# Patient Record
Sex: MALE | Race: WHITE | NOT HISPANIC OR LATINO | Employment: OTHER | URBAN - METROPOLITAN AREA
[De-identification: names, ages, dates, MRNs, and addresses within clinical notes are randomized per-mention and may not be internally consistent; named-entity substitution may affect disease eponyms.]

---

## 2023-12-11 ENCOUNTER — APPOINTMENT (OUTPATIENT)
Dept: LAB | Facility: LAB | Age: 68
End: 2023-12-11
Payer: MEDICARE

## 2023-12-11 ENCOUNTER — OFFICE VISIT (OUTPATIENT)
Dept: CARDIOLOGY | Facility: CLINIC | Age: 68
End: 2023-12-11
Payer: MEDICARE

## 2023-12-11 VITALS
OXYGEN SATURATION: 98 % | BODY MASS INDEX: 27.23 KG/M2 | HEART RATE: 62 BPM | DIASTOLIC BLOOD PRESSURE: 68 MMHG | SYSTOLIC BLOOD PRESSURE: 138 MMHG | RESPIRATION RATE: 16 BRPM | HEIGHT: 75 IN | WEIGHT: 219 LBS

## 2023-12-11 DIAGNOSIS — R00.2 PALPITATIONS: ICD-10-CM

## 2023-12-11 DIAGNOSIS — E11.9 DIABETES MELLITUS TYPE II, NON INSULIN DEPENDENT (MULTI): ICD-10-CM

## 2023-12-11 DIAGNOSIS — E78.2 MIXED HYPERLIPIDEMIA: ICD-10-CM

## 2023-12-11 DIAGNOSIS — I25.10 CORONARY ARTERY DISEASE INVOLVING NATIVE CORONARY ARTERY OF NATIVE HEART WITHOUT ANGINA PECTORIS: ICD-10-CM

## 2023-12-11 DIAGNOSIS — I10 PRIMARY HYPERTENSION: ICD-10-CM

## 2023-12-11 PROCEDURE — 3075F SYST BP GE 130 - 139MM HG: CPT | Performed by: INTERNAL MEDICINE

## 2023-12-11 PROCEDURE — 99214 OFFICE O/P EST MOD 30 MIN: CPT | Performed by: INTERNAL MEDICINE

## 2023-12-11 PROCEDURE — 1159F MED LIST DOCD IN RCRD: CPT | Performed by: INTERNAL MEDICINE

## 2023-12-11 PROCEDURE — 1036F TOBACCO NON-USER: CPT | Performed by: INTERNAL MEDICINE

## 2023-12-11 PROCEDURE — 1126F AMNT PAIN NOTED NONE PRSNT: CPT | Performed by: INTERNAL MEDICINE

## 2023-12-11 PROCEDURE — 3078F DIAST BP <80 MM HG: CPT | Performed by: INTERNAL MEDICINE

## 2023-12-11 RX ORDER — ATORVASTATIN CALCIUM 40 MG/1
40 TABLET, FILM COATED ORAL NIGHTLY
Qty: 90 TABLET | Refills: 3 | Status: SHIPPED | OUTPATIENT
Start: 2023-12-11 | End: 2024-12-10

## 2023-12-11 RX ORDER — METOPROLOL SUCCINATE 100 MG/1
100 TABLET, EXTENDED RELEASE ORAL DAILY
Qty: 90 TABLET | Refills: 3 | Status: SHIPPED | OUTPATIENT
Start: 2023-12-11 | End: 2024-12-10

## 2023-12-11 RX ORDER — AMLODIPINE BESYLATE 10 MG/1
10 TABLET ORAL DAILY
Qty: 90 TABLET | Refills: 3 | Status: SHIPPED | OUTPATIENT
Start: 2023-12-11 | End: 2024-12-10

## 2023-12-11 ASSESSMENT — LIFESTYLE VARIABLES
HOW OFTEN DO YOU HAVE SIX OR MORE DRINKS ON ONE OCCASION: NEVER
SKIP TO QUESTIONS 9-10: 1
HOW OFTEN DO YOU HAVE A DRINK CONTAINING ALCOHOL: MONTHLY OR LESS
HOW MANY STANDARD DRINKS CONTAINING ALCOHOL DO YOU HAVE ON A TYPICAL DAY: 1 OR 2
AUDIT-C TOTAL SCORE: 1

## 2023-12-11 ASSESSMENT — ENCOUNTER SYMPTOMS
OCCASIONAL FEELINGS OF UNSTEADINESS: 0
DEPRESSION: 0
LOSS OF SENSATION IN FEET: 0

## 2023-12-11 ASSESSMENT — PAIN SCALES - GENERAL: PAINLEVEL: 0-NO PAIN

## 2023-12-11 NOTE — PROGRESS NOTES
Subjective   Indu Corral is a 68 y.o. male.    Chief Complaint:  INDU CORRAL is being seen for a six month follow-up of coronary artery disease, dyslipidemia, dyspnea, hypertension, a prior myocardial infarction and a routine medication evaluation1   HPI  This is a 69 y/o male here today for a six month Cardiology follow up visit. He complains of increased heart palpitations over the past three weeks. He denies chest pain or sob. Reviewed lab work results and medications. A NM Stress test was done [id number]- see report. A Cardiac cath and Echo were done in June 2021- see reports.     ROS    Objective   Physical Exam  Patient is an obese-appearing 69 y/o male in no distress.   JVP not elevated. Carotid impulses are 2+ without overlying bruit.   Chest exhibits fair to good air movement with completely clear breath sounds.   The cardiac rhythm is regular with no premature beats.   Normal S1 and S2. No gallop, murmur or rub, or click.   Abdomen is soft and benign without focal tenderness.   No peripheral edema. The pedal pulses are intact.  All other systems have been reviewed and are negative for complaints.    Lab Review:   No visits with results within 6 Month(s) from this visit.   Latest known visit with results is:   Legacy Encounter on 12/30/2022   Component Date Value    Flu A By PCR 12/30/2022 NEGATIVE     Flu B By PCR 12/30/2022 NEGATIVE     Sars-Cov-2 By PCR 12/30/2022 Positive for COVID-19 (A)     Disclaimer 12/30/2022                      Value:Influenza A and Influenza B negative results should be considered   presumptive in samples that have a positive SARS-CoV-2 result.       This test has been authorized by FDA under an EUA for use by CLIA   Certified Moderate and High-Complexity laboratories and Point of Care   (POC), i.e., in patient care settings operating under a CLIA   Certificate of Waiver, Certificate of Compliance, or Certificate of   Accreditation.       This test has been authorized only for the  simultaneous qualitative   detection and differentiation of nucleic acid from SARS-CoV-2,   influenza A virus, and influenza B virus and not for any other virues   or pathogens.       This test is only authorized for the duration of the declaration that   circumstances exist justifying the authorization of emergency use of   in vitro diagnostic tests for the detection and/or diagnosis of   COVID-19, unless the authorization is terminated or revoked sooner.  Performed at [facility] [place], [address] [place] [id number]         Assessment/Plan   1. CAD. Patient was seen at [facility] 5/30/2021 with a 36 to 48-hour episode of vague upper abdominal and chest discomfort extending into his back. High-sensitivity troponin positive for non-STEMI with EKG demonstrating no ST segment abnormalities in the emergency room. He had diagnostic coronary angiography. It demonstrated mild calcification and diffuse mild to moderate irregularities throughout the proximal third of the LAD with mild luminal irregularities of the mid and distal vessel. There is a severe lesion in a very tiny and diagonal branch. The large dominant left circumflex has scattered mild luminal irregularities. A relatively small caliber nondominant right coronary artery supplying the mainly right ventricular myocardium is a moderate 50% mid vessel lesion. The left ventricular end-diastolic volume is in upper range of normal and there is a low normal left ventricular cyst solid contractility. Study reviewed by interventional cardiology and will recommend medical therapy. Brilinta was continued, but at this point may be discontinued. Patient is in cardiac rehab. Patient continues to have anginal symptoms with shortness of breath and palpitations. Will have patient complete exercise nuclear stress testing. Echocardiogram done at the time showed mild concentric LVH, EF 60 to 64%, left atrial size mildly dilated, mild AI, trace MR, trivial to mild TR. Had exercise  nuclear medicine stress test done 09/2021 that showed suspicion of infarct along the mid anterior wall, but ecg showed no ischemia at submax workload.  The patient is doing well at present.  His only symptom has been rare palpitations over the past 3 weeks but the duration is very brief less than 5 seconds.     2. Hyperlipidemia. Well-controlled on atorvastatin 40 mg daily.  Recent lipid panel from 12/2/2022 was excellent cholesterol 99 LDL 39 HDL 26 triglyceride 167.  SMA panel normal except glucose 142.  Hematocrit 38.5.  He will require repeat CBC CMP lipid panel A1c prior to next visit.     3. History of COVID-19 vaccine #1 and #2.     4. Hypertension. Adequately controlled on current medications.

## 2023-12-18 ENCOUNTER — PHARMACY VISIT (OUTPATIENT)
Dept: PHARMACY | Facility: CLINIC | Age: 68
End: 2023-12-18

## 2023-12-18 PROCEDURE — RXMED WILLOW AMBULATORY MEDICATION CHARGE

## 2024-02-02 ENCOUNTER — PHARMACY VISIT (OUTPATIENT)
Dept: PHARMACY | Facility: CLINIC | Age: 69
End: 2024-02-02

## 2024-02-02 PROCEDURE — RXMED WILLOW AMBULATORY MEDICATION CHARGE

## 2024-03-18 PROCEDURE — RXMED WILLOW AMBULATORY MEDICATION CHARGE

## 2024-03-19 ENCOUNTER — PHARMACY VISIT (OUTPATIENT)
Dept: PHARMACY | Facility: CLINIC | Age: 69
End: 2024-03-19
Payer: COMMERCIAL

## 2024-04-18 ENCOUNTER — LAB (OUTPATIENT)
Dept: LAB | Facility: LAB | Age: 69
End: 2024-04-18
Payer: MEDICARE

## 2024-04-18 DIAGNOSIS — I25.10 CORONARY ARTERY DISEASE INVOLVING NATIVE CORONARY ARTERY OF NATIVE HEART WITHOUT ANGINA PECTORIS: ICD-10-CM

## 2024-04-18 DIAGNOSIS — R00.2 PALPITATIONS: ICD-10-CM

## 2024-04-18 DIAGNOSIS — E11.9 DIABETES MELLITUS TYPE II, NON INSULIN DEPENDENT (MULTI): ICD-10-CM

## 2024-04-18 DIAGNOSIS — E78.2 MIXED HYPERLIPIDEMIA: ICD-10-CM

## 2024-04-18 LAB
ALBUMIN SERPL BCP-MCNC: 3.7 G/DL (ref 3.4–5)
ALP SERPL-CCNC: 113 U/L (ref 33–136)
ALT SERPL W P-5'-P-CCNC: 13 U/L (ref 10–52)
ANION GAP SERPL CALC-SCNC: 12 MMOL/L (ref 10–20)
AST SERPL W P-5'-P-CCNC: 25 U/L (ref 9–39)
BILIRUB SERPL-MCNC: 0.7 MG/DL (ref 0–1.2)
BUN SERPL-MCNC: 16 MG/DL (ref 6–23)
CALCIUM SERPL-MCNC: 8.9 MG/DL (ref 8.6–10.6)
CHLORIDE SERPL-SCNC: 105 MMOL/L (ref 98–107)
CHOLEST SERPL-MCNC: 136 MG/DL (ref 0–199)
CHOLESTEROL/HDL RATIO: 6.1
CO2 SERPL-SCNC: 27 MMOL/L (ref 21–32)
CREAT SERPL-MCNC: 1.07 MG/DL (ref 0.5–1.3)
EGFRCR SERPLBLD CKD-EPI 2021: 75 ML/MIN/1.73M*2
ERYTHROCYTE [DISTWIDTH] IN BLOOD BY AUTOMATED COUNT: 21 % (ref 11.5–14.5)
EST. AVERAGE GLUCOSE BLD GHB EST-MCNC: 169 MG/DL
GLUCOSE SERPL-MCNC: 137 MG/DL (ref 74–99)
HBA1C MFR BLD: 7.5 %
HCT VFR BLD AUTO: 30.7 % (ref 41–52)
HDLC SERPL-MCNC: 22.3 MG/DL
HGB BLD-MCNC: 8.5 G/DL (ref 13.5–17.5)
LDLC SERPL CALC-MCNC: 92 MG/DL
MCH RBC QN AUTO: 19.2 PG (ref 26–34)
MCHC RBC AUTO-ENTMCNC: 27.7 G/DL (ref 32–36)
MCV RBC AUTO: 69 FL (ref 80–100)
NON HDL CHOLESTEROL: 114 MG/DL (ref 0–149)
NRBC BLD-RTO: 0 /100 WBCS (ref 0–0)
PLATELET # BLD AUTO: 351 X10*3/UL (ref 150–450)
POTASSIUM SERPL-SCNC: 4.4 MMOL/L (ref 3.5–5.3)
PROT SERPL-MCNC: 7.4 G/DL (ref 6.4–8.2)
RBC # BLD AUTO: 4.43 X10*6/UL (ref 4.5–5.9)
SODIUM SERPL-SCNC: 140 MMOL/L (ref 136–145)
TRIGL SERPL-MCNC: 109 MG/DL (ref 0–149)
VLDL: 22 MG/DL (ref 0–40)
WBC # BLD AUTO: 8.6 X10*3/UL (ref 4.4–11.3)

## 2024-04-18 PROCEDURE — 83036 HEMOGLOBIN GLYCOSYLATED A1C: CPT

## 2024-04-18 PROCEDURE — 36415 COLL VENOUS BLD VENIPUNCTURE: CPT

## 2024-04-18 PROCEDURE — 80061 LIPID PANEL: CPT

## 2024-04-18 PROCEDURE — 85027 COMPLETE CBC AUTOMATED: CPT

## 2024-04-18 PROCEDURE — 80053 COMPREHEN METABOLIC PANEL: CPT

## 2024-06-06 ENCOUNTER — LAB (OUTPATIENT)
Dept: LAB | Facility: LAB | Age: 69
End: 2024-06-06
Payer: MEDICARE

## 2024-06-06 ENCOUNTER — OFFICE VISIT (OUTPATIENT)
Dept: PRIMARY CARE | Facility: CLINIC | Age: 69
End: 2024-06-06
Payer: MEDICARE

## 2024-06-06 VITALS
WEIGHT: 205 LBS | DIASTOLIC BLOOD PRESSURE: 72 MMHG | HEIGHT: 75 IN | SYSTOLIC BLOOD PRESSURE: 138 MMHG | BODY MASS INDEX: 25.49 KG/M2

## 2024-06-06 DIAGNOSIS — R63.4 WEIGHT LOSS: ICD-10-CM

## 2024-06-06 DIAGNOSIS — E78.5 HYPERLIPIDEMIA, UNSPECIFIED HYPERLIPIDEMIA TYPE: ICD-10-CM

## 2024-06-06 DIAGNOSIS — I10 PRIMARY HYPERTENSION: ICD-10-CM

## 2024-06-06 DIAGNOSIS — R10.84 GENERALIZED ABDOMINAL PAIN: ICD-10-CM

## 2024-06-06 DIAGNOSIS — D64.9 ANEMIA, UNSPECIFIED TYPE: ICD-10-CM

## 2024-06-06 DIAGNOSIS — K21.9 GASTROESOPHAGEAL REFLUX DISEASE WITHOUT ESOPHAGITIS: Primary | ICD-10-CM

## 2024-06-06 PROBLEM — E55.9 VITAMIN D DEFICIENCY: Status: ACTIVE | Noted: 2024-06-06

## 2024-06-06 PROBLEM — R07.89 TIGHT CHEST: Status: ACTIVE | Noted: 2024-06-06

## 2024-06-06 PROBLEM — K92.1 BLOOD IN STOOL: Status: ACTIVE | Noted: 2024-06-06

## 2024-06-06 PROBLEM — F41.9 ANXIETY: Status: ACTIVE | Noted: 2024-06-06

## 2024-06-06 PROBLEM — I21.4 ACUTE NON-ST ELEVATION MYOCARDIAL INFARCTION (NSTEMI) (MULTI): Status: ACTIVE | Noted: 2024-06-06
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
## 2024-06-06 PROBLEM — I25.10 CAD (CORONARY ARTERY DISEASE): Status: ACTIVE | Noted: 2024-06-06

## 2024-06-06 PROBLEM — M10.9 GOUT: Status: ACTIVE | Noted: 2024-06-06

## 2024-06-06 PROBLEM — R00.2 PALPITATIONS: Status: ACTIVE | Noted: 2023-12-11

## 2024-06-06 PROBLEM — E11.9 TYPE 2 DIABETES MELLITUS (MULTI): Status: ACTIVE | Noted: 2023-12-11

## 2024-06-06 PROBLEM — R06.02 SOB (SHORTNESS OF BREATH) ON EXERTION: Status: ACTIVE | Noted: 2024-06-06

## 2024-06-06 LAB
ALBUMIN SERPL BCP-MCNC: 3.8 G/DL (ref 3.4–5)
ALP SERPL-CCNC: 142 U/L (ref 33–136)
ALT SERPL W P-5'-P-CCNC: 12 U/L (ref 10–52)
ANION GAP SERPL CALC-SCNC: 18 MMOL/L (ref 10–20)
APPEARANCE UR: CLEAR
AST SERPL W P-5'-P-CCNC: 21 U/L (ref 9–39)
BILIRUB SERPL-MCNC: 0.7 MG/DL (ref 0–1.2)
BILIRUB UR STRIP.AUTO-MCNC: NEGATIVE MG/DL
BUN SERPL-MCNC: 18 MG/DL (ref 6–23)
CALCIUM SERPL-MCNC: 9.1 MG/DL (ref 8.6–10.6)
CHLORIDE SERPL-SCNC: 102 MMOL/L (ref 98–107)
CHOLEST SERPL-MCNC: 154 MG/DL (ref 0–199)
CHOLESTEROL/HDL RATIO: 5.7
CO2 SERPL-SCNC: 25 MMOL/L (ref 21–32)
COLOR UR: YELLOW
CREAT SERPL-MCNC: 1.1 MG/DL (ref 0.5–1.3)
EGFRCR SERPLBLD CKD-EPI 2021: 73 ML/MIN/1.73M*2
ERYTHROCYTE [DISTWIDTH] IN BLOOD BY AUTOMATED COUNT: 21.2 % (ref 11.5–14.5)
GLUCOSE SERPL-MCNC: 132 MG/DL (ref 74–99)
GLUCOSE UR STRIP.AUTO-MCNC: NORMAL MG/DL
HCT VFR BLD AUTO: 31.5 % (ref 41–52)
HDLC SERPL-MCNC: 27.1 MG/DL
HGB BLD-MCNC: 8.6 G/DL (ref 13.5–17.5)
IRON SERPL-MCNC: 21 UG/DL (ref 35–150)
KETONES UR STRIP.AUTO-MCNC: NEGATIVE MG/DL
LEUKOCYTE ESTERASE UR QL STRIP.AUTO: NEGATIVE
MCH RBC QN AUTO: 18.3 PG (ref 26–34)
MCHC RBC AUTO-ENTMCNC: 27.3 G/DL (ref 32–36)
MCV RBC AUTO: 67 FL (ref 80–100)
MUCOUS THREADS #/AREA URNS AUTO: NORMAL /LPF
NITRITE UR QL STRIP.AUTO: NEGATIVE
NON-HDL CHOLESTEROL: 127 MG/DL (ref 0–149)
NRBC BLD-RTO: 0 /100 WBCS (ref 0–0)
PH UR STRIP.AUTO: 5.5 [PH]
PLATELET # BLD AUTO: 395 X10*3/UL (ref 150–450)
POTASSIUM SERPL-SCNC: 4.6 MMOL/L (ref 3.5–5.3)
PROT SERPL-MCNC: 7.9 G/DL (ref 6.4–8.2)
PROT UR STRIP.AUTO-MCNC: ABNORMAL MG/DL
RBC # BLD AUTO: 4.69 X10*6/UL (ref 4.5–5.9)
RBC # UR STRIP.AUTO: NEGATIVE /UL
RBC #/AREA URNS AUTO: NORMAL /HPF
SODIUM SERPL-SCNC: 140 MMOL/L (ref 136–145)
SP GR UR STRIP.AUTO: 1.03
TSH SERPL-ACNC: 2.17 MIU/L (ref 0.44–3.98)
UROBILINOGEN UR STRIP.AUTO-MCNC: NORMAL MG/DL
VIT B12 SERPL-MCNC: 433 PG/ML (ref 211–911)
WBC # BLD AUTO: 9.9 X10*3/UL (ref 4.4–11.3)
WBC #/AREA URNS AUTO: NORMAL /HPF

## 2024-06-06 PROCEDURE — 84443 ASSAY THYROID STIM HORMONE: CPT

## 2024-06-06 PROCEDURE — 81001 URINALYSIS AUTO W/SCOPE: CPT

## 2024-06-06 PROCEDURE — 1036F TOBACCO NON-USER: CPT | Performed by: INTERNAL MEDICINE

## 2024-06-06 PROCEDURE — 82465 ASSAY BLD/SERUM CHOLESTEROL: CPT

## 2024-06-06 PROCEDURE — 36415 COLL VENOUS BLD VENIPUNCTURE: CPT

## 2024-06-06 PROCEDURE — 3051F HG A1C>EQUAL 7.0%<8.0%: CPT | Performed by: INTERNAL MEDICINE

## 2024-06-06 PROCEDURE — 3075F SYST BP GE 130 - 139MM HG: CPT | Performed by: INTERNAL MEDICINE

## 2024-06-06 PROCEDURE — 3048F LDL-C <100 MG/DL: CPT | Performed by: INTERNAL MEDICINE

## 2024-06-06 PROCEDURE — RXMED WILLOW AMBULATORY MEDICATION CHARGE

## 2024-06-06 PROCEDURE — 80053 COMPREHEN METABOLIC PANEL: CPT

## 2024-06-06 PROCEDURE — 83540 ASSAY OF IRON: CPT

## 2024-06-06 PROCEDURE — 85027 COMPLETE CBC AUTOMATED: CPT

## 2024-06-06 PROCEDURE — 99214 OFFICE O/P EST MOD 30 MIN: CPT | Performed by: INTERNAL MEDICINE

## 2024-06-06 PROCEDURE — 83718 ASSAY OF LIPOPROTEIN: CPT

## 2024-06-06 PROCEDURE — 1159F MED LIST DOCD IN RCRD: CPT | Performed by: INTERNAL MEDICINE

## 2024-06-06 PROCEDURE — 3078F DIAST BP <80 MM HG: CPT | Performed by: INTERNAL MEDICINE

## 2024-06-06 PROCEDURE — 82607 VITAMIN B-12: CPT

## 2024-06-06 RX ORDER — OMEPRAZOLE 40 MG/1
40 CAPSULE, DELAYED RELEASE ORAL
Qty: 90 CAPSULE | Refills: 0 | Status: SHIPPED | OUTPATIENT
Start: 2024-06-06 | End: 2025-06-06

## 2024-06-06 ASSESSMENT — ENCOUNTER SYMPTOMS
LOSS OF SENSATION IN FEET: 0
OCCASIONAL FEELINGS OF UNSTEADINESS: 0
DEPRESSION: 0

## 2024-06-07 NOTE — PROGRESS NOTES
"Subjective   Patient ID: Roma Corral is a 69 y.o. male who presents for Follow-up.    This 69-year-old white man today came to my office for abdominal discomfort, pain, weight loss.  Feeling weak, tired, fatigued, and exhausted.  Not feeling good.  He saw cardiologist a few days ago.  Some blood work was done.  He came here for follow-up.    I have personally reviewed the patient's Past Medical History, Medications, Allergies, Social History, and Family History in the EMR.    CURRENT MEDICATIONS: Lopid.    HEALTH MAINTENANCE:  He has never had a colonoscopy.    Review of Systems   All other systems reviewed and are negative.  He has never had a heart attack.  No stroke.  No diabetes.  No cancer.    Objective   /72   Ht 1.905 m (6' 3\")   Wt 93 kg (205 lb)   BMI 25.62 kg/m²     Physical Exam  Vitals reviewed.   Cardiovascular:      Heart sounds: Normal heart sounds, S1 normal and S2 normal. No murmur heard.     No friction rub.   Pulmonary:      Effort: Pulmonary effort is normal.      Breath sounds: Normal breath sounds and air entry.   Abdominal:      Palpations: There is no hepatomegaly, splenomegaly or mass.   Musculoskeletal:      Right lower leg: No edema.      Left lower leg: No edema.   Lymphadenopathy:      Lower Body: No right inguinal adenopathy. No left inguinal adenopathy.   Neurological:      Cranial Nerves: Cranial nerves 2-12 are intact.      Sensory: No sensory deficit.      Motor: Motor function is intact.      Deep Tendon Reflexes: Reflexes are normal and symmetric.     LAB WORK:  Laboratory testing discussed.    Assessment/Plan   Problem List Items Addressed This Visit             ICD-10-CM       Cardiac and Vasculature    Hyperlipidemia E78.5    Relevant Orders    Comprehensive Metabolic Panel (Completed)    Lipid Panel Non-Fasting (Completed)    Hypertension I10    Relevant Orders    CBC (Completed)    Urinalysis with Reflex Microscopic (Completed)    Thyroid Stimulating Hormone " (Completed)     Other Visit Diagnoses         Codes    Gastroesophageal reflux disease without esophagitis    -  Primary K21.9    Generalized abdominal pain     R10.84    Relevant Medications    omeprazole (PriLOSEC) 40 mg DR capsule    Other Relevant Orders    CT abdomen pelvis w IV contrast    Colonoscopy Screening; Average Risk Patient    Referral to Gastroenterology    Esophagogastroduodenoscopy (EGD)    Weight loss     R63.4    Relevant Orders    CT abdomen pelvis w IV contrast    Colonoscopy Screening; Average Risk Patient    Referral to Gastroenterology    Esophagogastroduodenoscopy (EGD)    Anemia, unspecified type     D64.9    Relevant Orders    Iron (Completed)    Vitamin B12 (Completed)    Colonoscopy Screening; Average Risk Patient    Referral to Gastroenterology    Esophagogastroduodenoscopy (EGD)        1. Abdominal pain, discomfort.  Now immediate CT scan ordered.  2. Anemia.  Anemia panel ordered.  I am going to set up upper and lower endoscopy for anemia.  3. Possible diabetes.  I will check hemoglobin A1c.  4. Hypertension, okay.  5. High cholesterol, okay.  6. GERD, on PPI.  7. History of heart disease.  He sees cardiologist.  8. Complete check-up ordered.  9. I urged him to see me in a couple of days after testing.  10. Everything will be done on an expedited manner.    Scribe Attestation  By signing my name below, I, Marnie Rowell attest that this documentation has been prepared under the direction and in the presence of Santiago Buckner MD.

## 2024-06-10 ENCOUNTER — HOSPITAL ENCOUNTER (OUTPATIENT)
Dept: RADIOLOGY | Facility: CLINIC | Age: 69
Discharge: HOME | End: 2024-06-10
Payer: MEDICARE

## 2024-06-10 DIAGNOSIS — R10.84 GENERALIZED ABDOMINAL PAIN: ICD-10-CM

## 2024-06-10 DIAGNOSIS — R63.4 WEIGHT LOSS: ICD-10-CM

## 2024-06-10 PROCEDURE — 2550000001 HC RX 255 CONTRASTS: Performed by: INTERNAL MEDICINE

## 2024-06-10 PROCEDURE — 74177 CT ABD & PELVIS W/CONTRAST: CPT | Performed by: RADIOLOGY

## 2024-06-10 PROCEDURE — 74177 CT ABD & PELVIS W/CONTRAST: CPT

## 2024-06-10 RX ADMIN — IOHEXOL 75 ML: 350 INJECTION, SOLUTION INTRAVENOUS at 14:11

## 2024-06-11 ENCOUNTER — OFFICE VISIT (OUTPATIENT)
Dept: PRIMARY CARE | Facility: CLINIC | Age: 69
End: 2024-06-11
Payer: MEDICARE

## 2024-06-11 ENCOUNTER — LAB (OUTPATIENT)
Dept: LAB | Facility: LAB | Age: 69
End: 2024-06-11
Payer: MEDICARE

## 2024-06-11 ENCOUNTER — TELEPHONE (OUTPATIENT)
Dept: PRIMARY CARE | Facility: CLINIC | Age: 69
End: 2024-06-11

## 2024-06-11 VITALS
WEIGHT: 206 LBS | HEIGHT: 75 IN | SYSTOLIC BLOOD PRESSURE: 118 MMHG | BODY MASS INDEX: 25.61 KG/M2 | DIASTOLIC BLOOD PRESSURE: 64 MMHG

## 2024-06-11 DIAGNOSIS — R97.8 OTHER ABNORMAL TUMOR MARKERS: ICD-10-CM

## 2024-06-11 DIAGNOSIS — R19.04 LEFT LOWER QUADRANT ABDOMINAL MASS: Primary | ICD-10-CM

## 2024-06-11 DIAGNOSIS — C18.9 MALIGNANT NEOPLASM OF COLON, UNSPECIFIED PART OF COLON (MULTI): ICD-10-CM

## 2024-06-11 DIAGNOSIS — K63.89 COLONIC MASS: Primary | ICD-10-CM

## 2024-06-11 DIAGNOSIS — K63.89 MASS OF COLON: ICD-10-CM

## 2024-06-11 LAB
CANCER AG125 SERPL-ACNC: 21 U/ML (ref 0–30.2)
CEA SERPL-MCNC: 893.1 UG/L

## 2024-06-11 PROCEDURE — 86304 IMMUNOASSAY TUMOR CA 125: CPT

## 2024-06-11 PROCEDURE — 36415 COLL VENOUS BLD VENIPUNCTURE: CPT

## 2024-06-11 PROCEDURE — 99214 OFFICE O/P EST MOD 30 MIN: CPT | Performed by: INTERNAL MEDICINE

## 2024-06-11 PROCEDURE — 3078F DIAST BP <80 MM HG: CPT | Performed by: INTERNAL MEDICINE

## 2024-06-11 PROCEDURE — 1159F MED LIST DOCD IN RCRD: CPT | Performed by: INTERNAL MEDICINE

## 2024-06-11 PROCEDURE — 3051F HG A1C>EQUAL 7.0%<8.0%: CPT | Performed by: INTERNAL MEDICINE

## 2024-06-11 PROCEDURE — 3074F SYST BP LT 130 MM HG: CPT | Performed by: INTERNAL MEDICINE

## 2024-06-11 PROCEDURE — 3048F LDL-C <100 MG/DL: CPT | Performed by: INTERNAL MEDICINE

## 2024-06-11 PROCEDURE — 82378 CARCINOEMBRYONIC ANTIGEN: CPT

## 2024-06-11 PROCEDURE — 4010F ACE/ARB THERAPY RXD/TAKEN: CPT | Performed by: INTERNAL MEDICINE

## 2024-06-11 RX ORDER — LOSARTAN POTASSIUM 100 MG/1
100 TABLET ORAL DAILY
COMMUNITY

## 2024-06-11 RX ORDER — NAPROXEN SODIUM 220 MG/1
81 TABLET, FILM COATED ORAL ONCE
COMMUNITY

## 2024-06-11 ASSESSMENT — ENCOUNTER SYMPTOMS
OCCASIONAL FEELINGS OF UNSTEADINESS: 0
LOSS OF SENSATION IN FEET: 0
DEPRESSION: 0

## 2024-06-12 NOTE — PROGRESS NOTES
"Subjective   Patient ID: Roma Corral is a 69 y.o. male who presents for Follow-up (various conditions).    Roma Corral today came here for multiple medical issues.  Very tired, fatigued, exhausted.  He had blood work and CT scan done.  He came for follow-up on various conditions, not feeling good.    I have personally reviewed the patient's Past Medical History, Medications, Allergies, Social History, and Family History in the EMR.    Review of Systems   All other systems reviewed and are negative.    Objective   /64   Ht 1.905 m (6' 3\")   Wt 93.4 kg (206 lb)   BMI 25.75 kg/m²     Physical Exam  Vitals reviewed.   Cardiovascular:      Heart sounds: Normal heart sounds, S1 normal and S2 normal. No murmur heard.     No friction rub.   Pulmonary:      Effort: Pulmonary effort is normal.      Breath sounds: Normal breath sounds and air entry.   Abdominal:      Tenderness: There is abdominal tenderness (diffuse).   Musculoskeletal:      Right lower leg: No edema.      Left lower leg: No edema.   Lymphadenopathy:      Lower Body: No right inguinal adenopathy. No left inguinal adenopathy.   Neurological:      Cranial Nerves: Cranial nerves 2-12 are intact.      Sensory: No sensory deficit.      Motor: Motor function is intact.      Deep Tendon Reflexes: Reflexes are normal and symmetric.     LAB WORK: Laboratory testing discussed.    Assessment/Plan   Problem List Items Addressed This Visit    None  Visit Diagnoses         Codes    Left lower quadrant abdominal mass    -  Primary R19.04    Mass of colon     K63.89    Relevant Orders    US guided needle liver biopsy    Referral to Colorectal Surgery    Malignant neoplasm of colon, unspecified part of colon (Multi)     C18.9    Relevant Orders    CEA (Completed)     (Completed)    Other abnormal tumor markers     R97.8    Relevant Orders     (Completed)        1. Left lower quadrant mass and sigmoid.  Needs colonoscopy right away.  2. Symptomatic anemia.  " Monitor.  3. Possible colon secondaries.  I ordered biopsy.  4. Cancer marker, blood work ordered.  5. I am going to do everything at aggressive speed to get results.  Meanwhile soft liquid diet.  I will keep an eye.    Scribe Attestation  By signing my name below, I, Luz Cowan, Marnie attest that this documentation has been prepared under the direction and in the presence of Santiago Buckner MD.

## 2024-06-12 NOTE — PROGRESS NOTES
"History Of Present Illness  Roma Corral is a 69 y.o. male presenting with Colon Mass. Referral from Dr. Buckner.     Office Note: 6/6/2024    Patient ID: Roma Corral is a 69 y.o. male who presents for Follow-up.     This 69-year-old white man today came to my office for abdominal discomfort, pain, weight loss.  Feeling weak, tired, fatigued, and exhausted.  Not feeling good.  He saw cardiologist a few days ago.  Some blood work was done.  He came here for follow-up.     I have personally reviewed the patient's Past Medical History, Medications, Allergies, Social History, and Family History in the EMR.     CURRENT MEDICATIONS: Lopid.     HEALTH MAINTENANCE:  He has never had a colonoscopy.     Review of Systems   All other systems reviewed and are negative.  He has never had a heart attack.  No stroke.  No diabetes.  No cancer.    Expand All Collapse All       Subjective   Patient ID: Roma Corral is a 69 y.o. male who presents for Follow-up.     This 69-year-old white man today came to my office for abdominal discomfort, pain, weight loss.  Feeling weak, tired, fatigued, and exhausted.  Not feeling good.  He saw cardiologist a few days ago.  Some blood work was done.  He came here for follow-up.     I have personally reviewed the patient's Past Medical History, Medications, Allergies, Social History, and Family History in the EMR.     CURRENT MEDICATIONS: Lopid.     HEALTH MAINTENANCE:  He has never had a colonoscopy.     Review of Systems   All other systems reviewed and are negative.  He has never had a heart attack.  No stroke.  No diabetes.  No cancer.           Objective   /72   Ht 1.905 m (6' 3\")   Wt 93 kg (205 lb)   BMI 25.62 kg/m²      Physical Exam  Vitals reviewed.   Cardiovascular:      Heart sounds: Normal heart sounds, S1 normal and S2 normal. No murmur heard.     No friction rub.   Pulmonary:      Effort: Pulmonary effort is normal.      Breath sounds: Normal breath sounds and air entry. "   Abdominal:      Palpations: There is no hepatomegaly, splenomegaly or mass.   Musculoskeletal:      Right lower leg: No edema.      Left lower leg: No edema.   Lymphadenopathy:      Lower Body: No right inguinal adenopathy. No left inguinal adenopathy.   Neurological:      Cranial Nerves: Cranial nerves 2-12 are intact.      Sensory: No sensory deficit.      Motor: Motor function is intact.      Deep Tendon Reflexes: Reflexes are normal and symmetric.      LAB WORK:  Laboratory testing discussed.           Assessment/Plan   Problem List Items Addressed This Visit               ICD-10-CM             Cardiac and Vasculature     Hyperlipidemia E78.5     Relevant Orders     Comprehensive Metabolic Panel (Completed)     Lipid Panel Non-Fasting (Completed)     Hypertension I10     Relevant Orders     CBC (Completed)     Urinalysis with Reflex Microscopic (Completed)     Thyroid Stimulating Hormone (Completed)      Other Visit Diagnoses           Codes     Gastroesophageal reflux disease without esophagitis    -  Primary K21.9     Generalized abdominal pain     R10.84     Relevant Medications     omeprazole (PriLOSEC) 40 mg DR capsule     Other Relevant Orders     CT abdomen pelvis w IV contrast     Colonoscopy Screening; Average Risk Patient     Referral to Gastroenterology     Esophagogastroduodenoscopy (EGD)     Weight loss     R63.4     Relevant Orders     CT abdomen pelvis w IV contrast     Colonoscopy Screening; Average Risk Patient     Referral to Gastroenterology     Esophagogastroduodenoscopy (EGD)     Anemia, unspecified type     D64.9     Relevant Orders     Iron (Completed)     Vitamin B12 (Completed)     Colonoscopy Screening; Average Risk Patient     Referral to Gastroenterology     Esophagogastroduodenoscopy (EGD)          1. Abdominal pain, discomfort.  Now immediate CT scan ordered.  2. Anemia.  Anemia panel ordered.  I am going to set up upper and lower endoscopy for anemia.  3. Possible diabetes.  I  will check hemoglobin A1c.  4. Hypertension, okay.  5. High cholesterol, okay.  6. GERD, on PPI.  7. History of heart disease.  He sees cardiologist.  8. Complete check-up ordered.  9. I urged him to see me in a couple of days after testing.  10. Everything will be done on an expedited manner.                   Imagin2024 CT abdomen pelvis w/ IV contrast  IMPRESSION:  1. New multiple confluent heterogenous ill-defined  right-greater-than-left hepatic lesions predominantly in segment V,  VI, and VII highly concerning for metastatic lesions. Recommend  further evaluation with tissue sampling.  2. Focal circumferential narrowing and wall thickening of the distal  sigmoid colon with adjacent subcentimeter  nodes concerning for colon  malignancy, primary site of disease. Recommend further evaluation  with colonoscopy.               Past Medical History  Past Medical History:   Diagnosis Date    High cholesterol     Hypertension        Surgical History  No past surgical history on file.     Social History  He reports that he has never smoked. He has never used smokeless tobacco. He reports that he does not currently use alcohol. He reports that he does not use drugs.    Family History  No family history on file.     Allergies  Patient has no known allergies.    Review of Systems     Physical Exam     Last Recorded Vitals  There were no vitals taken for this visit.    Relevant Results  {If you would like to pull in Medications, type .meds     If you would like to pull in Lab results for the last 24 hours, type .[id number]    If you would like to pull in Imaging results, type .imgrslt :99}      ***     Assessment/Plan   {Assess/PlanSmartLinks:19298}    ***           Lashawn Griffiths MA

## 2024-06-13 ENCOUNTER — APPOINTMENT (OUTPATIENT)
Dept: SURGERY | Facility: CLINIC | Age: 69
End: 2024-06-13
Payer: MEDICARE

## 2024-06-14 ENCOUNTER — OFFICE VISIT (OUTPATIENT)
Dept: SURGERY | Facility: CLINIC | Age: 69
End: 2024-06-14
Payer: MEDICARE

## 2024-06-14 VITALS
TEMPERATURE: 98.1 F | BODY MASS INDEX: 27.71 KG/M2 | SYSTOLIC BLOOD PRESSURE: 150 MMHG | HEIGHT: 72 IN | DIASTOLIC BLOOD PRESSURE: 77 MMHG | WEIGHT: 204.6 LBS | OXYGEN SATURATION: 99 % | HEART RATE: 74 BPM

## 2024-06-14 DIAGNOSIS — R16.0 MASS OF MULTIPLE SITES OF LIVER: ICD-10-CM

## 2024-06-14 DIAGNOSIS — K63.89 COLONIC MASS: Primary | ICD-10-CM

## 2024-06-14 PROCEDURE — 1125F AMNT PAIN NOTED PAIN PRSNT: CPT | Performed by: SURGERY

## 2024-06-14 PROCEDURE — 1036F TOBACCO NON-USER: CPT | Performed by: SURGERY

## 2024-06-14 PROCEDURE — 3077F SYST BP >= 140 MM HG: CPT | Performed by: SURGERY

## 2024-06-14 PROCEDURE — 3078F DIAST BP <80 MM HG: CPT | Performed by: SURGERY

## 2024-06-14 PROCEDURE — 1159F MED LIST DOCD IN RCRD: CPT | Performed by: SURGERY

## 2024-06-14 PROCEDURE — 3048F LDL-C <100 MG/DL: CPT | Performed by: SURGERY

## 2024-06-14 PROCEDURE — 4010F ACE/ARB THERAPY RXD/TAKEN: CPT | Performed by: SURGERY

## 2024-06-14 PROCEDURE — 3051F HG A1C>EQUAL 7.0%<8.0%: CPT | Performed by: SURGERY

## 2024-06-14 PROCEDURE — 99205 OFFICE O/P NEW HI 60 MIN: CPT | Performed by: SURGERY

## 2024-06-14 PROCEDURE — 99215 OFFICE O/P EST HI 40 MIN: CPT | Performed by: SURGERY

## 2024-06-14 ASSESSMENT — PAIN SCALES - GENERAL: PAINLEVEL: 6

## 2024-06-14 ASSESSMENT — PATIENT HEALTH QUESTIONNAIRE - PHQ9
1. LITTLE INTEREST OR PLEASURE IN DOING THINGS: NOT AT ALL
SUM OF ALL RESPONSES TO PHQ9 QUESTIONS 1 & 2: 0
2. FEELING DOWN, DEPRESSED OR HOPELESS: NOT AT ALL

## 2024-06-14 ASSESSMENT — COLUMBIA-SUICIDE SEVERITY RATING SCALE - C-SSRS
6. HAVE YOU EVER DONE ANYTHING, STARTED TO DO ANYTHING, OR PREPARED TO DO ANYTHING TO END YOUR LIFE?: NO
1. IN THE PAST MONTH, HAVE YOU WISHED YOU WERE DEAD OR WISHED YOU COULD GO TO SLEEP AND NOT WAKE UP?: NO
2. HAVE YOU ACTUALLY HAD ANY THOUGHTS OF KILLING YOURSELF?: NO

## 2024-06-14 ASSESSMENT — ENCOUNTER SYMPTOMS
DEPRESSION: 0
EYES NEGATIVE: 1
CARDIOVASCULAR NEGATIVE: 1
OCCASIONAL FEELINGS OF UNSTEADINESS: 0
RESPIRATORY NEGATIVE: 1
CONSTITUTIONAL NEGATIVE: 1
LOSS OF SENSATION IN FEET: 0
GASTROINTESTINAL NEGATIVE: 1

## 2024-06-14 NOTE — H&P (VIEW-ONLY)
History Of Present Illness  Roma Corral is a 69 y.o. male presenting with colon mass. Referral from      CT obtained for right upper quadrant vague abdominal pain.  Imaging: CT abdomen pelvis w IV contrast    IMPRESSION:  1. New multiple confluent heterogenous ill-defined  right-greater-than-left hepatic lesions predominantly in segment V,  VI, and VII highly concerning for metastatic lesions. Recommend  further evaluation with tissue sampling.  2. Focal circumferential narrowing and wall thickening of the distal  sigmoid colon with adjacent subcentimeter  nodes concerning for colon  malignancy, primary site of disease. Recommend further evaluation  with colonoscopy.      Reports he has had weight loss in the last month.  Occasional diarrhea.  No nausea vomiting bloating or significant constipation issues.      No prior abdominal surgeries.  Non-smoker.  No family history colorectal cancer.  No prior colonoscopy.        Past Medical History  He has a past medical history of High cholesterol and Hypertension.    Immunization History   Administered Date(s) Administered    Pfizer Purple Cap SARS-CoV-2 12/15/2021     Surgical History  He has no past surgical history on file.     Social History  He reports that he has never smoked. He has never used smokeless tobacco. He reports that he does not currently use alcohol. He reports that he does not use drugs.    Family History  His family history is not on file.     Allergies  Patient has no known allergies.      Review of Systems   Constitutional: Negative.    HENT: Negative.     Eyes: Negative.    Respiratory: Negative.     Cardiovascular: Negative.    Gastrointestinal: Negative.    Genitourinary: Negative.    Skin: Negative.    All other systems reviewed and are negative.       Physical Exam  Constitutional:       Appearance: Normal appearance.   HENT:      Head: Normocephalic.   Eyes:      Pupils: Pupils are equal, round, and reactive to light.    Cardiovascular:      Rate and Rhythm: Normal rate.   Pulmonary:      Effort: Pulmonary effort is normal.   Abdominal:      General: Abdomen is flat. Bowel sounds are normal.      Palpations: Abdomen is soft.   Skin:     General: Skin is warm.   Neurological:      General: No focal deficit present.      Mental Status: He is alert.            Assessment/Plan   Problem List Items Addressed This Visit             ICD-10-CM       Gastrointestinal and Abdominal    Colonic mass - Primary K63.89    Mass of multiple sites of liver R16.0       Imaging and history consistent with metastatic sigmoid colon cancer to multiple sites in the liver.  Significantly elevated CEA.  Plan for diagnostic colonoscopy next week with biopsies.  No evidence of colonic obstruction on the CT imaging or history today.  Referral to hematology oncology to discuss neoadjuvant treatment options.  Would benefit from referral to hepatobiliary surgery at some point in the future

## 2024-06-17 ENCOUNTER — ANESTHESIA (OUTPATIENT)
Dept: GASTROENTEROLOGY | Facility: HOSPITAL | Age: 69
End: 2024-06-17
Payer: MEDICARE

## 2024-06-17 ENCOUNTER — ANESTHESIA EVENT (OUTPATIENT)
Dept: GASTROENTEROLOGY | Facility: HOSPITAL | Age: 69
End: 2024-06-17
Payer: MEDICARE

## 2024-06-17 ENCOUNTER — APPOINTMENT (OUTPATIENT)
Dept: CARDIOLOGY | Facility: CLINIC | Age: 69
End: 2024-06-17
Payer: MEDICARE

## 2024-06-17 ENCOUNTER — HOSPITAL ENCOUNTER (OUTPATIENT)
Dept: GASTROENTEROLOGY | Facility: HOSPITAL | Age: 69
Discharge: HOME | End: 2024-06-17
Payer: MEDICARE

## 2024-06-17 VITALS
OXYGEN SATURATION: 98 % | HEART RATE: 62 BPM | DIASTOLIC BLOOD PRESSURE: 87 MMHG | TEMPERATURE: 98.2 F | RESPIRATION RATE: 15 BRPM | SYSTOLIC BLOOD PRESSURE: 113 MMHG

## 2024-06-17 DIAGNOSIS — K62.89 RECTAL MASS: ICD-10-CM

## 2024-06-17 DIAGNOSIS — C20 RECTAL CANCER (MULTI): ICD-10-CM

## 2024-06-17 DIAGNOSIS — R16.0 MASS OF MULTIPLE SITES OF LIVER: Primary | ICD-10-CM

## 2024-06-17 DIAGNOSIS — K63.89 MASS OF COLON: ICD-10-CM

## 2024-06-17 DIAGNOSIS — K63.89 COLONIC MASS: ICD-10-CM

## 2024-06-17 PROCEDURE — 7100000001 HC RECOVERY ROOM TIME - INITIAL BASE CHARGE

## 2024-06-17 PROCEDURE — 2500000005 HC RX 250 GENERAL PHARMACY W/O HCPCS: Performed by: NURSE ANESTHETIST, CERTIFIED REGISTERED

## 2024-06-17 PROCEDURE — A45331 PR SIGMOIDOSCOPY,BIOPSY: Performed by: NURSE ANESTHETIST, CERTIFIED REGISTERED

## 2024-06-17 PROCEDURE — RXMED WILLOW AMBULATORY MEDICATION CHARGE

## 2024-06-17 PROCEDURE — 7100000009 HC PHASE TWO TIME - INITIAL BASE CHARGE

## 2024-06-17 PROCEDURE — 2500000004 HC RX 250 GENERAL PHARMACY W/ HCPCS (ALT 636 FOR OP/ED): Performed by: NURSE ANESTHETIST, CERTIFIED REGISTERED

## 2024-06-17 PROCEDURE — 45331 SIGMOIDOSCOPY AND BIOPSY: CPT | Performed by: SURGERY

## 2024-06-17 PROCEDURE — 3700000002 HC GENERAL ANESTHESIA TIME - EACH INCREMENTAL 1 MINUTE

## 2024-06-17 PROCEDURE — 7100000002 HC RECOVERY ROOM TIME - EACH INCREMENTAL 1 MINUTE

## 2024-06-17 PROCEDURE — 7100000010 HC PHASE TWO TIME - EACH INCREMENTAL 1 MINUTE

## 2024-06-17 PROCEDURE — 3700000001 HC GENERAL ANESTHESIA TIME - INITIAL BASE CHARGE

## 2024-06-17 PROCEDURE — A45331 PR SIGMOIDOSCOPY,BIOPSY: Performed by: ANESTHESIOLOGY

## 2024-06-17 RX ORDER — PROPOFOL 10 MG/ML
INJECTION, EMULSION INTRAVENOUS AS NEEDED
Status: DISCONTINUED | OUTPATIENT
Start: 2024-06-17 | End: 2024-06-17

## 2024-06-17 RX ORDER — CHLORHEXIDINE GLUCONATE ORAL RINSE 1.2 MG/ML
SOLUTION DENTAL
Qty: 473 ML | Refills: 0 | Status: SHIPPED | OUTPATIENT
Start: 2024-06-17

## 2024-06-17 RX ORDER — NEOMYCIN SULFATE 500 MG/1
TABLET ORAL
Qty: 6 TABLET | Refills: 0 | Status: SHIPPED | OUTPATIENT
Start: 2024-06-17

## 2024-06-17 RX ORDER — HYDROMORPHONE HYDROCHLORIDE 0.2 MG/ML
0.2 INJECTION INTRAMUSCULAR; INTRAVENOUS; SUBCUTANEOUS EVERY 5 MIN PRN
Status: DISCONTINUED | OUTPATIENT
Start: 2024-06-17 | End: 2024-06-17 | Stop reason: HOSPADM

## 2024-06-17 RX ORDER — METRONIDAZOLE 250 MG/1
TABLET ORAL
Qty: 3 TABLET | Refills: 0 | Status: SHIPPED | OUTPATIENT
Start: 2024-06-17

## 2024-06-17 RX ORDER — GABAPENTIN 100 MG/1
100 CAPSULE ORAL NIGHTLY
Qty: 3 CAPSULE | Refills: 0 | Status: SHIPPED | OUTPATIENT
Start: 2024-06-17 | End: 2024-06-28 | Stop reason: ALTCHOICE

## 2024-06-17 RX ORDER — MEPERIDINE HYDROCHLORIDE 25 MG/ML
12.5 INJECTION INTRAMUSCULAR; INTRAVENOUS; SUBCUTANEOUS EVERY 10 MIN PRN
Status: DISCONTINUED | OUTPATIENT
Start: 2024-06-17 | End: 2024-06-17 | Stop reason: HOSPADM

## 2024-06-17 RX ORDER — SODIUM CHLORIDE, SODIUM LACTATE, POTASSIUM CHLORIDE, CALCIUM CHLORIDE 600; 310; 30; 20 MG/100ML; MG/100ML; MG/100ML; MG/100ML
100 INJECTION, SOLUTION INTRAVENOUS CONTINUOUS
Status: DISCONTINUED | OUTPATIENT
Start: 2024-06-17 | End: 2024-06-17 | Stop reason: HOSPADM

## 2024-06-17 RX ORDER — MIDAZOLAM HYDROCHLORIDE 1 MG/ML
INJECTION, SOLUTION INTRAMUSCULAR; INTRAVENOUS AS NEEDED
Status: DISCONTINUED | OUTPATIENT
Start: 2024-06-17 | End: 2024-06-17

## 2024-06-17 RX ORDER — SODIUM CHLORIDE, SODIUM LACTATE, POTASSIUM CHLORIDE, CALCIUM CHLORIDE 600; 310; 30; 20 MG/100ML; MG/100ML; MG/100ML; MG/100ML
INJECTION, SOLUTION INTRAVENOUS CONTINUOUS PRN
Status: DISCONTINUED | OUTPATIENT
Start: 2024-06-17 | End: 2024-06-17

## 2024-06-17 RX ORDER — LIDOCAINE HYDROCHLORIDE 10 MG/ML
0.1 INJECTION INFILTRATION; PERINEURAL ONCE
Status: DISCONTINUED | OUTPATIENT
Start: 2024-06-17 | End: 2024-06-17 | Stop reason: HOSPADM

## 2024-06-17 RX ORDER — ALBUTEROL SULFATE 0.83 MG/ML
2.5 SOLUTION RESPIRATORY (INHALATION) ONCE
Status: DISCONTINUED | OUTPATIENT
Start: 2024-06-17 | End: 2024-06-17 | Stop reason: HOSPADM

## 2024-06-17 RX ORDER — ONDANSETRON HYDROCHLORIDE 2 MG/ML
4 INJECTION, SOLUTION INTRAVENOUS ONCE AS NEEDED
Status: DISCONTINUED | OUTPATIENT
Start: 2024-06-17 | End: 2024-06-17 | Stop reason: HOSPADM

## 2024-06-17 RX ORDER — FENTANYL CITRATE 50 UG/ML
50 INJECTION, SOLUTION INTRAMUSCULAR; INTRAVENOUS EVERY 5 MIN PRN
Status: DISCONTINUED | OUTPATIENT
Start: 2024-06-17 | End: 2024-06-17 | Stop reason: HOSPADM

## 2024-06-17 RX ORDER — LIDOCAINE HYDROCHLORIDE 10 MG/ML
INJECTION INFILTRATION; PERINEURAL AS NEEDED
Status: DISCONTINUED | OUTPATIENT
Start: 2024-06-17 | End: 2024-06-17

## 2024-06-17 RX ORDER — MIDAZOLAM HYDROCHLORIDE 1 MG/ML
1 INJECTION, SOLUTION INTRAMUSCULAR; INTRAVENOUS ONCE AS NEEDED
Status: DISCONTINUED | OUTPATIENT
Start: 2024-06-17 | End: 2024-06-17 | Stop reason: HOSPADM

## 2024-06-17 ASSESSMENT — PATIENT HEALTH QUESTIONNAIRE - PHQ9
SUM OF ALL RESPONSES TO PHQ9 QUESTIONS 1 AND 2: 0
2. FEELING DOWN, DEPRESSED OR HOPELESS: NOT AT ALL
1. LITTLE INTEREST OR PLEASURE IN DOING THINGS: NOT AT ALL

## 2024-06-17 ASSESSMENT — PAIN SCALES - GENERAL
PAINLEVEL_OUTOF10: 0 - NO PAIN

## 2024-06-17 ASSESSMENT — PAIN - FUNCTIONAL ASSESSMENT
PAIN_FUNCTIONAL_ASSESSMENT: 0-10

## 2024-06-17 ASSESSMENT — ENCOUNTER SYMPTOMS: DEPRESSION: 0

## 2024-06-17 NOTE — ANESTHESIA PREPROCEDURE EVALUATION
Patient: Roma Corral    Procedure Information       Anesthesia Start Date/Time: 06/17/24 0755    Scheduled providers: Javier Vasquez MD; MELISSA Nava-[place]; Yang Perez DO    Procedures:       AMB REFERRAL TO COLORECTAL SURGERY      COLONOSCOPY    Location: [facility]            Relevant Problems   Anesthesia (within normal limits)      Cardiac   (+) Acute non-ST elevation myocardial infarction (NSTEMI) (Multi)   (+) CAD (coronary artery disease)   (+) Hyperlipidemia   (+) Hypertension      Pulmonary   (+) SOB (shortness of breath) on exertion      Neuro   (+) Anxiety      Endocrine   (+) Type 2 diabetes mellitus (Multi)       Clinical information reviewed:   Tobacco  Allergies  Meds  Problems  Med Hx  Surg Hx   Fam Hx  Soc   Hx        NPO Detail:  NPO/Void Status  Date of Last Liquid: 06/16/24  Time of Last Liquid: 2300  Date of Last Solid: 06/15/24  Time of Last Solid: 1800  Time of Last Void: 0600         Physical Exam    Airway  Mallampati: II  TM distance: >3 FB     Cardiovascular    Dental    Pulmonary    Abdominal            Anesthesia Plan    History of general anesthesia?: yes  History of complications of general anesthesia?: no    ASA 2     MAC     intravenous induction   Anesthetic plan and risks discussed with patient.

## 2024-06-17 NOTE — ANESTHESIA POSTPROCEDURE EVALUATION
Patient: oRma Corral    Procedure Summary       Date: 06/17/24 Room / Location: [facility]    Anesthesia Start: 0755 Anesthesia Stop: 0829    Procedures:       AMB REFERRAL TO COLORECTAL SURGERY      COLONOSCOPY Diagnosis:       Mass of colon      Colonic mass    Scheduled Providers: Javier Vasquez MD; EMLISSA Nava-[place]; Yang Perez DO Responsible Provider: Yang Perez DO    Anesthesia Type: general ASA Status: 2            Anesthesia Type: general    Vitals Value Taken Time   /67 06/17/24 0846   Temp 36.8 °C (98.2 °F) 06/17/24 0845   Pulse 64 06/17/24 0846   Resp 21 06/17/24 0846   SpO2 97 % 06/17/24 0846   Vitals shown include unfiled device data.    Anesthesia Post Evaluation    Patient location during evaluation: bedside  Patient participation: complete - patient participated  Level of consciousness: awake  Pain management: adequate  Airway patency: patent  Cardiovascular status: acceptable  Respiratory status: acceptable  Hydration status: acceptable  Postoperative Nausea and Vomiting: none        There were no known notable events for this encounter.

## 2024-06-18 ENCOUNTER — PHARMACY VISIT (OUTPATIENT)
Dept: PHARMACY | Facility: CLINIC | Age: 69
End: 2024-06-18
Payer: COMMERCIAL

## 2024-06-18 ENCOUNTER — HOSPITAL ENCOUNTER (OUTPATIENT)
Dept: RADIOLOGY | Facility: CLINIC | Age: 69
Discharge: HOME | End: 2024-06-18
Payer: MEDICARE

## 2024-06-18 DIAGNOSIS — C20 RECTAL CANCER (MULTI): Primary | ICD-10-CM

## 2024-06-18 DIAGNOSIS — C20 RECTAL CANCER (MULTI): ICD-10-CM

## 2024-06-18 DIAGNOSIS — R16.0 MASS OF MULTIPLE SITES OF LIVER: ICD-10-CM

## 2024-06-18 DIAGNOSIS — K62.89 RECTAL MASS: ICD-10-CM

## 2024-06-18 PROCEDURE — RXMED WILLOW AMBULATORY MEDICATION CHARGE

## 2024-06-18 PROCEDURE — 74183 MRI ABD W/O CNTR FLWD CNTR: CPT

## 2024-06-18 PROCEDURE — 2500000004 HC RX 250 GENERAL PHARMACY W/ HCPCS (ALT 636 FOR OP/ED): Performed by: SURGERY

## 2024-06-18 PROCEDURE — A9581 GADOXETATE DISODIUM INJ: HCPCS | Performed by: SURGERY

## 2024-06-18 RX ORDER — SODIUM CHLORIDE 9 MG/ML
10 INJECTION, SOLUTION INTRAVENOUS CONTINUOUS
Status: CANCELLED | OUTPATIENT
Start: 2024-06-18

## 2024-06-18 RX ORDER — HEPARIN SODIUM 5000 [USP'U]/ML
5000 INJECTION, SOLUTION INTRAVENOUS; SUBCUTANEOUS ONCE
Status: CANCELLED | OUTPATIENT
Start: 2024-06-18 | End: 2024-06-18

## 2024-06-18 RX ORDER — CEFAZOLIN SODIUM 2 G/100ML
2 INJECTION, SOLUTION INTRAVENOUS ONCE
Status: CANCELLED | OUTPATIENT
Start: 2024-06-18 | End: 2024-06-18

## 2024-06-18 RX ORDER — METRONIDAZOLE 500 MG/100ML
500 INJECTION, SOLUTION INTRAVENOUS ONCE
Status: CANCELLED | OUTPATIENT
Start: 2024-06-18 | End: 2024-06-18

## 2024-06-19 ENCOUNTER — PREP FOR PROCEDURE (OUTPATIENT)
Dept: RADIOLOGY | Facility: HOSPITAL | Age: 69
End: 2024-06-19

## 2024-06-19 ENCOUNTER — HOSPITAL ENCOUNTER (OUTPATIENT)
Dept: RADIOLOGY | Facility: HOSPITAL | Age: 69
Discharge: HOME | End: 2024-06-19
Payer: MEDICARE

## 2024-06-19 VITALS — DIASTOLIC BLOOD PRESSURE: 72 MMHG | SYSTOLIC BLOOD PRESSURE: 143 MMHG

## 2024-06-19 DIAGNOSIS — K62.89 RECTAL MASS: ICD-10-CM

## 2024-06-19 DIAGNOSIS — R16.0 MASS OF MULTIPLE SITES OF LIVER: ICD-10-CM

## 2024-06-19 PROCEDURE — A9575 INJ GADOTERATE MEGLUMI 0.1ML: HCPCS | Performed by: INTERNAL MEDICINE

## 2024-06-19 PROCEDURE — 72197 MRI PELVIS W/O & W/DYE: CPT

## 2024-06-19 PROCEDURE — 2550000001 HC RX 255 CONTRASTS: Performed by: INTERNAL MEDICINE

## 2024-06-19 RX ORDER — GADOTERATE MEGLUMINE 376.9 MG/ML
20 INJECTION INTRAVENOUS
Status: COMPLETED | OUTPATIENT
Start: 2024-06-19 | End: 2024-06-19

## 2024-06-21 ENCOUNTER — CLINICAL SUPPORT (OUTPATIENT)
Dept: SURGERY | Facility: CLINIC | Age: 69
End: 2024-06-21
Payer: MEDICARE

## 2024-06-21 DIAGNOSIS — K63.89 COLONIC MASS: ICD-10-CM

## 2024-06-21 LAB
LAB AP ASR DISCLAIMER: NORMAL
LABORATORY COMMENT REPORT: NORMAL
PATH REPORT.FINAL DX SPEC: NORMAL
PATH REPORT.GROSS SPEC: NORMAL
PATH REPORT.RELEVANT HX SPEC: NORMAL
PATH REPORT.TOTAL CANCER: NORMAL

## 2024-06-21 PROCEDURE — 99211 OFF/OP EST MAY X REQ PHY/QHP: CPT | Performed by: SURGERY

## 2024-06-21 NOTE — NURSING NOTE
Outcome: Preoperative education and joan completed for Colostomy    Abdomen assessed in sitting, standing, and lying position.   Joan made in LLQ using Ruma Ink Tattoo    Abdominal creases and scars were avoided.     Education provided:  Stoma Appearance  Purpose of pouch  Post-operative diet  Expectations for post-operative care/teaching by C RN    Materials provided: pre-op educational packet, booklet, colostomy handout    Patient Response: verbalized understanding    Time Increment: 45 minutes    RAMEZ KhouryN, RN, CWOCN

## 2024-06-24 ENCOUNTER — APPOINTMENT (OUTPATIENT)
Dept: RADIATION ONCOLOGY | Facility: HOSPITAL | Age: 69
End: 2024-06-24
Payer: MEDICARE

## 2024-06-24 ENCOUNTER — APPOINTMENT (OUTPATIENT)
Dept: CARDIOLOGY | Facility: CLINIC | Age: 69
End: 2024-06-24

## 2024-06-24 DIAGNOSIS — C20 RECTAL CANCER (MULTI): Primary | ICD-10-CM

## 2024-06-24 PROCEDURE — 77263 THER RADIOLOGY TX PLNG CPLX: CPT | Performed by: RADIOLOGY

## 2024-06-25 ENCOUNTER — CLINICAL SUPPORT (OUTPATIENT)
Dept: PREADMISSION TESTING | Facility: HOSPITAL | Age: 69
End: 2024-06-25
Payer: MEDICARE

## 2024-06-25 ENCOUNTER — TELEPHONE (OUTPATIENT)
Dept: PRIMARY CARE | Facility: CLINIC | Age: 69
End: 2024-06-25
Payer: MEDICARE

## 2024-06-25 DIAGNOSIS — C20 RECTAL CANCER (MULTI): ICD-10-CM

## 2024-06-25 LAB
LAB AP ASR DISCLAIMER: NORMAL
LABORATORY COMMENT REPORT: NORMAL
PATH REPORT.ADDENDUM SPEC: NORMAL
PATH REPORT.FINAL DX SPEC: NORMAL
PATH REPORT.GROSS SPEC: NORMAL
PATH REPORT.RELEVANT HX SPEC: NORMAL
PATH REPORT.TOTAL CANCER: NORMAL

## 2024-06-25 RX ORDER — ACETAMINOPHEN 500 MG
500 TABLET ORAL EVERY 6 HOURS PRN
COMMUNITY

## 2024-06-27 ENCOUNTER — LAB (OUTPATIENT)
Dept: LAB | Facility: LAB | Age: 69
End: 2024-06-27
Payer: MEDICARE

## 2024-06-27 ENCOUNTER — PRE-ADMISSION TESTING (OUTPATIENT)
Dept: PREADMISSION TESTING | Facility: HOSPITAL | Age: 69
End: 2024-06-27
Payer: MEDICARE

## 2024-06-27 ENCOUNTER — DOCUMENTATION (OUTPATIENT)
Dept: PREADMISSION TESTING | Facility: HOSPITAL | Age: 69
End: 2024-06-27

## 2024-06-27 VITALS
OXYGEN SATURATION: 99 % | BODY MASS INDEX: 28.43 KG/M2 | TEMPERATURE: 97.9 F | HEART RATE: 69 BPM | RESPIRATION RATE: 18 BRPM | WEIGHT: 203.04 LBS | SYSTOLIC BLOOD PRESSURE: 145 MMHG | DIASTOLIC BLOOD PRESSURE: 76 MMHG | HEIGHT: 71 IN

## 2024-06-27 DIAGNOSIS — Z01.818 PRE-OP TESTING: Primary | ICD-10-CM

## 2024-06-27 DIAGNOSIS — Z01.818 PRE-OP TESTING: ICD-10-CM

## 2024-06-27 LAB
ABO GROUP (TYPE) IN BLOOD: NORMAL
ANTIBODY SCREEN: NORMAL
BASOPHILS # BLD AUTO: 0.07 X10*3/UL (ref 0–0.1)
BASOPHILS NFR BLD AUTO: 0.9 %
BURR CELLS BLD QL SMEAR: NORMAL
DACRYOCYTES BLD QL SMEAR: NORMAL
DAT-POLYSPECIFIC: NORMAL
EOSINOPHIL # BLD AUTO: 0.28 X10*3/UL (ref 0–0.7)
EOSINOPHIL NFR BLD AUTO: 3.6 %
ERYTHROCYTE [DISTWIDTH] IN BLOOD BY AUTOMATED COUNT: 21.2 % (ref 11.5–14.5)
HCT VFR BLD AUTO: 28.9 % (ref 41–52)
HGB BLD-MCNC: 8 G/DL (ref 13.5–17.5)
HYPOCHROMIA BLD QL SMEAR: NORMAL
IMM GRANULOCYTES # BLD AUTO: 0.01 X10*3/UL (ref 0–0.7)
IMM GRANULOCYTES NFR BLD AUTO: 0.1 % (ref 0–0.9)
LYMPHOCYTES # BLD AUTO: 1.72 X10*3/UL (ref 1.2–4.8)
LYMPHOCYTES NFR BLD AUTO: 22.3 %
MCH RBC QN AUTO: 18.6 PG (ref 26–34)
MCHC RBC AUTO-ENTMCNC: 27.7 G/DL (ref 32–36)
MCV RBC AUTO: 67 FL (ref 80–100)
MONOCYTES # BLD AUTO: 0.62 X10*3/UL (ref 0.1–1)
MONOCYTES NFR BLD AUTO: 8.1 %
NEUTROPHILS # BLD AUTO: 5 X10*3/UL (ref 1.2–7.7)
NEUTROPHILS NFR BLD AUTO: 65 %
NRBC BLD-RTO: 0 /100 WBCS (ref 0–0)
OVALOCYTES BLD QL SMEAR: NORMAL
PLATELET # BLD AUTO: 384 X10*3/UL (ref 150–450)
POLYCHROMASIA BLD QL SMEAR: NORMAL
RBC # BLD AUTO: 4.31 X10*6/UL (ref 4.5–5.9)
RBC MORPH BLD: NORMAL
RH FACTOR (ANTIGEN D): NORMAL
WBC # BLD AUTO: 7.7 X10*3/UL (ref 4.4–11.3)

## 2024-06-27 PROCEDURE — 36415 COLL VENOUS BLD VENIPUNCTURE: CPT

## 2024-06-27 PROCEDURE — 86850 RBC ANTIBODY SCREEN: CPT

## 2024-06-27 PROCEDURE — 85025 COMPLETE CBC W/AUTO DIFF WBC: CPT

## 2024-06-27 PROCEDURE — 87081 CULTURE SCREEN ONLY: CPT | Mod: AHULAB | Performed by: SURGERY

## 2024-06-27 PROCEDURE — 93005 ELECTROCARDIOGRAM TRACING: CPT | Performed by: NURSE PRACTITIONER

## 2024-06-27 PROCEDURE — 86901 BLOOD TYPING SEROLOGIC RH(D): CPT

## 2024-06-27 PROCEDURE — 86900 BLOOD TYPING SEROLOGIC ABO: CPT

## 2024-06-27 ASSESSMENT — ENCOUNTER SYMPTOMS
CONSTITUTIONAL NEGATIVE: 1
DIARRHEA: 1
ARTHRALGIAS: 0
MYALGIAS: 0
DYSPNEA AT REST: 0
ABDOMINAL PAIN: 0
CONSTIPATION: 0
PALPITATIONS: 0
NECK NEGATIVE: 1
RESPIRATORY NEGATIVE: 1
DYSPNEA WITH EXERTION: 0
BRUISES/BLEEDS EASILY: 0
NEUROLOGICAL NEGATIVE: 1

## 2024-06-27 NOTE — CPM/PAT NURSE NOTE
CPM/PAT Nurse Note      Name: Roma Corral (Roma Corral)  /Age: 1955/69 y.o.       Past Medical History:   Diagnosis Date    Abdominal pain     Acute non-ST elevation myocardial infarction (NSTEMI) (Multi)     Anemia     24 HGB 8.6; HCT 31.5    Anxiety     CAD (coronary artery disease)     Cardiology follow-up encounter 2023    Sharif Lau MD    Gout     H/O cardiac catheterization 2021    H/O cardiovascular stress test 2021    IMPRESSION: 1. No evidence of ischemia. 2. Suspicion of an infarct along the mid anterior wall. 3. Left ventricular dilatation is noted. 4. Left ventricular EF was calculated to be 50%. Summary:  1. No clinical or electrocardiographic evidence for ischemia at a submaximal workload. 3. The blunted heart rate diminshes the sensitivity of this test.  4. The submaximal level of stress was achieved.    H/O echocardiogram 2021    Mild concentric Left ventricle hypertrophy.  Global left ventricular wall motion and contractility are within normal limits.  There is normal left ventricular systolic function.  Estimated ejection fraction is 60-64%.  The left atrial size is mildly dilated.  Mild aortic regurgitation.  A trace of mitral regurgitation.  Trivial to mild tricuspid regurgitation.  There is no pulmonary hypertension.    High cholesterol     Hypertension     Overweight     Rectal cancer (Multi)     Type 2 diabetes mellitus (Multi)     2024 A1C 7.5%       Past Surgical History:   Procedure Laterality Date    COLONOSCOPY  2024    LEG SURGERY Left     rodrigo placed       Patient Sexual activity questions deferred to the physician.    Family History   Problem Relation Name Age of Onset    No Known Problems Mother      No Known Problems Father      No Known Problems Sister      Leukemia Brother         No Known Allergies    Prior to Admission medications    Medication Sig Start Date End Date Taking? Authorizing Provider   acetaminophen (Tylenol) 500 mg  tablet Take 1 tablet (500 mg) by mouth every 6 hours if needed for mild pain (1 - 3).    Historical Provider, MD   amLODIPine (Norvasc) 10 mg tablet TAKE 1 TABLET BY MOUTH ONE TIME DAILY 12/11/23 12/10/24  Sharif Lau MD   aspirin 81 mg chewable tablet Chew 1 tablet (81 mg) 1 time.    Historical Provider, MD   atorvastatin (Lipitor) 40 mg tablet Take 1 tablet (40 mg) by mouth once daily at bedtime. 12/11/23 12/10/24  Sharif Lau MD   chlorhexidine (Peridex) 0.12 % solution Rinse mouth with 15 ml after toothbrushing the night before surgery and on the morning of surgery.  Expectorate after rinsing.  Do not swallow. 6/17/24   Javier Vasquez MD   gabapentin (Neurontin) 100 mg capsule Take 1 capsule (100 mg) by mouth once daily at bedtime. Start 3 days before surgery.  Patient not taking: Reported on 6/27/2024 6/17/24   Javier Vasquez MD   metoprolol succinate XL (Toprol-XL) 100 mg 24 hr tablet Take 1 tablet (100 mg) by mouth once daily. 12/11/23 12/10/24  Sharif Lau MD   metroNIDAZOLE (Flagyl) 250 mg tablet Take one tablet by mouth at 6p, 7p, and 11p the night before surgery.  Patient not taking: Reported on 6/27/2024 6/17/24   Javier Vasquez MD   neomycin (Mycifradin) 500 mg tablet Take two tabs by mouth at 6p, 7pm, and 11p the night before surgery.  Patient not taking: Reported on 6/27/2024 6/17/24   Javier Vasquez MD   losartan (Cozaar) 100 mg tablet Take 1 tablet (100 mg) by mouth once daily.  6/25/24  Historical Provider, MD   omeprazole (PriLOSEC) 40 mg DR capsule Take 1 capsule (40 mg) by mouth once daily in the morning. Take before meals. Do not crush or chew. 6/6/24 6/25/24  Santiago Buckner MD        PAT ROS     DASI Risk Score    No data to display       Caprini DVT Assessment    No data to display       Modified Frailty Index    No data to display       CHADS2 Stroke Risk  Current as of 2 hours ago        N/A 3 to 100%: High Risk   2 to < 3%: Medium Risk   0 to < 2%: Low Risk      Last Change: N/A          This score determines the patient's risk of having a stroke if the patient has atrial fibrillation.        This score is not applicable to this patient. Components are not calculated.          Revised Cardiac Risk Index    No data to display       Apfel Simplified Score    No data to display       Risk Analysis Index Results This Encounter    No data found in the last 1 encounters.         Nurse Plan of Action:     After Visit Summary (AVS) reviewed and patient verbalized good understanding of medications and NPO instructions.  Pre-op infection prevention measures:  CHG showers and mouthwash reviewed, understanding voiced.  CHG soap given and patient verbalized need to pick CHG mouthwash at their preferred local pharmacy.

## 2024-06-27 NOTE — H&P (VIEW-ONLY)
[facility]/PAT Evaluation       Name: Roma Corral (Roma Corral)  /Age: 1955/69 y.o.       Date of Consult: 24     Referring Provider: Dr. Vasquez    Surgery, Date, and Length: Laparoscopic Colostomy Creation, 24, 150 min    Roma Corral is a 69 year-old male who presents to the [facility] for perioperative risk assessment prior to surgery.    Patient presents with a primary diagnosis of colonic mass, rectal cancer.     CT obtained for right upper quadrant vague abdominal pain.  Imaging: CT abdomen pelvis w IV contrast     IMPRESSION:  1. New multiple confluent heterogenous ill-defined  right-greater-than-left hepatic lesions predominantly in segment V,  VI, and VII highly concerning for metastatic lesions. Recommend  further evaluation with tissue sampling.  2. Focal circumferential narrowing and wall thickening of the distal  sigmoid colon with adjacent subcentimeter  nodes concerning for colon  malignancy, primary site of disease. Recommend further evaluation  with colonoscopy.       Reports he has had weight loss in the last month.  Occasional diarrhea.  No nausea vomiting bloating or significant constipation issues.        No prior abdominal surgeries.  Non-smoker.  No family history colorectal cancer.  No prior colonoscopy.  This note was created in part upon personal review of patient's medical records.      Patient is scheduled to have a Laparoscopic Colostomy Creation    Pt denies any past history of anesthetic complications such as PONV, awareness, prolonged sedation, dental damage, aspiration, cardiac arrest, difficult intubation, difficult I.V. access or unexpected hospital admissions.  NO malignant hyperthermia and or pseudocholinesterase deficiency.  No history of blood transfusions     The patient is not a [employer] and will accept blood and blood products if medically indicated.   Type and screen sent.     Past Medical History:   Diagnosis Date    Abdominal pain     Acute non-ST  elevation myocardial infarction (NSTEMI) (Multi)     Anemia     6/6/24 HGB 8.6; HCT 31.5    Anxiety     CAD (coronary artery disease)     Cardiology follow-up encounter 12/11/2023    Sharif Lau MD    Gout     H/O cardiac catheterization 06/01/2021    H/O cardiovascular stress test 09/03/2021    IMPRESSION: 1. No evidence of ischemia. 2. Suspicion of an infarct along the mid anterior wall. 3. Left ventricular dilatation is noted. 4. Left ventricular EF was calculated to be 50%. Summary:  1. No clinical or electrocardiographic evidence for ischemia at a submaximal workload. 3. The blunted heart rate diminshes the sensitivity of this test.  4. The submaximal level of stress was achieved.    H/O echocardiogram 06/02/2021    Mild concentric Left ventricle hypertrophy.  Global left ventricular wall motion and contractility are within normal limits.  There is normal left ventricular systolic function.  Estimated ejection fraction is 60-64%.  The left atrial size is mildly dilated.  Mild aortic regurgitation.  A trace of mitral regurgitation.  Trivial to mild tricuspid regurgitation.  There is no pulmonary hypertension.    High cholesterol     Hypertension     Overweight     Rectal cancer (Multi)     Type 2 diabetes mellitus (Multi)     4/18/2024 A1C 7.5%       Past Surgical History:   Procedure Laterality Date    COLONOSCOPY  06/17/2024    LEG SURGERY Left     rodrigo placed       Family History   Problem Relation Name Age of Onset    No Known Problems Mother      No Known Problems Father      No Known Problems Sister      Leukemia Brother       Social History     Socioeconomic History    Marital status:      Spouse name: Not on file    Number of children: 1    Years of education: Not on file    Highest education level: Not on file   Occupational History    Occupation: retired   Tobacco Use    Smoking status: Never    Smokeless tobacco: Never   Vaping Use    Vaping status: Never Used   Substance and Sexual  Activity    Alcohol use: Not Currently    Drug use: Never    Sexual activity: Defer   Other Topics Concern    Not on file   Social History Narrative    Not on file     Social Determinants of Health     Financial Resource Strain: Not on file   Food Insecurity: Not on file   Transportation Needs: Not on file   Physical Activity: Not on file   Stress: Not on file   Social Connections: Not on file   Intimate Partner Violence: Not on file   Housing Stability: Not on file        No Known Allergies      Current Outpatient Medications:     acetaminophen (Tylenol) 500 mg tablet, Take 1 tablet (500 mg) by mouth every 6 hours if needed for mild pain (1 - 3)., Disp: , Rfl:     amLODIPine (Norvasc) 10 mg tablet, TAKE 1 TABLET BY MOUTH ONE TIME DAILY, Disp: 90 tablet, Rfl: 3    aspirin 81 mg chewable tablet, Chew 1 tablet (81 mg) 1 time., Disp: , Rfl:     atorvastatin (Lipitor) 40 mg tablet, Take 1 tablet (40 mg) by mouth once daily at bedtime., Disp: 90 tablet, Rfl: 3    metoprolol succinate XL (Toprol-XL) 100 mg 24 hr tablet, Take 1 tablet (100 mg) by mouth once daily., Disp: 90 tablet, Rfl: 3    chlorhexidine (Peridex) 0.12 % solution, Rinse mouth with 15 ml after toothbrushing the night before surgery and on the morning of surgery.  Expectorate after rinsing.  Do not swallow., Disp: 473 mL, Rfl: 0    gabapentin (Neurontin) 100 mg capsule, Take 1 capsule (100 mg) by mouth once daily at bedtime. Start 3 days before surgery. (Patient not taking: Reported on 6/27/2024), Disp: 3 capsule, Rfl: 0    metroNIDAZOLE (Flagyl) 250 mg tablet, Take one tablet by mouth at 6p, 7p, and 11p the night before surgery. (Patient not taking: Reported on 6/27/2024), Disp: 3 tablet, Rfl: 0    neomycin (Mycifradin) 500 mg tablet, Take two tabs by mouth at 6p, 7pm, and 11p the night before surgery. (Patient not taking: Reported on 6/27/2024), Disp: 6 tablet, Rfl: 0      PAT ROS:   Constitutional:   neg    Neuro/Psych:   neg    Eyes:    use of  "corrective lenses  Ears:    no hearing aides  Nose:   Mouth:   neg    Throat:   neg    Neck:   neg    Cardio:    Functional 4 Mets. Patient denies SOB walking up 2 flights of stairs , exercise equipment/20 minutes.    no chest pain   no palpitations   no dyspnea   no CHAUDHARY  Respiratory:   neg    Endocrine:   GI:    no abdominal pain   no constipation   diarrhea  :   neg    Musculoskeletal:    no arthralgias   no myalgias  Hematologic:    does not bruise/bleed easily   no history of blood transfusion   no blood clots  Skin:  neg        Physical Exam  Physical exam within normal limits.   Abdominal:      General: There is no distension.      Tenderness: There is no abdominal tenderness. There is no guarding.          PAT AIRWAY:   Airway:     Mallampati::  III    Neck ROM::  Full   No broken teeth, no dentures and no missing teeth         Visit Vitals  /76   Pulse 69   Temp 36.6 °C (97.9 °F)   Resp 18   Ht 1.803 m (5' 11\")   Wt 92.1 kg (203 lb 0.7 oz)   SpO2 99%   BMI 28.32 kg/m²   Smoking Status Never   BSA 2.15 m²       Plan    Cardiovascular:  Patient denies any chest pain, tightness, heaviness, pressure, radiating pain, palpitations, irregular heartbeats, lightheadedness, cough, congestion, shortness of breath, CHAUDHARY, PND, near syncope, weight loss or gain.    HLD:  Asa 81 mg to be taken dos  Atorvastatin-patient to take the evening before surgery    HTN:  Amlodipine-patient to take on dos  Metoprolol succinate-patient to take dos    CAD:   Cont ASA 81mg during periop period  cardiac cath 6/1/21        Pt follows with Dr. Shepherd; last seen 6/28/24.  Per secure chat 7/1/24 (see media tab) \"patient clear for surgery\"    EKG in PAT on 06/27/24   Sinus Bradycardia @ 67 bpm with 1 st degree AV Block      RCRI: 2 Risk of Mace: 10.1%    Pulm:  Denies any shortness of breath or activity intolerance.    Stop Bang= 3 (male, age, htn) intermediate risk    Renal/endo:  DM Type II-Elevated HgA1c 04/18/24-7.5%  Is not " currently on medication    GI/:  Rectal cancer    Heme:    Anemia-  6/27/24 H/H 8.0/28.9%    Patient instructed to ambulate as soon as possible postoperatively to decrease thromboembolic risk.    Initiate mechanical DVT prophylaxis as soon as possible and initiate chemical prophylaxis when deemed safe from a bleeding standpoint post surgery.    Caprini=8    Risk assessment complete.  Patient is scheduled for an intermediate surgical risk procedure.       Labs/testing obtained in PAT on 06/27/24  CBC, T&S,MRSA, EKG  Lab Results   Component Value Date    WBC 7.7 06/27/2024    HGB 8.0 (L) 06/27/2024    HCT 28.9 (L) 06/27/2024    MCV 67 (L) 06/27/2024     06/27/2024      Lab Results   Component Value Date    GLUCOSE 132 (H) 06/06/2024    CALCIUM 9.1 06/06/2024     06/06/2024    K 4.6 06/06/2024    CO2 25 06/06/2024     06/06/2024    BUN 18 06/06/2024    CREATININE 1.10 06/06/2024     Lab Results   Component Value Date    ALT 12 06/06/2024    AST 21 06/06/2024    ALKPHOS 142 (H) 06/06/2024    BILITOT 0.7 06/06/2024     Labs reviewed, patient's H/H low. Results similar to 06/06/24. Other wise labs unremarkable.     Follow up MRSA,   Patient has follow up visit with cardiology on 06/28/24    ---Addendum 7/1/24:  Patient optimized from cardiac perspective for upcoming surgery per Dr. Shepherd (see media tab)        Preoperative medication instructions were provided and reviewed with the patient.  Any additional testing or evaluation was explained to the patient.  Nothing by mouth instructions were discussed and patient's questions were answered prior to conclusion to this encounter.  Patient verbalized understanding of preoperative instructions given in preadmission testing; discharge instructions available in EMR.    This note was dictated with speech recognition.  Minor errors may have been detected during use of speech recognition.

## 2024-06-27 NOTE — CPM/PAT H&P
[facility]/PAT Evaluation       Name: Roma Corral (Roma Corral)  /Age: 1955/69 y.o.       Date of Consult: 24     Referring Provider: Dr. Vasquez    Surgery, Date, and Length: Laparoscopic Colostomy Creation, 24, 150 min    Roma Corral is a 69 year-old male who presents to the [facility] for perioperative risk assessment prior to surgery.    Patient presents with a primary diagnosis of colonic mass, rectal cancer.     CT obtained for right upper quadrant vague abdominal pain.  Imaging: CT abdomen pelvis w IV contrast     IMPRESSION:  1. New multiple confluent heterogenous ill-defined  right-greater-than-left hepatic lesions predominantly in segment V,  VI, and VII highly concerning for metastatic lesions. Recommend  further evaluation with tissue sampling.  2. Focal circumferential narrowing and wall thickening of the distal  sigmoid colon with adjacent subcentimeter  nodes concerning for colon  malignancy, primary site of disease. Recommend further evaluation  with colonoscopy.       Reports he has had weight loss in the last month.  Occasional diarrhea.  No nausea vomiting bloating or significant constipation issues.        No prior abdominal surgeries.  Non-smoker.  No family history colorectal cancer.  No prior colonoscopy.  This note was created in part upon personal review of patient's medical records.      Patient is scheduled to have a Laparoscopic Colostomy Creation    Pt denies any past history of anesthetic complications such as PONV, awareness, prolonged sedation, dental damage, aspiration, cardiac arrest, difficult intubation, difficult I.V. access or unexpected hospital admissions.  NO malignant hyperthermia and or pseudocholinesterase deficiency.  No history of blood transfusions     The patient is not a [employer] and will accept blood and blood products if medically indicated.   Type and screen sent.     Past Medical History:   Diagnosis Date    Abdominal pain     Acute non-ST  elevation myocardial infarction (NSTEMI) (Multi)     Anemia     6/6/24 HGB 8.6; HCT 31.5    Anxiety     CAD (coronary artery disease)     Cardiology follow-up encounter 12/11/2023    Sharif Lau MD    Gout     H/O cardiac catheterization 06/01/2021    H/O cardiovascular stress test 09/03/2021    IMPRESSION: 1. No evidence of ischemia. 2. Suspicion of an infarct along the mid anterior wall. 3. Left ventricular dilatation is noted. 4. Left ventricular EF was calculated to be 50%. Summary:  1. No clinical or electrocardiographic evidence for ischemia at a submaximal workload. 3. The blunted heart rate diminshes the sensitivity of this test.  4. The submaximal level of stress was achieved.    H/O echocardiogram 06/02/2021    Mild concentric Left ventricle hypertrophy.  Global left ventricular wall motion and contractility are within normal limits.  There is normal left ventricular systolic function.  Estimated ejection fraction is 60-64%.  The left atrial size is mildly dilated.  Mild aortic regurgitation.  A trace of mitral regurgitation.  Trivial to mild tricuspid regurgitation.  There is no pulmonary hypertension.    High cholesterol     Hypertension     Overweight     Rectal cancer (Multi)     Type 2 diabetes mellitus (Multi)     4/18/2024 A1C 7.5%       Past Surgical History:   Procedure Laterality Date    COLONOSCOPY  06/17/2024    LEG SURGERY Left     rodrigo placed       Family History   Problem Relation Name Age of Onset    No Known Problems Mother      No Known Problems Father      No Known Problems Sister      Leukemia Brother       Social History     Socioeconomic History    Marital status:      Spouse name: Not on file    Number of children: 1    Years of education: Not on file    Highest education level: Not on file   Occupational History    Occupation: retired   Tobacco Use    Smoking status: Never    Smokeless tobacco: Never   Vaping Use    Vaping status: Never Used   Substance and Sexual  Activity    Alcohol use: Not Currently    Drug use: Never    Sexual activity: Defer   Other Topics Concern    Not on file   Social History Narrative    Not on file     Social Determinants of Health     Financial Resource Strain: Not on file   Food Insecurity: Not on file   Transportation Needs: Not on file   Physical Activity: Not on file   Stress: Not on file   Social Connections: Not on file   Intimate Partner Violence: Not on file   Housing Stability: Not on file        No Known Allergies      Current Outpatient Medications:     acetaminophen (Tylenol) 500 mg tablet, Take 1 tablet (500 mg) by mouth every 6 hours if needed for mild pain (1 - 3)., Disp: , Rfl:     amLODIPine (Norvasc) 10 mg tablet, TAKE 1 TABLET BY MOUTH ONE TIME DAILY, Disp: 90 tablet, Rfl: 3    aspirin 81 mg chewable tablet, Chew 1 tablet (81 mg) 1 time., Disp: , Rfl:     atorvastatin (Lipitor) 40 mg tablet, Take 1 tablet (40 mg) by mouth once daily at bedtime., Disp: 90 tablet, Rfl: 3    metoprolol succinate XL (Toprol-XL) 100 mg 24 hr tablet, Take 1 tablet (100 mg) by mouth once daily., Disp: 90 tablet, Rfl: 3    chlorhexidine (Peridex) 0.12 % solution, Rinse mouth with 15 ml after toothbrushing the night before surgery and on the morning of surgery.  Expectorate after rinsing.  Do not swallow., Disp: 473 mL, Rfl: 0    gabapentin (Neurontin) 100 mg capsule, Take 1 capsule (100 mg) by mouth once daily at bedtime. Start 3 days before surgery. (Patient not taking: Reported on 6/27/2024), Disp: 3 capsule, Rfl: 0    metroNIDAZOLE (Flagyl) 250 mg tablet, Take one tablet by mouth at 6p, 7p, and 11p the night before surgery. (Patient not taking: Reported on 6/27/2024), Disp: 3 tablet, Rfl: 0    neomycin (Mycifradin) 500 mg tablet, Take two tabs by mouth at 6p, 7pm, and 11p the night before surgery. (Patient not taking: Reported on 6/27/2024), Disp: 6 tablet, Rfl: 0      PAT ROS:   Constitutional:   neg    Neuro/Psych:   neg    Eyes:    use of  "corrective lenses  Ears:    no hearing aides  Nose:   Mouth:   neg    Throat:   neg    Neck:   neg    Cardio:    Functional 4 Mets. Patient denies SOB walking up 2 flights of stairs , exercise equipment/20 minutes.    no chest pain   no palpitations   no dyspnea   no CHAUDHARY  Respiratory:   neg    Endocrine:   GI:    no abdominal pain   no constipation   diarrhea  :   neg    Musculoskeletal:    no arthralgias   no myalgias  Hematologic:    does not bruise/bleed easily   no history of blood transfusion   no blood clots  Skin:  neg        Physical Exam  Physical exam within normal limits.   Abdominal:      General: There is no distension.      Tenderness: There is no abdominal tenderness. There is no guarding.          PAT AIRWAY:   Airway:     Mallampati::  III    Neck ROM::  Full   No broken teeth, no dentures and no missing teeth         Visit Vitals  /76   Pulse 69   Temp 36.6 °C (97.9 °F)   Resp 18   Ht 1.803 m (5' 11\")   Wt 92.1 kg (203 lb 0.7 oz)   SpO2 99%   BMI 28.32 kg/m²   Smoking Status Never   BSA 2.15 m²       Plan    Cardiovascular:  Patient denies any chest pain, tightness, heaviness, pressure, radiating pain, palpitations, irregular heartbeats, lightheadedness, cough, congestion, shortness of breath, CHAUDHARY, PND, near syncope, weight loss or gain.    HLD:  Asa 81 mg to be taken dos  Atorvastatin-patient to take the evening before surgery    HTN:  Amlodipine-patient to take on dos  Metoprolol succinate-patient to take dos    CAD:   Cont ASA 81mg during periop period  cardiac cath 6/1/21        Pt follows with Dr. Shepherd; last seen 6/28/24.  Per secure chat 7/1/24 (see media tab) \"patient clear for surgery\"    EKG in PAT on 06/27/24   Sinus Bradycardia @ 67 bpm with 1 st degree AV Block      RCRI: 2 Risk of Mace: 10.1%    Pulm:  Denies any shortness of breath or activity intolerance.    Stop Bang= 3 (male, age, htn) intermediate risk    Renal/endo:  DM Type II-Elevated HgA1c 04/18/24-7.5%  Is not " currently on medication    GI/:  Rectal cancer    Heme:    Anemia-  6/27/24 H/H 8.0/28.9%    Patient instructed to ambulate as soon as possible postoperatively to decrease thromboembolic risk.    Initiate mechanical DVT prophylaxis as soon as possible and initiate chemical prophylaxis when deemed safe from a bleeding standpoint post surgery.    Caprini=8    Risk assessment complete.  Patient is scheduled for an intermediate surgical risk procedure.       Labs/testing obtained in PAT on 06/27/24  CBC, T&S,MRSA, EKG  Lab Results   Component Value Date    WBC 7.7 06/27/2024    HGB 8.0 (L) 06/27/2024    HCT 28.9 (L) 06/27/2024    MCV 67 (L) 06/27/2024     06/27/2024      Lab Results   Component Value Date    GLUCOSE 132 (H) 06/06/2024    CALCIUM 9.1 06/06/2024     06/06/2024    K 4.6 06/06/2024    CO2 25 06/06/2024     06/06/2024    BUN 18 06/06/2024    CREATININE 1.10 06/06/2024     Lab Results   Component Value Date    ALT 12 06/06/2024    AST 21 06/06/2024    ALKPHOS 142 (H) 06/06/2024    BILITOT 0.7 06/06/2024     Labs reviewed, patient's H/H low. Results similar to 06/06/24. Other wise labs unremarkable.     Follow up MRSA,   Patient has follow up visit with cardiology on 06/28/24    ---Addendum 7/1/24:  Patient optimized from cardiac perspective for upcoming surgery per Dr. Shepherd (see media tab)        Preoperative medication instructions were provided and reviewed with the patient.  Any additional testing or evaluation was explained to the patient.  Nothing by mouth instructions were discussed and patient's questions were answered prior to conclusion to this encounter.  Patient verbalized understanding of preoperative instructions given in preadmission testing; discharge instructions available in EMR.    This note was dictated with speech recognition.  Minor errors may have been detected during use of speech recognition.

## 2024-06-27 NOTE — PREPROCEDURE INSTRUCTIONS
Medication List            Accurate as of June 27, 2024  2:11 PM. Always use your most recent med list.                acetaminophen 500 mg tablet  Commonly known as: Tylenol  Notes to patient: May use on the morning of surgery if needed     amLODIPine 10 mg tablet  Commonly known as: Norvasc  TAKE 1 TABLET BY MOUTH ONE TIME DAILY  Medication Adjustments for Surgery: Take morning of surgery with sip of water, no other fluids     aspirin 81 mg chewable tablet  Medication Adjustments for Surgery: Take morning of surgery with sip of water, no other fluids     atorvastatin 40 mg tablet  Commonly known as: Lipitor  Take 1 tablet (40 mg) by mouth once daily at bedtime.  Notes to patient: May take the evening before surgery     chlorhexidine 0.12 % solution  Commonly known as: Peridex  Rinse mouth with 15 ml after toothbrushing the night before surgery and on the morning of surgery.  Expectorate after rinsing.  Do not swallow.     gabapentin 100 mg capsule  Commonly known as: Neurontin  Take 1 capsule (100 mg) by mouth once daily at bedtime. Start 3 days before surgery.  Notes to patient: Take as directed     metoprolol succinate  mg 24 hr tablet  Commonly known as: Toprol-XL  Take 1 tablet (100 mg) by mouth once daily.  Medication Adjustments for Surgery: Take morning of surgery with sip of water, no other fluids     metroNIDAZOLE 250 mg tablet  Commonly known as: Flagyl  Take one tablet by mouth at 6p, 7p, and 11p the night before surgery.  Notes to patient: Take as directed     neomycin 500 mg tablet  Commonly known as: Mycifradin  Take two tabs by mouth at 6p, 7pm, and 11p the night before surgery.  Notes to patient: Take as directed                        **Concerning above medication instructions, if medication is normally taken at night, continue normal schedule.**  **DO NOT TAKE NIGHT PRIOR AND MORNING OF SURGERY**    CONTACT SURGEON'S OFFICE IF YOU DEVELOP:  * Fever = 100.4 F   * New respiratory symptoms  (e.g. cough, shortness of breath, respiratory distress, sore throat)  * Recent loss of taste or smell  *Flu like symptoms such as headache, fatigue or gastrointestinal symptoms  * You develop any open sores, shingles, burning or painful urination   AND/OR:  * You no longer wish to have the surgery.  * Any other personal circumstances change that may lead to the need to cancel or defer this surgery.  *You were admitted to any hospital within one week of your planned procedure.    SMOKING:  *Quitting smoking can make a huge difference to your health and recovery from surgery.    *If you need help with quitting, call 5-786-QUIT-NOW.    THE DAY BEFORE SURGERY:   Nothing by mouth (no solids or liquids) after midnight.   If diabetic, please check fasting blood sugar upon waking up.    If fasting sugar is <80 mg/dl, please drink 100ml/3oz of apple juice no later than 2 hours prior to arrival time.      SURGICAL TIME  *You will be contacted between 2 p.m. and 6 p.m. the business day before your surgery with your arrival time.  *If you haven't received a call by 6pm, call [phone].  *Scheduled surgery times may change and you will be notified if this occurs-check your personal voicemail for any updates.    ON THE MORNING OF SURGERY:  *Wear comfortable, loose fitting clothing.   *Do not use moisturizers, creams, lotions or perfume.  *All jewelry and valuables should be left at home.  *Prosthetic devices such as contact lenses, hearing aids, dentures, eyelash extensions, hairpins and body piercing must be removed before surgery.    BRING WITH YOU:  *Photo ID and insurance card  *Current list of medicines and allergies  *Pacemaker/Defibrillator/Heart stent cards  *CPAP machine and mask  *Slings/splints/crutches  *Copy of your complete Advanced Directive/DHPOA-if applicable  *Neurostimulator implant remote    PARKING AND ARRIVAL:  *Check in at the Main Entrance desk and let them know you are here for surgery.  *You will be  directed to the 2nd floor surgical waiting area.    AFTER OUTPATIENT SURGERY:  *A responsible adult MUST accompany you at the time of discharge and stay with you for 24 hours after your surgery.  *You may NOT drive yourself home after surgery.  *You may use a taxi or ride sharing service ([employer], Uber) to return home ONLY if you are accompanied by a friend or family member.  *Instructions for resuming your medications will be provided by your surgeon.       Home Preoperative Antibacterial Shower     What is a home preoperative antibacterial shower?  This shower is a way of cleaning the skin with a germ killing soap before surgery.  The soap contains chlorhexidine, commonly known as CHG.  CHG is a soap for your skin with germ killing ability.  Let your doctor know if you are allergic to chlorhexidine.    Why do I need to take a preoperative antibacterial shower?  Skin is not sterile.  It is best to try to make your skin as free of germs as possible before surgery.  Proper cleansing with a germ killing soap before surgery can lower the number of germs on your skin.  This helps to reduce the risk of infection at the surgical site.  Following the instructions listed below will help you prepare your skin for surgery.      How do I use the CHG skin cleanser?  Steps:  Begin using your CHG soap five days before your scheduled surgery on ________________________.    Days 1-4 Shower before bed:  Wash your face and genitals with your normal soap and rinse.    Wash and rinse your hair using the CHG soap. Rinse completely, do not condition your   Hair.          3.    Apply the CHG soap to a clean wet washcloth.  Turn the water off or move away                From the water spray to avoid premature rinsing of the CHG soap as you are applying.     4.   Lather your entire body from the neck down.  Do not use on your face or genitals.   Pay special attention to the area(s) where your incision(s) will be located unless they are on your  face.  Avoid scrubbing your skin too hard.  The important point is to have the CHG soap sit on your skin for 3 minutes.    When the 3 minutes are up, turn on the water and rinse the CHG soap off your body completely.   Pat yourself dry with a clean, freshly-laundered towel.  Dress in clean, freshly laundered night clothes.    Be sure to sleep with clean, freshly laundered sheets.  Day 5:  Last shower is the morning before surgery: Follow above Instructions.    NOTE:    *Hair extensions should be removed    *Keep CHG soap out of eyes and ear canals   *DO NOT wash with regular soap on your body after you have used the CHG        soap solution  *DO NOT apply powders, lotions, or perfume.  *Deodorant may be used days 1-4, BUT NOT the day of surgery    Who should I contact if I have any questions regarding the use of CHG soap?  Call the [facility], Preadmission Testing at [phone] if you have any questions.              Patient Information: Pre-Operative Infection Prevention Measures     Why did I have my nose, under my arms and groin swabbed?  The purpose of the swab is to identify Staphylococcus aureus inside your nose or on your skin.  The swab was sent to the laboratory for culture.  A positive swab/culture for Staphylococcus aureus is called colonization or carriage.      What is Staphylococcus aureus?  Staphylococcus aureus, also known as “staph”, is a germ found on the skin or in the nose of healthy people.  Sometimes Staphylococcus aureus can get into the body and cause an infection.  This can be minor (such as pimples, boils or other skin problems).  It might also be serious (such as blood infection, pneumonia or a surgical site infection).    What is Staphylococcus aureus colonization or carriage?  Colonization or carriage means that a person has the germ but is not sick from it.  These bacteria can be spread on the hands or when breathing or sneezing.    How is Staphylococcus  aureus spread?  It is most often spread by close contact with a person or item that carries it.    What happens if my culture is positive for Staphylococcus aureus?  Your doctor/medical team will use this information to guide any antibiotic treatment which may be necessary.  Regardless of the culture results, we will clean the inside of your nose with a betadine swab just before you have your surgery.      Will I get an infection if I have Staphylococcus aureus in my nose or on my skin?  Anyone can get an infection with Staphylococcus aureus.  However, the best way to reduce your risk of infection is to follow the instructions provided to you for the use of your CHG soap and dental rinse.        Who should I contact if I have any questions?  Call the [facility], Preadmission Testing at [phone] if you have any questions.        Patient Information: Oral/Dental Rinse  **This is a prescription; pick it up at your preferred local pharmacy **  What is oral/dental rinse?   It is a mouthwash. It is a way of cleaning the mouth with a germ killing solution before your surgery.  The solution contains chlorhexidine, commonly known as CHG.   It is used inside the mouth to kill a bacteria known as Staphylococcus aureus.  Let your doctor know if you are allergic to Chlorhexidine.    Why do I need to use CHG oral/dental rinse?  The CHG oral/dental rinse helps to kill a bacteria in your mouth known a Staphylococcus aureus.     This reduces the risk of infection at the surgical site.      Using your CHG oral/dental rinse  STEPS:  Use your CHG oral/dental rinse after you brush your teeth the night before (at bedtime) and the morning of your surgery.  Follow all directions on your prescription label.    Use the cap on the container to measure 15ml (fill cap to fill line)  Swish (gargle if you can) the mouthwash in your mouth for at least 30 seconds, (do not to swallow) spit out  After you use your  CHG rinse, do not rinse your mouth with water, drink or eat.  Please refer to prescription label for the appropriate time to resume oral intake  Dental rinse comes in one size bottle: 473ml ~16oz.  You will have leftover    rinse, discard after this use.    What side effects might I have using the CHG oral/dental rinse?  CHG rinse will stick to plaque on the teeth.  Brush and floss just before use.  Teeth brushing will help avoid staining of plaque during use.    Who should I contact if I have questions about the CHG oral/dental rinse?  Please call [facility], Preadmission Testing at [phone] if you have any questions

## 2024-06-28 ENCOUNTER — TELEPHONE (OUTPATIENT)
Dept: PREADMISSION TESTING | Facility: HOSPITAL | Age: 69
End: 2024-06-28
Payer: MEDICARE

## 2024-06-28 ENCOUNTER — OFFICE VISIT (OUTPATIENT)
Dept: CARDIOLOGY | Facility: CLINIC | Age: 69
End: 2024-06-28
Payer: MEDICARE

## 2024-06-28 VITALS
SYSTOLIC BLOOD PRESSURE: 136 MMHG | OXYGEN SATURATION: 96 % | HEIGHT: 72 IN | BODY MASS INDEX: 27.36 KG/M2 | HEART RATE: 68 BPM | DIASTOLIC BLOOD PRESSURE: 76 MMHG | WEIGHT: 202 LBS

## 2024-06-28 DIAGNOSIS — I10 HYPERTENSION, UNSPECIFIED TYPE: Primary | ICD-10-CM

## 2024-06-28 LAB
ATRIAL RATE: 67 BPM
BB ANTIBODY IDENTIFICATION: NORMAL
CASE #: NORMAL
P AXIS: 20 DEGREES
P OFFSET: 170 MS
P ONSET: 113 MS
PR INTERVAL: 226 MS
Q ONSET: 226 MS
QRS COUNT: 11 BEATS
QRS DURATION: 96 MS
QT INTERVAL: 418 MS
QTC CALCULATION(BAZETT): 441 MS
QTC FREDERICIA: 434 MS
R AXIS: -25 DEGREES
T AXIS: 31 DEGREES
T OFFSET: 435 MS
VENTRICULAR RATE: 67 BPM

## 2024-06-28 PROCEDURE — 1159F MED LIST DOCD IN RCRD: CPT | Performed by: INTERNAL MEDICINE

## 2024-06-28 PROCEDURE — 3075F SYST BP GE 130 - 139MM HG: CPT | Performed by: INTERNAL MEDICINE

## 2024-06-28 PROCEDURE — 3051F HG A1C>EQUAL 7.0%<8.0%: CPT | Performed by: INTERNAL MEDICINE

## 2024-06-28 PROCEDURE — 99214 OFFICE O/P EST MOD 30 MIN: CPT | Performed by: INTERNAL MEDICINE

## 2024-06-28 PROCEDURE — 3048F LDL-C <100 MG/DL: CPT | Performed by: INTERNAL MEDICINE

## 2024-06-28 PROCEDURE — 3078F DIAST BP <80 MM HG: CPT | Performed by: INTERNAL MEDICINE

## 2024-06-28 RX ORDER — AMLODIPINE BESYLATE 5 MG/1
5 TABLET ORAL DAILY
Qty: 90 TABLET | Refills: 3 | Status: SHIPPED | OUTPATIENT
Start: 2024-06-28

## 2024-06-28 RX ORDER — AMLODIPINE BESYLATE 5 MG/1
5 TABLET ORAL DAILY
COMMUNITY
End: 2024-06-28 | Stop reason: SDUPTHER

## 2024-06-28 ASSESSMENT — ENCOUNTER SYMPTOMS
DEPRESSION: 0
LOSS OF SENSATION IN FEET: 0
OCCASIONAL FEELINGS OF UNSTEADINESS: 0

## 2024-06-29 ENCOUNTER — LAB (OUTPATIENT)
Dept: LAB | Facility: LAB | Age: 69
End: 2024-06-29
Payer: MEDICARE

## 2024-06-29 DIAGNOSIS — Z01.818 PRE-OP TESTING: ICD-10-CM

## 2024-06-29 LAB
ABO GROUP (TYPE) IN BLOOD: NORMAL
ANTIBODY SCREEN: NORMAL
BB ANTIBODY IDENTIFICATION: NORMAL
CASE #: NORMAL
RH FACTOR (ANTIGEN D): NORMAL
STAPHYLOCOCCUS SPEC CULT: NORMAL

## 2024-06-29 PROCEDURE — 36415 COLL VENOUS BLD VENIPUNCTURE: CPT

## 2024-06-29 PROCEDURE — 86850 RBC ANTIBODY SCREEN: CPT

## 2024-06-29 PROCEDURE — 86900 BLOOD TYPING SEROLOGIC ABO: CPT

## 2024-06-29 PROCEDURE — 86901 BLOOD TYPING SEROLOGIC RH(D): CPT

## 2024-07-01 ENCOUNTER — TELEPHONE (OUTPATIENT)
Dept: RADIATION ONCOLOGY | Facility: HOSPITAL | Age: 69
End: 2024-07-01
Payer: MEDICARE

## 2024-07-01 RX ORDER — METRONIDAZOLE 500 MG/100ML
500 INJECTION, SOLUTION INTRAVENOUS ONCE
Status: CANCELLED | OUTPATIENT
Start: 2024-07-01 | End: 2024-07-01

## 2024-07-01 RX ORDER — SODIUM CHLORIDE 9 MG/ML
10 INJECTION, SOLUTION INTRAVENOUS CONTINUOUS
Status: CANCELLED | OUTPATIENT
Start: 2024-07-01

## 2024-07-01 RX ORDER — HEPARIN SODIUM 5000 [USP'U]/ML
5000 INJECTION, SOLUTION INTRAVENOUS; SUBCUTANEOUS ONCE
Status: CANCELLED | OUTPATIENT
Start: 2024-07-01 | End: 2024-07-01

## 2024-07-01 RX ORDER — CEFAZOLIN SODIUM 2 G/100ML
2 INJECTION, SOLUTION INTRAVENOUS ONCE
Status: CANCELLED | OUTPATIENT
Start: 2024-07-01 | End: 2024-07-01

## 2024-07-02 ENCOUNTER — HOSPITAL ENCOUNTER (INPATIENT)
Facility: HOSPITAL | Age: 69
LOS: 3 days | Discharge: HOME | End: 2024-07-05
Attending: SURGERY | Admitting: SURGERY
Payer: MEDICARE

## 2024-07-02 ENCOUNTER — ANESTHESIA EVENT (OUTPATIENT)
Dept: OPERATING ROOM | Facility: HOSPITAL | Age: 69
End: 2024-07-02
Payer: MEDICARE

## 2024-07-02 ENCOUNTER — APPOINTMENT (OUTPATIENT)
Dept: RADIOLOGY | Facility: HOSPITAL | Age: 69
End: 2024-07-02
Payer: MEDICARE

## 2024-07-02 ENCOUNTER — ANESTHESIA (OUTPATIENT)
Dept: OPERATING ROOM | Facility: HOSPITAL | Age: 69
End: 2024-07-02
Payer: MEDICARE

## 2024-07-02 DIAGNOSIS — G89.18 ACUTE POST-OPERATIVE PAIN: ICD-10-CM

## 2024-07-02 DIAGNOSIS — C20 RECTAL CANCER (MULTI): Primary | ICD-10-CM

## 2024-07-02 PROCEDURE — 2500000001 HC RX 250 WO HCPCS SELF ADMINISTERED DRUGS (ALT 637 FOR MEDICARE OP): Performed by: STUDENT IN AN ORGANIZED HEALTH CARE EDUCATION/TRAINING PROGRAM

## 2024-07-02 PROCEDURE — 44188 LAP COLOSTOMY: CPT | Performed by: SURGERY

## 2024-07-02 PROCEDURE — 2500000004 HC RX 250 GENERAL PHARMACY W/ HCPCS (ALT 636 FOR OP/ED): Performed by: SURGERY

## 2024-07-02 PROCEDURE — 3600000009 HC OR TIME - EACH INCREMENTAL 1 MINUTE - PROCEDURE LEVEL FOUR: Performed by: SURGERY

## 2024-07-02 PROCEDURE — 1100000001 HC PRIVATE ROOM DAILY

## 2024-07-02 PROCEDURE — 2500000004 HC RX 250 GENERAL PHARMACY W/ HCPCS (ALT 636 FOR OP/ED): Performed by: ANESTHESIOLOGY

## 2024-07-02 PROCEDURE — 3700000001 HC GENERAL ANESTHESIA TIME - INITIAL BASE CHARGE: Performed by: SURGERY

## 2024-07-02 PROCEDURE — 2720000007 HC OR 272 NO HCPCS: Performed by: SURGERY

## 2024-07-02 PROCEDURE — 3600000004 HC OR TIME - INITIAL BASE CHARGE - PROCEDURE LEVEL FOUR: Performed by: SURGERY

## 2024-07-02 PROCEDURE — 2500000005 HC RX 250 GENERAL PHARMACY W/O HCPCS: Performed by: ANESTHESIOLOGY

## 2024-07-02 PROCEDURE — 0D1N4Z4 BYPASS SIGMOID COLON TO CUTANEOUS, PERCUTANEOUS ENDOSCOPIC APPROACH: ICD-10-PCS | Performed by: SURGERY

## 2024-07-02 PROCEDURE — 2500000004 HC RX 250 GENERAL PHARMACY W/ HCPCS (ALT 636 FOR OP/ED): Performed by: ANESTHESIOLOGIST ASSISTANT

## 2024-07-02 PROCEDURE — 2500000004 HC RX 250 GENERAL PHARMACY W/ HCPCS (ALT 636 FOR OP/ED): Performed by: STUDENT IN AN ORGANIZED HEALTH CARE EDUCATION/TRAINING PROGRAM

## 2024-07-02 PROCEDURE — 2500000005 HC RX 250 GENERAL PHARMACY W/O HCPCS: Performed by: ANESTHESIOLOGIST ASSISTANT

## 2024-07-02 PROCEDURE — 2500000005 HC RX 250 GENERAL PHARMACY W/O HCPCS: Performed by: SURGERY

## 2024-07-02 PROCEDURE — 3700000002 HC GENERAL ANESTHESIA TIME - EACH INCREMENTAL 1 MINUTE: Performed by: SURGERY

## 2024-07-02 PROCEDURE — 2500000004 HC RX 250 GENERAL PHARMACY W/ HCPCS (ALT 636 FOR OP/ED): Mod: JZ | Performed by: SURGERY

## 2024-07-02 PROCEDURE — 7100000002 HC RECOVERY ROOM TIME - EACH INCREMENTAL 1 MINUTE: Performed by: SURGERY

## 2024-07-02 PROCEDURE — 7100000001 HC RECOVERY ROOM TIME - INITIAL BASE CHARGE: Performed by: SURGERY

## 2024-07-02 RX ORDER — HEPARIN SODIUM 5000 [USP'U]/ML
5000 INJECTION, SOLUTION INTRAVENOUS; SUBCUTANEOUS ONCE
Status: COMPLETED | OUTPATIENT
Start: 2024-07-02 | End: 2024-07-02

## 2024-07-02 RX ORDER — CEFAZOLIN SODIUM 2 G/100ML
2 INJECTION, SOLUTION INTRAVENOUS ONCE
Status: DISCONTINUED | OUTPATIENT
Start: 2024-07-02 | End: 2024-07-02 | Stop reason: HOSPADM

## 2024-07-02 RX ORDER — LABETALOL HYDROCHLORIDE 5 MG/ML
5 INJECTION, SOLUTION INTRAVENOUS ONCE AS NEEDED
Status: DISCONTINUED | OUTPATIENT
Start: 2024-07-02 | End: 2024-07-02 | Stop reason: HOSPADM

## 2024-07-02 RX ORDER — ATORVASTATIN CALCIUM 40 MG/1
40 TABLET, FILM COATED ORAL NIGHTLY
Status: DISCONTINUED | OUTPATIENT
Start: 2024-07-03 | End: 2024-07-05 | Stop reason: HOSPADM

## 2024-07-02 RX ORDER — OXYCODONE HYDROCHLORIDE 5 MG/1
5 TABLET ORAL EVERY 4 HOURS PRN
Status: DISCONTINUED | OUTPATIENT
Start: 2024-07-02 | End: 2024-07-05 | Stop reason: HOSPADM

## 2024-07-02 RX ORDER — PROPOFOL 10 MG/ML
INJECTION, EMULSION INTRAVENOUS AS NEEDED
Status: DISCONTINUED | OUTPATIENT
Start: 2024-07-02 | End: 2024-07-02

## 2024-07-02 RX ORDER — ONDANSETRON HYDROCHLORIDE 2 MG/ML
4 INJECTION, SOLUTION INTRAVENOUS EVERY 8 HOURS PRN
Status: DISCONTINUED | OUTPATIENT
Start: 2024-07-02 | End: 2024-07-05 | Stop reason: HOSPADM

## 2024-07-02 RX ORDER — SODIUM CHLORIDE 0.9 G/100ML
IRRIGANT IRRIGATION AS NEEDED
Status: DISCONTINUED | OUTPATIENT
Start: 2024-07-02 | End: 2024-07-02 | Stop reason: HOSPADM

## 2024-07-02 RX ORDER — FENTANYL CITRATE 50 UG/ML
INJECTION, SOLUTION INTRAMUSCULAR; INTRAVENOUS AS NEEDED
Status: DISCONTINUED | OUTPATIENT
Start: 2024-07-02 | End: 2024-07-02

## 2024-07-02 RX ORDER — DEXTROSE MONOHYDRATE AND SODIUM CHLORIDE 5; .45 G/100ML; G/100ML
40 INJECTION, SOLUTION INTRAVENOUS CONTINUOUS
Status: DISCONTINUED | OUTPATIENT
Start: 2024-07-02 | End: 2024-07-05 | Stop reason: HOSPADM

## 2024-07-02 RX ORDER — ROCURONIUM BROMIDE 10 MG/ML
INJECTION, SOLUTION INTRAVENOUS AS NEEDED
Status: DISCONTINUED | OUTPATIENT
Start: 2024-07-02 | End: 2024-07-02

## 2024-07-02 RX ORDER — AMLODIPINE BESYLATE 5 MG/1
5 TABLET ORAL DAILY
Status: DISCONTINUED | OUTPATIENT
Start: 2024-07-03 | End: 2024-07-05 | Stop reason: HOSPADM

## 2024-07-02 RX ORDER — NALOXONE HYDROCHLORIDE 0.4 MG/ML
0.2 INJECTION, SOLUTION INTRAMUSCULAR; INTRAVENOUS; SUBCUTANEOUS EVERY 5 MIN PRN
Status: DISCONTINUED | OUTPATIENT
Start: 2024-07-02 | End: 2024-07-05 | Stop reason: HOSPADM

## 2024-07-02 RX ORDER — LIDOCAINE HYDROCHLORIDE 10 MG/ML
0.1 INJECTION, SOLUTION EPIDURAL; INFILTRATION; INTRACAUDAL; PERINEURAL ONCE
Status: DISCONTINUED | OUTPATIENT
Start: 2024-07-02 | End: 2024-07-02 | Stop reason: HOSPADM

## 2024-07-02 RX ORDER — ONDANSETRON HYDROCHLORIDE 2 MG/ML
4 INJECTION, SOLUTION INTRAVENOUS ONCE AS NEEDED
Status: COMPLETED | OUTPATIENT
Start: 2024-07-02 | End: 2024-07-02

## 2024-07-02 RX ORDER — OXYCODONE HYDROCHLORIDE 5 MG/1
10 TABLET ORAL EVERY 4 HOURS PRN
Status: DISCONTINUED | OUTPATIENT
Start: 2024-07-02 | End: 2024-07-05 | Stop reason: HOSPADM

## 2024-07-02 RX ORDER — ONDANSETRON 4 MG/1
4 TABLET, ORALLY DISINTEGRATING ORAL EVERY 8 HOURS PRN
Status: DISCONTINUED | OUTPATIENT
Start: 2024-07-02 | End: 2024-07-05 | Stop reason: HOSPADM

## 2024-07-02 RX ORDER — NAPROXEN SODIUM 220 MG/1
81 TABLET, FILM COATED ORAL DAILY
Status: DISCONTINUED | OUTPATIENT
Start: 2024-07-03 | End: 2024-07-05 | Stop reason: HOSPADM

## 2024-07-02 RX ORDER — GABAPENTIN 300 MG/1
300 CAPSULE ORAL 3 TIMES DAILY
Status: DISCONTINUED | OUTPATIENT
Start: 2024-07-02 | End: 2024-07-05 | Stop reason: HOSPADM

## 2024-07-02 RX ORDER — SODIUM CHLORIDE, SODIUM LACTATE, POTASSIUM CHLORIDE, CALCIUM CHLORIDE 600; 310; 30; 20 MG/100ML; MG/100ML; MG/100ML; MG/100ML
100 INJECTION, SOLUTION INTRAVENOUS CONTINUOUS
Status: DISCONTINUED | OUTPATIENT
Start: 2024-07-02 | End: 2024-07-02 | Stop reason: HOSPADM

## 2024-07-02 RX ORDER — ALBUTEROL SULFATE 0.83 MG/ML
2.5 SOLUTION RESPIRATORY (INHALATION) ONCE AS NEEDED
Status: DISCONTINUED | OUTPATIENT
Start: 2024-07-02 | End: 2024-07-02 | Stop reason: HOSPADM

## 2024-07-02 RX ORDER — METRONIDAZOLE 500 MG/100ML
500 INJECTION, SOLUTION INTRAVENOUS ONCE
Status: COMPLETED | OUTPATIENT
Start: 2024-07-02 | End: 2024-07-02

## 2024-07-02 RX ORDER — ACETAMINOPHEN 325 MG/1
650 TABLET ORAL EVERY 6 HOURS
Status: DISCONTINUED | OUTPATIENT
Start: 2024-07-02 | End: 2024-07-05 | Stop reason: HOSPADM

## 2024-07-02 RX ORDER — SODIUM CHLORIDE 9 MG/ML
10 INJECTION, SOLUTION INTRAVENOUS CONTINUOUS
Status: DISCONTINUED | OUTPATIENT
Start: 2024-07-02 | End: 2024-07-05 | Stop reason: HOSPADM

## 2024-07-02 RX ORDER — ENOXAPARIN SODIUM 100 MG/ML
40 INJECTION SUBCUTANEOUS EVERY 24 HOURS
Status: DISCONTINUED | OUTPATIENT
Start: 2024-07-03 | End: 2024-07-03

## 2024-07-02 RX ORDER — LIDOCAINE HYDROCHLORIDE 20 MG/ML
INJECTION, SOLUTION INFILTRATION; PERINEURAL AS NEEDED
Status: DISCONTINUED | OUTPATIENT
Start: 2024-07-02 | End: 2024-07-02

## 2024-07-02 RX ORDER — CEFAZOLIN 1 G/1
INJECTION, POWDER, FOR SOLUTION INTRAVENOUS AS NEEDED
Status: DISCONTINUED | OUTPATIENT
Start: 2024-07-02 | End: 2024-07-02

## 2024-07-02 RX ORDER — MIDAZOLAM HYDROCHLORIDE 1 MG/ML
INJECTION INTRAMUSCULAR; INTRAVENOUS AS NEEDED
Status: DISCONTINUED | OUTPATIENT
Start: 2024-07-02 | End: 2024-07-02

## 2024-07-02 RX ORDER — METOPROLOL TARTRATE 50 MG/1
50 TABLET ORAL 2 TIMES DAILY
Status: DISCONTINUED | OUTPATIENT
Start: 2024-07-03 | End: 2024-07-05 | Stop reason: HOSPADM

## 2024-07-02 RX ORDER — HYDRALAZINE HYDROCHLORIDE 20 MG/ML
5 INJECTION INTRAMUSCULAR; INTRAVENOUS EVERY 30 MIN PRN
Status: DISCONTINUED | OUTPATIENT
Start: 2024-07-02 | End: 2024-07-02 | Stop reason: HOSPADM

## 2024-07-02 RX ORDER — OXYCODONE HYDROCHLORIDE 5 MG/1
5 TABLET ORAL EVERY 4 HOURS PRN
Status: DISCONTINUED | OUTPATIENT
Start: 2024-07-02 | End: 2024-07-02 | Stop reason: HOSPADM

## 2024-07-02 SDOH — SOCIAL STABILITY: SOCIAL INSECURITY: HAVE YOU HAD THOUGHTS OF HARMING ANYONE ELSE?: NO

## 2024-07-02 SDOH — SOCIAL STABILITY: SOCIAL INSECURITY: ABUSE: ADULT

## 2024-07-02 SDOH — SOCIAL STABILITY: SOCIAL INSECURITY: ARE YOU OR HAVE YOU BEEN THREATENED OR ABUSED PHYSICALLY, EMOTIONALLY, OR SEXUALLY BY ANYONE?: NO

## 2024-07-02 SDOH — SOCIAL STABILITY: SOCIAL INSECURITY: HAVE YOU HAD ANY THOUGHTS OF HARMING ANYONE ELSE?: NO

## 2024-07-02 SDOH — SOCIAL STABILITY: SOCIAL INSECURITY: DO YOU FEEL UNSAFE GOING BACK TO THE PLACE WHERE YOU ARE LIVING?: NO

## 2024-07-02 SDOH — SOCIAL STABILITY: SOCIAL INSECURITY: HAS ANYONE EVER THREATENED TO HURT YOUR FAMILY OR YOUR PETS?: NO

## 2024-07-02 SDOH — SOCIAL STABILITY: SOCIAL INSECURITY: ARE THERE ANY APPARENT SIGNS OF INJURIES/BEHAVIORS THAT COULD BE RELATED TO ABUSE/NEGLECT?: NO

## 2024-07-02 SDOH — SOCIAL STABILITY: SOCIAL INSECURITY: WERE YOU ABLE TO COMPLETE ALL THE BEHAVIORAL HEALTH SCREENINGS?: YES

## 2024-07-02 SDOH — SOCIAL STABILITY: SOCIAL INSECURITY: DOES ANYONE TRY TO KEEP YOU FROM HAVING/CONTACTING OTHER FRIENDS OR DOING THINGS OUTSIDE YOUR HOME?: NO

## 2024-07-02 SDOH — SOCIAL STABILITY: SOCIAL INSECURITY: DO YOU FEEL ANYONE HAS EXPLOITED OR TAKEN ADVANTAGE OF YOU FINANCIALLY OR OF YOUR PERSONAL PROPERTY?: NO

## 2024-07-02 ASSESSMENT — COGNITIVE AND FUNCTIONAL STATUS - GENERAL
DAILY ACTIVITIY SCORE: 20
DRESSING REGULAR LOWER BODY CLOTHING: A LITTLE
MOVING TO AND FROM BED TO CHAIR: A LITTLE
CLIMB 3 TO 5 STEPS WITH RAILING: A LITTLE
CLIMB 3 TO 5 STEPS WITH RAILING: A LITTLE
STANDING UP FROM CHAIR USING ARMS: A LITTLE
HELP NEEDED FOR BATHING: A LITTLE
WALKING IN HOSPITAL ROOM: A LITTLE
STANDING UP FROM CHAIR USING ARMS: A LITTLE
MOVING FROM LYING ON BACK TO SITTING ON SIDE OF FLAT BED WITH BEDRAILS: A LITTLE
DRESSING REGULAR LOWER BODY CLOTHING: A LITTLE
DRESSING REGULAR LOWER BODY CLOTHING: A LITTLE
TURNING FROM BACK TO SIDE WHILE IN FLAT BAD: A LITTLE
PATIENT BASELINE BEDBOUND: NO
TOILETING: A LITTLE
DAILY ACTIVITIY SCORE: 20
HELP NEEDED FOR BATHING: A LITTLE
MOBILITY SCORE: 18
HELP NEEDED FOR BATHING: A LITTLE
MOVING TO AND FROM BED TO CHAIR: A LITTLE
TURNING FROM BACK TO SIDE WHILE IN FLAT BAD: A LITTLE
TURNING FROM BACK TO SIDE WHILE IN FLAT BAD: A LITTLE
WALKING IN HOSPITAL ROOM: A LITTLE
MOBILITY SCORE: 18
WALKING IN HOSPITAL ROOM: A LITTLE
DRESSING REGULAR UPPER BODY CLOTHING: A LITTLE
DRESSING REGULAR UPPER BODY CLOTHING: A LITTLE
TOILETING: A LITTLE
DRESSING REGULAR UPPER BODY CLOTHING: A LITTLE
MOVING TO AND FROM BED TO CHAIR: A LITTLE
DAILY ACTIVITIY SCORE: 20
MOVING FROM LYING ON BACK TO SITTING ON SIDE OF FLAT BED WITH BEDRAILS: A LITTLE
CLIMB 3 TO 5 STEPS WITH RAILING: A LITTLE
MOVING FROM LYING ON BACK TO SITTING ON SIDE OF FLAT BED WITH BEDRAILS: A LITTLE
STANDING UP FROM CHAIR USING ARMS: A LITTLE
TOILETING: A LITTLE
MOBILITY SCORE: 18

## 2024-07-02 ASSESSMENT — PAIN - FUNCTIONAL ASSESSMENT
PAIN_FUNCTIONAL_ASSESSMENT: 0-10

## 2024-07-02 ASSESSMENT — PAIN SCALES - GENERAL
PAINLEVEL_OUTOF10: 7
PAINLEVEL_OUTOF10: 6
PAINLEVEL_OUTOF10: 7
PAINLEVEL_OUTOF10: 5 - MODERATE PAIN
PAINLEVEL_OUTOF10: 0 - NO PAIN
PAINLEVEL_OUTOF10: 6
PAINLEVEL_OUTOF10: 7
PAINLEVEL_OUTOF10: 7
PAINLEVEL_OUTOF10: 5 - MODERATE PAIN
PAINLEVEL_OUTOF10: 7
PAINLEVEL_OUTOF10: 5 - MODERATE PAIN
PAINLEVEL_OUTOF10: 7
PAINLEVEL_OUTOF10: 5 - MODERATE PAIN
PAINLEVEL_OUTOF10: 7
PAINLEVEL_OUTOF10: 0 - NO PAIN

## 2024-07-02 ASSESSMENT — PATIENT HEALTH QUESTIONNAIRE - PHQ9
SUM OF ALL RESPONSES TO PHQ9 QUESTIONS 1 & 2: 0
2. FEELING DOWN, DEPRESSED OR HOPELESS: NOT AT ALL
1. LITTLE INTEREST OR PLEASURE IN DOING THINGS: NOT AT ALL

## 2024-07-02 ASSESSMENT — PAIN DESCRIPTION - LOCATION
LOCATION: ABDOMEN
LOCATION: ABDOMEN

## 2024-07-02 ASSESSMENT — ACTIVITIES OF DAILY LIVING (ADL)
HEARING - RIGHT EAR: FUNCTIONAL
GROOMING: INDEPENDENT
JUDGMENT_ADEQUATE_SAFELY_COMPLETE_DAILY_ACTIVITIES: YES
PATIENT'S MEMORY ADEQUATE TO SAFELY COMPLETE DAILY ACTIVITIES?: YES
TOILETING: INDEPENDENT
LACK_OF_TRANSPORTATION: NO
HEARING - LEFT EAR: FUNCTIONAL
DRESSING YOURSELF: INDEPENDENT
WALKS IN HOME: INDEPENDENT
BATHING: INDEPENDENT
ADEQUATE_TO_COMPLETE_ADL: YES
FEEDING YOURSELF: INDEPENDENT

## 2024-07-02 ASSESSMENT — COLUMBIA-SUICIDE SEVERITY RATING SCALE - C-SSRS
1. IN THE PAST MONTH, HAVE YOU WISHED YOU WERE DEAD OR WISHED YOU COULD GO TO SLEEP AND NOT WAKE UP?: NO
2. HAVE YOU ACTUALLY HAD ANY THOUGHTS OF KILLING YOURSELF?: NO
6. HAVE YOU EVER DONE ANYTHING, STARTED TO DO ANYTHING, OR PREPARED TO DO ANYTHING TO END YOUR LIFE?: NO

## 2024-07-02 ASSESSMENT — LIFESTYLE VARIABLES
HOW OFTEN DO YOU HAVE A DRINK CONTAINING ALCOHOL: NEVER
SKIP TO QUESTIONS 9-10: 1
HOW MANY STANDARD DRINKS CONTAINING ALCOHOL DO YOU HAVE ON A TYPICAL DAY: PATIENT DOES NOT DRINK
HOW OFTEN DO YOU HAVE 6 OR MORE DRINKS ON ONE OCCASION: NEVER
AUDIT-C TOTAL SCORE: 0
AUDIT-C TOTAL SCORE: 0

## 2024-07-02 NOTE — OP NOTE
Laparoscopic Colostomy Creation Operative Note     Date: 2024  OR Location: [facility] A OR    Name: Roma Corral, : 1955, Age: 69 y.o., MRN: [id number], Sex: male    Diagnosis  Pre-op Diagnosis     * Rectal cancer (Multi) [C20] Post-op Diagnosis     * Rectal cancer (Multi) [C20]     Procedures  Laparoscopic Colostomy Creation  [address] - [place] LAPAROSCOPY SURG COLOSTOMY/SKN LVL CECOSTOMY      Surgeons      * Javier Vasquez - Primary    Resident/Fellow/Other Assistant:  Surgeons and Role:     * Debby Lynch MD - Resident - Assisting    Procedure Summary  Anesthesia: General  ASA: III  Anesthesia Staff: Anesthesiologist: Darrion Rodriguez MD  C-AA: KEV Pickard  Estimated Blood Loss: 5 mL  Intra-op Medications:   Administrations occurring from 1230 to 1430 on 24:   Medication Name Total Dose   sodium chloride 0.9 % irrigation solution 1,000 mL   BUPivacaine HCl (Marcaine) 0.5 % (5 mg/mL) 30 mL in sodium chloride 0.9% 30 mL syringe 46 mL   sodium chloride 0.9% infusion Cannot be calculated   metroNIDAZOLE (Flagyl) 500 mg in NaCl (iso-os) 100 mL 500 mg              Anesthesia Record               Intraprocedure I/O Totals          Intake    sodium chloride 0.9% infusion 1000.00 mL    Total Intake 1000 mL       Output    Est. Blood Loss 20 mL    Total Output 20 mL       Net    Net Volume 980 mL          Specimen: No specimens collected     Staff:   Circulator: Nereyda  Scrub Person: Kristan  Relief Scrub: Betsy  Relief Circulator: [facility]         Drains and/or Catheters:   Colostomy Loop LUQ (Active)   Stomal Appliance Clean;Dry;Intact;1 piece 24 1432   Site/Stoma Assessment TIGRE 24 1432   Peristomal Assessment Intact;Clean 24 1432   Treatment Ice applied 24 1432   Drainage Characteristics Red 24 1432       Tourniquet Times:         Implants:     Findings: Obstructing mid rectal mass, evidence of liver metastasis    Indications: Roma Corral is an 69 y.o. male who is having  surgery for Rectal cancer (Multi) [C20].  Newly diagnosed metastatic obstructing rectal cancer    The patient was seen in the preoperative area. The risks, benefits, complications, treatment options, non-operative alternatives, expected recovery and outcomes were discussed with the patient. The possibilities of reaction to medication, pulmonary aspiration, injury to surrounding structures, bleeding, recurrent infection, the need for additional procedures, failure to diagnose a condition, and creating a complication requiring transfusion or operation were discussed with the patient. The patient concurred with the proposed plan, giving informed consent.  The site of surgery was properly noted/marked if necessary per policy. The patient has been actively warmed in preoperative area. Preoperative antibiotics have been ordered and given within 1 hours of incision. Venous thrombosis prophylaxis have been ordered including bilateral sequential compression devices    Procedure Details: Patient was identified in the preoperative area and transported to the operating room.  They were placed supine on the or table.  General anesthesia was induced. SCDs, OG were placed.  Abdomen was clipped, prepped, and draped in the usual sterile fashion.  Time-out was performed confirming patient, procedure, and perioperative criteria.  Veress needle was inserted at moyer's point with a good drop test.  Abdomen was insufflated to 15 millimeters mercury with low initial pressures.  5 millimeter Visiport was placed in supraumbilical region.  Camera was inserted and no entry injury was identified.  5 millimeter ports were then placed in the right upper and lower quadrants under visualization.  Abdomen was surveyed and hepatic metastases were visualized anteriorly on the right lobe of the liver.  Sigmoid colon was identified and the obstructing rectal mass was seen just below the rectosigmoid junction leading into the pelvis.  This was fairly  firm, fixed, and filling the pelvic brim..  There were mild omental attachments to the sigmoid colon which were divided sharply.  Sigmoid colon had limited mobility therefore this was medialized and the white line of Toldt was freed.  Adequate mobility was achieved. a trephine was made through the skin in the left lower quadrant and the previously marked site.  This was carried down to the anterior rectus sheath which was scored.  Rectus muscles were split and the peritoneum was entered sharply.  The colon was then delivered and secured with a stoma bar.  We returned to laparoscopy and ensured adequate orientation of the colon.  Hemostasis was confirmed.  Tap block was performed in 4 quadrants using a total of 40 milliliters of 0.25% Marcaine.   Abdomen was desufflated and all port sites were closed with 4-0 Monocryl followed by skin glue.  Attention was turned to the colon.  This was without tension on the mesentery or at the level of the fascia.   Diverting colostomy was opened and matured in a Rajni fashion with interrupted 3-0 Vicryl.  Both lumens were digitized and widely patent below the fascia.  Stoma bar was secured in place and an ostomy appliance was placed over top. Prior to complete closure all counts were confirmed correct.  Patient was then extubated and transferred to the PACU in stable condition.   Complications:  None; patient tolerated the procedure well.    Disposition: PACU - hemodynamically stable.  Condition: stable         Additional Details:     Attending Attestation: I was present and scrubbed for the entire procedure.    Javier Vasquez  Phone Number: [phone]

## 2024-07-02 NOTE — ANESTHESIA PREPROCEDURE EVALUATION
Patient: Roma Corral    Procedure Information       Date/Time: 07/02/24 1230    Procedure: Laparoscopic Colostomy Creation    Location: [facility] OR 04 / [facility]    Surgeons: Javier Vasquez MD          70 yo F hx Metastatic (liver) sigmoid colon CA, NSTEMI (normal stress 2021, anemia (hgb 8), HTN, DM2 (A1c 7.5).  TTE from 2021 showed EF 60-64 and mild AR.    Relevant Problems   Cardiac   (+) Acute non-ST elevation myocardial infarction (NSTEMI) (Multi)   (+) CAD (coronary artery disease)   (+) Hyperlipidemia   (+) Hypertension      Pulmonary   (+) SOB (shortness of breath) on exertion      Neuro   (+) Anxiety      GI   (+) Rectal cancer (Multi)      Liver   (+) Rectal cancer (Multi)      Endocrine   (+) Type 2 diabetes mellitus (Multi)       Clinical information reviewed:   Tobacco  Allergies  Meds   Med Hx  Surg Hx   Fam Hx  Soc Hx        NPO Detail:  NPO/Void Status  Carbohydrate Drink Given Prior to Surgery? : N  Date of Last Liquid: 07/02/24  Time of Last Liquid: 0930  Date of Last Solid: 06/30/24  Time of Last Solid: 1900  Last Intake Type: Clear fluids  Time of Last Void: 1100         Physical Exam    Airway  Mallampati: II  TM distance: >3 FB  Neck ROM: full     Cardiovascular   Rate: normal     Dental    Pulmonary - normal exam     Abdominal            Anesthesia Plan    History of general anesthesia?: yes  History of complications of general anesthesia?: no    ASA 3     general     intravenous induction   Postoperative administration of opioids is intended.  Anesthetic plan and risks discussed with patient.

## 2024-07-02 NOTE — ANESTHESIA PROCEDURE NOTES
Airway  Date/Time: 7/2/2024 12:51 PM  Urgency: elective    Airway not difficult    Staffing  Performed: KEV   Authorized by: Darrion Rodriguez MD    Performed by: KEV Pickard  Patient location during procedure: OR    Indications and Patient Condition  Indications for airway management: anesthesia  Spontaneous Ventilation: absent  Sedation level: deep  Preoxygenated: yes  Patient position: sniffing  Mask difficulty assessment: 1 - vent by mask    Final Airway Details  Final airway type: endotracheal airway      Successful airway: ETT  Cuffed: yes   Successful intubation technique: direct laryngoscopy  Endotracheal tube insertion site: oral  Blade: Matthew  Blade size: #4  ETT size (mm): 7.5  Cormack-Lehane Classification: grade I - full view of glottis  Placement verified by: capnometry   Measured from: lips  ETT to lips (cm): 22  Number of attempts at approach: 1

## 2024-07-02 NOTE — ANESTHESIA POSTPROCEDURE EVALUATION
Patient: Roma Corral    Procedure Summary       Date: 07/02/24 Room / Location: [facility] OR 04 / [facility] OR    Anesthesia Start: 1233 Anesthesia Stop: 1435    Procedure: Laparoscopic Colostomy Creation Diagnosis:       Rectal cancer (Multi)      (Rectal cancer (Multi) [C20])    Surgeons: Javier Vasquez MD Responsible Provider: Darrion Rodriguez MD    Anesthesia Type: general ASA Status: 3            Anesthesia Type: general    Vitals Value Taken Time   /59 07/02/24 1501   Temp 36.2 °C (97.2 °F) 07/02/24 1432   Pulse 65 07/02/24 1507   Resp 16 07/02/24 1500   SpO2 97 % 07/02/24 1507   Vitals shown include unfiled device data.    Anesthesia Post Evaluation    Patient participation: complete - patient participated  Level of consciousness: awake  Pain management: adequate  Airway patency: patent  Cardiovascular status: acceptable and hemodynamically stable  Respiratory status: acceptable  Hydration status: acceptable  Postoperative Nausea and Vomiting: none        No notable events documented.

## 2024-07-02 NOTE — CARE PLAN
The patient's goals for the shift include remain comfortable    The clinical goals for the shift include pain management    Over the shift, the patient did not make progress toward the following goals. Barriers to progression include post surgical. Recommendations to address these barriers include pain management.

## 2024-07-03 ENCOUNTER — APPOINTMENT (OUTPATIENT)
Dept: HEMATOLOGY/ONCOLOGY | Facility: HOSPITAL | Age: 69
End: 2024-07-03
Payer: MEDICARE

## 2024-07-03 LAB
ANION GAP SERPL CALC-SCNC: 13 MMOL/L (ref 10–20)
BUN SERPL-MCNC: 14 MG/DL (ref 6–23)
CALCIUM SERPL-MCNC: 7.8 MG/DL (ref 8.6–10.3)
CHLORIDE SERPL-SCNC: 103 MMOL/L (ref 98–107)
CO2 SERPL-SCNC: 23 MMOL/L (ref 21–32)
CREAT SERPL-MCNC: 1.01 MG/DL (ref 0.5–1.3)
EGFRCR SERPLBLD CKD-EPI 2021: 81 ML/MIN/1.73M*2
ERYTHROCYTE [DISTWIDTH] IN BLOOD BY AUTOMATED COUNT: 21 % (ref 11.5–14.5)
GLUCOSE SERPL-MCNC: 208 MG/DL (ref 74–99)
HCT VFR BLD AUTO: 25.8 % (ref 41–52)
HGB BLD-MCNC: 7 G/DL (ref 13.5–17.5)
MAGNESIUM SERPL-MCNC: 2.1 MG/DL (ref 1.6–2.4)
MCH RBC QN AUTO: 18.3 PG (ref 26–34)
MCHC RBC AUTO-ENTMCNC: 27.1 G/DL (ref 32–36)
MCV RBC AUTO: 67 FL (ref 80–100)
NRBC BLD-RTO: 0 /100 WBCS (ref 0–0)
PLATELET # BLD AUTO: 305 X10*3/UL (ref 150–450)
POTASSIUM SERPL-SCNC: 4.4 MMOL/L (ref 3.5–5.3)
RBC # BLD AUTO: 3.83 X10*6/UL (ref 4.5–5.9)
SODIUM SERPL-SCNC: 135 MMOL/L (ref 136–145)
WBC # BLD AUTO: 8.8 X10*3/UL (ref 4.4–11.3)

## 2024-07-03 PROCEDURE — 2500000004 HC RX 250 GENERAL PHARMACY W/ HCPCS (ALT 636 FOR OP/ED): Performed by: STUDENT IN AN ORGANIZED HEALTH CARE EDUCATION/TRAINING PROGRAM

## 2024-07-03 PROCEDURE — 85027 COMPLETE CBC AUTOMATED: CPT | Performed by: STUDENT IN AN ORGANIZED HEALTH CARE EDUCATION/TRAINING PROGRAM

## 2024-07-03 PROCEDURE — 83735 ASSAY OF MAGNESIUM: CPT | Performed by: STUDENT IN AN ORGANIZED HEALTH CARE EDUCATION/TRAINING PROGRAM

## 2024-07-03 PROCEDURE — 2500000001 HC RX 250 WO HCPCS SELF ADMINISTERED DRUGS (ALT 637 FOR MEDICARE OP): Performed by: STUDENT IN AN ORGANIZED HEALTH CARE EDUCATION/TRAINING PROGRAM

## 2024-07-03 PROCEDURE — 99231 SBSQ HOSP IP/OBS SF/LOW 25: CPT | Performed by: NURSE PRACTITIONER

## 2024-07-03 PROCEDURE — 2500000004 HC RX 250 GENERAL PHARMACY W/ HCPCS (ALT 636 FOR OP/ED): Performed by: NURSE PRACTITIONER

## 2024-07-03 PROCEDURE — 80048 BASIC METABOLIC PNL TOTAL CA: CPT | Performed by: STUDENT IN AN ORGANIZED HEALTH CARE EDUCATION/TRAINING PROGRAM

## 2024-07-03 PROCEDURE — 36415 COLL VENOUS BLD VENIPUNCTURE: CPT | Performed by: STUDENT IN AN ORGANIZED HEALTH CARE EDUCATION/TRAINING PROGRAM

## 2024-07-03 PROCEDURE — 9420000001 HC RT PATIENT EDUCATION 5 MIN

## 2024-07-03 PROCEDURE — 1100000001 HC PRIVATE ROOM DAILY

## 2024-07-03 RX ORDER — HYDROMORPHONE HYDROCHLORIDE 0.2 MG/ML
0.2 INJECTION INTRAMUSCULAR; INTRAVENOUS; SUBCUTANEOUS EVERY 4 HOURS PRN
Status: DISCONTINUED | OUTPATIENT
Start: 2024-07-03 | End: 2024-07-05 | Stop reason: HOSPADM

## 2024-07-03 RX ORDER — ENOXAPARIN SODIUM 100 MG/ML
40 INJECTION SUBCUTANEOUS EVERY 24 HOURS
Status: DISCONTINUED | OUTPATIENT
Start: 2024-07-03 | End: 2024-07-05 | Stop reason: HOSPADM

## 2024-07-03 SDOH — HEALTH STABILITY: MENTAL HEALTH: HOW OFTEN DO YOU HAVE A DRINK CONTAINING ALCOHOL?: NEVER

## 2024-07-03 SDOH — HEALTH STABILITY: MENTAL HEALTH: HOW OFTEN DO YOU HAVE 6 OR MORE DRINKS ON ONE OCCASION?: NEVER

## 2024-07-03 SDOH — ECONOMIC STABILITY: INCOME INSECURITY: IN THE PAST 12 MONTHS, HAS THE ELECTRIC, GAS, OIL, OR WATER COMPANY THREATENED TO SHUT OFF SERVICE IN YOUR HOME?: NO

## 2024-07-03 SDOH — ECONOMIC STABILITY: FOOD INSECURITY: WITHIN THE PAST 12 MONTHS, YOU WORRIED THAT YOUR FOOD WOULD RUN OUT BEFORE YOU GOT MONEY TO BUY MORE.: NEVER TRUE

## 2024-07-03 SDOH — ECONOMIC STABILITY: HOUSING INSECURITY: IN THE LAST 12 MONTHS, HOW MANY PLACES HAVE YOU LIVED?: 1

## 2024-07-03 SDOH — ECONOMIC STABILITY: INCOME INSECURITY: HOW HARD IS IT FOR YOU TO PAY FOR THE VERY BASICS LIKE FOOD, HOUSING, MEDICAL CARE, AND HEATING?: NOT HARD AT ALL

## 2024-07-03 SDOH — SOCIAL STABILITY: SOCIAL NETWORK: ARE YOU MARRIED, WIDOWED, DIVORCED, SEPARATED, NEVER MARRIED, OR LIVING WITH A PARTNER?: MARRIED

## 2024-07-03 SDOH — ECONOMIC STABILITY: TRANSPORTATION INSECURITY
IN THE PAST 12 MONTHS, HAS THE LACK OF TRANSPORTATION KEPT YOU FROM MEDICAL APPOINTMENTS OR FROM GETTING MEDICATIONS?: NO

## 2024-07-03 SDOH — ECONOMIC STABILITY: TRANSPORTATION INSECURITY
IN THE PAST 12 MONTHS, HAS LACK OF TRANSPORTATION KEPT YOU FROM MEETINGS, WORK, OR FROM GETTING THINGS NEEDED FOR DAILY LIVING?: NO

## 2024-07-03 SDOH — ECONOMIC STABILITY: INCOME INSECURITY: IN THE LAST 12 MONTHS, WAS THERE A TIME WHEN YOU WERE NOT ABLE TO PAY THE MORTGAGE OR RENT ON TIME?: NO

## 2024-07-03 SDOH — HEALTH STABILITY: MENTAL HEALTH: HOW MANY STANDARD DRINKS CONTAINING ALCOHOL DO YOU HAVE ON A TYPICAL DAY?: PATIENT DOES NOT DRINK

## 2024-07-03 SDOH — ECONOMIC STABILITY: HOUSING INSECURITY
IN THE LAST 12 MONTHS, WAS THERE A TIME WHEN YOU DID NOT HAVE A STEADY PLACE TO SLEEP OR SLEPT IN A SHELTER (INCLUDING NOW)?: NO

## 2024-07-03 SDOH — ECONOMIC STABILITY: FOOD INSECURITY: WITHIN THE PAST 12 MONTHS, THE FOOD YOU BOUGHT JUST DIDN'T LAST AND YOU DIDN'T HAVE MONEY TO GET MORE.: NEVER TRUE

## 2024-07-03 ASSESSMENT — PAIN - FUNCTIONAL ASSESSMENT
PAIN_FUNCTIONAL_ASSESSMENT: 0-10

## 2024-07-03 ASSESSMENT — LIFESTYLE VARIABLES
SKIP TO QUESTIONS 9-10: 1
AUDIT-C TOTAL SCORE: 0

## 2024-07-03 ASSESSMENT — PAIN SCALES - GENERAL
PAINLEVEL_OUTOF10: 6
PAINLEVEL_OUTOF10: 6
PAINLEVEL_OUTOF10: 8
PAINLEVEL_OUTOF10: 0 - NO PAIN
PAINLEVEL_OUTOF10: 8
PAINLEVEL_OUTOF10: 2

## 2024-07-03 ASSESSMENT — PAIN DESCRIPTION - LOCATION
LOCATION: ABDOMEN
LOCATION: ABDOMEN

## 2024-07-03 ASSESSMENT — COGNITIVE AND FUNCTIONAL STATUS - GENERAL
DRESSING REGULAR UPPER BODY CLOTHING: A LITTLE
CLIMB 3 TO 5 STEPS WITH RAILING: A LITTLE
MOVING TO AND FROM BED TO CHAIR: A LITTLE
TOILETING: A LITTLE
HELP NEEDED FOR BATHING: A LITTLE
TURNING FROM BACK TO SIDE WHILE IN FLAT BAD: A LITTLE
DAILY ACTIVITIY SCORE: 20
WALKING IN HOSPITAL ROOM: A LITTLE
STANDING UP FROM CHAIR USING ARMS: A LITTLE
DRESSING REGULAR LOWER BODY CLOTHING: A LITTLE
MOVING FROM LYING ON BACK TO SITTING ON SIDE OF FLAT BED WITH BEDRAILS: A LITTLE
MOBILITY SCORE: 18

## 2024-07-03 ASSESSMENT — ACTIVITIES OF DAILY LIVING (ADL): LACK_OF_TRANSPORTATION: NO

## 2024-07-03 NOTE — PROGRESS NOTES
"Roma Corral is a 69 y.o. male on day 1 of admission presenting with Rectal cancer (Multi).    Subjective   Patient reports increased abdominal pain after eating some scrambled eggs this morning and having some intermittent cramping. Stoma is making some noise. Afebrile, VSS       Objective     Physical Exam  Constitutional:       Appearance: Normal appearance.   Cardiovascular:      Rate and Rhythm: Normal rate.   Pulmonary:      Effort: Pulmonary effort is normal.   Abdominal:      Comments: Very distended, soft, appropriately tender, Colostomy Beefy red, moist, producing bowel sweat, no stool or gas, well pouched     Musculoskeletal:         General: Normal range of motion.   Skin:     General: Skin is warm and dry.   Neurological:      Mental Status: He is oriented to person, place, and time.   Psychiatric:         Mood and Affect: Mood normal.         Behavior: Behavior normal.         Last Recorded Vitals  Blood pressure 119/52, pulse 58, temperature 36.6 °C (97.9 °F), temperature source Oral, resp. rate 17, height 1.803 m (5' 11\"), weight 89.4 kg (197 lb 1.5 oz), SpO2 96%.  Intake/Output last 3 Shifts:  I/O last 3 completed shifts:  In: 2029.3 (22.7 mL/kg) [P.O.:240; I.V.:1789.3 (20 mL/kg)]  Out: 820 (9.2 mL/kg) [Urine:800 (0.2 mL/kg/hr); Blood:20]  Weight: 89.4 kg     Medications:   Scheduled medications  acetaminophen, 650 mg, oral, q6h  amLODIPine, 5 mg, oral, Daily  aspirin, 81 mg, oral, Daily  atorvastatin, 40 mg, oral, Nightly  enoxaparin, 40 mg, subcutaneous, q24h  gabapentin, 300 mg, oral, TID  iron sucrose, 200 mg, intravenous, Daily  metoprolol tartrate, 50 mg, oral, BID      Continuous medications  dextrose 5%-0.45 % sodium chloride, 40 mL/hr, Last Rate: 40 mL/hr (07/02/24 1804)  sodium chloride 0.9%, 10 mL/hr, Last Rate: 10 mL/hr (07/02/24 1804)      PRN medications  PRN medications: HYDROmorphone, naloxone, ondansetron ODT **OR** ondansetron, oxyCODONE, oxyCODONE, oxygen      Relevant " Results  Results for orders placed or performed during the hospital encounter of 07/02/24 (from the past 24 hour(s))   Basic Metabolic Panel   Result Value Ref Range    Glucose 208 (H) 74 - 99 mg/dL    Sodium 135 (L) 136 - 145 mmol/L    Potassium 4.4 3.5 - 5.3 mmol/L    Chloride 103 98 - 107 mmol/L    Bicarbonate 23 21 - 32 mmol/L    Anion Gap 13 10 - 20 mmol/L    Urea Nitrogen 14 6 - 23 mg/dL    Creatinine 1.01 0.50 - 1.30 mg/dL    eGFR 81 >60 mL/min/1.73m*2    Calcium 7.8 (L) 8.6 - 10.3 mg/dL   CBC   Result Value Ref Range    WBC 8.8 4.4 - 11.3 x10*3/uL    nRBC 0.0 0.0 - 0.0 /100 WBCs    RBC 3.83 (L) 4.50 - 5.90 x10*6/uL    Hemoglobin 7.0 (L) 13.5 - 17.5 g/dL    Hematocrit 25.8 (L) 41.0 - 52.0 %    MCV 67 (L) 80 - 100 fL    MCH 18.3 (L) 26.0 - 34.0 pg    MCHC 27.1 (L) 32.0 - 36.0 g/dL    RDW 21.0 (H) 11.5 - 14.5 %    Platelets 305 150 - 450 x10*3/uL   Magnesium   Result Value Ref Range    Magnesium 2.10 1.60 - 2.40 mg/dL     ECG 12 Lead    Result Date: 6/28/2024  Sinus rhythm with 1st degree AV block Otherwise normal ECG When compared with ECG of 02-DEC-2022 10:01, No significant change was found Confirmed by All Lennon ([id number]) on 6/28/2024 9:46:33 AM    MR liver w EOVIST contrast    Result Date: 6/22/2024  Interpreted By:  [place] Villead,  and Dervishi Rex STUDY: MR LIVER WITH EOVIST CONTRAST;  6/18/2024 2:29 pm   INDICATION: Signs/Symptoms:rectal cancer with hepatic metastases.   COMPARISON: CT abdomen pelvis: 06/10/2024   ACCESSION NUMBER(S): [id number]   ORDERING CLINICIAN: RAYMUNDO GUERRA   TECHNIQUE: MRI LIVER; Multiplanar magnetic resonance images of the abdomen were obtained including the following sequences; T2-weighted SSFSE with and without fat saturation, T1-weighted GRE in/opposed phase, DWI, fat saturated 3D-T1w GRE pre and dynamically post contrast.  9 ml of Gadolinium contrast agent Eovist were administered intravenously without immediate complication.   FINDINGS: LIVER: There are  multiple confluent, ill-defined, infiltrative lesions throughout the right hepatic lobe which demonstrate minimal enhancement on early postcontrast imaging. There is no uptake of contrast on hepatocyte phase imaging when compared to normal hepatic parenchyma. The largest measurable lesion is seen in hepatic segment 6/7 which measures 5.2 x 11 cm (series 19, image 24) and hepatic segment 6/5 measuring 6 x 7.5 cm. There are 2 well-circumscribed heterogenous hypoenhancing lesions seen on left hepatic lobe with the lesion seen on hepatic segment 4A which measures 2.3 x 2.5 cm (series 19, image 41) and hepatic segment 3 lesion measuring 0.7 x 0.8 cm (series 19, image 33). The liver measures 18 cm in craniocaudal dimension.   BILE DUCTS: No intrahepatic or extrahepatic bile duct dilatation is demonstrated.   GALLBLADDER: Gallbladder is partially distended. There is a T1 hyperintense material in the gallbladder lumen likely representing sludge.   PANCREAS: Normal signal intensity. Normal enhancement. No masses. The pancreatic duct is normal.   SPLEEN: Within normal limits.   ADRENAL GLANDS: Within normal limits.   KIDNEYS: Within normal limits.   LYMPH NODES: There are few small portacaval lymph nodes. Otherwise there is no lymphadenopathy by size criteria.   ABDOMINAL VESSELS: Aorta and the major abdominal arterial vessels demonstrate no gross abnormality.  The portal vein, hepatic veins, superior mesenteric and splenic veins are patent.  No significant collaterals or esophageal varices are present.   BOWEL: Stomach and duodenum are within normal limits. Visualized small and large bowel loops are normal in caliber.   PERITONEUM/RETROPERITONEUM: No ascites.   BONES AND LOWER THORAX: No abnormal enhancing focal bony lesions are identified. The visualized lower lung fields are unremarkable. The heart is normal in size.       1. Multiple hepatic metastases predominantly involving the right lobe of the liver as described  above. 2. Few small portacaval lymph nodes with no lymphadenopathy by size criteria. 3. Patent portal venous system and no extrahepatic or intrahepatic biliary duct dilatation.   I personally reviewed the images/study and I agree with the findings as stated. This study was interpreted at [facility], [place], [place].   MACRO: None   Signed by: Des Mohsen Lorenkalen 6/22/2024 12:13 AM Dictation workstation:   [id number]    MR rectum w and wo IV contrast    Result Date: 6/21/2024  Interpreted By:  Jamil Gustafson,  and Jacinda Goodman STUDY: MR RECTUM W AND WO IV CONTRAST;  6/19/2024 5:09 pm   INDICATION: Signs/Symptoms:rectal mass   COMPARISON: CT abdomen pelvis: 06/10/2024.   ACCESSION NUMBER(S): [id number]   ORDERING CLINICIAN: RAYMUNDO GUERRA   TECHNIQUE: Multiplanar MRI of the pelvis was obtained including axial, sagittal and coronal T2 weighted SSFSE, (para)axial, (para)coronal and sagittal T2 FSE , axial DWI, pre and post gadolinium dynamic T1 GRE sequences in 3 planes.  Patient received 60CC of gel per rectum. 20 ml of Gadolinium contrast agent Dotarem were administered intravenously without complication.   FINDINGS: RECTAL MASS: Within the  upper rectum at the rectosigmoid junction there is a semi circumferential rectal wall mass measuring up to 10mm in thickness (series 10, image 17). The mass measures 7.6 cm longitudinally and demonstrates predominant T2w  isointensity compared to normal rectal wall. There is marked narrowing at the rectosigmoid junction with abnormal peripheral T2 hypointense striations.   DISTANCE FROM ANAL VERGE: The inferior aspect of the lesion is approximately 8-9 cm from the anorectal junction and  10 cm from the anal verge.   EXTRAMURAL INVASION: The mass demonstrates extramural extension into the perirectal fat as seen on series 10, image 25.   EXTRAMURAL VASCULAR INVASION: There is no extramural vascular invasion.   DISTANCE TO  MESORECTAL FASCIA/PERITONEAL REFLECTION: Abnormal, enhancing T2 dark striations, extending beyond the wall of the rectal mass involving the anterior mesorectal fascia and peritoneal reflection (series 10, image 25.   ADJACENT ORGANS: The urinary bladder is  not involved.  The prostate and seminal vesicles appear within normal limits.   LYMPH NODE STATUS: There are few nonspecific left-sided superior rectal lymph nodes with the largest measuring 0.4 cm in diameter (series 6, image 21). Otherwise there is no evidence of lymphadenopathy.   BONES: No focal lesions are noted in the bone.       1. Semi circumferential rectosigmoid junction mass with extramural invasion and extension into the anterior mesorectal fascia and peritoneal reflection with significant luminal narrowing at the rectosigmoid junction as detailed above. Findings are consistent with known rectal malignancy. 2. Few nonspecific left sided pelvic lymph nodes with no evidence of lymphadenopathy by MRI criteria.     The MR imaging based stage of this rectal cancer is T4. Based on MRI the kiran stage is Nx.   I personally reviewed the images/study and I agree with the findings as stated by Resident Rex Monaco MD.   MACRO: None   Signed by: Jamil Mcclelland 6/21/2024 12:08 AM Dictation workstation:   [id number]    Colonoscopy Diagnostic    Result Date: 6/17/2024  Table formatting from the original result was not included. Impression Single malignant-appearing mass (not traversable) in the rectum 8 cm from the anal verge, covering the whole circumference; there was stigmata of recent hemorrhage, and bleeding occurred after intervention; performed cold forceps biopsy with partial removal Findings Single malignant-appearing mass (not traversable) in the rectum 8 cm from the anal verge observed during digital rectal exam, covering the whole circumference; there was stigmata of recent hemorrhage, and bleeding occurred after intervention;  performed cold forceps biopsy with partial removal. Obstructing nontraversable circumferential rectal mass just proximal to second rectal valve, approximately 8 cm from the anus  Recommendation  Repeat colonoscopy in 1 year  Incomplete procedure Abnormal pathology   Rectal MRI Liver MRI with Eovist Referrals to hematology oncology and hepatobiliary surgery Will need to discuss diverting loop colostomy in the near future  Indication Colonic mass Staff Staff Role Javier Vasquez MD Proceduralist Medications See Anesthesia Record. Preprocedure A history and physical has been performed, and patient medication allergies have been reviewed. The patient's tolerance of previous anesthesia has been reviewed. The risks and benefits of the procedure and the sedation options and risks were discussed with the patient. All questions were answered and informed consent obtained. Details of the Procedure The patient underwent monitored anesthesia care, which was administered by an anesthesia professional. The patient's blood pressure, ECG, ETCO2, heart rate, level of consciousness, oxygen and respirations were monitored throughout the procedure. A digital rectal exam was performed. A perianal exam was performed. The scope was introduced through the anus and advanced to the rectum. Retroflexion was not performed due to altered anatomy. The quality of bowel preparation was evaluated using the [place] Bowel Preparation Scale with scores of: right colon = not assessed, transverse colon = not assessed, left colon = not assessed. The patient's estimated blood loss was minimal (<5 mL). The procedure was not difficult. The patient tolerated the procedure well. There were no apparent adverse events. Obstructing rectal mass. Events Procedure Events Event Event Time ENDO SCOPE IN TIME 6/17/2024  8:05 AM ENDO SCOPE OUT TIME 6/17/2024  8:21 AM Specimens ID Type Source Tests Collected by Time 1 : rectal mass biopsy Tissue RECTUM BIOPSY SURGICAL  PATHOLOGY EXAM Regina Joseph RN 6/17/2024 0815 Procedure Location [facility] [address] [place] [facility] [id number] [phone] Referring Provider Javier Vasquez MD Procedure Provider Javier Vasquez MD     CT abdomen pelvis w IV contrast    Result Date: 6/10/2024  Interpreted By:  Des Villeda,  and Naz Aragon STUDY: CT ABDOMEN PELVIS W IV CONTRAST;  6/10/2024 2:18 pm   INDICATION: Signs/Symptoms:abdominal pain, weight loss.   Per EMR: History of hyperlipidemia and hypertension now with abdominal discomfort, pain, weight loss, weakness, and fatigue.   COMPARISON: CT chest/abdomen/pelvis 05/29/2021   ACCESSION NUMBER(S): [id number]   ORDERING CLINICIAN: JOSE R GUERRA   TECHNIQUE: CT of the abdomen and pelvis was performed.  Standard contiguous axial images were obtained at 3 mm slice thickness through the abdomen and pelvis. Coronal and sagittal reconstructions at 3 mm slice thickness were performed.   75 ml of contrast Omnipaque 350 were administered intravenously without immediate complication.   FINDINGS: LOWER CHEST: The visualized lung base is unremarkable. The heart is normal in size without pericardial effusion. No pleural effusion is present. Visualized distal esophagus appears normal.   ABDOMEN:   LIVER: The liver is normal in size.   There are multiple confluent heterogenous ill-defined hepatic lesions one on the left and the rest throughout the right lobe of the liver. For example:   7.9 x 9.0 x 5.4 cm lesion in segment V (series 201, image 42 and series 202, image 44). 6.7 x 0.4 x 5.3 cm lesion in segment VII (series 201, image 42 and series 202, image 71). 2.2 x 2.1 x 1.6 cm lesion in segment VI (series 201, image 25 and series 202, image 32).   BILE DUCTS: The intrahepatic and extrahepatic ducts are not dilated.   GALLBLADDER: The gallbladder is nondistended and without evidence of radiopaque stones.   PANCREAS: There are several punctate  calcification within the pancreatic head. Otherwise, the pancreas appears unremarkable without evidence of ductal dilatation or masses.   SPLEEN: The spleen is normal in size without focal lesions.   ADRENAL GLANDS: Bilateral adrenal glands appear normal.   KIDNEYS AND URETERS: The kidneys are normal in size and enhance symmetrically. There are two renal stones in the left ureterovesical junction measuring up to 4 cm series 201, image 138), without upstream hydroureteronephrosis. No evidence of nephroureterolithiasis or hydroureteronephrosis on the right.   Subcentimeter hypodensity in the inferior pole of the left kidney measuring 0.8 cm (series 201, image 79), too small to characterize, however favored to represent renal cyst.   PELVIS:   BLADDER: The urinary bladder is decompressed, limited for evaluation.   REPRODUCTIVE ORGANS: No pelvic masses.   BOWEL: The stomach is unremarkable.   The small bowel is not abnormally dilated.   The appendix appears normal.   There is focal circumferential narrowing of the distal sigmoid colon (series 201, image 131) with mild sigmoid wall thickening (series 201, image 131). There is moderate colonic stool burden proximal to this raising concern for partial obstruction.   VESSELS: There is no aneurysmal dilatation of the abdominal aorta. The IVC appears normal.   PERITONEUM/RETROPERITONEUM/LYMPH NODES: No ascites or free air, no fluid collection. Subcentimeter prominent pericolonic nodes around the thickened sigmoid colon measuring up to 0.5 cm (series 201, image 120), not evident on prior CT from 2021.   BONES: No suspicious osseous lesions are identified. Degenerative discogenic disease is noted in the lower thoracic and lumbar spine.   ABDOMINAL WALL: Small fat containing umbilical hernia.       1. New multiple confluent heterogenous ill-defined right-greater-than-left hepatic lesions predominantly in segment V, VI, and VII highly concerning for metastatic lesions. Recommend  further evaluation with tissue sampling. 2. Focal circumferential narrowing and wall thickening of the distal sigmoid colon with adjacent subcentimeter  nodes concerning for colon malignancy, primary site of disease. Recommend further evaluation with colonoscopy.   I personally reviewed the images/study and I agree with the findings as stated by Margo Childs DO, PGY-2. This study was interpreted at [facility], [place], [place].   MACRO: Critical Finding:  See findings. Notification was initiated on 6/10/2024 at 3:41 pm by  Margo Childs.  (**-OCF-**) Instructions:   Signed by: Des Villeda 6/10/2024 7:15 PM Dictation workstation:   [id number]       Assessment/Plan   Principal Problem:    Rectal cancer (Multi)    Roma Corral is a 70 yo male who is admitted after Laparoscopic Colostomy with Dr Vasquez.     Neuro:  Acute post-op pain- controlled with pain medications  - OOB/ ambulate 5x per day   - oxycodone as needed- minimize narcotics as much as possible  - scheduled tylenol       CV: RRR, VSS  Hx: HLD and HTN   - VS every 4 hours   - continue amlodipine, atorvastatin and metoprolol     Pulm: NAD, on RA, CTAB   - IS every hour while awake   - Pulse ox every 4 hours with VS       GI:  abdomen very distended, soft, appropriately tender, Colostomy Beefy red, moist, producing bowel sweat, no stool or gas, well pouched  Hx: rectal cancer   POD #2 s/p Lx colostomy   - scaled back to sips of clears  - intubate stoma  - Stoma RN and dietician following  - PRN antiemetic   - continue IVF     : voiding without difficulty   - Monitor I&Os   - Cr and lytes WNL     HEME: DVT Proph   - SCDs/ ambulate/ lovenox  - iron started  - H/H 7/25.8 (8/28.9 pre-op)       Endo:   No active issues     ID: afebrile   - Monitor for s/s infection       Dispo: Sips of clears, intubate stoma, walking as much as possible. Discussed with Dr Vasquez     I spent 35 minutes in the professional and  overall care of this patient.      Capri Thakkar, APRN-CNP    Hemoglobin stable, stoma intubated and beginning to function, diet as tolerated.  Ongoing stoma teaching, possible discharge tomorrow if doing well

## 2024-07-03 NOTE — POST-PROCEDURE NOTE
Subjective:  Doing well. Pain controlled, has not ate yet. Denies N/V. Reports +flatus, +void      PE:  Constitutional: A&Ox3, calm and cooperative, NAD.  Eyes: PERRL, clear sclera.  ENMT: Moist mucous membranes.  Head/Neck: Neck supple.  Cardiovascular: Normal rate and regular rhythm.   Respiratory/Thorax: Unlabored breathing.   Gastrointestinal: Abdomen soft, mildly distended, appropriately tender, +BS x 4, lap sites C/D/I with dermabond, well approximated w/o surrounding erythema or drainage. Stoma is Beefy red, moist, with serosanguinous OP, well pouched  Genitourinary: Voiding independently without difficulty.  Musculoskeletal: ROM intact.  Neurological: A&Ox3, No focal deficits.  Psychological: Appropriate mood and behavior.       Radiology and labs reviewed. VSS, afebrile.     POD0  LAP Colostomy Creation with Dr. Vasquez 2/2 rectal cancer   Intra-op findings: Obstructing mid rectal mass, evidence of liver metastasis     Plan:   - Diet: regular   - abx:  obdulio-op flagyl and ancef  - Continue current pain regimen   - PRN antiemetic   - DVT Proph: SCDs/ ambulate/ Lovenox  - Monitor VS every 4 hours   - Labs ordered for AM   - IS every hour while awake   - Encourage ambulation / OOB as tolerated   - Monitor surgical sites for excessive drainage, erythema, bruising.   - nutrition consult  - wound care consult  - monitor stoma output   - ERAS protocol  - resume home meds      Dispo: Pain and nausea control as needed. OOB as able. Monitor surgical site for excessive drainage, erythema or bruising.    I spent 35 minutes in the professional and overall care of this patient.     Tatyana Obregon PA-C  Department of Surgical Services  [facility]

## 2024-07-03 NOTE — PROGRESS NOTES
07/03/24 1100   Discharge Planning   Living Arrangements Spouse/significant other   Support Systems Spouse/significant other;Children   Assistance Needed none   Type of Residence Private residence   Number of Stairs to Enter Residence 2   Number of Stairs Within Residence 10   Do you have animals or pets at home? Yes   Type of Animals or Pets one dog   Who is requesting discharge planning? Provider   Home or Post Acute Services In home services   Type of Home Care Services Home nursing visits   Patient expects to be discharged to: Home   Does the patient need discharge transport arranged? No   Financial Resource Strain   How hard is it for you to pay for the very basics like food, housing, medical care, and heating? Not hard   Housing Stability   In the last 12 months, was there a time when you were not able to pay the mortgage or rent on time? N   In the last 12 months, how many places have you lived? 1   In the last 12 months, was there a time when you did not have a steady place to sleep or slept in a shelter (including now)? N   Transportation Needs   In the past 12 months, has lack of transportation kept you from medical appointments or from getting medications? no   In the past 12 months, has lack of transportation kept you from meetings, work, or from getting things needed for daily living? No   Patient Choice   Provider Choice list and CMS website (https://medicare.gov/care-compare#search) for post-acute Quality and Resource Measure Data were provided and reviewed with: Patient   Patient / Family choosing to utilize agency / facility established prior to hospitalization No     7/3/24 1100  Met with patient at bedside to discuss discharge planning.  Patient lives at home with his wife in a house.  He is independent with ADLs and drives at baseline.  He does not use any assistive devices for ambulation.  He plans on returning home at discharge.  He is agreeable to [facility] for ostomy care.  Reviewed [facility] options  and expectations with patient.  He would like to use [facility].  Referral will need to be sent at discharge.  Emilie Garcia RN TCC

## 2024-07-03 NOTE — CONSULTS
"WO nursing visit outcome: Pouch was changed and stoma was assessed. New pouch was placed, and per discussion with ANKUSH Thakkar, the stoma was intubated (16 Fr catheter) and flushed with NS. A total of 420 ml NS was used to to flush and about 350 ml brown stained return with some mucous shreds was returned.     [facility] next scheduled visit/plan: see on Friday for lesson and john removal (if approved by service)    Stoma Type: Loop Colostomy  John: Yes - sutured, moderate tension  Diameter: 1 1/2 x 1 3/4\"  Location: LLQ  Protrusion: Budded  Mucosal Condition and Color: Moist, Red, Edematous  Mucocutaneous Junction: Intact  Peristomal Skin: Clear, intact  Location of Skin Impairment: n/a  Peristomal Contour: Deep Concave and Creases at 3 & 9 o'clock  Supportive Tissue: Semi-Soft  Character of Output:  bowel sweat in pouch before pouch change and stoma intubation  Emptying Frequency: per nursing  Removed/Current Pouching System: 2 3/4\" ConvaTec moldable flat wafer, barrier ring, Natura Invisiclose pouch  Current Wearing Time: 1 Day    Recommendations:   Skin Care: stoma powder to denuded skin as needed with pouch change  Pouching System: for today 2 3/4\" Karrie New Image soft convex with tape collar, stoma paste (including over john ends), lock n' roll pouch    Wear Time: plan to change on Friday with lesson and john removal (if approved by service)  Other: TBD    Incision: lap sites, no drainage or erythema, surgical glue    Supplier:  TBD - should have [facility] on discharge    Comments: One set of supplies and the pattern are at the bedside.     Time Increment: 45 minutes    Lauren Posey, RAMEZN, RN, CWOCN     "

## 2024-07-03 NOTE — CARE PLAN
Problem: Diabetes  Goal: Achieve decreasing blood glucose levels by end of shift  Outcome: Progressing  Goal: Increase stability of blood glucose readings by end of shift  Outcome: Progressing  Goal: Decrease in ketones present in urine by end of shift  Outcome: Progressing  Goal: Maintain electrolyte levels within acceptable range throughout shift  Outcome: Progressing  Goal: Maintain glucose levels >70mg/dl to <250mg/dl throughout shift  Outcome: Progressing  Goal: No changes in neurological exam by end of shift  Outcome: Progressing  Goal: Learn about and adhere to nutrition recommendations by end of shift  Outcome: Progressing  Goal: Vital signs within normal range for age by end of shift  Outcome: Progressing  Goal: Increase self care and/or family involovement by end of shift  Outcome: Progressing  Goal: Receive DSME education by end of shift  Outcome: Progressing   The patient's goals for the shift include remain comfortable    The clinical goals for the shift include pain management

## 2024-07-04 LAB
ANION GAP SERPL CALC-SCNC: 12 MMOL/L (ref 10–20)
BUN SERPL-MCNC: 11 MG/DL (ref 6–23)
CALCIUM SERPL-MCNC: 7.9 MG/DL (ref 8.6–10.3)
CHLORIDE SERPL-SCNC: 107 MMOL/L (ref 98–107)
CO2 SERPL-SCNC: 25 MMOL/L (ref 21–32)
CREAT SERPL-MCNC: 1.03 MG/DL (ref 0.5–1.3)
EGFRCR SERPLBLD CKD-EPI 2021: 79 ML/MIN/1.73M*2
ERYTHROCYTE [DISTWIDTH] IN BLOOD BY AUTOMATED COUNT: 21.4 % (ref 11.5–14.5)
GLUCOSE SERPL-MCNC: 95 MG/DL (ref 74–99)
HCT VFR BLD AUTO: 25.8 % (ref 41–52)
HGB BLD-MCNC: 7 G/DL (ref 13.5–17.5)
MAGNESIUM SERPL-MCNC: 2.2 MG/DL (ref 1.6–2.4)
MCH RBC QN AUTO: 18.3 PG (ref 26–34)
MCHC RBC AUTO-ENTMCNC: 27.1 G/DL (ref 32–36)
MCV RBC AUTO: 68 FL (ref 80–100)
NRBC BLD-RTO: 0.2 /100 WBCS (ref 0–0)
PLATELET # BLD AUTO: 294 X10*3/UL (ref 150–450)
POTASSIUM SERPL-SCNC: 3.8 MMOL/L (ref 3.5–5.3)
RBC # BLD AUTO: 3.82 X10*6/UL (ref 4.5–5.9)
SODIUM SERPL-SCNC: 140 MMOL/L (ref 136–145)
WBC # BLD AUTO: 9.1 X10*3/UL (ref 4.4–11.3)

## 2024-07-04 PROCEDURE — 2500000004 HC RX 250 GENERAL PHARMACY W/ HCPCS (ALT 636 FOR OP/ED): Performed by: NURSE PRACTITIONER

## 2024-07-04 PROCEDURE — 83735 ASSAY OF MAGNESIUM: CPT | Performed by: STUDENT IN AN ORGANIZED HEALTH CARE EDUCATION/TRAINING PROGRAM

## 2024-07-04 PROCEDURE — 1100000001 HC PRIVATE ROOM DAILY

## 2024-07-04 PROCEDURE — 85027 COMPLETE CBC AUTOMATED: CPT

## 2024-07-04 PROCEDURE — 9420000001 HC RT PATIENT EDUCATION 5 MIN

## 2024-07-04 PROCEDURE — 36415 COLL VENOUS BLD VENIPUNCTURE: CPT

## 2024-07-04 PROCEDURE — 2500000001 HC RX 250 WO HCPCS SELF ADMINISTERED DRUGS (ALT 637 FOR MEDICARE OP): Performed by: STUDENT IN AN ORGANIZED HEALTH CARE EDUCATION/TRAINING PROGRAM

## 2024-07-04 PROCEDURE — 80048 BASIC METABOLIC PNL TOTAL CA: CPT

## 2024-07-04 PROCEDURE — 99231 SBSQ HOSP IP/OBS SF/LOW 25: CPT | Performed by: NURSE PRACTITIONER

## 2024-07-04 ASSESSMENT — PAIN DESCRIPTION - ORIENTATION
ORIENTATION: LEFT
ORIENTATION: LEFT

## 2024-07-04 ASSESSMENT — PAIN - FUNCTIONAL ASSESSMENT: PAIN_FUNCTIONAL_ASSESSMENT: 0-10

## 2024-07-04 ASSESSMENT — PAIN DESCRIPTION - LOCATION
LOCATION: ABDOMEN
LOCATION: ABDOMEN

## 2024-07-04 ASSESSMENT — PAIN SCALES - GENERAL
PAINLEVEL_OUTOF10: 10 - WORST POSSIBLE PAIN
PAINLEVEL_OUTOF10: 7
PAINLEVEL_OUTOF10: 0 - NO PAIN
PAINLEVEL_OUTOF10: 9
PAINLEVEL_OUTOF10: 3

## 2024-07-04 ASSESSMENT — PAIN SCALES - WONG BAKER: WONGBAKER_NUMERICALRESPONSE: HURTS WORST

## 2024-07-04 ASSESSMENT — PAIN SCALES - PAIN ASSESSMENT IN ADVANCED DEMENTIA (PAINAD): TOTALSCORE: MEDICATION (SEE MAR)

## 2024-07-04 NOTE — DISCHARGE INSTRUCTIONS
Instructions After Laparoscopic Surgery:    Wound Care:  -Ice packs to wounds every hour the first day  -Ok to shower in 24 hours  -no baths or swimming for 2 weeks  -keep wound clean and dry  -do not apply topical creams or ointments  -call if you notice redness around wound, foul-smelliing drainage, or increasing pain     Diet:          - GI soft low residual as discussed with Dietary          - Impact Advance Shakes 3 times a day for 5 days    Activity          -Take it easy for the first 48 hours          -stairs and walks are fine          -resume activities gradually over the first week          -ask Dr Vasquez before resuming strenuous physical exertions          -No driving while on pain medication    Medications          - Tylenol as needed for mild-moderate pain.          - Please take oxycodone as needed for severe pain only. Pain medication slows down bowel function so avoid taking unless neccessary.          - Resume your home medications unless otherwise directed    Other Instructions          - Appointment requested. Call to make appointment within 1-2 days if you have no heard back from Dr Vasquez's office: [phone]          -Call the doctor for the following:   severe unrelieved pain   fevers > 101F   Nausea/vomiting   wound issues   insurance/return to work forms   shortness of breath   chest pains    Other Instructions:  It is important to drink adequate fluid and avoid dehydration. Signs of dehydration include dark urine, decreased frequency in urine, pale mucous membrane, or dry mucous membranes. You should drink at least 8 8oz glasses of fluids a day and it should be a variety of fluids (water, juice, tea, coffee, etc.).  If you start to experience nausea, emesis, fever, or abdominal pain please call Dr. Vasquez's office at [phone].      -------  Your colostomy is formed from the large intestine or colon. The stool in your ostomy pouch will be somewhat formed an may resemble normal stool.  You can experience constipation with a colostomy, therefore it is important to drink plenty of a variety of fluids and you may need to use a stool softener. Your pouching system should be changed twice per week, including the wafer. The skin around the ostomy should be observed at that time to ensure that the skin under the pouching system is intact.    Ostomy Supply List  PATIENT NAME: Roma Corral                                                                     DATE: 2024     STREET ADDRESS: [address] [place] [address]        CITY: [place]   STATE:          [place]                  ZIP:  [address]     PHONE: [phone]   : 1955     DIAGNOSIS: Rectal Ca   STOMA TYPE: colostomy      Item Order # Brand Description Qty/Unit 30 Day Use   Wafer  43812 Karrie 2 ” New Image, soft convex 5/Box 2 Boxes   Pouch - drainable  30162 - clear  07915 - beige Karrie 2 ” New Image lock n' roll pouch 10/Box 1 Box   Pouch - Closed 26111  [place] 2 ” New Image closed pouch 60/Box 1 Box   Adhesive Remover 7760 [place] Adapt Universal Remover Wipes 50/Box 1 Box   Stoma Paste   83658 Karrie Adapt Stoma Paste 1 Tube 2 Tubes   Barrier Ring   7805 [place] Flat Adapt Barrier Ring 10/Box 1 Box   Stoma Powder   00490 ConvaTec Stomahesive Protective Powder 1 Bottle As Needed   Belt   7299 Karrie Large Belt 1 Belt 1 Belt   Deordorizing Lubricant  35217 Karrie Adapt Lubricating Deodorant 8 oz/Bottle 1 Bottle     Refill #: 12     Attending Physician:  Dr. Javier Vasquez   Office Phone: [phone]   Office Fax: [phone]     [facility] RN: RAMEZ KhouryN, RN, CWOCN            Office Phone: [phone]       HOW TO CHANGE YOUR OSTOMY POUCH  Gather Supplies:  Soft paper towels or washcloths (Bounty, Brawny, Viva for example)  Non-lotion/Non-oily soap (Ivory® or Dial® are recommended)  Scissors with blunt tip   Wafer: [place] 17872 soft convex, wafer with tape collar  Pouch: [place] 58223 lock n' roll pouch OR,  once the stool is normalized, #12344 opaque, closed pouch  Accessory products: measuring guide, adhesive removers, stoma paste, stoma powder, belt     Close the New Pouch:  Make a crease in the drainable pouch end for easier opening/emptying.   Close the pouch by rolling up 3 times and then seal with a “push, push, push” on the flap.  Set prepared pouch aside.   Prepare the New Wafer:  Measure the size of the stoma using the “large Swiss cheese piece”   NOTE: for the first few weeks, you will need to measure the stoma about once a week to check the stoma size. You will do this after you remove the pouch.  Trace the [place] that matches your stoma size on the back of the wafer.   Use the small curved scissors to cut a stoma-sized opening in the wafer.   Remove the plastic cover from the back of the baseplate.  Make a “dog ear” on one end of the paper that covers each side of the tape.  Apply a bead of paste around the opening on the back of the baseplate and to positions 3-o'clock and 9 o'clock. (It will look a little like a UFO.)  Set the prepared wafer aside with the sticky side facing up.   Remove the Worn Pouch:  Empty the contents of the worn pouch into the toilet.   NOTE: Between pouch changes, be sure to empty pouch when no more than ½ full!  Wipe the end of the pouch clean using damp toilet tissue or paper towel.   Use the adhesive remover wipe or a soapy cloth and gentle pressure to remove the worn pouch from the skin.  Clean the Skin:  Wash the skin around the wound with non-lotion soap. Do this 3 times.  Rinse the skin with warm water. Do this 3 times.  Pat the skin dry using a dry washcloth or paper towel. Do this 3 times.  Apply stoma powder to any red or irritated skin.  Firmly brush the excess powder off.   Apply the New Pouch:  Hold the baseplate in a “fernie” shape, then center it over the stoma and push firmly it onto the skin.  Tug on the dog ear and pull the paper off of the tape, smoothing the  tape as you do so.   Warm your hands by rubbing them together, then apply them over the pouch for a minute or two.   Apply the belt snugly, but not tightly. 2 fingers should fit between the belt and the skin.    Check the size of the stoma at least weekly for the first 6 weeks following surgery.   The best time to change your pouch is first thing in the morning, before you eat or drink anything.    If you have questions about changing your pouch or if you encounter leaks or irritated skin please contact your Ostomy Nurse by calling [phone].

## 2024-07-04 NOTE — PROGRESS NOTES
Subjective   Indu Corral is a 68 y.o. male.    Chief Complaint:  INDU CORRAL is being seen for a six month follow-up of coronary artery disease, dyslipidemia, dyspnea, hypertension, a prior myocardial infarction and a routine medication evaluation1   HPI  This is a 69 y/o male here today for a six month Cardiology follow up visit. He complains of increased heart palpitations over the past three weeks. He denies chest pain or sob. Reviewed lab work results and medications. A NM Stress test was done [id number]- see report. A Cardiac cath and Echo were done in June 2021- see reports.     ROS    Objective   Physical Exam  Patient is an obese-appearing 69 y/o male in no distress.   JVP not elevated. Carotid impulses are 2+ without overlying bruit.   Chest exhibits fair to good air movement with completely clear breath sounds.   The cardiac rhythm is regular with no premature beats.   Normal S1 and S2. No gallop, murmur or rub, or click.   Abdomen is soft and benign without focal tenderness.   No peripheral edema. The pedal pulses are intact.  All other systems have been reviewed and are negative for complaints.    Lab Review:   No visits with results within 6 Month(s) from this visit.   Latest known visit with results is:   Legacy Encounter on 12/30/2022   Component Date Value    Flu A By PCR 12/30/2022 NEGATIVE     Flu B By PCR 12/30/2022 NEGATIVE     Sars-Cov-2 By PCR 12/30/2022 Positive for COVID-19 (A)     Disclaimer 12/30/2022                      Value:Influenza A and Influenza B negative results should be considered   presumptive in samples that have a positive SARS-CoV-2 result.       This test has been authorized by FDA under an EUA for use by CLIA   Certified Moderate and High-Complexity laboratories and Point of Care   (POC), i.e., in patient care settings operating under a CLIA   Certificate of Waiver, Certificate of Compliance, or Certificate of   Accreditation.       This test has been authorized only for the  simultaneous qualitative   detection and differentiation of nucleic acid from SARS-CoV-2,   influenza A virus, and influenza B virus and not for any other virues   or pathogens.       This test is only authorized for the duration of the declaration that   circumstances exist justifying the authorization of emergency use of   in vitro diagnostic tests for the detection and/or diagnosis of   COVID-19, unless the authorization is terminated or revoked sooner.  Performed at [facility], [address] [place] [id number]         Assessment/Plan   1. CAD. Patient was seen at [facility] 5/30/2021 with a 36 to 48-hour episode of vague upper abdominal and chest discomfort extending into his back. High-sensitivity troponin positive for non-STEMI with EKG demonstrating no ST segment abnormalities in the emergency room. He had diagnostic coronary angiography. It demonstrated mild calcification and diffuse mild to moderate irregularities throughout the proximal third of the LAD with mild luminal irregularities of the mid and distal vessel. There is a severe lesion in a very tiny and diagonal branch. The large dominant left circumflex has scattered mild luminal irregularities. A relatively small caliber nondominant right coronary artery supplying the mainly right ventricular myocardium is a moderate 50% mid vessel lesion. The left ventricular end-diastolic volume is in upper range of normal and there is a low normal left ventricular cyst solid contractility. Study reviewed by interventional cardiology and will recommend medical therapy. Brilinta was continued, but at this point may be discontinued. Patient is in cardiac rehab. Patient continues to have anginal symptoms with shortness of breath and palpitations. Will have patient complete exercise nuclear stress testing. Echocardiogram done at the time showed mild concentric LVH, EF 60 to 64%, left atrial size mildly dilated, mild AI, trace MR, trivial to mild TR. Had exercise  nuclear medicine stress test done 09/2021 that showed suspicion of infarct along the mid anterior wall, but ecg showed no ischemia at submax workload.  The patient is doing well at present.  His only symptom has been rare palpitations over the past 3 weeks but the duration is very brief less than 5 seconds.     2. Hyperlipidemia. Well-controlled on atorvastatin 40 mg daily.  Recent lipid panel from 12/2/2022 was excellent cholesterol 99 LDL 39 HDL 26 triglyceride 167.  SMA panel normal except glucose 142.  Hematocrit 38.5.  He will require repeat CBC CMP lipid panel A1c prior to next visit.     3. History of COVID-19 vaccine #1 and #2.     4. Hypertension. Adequately controlled on current medications.  Patient's blood pressure is currently well within normal range and given his weight loss will reduce the amlodipine from 10 to 5 mg daily.    5.  Rectal carcinoma.  This patient is lost approximately 15 to 20 pounds over the past several months.  He had an abdominal and pelvic CT on 6/10/2024 is showing circumferential narrowing and thickening of the distal sigmoid colon concerning for carcinoma along with multiple right greater than left hepatic lesions.  Colonoscopy on 6/17/2024 confirmed the presence of the carcinoma.  MRI of the liver 6/18/2024 showed multiple hepatic metastases.  MRI of the rectum 6/19/2024 showed circumferential narrowing of the rectosigmoid junction.  Patient is scheduled for colostomy on 7/2/2024 at [facility].  Preadmission testing yesterday shows sinus rhythm with first-degree AV block.  Patient's blood pressure is dropped slightly with his weight loss currently acceptable range.  Will reduce amlodipine from 10 to 5 mg daily.

## 2024-07-04 NOTE — PROGRESS NOTES
"Roma Corral is a 69 y.o. male on day 2 of admission presenting with Rectal cancer (Multi).    Subjective   Feeling much better today, drinking liquids without abdominal cramping or nausea. Robaxin has made a significant difference. Afebrile, VSS       Objective     Physical Exam  Constitutional:       Appearance: Normal appearance.   Cardiovascular:      Rate and Rhythm: Normal rate.   Pulmonary:      Effort: Pulmonary effort is normal.   Abdominal:      Comments: Minimally distended, soft, appropriately tender, Colostomy intubated Beefy red, moist, producing some brown liquid stool (350cc/24h), well pouched     Musculoskeletal:         General: Normal range of motion.   Skin:     General: Skin is warm and dry.   Neurological:      Mental Status: He is oriented to person, place, and time.   Psychiatric:         Mood and Affect: Mood normal.         Behavior: Behavior normal.         Last Recorded Vitals  Blood pressure 111/56, pulse 69, temperature 37.2 °C (99 °F), temperature source Oral, resp. rate 16, height 1.803 m (5' 11\"), weight 89.4 kg (197 lb 1.5 oz), SpO2 97%.  Intake/Output last 3 Shifts:  I/O last 3 completed shifts:  In: 1088.7 (12.2 mL/kg) [I.V.:668.7 (7.5 mL/kg); Other:420]  Out: 2925 (32.7 mL/kg) [Urine:2550 (0.8 mL/kg/hr); Stool:375]  Weight: 89.4 kg     Medications:   Scheduled medications  acetaminophen, 650 mg, oral, q6h  amLODIPine, 5 mg, oral, Daily  aspirin, 81 mg, oral, Daily  atorvastatin, 40 mg, oral, Nightly  enoxaparin, 40 mg, subcutaneous, q24h  gabapentin, 300 mg, oral, TID  iron sucrose, 200 mg, intravenous, Daily  metoprolol tartrate, 50 mg, oral, BID      Continuous medications  dextrose 5%-0.45 % sodium chloride, 40 mL/hr, Last Rate: 40 mL/hr (07/03/24 1617)  sodium chloride 0.9%, 10 mL/hr, Last Rate: 10 mL/hr (07/03/24 1047)      PRN medications  PRN medications: HYDROmorphone, naloxone, ondansetron ODT **OR** ondansetron, oxyCODONE, oxyCODONE, oxygen      Relevant Results  Results " for orders placed or performed during the hospital encounter of 07/02/24 (from the past 24 hour(s))   Magnesium   Result Value Ref Range    Magnesium 2.20 1.60 - 2.40 mg/dL   CBC   Result Value Ref Range    WBC 9.1 4.4 - 11.3 x10*3/uL    nRBC 0.2 (H) 0.0 - 0.0 /100 WBCs    RBC 3.82 (L) 4.50 - 5.90 x10*6/uL    Hemoglobin 7.0 (L) 13.5 - 17.5 g/dL    Hematocrit 25.8 (L) 41.0 - 52.0 %    MCV 68 (L) 80 - 100 fL    MCH 18.3 (L) 26.0 - 34.0 pg    MCHC 27.1 (L) 32.0 - 36.0 g/dL    RDW 21.4 (H) 11.5 - 14.5 %    Platelets 294 150 - 450 x10*3/uL   Basic Metabolic Panel   Result Value Ref Range    Glucose 95 74 - 99 mg/dL    Sodium 140 136 - 145 mmol/L    Potassium 3.8 3.5 - 5.3 mmol/L    Chloride 107 98 - 107 mmol/L    Bicarbonate 25 21 - 32 mmol/L    Anion Gap 12 10 - 20 mmol/L    Urea Nitrogen 11 6 - 23 mg/dL    Creatinine 1.03 0.50 - 1.30 mg/dL    eGFR 79 >60 mL/min/1.73m*2    Calcium 7.9 (L) 8.6 - 10.3 mg/dL     ECG 12 Lead    Result Date: 6/28/2024  Sinus rhythm with 1st degree AV block Otherwise normal ECG When compared with ECG of 02-DEC-2022 10:01, No significant change was found Confirmed by All Lennon ([id number]) on 6/28/2024 9:46:33 AM    MR liver w EOVIST contrast    Result Date: 6/22/2024  Interpreted By:  Des Villeda,  and Dervishi Rex STUDY: MR LIVER WITH EOVIST CONTRAST;  6/18/2024 2:29 pm   INDICATION: Signs/Symptoms:rectal cancer with hepatic metastases.   COMPARISON: CT abdomen pelvis: 06/10/2024   ACCESSION NUMBER(S): [id number]   ORDERING CLINICIAN: RAYMUNDO GUERRA   TECHNIQUE: MRI LIVER; Multiplanar magnetic resonance images of the abdomen were obtained including the following sequences; T2-weighted SSFSE with and without fat saturation, T1-weighted GRE in/opposed phase, DWI, fat saturated 3D-T1w GRE pre and dynamically post contrast.  9 ml of Gadolinium contrast agent Eovist were administered intravenously without immediate complication.   FINDINGS: LIVER: There are multiple confluent,  ill-defined, infiltrative lesions throughout the right hepatic lobe which demonstrate minimal enhancement on early postcontrast imaging. There is no uptake of contrast on hepatocyte phase imaging when compared to normal hepatic parenchyma. The largest measurable lesion is seen in hepatic segment 6/7 which measures 5.2 x 11 cm (series 19, image 24) and hepatic segment 6/5 measuring 6 x 7.5 cm. There are 2 well-circumscribed heterogenous hypoenhancing lesions seen on left hepatic lobe with the lesion seen on hepatic segment 4A which measures 2.3 x 2.5 cm (series 19, image 41) and hepatic segment 3 lesion measuring 0.7 x 0.8 cm (series 19, image 33). The liver measures 18 cm in craniocaudal dimension.   BILE DUCTS: No intrahepatic or extrahepatic bile duct dilatation is demonstrated.   GALLBLADDER: Gallbladder is partially distended. There is a T1 hyperintense material in the gallbladder lumen likely representing sludge.   PANCREAS: Normal signal intensity. Normal enhancement. No masses. The pancreatic duct is normal.   SPLEEN: Within normal limits.   ADRENAL GLANDS: Within normal limits.   KIDNEYS: Within normal limits.   LYMPH NODES: There are few small portacaval lymph nodes. Otherwise there is no lymphadenopathy by size criteria.   ABDOMINAL VESSELS: Aorta and the major abdominal arterial vessels demonstrate no gross abnormality.  The portal vein, hepatic veins, superior mesenteric and splenic veins are patent.  No significant collaterals or esophageal varices are present.   BOWEL: Stomach and duodenum are within normal limits. Visualized small and large bowel loops are normal in caliber.   PERITONEUM/RETROPERITONEUM: No ascites.   BONES AND LOWER THORAX: No abnormal enhancing focal bony lesions are identified. The visualized lower lung fields are unremarkable. The heart is normal in size.       1. Multiple hepatic metastases predominantly involving the right lobe of the liver as described above. 2. Few small  portacaval lymph nodes with no lymphadenopathy by size criteria. 3. Patent portal venous system and no extrahepatic or intrahepatic biliary duct dilatation.   I personally reviewed the images/study and I agree with the findings as stated. This study was interpreted at [place], [place].   MACRO: None   Signed by: Des Mohsen Lorenkalen 6/22/2024 12:13 AM Dictation workstation:   [id number]    MR rectum w and wo IV contrast    Result Date: 6/21/2024  Interpreted By:  Jamil Gustafson,  and Jacinda Goodman STUDY: MR RECTUM W AND WO IV CONTRAST;  6/19/2024 5:09 pm   INDICATION: Signs/Symptoms:rectal mass   COMPARISON: CT abdomen pelvis: 06/10/2024.   ACCESSION NUMBER(S): [id number]   ORDERING CLINICIAN: RAYMUNDO GUERRA   TECHNIQUE: Multiplanar MRI of the pelvis was obtained including axial, sagittal and coronal T2 weighted SSFSE, (para)axial, (para)coronal and sagittal T2 FSE , axial DWI, pre and post gadolinium dynamic T1 GRE sequences in 3 planes.  Patient received 60CC of gel per rectum. 20 ml of Gadolinium contrast agent Dotarem were administered intravenously without complication.   FINDINGS: RECTAL MASS: Within the  upper rectum at the rectosigmoid junction there is a semi circumferential rectal wall mass measuring up to 10mm in thickness (series 10, image 17). The mass measures 7.6 cm longitudinally and demonstrates predominant T2w  isointensity compared to normal rectal wall. There is marked narrowing at the rectosigmoid junction with abnormal peripheral T2 hypointense striations.   DISTANCE FROM ANAL VERGE: The inferior aspect of the lesion is approximately 8-9 cm from the anorectal junction and  10 cm from the anal verge.   EXTRAMURAL INVASION: The mass demonstrates extramural extension into the perirectal fat as seen on series 10, image 25.   EXTRAMURAL VASCULAR INVASION: There is no extramural vascular invasion.   DISTANCE TO MESORECTAL  FASCIA/PERITONEAL REFLECTION: Abnormal, enhancing T2 dark striations, extending beyond the wall of the rectal mass involving the anterior mesorectal fascia and peritoneal reflection (series 10, image 25.   ADJACENT ORGANS: The urinary bladder is  not involved.  The prostate and seminal vesicles appear within normal limits.   LYMPH NODE STATUS: There are few nonspecific left-sided superior rectal lymph nodes with the largest measuring 0.4 cm in diameter (series 6, image 21). Otherwise there is no evidence of lymphadenopathy.   BONES: No focal lesions are noted in the bone.       1. Semi circumferential rectosigmoid junction mass with extramural invasion and extension into the anterior mesorectal fascia and peritoneal reflection with significant luminal narrowing at the rectosigmoid junction as detailed above. Findings are consistent with known rectal malignancy. 2. Few nonspecific left sided pelvic lymph nodes with no evidence of lymphadenopathy by MRI criteria.     The MR imaging based stage of this rectal cancer is T4. Based on MRI the kiran stage is Nx.   I personally reviewed the images/study and I agree with the findings as stated by Resident Rex Monaco MD.   MACRO: None   Signed by: Jamil Mcclelland 6/21/2024 12:08 AM Dictation workstation:   [id number]    Colonoscopy Diagnostic    Result Date: 6/17/2024  Table formatting from the original result was not included. Impression Single malignant-appearing mass (not traversable) in the rectum 8 cm from the anal verge, covering the whole circumference; there was stigmata of recent hemorrhage, and bleeding occurred after intervention; performed cold forceps biopsy with partial removal Findings Single malignant-appearing mass (not traversable) in the rectum 8 cm from the anal verge observed during digital rectal exam, covering the whole circumference; there was stigmata of recent hemorrhage, and bleeding occurred after intervention; performed cold  forceps biopsy with partial removal. Obstructing nontraversable circumferential rectal mass just proximal to second rectal valve, approximately 8 cm from the anus  Recommendation  Repeat colonoscopy in 1 year  Incomplete procedure Abnormal pathology   Rectal MRI Liver MRI with Eovist Referrals to hematology oncology and hepatobiliary surgery Will need to discuss diverting loop colostomy in the near future  Indication Colonic mass Staff Staff Role Javier Vasquez MD Proceduralist Medications See Anesthesia Record. Preprocedure A history and physical has been performed, and patient medication allergies have been reviewed. The patient's tolerance of previous anesthesia has been reviewed. The risks and benefits of the procedure and the sedation options and risks were discussed with the patient. All questions were answered and informed consent obtained. Details of the Procedure The patient underwent monitored anesthesia care, which was administered by an anesthesia professional. The patient's blood pressure, ECG, ETCO2, heart rate, level of consciousness, oxygen and respirations were monitored throughout the procedure. A digital rectal exam was performed. A perianal exam was performed. The scope was introduced through the anus and advanced to the rectum. Retroflexion was not performed due to altered anatomy. The quality of bowel preparation was evaluated using the [place] Bowel Preparation Scale with scores of: right colon = not assessed, transverse colon = not assessed, left colon = not assessed. The patient's estimated blood loss was minimal (<5 mL). The procedure was not difficult. The patient tolerated the procedure well. There were no apparent adverse events. Obstructing rectal mass. Events Procedure Events Event Event Time ENDO SCOPE IN TIME 6/17/2024  8:05 AM ENDO SCOPE OUT TIME 6/17/2024  8:21 AM Specimens ID Type Source Tests Collected by Time 1 : rectal mass biopsy Tissue RECTUM BIOPSY SURGICAL PATHOLOGY EXAM  Regina Joseph RN 6/17/2024 0815 Procedure Location [facility] [address] [place] [facility] [address] [phone] Referring Provider Javier Vasquez MD Procedure Provider Javier Vasquez MD     CT abdomen pelvis w IV contrast    Result Date: 6/10/2024  Interpreted By:  Des Villeda,  and Naz Aragon STUDY: CT ABDOMEN PELVIS W IV CONTRAST;  6/10/2024 2:18 pm   INDICATION: Signs/Symptoms:abdominal pain, weight loss.   Per EMR: History of hyperlipidemia and hypertension now with abdominal discomfort, pain, weight loss, weakness, and fatigue.   COMPARISON: CT chest/abdomen/pelvis 05/29/2021   ACCESSION NUMBER(S): [id number]   ORDERING CLINICIAN: JOSE R GUERRA   TECHNIQUE: CT of the abdomen and pelvis was performed.  Standard contiguous axial images were obtained at 3 mm slice thickness through the abdomen and pelvis. Coronal and sagittal reconstructions at 3 mm slice thickness were performed.   75 ml of contrast Omnipaque 350 were administered intravenously without immediate complication.   FINDINGS: LOWER CHEST: The visualized lung base is unremarkable. The heart is normal in size without pericardial effusion. No pleural effusion is present. Visualized distal esophagus appears normal.   ABDOMEN:   LIVER: The liver is normal in size.   There are multiple confluent heterogenous ill-defined hepatic lesions one on the left and the rest throughout the right lobe of the liver. For example:   7.9 x 9.0 x 5.4 cm lesion in segment V (series 201, image 42 and series 202, image 44). 6.7 x 0.4 x 5.3 cm lesion in segment VII (series 201, image 42 and series 202, image 71). 2.2 x 2.1 x 1.6 cm lesion in segment VI (series 201, image 25 and series 202, image 32).   BILE DUCTS: The intrahepatic and extrahepatic ducts are not dilated.   GALLBLADDER: The gallbladder is nondistended and without evidence of radiopaque stones.   PANCREAS: There are several punctate calcification  within the pancreatic head. Otherwise, the pancreas appears unremarkable without evidence of ductal dilatation or masses.   SPLEEN: The spleen is normal in size without focal lesions.   ADRENAL GLANDS: Bilateral adrenal glands appear normal.   KIDNEYS AND URETERS: The kidneys are normal in size and enhance symmetrically. There are two renal stones in the left ureterovesical junction measuring up to 4 cm series 201, image 138), without upstream hydroureteronephrosis. No evidence of nephroureterolithiasis or hydroureteronephrosis on the right.   Subcentimeter hypodensity in the inferior pole of the left kidney measuring 0.8 cm (series 201, image 79), too small to characterize, however favored to represent renal cyst.   PELVIS:   BLADDER: The urinary bladder is decompressed, limited for evaluation.   REPRODUCTIVE ORGANS: No pelvic masses.   BOWEL: The stomach is unremarkable.   The small bowel is not abnormally dilated.   The appendix appears normal.   There is focal circumferential narrowing of the distal sigmoid colon (series 201, image 131) with mild sigmoid wall thickening (series 201, image 131). There is moderate colonic stool burden proximal to this raising concern for partial obstruction.   VESSELS: There is no aneurysmal dilatation of the abdominal aorta. The IVC appears normal.   PERITONEUM/RETROPERITONEUM/LYMPH NODES: No ascites or free air, no fluid collection. Subcentimeter prominent pericolonic nodes around the thickened sigmoid colon measuring up to 0.5 cm (series 201, image 120), not evident on prior CT from 2021.   BONES: No suspicious osseous lesions are identified. Degenerative discogenic disease is noted in the lower thoracic and lumbar spine.   ABDOMINAL WALL: Small fat containing umbilical hernia.       1. New multiple confluent heterogenous ill-defined right-greater-than-left hepatic lesions predominantly in segment V, VI, and VII highly concerning for metastatic lesions. Recommend further  evaluation with tissue sampling. 2. Focal circumferential narrowing and wall thickening of the distal sigmoid colon with adjacent subcentimeter  nodes concerning for colon malignancy, primary site of disease. Recommend further evaluation with colonoscopy.   I personally reviewed the images/study and I agree with the findings as stated by Margo Childs DO, PGY-2. This study was interpreted at [facility], [place], [place].   MACRO: Critical Finding:  See findings. Notification was initiated on 6/10/2024 at 3:41 pm by  Margo Childs.  (**-OCF-**) Instructions:   Signed by: Des Villeda 6/10/2024 7:15 PM Dictation workstation:   [id number]       Assessment/Plan   Principal Problem:    Rectal cancer (Multi)    Roma Corral is a 68 yo male who is admitted after Laparoscopic Colostomy with Dr Vasquez.     Neuro:  Acute post-op pain- controlled with pain medications  - OOB/ ambulate 5x per day   - oxycodone as needed- minimize narcotics as much as possible  - scheduled tylenol       CV: RRR, VSS  Hx: HLD and HTN   - VS every 4 hours   - continue amlodipine, atorvastatin and metoprolol     Pulm: NAD, on RA, CTAB   - IS every hour while awake   - Pulse ox every 4 hours with VS       GI:  abdomen minimally distended, soft, appropriately tender, Colostomy intubated- Beefy red, moist, producing some brown liquid stool, well pouched  Hx: rectal cancer   POD #2 s/p Lx colostomy   - advanced to soft diet- as tolerated  - continue stoma intubation  - rodrigo in place   - enterostomal RN and dietician following  - PRN antiemetic   - continue IVF     : voiding without difficulty   - Monitor I&Os   - Cr and lytes WNL     HEME: DVT Proph   - SCDs/ ambulate/ lovenox  - continue iron   - H/H stable       Endo:   No active issues     ID: afebrile   - Monitor for s/s infection       Dispo: Advanced diet, continue stoma intubation, will need stoma teaching again before possible DC tomorrow.  Discussed with Dr Vasquez     I spent 35 minutes in the professional and overall care of this patient.      Capri Thakkar, APRN-CNP

## 2024-07-04 NOTE — CARE PLAN
The patient's goals for the shift include remain comfortable    The clinical goals for the shift include remain comfortable during shift    Problem: Diabetes  Goal: Achieve decreasing blood glucose levels by end of shift  Outcome: Progressing  Goal: Increase stability of blood glucose readings by end of shift  Outcome: Progressing  Goal: Decrease in ketones present in urine by end of shift  Outcome: Progressing  Goal: Maintain electrolyte levels within acceptable range throughout shift  Outcome: Progressing  Goal: Maintain glucose levels >70mg/dl to <250mg/dl throughout shift  Outcome: Progressing  Goal: No changes in neurological exam by end of shift  Outcome: Progressing  Goal: Learn about and adhere to nutrition recommendations by end of shift  Outcome: Progressing  Goal: Vital signs within normal range for age by end of shift  Outcome: Progressing  Goal: Increase self care and/or family involovement by end of shift  Outcome: Progressing  Goal: Receive DSME education by end of shift  Outcome: Progressing     Problem: Pain  Goal: Takes deep breaths with improved pain control throughout the shift  Outcome: Progressing  Goal: Turns in bed with improved pain control throughout the shift  Outcome: Progressing  Goal: Walks with improved pain control throughout the shift  Outcome: Progressing  Goal: Performs ADL's with improved pain control throughout shift  Outcome: Progressing  Goal: Participates in PT with improved pain control throughout the shift  Outcome: Progressing  Goal: Free from opioid side effects throughout the shift  Outcome: Progressing  Goal: Free from acute confusion related to pain meds throughout the shift  Outcome: Progressing     Problem: Pain - Adult  Goal: Verbalizes/displays adequate comfort level or baseline comfort level  Outcome: Progressing     Problem: Safety - Adult  Goal: Free from fall injury  Outcome: Progressing     Problem: Discharge Planning  Goal: Discharge to home or other facility  with appropriate resources  Outcome: Progressing     Problem: Chronic Conditions and Co-morbidities  Goal: Patient's chronic conditions and co-morbidity symptoms are monitored and maintained or improved  Outcome: Progressing

## 2024-07-05 ENCOUNTER — TELEPHONE (OUTPATIENT)
Dept: RADIATION ONCOLOGY | Facility: HOSPITAL | Age: 69
End: 2024-07-05
Payer: MEDICARE

## 2024-07-05 ENCOUNTER — PHARMACY VISIT (OUTPATIENT)
Dept: PHARMACY | Facility: CLINIC | Age: 69
End: 2024-07-05
Payer: COMMERCIAL

## 2024-07-05 ENCOUNTER — HOME HEALTH ADMISSION (OUTPATIENT)
Dept: HOME HEALTH SERVICES | Facility: HOME HEALTH | Age: 69
End: 2024-07-05
Payer: MEDICARE

## 2024-07-05 ENCOUNTER — DOCUMENTATION (OUTPATIENT)
Dept: HOME HEALTH SERVICES | Facility: HOME HEALTH | Age: 69
End: 2024-07-05
Payer: MEDICARE

## 2024-07-05 VITALS
TEMPERATURE: 98.7 F | RESPIRATION RATE: 18 BRPM | HEART RATE: 64 BPM | OXYGEN SATURATION: 95 % | BODY MASS INDEX: 27.59 KG/M2 | SYSTOLIC BLOOD PRESSURE: 149 MMHG | WEIGHT: 197.09 LBS | DIASTOLIC BLOOD PRESSURE: 57 MMHG | HEIGHT: 71 IN

## 2024-07-05 LAB
ANION GAP SERPL CALC-SCNC: 13 MMOL/L (ref 10–20)
BUN SERPL-MCNC: 9 MG/DL (ref 6–23)
CALCIUM SERPL-MCNC: 7.7 MG/DL (ref 8.6–10.3)
CHLORIDE SERPL-SCNC: 106 MMOL/L (ref 98–107)
CO2 SERPL-SCNC: 26 MMOL/L (ref 21–32)
CREAT SERPL-MCNC: 1 MG/DL (ref 0.5–1.3)
EGFRCR SERPLBLD CKD-EPI 2021: 81 ML/MIN/1.73M*2
ERYTHROCYTE [DISTWIDTH] IN BLOOD BY AUTOMATED COUNT: 22.3 % (ref 11.5–14.5)
GLUCOSE SERPL-MCNC: 96 MG/DL (ref 74–99)
HCT VFR BLD AUTO: 27.6 % (ref 41–52)
HGB BLD-MCNC: 7.5 G/DL (ref 13.5–17.5)
HIV 1+2 AB+HIV1 P24 AG SERPL QL IA: NONREACTIVE
MAGNESIUM SERPL-MCNC: 1.9 MG/DL (ref 1.6–2.4)
MCH RBC QN AUTO: 18.3 PG (ref 26–34)
MCHC RBC AUTO-ENTMCNC: 27.2 G/DL (ref 32–36)
MCV RBC AUTO: 67 FL (ref 80–100)
NRBC BLD-RTO: 0.5 /100 WBCS (ref 0–0)
PLATELET # BLD AUTO: 309 X10*3/UL (ref 150–450)
POTASSIUM SERPL-SCNC: 4 MMOL/L (ref 3.5–5.3)
RBC # BLD AUTO: 4.1 X10*6/UL (ref 4.5–5.9)
SODIUM SERPL-SCNC: 141 MMOL/L (ref 136–145)
WBC # BLD AUTO: 8.7 X10*3/UL (ref 4.4–11.3)

## 2024-07-05 PROCEDURE — 2500000004 HC RX 250 GENERAL PHARMACY W/ HCPCS (ALT 636 FOR OP/ED): Performed by: NURSE PRACTITIONER

## 2024-07-05 PROCEDURE — 99239 HOSP IP/OBS DSCHRG MGMT >30: CPT

## 2024-07-05 PROCEDURE — 36415 COLL VENOUS BLD VENIPUNCTURE: CPT | Performed by: SURGERY

## 2024-07-05 PROCEDURE — 85027 COMPLETE CBC AUTOMATED: CPT

## 2024-07-05 PROCEDURE — 83735 ASSAY OF MAGNESIUM: CPT | Performed by: STUDENT IN AN ORGANIZED HEALTH CARE EDUCATION/TRAINING PROGRAM

## 2024-07-05 PROCEDURE — 80048 BASIC METABOLIC PNL TOTAL CA: CPT

## 2024-07-05 PROCEDURE — 2500000001 HC RX 250 WO HCPCS SELF ADMINISTERED DRUGS (ALT 637 FOR MEDICARE OP): Performed by: STUDENT IN AN ORGANIZED HEALTH CARE EDUCATION/TRAINING PROGRAM

## 2024-07-05 PROCEDURE — 36415 COLL VENOUS BLD VENIPUNCTURE: CPT

## 2024-07-05 PROCEDURE — 87389 HIV-1 AG W/HIV-1&-2 AB AG IA: CPT | Mod: AHULAB | Performed by: SURGERY

## 2024-07-05 PROCEDURE — RXMED WILLOW AMBULATORY MEDICATION CHARGE

## 2024-07-05 RX ORDER — OXYCODONE HYDROCHLORIDE 5 MG/1
5 TABLET ORAL EVERY 6 HOURS PRN
Qty: 15 TABLET | Refills: 0 | Status: SHIPPED | OUTPATIENT
Start: 2024-07-05

## 2024-07-05 RX ORDER — ONDANSETRON 4 MG/1
4 TABLET, FILM COATED ORAL EVERY 8 HOURS PRN
Qty: 20 TABLET | Refills: 0 | Status: SHIPPED | OUTPATIENT
Start: 2024-07-05

## 2024-07-05 ASSESSMENT — COGNITIVE AND FUNCTIONAL STATUS - GENERAL
MOBILITY SCORE: 24
DAILY ACTIVITIY SCORE: 24

## 2024-07-05 ASSESSMENT — PAIN SCALES - GENERAL
PAINLEVEL_OUTOF10: 8
PAINLEVEL_OUTOF10: 5 - MODERATE PAIN

## 2024-07-05 NOTE — CONSULTS
"WO nursing visit outcome: Stoma was assessed. Per direction from KYLER Beckwith, the stoma evans and stoma john were removed. Pouch fitting was completed along with hands-on stoma care/pouching lesson.  Discharge supplies, instruction sheets, and supply list was provided. Patient is ready for discharge from the Ostomy Nurse standpoint.       [facility] next scheduled visit/plan: next change scheduled for Tuesday, 7/9    Stoma Type: Loop Colostomy  John:  sutured john with moderate tension was removed today  Diameter: 2 5/8\"  Location: LLQ  Protrusion: Budded  Mucosal Condition and Color: Moist, Red, Edematous - some purple on surface, near stoma base 3-6 o'clock  Mucocutaneous Junction: Intact  Peristomal Skin:  impressions from john present at 3 & 9 o'clock  Location of Skin Impairment: as noted  Peristomal Contour: Deep Concave and Creases at 3 & 9 o'clock  Supportive Tissue: Semi-Soft  Character of Output: Brown and liquid  Emptying Frequency: per nursing  Removed/Current Pouching System: 2 3/4\" Haileo New Image soft convex with tape collar, stoma paste, lock n' roll pouch    Current Wearing Time: 2 Days    Recommendations:   Skin Care: stoma powder to denuded skin as needed with pouch change (return demonstration completed today)  Pouching System: as above - additional stoma paste to fill creases at 3&9 o'clock and added belt  Wear Time: 3-4 Days  Other: TBD    Supplier:  should have [facility] upon discharge    Comments: Mr. Corral participated fully in pouching lesson and asked appropriate questions, which were addressed. WO RN will ask Mr. Corral if he would like to be enrolled in [employer].     Time Increment: 75 minutes    RAMEZ KhouryN, RN, CWOCN     "

## 2024-07-05 NOTE — TELEPHONE ENCOUNTER
Called pt to remind of appointment on 7/8/2024 at 1:30. Pt's phone went to voicemail left number if needs to reschedule.

## 2024-07-05 NOTE — CARE PLAN
The patient's goals for the shift include remain comfortable    The clinical goals for the shift include patient will remain comfortable  Problem: Chronic Conditions and Co-morbidities  Goal: Patient's chronic conditions and co-morbidity symptoms are monitored and maintained or improved  Outcome: Progressing     Problem: Discharge Planning  Goal: Discharge to home or other facility with appropriate resources  Outcome: Progressing     Problem: Safety - Adult  Goal: Free from fall injury  Outcome: Progressing     Problem: Pain - Adult  Goal: Verbalizes/displays adequate comfort level or baseline comfort level  Outcome: Progressing     Problem: Pain  Goal: Takes deep breaths with improved pain control throughout the shift  Outcome: Progressing  Goal: Turns in bed with improved pain control throughout the shift  Outcome: Progressing  Goal: Walks with improved pain control throughout the shift  Outcome: Progressing  Goal: Performs ADL's with improved pain control throughout shift  Outcome: Progressing  Goal: Participates in PT with improved pain control throughout the shift  Outcome: Progressing  Goal: Free from opioid side effects throughout the shift  Outcome: Progressing  Goal: Free from acute confusion related to pain meds throughout the shift  Outcome: Progressing     Problem: Diabetes  Goal: Achieve decreasing blood glucose levels by end of shift  Outcome: Progressing  Goal: Increase stability of blood glucose readings by end of shift  Outcome: Progressing  Goal: Decrease in ketones present in urine by end of shift  Outcome: Progressing  Goal: Maintain electrolyte levels within acceptable range throughout shift  Outcome: Progressing  Goal: Maintain glucose levels >70mg/dl to <250mg/dl throughout shift  Outcome: Progressing  Goal: No changes in neurological exam by end of shift  Outcome: Progressing  Goal: Learn about and adhere to nutrition recommendations by end of shift  Outcome: Progressing  Goal: Vital signs  within normal range for age by end of shift  Outcome: Progressing  Goal: Increase self care and/or family involovement by end of shift  Outcome: Progressing  Goal: Receive DSME education by end of shift  Outcome: Progressing

## 2024-07-05 NOTE — DISCHARGE SUMMARY
Discharge Diagnosis  Rectal cancer (Multi)    Issues Requiring Follow-Up  POV, PCP follow-up    Test Results Pending At Discharge  Pending Labs       Order Current Status    HIV 1/2 Antigen/Antibody Screen with Reflex to Confirmation Collected (07/05/24 1080)          Hospital Course  69 yr old male with rectal cancer s/p lap colostomy creation on 7/2/24 with Dr. Vasquez. Please see operative report for full details. Patient tolerated the procedure well and recovered briefly in PACU before being transitioned to regular nursing floor. Post-op course was uncomplicated. Diet was advanced as tolerated.  IV medication transitioned to oral as diet advanced. On the day of discharge, the pt was tolerating a diet, pain was controlled on PO pain medication, and they were ambulating and voiding spontaneously. They were discharged 7/5/24 in stable condition with instructions to follow up as outpatient.     Patient apparently the patient was called by client services regarding a positive HIV test on 7/4. Tried to follow-up with this testing that is not shown in the chart but ultimately found out from client services that this testing was performed 7/3 and these results cannot be accessed through the regular EMR. Patient would like to be tested again. Lab testing ordered and instructions to follow-up with his PCP for this were given.    Pertinent Physical Exam At Time of Discharge  PE:  Constitutional: A&Ox3, calm and cooperative, NAD  Cardiovascular: Normal rate and regular rhythm.  Respiratory/Thorax: Good symmetric chest expansion. No labored breathing.  Gastrointestinal: Abdomen slightly distended, soft, appropriately tender, stoma pink with brown mus hand air in pouch. Tolerating PO.  Genitourinary: Voiding independently   Extremities: No peripheral edema  Neurological: A&Ox3, No focal deficits  Psychological: Appropriate mood and behavior  Skin: Warm and dry    Home Medications     Medication List      START taking these  medications     ondansetron 4 mg tablet; Commonly known as: Zofran; Take 1 tablet (4 mg)   by mouth every 8 hours if needed for nausea or vomiting.   oxyCODONE 5 mg immediate release tablet; Commonly known as: Roxicodone;   Take 1 tablet (5 mg) by mouth every 6 hours if needed for severe pain (7 -   10).     CONTINUE taking these medications     acetaminophen 500 mg tablet; Commonly known as: Tylenol   amLODIPine 5 mg tablet; Commonly known as: Norvasc; Take 1 tablet (5 mg)   by mouth once daily.   aspirin 81 mg chewable tablet   atorvastatin 40 mg tablet; Commonly known as: Lipitor; Take 1 tablet (40   mg) by mouth once daily at bedtime.   metoprolol succinate  mg 24 hr tablet; Commonly known as:   Toprol-XL; Take 1 tablet (100 mg) by mouth once daily.     STOP taking these medications     chlorhexidine 0.12 % solution; Commonly known as: Peridex   metroNIDAZOLE 250 mg tablet; Commonly known as: Flagyl   neomycin 500 mg tablet; Commonly known as: Mycifradin       Outpatient Follow-Up  Future Appointments   Date Time Provider Department Center   7/8/2024  2:00 PM Debby Boles MD [id number] [facility]   7/8/2024  4:00 PM [facility] SIMULATOR [id number] [facility]   7/10/2024  2:00 PM Baron Erazo MD [id number] [facility]   7/11/2024  3:00 PM Dann Nieto MD [id number] [facility]   8/9/2024  1:00 PM Zita Grubbs, APRN-CNP [id number] [facility]   10/28/2024  3:00 PM Sharif Lau MD [id number] [facility]       Maikel Beckwith PA-C    Tolerating diet, still beginning to function.  Discharge today.  HIV testing pending given unclear reports of patient's questionable testing previously.  Nothing available in the chart to confirm testing or results.  Patient has agreed to repeat testing.

## 2024-07-05 NOTE — CARE PLAN
The patient's goals for the shift include remain comfortable    The clinical goals for the shift include patient will be able to decrease his pain score to 5/10 by the end of the shift    Problem: Pain  Goal: Takes deep breaths with improved pain control throughout the shift  Outcome: Progressing  Goal: Turns in bed with improved pain control throughout the shift  Outcome: Progressing  Goal: Walks with improved pain control throughout the shift  Outcome: Progressing  Goal: Performs ADL's with improved pain control throughout shift  Outcome: Progressing  Goal: Participates in PT with improved pain control throughout the shift  Outcome: Progressing  Goal: Free from opioid side effects throughout the shift  Outcome: Progressing  Goal: Free from acute confusion related to pain meds throughout the shift  Outcome: Progressing     Problem: Pain - Adult  Goal: Verbalizes/displays adequate comfort level or baseline comfort level  Outcome: Progressing     Problem: Safety - Adult  Goal: Free from fall injury  Outcome: Progressing

## 2024-07-05 NOTE — PROGRESS NOTES
07/05/24 1401   Discharge Planning   Who is requesting discharge planning? Provider   Home or Post Acute Services In home services   Type of Home Care Services Home nursing visits   Patient expects to be discharged to: Home   Does the patient need discharge transport arranged? No     7/5/24 1401  Patient on low fiber diet.  Waiting on surgery clearance for discharge.  Will need referral sent to [facility] for SN for ostomy care.   Emilie Garcia RN [facility]

## 2024-07-08 ENCOUNTER — HOSPITAL ENCOUNTER (OUTPATIENT)
Dept: RADIATION ONCOLOGY | Facility: HOSPITAL | Age: 69
Setting detail: RADIATION/ONCOLOGY SERIES
Discharge: HOME | End: 2024-07-08
Payer: MEDICARE

## 2024-07-08 ENCOUNTER — TELEPHONE (OUTPATIENT)
Dept: SURGERY | Facility: CLINIC | Age: 69
End: 2024-07-08
Payer: MEDICARE

## 2024-07-08 ENCOUNTER — HOSPITAL ENCOUNTER (OUTPATIENT)
Dept: RADIOLOGY | Facility: EXTERNAL LOCATION | Age: 69
Discharge: HOME | End: 2024-07-08

## 2024-07-08 VITALS
RESPIRATION RATE: 18 BRPM | DIASTOLIC BLOOD PRESSURE: 79 MMHG | SYSTOLIC BLOOD PRESSURE: 144 MMHG | BODY MASS INDEX: 27.93 KG/M2 | OXYGEN SATURATION: 96 % | WEIGHT: 200.29 LBS | HEART RATE: 75 BPM | TEMPERATURE: 97.9 F

## 2024-07-08 DIAGNOSIS — C20 RECTAL CANCER (MULTI): ICD-10-CM

## 2024-07-08 DIAGNOSIS — C20 RECTAL CANCER (MULTI): Primary | ICD-10-CM

## 2024-07-08 PROCEDURE — 77334 RADIATION TREATMENT AID(S): CPT | Performed by: RADIOLOGY

## 2024-07-08 PROCEDURE — 99205 OFFICE O/P NEW HI 60 MIN: CPT | Performed by: RADIOLOGY

## 2024-07-08 PROCEDURE — 77290 THER RAD SIMULAJ FIELD CPLX: CPT | Performed by: RADIOLOGY

## 2024-07-08 PROCEDURE — 99215 OFFICE O/P EST HI 40 MIN: CPT | Mod: 25 | Performed by: RADIOLOGY

## 2024-07-08 ASSESSMENT — ENCOUNTER SYMPTOMS
CONSTIPATION: 0
SEIZURES: 0
NAUSEA: 1
WHEEZING: 0
RECTAL PAIN: 0
LIGHT-HEADEDNESS: 1
NERVOUS/ANXIOUS: 0
HEMATOLOGIC/LYMPHATIC NEGATIVE: 1
DEPRESSION: 0
EXTREMITY WEAKNESS: 0
EYES NEGATIVE: 1
CHILLS: 0
COUGH: 0
DECREASED CONCENTRATION: 0
SLEEP DISTURBANCE: 1
DIARRHEA: 0
SPEECH DIFFICULTY: 0
CARDIOVASCULAR NEGATIVE: 1
NUMBNESS: 0
ABDOMINAL PAIN: 1
HEMOPTYSIS: 0
TROUBLE SWALLOWING: 0
ABDOMINAL DISTENTION: 0
DIAPHORESIS: 0
APPETITE CHANGE: 1
LOSS OF SENSATION IN FEET: 0
FEVER: 0
ENDOCRINE NEGATIVE: 1
SHORTNESS OF BREATH: 1
MUSCULOSKELETAL NEGATIVE: 1
FATIGUE: 1
CONFUSION: 0
HEADACHES: 0
CHEST TIGHTNESS: 0
UNEXPECTED WEIGHT CHANGE: 0
DIZZINESS: 0
OCCASIONAL FEELINGS OF UNSTEADINESS: 0
SORE THROAT: 0
VOMITING: 0
VOICE CHANGE: 0

## 2024-07-08 ASSESSMENT — PATIENT HEALTH QUESTIONNAIRE - PHQ9
10. IF YOU CHECKED OFF ANY PROBLEMS, HOW DIFFICULT HAVE THESE PROBLEMS MADE IT FOR YOU TO DO YOUR WORK, TAKE CARE OF THINGS AT HOME, OR GET ALONG WITH OTHER PEOPLE: NOT DIFFICULT AT ALL
1. LITTLE INTEREST OR PLEASURE IN DOING THINGS: SEVERAL DAYS
2. FEELING DOWN, DEPRESSED OR HOPELESS: NOT AT ALL
SUM OF ALL RESPONSES TO PHQ9 QUESTIONS 1 AND 2: 1

## 2024-07-08 ASSESSMENT — COLUMBIA-SUICIDE SEVERITY RATING SCALE - C-SSRS
6. HAVE YOU EVER DONE ANYTHING, STARTED TO DO ANYTHING, OR PREPARED TO DO ANYTHING TO END YOUR LIFE?: NO
2. HAVE YOU ACTUALLY HAD ANY THOUGHTS OF KILLING YOURSELF?: NO
1. IN THE PAST MONTH, HAVE YOU WISHED YOU WERE DEAD OR WISHED YOU COULD GO TO SLEEP AND NOT WAKE UP?: NO

## 2024-07-08 NOTE — PROGRESS NOTES
Staff Physician: Debby Boles MD  Referring Physician: Javier Vasquez MD  Date of Service: 7/8/2024    RADIATION ONCOLOGY CONSULT NOTE    IDENTIFYING DATA:   Cancer Staging   Rectal cancer (Multi)  Staging form: Colon and Rectum, AJCC 8th Edition  - Clinical stage from 6/17/2024: Stage IVB (cT4, cNX, cM1b) - Unsigned    Problem List Items Addressed This Visit       Rectal cancer (Multi)    Relevant Orders    Referral to Radiation Oncology       Mr. 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 He presents to consider radiation to his pelvis as part of FAWN ahead of subsequent dual resection.     HISTORY OF PRESENT ILLNESS:  Mr. Roma Corral states that he was first diagnosed with rectal cancer after having onset of fatigue and abdominal pain/pelvic pressure, with labs showing anemia. He was slated for EGD and colonoscopy by his PCP as well as CT-AP and was found 06/10/2024 to have a rectosigmoid lesion and right > left liver lesions. Colonoscopy 06/17/2024 ([facility]) showed a rectal mass 8cm from the verge, with adenocarcinoma pMMR on biopsy. Liver MRI on 06/18/2024 showed hepatic metastases predominantly in the right hemiliver, with one Sg4-abutting metastasis and a small single Sg2 metastasis (no other left-sided disease). Rectal MRI showed a rectal mass 7.6cm in craniocaudal extent with extension into the perirectal fat, iinvolving the mesorectal fascia and peritoneal reflection with some prominent perirectal nodes not meeting imaging criteria but clinically concerning, for overall [id number]. Chest imaging is pending; CT-AP visualizes the majority of lung fields without concerning nodules and completion imaging is pending. He underwent diverting ostomy on 07/02/2024. Presently, he has ongoing pelvic pressure/pain, with occasional blood tinged mucus. He is here with his wife.    PAST MEDICAL HISTORY:  Past Medical History:   Diagnosis Date     Abdominal pain     Acute non-ST elevation myocardial infarction (NSTEMI) (Multi)     Anemia     6/6/24 HGB 8.6; HCT 31.5    Anxiety     CAD (coronary artery disease)     Cardiology follow-up encounter 12/11/2023    Sharif Lau MD    Gout     H/O cardiac catheterization 06/01/2021    H/O cardiovascular stress test 09/03/2021    IMPRESSION: 1. No evidence of ischemia. 2. Suspicion of an infarct along the mid anterior wall. 3. Left ventricular dilatation is noted. 4. Left ventricular EF was calculated to be 50%. Summary:  1. No clinical or electrocardiographic evidence for ischemia at a submaximal workload. 3. The blunted heart rate diminshes the sensitivity of this test.  4. The submaximal level of stress was achieved.    H/O echocardiogram 06/02/2021    Mild concentric Left ventricle hypertrophy.  Global left ventricular wall motion and contractility are within normal limits.  There is normal left ventricular systolic function.  Estimated ejection fraction is 60-64%.  The left atrial size is mildly dilated.  Mild aortic regurgitation.  A trace of mitral regurgitation.  Trivial to mild tricuspid regurgitation.  There is no pulmonary hypertension.    High cholesterol     Hypertension     Overweight     Rectal cancer (Multi)     Type 2 diabetes mellitus (Multi)     4/18/2024 A1C 7.5%     PAST SURGICAL HISTORY:  Past Surgical History:   Procedure Laterality Date    COLONOSCOPY  06/17/2024    HEMICOLECTOMY W/ OSTOMY      LEG SURGERY Left     rodrigo placed     ALLERGIES:  No Known Allergies  MEDICATIONS:    Current Outpatient Medications:     acetaminophen (Tylenol) 500 mg tablet, Take 1 tablet (500 mg) by mouth every 6 hours if needed for mild pain (1 - 3)., Disp: , Rfl:     amLODIPine (Norvasc) 5 mg tablet, Take 1 tablet (5 mg) by mouth once daily., Disp: 90 tablet, Rfl: 3    aspirin 81 mg chewable tablet, Chew 1 tablet (81 mg) 1 time., Disp: , Rfl:     atorvastatin (Lipitor) 40 mg tablet, Take 1 tablet (40 mg) by  mouth once daily at bedtime., Disp: 90 tablet, Rfl: 3    metoprolol succinate XL (Toprol-XL) 100 mg 24 hr tablet, Take 1 tablet (100 mg) by mouth once daily., Disp: 90 tablet, Rfl: 3    ondansetron (Zofran) 4 mg tablet, Take 1 tablet (4 mg) by mouth every 8 hours if needed for nausea or vomiting. (Patient not taking: Reported on 2024), Disp: 20 tablet, Rfl: 0    oxyCODONE (Roxicodone) 5 mg immediate release tablet, Take 1 tablet (5 mg) by mouth every 6 hours if needed for severe pain (7 - 10). (Patient not taking: Reported on 2024), Disp: 15 tablet, Rfl: 0   SOCIAL HISTORY:  Social History     Tobacco Use    Smoking status: Never    Smokeless tobacco: Never   Substance Use Topics    Alcohol use: Not Currently     FAMILY HISTORY:  Family History   Problem Relation Name Age of Onset    No Known Problems Mother      No Known Problems Father      No Known Problems Sister      Leukemia Brother         REVIEW OF SYSTEMS:  Except for the symptoms described above, the review of systems is negative. Specifically, except as noted in the HPI, when asked the patient expressed no complaints relative to constitutional (fever, weight loss), eyes, ears, nose, mouth, throat, neurologic, cardiovascular, pulmonary, breast, GI, , skin, musculoskeletal, endocrine, hematologic/lymphatic, or immunologic systems.    RADIATION SCREENING QUESTIONS:  Prior radiation therapy: No  Pacemaker: No  Other implantable devices: No  Connective tissue disease: No    PERFORMANCE STATUS:  Karnofsky Performance Score/ECO, Normal activity with effort; some signs or symptoms of disease (ECOG equivalent 1)    PHYSICAL EXAMINATION:  /79   Pulse 75   Temp 36.6 °C (97.9 °F) (Skin)   Resp 18   Wt 90.9 kg (200 lb 4.6 oz)   SpO2 96%   BMI 27.93 kg/m²   Pain score: 3/10  General: no acute distress, engaged in conversation. No jaundice.  HEENT: Normocephalic, atraumatic. Extraocular movements are intact.   Neck: supple with trachea at  midline, no palpable adenopathy.   Pulmonary: Breathing comfortably on room air, no respiratory distress  Cardiovascular: Regular rate, no cyanosis, well-perfused  Abdomen: Soft, nontender, nondistended.   Extremities: No lower extremity edema or cyanosis.   Musculoskeletal: Normal range of motion. Able to raise both arms above head without issues  Skin: Without rash or obvious lesions.   Neurologic: Alert and oriented x3. Cranial nerves grossly intact.     LABORATORY AND IMAGING DATA:  Imaging: All imaging was personally reviewed and interpreted in clinic. Findings as per HPI and EMR.    Laboratory/Pathology:  All pertinent labs and pathology were personally reviewed and interpreted in clinic. Findings as per HPI and EMR.    IMPRESSION:  69-year-old man with [id number] ADC of the rectum, pMMR, with right>left liver only metastatic disease. Plan for FAWN with short course RT and subsequent dual resection.     PLAN:  For Roma Corral, I would recommend  CT chest for formal completion of staging. There is low index for concern given that most of the lung fields have been imaged on abdominal imaging, we will obtain this to ensure there is no thoracic disease. I would then recommend a course of definitive radiation therapy as part of an aggressive, potentially curative intent treatment strategy. Specifically, this would include our institutional standard for hepatic-only metastatic rectal cancer of FAWN with RT to the pelvis based on pelvic risk factors, with 4-6mo of chemotherapy, and subsequent combined vs staged surgery. He meets with Dr. Erazo and Dr. Nieto later this week and was discussed already in tumor board. In his case, we would offer short course radiation to 25 Gy in 5 fractions, over one week. Of note, he will need to begin chemotherapy 2-4 weeks after the radiation (no sooner than 2 weeks after radiation completion to avoid overlapping toxicity). The goal of pelvic RT is to reduce the risk of pelvic recurrence  following resection. For him it is also to palliative pelvic pain and pressure in the meantime.     Short and long term effects of radiation therapy were also discussed, including but not limited to skin reaction, dry/moist desquamation, diarrhea, dysuria, and nausea in the short term. Late effects could include but are not limited to: permanent change in bowel habits, rectal urgency, stenosis,  intestinal obstruction or perforation, and femoral neck fracture. The patient accepts these risks and signed informed consent.  He will be scheduled for treatment simulation later today, with treatment planning and treatment to commence shortly thereafter. The patient knows to call anytime with any questions or concerns.    Thank you for the opportunity to participate in the care of this kind man.    PAIN PLAN: The patient reports their pain is well-controlled on their current regimen.  Their pain regimen is currently managed by Medical Oncology.  No uncontrolled pain.    Thank you for the opportunity to participate in the care of this pleasant man.    Debby Boles MD  7/8/2024  , Radiation Oncology\

## 2024-07-08 NOTE — PROGRESS NOTES
Radiation Oncology Nursing Note    Prior Radiotherapy:  No  No radiation treatments to show. (Treatments may have been administered in another system.)     Current Systemic Treatment:  No     Presence of Pacemaker or ICD:  No    History of Autoimmune or Connective Tissue Disorders:  No    Pain: The patient's current pain level was assessed.  They report currently having a pain of 3 out of 10.  They feel their pain is under control without the use of pain medications.    Review of Systems:  Review of Systems   Constitutional:  Positive for appetite change and fatigue. Negative for chills, diaphoresis, fever and unexpected weight change.        Smell of food makes him nauseated.    HENT:   Negative for hearing loss, lump/mass, mouth sores, nosebleeds, sore throat, tinnitus, trouble swallowing and voice change.    Eyes: Negative.    Respiratory:  Positive for shortness of breath. Negative for chest tightness, cough, hemoptysis and wheezing.         Occasionally sob   Cardiovascular: Negative.    Gastrointestinal:  Positive for abdominal pain and nausea. Negative for abdominal distention, constipation, diarrhea, rectal pain and vomiting.        New ostomy site 3-4/10 pain. Just discharged on Friday.   Pink tinge from ostomy.    Endocrine: Negative.    Genitourinary: Negative.     Musculoskeletal: Negative.  Negative for gait problem.   Skin: Negative.    Neurological:  Positive for light-headedness. Negative for dizziness, extremity weakness, gait problem, headaches, numbness, seizures and speech difficulty.   Hematological: Negative.    Psychiatric/Behavioral:  Positive for sleep disturbance. Negative for confusion, decreased concentration, depression and suicidal ideas. The patient is not nervous/anxious.

## 2024-07-08 NOTE — TELEPHONE ENCOUNTER
Post op call. Message left for patient that I was calling to check on him after surgery with Dr. Vasquez.  I invited him to please call the office and let us know how you are doing at [phone].  Emily Canales RN

## 2024-07-09 ENCOUNTER — APPOINTMENT (OUTPATIENT)
Dept: RADIOLOGY | Facility: HOSPITAL | Age: 69
End: 2024-07-09
Payer: MEDICARE

## 2024-07-10 ENCOUNTER — HOSPITAL ENCOUNTER (OUTPATIENT)
Dept: RADIOLOGY | Facility: HOSPITAL | Age: 69
Discharge: HOME | End: 2024-07-10
Payer: MEDICARE

## 2024-07-10 ENCOUNTER — TELEPHONE (OUTPATIENT)
Dept: RADIATION ONCOLOGY | Facility: HOSPITAL | Age: 69
End: 2024-07-10
Payer: MEDICARE

## 2024-07-10 ENCOUNTER — TELEPHONE (OUTPATIENT)
Dept: HOME HEALTH SERVICES | Facility: HOME HEALTH | Age: 69
End: 2024-07-10
Payer: MEDICARE

## 2024-07-10 ENCOUNTER — OFFICE VISIT (OUTPATIENT)
Dept: SURGICAL ONCOLOGY | Facility: HOSPITAL | Age: 69
End: 2024-07-10
Payer: MEDICARE

## 2024-07-10 ENCOUNTER — HOSPITAL ENCOUNTER (OUTPATIENT)
Dept: RADIATION ONCOLOGY | Facility: HOSPITAL | Age: 69
Setting detail: RADIATION/ONCOLOGY SERIES
Discharge: HOME | End: 2024-07-10
Payer: MEDICARE

## 2024-07-10 VITALS
BODY MASS INDEX: 27.94 KG/M2 | SYSTOLIC BLOOD PRESSURE: 133 MMHG | RESPIRATION RATE: 16 BRPM | WEIGHT: 200.3 LBS | DIASTOLIC BLOOD PRESSURE: 64 MMHG | OXYGEN SATURATION: 100 % | HEART RATE: 62 BPM | TEMPERATURE: 96.6 F

## 2024-07-10 DIAGNOSIS — K62.89 RECTAL MASS: ICD-10-CM

## 2024-07-10 DIAGNOSIS — R16.0 MASS OF MULTIPLE SITES OF LIVER: ICD-10-CM

## 2024-07-10 DIAGNOSIS — C20 RECTAL CANCER (MULTI): ICD-10-CM

## 2024-07-10 PROCEDURE — 77295 3-D RADIOTHERAPY PLAN: CPT | Performed by: RADIOLOGY

## 2024-07-10 PROCEDURE — 99215 OFFICE O/P EST HI 40 MIN: CPT | Performed by: SURGERY

## 2024-07-10 PROCEDURE — 71250 CT THORAX DX C-: CPT | Performed by: RADIOLOGY

## 2024-07-10 PROCEDURE — 3078F DIAST BP <80 MM HG: CPT | Performed by: SURGERY

## 2024-07-10 PROCEDURE — 1125F AMNT PAIN NOTED PAIN PRSNT: CPT | Performed by: SURGERY

## 2024-07-10 PROCEDURE — 1159F MED LIST DOCD IN RCRD: CPT | Performed by: SURGERY

## 2024-07-10 PROCEDURE — 77300 RADIATION THERAPY DOSE PLAN: CPT | Performed by: RADIOLOGY

## 2024-07-10 PROCEDURE — 77334 RADIATION TREATMENT AID(S): CPT | Performed by: RADIOLOGY

## 2024-07-10 PROCEDURE — 1111F DSCHRG MED/CURRENT MED MERGE: CPT | Performed by: SURGERY

## 2024-07-10 PROCEDURE — 99205 OFFICE O/P NEW HI 60 MIN: CPT | Performed by: SURGERY

## 2024-07-10 PROCEDURE — 3051F HG A1C>EQUAL 7.0%<8.0%: CPT | Performed by: SURGERY

## 2024-07-10 PROCEDURE — 3048F LDL-C <100 MG/DL: CPT | Performed by: SURGERY

## 2024-07-10 PROCEDURE — 3075F SYST BP GE 130 - 139MM HG: CPT | Performed by: SURGERY

## 2024-07-10 PROCEDURE — 71250 CT THORAX DX C-: CPT

## 2024-07-10 ASSESSMENT — ENCOUNTER SYMPTOMS
EYE DISCHARGE: 0
ABDOMINAL PAIN: 1
SHORTNESS OF BREATH: 0
FATIGUE: 1
FLANK PAIN: 0
HEADACHES: 0
NAUSEA: 0
CONFUSION: 0
ENDOCRINE NEGATIVE: 1
WEAKNESS: 0
SPEECH DIFFICULTY: 0
DYSURIA: 0
VOMITING: 0
HALLUCINATIONS: 0
SORE THROAT: 0
ADENOPATHY: 0

## 2024-07-10 ASSESSMENT — PAIN SCALES - GENERAL: PAINLEVEL_OUTOF10: 5

## 2024-07-10 NOTE — TELEPHONE ENCOUNTER
Hello,    Your recent home care referral for Roma Corral has been made a Non Admit with  Home Care due to Inability to Contact Patient. If you have further questions, feel free to reach out to our office at [phone].     Thank you,   [facility]

## 2024-07-10 NOTE — PROGRESS NOTES
Subjective     HPI  Roma Corral is a 69 y.o. 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 .  There is extensive metastatic disease in the liver with most of the right liver occupied with bulky disease which extends into segment 4.  Additionally there are 2 lesions in the left lateral section.  He initially presented with abdominal pain and pelvic pressure with labs showing anemia.  He has undergone imaging including CT scan, MRI liver, MRI rectum.  He has met with Dr. Boles from radiation oncology with a plan to start short course in the near future.  He is meeting with Dr. Nieto tomorrow to discuss chemotherapy.    Review of Systems   Constitutional:  Positive for fatigue.   HENT:  Negative for ear discharge, nosebleeds and sore throat.    Eyes:  Negative for discharge.   Respiratory:  Negative for shortness of breath.    Cardiovascular:  Negative for chest pain.   Gastrointestinal:  Positive for abdominal pain. Negative for nausea and vomiting.   Endocrine: Negative.    Genitourinary:  Negative for dysuria and flank pain.   Musculoskeletal:  Negative for gait problem.   Skin:  Negative for rash.   Neurological:  Negative for speech difficulty, weakness and headaches.   Hematological:  Negative for adenopathy.   Psychiatric/Behavioral:  Negative for behavioral problems, confusion and hallucinations.         Past Medical History:   Diagnosis Date    Abdominal pain     Acute non-ST elevation myocardial infarction (NSTEMI) (Multi)     Anemia     6/6/24 HGB 8.6; HCT 31.5    Anxiety     CAD (coronary artery disease)     Cardiology follow-up encounter 12/11/2023    Sharif Lau MD    Gout     H/O cardiac catheterization 06/01/2021    H/O cardiovascular stress test 09/03/2021    IMPRESSION: 1. No evidence of ischemia. 2. Suspicion of an infarct along the mid anterior wall. 3. Left ventricular dilatation is noted. 4. Left ventricular  EF was calculated to be 50%. Summary:  1. No clinical or electrocardiographic evidence for ischemia at a submaximal workload. 3. The blunted heart rate diminshes the sensitivity of this test.  4. The submaximal level of stress was achieved.    H/O echocardiogram 06/02/2021    Mild concentric Left ventricle hypertrophy.  Global left ventricular wall motion and contractility are within normal limits.  There is normal left ventricular systolic function.  Estimated ejection fraction is 60-64%.  The left atrial size is mildly dilated.  Mild aortic regurgitation.  A trace of mitral regurgitation.  Trivial to mild tricuspid regurgitation.  There is no pulmonary hypertension.    High cholesterol     Hypertension     Overweight     Rectal cancer (Multi)     Type 2 diabetes mellitus (Multi)     4/18/2024 A1C 7.5%      Past Surgical History:   Procedure Laterality Date    COLONOSCOPY  06/17/2024    HEMICOLECTOMY W/ OSTOMY      LEG SURGERY Left     rodrigo placed      Current Outpatient Medications on File Prior to Visit   Medication Sig Dispense Refill    acetaminophen (Tylenol) 500 mg tablet Take 1 tablet (500 mg) by mouth every 6 hours if needed for mild pain (1 - 3).      amLODIPine (Norvasc) 5 mg tablet Take 1 tablet (5 mg) by mouth once daily. 90 tablet 3    aspirin 81 mg chewable tablet Chew 1 tablet (81 mg) 1 time.      atorvastatin (Lipitor) 40 mg tablet Take 1 tablet (40 mg) by mouth once daily at bedtime. 90 tablet 3    metoprolol succinate XL (Toprol-XL) 100 mg 24 hr tablet Take 1 tablet (100 mg) by mouth once daily. 90 tablet 3    ondansetron (Zofran) 4 mg tablet Take 1 tablet (4 mg) by mouth every 8 hours if needed for nausea or vomiting. (Patient not taking: Reported on 7/8/2024) 20 tablet 0    oxyCODONE (Roxicodone) 5 mg immediate release tablet Take 1 tablet (5 mg) by mouth every 6 hours if needed for severe pain (7 - 10). (Patient not taking: Reported on 7/8/2024) 15 tablet 0    [DISCONTINUED] chlorhexidine  (Peridex) 0.12 % solution Rinse mouth with 15 ml after toothbrushing the night before surgery and on the morning of surgery.  Expectorate after rinsing.  Do not swallow. 473 mL 0    [DISCONTINUED] metroNIDAZOLE (Flagyl) 250 mg tablet Take one tablet by mouth at 6p, 7p, and 11p the night before surgery. 3 tablet 0    [DISCONTINUED] neomycin (Mycifradin) 500 mg tablet Take two tabs by mouth at 6p, 7pm, and 11p the night before surgery. 6 tablet 0     No current facility-administered medications on file prior to visit.      No Known Allergies   Social History     Socioeconomic History    Marital status:      Spouse name: Not on file    Number of children: 1    Years of education: Not on file    Highest education level: Not on file   Occupational History    Occupation: retired   Tobacco Use    Smoking status: Never    Smokeless tobacco: Never   Vaping Use    Vaping status: Never Used   Substance and Sexual Activity    Alcohol use: Not Currently    Drug use: Never    Sexual activity: Defer   Other Topics Concern    Not on file   Social History Narrative    Not on file     Social Determinants of Health     Financial Resource Strain: Low Risk  (7/3/2024)    Overall Financial Resource Strain (CARDIA)     Difficulty of Paying Living Expenses: Not hard at all   Food Insecurity: No Food Insecurity (7/3/2024)    Hunger Vital Sign     Worried About Running Out of Food in the Last Year: Never true     Ran Out of Food in the Last Year: Never true   Transportation Needs: No Transportation Needs (7/3/2024)    PRAPARE - Transportation     Lack of Transportation (Medical): No     Lack of Transportation (Non-Medical): No   Physical Activity: Not on file   Stress: Not on file   Social Connections: Unknown (7/3/2024)    Social Connection and Isolation Panel [NHANES]     Frequency of Communication with Friends and Family: Not on file     Frequency of Social Gatherings with Friends and Family: Not on file     Attends Catholic  Services: Not on file     Active Member of Clubs or Organizations: Not on file     Attends Club or Organization Meetings: Not on file     Marital Status:    Intimate Partner Violence: Not on file   Housing Stability: Low Risk  (7/3/2024)    Housing Stability Vital Sign     Unable to Pay for Housing in the Last Year: No     Number of Places Lived in the Last Year: 1     Unstable Housing in the Last Year: No      Family History   Problem Relation Name Age of Onset    No Known Problems Mother      No Known Problems Father      No Known Problems Sister      Leukemia Brother            Objective         7/5/2024     3:00 AM 7/5/2024     8:08 AM 7/5/2024    11:52 AM 7/5/2024     3:26 PM 7/8/2024     2:04 PM 7/8/2024     2:05 PM 7/10/2024     2:15 PM   Vitals   Systolic 130 129 129 149 162 144 133   Diastolic 84 59 72 57 77 79 64   Heart Rate 75 75 60 64 67 75 62   Temp 36.6 °C (97.8 °F) 36.9 °C (98.5 °F) 36.7 °C (98 °F) 37.1 °C (98.7 °F) 36.6 °C (97.9 °F)  35.9 °C (96.6 °F)   Resp 16 16 18 18 18 18 16   Weight (lb)     200.29  200.3   BMI     27.93 kg/m2  27.94 kg/m2   BSA (m2)     2.13 m2  2.13 m2   Visit Report       Report          Physical Exam  Constitutional:       Appearance: Normal appearance.   HENT:      Head: Atraumatic.      Nose: Nose normal.   Eyes:      Extraocular Movements: Extraocular movements intact.      Conjunctiva/sclera: Conjunctivae normal.   Cardiovascular:      Rate and Rhythm: Normal rate.   Pulmonary:      Effort: Pulmonary effort is normal.   Abdominal:      Palpations: Abdomen is soft.      Tenderness: There is no abdominal tenderness.      Comments: Left-sided colostomy in place.   Musculoskeletal:         General: Normal range of motion.   Skin:     Findings: No rash.   Neurological:      General: No focal deficit present.      Mental Status: He is alert.   Psychiatric:         Behavior: Behavior normal.          Lab Results   Component Value Date    WBC 8.7 07/05/2024    HGB 7.5  (L) 07/05/2024    HCT 27.6 (L) 07/05/2024     07/05/2024     Lab Results   Component Value Date     07/05/2024    K 4.0 07/05/2024     07/05/2024    CO2 26 07/05/2024    BUN 9 07/05/2024    CREATININE 1.00 07/05/2024    EGFR 81 07/05/2024    CALCIUM 7.7 (L) 07/05/2024    ALBUMIN 3.8 06/06/2024    PROT 7.9 06/06/2024    AST 21 06/06/2024    ALT 12 06/06/2024    BILITOT 0.7 06/06/2024     Lab Results   Component Value Date    .1 06/11/2024        === 06/19/24 ===    MR RECTUM W AND WO CONTRAST    - Impression -  1. Semi circumferential rectosigmoid junction mass with extramural  invasion and extension into the anterior mesorectal fascia and  peritoneal reflection with significant luminal narrowing at the  rectosigmoid junction as detailed above. Findings are consistent with  known rectal malignancy.  2. Few nonspecific left sided pelvic lymph nodes with no evidence of  lymphadenopathy by MRI criteria.      The MR imaging based stage of this rectal cancer is T4.  Based on MRI the kiran stage is Nx.    I personally reviewed the images/study and I agree with the findings  as stated by Resident Rex Monaco MD.    MACRO:  None    Signed by: Jamil Mcclelland 6/21/2024 12:08 AM  Dictation workstation:   [id number]      === 06/18/24 ===    MR LIVER WITH EOVIST CONTRAST    - Impression -  1. Multiple hepatic metastases predominantly involving the right lobe  of the liver as described above.  2. Few small portacaval lymph nodes with no lymphadenopathy by size  criteria.  3. Patent portal venous system and no extrahepatic or intrahepatic  biliary duct dilatation.    I personally reviewed the images/study and I agree with the findings  as stated. This study was interpreted at [place], [place].    MACRO:  None    Signed by: Des Villeda 6/22/2024 12:13 AM  Dictation workstation:   [id number]  Rad Onc CT Sim Images    Result Date:  7/8/2024  These images are not reportable by radiology and will not be interpreted by  Radiologists.    ECG 12 Lead    Result Date: 6/28/2024  Sinus rhythm with 1st degree AV block Otherwise normal ECG When compared with ECG of 02-DEC-2022 10:01, No significant change was found Confirmed by All Lennon ([id number]) on 6/28/2024 9:46:33 AM    MR liver w EOVIST contrast    Result Date: 6/22/2024  Interpreted By:  Des Villeda,  and Jacinda Goodman STUDY: MR LIVER WITH EOVIST CONTRAST;  6/18/2024 2:29 pm   INDICATION: Signs/Symptoms:rectal cancer with hepatic metastases.   COMPARISON: CT abdomen pelvis: 06/10/2024   ACCESSION NUMBER(S): [id number]   ORDERING CLINICIAN: RAYMUNDO GUERRA   TECHNIQUE: MRI LIVER; Multiplanar magnetic resonance images of the abdomen were obtained including the following sequences; T2-weighted SSFSE with and without fat saturation, T1-weighted GRE in/opposed phase, DWI, fat saturated 3D-T1w GRE pre and dynamically post contrast.  9 ml of Gadolinium contrast agent Eovist were administered intravenously without immediate complication.   FINDINGS: LIVER: There are multiple confluent, ill-defined, infiltrative lesions throughout the right hepatic lobe which demonstrate minimal enhancement on early postcontrast imaging. There is no uptake of contrast on hepatocyte phase imaging when compared to normal hepatic parenchyma. The largest measurable lesion is seen in hepatic segment 6/7 which measures 5.2 x 11 cm (series 19, image 24) and hepatic segment 6/5 measuring 6 x 7.5 cm. There are 2 well-circumscribed heterogenous hypoenhancing lesions seen on left hepatic lobe with the lesion seen on hepatic segment 4A which measures 2.3 x 2.5 cm (series 19, image 41) and hepatic segment 3 lesion measuring 0.7 x 0.8 cm (series 19, image 33). The liver measures 18 cm in craniocaudal dimension.   BILE DUCTS: No intrahepatic or extrahepatic bile duct dilatation is demonstrated.   GALLBLADDER: Gallbladder is  partially distended. There is a T1 hyperintense material in the gallbladder lumen likely representing sludge.   PANCREAS: Normal signal intensity. Normal enhancement. No masses. The pancreatic duct is normal.   SPLEEN: Within normal limits.   ADRENAL GLANDS: Within normal limits.   KIDNEYS: Within normal limits.   LYMPH NODES: There are few small portacaval lymph nodes. Otherwise there is no lymphadenopathy by size criteria.   ABDOMINAL VESSELS: Aorta and the major abdominal arterial vessels demonstrate no gross abnormality.  The portal vein, hepatic veins, superior mesenteric and splenic veins are patent.  No significant collaterals or esophageal varices are present.   BOWEL: Stomach and duodenum are within normal limits. Visualized small and large bowel loops are normal in caliber.   PERITONEUM/RETROPERITONEUM: No ascites.   BONES AND LOWER THORAX: No abnormal enhancing focal bony lesions are identified. The visualized lower lung fields are unremarkable. The heart is normal in size.       1. Multiple hepatic metastases predominantly involving the right lobe of the liver as described above. 2. Few small portacaval lymph nodes with no lymphadenopathy by size criteria. 3. Patent portal venous system and no extrahepatic or intrahepatic biliary duct dilatation.   I personally reviewed the images/study and I agree with the findings as stated. This study was interpreted at [place], [place].   MACRO: None   Signed by: Des Villeda 6/22/2024 12:13 AM Dictation workstation:   [id number]    MR rectum w and wo IV contrast    Result Date: 6/21/2024  Interpreted By:  Jamil Gustafson and Dervishi Mario STUDY: MR RECTUM W AND WO IV CONTRAST;  6/19/2024 5:09 pm   INDICATION: Signs/Symptoms:rectal mass   COMPARISON: CT abdomen pelvis: 06/10/2024.   ACCESSION NUMBER(S): [id number]   ORDERING CLINICIAN: RAYMUNDO GUERRA   TECHNIQUE: Multiplanar MRI of the pelvis was  obtained including axial, sagittal and coronal T2 weighted SSFSE, (para)axial, (para)coronal and sagittal T2 FSE , axial DWI, pre and post gadolinium dynamic T1 GRE sequences in 3 planes.  Patient received 60CC of gel per rectum. 20 ml of Gadolinium contrast agent Dotarem were administered intravenously without complication.   FINDINGS: RECTAL MASS: Within the  upper rectum at the rectosigmoid junction there is a semi circumferential rectal wall mass measuring up to 10mm in thickness (series 10, image 17). The mass measures 7.6 cm longitudinally and demonstrates predominant T2w  isointensity compared to normal rectal wall. There is marked narrowing at the rectosigmoid junction with abnormal peripheral T2 hypointense striations.   DISTANCE FROM ANAL VERGE: The inferior aspect of the lesion is approximately 8-9 cm from the anorectal junction and  10 cm from the anal verge.   EXTRAMURAL INVASION: The mass demonstrates extramural extension into the perirectal fat as seen on series 10, image 25.   EXTRAMURAL VASCULAR INVASION: There is no extramural vascular invasion.   DISTANCE TO MESORECTAL FASCIA/PERITONEAL REFLECTION: Abnormal, enhancing T2 dark striations, extending beyond the wall of the rectal mass involving the anterior mesorectal fascia and peritoneal reflection (series 10, image 25.   ADJACENT ORGANS: The urinary bladder is  not involved.  The prostate and seminal vesicles appear within normal limits.   LYMPH NODE STATUS: There are few nonspecific left-sided superior rectal lymph nodes with the largest measuring 0.4 cm in diameter (series 6, image 21). Otherwise there is no evidence of lymphadenopathy.   BONES: No focal lesions are noted in the bone.       1. Semi circumferential rectosigmoid junction mass with extramural invasion and extension into the anterior mesorectal fascia and peritoneal reflection with significant luminal narrowing at the rectosigmoid junction as detailed above. Findings are consistent  with known rectal malignancy. 2. Few nonspecific left sided pelvic lymph nodes with no evidence of lymphadenopathy by MRI criteria.     The MR imaging based stage of this rectal cancer is T4. Based on MRI the kiran stage is Nx.   I personally reviewed the images/study and I agree with the findings as stated by Resident Rex Monaco MD.   MACRO: None   Signed by: Jamil Higgins Krystin 6/21/2024 12:08 AM Dictation workstation:   [id number]    Colonoscopy Diagnostic    Result Date: 6/17/2024  Table formatting from the original result was not included. Impression Single malignant-appearing mass (not traversable) in the rectum 8 cm from the anal verge, covering the whole circumference; there was stigmata of recent hemorrhage, and bleeding occurred after intervention; performed cold forceps biopsy with partial removal Findings Single malignant-appearing mass (not traversable) in the rectum 8 cm from the anal verge observed during digital rectal exam, covering the whole circumference; there was stigmata of recent hemorrhage, and bleeding occurred after intervention; performed cold forceps biopsy with partial removal. Obstructing nontraversable circumferential rectal mass just proximal to second rectal valve, approximately 8 cm from the anus  Recommendation  Repeat colonoscopy in 1 year  Incomplete procedure Abnormal pathology   Rectal MRI Liver MRI with Eovist Referrals to hematology oncology and hepatobiliary surgery Will need to discuss diverting loop colostomy in the near future  Indication Colonic mass Staff Staff Role Javier Vasquez MD Proceduralist Medications See Anesthesia Record. Preprocedure A history and physical has been performed, and patient medication allergies have been reviewed. The patient's tolerance of previous anesthesia has been reviewed. The risks and benefits of the procedure and the sedation options and risks were discussed with the patient. All questions were answered and informed  consent obtained. Details of the Procedure The patient underwent monitored anesthesia care, which was administered by an anesthesia professional. The patient's blood pressure, ECG, ETCO2, heart rate, level of consciousness, oxygen and respirations were monitored throughout the procedure. A digital rectal exam was performed. A perianal exam was performed. The scope was introduced through the anus and advanced to the rectum. Retroflexion was not performed due to altered anatomy. The quality of bowel preparation was evaluated using the Lynch Bowel Preparation Scale with scores of: right colon = not assessed, transverse colon = not assessed, left colon = not assessed. The patient's estimated blood loss was minimal (<5 mL). The procedure was not difficult. The patient tolerated the procedure well. There were no apparent adverse events. Obstructing rectal mass. Events Procedure Events Event Event Time ENDO SCOPE IN TIME 6/17/2024  8:05 AM ENDO SCOPE OUT TIME 6/17/2024  8:21 AM Specimens ID Type Source Tests Collected by Time 1 : rectal mass biopsy Tissue RECTUM BIOPSY SURGICAL PATHOLOGY EXAM Regina Joseph RN 6/17/2024 0815 Procedure Location [address] [address] [phone] Referring Provider Javier Vasquez MD Procedure Provider Javier Vasquez MD     CT abdomen pelvis w IV contrast    Result Date: 6/10/2024  Interpreted By:  Des Villeda,  and Naz Aragon STUDY: CT ABDOMEN PELVIS W IV CONTRAST;  6/10/2024 2:18 pm   INDICATION: Signs/Symptoms:abdominal pain, weight loss.   Per EMR: History of hyperlipidemia and hypertension now with abdominal discomfort, pain, weight loss, weakness, and fatigue.   COMPARISON: CT chest/abdomen/pelvis 05/29/2021   ACCESSION NUMBER(S): [id number]   ORDERING CLINICIAN: JOSE R GUERRA   TECHNIQUE: CT of the abdomen and pelvis was performed.  Standard contiguous axial images were obtained at 3 mm slice  thickness through the abdomen and pelvis. Coronal and sagittal reconstructions at 3 mm slice thickness were performed.   75 ml of contrast Omnipaque 350 were administered intravenously without immediate complication.   FINDINGS: LOWER CHEST: The visualized lung base is unremarkable. The heart is normal in size without pericardial effusion. No pleural effusion is present. Visualized distal esophagus appears normal.   ABDOMEN:   LIVER: The liver is normal in size.   There are multiple confluent heterogenous ill-defined hepatic lesions one on the left and the rest throughout the right lobe of the liver. For example:   7.9 x 9.0 x 5.4 cm lesion in segment V (series 201, image 42 and series 202, image 44). 6.7 x 0.4 x 5.3 cm lesion in segment VII (series 201, image 42 and series 202, image 71). 2.2 x 2.1 x 1.6 cm lesion in segment VI (series 201, image 25 and series 202, image 32).   BILE DUCTS: The intrahepatic and extrahepatic ducts are not dilated.   GALLBLADDER: The gallbladder is nondistended and without evidence of radiopaque stones.   PANCREAS: There are several punctate calcification within the pancreatic head. Otherwise, the pancreas appears unremarkable without evidence of ductal dilatation or masses.   SPLEEN: The spleen is normal in size without focal lesions.   ADRENAL GLANDS: Bilateral adrenal glands appear normal.   KIDNEYS AND URETERS: The kidneys are normal in size and enhance symmetrically. There are two renal stones in the left ureterovesical junction measuring up to 4 cm series 201, image 138), without upstream hydroureteronephrosis. No evidence of nephroureterolithiasis or hydroureteronephrosis on the right.   Subcentimeter hypodensity in the inferior pole of the left kidney measuring 0.8 cm (series 201, image 79), too small to characterize, however favored to represent renal cyst.   PELVIS:   BLADDER: The urinary bladder is decompressed, limited for evaluation.   REPRODUCTIVE ORGANS: No pelvic  masses.   BOWEL: The stomach is unremarkable.   The small bowel is not abnormally dilated.   The appendix appears normal.   There is focal circumferential narrowing of the distal sigmoid colon (series 201, image 131) with mild sigmoid wall thickening (series 201, image 131). There is moderate colonic stool burden proximal to this raising concern for partial obstruction.   VESSELS: There is no aneurysmal dilatation of the abdominal aorta. The IVC appears normal.   PERITONEUM/RETROPERITONEUM/LYMPH NODES: No ascites or free air, no fluid collection. Subcentimeter prominent pericolonic nodes around the thickened sigmoid colon measuring up to 0.5 cm (series 201, image 120), not evident on prior CT from 2021.   BONES: No suspicious osseous lesions are identified. Degenerative discogenic disease is noted in the lower thoracic and lumbar spine.   ABDOMINAL WALL: Small fat containing umbilical hernia.       1. New multiple confluent heterogenous ill-defined right-greater-than-left hepatic lesions predominantly in segment V, VI, and VII highly concerning for metastatic lesions. Recommend further evaluation with tissue sampling. 2. Focal circumferential narrowing and wall thickening of the distal sigmoid colon with adjacent subcentimeter  nodes concerning for colon malignancy, primary site of disease. Recommend further evaluation with colonoscopy.   I personally reviewed the images/study and I agree with the findings as stated by Margo Childs DO, PGY-2. This study was interpreted at [facility], [place], [place].   MACRO: Critical Finding:  See findings. Notification was initiated on 6/10/2024 at 3:41 pm by  Margo Childs.  (**-OCF-**) Instructions:   Signed by: Des Villeda 6/10/2024 7:15 PM Dictation workstation:   [id number]      Assessment/Plan     69-year-old man with rectosigmoid cancer and extensive liver metastases.  We discussed that the next steps will be short course  radiation as well as neoadjuvant chemotherapy.  With regards to his liver, it would depend upon response to chemotherapy.  As things stand now, he has disease that could potentially be treated with a two-stage hepatectomy approach.  The first stage would be ablation of the left-sided tumors which are 2 tumors at the same time as his rectal resection.  This will be followed by portal vein embolization followed by second stage extended right hepatectomy.  If he has an extensive response, he may need lesser surgery.  If he does not become resectable, we discussed hepatic artery infusional pump chemotherapy as an option as well.  He understands that I will be part of the team and he will come back to see me when the time is right.    Baron Erazo MD

## 2024-07-11 ENCOUNTER — OFFICE VISIT (OUTPATIENT)
Dept: HEMATOLOGY/ONCOLOGY | Facility: HOSPITAL | Age: 69
End: 2024-07-11
Payer: MEDICARE

## 2024-07-11 ENCOUNTER — APPOINTMENT (OUTPATIENT)
Dept: RADIATION ONCOLOGY | Facility: CLINIC | Age: 69
End: 2024-07-11
Payer: MEDICARE

## 2024-07-11 ENCOUNTER — TELEPHONE (OUTPATIENT)
Dept: RADIATION ONCOLOGY | Facility: CLINIC | Age: 69
End: 2024-07-11

## 2024-07-11 VITALS
HEART RATE: 60 BPM | TEMPERATURE: 96.3 F | WEIGHT: 199.96 LBS | OXYGEN SATURATION: 99 % | SYSTOLIC BLOOD PRESSURE: 142 MMHG | BODY MASS INDEX: 27.89 KG/M2 | DIASTOLIC BLOOD PRESSURE: 66 MMHG | RESPIRATION RATE: 18 BRPM

## 2024-07-11 DIAGNOSIS — R16.0 MASS OF MULTIPLE SITES OF LIVER: ICD-10-CM

## 2024-07-11 DIAGNOSIS — C20 RECTAL CANCER (MULTI): Primary | ICD-10-CM

## 2024-07-11 DIAGNOSIS — K63.89 COLONIC MASS: ICD-10-CM

## 2024-07-11 PROCEDURE — 3077F SYST BP >= 140 MM HG: CPT | Performed by: INTERNAL MEDICINE

## 2024-07-11 PROCEDURE — 3051F HG A1C>EQUAL 7.0%<8.0%: CPT | Performed by: INTERNAL MEDICINE

## 2024-07-11 PROCEDURE — 1111F DSCHRG MED/CURRENT MED MERGE: CPT | Performed by: INTERNAL MEDICINE

## 2024-07-11 PROCEDURE — RXMED WILLOW AMBULATORY MEDICATION CHARGE

## 2024-07-11 PROCEDURE — 3048F LDL-C <100 MG/DL: CPT | Performed by: INTERNAL MEDICINE

## 2024-07-11 PROCEDURE — 99215 OFFICE O/P EST HI 40 MIN: CPT | Performed by: INTERNAL MEDICINE

## 2024-07-11 PROCEDURE — 3078F DIAST BP <80 MM HG: CPT | Performed by: INTERNAL MEDICINE

## 2024-07-11 PROCEDURE — 1159F MED LIST DOCD IN RCRD: CPT | Performed by: INTERNAL MEDICINE

## 2024-07-11 PROCEDURE — 1126F AMNT PAIN NOTED NONE PRSNT: CPT | Performed by: INTERNAL MEDICINE

## 2024-07-11 PROCEDURE — 99205 OFFICE O/P NEW HI 60 MIN: CPT | Performed by: INTERNAL MEDICINE

## 2024-07-11 RX ORDER — PROCHLORPERAZINE EDISYLATE 5 MG/ML
10 INJECTION INTRAMUSCULAR; INTRAVENOUS EVERY 6 HOURS PRN
OUTPATIENT
Start: 2024-07-24

## 2024-07-11 RX ORDER — PALONOSETRON 0.05 MG/ML
0.25 INJECTION, SOLUTION INTRAVENOUS ONCE
OUTPATIENT
Start: 2024-07-24

## 2024-07-11 RX ORDER — LOPERAMIDE HYDROCHLORIDE 2 MG/1
CAPSULE ORAL
Qty: 100 CAPSULE | Refills: 2 | Status: SHIPPED | OUTPATIENT
Start: 2024-07-11

## 2024-07-11 RX ORDER — DIPHENHYDRAMINE HYDROCHLORIDE 50 MG/ML
50 INJECTION INTRAMUSCULAR; INTRAVENOUS AS NEEDED
OUTPATIENT
Start: 2024-07-24

## 2024-07-11 RX ORDER — HYDROMORPHONE HYDROCHLORIDE 2 MG/1
2 TABLET ORAL EVERY 8 HOURS PRN
Qty: 90 TABLET | Refills: 0 | Status: SHIPPED | OUTPATIENT
Start: 2024-07-11 | End: 2024-08-11

## 2024-07-11 RX ORDER — PROCHLORPERAZINE MALEATE 10 MG
10 TABLET ORAL EVERY 6 HOURS PRN
OUTPATIENT
Start: 2024-07-24

## 2024-07-11 RX ORDER — LORAZEPAM 2 MG/ML
1 INJECTION INTRAMUSCULAR AS NEEDED
OUTPATIENT
Start: 2024-07-24

## 2024-07-11 RX ORDER — FAMOTIDINE 10 MG/ML
20 INJECTION INTRAVENOUS ONCE AS NEEDED
OUTPATIENT
Start: 2024-07-24

## 2024-07-11 RX ORDER — PROCHLORPERAZINE MALEATE 10 MG
10 TABLET ORAL EVERY 6 HOURS PRN
Qty: 30 TABLET | Refills: 5 | Status: SHIPPED | OUTPATIENT
Start: 2024-07-11

## 2024-07-11 RX ORDER — ATROPINE SULFATE 0.4 MG/ML
0.4 INJECTION, SOLUTION ENDOTRACHEAL; INTRAMEDULLARY; INTRAMUSCULAR; INTRAVENOUS; SUBCUTANEOUS
OUTPATIENT
Start: 2024-07-24

## 2024-07-11 RX ORDER — EPINEPHRINE 0.3 MG/.3ML
0.3 INJECTION SUBCUTANEOUS EVERY 5 MIN PRN
OUTPATIENT
Start: 2024-07-24

## 2024-07-11 RX ORDER — ONDANSETRON HYDROCHLORIDE 8 MG/1
8 TABLET, FILM COATED ORAL EVERY 8 HOURS PRN
Qty: 30 TABLET | Refills: 5 | Status: SHIPPED | OUTPATIENT
Start: 2024-07-11

## 2024-07-11 RX ORDER — ALBUTEROL SULFATE 0.83 MG/ML
3 SOLUTION RESPIRATORY (INHALATION) AS NEEDED
OUTPATIENT
Start: 2024-07-24

## 2024-07-11 ASSESSMENT — PAIN SCALES - GENERAL: PAINLEVEL: 0-NO PAIN

## 2024-07-11 NOTE — PROGRESS NOTES
Started having abdominal pain in the last 7 months, had colonoscopy and received diagnosis. Has Colostomy bag now and is doing okay with it, he is learning. Lost about 30 lbs in 6 months. He does have some nausea occasionally and feels full very quickly.     Here with his wife Betsy. Has good support.     Medications, nutrition screening, allergy and falls risk reviewed with patient. Education completed.     Patient denies nausea/vomiting, fever/ chills. No issues constipation or diarrhea.      Patients chief concern today to review with MD:   Fatigue, mentally and physically  Having some abdominal pain kind of all over the middle of his abdomen rates pain 5/10.     Education provided with verbalized understanding of plan.   - Discussed/ Plan:   - Orders placed per MD and patient taken to scheduling.

## 2024-07-11 NOTE — PROGRESS NOTES
Patient ID: Roma Corral is a 69 y.o. male from [place], [place].     Referring Physician: Javier Vasquez MD  [address] [place] [address] Dr Sosa [place], [facility] [address]  [place] [id number]    Primary Care Provider: Santiago Buckner MD    Diagnosis:   Rectal cancer 6/2024    Primary Oncologic Surgeon:   Christy    Primary Medical Oncologist:  Dann Nieto MD      Primary Radiation Oncologist:  Elvi    Current Therapy:  Short course RT  FOLFOXIRI + Josefina to follow    Oncologic Surgery History:  N/a    Oncologic Therapy History:  N/a    Molecular Genetics:  Pending    Current Sites of Disease:  Liver, rectum    Oncologic Problem List:  Metastatic CRC  CAD  T2DM    Oncologic Narrative:  Roma Corral is a 69 y.o. male who is referred by Dr Vasquez for metastatic rectal cancer to liver s/p lap colostomy creation on 7/2/24. .  There is extensive metastatic disease in the liver with most of the right liver occupied with bulky disease which extends into segment 4.  Additionally there are 2 lesions in the left lateral section.  He initially presented with abdominal pain and pelvic pressure with labs showing anemia.  He has undergone imaging including CT scan, MRI liver, MRI rectum.  He has met with Dr. Boles from radiation oncology with a plan to start short course in the near future. He has met with Dr. Erazo and Dr. Harrison to discuss potential future surgeries.       Past Medical History: Roma has a past medical history of Abdominal pain, Acute non-ST elevation myocardial infarction (NSTEMI) (Multi), Anemia, Anxiety, CAD (coronary artery disease), Cardiology follow-up encounter (12/11/2023), Gout, H/O cardiac catheterization (06/01/2021), H/O cardiovascular stress test (09/03/2021), H/O echocardiogram (06/02/2021), High cholesterol, Hypertension, Overweight, Rectal cancer (Multi), and Type 2 diabetes mellitus (Multi).  Surgical History:  Roma has a past surgical history that includes Leg Surgery (Left); Colonoscopy (06/17/2024); and  Hemicolectomy w/ ostomy.  Social History:  Roma reports that he has never smoked. He has never used smokeless tobacco. He reports that he does not currently use alcohol. He reports that he does not use drugs.  Family History:    Family History   Problem Relation Name Age of Onset    No Known Problems Mother      No Known Problems Father      No Known Problems Sister      Leukemia Brother       Family Oncology History:  Cancer-related family history is not on file.        SUBJECTIVE:    History of Present Illness:  Roma Corral is a 69 y.o. male who was referred by Javier Vasquez MD and presents with No chief complaint on file.    HPI        OBJECTIVE:    VS / Pain:  /66 (BP Location: Left arm, Patient Position: Sitting, BP Cuff Size: Adult)   Pulse 60   Temp 35.7 °C (96.3 °F) (Temporal)   Resp 18   Wt 90.7 kg (199 lb 15.3 oz)   SpO2 99%   BMI 27.89 kg/m²   BSA: 2.13 meters squared   Pain Scale: 0    Daily Weight  07/11/24 : 90.7 kg (199 lb 15.3 oz)  07/10/24 : 90.9 kg (200 lb 4.8 oz)  07/08/24 : 90.9 kg (200 lb 4.6 oz)      Physical Exam  Constitutional:       Appearance: He is normal weight.   HENT:      Nose: Nose normal.      Mouth/Throat:      Mouth: Mucous membranes are moist.      Pharynx: Oropharynx is clear.   Eyes:      Extraocular Movements: Extraocular movements intact.      Conjunctiva/sclera: Conjunctivae normal.   Cardiovascular:      Rate and Rhythm: Normal rate.      Pulses: Normal pulses.   Pulmonary:      Effort: Pulmonary effort is normal.   Abdominal:      General: Abdomen is flat.   Musculoskeletal:         General: Normal range of motion.   Skin:     General: Skin is warm.   Neurological:      General: No focal deficit present.      Mental Status: He is alert. Mental status is at baseline.   Psychiatric:         Mood and Affect: Mood normal.         Thought Content: Thought content normal.         Judgment: Judgment normal.         Performance Status:   ECOG 1    Diagnostic Results          WBC   Date/Time Value Ref Range Status   07/05/2024 06:01 AM 8.7 4.4 - 11.3 x10*3/uL Final   07/04/2024 05:29 AM 9.1 4.4 - 11.3 x10*3/uL Final   07/03/2024 05:50 AM 8.8 4.4 - 11.3 x10*3/uL Final     Hemoglobin   Date Value Ref Range Status   07/05/2024 7.5 (L) 13.5 - 17.5 g/dL Final   07/04/2024 7.0 (L) 13.5 - 17.5 g/dL Final   07/03/2024 7.0 (L) 13.5 - 17.5 g/dL Final     MCV   Date/Time Value Ref Range Status   07/05/2024 06:01 AM 67 (L) 80 - 100 fL Final   07/04/2024 05:29 AM 68 (L) 80 - 100 fL Final   07/03/2024 05:50 AM 67 (L) 80 - 100 fL Final     Platelets   Date/Time Value Ref Range Status   07/05/2024 06:01  150 - 450 x10*3/uL Final   07/04/2024 05:29  150 - 450 x10*3/uL Final   07/03/2024 05:50  150 - 450 x10*3/uL Final     Neutrophils Absolute   Date/Time Value Ref Range Status   06/27/2024 02:56 PM 5.00 1.20 - 7.70 x10*3/uL Final     Comment:     Percent differential counts (%) should be interpreted in the context of the absolute cell counts (cells/uL).   06/23/2021 11:12 AM 4.89 1.20 - 7.70 x10E9/L Final   05/30/2021 03:15 AM 4.74 1.8 - 7.7 K/UL Final   05/29/2021 08:32 PM 3.83 1.8 - 7.7 K/UL Final     Bilirubin, Total   Date/Time Value Ref Range Status   06/06/2024 04:49 PM 0.7 0.0 - 1.2 mg/dL Final   04/18/2024 02:29 PM 0.7 0.0 - 1.2 mg/dL Final     Total Bilirubin   Date/Time Value Ref Range Status   12/02/2022 10:34 AM 0.4 0.0 - 1.2 mg/dL Final   06/23/2021 11:12 AM 0.4 0.0 - 1.2 mg/dL Final   05/30/2021 03:15 AM 0.5 0.1 - 1.2 MG/DL Final     AST   Date/Time Value Ref Range Status   06/06/2024 04:49 PM 21 9 - 39 U/L Final   04/18/2024 02:29 PM 25 9 - 39 U/L Final   12/02/2022 10:34 AM 23 9 - 39 U/L Final   06/23/2021 11:12 AM 19 9 - 39 U/L Final   05/30/2021 03:15 AM 55 (H) 5 - 40 U/L Final     ALT   Date/Time Value Ref Range Status   06/06/2024 04:49 PM 12 10 - 52 U/L Final     Comment:     Patients treated with Sulfasalazine may generate falsely decreased results for ALT.  "  04/18/2024 02:29 PM 13 10 - 52 U/L Final     Comment:     Patients treated with Sulfasalazine may generate falsely decreased results for ALT.     ALT (SGPT)   Date/Time Value Ref Range Status   12/02/2022 10:34 AM 19 10 - 52 U/L Final     Comment:      Patients treated with Sulfasalazine may generate    falsely decreased results for ALT.     06/23/2021 11:12 AM 16 10 - 52 U/L Final     Comment:      Patients treated with Sulfasalazine may generate    falsely decreased results for ALT.     05/30/2021 03:15 AM 26 5 - 40 U/L Final     Creatinine   Date/Time Value Ref Range Status   07/05/2024 06:01 AM 1.00 0.50 - 1.30 mg/dL Final   07/04/2024 05:29 AM 1.03 0.50 - 1.30 mg/dL Final   07/03/2024 05:50 AM 1.01 0.50 - 1.30 mg/dL Final     Urea Nitrogen   Date/Time Value Ref Range Status   07/05/2024 06:01 AM 9 6 - 23 mg/dL Final   07/04/2024 05:29 AM 11 6 - 23 mg/dL Final   07/03/2024 05:50 AM 14 6 - 23 mg/dL Final     Albumin   Date/Time Value Ref Range Status   06/06/2024 04:49 PM 3.8 3.4 - 5.0 g/dL Final   04/18/2024 02:29 PM 3.7 3.4 - 5.0 g/dL Final   12/02/2022 10:34 AM 4.3 3.4 - 5.0 g/dL Final   06/23/2021 11:12 AM 3.9 3.4 - 5.0 g/dL Final   05/30/2021 03:15 AM 3.8 3.5 - 5.0 GM/DL Final     No results found for: \"\"  Carcinoembryonic AG   Date/Time Value Ref Range Status   06/11/2024 04:40 .1 ug/L Final         === 07/10/24 ===    CT CHEST WO IV CONTRAST    - Impression -  1.  No definite evidence of metastatic disease in the chest  2. Couple of nodules in the lungs as described above, probably stable  compared to prior CT from 2021, however direct comparison is limited  due to difference in technique. Recommend attention on follow-up.  3. Severe coronary artery calcification. CT findings suggestive of  anemia.  4.  Multiple infiltrative and ill-defined masses in the liver are  poorly delineated and better evaluated on dedicated CT of the abdomen  and pelvis from 06/10/2024.    MACRO:  None    Signed by: " Jennifer Davies 7/10/2024 4:08 PM  Dictation workstation:   [id number]      Assessment/Plan   This is a 70-year-old man with diabetes and coronary artery disease who presented with abdominal pain and anemia in June 2024 and was found to have rectal cancer with diffuse liver metastases.  He has nearly confluent right hepatic liver metastases and 2 lesions in the left lobe.  He has met with Dr. Erazo to discuss potential future surgery should he do well with chemotherapy.  He is also met with Dr. Boles to discuss radiation to the primary tumor which is causing bleeding currently.  This type of radiation would make him ineligible for the onvansertib trial.  We will therefore plan to begin chemotherapy with FOLFOXIRI and bevacizumab in the next 2 weeks after the completion of radiation hopefully in the coming 10 days.  I will see him in follow-up prior to cycle 2 of chemotherapy he will need a Mediport.  He has consented to chemotherapy after thorough review of the logistics and likely side effects.  We also discussed less common but still potential side effects such as neutropenic fever and the need for hospitalization.    Plan:  Stage IV CRC:  Obtain NGS  Obtain Mediport  Plan for FOLFOXIRI + Josefina after short course RT.   F/U with me prior to C2.   Dann Nieto MD     [facility] [place]  Office: [phone]  Fax: [phone]

## 2024-07-12 ENCOUNTER — APPOINTMENT (OUTPATIENT)
Dept: RADIATION ONCOLOGY | Facility: CLINIC | Age: 69
End: 2024-07-12
Payer: MEDICARE

## 2024-07-12 ENCOUNTER — PHARMACY VISIT (OUTPATIENT)
Dept: PHARMACY | Facility: CLINIC | Age: 69
End: 2024-07-12

## 2024-07-12 ENCOUNTER — CLINICAL SUPPORT (OUTPATIENT)
Dept: SURGERY | Facility: CLINIC | Age: 69
End: 2024-07-12
Payer: MEDICARE

## 2024-07-12 DIAGNOSIS — Z43.3 COLOSTOMY CARE (MULTI): ICD-10-CM

## 2024-07-12 PROCEDURE — RXMED WILLOW AMBULATORY MEDICATION CHARGE

## 2024-07-12 PROCEDURE — 99211 OFF/OP EST MAY X REQ PHY/QHP: CPT | Performed by: SURGERY

## 2024-07-12 NOTE — NURSING NOTE
"[facility] nursing visit outcome: Mr. Corral came to the stoma clinic with stoma-related questions. He has done well with stoma care at home and replaced the pouch this morning. Pouch seal is excellent.   He will not be able to have HHC for another 2 weeks, so he was provided with supplies to use until that time.      [facility] next scheduled visit/plan: at follow up appointment with Dr. Vasquez in early August    Stoma Type: Loop Colostomy  John: No - removed previously  Location: RLQ  Protrusion: Budded  Mucosal Condition and Color: Moist, Red  Peristomal Contour: Shallow Concave  Supportive Tissue: Firm  Character of Output: Tan and soft, nearly formed stool  Emptying Frequency: not assessed today  Removed/Current Pouching System: 2 3/4\" Karrie New Image soft convex wafer, Adapt stoma paste, lock n' roll pouch, belt    Current Wearing Time: 3-4 Days; did stoma care on his own this morning    Supplier:  [employer] - should have HHC soon to assist with supplies    Comments: 1. We discussed that there may be a small amount of blood from the stoma during stoma care. This can be managed with stoma powder and firm pressure for several minutes.   2. He was provided barrier rings and stoma paste today, because he reports that once when changing the pouch the paste came out around the stoma and also at the bottom of the wafer - causing the wafer to lift and not have a good seal.     Time Increment: 30 minutes    Lauren Posey, RAMEZN, RN, CWOCN     "

## 2024-07-15 ENCOUNTER — APPOINTMENT (OUTPATIENT)
Dept: RADIATION ONCOLOGY | Facility: CLINIC | Age: 69
End: 2024-07-15
Payer: MEDICARE

## 2024-07-15 ENCOUNTER — HOSPITAL ENCOUNTER (OUTPATIENT)
Dept: RADIATION ONCOLOGY | Facility: CLINIC | Age: 69
Setting detail: RADIATION/ONCOLOGY SERIES
Discharge: HOME | End: 2024-07-15
Payer: MEDICARE

## 2024-07-15 DIAGNOSIS — Z51.0 ENCOUNTER FOR ANTINEOPLASTIC RADIATION THERAPY: ICD-10-CM

## 2024-07-15 DIAGNOSIS — R16.0 MASS OF MULTIPLE SITES OF LIVER: ICD-10-CM

## 2024-07-15 DIAGNOSIS — C20 RECTAL CANCER (MULTI): ICD-10-CM

## 2024-07-15 DIAGNOSIS — C20 MALIGNANT NEOPLASM OF RECTUM (MULTI): ICD-10-CM

## 2024-07-15 LAB
RAD ONC MSQ ACTUAL FRACTIONS DELIVERED: 1
RAD ONC MSQ ACTUAL SESSION DELIVERED DOSE: 500 CGRAY
RAD ONC MSQ ACTUAL TOTAL DOSE: 500 CGRAY
RAD ONC MSQ ELAPSED DAYS: 0
RAD ONC MSQ LAST DATE: NORMAL
RAD ONC MSQ PRESCRIBED FRACTIONAL DOSE: 500 CGRAY
RAD ONC MSQ PRESCRIBED NUMBER OF FRACTIONS: 5
RAD ONC MSQ PRESCRIBED TECHNIQUE: NORMAL
RAD ONC MSQ PRESCRIBED TOTAL DOSE: 2500 CGRAY
RAD ONC MSQ PRESCRIPTION PATTERN COMMENT: NORMAL
RAD ONC MSQ START DATE: NORMAL
RAD ONC MSQ TREATMENT COURSE NUMBER: 1
RAD ONC MSQ TREATMENT SITE: NORMAL

## 2024-07-15 PROCEDURE — 77280 THER RAD SIMULAJ FIELD SMPL: CPT | Performed by: STUDENT IN AN ORGANIZED HEALTH CARE EDUCATION/TRAINING PROGRAM

## 2024-07-15 PROCEDURE — 77412 RADIATION TX DELIVERY LVL 3: CPT | Performed by: STUDENT IN AN ORGANIZED HEALTH CARE EDUCATION/TRAINING PROGRAM

## 2024-07-16 ENCOUNTER — TELEPHONE (OUTPATIENT)
Dept: HEMATOLOGY/ONCOLOGY | Facility: HOSPITAL | Age: 69
End: 2024-07-16
Payer: MEDICARE

## 2024-07-16 ENCOUNTER — HOSPITAL ENCOUNTER (OUTPATIENT)
Dept: RADIATION ONCOLOGY | Facility: CLINIC | Age: 69
Setting detail: RADIATION/ONCOLOGY SERIES
Discharge: HOME | End: 2024-07-16
Payer: MEDICARE

## 2024-07-16 DIAGNOSIS — Z51.0 ENCOUNTER FOR ANTINEOPLASTIC RADIATION THERAPY: ICD-10-CM

## 2024-07-16 DIAGNOSIS — C20 MALIGNANT NEOPLASM OF RECTUM (MULTI): ICD-10-CM

## 2024-07-16 LAB
RAD ONC MSQ ACTUAL FRACTIONS DELIVERED: 2
RAD ONC MSQ ACTUAL SESSION DELIVERED DOSE: 500 CGRAY
RAD ONC MSQ ACTUAL TOTAL DOSE: 1000 CGRAY
RAD ONC MSQ ELAPSED DAYS: 1
RAD ONC MSQ LAST DATE: NORMAL
RAD ONC MSQ PRESCRIBED FRACTIONAL DOSE: 500 CGRAY
RAD ONC MSQ PRESCRIBED NUMBER OF FRACTIONS: 5
RAD ONC MSQ PRESCRIBED TECHNIQUE: NORMAL
RAD ONC MSQ PRESCRIBED TOTAL DOSE: 2500 CGRAY
RAD ONC MSQ PRESCRIPTION PATTERN COMMENT: NORMAL
RAD ONC MSQ START DATE: NORMAL
RAD ONC MSQ TREATMENT COURSE NUMBER: 1
RAD ONC MSQ TREATMENT SITE: NORMAL

## 2024-07-16 PROCEDURE — 77336 RADIATION PHYSICS CONSULT: CPT | Performed by: RADIOLOGY

## 2024-07-16 PROCEDURE — 77412 RADIATION TX DELIVERY LVL 3: CPT | Performed by: RADIOLOGY

## 2024-07-16 PROCEDURE — 77387 GUIDANCE FOR RADJ TX DLVR: CPT | Performed by: RADIOLOGY

## 2024-07-16 NOTE — TELEPHONE ENCOUNTER
Attempted to contact patient to discuss schedule. Phone busy x2 and number for wife was disconnected. Will follow up.

## 2024-07-17 ENCOUNTER — RADIATION ONCOLOGY OTV (OUTPATIENT)
Dept: RADIATION ONCOLOGY | Facility: CLINIC | Age: 69
End: 2024-07-17
Payer: MEDICARE

## 2024-07-17 ENCOUNTER — HOSPITAL ENCOUNTER (OUTPATIENT)
Dept: RADIATION ONCOLOGY | Facility: CLINIC | Age: 69
Setting detail: RADIATION/ONCOLOGY SERIES
Discharge: HOME | End: 2024-07-17
Payer: MEDICARE

## 2024-07-17 VITALS
WEIGHT: 203.37 LBS | OXYGEN SATURATION: 98 % | HEART RATE: 77 BPM | BODY MASS INDEX: 28.36 KG/M2 | DIASTOLIC BLOOD PRESSURE: 74 MMHG | SYSTOLIC BLOOD PRESSURE: 160 MMHG | TEMPERATURE: 96.8 F | RESPIRATION RATE: 18 BRPM

## 2024-07-17 DIAGNOSIS — Z51.0 ENCOUNTER FOR ANTINEOPLASTIC RADIATION THERAPY: ICD-10-CM

## 2024-07-17 DIAGNOSIS — C20 MALIGNANT NEOPLASM OF RECTUM (MULTI): ICD-10-CM

## 2024-07-17 LAB
RAD ONC MSQ ACTUAL FRACTIONS DELIVERED: 3
RAD ONC MSQ ACTUAL SESSION DELIVERED DOSE: 500 CGRAY
RAD ONC MSQ ACTUAL TOTAL DOSE: 1500 CGRAY
RAD ONC MSQ ELAPSED DAYS: 2
RAD ONC MSQ LAST DATE: NORMAL
RAD ONC MSQ PRESCRIBED FRACTIONAL DOSE: 500 CGRAY
RAD ONC MSQ PRESCRIBED NUMBER OF FRACTIONS: 5
RAD ONC MSQ PRESCRIBED TECHNIQUE: NORMAL
RAD ONC MSQ PRESCRIBED TOTAL DOSE: 2500 CGRAY
RAD ONC MSQ PRESCRIPTION PATTERN COMMENT: NORMAL
RAD ONC MSQ START DATE: NORMAL
RAD ONC MSQ TREATMENT COURSE NUMBER: 1
RAD ONC MSQ TREATMENT SITE: NORMAL

## 2024-07-17 PROCEDURE — 77387 GUIDANCE FOR RADJ TX DLVR: CPT | Performed by: RADIOLOGY

## 2024-07-17 PROCEDURE — 77412 RADIATION TX DELIVERY LVL 3: CPT | Performed by: RADIOLOGY

## 2024-07-17 ASSESSMENT — PAIN SCALES - GENERAL: PAINLEVEL: 5

## 2024-07-17 NOTE — PROGRESS NOTES
Radiation Oncology On Treatment Visit    Patient Name:  Roma Corral  MRN:  [id number]  :  1955    Referring Provider: No ref. provider found  Primary Care Provider: Santiago Buckner MD  Care Team: Patient Care Team:  Santiago Buckner MD as PCP - General  Dann Nieto MD as Consulting Physician (Hematology and Oncology)    Date of Service: 2024     Diagnosis:   Specialty Problems          Radiation Oncology Problems    Rectal cancer (Multi)         Treatment Summary:  3D CRT: Not Applicable Rectum    Treatment Period Technique Fraction Dose Fractions Total Dose   Course 1 7/15/2024-2024  (days elapsed: 2)         rectum 7/15/2024-2024 4-Field 500 / 500 cGy 3 / 5 1500 / 2,500 cGy     SUBJECTIVE: Decreased pelvic discomfort since undergone diversion colostomy.  Noticed improvement in urination with clear and mucin.  Taking Dilaudid for moderate pain as needed.  The patient notes dry mouth.  The patient is scheduled for port placement and initiation of chemotherapy later this month.      OBJECTIVE:   Vital Signs:  /74   Pulse 77   Temp 36 °C (96.8 °F)   Resp 18   Wt 92.3 kg (203 lb 6 oz)   SpO2 98%   BMI 28.36 kg/m²    Pain Scale: The patient's current pain level was assessed.  They report currently having a pain of 5 out of 10.    Other Pertinent Findings:     Toxicity Assessment          2024    10:13   Toxicity Assessment   Treatment Site Pelvis - male   Anorexia Grade 0   Anxiety Grade 0   Dehydration Grade 0   Depression Grade 0   Dermatitis Radiation Grade 0   Diarrhea Grade 0   Fatigue Grade 0   Fibrosis Deep Connective Tissue Grade 0   Fracture Grade 0   Nausea Grade 0   Pain Grade 2       pain level 5--taking Diluadid as needed with good relief   Treatment Related Secondary Malignancy Grade 0   Tumor Pain Grade 0   Abdominal Pain Grade 2   Bloating Grade 1       occasional   Constipation Grade 0   Dysphagia Grade 0   Enterovesical Fistula Grade 0   Fecal Incontinence Grade  0   Lower Gastrointestinal Hemorrhage Grade 0   Mucositis Oral Grade 0   Proctitis Grade 0   Rectal Hemorrhage Grade 0   Rectal Pain Grade 0   Rectal Ulcer Grade 0   Small Intestinal Obstruction Grade 0   Dry Mouth Grade 1   Gastric Fistula Grade 0   Rectal Mucositis Grade 1   Rectal Stenosis Grade 0        Concurrent systemic therapy: fluorouracil (Adrucil) 2,400 mg/m2 = 5,100 mg in sodium chloride 0.9% 138 mL IV via Home Infusion, 2,400 mg/m2 = 5,100 mg, intravenous, Once, 0 of 12 cycles        palonosetron (Aloxi) injection 250 mcg, 250 mcg, intravenous, Once, 0 of 12 cycles        bevacizumab-awwb (Mvasi) 450 mg in sodium chloride 0.9% 118 mL IV, 5 mg/kg = 450 mg, intravenous, Once, 0 of 12 cycles        irinotecan (Camptosar) 350 mg in dextrose 5% 517.5 mL IV, 165 mg/m2 = 350 mg, intravenous, Once, 0 of 12 cycles        OXALIplatin (Eloxatin) 180 mg in dextrose 5% 536 mL IV, 85 mg/m2 = 180 mg, intravenous, Once, 0 of 12 cycles        pegfilgrastim-cbqv (Udenyca) injection 6 mg, 6 mg, subcutaneous, Once, 0 of 12 cycles        LORazepam (Ativan) injection 1 mg, 1 mg, intravenous, As needed, 0 of 12 cycles      Labs:   WBC   Date Value Ref Range Status   07/05/2024 8.7 4.4 - 11.3 x10*3/uL Final   07/04/2024 9.1 4.4 - 11.3 x10*3/uL Final     Hemoglobin   Date Value Ref Range Status   07/05/2024 7.5 (L) 13.5 - 17.5 g/dL Final   07/04/2024 7.0 (L) 13.5 - 17.5 g/dL Final     Hematocrit   Date Value Ref Range Status   07/05/2024 27.6 (L) 41.0 - 52.0 % Final   07/04/2024 25.8 (L) 41.0 - 52.0 % Final     Neutrophils Absolute   Date Value Ref Range Status   06/27/2024 5.00 1.20 - 7.70 x10*3/uL Final     Comment:     Percent differential counts (%) should be interpreted in the context of the absolute cell counts (cells/uL).   06/23/2021 4.89 1.20 - 7.70 x10E9/L Final   05/30/2021 4.74 1.8 - 7.7 K/UL Final     Platelets   Date Value Ref Range Status   07/05/2024 309 150 - 450 x10*3/uL Final   07/04/2024 294 150 - 450  x10*3/uL Final     Alkaline Phosphatase   Date Value Ref Range Status   06/06/2024 142 (H) 33 - 136 U/L Final   04/18/2024 113 33 - 136 U/L Final     AST   Date Value Ref Range Status   06/06/2024 21 9 - 39 U/L Final   04/18/2024 25 9 - 39 U/L Final     ALT   Date Value Ref Range Status   06/06/2024 12 10 - 52 U/L Final     Comment:     Patients treated with Sulfasalazine may generate falsely decreased results for ALT.   04/18/2024 13 10 - 52 U/L Final     Comment:     Patients treated with Sulfasalazine may generate falsely decreased results for ALT.     Bilirubin, Total   Date Value Ref Range Status   06/06/2024 0.7 0.0 - 1.2 mg/dL Final   04/18/2024 0.7 0.0 - 1.2 mg/dL Final     Glucose   Date Value Ref Range Status   07/05/2024 96 74 - 99 mg/dL Final   07/04/2024 95 74 - 99 mg/dL Final     Calcium   Date Value Ref Range Status   07/05/2024 7.7 (L) 8.6 - 10.3 mg/dL Final   07/04/2024 7.9 (L) 8.6 - 10.3 mg/dL Final     Sodium   Date Value Ref Range Status   07/05/2024 141 136 - 145 mmol/L Final   07/04/2024 140 136 - 145 mmol/L Final     Potassium   Date Value Ref Range Status   07/05/2024 4.0 3.5 - 5.3 mmol/L Final   07/04/2024 3.8 3.5 - 5.3 mmol/L Final     Bicarbonate   Date Value Ref Range Status   07/05/2024 26 21 - 32 mmol/L Final   07/04/2024 25 21 - 32 mmol/L Final     Chloride   Date Value Ref Range Status   07/05/2024 106 98 - 107 mmol/L Final   07/04/2024 107 98 - 107 mmol/L Final     Urea Nitrogen   Date Value Ref Range Status   07/05/2024 9 6 - 23 mg/dL Final   07/04/2024 11 6 - 23 mg/dL Final     Creatinine   Date Value Ref Range Status   07/05/2024 1.00 0.50 - 1.30 mg/dL Final   07/04/2024 1.03 0.50 - 1.30 mg/dL Final         Exam: Resting comfortably in chair    Assessment / Plan:  The patient is tolerating radiation therapy as anticipated.  Continue per current treatment plan.     Therapies: Monitor stable moderately low hemoglobin, improved from prior blood work.  Discussed with the patient  regarding the effects of radiation proctitis which may occur after radiation therapy completes.  Rest as needed for fatigue.    Side effects reviewed with patient. Images, chart and labs reviewed.    Natan Briceño MD

## 2024-07-18 ENCOUNTER — HOSPITAL ENCOUNTER (OUTPATIENT)
Dept: RADIATION ONCOLOGY | Facility: CLINIC | Age: 69
Setting detail: RADIATION/ONCOLOGY SERIES
Discharge: HOME | End: 2024-07-18
Payer: MEDICARE

## 2024-07-18 DIAGNOSIS — C20 MALIGNANT NEOPLASM OF RECTUM (MULTI): ICD-10-CM

## 2024-07-18 DIAGNOSIS — Z51.0 ENCOUNTER FOR ANTINEOPLASTIC RADIATION THERAPY: ICD-10-CM

## 2024-07-18 LAB
RAD ONC MSQ ACTUAL FRACTIONS DELIVERED: 4
RAD ONC MSQ ACTUAL SESSION DELIVERED DOSE: 500 CGRAY
RAD ONC MSQ ACTUAL TOTAL DOSE: 2000 CGRAY
RAD ONC MSQ ELAPSED DAYS: 3
RAD ONC MSQ LAST DATE: NORMAL
RAD ONC MSQ PRESCRIBED FRACTIONAL DOSE: 500 CGRAY
RAD ONC MSQ PRESCRIBED NUMBER OF FRACTIONS: 5
RAD ONC MSQ PRESCRIBED TECHNIQUE: NORMAL
RAD ONC MSQ PRESCRIBED TOTAL DOSE: 2500 CGRAY
RAD ONC MSQ PRESCRIPTION PATTERN COMMENT: NORMAL
RAD ONC MSQ START DATE: NORMAL
RAD ONC MSQ TREATMENT COURSE NUMBER: 1
RAD ONC MSQ TREATMENT SITE: NORMAL

## 2024-07-18 PROCEDURE — 77412 RADIATION TX DELIVERY LVL 3: CPT | Performed by: RADIOLOGY

## 2024-07-18 PROCEDURE — 77387 GUIDANCE FOR RADJ TX DLVR: CPT | Performed by: RADIOLOGY

## 2024-07-18 NOTE — PROGRESS NOTES
Roma Corral is a 69 year old male with history of metastatic rectal cancer to liver.  He presented in June with abdominal pain and anemia.   He was found to have rectal cancer with diffuse liver metastases.   He was brought to the OR on July 2, 2024 for Creation of loop colostomy.   He returns to the office today for a post op visit.  Since surgery he has meet with Dr. Nieto and Dr. Briceño in oncology and Dr. Erazo.  Plan is to begin chemotherapy with FOLFOXIRI and bevacizumab after the completion of radiation     He is doing well.  Having slight pain around the stoma.  He is emptying the bag about 1-6 times per day.  Has had some diarrhea with the chemotherapy.  Changing the appliance every 3-4 days.  Eating and drinking well.  No n/v.   Has been having difficulty urinating after starting the chemotherapy.  He is having some leakage and pain.  No discharge.  Not on any medications to help with urination like Flomax.    Completed radiation three weeks ago.  Currently on chemotherapy.  He reports that his voice changed while on the chemotherapy.  Voice seems weaker than normal.  Denies fever/chills.      Visit Vitals  /79   Pulse 67   Wt 89.4 kg (197 lb)   SpO2 98%   BMI 28.52 kg/m²   Smoking Status Never   BSA 2.1 m²       Past Medical History  Metastatic Rectal cancer to liver  MI  Anemia  CAD  Gout  HTN  HLD  DM Type 2      Surgical History  LX Colostomy creation  Cardiac Cath  Leg surgery    Review of Systems  Constitutional: Negative for fever, chills, anorexia, weight loss, malaise     ENMT: Negative for nasal discharge, congestion, ear pain, mouth pain, throat pain     Respiratory: Negative for cough, hemoptysis, wheezing, shortness of breath     Cardiac: Negative for chest pain, dyspnea on exertion, orthopnea, palpitations, syncope     Gastrointestinal: Negative for nausea, vomiting, diarrhea, constipation, abdominal pain,     Genitourinary: Negative for discharge, dysuria, flank pain, frequency,  hematuria     Musculoskeletal: Negative for decreased ROM, pain, swelling, weakness     Neurological: Negative for dizziness, confusion, headache, seizures, syncope     Psychiatric: Negative for mood changes, anxiety, hallucinations, sleep changes, suicidal ideas     Skin: Negative for mass, pain, itching, rash, ulcer     Endocrine: Negative for heat intolerance, cold intolerance, excessive sweating, polyuria, excess thirst     Hematologic/Lymph: Negative for anemia, bruising, easy bleeding, night sweats, petechiae, history of DVT/PE or cancer     Allergic/Immunologic: Negative for anaphylaxis, itchy/ teary eyes, itching, sneezing, swelling    Physical Exam  Constitutional: Well developed, awake/alert/oriented x3, no distress, alert and cooperative             Eyes: Sclera anicteric, no conjunctival inflammation, conjugate gaze    ENMT: mucous membranes moist, no apparent injury,            Head/Neck: Neck supple, no apparent injury, No JVD, trachea midline, no bruits              Respiratory/Thorax: Patent airways, CTAB, normal breath sounds with good chest expansion, thorax symmetric         Cardiovascular: Regular, rate and rhythm, no murmurs, normal S1 and S2         Gastrointestinal: Nondistended, soft, non-tender, no rebound tenderness or guarding, no masses palpable, no organomegaly, +BS, no bruits               Extremities: normal extremities, no cyanosis edema, contusions or wounds, 2+ femoral pulses B/L              Neurological: alert and oriented x3, normal strength, Normal gait          Lymphatic: No palpable inguinal lymphadenopathy   Psychological: Appropriate mood and behavior         Skin: Warm and dry, no lesions, no rashes                Anorectal:  Stage I sacral wound overlying the coccyx    Impression:  Metastatic Rectal Cancer    Plan:    Urinalysis to rule out UTI  Discussed as needed Imodium for loose stools and diarrhea likely related to chemotherapy  Foam pad to coccyx and pressure  offloading for small sacral wound

## 2024-07-19 ENCOUNTER — HOSPITAL ENCOUNTER (OUTPATIENT)
Dept: RADIATION ONCOLOGY | Facility: CLINIC | Age: 69
Setting detail: RADIATION/ONCOLOGY SERIES
Discharge: HOME | End: 2024-07-19
Payer: MEDICARE

## 2024-07-19 DIAGNOSIS — Z51.0 ENCOUNTER FOR ANTINEOPLASTIC RADIATION THERAPY: ICD-10-CM

## 2024-07-19 DIAGNOSIS — C20 MALIGNANT NEOPLASM OF RECTUM (MULTI): ICD-10-CM

## 2024-07-19 LAB
RAD ONC MSQ ACTUAL FRACTIONS DELIVERED: 5
RAD ONC MSQ ACTUAL SESSION DELIVERED DOSE: 500 CGRAY
RAD ONC MSQ ACTUAL TOTAL DOSE: 2500 CGRAY
RAD ONC MSQ ELAPSED DAYS: 4
RAD ONC MSQ LAST DATE: NORMAL
RAD ONC MSQ PRESCRIBED FRACTIONAL DOSE: 500 CGRAY
RAD ONC MSQ PRESCRIBED NUMBER OF FRACTIONS: 5
RAD ONC MSQ PRESCRIBED TECHNIQUE: NORMAL
RAD ONC MSQ PRESCRIBED TOTAL DOSE: 2500 CGRAY
RAD ONC MSQ PRESCRIPTION PATTERN COMMENT: NORMAL
RAD ONC MSQ START DATE: NORMAL
RAD ONC MSQ TREATMENT COURSE NUMBER: 1
RAD ONC MSQ TREATMENT SITE: NORMAL

## 2024-07-19 PROCEDURE — 77412 RADIATION TX DELIVERY LVL 3: CPT | Performed by: RADIOLOGY

## 2024-07-19 NOTE — H&P (VIEW-ONLY)
Patient completed 5 fractions of radiation to the rectum today. Patient given and reviewed What you need to know after radiation. We reviewed again side effect of radiation and how the effects can linger for 1-2 weeks. Patient instructed to call with questions or concerns. Per Dr. Boles, patient to follow up as needed. Patient verbalizes understanding with verbal teach back. Wilton Tian MSN, RN, OCN

## 2024-07-19 NOTE — PROGRESS NOTES
Radiation Oncology Treatment Summary    Patient Name:  Roma Corral  MRN:  [id number]  :  1955    Referring Provider: Javier Vasquez MD  Primary Care Provider: Santiago Buckner MD    Brief History: Please see the radiation oncology consultation note.  Briefly, Roma Corral is a 69 y.o. male with Rectal cancer (Multi), Clinical: Stage IVB (cT4, cNX, cM1b).  The patient completed radiotherapy as outlined below.    The radiation planning and treatment procedures were explained to the patient in advance, and all questions were answered. The benefits and goals of treatment, options and alternatives, limitations, side effects and risks of radiation were also explained. The patient provided informed consent.    Technical Summary:  Region(s) Treated: Rectum and pelvis  Radiation Dose Prescribed: 2500 cGy in 5 fractions  Radiation Technique/Machine/Energy Used: 3DRT / Truebeam / 10 MV photons  Additional radiation technical details available in our radiation oncology EMR MOSAIQ and our treatment planning system.    Radiation Treatment Summary:    3D CRT: Not Applicable Rectum    Treatment Period Technique Fraction Dose Fractions Total Dose   Course 1 7/15/2024-2024  (days elapsed: 4)         rectum 7/15/2024-2024 4-Field 500 / 500 cGy 5 / 5 2500 / 2,500 cGy       Please see the patient's Mosaiq chart for further details regarding the radiation plan, including beam energy.    Concurrent Chemotherapy:  Treatment Plans       Name Type Plan Dates Plan Provider         Active    Bevacizumab + mFOLFOXIRI (Fluorouracil Continuous Infusion / Leucovorin / Oxaliplatin / Irinotecan), 14 Day [facility] Oncology Treatment  2024 - Present Dann Nieto MD                  Clinical Summary:  The patient tolerated this course of radiation therapy relatively well, with no unusual events or unanticipated toxicities. Symptoms during treatment included pelvic pain treated with Dilaudid as needed, bloating and dry mouth.    CTCAE  Toxicity Overview:   Toxicity Assessment          7/17/2024    10:13   Toxicity Assessment   Treatment Site Pelvis - male   Anorexia Grade 0   Anxiety Grade 0   Dehydration Grade 0   Depression Grade 0   Dermatitis Radiation Grade 0   Diarrhea Grade 0   Fatigue Grade 0   Fibrosis Deep Connective Tissue Grade 0   Fracture Grade 0   Nausea Grade 0   Pain Grade 2       pain level 5--taking Diluadid as needed with good relief   Treatment Related Secondary Malignancy Grade 0   Tumor Pain Grade 0   Abdominal Pain Grade 2   Bloating Grade 1       occasional   Constipation Grade 0   Dysphagia Grade 0   Enterovesical Fistula Grade 0   Fecal Incontinence Grade 0   Lower Gastrointestinal Hemorrhage Grade 0   Mucositis Oral Grade 0   Proctitis Grade 0   Rectal Hemorrhage Grade 0   Rectal Pain Grade 0   Rectal Ulcer Grade 0   Small Intestinal Obstruction Grade 0   Dry Mouth Grade 1   Gastric Fistula Grade 0   Rectal Mucositis Grade 1   Rectal Stenosis Grade 0     Patient Disposition:   Followup/Disposition:  The patient will be scheduled for follow-up at our clinic with Dr. Boles as scheduled. The patient was encouraged to contact us for any questions or concerns in the interim.    Natan Briceño MD  [facility] [place]  [place] clinical  - Department of Radiation Oncology  Phone: [phone]  Fax: [phone]  [facility] secure chat preferred

## 2024-07-22 ENCOUNTER — TELEPHONE (OUTPATIENT)
Dept: HEMATOLOGY/ONCOLOGY | Facility: HOSPITAL | Age: 69
End: 2024-07-22
Payer: MEDICARE

## 2024-07-22 DIAGNOSIS — C20 RECTAL CANCER (MULTI): ICD-10-CM

## 2024-07-22 NOTE — TELEPHONE ENCOUNTER
Returned patient's call to clarify schedule.  He will be scheduled to speak with Dr. Nieto for chemo fuv 8/1. We discussed how scheduling will work from here on out and he verbalized understanding/confirmation.

## 2024-07-22 NOTE — TELEPHONE ENCOUNTER
Spoke with patient again regarding schedule. He will see Laurence 8/6 prior to infusion 8/7. Pt agreed with plan.

## 2024-07-23 ENCOUNTER — HOSPITAL ENCOUNTER (OUTPATIENT)
Dept: CARDIOLOGY | Facility: HOSPITAL | Age: 69
Discharge: HOME | End: 2024-07-23
Payer: MEDICARE

## 2024-07-23 VITALS
HEART RATE: 85 BPM | SYSTOLIC BLOOD PRESSURE: 171 MMHG | WEIGHT: 220 LBS | TEMPERATURE: 99 F | RESPIRATION RATE: 18 BRPM | HEIGHT: 71 IN | OXYGEN SATURATION: 98 % | BODY MASS INDEX: 30.8 KG/M2 | DIASTOLIC BLOOD PRESSURE: 63 MMHG

## 2024-07-23 DIAGNOSIS — E11.9 TYPE 2 DIABETES MELLITUS WITHOUT COMPLICATION, WITHOUT LONG-TERM CURRENT USE OF INSULIN (MULTI): Primary | ICD-10-CM

## 2024-07-23 DIAGNOSIS — C20 RECTAL CANCER (MULTI): ICD-10-CM

## 2024-07-23 PROCEDURE — 76937 US GUIDE VASCULAR ACCESS: CPT

## 2024-07-23 PROCEDURE — C1788 PORT, INDWELLING, IMP: HCPCS

## 2024-07-23 PROCEDURE — 99222 1ST HOSP IP/OBS MODERATE 55: CPT | Performed by: NURSE PRACTITIONER

## 2024-07-23 PROCEDURE — 77001 FLUOROGUIDE FOR VEIN DEVICE: CPT

## 2024-07-23 PROCEDURE — 2780000003 HC OR 278 NO HCPCS

## 2024-07-23 PROCEDURE — 2500000005 HC RX 250 GENERAL PHARMACY W/O HCPCS: Performed by: STUDENT IN AN ORGANIZED HEALTH CARE EDUCATION/TRAINING PROGRAM

## 2024-07-23 PROCEDURE — 2720000007 HC OR 272 NO HCPCS

## 2024-07-23 PROCEDURE — 99152 MOD SED SAME PHYS/QHP 5/>YRS: CPT

## 2024-07-23 PROCEDURE — 7100000009 HC PHASE TWO TIME - INITIAL BASE CHARGE

## 2024-07-23 PROCEDURE — 2500000004 HC RX 250 GENERAL PHARMACY W/ HCPCS (ALT 636 FOR OP/ED): Performed by: STUDENT IN AN ORGANIZED HEALTH CARE EDUCATION/TRAINING PROGRAM

## 2024-07-23 PROCEDURE — 36561 INSERT TUNNELED CV CATH: CPT

## 2024-07-23 PROCEDURE — C1894 INTRO/SHEATH, NON-LASER: HCPCS

## 2024-07-23 PROCEDURE — 7100000010 HC PHASE TWO TIME - EACH INCREMENTAL 1 MINUTE

## 2024-07-23 PROCEDURE — 2500000004 HC RX 250 GENERAL PHARMACY W/ HCPCS (ALT 636 FOR OP/ED): Mod: JZ | Performed by: NURSE PRACTITIONER

## 2024-07-23 RX ORDER — LIDOCAINE HYDROCHLORIDE AND EPINEPHRINE 10; 10 MG/ML; UG/ML
INJECTION, SOLUTION INFILTRATION; PERINEURAL AS NEEDED
Status: DISCONTINUED | OUTPATIENT
Start: 2024-07-23 | End: 2024-07-23 | Stop reason: HOSPADM

## 2024-07-23 RX ORDER — CEFAZOLIN SODIUM 2 G/100ML
2 INJECTION, SOLUTION INTRAVENOUS ONCE
Status: COMPLETED | OUTPATIENT
Start: 2024-07-23 | End: 2024-07-23

## 2024-07-23 RX ORDER — MIDAZOLAM HYDROCHLORIDE 1 MG/ML
INJECTION, SOLUTION INTRAMUSCULAR; INTRAVENOUS AS NEEDED
Status: DISCONTINUED | OUTPATIENT
Start: 2024-07-23 | End: 2024-07-23 | Stop reason: HOSPADM

## 2024-07-23 RX ORDER — SODIUM CHLORIDE 9 MG/ML
INJECTION, SOLUTION INTRAVENOUS CONTINUOUS PRN
Status: DISCONTINUED | OUTPATIENT
Start: 2024-07-23 | End: 2024-07-23

## 2024-07-23 RX ORDER — FENTANYL CITRATE 50 UG/ML
INJECTION, SOLUTION INTRAMUSCULAR; INTRAVENOUS AS NEEDED
Status: DISCONTINUED | OUTPATIENT
Start: 2024-07-23 | End: 2024-07-23 | Stop reason: HOSPADM

## 2024-07-23 ASSESSMENT — ENCOUNTER SYMPTOMS
RESPIRATORY NEGATIVE: 1
MUSCULOSKELETAL NEGATIVE: 1
DIARRHEA: 1
CONSTITUTIONAL NEGATIVE: 1
ABDOMINAL PAIN: 1
HEMATOLOGIC/LYMPHATIC NEGATIVE: 1
EYES NEGATIVE: 1
CARDIOVASCULAR NEGATIVE: 1
NERVOUS/ANXIOUS: 1
ENDOCRINE NEGATIVE: 1
NEUROLOGICAL NEGATIVE: 1
ALLERGIC/IMMUNOLOGIC NEGATIVE: 1

## 2024-07-23 ASSESSMENT — PAIN - FUNCTIONAL ASSESSMENT: PAIN_FUNCTIONAL_ASSESSMENT: 0-10

## 2024-07-23 ASSESSMENT — PAIN SCALES - GENERAL: PAINLEVEL_OUTOF10: 6

## 2024-07-23 NOTE — PRE-PROCEDURE NOTE
Interventional Radiology Preprocedure Note    Indication for procedure: The encounter diagnosis was Rectal cancer (Multi).    Relevant review of systems: NA    Relevant Labs:   Lab Results   Component Value Date    CREATININE 1.00 07/05/2024    EGFR 81 07/05/2024    INR 1.0 05/30/2021    PROTIME 11.4 05/30/2021       Planned Sedation/Anesthesia: Moderate    Airway assessment: normal    Directed physical examination:    Physical Exam  Constitutional:       Appearance: Normal appearance.   Cardiovascular:      Rate and Rhythm: Normal rate and regular rhythm.   Pulmonary:      Effort: Pulmonary effort is normal.      Breath sounds: Normal breath sounds.   Neurological:      Mental Status: He is alert.          Mallampati: II (hard and soft palate, upper portion of tonsils and uvula visible)    ASA Score: ASA 2 - Patient with mild systemic disease with no functional limitations    Benefits, risks and alternatives of procedure and planned sedation have been discussed with the patient and/or their representative. All questions answered and they agree to proceed.

## 2024-07-23 NOTE — POST-PROCEDURE NOTE
Interventional Radiology Brief Postprocedure Note    Attending: Esther Cantor MD      Assistant: none    Diagnosis: rectal cancer    Description of procedure: Mediport placement     Anesthesia:  moderate sedation    Complications: None    Estimated Blood Loss: none    Medications (Filter: Administrations occurring from 1410 to 1410 on 07/23/24) As of 07/23/24 1410      None          No specimens collected      See detailed result report with images in PACS.    The patient tolerated the procedure well without incident or complication and is in stable condition.

## 2024-07-23 NOTE — DISCHARGE INSTRUCTIONS
What is a Central Venous Access Port? Patient and Family Education    What is a Central Venous Access Port?  A central venous access port is a small device made up of 2 parts:  ? The port, which is a hollow metal or plastic disk with a rubber center and  ? The tube (also called a catheter) that connects to the port. The catheter is  threaded through a vein that leads to your heart.  A doctor places the port under the skin in the chest near the collarbone, or in the arm. The port  feels like a small bump. Once your port site heals it should not hurt. A port provides easy  access to a vein. A port is useful if you need frequent intravenous (IV) treatments, medicines,  blood tests or blood products. A port can stay in for months or years if it is cared for correctly  and working as it should. A port may also be called a Mediport, Power Port or Port-a-cath.    Before your Port is Placed:  ? If you take any medicine that thins your blood or helps prevent clots, talk with  your doctor. Before your port is placed, ask your doctor if your dose of these  medicines needs to be changed to help prevent bleeding problems. If your doctor tells  you to stop taking these medicines, ask him or her when you should restart them. If  your port placement is cancelled, call your doctor and ask if you need to restart your  medicine or change your dose. These medicines include, but are not limited to:  Warfarin (Coumadin), Lovenox (enoxaparin), Eliquis (apixaban), Plavix (clopidogrel),  Pradaxa (dabigatran) and Xarelto (rivaroxaban).  ? Do not eat or drink anything 8 hours before your port placement. If you have  been told to take certain medicines, take them with a sip of water.  ? Take your daily medicines, especially any cardiac (heart), high blood pressure, seizure,  and antibiotic medicines. If you take insulin for diabetes, ask your doctor how to adjust  your insulin dose the morning of your port placement. Be sure to tell your  doctor that  you have to fast for 8 hours before the port is placed.  ? Talk with your doctor, nurse or dietitian before taking probiotics. Ask if it’s safe for  you to take them when you have a central line. Some patients should avoid taking  certain probiotics because they may increase their chance of getting an infection.    The Day your Port is Placed:  ? A doctor in Radiology will place your port. The port placement takes about 60 to 90  minutes but plan on being here for 3 to 4 hours. This allows time for your check-in and  recovery.  ? A staff member will talk with you about the procedure, ask you questions and help  answer your questions. For women: Tell your doctor or technologist if there is any  chance you are pregnant.  ? You will be asked to change into a hospital gown for the procedure. You must remove  necklaces and earrings because they can get in the way during your port placement. An  IV will be placed in your arm and you will be asked to lay on a cart. Once we are  ready, we will take you to the procedure room. A family member may stay in our  waiting room while your port is placed.    In the Procedure Room:  You will get medicine through your IV to help you relax. While the port is placed, some  people fall asleep and some are awake but feel very relaxed. We will wash the area where the  port is being placed with a germ killing solution. This solution helps lower your chances of  getting an infection. Next, the doctor injects a numbing medicine into your skin, around the  port site. Once your skin is numb, the doctor makes 2 small incisions (cuts) to place the port  under your skin. The incisions are closed with skin glue or sutures and paper Band-Aids called  steri-strips. A gauze bandage is then placed over the port site.    After the Port is Placed:  ? You will be taken to the recovery area. We will check your pulse and blood pressure  often and check your port site for bleeding and swelling.  Some bruising is normal. If  you see bleeding, apply pressure with your fingertips and call your nurse right away. If  you feel swelling or more pain at the site, call your nurse.  ? You may have some soreness where your port is placed but it should improve each  day as the site heals. The incisions used to place the port are small and should heal in  10 to 14 days.  ? Once healed, you do not need to have a dressing over the site unless the port has a  needle in it.    If You go Home After Your Port is Placed:  ? You must have someone drive you home. We cannot let you drive or travel home alone in  a cab or on a bus. Do not drive for 24 hours after your port is placed.  ? Someone should stay with you through the night.  ? Resume your routine normal diet. You may resume your normal medicines but if you  take blood-thinning medicine, ask your doctor when you should start taking it again.  ? Rest until morning.   ? Lifting your arm on the same side of the mediport should be restricted to 10-15 pounds   or less for 1 week.  ? Avoid pushing, pulling, straining, or any strenuous activities.  ? You may climb stairs.  ? You may return to work when you feel you are ready at any point after surgery.  If you are not able to contact your doctor, go to the nearest Emergency Room.    Caring for Your Incisions:  ? Remove the gauze dressing after 48 hours. You do not need to replace it. Don’t try to peel  off the surgical glue or steri-strips. Let them fall off on their own, which often happens  after 2 weeks.  ? Do not let your incisions get wet until they are fully healed, which often takes 10 to 14  days. When you shower, cover your incisions with a dressing made from plastic wrap  (like Saran wrap or Glad Press n’ Seal) or a plastic bag and tape to keep the area dry.  After you shower, remove the plastic wrap and pat the incisions dry. Don’t swim or soak  in a tub or Jacuzzi until your incisions are fully healed.  ? Do not get  your port site wet if it has a needle in it. Follow the steps above to make  sure your port site is covered with plastic wrap or a plastic bag when you shower.  ? Look at your incisions each day. Call your doctor right away if you have any of the signs  of infection that are listed on the next page.    Caring for Your Port:  -A trained nurse must use a special non-coring needle, called a Douglas needle, each time they  access your port. The needle is placed through your skin and into the rubber center of the port.  -You will likely feel slight pricking when your nurse inserts the needle. The needle stays in  place for your treatments and can be removed afterwards.  -Your port needs to be flushed every 4 to 6 weeks to help prevent blood clots  from forming in the catheter. If blood clots form and cause a blockage, your  port may need to be removed. Your nurse will flush your port with saline. If  needed, they may also flush it with a blood thinning medicine called heparin.  -Port flushes are done right before the needle is taken out. Some patients are  taught how to do these flushes at home. If you need to flush your port at home,  your nurse will show you how. If your port is not being used at least once  every 4 to 6 weeks, talk with your doctor or nurse about scheduling port  flushes.    Sedation:  If you received medication for sedation, it will be active in your body for approximately 24  hours. So you may feel a little sleepy. Therefore, for the next 24 hours:  ? DO NOT drive a car, operate machinery or power tools. It is recommended that a   responsible adult be with you for the first 24 hours.  ? DO NOT drink any alcoholic beverages or take any non-prescriptive medications that   contain alcohol.  ? DO NOT make any important decisions or sign any legal/important documents.    When to Call Your Doctor:  ? Redness, swelling or drainage near the port or over the port site.  ? Drainage from the port site.  ? Shake  or chills.  ? Temperature of 100.4°F (38°C) or higher.  ? Pain, warm or soreness at the port site.  ? Swelling of your arm, check at the port site.  ? Also call if the port has been moved  ? If you have any questions about your port.     How to Reach your Doctor:    Call [phone] with problems or questions    This info is a general resource. It is not meant to replace your health care provider’s advice.  Ask your doctor or health care team any questions. Always follow their instructions.

## 2024-07-23 NOTE — H&P
History Of Present Illness  Roma Corral is a 69 y.o. male presenting with rectal cancer; here for Mediport placement. PMHx significant for metastatic rectal cancer to liver s/p lap colostomy creation on 7/2/24, anemia, CAD, NSTEMI, DM2, HTN, HLD, anemia.     Past Medical History:  Past Medical History:   Diagnosis Date    Abdominal pain     Acute non-ST elevation myocardial infarction (NSTEMI) (Multi)     Anemia     6/6/24 HGB 8.6; HCT 31.5    Anxiety     CAD (coronary artery disease)     Cardiology follow-up encounter 12/11/2023    Sharif Lau MD    Gout     H/O cardiac catheterization 06/01/2021    H/O cardiovascular stress test 09/03/2021    IMPRESSION: 1. No evidence of ischemia. 2. Suspicion of an infarct along the mid anterior wall. 3. Left ventricular dilatation is noted. 4. Left ventricular EF was calculated to be 50%. Summary:  1. No clinical or electrocardiographic evidence for ischemia at a submaximal workload. 3. The blunted heart rate diminshes the sensitivity of this test.  4. The submaximal level of stress was achieved.    H/O echocardiogram 06/02/2021    Mild concentric Left ventricle hypertrophy.  Global left ventricular wall motion and contractility are within normal limits.  There is normal left ventricular systolic function.  Estimated ejection fraction is 60-64%.  The left atrial size is mildly dilated.  Mild aortic regurgitation.  A trace of mitral regurgitation.  Trivial to mild tricuspid regurgitation.  There is no pulmonary hypertension.    High cholesterol     Hypertension     Overweight     Rectal cancer (Multi)     Type 2 diabetes mellitus (Multi)     4/18/2024 A1C 7.5%        Past Surgical History:  Past Surgical History:   Procedure Laterality Date    COLONOSCOPY  06/17/2024    HEMICOLECTOMY W/ OSTOMY      LEG SURGERY Left     rodrigo placed          Social History:   reports that he has never smoked. He has never used smokeless tobacco. He reports that he does not currently use  alcohol. He reports that he does not use drugs.     Family History:  Family History   Problem Relation Name Age of Onset    No Known Problems Mother      No Known Problems Father      No Known Problems Sister      Leukemia Brother          Allergies:  No Known Allergies     Home Medications:  Current Outpatient Medications   Medication Instructions    acetaminophen (TYLENOL) 500 mg, oral, Every 6 hours PRN    amLODIPine (NORVASC) 5 mg, oral, Daily    aspirin 81 mg, oral, Once    atorvastatin (LIPITOR) 40 mg, oral, Nightly    HYDROmorphone (DILAUDID) 2 mg, oral, Every 8 hours PRN    loperamide (Imodium) 2 mg capsule Take 2 capsules (4 mg) by mouth with the first episode of diarrhea and 1 capsule (2 mg) by mouth with any additional episodes. Maximum 8 capsules (16 mg) per day.    metoprolol succinate XL (TOPROL-XL) 100 mg, oral, Daily    ondansetron (ZOFRAN) 4 mg, oral, Every 8 hours PRN    ondansetron (ZOFRAN) 8 mg, oral, Every 8 hours PRN    oxyCODONE (ROXICODONE) 5 mg, oral, Every 6 hours PRN    prochlorperazine (COMPAZINE) 10 mg, oral, Every 6 hours PRN       Inpatient Medications:  Scheduled medications   Medication Dose Route Frequency     PRN medications   Medication    fentaNYL PF    midazolam    oxygen    sodium chloride 0.9%     Continuous Medications   Medication Dose Last Rate    oxygen   2 L/min (07/23/24 1412)    sodium chloride 0.9%   100 mL/hr (07/23/24 1412)         Review of Systems   Constitutional: Negative.    HENT: Negative.     Eyes: Negative.    Respiratory: Negative.     Cardiovascular: Negative.    Gastrointestinal:  Positive for abdominal pain and diarrhea.   Endocrine: Negative.    Genitourinary: Negative.    Musculoskeletal: Negative.    Skin: Negative.    Allergic/Immunologic: Negative.    Neurological: Negative.    Hematological: Negative.    Psychiatric/Behavioral:  The patient is nervous/anxious.           Physical Exam  General:  Patient is awake, alert, and oriented.  Patient is in  "no acute distress.  HEENT:  Pupils equal and reactive.  Normocephalic.  Moist mucosa.    Neck:  No JVD.   Cardiovascular:  Regular rate and rhythm.  Normal S1 and S2. No murmurs/rubs/gallops. Radial pulses 2+.   Pulmonary:  Clear to auscultation bilaterally.  Abdomen:  Soft. Non-tender.   Non-distended.  Positive bowel sounds. +Colostomy.   Lower Extremities:  Pedal pulses 2+ No LE edema.  Neurologic:  Cranial nerves II-XII grossly intact.   No focal deficit.   Skin: Skin warm and dry, no lesions. Normal skin turgor.   Psychiatric: Normal affect.     Sedation Plan    ASA 3     Mallampati class: III.         Last Recorded Vitals  Blood pressure 171/77, pulse 67, temperature 37.2 °C (99 °F), temperature source Temporal, resp. rate 16, height 1.803 m (5' 11\"), weight 99.8 kg (220 lb), SpO2 99%.    Oxygen Dose: *2 L/min    Vitals from the Past 24 Hours  Heart Rate:  [67]   Temp:  [37.2 °C (99 °F)]   Resp:  [16]   BP: (171)/(77)   Height:  [180.3 cm (5' 11\")]   Weight:  [99.8 kg (220 lb)]   SpO2:  [99 %]     Oxygen Dose: *2 L/min    Relevant Results    Labs    CBC:   Recent Labs     07/05/24  0601 07/04/24  0529 07/03/24  0550 06/27/24  1456 06/06/24  1649 04/18/24  1429   WBC 8.7 9.1 8.8 7.7 9.9 8.6   HGB 7.5* 7.0* 7.0* 8.0* 8.6* 8.5*   HCT 27.6* 25.8* 25.8* 28.9* 31.5* 30.7*    294 305 384 395 351   MCV 67* 68* 67* 67* 67* 69*     BMP/CMP:   Recent Labs     07/05/24  0601 07/04/24  0529 07/03/24  0550 06/06/24  1649 04/18/24  1429 12/02/22  1034 06/23/21  1112 05/30/21  0315    140 135* 140 140 140 141 139   K 4.0 3.8 4.4 4.6 4.4 4.5 4.4 4.0    107 103 102 105 105 106 103   BUN 9 11 14 18 16 18 21 8   CREATININE 1.00 1.03 1.01 1.10 1.07 1.09 1.69* 0.9   CO2 26 25 23 25 27 26 26 23*   CALCIUM 7.7* 7.9* 7.8* 9.1 8.9 9.2 8.6 8.7   PROT  --   --   --  7.9 7.4 7.7 6.5 7.2   BILITOT  --   --   --  0.7 0.7 0.4 0.4 0.5   ALKPHOS  --   --   --  142* 113 112 69 76   ALT  --   --   --  12 13 19 16 26   AST  --  " " --   --  21 25 23 19 55*   GLUCOSE 96 95 208* 132* 137* 142* 165* 129*      Lipid Panel:   Recent Labs     06/06/24  1649 04/18/24  1429 12/02/22  1034 06/23/21  1112 05/30/21  0315   CHOL 154 136 99 100 172   HDL 27.1 22.3 26.5* 27.4* 33*   CHHDL 5.7 6.1 3.7 3.6 5.2   VLDL  --  22 33 33  --    TRIG  --  109 167* 167* 171*   NHDL  --  114  --   --   --      Cardiac       No lab exists for component: \"CK\", \"CKMBP\"   Hemoglobin A1C:   Recent Labs     04/18/24  1429   HGBA1C 7.5*     TSH/ Free T4:   Recent Labs     06/06/24  1649 06/23/21  1112 05/30/21  0315   TSH 2.17 2.44 3.46     Iron: No results for input(s): \"FERRITIN\", \"TIBC\", \"IRONSAT\", \"BNP\" in the last 23436 hours.  Coag:     ABO: No results found for: \"ABO\"    Past Cardiology Tests (Last 3 Years):    EKG:  Recent Labs     06/27/24  1345 12/02/22  1001   ATRRATE 67 55   VENTRATE 67 55   PRINT 226 272   QRSDUR 96 100   QTCFRED 434 431   QTCCALCB 441 424     Encounter Date: 06/27/24   ECG 12 Lead   Result Value    Ventricular Rate 67    Atrial Rate 67    NE Interval 226    QRS Duration 96    QT Interval 418    QTC Calculation(Bazett) 441    P Axis 20    R Axis -25    T Axis 31    QRS Count 11    Q Onset 226    P Onset 113    P Offset 170    T Offset 435    QTC Fredericia 434    Narrative    Sinus rhythm with 1st degree AV block  Otherwise normal ECG  When compared with ECG of 02-DEC-2022 10:01,  No significant change was found  Confirmed by All Lennon ([id number]) on 6/28/2024 9:46:33 AM     Echo:  Echocardiogram:   ECHOCARDIOGRAM     Narrative  Ordered by an unspecified provider.    Ejection Fractions:  No results found for: \"EF\"  Cath:  Coronary Angiography:   ADULT CATH     Narrative  Ordered by an unspecified provider.    Right Heart Cath: No results found for this or any previous visit from the past 1800 days.    Stress Test:  Nuclear:  NM CARDIAC STRESS REST (MYOCARDIAL PERFUSION MIBI) 09/03/2021    Narrative  MRN: [id number]  Patient Name: GLENDA, " INDU    STUDY:  CARDIAC STRESS/REST INJECTION; PART 2 STRESS OR REST (NO CHARGE);  CARDIAC STRESS/REST (MYOCARDIAL PERFUSION/MIBI);  9/3/2021 12:21 pm    INDICATION:  cad.    COMPARISON:  None.    ACCESSION NUMBER(S):  [id number]; [id number]; [id number]    ORDERING CLINICIAN:  ZHANG HUERTA    TECHNIQUE:  DIVISION OF NUCLEAR MEDICINE  STRESS MYOCARDIAL PERFUSION SCAN, ONE DAY PROTOCOL    The patient received an intravenous dose of  10.2 mCi of Tc-99m  Myoview and resting emission tomographic (SPECT) images of the  myocardium were acquired. The patient then underwent treadmill stress  exercising to  75 % of MPHR and achieving  7.4 METS.  At peak stress  an additional dose of   33.7 mCi of Tc-99m  Myoview was administered  and stress phase SPECT images of the myocardium were then acquired.  This acquisition included ECG-gated images to assess and quantify  ventricular function.    FINDINGS:  Stress and rest images both demonstrate a fixed defect in the mid  anterior wall with associated hypokinesis and abnormal wall  thickening.    Left ventricular dilatation is noted ( mL).    ECG-gated images demonstrate a LV ejection fraction of  50 % (normal  above 45 percent).    Impression  1. No definite evidence of ischemia.  2. Suspicion of an infarct along the mid anterior wall.  3. Left ventricular dilatation is noted.  4. Left ventricular ejection fraction was calculated to be 50%.    Images were interpreted at [facility].    Metabolic Stress: No results found for this or any previous visit from the past 1800 days.    Cardiac Imaging:  Cardiac Scoring: No results found for this or any previous visit from the past 1800 days.    Cardiac MRI: No results found for this or any previous visit from the past 1800 days.         Assessment/Plan  Assessment/Plan   Active Problems:  There are no active Hospital Problems.        #Rectal Cancer  -Mediport placement with Dr. Cantor  -[facility] IV  pre-procedure       NP discussed with Dr. Cantor regarding plan of care/ discharge plan      I spent 30 minutes in the professional and overall care of this patient.      Becky Bill, MELISSA-CNP

## 2024-07-24 ENCOUNTER — NUTRITION (OUTPATIENT)
Dept: HEMATOLOGY/ONCOLOGY | Facility: HOSPITAL | Age: 69
End: 2024-07-24
Payer: MEDICARE

## 2024-07-24 ENCOUNTER — INFUSION (OUTPATIENT)
Dept: HEMATOLOGY/ONCOLOGY | Facility: HOSPITAL | Age: 69
End: 2024-07-24
Payer: MEDICARE

## 2024-07-24 ENCOUNTER — APPOINTMENT (OUTPATIENT)
Dept: HEMATOLOGY/ONCOLOGY | Facility: HOSPITAL | Age: 69
End: 2024-07-24
Payer: MEDICARE

## 2024-07-24 ENCOUNTER — LAB (OUTPATIENT)
Dept: LAB | Facility: HOSPITAL | Age: 69
End: 2024-07-24
Payer: MEDICARE

## 2024-07-24 VITALS
HEART RATE: 70 BPM | SYSTOLIC BLOOD PRESSURE: 152 MMHG | HEIGHT: 70 IN | RESPIRATION RATE: 19 BRPM | WEIGHT: 199.8 LBS | TEMPERATURE: 98.2 F | DIASTOLIC BLOOD PRESSURE: 71 MMHG | BODY MASS INDEX: 28.6 KG/M2

## 2024-07-24 DIAGNOSIS — C20 RECTAL CANCER (MULTI): ICD-10-CM

## 2024-07-24 DIAGNOSIS — Z01.818 PRE-OP TESTING: ICD-10-CM

## 2024-07-24 LAB
ABO GROUP (TYPE) IN BLOOD: NORMAL
ALBUMIN SERPL BCP-MCNC: 3.9 G/DL (ref 3.4–5)
ALP SERPL-CCNC: 141 U/L (ref 33–136)
ALT SERPL W P-5'-P-CCNC: 12 U/L (ref 10–52)
ANION GAP SERPL CALC-SCNC: 14 MMOL/L (ref 10–20)
ANTIBODY SCREEN: NORMAL
APPEARANCE UR: CLEAR
AST SERPL W P-5'-P-CCNC: 31 U/L (ref 9–39)
BASOPHILS # BLD AUTO: 0.03 X10*3/UL (ref 0–0.1)
BASOPHILS NFR BLD AUTO: 0.7 %
BILIRUB SERPL-MCNC: 0.8 MG/DL (ref 0–1.2)
BILIRUB UR STRIP.AUTO-MCNC: NEGATIVE MG/DL
BUN SERPL-MCNC: 14 MG/DL (ref 6–23)
BURR CELLS BLD QL SMEAR: NORMAL
CALCIUM SERPL-MCNC: 9.2 MG/DL (ref 8.6–10.3)
CHLORIDE SERPL-SCNC: 104 MMOL/L (ref 98–107)
CO2 SERPL-SCNC: 26 MMOL/L (ref 21–32)
COLOR UR: YELLOW
CREAT SERPL-MCNC: 0.96 MG/DL (ref 0.5–1.3)
EGFRCR SERPLBLD CKD-EPI 2021: 86 ML/MIN/1.73M*2
EOSINOPHIL # BLD AUTO: 0.21 X10*3/UL (ref 0–0.7)
EOSINOPHIL NFR BLD AUTO: 4.8 %
ERYTHROCYTE [DISTWIDTH] IN BLOOD BY AUTOMATED COUNT: 29.2 % (ref 11.5–14.5)
GLUCOSE SERPL-MCNC: 167 MG/DL (ref 74–99)
GLUCOSE UR STRIP.AUTO-MCNC: NORMAL MG/DL
HBV CORE AB SER QL: NONREACTIVE
HBV SURFACE AB SER-ACNC: <3.1 MIU/ML
HBV SURFACE AG SERPL QL IA: NONREACTIVE
HCT VFR BLD AUTO: 32.6 % (ref 41–52)
HGB BLD-MCNC: 9.3 G/DL (ref 13.5–17.5)
HYPOCHROMIA BLD QL SMEAR: NORMAL
IMM GRANULOCYTES # BLD AUTO: 0.01 X10*3/UL (ref 0–0.7)
IMM GRANULOCYTES NFR BLD AUTO: 0.2 % (ref 0–0.9)
KETONES UR STRIP.AUTO-MCNC: NEGATIVE MG/DL
LEUKOCYTE ESTERASE UR QL STRIP.AUTO: ABNORMAL
LYMPHOCYTES # BLD AUTO: 0.86 X10*3/UL (ref 1.2–4.8)
LYMPHOCYTES NFR BLD AUTO: 19.5 %
MCH RBC QN AUTO: 20.9 PG (ref 26–34)
MCHC RBC AUTO-ENTMCNC: 28.5 G/DL (ref 32–36)
MCV RBC AUTO: 73 FL (ref 80–100)
MONOCYTES # BLD AUTO: 0.6 X10*3/UL (ref 0.1–1)
MONOCYTES NFR BLD AUTO: 13.6 %
MUCOUS THREADS #/AREA URNS AUTO: ABNORMAL /LPF
NEUTROPHILS # BLD AUTO: 2.69 X10*3/UL (ref 1.2–7.7)
NEUTROPHILS NFR BLD AUTO: 61.2 %
NITRITE UR QL STRIP.AUTO: NEGATIVE
NRBC BLD-RTO: 0 /100 WBCS (ref 0–0)
OVALOCYTES BLD QL SMEAR: NORMAL
PH UR STRIP.AUTO: 6 [PH]
PLATELET # BLD AUTO: 230 X10*3/UL (ref 150–450)
POLYCHROMASIA BLD QL SMEAR: NORMAL
POTASSIUM SERPL-SCNC: 3.9 MMOL/L (ref 3.5–5.3)
PROT SERPL-MCNC: 8 G/DL (ref 6.4–8.2)
PROT UR STRIP.AUTO-MCNC: ABNORMAL MG/DL
RBC # BLD AUTO: 4.45 X10*6/UL (ref 4.5–5.9)
RBC # UR STRIP.AUTO: ABNORMAL /UL
RBC #/AREA URNS AUTO: >20 /HPF
RBC MORPH BLD: NORMAL
RH FACTOR (ANTIGEN D): NORMAL
SCHISTOCYTES BLD QL SMEAR: NORMAL
SODIUM SERPL-SCNC: 140 MMOL/L (ref 136–145)
SP GR UR STRIP.AUTO: 1.02
UROBILINOGEN UR STRIP.AUTO-MCNC: NORMAL MG/DL
WBC # BLD AUTO: 4.4 X10*3/UL (ref 4.4–11.3)
WBC #/AREA URNS AUTO: ABNORMAL /HPF

## 2024-07-24 PROCEDURE — 85025 COMPLETE CBC W/AUTO DIFF WBC: CPT

## 2024-07-24 PROCEDURE — 81001 URINALYSIS AUTO W/SCOPE: CPT | Performed by: INTERNAL MEDICINE

## 2024-07-24 PROCEDURE — 2500000004 HC RX 250 GENERAL PHARMACY W/ HCPCS (ALT 636 FOR OP/ED): Performed by: INTERNAL MEDICINE

## 2024-07-24 PROCEDURE — 86704 HEP B CORE ANTIBODY TOTAL: CPT | Performed by: INTERNAL MEDICINE

## 2024-07-24 PROCEDURE — 80053 COMPREHEN METABOLIC PANEL: CPT

## 2024-07-24 PROCEDURE — 96375 TX/PRO/DX INJ NEW DRUG ADDON: CPT | Mod: INF

## 2024-07-24 PROCEDURE — 87340 HEPATITIS B SURFACE AG IA: CPT | Performed by: INTERNAL MEDICINE

## 2024-07-24 PROCEDURE — 86706 HEP B SURFACE ANTIBODY: CPT | Performed by: INTERNAL MEDICINE

## 2024-07-24 PROCEDURE — 81232 DPYD GENE COMMON VARIANTS: CPT | Performed by: INTERNAL MEDICINE

## 2024-07-24 PROCEDURE — 96367 TX/PROPH/DG ADDL SEQ IV INF: CPT

## 2024-07-24 PROCEDURE — 96417 CHEMO IV INFUS EACH ADDL SEQ: CPT

## 2024-07-24 PROCEDURE — 96415 CHEMO IV INFUSION ADDL HR: CPT

## 2024-07-24 PROCEDURE — 86901 BLOOD TYPING SEROLOGIC RH(D): CPT | Performed by: SURGERY

## 2024-07-24 PROCEDURE — 96416 CHEMO PROLONG INFUSE W/PUMP: CPT

## 2024-07-24 PROCEDURE — 96411 CHEMO IV PUSH ADDL DRUG: CPT

## 2024-07-24 PROCEDURE — 96413 CHEMO IV INFUSION 1 HR: CPT

## 2024-07-24 RX ORDER — FAMOTIDINE 10 MG/ML
20 INJECTION INTRAVENOUS ONCE AS NEEDED
Status: DISCONTINUED | OUTPATIENT
Start: 2024-07-24 | End: 2024-07-24 | Stop reason: HOSPADM

## 2024-07-24 RX ORDER — ATROPINE SULFATE 0.4 MG/ML
0.4 INJECTION, SOLUTION ENDOTRACHEAL; INTRAMEDULLARY; INTRAMUSCULAR; INTRAVENOUS; SUBCUTANEOUS
Status: DISCONTINUED | OUTPATIENT
Start: 2024-07-24 | End: 2024-07-24 | Stop reason: HOSPADM

## 2024-07-24 RX ORDER — EPINEPHRINE 0.3 MG/.3ML
0.3 INJECTION SUBCUTANEOUS EVERY 5 MIN PRN
Status: DISCONTINUED | OUTPATIENT
Start: 2024-07-24 | End: 2024-07-24 | Stop reason: HOSPADM

## 2024-07-24 RX ORDER — DIPHENHYDRAMINE HYDROCHLORIDE 50 MG/ML
50 INJECTION INTRAMUSCULAR; INTRAVENOUS AS NEEDED
Status: DISCONTINUED | OUTPATIENT
Start: 2024-07-24 | End: 2024-07-24 | Stop reason: HOSPADM

## 2024-07-24 RX ORDER — PROCHLORPERAZINE EDISYLATE 5 MG/ML
10 INJECTION INTRAMUSCULAR; INTRAVENOUS EVERY 6 HOURS PRN
Status: DISCONTINUED | OUTPATIENT
Start: 2024-07-24 | End: 2024-07-24 | Stop reason: HOSPADM

## 2024-07-24 RX ORDER — PALONOSETRON 0.05 MG/ML
0.25 INJECTION, SOLUTION INTRAVENOUS ONCE
Status: COMPLETED | OUTPATIENT
Start: 2024-07-24 | End: 2024-07-24

## 2024-07-24 RX ORDER — LORAZEPAM 2 MG/ML
1 INJECTION INTRAMUSCULAR AS NEEDED
Status: DISCONTINUED | OUTPATIENT
Start: 2024-07-24 | End: 2024-07-24 | Stop reason: HOSPADM

## 2024-07-24 RX ORDER — ALBUTEROL SULFATE 0.83 MG/ML
3 SOLUTION RESPIRATORY (INHALATION) AS NEEDED
Status: DISCONTINUED | OUTPATIENT
Start: 2024-07-24 | End: 2024-07-24 | Stop reason: HOSPADM

## 2024-07-24 RX ORDER — PROCHLORPERAZINE MALEATE 10 MG
10 TABLET ORAL EVERY 6 HOURS PRN
Status: DISCONTINUED | OUTPATIENT
Start: 2024-07-24 | End: 2024-07-24 | Stop reason: HOSPADM

## 2024-07-24 NOTE — PROGRESS NOTES
NUTRITION Assessment NOTE    Nutrition Assessment     Reason for Visit:  Roma Corral is a 69 y.o. male who presents for treatment of his stage IVB rectal cancr  He underwent a loop colostomy - 7/2//2024  He received RT 7-15 to 7-  Today he begins FOLFOXIRI + beverley C1D1    Lab Results   Component Value Date/Time    GLUCOSE 167 (H) 07/24/2024 0811     07/24/2024 0811    K 3.9 07/24/2024 0811     07/24/2024 0811    CO2 26 07/24/2024 0811    ANIONGAP 14 07/24/2024 0811    BUN 14 07/24/2024 0811    CREATININE 0.96 07/24/2024 0811    EGFR 86 07/24/2024 0811    CALCIUM 9.2 07/24/2024 0811    ALBUMIN 3.9 07/24/2024 0811    ALKPHOS 141 (H) 07/24/2024 0811    PROT 8.0 07/24/2024 0811    AST 31 07/24/2024 0811    BILITOT 0.8 07/24/2024 0811    ALT 12 07/24/2024 0811     Lab Results   Component Value Date/Time    VITD25 22 (A) 06/23/2021 1112       Anthropometrics:      cm  IBW: 74.4 kg  121.7% IBW  BMI: 28.9    UBW: 205#    Wt Readings from Last 10 Encounters:   07/24/24 90.6 kg (199 lb 12.8 oz)   07/23/24 99.8 kg (220 lb)   07/17/24 92.3 kg (203 lb 6 oz)   07/11/24 90.7 kg (199 lb 15.3 oz)   07/10/24 90.9 kg (200 lb 4.8 oz)   07/08/24 90.9 kg (200 lb 4.6 oz)   07/03/24 89.4 kg (197 lb 1.5 oz)   06/28/24 91.6 kg (202 lb)   06/27/24 92.1 kg (203 lb 0.7 oz)   06/18/24 92.5 kg (204 lb)      In the past month has lost 1.9 kg (2%)    Food And Nutrient Intake:      Appetite is good to fair  Is having some smell alerations  which can cause taste alterations     Still does not have osotomy supplies set up     Eating 2-3 times per day and snacking   Green tea (hot tea)   [place] ( 1 cup) and add 2 eggs  Rigatoni- no meat - sauce and noodles ( 2 cups or more)   Applesauce- cinnamon   2 bottles of water   1 can of pop    Has had:   liver and onions, hamburger  hamburger    Snacks on:   Yogurt- greek   Plums, raspberries, blueberries, banana, cataloupe, apples (no skin), peaches   PB  Cottage cheese  Chicken noodle  soup  Canned Kale- canned peas      Drinking- pedialyte- pop, 16 ounces of green tea and 2 bottles of water- marginally adequate     Out-put varies from liquid to toothpaste    Has immodium at home                                                            Nutrition Focused Physical Exam Findings:      Subcutaneous Fat Loss  Orbital Fat Pads: Well nourished (slightly bulging fat pads)  Buccal Fat Pads: Well nourished (full, rounded cheeks)  Triceps: Well nourished (ample fat tissue)    Muscle Wasting  Temporalis: Well nourished (well-defined muscle)  Pectoralis (Clavicular Region): Well nourished (clavicle not visible)  Interosseous: Well nourished (muscle bulges)  Quadriceps: Well nourished (well developed, well rounded)    Edema  Edema: none         Energy Needs     Dosing weight: 90.6 kg  Calories per day: 2720  determined by 30 kcal/kg  Protein (g) per day: 110 determined by 1.2 g/kg  Estimated fluid needs: 2720 lwe determined by 1 kcal/mL       Nutrition Diagnosis   Malnutrition Diagnosis  Patient has Malnutrition Diagnosis: No    Nutrition Diagnosis  Patient has Nutrition Diagnosis: Yes  Diagnosis Status (1): New  Nutrition Diagnosis 1: Altered GI function  Related to (1): diagnosis, colostomy placed 7/2/2024 and chemotherapy beginning today (7/24/2024)  As Evidenced by (1): lorriel need for diet altreations    Nutrition Interventions/Recommendations   Nutrition Prescription   Asked pt to continue to eat 5-6 times per day  Asked to follow low to moderate fiber intake  Asked to increase fluid intake to 8-10 classes based on ootomy out-put   Could of educated / enrolled pt in enterade study but did not as felt this would have overwhelmed him at this time- might be a candidate in the future    Food and Nutrition Delivery       Nutrition Education   Provided with colostomy diet PI sheet from [employer]- focusing on foods to thicken stool as needed and primary objectives   Business card provided     Coordination of  Care   Subsequent treatments will be at [place]- messaged RDN at [place]   Wound Care RN- contacted regarding ostomy supplies - pt has her contact number- we called her in infusion room and left her a message (prior to do this contacted RN partner regarding procedure)   Med onc (seeing pt at [facility])     There are no Patient Instructions on file for this visit.    Nutrition Monitoring and Evaluation        Adequacy of po intake  Tolerance to po intake  Weight   Labs (BS noted)

## 2024-07-24 NOTE — PATIENT INSTRUCTIONS
For nausea  -next 48 hours, take compazine   -after that take zofran and/or compazine    For diarrhea-  -take loperamide    Return for pump disconnect on 7/26 around 1245      If you have any questions call [phone]

## 2024-07-24 NOTE — PROGRESS NOTES
-Patient presents to the clinic today for Beva+ folfurinox  -Patient tolerated infusion very well  -Patient discharged in stable condition. Reviewed calendar/next appointment, all questions answered.   -Patient ambulated out of clinic with ease, with pump confirmed to be running.   -Notes/Plan for next visit-    -extensive time spent reviewing medications, side effects, when/how to contact the clinic with questions/concerns, schedule, etc. Chemo spill kit provided.    -Pump disconnect @ minoff around 12:45 on 7/26

## 2024-07-25 LAB
DPYD GENE MUT ANL BLD/T: NORMAL
ELECTRONICALLY SIGNED BY: NORMAL

## 2024-07-25 RX ORDER — HEPARIN SODIUM,PORCINE/PF 10 UNIT/ML
50 SYRINGE (ML) INTRAVENOUS AS NEEDED
Status: CANCELLED | OUTPATIENT
Start: 2024-07-25

## 2024-07-25 RX ORDER — HEPARIN 100 UNIT/ML
500 SYRINGE INTRAVENOUS AS NEEDED
Status: CANCELLED | OUTPATIENT
Start: 2024-07-25

## 2024-07-26 ENCOUNTER — INFUSION (OUTPATIENT)
Dept: HEMATOLOGY/ONCOLOGY | Facility: CLINIC | Age: 69
End: 2024-07-26
Payer: MEDICARE

## 2024-07-26 VITALS
TEMPERATURE: 96.8 F | SYSTOLIC BLOOD PRESSURE: 153 MMHG | WEIGHT: 201.72 LBS | RESPIRATION RATE: 16 BRPM | BODY MASS INDEX: 29.21 KG/M2 | HEART RATE: 68 BPM | OXYGEN SATURATION: 97 % | DIASTOLIC BLOOD PRESSURE: 77 MMHG

## 2024-07-26 DIAGNOSIS — C20 RECTAL CANCER (MULTI): Primary | ICD-10-CM

## 2024-07-26 DIAGNOSIS — C20 RECTAL CANCER (MULTI): ICD-10-CM

## 2024-07-26 PROCEDURE — 2500000004 HC RX 250 GENERAL PHARMACY W/ HCPCS (ALT 636 FOR OP/ED): Mod: JZ,JG | Performed by: INTERNAL MEDICINE

## 2024-07-26 PROCEDURE — 96372 THER/PROPH/DIAG INJ SC/IM: CPT

## 2024-07-26 PROCEDURE — 2500000004 HC RX 250 GENERAL PHARMACY W/ HCPCS (ALT 636 FOR OP/ED): Performed by: NURSE PRACTITIONER

## 2024-07-26 RX ORDER — FAMOTIDINE 10 MG/ML
20 INJECTION INTRAVENOUS ONCE AS NEEDED
Status: CANCELLED | OUTPATIENT
Start: 2024-07-26

## 2024-07-26 RX ORDER — EPINEPHRINE 0.3 MG/.3ML
0.3 INJECTION SUBCUTANEOUS EVERY 5 MIN PRN
Status: DISCONTINUED | OUTPATIENT
Start: 2024-07-26 | End: 2024-07-26 | Stop reason: HOSPADM

## 2024-07-26 RX ORDER — HEPARIN SODIUM,PORCINE/PF 10 UNIT/ML
50 SYRINGE (ML) INTRAVENOUS AS NEEDED
Status: DISCONTINUED | OUTPATIENT
Start: 2024-07-26 | End: 2024-07-26 | Stop reason: HOSPADM

## 2024-07-26 RX ORDER — HEPARIN 100 UNIT/ML
500 SYRINGE INTRAVENOUS AS NEEDED
Status: DISCONTINUED | OUTPATIENT
Start: 2024-07-26 | End: 2024-07-26 | Stop reason: HOSPADM

## 2024-07-26 RX ORDER — EPINEPHRINE 0.3 MG/.3ML
0.3 INJECTION SUBCUTANEOUS EVERY 5 MIN PRN
Status: CANCELLED | OUTPATIENT
Start: 2024-07-26

## 2024-07-26 RX ORDER — HEPARIN 100 UNIT/ML
500 SYRINGE INTRAVENOUS AS NEEDED
OUTPATIENT
Start: 2024-07-26

## 2024-07-26 RX ORDER — ALBUTEROL SULFATE 0.83 MG/ML
3 SOLUTION RESPIRATORY (INHALATION) AS NEEDED
Status: DISCONTINUED | OUTPATIENT
Start: 2024-07-26 | End: 2024-07-26 | Stop reason: HOSPADM

## 2024-07-26 RX ORDER — FAMOTIDINE 10 MG/ML
20 INJECTION INTRAVENOUS ONCE AS NEEDED
Status: DISCONTINUED | OUTPATIENT
Start: 2024-07-26 | End: 2024-07-26 | Stop reason: HOSPADM

## 2024-07-26 RX ORDER — ALBUTEROL SULFATE 0.83 MG/ML
3 SOLUTION RESPIRATORY (INHALATION) AS NEEDED
Status: CANCELLED | OUTPATIENT
Start: 2024-07-26

## 2024-07-26 RX ORDER — HEPARIN SODIUM,PORCINE/PF 10 UNIT/ML
50 SYRINGE (ML) INTRAVENOUS AS NEEDED
OUTPATIENT
Start: 2024-07-26

## 2024-07-26 RX ORDER — DIPHENHYDRAMINE HYDROCHLORIDE 50 MG/ML
50 INJECTION INTRAMUSCULAR; INTRAVENOUS AS NEEDED
Status: CANCELLED | OUTPATIENT
Start: 2024-07-26

## 2024-07-26 RX ORDER — DIPHENHYDRAMINE HYDROCHLORIDE 50 MG/ML
50 INJECTION INTRAMUSCULAR; INTRAVENOUS AS NEEDED
Status: DISCONTINUED | OUTPATIENT
Start: 2024-07-26 | End: 2024-07-26 | Stop reason: HOSPADM

## 2024-07-26 ASSESSMENT — PAIN SCALES - GENERAL: PAINLEVEL: 0-NO PAIN

## 2024-08-06 ENCOUNTER — LAB (OUTPATIENT)
Dept: LAB | Facility: LAB | Age: 69
End: 2024-08-06
Payer: MEDICARE

## 2024-08-06 ENCOUNTER — APPOINTMENT (OUTPATIENT)
Dept: HEMATOLOGY/ONCOLOGY | Facility: CLINIC | Age: 69
End: 2024-08-06
Payer: MEDICARE

## 2024-08-06 ENCOUNTER — OFFICE VISIT (OUTPATIENT)
Dept: SURGERY | Facility: CLINIC | Age: 69
End: 2024-08-06
Payer: MEDICARE

## 2024-08-06 ENCOUNTER — OFFICE VISIT (OUTPATIENT)
Dept: HEMATOLOGY/ONCOLOGY | Facility: CLINIC | Age: 69
End: 2024-08-06
Payer: MEDICARE

## 2024-08-06 VITALS
HEART RATE: 67 BPM | BODY MASS INDEX: 28.52 KG/M2 | SYSTOLIC BLOOD PRESSURE: 137 MMHG | OXYGEN SATURATION: 98 % | DIASTOLIC BLOOD PRESSURE: 79 MMHG | WEIGHT: 197 LBS

## 2024-08-06 DIAGNOSIS — C20 RECTAL CANCER (MULTI): Primary | ICD-10-CM

## 2024-08-06 DIAGNOSIS — R30.9 PAINFUL URINATION: ICD-10-CM

## 2024-08-06 DIAGNOSIS — C20 RECTAL CANCER (MULTI): ICD-10-CM

## 2024-08-06 LAB
ALBUMIN SERPL BCP-MCNC: 3.6 G/DL (ref 3.4–5)
ALP SERPL-CCNC: 190 U/L (ref 33–136)
ALT SERPL W P-5'-P-CCNC: 20 U/L (ref 10–52)
ANION GAP SERPL CALC-SCNC: 15 MMOL/L (ref 10–20)
APPEARANCE UR: CLEAR
AST SERPL W P-5'-P-CCNC: 21 U/L (ref 9–39)
BASOPHILS # BLD MANUAL: 0 X10*3/UL (ref 0–0.1)
BASOPHILS NFR BLD MANUAL: 0 %
BILIRUB SERPL-MCNC: 0.4 MG/DL (ref 0–1.2)
BILIRUB UR STRIP.AUTO-MCNC: NEGATIVE MG/DL
BUN SERPL-MCNC: 14 MG/DL (ref 6–23)
BURR CELLS BLD QL SMEAR: ABNORMAL
CALCIUM SERPL-MCNC: 8.4 MG/DL (ref 8.6–10.3)
CEA SERPL-MCNC: 716.9 UG/L
CHLORIDE SERPL-SCNC: 108 MMOL/L (ref 98–107)
CO2 SERPL-SCNC: 24 MMOL/L (ref 21–32)
COLOR UR: NORMAL
CREAT SERPL-MCNC: 0.91 MG/DL (ref 0.5–1.3)
EGFRCR SERPLBLD CKD-EPI 2021: >90 ML/MIN/1.73M*2
EOSINOPHIL # BLD MANUAL: 0.14 X10*3/UL (ref 0–0.7)
EOSINOPHIL NFR BLD MANUAL: 2 %
ERYTHROCYTE [DISTWIDTH] IN BLOOD BY AUTOMATED COUNT: 29.2 % (ref 11.5–14.5)
FERRITIN SERPL-MCNC: 340 NG/ML (ref 20–300)
GLUCOSE SERPL-MCNC: 108 MG/DL (ref 74–99)
GLUCOSE UR STRIP.AUTO-MCNC: NORMAL MG/DL
HCT VFR BLD AUTO: 34.2 % (ref 41–52)
HGB BLD-MCNC: 9.7 G/DL (ref 13.5–17.5)
IMM GRANULOCYTES # BLD AUTO: 0.22 X10*3/UL (ref 0–0.7)
IMM GRANULOCYTES NFR BLD AUTO: 3.1 % (ref 0–0.9)
IRON SATN MFR SERPL: 21 % (ref 25–45)
IRON SERPL-MCNC: 69 UG/DL (ref 35–150)
KETONES UR STRIP.AUTO-MCNC: NEGATIVE MG/DL
LEUKOCYTE ESTERASE UR QL STRIP.AUTO: NEGATIVE
LYMPHOCYTES # BLD MANUAL: 0.94 X10*3/UL (ref 1.2–4.8)
LYMPHOCYTES NFR BLD MANUAL: 13 %
MCH RBC QN AUTO: 21.7 PG (ref 26–34)
MCHC RBC AUTO-ENTMCNC: 28.4 G/DL (ref 32–36)
MCV RBC AUTO: 77 FL (ref 80–100)
MONOCYTES # BLD MANUAL: 0.22 X10*3/UL (ref 0.1–1)
MONOCYTES NFR BLD MANUAL: 3 %
MUCOUS THREADS #/AREA URNS AUTO: ABNORMAL /LPF
NEUTROPHILS # BLD MANUAL: 5.9 X10*3/UL (ref 1.2–7.7)
NEUTS BAND # BLD MANUAL: 0.14 X10*3/UL (ref 0–0.7)
NEUTS BAND NFR BLD MANUAL: 2 %
NEUTS SEG # BLD MANUAL: 5.76 X10*3/UL (ref 1.2–7)
NEUTS SEG NFR BLD MANUAL: 80 %
NITRITE UR QL STRIP.AUTO: NEGATIVE
NRBC BLD-RTO: 0.4 /100 WBCS (ref 0–0)
OVALOCYTES BLD QL SMEAR: ABNORMAL
PH UR STRIP.AUTO: 5.5 [PH]
PLATELET # BLD AUTO: 193 X10*3/UL (ref 150–450)
POLYCHROMASIA BLD QL SMEAR: ABNORMAL
POTASSIUM SERPL-SCNC: 4.1 MMOL/L (ref 3.5–5.3)
PROT SERPL-MCNC: 7.2 G/DL (ref 6.4–8.2)
PROT UR STRIP.AUTO-MCNC: NORMAL MG/DL
RBC # BLD AUTO: 4.47 X10*6/UL (ref 4.5–5.9)
RBC # UR STRIP.AUTO: NEGATIVE /UL
RBC #/AREA URNS AUTO: ABNORMAL /HPF
RBC MORPH BLD: ABNORMAL
SCHISTOCYTES BLD QL SMEAR: ABNORMAL
SODIUM SERPL-SCNC: 143 MMOL/L (ref 136–145)
SP GR UR STRIP.AUTO: 1.02
TIBC SERPL-MCNC: 334 UG/DL (ref 240–445)
TOTAL CELLS COUNTED BLD: 100
UIBC SERPL-MCNC: 265 UG/DL (ref 110–370)
UROBILINOGEN UR STRIP.AUTO-MCNC: NORMAL MG/DL
WBC # BLD AUTO: 7.2 X10*3/UL (ref 4.4–11.3)
WBC #/AREA URNS AUTO: ABNORMAL /HPF

## 2024-08-06 PROCEDURE — 99214 OFFICE O/P EST MOD 30 MIN: CPT | Performed by: NURSE PRACTITIONER

## 2024-08-06 PROCEDURE — 3048F LDL-C <100 MG/DL: CPT | Performed by: SURGERY

## 2024-08-06 PROCEDURE — 82728 ASSAY OF FERRITIN: CPT

## 2024-08-06 PROCEDURE — 1159F MED LIST DOCD IN RCRD: CPT | Performed by: SURGERY

## 2024-08-06 PROCEDURE — 83550 IRON BINDING TEST: CPT

## 2024-08-06 PROCEDURE — 3078F DIAST BP <80 MM HG: CPT | Performed by: SURGERY

## 2024-08-06 PROCEDURE — 99211 OFF/OP EST MAY X REQ PHY/QHP: CPT | Performed by: SURGERY

## 2024-08-06 PROCEDURE — 83540 ASSAY OF IRON: CPT

## 2024-08-06 PROCEDURE — 3075F SYST BP GE 130 - 139MM HG: CPT | Performed by: SURGERY

## 2024-08-06 PROCEDURE — 82378 CARCINOEMBRYONIC ANTIGEN: CPT

## 2024-08-06 PROCEDURE — 3051F HG A1C>EQUAL 7.0%<8.0%: CPT | Performed by: SURGERY

## 2024-08-06 PROCEDURE — 3051F HG A1C>EQUAL 7.0%<8.0%: CPT | Performed by: NURSE PRACTITIONER

## 2024-08-06 PROCEDURE — 1159F MED LIST DOCD IN RCRD: CPT | Performed by: NURSE PRACTITIONER

## 2024-08-06 PROCEDURE — 85027 COMPLETE CBC AUTOMATED: CPT

## 2024-08-06 PROCEDURE — 85007 BL SMEAR W/DIFF WBC COUNT: CPT

## 2024-08-06 PROCEDURE — 3048F LDL-C <100 MG/DL: CPT | Performed by: NURSE PRACTITIONER

## 2024-08-06 PROCEDURE — 81001 URINALYSIS AUTO W/SCOPE: CPT

## 2024-08-06 PROCEDURE — 1125F AMNT PAIN NOTED PAIN PRSNT: CPT | Performed by: SURGERY

## 2024-08-06 PROCEDURE — 80053 COMPREHEN METABOLIC PANEL: CPT

## 2024-08-06 RX ORDER — FAMOTIDINE 10 MG/ML
20 INJECTION INTRAVENOUS ONCE AS NEEDED
Status: CANCELLED | OUTPATIENT
Start: 2024-08-09

## 2024-08-06 RX ORDER — DIPHENHYDRAMINE HYDROCHLORIDE 50 MG/ML
50 INJECTION INTRAMUSCULAR; INTRAVENOUS AS NEEDED
Status: CANCELLED | OUTPATIENT
Start: 2024-08-09

## 2024-08-06 RX ORDER — EPINEPHRINE 0.3 MG/.3ML
0.3 INJECTION SUBCUTANEOUS EVERY 5 MIN PRN
Status: CANCELLED | OUTPATIENT
Start: 2024-08-09

## 2024-08-06 RX ORDER — ALBUTEROL SULFATE 0.83 MG/ML
3 SOLUTION RESPIRATORY (INHALATION) AS NEEDED
Status: CANCELLED | OUTPATIENT
Start: 2024-08-09

## 2024-08-06 ASSESSMENT — PAIN SCALES - GENERAL: PAINLEVEL: 2

## 2024-08-06 NOTE — PROGRESS NOTES
Patient in clinic today for follow up visit. Denies any side effects at this time. Patient denies any specific concerns or questions with RN. Medications, allergies, Falls, Skin risk, nutrition assessment and treatment plan reviewed with patient.

## 2024-08-06 NOTE — NURSING NOTE
"[facility] nursing visit outcome: Colostomy and pouching system were assessed. Mr. Corral is doing well with pouching. Painful site at tailbone was assessed. Recommendation for treating this moisture associated skin damage was recommended and care was provided.      [facility] next scheduled visit/plan: TBD    Stoma Type: Loop Colostomy  John: No  Diameter: 1 3/8\"  Location: LLQ  Protrusion: functional lumen Protrudes slightly, flush to slightly retracted distal lumen  Mucosal Condition and Color: Moist, Red  Mucocutaneous Junction: Separation, Location scattered, but greatest near distal lumen from 6 to 9 o'clock  Peristomal Skin: red irritation at 12 o'clock - Dr. Vasquez assessed  Location of Skin Impairment: n/a  Peristomal Contour: Shallow Concave  Supportive Tissue: Semi-Soft  Character of Output:  has varied d/t chemo (more loose/liquid), to somewhat formed (small formed piece in pouch that was removed today)  Emptying Frequency: varies with chemo - up to 5 per day  Removed/Current Pouching System: 2 1/4\" Karrie New Image soft convex, barrier ring, smear of stoma paste, lock n' roll pouch  Current Wearing Time: about 3 Days    Recommendations:   Skin Care: stoma powder to denuded skin as needed with pouch change  Pouching System: as above  Wear Time: 3-4 Days while still having liquid output from chemo  Other: 1. After chemo, may be able to wear wafer for 5-7 days; 2. Also after chemo, may be able to use closed pouches (2 allowed per day)    Supplier: [employer] - account has been set up    Comments: MASD at gluteal cleft measures about 2.5 cm. Site was cleaned with bath wipe and patted dry. Small amount of Critic Aid ointment was applied to the site. Instruction was provided to Mr. Corral and he verbalized understanding.     Time Increment: 45 minutes    RAMEZ KhouryN, RN, CWOCN     "

## 2024-08-06 NOTE — PROGRESS NOTES
Patient ID: Roma Corral is a 69 y.o. male from [place], [place].     Referring Physician: Dann Nieto MD  [id number] [place] [address]    Primary Care Provider: Santiago Buckner MD    Diagnosis:   Rectal cancer with liver mets - 6/2024    Primary Oncologic Surgeon:   Christy    Primary Medical Oncologist:  Dann Nieto MD       Primary Radiation Oncologist:  Elvi    Oncologic Sugery History:  7/2/24 - ex lap with colostomy creation     Oncologic Therapy History:  N/a    Molecular Genetics:  Pending    Current Sites of Disease:  Liver, rectum    Oncologic Problem List:  Metastatic CRC  CAD  T2DM    Oncologic Narrative:  Roma Corral is a 69 y.o. male who is referred by Dr Vasquez for metastatic rectal cancer to liver s/p lap colostomy creation on 7/2/24. .  There is extensive metastatic disease in the liver with most of the right liver occupied with bulky disease which extends into segment 4.  Additionally there are 2 lesions in the left lateral section.  He initially presented with abdominal pain and pelvic pressure with labs showing anemia.  He has undergone imaging including CT scan, MRI liver, MRI rectum.  He has met with Dr. Boles from radiation oncology with a plan to start short course in the near future. He has met with Dr. Erazo and Dr. Harrison to discuss potential future surgeries.       Past Medical History: Roma has a past medical history of Abdominal pain, Acute non-ST elevation myocardial infarction (NSTEMI) (Multi), Anemia, Anxiety, CAD (coronary artery disease), Cardiology follow-up encounter (12/11/2023), Gout, H/O cardiac catheterization (06/01/2021), H/O cardiovascular stress test (09/03/2021), H/O echocardiogram (06/02/2021), High cholesterol, Hypertension, Overweight, Rectal cancer (Multi), and Type 2 diabetes mellitus (Multi).  Surgical History:  Roma has a past surgical history that includes Leg Surgery (Left); Colonoscopy (06/17/2024); and Hemicolectomy w/ ostomy.  Social History:   Roma reports that he has never smoked. He has never used smokeless tobacco. He reports that he does not currently use alcohol. He reports that he does not use drugs.  Family History:    Family History   Problem Relation Name Age of Onset    No Known Problems Mother      No Known Problems Father      No Known Problems Sister      Leukemia Brother       Family Oncology History:  Cancer-related family history is not on file.        SUBJECTIVE:    History of Present Illness:  Roma Corral is a 69 y.o. male who was referred by Dann Nieto MD and presents with chemotherapy follow up   Mr. Corral is seen in follow up   Completed xrt 7/17 7/24 - received 1st dose FOLFOXIRI / Josefina  - here today for assmt prior to C2   Felt exhausted while hooked up to pump   Occ nausea - relieved with anti-emetics   + cold sensitivity lasted a few days   - no neuropathy   -occ looser stools - emptying bag a little more frequently             OBJECTIVE:    VS / Pain:  There were no vitals taken for this visit.  BSA: There is no height or weight on file to calculate BSA.   Pain Scale: 0    Daily Weight  07/26/24 : 91.5 kg (201 lb 11.5 oz)  07/24/24 : 90.6 kg (199 lb 12.8 oz)  07/23/24 : 99.8 kg (220 lb)      Physical Exam  Constitutional:       Appearance: He is normal weight.   HENT:      Nose: Nose normal.      Mouth/Throat:      Mouth: Mucous membranes are moist.      Pharynx: Oropharynx is clear.   Eyes:      Extraocular Movements: Extraocular movements intact.      Conjunctiva/sclera: Conjunctivae normal.   Cardiovascular:      Rate and Rhythm: Normal rate.      Pulses: Normal pulses.   Pulmonary:      Effort: Pulmonary effort is normal.   Abdominal:      General: Abdomen is flat.      Comments: Colostomy bag in place    Musculoskeletal:         General: Normal range of motion.   Skin:     General: Skin is warm.   Neurological:      General: No focal deficit present.      Mental Status: He is alert. Mental status is at baseline.    Psychiatric:         Mood and Affect: Mood normal.         Thought Content: Thought content normal.         Judgment: Judgment normal.         Performance Status:   ECOG 1    Diagnostic Results         WBC   Date/Time Value Ref Range Status   07/24/2024 08:11 AM 4.4 4.4 - 11.3 x10*3/uL Final   07/05/2024 06:01 AM 8.7 4.4 - 11.3 x10*3/uL Final   07/04/2024 05:29 AM 9.1 4.4 - 11.3 x10*3/uL Final     Hemoglobin   Date Value Ref Range Status   07/24/2024 9.3 (L) 13.5 - 17.5 g/dL Final   07/05/2024 7.5 (L) 13.5 - 17.5 g/dL Final   07/04/2024 7.0 (L) 13.5 - 17.5 g/dL Final     MCV   Date/Time Value Ref Range Status   07/24/2024 08:11 AM 73 (L) 80 - 100 fL Final   07/05/2024 06:01 AM 67 (L) 80 - 100 fL Final   07/04/2024 05:29 AM 68 (L) 80 - 100 fL Final     Platelets   Date/Time Value Ref Range Status   07/24/2024 08:11  150 - 450 x10*3/uL Final   07/05/2024 06:01  150 - 450 x10*3/uL Final   07/04/2024 05:29  150 - 450 x10*3/uL Final     Neutrophils Absolute   Date/Time Value Ref Range Status   07/24/2024 08:11 AM 2.69 1.20 - 7.70 x10*3/uL Final     Comment:     Percent differential counts (%) should be interpreted in the context of the absolute cell counts (cells/uL).   06/27/2024 02:56 PM 5.00 1.20 - 7.70 x10*3/uL Final     Comment:     Percent differential counts (%) should be interpreted in the context of the absolute cell counts (cells/uL).   06/23/2021 11:12 AM 4.89 1.20 - 7.70 x10E9/L Final   05/30/2021 03:15 AM 4.74 1.8 - 7.7 K/UL Final   05/29/2021 08:32 PM 3.83 1.8 - 7.7 K/UL Final     Bilirubin, Total   Date/Time Value Ref Range Status   07/24/2024 08:11 AM 0.8 0.0 - 1.2 mg/dL Final   06/06/2024 04:49 PM 0.7 0.0 - 1.2 mg/dL Final   04/18/2024 02:29 PM 0.7 0.0 - 1.2 mg/dL Final     AST   Date/Time Value Ref Range Status   07/24/2024 08:11 AM 31 9 - 39 U/L Final   06/06/2024 04:49 PM 21 9 - 39 U/L Final   04/18/2024 02:29 PM 25 9 - 39 U/L Final     ALT   Date/Time Value Ref Range Status  "  07/24/2024 08:11 AM 12 10 - 52 U/L Final     Comment:     Patients treated with Sulfasalazine may generate falsely decreased results for ALT.   06/06/2024 04:49 PM 12 10 - 52 U/L Final     Comment:     Patients treated with Sulfasalazine may generate falsely decreased results for ALT.   04/18/2024 02:29 PM 13 10 - 52 U/L Final     Comment:     Patients treated with Sulfasalazine may generate falsely decreased results for ALT.     Creatinine   Date/Time Value Ref Range Status   07/24/2024 08:11 AM 0.96 0.50 - 1.30 mg/dL Final   07/05/2024 06:01 AM 1.00 0.50 - 1.30 mg/dL Final   07/04/2024 05:29 AM 1.03 0.50 - 1.30 mg/dL Final     Urea Nitrogen   Date/Time Value Ref Range Status   07/24/2024 08:11 AM 14 6 - 23 mg/dL Final   07/05/2024 06:01 AM 9 6 - 23 mg/dL Final   07/04/2024 05:29 AM 11 6 - 23 mg/dL Final     Albumin   Date/Time Value Ref Range Status   07/24/2024 08:11 AM 3.9 3.4 - 5.0 g/dL Final   06/06/2024 04:49 PM 3.8 3.4 - 5.0 g/dL Final   04/18/2024 02:29 PM 3.7 3.4 - 5.0 g/dL Final     No results found for: \"\"  Carcinoembryonic AG   Date/Time Value Ref Range Status   06/11/2024 04:40 .1 ug/L Final         Assessment/Plan   This is a 70-year-old man with diabetes and coronary artery disease who presented with abdominal pain and anemia in June 2024 and was found to have rectal cancer with diffuse liver metastases.  He has nearly confluent right hepatic liver metastases and 2 lesions in the left lobe.  He has met with Dr. Erazo to discuss potential future surgery should he do well with chemotherapy.  He is also met with Dr. Boles to discuss radiation to the primary tumor which is causing bleeding currently.  7/15 - 7/17 completed radiation.  -7/24 - first dose FOLFOXIRI + Josefina, tolerated     Plan:  Stage IV CRC:  Obtain NGS  C2 FOLFOXIRI / Josefina tomorrow 8/7   RTC to see Dr. Nieto prior to C3 in 2 weeks   Scans after 4 cycles   Follow CEA     Anemia   - likely iron deficiency    - check iron studies " today     Logistics -    - clinic appt - minoff tues   - infusion appt - mentor MELISSA De Guzman-[facility]/ [facility]  Office: [phone]  Fax: [phone]

## 2024-08-07 ENCOUNTER — NUTRITION (OUTPATIENT)
Dept: HEMATOLOGY/ONCOLOGY | Facility: CLINIC | Age: 69
End: 2024-08-07
Payer: MEDICARE

## 2024-08-07 ENCOUNTER — INFUSION (OUTPATIENT)
Dept: HEMATOLOGY/ONCOLOGY | Facility: CLINIC | Age: 69
End: 2024-08-07
Payer: MEDICARE

## 2024-08-07 VITALS
BODY MASS INDEX: 28.68 KG/M2 | TEMPERATURE: 97 F | WEIGHT: 198.08 LBS | SYSTOLIC BLOOD PRESSURE: 142 MMHG | DIASTOLIC BLOOD PRESSURE: 78 MMHG | OXYGEN SATURATION: 98 % | HEART RATE: 74 BPM | RESPIRATION RATE: 16 BRPM

## 2024-08-07 VITALS — HEIGHT: 70 IN | BODY MASS INDEX: 28.25 KG/M2 | WEIGHT: 197.31 LBS

## 2024-08-07 DIAGNOSIS — C20 RECTAL CANCER (MULTI): Primary | ICD-10-CM

## 2024-08-07 PROCEDURE — 96416 CHEMO PROLONG INFUSE W/PUMP: CPT

## 2024-08-07 PROCEDURE — 96375 TX/PRO/DX INJ NEW DRUG ADDON: CPT | Mod: INF

## 2024-08-07 PROCEDURE — 96409 CHEMO IV PUSH SNGL DRUG: CPT

## 2024-08-07 PROCEDURE — 96411 CHEMO IV PUSH ADDL DRUG: CPT

## 2024-08-07 PROCEDURE — 2500000004 HC RX 250 GENERAL PHARMACY W/ HCPCS (ALT 636 FOR OP/ED): Performed by: NURSE PRACTITIONER

## 2024-08-07 RX ORDER — PROCHLORPERAZINE EDISYLATE 5 MG/ML
10 INJECTION INTRAMUSCULAR; INTRAVENOUS EVERY 6 HOURS PRN
Status: DISCONTINUED | OUTPATIENT
Start: 2024-08-07 | End: 2024-08-07 | Stop reason: HOSPADM

## 2024-08-07 RX ORDER — PALONOSETRON 0.05 MG/ML
0.25 INJECTION, SOLUTION INTRAVENOUS ONCE
Status: COMPLETED | OUTPATIENT
Start: 2024-08-07 | End: 2024-08-07

## 2024-08-07 RX ORDER — PROCHLORPERAZINE MALEATE 10 MG
10 TABLET ORAL EVERY 6 HOURS PRN
Status: DISCONTINUED | OUTPATIENT
Start: 2024-08-07 | End: 2024-08-07 | Stop reason: HOSPADM

## 2024-08-07 RX ORDER — ALBUTEROL SULFATE 0.83 MG/ML
3 SOLUTION RESPIRATORY (INHALATION) AS NEEDED
Status: DISCONTINUED | OUTPATIENT
Start: 2024-08-07 | End: 2024-08-07 | Stop reason: HOSPADM

## 2024-08-07 RX ORDER — DIPHENHYDRAMINE HYDROCHLORIDE 50 MG/ML
50 INJECTION INTRAMUSCULAR; INTRAVENOUS AS NEEDED
Status: DISCONTINUED | OUTPATIENT
Start: 2024-08-07 | End: 2024-08-07 | Stop reason: HOSPADM

## 2024-08-07 RX ORDER — EPINEPHRINE 0.3 MG/.3ML
0.3 INJECTION SUBCUTANEOUS EVERY 5 MIN PRN
Status: DISCONTINUED | OUTPATIENT
Start: 2024-08-07 | End: 2024-08-07 | Stop reason: HOSPADM

## 2024-08-07 RX ORDER — ATROPINE SULFATE 0.4 MG/ML
0.4 INJECTION, SOLUTION ENDOTRACHEAL; INTRAMEDULLARY; INTRAMUSCULAR; INTRAVENOUS; SUBCUTANEOUS
Status: DISCONTINUED | OUTPATIENT
Start: 2024-08-07 | End: 2024-08-07 | Stop reason: HOSPADM

## 2024-08-07 RX ORDER — LORAZEPAM 2 MG/ML
1 INJECTION INTRAMUSCULAR AS NEEDED
Status: DISCONTINUED | OUTPATIENT
Start: 2024-08-07 | End: 2024-08-07 | Stop reason: HOSPADM

## 2024-08-07 RX ORDER — FAMOTIDINE 10 MG/ML
20 INJECTION INTRAVENOUS ONCE AS NEEDED
Status: DISCONTINUED | OUTPATIENT
Start: 2024-08-07 | End: 2024-08-07 | Stop reason: HOSPADM

## 2024-08-07 ASSESSMENT — ENCOUNTER SYMPTOMS
LOSS OF SENSATION IN FEET: 0
DEPRESSION: 0
OCCASIONAL FEELINGS OF UNSTEADINESS: 0

## 2024-08-07 ASSESSMENT — PAIN SCALES - GENERAL: PAINLEVEL: 4

## 2024-08-07 ASSESSMENT — COLUMBIA-SUICIDE SEVERITY RATING SCALE - C-SSRS
6. HAVE YOU EVER DONE ANYTHING, STARTED TO DO ANYTHING, OR PREPARED TO DO ANYTHING TO END YOUR LIFE?: NO
2. HAVE YOU ACTUALLY HAD ANY THOUGHTS OF KILLING YOURSELF?: NO

## 2024-08-07 ASSESSMENT — PATIENT HEALTH QUESTIONNAIRE - PHQ9
2. FEELING DOWN, DEPRESSED OR HOPELESS: NOT AT ALL
1. LITTLE INTEREST OR PLEASURE IN DOING THINGS: NOT AT ALL
SUM OF ALL RESPONSES TO PHQ9 QUESTIONS 1 AND 2: 0

## 2024-08-07 NOTE — PROGRESS NOTES
"NUTRITION Follow Up NOTE    Nutrition Assessment     Reason for Visit:  Roma Corral is a 69 y.o. male who presents for treatment of his stage IVB rectal cancer. Extensive metastatic disease to liver.  He underwent a loop colostomy - 7/2//2024  He received RT 7-15 to 7-  TX: FOLFOXIRI + beverley     8/7 - C2 today. Pt seen in infusion for FUV, he was alone. Very pleasant, states he is feeling well.   Seen by Ivana BARCENAS RDN [place] [facility] [place]    Lab Results   Component Value Date/Time    GLUCOSE 108 (H) 08/06/2024 1130     08/06/2024 1130    K 4.1 08/06/2024 1130     (H) 08/06/2024 1130    CO2 24 08/06/2024 1130    ANIONGAP 15 08/06/2024 1130    BUN 14 08/06/2024 1130    CREATININE 0.91 08/06/2024 1130    EGFR >90 08/06/2024 1130    CALCIUM 8.4 (L) 08/06/2024 1130    ALBUMIN 3.6 08/06/2024 1130    ALKPHOS 190 (H) 08/06/2024 1130    PROT 7.2 08/06/2024 1130    AST 21 08/06/2024 1130    BILITOT 0.4 08/06/2024 1130    ALT 20 08/06/2024 1130     Lab Results   Component Value Date/Time    VITD25 22 (A) 06/23/2021 1112       Anthropometrics:  Anthropometrics  Height: 177 cm (5' 9.69\")  Weight: 89.5 kg (197 lb 5 oz)  BMI (Calculated): 28.57  IBW/kg (Dietitian Calculated): 75.5 kg  Percent of IBW: 118 %  Weight Change  Weight History / % Weight Change: #      Wt Readings from Last 10 Encounters:   08/07/24 89.5 kg (197 lb 5 oz)   08/07/24 89.8 kg (198 lb 1.3 oz)   08/06/24 89.4 kg (197 lb)   07/26/24 91.5 kg (201 lb 11.5 oz)   07/24/24 90.6 kg (199 lb 12.8 oz)   07/23/24 99.8 kg (220 lb)   07/17/24 92.3 kg (203 lb 6 oz)   07/11/24 90.7 kg (199 lb 15.3 oz)   07/10/24 90.9 kg (200 lb 4.8 oz)   07/08/24 90.9 kg (200 lb 4.6 oz)     Weight stable +/- a few pounds     Food And Nutrient Intake:  Food and Nutrient History  Food and Nutrient History: eating small amounts of food throughout day, tolerating most all foods, regular diet. Odors bother him at times when wife is cooking  Energy Intake: Good > 75 %  Fluid " "Intake: green tea, water, Pedialyte (~ 8 oz/day) Ensure  GI Symptoms: none (imodium if needed, has not needed to use. stool output soft formed to loser some days. States not diarrhea. antiemetics as needed when odors cause nausea)  GI Symptoms greater than 2 weeks: intermittent  Oral Problems: dysgeusia (metallic taste improved)       Food Intake  Amount of Food: likes fruit, eating good protein sources. Consistent meals. Spouse makes sure he gets a good variety and protein in with meals    Food Preparation  Cooking: Spouse/Significant Other  Grocery Shopping: Spouse/Significant Other                                       Food Supplement Intake  Oral Nutrition Supplements: Ensure (unsure what variety. Has vanilla but prefers other flavors. Taking ~ once daily)           Nutrition Focused Physical Exam Findings: completed 7/24/24                          Energy Needs  Calculated Energy Needs Using Equations  Height: 177 cm (5' 9.69\")  Estimated Energy Needs  Total Energy Estimated Needs (kCal): 2675 kCal  Total Estimated Energy Need per Day (kCal/kg): 30 kCal/kg (actual BW used for assessment weight 89.5 kg)  Estimated Fluid Needs  Total Fluid Estimated Needs (mL): 2675 mL  Method for Estimating Needs: 1 ml/kcal  Estimated Protein Needs  Total Protein Estimated Needs (g): 116 g  Total Protein Estimated Needs (g/kg): 1.3 g/kg      Nutrition Diagnosis   Malnutrition Diagnosis  Patient has Malnutrition Diagnosis: No    Nutrition Diagnosis  Patient has Nutrition Diagnosis: Yes  Diagnosis Status (1): Declining  Nutrition Diagnosis 1: Altered GI function  Related to (1): diagnosis, colostomy placed 7/2/2024 and chemotherapy beginning (7/24/2024)  As Evidenced by (1): potetnial need for diet altreations    Nutrition Interventions/Recommendations   Nutrition Prescription  Individualized Nutrition Prescription Provided for : nutrition during cancer treatment, with colostomy      Food and Nutrition Delivery  Food and Nutrition " Delivery  Meals & Snacks: Energy-modified diet, Fluid-modified diet, Protein-modified diet  Goals: advance to regular diet as tolerated, small frequent meals/snacks, adequate fluid intake - increase as needed based on ostomy output. Include high protein sources with meals  Medical Food Supplement: Commercial beverage (Recommend Ensure Plus or Complete next time he needs to purchase. Unsure what he has at home now. Written info provided with coupons and samples of Ens Complete, coupons for Pedialyte. 1-2/day to maintain weight. Advised he could use store brand plus ONS)    Nutrition Education  Nutrition Education  Nutrition Education Content: Content related nutrition education  Goals: provided with mervin cold sensativity and taste and smell changes handout and Metaquil information  7/24/24-[facility] focusing on foods to thicken stool as needed and primary objectives       Coordination of Care          8/7- Was initially thought pt may be appropriate for enterade on visit 7/24, on follow up, not indicated  Medonc at [facility], infusion at [place]  Explained calorie and protein needs are increased with diagnosis and treatment. The importance of maintaining weight, adequate oral intake and fluids. Explained the role of protein, examples of protein sources provided and how to work into diet.    Pt inquiring about being able to add chocolate syrup to van Ensure, explained this is acceptable, suggested he could also add ice cream, or fruit and sherbet to make a smoothie or shake especially since he enjoys fruit a lot. He is trying to use up vanilla flavor which he is not fond of  +cold sensativity which resolves    Nutrition Monitoring and Evaluation   Food/Nutrient Related History Monitoring  Monitoring and Evaluation Plan: Energy intake, Fluid intake, Protein intake  Energy Intake: Estimated energy intake  Criteria: meet 100% est energy needs  Fluid Intake: Estimated fluid  intake  Criteria: adequate fluid intake  Estimated protein intake: Estimated protein intake  Criteria: meet >75% est protein needs  Body Composition/Growth/Weight History  Monitoring and Evaluation Plan: Weight  Weight: Measured weight  Criteria: maintain weight    Following as needed, encouraged pt to call with any concerns or changes in oral intake  Contact information provided     Time Spent  Prep time on day of patient encounter: 5 minutes  Time spent directly with patient, family or caregiver: 20 minutes  Additional Time Spent on Patient Care Activities: 5 minutes  Documentation Time: 25 minutes  Other Time Spent: 0 minutes  Total: 55 minutes

## 2024-08-09 ENCOUNTER — INFUSION (OUTPATIENT)
Dept: HEMATOLOGY/ONCOLOGY | Facility: CLINIC | Age: 69
End: 2024-08-09
Payer: MEDICARE

## 2024-08-09 ENCOUNTER — APPOINTMENT (OUTPATIENT)
Dept: GASTROENTEROLOGY | Facility: CLINIC | Age: 69
End: 2024-08-09
Payer: MEDICARE

## 2024-08-09 VITALS
SYSTOLIC BLOOD PRESSURE: 159 MMHG | WEIGHT: 197.09 LBS | TEMPERATURE: 96.3 F | DIASTOLIC BLOOD PRESSURE: 80 MMHG | HEART RATE: 68 BPM | RESPIRATION RATE: 16 BRPM | BODY MASS INDEX: 28.54 KG/M2 | OXYGEN SATURATION: 98 %

## 2024-08-09 DIAGNOSIS — C20 RECTAL CANCER (MULTI): ICD-10-CM

## 2024-08-09 PROCEDURE — 2500000004 HC RX 250 GENERAL PHARMACY W/ HCPCS (ALT 636 FOR OP/ED): Mod: JZ,JG | Performed by: NURSE PRACTITIONER

## 2024-08-09 PROCEDURE — 96372 THER/PROPH/DIAG INJ SC/IM: CPT

## 2024-08-09 PROCEDURE — 96523 IRRIG DRUG DELIVERY DEVICE: CPT

## 2024-08-09 RX ORDER — HEPARIN 100 UNIT/ML
500 SYRINGE INTRAVENOUS AS NEEDED
Status: DISCONTINUED | OUTPATIENT
Start: 2024-08-09 | End: 2024-08-09 | Stop reason: HOSPADM

## 2024-08-09 RX ORDER — HEPARIN 100 UNIT/ML
500 SYRINGE INTRAVENOUS AS NEEDED
OUTPATIENT
Start: 2024-08-09

## 2024-08-09 RX ORDER — HEPARIN SODIUM,PORCINE/PF 10 UNIT/ML
50 SYRINGE (ML) INTRAVENOUS AS NEEDED
OUTPATIENT
Start: 2024-08-09

## 2024-08-09 ASSESSMENT — PAIN SCALES - GENERAL: PAINLEVEL: 0-NO PAIN

## 2024-08-13 ENCOUNTER — LAB (OUTPATIENT)
Dept: LAB | Facility: LAB | Age: 69
End: 2024-08-13
Payer: MEDICARE

## 2024-08-13 ENCOUNTER — OFFICE VISIT (OUTPATIENT)
Dept: PRIMARY CARE | Facility: CLINIC | Age: 69
End: 2024-08-13
Payer: MEDICARE

## 2024-08-13 VITALS
WEIGHT: 186 LBS | HEIGHT: 69 IN | BODY MASS INDEX: 27.55 KG/M2 | DIASTOLIC BLOOD PRESSURE: 74 MMHG | SYSTOLIC BLOOD PRESSURE: 138 MMHG

## 2024-08-13 DIAGNOSIS — N50.812 PAIN IN BOTH TESTICLES: ICD-10-CM

## 2024-08-13 DIAGNOSIS — N45.3 EPIDIDYMO-ORCHITIS: Primary | ICD-10-CM

## 2024-08-13 DIAGNOSIS — N41.9 PROSTATITIS, UNSPECIFIED PROSTATITIS TYPE: ICD-10-CM

## 2024-08-13 DIAGNOSIS — N50.811 PAIN IN BOTH TESTICLES: ICD-10-CM

## 2024-08-13 DIAGNOSIS — B37.0 ORAL THRUSH: ICD-10-CM

## 2024-08-13 LAB
ALBUMIN SERPL BCP-MCNC: 3.6 G/DL (ref 3.4–5)
ALP SERPL-CCNC: 176 U/L (ref 33–136)
ALT SERPL W P-5'-P-CCNC: 16 U/L (ref 10–52)
ANION GAP SERPL CALC-SCNC: 16 MMOL/L (ref 10–20)
APPEARANCE UR: ABNORMAL
AST SERPL W P-5'-P-CCNC: 16 U/L (ref 9–39)
BILIRUB SERPL-MCNC: 0.5 MG/DL (ref 0–1.2)
BILIRUB UR STRIP.AUTO-MCNC: NEGATIVE MG/DL
BUN SERPL-MCNC: 24 MG/DL (ref 6–23)
CALCIUM SERPL-MCNC: 8.7 MG/DL (ref 8.6–10.6)
CHLORIDE SERPL-SCNC: 106 MMOL/L (ref 98–107)
CO2 SERPL-SCNC: 24 MMOL/L (ref 21–32)
COLOR UR: ABNORMAL
CREAT SERPL-MCNC: 1.1 MG/DL (ref 0.5–1.3)
EGFRCR SERPLBLD CKD-EPI 2021: 73 ML/MIN/1.73M*2
ERYTHROCYTE [DISTWIDTH] IN BLOOD BY AUTOMATED COUNT: 29.4 % (ref 11.5–14.5)
GLUCOSE SERPL-MCNC: 129 MG/DL (ref 74–99)
GLUCOSE UR STRIP.AUTO-MCNC: NORMAL MG/DL
HCT VFR BLD AUTO: 36.1 % (ref 41–52)
HGB BLD-MCNC: 10.5 G/DL (ref 13.5–17.5)
KETONES UR STRIP.AUTO-MCNC: NEGATIVE MG/DL
LEUKOCYTE ESTERASE UR QL STRIP.AUTO: ABNORMAL
MCH RBC QN AUTO: 22.2 PG (ref 26–34)
MCHC RBC AUTO-ENTMCNC: 29.1 G/DL (ref 32–36)
MCV RBC AUTO: 76 FL (ref 80–100)
MUCOUS THREADS #/AREA URNS AUTO: ABNORMAL /LPF
NITRITE UR QL STRIP.AUTO: NEGATIVE
NRBC BLD-RTO: 0 /100 WBCS (ref 0–0)
PH UR STRIP.AUTO: 5.5 [PH]
PLATELET # BLD AUTO: 165 X10*3/UL (ref 150–450)
POTASSIUM SERPL-SCNC: 4.3 MMOL/L (ref 3.5–5.3)
PROT SERPL-MCNC: 6.9 G/DL (ref 6.4–8.2)
PROT UR STRIP.AUTO-MCNC: ABNORMAL MG/DL
RBC # BLD AUTO: 4.73 X10*6/UL (ref 4.5–5.9)
RBC # UR STRIP.AUTO: ABNORMAL /UL
RBC #/AREA URNS AUTO: >20 /HPF
SODIUM SERPL-SCNC: 142 MMOL/L (ref 136–145)
SP GR UR STRIP.AUTO: 1.03
UROBILINOGEN UR STRIP.AUTO-MCNC: NORMAL MG/DL
WBC # BLD AUTO: 6.1 X10*3/UL (ref 4.4–11.3)
WBC #/AREA URNS AUTO: ABNORMAL /HPF

## 2024-08-13 PROCEDURE — 1159F MED LIST DOCD IN RCRD: CPT | Performed by: INTERNAL MEDICINE

## 2024-08-13 PROCEDURE — 3048F LDL-C <100 MG/DL: CPT | Performed by: INTERNAL MEDICINE

## 2024-08-13 PROCEDURE — 99214 OFFICE O/P EST MOD 30 MIN: CPT | Performed by: INTERNAL MEDICINE

## 2024-08-13 PROCEDURE — 81001 URINALYSIS AUTO W/SCOPE: CPT

## 2024-08-13 PROCEDURE — 3075F SYST BP GE 130 - 139MM HG: CPT | Performed by: INTERNAL MEDICINE

## 2024-08-13 PROCEDURE — 80053 COMPREHEN METABOLIC PANEL: CPT

## 2024-08-13 PROCEDURE — 3008F BODY MASS INDEX DOCD: CPT | Performed by: INTERNAL MEDICINE

## 2024-08-13 PROCEDURE — 3051F HG A1C>EQUAL 7.0%<8.0%: CPT | Performed by: INTERNAL MEDICINE

## 2024-08-13 PROCEDURE — 3078F DIAST BP <80 MM HG: CPT | Performed by: INTERNAL MEDICINE

## 2024-08-13 PROCEDURE — 85027 COMPLETE CBC AUTOMATED: CPT

## 2024-08-13 RX ORDER — NYSTATIN 100000 [USP'U]/ML
500000 SUSPENSION ORAL 4 TIMES DAILY
Qty: 280 ML | Refills: 0 | Status: SHIPPED | OUTPATIENT
Start: 2024-08-13 | End: 2024-10-12

## 2024-08-13 RX ORDER — DOXYCYCLINE 100 MG/1
100 CAPSULE ORAL 2 TIMES DAILY
Qty: 28 CAPSULE | Refills: 0 | Status: SHIPPED | OUTPATIENT
Start: 2024-08-13 | End: 2024-08-27

## 2024-08-13 RX ORDER — CIPROFLOXACIN 500 MG/1
500 TABLET ORAL 2 TIMES DAILY
Qty: 20 TABLET | Refills: 0 | Status: SHIPPED | OUTPATIENT
Start: 2024-08-13 | End: 2024-08-23

## 2024-08-13 ASSESSMENT — ENCOUNTER SYMPTOMS: FREQUENCY: 1

## 2024-08-14 NOTE — PROGRESS NOTES
"Subjective   Patient ID: Roma Corral is a 69 y.o. male who presents for Urinary Frequency and Testicle Pain.    Roma Corral today came here for multiple medical issues.  He is not feeling good.  He has severe pain left testicle for no good reason.  He did not have vasectomy.  Pain and swelling going on.  He had a colostomy bag.  He came for follow-up on various conditions.  Appetite and weight are okay. No problem.  He is undergoing through cancer treatment, chemotherapy.  He had a colostomy.    I have personally reviewed the patient's Past Medical History, Medications, Allergies, Social History, and Family History in the EMR.    Urinary Frequency   Associated symptoms include frequency.   Testicle Pain  The patient's primary symptoms include testicular pain. Associated symptoms include frequency.      Review of Systems   Genitourinary:  Positive for frequency and testicular pain.   All other systems reviewed and are negative.    Objective   /74   Ht 1.753 m (5' 9\")   Wt 84.4 kg (186 lb)   BMI 27.47 kg/m²     Physical Exam  Vitals reviewed.   Cardiovascular:      Heart sounds: Normal heart sounds, S1 normal and S2 normal. No murmur heard.     No friction rub.   Pulmonary:      Effort: Pulmonary effort is normal.      Breath sounds: Normal breath sounds and air entry.   Abdominal:      Palpations: There is no hepatomegaly, splenomegaly or mass.   Genitourinary:     Prostate: Enlarged (slightly).      Comments: GENITAL: Left testicle enlarged, looks like epididymo-orchitis.  Musculoskeletal:      Right lower leg: No edema.      Left lower leg: No edema.   Lymphadenopathy:      Lower Body: No right inguinal adenopathy. No left inguinal adenopathy.   Neurological:      Cranial Nerves: Cranial nerves 2-12 are intact.      Sensory: No sensory deficit.      Motor: Motor function is intact.      Deep Tendon Reflexes: Reflexes are normal and symmetric.     LAB WORK: Laboratory testing discussed.    Assessment/Plan "   Problem List Items Addressed This Visit    None  Visit Diagnoses         Codes    Epididymo-orchitis    -  Primary N45.3    Pain in both testicles     N50.811, N50.812    Relevant Medications    ciprofloxacin (Cipro) 500 mg tablet    doxycycline (Vibramycin) 100 mg capsule    nystatin (Mycostatin) 100,000 unit/mL suspension    Other Relevant Orders    CBC (Completed)    Comprehensive metabolic panel    Urinalysis with Reflex Microscopic (Completed)    Prostatitis, unspecified prostatitis type     N41.9    Oral thrush     B37.0        1. Epididymo-orchitis infection with prostatitis.  Advised drink enough water, Cipro, doxycycline, supportive care, support.  Monitor.  I ordered blood count, chemistry, urine.  2. Oral thrush.  Nystatin given.  3. Nutrition.  We discussed about taking ______ multivitamin and boiled eggs without yellow, egg whites two to three a day for protein.  He understands.  4. Follow-up appointment with me in about a week at 10 days, but if situation changes, he will call me right away.    Scribe Attestation  By signing my name below, I, Marnie Rowell attest that this documentation has been prepared under the direction and in the presence of Santiago Buckner MD.

## 2024-08-15 ENCOUNTER — TELEPHONE (OUTPATIENT)
Dept: HEMATOLOGY/ONCOLOGY | Facility: HOSPITAL | Age: 69
End: 2024-08-15
Payer: MEDICARE

## 2024-08-15 ENCOUNTER — INFUSION (OUTPATIENT)
Dept: HEMATOLOGY/ONCOLOGY | Facility: CLINIC | Age: 69
End: 2024-08-15
Payer: MEDICARE

## 2024-08-15 VITALS
OXYGEN SATURATION: 99 % | DIASTOLIC BLOOD PRESSURE: 79 MMHG | RESPIRATION RATE: 17 BRPM | HEART RATE: 96 BPM | TEMPERATURE: 97.2 F | SYSTOLIC BLOOD PRESSURE: 144 MMHG

## 2024-08-15 DIAGNOSIS — A09 DIARRHEA OF INFECTIOUS ORIGIN: ICD-10-CM

## 2024-08-15 DIAGNOSIS — C20 RECTAL CANCER (MULTI): Primary | ICD-10-CM

## 2024-08-15 DIAGNOSIS — C20 RECTAL CANCER (MULTI): ICD-10-CM

## 2024-08-15 PROCEDURE — 2500000004 HC RX 250 GENERAL PHARMACY W/ HCPCS (ALT 636 FOR OP/ED): Performed by: INTERNAL MEDICINE

## 2024-08-15 PROCEDURE — 2500000004 HC RX 250 GENERAL PHARMACY W/ HCPCS (ALT 636 FOR OP/ED): Performed by: NURSE PRACTITIONER

## 2024-08-15 PROCEDURE — 87493 C DIFF AMPLIFIED PROBE: CPT | Performed by: INTERNAL MEDICINE

## 2024-08-15 PROCEDURE — 96360 HYDRATION IV INFUSION INIT: CPT | Mod: INF

## 2024-08-15 RX ORDER — PROCHLORPERAZINE MALEATE 10 MG
10 TABLET ORAL EVERY 6 HOURS PRN
OUTPATIENT
Start: 2024-08-28

## 2024-08-15 RX ORDER — HEPARIN 100 UNIT/ML
500 SYRINGE INTRAVENOUS AS NEEDED
Status: DISCONTINUED | OUTPATIENT
Start: 2024-08-15 | End: 2024-08-15 | Stop reason: HOSPADM

## 2024-08-15 RX ORDER — EPINEPHRINE 0.3 MG/.3ML
0.3 INJECTION SUBCUTANEOUS EVERY 5 MIN PRN
OUTPATIENT
Start: 2024-08-28

## 2024-08-15 RX ORDER — DIPHENHYDRAMINE HYDROCHLORIDE 50 MG/ML
50 INJECTION INTRAMUSCULAR; INTRAVENOUS AS NEEDED
OUTPATIENT
Start: 2024-08-28

## 2024-08-15 RX ORDER — HEPARIN SODIUM,PORCINE/PF 10 UNIT/ML
50 SYRINGE (ML) INTRAVENOUS AS NEEDED
OUTPATIENT
Start: 2024-08-15

## 2024-08-15 RX ORDER — ALBUTEROL SULFATE 0.83 MG/ML
3 SOLUTION RESPIRATORY (INHALATION) AS NEEDED
OUTPATIENT
Start: 2024-08-28

## 2024-08-15 RX ORDER — FAMOTIDINE 10 MG/ML
20 INJECTION INTRAVENOUS ONCE AS NEEDED
OUTPATIENT
Start: 2024-08-28

## 2024-08-15 RX ORDER — LORAZEPAM 2 MG/ML
1 INJECTION INTRAMUSCULAR AS NEEDED
OUTPATIENT
Start: 2024-08-28

## 2024-08-15 RX ORDER — ALBUTEROL SULFATE 0.83 MG/ML
3 SOLUTION RESPIRATORY (INHALATION) AS NEEDED
OUTPATIENT
Start: 2024-08-30

## 2024-08-15 RX ORDER — ATROPINE SULFATE 0.4 MG/ML
0.4 INJECTION, SOLUTION ENDOTRACHEAL; INTRAMEDULLARY; INTRAMUSCULAR; INTRAVENOUS; SUBCUTANEOUS
OUTPATIENT
Start: 2024-08-28

## 2024-08-15 RX ORDER — PROCHLORPERAZINE EDISYLATE 5 MG/ML
10 INJECTION INTRAMUSCULAR; INTRAVENOUS EVERY 6 HOURS PRN
OUTPATIENT
Start: 2024-08-28

## 2024-08-15 RX ORDER — HEPARIN 100 UNIT/ML
500 SYRINGE INTRAVENOUS AS NEEDED
OUTPATIENT
Start: 2024-08-15

## 2024-08-15 RX ORDER — EPINEPHRINE 0.3 MG/.3ML
0.3 INJECTION SUBCUTANEOUS EVERY 5 MIN PRN
OUTPATIENT
Start: 2024-08-30

## 2024-08-15 RX ORDER — HEPARIN SODIUM,PORCINE/PF 10 UNIT/ML
50 SYRINGE (ML) INTRAVENOUS AS NEEDED
Status: DISCONTINUED | OUTPATIENT
Start: 2024-08-15 | End: 2024-08-15 | Stop reason: HOSPADM

## 2024-08-15 RX ORDER — PALONOSETRON 0.05 MG/ML
0.25 INJECTION, SOLUTION INTRAVENOUS ONCE
OUTPATIENT
Start: 2024-08-28

## 2024-08-15 RX ORDER — FAMOTIDINE 10 MG/ML
20 INJECTION INTRAVENOUS ONCE AS NEEDED
OUTPATIENT
Start: 2024-08-30

## 2024-08-15 RX ORDER — DIPHENHYDRAMINE HYDROCHLORIDE 50 MG/ML
50 INJECTION INTRAMUSCULAR; INTRAVENOUS AS NEEDED
OUTPATIENT
Start: 2024-08-30

## 2024-08-15 NOTE — TELEPHONE ENCOUNTER
"Returned Roma's call back.  He states that about 2 days after pump disconnect he usually feels back to his normal self, however, after last infusion he is struggling with intermittent abdominal pain. He states that currently its \"about a 4\" but at night it \"feels like a ten\". His ostomy output has been very loose and describes the amount as   \"A lot. More than usual\".  He also stated he was having issues urinating and having pain in his right testicle. He saw his PCP Tuesday and found he had a UTI.  I explained that the abdominal pain could be from the UTI and that he needs to begin the antibiotics prescribed by PCP. This may help the pain.   I also messaged [facility] to see if they have a spot for him to receive fluids.  I also recommended that if he experiences this pain again tonight and it does not subside, he should seek medical attention at the nearest ED.  He was grateful for the call and agreed with the plan. I will follow up.  "

## 2024-08-15 NOTE — TELEPHONE ENCOUNTER
Pt left a voicemail on the RN triage line returning call from RN partner.  No other information was provided.  He will be available at [phone]

## 2024-08-15 NOTE — TELEPHONE ENCOUNTER
Spoke with Roma again to inform him that [facility] has no available times for IV hydration tomorrow. Able to fit him in if he goes now. Pt accepted appt and will heading to [facility] shortly for IVF.  He will begin his antibiotics and call us if symptoms don't get any better.  He also agreed to go to the ER if abdominal pain gets worse.  No further needs at this time. Next fuv 8/20.

## 2024-08-15 NOTE — PROGRESS NOTES
Pt arrived for hydration, vague about output from ostomy, pt unsure if he having increased output states is looks different, not good, was told to push fluids when he call his doctors office so he has been drinking a lot of fluids but has not focused on solid food as much, but still eating some solids, denies N/V

## 2024-08-16 LAB — C DIF TOX TCDA+TCDB STL QL NAA+PROBE: NOT DETECTED

## 2024-08-17 ENCOUNTER — APPOINTMENT (OUTPATIENT)
Dept: RADIOLOGY | Facility: HOSPITAL | Age: 69
End: 2024-08-17
Payer: MEDICARE

## 2024-08-17 ENCOUNTER — PHARMACY VISIT (OUTPATIENT)
Dept: PHARMACY | Facility: CLINIC | Age: 69
End: 2024-08-17
Payer: COMMERCIAL

## 2024-08-17 ENCOUNTER — HOSPITAL ENCOUNTER (EMERGENCY)
Facility: HOSPITAL | Age: 69
Discharge: HOME | End: 2024-08-17
Attending: STUDENT IN AN ORGANIZED HEALTH CARE EDUCATION/TRAINING PROGRAM
Payer: MEDICARE

## 2024-08-17 VITALS
OXYGEN SATURATION: 99 % | HEIGHT: 70 IN | HEART RATE: 98 BPM | TEMPERATURE: 98.2 F | SYSTOLIC BLOOD PRESSURE: 120 MMHG | DIASTOLIC BLOOD PRESSURE: 87 MMHG | WEIGHT: 187 LBS | BODY MASS INDEX: 26.77 KG/M2 | RESPIRATION RATE: 18 BRPM

## 2024-08-17 DIAGNOSIS — R10.9 ABDOMINAL PAIN, UNSPECIFIED ABDOMINAL LOCATION: Primary | ICD-10-CM

## 2024-08-17 DIAGNOSIS — K52.9 ENTERITIS: ICD-10-CM

## 2024-08-17 DIAGNOSIS — N20.0 KIDNEY STONE: ICD-10-CM

## 2024-08-17 LAB
ALBUMIN SERPL-MCNC: 3.2 G/DL (ref 3.5–5)
ALP BLD-CCNC: 177 U/L (ref 35–125)
ALT SERPL-CCNC: 13 U/L (ref 5–40)
ANION GAP SERPL CALC-SCNC: 14 MMOL/L
APPEARANCE UR: CLEAR
AST SERPL-CCNC: 18 U/L (ref 5–40)
BACTERIA #/AREA URNS AUTO: ABNORMAL /HPF
BASOPHILS # BLD MANUAL: 0.06 X10*3/UL (ref 0–0.1)
BASOPHILS NFR BLD MANUAL: 1 %
BILIRUB SERPL-MCNC: 0.3 MG/DL (ref 0.1–1.2)
BILIRUB UR STRIP.AUTO-MCNC: NEGATIVE MG/DL
BUN SERPL-MCNC: 15 MG/DL (ref 8–25)
CALCIUM SERPL-MCNC: 8.6 MG/DL (ref 8.5–10.4)
CHLORIDE SERPL-SCNC: 105 MMOL/L (ref 97–107)
CO2 SERPL-SCNC: 19 MMOL/L (ref 24–31)
COLOR UR: ABNORMAL
CREAT SERPL-MCNC: 1.2 MG/DL (ref 0.4–1.6)
EGFRCR SERPLBLD CKD-EPI 2021: 65 ML/MIN/1.73M*2
EOSINOPHIL # BLD MANUAL: 0 X10*3/UL (ref 0–0.7)
EOSINOPHIL NFR BLD MANUAL: 0 %
ERYTHROCYTE [DISTWIDTH] IN BLOOD BY AUTOMATED COUNT: 29.7 % (ref 11.5–14.5)
GLUCOSE SERPL-MCNC: 200 MG/DL (ref 65–99)
GLUCOSE UR STRIP.AUTO-MCNC: NORMAL MG/DL
HCT VFR BLD AUTO: 35.1 % (ref 41–52)
HGB BLD-MCNC: 10.5 G/DL (ref 13.5–17.5)
HOLD SPECIMEN: NORMAL
IMM GRANULOCYTES # BLD AUTO: 0.05 X10*3/UL (ref 0–0.7)
IMM GRANULOCYTES NFR BLD AUTO: 0.9 % (ref 0–0.9)
KETONES UR STRIP.AUTO-MCNC: NEGATIVE MG/DL
LACTATE BLDV-SCNC: 1.8 MMOL/L (ref 0.4–2)
LACTATE BLDV-SCNC: 2.2 MMOL/L (ref 0.4–2)
LEUKOCYTE ESTERASE UR QL STRIP.AUTO: NEGATIVE
LYMPHOCYTES # BLD MANUAL: 1.01 X10*3/UL (ref 1.2–4.8)
LYMPHOCYTES NFR BLD MANUAL: 18 %
MCH RBC QN AUTO: 22.6 PG (ref 26–34)
MCHC RBC AUTO-ENTMCNC: 29.9 G/DL (ref 32–36)
MCV RBC AUTO: 76 FL (ref 80–100)
MONOCYTES # BLD MANUAL: 0.5 X10*3/UL (ref 0.1–1)
MONOCYTES NFR BLD MANUAL: 9 %
MUCOUS THREADS #/AREA URNS AUTO: ABNORMAL /LPF
NEUTROPHILS # BLD MANUAL: 4.03 X10*3/UL (ref 1.2–7.7)
NEUTS BAND # BLD MANUAL: 0.28 X10*3/UL (ref 0–0.7)
NEUTS BAND NFR BLD MANUAL: 5 %
NEUTS SEG # BLD MANUAL: 3.75 X10*3/UL (ref 1.2–7)
NEUTS SEG NFR BLD MANUAL: 67 %
NITRITE UR QL STRIP.AUTO: NEGATIVE
NRBC BLD-RTO: 0 /100 WBCS (ref 0–0)
OVALOCYTES BLD QL SMEAR: ABNORMAL
PH UR STRIP.AUTO: 5.5 [PH]
PLATELET # BLD AUTO: 161 X10*3/UL (ref 150–450)
POLYCHROMASIA BLD QL SMEAR: ABNORMAL
POTASSIUM SERPL-SCNC: 4.3 MMOL/L (ref 3.4–5.1)
PROT SERPL-MCNC: 6.7 G/DL (ref 5.9–7.9)
PROT UR STRIP.AUTO-MCNC: ABNORMAL MG/DL
RBC # BLD AUTO: 4.65 X10*6/UL (ref 4.5–5.9)
RBC # UR STRIP.AUTO: ABNORMAL /UL
RBC #/AREA URNS AUTO: ABNORMAL /HPF
RBC MORPH BLD: ABNORMAL
SCHISTOCYTES BLD QL SMEAR: ABNORMAL
SODIUM SERPL-SCNC: 138 MMOL/L (ref 133–145)
SP GR UR STRIP.AUTO: >1.05
TOTAL CELLS COUNTED BLD: 100
UROBILINOGEN UR STRIP.AUTO-MCNC: NORMAL MG/DL
WBC # BLD AUTO: 5.6 X10*3/UL (ref 4.4–11.3)
WBC #/AREA URNS AUTO: ABNORMAL /HPF

## 2024-08-17 PROCEDURE — 83605 ASSAY OF LACTIC ACID: CPT | Performed by: STUDENT IN AN ORGANIZED HEALTH CARE EDUCATION/TRAINING PROGRAM

## 2024-08-17 PROCEDURE — 96374 THER/PROPH/DIAG INJ IV PUSH: CPT | Mod: 59

## 2024-08-17 PROCEDURE — 74177 CT ABD & PELVIS W/CONTRAST: CPT | Performed by: RADIOLOGY

## 2024-08-17 PROCEDURE — 96375 TX/PRO/DX INJ NEW DRUG ADDON: CPT

## 2024-08-17 PROCEDURE — 2500000004 HC RX 250 GENERAL PHARMACY W/ HCPCS (ALT 636 FOR OP/ED): Performed by: STUDENT IN AN ORGANIZED HEALTH CARE EDUCATION/TRAINING PROGRAM

## 2024-08-17 PROCEDURE — 99284 EMERGENCY DEPT VISIT MOD MDM: CPT | Mod: 25

## 2024-08-17 PROCEDURE — 85027 COMPLETE CBC AUTOMATED: CPT | Performed by: STUDENT IN AN ORGANIZED HEALTH CARE EDUCATION/TRAINING PROGRAM

## 2024-08-17 PROCEDURE — RXOTC WILLOW AMBULATORY OTC CHARGE

## 2024-08-17 PROCEDURE — 96361 HYDRATE IV INFUSION ADD-ON: CPT

## 2024-08-17 PROCEDURE — 84075 ASSAY ALKALINE PHOSPHATASE: CPT | Performed by: STUDENT IN AN ORGANIZED HEALTH CARE EDUCATION/TRAINING PROGRAM

## 2024-08-17 PROCEDURE — 85007 BL SMEAR W/DIFF WBC COUNT: CPT | Performed by: STUDENT IN AN ORGANIZED HEALTH CARE EDUCATION/TRAINING PROGRAM

## 2024-08-17 PROCEDURE — 74177 CT ABD & PELVIS W/CONTRAST: CPT

## 2024-08-17 PROCEDURE — RXMED WILLOW AMBULATORY MEDICATION CHARGE

## 2024-08-17 PROCEDURE — 2550000001 HC RX 255 CONTRASTS: Performed by: STUDENT IN AN ORGANIZED HEALTH CARE EDUCATION/TRAINING PROGRAM

## 2024-08-17 PROCEDURE — 81001 URINALYSIS AUTO W/SCOPE: CPT | Performed by: STUDENT IN AN ORGANIZED HEALTH CARE EDUCATION/TRAINING PROGRAM

## 2024-08-17 RX ORDER — OXYCODONE HYDROCHLORIDE 5 MG/1
5 TABLET ORAL EVERY 8 HOURS PRN
Qty: 9 TABLET | Refills: 0 | Status: SHIPPED | OUTPATIENT
Start: 2024-08-17 | End: 2024-08-17

## 2024-08-17 RX ORDER — MORPHINE SULFATE 4 MG/ML
4 INJECTION, SOLUTION INTRAMUSCULAR; INTRAVENOUS ONCE
Status: COMPLETED | OUTPATIENT
Start: 2024-08-17 | End: 2024-08-17

## 2024-08-17 RX ORDER — KETOROLAC TROMETHAMINE 30 MG/ML
15 INJECTION, SOLUTION INTRAMUSCULAR; INTRAVENOUS ONCE
Status: COMPLETED | OUTPATIENT
Start: 2024-08-17 | End: 2024-08-17

## 2024-08-17 RX ORDER — HYDROCODONE BITARTRATE AND ACETAMINOPHEN 5; 325 MG/1; MG/1
1 TABLET ORAL EVERY 6 HOURS PRN
Qty: 9 TABLET | Refills: 0 | Status: SHIPPED | OUTPATIENT
Start: 2024-08-17 | End: 2024-08-20

## 2024-08-17 RX ORDER — ONDANSETRON 4 MG/1
4 TABLET, ORALLY DISINTEGRATING ORAL EVERY 8 HOURS PRN
Qty: 15 TABLET | Refills: 0 | Status: SHIPPED | OUTPATIENT
Start: 2024-08-17 | End: 2024-08-23 | Stop reason: SDUPTHER

## 2024-08-17 RX ORDER — TAMSULOSIN HYDROCHLORIDE 0.4 MG/1
0.4 CAPSULE ORAL DAILY
Qty: 14 CAPSULE | Refills: 0 | Status: SHIPPED | OUTPATIENT
Start: 2024-08-17 | End: 2024-08-23 | Stop reason: SDUPTHER

## 2024-08-17 RX ORDER — ONDANSETRON HYDROCHLORIDE 2 MG/ML
4 INJECTION, SOLUTION INTRAVENOUS ONCE
Status: COMPLETED | OUTPATIENT
Start: 2024-08-17 | End: 2024-08-17

## 2024-08-17 RX ADMIN — SODIUM CHLORIDE 1000 ML: 9 INJECTION, SOLUTION INTRAVENOUS at 07:23

## 2024-08-17 RX ADMIN — ONDANSETRON 4 MG: 2 INJECTION, SOLUTION INTRAMUSCULAR; INTRAVENOUS at 07:22

## 2024-08-17 RX ADMIN — SODIUM CHLORIDE 1000 ML: 9 INJECTION, SOLUTION INTRAVENOUS at 09:35

## 2024-08-17 RX ADMIN — MORPHINE SULFATE 4 MG: 4 INJECTION, SOLUTION INTRAMUSCULAR; INTRAVENOUS at 07:23

## 2024-08-17 RX ADMIN — IOHEXOL 75 ML: 350 INJECTION, SOLUTION INTRAVENOUS at 08:17

## 2024-08-17 RX ADMIN — KETOROLAC TROMETHAMINE 15 MG: 30 INJECTION, SOLUTION INTRAMUSCULAR at 09:35

## 2024-08-17 ASSESSMENT — COLUMBIA-SUICIDE SEVERITY RATING SCALE - C-SSRS
2. HAVE YOU ACTUALLY HAD ANY THOUGHTS OF KILLING YOURSELF?: NO
6. HAVE YOU EVER DONE ANYTHING, STARTED TO DO ANYTHING, OR PREPARED TO DO ANYTHING TO END YOUR LIFE?: NO
1. IN THE PAST MONTH, HAVE YOU WISHED YOU WERE DEAD OR WISHED YOU COULD GO TO SLEEP AND NOT WAKE UP?: NO

## 2024-08-17 ASSESSMENT — PAIN SCALES - GENERAL
PAINLEVEL_OUTOF10: 0 - NO PAIN
PAINLEVEL_OUTOF10: 7
PAINLEVEL_OUTOF10: 0 - NO PAIN
PAINLEVEL_OUTOF10: 10 - WORST POSSIBLE PAIN

## 2024-08-17 ASSESSMENT — PAIN - FUNCTIONAL ASSESSMENT
PAIN_FUNCTIONAL_ASSESSMENT: 0-10

## 2024-08-17 NOTE — DISCHARGE INSTRUCTIONS
Your CAT scan showed that you have a kidney stone.  You have been given pain medication as well as nausea medication which you may use as needed.  You may also use Tylenol and ibuprofen.  You should try to drink extra fluids over the coming days.  You should follow-up with a urologist regarding this finding.  Your CAT scan also revealed enteritis, and generalized inflammation of the bowel.  You should follow-up with your surgeon.  Return to the emergency department with any worsening symptoms.    It is important to remember that your care does not end here and you must continue to monitor your condition closely. Please return to the emergency department for any worsening or concerning signs or symptoms as directed by our conversations and the discharge instructions. If you do not have a doctor please contact the referral number on your discharge instructions. Please contact any physician specialists provided in your discharge notes as it is very important to follow up with them regarding your condition. If you are unable to reach the physicians provided, please come back to the Emergency Department at any time.    Return to emergency room without delay for ANY new or worsening pains or for any other symptoms or concerns.  Return with worsening pains, nausea, vomiting, trouble breathing, palpitations, shortness of breath, inability to pass stool or urine, loss of control of stool or urine, any numbness or tingling (that is not normal for you), uncontrolled fevers, the passing of blood or other material in stool or urine, rashes, pains or for any other symptoms or concerns you may have.  You are always welcome to return to the ER at any time for any reason or for any other concerns you may have.

## 2024-08-17 NOTE — ED PROVIDER NOTES
HPI   Chief Complaint   Patient presents with    Colostomy Issues       Patient with history of rectal cancer status post colostomy 2 months ago with Dr. Vasquez at [facility], presents with abdominal pain.  He has not been able to eat or drink because it causes abdominal pain.  He did have an infusion recently that seemed to help.  He is currently being treated for epididymoorchitis with Cipro and doxycycline prescribed by Dr. Buckner.  He reports that abdominal pain has grown worse.  He has not had any output into his ostomy bag for the last 8 hours.  It also hurts to urinate.              Patient History   Past Medical History:   Diagnosis Date    Abdominal pain     Acute non-ST elevation myocardial infarction (NSTEMI) (Multi)     Anemia     6/6/24 HGB 8.6; HCT 31.5    Anxiety     CAD (coronary artery disease)     Cardiology follow-up encounter 12/11/2023    Sharif Lau MD    Gout     H/O cardiac catheterization 06/01/2021    H/O cardiovascular stress test 09/03/2021    IMPRESSION: 1. No evidence of ischemia. 2. Suspicion of an infarct along the mid anterior wall. 3. Left ventricular dilatation is noted. 4. Left ventricular EF was calculated to be 50%. Summary:  1. No clinical or electrocardiographic evidence for ischemia at a submaximal workload. 3. The blunted heart rate diminshes the sensitivity of this test.  4. The submaximal level of stress was achieved.    H/O echocardiogram 06/02/2021    Mild concentric Left ventricle hypertrophy.  Global left ventricular wall motion and contractility are within normal limits.  There is normal left ventricular systolic function.  Estimated ejection fraction is 60-64%.  The left atrial size is mildly dilated.  Mild aortic regurgitation.  A trace of mitral regurgitation.  Trivial to mild tricuspid regurgitation.  There is no pulmonary hypertension.    High cholesterol     Hypertension     Overweight     Rectal cancer (Multi)     Type 2 diabetes mellitus (Multi)     4/18/2024  A1C 7.5%     Past Surgical History:   Procedure Laterality Date    COLONOSCOPY  06/17/2024    HEMICOLECTOMY W/ OSTOMY      LEG SURGERY Left     rodrigo placed     Family History   Problem Relation Name Age of Onset    No Known Problems Mother      No Known Problems Father      No Known Problems Sister      Leukemia Brother       Social History     Tobacco Use    Smoking status: Never    Smokeless tobacco: Never   Vaping Use    Vaping status: Never Used   Substance Use Topics    Alcohol use: Not Currently    Drug use: Never       Physical Exam   ED Triage Vitals [08/17/24 0700]   Temperature Heart Rate Respirations BP   36.8 °C (98.2 °F) (!) 111 16 116/81      Pulse Ox Temp Source Heart Rate Source Patient Position   99 % Temporal Monitor Standing      BP Location FiO2 (%)     Left arm --       Physical Exam  HENT:      Head: Normocephalic.   Eyes:      General: No scleral icterus.  Cardiovascular:      Rate and Rhythm: Tachycardia present.   Pulmonary:      Effort: Pulmonary effort is normal.   Abdominal:      Comments: Diffuse severe abdominal tenderness to palpation.  Ostomy in place with no surrounding erythema.  Very small amount of fluid/stool noted in ostomy bag.   Neurological:      Mental Status: He is alert.           ED Course & MDM   Diagnoses as of 08/17/24 1057   Abdominal pain, unspecified abdominal location   Kidney stone   Enteritis                 No data recorded     [place] Coma Scale Score: 15 (08/17/24 0704 : Keely Mosqueda RN)                           Medical Decision Making  Laboratory studies reveal kidney function within normal limits compared with previous measurements.  CT reveals enteritis as well as kidney stone.  Patient was able to tolerate oral intake in the emergency department.  Patient encouraged to drink extra fluids, use Tylenol, ibuprofen, and given prescription for Flomax, oxycodone, and Zofran.  Patient advised to follow-up with urology, general surgery, and primary care  physician.  Return precautions given for any worsening symptoms.  My suspicion for bowel obstruction, bowel ischemia, perforation, AAA, aortic dissection, appendicitis, cholecystitis as etiology of symptoms is very low.  Parts of this chart were completed with dictation software, please excuse any errors in transcription.        Procedure  Procedures     Sean Holliday MD  08/17/24 9273

## 2024-08-20 ENCOUNTER — OFFICE VISIT (OUTPATIENT)
Dept: HEMATOLOGY/ONCOLOGY | Facility: CLINIC | Age: 69
End: 2024-08-20
Payer: MEDICARE

## 2024-08-20 VITALS
TEMPERATURE: 96.8 F | HEART RATE: 85 BPM | SYSTOLIC BLOOD PRESSURE: 129 MMHG | DIASTOLIC BLOOD PRESSURE: 77 MMHG | WEIGHT: 190.92 LBS | BODY MASS INDEX: 27.39 KG/M2 | OXYGEN SATURATION: 95 % | RESPIRATION RATE: 18 BRPM

## 2024-08-20 DIAGNOSIS — C20 RECTAL CANCER (MULTI): Primary | ICD-10-CM

## 2024-08-20 PROCEDURE — 1126F AMNT PAIN NOTED NONE PRSNT: CPT | Performed by: INTERNAL MEDICINE

## 2024-08-20 PROCEDURE — 99215 OFFICE O/P EST HI 40 MIN: CPT | Performed by: INTERNAL MEDICINE

## 2024-08-20 PROCEDURE — 3048F LDL-C <100 MG/DL: CPT | Performed by: INTERNAL MEDICINE

## 2024-08-20 PROCEDURE — 1159F MED LIST DOCD IN RCRD: CPT | Performed by: INTERNAL MEDICINE

## 2024-08-20 PROCEDURE — 3074F SYST BP LT 130 MM HG: CPT | Performed by: INTERNAL MEDICINE

## 2024-08-20 PROCEDURE — 3051F HG A1C>EQUAL 7.0%<8.0%: CPT | Performed by: INTERNAL MEDICINE

## 2024-08-20 PROCEDURE — 3078F DIAST BP <80 MM HG: CPT | Performed by: INTERNAL MEDICINE

## 2024-08-20 RX ORDER — ALBUTEROL SULFATE 0.83 MG/ML
3 SOLUTION RESPIRATORY (INHALATION) AS NEEDED
OUTPATIENT
Start: 2024-09-20

## 2024-08-20 RX ORDER — DIPHENHYDRAMINE HYDROCHLORIDE 50 MG/ML
50 INJECTION INTRAMUSCULAR; INTRAVENOUS AS NEEDED
OUTPATIENT
Start: 2024-09-20

## 2024-08-20 RX ORDER — EPINEPHRINE 0.3 MG/.3ML
0.3 INJECTION SUBCUTANEOUS EVERY 5 MIN PRN
OUTPATIENT
Start: 2024-09-18

## 2024-08-20 RX ORDER — EPINEPHRINE 0.3 MG/.3ML
0.3 INJECTION SUBCUTANEOUS EVERY 5 MIN PRN
OUTPATIENT
Start: 2024-09-20

## 2024-08-20 RX ORDER — DIPHENHYDRAMINE HYDROCHLORIDE 50 MG/ML
50 INJECTION INTRAMUSCULAR; INTRAVENOUS AS NEEDED
OUTPATIENT
Start: 2024-09-18

## 2024-08-20 RX ORDER — LORAZEPAM 2 MG/ML
1 INJECTION INTRAMUSCULAR AS NEEDED
OUTPATIENT
Start: 2024-09-18

## 2024-08-20 RX ORDER — PALONOSETRON 0.05 MG/ML
0.25 INJECTION, SOLUTION INTRAVENOUS ONCE
OUTPATIENT
Start: 2024-09-18

## 2024-08-20 RX ORDER — PROCHLORPERAZINE MALEATE 10 MG
10 TABLET ORAL EVERY 6 HOURS PRN
OUTPATIENT
Start: 2024-09-18

## 2024-08-20 RX ORDER — FAMOTIDINE 10 MG/ML
20 INJECTION INTRAVENOUS ONCE AS NEEDED
OUTPATIENT
Start: 2024-09-18

## 2024-08-20 RX ORDER — PROCHLORPERAZINE EDISYLATE 5 MG/ML
10 INJECTION INTRAMUSCULAR; INTRAVENOUS EVERY 6 HOURS PRN
OUTPATIENT
Start: 2024-09-18

## 2024-08-20 RX ORDER — FAMOTIDINE 10 MG/ML
20 INJECTION INTRAVENOUS ONCE AS NEEDED
OUTPATIENT
Start: 2024-09-20

## 2024-08-20 RX ORDER — ALBUTEROL SULFATE 0.83 MG/ML
3 SOLUTION RESPIRATORY (INHALATION) AS NEEDED
OUTPATIENT
Start: 2024-09-18

## 2024-08-20 RX ORDER — ATROPINE SULFATE 0.4 MG/ML
0.4 INJECTION, SOLUTION ENDOTRACHEAL; INTRAMEDULLARY; INTRAMUSCULAR; INTRAVENOUS; SUBCUTANEOUS
OUTPATIENT
Start: 2024-09-18

## 2024-08-20 ASSESSMENT — ENCOUNTER SYMPTOMS
OCCASIONAL FEELINGS OF UNSTEADINESS: 0
DEPRESSION: 0
LOSS OF SENSATION IN FEET: 0

## 2024-08-20 ASSESSMENT — PAIN SCALES - GENERAL: PAINLEVEL: 0-NO PAIN

## 2024-08-20 NOTE — PROGRESS NOTES
Patient here with wife. He states he had a rough week. He was having major pain in his abdomen after getting fluids. He also had pain and swelling in his L testicle and was having trouble urinating. He called Dr Vasquez to discuss, and he sent him to the ED to get evaluated. They did an MRI which found a 9mm stone. They gave him abx for 10 days and put in referral for urology. He wanted to confirm with Dr Nieto what to do moving forward as he did not want anything to interfere with treatment. Treatment will be postponed for 1 week.

## 2024-08-20 NOTE — PROGRESS NOTES
Patient ID: Roma Corral is a 69 y.o. male from [place], [place].     Referring Physician: Dann Nieto MD  [id number] [place] [address]    Primary Care Provider: Santiago Buckner MD    Diagnosis:   Rectal cancer with liver mets - 6/2024    Primary Oncologic Surgeon:   Christy    Primary Medical Oncologist:  Dann Nieto MD       Primary Radiation Oncologist:  Elvi    Oncologic Sugery History:  7/2/24 - ex lap with colostomy creation     Oncologic Therapy History:  N/a    Molecular Genetics:  Pending    Current Sites of Disease:  Liver, rectum    Oncologic Problem List:  Metastatic CRC  CAD  T2DM    Oncologic Narrative:  Roma Corral is a 69 y.o. male who is referred by Dr Vasquez for metastatic rectal cancer to liver s/p lap colostomy creation on 7/2/24. .  There is extensive metastatic disease in the liver with most of the right liver occupied with bulky disease which extends into segment 4.  Additionally there are 2 lesions in the left lateral section.  He initially presented with abdominal pain and pelvic pressure with labs showing anemia.  He has undergone imaging including CT scan, MRI liver, MRI rectum.  He has met with Dr. Boles from radiation oncology with a plan to start short course in the near future. He has met with Dr. Erazo and Dr. Harrison to discuss potential future surgeries.       Past Medical History: Roma has a past medical history of Abdominal pain, Acute non-ST elevation myocardial infarction (NSTEMI) (Multi), Anemia, Anxiety, CAD (coronary artery disease), Cardiology follow-up encounter (12/11/2023), Gout, H/O cardiac catheterization (06/01/2021), H/O cardiovascular stress test (09/03/2021), H/O echocardiogram (06/02/2021), High cholesterol, Hypertension, Overweight, Rectal cancer (Multi), and Type 2 diabetes mellitus (Multi).  Surgical History:  Roma has a past surgical history that includes Leg Surgery (Left); Colonoscopy (06/17/2024); and Hemicolectomy w/ ostomy.  Social History:   Roma reports that he has never smoked. He has never used smokeless tobacco. He reports that he does not currently use alcohol. He reports that he does not use drugs.  Family History:    Family History   Problem Relation Name Age of Onset   • No Known Problems Mother     • No Known Problems Father     • No Known Problems Sister     • Leukemia Brother       Family Oncology History:  Cancer-related family history is not on file.        SUBJECTIVE:    History of Present Illness:  Roma Corral is a 69 y.o. male who was referred by Dann Nieto MD and presents with chemotherapy follow up   Mr. Corral is seen in follow up   Completed xrt 7/17 7/24 - received 1st dose FOLFOXIRI / Josefina  - here today for assmt prior to C2   Felt exhausted while hooked up to pump   Occ nausea - relieved with anti-emetics   + cold sensitivity lasted a few days   - no neuropathy   -occ looser stools - emptying bag a little more frequently     OBJECTIVE:    VS / Pain:  /77   Pulse 85   Temp 36 °C (96.8 °F)   Resp 18   Wt 86.6 kg (190 lb 14.7 oz)   SpO2 95%   BMI 27.39 kg/m²   BSA: 2.07 meters squared   Pain Scale: 0    Daily Weight  08/20/24 : 86.6 kg (190 lb 14.7 oz)  08/17/24 : 84.8 kg (187 lb)  08/13/24 : 84.4 kg (186 lb)      Physical Exam  Constitutional:       Appearance: He is normal weight.   HENT:      Nose: Nose normal.      Mouth/Throat:      Mouth: Mucous membranes are moist.      Pharynx: Oropharynx is clear.   Eyes:      Extraocular Movements: Extraocular movements intact.      Conjunctiva/sclera: Conjunctivae normal.   Cardiovascular:      Rate and Rhythm: Normal rate.      Pulses: Normal pulses.   Pulmonary:      Effort: Pulmonary effort is normal.   Abdominal:      General: Abdomen is flat.      Comments: Colostomy bag in place    Musculoskeletal:         General: Normal range of motion.   Skin:     General: Skin is warm.   Neurological:      General: No focal deficit present.      Mental Status: He is alert. Mental  status is at baseline.   Psychiatric:         Mood and Affect: Mood normal.         Thought Content: Thought content normal.         Judgment: Judgment normal.       Performance Status:   ECOG 1    Diagnostic Results         WBC   Date/Time Value Ref Range Status   08/17/2024 07:23 AM 5.6 4.4 - 11.3 x10*3/uL Final   08/13/2024 05:25 PM 6.1 4.4 - 11.3 x10*3/uL Final   08/06/2024 11:30 AM 7.2 4.4 - 11.3 x10*3/uL Final     Hemoglobin   Date Value Ref Range Status   08/17/2024 10.5 (L) 13.5 - 17.5 g/dL Final   08/13/2024 10.5 (L) 13.5 - 17.5 g/dL Final   08/06/2024 9.7 (L) 13.5 - 17.5 g/dL Final     MCV   Date/Time Value Ref Range Status   08/17/2024 07:23 AM 76 (L) 80 - 100 fL Final   08/13/2024 05:25 PM 76 (L) 80 - 100 fL Final   08/06/2024 11:30 AM 77 (L) 80 - 100 fL Final     Platelets   Date/Time Value Ref Range Status   08/17/2024 07:23  150 - 450 x10*3/uL Final   08/13/2024 05:25  150 - 450 x10*3/uL Final   08/06/2024 11:30  150 - 450 x10*3/uL Final     Neutrophils Absolute   Date/Time Value Ref Range Status   07/24/2024 08:11 AM 2.69 1.20 - 7.70 x10*3/uL Final     Comment:     Percent differential counts (%) should be interpreted in the context of the absolute cell counts (cells/uL).   06/27/2024 02:56 PM 5.00 1.20 - 7.70 x10*3/uL Final     Comment:     Percent differential counts (%) should be interpreted in the context of the absolute cell counts (cells/uL).   06/23/2021 11:12 AM 4.89 1.20 - 7.70 x10E9/L Final   05/30/2021 03:15 AM 4.74 1.8 - 7.7 K/UL Final   05/29/2021 08:32 PM 3.83 1.8 - 7.7 K/UL Final     Bilirubin, Total   Date/Time Value Ref Range Status   08/17/2024 07:23 AM 0.3 0.1 - 1.2 mg/dL Final   08/13/2024 05:25 PM 0.5 0.0 - 1.2 mg/dL Final   08/06/2024 11:30 AM 0.4 0.0 - 1.2 mg/dL Final     AST   Date/Time Value Ref Range Status   08/17/2024 07:23 AM 18 5 - 40 U/L Final   08/13/2024 05:25 PM 16 9 - 39 U/L Final   08/06/2024 11:30 AM 21 9 - 39 U/L Final     ALT   Date/Time Value  "Ref Range Status   08/17/2024 07:23 AM 13 5 - 40 U/L Final   08/13/2024 05:25 PM 16 10 - 52 U/L Final     Comment:     Patients treated with Sulfasalazine may generate falsely decreased results for ALT.   08/06/2024 11:30 AM 20 10 - 52 U/L Final     Comment:     Patients treated with Sulfasalazine may generate falsely decreased results for ALT.     Creatinine   Date/Time Value Ref Range Status   08/17/2024 07:23 AM 1.20 0.40 - 1.60 mg/dL Final   08/13/2024 05:25 PM 1.10 0.50 - 1.30 mg/dL Final   08/06/2024 11:30 AM 0.91 0.50 - 1.30 mg/dL Final     Urea Nitrogen   Date/Time Value Ref Range Status   08/17/2024 07:23 AM 15 8 - 25 mg/dL Final   08/13/2024 05:25 PM 24 (H) 6 - 23 mg/dL Final   08/06/2024 11:30 AM 14 6 - 23 mg/dL Final     Albumin   Date/Time Value Ref Range Status   08/17/2024 07:23 AM 3.2 (L) 3.5 - 5.0 g/dL Final   08/13/2024 05:25 PM 3.6 3.4 - 5.0 g/dL Final   08/06/2024 11:30 AM 3.6 3.4 - 5.0 g/dL Final     No results found for: \"\"  Carcinoembryonic AG   Date/Time Value Ref Range Status   08/06/2024 11:30 .9 ug/L Final   06/11/2024 04:40 .1 ug/L Final     === 08/17/24 ===    CT ABDOMEN PELVIS W IV CONTRAST    - Impression -  1. Extensive liver metastases.  2. A pair of left UVJ stones the larger of which is 9 mm and a minute lower left ureteral calculus are present with mild-to-moderate left hydroureteronephrosis.  3. Extremely tiny nonobstructing right renal calculus.  4. Diffuse edematous wall thickening and mild distention of the distal (aboral) ileum suggest enteritis, but other etiologies are  possible.  5. Minimal pelvic ascites.  6. Pancreatic calcifications suggest chronic pancreatitis.    MACRO:  None    Signed by: Fermin Baptiste 8/17/2024 10:10 AM  Dictation workstation:   [id number]      Assessment/Plan   This is a 70-year-old man with diabetes and coronary artery disease who presented with abdominal pain and anemia in June 2024 and was found to have rectal cancer with " diffuse liver metastases.  He has nearly confluent right hepatic liver metastases and 2 lesions in the left lobe.  He has met with Dr. Erazo to discuss potential future surgery should he do well with chemotherapy.  He is also met with Dr. Boles to discuss radiation to the primary tumor which is causing bleeding currently.  7/15 - 7/17 completed radiation.  -7/24 - first dose FOLFOXIRI + Josefina, tolerated     Plan:  Stage IV CRC:  Obtain NGS  C2 FOLFOXIRI / Josefina tomorrow 8/7   RTC to see Dr. Nieto prior to C3 in 2 weeks   Scans after 4 cycles   Follow [facility]     Anemia   - likely iron deficiency    - check iron studies today     Logistics -    - clinic appt - minoff tues   - infusion appt - [facility] nikia Nieto MD  [facility]/ [facility] [place]  Office: [phone]  Fax: [phone]

## 2024-08-21 ENCOUNTER — APPOINTMENT (OUTPATIENT)
Dept: HEMATOLOGY/ONCOLOGY | Facility: CLINIC | Age: 69
End: 2024-08-21
Payer: MEDICARE

## 2024-08-21 NOTE — PROGRESS NOTES
[facility]  Outpatient Clinic Note    Patient Name:  Roma Corral  MRN:  [id number]  :  1955    Referring Provider: Dann Nieto MD  Date of Service: 2024   Visit type: New patient visit     problem list/Chief complaint:  Kidney stone  Renal cyst  Scrotal pain      HISTORY OF PRESENT ILLNESS:  Roma Corral is a 69 y.o. male with past medical history of rectal cancer with liver mets s/p lap colostomy creation on 24 currently on treatment, NSTEMI, anemia, anxiety, CAD, gout, high cholesterol, HTN, overweight, DM2, who presents for initial Urology visit. I performed a detailed review of the medical chart records lab testing and imaging. Patient was referred to Urology with Kidney stones after being evaluated for abdominal pain on 24.  Patient reports pain in left testicle and penis, was prescribed antibiotics. Pain in bilateral lower back across his abdomen. Pain was excruciating so he went to ER and had scan done. Passed stone but is still having back, abdominal pain as well as pain in testicle and penis. He brought the stone with him in a Ziploc bag. He has some pain with urination, has frequency with low volume, no urgency.  Nocturia x 2-3. Has stoma with good output.    Patient both flank/upper abdominal pain, no nausea, no gross hematuria.   History of kidney stones? No  Family history of kidney stones? No  Are you on blood thinners? No    I personally reviewed CT AP dated 24.   IMPRESSION:  1. Extensive liver metastases.  2. A pair of left UVJ stones the larger of which is 9 mm and a minute  lower left ureteral calculus are present with mild-to-moderate left  hydroureteronephrosis.  3. Extremely tiny nonobstructing right renal calculus.  4. Diffuse edematous wall thickening and mild distention of the  distal (aboral) ileum suggest enteritis, but other etiologies are  possible.  5. Minimal pelvic ascites.  6. Pancreatic calcifications suggest chronic pancreatitis.    JOSÉ:  21  IPSS: 17  QOL: 6    PAST MEDICAL HISTORY:  Past Medical History:   Diagnosis Date    Abdominal pain     Acute non-ST elevation myocardial infarction (NSTEMI) (Multi)     Anemia     6/6/24 HGB 8.6; HCT 31.5    Anxiety     CAD (coronary artery disease)     Cardiology follow-up encounter 12/11/2023    Sharif Lau MD    Gout     H/O cardiac catheterization 06/01/2021    H/O cardiovascular stress test 09/03/2021    IMPRESSION: 1. No evidence of ischemia. 2. Suspicion of an infarct along the mid anterior wall. 3. Left ventricular dilatation is noted. 4. Left ventricular EF was calculated to be 50%. Summary:  1. No clinical or electrocardiographic evidence for ischemia at a submaximal workload. 3. The blunted heart rate diminshes the sensitivity of this test.  4. The submaximal level of stress was achieved.    H/O echocardiogram 06/02/2021    Mild concentric Left ventricle hypertrophy.  Global left ventricular wall motion and contractility are within normal limits.  There is normal left ventricular systolic function.  Estimated ejection fraction is 60-64%.  The left atrial size is mildly dilated.  Mild aortic regurgitation.  A trace of mitral regurgitation.  Trivial to mild tricuspid regurgitation.  There is no pulmonary hypertension.    High cholesterol     Hypertension     Overweight     Rectal cancer (Multi)     Type 2 diabetes mellitus (Multi)     4/18/2024 A1C 7.5%       PAST SURGICAL HISTORY:  Past Surgical History:   Procedure Laterality Date    COLONOSCOPY  06/17/2024    HEMICOLECTOMY W/ OSTOMY      LEG SURGERY Left     rodrigo placed       ALLERGIES:  No Known Allergies    MEDICATIONS:  Current Outpatient Medications   Medication Instructions    acetaminophen (TYLENOL) 500 mg, oral, Every 6 hours PRN    amLODIPine (NORVASC) 5 mg, oral, Daily    aspirin 81 mg, oral, Once    atorvastatin (LIPITOR) 40 mg, oral, Nightly    ciprofloxacin (CIPRO) 500 mg, oral, 2 times daily    doxycycline (VIBRAMYCIN) 100 mg, oral,  2 times daily, Take with at least 8 ounces (large glass) of water, do not lie down for 30 minutes after    ketorolac (TORADOL) 10 mg, oral, Every 6 hours PRN    loperamide (Imodium) 2 mg capsule Take 2 capsules (4 mg) by mouth with the first episode of diarrhea and 1 capsule (2 mg) by mouth with any additional episodes. Maximum 8 capsules (16 mg) per day.    metoprolol succinate XL (TOPROL-XL) 100 mg, oral, Daily    nystatin (MYCOSTATIN) 500,000 Units, oral, 4 times daily, Swish in mouth and swallow.    ondansetron (ZOFRAN) 4 mg, oral, Every 8 hours PRN    ondansetron (ZOFRAN) 8 mg, oral, Every 8 hours PRN    ondansetron ODT (ZOFRAN-ODT) 4 mg, oral, Every 8 hours PRN    oxyCODONE (ROXICODONE) 5 mg, oral, Every 6 hours PRN    prochlorperazine (COMPAZINE) 10 mg, oral, Every 6 hours PRN    tamsulosin (FLOMAX) 0.4 mg, oral, Daily        SOCIAL HISTORY:  Social History     Tobacco Use    Smoking status: Never    Smokeless tobacco: Never   Vaping Use    Vaping status: Never Used   Substance Use Topics    Alcohol use: Not Currently    Drug use: Never        FAMILY HISTORY:  Family History   Problem Relation Name Age of Onset    No Known Problems Mother      No Known Problems Father      No Known Problems Sister      Leukemia Brother          REVIEW OF SYSTEMS:  10-pt ROS reviewed and negative except as mentioned above.    Vital signs:  There were no vitals taken for this visit.    PHYSICAL EXAMINATION: Offered chaperone for physical exam, patient declined.  General: Appears comfortable and in no apparent distress, well nourished  Head: Normocephalic, atraumatic  Eyes: Non-injected conjunctiva, sclera clear, no proptosis  Lungs: Breathing is easy, non-labored. Speaking in clear and complete sentences. Normal diaphragmatic movement.  Cardiovascular: no peripheral edema, cyanosis or pallor.   Abdomen: soft, non-distended, non-tender  : Bladder: non tender, not distended; Scrotum: no lesions, no cysts, no rashes, mild  swelling with tenderness  Epididymides: normal size, position, symmetric, no masses or induration. Testis: normal size, position, symmetric, no masses, no hydroceles, tender. Urethra and meatus: normal size, position, no lesions or discharge  Penis: normal (circumcised) phallus, no palpable plaque, no lesions/masses/deformity  MSK: Ambulatory with steady gait, unassisted  Skin: No visible rashes or lesions  Neurologic: Alert, oriented to person, place, and time  Psychiatric: mood and affect appropriate      IMAGING DATA:   CT abdomen pelvis w IV contrast  Narrative: Interpreted By:  Fermin Baptiste,   STUDY:  CT ABDOMEN PELVIS W IV CONTRAST;  8/17/2024 8:16 am      INDICATION:  Signs/Symptoms:recent colostomy, rectal cancer, no ostomy output in 8  hrs, abd pain.      COMPARISON:  06/10/2024 enhanced abdominal and pelvic CT.      ACCESSION NUMBER(S):  [id number]      ORDERING CLINICIAN:  ELDER HEWITT      TECHNIQUE:  CT of the abdomen and pelvis was performed.  Standard contiguous  axial images were obtained at 3 mm slice thickness through the  abdomen and pelvis. Coronal and sagittal reconstructions at 3 mm  slice thickness were performed.      75 ml of contrast Omnipaque 350 were administered intravenously  without immediate complication.      FINDINGS:  LOWER CHEST:  Mild bilateral lower lobe subsegmental atelectasis and/or scarring  similar to 6/20. Posterosuperior right atrial catheter tip.      ABDOMEN:      LIVER:  Extensive right and a few left lobe hypoenhancing metastases are  similar to 6/24. Most of the inferior right lobe is involved.      BILE DUCTS:  The intrahepatic and extrahepatic ducts are not dilated.      GALLBLADDER:  No calcified gallstone or pericholecystic fat infiltration.      PANCREAS:  Tiny head and uncinate process calcifications consistent with chronic  pancreatitis. Otherwise unremarkable, without a mass or evidence of  acute pancreatitis.      SPLEEN:  Normal size and  homogeneous.      ADRENAL GLANDS:  Within normal limits.      KIDNEYS AND URETERS:  2 left ureterovesical junction calcifications measure up to 9 mm and  5 mm. A minute lower left ureteral calculus is also present on image  140 series 3. Moderate left hydronephrosis and mild generalized left  ureteral dilation are new. A very minute right renal calculus on  image 70 series 3 is probably calyceal. A subcentimeter inferior left  renal cortical lesion is likely to be a cyst, but is too small to  definitively characterize. Normal size kidneys without a mass or  right hydroureteronephrosis.      PELVIS:      BLADDER:  Collapsed precluding evaluation for mucosal lesions. Left UVJ calculi.      REPRODUCTIVE ORGANS:  The prostate measures 4.8 cm in width.      BOWEL:  The stomach is unremarkable.   New double barrel colostomy inferior  and to the left of the umbilicus. The colon distal (aboral) to the  colostomy is collapsed and gasless. Nonspecific air/fluid levels in  the right and transverse colon with normal caliber large bowel.  Moderate diffuse circumferential edematous wall thickening in the  distal (aboral) ileum extending to the terminal ileum with mild  distention up to about 3 cm in diameter new since 06/24 could be  infectious or of other etiology and should be correlated clinically.  The jejunum is normal caliber, fluid-filled and almost completely  gasless. Very thin gasless appendix without evidence of appendicitis.      VESSELS:  The aorta and IVC appear normal. Mild-to-moderate vascular  calcification is noted.      PERITONEUM/RETROPERITONEUM/LYMPH NODES:  Very small amount of new dependent pelvic ascites. No free  intraperitoneal air. No abdominopelvic lymphadenopathy is apparent.      BONES AND ABDOMINAL WALL:  Marked L5-S1 and lesser L4-5 bilateral facet joint DJD impinges upon  the L5-S1 neural foramina. No suspicious osseous lesions. Diastasis  recti. New colostomy. A very tiny umbilical hernia  contains only fat.      Impression: 1. Extensive liver metastases.  2. A pair of left UVJ stones the larger of which is 9 mm and a minute  lower left ureteral calculus are present with mild-to-moderate left  hydroureteronephrosis.  3. Extremely tiny nonobstructing right renal calculus.  4. Diffuse edematous wall thickening and mild distention of the  distal (aboral) ileum suggest enteritis, but other etiologies are  possible.  5. Minimal pelvic ascites.  6. Pancreatic calcifications suggest chronic pancreatitis.      MACRO:  None      Signed by: Fermin Baptiste 8/17/2024 10:10 AM  Dictation workstation:   [id number]      LABORATORY DATA:    Office Visit on 08/23/2024   Component Date Value    POC Color, Urine 08/23/2024 Yellow     POC Appearance, Urine 08/23/2024 Clear     POC Glucose, Urine 08/23/2024 NEGATIVE     POC Bilirubin, Urine 08/23/2024 NEGATIVE     POC Ketones, Urine 08/23/2024 NEGATIVE     POC Specific Gravity, Ur* 08/23/2024 >=1.030     POC Blood, Urine 08/23/2024 TRACE-Intact (A)     POC PH, Urine 08/23/2024 6.0     POC Protein, Urine 08/23/2024 100 (2+) (A)     POC Urobilinogen, Urine 08/23/2024 0.2     Poc Nitrite, Urine 08/23/2024 NEGATIVE     POC Leukocytes, Urine 08/23/2024 NEGATIVE        ASSESSMENT:  Roma Corral is a 69 y.o. male with kidney stone who presents for initial Urology visit.    1.We discussed that most calculi under 5 mm can be safely observed. This recommendation is based on large series looking at spontaneous passage rates that suggest that the likelihood of stone passage is highest for stones under 4 mm in size (approximately 70-80%), moderate for stones between 4 and 6 mm (50%), and lowest for stones 6 mm or greater (20%-30%).     If unable to pass symptomatic/obstructing stone under 5 mm within 4-6 weeks, intervention is recommended.    For asymptomatic, non-infected, non-obstructing calyceal stone, then observation is an option. Will repeat Renal US in 6 months.    However,  stones under 5 mm that are in a solitary kidney, associated with infection or causing significant obstruction should be removed to prevent loss of renal function and/or sepsis. Also, every patient has different anatomy and different ability to pass calculi and tolerate pain, so intervention is also recommended in patients with small stones that are in significant discomfort or that have a history of being unable to pass small calculi.     Discussed possible interventions including: Extracorporeal shock wave lithotripsy (ESWL) for stones less than 1.5 cm. Ureteroscopy with stent placement and Percutaneous nephrolithotomy (PCNL)- minimally invasive surgery to remove stones bigger than 2 cm.    Patient understands and desires to proceed with intervention.    The role of medical expulsive therapy (MET) with alpha-blockers was discussed with the patient. The risks and benefits of MET were discussed extensively including the risk of renal loss or damage secondary to recurrent, persistent or silent hydronephrosis. Based on this patient's stone size and location I believe the patient is a good candidate for this therapy as he is still experiencing symptoms. The need for close monitoring and follow-up has been stressed to avoid infectious and functional complications.    Reviewed dietary modifications required to help prevent recurrent stone formation: Handout provided  1. Oral fluid intake sufficient to produce at least 2.5 liters of urine per day-the ideal oral fluids have high citrate content (such as lemonade or orange juice). Minimize carbonated drinks that contain phosphoric acid (ex/ dark doris)  2. Low sodium diet (< or = 2000mg/day), sodium intake increases urinary calcium  3. Low intake of animal protein (anything with a face)- limiting intake to <8 ounces/daily  4. Low oxalate diet; calcium intake with high oxalate foods  7. Moderate calcium intake (3994-0487 mg/day)-risk for stone formation increases when oral  calcium intake is either too low or too high  8. Avoid high doses of Vitamin C (>500mg), as it metabolizes to oxalate  9. High fiber diet may reduce stone risk  10. Exercise!    2.-Discussed scrotal pain and its different etiologies, including epididymitis, cancer, orchitis, etc.   -Discussed conservative measures like nsaids, elevation, ice, tight underwear for good scrotal support etc.   -Will get Scrotum US to evaluate scrotal pain    3.We discussed that simple renal cysts are benign findings and do not require intervention unless they are causing discomfort    PLAN:  -Reviewed CT AP results with patient  -UA today with no infection, trace blood noted. Will continue to monitor  -Stone analysis ordered for stone provided by patient  -Patient has dry mouth from cancer treatment. Encouraged patient to discuss weekly IVF with his Oncologist to help with hydration as he is having a hard time drinking enough fluids  -Patient will implement dietary modification to help prevent recurrent stone formation  -Referral placed to Urology MD for kidney stone management  -Patient was advised to present to local Emergency Department for development of fevers, chills, nausea, vomiting or flank pain that is not controlled with oral pain medication.  -Discussed the risks and benefit of continuing Tamsulosin 0.4 mg nightly to help with stone passage, medication is working well for patient with no side effects. Discussed other management options. The patient and I agreed to continue with medication.  -Prescription for Toradol 10 mg every 6 hrs as needed for pain, refill for Tamsulosin 0.4 mg nightly and Zofran 4 mg every 8 hrs as needed for nausea sent to pharmacy for MET. Common SE discussed with patient.  -Scrotal US ordered to evaluate scrotum pain    All questions and concerns were addressed. Patient verbalizes understanding and has no other questions at this time.     E&M visit today is associated with current or anticipated  ongoing medical care services related to a patient's single, serious condition or a complex condition.    MELISSA Bundy-CNP  [facility]  8/23/2024

## 2024-08-23 ENCOUNTER — APPOINTMENT (OUTPATIENT)
Dept: HEMATOLOGY/ONCOLOGY | Facility: CLINIC | Age: 69
End: 2024-08-23
Payer: MEDICARE

## 2024-08-23 ENCOUNTER — OFFICE VISIT (OUTPATIENT)
Dept: UROLOGY | Facility: HOSPITAL | Age: 69
End: 2024-08-23
Payer: MEDICARE

## 2024-08-23 DIAGNOSIS — C20 RECTAL CANCER (MULTI): ICD-10-CM

## 2024-08-23 DIAGNOSIS — N20.0 KIDNEY STONE: Primary | ICD-10-CM

## 2024-08-23 DIAGNOSIS — N50.82 SCROTAL PAIN: ICD-10-CM

## 2024-08-23 DIAGNOSIS — N28.1 RENAL CYST: ICD-10-CM

## 2024-08-23 LAB
POC APPEARANCE, URINE: CLEAR
POC BILIRUBIN, URINE: NEGATIVE
POC BLOOD, URINE: ABNORMAL
POC COLOR, URINE: YELLOW
POC GLUCOSE, URINE: NEGATIVE MG/DL
POC KETONES, URINE: NEGATIVE MG/DL
POC LEUKOCYTES, URINE: NEGATIVE
POC NITRITE,URINE: NEGATIVE
POC PH, URINE: 6 PH
POC PROTEIN, URINE: ABNORMAL MG/DL
POC SPECIFIC GRAVITY, URINE: >=1.03
POC UROBILINOGEN, URINE: 0.2 EU/DL

## 2024-08-23 PROCEDURE — 81003 URINALYSIS AUTO W/O SCOPE: CPT | Performed by: NURSE PRACTITIONER

## 2024-08-23 PROCEDURE — 1125F AMNT PAIN NOTED PAIN PRSNT: CPT | Performed by: NURSE PRACTITIONER

## 2024-08-23 PROCEDURE — 82365 CALCULUS SPECTROSCOPY: CPT | Performed by: NURSE PRACTITIONER

## 2024-08-23 PROCEDURE — G2211 COMPLEX E/M VISIT ADD ON: HCPCS | Performed by: NURSE PRACTITIONER

## 2024-08-23 PROCEDURE — RXMED WILLOW AMBULATORY MEDICATION CHARGE

## 2024-08-23 PROCEDURE — 3051F HG A1C>EQUAL 7.0%<8.0%: CPT | Performed by: NURSE PRACTITIONER

## 2024-08-23 PROCEDURE — 1036F TOBACCO NON-USER: CPT | Performed by: NURSE PRACTITIONER

## 2024-08-23 PROCEDURE — 99214 OFFICE O/P EST MOD 30 MIN: CPT | Performed by: NURSE PRACTITIONER

## 2024-08-23 PROCEDURE — 99204 OFFICE O/P NEW MOD 45 MIN: CPT | Performed by: NURSE PRACTITIONER

## 2024-08-23 PROCEDURE — 1160F RVW MEDS BY RX/DR IN RCRD: CPT | Performed by: NURSE PRACTITIONER

## 2024-08-23 PROCEDURE — 3048F LDL-C <100 MG/DL: CPT | Performed by: NURSE PRACTITIONER

## 2024-08-23 PROCEDURE — 1159F MED LIST DOCD IN RCRD: CPT | Performed by: NURSE PRACTITIONER

## 2024-08-23 RX ORDER — ONDANSETRON 4 MG/1
4 TABLET, ORALLY DISINTEGRATING ORAL EVERY 8 HOURS PRN
Qty: 15 TABLET | Refills: 0 | Status: SHIPPED | OUTPATIENT
Start: 2024-08-23 | End: 2024-09-22

## 2024-08-23 RX ORDER — KETOROLAC TROMETHAMINE 10 MG/1
10 TABLET, FILM COATED ORAL EVERY 6 HOURS PRN
Qty: 20 TABLET | Refills: 0 | Status: SHIPPED | OUTPATIENT
Start: 2024-08-23

## 2024-08-23 RX ORDER — TAMSULOSIN HYDROCHLORIDE 0.4 MG/1
0.4 CAPSULE ORAL DAILY
Qty: 30 CAPSULE | Refills: 0 | Status: SHIPPED | OUTPATIENT
Start: 2024-08-23 | End: 2024-09-23

## 2024-08-23 ASSESSMENT — PAIN SCALES - GENERAL: PAINLEVEL: 6

## 2024-08-24 ENCOUNTER — PHARMACY VISIT (OUTPATIENT)
Dept: PHARMACY | Facility: CLINIC | Age: 69
End: 2024-08-24

## 2024-08-26 ENCOUNTER — LAB (OUTPATIENT)
Dept: HEMATOLOGY/ONCOLOGY | Facility: CLINIC | Age: 69
End: 2024-08-26
Payer: MEDICARE

## 2024-08-26 ENCOUNTER — TELEPHONE (OUTPATIENT)
Dept: HEMATOLOGY/ONCOLOGY | Facility: HOSPITAL | Age: 69
End: 2024-08-26
Payer: MEDICARE

## 2024-08-26 DIAGNOSIS — C20 RECTAL CANCER (MULTI): ICD-10-CM

## 2024-08-26 LAB
ALBUMIN SERPL BCP-MCNC: 2.9 G/DL (ref 3.4–5)
ALP SERPL-CCNC: 238 U/L (ref 33–136)
ALT SERPL W P-5'-P-CCNC: 27 U/L (ref 10–52)
ANION GAP SERPL CALC-SCNC: 14 MMOL/L (ref 10–20)
AST SERPL W P-5'-P-CCNC: 44 U/L (ref 9–39)
BASOPHILS # BLD AUTO: 0.03 X10*3/UL (ref 0–0.1)
BASOPHILS NFR BLD AUTO: 0.4 %
BILIRUB SERPL-MCNC: 0.4 MG/DL (ref 0–1.2)
BUN SERPL-MCNC: 11 MG/DL (ref 6–23)
CALCIUM SERPL-MCNC: 8.2 MG/DL (ref 8.6–10.3)
CHLORIDE SERPL-SCNC: 107 MMOL/L (ref 98–107)
CO2 SERPL-SCNC: 24 MMOL/L (ref 21–32)
CREAT SERPL-MCNC: 1.01 MG/DL (ref 0.5–1.3)
EGFRCR SERPLBLD CKD-EPI 2021: 81 ML/MIN/1.73M*2
EOSINOPHIL # BLD AUTO: 0.04 X10*3/UL (ref 0–0.7)
EOSINOPHIL NFR BLD AUTO: 0.5 %
ERYTHROCYTE [DISTWIDTH] IN BLOOD BY AUTOMATED COUNT: 29.7 % (ref 11.5–14.5)
GLUCOSE SERPL-MCNC: 142 MG/DL (ref 74–99)
HCT VFR BLD AUTO: 32.9 % (ref 41–52)
HGB BLD-MCNC: 9.8 G/DL (ref 13.5–17.5)
IMM GRANULOCYTES # BLD AUTO: 0.11 X10*3/UL (ref 0–0.7)
IMM GRANULOCYTES NFR BLD AUTO: 1.5 % (ref 0–0.9)
LYMPHOCYTES # BLD AUTO: 0.83 X10*3/UL (ref 1.2–4.8)
LYMPHOCYTES NFR BLD AUTO: 11.3 %
MCH RBC QN AUTO: 23.2 PG (ref 26–34)
MCHC RBC AUTO-ENTMCNC: 29.8 G/DL (ref 32–36)
MCV RBC AUTO: 78 FL (ref 80–100)
MONOCYTES # BLD AUTO: 0.57 X10*3/UL (ref 0.1–1)
MONOCYTES NFR BLD AUTO: 7.8 %
NEUTROPHILS # BLD AUTO: 5.76 X10*3/UL (ref 1.2–7.7)
NEUTROPHILS NFR BLD AUTO: 78.5 %
NRBC BLD-RTO: 0 /100 WBCS (ref 0–0)
PLATELET # BLD AUTO: 203 X10*3/UL (ref 150–450)
POTASSIUM SERPL-SCNC: 3.4 MMOL/L (ref 3.5–5.3)
PROT SERPL-MCNC: 5.7 G/DL (ref 6.4–8.2)
RBC # BLD AUTO: 4.23 X10*6/UL (ref 4.5–5.9)
SODIUM SERPL-SCNC: 142 MMOL/L (ref 136–145)
WBC # BLD AUTO: 7.3 X10*3/UL (ref 4.4–11.3)

## 2024-08-26 PROCEDURE — 2500000004 HC RX 250 GENERAL PHARMACY W/ HCPCS (ALT 636 FOR OP/ED): Performed by: NURSE PRACTITIONER

## 2024-08-26 PROCEDURE — 81001 URINALYSIS AUTO W/SCOPE: CPT | Mod: WESLAB | Performed by: NURSE PRACTITIONER

## 2024-08-26 PROCEDURE — 82378 CARCINOEMBRYONIC ANTIGEN: CPT | Performed by: NURSE PRACTITIONER

## 2024-08-26 PROCEDURE — 85025 COMPLETE CBC W/AUTO DIFF WBC: CPT | Performed by: NURSE PRACTITIONER

## 2024-08-26 PROCEDURE — 36591 DRAW BLOOD OFF VENOUS DEVICE: CPT

## 2024-08-26 PROCEDURE — 84075 ASSAY ALKALINE PHOSPHATASE: CPT | Performed by: NURSE PRACTITIONER

## 2024-08-26 RX ORDER — HEPARIN 100 UNIT/ML
500 SYRINGE INTRAVENOUS AS NEEDED
Status: DISCONTINUED | OUTPATIENT
Start: 2024-08-26 | End: 2024-08-26 | Stop reason: HOSPADM

## 2024-08-26 RX ORDER — HEPARIN SODIUM,PORCINE/PF 10 UNIT/ML
50 SYRINGE (ML) INTRAVENOUS AS NEEDED
Status: DISCONTINUED | OUTPATIENT
Start: 2024-08-26 | End: 2024-08-26 | Stop reason: HOSPADM

## 2024-08-26 RX ORDER — HEPARIN SODIUM,PORCINE/PF 10 UNIT/ML
50 SYRINGE (ML) INTRAVENOUS AS NEEDED
OUTPATIENT
Start: 2024-08-26

## 2024-08-26 RX ORDER — HEPARIN 100 UNIT/ML
500 SYRINGE INTRAVENOUS AS NEEDED
OUTPATIENT
Start: 2024-08-26

## 2024-08-26 NOTE — TELEPHONE ENCOUNTER
Contacted Roma regarding scheduling.   He was just making sure that all appointments were correctly made. I clarified his schedule with him and he confirmed/agreed with all appts.   He also has a kidney stone that he is having issues with so he will discuss that plan with Dr. Nieto tomorrow at [facility].

## 2024-08-27 ENCOUNTER — TELEPHONE (OUTPATIENT)
Dept: HEMATOLOGY/ONCOLOGY | Facility: CLINIC | Age: 69
End: 2024-08-27
Payer: MEDICARE

## 2024-08-27 ENCOUNTER — APPOINTMENT (OUTPATIENT)
Dept: HEMATOLOGY/ONCOLOGY | Facility: CLINIC | Age: 69
End: 2024-08-27
Payer: MEDICARE

## 2024-08-27 DIAGNOSIS — C20 RECTAL CANCER (MULTI): Primary | ICD-10-CM

## 2024-08-27 LAB
APPEARANCE UR: CLEAR
BILIRUB UR STRIP.AUTO-MCNC: NEGATIVE MG/DL
CEA SERPL-MCNC: 293.7 UG/L
COLOR UR: YELLOW
GLUCOSE UR STRIP.AUTO-MCNC: NORMAL MG/DL
HYALINE CASTS #/AREA URNS AUTO: ABNORMAL /LPF
KETONES UR STRIP.AUTO-MCNC: NEGATIVE MG/DL
LEUKOCYTE ESTERASE UR QL STRIP.AUTO: NEGATIVE
MUCOUS THREADS #/AREA URNS AUTO: ABNORMAL /LPF
NITRITE UR QL STRIP.AUTO: NEGATIVE
PH UR STRIP.AUTO: 5.5 [PH]
PROT UR STRIP.AUTO-MCNC: ABNORMAL MG/DL
RBC # UR STRIP.AUTO: ABNORMAL /UL
RBC #/AREA URNS AUTO: >20 /HPF
SP GR UR STRIP.AUTO: 1.02
UROBILINOGEN UR STRIP.AUTO-MCNC: NORMAL MG/DL
WBC #/AREA URNS AUTO: ABNORMAL /HPF

## 2024-08-27 NOTE — TELEPHONE ENCOUNTER
Spoke with Mr Corral about his pain he is experiencing from his kidney stones. He is seeing urologist on Thursday but he stated the pain medication is not helping. I told him he can take 10 mg to see if that will help the pain, every 6 hours. But to monitor for constipation and make sure he's drinking enough fluids. He stated he is not sure he can sit through chemo tomorrow. I told him we can delay if he needs to. He is going to call back and let me know.

## 2024-08-27 NOTE — TELEPHONE ENCOUNTER
Left message for Mr Shayna that he does not need to come today to see Dr Nieto if he does not have anything to discuss since we just saw him last week. We can see him sept 10 prior to his next infusion.   Left call back number.  Keely RODRIGUEZ

## 2024-08-27 NOTE — TELEPHONE ENCOUNTER
Spoke with Mr Corral, he is going to cancel treatment tomorrow for Chemo. Dr Nieto is ok with it. He has a 9mm kidney stone and he is going to see urology tomorrow.     [facility]   Keely RODRIGUEZ

## 2024-08-28 ENCOUNTER — APPOINTMENT (OUTPATIENT)
Dept: HEMATOLOGY/ONCOLOGY | Facility: CLINIC | Age: 69
End: 2024-08-28
Payer: MEDICARE

## 2024-08-28 ENCOUNTER — APPOINTMENT (OUTPATIENT)
Dept: RADIOLOGY | Facility: HOSPITAL | Age: 69
End: 2024-08-28
Payer: MEDICARE

## 2024-08-28 ENCOUNTER — OFFICE VISIT (OUTPATIENT)
Dept: UROLOGY | Facility: HOSPITAL | Age: 69
End: 2024-08-28
Payer: MEDICARE

## 2024-08-28 ENCOUNTER — HOSPITAL ENCOUNTER (EMERGENCY)
Facility: HOSPITAL | Age: 69
Discharge: HOME | End: 2024-08-29
Attending: STUDENT IN AN ORGANIZED HEALTH CARE EDUCATION/TRAINING PROGRAM
Payer: MEDICARE

## 2024-08-28 DIAGNOSIS — R10.9 RIGHT FLANK PAIN: ICD-10-CM

## 2024-08-28 DIAGNOSIS — N20.0 KIDNEY STONE: Primary | ICD-10-CM

## 2024-08-28 DIAGNOSIS — N20.1 LEFT URETERAL STONE: Primary | ICD-10-CM

## 2024-08-28 DIAGNOSIS — C20 RECTAL CANCER (MULTI): ICD-10-CM

## 2024-08-28 LAB
ALBUMIN SERPL-MCNC: 2.8 G/DL (ref 3.5–5)
ALP BLD-CCNC: 307 U/L (ref 35–125)
ALT SERPL-CCNC: 24 U/L (ref 5–40)
ANION GAP SERPL CALC-SCNC: 10 MMOL/L
APPEARANCE STONE: NORMAL
APPEARANCE UR: CLEAR
AST SERPL-CCNC: 39 U/L (ref 5–40)
BASOPHILS # BLD AUTO: 0.03 X10*3/UL (ref 0–0.1)
BASOPHILS NFR BLD AUTO: 0.5 %
BILIRUB SERPL-MCNC: 0.3 MG/DL (ref 0.1–1.2)
BILIRUB UR STRIP.AUTO-MCNC: NEGATIVE MG/DL
BUN SERPL-MCNC: 15 MG/DL (ref 8–25)
CALCIUM SERPL-MCNC: 8.1 MG/DL (ref 8.5–10.4)
CHLORIDE SERPL-SCNC: 103 MMOL/L (ref 97–107)
CO2 SERPL-SCNC: 25 MMOL/L (ref 24–31)
COLOR UR: YELLOW
COMPN STONE: NORMAL
CREAT SERPL-MCNC: 1.2 MG/DL (ref 0.4–1.6)
DACRYOCYTES BLD QL SMEAR: NORMAL
EGFRCR SERPLBLD CKD-EPI 2021: 65 ML/MIN/1.73M*2
EOSINOPHIL # BLD AUTO: 0.07 X10*3/UL (ref 0–0.7)
EOSINOPHIL NFR BLD AUTO: 1.2 %
ERYTHROCYTE [DISTWIDTH] IN BLOOD BY AUTOMATED COUNT: 29.4 % (ref 11.5–14.5)
GLUCOSE SERPL-MCNC: 133 MG/DL (ref 65–99)
GLUCOSE UR STRIP.AUTO-MCNC: NORMAL MG/DL
HCT VFR BLD AUTO: 34 % (ref 41–52)
HGB BLD-MCNC: 10.2 G/DL (ref 13.5–17.5)
HYPOCHROMIA BLD QL SMEAR: NORMAL
IMM GRANULOCYTES # BLD AUTO: 0.05 X10*3/UL (ref 0–0.7)
IMM GRANULOCYTES NFR BLD AUTO: 0.9 % (ref 0–0.9)
KETONES UR STRIP.AUTO-MCNC: NEGATIVE MG/DL
LEUKOCYTE ESTERASE UR QL STRIP.AUTO: NEGATIVE
LIPASE SERPL-CCNC: 29 U/L (ref 16–63)
LYMPHOCYTES # BLD AUTO: 0.85 X10*3/UL (ref 1.2–4.8)
LYMPHOCYTES NFR BLD AUTO: 14.6 %
MCH RBC QN AUTO: 23.2 PG (ref 26–34)
MCHC RBC AUTO-ENTMCNC: 30 G/DL (ref 32–36)
MCV RBC AUTO: 77 FL (ref 80–100)
MONOCYTES # BLD AUTO: 0.51 X10*3/UL (ref 0.1–1)
MONOCYTES NFR BLD AUTO: 8.8 %
MUCOUS THREADS #/AREA URNS AUTO: ABNORMAL /LPF
NEUTROPHILS # BLD AUTO: 4.3 X10*3/UL (ref 1.2–7.7)
NEUTROPHILS NFR BLD AUTO: 74 %
NITRITE UR QL STRIP.AUTO: NEGATIVE
NRBC BLD-RTO: 0 /100 WBCS (ref 0–0)
OVALOCYTES BLD QL SMEAR: NORMAL
PH UR STRIP.AUTO: 5.5 [PH]
PLATELET # BLD AUTO: 194 X10*3/UL (ref 150–450)
POTASSIUM SERPL-SCNC: 3.3 MMOL/L (ref 3.4–5.1)
PROT SERPL-MCNC: 5.6 G/DL (ref 5.9–7.9)
PROT UR STRIP.AUTO-MCNC: ABNORMAL MG/DL
RBC # BLD AUTO: 4.4 X10*6/UL (ref 4.5–5.9)
RBC # UR STRIP.AUTO: ABNORMAL /UL
RBC #/AREA URNS AUTO: >20 /HPF
RBC MORPH BLD: NORMAL
SODIUM SERPL-SCNC: 138 MMOL/L (ref 133–145)
SP GR UR STRIP.AUTO: 1.02
SPECIMEN WT: 46 MG
UROBILINOGEN UR STRIP.AUTO-MCNC: NORMAL MG/DL
WBC # BLD AUTO: 5.8 X10*3/UL (ref 4.4–11.3)
WBC #/AREA URNS AUTO: ABNORMAL /HPF

## 2024-08-28 PROCEDURE — 76770 US EXAM ABDO BACK WALL COMP: CPT

## 2024-08-28 PROCEDURE — 76770 US EXAM ABDO BACK WALL COMP: CPT | Performed by: RADIOLOGY

## 2024-08-28 PROCEDURE — 99214 OFFICE O/P EST MOD 30 MIN: CPT | Performed by: STUDENT IN AN ORGANIZED HEALTH CARE EDUCATION/TRAINING PROGRAM

## 2024-08-28 PROCEDURE — 3051F HG A1C>EQUAL 7.0%<8.0%: CPT | Performed by: STUDENT IN AN ORGANIZED HEALTH CARE EDUCATION/TRAINING PROGRAM

## 2024-08-28 PROCEDURE — 96375 TX/PRO/DX INJ NEW DRUG ADDON: CPT

## 2024-08-28 PROCEDURE — 96374 THER/PROPH/DIAG INJ IV PUSH: CPT

## 2024-08-28 PROCEDURE — 85025 COMPLETE CBC W/AUTO DIFF WBC: CPT | Performed by: PHYSICIAN ASSISTANT

## 2024-08-28 PROCEDURE — 84075 ASSAY ALKALINE PHOSPHATASE: CPT | Performed by: PHYSICIAN ASSISTANT

## 2024-08-28 PROCEDURE — 96376 TX/PRO/DX INJ SAME DRUG ADON: CPT

## 2024-08-28 PROCEDURE — 74176 CT ABD & PELVIS W/O CONTRAST: CPT | Performed by: RADIOLOGY

## 2024-08-28 PROCEDURE — 96361 HYDRATE IV INFUSION ADD-ON: CPT

## 2024-08-28 PROCEDURE — 81001 URINALYSIS AUTO W/SCOPE: CPT | Performed by: PHYSICIAN ASSISTANT

## 2024-08-28 PROCEDURE — 99204 OFFICE O/P NEW MOD 45 MIN: CPT | Performed by: STUDENT IN AN ORGANIZED HEALTH CARE EDUCATION/TRAINING PROGRAM

## 2024-08-28 PROCEDURE — 2500000004 HC RX 250 GENERAL PHARMACY W/ HCPCS (ALT 636 FOR OP/ED): Performed by: STUDENT IN AN ORGANIZED HEALTH CARE EDUCATION/TRAINING PROGRAM

## 2024-08-28 PROCEDURE — 74176 CT ABD & PELVIS W/O CONTRAST: CPT

## 2024-08-28 PROCEDURE — 3048F LDL-C <100 MG/DL: CPT | Performed by: STUDENT IN AN ORGANIZED HEALTH CARE EDUCATION/TRAINING PROGRAM

## 2024-08-28 PROCEDURE — 83690 ASSAY OF LIPASE: CPT | Performed by: PHYSICIAN ASSISTANT

## 2024-08-28 PROCEDURE — 2500000004 HC RX 250 GENERAL PHARMACY W/ HCPCS (ALT 636 FOR OP/ED): Performed by: PHYSICIAN ASSISTANT

## 2024-08-28 PROCEDURE — 99284 EMERGENCY DEPT VISIT MOD MDM: CPT | Mod: 25

## 2024-08-28 RX ORDER — ONDANSETRON HYDROCHLORIDE 2 MG/ML
4 INJECTION, SOLUTION INTRAVENOUS ONCE
Status: COMPLETED | OUTPATIENT
Start: 2024-08-28 | End: 2024-08-28

## 2024-08-28 RX ORDER — MORPHINE SULFATE 4 MG/ML
4 INJECTION, SOLUTION INTRAMUSCULAR; INTRAVENOUS ONCE
Status: COMPLETED | OUTPATIENT
Start: 2024-08-28 | End: 2024-08-28

## 2024-08-28 RX ORDER — KETOROLAC TROMETHAMINE 30 MG/ML
15 INJECTION, SOLUTION INTRAMUSCULAR; INTRAVENOUS ONCE
Status: COMPLETED | OUTPATIENT
Start: 2024-08-28 | End: 2024-08-28

## 2024-08-28 RX ADMIN — KETOROLAC TROMETHAMINE 15 MG: 30 INJECTION, SOLUTION INTRAMUSCULAR at 20:18

## 2024-08-28 RX ADMIN — ONDANSETRON 4 MG: 2 INJECTION INTRAMUSCULAR; INTRAVENOUS at 20:18

## 2024-08-28 RX ADMIN — MORPHINE SULFATE 4 MG: 4 INJECTION, SOLUTION INTRAMUSCULAR; INTRAVENOUS at 20:18

## 2024-08-28 RX ADMIN — SODIUM CHLORIDE 1000 ML: 900 INJECTION, SOLUTION INTRAVENOUS at 20:25

## 2024-08-28 RX ADMIN — MORPHINE SULFATE 4 MG: 4 INJECTION, SOLUTION INTRAMUSCULAR; INTRAVENOUS at 23:02

## 2024-08-28 ASSESSMENT — PAIN DESCRIPTION - ONSET: ONSET: GRADUAL

## 2024-08-28 ASSESSMENT — PAIN - FUNCTIONAL ASSESSMENT: PAIN_FUNCTIONAL_ASSESSMENT: 0-10

## 2024-08-28 ASSESSMENT — COLUMBIA-SUICIDE SEVERITY RATING SCALE - C-SSRS
1. IN THE PAST MONTH, HAVE YOU WISHED YOU WERE DEAD OR WISHED YOU COULD GO TO SLEEP AND NOT WAKE UP?: NO
6. HAVE YOU EVER DONE ANYTHING, STARTED TO DO ANYTHING, OR PREPARED TO DO ANYTHING TO END YOUR LIFE?: NO
2. HAVE YOU ACTUALLY HAD ANY THOUGHTS OF KILLING YOURSELF?: NO

## 2024-08-28 ASSESSMENT — PAIN DESCRIPTION - FREQUENCY: FREQUENCY: CONSTANT/CONTINUOUS

## 2024-08-28 ASSESSMENT — PAIN SCALES - GENERAL
PAINLEVEL_OUTOF10: 6
PAINLEVEL_OUTOF10: 10 - WORST POSSIBLE PAIN
PAINLEVEL_OUTOF10: 10 - WORST POSSIBLE PAIN

## 2024-08-28 ASSESSMENT — PAIN DESCRIPTION - DESCRIPTORS
DESCRIPTORS_2: SHARP
DESCRIPTORS: SHARP
DESCRIPTORS: SHARP;MISERABLE

## 2024-08-28 ASSESSMENT — PAIN DESCRIPTION - PROGRESSION: CLINICAL_PROGRESSION: GRADUALLY WORSENING

## 2024-08-28 ASSESSMENT — PAIN DESCRIPTION - ORIENTATION
ORIENTATION: LEFT;LOWER
ORIENTATION_2: LEFT;LOWER

## 2024-08-28 ASSESSMENT — PAIN DESCRIPTION - PAIN TYPE: TYPE: ACUTE PAIN

## 2024-08-28 ASSESSMENT — PAIN DESCRIPTION - LOCATION
LOCATION_2: BACK
LOCATION: ABDOMEN

## 2024-08-28 NOTE — PROGRESS NOTES
Deferred treatment plan by 1 week per patient with MD approval. Patient has kidney stones in too much pain for tx,  moved up urologist appt.   Keely RODRIGUEZ

## 2024-08-28 NOTE — PROGRESS NOTES
Subjective   Patient ID: Roma Corral is a 69 y.o. male    HPI  69 y.o. male with a past medical history of rectal cancer with liver metastasis, s/p colostomy 7/2/24 kindly referred to us by Emily Zambrano CNP for kidney stones. He describes his pain as a 10/10. He is currently on chemo therapy. He has passed one stone but continues to have pain.     Denies fever, nausea today. Does have a new, dry cough    CT abdomen/pelvis from 8/17/24:  IMPRESSION:  1. Extensive liver metastases.  2. A pair of left UVJ stones the larger of which is 9 mm and a minute  lower left ureteral calculus are present with mild-to-moderate left  hydroureteronephrosis.  3. Extremely tiny nonobstructing right renal calculus.  4. Diffuse edematous wall thickening and mild distention of the  distal (aboral) ileum suggest enteritis, but other etiologies are  possible.  5. Minimal pelvic ascites.  6. Pancreatic calcifications suggest chronic pancreatitis.    Review of Systems    All systems were reviewed. Anything negative was noted in the HPI.    Objective   Physical Exam    General: Well developed, well nourished, alert and cooperative, appears in no acute distress   Eyes: Non-injected conjunctiva, sclera clear, no proptosis   Cardiac: Extremities are warm and well perfused. No edema, cyanosis or pallor   Lungs: Breathing is easy, non-labored. Speaking in clear and complete sentences. Normal diaphragmatic movement   MSK: Ambulatory with steady gait, unassisted   Neuro: Alert and oriented to person, place, and time   Psych: Demonstrates good judgment and reason, without hallucinations, abnormal affect or abnormal behaviors   Skin: No obvious lesions, no rashes       No CVA tenderness bilaterally   No suprapubic pain or discomfort       Past Medical History:   Diagnosis Date    Abdominal pain     Acute non-ST elevation myocardial infarction (NSTEMI) (Multi)     Anemia     6/6/24 HGB 8.6; HCT 31.5    Anxiety     CAD (coronary artery disease)      Cardiology follow-up encounter 12/11/2023    Sharif Lau MD    Gout     H/O cardiac catheterization 06/01/2021    H/O cardiovascular stress test 09/03/2021    IMPRESSION: 1. No evidence of ischemia. 2. Suspicion of an infarct along the mid anterior wall. 3. Left ventricular dilatation is noted. 4. Left ventricular EF was calculated to be 50%. Summary:  1. No clinical or electrocardiographic evidence for ischemia at a submaximal workload. 3. The blunted heart rate diminshes the sensitivity of this test.  4. The submaximal level of stress was achieved.    H/O echocardiogram 06/02/2021    Mild concentric Left ventricle hypertrophy.  Global left ventricular wall motion and contractility are within normal limits.  There is normal left ventricular systolic function.  Estimated ejection fraction is 60-64%.  The left atrial size is mildly dilated.  Mild aortic regurgitation.  A trace of mitral regurgitation.  Trivial to mild tricuspid regurgitation.  There is no pulmonary hypertension.    High cholesterol     Hypertension     Overweight     Rectal cancer (Multi)     Type 2 diabetes mellitus (Multi)     4/18/2024 A1C 7.5%         Past Surgical History:   Procedure Laterality Date    COLONOSCOPY  06/17/2024    HEMICOLECTOMY W/ OSTOMY      LEG SURGERY Left     rodrigo placed           Assessment/Plan   Nephrolithiasis left 9mm UVJ    69 y.o. male who presents for the above condition, We had a very long and extensive discussion with the patient regarding his condition.  I discussed with the patient the pathophysiology, differential diagnosis, risk factor, management of ureteral stones.  Explained to the patient that the stone is most probably still present given their persistent pain and the recent CT.  I gave the patient 3 options of management including observation which I discouraged given their episode of fever, the size of the location of the stone.  Explained that they have less likely chance of spontaneous passage of  the stone.  We also discussed ESWL which would be appropriate for the size of the location of stone.  We discussed at length a left ureteroscopy, laser stone fragmentation, left retrograde pyelogram, left double-J stent insertion.  We discussed in detail the risk, benefit, potential complication, adverse events including hematuria, pneumaturia, pain, stent discomfort and pain, fever, chills, infection, urosepsis, I explained to the patient that most likely they need a second procedure at the first wound will be probably only a stent placement. I explained that the second procedure would be the actual laser stone fragmentation and exchange of their stent.      If he does not pass this by 9/3/24, we will get him scheduled for laser lithotripsy and stent placement.  He understands that as he is currently undergoing chemotherapy he is at a higher risk of infection and hospitalization. As we are anticipating surgery in the next few days, I advised he hold off on his next scheduled chemotherapy date. He would like some time to think on his options, and will let us know what he decides.     Plan:  - Continue flomax  - Follow up will be scheduled appropriately        8/28/2024    Scribe Attestation  By signing my name below, I, Marnie Serrato   attest that this documentation has been prepared under the direction and in the presence of Ted Ornelas MD MPH.

## 2024-08-28 NOTE — ED TRIAGE NOTES
Triage Note:  Date of Encounter: [unfilled]   MRN: [id number]    I saw the patient as the clinician in triage and performed a brief history and physical exam established acuity and order appropriate test to develop basic plan of care.  Patient will be seen by MADDIE and/or the physician who will independently evaluate  The patient.  Please see subsequent provider notes for further details and disposition      Brief HPI:   In brief, 69-year-old male here for abdominal pain, he says it is from a known kidney stone that has not been able to pass.    Focused physical exam:  Speaking clearly, no signs of respiratory distress.  No sign of injury or trauma all 4 extremities grossly intact    Plan/MDM:  Chart review, pain control, renal ultrasound, renal function test        Please see subsequent provider note for details and disposition

## 2024-08-29 ENCOUNTER — HOSPITAL ENCOUNTER (OUTPATIENT)
Dept: RADIOLOGY | Facility: HOSPITAL | Age: 69
End: 2024-08-29
Payer: MEDICARE

## 2024-08-29 ENCOUNTER — APPOINTMENT (OUTPATIENT)
Dept: RADIOLOGY | Facility: HOSPITAL | Age: 69
End: 2024-08-29
Payer: MEDICARE

## 2024-08-29 ENCOUNTER — TELEPHONE (OUTPATIENT)
Dept: UROLOGY | Facility: HOSPITAL | Age: 69
End: 2024-08-29
Payer: MEDICARE

## 2024-08-29 ENCOUNTER — APPOINTMENT (OUTPATIENT)
Dept: UROLOGY | Facility: HOSPITAL | Age: 69
End: 2024-08-29
Payer: MEDICARE

## 2024-08-29 ENCOUNTER — PHARMACY VISIT (OUTPATIENT)
Dept: PHARMACY | Facility: CLINIC | Age: 69
End: 2024-08-29
Payer: COMMERCIAL

## 2024-08-29 VITALS
OXYGEN SATURATION: 97 % | BODY MASS INDEX: 28.35 KG/M2 | TEMPERATURE: 97.9 F | SYSTOLIC BLOOD PRESSURE: 130 MMHG | HEIGHT: 70 IN | HEART RATE: 78 BPM | WEIGHT: 198 LBS | DIASTOLIC BLOOD PRESSURE: 73 MMHG | RESPIRATION RATE: 18 BRPM

## 2024-08-29 PROCEDURE — RXMED WILLOW AMBULATORY MEDICATION CHARGE

## 2024-08-29 RX ORDER — HYDROCODONE BITARTRATE AND ACETAMINOPHEN 5; 325 MG/1; MG/1
1 TABLET ORAL 3 TIMES DAILY PRN
Qty: 9 TABLET | Refills: 0 | Status: SHIPPED | OUTPATIENT
Start: 2024-08-29 | End: 2024-09-01

## 2024-08-29 RX ORDER — HYDROCODONE BITARTRATE AND ACETAMINOPHEN 5; 325 MG/1; MG/1
1 TABLET ORAL 3 TIMES DAILY PRN
Qty: 9 TABLET | Refills: 0 | Status: SHIPPED | OUTPATIENT
Start: 2024-08-29 | End: 2024-08-29 | Stop reason: RX

## 2024-08-29 RX ORDER — CEFDINIR 300 MG/1
300 CAPSULE ORAL 2 TIMES DAILY
Qty: 14 CAPSULE | Refills: 0 | Status: SHIPPED | OUTPATIENT
Start: 2024-08-29 | End: 2024-08-29

## 2024-08-29 RX ORDER — HYDROCODONE BITARTRATE AND ACETAMINOPHEN 5; 325 MG/1; MG/1
1 TABLET ORAL 3 TIMES DAILY PRN
Qty: 9 TABLET | Refills: 0 | Status: SHIPPED | OUTPATIENT
Start: 2024-08-29 | End: 2024-08-29

## 2024-08-29 RX ORDER — CEFDINIR 300 MG/1
300 CAPSULE ORAL 2 TIMES DAILY
Qty: 14 CAPSULE | Refills: 0 | Status: SHIPPED | OUTPATIENT
Start: 2024-08-29 | End: 2024-09-05

## 2024-08-29 ASSESSMENT — PAIN SCALES - GENERAL: PAINLEVEL_OUTOF10: 3

## 2024-08-29 NOTE — TELEPHONE ENCOUNTER
Patient called to see if he could do urology procedure tomorrow per Dr. Ornelas; no answer, LM on cell phone.    Agnes John LPN

## 2024-08-29 NOTE — ED PROVIDER NOTES
HPI   Chief Complaint   Patient presents with    Abdominal Pain    Flank Pain     Pt reports being seen here 1 week ago and being diagnosed with kidney stones, pt followed up with pcp and was told he had 2 kidney stones. Pt has only passed one and is in pain in left flank and left lower abd       This is a 69-year-old male presenting to the emergency department for evaluation of left-sided flank pain that has been ongoing for about 2 weeks.  He states that he was diagnosed with a kidney stone at a previous hospital visit and followed up with the urologist today who discussed options for intervention.  The patient states that he did not like what the urologist was telling him due to the persistent pain he came to the ED tonight for reevaluation.  He states that it feels exactly the same as it has felt since he developed the kidney stone in the first place.  He states that he was able to pass 1 kidney stone but he was told he had a 9 mm stone when he was diagnosed a couple of weeks ago, he only passed a stone about 5 mm in diameter.  He is concerned that he has a persistent kidney stone and may require intervention to remove this.      History provided by:  Patient and medical records   used: No            Patient History   Past Medical History:   Diagnosis Date    Abdominal pain     Acute non-ST elevation myocardial infarction (NSTEMI) (Multi)     Anemia     6/6/24 HGB 8.6; HCT 31.5    Anxiety     CAD (coronary artery disease)     Cardiology follow-up encounter 12/11/2023    Sharif Lau MD    Gout     H/O cardiac catheterization 06/01/2021    H/O cardiovascular stress test 09/03/2021    IMPRESSION: 1. No evidence of ischemia. 2. Suspicion of an infarct along the mid anterior wall. 3. Left ventricular dilatation is noted. 4. Left ventricular EF was calculated to be 50%. Summary:  1. No clinical or electrocardiographic evidence for ischemia at a submaximal workload. 3. The blunted heart rate  diminshes the sensitivity of this test.  4. The submaximal level of stress was achieved.    H/O echocardiogram 06/02/2021    Mild concentric Left ventricle hypertrophy.  Global left ventricular wall motion and contractility are within normal limits.  There is normal left ventricular systolic function.  Estimated ejection fraction is 60-64%.  The left atrial size is mildly dilated.  Mild aortic regurgitation.  A trace of mitral regurgitation.  Trivial to mild tricuspid regurgitation.  There is no pulmonary hypertension.    High cholesterol     Hypertension     Overweight     Rectal cancer (Multi)     Type 2 diabetes mellitus (Multi)     4/18/2024 A1C 7.5%     Past Surgical History:   Procedure Laterality Date    COLONOSCOPY  06/17/2024    HEMICOLECTOMY W/ OSTOMY      LEG SURGERY Left     rodrigo placed     Family History   Problem Relation Name Age of Onset    No Known Problems Mother      No Known Problems Father      No Known Problems Sister      Leukemia Brother       Social History     Tobacco Use    Smoking status: Never    Smokeless tobacco: Never   Vaping Use    Vaping status: Never Used   Substance Use Topics    Alcohol use: Not Currently    Drug use: Never       Physical Exam   ED Triage Vitals [08/28/24 1947]   Temperature Heart Rate Respirations BP   36.6 °C (97.9 °F) 92 18 131/72      Pulse Ox Temp Source Heart Rate Source Patient Position   100 % Tympanic Monitor Sitting      BP Location FiO2 (%)     Right arm --       Physical Exam  General: well developed, well nourished elderly male who is awake and alert, in no apparent distress  Eyes: sclera clear bilaterally  HENT: normocephalic, atraumatic.  CV: regular rate and rhythm, no murmur, no gallops, or rubs.   Resp: clear to ascultation bilaterally, no wheezes, rales, or rhonchi  GI: abdomen soft, tender to palpation in the left lower quadrant and left flank without rigidity or guarding, no peritoneal signs, abdomen is nondistended, no masses palpated    Skin: warm, dry, without evidence of rash or abrasions      ED Course & MDM   Diagnoses as of 08/30/24 1005   Kidney stone   Right flank pain                 No data recorded     Genaro Coma Scale Score: 15 (08/28/24 2023 : Tamara Campuzano RN)                           Medical Decision Making  On my exam he does have persistent left-sided abdominal and flank tenderness.  He was given IV morphine, Toradol, Zofran and IV fluids for pain control and control of his nausea.  He did have some relief but his pain came back and was given additional dose of morphine.  While in the ED he was found to have no evidence of acute UTI, no bacteria in the urine and only minimal white blood cells.  No leukocyte esterase or nitrates.  There is no leukocytosis, patient's vitals are unremarkable, he is not septic.  Renal ultrasound showed mild left hydronephrosis which appears decreased compared to prior CT.  I did review the CT from 817 showing a large 0.9 cm kidney stone which was obstructing at the time.  This was at the left UVJ.  I did repeat the scan today due to the change acutely found on ultrasound.    I personally reviewed the CT image and discussed the case with the urologist on-call Dr. Kiser who recommends outpatient follow-up since it appears the kidney stone is now in the bladder and is no longer obstructive.  I did sign the patient out pending the formal read by the radiologist to the oncoming physician and reassessment of the patient after a second dose of morphine.  If no additional pathology is found on the CT and the patient is feeling more comfortable after being medicated in the ED I feel that he may be appropriate for discharge.    Procedure  Procedures     Ronan Patel,   08/30/24 1006

## 2024-08-29 NOTE — DISCHARGE INSTRUCTIONS
Follow-up with your surgeon as well as a urologist by calling their office tomorrow to schedule close follow-up appointment.  If symptoms worsen or change he can return at any time for further evaluation and treatment.

## 2024-08-29 NOTE — PROGRESS NOTES
Patient was initially seen by my colleague and endorsed to me on signout.    Briefly, patient is a 69-year-old male that presented to the emergency room for evaluation of right flank pain.  Patient very uncomfortable appearing but in no obvious distress on initial presentation.  My colleague had spoken with nephrology and if the stone is within the bladder then patient can be discharged with outpatient follow-up.  If it is still causing an obstruction given its size patient may need further evaluation. patient was signed out to me pending CT results.    Exam  General: Awake, uncomfortable appearing but in no obvious distress  HEENT: Normocephalic, atraumatic, moist because membranes, pupils equal, round, active to light, extraocular eye movements intact  Pulmonary: Respirations easy, nonlabored  Cardiac: Regular rate and rhythm  Abdomen: Mild right-sided CVA tenderness palpation as well as mild right lower abdominal tenderness palpation without rigidity or guarding no evidence of peritonitis      On repeat evaluation patient significantly feeling better at this time.  CT scan does show that the stone has passed within the bladder and there is no obvious hydronephrosis or obstruction at this time.  There is some slight increase in his induration along the terminal ileum concerning for possible severe terminal ileitis however this is minimally changed from imaging 2 weeks prior.  Patient will follow-up with his surgeon as an outpatient.  As the stone has passed within the bladder patient will follow-up with urology as an outpatient for further management.  Patient will be given a very short course of Norco for pain as well as put on p.o. cefdinir given the pyuria present.  Return precautions were discussed with patient and he is agreeable for discharge at this time.      0300: Patient did call back to inform us that pharmacy that the [place] was sent to was out of stock at this time.  He requested it be sent to "[facility]  pharmacy.  We did call the pharmacy to confirm and they did confirm that they are out of that medication at this time and we will cancel the order.  New prescription sent into [facility] pharmacy for him to  tomorrow.

## 2024-08-30 ENCOUNTER — TELEPHONE (OUTPATIENT)
Dept: HEMATOLOGY/ONCOLOGY | Facility: HOSPITAL | Age: 69
End: 2024-08-30
Payer: MEDICARE

## 2024-08-30 NOTE — TELEPHONE ENCOUNTER
Patient calls to update Dr. Nieto, Went to ER r/t kidney stone got prescription for Hydrocodone/Acetaminophen 5/325mg (Norco) and Cefdinir 300mg. He has passed a small stone, 9mm larger stone still remain. He is taking pain meds and using heat for his comfort. Scheduled for FUV on 9/3 with Dr. Nieto, he reports if any concerns with not being able to come to appt he will call in the morning on Tuesday.

## 2024-09-03 ENCOUNTER — APPOINTMENT (OUTPATIENT)
Dept: HEMATOLOGY/ONCOLOGY | Facility: CLINIC | Age: 69
End: 2024-09-03
Payer: MEDICARE

## 2024-09-04 ENCOUNTER — APPOINTMENT (OUTPATIENT)
Dept: HEMATOLOGY/ONCOLOGY | Facility: CLINIC | Age: 69
End: 2024-09-04
Payer: MEDICARE

## 2024-09-05 ENCOUNTER — HOSPITAL ENCOUNTER (OUTPATIENT)
Dept: RADIOLOGY | Facility: HOSPITAL | Age: 69
Discharge: HOME | End: 2024-09-05
Payer: MEDICARE

## 2024-09-05 DIAGNOSIS — N50.82 SCROTAL PAIN: ICD-10-CM

## 2024-09-05 PROCEDURE — 93975 VASCULAR STUDY: CPT

## 2024-09-05 PROCEDURE — 76870 US EXAM SCROTUM: CPT

## 2024-09-06 ENCOUNTER — APPOINTMENT (OUTPATIENT)
Dept: HEMATOLOGY/ONCOLOGY | Facility: CLINIC | Age: 69
End: 2024-09-06
Payer: MEDICARE

## 2024-09-09 ENCOUNTER — TELEPHONE (OUTPATIENT)
Dept: HEMATOLOGY/ONCOLOGY | Facility: HOSPITAL | Age: 69
End: 2024-09-09
Payer: MEDICARE

## 2024-09-09 ENCOUNTER — APPOINTMENT (OUTPATIENT)
Dept: HEMATOLOGY/ONCOLOGY | Facility: CLINIC | Age: 69
End: 2024-09-09
Payer: MEDICARE

## 2024-09-09 NOTE — TELEPHONE ENCOUNTER
"Patient is calling to let you know what happened in the ED.  He had two stones (a 5 and a 9).    The emergency room dr put him on antibiotics.    The antibiotics are completed.    He says they are detecting a new small intestine infection after the antibiotics on 8/28.  He wants to know who he should be talking to now?  They did not give him more antibiotics.  He had a scrotum scan on 9/5 at [facility] also.    He wants to make sure you saw all of this and get direction.  He was told by the other dr Vasquez to call you.      He is wanting to know what the next move should be.  His last tx was 8/7.      He does have slight pain in the \"stomach area\"  pain can be up to 5 in the stomach area.    "

## 2024-09-09 NOTE — TELEPHONE ENCOUNTER
I spoke with Dr. Nieto and called Mr. Corral back.  I informed him that the infection in his ileum is often seen in chemotherapy patients. Dr. Nieto will send in some pain medication to help with the abdominal pain. Patient will keep his 9/17 fuv/infusion.   Patient in agreement with the plan and verbalized understanding.

## 2024-09-09 NOTE — TELEPHONE ENCOUNTER
"Called Mr. Corral back to discuss his concerns.   He states that 8/28 he went to the ED due to pain from passing kidney stone - a CT was done and one of the inpatient docs told him that it looked like there was a \"small infection\" in the intestines. He did not hear about it again and was not sent home with any other medications or antibiotics for that specifically.   He states that the pain is in his stomach very near to his stoma. He describes it as intermittent and 5/10 at its worst. He took toradol which helped the pain and he was able to rest.   He thought it could be his ostomy so he called Dr. Vasquez's office who then sent him here.   His next FUV is next Tuesday 9/17.   I will let Dr. Nieto know what is going on and see if he pt needs to be seen sooner.   "

## 2024-09-10 ENCOUNTER — APPOINTMENT (OUTPATIENT)
Dept: HEMATOLOGY/ONCOLOGY | Facility: CLINIC | Age: 69
End: 2024-09-10
Payer: MEDICARE

## 2024-09-10 DIAGNOSIS — C20 RECTAL CANCER (MULTI): ICD-10-CM

## 2024-09-10 DIAGNOSIS — G89.18 ACUTE POST-OPERATIVE PAIN: ICD-10-CM

## 2024-09-10 PROCEDURE — RXMED WILLOW AMBULATORY MEDICATION CHARGE

## 2024-09-10 RX ORDER — HYDROMORPHONE HYDROCHLORIDE 2 MG/1
2 TABLET ORAL EVERY 8 HOURS PRN
Qty: 90 TABLET | Refills: 0 | Status: SHIPPED | OUTPATIENT
Start: 2024-09-10 | End: 2024-10-11

## 2024-09-11 ENCOUNTER — PHARMACY VISIT (OUTPATIENT)
Dept: PHARMACY | Facility: CLINIC | Age: 69
End: 2024-09-11
Payer: COMMERCIAL

## 2024-09-11 ENCOUNTER — APPOINTMENT (OUTPATIENT)
Dept: HEMATOLOGY/ONCOLOGY | Facility: CLINIC | Age: 69
End: 2024-09-11
Payer: MEDICARE

## 2024-09-11 ENCOUNTER — TELEPHONE (OUTPATIENT)
Dept: ADMISSION | Facility: HOSPITAL | Age: 69
End: 2024-09-11
Payer: MEDICARE

## 2024-09-11 DIAGNOSIS — C20 RECTAL CANCER (MULTI): ICD-10-CM

## 2024-09-11 DIAGNOSIS — C20 RECTAL CANCER (MULTI): Primary | ICD-10-CM

## 2024-09-11 NOTE — TELEPHONE ENCOUNTER
Patient contacted and made aware that he can get fluids at [facility] on Wednesday 9/25 at 2:30. He is agreeable. Orders placed.

## 2024-09-11 NOTE — TELEPHONE ENCOUNTER
The patient called in, states that he picked up his schedule for his next treatment which is on 9/17. He wants hydration apts scheduled for the following week Monday & Friday (9/23 & 9/27). Can this be added? Please advise.

## 2024-09-11 NOTE — TELEPHONE ENCOUNTER
The patient does not want these appointments at [facility], he wants them at [facility], message relayed to [facility] Charge nurse to see if this can be accommodated on 9/23 and 9/27. The apts at [facility] have been cancelled. Patient prefers fluids in the afternoon

## 2024-09-16 ENCOUNTER — APPOINTMENT (OUTPATIENT)
Dept: UROLOGY | Facility: CLINIC | Age: 69
End: 2024-09-16
Payer: MEDICARE

## 2024-09-16 ENCOUNTER — INFUSION (OUTPATIENT)
Dept: HEMATOLOGY/ONCOLOGY | Facility: CLINIC | Age: 69
End: 2024-09-16
Payer: MEDICARE

## 2024-09-16 DIAGNOSIS — N20.0 NEPHROLITHIASIS: ICD-10-CM

## 2024-09-16 DIAGNOSIS — N50.82 SCROTAL PAIN: Primary | ICD-10-CM

## 2024-09-16 DIAGNOSIS — C20 RECTAL CANCER (MULTI): ICD-10-CM

## 2024-09-16 LAB
ALBUMIN SERPL BCP-MCNC: 3 G/DL (ref 3.4–5)
ALP SERPL-CCNC: 140 U/L (ref 33–136)
ALT SERPL W P-5'-P-CCNC: 9 U/L (ref 10–52)
ANION GAP SERPL CALC-SCNC: 14 MMOL/L (ref 10–20)
AST SERPL W P-5'-P-CCNC: 18 U/L (ref 9–39)
BASOPHILS # BLD AUTO: 0.05 X10*3/UL (ref 0–0.1)
BASOPHILS NFR BLD AUTO: 1 %
BILIRUB SERPL-MCNC: 0.3 MG/DL (ref 0–1.2)
BUN SERPL-MCNC: 13 MG/DL (ref 6–23)
CALCIUM SERPL-MCNC: 7.7 MG/DL (ref 8.6–10.3)
CEA SERPL-MCNC: 143.8 UG/L
CHLORIDE SERPL-SCNC: 107 MMOL/L (ref 98–107)
CO2 SERPL-SCNC: 25 MMOL/L (ref 21–32)
CREAT SERPL-MCNC: 0.85 MG/DL (ref 0.5–1.3)
DACRYOCYTES BLD QL SMEAR: NORMAL
EGFRCR SERPLBLD CKD-EPI 2021: >90 ML/MIN/1.73M*2
EOSINOPHIL # BLD AUTO: 0.17 X10*3/UL (ref 0–0.7)
EOSINOPHIL NFR BLD AUTO: 3.6 %
ERYTHROCYTE [DISTWIDTH] IN BLOOD BY AUTOMATED COUNT: 26.3 % (ref 11.5–14.5)
GLUCOSE SERPL-MCNC: 153 MG/DL (ref 74–99)
HCT VFR BLD AUTO: 30.5 % (ref 41–52)
HGB BLD-MCNC: 9.1 G/DL (ref 13.5–17.5)
HYPOCHROMIA BLD QL SMEAR: NORMAL
IMM GRANULOCYTES # BLD AUTO: 0.01 X10*3/UL (ref 0–0.7)
IMM GRANULOCYTES NFR BLD AUTO: 0.2 % (ref 0–0.9)
LYMPHOCYTES # BLD AUTO: 1.1 X10*3/UL (ref 1.2–4.8)
LYMPHOCYTES NFR BLD AUTO: 23 %
MCH RBC QN AUTO: 24.8 PG (ref 26–34)
MCHC RBC AUTO-ENTMCNC: 29.8 G/DL (ref 32–36)
MCV RBC AUTO: 83 FL (ref 80–100)
MONOCYTES # BLD AUTO: 0.42 X10*3/UL (ref 0.1–1)
MONOCYTES NFR BLD AUTO: 8.8 %
NEUTROPHILS # BLD AUTO: 3.03 X10*3/UL (ref 1.2–7.7)
NEUTROPHILS NFR BLD AUTO: 63.4 %
NRBC BLD-RTO: 0 /100 WBCS (ref 0–0)
OVALOCYTES BLD QL SMEAR: NORMAL
PLATELET # BLD AUTO: 161 X10*3/UL (ref 150–450)
POC APPEARANCE, URINE: CLEAR
POC BILIRUBIN, URINE: NEGATIVE
POC BLOOD, URINE: NEGATIVE
POC COLOR, URINE: YELLOW
POC GLUCOSE, URINE: NEGATIVE MG/DL
POC KETONES, URINE: NEGATIVE MG/DL
POC LEUKOCYTES, URINE: NEGATIVE
POC NITRITE,URINE: NEGATIVE
POC PH, URINE: 6.5 PH
POC PROTEIN, URINE: NEGATIVE MG/DL
POC SPECIFIC GRAVITY, URINE: 1.02
POC UROBILINOGEN, URINE: 0.2 EU/DL
POLYCHROMASIA BLD QL SMEAR: NORMAL
POTASSIUM SERPL-SCNC: 3.5 MMOL/L (ref 3.5–5.3)
PROT SERPL-MCNC: 6.1 G/DL (ref 6.4–8.2)
PTH-INTACT SERPL-MCNC: 105 PG/ML (ref 18.5–88)
RBC # BLD AUTO: 3.67 X10*6/UL (ref 4.5–5.9)
RBC MORPH BLD: NORMAL
SODIUM SERPL-SCNC: 142 MMOL/L (ref 136–145)
URATE SERPL-MCNC: 6.3 MG/DL (ref 4–7.5)
WBC # BLD AUTO: 4.8 X10*3/UL (ref 4.4–11.3)

## 2024-09-16 PROCEDURE — 84075 ASSAY ALKALINE PHOSPHATASE: CPT | Performed by: NURSE PRACTITIONER

## 2024-09-16 PROCEDURE — 82378 CARCINOEMBRYONIC ANTIGEN: CPT | Mod: WESLAB | Performed by: NURSE PRACTITIONER

## 2024-09-16 PROCEDURE — 1159F MED LIST DOCD IN RCRD: CPT | Performed by: UROLOGY

## 2024-09-16 PROCEDURE — 3048F LDL-C <100 MG/DL: CPT | Performed by: UROLOGY

## 2024-09-16 PROCEDURE — 1036F TOBACCO NON-USER: CPT | Performed by: UROLOGY

## 2024-09-16 PROCEDURE — 1160F RVW MEDS BY RX/DR IN RCRD: CPT | Performed by: UROLOGY

## 2024-09-16 PROCEDURE — 83970 ASSAY OF PARATHORMONE: CPT | Mod: WESLAB | Performed by: UROLOGY

## 2024-09-16 PROCEDURE — 1125F AMNT PAIN NOTED PAIN PRSNT: CPT | Performed by: UROLOGY

## 2024-09-16 PROCEDURE — G2211 COMPLEX E/M VISIT ADD ON: HCPCS | Performed by: UROLOGY

## 2024-09-16 PROCEDURE — 99214 OFFICE O/P EST MOD 30 MIN: CPT | Performed by: UROLOGY

## 2024-09-16 PROCEDURE — 2500000004 HC RX 250 GENERAL PHARMACY W/ HCPCS (ALT 636 FOR OP/ED): Performed by: NURSE PRACTITIONER

## 2024-09-16 PROCEDURE — 3051F HG A1C>EQUAL 7.0%<8.0%: CPT | Performed by: UROLOGY

## 2024-09-16 PROCEDURE — 85025 COMPLETE CBC W/AUTO DIFF WBC: CPT | Performed by: NURSE PRACTITIONER

## 2024-09-16 PROCEDURE — 36591 DRAW BLOOD OFF VENOUS DEVICE: CPT

## 2024-09-16 PROCEDURE — 84550 ASSAY OF BLOOD/URIC ACID: CPT | Mod: WESLAB | Performed by: UROLOGY

## 2024-09-16 PROCEDURE — 81003 URINALYSIS AUTO W/O SCOPE: CPT | Performed by: UROLOGY

## 2024-09-16 RX ORDER — HEPARIN SODIUM,PORCINE/PF 10 UNIT/ML
50 SYRINGE (ML) INTRAVENOUS AS NEEDED
Status: DISCONTINUED | OUTPATIENT
Start: 2024-09-16 | End: 2024-09-16 | Stop reason: HOSPADM

## 2024-09-16 RX ORDER — HEPARIN 100 UNIT/ML
500 SYRINGE INTRAVENOUS AS NEEDED
Status: CANCELLED | OUTPATIENT
Start: 2024-09-16

## 2024-09-16 RX ORDER — HEPARIN SODIUM,PORCINE/PF 10 UNIT/ML
50 SYRINGE (ML) INTRAVENOUS AS NEEDED
Status: CANCELLED | OUTPATIENT
Start: 2024-09-16

## 2024-09-16 RX ORDER — HEPARIN 100 UNIT/ML
500 SYRINGE INTRAVENOUS AS NEEDED
Status: DISCONTINUED | OUTPATIENT
Start: 2024-09-16 | End: 2024-09-16 | Stop reason: HOSPADM

## 2024-09-16 ASSESSMENT — PAIN SCALES - GENERAL: PAINLEVEL: 2

## 2024-09-16 NOTE — PATIENT INSTRUCTIONS
Jeri will mail you a 24 hour urine kit to your home  Your provider has ordered blood work to be drawn.  Please obtain your labs at any  facility.

## 2024-09-17 ENCOUNTER — APPOINTMENT (OUTPATIENT)
Dept: HEMATOLOGY/ONCOLOGY | Facility: CLINIC | Age: 69
End: 2024-09-17
Payer: MEDICARE

## 2024-09-17 ENCOUNTER — OFFICE VISIT (OUTPATIENT)
Dept: HEMATOLOGY/ONCOLOGY | Facility: CLINIC | Age: 69
End: 2024-09-17
Payer: MEDICARE

## 2024-09-17 VITALS
SYSTOLIC BLOOD PRESSURE: 144 MMHG | HEART RATE: 69 BPM | WEIGHT: 198 LBS | TEMPERATURE: 97.2 F | DIASTOLIC BLOOD PRESSURE: 78 MMHG | OXYGEN SATURATION: 96 % | RESPIRATION RATE: 18 BRPM | BODY MASS INDEX: 28.41 KG/M2

## 2024-09-17 DIAGNOSIS — C20 RECTAL CANCER (MULTI): Primary | ICD-10-CM

## 2024-09-17 PROCEDURE — 1125F AMNT PAIN NOTED PAIN PRSNT: CPT | Performed by: NURSE PRACTITIONER

## 2024-09-17 PROCEDURE — 99215 OFFICE O/P EST HI 40 MIN: CPT | Performed by: NURSE PRACTITIONER

## 2024-09-17 PROCEDURE — 1159F MED LIST DOCD IN RCRD: CPT | Performed by: NURSE PRACTITIONER

## 2024-09-17 PROCEDURE — 3078F DIAST BP <80 MM HG: CPT | Performed by: NURSE PRACTITIONER

## 2024-09-17 PROCEDURE — 3051F HG A1C>EQUAL 7.0%<8.0%: CPT | Performed by: NURSE PRACTITIONER

## 2024-09-17 PROCEDURE — 3077F SYST BP >= 140 MM HG: CPT | Performed by: NURSE PRACTITIONER

## 2024-09-17 PROCEDURE — 3048F LDL-C <100 MG/DL: CPT | Performed by: NURSE PRACTITIONER

## 2024-09-17 ASSESSMENT — PAIN SCALES - GENERAL: PAINLEVEL: 2

## 2024-09-17 NOTE — PROGRESS NOTES
Patient in clinic today for follow up visit. Mr. Corral reports tht he passed his last kidney stone approximately 2 weeks ago. He reports continuing to have some abd discomfort requiring pain medication. He is aware of upcoming IV fluids appointment and inquiring if any more are needed. Medications, allergies, Falls, Skin risk, nutrition assessment and treatment plan reviewed with patient.

## 2024-09-17 NOTE — PROGRESS NOTES
"  Patient is a 69 y.o. male presenting with kidney stones    SUBJECTIVE:  HPI   He presented with pain and CT scan identified 2 stones in the distal left ureter.  He had scrotal pain and had a scrotal ultrasound.  He passed both stones over a month.  He feels well now.  He has a history of metastatic rectal cancer.  He denies any further stone pain.        No results found for: \"URINECULTURE\"     Past Medical History:   Diagnosis Date    Abdominal pain     Acute non-ST elevation myocardial infarction (NSTEMI) (Multi)     Anemia     6/6/24 HGB 8.6; HCT 31.5    Anxiety     CAD (coronary artery disease)     Cardiology follow-up encounter 12/11/2023    Sharif Lau MD    Gout     H/O cardiac catheterization 06/01/2021    H/O cardiovascular stress test 09/03/2021    IMPRESSION: 1. No evidence of ischemia. 2. Suspicion of an infarct along the mid anterior wall. 3. Left ventricular dilatation is noted. 4. Left ventricular EF was calculated to be 50%. Summary:  1. No clinical or electrocardiographic evidence for ischemia at a submaximal workload. 3. The blunted heart rate diminshes the sensitivity of this test.  4. The submaximal level of stress was achieved.    H/O echocardiogram 06/02/2021    Mild concentric Left ventricle hypertrophy.  Global left ventricular wall motion and contractility are within normal limits.  There is normal left ventricular systolic function.  Estimated ejection fraction is 60-64%.  The left atrial size is mildly dilated.  Mild aortic regurgitation.  A trace of mitral regurgitation.  Trivial to mild tricuspid regurgitation.  There is no pulmonary hypertension.    High cholesterol     Hypertension     Overweight     Rectal cancer (Multi)     Type 2 diabetes mellitus (Multi)     4/18/2024 A1C 7.5%      Past Surgical History:   Procedure Laterality Date    COLONOSCOPY  06/17/2024    HEMICOLECTOMY W/ OSTOMY      LEG SURGERY Left     rodrigo placed      Family History   Problem Relation Name Age of " Onset    No Known Problems Mother      No Known Problems Father      No Known Problems Sister      Leukemia Brother        Social History     Socioeconomic History    Marital status:     Number of children: 1   Occupational History    Occupation: retired   Tobacco Use    Smoking status: Never    Smokeless tobacco: Never   Vaping Use    Vaping status: Never Used   Substance and Sexual Activity    Alcohol use: Not Currently    Drug use: Never    Sexual activity: Defer     Social Determinants of Health     Financial Resource Strain: Low Risk  (7/3/2024)    Overall Financial Resource Strain (CARDIA)     Difficulty of Paying Living Expenses: Not hard at all   Food Insecurity: No Food Insecurity (7/3/2024)    Hunger Vital Sign     Worried About Running Out of Food in the Last Year: Never true     Ran Out of Food in the Last Year: Never true   Transportation Needs: No Transportation Needs (7/3/2024)    PRAPARE - Transportation     Lack of Transportation (Medical): No     Lack of Transportation (Non-Medical): No   Social Connections: Unknown (7/3/2024)    Social Connection and Isolation Panel [NHANES]     Marital Status:    Housing Stability: Low Risk  (7/3/2024)    Housing Stability Vital Sign     Unable to Pay for Housing in the Last Year: No     Number of Places Lived in the Last Year: 1     Unstable Housing in the Last Year: No        Review of Systems   Constitutional: denies any unintentional weight loss or change in strength.  Integumentary: denies any rashes or pruritus.  Eyes: denies any double vision or eye pain.  Ear/Nose/Mouth/Throat: denies any nosebleeds or gum bleeds.  Cardiovascular: denies any chest pain or syncope.  Respiratory: denies hemoptysis.  Gastrointestinal: denies nausea or vomiting.  Musculoskeletal: denies muscle cramping or weakness.  Neurologic: denies convulsions or seizures.  Hematologic/Lymphatic: denies bleeding tendencies.  Endocrine: denies heat/cold intolerance.  All other  "systems have been reviewed and are negative unless otherwise noted in the HPI.    OBJECTIVE:  There were no vitals taken for this visit.  Physical Exam   Constitutional: No obvious distress.  Eyes: Non-injected conjunctiva, sclera clear, EOMI.  Ears/Nose/Mouth/Throat: No obvious drainage per ears or nose.  Cardiovascular: Extremities are warm and well perfused. No edema, cyanosis or pallor.  Respiratory: No audible wheezing/stridor; respirations do not appear labored.  Gastrointestinal: Abdomen soft, not distended.  Musculoskeletal: Normal ROM of extremities.  Skin: No obvious rashes or open sores.  Neurologic: Alert and oriented, CN 2-12 grossly intact.  Psychiatric: Answers questions appropriately with normal affect.  Hematologic/Lymphatic/Immunologic: No obvious bruises or sites of spontaneous bleeding.  Genitourinary: No CVA tenderness, bladder not palpable.     Labs:  Lab Results   Component Value Date    WBC 4.8 09/16/2024    HGB 9.1 (L) 09/16/2024    HCT 30.5 (L) 09/16/2024     09/16/2024    CHOL 154 06/06/2024    TRIG 109 04/18/2024    HDL 27.1 06/06/2024    ALT 9 (L) 09/16/2024    AST 18 09/16/2024     09/16/2024    K 3.5 09/16/2024     09/16/2024    CREATININE 0.85 09/16/2024    BUN 13 09/16/2024    CO2 25 09/16/2024    TSH 2.17 06/06/2024    INR 1.0 05/30/2021    HGBA1C 7.5 (H) 04/18/2024     No results found for: \"KPSAT\", \"KPSAP\"  IMAGING:  US scrotum w doppler    Result Date: 9/6/2024  Impression: Small bilateral hydrocele is present with that on the left containing some internal debris.   6 mm appendix epididymis on the left.   Bilateral scrotal tunica cysts.   Normal testes without torsion, testicular mass, or orchitis. No epididymitis is seen.     MACRO: None.   Signed by: Jewell Sharma 9/6/2024 7:18 PM Dictation workstation:   [id number]       PROCEDURES:    ASSESSMENT/PLAN:  Problem List Items Addressed This Visit       Nephrolithiasis    Relevant Orders    POCT UA Automated " manually resulted (Completed)    Basic Metabolic Panel    Parathyroid Hormone, Intact    Uric Acid (Completed)    Calculi (Stone) Analysis    Scrotal pain - Primary      He had 2 distal left ureteral stones which he passed. He will drop off a stone for analysis.  Lab work is ordered to evaluate for an etiology of his stone disease.  He is scrotal pain appears secondary to the stone passing.  He is undergoing chemotherapy for metastatic rectal cancer.  He will complete 24-hour urinalysis.  Follow-up in 1 month planned to discuss stone prevention.    All questions were answered to the patient’s satisfaction.  Patient agrees with the plan and wishes to proceed.  Follow-up will be scheduled appropriately.     Shaheen Kiser MD

## 2024-09-17 NOTE — PROGRESS NOTES
Patient ID: Roma Corral is a 69 y.o. male from [place], [place].     Referring Physician: Dann Nieto MD  [id number] [place] [address]    Primary Care Provider: Santiago Buckner MD    Diagnosis:   Rectal cancer with liver mets - 6/2024    Primary Oncologic Surgeon:   Christy    Primary Medical Oncologist:  Dann Nieto MD       Primary Radiation Oncologist:  Elvi    Oncologic Sugery History:  7/2/24 - ex lap with colostomy creation     Oncologic Therapy History:  N/a    Molecular Genetics:  Pending    Current Sites of Disease:  Liver, rectum    Oncologic Problem List:  Metastatic CRC  CAD  T2DM    Oncologic Narrative:  Roma Corral is a 69 y.o. male who is referred by Dr Vasquez for metastatic rectal cancer to liver s/p lap colostomy creation on 7/2/24. .  There is extensive metastatic disease in the liver with most of the right liver occupied with bulky disease which extends into segment 4.  Additionally there are 2 lesions in the left lateral section.  He initially presented with abdominal pain and pelvic pressure with labs showing anemia.  He has undergone imaging including CT scan, MRI liver, MRI rectum.  He has met with Dr. Boles from radiation oncology with a plan to start short course in the near future. He has met with Dr. Erazo and Dr. Harrison to discuss potential future surgeries.       Past Medical History: Roma has a past medical history of Abdominal pain, Acute non-ST elevation myocardial infarction (NSTEMI) (Multi), Anemia, Anxiety, CAD (coronary artery disease), Cardiology follow-up encounter (12/11/2023), Gout, H/O cardiac catheterization (06/01/2021), H/O cardiovascular stress test (09/03/2021), H/O echocardiogram (06/02/2021), High cholesterol, Hypertension, Overweight, Rectal cancer (Multi), and Type 2 diabetes mellitus (Multi).  Surgical History:  Roma has a past surgical history that includes Leg Surgery (Left); Colonoscopy (06/17/2024); and Hemicolectomy w/ ostomy.  Social History:   Roma reports that he has never smoked. He has never used smokeless tobacco. He reports that he does not currently use alcohol. He reports that he does not use drugs.  Family History:    Family History   Problem Relation Name Age of Onset    No Known Problems Mother      No Known Problems Father      No Known Problems Sister      Leukemia Brother       Family Oncology History:  Cancer-related family history is not on file.        SUBJECTIVE:    History of Present Illness:  Roma Corral is a 69 y.o. male who was referred by Dann Nieto MD and presents with chemotherapy follow up   Mr. Corral is seen in follow up   Completed xrt 7/17 7/24 - received 1st dose FOLFOXIRI / Josefina  8/7/24 - 2nd dose    - chemo has been held since then due to painful kidney stones requiring ED visits / fluids/ pain meds     Was able to pass both stones   Feeling better   Ready to restart treatment       OBJECTIVE:    VS / Pain:  There were no vitals taken for this visit.  BSA: There is no height or weight on file to calculate BSA.   Pain Scale: 0    Daily Weight  08/28/24 : 89.8 kg (198 lb)  08/20/24 : 86.6 kg (190 lb 14.7 oz)  08/17/24 : 84.8 kg (187 lb)      Physical Exam  Constitutional:       Appearance: He is normal weight.   HENT:      Nose: Nose normal.      Mouth/Throat:      Mouth: Mucous membranes are moist.      Pharynx: Oropharynx is clear.   Eyes:      Extraocular Movements: Extraocular movements intact.      Conjunctiva/sclera: Conjunctivae normal.   Cardiovascular:      Rate and Rhythm: Normal rate.      Pulses: Normal pulses.   Pulmonary:      Effort: Pulmonary effort is normal.   Abdominal:      General: Abdomen is flat.      Comments: Colostomy bag in place    Musculoskeletal:         General: Normal range of motion.   Skin:     General: Skin is warm.   Neurological:      General: No focal deficit present.      Mental Status: He is alert. Mental status is at baseline.   Psychiatric:         Mood and Affect: Mood normal.          Thought Content: Thought content normal.         Judgment: Judgment normal.         Performance Status:   ECOG 1    Diagnostic Results         WBC   Date/Time Value Ref Range Status   09/16/2024 02:46 PM 4.8 4.4 - 11.3 x10*3/uL Final   08/28/2024 08:12 PM 5.8 4.4 - 11.3 x10*3/uL Final   08/26/2024 03:25 PM 7.3 4.4 - 11.3 x10*3/uL Final     Hemoglobin   Date Value Ref Range Status   09/16/2024 9.1 (L) 13.5 - 17.5 g/dL Final   08/28/2024 10.2 (L) 13.5 - 17.5 g/dL Final   08/26/2024 9.8 (L) 13.5 - 17.5 g/dL Final     MCV   Date/Time Value Ref Range Status   09/16/2024 02:46 PM 83 80 - 100 fL Final   08/28/2024 08:12 PM 77 (L) 80 - 100 fL Final   08/26/2024 03:25 PM 78 (L) 80 - 100 fL Final     Platelets   Date/Time Value Ref Range Status   09/16/2024 02:46  150 - 450 x10*3/uL Final   08/28/2024 08:12  150 - 450 x10*3/uL Final   08/26/2024 03:25  150 - 450 x10*3/uL Final     Neutrophils Absolute   Date/Time Value Ref Range Status   09/16/2024 02:46 PM 3.03 1.20 - 7.70 x10*3/uL Final     Comment:     Percent differential counts (%) should be interpreted in the context of the absolute cell counts (cells/uL).   08/28/2024 08:12 PM 4.30 1.20 - 7.70 x10*3/uL Final     Comment:     Percent differential counts (%) should be interpreted in the context of the absolute cell counts (cells/uL).   08/26/2024 03:25 PM 5.76 1.20 - 7.70 x10*3/uL Final     Comment:     Percent differential counts (%) should be interpreted in the context of the absolute cell counts (cells/uL).     Bilirubin, Total   Date/Time Value Ref Range Status   09/16/2024 02:46 PM 0.3 0.0 - 1.2 mg/dL Final   08/28/2024 08:12 PM 0.3 0.1 - 1.2 mg/dL Final   08/26/2024 03:25 PM 0.4 0.0 - 1.2 mg/dL Final     AST   Date/Time Value Ref Range Status   09/16/2024 02:46 PM 18 9 - 39 U/L Final   08/28/2024 08:12 PM 39 5 - 40 U/L Final   08/26/2024 03:25 PM 44 (H) 9 - 39 U/L Final     ALT   Date/Time Value Ref Range Status   09/16/2024 02:46 PM 9 (L) 10  "- 52 U/L Final     Comment:     Patients treated with Sulfasalazine may generate falsely decreased results for ALT.   08/28/2024 08:12 PM 24 5 - 40 U/L Final   08/26/2024 03:25 PM 27 10 - 52 U/L Final     Comment:     Patients treated with Sulfasalazine may generate falsely decreased results for ALT.     Creatinine   Date/Time Value Ref Range Status   09/16/2024 02:46 PM 0.85 0.50 - 1.30 mg/dL Final   08/28/2024 08:12 PM 1.20 0.40 - 1.60 mg/dL Final   08/26/2024 03:25 PM 1.01 0.50 - 1.30 mg/dL Final     Urea Nitrogen   Date/Time Value Ref Range Status   09/16/2024 02:46 PM 13 6 - 23 mg/dL Final   08/28/2024 08:12 PM 15 8 - 25 mg/dL Final   08/26/2024 03:25 PM 11 6 - 23 mg/dL Final     Albumin   Date/Time Value Ref Range Status   09/16/2024 02:46 PM 3.0 (L) 3.4 - 5.0 g/dL Final   08/28/2024 08:12 PM 2.8 (L) 3.5 - 5.0 g/dL Final   08/26/2024 03:25 PM 2.9 (L) 3.4 - 5.0 g/dL Final     No results found for: \"\"  Carcinoembryonic AG   Date/Time Value Ref Range Status   09/16/2024 02:46 .8 ug/L Final   08/26/2024 03:25 .7 ug/L Final   08/06/2024 11:30 .9 ug/L Final     === 08/28/24 ===    CT ABDOMEN PELVIS WO IV CONTRAST    - Impression -  1. Severe wall thickening of the terminal ileum with surrounding fat  stranding and small volume ascites, increased from 08/17/2024  concerning for severe terminal ileitis.  2. 0.8 cm calculus within the urinary bladder lumen. No  hydronephrosis or hydroureter. Nonobstructing right renal calculi  measuring up to 0.2 cm.  3. Mild circumferential bladder wall thickening may reflect chronic  change or cystitis. Please correlate with urinalysis.  4. Postsurgical change related to diverting loop colostomy.  5. Rectosigmoid mass with stricturing and adjacent desmoplastic  reaction is better evaluated on the prior MRI.  6. Hepatic metastatic disease which appears overall similar to  08/17/2024; limited evaluation in the absence of IV contrast.    Signed by: Timo " Gilberto 8/28/2024 11:39 PM  Dictation workstation:   [id number]     Assessment/Plan   This is a 70-year-old man with diabetes and coronary artery disease who presented with abdominal pain and anemia in June 2024 and was found to have rectal cancer with diffuse liver metastases.  He has nearly confluent right hepatic liver metastases and 2 lesions in the left lobe.  He has met with Dr. Erazo to discuss potential future surgery should he do well with chemotherapy.  He is also met with Dr. Boles to discuss radiation to the primary tumor which is causing bleeding currently.  7/15 - 7/17 completed radiation.  -7/24 - first dose FOLFOXIRI + Josefina, tolerated with fatigue   -8/7/24 - 2nd dose FOLFOXIRI + Josefina   Tx has been held since then due to painful kidney stones requiring ED visit - both stones have passed - ready to restart treatment.     Despite only 2 txs, CEA has gone from >800 to 143    Plan:  Stage IV CRC:  Obtain NGS  C3 FOLFOXIRI / Josefina tomorrow 9/18 - fluids on day 3 & day 8   C4 in 2 weeks   Scans after 4 cycles & visit with Dr. Nieto prior to C5   Follow CEA     Anemia   -    - iron studies normal     Logistics -    - clinic appt - minoff tues   - infusion appt - mentor MELISSA De Guzman-[facility]/ [facility]  Office: [phone]  Fax: [phone]

## 2024-09-18 ENCOUNTER — INFUSION (OUTPATIENT)
Dept: HEMATOLOGY/ONCOLOGY | Facility: CLINIC | Age: 69
End: 2024-09-18
Payer: MEDICARE

## 2024-09-18 VITALS
WEIGHT: 196.1 LBS | OXYGEN SATURATION: 98 % | SYSTOLIC BLOOD PRESSURE: 119 MMHG | RESPIRATION RATE: 18 BRPM | BODY MASS INDEX: 28.14 KG/M2 | DIASTOLIC BLOOD PRESSURE: 69 MMHG | TEMPERATURE: 96.6 F | HEART RATE: 68 BPM

## 2024-09-18 DIAGNOSIS — C20 RECTAL CANCER (MULTI): ICD-10-CM

## 2024-09-18 PROCEDURE — 96411 CHEMO IV PUSH ADDL DRUG: CPT

## 2024-09-18 PROCEDURE — 2500000004 HC RX 250 GENERAL PHARMACY W/ HCPCS (ALT 636 FOR OP/ED): Performed by: INTERNAL MEDICINE

## 2024-09-18 PROCEDURE — 96415 CHEMO IV INFUSION ADDL HR: CPT

## 2024-09-18 PROCEDURE — 96413 CHEMO IV INFUSION 1 HR: CPT

## 2024-09-18 PROCEDURE — 96417 CHEMO IV INFUS EACH ADDL SEQ: CPT

## 2024-09-18 PROCEDURE — 96416 CHEMO PROLONG INFUSE W/PUMP: CPT

## 2024-09-18 PROCEDURE — 96376 TX/PRO/DX INJ SAME DRUG ADON: CPT

## 2024-09-18 PROCEDURE — 96375 TX/PRO/DX INJ NEW DRUG ADDON: CPT | Mod: INF

## 2024-09-18 RX ORDER — PROCHLORPERAZINE MALEATE 10 MG
10 TABLET ORAL EVERY 6 HOURS PRN
Status: DISCONTINUED | OUTPATIENT
Start: 2024-09-18 | End: 2024-09-18 | Stop reason: HOSPADM

## 2024-09-18 RX ORDER — DIPHENHYDRAMINE HYDROCHLORIDE 50 MG/ML
50 INJECTION INTRAMUSCULAR; INTRAVENOUS AS NEEDED
Status: DISCONTINUED | OUTPATIENT
Start: 2024-09-18 | End: 2024-09-18 | Stop reason: HOSPADM

## 2024-09-18 RX ORDER — ATROPINE SULFATE 0.4 MG/ML
0.4 INJECTION, SOLUTION ENDOTRACHEAL; INTRAMEDULLARY; INTRAMUSCULAR; INTRAVENOUS; SUBCUTANEOUS
Status: COMPLETED | OUTPATIENT
Start: 2024-09-18 | End: 2024-09-18

## 2024-09-18 RX ORDER — FAMOTIDINE 10 MG/ML
20 INJECTION INTRAVENOUS ONCE AS NEEDED
Status: DISCONTINUED | OUTPATIENT
Start: 2024-09-18 | End: 2024-09-18 | Stop reason: HOSPADM

## 2024-09-18 RX ORDER — LORAZEPAM 2 MG/ML
1 INJECTION INTRAMUSCULAR AS NEEDED
Status: DISCONTINUED | OUTPATIENT
Start: 2024-09-18 | End: 2024-09-18 | Stop reason: HOSPADM

## 2024-09-18 RX ORDER — EPINEPHRINE 0.3 MG/.3ML
0.3 INJECTION SUBCUTANEOUS EVERY 5 MIN PRN
Status: DISCONTINUED | OUTPATIENT
Start: 2024-09-18 | End: 2024-09-18 | Stop reason: HOSPADM

## 2024-09-18 RX ORDER — ALBUTEROL SULFATE 0.83 MG/ML
3 SOLUTION RESPIRATORY (INHALATION) AS NEEDED
Status: DISCONTINUED | OUTPATIENT
Start: 2024-09-18 | End: 2024-09-18 | Stop reason: HOSPADM

## 2024-09-18 RX ORDER — PALONOSETRON 0.05 MG/ML
0.25 INJECTION, SOLUTION INTRAVENOUS ONCE
Status: COMPLETED | OUTPATIENT
Start: 2024-09-18 | End: 2024-09-18

## 2024-09-18 RX ORDER — PROCHLORPERAZINE EDISYLATE 5 MG/ML
10 INJECTION INTRAMUSCULAR; INTRAVENOUS EVERY 6 HOURS PRN
Status: DISCONTINUED | OUTPATIENT
Start: 2024-09-18 | End: 2024-09-18 | Stop reason: HOSPADM

## 2024-09-18 ASSESSMENT — PAIN SCALES - GENERAL: PAINLEVEL: 3

## 2024-09-19 PROCEDURE — RXMED WILLOW AMBULATORY MEDICATION CHARGE

## 2024-09-20 ENCOUNTER — INFUSION (OUTPATIENT)
Dept: HEMATOLOGY/ONCOLOGY | Facility: CLINIC | Age: 69
End: 2024-09-20
Payer: MEDICARE

## 2024-09-20 ENCOUNTER — APPOINTMENT (OUTPATIENT)
Dept: LAB | Facility: LAB | Age: 69
End: 2024-09-20
Payer: MEDICARE

## 2024-09-20 ENCOUNTER — APPOINTMENT (OUTPATIENT)
Dept: HEMATOLOGY/ONCOLOGY | Facility: CLINIC | Age: 69
End: 2024-09-20
Payer: MEDICARE

## 2024-09-20 ENCOUNTER — PHARMACY VISIT (OUTPATIENT)
Dept: PHARMACY | Facility: CLINIC | Age: 69
End: 2024-09-20
Payer: COMMERCIAL

## 2024-09-20 VITALS
SYSTOLIC BLOOD PRESSURE: 134 MMHG | DIASTOLIC BLOOD PRESSURE: 73 MMHG | HEART RATE: 78 BPM | TEMPERATURE: 97.5 F | BODY MASS INDEX: 27.76 KG/M2 | WEIGHT: 193.45 LBS | OXYGEN SATURATION: 98 %

## 2024-09-20 DIAGNOSIS — C20 RECTAL CANCER (MULTI): ICD-10-CM

## 2024-09-20 PROCEDURE — 96360 HYDRATION IV INFUSION INIT: CPT | Mod: INF

## 2024-09-20 PROCEDURE — 2500000004 HC RX 250 GENERAL PHARMACY W/ HCPCS (ALT 636 FOR OP/ED): Performed by: NURSE PRACTITIONER

## 2024-09-20 PROCEDURE — 96374 THER/PROPH/DIAG INJ IV PUSH: CPT | Mod: INF

## 2024-09-20 PROCEDURE — 2500000004 HC RX 250 GENERAL PHARMACY W/ HCPCS (ALT 636 FOR OP/ED): Performed by: INTERNAL MEDICINE

## 2024-09-20 PROCEDURE — 2500000004 HC RX 250 GENERAL PHARMACY W/ HCPCS (ALT 636 FOR OP/ED): Mod: JZ,JG | Performed by: INTERNAL MEDICINE

## 2024-09-20 PROCEDURE — 96372 THER/PROPH/DIAG INJ SC/IM: CPT

## 2024-09-20 RX ORDER — ALBUTEROL SULFATE 0.83 MG/ML
3 SOLUTION RESPIRATORY (INHALATION) AS NEEDED
Status: DISCONTINUED | OUTPATIENT
Start: 2024-09-20 | End: 2024-09-20 | Stop reason: HOSPADM

## 2024-09-20 RX ORDER — HEPARIN 100 UNIT/ML
500 SYRINGE INTRAVENOUS AS NEEDED
Status: DISCONTINUED | OUTPATIENT
Start: 2024-09-20 | End: 2024-09-20 | Stop reason: HOSPADM

## 2024-09-20 RX ORDER — HEPARIN SODIUM,PORCINE/PF 10 UNIT/ML
50 SYRINGE (ML) INTRAVENOUS AS NEEDED
Status: DISCONTINUED | OUTPATIENT
Start: 2024-09-20 | End: 2024-09-20 | Stop reason: HOSPADM

## 2024-09-20 RX ORDER — FAMOTIDINE 10 MG/ML
20 INJECTION INTRAVENOUS ONCE AS NEEDED
Status: DISCONTINUED | OUTPATIENT
Start: 2024-09-20 | End: 2024-09-20 | Stop reason: HOSPADM

## 2024-09-20 RX ORDER — EPINEPHRINE 0.3 MG/.3ML
0.3 INJECTION SUBCUTANEOUS EVERY 5 MIN PRN
Status: DISCONTINUED | OUTPATIENT
Start: 2024-09-20 | End: 2024-09-20 | Stop reason: HOSPADM

## 2024-09-20 RX ORDER — HEPARIN SODIUM,PORCINE/PF 10 UNIT/ML
50 SYRINGE (ML) INTRAVENOUS AS NEEDED
OUTPATIENT
Start: 2024-09-20

## 2024-09-20 RX ORDER — ONDANSETRON HYDROCHLORIDE 2 MG/ML
8 INJECTION, SOLUTION INTRAVENOUS ONCE
Status: CANCELLED | OUTPATIENT
Start: 2024-09-20

## 2024-09-20 RX ORDER — ONDANSETRON HYDROCHLORIDE 2 MG/ML
8 INJECTION, SOLUTION INTRAVENOUS ONCE
Status: COMPLETED | OUTPATIENT
Start: 2024-09-20 | End: 2024-09-20

## 2024-09-20 RX ORDER — HEPARIN 100 UNIT/ML
500 SYRINGE INTRAVENOUS AS NEEDED
OUTPATIENT
Start: 2024-09-20

## 2024-09-20 RX ORDER — DIPHENHYDRAMINE HYDROCHLORIDE 50 MG/ML
50 INJECTION INTRAMUSCULAR; INTRAVENOUS AS NEEDED
Status: DISCONTINUED | OUTPATIENT
Start: 2024-09-20 | End: 2024-09-20 | Stop reason: HOSPADM

## 2024-09-20 ASSESSMENT — PAIN SCALES - GENERAL: PAINLEVEL: 2

## 2024-09-20 NOTE — PROGRESS NOTES
Pt here for pump disconnect and IV hydration. Complaining of Nausea, feeling foggy, Experienced 1 episode of liquid stool, but not as severe as prior treatments. Difficulty sleeping evening after treatment, felt shaky and hot all over - which has subsided. Message sent to Provider and NP regarding above.    Gale Gasca RN

## 2024-09-23 ENCOUNTER — APPOINTMENT (OUTPATIENT)
Dept: HEMATOLOGY/ONCOLOGY | Facility: CLINIC | Age: 69
End: 2024-09-23
Payer: MEDICARE

## 2024-09-24 ENCOUNTER — APPOINTMENT (OUTPATIENT)
Dept: UROLOGY | Facility: HOSPITAL | Age: 69
End: 2024-09-24
Payer: MEDICARE

## 2024-09-25 ENCOUNTER — APPOINTMENT (OUTPATIENT)
Dept: HEMATOLOGY/ONCOLOGY | Facility: CLINIC | Age: 69
End: 2024-09-25
Payer: MEDICARE

## 2024-09-25 LAB
APPEARANCE STONE: NORMAL
COMPN STONE: NORMAL
SPECIMEN WT: 212 MG

## 2024-09-26 ENCOUNTER — APPOINTMENT (OUTPATIENT)
Dept: RADIOLOGY | Facility: HOSPITAL | Age: 69
End: 2024-09-26
Payer: MEDICARE

## 2024-09-26 ENCOUNTER — HOSPITAL ENCOUNTER (EMERGENCY)
Facility: HOSPITAL | Age: 69
Discharge: HOME | End: 2024-09-27
Attending: EMERGENCY MEDICINE
Payer: MEDICARE

## 2024-09-26 DIAGNOSIS — K52.9 ILEITIS: ICD-10-CM

## 2024-09-26 DIAGNOSIS — D84.9 IMMUNOCOMPROMISED: Primary | ICD-10-CM

## 2024-09-26 DIAGNOSIS — R10.9 ABDOMINAL PAIN, UNSPECIFIED ABDOMINAL LOCATION: ICD-10-CM

## 2024-09-26 LAB
ALBUMIN SERPL BCP-MCNC: 3.3 G/DL (ref 3.4–5)
ALP SERPL-CCNC: 146 U/L (ref 33–136)
ALT SERPL W P-5'-P-CCNC: 9 U/L (ref 10–52)
ANION GAP SERPL CALCULATED.3IONS-SCNC: 15 MMOL/L (ref 10–20)
APPEARANCE UR: CLEAR
AST SERPL W P-5'-P-CCNC: 11 U/L (ref 9–39)
BASOPHILS # BLD MANUAL: 0 X10*3/UL (ref 0–0.1)
BASOPHILS NFR BLD MANUAL: 0 %
BILIRUB SERPL-MCNC: 0.6 MG/DL (ref 0–1.2)
BILIRUB UR STRIP.AUTO-MCNC: NEGATIVE MG/DL
BUN SERPL-MCNC: 14 MG/DL (ref 6–23)
CALCIUM SERPL-MCNC: 8.8 MG/DL (ref 8.6–10.3)
CHLORIDE SERPL-SCNC: 104 MMOL/L (ref 98–107)
CO2 SERPL-SCNC: 24 MMOL/L (ref 21–32)
COLOR UR: YELLOW
CREAT SERPL-MCNC: 0.95 MG/DL (ref 0.5–1.3)
DOHLE BOD BLD QL SMEAR: PRESENT
EGFRCR SERPLBLD CKD-EPI 2021: 87 ML/MIN/1.73M*2
EOSINOPHIL # BLD MANUAL: 0.07 X10*3/UL (ref 0–0.7)
EOSINOPHIL NFR BLD MANUAL: 1 %
ERYTHROCYTE [DISTWIDTH] IN BLOOD BY AUTOMATED COUNT: 23 % (ref 11.5–14.5)
GLUCOSE SERPL-MCNC: 113 MG/DL (ref 74–99)
GLUCOSE UR STRIP.AUTO-MCNC: NORMAL MG/DL
HCT VFR BLD AUTO: 33.2 % (ref 41–52)
HGB BLD-MCNC: 10.4 G/DL (ref 13.5–17.5)
IMM GRANULOCYTES # BLD AUTO: 0.14 X10*3/UL (ref 0–0.7)
IMM GRANULOCYTES NFR BLD AUTO: 1.9 % (ref 0–0.9)
KETONES UR STRIP.AUTO-MCNC: NEGATIVE MG/DL
LEUKOCYTE ESTERASE UR QL STRIP.AUTO: NEGATIVE
LIPASE SERPL-CCNC: 21 U/L (ref 9–82)
LYMPHOCYTES # BLD MANUAL: 1.08 X10*3/UL (ref 1.2–4.8)
LYMPHOCYTES NFR BLD MANUAL: 15 %
MCH RBC QN AUTO: 25.7 PG (ref 26–34)
MCHC RBC AUTO-ENTMCNC: 31.3 G/DL (ref 32–36)
MCV RBC AUTO: 82 FL (ref 80–100)
MONOCYTES # BLD MANUAL: 0.5 X10*3/UL (ref 0.1–1)
MONOCYTES NFR BLD MANUAL: 7 %
MUCOUS THREADS #/AREA URNS AUTO: NORMAL /LPF
NEUTS SEG # BLD MANUAL: 5.54 X10*3/UL (ref 1.2–7)
NEUTS SEG NFR BLD MANUAL: 77 %
NITRITE UR QL STRIP.AUTO: NEGATIVE
NRBC BLD-RTO: 0 /100 WBCS (ref 0–0)
OVALOCYTES BLD QL SMEAR: ABNORMAL
PH UR STRIP.AUTO: 6.5 [PH]
PLATELET # BLD AUTO: 102 X10*3/UL (ref 150–450)
POLYCHROMASIA BLD QL SMEAR: ABNORMAL
POTASSIUM SERPL-SCNC: 3.7 MMOL/L (ref 3.5–5.3)
PROT SERPL-MCNC: 6.7 G/DL (ref 6.4–8.2)
PROT UR STRIP.AUTO-MCNC: ABNORMAL MG/DL
RBC # BLD AUTO: 4.04 X10*6/UL (ref 4.5–5.9)
RBC # UR STRIP.AUTO: NEGATIVE /UL
RBC #/AREA URNS AUTO: NORMAL /HPF
RBC MORPH BLD: ABNORMAL
SODIUM SERPL-SCNC: 139 MMOL/L (ref 136–145)
SP GR UR STRIP.AUTO: >1.05
TOTAL CELLS COUNTED BLD: 100
UROBILINOGEN UR STRIP.AUTO-MCNC: NORMAL MG/DL
WBC # BLD AUTO: 7.2 X10*3/UL (ref 4.4–11.3)
WBC #/AREA URNS AUTO: NORMAL /HPF

## 2024-09-26 PROCEDURE — 85007 BL SMEAR W/DIFF WBC COUNT: CPT | Performed by: NURSE PRACTITIONER

## 2024-09-26 PROCEDURE — 96375 TX/PRO/DX INJ NEW DRUG ADDON: CPT

## 2024-09-26 PROCEDURE — 74177 CT ABD & PELVIS W/CONTRAST: CPT

## 2024-09-26 PROCEDURE — 96376 TX/PRO/DX INJ SAME DRUG ADON: CPT

## 2024-09-26 PROCEDURE — 99284 EMERGENCY DEPT VISIT MOD MDM: CPT | Mod: 25

## 2024-09-26 PROCEDURE — 96374 THER/PROPH/DIAG INJ IV PUSH: CPT | Mod: 59

## 2024-09-26 PROCEDURE — 83690 ASSAY OF LIPASE: CPT | Performed by: NURSE PRACTITIONER

## 2024-09-26 PROCEDURE — 81001 URINALYSIS AUTO W/SCOPE: CPT | Performed by: NURSE PRACTITIONER

## 2024-09-26 PROCEDURE — 74177 CT ABD & PELVIS W/CONTRAST: CPT | Performed by: RADIOLOGY

## 2024-09-26 PROCEDURE — 2500000004 HC RX 250 GENERAL PHARMACY W/ HCPCS (ALT 636 FOR OP/ED): Performed by: EMERGENCY MEDICINE

## 2024-09-26 PROCEDURE — 2550000001 HC RX 255 CONTRASTS: Performed by: EMERGENCY MEDICINE

## 2024-09-26 PROCEDURE — 84520 ASSAY OF UREA NITROGEN: CPT | Performed by: NURSE PRACTITIONER

## 2024-09-26 PROCEDURE — 85027 COMPLETE CBC AUTOMATED: CPT | Performed by: NURSE PRACTITIONER

## 2024-09-26 RX ORDER — DICYCLOMINE HYDROCHLORIDE 10 MG/1
20 CAPSULE ORAL ONCE
Status: COMPLETED | OUTPATIENT
Start: 2024-09-27 | End: 2024-09-27

## 2024-09-26 RX ORDER — AMOXICILLIN AND CLAVULANATE POTASSIUM 875; 125 MG/1; MG/1
1 TABLET, FILM COATED ORAL ONCE
Status: COMPLETED | OUTPATIENT
Start: 2024-09-27 | End: 2024-09-27

## 2024-09-26 RX ORDER — ONDANSETRON HYDROCHLORIDE 2 MG/ML
4 INJECTION, SOLUTION INTRAVENOUS ONCE
Status: COMPLETED | OUTPATIENT
Start: 2024-09-26 | End: 2024-09-26

## 2024-09-26 ASSESSMENT — PAIN DESCRIPTION - ONSET: ONSET: GRADUAL

## 2024-09-26 ASSESSMENT — PAIN DESCRIPTION - LOCATION: LOCATION: ABDOMEN

## 2024-09-26 ASSESSMENT — PAIN DESCRIPTION - ORIENTATION: ORIENTATION: LEFT;LOWER

## 2024-09-26 ASSESSMENT — PAIN DESCRIPTION - PROGRESSION: CLINICAL_PROGRESSION: NOT CHANGED

## 2024-09-26 ASSESSMENT — PAIN - FUNCTIONAL ASSESSMENT: PAIN_FUNCTIONAL_ASSESSMENT: 0-10

## 2024-09-26 ASSESSMENT — PAIN SCALES - GENERAL: PAINLEVEL_OUTOF10: 10 - WORST POSSIBLE PAIN

## 2024-09-26 ASSESSMENT — PAIN DESCRIPTION - PAIN TYPE: TYPE: ACUTE PAIN

## 2024-09-26 ASSESSMENT — PAIN DESCRIPTION - DESCRIPTORS: DESCRIPTORS: SHARP

## 2024-09-26 ASSESSMENT — PAIN DESCRIPTION - FREQUENCY: FREQUENCY: CONSTANT/CONTINUOUS

## 2024-09-27 ENCOUNTER — APPOINTMENT (OUTPATIENT)
Dept: HEMATOLOGY/ONCOLOGY | Facility: CLINIC | Age: 69
End: 2024-09-27
Payer: MEDICARE

## 2024-09-27 ENCOUNTER — TELEPHONE (OUTPATIENT)
Dept: HEMATOLOGY/ONCOLOGY | Facility: HOSPITAL | Age: 69
End: 2024-09-27
Payer: MEDICARE

## 2024-09-27 VITALS
RESPIRATION RATE: 18 BRPM | HEART RATE: 78 BPM | TEMPERATURE: 98.8 F | SYSTOLIC BLOOD PRESSURE: 110 MMHG | WEIGHT: 198 LBS | HEIGHT: 72 IN | OXYGEN SATURATION: 99 % | BODY MASS INDEX: 26.82 KG/M2 | DIASTOLIC BLOOD PRESSURE: 60 MMHG

## 2024-09-27 PROCEDURE — 2500000004 HC RX 250 GENERAL PHARMACY W/ HCPCS (ALT 636 FOR OP/ED): Performed by: EMERGENCY MEDICINE

## 2024-09-27 PROCEDURE — 96375 TX/PRO/DX INJ NEW DRUG ADDON: CPT

## 2024-09-27 PROCEDURE — 2500000001 HC RX 250 WO HCPCS SELF ADMINISTERED DRUGS (ALT 637 FOR MEDICARE OP): Performed by: EMERGENCY MEDICINE

## 2024-09-27 RX ORDER — AMOXICILLIN AND CLAVULANATE POTASSIUM 875; 125 MG/1; MG/1
875 TABLET, FILM COATED ORAL EVERY 12 HOURS
Qty: 20 TABLET | Refills: 0 | Status: SHIPPED | OUTPATIENT
Start: 2024-09-27 | End: 2024-10-01 | Stop reason: ALTCHOICE

## 2024-09-27 RX ORDER — DICYCLOMINE HYDROCHLORIDE 10 MG/1
20 CAPSULE ORAL 4 TIMES DAILY
Qty: 120 CAPSULE | Refills: 0 | Status: SHIPPED | OUTPATIENT
Start: 2024-09-27 | End: 2024-10-17

## 2024-09-27 RX ORDER — OXYCODONE HYDROCHLORIDE 5 MG/1
5 TABLET ORAL EVERY 6 HOURS PRN
Qty: 12 TABLET | Refills: 0 | Status: SHIPPED | OUTPATIENT
Start: 2024-09-27 | End: 2024-10-04 | Stop reason: WASHOUT

## 2024-09-27 RX ORDER — ACETAMINOPHEN 325 MG/1
975 TABLET ORAL ONCE
Status: COMPLETED | OUTPATIENT
Start: 2024-09-27 | End: 2024-09-27

## 2024-09-27 RX ORDER — ACETAMINOPHEN 500 MG
1000 TABLET ORAL 4 TIMES DAILY
Qty: 40 TABLET | Refills: 0 | Status: SHIPPED | OUTPATIENT
Start: 2024-09-27 | End: 2024-10-07

## 2024-09-27 RX ORDER — KETOROLAC TROMETHAMINE 30 MG/ML
15 INJECTION, SOLUTION INTRAMUSCULAR; INTRAVENOUS ONCE
Status: COMPLETED | OUTPATIENT
Start: 2024-09-27 | End: 2024-09-27

## 2024-09-27 NOTE — ED PROVIDER NOTES
HPI   Chief Complaint   Patient presents with    Abdominal Pain     Abd pain near stoma port x 1 week. Pt is having Lower left abd pain with nausea and vomiting. Pt has ostomy and states it is hard to tell whether or not he is having diarrhea. Pt denies any blood in stool. Pt admits to burning urination, darker urination and darker odors. Pt has Colon cancer stage 4.  Pt is aox4, stable.       69-year-old male with a history of colon and liver cancer, CAD, hypertension, dyslipidemia, and type 2 diabetes comes to the emergency department with a chief complaint of abdominal pain nausea and vomiting.  Patient is currently under chemotherapy for his cancers.  He has had a colostomy.  He states that his nausea vomiting and abdominal pain have been worsening over the last couple of days.  He was supposed to go to an appointment for hydration and also placement of medications to increase his white blood cell count.  He states he could not go to that appointment yesterday due to his nausea vomiting and abdominal pain.  Today it got worse and he comes in for evaluation.  He states that the pain is in the mid abdomen.  He notes no changes in his stools.  He denies any fevers.      History provided by:  Patient   used: No            Patient History   Past Medical History:   Diagnosis Date    Abdominal pain     Acute non-ST elevation myocardial infarction (NSTEMI) (Multi)     Anemia     6/6/24 HGB 8.6; HCT 31.5    Anxiety     CAD (coronary artery disease)     Cardiology follow-up encounter 12/11/2023    Sharif Lau MD    Gout     H/O cardiac catheterization 06/01/2021    H/O cardiovascular stress test 09/03/2021    IMPRESSION: 1. No evidence of ischemia. 2. Suspicion of an infarct along the mid anterior wall. 3. Left ventricular dilatation is noted. 4. Left ventricular EF was calculated to be 50%. Summary:  1. No clinical or electrocardiographic evidence for ischemia at a submaximal workload. 3. The  blunted heart rate diminshes the sensitivity of this test.  4. The submaximal level of stress was achieved.    H/O echocardiogram 06/02/2021    Mild concentric Left ventricle hypertrophy.  Global left ventricular wall motion and contractility are within normal limits.  There is normal left ventricular systolic function.  Estimated ejection fraction is 60-64%.  The left atrial size is mildly dilated.  Mild aortic regurgitation.  A trace of mitral regurgitation.  Trivial to mild tricuspid regurgitation.  There is no pulmonary hypertension.    High cholesterol     Hypertension     Overweight     Rectal cancer (Multi)     Type 2 diabetes mellitus (Multi)     4/18/2024 A1C 7.5%     Past Surgical History:   Procedure Laterality Date    COLONOSCOPY  06/17/2024    HEMICOLECTOMY W/ OSTOMY      LEG SURGERY Left     rodrigo placed     Family History   Problem Relation Name Age of Onset    No Known Problems Mother      No Known Problems Father      No Known Problems Sister      Leukemia Brother       Social History     Tobacco Use    Smoking status: Never    Smokeless tobacco: Never   Vaping Use    Vaping status: Never Used   Substance Use Topics    Alcohol use: Not Currently    Drug use: Never       Physical Exam   ED Triage Vitals [09/26/24 2012]   Temperature Heart Rate Respirations BP   37.1 °C (98.8 °F) 92 17 118/74      Pulse Ox Temp Source Heart Rate Source Patient Position   100 % Oral Monitor Sitting      BP Location FiO2 (%)     Right arm --       Physical Exam  Vitals and nursing note reviewed.   Constitutional:       General: He is not in acute distress.     Appearance: He is well-developed.   HENT:      Head: Normocephalic and atraumatic.   Eyes:      Conjunctiva/sclera: Conjunctivae normal.   Cardiovascular:      Rate and Rhythm: Normal rate and regular rhythm.      Heart sounds: No murmur heard.  Pulmonary:      Effort: Pulmonary effort is normal. No respiratory distress.      Breath sounds: Normal breath sounds.    Abdominal:      General: Bowel sounds are normal. There is no distension.      Palpations: Abdomen is soft.      Tenderness: There is generalized abdominal tenderness. There is no guarding or rebound.      Comments: Ostomy on left side of abdomen without surrounding signs of inflammation.  No stool in bag.   Musculoskeletal:         General: No swelling.      Cervical back: Neck supple.   Skin:     General: Skin is warm and dry.      Capillary Refill: Capillary refill takes less than 2 seconds.   Neurological:      Mental Status: He is alert.   Psychiatric:         Mood and Affect: Mood normal.           ED Course & MDM   ED Course as of 09/27/24 0834   u Sep 26, 2024   2307 Lipase  Not consistent with pancreatitis [EG]   2307 Comprehensive Metabolic Panel(!)  Noncontributory and similar to previous lab work in the system [EG]   2308 CBC and Auto Differential(!)  No leukocytosis, leukopenia not consistent with sepsis.  Baseline anemia that is normocytic and similar to previous values.  Thrombocytopenia greater than 100 not requiring intervention, but decreased from previous lab work [EG]   2309 No stool sample currently, urinalysis currently in process [EG]   2309 CT abdomen pelvis w IV contrast    IMPRESSION:  1.  There is extensive wall thickening noted of the distal ileal  loops in the terminal ileum findings are increased when compared to  the prior study. Findings are concerning for ileitis.  2. There are extensive hypodense lesions noted within the liver  unchanged from the prior study concerning for metastatic disease.   [EG]   Fri Sep 27, 2024   0018 Urinalysis with Reflex Microscopic(!)  Not consistent with urinary tract infection [EG]      ED Course User Index  [EG] Keely Renee MD         Diagnoses as of 09/27/24 0834   Immunocompromised (Multi)   Abdominal pain, unspecified abdominal location   Ileitis                 No data recorded     Genaro Coma Scale Score: 15 (09/26/24 2056 :  Chelsea Rizo RN)                           Medical Decision Making    HPI:  As Above  PMHx/PSHx/Meds/Allergies/SH/FH as per nursing documentation and reviewed.  Review of systems: Total of 10 systems reviewed and otherwise negative except as noted elsewhere    DDX: As described in MDM    If performed, radiology listed above interpreted by me and confirmed by the Radiologist.  Medications administered during this visit (name and route): see MAR  Social determinants of health considered for this visit: lives at home  If performed, EKG interpreted by me and detailed above    [facility] Summary/considerations:  69-year-old male presenting with acute on chronic abdominal pain in the setting of cancer on chemotherapy.  Patient is actively getting chemotherapy treatment and is in an acute immunocompromise state.  Although he does not have a leukocytosis, there is evidence of ileitis on his CT scan.  This may be infectious or inflammatory.  CT scan and abdominal exam are not consistent with ischemic etiology.  As the patient is immunocompromised and an infectious etiology cannot be ruled out he is being given a prescription for Augmentin.  The patient had emptied his colostomy bag prior to coming to the emergency department and has not had any other stool either per rectum or in the bag.  Patient was given Dilaudid with resolution of his pain as he is currently under treatment for cancer.  Patient will be discharged home with a prescription for Augmentin, Bentyl and instructed to use Tylenol every 4 hours for baseline control of pain and to use oxycodone and NSAIDs for breakthrough pain.  He is given contact information for infectious disease as he is potentially immunocompromised and antibiotics may be tailored given their input.  He is instructed to follow-up with his primary care provider as well as his oncology team within the next 2 to 3 days.  He is not meeting SIRS criteria and therefore not septic.  Differential  includes C. difficile infection, foodborne illness, viral infection versus inflammatory ileitis.  CT scan is not consistent with obstructions, perforations, diverticulitis, obstructive uropathy, obstructive biliary colic, etc.    Prescriptions provided include: Oral Augmentin, Bentyl, oxycodone,    The patient was seen and triaged by our nursing/medic staff, their vitals were taken and the staff notes were reviewed.  If the patient arrived by an EMS squad or an outside agency, we discussed the case with transporting EMS medic, police, or other historians. My initial assessment was attention to their airway, breathing, and circulatory status.  We addressed any immediate or life threatening findings and completed a medical history and a physical exam if the patient or those legally responsible were in agreement with this.   Prior to the patient being discharged, I or my PA/NP or the nursing staff discussed the differential, results and discharge plan with the patient and/or family/friend/caregiver if present.  I emphasized the importance of follow-up in 2-3 days unless otherwise specified.  I explained reasons for the patient to return to the Emergency Department. Additional verbal discharge instructions were also given and discussed with the patient to supplement those generated by the EMR. We also discussed medications that were prescribed (if any) including common side effects and interactions. The patient was advised to abstain from driving, operating heavy machinery or making significant decisions while taking medications such as antihistamines, benzodiazepines, opiates and muscle relaxers. All questions were addressed.  They understand return precautions and discharge instructions. The patient and/or family/friend/caregiver expressed understanding.  **Disclaimer:  This note was dictated by speech recognition technology.  Minor errors in transcription may be present.  Please contact for clarification or  corrections.      Amount and/or Complexity of Data Reviewed  Labs: ordered. Decision-making details documented in ED Course.  Radiology: ordered and independent interpretation performed. Decision-making details documented in ED Course.  ECG/medicine tests: ordered and independent interpretation performed. Decision-making details documented in ED Course.        Procedure  Procedures     Keely Renee MD  09/27/24 0869

## 2024-09-27 NOTE — TELEPHONE ENCOUNTER
Patient states was in the ED yesterday and calling to confirm he should take antibiotics as prescribed. Scheduled for treatment next week.

## 2024-09-30 ENCOUNTER — LAB (OUTPATIENT)
Dept: HEMATOLOGY/ONCOLOGY | Facility: CLINIC | Age: 69
End: 2024-09-30
Payer: MEDICARE

## 2024-09-30 DIAGNOSIS — C20 RECTAL CANCER (MULTI): ICD-10-CM

## 2024-09-30 LAB
ALBUMIN SERPL BCP-MCNC: 3.2 G/DL (ref 3.4–5)
ALP SERPL-CCNC: 146 U/L (ref 33–136)
ALT SERPL W P-5'-P-CCNC: 9 U/L (ref 10–52)
ANION GAP SERPL CALC-SCNC: 12 MMOL/L (ref 10–20)
AST SERPL W P-5'-P-CCNC: 15 U/L (ref 9–39)
BASOPHILS # BLD AUTO: 0.01 X10*3/UL (ref 0–0.1)
BASOPHILS NFR BLD AUTO: 0.2 %
BILIRUB SERPL-MCNC: 0.3 MG/DL (ref 0–1.2)
BUN SERPL-MCNC: 11 MG/DL (ref 6–23)
CALCIUM SERPL-MCNC: 8.2 MG/DL (ref 8.6–10.3)
CEA SERPL-MCNC: 61.3 UG/L
CHLORIDE SERPL-SCNC: 108 MMOL/L (ref 98–107)
CO2 SERPL-SCNC: 24 MMOL/L (ref 21–32)
CREAT SERPL-MCNC: 0.79 MG/DL (ref 0.5–1.3)
DACRYOCYTES BLD QL SMEAR: NORMAL
DOHLE BOD BLD QL SMEAR: PRESENT
EGFRCR SERPLBLD CKD-EPI 2021: >90 ML/MIN/1.73M*2
EOSINOPHIL # BLD AUTO: 0.16 X10*3/UL (ref 0–0.7)
EOSINOPHIL NFR BLD AUTO: 2.9 %
ERYTHROCYTE [DISTWIDTH] IN BLOOD BY AUTOMATED COUNT: 22 % (ref 11.5–14.5)
GLUCOSE SERPL-MCNC: 92 MG/DL (ref 74–99)
HCT VFR BLD AUTO: 30.6 % (ref 41–52)
HGB BLD-MCNC: 9.4 G/DL (ref 13.5–17.5)
HYPOCHROMIA BLD QL SMEAR: NORMAL
IMM GRANULOCYTES # BLD AUTO: 0.02 X10*3/UL (ref 0–0.7)
IMM GRANULOCYTES NFR BLD AUTO: 0.4 % (ref 0–0.9)
LYMPHOCYTES # BLD AUTO: 1.56 X10*3/UL (ref 1.2–4.8)
LYMPHOCYTES NFR BLD AUTO: 28.2 %
MCH RBC QN AUTO: 25.8 PG (ref 26–34)
MCHC RBC AUTO-ENTMCNC: 30.7 G/DL (ref 32–36)
MCV RBC AUTO: 84 FL (ref 80–100)
MONOCYTES # BLD AUTO: 0.37 X10*3/UL (ref 0.1–1)
MONOCYTES NFR BLD AUTO: 6.7 %
NEUTROPHILS # BLD AUTO: 3.42 X10*3/UL (ref 1.2–7.7)
NEUTROPHILS NFR BLD AUTO: 61.6 %
NRBC BLD-RTO: 0 /100 WBCS (ref 0–0)
OVALOCYTES BLD QL SMEAR: NORMAL
PLATELET # BLD AUTO: 113 X10*3/UL (ref 150–450)
POTASSIUM SERPL-SCNC: 3.4 MMOL/L (ref 3.5–5.3)
PROT SERPL-MCNC: 6.4 G/DL (ref 6.4–8.2)
RBC # BLD AUTO: 3.64 X10*6/UL (ref 4.5–5.9)
RBC MORPH BLD: NORMAL
SODIUM SERPL-SCNC: 141 MMOL/L (ref 136–145)
TOXIC GRANULES BLD QL SMEAR: PRESENT
WBC # BLD AUTO: 5.5 X10*3/UL (ref 4.4–11.3)

## 2024-09-30 PROCEDURE — 2500000004 HC RX 250 GENERAL PHARMACY W/ HCPCS (ALT 636 FOR OP/ED): Performed by: NURSE PRACTITIONER

## 2024-09-30 PROCEDURE — 36591 DRAW BLOOD OFF VENOUS DEVICE: CPT

## 2024-09-30 PROCEDURE — 80053 COMPREHEN METABOLIC PANEL: CPT

## 2024-09-30 PROCEDURE — 82378 CARCINOEMBRYONIC ANTIGEN: CPT | Mod: WESLAB

## 2024-09-30 PROCEDURE — 85025 COMPLETE CBC W/AUTO DIFF WBC: CPT

## 2024-09-30 RX ORDER — HEPARIN SODIUM,PORCINE/PF 10 UNIT/ML
50 SYRINGE (ML) INTRAVENOUS AS NEEDED
Status: DISCONTINUED | OUTPATIENT
Start: 2024-09-30 | End: 2024-09-30 | Stop reason: HOSPADM

## 2024-09-30 RX ORDER — HEPARIN 100 UNIT/ML
500 SYRINGE INTRAVENOUS AS NEEDED
Status: DISCONTINUED | OUTPATIENT
Start: 2024-09-30 | End: 2024-09-30 | Stop reason: HOSPADM

## 2024-09-30 RX ORDER — HEPARIN 100 UNIT/ML
500 SYRINGE INTRAVENOUS AS NEEDED
Status: CANCELLED | OUTPATIENT
Start: 2024-09-30

## 2024-09-30 RX ORDER — HEPARIN SODIUM,PORCINE/PF 10 UNIT/ML
50 SYRINGE (ML) INTRAVENOUS AS NEEDED
Status: CANCELLED | OUTPATIENT
Start: 2024-09-30

## 2024-10-01 ENCOUNTER — OFFICE VISIT (OUTPATIENT)
Dept: HEMATOLOGY/ONCOLOGY | Facility: CLINIC | Age: 69
End: 2024-10-01
Payer: MEDICARE

## 2024-10-01 VITALS
RESPIRATION RATE: 18 BRPM | OXYGEN SATURATION: 98 % | SYSTOLIC BLOOD PRESSURE: 133 MMHG | WEIGHT: 188.49 LBS | HEART RATE: 66 BPM | BODY MASS INDEX: 25.56 KG/M2 | DIASTOLIC BLOOD PRESSURE: 65 MMHG | TEMPERATURE: 97.7 F

## 2024-10-01 DIAGNOSIS — C20 RECTAL CANCER (MULTI): ICD-10-CM

## 2024-10-01 PROCEDURE — 1125F AMNT PAIN NOTED PAIN PRSNT: CPT | Performed by: NURSE PRACTITIONER

## 2024-10-01 PROCEDURE — 3075F SYST BP GE 130 - 139MM HG: CPT | Performed by: NURSE PRACTITIONER

## 2024-10-01 PROCEDURE — 1159F MED LIST DOCD IN RCRD: CPT | Performed by: NURSE PRACTITIONER

## 2024-10-01 PROCEDURE — 3078F DIAST BP <80 MM HG: CPT | Performed by: NURSE PRACTITIONER

## 2024-10-01 PROCEDURE — 99214 OFFICE O/P EST MOD 30 MIN: CPT | Performed by: NURSE PRACTITIONER

## 2024-10-01 PROCEDURE — 3051F HG A1C>EQUAL 7.0%<8.0%: CPT | Performed by: NURSE PRACTITIONER

## 2024-10-01 PROCEDURE — RXMED WILLOW AMBULATORY MEDICATION CHARGE

## 2024-10-01 PROCEDURE — 3048F LDL-C <100 MG/DL: CPT | Performed by: NURSE PRACTITIONER

## 2024-10-01 RX ORDER — POTASSIUM CHLORIDE 20 MEQ/1
20 TABLET, EXTENDED RELEASE ORAL DAILY
Qty: 30 TABLET | Refills: 1 | Status: SHIPPED | OUTPATIENT
Start: 2024-10-01 | End: 2024-11-30

## 2024-10-01 RX ORDER — HYDROMORPHONE HYDROCHLORIDE 4 MG/1
4 TABLET ORAL EVERY 4 HOURS PRN
Qty: 90 TABLET | Refills: 0 | Status: SHIPPED | OUTPATIENT
Start: 2024-10-01 | End: 2024-10-31

## 2024-10-01 RX ORDER — MORPHINE SULFATE 15 MG/1
15 TABLET, FILM COATED, EXTENDED RELEASE ORAL EVERY 12 HOURS
Qty: 60 TABLET | Refills: 0 | Status: SHIPPED | OUTPATIENT
Start: 2024-10-01 | End: 2024-11-01

## 2024-10-01 ASSESSMENT — PAIN SCALES - GENERAL: PAINLEVEL: 6

## 2024-10-01 NOTE — PROGRESS NOTES
Patient ID: Roma Corral is a 69 y.o. male from [place], [place].     Referring Physician: Dann Nieto MD  [id number] [place] [address]    Primary Care Provider: Santiago Buckner MD    Diagnosis:   Rectal cancer with liver mets - 6/2024    Primary Oncologic Surgeon:   Christy    Primary Medical Oncologist:  Dann Nieto MD       Primary Radiation Oncologist:  Elvi    Oncologic Sugery History:  7/2/24 - ex lap with colostomy creation     Oncologic Therapy History:  N/a    Molecular Genetics:  Pending    Current Sites of Disease:  Liver, rectum    Oncologic Problem List:  Metastatic CRC  CAD  T2DM    Oncologic Narrative:  Roma Corral is a 69 y.o. male who is referred by Dr Vasquez for metastatic rectal cancer to liver s/p lap colostomy creation on 7/2/24. .  There is extensive metastatic disease in the liver with most of the right liver occupied with bulky disease which extends into segment 4.  Additionally there are 2 lesions in the left lateral section.  He initially presented with abdominal pain and pelvic pressure with labs showing anemia.  He has undergone imaging including CT scan, MRI liver, MRI rectum.  He has met with Dr. Boles from radiation oncology with a plan to start short course in the near future. He has met with Dr. Erazo and Dr. Harrison to discuss potential future surgeries.       Past Medical History: Roma has a past medical history of Abdominal pain, Acute non-ST elevation myocardial infarction (NSTEMI) (Multi), Anemia, Anxiety, CAD (coronary artery disease), Cardiology follow-up encounter (12/11/2023), Gout, H/O cardiac catheterization (06/01/2021), H/O cardiovascular stress test (09/03/2021), H/O echocardiogram (06/02/2021), High cholesterol, Hypertension, Overweight, Rectal cancer (Multi), and Type 2 diabetes mellitus (Multi).  Surgical History:  Roma has a past surgical history that includes Leg Surgery (Left); Colonoscopy (06/17/2024); and Hemicolectomy w/ ostomy.  Social History:   Roma reports that he has never smoked. He has never used smokeless tobacco. He reports that he does not currently use alcohol. He reports that he does not use drugs.  Family History:    Family History   Problem Relation Name Age of Onset    No Known Problems Mother      No Known Problems Father      No Known Problems Sister      Leukemia Brother       Family Oncology History:  Cancer-related family history is not on file.        SUBJECTIVE:    History of Present Illness:  Roma Corral is a 69 y.o. male who was referred by Dann Nieto MD and presents with chemotherapy follow up   Mr. Corral is seen in follow up   Completed xrt 7/17 7/24 - received 1st dose FOLFOXIRI / Josefina  8/7/24 - 2nd dose    - chemo held then due to painful kidney stones requiring ED visits / fluids/ pain meds     Restarted chemo on 9/18  - had trouble with abdominal pain about a week later - seen in ED, ct with possible colitis.  Still with uncontrolled pain at times. Occ diarrhea     All other ROS reviewed & are negative        OBJECTIVE:    VS / Pain:  /65   Pulse 66   Temp 36.5 °C (97.7 °F)   Resp 18   Wt 85.5 kg (188 lb 7.9 oz)   SpO2 98%   BMI 25.56 kg/m²   BSA: 2.08 meters squared   Pain Scale: 0    Daily Weight  10/01/24 : 85.5 kg (188 lb 7.9 oz)  09/26/24 : 89.8 kg (198 lb)  09/20/24 : 87.8 kg (193 lb 7.3 oz)      Physical Exam  Constitutional:       Appearance: He is normal weight.   HENT:      Nose: Nose normal.      Mouth/Throat:      Mouth: Mucous membranes are moist.      Pharynx: Oropharynx is clear.   Eyes:      Extraocular Movements: Extraocular movements intact.      Conjunctiva/sclera: Conjunctivae normal.   Cardiovascular:      Rate and Rhythm: Normal rate.      Pulses: Normal pulses.   Pulmonary:      Effort: Pulmonary effort is normal.   Abdominal:      General: Abdomen is flat.      Comments: Colostomy bag in place    Musculoskeletal:         General: Normal range of motion.   Skin:     General: Skin is warm.    Neurological:      General: No focal deficit present.      Mental Status: He is alert. Mental status is at baseline.   Psychiatric:         Mood and Affect: Mood normal.         Thought Content: Thought content normal.         Judgment: Judgment normal.         Performance Status:   ECOG 1    Diagnostic Results         WBC   Date/Time Value Ref Range Status   09/30/2024 12:27 PM 5.5 4.4 - 11.3 x10*3/uL Final   09/26/2024 08:51 PM 7.2 4.4 - 11.3 x10*3/uL Final   09/16/2024 02:46 PM 4.8 4.4 - 11.3 x10*3/uL Final     Hemoglobin   Date Value Ref Range Status   09/30/2024 9.4 (L) 13.5 - 17.5 g/dL Final   09/26/2024 10.4 (L) 13.5 - 17.5 g/dL Final   09/16/2024 9.1 (L) 13.5 - 17.5 g/dL Final     MCV   Date/Time Value Ref Range Status   09/30/2024 12:27 PM 84 80 - 100 fL Final   09/26/2024 08:51 PM 82 80 - 100 fL Final   09/16/2024 02:46 PM 83 80 - 100 fL Final     Platelets   Date/Time Value Ref Range Status   09/30/2024 12:27  (L) 150 - 450 x10*3/uL Final   09/26/2024 08:51  (L) 150 - 450 x10*3/uL Final     Comment:     Platelet count verified by smear review.   09/16/2024 02:46  150 - 450 x10*3/uL Final     Neutrophils Absolute   Date/Time Value Ref Range Status   09/30/2024 12:27 PM 3.42 1.20 - 7.70 x10*3/uL Final     Comment:     Percent differential counts (%) should be interpreted in the context of the absolute cell counts (cells/uL).   09/16/2024 02:46 PM 3.03 1.20 - 7.70 x10*3/uL Final     Comment:     Percent differential counts (%) should be interpreted in the context of the absolute cell counts (cells/uL).   08/28/2024 08:12 PM 4.30 1.20 - 7.70 x10*3/uL Final     Comment:     Percent differential counts (%) should be interpreted in the context of the absolute cell counts (cells/uL).     Bilirubin, Total   Date/Time Value Ref Range Status   09/30/2024 12:27 PM 0.3 0.0 - 1.2 mg/dL Final   09/26/2024 08:51 PM 0.6 0.0 - 1.2 mg/dL Final   09/16/2024 02:46 PM 0.3 0.0 - 1.2 mg/dL Final     AST  "  Date/Time Value Ref Range Status   09/30/2024 12:27 PM 15 9 - 39 U/L Final   09/26/2024 08:51 PM 11 9 - 39 U/L Final   09/16/2024 02:46 PM 18 9 - 39 U/L Final     ALT   Date/Time Value Ref Range Status   09/30/2024 12:27 PM 9 (L) 10 - 52 U/L Final     Comment:     Patients treated with Sulfasalazine may generate falsely decreased results for ALT.   09/26/2024 08:51 PM 9 (L) 10 - 52 U/L Final     Comment:     Patients treated with Sulfasalazine may generate falsely decreased results for ALT.   09/16/2024 02:46 PM 9 (L) 10 - 52 U/L Final     Comment:     Patients treated with Sulfasalazine may generate falsely decreased results for ALT.     Creatinine   Date/Time Value Ref Range Status   09/30/2024 12:27 PM 0.79 0.50 - 1.30 mg/dL Final   09/26/2024 08:51 PM 0.95 0.50 - 1.30 mg/dL Final   09/16/2024 02:46 PM 0.85 0.50 - 1.30 mg/dL Final     Urea Nitrogen   Date/Time Value Ref Range Status   09/30/2024 12:27 PM 11 6 - 23 mg/dL Final   09/26/2024 08:51 PM 14 6 - 23 mg/dL Final   09/16/2024 02:46 PM 13 6 - 23 mg/dL Final     Albumin   Date/Time Value Ref Range Status   09/30/2024 12:27 PM 3.2 (L) 3.4 - 5.0 g/dL Final   09/26/2024 08:51 PM 3.3 (L) 3.4 - 5.0 g/dL Final   09/16/2024 02:46 PM 3.0 (L) 3.4 - 5.0 g/dL Final     No results found for: \"\"  Carcinoembryonic AG   Date/Time Value Ref Range Status   09/30/2024 12:27 PM 61.3 ug/L Final   09/16/2024 02:46 .8 ug/L Final   08/26/2024 03:25 .7 ug/L Final     Assessment/Plan   This is a 70-year-old man with diabetes and coronary artery disease who presented with abdominal pain and anemia in June 2024 and was found to have rectal cancer with diffuse liver metastases.  He has nearly confluent right hepatic liver metastases and 2 lesions in the left lobe.  He has met with Dr. Erazo to discuss potential future surgery should he do well with chemotherapy.  He is also met with Dr. Boles to discuss radiation to the primary tumor which is causing bleeding " currently.  7/15 - 7/17 completed radiation.  -7/24 - first dose FOLFOXIRI + Josefina, tolerated with fatigue   -8/7/24 - 2nd dose FOLFOXIRI + Josefina   Tx has been held since then due to painful kidney stones requiring ED visit - both stones passed    - Restarted C3 on 9/13.  Tolerated with abdominal pain.      Despite only 2 txs, CEA has gone from >800 to 143    Plan:  Stage IV CRC:  Obtain NGS  C3 FOLFOXIRI / Josefina tomorrow 9/18 - fluids on day 3 & day 8   C4 in 2 weeks   Scans after 4 cycles & visit with Dr. Nieto prior to C5   Follow CEA     Anemia   -    - iron studies normal     Pain -   Uncontrolled with 2mg dilaudid prn, will increase to 4mg & add in long acting morphine.     Logistics -    - clinic appt - [facility] t[facility]   - infusion appt - mentor MELISSA De Guzman-[facility]/ [facility]  Office: [phone]  Fax: [phone]

## 2024-10-02 ENCOUNTER — TELEPHONE (OUTPATIENT)
Dept: PALLIATIVE MEDICINE | Facility: HOSPITAL | Age: 69
End: 2024-10-02

## 2024-10-02 ENCOUNTER — INFUSION (OUTPATIENT)
Dept: HEMATOLOGY/ONCOLOGY | Facility: CLINIC | Age: 69
End: 2024-10-02
Payer: MEDICARE

## 2024-10-02 ENCOUNTER — PHARMACY VISIT (OUTPATIENT)
Dept: PHARMACY | Facility: CLINIC | Age: 69
End: 2024-10-02
Payer: COMMERCIAL

## 2024-10-02 VITALS
HEART RATE: 82 BPM | RESPIRATION RATE: 18 BRPM | OXYGEN SATURATION: 97 % | SYSTOLIC BLOOD PRESSURE: 137 MMHG | TEMPERATURE: 97.7 F | WEIGHT: 189.6 LBS | DIASTOLIC BLOOD PRESSURE: 72 MMHG | BODY MASS INDEX: 25.71 KG/M2

## 2024-10-02 DIAGNOSIS — C20 RECTAL CANCER (MULTI): ICD-10-CM

## 2024-10-02 PROCEDURE — 96415 CHEMO IV INFUSION ADDL HR: CPT

## 2024-10-02 PROCEDURE — 2500000004 HC RX 250 GENERAL PHARMACY W/ HCPCS (ALT 636 FOR OP/ED): Performed by: NURSE PRACTITIONER

## 2024-10-02 PROCEDURE — 96367 TX/PROPH/DG ADDL SEQ IV INF: CPT

## 2024-10-02 PROCEDURE — 2500000004 HC RX 250 GENERAL PHARMACY W/ HCPCS (ALT 636 FOR OP/ED): Performed by: INTERNAL MEDICINE

## 2024-10-02 PROCEDURE — 96376 TX/PRO/DX INJ SAME DRUG ADON: CPT

## 2024-10-02 PROCEDURE — 96417 CHEMO IV INFUS EACH ADDL SEQ: CPT

## 2024-10-02 PROCEDURE — RXMED WILLOW AMBULATORY MEDICATION CHARGE

## 2024-10-02 PROCEDURE — 96416 CHEMO PROLONG INFUSE W/PUMP: CPT

## 2024-10-02 PROCEDURE — 96413 CHEMO IV INFUSION 1 HR: CPT

## 2024-10-02 PROCEDURE — 96375 TX/PRO/DX INJ NEW DRUG ADDON: CPT | Mod: INF

## 2024-10-02 PROCEDURE — 96411 CHEMO IV PUSH ADDL DRUG: CPT

## 2024-10-02 RX ORDER — ATROPINE SULFATE 0.4 MG/ML
0.4 INJECTION, SOLUTION ENDOTRACHEAL; INTRAMEDULLARY; INTRAMUSCULAR; INTRAVENOUS; SUBCUTANEOUS
Status: COMPLETED | OUTPATIENT
Start: 2024-10-02 | End: 2024-10-02

## 2024-10-02 RX ORDER — PALONOSETRON 0.05 MG/ML
0.25 INJECTION, SOLUTION INTRAVENOUS ONCE
Status: COMPLETED | OUTPATIENT
Start: 2024-10-02 | End: 2024-10-02

## 2024-10-02 RX ORDER — DIPHENHYDRAMINE HYDROCHLORIDE 50 MG/ML
50 INJECTION INTRAMUSCULAR; INTRAVENOUS AS NEEDED
Status: DISCONTINUED | OUTPATIENT
Start: 2024-10-02 | End: 2024-10-02 | Stop reason: HOSPADM

## 2024-10-02 RX ORDER — FAMOTIDINE 10 MG/ML
20 INJECTION INTRAVENOUS ONCE AS NEEDED
Status: DISCONTINUED | OUTPATIENT
Start: 2024-10-02 | End: 2024-10-02 | Stop reason: HOSPADM

## 2024-10-02 RX ORDER — PROCHLORPERAZINE EDISYLATE 5 MG/ML
10 INJECTION INTRAMUSCULAR; INTRAVENOUS EVERY 6 HOURS PRN
Status: DISCONTINUED | OUTPATIENT
Start: 2024-10-02 | End: 2024-10-02 | Stop reason: HOSPADM

## 2024-10-02 RX ORDER — PROCHLORPERAZINE MALEATE 10 MG
10 TABLET ORAL EVERY 6 HOURS PRN
Status: DISCONTINUED | OUTPATIENT
Start: 2024-10-02 | End: 2024-10-02 | Stop reason: HOSPADM

## 2024-10-02 RX ORDER — EPINEPHRINE 0.3 MG/.3ML
0.3 INJECTION SUBCUTANEOUS EVERY 5 MIN PRN
Status: DISCONTINUED | OUTPATIENT
Start: 2024-10-02 | End: 2024-10-02 | Stop reason: HOSPADM

## 2024-10-02 RX ORDER — LORAZEPAM 2 MG/ML
1 INJECTION INTRAMUSCULAR AS NEEDED
Status: DISCONTINUED | OUTPATIENT
Start: 2024-10-02 | End: 2024-10-02 | Stop reason: HOSPADM

## 2024-10-02 RX ORDER — ALBUTEROL SULFATE 0.83 MG/ML
3 SOLUTION RESPIRATORY (INHALATION) AS NEEDED
Status: DISCONTINUED | OUTPATIENT
Start: 2024-10-02 | End: 2024-10-02 | Stop reason: HOSPADM

## 2024-10-02 ASSESSMENT — PAIN SCALES - GENERAL: PAINLEVEL: 5

## 2024-10-02 NOTE — TELEPHONE ENCOUNTER
Message from Oncology team requested that we move up patients NPV with Supportive Oncology. He is currently scheduled for 11/4. I left a VM requesting a return call to discuss other locations where we can schedule him sooner.

## 2024-10-04 ENCOUNTER — INFUSION (OUTPATIENT)
Dept: HEMATOLOGY/ONCOLOGY | Facility: CLINIC | Age: 69
End: 2024-10-04
Payer: MEDICARE

## 2024-10-04 VITALS
BODY MASS INDEX: 26.06 KG/M2 | OXYGEN SATURATION: 98 % | WEIGHT: 192.13 LBS | HEART RATE: 70 BPM | DIASTOLIC BLOOD PRESSURE: 67 MMHG | SYSTOLIC BLOOD PRESSURE: 118 MMHG | RESPIRATION RATE: 17 BRPM | TEMPERATURE: 97.2 F

## 2024-10-04 DIAGNOSIS — C20 RECTAL CANCER (MULTI): ICD-10-CM

## 2024-10-04 PROCEDURE — 96360 HYDRATION IV INFUSION INIT: CPT | Mod: INF

## 2024-10-04 PROCEDURE — 2500000004 HC RX 250 GENERAL PHARMACY W/ HCPCS (ALT 636 FOR OP/ED): Performed by: NURSE PRACTITIONER

## 2024-10-04 PROCEDURE — 96372 THER/PROPH/DIAG INJ SC/IM: CPT

## 2024-10-04 PROCEDURE — 2500000004 HC RX 250 GENERAL PHARMACY W/ HCPCS (ALT 636 FOR OP/ED): Mod: JZ,JG | Performed by: INTERNAL MEDICINE

## 2024-10-04 RX ORDER — ALBUTEROL SULFATE 0.83 MG/ML
3 SOLUTION RESPIRATORY (INHALATION) AS NEEDED
Status: DISCONTINUED | OUTPATIENT
Start: 2024-10-04 | End: 2024-10-04 | Stop reason: HOSPADM

## 2024-10-04 RX ORDER — HEPARIN 100 UNIT/ML
500 SYRINGE INTRAVENOUS AS NEEDED
OUTPATIENT
Start: 2024-10-04

## 2024-10-04 RX ORDER — HEPARIN SODIUM,PORCINE/PF 10 UNIT/ML
50 SYRINGE (ML) INTRAVENOUS AS NEEDED
OUTPATIENT
Start: 2024-10-04

## 2024-10-04 RX ORDER — HEPARIN 100 UNIT/ML
500 SYRINGE INTRAVENOUS AS NEEDED
Status: DISCONTINUED | OUTPATIENT
Start: 2024-10-04 | End: 2024-10-04 | Stop reason: HOSPADM

## 2024-10-04 RX ORDER — FAMOTIDINE 10 MG/ML
20 INJECTION INTRAVENOUS ONCE AS NEEDED
Status: DISCONTINUED | OUTPATIENT
Start: 2024-10-04 | End: 2024-10-04 | Stop reason: HOSPADM

## 2024-10-04 RX ORDER — HEPARIN SODIUM,PORCINE/PF 10 UNIT/ML
50 SYRINGE (ML) INTRAVENOUS AS NEEDED
Status: DISCONTINUED | OUTPATIENT
Start: 2024-10-04 | End: 2024-10-04 | Stop reason: HOSPADM

## 2024-10-04 RX ORDER — DIPHENHYDRAMINE HYDROCHLORIDE 50 MG/ML
50 INJECTION INTRAMUSCULAR; INTRAVENOUS AS NEEDED
Status: DISCONTINUED | OUTPATIENT
Start: 2024-10-04 | End: 2024-10-04 | Stop reason: HOSPADM

## 2024-10-04 RX ORDER — EPINEPHRINE 0.3 MG/.3ML
0.3 INJECTION SUBCUTANEOUS EVERY 5 MIN PRN
Status: DISCONTINUED | OUTPATIENT
Start: 2024-10-04 | End: 2024-10-04 | Stop reason: HOSPADM

## 2024-10-04 ASSESSMENT — PAIN SCALES - GENERAL: PAINLEVEL: 0-NO PAIN

## 2024-10-04 NOTE — PROGRESS NOTES
Pr here for pump disconnect. States he is feeling better, pain is better controlled with new low release pain meds rates pain at a 2/10. Gave 1 liter NS tolerated well. Reviewed follow up appointments with pt her verbalized understanding. Discharged home    Gale Gasca RN

## 2024-10-09 ENCOUNTER — INFUSION (OUTPATIENT)
Dept: HEMATOLOGY/ONCOLOGY | Facility: CLINIC | Age: 69
End: 2024-10-09
Payer: MEDICARE

## 2024-10-09 VITALS
HEART RATE: 87 BPM | TEMPERATURE: 96.8 F | DIASTOLIC BLOOD PRESSURE: 83 MMHG | OXYGEN SATURATION: 99 % | WEIGHT: 184.41 LBS | SYSTOLIC BLOOD PRESSURE: 146 MMHG | BODY MASS INDEX: 25.01 KG/M2 | RESPIRATION RATE: 18 BRPM

## 2024-10-09 DIAGNOSIS — C20 RECTAL CANCER (MULTI): ICD-10-CM

## 2024-10-09 PROCEDURE — 96360 HYDRATION IV INFUSION INIT: CPT | Mod: INF

## 2024-10-09 PROCEDURE — 2500000004 HC RX 250 GENERAL PHARMACY W/ HCPCS (ALT 636 FOR OP/ED): Performed by: NURSE PRACTITIONER

## 2024-10-09 RX ORDER — HEPARIN 100 UNIT/ML
500 SYRINGE INTRAVENOUS AS NEEDED
Status: DISCONTINUED | OUTPATIENT
Start: 2024-10-09 | End: 2024-10-09 | Stop reason: HOSPADM

## 2024-10-09 RX ORDER — HEPARIN 100 UNIT/ML
500 SYRINGE INTRAVENOUS AS NEEDED
OUTPATIENT
Start: 2024-10-09

## 2024-10-09 RX ORDER — HEPARIN SODIUM,PORCINE/PF 10 UNIT/ML
50 SYRINGE (ML) INTRAVENOUS AS NEEDED
Status: DISCONTINUED | OUTPATIENT
Start: 2024-10-09 | End: 2024-10-09 | Stop reason: HOSPADM

## 2024-10-09 RX ORDER — HEPARIN SODIUM,PORCINE/PF 10 UNIT/ML
50 SYRINGE (ML) INTRAVENOUS AS NEEDED
OUTPATIENT
Start: 2024-10-09

## 2024-10-09 ASSESSMENT — PAIN SCALES - GENERAL: PAINLEVEL: 3

## 2024-10-10 ENCOUNTER — HOSPITAL ENCOUNTER (OUTPATIENT)
Dept: RADIOLOGY | Facility: HOSPITAL | Age: 69
Discharge: HOME | End: 2024-10-10
Payer: MEDICARE

## 2024-10-10 DIAGNOSIS — C20 RECTAL CANCER (MULTI): ICD-10-CM

## 2024-10-10 PROCEDURE — 74177 CT ABD & PELVIS W/CONTRAST: CPT

## 2024-10-10 PROCEDURE — 2550000001 HC RX 255 CONTRASTS: Performed by: NURSE PRACTITIONER

## 2024-10-14 ENCOUNTER — LAB (OUTPATIENT)
Dept: HEMATOLOGY/ONCOLOGY | Facility: CLINIC | Age: 69
End: 2024-10-14
Payer: MEDICARE

## 2024-10-14 DIAGNOSIS — C20 RECTAL CANCER (MULTI): ICD-10-CM

## 2024-10-14 LAB
ALBUMIN SERPL BCP-MCNC: 3.2 G/DL (ref 3.4–5)
ALP SERPL-CCNC: 189 U/L (ref 33–136)
ALT SERPL W P-5'-P-CCNC: 9 U/L (ref 10–52)
ANION GAP SERPL CALC-SCNC: 14 MMOL/L (ref 10–20)
AST SERPL W P-5'-P-CCNC: 14 U/L (ref 9–39)
BASOPHILS # BLD AUTO: 0.01 X10*3/UL (ref 0–0.1)
BASOPHILS NFR BLD AUTO: 0.2 %
BILIRUB SERPL-MCNC: 0.3 MG/DL (ref 0–1.2)
BUN SERPL-MCNC: 11 MG/DL (ref 6–23)
CALCIUM SERPL-MCNC: 8.1 MG/DL (ref 8.6–10.3)
CEA SERPL-MCNC: 48.2 UG/L
CHLORIDE SERPL-SCNC: 108 MMOL/L (ref 98–107)
CO2 SERPL-SCNC: 24 MMOL/L (ref 21–32)
CREAT SERPL-MCNC: 0.75 MG/DL (ref 0.5–1.3)
DACRYOCYTES BLD QL SMEAR: NORMAL
DOHLE BOD BLD QL SMEAR: PRESENT
EGFRCR SERPLBLD CKD-EPI 2021: >90 ML/MIN/1.73M*2
EOSINOPHIL # BLD AUTO: 0.05 X10*3/UL (ref 0–0.7)
EOSINOPHIL NFR BLD AUTO: 0.9 %
ERYTHROCYTE [DISTWIDTH] IN BLOOD BY AUTOMATED COUNT: 21.1 % (ref 11.5–14.5)
GLUCOSE SERPL-MCNC: 114 MG/DL (ref 74–99)
HCT VFR BLD AUTO: 29.9 % (ref 41–52)
HGB BLD-MCNC: 9.1 G/DL (ref 13.5–17.5)
HYPOCHROMIA BLD QL SMEAR: NORMAL
IMM GRANULOCYTES # BLD AUTO: 0.1 X10*3/UL (ref 0–0.7)
IMM GRANULOCYTES NFR BLD AUTO: 1.8 % (ref 0–0.9)
LYMPHOCYTES # BLD AUTO: 1.11 X10*3/UL (ref 1.2–4.8)
LYMPHOCYTES NFR BLD AUTO: 19.7 %
MCH RBC QN AUTO: 26.2 PG (ref 26–34)
MCHC RBC AUTO-ENTMCNC: 30.4 G/DL (ref 32–36)
MCV RBC AUTO: 86 FL (ref 80–100)
MONOCYTES # BLD AUTO: 0.44 X10*3/UL (ref 0.1–1)
MONOCYTES NFR BLD AUTO: 7.8 %
NEUTROPHILS # BLD AUTO: 3.93 X10*3/UL (ref 1.2–7.7)
NEUTROPHILS NFR BLD AUTO: 69.6 %
NRBC BLD-RTO: 0.4 /100 WBCS (ref 0–0)
OVALOCYTES BLD QL SMEAR: NORMAL
PLATELET # BLD AUTO: 106 X10*3/UL (ref 150–450)
POLYCHROMASIA BLD QL SMEAR: NORMAL
POTASSIUM SERPL-SCNC: 3.4 MMOL/L (ref 3.5–5.3)
PROT SERPL-MCNC: 6.2 G/DL (ref 6.4–8.2)
RBC # BLD AUTO: 3.47 X10*6/UL (ref 4.5–5.9)
RBC MORPH BLD: NORMAL
SODIUM SERPL-SCNC: 143 MMOL/L (ref 136–145)
TOXIC GRANULES BLD QL SMEAR: PRESENT
WBC # BLD AUTO: 5.6 X10*3/UL (ref 4.4–11.3)

## 2024-10-14 PROCEDURE — 36591 DRAW BLOOD OFF VENOUS DEVICE: CPT

## 2024-10-14 PROCEDURE — 81001 URINALYSIS AUTO W/SCOPE: CPT

## 2024-10-14 PROCEDURE — 85025 COMPLETE CBC W/AUTO DIFF WBC: CPT

## 2024-10-14 PROCEDURE — 80053 COMPREHEN METABOLIC PANEL: CPT

## 2024-10-14 PROCEDURE — 82378 CARCINOEMBRYONIC ANTIGEN: CPT

## 2024-10-14 PROCEDURE — 2500000004 HC RX 250 GENERAL PHARMACY W/ HCPCS (ALT 636 FOR OP/ED): Performed by: NURSE PRACTITIONER

## 2024-10-14 RX ORDER — HEPARIN SODIUM,PORCINE/PF 10 UNIT/ML
50 SYRINGE (ML) INTRAVENOUS AS NEEDED
Status: DISCONTINUED | OUTPATIENT
Start: 2024-10-14 | End: 2024-10-14 | Stop reason: HOSPADM

## 2024-10-14 RX ORDER — HEPARIN SODIUM,PORCINE/PF 10 UNIT/ML
50 SYRINGE (ML) INTRAVENOUS AS NEEDED
Status: CANCELLED | OUTPATIENT
Start: 2024-10-14

## 2024-10-14 RX ORDER — HEPARIN 100 UNIT/ML
500 SYRINGE INTRAVENOUS AS NEEDED
Status: CANCELLED | OUTPATIENT
Start: 2024-10-14

## 2024-10-14 RX ORDER — HEPARIN 100 UNIT/ML
500 SYRINGE INTRAVENOUS AS NEEDED
Status: DISCONTINUED | OUTPATIENT
Start: 2024-10-14 | End: 2024-10-14 | Stop reason: HOSPADM

## 2024-10-15 ENCOUNTER — OFFICE VISIT (OUTPATIENT)
Dept: HEMATOLOGY/ONCOLOGY | Facility: CLINIC | Age: 69
End: 2024-10-15
Payer: MEDICARE

## 2024-10-15 VITALS
DIASTOLIC BLOOD PRESSURE: 81 MMHG | BODY MASS INDEX: 26.07 KG/M2 | OXYGEN SATURATION: 95 % | RESPIRATION RATE: 18 BRPM | TEMPERATURE: 96.8 F | WEIGHT: 192.24 LBS | HEART RATE: 69 BPM | SYSTOLIC BLOOD PRESSURE: 150 MMHG

## 2024-10-15 DIAGNOSIS — C20 RECTAL CANCER (MULTI): Primary | ICD-10-CM

## 2024-10-15 LAB
APPEARANCE UR: CLEAR
BILIRUB UR STRIP.AUTO-MCNC: NEGATIVE MG/DL
COLOR UR: NORMAL
GLUCOSE UR STRIP.AUTO-MCNC: NORMAL MG/DL
HYALINE CASTS #/AREA URNS AUTO: ABNORMAL /LPF
KETONES UR STRIP.AUTO-MCNC: NEGATIVE MG/DL
LEUKOCYTE ESTERASE UR QL STRIP.AUTO: NEGATIVE
MUCOUS THREADS #/AREA URNS AUTO: ABNORMAL /LPF
NITRITE UR QL STRIP.AUTO: NEGATIVE
PH UR STRIP.AUTO: 5.5 [PH]
PROT UR STRIP.AUTO-MCNC: NORMAL MG/DL
RBC # UR STRIP.AUTO: NEGATIVE /UL
RBC #/AREA URNS AUTO: ABNORMAL /HPF
SP GR UR STRIP.AUTO: 1.02
UROBILINOGEN UR STRIP.AUTO-MCNC: NORMAL MG/DL
WBC #/AREA URNS AUTO: ABNORMAL /HPF

## 2024-10-15 PROCEDURE — 99215 OFFICE O/P EST HI 40 MIN: CPT | Performed by: INTERNAL MEDICINE

## 2024-10-15 PROCEDURE — 3051F HG A1C>EQUAL 7.0%<8.0%: CPT | Performed by: INTERNAL MEDICINE

## 2024-10-15 PROCEDURE — 3079F DIAST BP 80-89 MM HG: CPT | Performed by: INTERNAL MEDICINE

## 2024-10-15 PROCEDURE — 1159F MED LIST DOCD IN RCRD: CPT | Performed by: INTERNAL MEDICINE

## 2024-10-15 PROCEDURE — 3048F LDL-C <100 MG/DL: CPT | Performed by: INTERNAL MEDICINE

## 2024-10-15 PROCEDURE — 1125F AMNT PAIN NOTED PAIN PRSNT: CPT | Performed by: INTERNAL MEDICINE

## 2024-10-15 PROCEDURE — 3077F SYST BP >= 140 MM HG: CPT | Performed by: INTERNAL MEDICINE

## 2024-10-15 RX ORDER — FAMOTIDINE 10 MG/ML
20 INJECTION INTRAVENOUS ONCE AS NEEDED
OUTPATIENT
Start: 2024-10-30

## 2024-10-15 RX ORDER — FAMOTIDINE 10 MG/ML
20 INJECTION INTRAVENOUS ONCE AS NEEDED
OUTPATIENT
Start: 2024-11-01

## 2024-10-15 RX ORDER — FAMOTIDINE 10 MG/ML
20 INJECTION INTRAVENOUS ONCE AS NEEDED
OUTPATIENT
Start: 2024-11-13

## 2024-10-15 RX ORDER — PROCHLORPERAZINE MALEATE 10 MG
10 TABLET ORAL EVERY 6 HOURS PRN
OUTPATIENT
Start: 2024-11-27

## 2024-10-15 RX ORDER — ALBUTEROL SULFATE 0.83 MG/ML
3 SOLUTION RESPIRATORY (INHALATION) AS NEEDED
OUTPATIENT
Start: 2024-11-01

## 2024-10-15 RX ORDER — ALBUTEROL SULFATE 0.83 MG/ML
3 SOLUTION RESPIRATORY (INHALATION) AS NEEDED
OUTPATIENT
Start: 2024-11-13

## 2024-10-15 RX ORDER — LORAZEPAM 2 MG/ML
1 INJECTION INTRAMUSCULAR AS NEEDED
Status: CANCELLED | OUTPATIENT
Start: 2024-10-16

## 2024-10-15 RX ORDER — DIPHENHYDRAMINE HYDROCHLORIDE 50 MG/ML
50 INJECTION INTRAMUSCULAR; INTRAVENOUS AS NEEDED
Status: CANCELLED | OUTPATIENT
Start: 2024-10-18

## 2024-10-15 RX ORDER — ATROPINE SULFATE 0.4 MG/ML
0.4 INJECTION, SOLUTION ENDOTRACHEAL; INTRAMEDULLARY; INTRAMUSCULAR; INTRAVENOUS; SUBCUTANEOUS
OUTPATIENT
Start: 2024-10-30

## 2024-10-15 RX ORDER — EPINEPHRINE 0.3 MG/.3ML
0.3 INJECTION SUBCUTANEOUS EVERY 5 MIN PRN
OUTPATIENT
Start: 2024-11-01

## 2024-10-15 RX ORDER — ATROPINE SULFATE 0.4 MG/ML
0.4 INJECTION, SOLUTION ENDOTRACHEAL; INTRAMEDULLARY; INTRAMUSCULAR; INTRAVENOUS; SUBCUTANEOUS
OUTPATIENT
Start: 2024-11-13

## 2024-10-15 RX ORDER — EPINEPHRINE 0.3 MG/.3ML
0.3 INJECTION SUBCUTANEOUS EVERY 5 MIN PRN
Status: CANCELLED | OUTPATIENT
Start: 2024-10-16

## 2024-10-15 RX ORDER — LORAZEPAM 2 MG/ML
1 INJECTION INTRAMUSCULAR AS NEEDED
OUTPATIENT
Start: 2024-11-27

## 2024-10-15 RX ORDER — PROCHLORPERAZINE MALEATE 10 MG
10 TABLET ORAL EVERY 6 HOURS PRN
OUTPATIENT
Start: 2024-10-30

## 2024-10-15 RX ORDER — DIPHENHYDRAMINE HYDROCHLORIDE 50 MG/ML
50 INJECTION INTRAMUSCULAR; INTRAVENOUS AS NEEDED
OUTPATIENT
Start: 2024-11-27

## 2024-10-15 RX ORDER — DIPHENHYDRAMINE HYDROCHLORIDE 50 MG/ML
50 INJECTION INTRAMUSCULAR; INTRAVENOUS AS NEEDED
OUTPATIENT
Start: 2024-11-29

## 2024-10-15 RX ORDER — EPINEPHRINE 0.3 MG/.3ML
0.3 INJECTION SUBCUTANEOUS EVERY 5 MIN PRN
Status: CANCELLED | OUTPATIENT
Start: 2024-10-18

## 2024-10-15 RX ORDER — EPINEPHRINE 0.3 MG/.3ML
0.3 INJECTION SUBCUTANEOUS EVERY 5 MIN PRN
OUTPATIENT
Start: 2024-11-15

## 2024-10-15 RX ORDER — PROCHLORPERAZINE EDISYLATE 5 MG/ML
10 INJECTION INTRAMUSCULAR; INTRAVENOUS EVERY 6 HOURS PRN
OUTPATIENT
Start: 2024-11-27

## 2024-10-15 RX ORDER — EPINEPHRINE 0.3 MG/.3ML
0.3 INJECTION SUBCUTANEOUS EVERY 5 MIN PRN
OUTPATIENT
Start: 2024-11-29

## 2024-10-15 RX ORDER — PROCHLORPERAZINE MALEATE 10 MG
10 TABLET ORAL EVERY 6 HOURS PRN
Status: CANCELLED | OUTPATIENT
Start: 2024-10-16

## 2024-10-15 RX ORDER — FAMOTIDINE 10 MG/ML
20 INJECTION INTRAVENOUS ONCE AS NEEDED
OUTPATIENT
Start: 2024-11-27

## 2024-10-15 RX ORDER — DIPHENHYDRAMINE HYDROCHLORIDE 50 MG/ML
50 INJECTION INTRAMUSCULAR; INTRAVENOUS AS NEEDED
OUTPATIENT
Start: 2024-11-13

## 2024-10-15 RX ORDER — FAMOTIDINE 10 MG/ML
20 INJECTION INTRAVENOUS ONCE AS NEEDED
Status: CANCELLED | OUTPATIENT
Start: 2024-10-16

## 2024-10-15 RX ORDER — EPINEPHRINE 0.3 MG/.3ML
0.3 INJECTION SUBCUTANEOUS EVERY 5 MIN PRN
OUTPATIENT
Start: 2024-10-30

## 2024-10-15 RX ORDER — ALBUTEROL SULFATE 0.83 MG/ML
3 SOLUTION RESPIRATORY (INHALATION) AS NEEDED
Status: CANCELLED | OUTPATIENT
Start: 2024-10-18

## 2024-10-15 RX ORDER — DIPHENHYDRAMINE HYDROCHLORIDE 50 MG/ML
50 INJECTION INTRAMUSCULAR; INTRAVENOUS AS NEEDED
OUTPATIENT
Start: 2024-10-30

## 2024-10-15 RX ORDER — ALBUTEROL SULFATE 0.83 MG/ML
3 SOLUTION RESPIRATORY (INHALATION) AS NEEDED
OUTPATIENT
Start: 2024-10-30

## 2024-10-15 RX ORDER — FAMOTIDINE 10 MG/ML
20 INJECTION INTRAVENOUS ONCE AS NEEDED
Status: CANCELLED | OUTPATIENT
Start: 2024-10-18

## 2024-10-15 RX ORDER — PALONOSETRON 0.05 MG/ML
0.25 INJECTION, SOLUTION INTRAVENOUS ONCE
OUTPATIENT
Start: 2024-11-13

## 2024-10-15 RX ORDER — PALONOSETRON 0.05 MG/ML
0.25 INJECTION, SOLUTION INTRAVENOUS ONCE
OUTPATIENT
Start: 2024-11-27

## 2024-10-15 RX ORDER — FAMOTIDINE 10 MG/ML
20 INJECTION INTRAVENOUS ONCE AS NEEDED
OUTPATIENT
Start: 2024-11-29

## 2024-10-15 RX ORDER — DIPHENHYDRAMINE HYDROCHLORIDE 50 MG/ML
50 INJECTION INTRAMUSCULAR; INTRAVENOUS AS NEEDED
Status: CANCELLED | OUTPATIENT
Start: 2024-10-16

## 2024-10-15 RX ORDER — ALBUTEROL SULFATE 0.83 MG/ML
3 SOLUTION RESPIRATORY (INHALATION) AS NEEDED
OUTPATIENT
Start: 2024-11-15

## 2024-10-15 RX ORDER — LORAZEPAM 2 MG/ML
1 INJECTION INTRAMUSCULAR AS NEEDED
OUTPATIENT
Start: 2024-10-30

## 2024-10-15 RX ORDER — PROCHLORPERAZINE EDISYLATE 5 MG/ML
10 INJECTION INTRAMUSCULAR; INTRAVENOUS EVERY 6 HOURS PRN
OUTPATIENT
Start: 2024-11-13

## 2024-10-15 RX ORDER — ATROPINE SULFATE 0.4 MG/ML
0.4 INJECTION, SOLUTION ENDOTRACHEAL; INTRAMEDULLARY; INTRAMUSCULAR; INTRAVENOUS; SUBCUTANEOUS
Status: CANCELLED | OUTPATIENT
Start: 2024-10-16

## 2024-10-15 RX ORDER — LORAZEPAM 2 MG/ML
1 INJECTION INTRAMUSCULAR AS NEEDED
OUTPATIENT
Start: 2024-11-13

## 2024-10-15 RX ORDER — ATROPINE SULFATE 0.4 MG/ML
0.4 INJECTION, SOLUTION ENDOTRACHEAL; INTRAMEDULLARY; INTRAMUSCULAR; INTRAVENOUS; SUBCUTANEOUS
OUTPATIENT
Start: 2024-11-27

## 2024-10-15 RX ORDER — PROCHLORPERAZINE MALEATE 10 MG
10 TABLET ORAL EVERY 6 HOURS PRN
OUTPATIENT
Start: 2024-11-13

## 2024-10-15 RX ORDER — EPINEPHRINE 0.3 MG/.3ML
0.3 INJECTION SUBCUTANEOUS EVERY 5 MIN PRN
OUTPATIENT
Start: 2024-11-27

## 2024-10-15 RX ORDER — DIPHENHYDRAMINE HYDROCHLORIDE 50 MG/ML
50 INJECTION INTRAMUSCULAR; INTRAVENOUS AS NEEDED
OUTPATIENT
Start: 2024-11-01

## 2024-10-15 RX ORDER — PALONOSETRON 0.05 MG/ML
0.25 INJECTION, SOLUTION INTRAVENOUS ONCE
OUTPATIENT
Start: 2024-10-30

## 2024-10-15 RX ORDER — ALBUTEROL SULFATE 0.83 MG/ML
3 SOLUTION RESPIRATORY (INHALATION) AS NEEDED
OUTPATIENT
Start: 2024-11-27

## 2024-10-15 RX ORDER — ALBUTEROL SULFATE 0.83 MG/ML
3 SOLUTION RESPIRATORY (INHALATION) AS NEEDED
OUTPATIENT
Start: 2024-11-29

## 2024-10-15 RX ORDER — PROCHLORPERAZINE EDISYLATE 5 MG/ML
10 INJECTION INTRAMUSCULAR; INTRAVENOUS EVERY 6 HOURS PRN
Status: CANCELLED | OUTPATIENT
Start: 2024-10-16

## 2024-10-15 RX ORDER — DIPHENHYDRAMINE HYDROCHLORIDE 50 MG/ML
50 INJECTION INTRAMUSCULAR; INTRAVENOUS AS NEEDED
OUTPATIENT
Start: 2024-11-15

## 2024-10-15 RX ORDER — EPINEPHRINE 0.3 MG/.3ML
0.3 INJECTION SUBCUTANEOUS EVERY 5 MIN PRN
OUTPATIENT
Start: 2024-11-13

## 2024-10-15 RX ORDER — ALBUTEROL SULFATE 0.83 MG/ML
3 SOLUTION RESPIRATORY (INHALATION) AS NEEDED
Status: CANCELLED | OUTPATIENT
Start: 2024-10-16

## 2024-10-15 RX ORDER — PROCHLORPERAZINE EDISYLATE 5 MG/ML
10 INJECTION INTRAMUSCULAR; INTRAVENOUS EVERY 6 HOURS PRN
OUTPATIENT
Start: 2024-10-30

## 2024-10-15 RX ORDER — PALONOSETRON 0.05 MG/ML
0.25 INJECTION, SOLUTION INTRAVENOUS ONCE
Status: CANCELLED | OUTPATIENT
Start: 2024-10-16

## 2024-10-15 RX ORDER — FAMOTIDINE 10 MG/ML
20 INJECTION INTRAVENOUS ONCE AS NEEDED
OUTPATIENT
Start: 2024-11-15

## 2024-10-15 ASSESSMENT — PAIN SCALES - GENERAL: PAINLEVEL: 5

## 2024-10-15 ASSESSMENT — ENCOUNTER SYMPTOMS
DEPRESSION: 0
LOSS OF SENSATION IN FEET: 0
OCCASIONAL FEELINGS OF UNSTEADINESS: 0

## 2024-10-15 NOTE — PROGRESS NOTES
Patient ID: Roma Corral is a 69 y.o. male from [place], [place].     Referring Physician: Dann Nieto MD  [id number] [place] [address]    Primary Care Provider: Santiago Buckner MD    Diagnosis:   Rectal cancer with liver mets - 6/2024    Primary Oncologic Surgeon:   Christy    Primary Medical Oncologist:  Dann Neito MD       Primary Radiation Oncologist:  Elvi    Oncologic Sugery History:  7/2/24 - ex lap with colostomy creation     Oncologic Therapy History:  N/a    Molecular Genetics:      Current Sites of Disease:  Liver, rectum    Oncologic Problem List:  Metastatic CRC  CAD  T2DM    Oncologic Narrative:  Roma Corral is a 69 y.o. male who is referred by Dr Vasquez for metastatic rectal cancer to liver s/p lap colostomy creation on 7/2/24. .  There is extensive metastatic disease in the liver with most of the right liver occupied with bulky disease which extends into segment 4.  Additionally there are 2 lesions in the left lateral section.  He initially presented with abdominal pain and pelvic pressure with labs showing anemia.  He has undergone imaging including CT scan, MRI liver, MRI rectum.  He has met with Dr. Boles from radiation oncology with a plan to start short course in the near future. He has met with Dr. Erazo and Dr. Harrison to discuss potential future surgeries.       Past Medical History: Roma has a past medical history of Abdominal pain, Acute non-ST elevation myocardial infarction (NSTEMI) (Multi), Anemia, Anxiety, CAD (coronary artery disease), Cardiology follow-up encounter (12/11/2023), Gout, H/O cardiac catheterization (06/01/2021), H/O cardiovascular stress test (09/03/2021), H/O echocardiogram (06/02/2021), High cholesterol, Hypertension, Overweight, Rectal cancer (Multi), and Type 2 diabetes mellitus.  Surgical History:  Roma has a past surgical history that includes Leg Surgery (Left); Colonoscopy (06/17/2024); and Hemicolectomy w/ ostomy.  Social History:  Roma reports  that he has never smoked. He has never used smokeless tobacco. He reports that he does not currently use alcohol. He reports that he does not use drugs.  Family History:    Family History   Problem Relation Name Age of Onset    No Known Problems Mother      No Known Problems Father      No Known Problems Sister      Leukemia Brother       Family Oncology History:  Cancer-related family history is not on file.    SUBJECTIVE:    History of Present Illness:  Roma Corral is a 69 y.o. male who was referred by Dann Nieto MD and presents with chemotherapy follow up   Mr. Corral is seen in follow up   Completed xrt 7/17 7/24 - received 1st dose FOLFOXIRI / Josefina  8/7/24 - 2nd dose    - chemo has been held since then due to painful kidney stones requiring ED visits / fluids/ pain meds     Was able to pass both stones   Feeling better   Ready to restart treatment     Having trouble  -   Morphine     OBJECTIVE:    VS / Pain:  There were no vitals taken for this visit.  BSA: There is no height or weight on file to calculate BSA.   Pain Scale: 0    Daily Weight  10/15/24 : 87.2 kg (192 lb 3.9 oz)  10/09/24 : 83.7 kg (184 lb 6.6 oz)  10/04/24 : 87.1 kg (192 lb 2.1 oz)  10/02/24 : 86 kg (189 lb 9.5 oz)  10/01/24 : 85.5 kg (188 lb 7.9 oz)  09/26/24 : 89.8 kg (198 lb)  09/20/24 : 87.8 kg (193 lb 7.3 oz)  09/18/24 : 89 kg (196 lb 1.6 oz)  09/17/24 : 89.8 kg (198 lb)  08/28/24 : 89.8 kg (198 lb)      Physical Exam  Constitutional:       Appearance: He is normal weight.   HENT:      Nose: Nose normal.      Mouth/Throat:      Mouth: Mucous membranes are moist.      Pharynx: Oropharynx is clear.   Eyes:      Extraocular Movements: Extraocular movements intact.      Conjunctiva/sclera: Conjunctivae normal.   Cardiovascular:      Rate and Rhythm: Normal rate.      Pulses: Normal pulses.   Pulmonary:      Effort: Pulmonary effort is normal.   Abdominal:      General: Abdomen is flat.      Comments: Colostomy bag in place     Musculoskeletal:         General: Normal range of motion.   Skin:     General: Skin is warm.   Neurological:      General: No focal deficit present.      Mental Status: He is alert. Mental status is at baseline.   Psychiatric:         Mood and Affect: Mood normal.         Thought Content: Thought content normal.         Judgment: Judgment normal.       Performance Status:   ECOG 1    Diagnostic Results         WBC   Date/Time Value Ref Range Status   10/14/2024 02:41 PM 5.6 4.4 - 11.3 x10*3/uL Final   09/30/2024 12:27 PM 5.5 4.4 - 11.3 x10*3/uL Final   09/26/2024 08:51 PM 7.2 4.4 - 11.3 x10*3/uL Final     Hemoglobin   Date Value Ref Range Status   10/14/2024 9.1 (L) 13.5 - 17.5 g/dL Final   09/30/2024 9.4 (L) 13.5 - 17.5 g/dL Final   09/26/2024 10.4 (L) 13.5 - 17.5 g/dL Final     MCV   Date/Time Value Ref Range Status   10/14/2024 02:41 PM 86 80 - 100 fL Final   09/30/2024 12:27 PM 84 80 - 100 fL Final   09/26/2024 08:51 PM 82 80 - 100 fL Final     Platelets   Date/Time Value Ref Range Status   10/14/2024 02:41  (L) 150 - 450 x10*3/uL Final   09/30/2024 12:27  (L) 150 - 450 x10*3/uL Final   09/26/2024 08:51  (L) 150 - 450 x10*3/uL Final     Comment:     Platelet count verified by smear review.     Neutrophils Absolute   Date/Time Value Ref Range Status   10/14/2024 02:41 PM 3.93 1.20 - 7.70 x10*3/uL Final     Comment:     Percent differential counts (%) should be interpreted in the context of the absolute cell counts (cells/uL).   09/30/2024 12:27 PM 3.42 1.20 - 7.70 x10*3/uL Final     Comment:     Percent differential counts (%) should be interpreted in the context of the absolute cell counts (cells/uL).   09/16/2024 02:46 PM 3.03 1.20 - 7.70 x10*3/uL Final     Comment:     Percent differential counts (%) should be interpreted in the context of the absolute cell counts (cells/uL).     Bilirubin, Total   Date/Time Value Ref Range Status   10/14/2024 02:41 PM 0.3 0.0 - 1.2 mg/dL Final   09/30/2024  "12:27 PM 0.3 0.0 - 1.2 mg/dL Final   09/26/2024 08:51 PM 0.6 0.0 - 1.2 mg/dL Final     AST   Date/Time Value Ref Range Status   10/14/2024 02:41 PM 14 9 - 39 U/L Final   09/30/2024 12:27 PM 15 9 - 39 U/L Final   09/26/2024 08:51 PM 11 9 - 39 U/L Final     ALT   Date/Time Value Ref Range Status   10/14/2024 02:41 PM 9 (L) 10 - 52 U/L Final     Comment:     Patients treated with Sulfasalazine may generate falsely decreased results for ALT.   09/30/2024 12:27 PM 9 (L) 10 - 52 U/L Final     Comment:     Patients treated with Sulfasalazine may generate falsely decreased results for ALT.   09/26/2024 08:51 PM 9 (L) 10 - 52 U/L Final     Comment:     Patients treated with Sulfasalazine may generate falsely decreased results for ALT.     Creatinine   Date/Time Value Ref Range Status   10/14/2024 02:41 PM 0.75 0.50 - 1.30 mg/dL Final   09/30/2024 12:27 PM 0.79 0.50 - 1.30 mg/dL Final   09/26/2024 08:51 PM 0.95 0.50 - 1.30 mg/dL Final     Urea Nitrogen   Date/Time Value Ref Range Status   10/14/2024 02:41 PM 11 6 - 23 mg/dL Final   09/30/2024 12:27 PM 11 6 - 23 mg/dL Final   09/26/2024 08:51 PM 14 6 - 23 mg/dL Final     Albumin   Date/Time Value Ref Range Status   10/14/2024 02:41 PM 3.2 (L) 3.4 - 5.0 g/dL Final   09/30/2024 12:27 PM 3.2 (L) 3.4 - 5.0 g/dL Final   09/26/2024 08:51 PM 3.3 (L) 3.4 - 5.0 g/dL Final     No results found for: \"\"  Carcinoembryonic AG   Date/Time Value Ref Range Status   10/14/2024 02:41 PM 48.2 ug/L Final   09/30/2024 12:27 PM 61.3 ug/L Final   09/16/2024 02:46 .8 ug/L Final     === 08/28/24 ===    CT ABDOMEN PELVIS WO IV CONTRAST    - Impression -  1. Severe wall thickening of the terminal ileum with surrounding fat  stranding and small volume ascites, increased from 08/17/2024  concerning for severe terminal ileitis.  2. 0.8 cm calculus within the urinary bladder lumen. No  hydronephrosis or hydroureter. Nonobstructing right renal calculi  measuring up to 0.2 cm.  3. Mild " circumferential bladder wall thickening may reflect chronic  change or cystitis. Please correlate with urinalysis.  4. Postsurgical change related to diverting loop colostomy.  5. Rectosigmoid mass with stricturing and adjacent desmoplastic  reaction is better evaluated on the prior MRI.  6. Hepatic metastatic disease which appears overall similar to  08/17/2024; limited evaluation in the absence of IV contrast.    Signed by: Timo Macias 8/28/2024 11:39 PM  Dictation workstation:   [id number]     === 10/10/24 ===    CT CHEST ABDOMEN PELVIS W IV CONTRAST    - Impression -  Colorectal cancer restaging scan:    1. Interval slightly improved hepatic metastatic disease when  compared to the most recent exam and overall, gradual improved  hepatic metastatic disease when compared to the prior exams.  2. There is nodular pleural thickening in the posterior aspect of the  right lower lobe, which is nonspecific and may represent atelectasis.  Recommend attention on follow-up.  3. No additional new metastatic disease in the chest, abdomen or  pelvis.  4. Redemonstration of multiple segment distal ileal loop thickening  with surrounding inflammatory changes, along with the worsening of  sigmoid and descending colon thickening and associated hyperemia.  Findings are concerning stable to mild worsening of enterocolitis.  New and increasing loculated left paracolic gutter small collection  likely secondary to lower abdominopelvic inflammatory changes  involving the bowel loops.  5. Additional chronic and incidental findings as above.    I personally reviewed the images/study and I agree with the findings  as stated by resident physician Dr. Vic Villagomez . This study  was interpreted at [facility],  [place], [place].    MACRO:  None    Signed by: Laci Bates 10/11/2024 9:33 PM  Dictation workstation:   [id number]    Assessment/Plan   This is a 70-year-old man with diabetes  and coronary artery disease who presented with abdominal pain and anemia in June 2024 and was found to have rectal cancer with diffuse liver metastases.  He has nearly confluent right hepatic liver metastases and 2 lesions in the left lobe.  He has met with Dr. Erazo to discuss potential future surgery should he do well with chemotherapy.  He is also met with Dr. Boles to discuss radiation to the primary tumor which is causing bleeding currently.  7/15 - 7/17 completed radiation.  -7/24 - first dose FOLFOXIRI + Josefina, tolerated with fatigue   -8/7/24 - 2nd dose FOLFOXIRI + Josefina   Tx has been held since then due to painful kidney stones requiring ED visit - both stones have passed - ready to restart treatment.     Despite only 2 txs, CEA has gone from >800 to 143    Plan:  Stage IV CRC:  NGS shows wt KRAS/NRAS/BRAF  C5 FOLFOXIRI / Josefina tomorrow 9/18 - fluids on day 3 & day 8   Follow up prior to C6   Follow CEA     Anemia   -    - iron studies normal     Logistics -    - clinic appt - [place]   - infusion appt - mentor nikia Nieto MD  [facility]/ [facility]  Office: [phone]  Fax: [phone]

## 2024-10-16 ENCOUNTER — INFUSION (OUTPATIENT)
Dept: HEMATOLOGY/ONCOLOGY | Facility: CLINIC | Age: 69
End: 2024-10-16
Payer: MEDICARE

## 2024-10-16 VITALS
HEART RATE: 101 BPM | OXYGEN SATURATION: 96 % | RESPIRATION RATE: 16 BRPM | TEMPERATURE: 96.3 F | BODY MASS INDEX: 25.92 KG/M2 | DIASTOLIC BLOOD PRESSURE: 75 MMHG | WEIGHT: 191.14 LBS | SYSTOLIC BLOOD PRESSURE: 136 MMHG

## 2024-10-16 DIAGNOSIS — C20 RECTAL CANCER (MULTI): ICD-10-CM

## 2024-10-16 PROCEDURE — 96416 CHEMO PROLONG INFUSE W/PUMP: CPT

## 2024-10-16 PROCEDURE — 2500000004 HC RX 250 GENERAL PHARMACY W/ HCPCS (ALT 636 FOR OP/ED): Performed by: INTERNAL MEDICINE

## 2024-10-16 PROCEDURE — 96417 CHEMO IV INFUS EACH ADDL SEQ: CPT

## 2024-10-16 PROCEDURE — 96375 TX/PRO/DX INJ NEW DRUG ADDON: CPT | Mod: INF

## 2024-10-16 PROCEDURE — 96413 CHEMO IV INFUSION 1 HR: CPT

## 2024-10-16 PROCEDURE — 96367 TX/PROPH/DG ADDL SEQ IV INF: CPT

## 2024-10-16 PROCEDURE — 96415 CHEMO IV INFUSION ADDL HR: CPT

## 2024-10-16 PROCEDURE — 96376 TX/PRO/DX INJ SAME DRUG ADON: CPT

## 2024-10-16 RX ORDER — ALBUTEROL SULFATE 0.83 MG/ML
3 SOLUTION RESPIRATORY (INHALATION) AS NEEDED
Status: DISCONTINUED | OUTPATIENT
Start: 2024-10-16 | End: 2024-10-16 | Stop reason: HOSPADM

## 2024-10-16 RX ORDER — PALONOSETRON 0.05 MG/ML
0.25 INJECTION, SOLUTION INTRAVENOUS ONCE
Status: COMPLETED | OUTPATIENT
Start: 2024-10-16 | End: 2024-10-16

## 2024-10-16 RX ORDER — DIPHENHYDRAMINE HYDROCHLORIDE 50 MG/ML
50 INJECTION INTRAMUSCULAR; INTRAVENOUS AS NEEDED
Status: DISCONTINUED | OUTPATIENT
Start: 2024-10-16 | End: 2024-10-16 | Stop reason: HOSPADM

## 2024-10-16 RX ORDER — PROCHLORPERAZINE EDISYLATE 5 MG/ML
10 INJECTION INTRAMUSCULAR; INTRAVENOUS EVERY 6 HOURS PRN
Status: DISCONTINUED | OUTPATIENT
Start: 2024-10-16 | End: 2024-10-16 | Stop reason: HOSPADM

## 2024-10-16 RX ORDER — FAMOTIDINE 10 MG/ML
20 INJECTION INTRAVENOUS ONCE AS NEEDED
Status: DISCONTINUED | OUTPATIENT
Start: 2024-10-16 | End: 2024-10-16 | Stop reason: HOSPADM

## 2024-10-16 RX ORDER — LORAZEPAM 2 MG/ML
1 INJECTION INTRAMUSCULAR AS NEEDED
Status: DISCONTINUED | OUTPATIENT
Start: 2024-10-16 | End: 2024-10-16 | Stop reason: HOSPADM

## 2024-10-16 RX ORDER — EPINEPHRINE 0.3 MG/.3ML
0.3 INJECTION SUBCUTANEOUS EVERY 5 MIN PRN
Status: DISCONTINUED | OUTPATIENT
Start: 2024-10-16 | End: 2024-10-16 | Stop reason: HOSPADM

## 2024-10-16 RX ORDER — ATROPINE SULFATE 0.4 MG/ML
0.4 INJECTION, SOLUTION ENDOTRACHEAL; INTRAMEDULLARY; INTRAMUSCULAR; INTRAVENOUS; SUBCUTANEOUS
Status: COMPLETED | OUTPATIENT
Start: 2024-10-16 | End: 2024-10-16

## 2024-10-16 RX ORDER — PROCHLORPERAZINE MALEATE 10 MG
10 TABLET ORAL EVERY 6 HOURS PRN
Status: DISCONTINUED | OUTPATIENT
Start: 2024-10-16 | End: 2024-10-16 | Stop reason: HOSPADM

## 2024-10-16 ASSESSMENT — PAIN SCALES - GENERAL: PAINLEVEL_OUTOF10: 3

## 2024-10-17 ENCOUNTER — HOSPITAL ENCOUNTER (OUTPATIENT)
Dept: RADIOLOGY | Facility: HOSPITAL | Age: 69
Discharge: HOME | End: 2024-10-17
Payer: MEDICARE

## 2024-10-18 ENCOUNTER — INFUSION (OUTPATIENT)
Dept: HEMATOLOGY/ONCOLOGY | Facility: CLINIC | Age: 69
End: 2024-10-18
Payer: MEDICARE

## 2024-10-18 VITALS
DIASTOLIC BLOOD PRESSURE: 77 MMHG | OXYGEN SATURATION: 95 % | RESPIRATION RATE: 18 BRPM | WEIGHT: 187.28 LBS | TEMPERATURE: 97.2 F | SYSTOLIC BLOOD PRESSURE: 145 MMHG | BODY MASS INDEX: 25.4 KG/M2 | HEART RATE: 65 BPM

## 2024-10-18 DIAGNOSIS — C20 RECTAL CANCER (MULTI): ICD-10-CM

## 2024-10-18 PROCEDURE — 96372 THER/PROPH/DIAG INJ SC/IM: CPT

## 2024-10-18 PROCEDURE — 2500000004 HC RX 250 GENERAL PHARMACY W/ HCPCS (ALT 636 FOR OP/ED): Performed by: NURSE PRACTITIONER

## 2024-10-18 PROCEDURE — 2500000004 HC RX 250 GENERAL PHARMACY W/ HCPCS (ALT 636 FOR OP/ED): Mod: JZ,JG | Performed by: INTERNAL MEDICINE

## 2024-10-18 RX ORDER — HEPARIN SODIUM,PORCINE/PF 10 UNIT/ML
50 SYRINGE (ML) INTRAVENOUS AS NEEDED
OUTPATIENT
Start: 2024-10-18

## 2024-10-18 RX ORDER — HEPARIN 100 UNIT/ML
500 SYRINGE INTRAVENOUS AS NEEDED
Status: DISCONTINUED | OUTPATIENT
Start: 2024-10-18 | End: 2024-10-18 | Stop reason: HOSPADM

## 2024-10-18 RX ORDER — HEPARIN 100 UNIT/ML
500 SYRINGE INTRAVENOUS AS NEEDED
OUTPATIENT
Start: 2024-10-18

## 2024-10-18 RX ORDER — HEPARIN SODIUM,PORCINE/PF 10 UNIT/ML
50 SYRINGE (ML) INTRAVENOUS AS NEEDED
Status: DISCONTINUED | OUTPATIENT
Start: 2024-10-18 | End: 2024-10-18 | Stop reason: HOSPADM

## 2024-10-18 ASSESSMENT — PAIN SCALES - GENERAL: PAINLEVEL_OUTOF10: 0-NO PAIN

## 2024-10-18 NOTE — PROGRESS NOTES
Pt declined hydration today, states he does not feel that he needs it. Also states his pain is at a 0/10. Felt a rush of heat and sweats followed by chills late last night, advised pt to check tempeture and to be cautious of possible fever/infections. I also advised pt to push fluids with electrolyte replacement, he verbalized understanding.    Gale Gasca RN

## 2024-10-21 ENCOUNTER — APPOINTMENT (OUTPATIENT)
Dept: UROLOGY | Facility: CLINIC | Age: 69
End: 2024-10-21
Payer: MEDICARE

## 2024-10-21 DIAGNOSIS — C20 RECTAL CANCER (MULTI): Primary | ICD-10-CM

## 2024-10-21 LAB — TEST COMMENT - SURGICAL SENDOUT REQUEST: NORMAL

## 2024-10-23 ENCOUNTER — INFUSION (OUTPATIENT)
Dept: HEMATOLOGY/ONCOLOGY | Facility: CLINIC | Age: 69
End: 2024-10-23
Payer: MEDICARE

## 2024-10-23 ENCOUNTER — TELEPHONE (OUTPATIENT)
Dept: HEMATOLOGY/ONCOLOGY | Facility: CLINIC | Age: 69
End: 2024-10-23

## 2024-10-23 VITALS
TEMPERATURE: 97.2 F | HEART RATE: 107 BPM | BODY MASS INDEX: 24.04 KG/M2 | WEIGHT: 177.25 LBS | DIASTOLIC BLOOD PRESSURE: 71 MMHG | SYSTOLIC BLOOD PRESSURE: 105 MMHG | RESPIRATION RATE: 18 BRPM | OXYGEN SATURATION: 98 %

## 2024-10-23 DIAGNOSIS — K92.1 BLOOD IN STOOL: ICD-10-CM

## 2024-10-23 DIAGNOSIS — C20 RECTAL CANCER (MULTI): ICD-10-CM

## 2024-10-23 DIAGNOSIS — R10.827 GENERALIZED ABDOMINAL TENDERNESS WITH REBOUND TENDERNESS: ICD-10-CM

## 2024-10-23 DIAGNOSIS — K92.1 BLOOD IN STOOL: Primary | ICD-10-CM

## 2024-10-23 LAB
ALBUMIN SERPL BCP-MCNC: 3.3 G/DL (ref 3.4–5)
ALP SERPL-CCNC: 240 U/L (ref 33–136)
ALT SERPL W P-5'-P-CCNC: 11 U/L (ref 10–52)
ANION GAP SERPL CALC-SCNC: 15 MMOL/L (ref 10–20)
AST SERPL W P-5'-P-CCNC: 17 U/L (ref 9–39)
BASOPHILS # BLD AUTO: 0.02 X10*3/UL (ref 0–0.1)
BASOPHILS NFR BLD AUTO: 0.3 %
BILIRUB SERPL-MCNC: 0.4 MG/DL (ref 0–1.2)
BUN SERPL-MCNC: 10 MG/DL (ref 6–23)
CALCIUM SERPL-MCNC: 8.3 MG/DL (ref 8.6–10.3)
CHLORIDE SERPL-SCNC: 107 MMOL/L (ref 98–107)
CO2 SERPL-SCNC: 24 MMOL/L (ref 21–32)
CREAT SERPL-MCNC: 0.79 MG/DL (ref 0.5–1.3)
EGFRCR SERPLBLD CKD-EPI 2021: >90 ML/MIN/1.73M*2
EOSINOPHIL # BLD AUTO: 0.04 X10*3/UL (ref 0–0.7)
EOSINOPHIL NFR BLD AUTO: 0.5 %
ERYTHROCYTE [DISTWIDTH] IN BLOOD BY AUTOMATED COUNT: 20.5 % (ref 11.5–14.5)
GLUCOSE SERPL-MCNC: 129 MG/DL (ref 74–99)
HCT VFR BLD AUTO: 26.5 % (ref 41–52)
HGB BLD-MCNC: 8.2 G/DL (ref 13.5–17.5)
IMM GRANULOCYTES # BLD AUTO: 0.1 X10*3/UL (ref 0–0.7)
IMM GRANULOCYTES NFR BLD AUTO: 1.3 % (ref 0–0.9)
LYMPHOCYTES # BLD AUTO: 1.15 X10*3/UL (ref 1.2–4.8)
LYMPHOCYTES NFR BLD AUTO: 14.6 %
MCH RBC QN AUTO: 26.7 PG (ref 26–34)
MCHC RBC AUTO-ENTMCNC: 30.9 G/DL (ref 32–36)
MCV RBC AUTO: 86 FL (ref 80–100)
MONOCYTES # BLD AUTO: 0.71 X10*3/UL (ref 0.1–1)
MONOCYTES NFR BLD AUTO: 9 %
NEUTROPHILS # BLD AUTO: 5.83 X10*3/UL (ref 1.2–7.7)
NEUTROPHILS NFR BLD AUTO: 74.3 %
NRBC BLD-RTO: 0 /100 WBCS (ref 0–0)
PLATELET # BLD AUTO: 77 X10*3/UL (ref 150–450)
POTASSIUM SERPL-SCNC: 3.3 MMOL/L (ref 3.5–5.3)
PROT SERPL-MCNC: 6.1 G/DL (ref 6.4–8.2)
RBC # BLD AUTO: 3.07 X10*6/UL (ref 4.5–5.9)
SODIUM SERPL-SCNC: 143 MMOL/L (ref 136–145)
WBC # BLD AUTO: 7.9 X10*3/UL (ref 4.4–11.3)

## 2024-10-23 PROCEDURE — 85025 COMPLETE CBC W/AUTO DIFF WBC: CPT

## 2024-10-23 PROCEDURE — 2500000004 HC RX 250 GENERAL PHARMACY W/ HCPCS (ALT 636 FOR OP/ED): Performed by: NURSE PRACTITIONER

## 2024-10-23 PROCEDURE — 81001 URINALYSIS AUTO W/SCOPE: CPT

## 2024-10-23 PROCEDURE — 84075 ASSAY ALKALINE PHOSPHATASE: CPT

## 2024-10-23 PROCEDURE — 96360 HYDRATION IV INFUSION INIT: CPT | Mod: INF

## 2024-10-23 PROCEDURE — 87086 URINE CULTURE/COLONY COUNT: CPT

## 2024-10-23 RX ORDER — HEPARIN SODIUM,PORCINE/PF 10 UNIT/ML
50 SYRINGE (ML) INTRAVENOUS AS NEEDED
Status: DISCONTINUED | OUTPATIENT
Start: 2024-10-23 | End: 2024-10-23 | Stop reason: HOSPADM

## 2024-10-23 RX ORDER — HEPARIN 100 UNIT/ML
500 SYRINGE INTRAVENOUS AS NEEDED
OUTPATIENT
Start: 2024-10-23

## 2024-10-23 RX ORDER — HEPARIN 100 UNIT/ML
500 SYRINGE INTRAVENOUS AS NEEDED
Status: DISCONTINUED | OUTPATIENT
Start: 2024-10-23 | End: 2024-10-23 | Stop reason: HOSPADM

## 2024-10-23 RX ORDER — HEPARIN SODIUM,PORCINE/PF 10 UNIT/ML
50 SYRINGE (ML) INTRAVENOUS AS NEEDED
OUTPATIENT
Start: 2024-10-23

## 2024-10-23 ASSESSMENT — PAIN SCALES - GENERAL: PAINLEVEL_OUTOF10: 5

## 2024-10-23 NOTE — PROGRESS NOTES
pt here for IVF today. States he had bright red and tarry stool output which started Sunday evening until this AM @ 4. still experiencing shakes at times which he has had for the last few treatments. States he feels like when he takes the tylenol he experiences sweats. pain 5-6/10 currently and had for the last 2 days, sharp shooting pain through abdomen 10/10 lasting seconds about 4 x day. Provider aware and ordered labs.    Gale Gasca RN

## 2024-10-24 LAB
APPEARANCE UR: CLEAR
BILIRUB UR STRIP.AUTO-MCNC: NEGATIVE MG/DL
COLOR UR: YELLOW
GLUCOSE UR STRIP.AUTO-MCNC: NORMAL MG/DL
HOLD SPECIMEN: NORMAL
HYALINE CASTS #/AREA URNS AUTO: ABNORMAL /LPF
KETONES UR STRIP.AUTO-MCNC: NEGATIVE MG/DL
LEUKOCYTE ESTERASE UR QL STRIP.AUTO: ABNORMAL
MUCOUS THREADS #/AREA URNS AUTO: ABNORMAL /LPF
NITRITE UR QL STRIP.AUTO: NEGATIVE
PH UR STRIP.AUTO: 6 [PH]
PROT UR STRIP.AUTO-MCNC: ABNORMAL MG/DL
RBC # UR STRIP.AUTO: NEGATIVE /UL
RBC #/AREA URNS AUTO: ABNORMAL /HPF
SP GR UR STRIP.AUTO: 1.02
UROBILINOGEN UR STRIP.AUTO-MCNC: NORMAL MG/DL
WBC #/AREA URNS AUTO: ABNORMAL /HPF

## 2024-10-25 LAB — BACTERIA UR CULT: NO GROWTH

## 2024-10-28 ENCOUNTER — OFFICE VISIT (OUTPATIENT)
Dept: CARDIOLOGY | Facility: CLINIC | Age: 69
End: 2024-10-28
Payer: MEDICARE

## 2024-10-28 ENCOUNTER — LAB (OUTPATIENT)
Dept: HEMATOLOGY/ONCOLOGY | Facility: CLINIC | Age: 69
End: 2024-10-28
Payer: MEDICARE

## 2024-10-28 VITALS
BODY MASS INDEX: 24.68 KG/M2 | OXYGEN SATURATION: 96 % | DIASTOLIC BLOOD PRESSURE: 62 MMHG | HEART RATE: 72 BPM | SYSTOLIC BLOOD PRESSURE: 118 MMHG | WEIGHT: 182 LBS

## 2024-10-28 DIAGNOSIS — C20 RECTAL CANCER (MULTI): ICD-10-CM

## 2024-10-28 DIAGNOSIS — E87.6 HYPOKALEMIA: Primary | ICD-10-CM

## 2024-10-28 DIAGNOSIS — K92.1 BLOOD IN STOOL: ICD-10-CM

## 2024-10-28 LAB
ALBUMIN SERPL BCP-MCNC: 3.3 G/DL (ref 3.4–5)
ALP SERPL-CCNC: 230 U/L (ref 33–136)
ALT SERPL W P-5'-P-CCNC: 10 U/L (ref 10–52)
ANION GAP SERPL CALC-SCNC: 14 MMOL/L (ref 10–20)
APPEARANCE UR: CLEAR
AST SERPL W P-5'-P-CCNC: 16 U/L (ref 9–39)
BASOPHILS # BLD AUTO: 0.03 X10*3/UL (ref 0–0.1)
BASOPHILS NFR BLD AUTO: 0.4 %
BILIRUB SERPL-MCNC: 0.3 MG/DL (ref 0–1.2)
BILIRUB UR STRIP.AUTO-MCNC: NEGATIVE MG/DL
BUN SERPL-MCNC: 12 MG/DL (ref 6–23)
CALCIUM SERPL-MCNC: 8.1 MG/DL (ref 8.6–10.3)
CHLORIDE SERPL-SCNC: 108 MMOL/L (ref 98–107)
CO2 SERPL-SCNC: 24 MMOL/L (ref 21–32)
COLOR UR: YELLOW
CREAT SERPL-MCNC: 0.83 MG/DL (ref 0.5–1.3)
DACRYOCYTES BLD QL SMEAR: NORMAL
DOHLE BOD BLD QL SMEAR: PRESENT
EGFRCR SERPLBLD CKD-EPI 2021: >90 ML/MIN/1.73M*2
EOSINOPHIL # BLD AUTO: 0.03 X10*3/UL (ref 0–0.7)
EOSINOPHIL NFR BLD AUTO: 0.4 %
ERYTHROCYTE [DISTWIDTH] IN BLOOD BY AUTOMATED COUNT: 21.2 % (ref 11.5–14.5)
GLUCOSE SERPL-MCNC: 118 MG/DL (ref 74–99)
GLUCOSE UR STRIP.AUTO-MCNC: NORMAL MG/DL
HCT VFR BLD AUTO: 28.2 % (ref 41–52)
HGB BLD-MCNC: 8.6 G/DL (ref 13.5–17.5)
HYALINE CASTS #/AREA URNS AUTO: ABNORMAL /LPF
IMM GRANULOCYTES # BLD AUTO: 0.09 X10*3/UL (ref 0–0.7)
IMM GRANULOCYTES NFR BLD AUTO: 1.2 % (ref 0–0.9)
KETONES UR STRIP.AUTO-MCNC: NEGATIVE MG/DL
LEUKOCYTE ESTERASE UR QL STRIP.AUTO: ABNORMAL
LYMPHOCYTES # BLD AUTO: 1.47 X10*3/UL (ref 1.2–4.8)
LYMPHOCYTES NFR BLD AUTO: 19 %
MCH RBC QN AUTO: 26.8 PG (ref 26–34)
MCHC RBC AUTO-ENTMCNC: 30.5 G/DL (ref 32–36)
MCV RBC AUTO: 88 FL (ref 80–100)
MONOCYTES # BLD AUTO: 0.66 X10*3/UL (ref 0.1–1)
MONOCYTES NFR BLD AUTO: 8.5 %
MUCOUS THREADS #/AREA URNS AUTO: ABNORMAL /LPF
NEUTROPHILS # BLD AUTO: 5.45 X10*3/UL (ref 1.2–7.7)
NEUTROPHILS NFR BLD AUTO: 70.5 %
NITRITE UR QL STRIP.AUTO: NEGATIVE
NRBC BLD-RTO: 0.5 /100 WBCS (ref 0–0)
OVALOCYTES BLD QL SMEAR: NORMAL
PH UR STRIP.AUTO: 5.5 [PH]
PLATELET # BLD AUTO: 122 X10*3/UL (ref 150–450)
POLYCHROMASIA BLD QL SMEAR: NORMAL
POTASSIUM SERPL-SCNC: 3.2 MMOL/L (ref 3.5–5.3)
PROT SERPL-MCNC: 6.2 G/DL (ref 6.4–8.2)
PROT UR STRIP.AUTO-MCNC: ABNORMAL MG/DL
RBC # BLD AUTO: 3.21 X10*6/UL (ref 4.5–5.9)
RBC # UR STRIP.AUTO: NEGATIVE /UL
RBC #/AREA URNS AUTO: ABNORMAL /HPF
RBC MORPH BLD: NORMAL
SODIUM SERPL-SCNC: 143 MMOL/L (ref 136–145)
SP GR UR STRIP.AUTO: 1.02
TOXIC GRANULES BLD QL SMEAR: PRESENT
UROBILINOGEN UR STRIP.AUTO-MCNC: NORMAL MG/DL
WBC # BLD AUTO: 7.7 X10*3/UL (ref 4.4–11.3)
WBC #/AREA URNS AUTO: ABNORMAL /HPF

## 2024-10-28 PROCEDURE — 85025 COMPLETE CBC W/AUTO DIFF WBC: CPT

## 2024-10-28 PROCEDURE — 36591 DRAW BLOOD OFF VENOUS DEVICE: CPT

## 2024-10-28 PROCEDURE — 3048F LDL-C <100 MG/DL: CPT | Performed by: INTERNAL MEDICINE

## 2024-10-28 PROCEDURE — 82378 CARCINOEMBRYONIC ANTIGEN: CPT

## 2024-10-28 PROCEDURE — 1126F AMNT PAIN NOTED NONE PRSNT: CPT | Performed by: INTERNAL MEDICINE

## 2024-10-28 PROCEDURE — 1160F RVW MEDS BY RX/DR IN RCRD: CPT | Performed by: INTERNAL MEDICINE

## 2024-10-28 PROCEDURE — 99214 OFFICE O/P EST MOD 30 MIN: CPT | Performed by: INTERNAL MEDICINE

## 2024-10-28 PROCEDURE — 80053 COMPREHEN METABOLIC PANEL: CPT

## 2024-10-28 PROCEDURE — 1036F TOBACCO NON-USER: CPT | Performed by: INTERNAL MEDICINE

## 2024-10-28 PROCEDURE — 2500000004 HC RX 250 GENERAL PHARMACY W/ HCPCS (ALT 636 FOR OP/ED): Performed by: NURSE PRACTITIONER

## 2024-10-28 PROCEDURE — 3051F HG A1C>EQUAL 7.0%<8.0%: CPT | Performed by: INTERNAL MEDICINE

## 2024-10-28 PROCEDURE — 1159F MED LIST DOCD IN RCRD: CPT | Performed by: INTERNAL MEDICINE

## 2024-10-28 PROCEDURE — 3074F SYST BP LT 130 MM HG: CPT | Performed by: INTERNAL MEDICINE

## 2024-10-28 PROCEDURE — 81001 URINALYSIS AUTO W/SCOPE: CPT | Mod: WESLAB

## 2024-10-28 PROCEDURE — 3078F DIAST BP <80 MM HG: CPT | Performed by: INTERNAL MEDICINE

## 2024-10-28 RX ORDER — HEPARIN SODIUM,PORCINE/PF 10 UNIT/ML
50 SYRINGE (ML) INTRAVENOUS AS NEEDED
Status: DISCONTINUED | OUTPATIENT
Start: 2024-10-28 | End: 2024-10-28 | Stop reason: HOSPADM

## 2024-10-28 RX ORDER — HEPARIN 100 UNIT/ML
500 SYRINGE INTRAVENOUS AS NEEDED
Status: DISCONTINUED | OUTPATIENT
Start: 2024-10-28 | End: 2024-10-28 | Stop reason: HOSPADM

## 2024-10-28 RX ORDER — HEPARIN 100 UNIT/ML
500 SYRINGE INTRAVENOUS AS NEEDED
OUTPATIENT
Start: 2024-10-28

## 2024-10-28 RX ORDER — POTASSIUM CHLORIDE 20 MEQ/1
20 TABLET, EXTENDED RELEASE ORAL 2 TIMES DAILY
Qty: 180 TABLET | Refills: 3 | Status: SHIPPED | OUTPATIENT
Start: 2024-10-28 | End: 2025-10-28

## 2024-10-28 RX ORDER — DEXAMETHASONE 6 MG/1
12 TABLET ORAL ONCE
OUTPATIENT
Start: 2024-10-30

## 2024-10-28 RX ORDER — HEPARIN SODIUM,PORCINE/PF 10 UNIT/ML
50 SYRINGE (ML) INTRAVENOUS AS NEEDED
OUTPATIENT
Start: 2024-10-28

## 2024-10-28 ASSESSMENT — ENCOUNTER SYMPTOMS
LOSS OF SENSATION IN FEET: 0
DEPRESSION: 0
OCCASIONAL FEELINGS OF UNSTEADINESS: 0

## 2024-10-28 ASSESSMENT — PAIN SCALES - GENERAL: PAINLEVEL_OUTOF10: 0-NO PAIN

## 2024-10-29 ENCOUNTER — OFFICE VISIT (OUTPATIENT)
Dept: HEMATOLOGY/ONCOLOGY | Facility: CLINIC | Age: 69
End: 2024-10-29
Payer: MEDICARE

## 2024-10-29 VITALS
RESPIRATION RATE: 18 BRPM | DIASTOLIC BLOOD PRESSURE: 81 MMHG | OXYGEN SATURATION: 98 % | SYSTOLIC BLOOD PRESSURE: 133 MMHG | TEMPERATURE: 97.7 F | WEIGHT: 181.22 LBS | BODY MASS INDEX: 24.58 KG/M2 | HEART RATE: 82 BPM

## 2024-10-29 DIAGNOSIS — C20 RECTAL CANCER (MULTI): ICD-10-CM

## 2024-10-29 LAB — CEA SERPL-MCNC: 34.9 UG/L

## 2024-10-29 PROCEDURE — 3075F SYST BP GE 130 - 139MM HG: CPT | Performed by: NURSE PRACTITIONER

## 2024-10-29 PROCEDURE — 1159F MED LIST DOCD IN RCRD: CPT | Performed by: NURSE PRACTITIONER

## 2024-10-29 PROCEDURE — 3051F HG A1C>EQUAL 7.0%<8.0%: CPT | Performed by: NURSE PRACTITIONER

## 2024-10-29 PROCEDURE — 3048F LDL-C <100 MG/DL: CPT | Performed by: NURSE PRACTITIONER

## 2024-10-29 PROCEDURE — 99214 OFFICE O/P EST MOD 30 MIN: CPT | Performed by: NURSE PRACTITIONER

## 2024-10-29 PROCEDURE — 3079F DIAST BP 80-89 MM HG: CPT | Performed by: NURSE PRACTITIONER

## 2024-10-29 PROCEDURE — 1125F AMNT PAIN NOTED PAIN PRSNT: CPT | Performed by: NURSE PRACTITIONER

## 2024-10-29 PROCEDURE — 1036F TOBACCO NON-USER: CPT | Performed by: NURSE PRACTITIONER

## 2024-10-29 ASSESSMENT — PAIN SCALES - GENERAL: PAINLEVEL_OUTOF10: 6

## 2024-10-29 ASSESSMENT — ENCOUNTER SYMPTOMS
OCCASIONAL FEELINGS OF UNSTEADINESS: 0
DEPRESSION: 0
LOSS OF SENSATION IN FEET: 0

## 2024-10-30 ENCOUNTER — APPOINTMENT (OUTPATIENT)
Dept: HEMATOLOGY/ONCOLOGY | Facility: CLINIC | Age: 69
End: 2024-10-30
Payer: MEDICARE

## 2024-11-01 ENCOUNTER — APPOINTMENT (OUTPATIENT)
Dept: HEMATOLOGY/ONCOLOGY | Facility: CLINIC | Age: 69
End: 2024-11-01
Payer: MEDICARE

## 2024-11-01 ENCOUNTER — TELEPHONE (OUTPATIENT)
Dept: HEMATOLOGY/ONCOLOGY | Facility: HOSPITAL | Age: 69
End: 2024-11-01
Payer: MEDICARE

## 2024-11-01 ENCOUNTER — APPOINTMENT (OUTPATIENT)
Dept: RADIOLOGY | Facility: HOSPITAL | Age: 69
End: 2024-11-01
Payer: MEDICARE

## 2024-11-01 NOTE — TELEPHONE ENCOUNTER
Patient states he is returning Tonja's call.  He was not able to state what the call was regarding.

## 2024-11-01 NOTE — TELEPHONE ENCOUNTER
I called patient back to verify it was Tonja who called.  He states that was the message on his answering service.  I let him know she will not be in today.  He is aware he will not get a call back today and is agreeable to the plan.    He states the message said Tonja.

## 2024-11-04 ENCOUNTER — HOSPITAL ENCOUNTER (OUTPATIENT)
Dept: RADIOLOGY | Facility: HOSPITAL | Age: 69
Discharge: HOME | End: 2024-11-04
Payer: MEDICARE

## 2024-11-04 ENCOUNTER — APPOINTMENT (OUTPATIENT)
Dept: PALLIATIVE MEDICINE | Facility: CLINIC | Age: 69
End: 2024-11-04
Payer: MEDICARE

## 2024-11-04 DIAGNOSIS — C20 RECTAL CANCER (MULTI): ICD-10-CM

## 2024-11-04 PROCEDURE — A9575 INJ GADOTERATE MEGLUMI 0.1ML: HCPCS | Performed by: INTERNAL MEDICINE

## 2024-11-04 PROCEDURE — 2550000001 HC RX 255 CONTRASTS: Performed by: INTERNAL MEDICINE

## 2024-11-04 PROCEDURE — 72197 MRI PELVIS W/O & W/DYE: CPT

## 2024-11-04 RX ORDER — GADOTERATE MEGLUMINE 376.9 MG/ML
16 INJECTION INTRAVENOUS
Status: COMPLETED | OUTPATIENT
Start: 2024-11-04 | End: 2024-11-04

## 2024-11-04 ASSESSMENT — ENCOUNTER SYMPTOMS
LOSS OF SENSATION IN FEET: 0
DEPRESSION: 0
OCCASIONAL FEELINGS OF UNSTEADINESS: 0

## 2024-11-04 NOTE — TELEPHONE ENCOUNTER
"Contacted and spoke with Roma.  I explained the call was for infusion this week that he chose to r/s last week.   He is concerned about the distance of [facility] from his home as he has no one to go to infusion with him. I explained that getting infusions a week out can be tricky - I will check back with mentor to see if there has been any cancellations that would allow him to get treated there.   I also explained his schedule after this week is incorrect as treatment has been delayed so he will have to r/s all infusions - I reassured him I would make sure the dates get changed and he can get some help at his next infusion to schedule further treatments.  He stated that he is also in a lot of pain and \"the pain meds are not working\". I asked if he is taking them consistently to which he responded \"only when I have the pain\".  I recommended that he take the pain medications more frequently as this will help keep up with the pain.   He verbalized understanding and agreement with this plan.   I will follow up regarding treatment location for this week.   "

## 2024-11-05 ENCOUNTER — TELEPHONE (OUTPATIENT)
Dept: HEMATOLOGY/ONCOLOGY | Facility: CLINIC | Age: 69
End: 2024-11-05
Payer: MEDICARE

## 2024-11-05 ENCOUNTER — TELEPHONE (OUTPATIENT)
Dept: HEMATOLOGY/ONCOLOGY | Facility: HOSPITAL | Age: 69
End: 2024-11-05
Payer: MEDICARE

## 2024-11-05 DIAGNOSIS — C20 RECTAL CANCER (MULTI): ICD-10-CM

## 2024-11-05 LAB
AP SUMMARY REPORT: NORMAL
SCAN RESULT: NORMAL

## 2024-11-05 NOTE — TELEPHONE ENCOUNTER
Contacted patient and left  informing him that Thursday at [facility] is the best I can do for infusion this week as [facility] is still completely booked.  Also let him know that Dr. Nieto will have a telephone call with him Wednesday to discuss MRI results.  Appt scheduled and callback number left for questions/concerns.

## 2024-11-05 NOTE — TELEPHONE ENCOUNTER
Contacted patient to discuss schedule.   No answer and phone continued to ring without going to voice mail.   Will try again tomorrow morning.

## 2024-11-05 NOTE — TELEPHONE ENCOUNTER
Called and spoke with Mr Corral about coming to [facility] for treatment, [facility] stated they did not have availability 11/21. Patient stated that he refuses to go anywhere for treatment besides [facility].   Keely Pretty

## 2024-11-06 ENCOUNTER — TELEMEDICINE (OUTPATIENT)
Dept: HEMATOLOGY/ONCOLOGY | Facility: HOSPITAL | Age: 69
End: 2024-11-06
Payer: MEDICARE

## 2024-11-06 ENCOUNTER — APPOINTMENT (OUTPATIENT)
Dept: HEMATOLOGY/ONCOLOGY | Facility: CLINIC | Age: 69
End: 2024-11-06
Payer: MEDICARE

## 2024-11-06 ENCOUNTER — TELEPHONE (OUTPATIENT)
Dept: HEMATOLOGY/ONCOLOGY | Facility: HOSPITAL | Age: 69
End: 2024-11-06

## 2024-11-06 DIAGNOSIS — C20 RECTAL CANCER (MULTI): ICD-10-CM

## 2024-11-06 PROCEDURE — 3051F HG A1C>EQUAL 7.0%<8.0%: CPT | Performed by: INTERNAL MEDICINE

## 2024-11-06 PROCEDURE — 99214 OFFICE O/P EST MOD 30 MIN: CPT | Performed by: INTERNAL MEDICINE

## 2024-11-06 PROCEDURE — 3048F LDL-C <100 MG/DL: CPT | Performed by: INTERNAL MEDICINE

## 2024-11-06 RX ORDER — DEXAMETHASONE 4 MG/1
12 TABLET ORAL ONCE
OUTPATIENT
Start: 2024-11-27 | End: 2024-11-27

## 2024-11-06 NOTE — TELEPHONE ENCOUNTER
Contacted and spoke with patient about treatment next week. Explained I am having a hard time getting him scheduled at the preferred locations so I am going to try [facility] and [facility] campus with pump disconnect closer to home.   He agreed.  I called him back and let him know that our only option is [facility] tomorrow or next Thursday. He chose next Thursday and verbalized understanding/agreement.

## 2024-11-06 NOTE — PROGRESS NOTES
Patient ID: Roma Corral is a 69 y.o. male from [place], [place].     Referring Physician: Dann Nieto MD  [id number] [place] [address]    Primary Care Provider: Santiago Buckner MD    Diagnosis:   Rectal cancer with liver mets - 6/2024    Primary Oncologic Surgeon:   Christy    Primary Medical Oncologist:  Dann Nieto MD       Primary Radiation Oncologist:  Elvi    Oncologic Sugery History:  7/2/24 - ex lap with colostomy creation     Oncologic Therapy History:  N/a    Molecular Genetics:      Current Sites of Disease:  Liver, rectum    Oncologic Problem List:  Metastatic CRC  CAD  T2DM    Oncologic Narrative:  Roma Corral is a 69 y.o. male who is referred by Dr Vasquez for metastatic rectal cancer to liver s/p lap colostomy creation on 7/2/24. .  There is extensive metastatic disease in the liver with most of the right liver occupied with bulky disease which extends into segment 4.  Additionally there are 2 lesions in the left lateral section.  He initially presented with abdominal pain and pelvic pressure with labs showing anemia.  He has undergone imaging including CT scan, MRI liver, MRI rectum.  He has met with Dr. Boles from radiation oncology with a plan to start short course in the near future. He has met with Dr. Erazo and Dr. Harrison to discuss potential future surgeries.       Past Medical History: Roma has a past medical history of Abdominal pain, Acute non-ST elevation myocardial infarction (NSTEMI) (Multi), Anemia, Anxiety, CAD (coronary artery disease), Cardiology follow-up encounter (12/11/2023), Gout, H/O cardiac catheterization (06/01/2021), H/O cardiovascular stress test (09/03/2021), H/O echocardiogram (06/02/2021), High cholesterol, Hypertension, Overweight, Rectal cancer (Multi), and Type 2 diabetes mellitus.  Surgical History:  Roma has a past surgical history that includes Leg Surgery (Left); Colonoscopy (06/17/2024); and Hemicolectomy w/ ostomy.  Social History:  Roma reports  that he has never smoked. He has never used smokeless tobacco. He reports that he does not currently use alcohol. He reports that he does not use drugs.  Family History:    Family History   Problem Relation Name Age of Onset    No Known Problems Mother      No Known Problems Father      No Known Problems Sister      Leukemia Brother       Family Oncology History:  Cancer-related family history is not on file.    SUBJECTIVE:    History of Present Illness:  Roma Corral is a 69 y.o. male who was referred by Dann Nieto MD and presents with chemotherapy follow up   Mr. Corral is seen in follow up   Completed xrt 7/17 7/24 - received 1st dose FOLFOXIRI / Josefina  8/7/24 - 2nd dose    - chemo has been held since then due to painful kidney stones requiring ED visits / fluids/ pain meds     Was able to pass both stones   Feeling better   Ready to restart treatment     Having trouble  -   Morphine       OBJECTIVE:    VS / Pain:  There were no vitals taken for this visit.  BSA: There is no height or weight on file to calculate BSA.   Pain Scale: 0    Daily Weight  10/29/24 : 82.2 kg (181 lb 3.5 oz)  10/28/24 : 82.6 kg (182 lb)  10/23/24 : 80.4 kg (177 lb 4 oz)  10/18/24 : 84.9 kg (187 lb 4.5 oz)  10/16/24 : 86.7 kg (191 lb 2.2 oz)  11/04/24 : 82.2 kg (181 lb 3.5 oz)  10/15/24 : 87.2 kg (192 lb 3.9 oz)  10/09/24 : 83.7 kg (184 lb 6.6 oz)  10/04/24 : 87.1 kg (192 lb 2.1 oz)  10/02/24 : 86 kg (189 lb 9.5 oz)      Physical Exam  Constitutional:       Appearance: He is normal weight.   HENT:      Nose: Nose normal.      Mouth/Throat:      Mouth: Mucous membranes are moist.      Pharynx: Oropharynx is clear.   Eyes:      Extraocular Movements: Extraocular movements intact.      Conjunctiva/sclera: Conjunctivae normal.   Cardiovascular:      Rate and Rhythm: Normal rate.      Pulses: Normal pulses.   Pulmonary:      Effort: Pulmonary effort is normal.   Abdominal:      General: Abdomen is flat.      Comments: Colostomy bag in place     Musculoskeletal:         General: Normal range of motion.   Skin:     General: Skin is warm.   Neurological:      General: No focal deficit present.      Mental Status: He is alert. Mental status is at baseline.   Psychiatric:         Mood and Affect: Mood normal.         Thought Content: Thought content normal.         Judgment: Judgment normal.         Performance Status:   ECOG 1    Diagnostic Results         WBC   Date/Time Value Ref Range Status   10/28/2024 02:03 PM 7.7 4.4 - 11.3 x10*3/uL Final   10/23/2024 03:34 PM 7.9 4.4 - 11.3 x10*3/uL Final   10/14/2024 02:41 PM 5.6 4.4 - 11.3 x10*3/uL Final     Hemoglobin   Date Value Ref Range Status   10/28/2024 8.6 (L) 13.5 - 17.5 g/dL Final   10/23/2024 8.2 (L) 13.5 - 17.5 g/dL Final   10/14/2024 9.1 (L) 13.5 - 17.5 g/dL Final     MCV   Date/Time Value Ref Range Status   10/28/2024 02:03 PM 88 80 - 100 fL Final   10/23/2024 03:34 PM 86 80 - 100 fL Final   10/14/2024 02:41 PM 86 80 - 100 fL Final     Platelets   Date/Time Value Ref Range Status   10/28/2024 02:03  (L) 150 - 450 x10*3/uL Final   10/23/2024 03:34 PM 77 (L) 150 - 450 x10*3/uL Final   10/14/2024 02:41  (L) 150 - 450 x10*3/uL Final     Neutrophils Absolute   Date/Time Value Ref Range Status   10/28/2024 02:03 PM 5.45 1.20 - 7.70 x10*3/uL Final     Comment:     Percent differential counts (%) should be interpreted in the context of the absolute cell counts (cells/uL).   10/23/2024 03:34 PM 5.83 1.20 - 7.70 x10*3/uL Final     Comment:     Percent differential counts (%) should be interpreted in the context of the absolute cell counts (cells/uL).   10/14/2024 02:41 PM 3.93 1.20 - 7.70 x10*3/uL Final     Comment:     Percent differential counts (%) should be interpreted in the context of the absolute cell counts (cells/uL).     Bilirubin, Total   Date/Time Value Ref Range Status   10/28/2024 02:03 PM 0.3 0.0 - 1.2 mg/dL Final   10/23/2024 03:34 PM 0.4 0.0 - 1.2 mg/dL Final   10/14/2024 02:41 PM  "0.3 0.0 - 1.2 mg/dL Final     AST   Date/Time Value Ref Range Status   10/28/2024 02:03 PM 16 9 - 39 U/L Final   10/23/2024 03:34 PM 17 9 - 39 U/L Final   10/14/2024 02:41 PM 14 9 - 39 U/L Final     ALT   Date/Time Value Ref Range Status   10/28/2024 02:03 PM 10 10 - 52 U/L Final     Comment:     Patients treated with Sulfasalazine may generate falsely decreased results for ALT.   10/23/2024 03:34 PM 11 10 - 52 U/L Final     Comment:     Patients treated with Sulfasalazine may generate falsely decreased results for ALT.   10/14/2024 02:41 PM 9 (L) 10 - 52 U/L Final     Comment:     Patients treated with Sulfasalazine may generate falsely decreased results for ALT.     Creatinine   Date/Time Value Ref Range Status   10/28/2024 02:03 PM 0.83 0.50 - 1.30 mg/dL Final   10/23/2024 03:34 PM 0.79 0.50 - 1.30 mg/dL Final   10/14/2024 02:41 PM 0.75 0.50 - 1.30 mg/dL Final     Urea Nitrogen   Date/Time Value Ref Range Status   10/28/2024 02:03 PM 12 6 - 23 mg/dL Final   10/23/2024 03:34 PM 10 6 - 23 mg/dL Final   10/14/2024 02:41 PM 11 6 - 23 mg/dL Final     Albumin   Date/Time Value Ref Range Status   10/28/2024 02:03 PM 3.3 (L) 3.4 - 5.0 g/dL Final   10/23/2024 03:34 PM 3.3 (L) 3.4 - 5.0 g/dL Final   10/14/2024 02:41 PM 3.2 (L) 3.4 - 5.0 g/dL Final     No results found for: \"\"  Carcinoembryonic AG   Date/Time Value Ref Range Status   10/28/2024 02:03 PM 34.9 ug/L Final   10/14/2024 02:41 PM 48.2 ug/L Final   09/30/2024 12:27 PM 61.3 ug/L Final     === 08/28/24 ===    CT ABDOMEN PELVIS WO IV CONTRAST    - Impression -  1. Severe wall thickening of the terminal ileum with surrounding fat  stranding and small volume ascites, increased from 08/17/2024  concerning for severe terminal ileitis.  2. 0.8 cm calculus within the urinary bladder lumen. No  hydronephrosis or hydroureter. Nonobstructing right renal calculi  measuring up to 0.2 cm.  3. Mild circumferential bladder wall thickening may reflect chronic  change or " cystitis. Please correlate with urinalysis.  4. Postsurgical change related to diverting loop colostomy.  5. Rectosigmoid mass with stricturing and adjacent desmoplastic  reaction is better evaluated on the prior MRI.  6. Hepatic metastatic disease which appears overall similar to  08/17/2024; limited evaluation in the absence of IV contrast.    Signed by: Timo Macias 8/28/2024 11:39 PM  Dictation workstation:   [id number]     === 10/10/24 ===    CT CHEST ABDOMEN PELVIS W IV CONTRAST    - Impression -  Colorectal cancer restaging scan:    1. Interval slightly improved hepatic metastatic disease when compared to the most recent exam and overall, gradual improved hepatic metastatic disease when compared to the prior exams.  2. There is nodular pleural thickening in the posterior aspect of the right lower lobe, which is nonspecific and may represent atelectasis. Recommend attention on follow-up.  3. No additional new metastatic disease in the chest, abdomen or pelvis.  4. Redemonstration of multiple segment distal ileal loop thickening with surrounding inflammatory changes, along with the worsening of sigmoid and descending colon thickening and associated hyperemia.  Findings are concerning stable to mild worsening of enterocolitis. New and increasing loculated left paracolic gutter small collection likely secondary to lower abdominopelvic inflammatory changes  involving the bowel loops.  5. Additional chronic and incidental findings as above.    I personally reviewed the images/study and I agree with the findings as stated by resident physician Dr. Vic Villagomez . This study was interpreted at [facility],  [place], [place].    MACRO:  None    Signed by: Laci Bates 10/11/2024 9:33 PM  Dictation workstation:   [id number]    Assessment/Plan   This is a 69 y.o. man with diabetes and coronary artery disease who presented with abdominal pain and anemia in June 2024  and was found to have rectal cancer with diffuse liver metastases.  He has nearly confluent right hepatic liver metastases and 2 lesions in the left lobe.  He has met with Dr. Erazo to discuss potential future surgery should he do well with chemotherapy.  He is also met with Dr. Boles to discuss radiation to the primary tumor which is causing bleeding currently.  7/15 - 7/17 completed radiation.  -7/24 - first dose FOLFOXIRI + Josefina, tolerated with fatigue   -8/7/24 - 2nd dose FOLFOXIRI + Josefina   Tx has been held since then due to painful kidney stones requiring ED visit - both stones have passed - ready to restart treatment.     Despite only 2 txs, CEA has gone from >800 to 143    Plan:  Stage IV CRC:  NGS shows wt KRAS/NRAS/BRAF  C5 FOLFOXIRI / Josefina tomorrow 9/18 - fluids on day 3 & day 8   Dose reduce oxali for C6 given difficulty with C5.    He is not focused on having ostomy take down at this point although he would ultimately prefer it.     Follow up with C7.    Anemia   -    - iron studies normal     Logistics -    - clinic appt - [place] [facility]   - infusion appt - mentor nikia Nieto MD  [facility] [place]  Office: [phone]  Fax: [phone]

## 2024-11-07 ENCOUNTER — APPOINTMENT (OUTPATIENT)
Dept: HEMATOLOGY/ONCOLOGY | Facility: CLINIC | Age: 69
End: 2024-11-07
Payer: MEDICARE

## 2024-11-09 ENCOUNTER — APPOINTMENT (OUTPATIENT)
Dept: HEMATOLOGY/ONCOLOGY | Facility: HOSPITAL | Age: 69
End: 2024-11-09
Payer: MEDICARE

## 2024-11-10 ENCOUNTER — APPOINTMENT (OUTPATIENT)
Dept: RADIOLOGY | Facility: HOSPITAL | Age: 69
End: 2024-11-10
Payer: MEDICARE

## 2024-11-10 ENCOUNTER — HOSPITAL ENCOUNTER (EMERGENCY)
Facility: HOSPITAL | Age: 69
Discharge: HOME | End: 2024-11-11
Attending: EMERGENCY MEDICINE
Payer: MEDICARE

## 2024-11-10 ENCOUNTER — APPOINTMENT (OUTPATIENT)
Dept: CARDIOLOGY | Facility: HOSPITAL | Age: 69
End: 2024-11-10
Payer: MEDICARE

## 2024-11-10 DIAGNOSIS — N50.812 PAIN IN BOTH TESTICLES: ICD-10-CM

## 2024-11-10 DIAGNOSIS — R10.30 LOWER ABDOMINAL PAIN: Primary | ICD-10-CM

## 2024-11-10 DIAGNOSIS — R53.81 MALAISE AND FATIGUE: ICD-10-CM

## 2024-11-10 DIAGNOSIS — N50.811 PAIN IN BOTH TESTICLES: ICD-10-CM

## 2024-11-10 DIAGNOSIS — R53.83 MALAISE AND FATIGUE: ICD-10-CM

## 2024-11-10 DIAGNOSIS — R11.2 NAUSEA AND VOMITING, UNSPECIFIED VOMITING TYPE: ICD-10-CM

## 2024-11-10 LAB
ALBUMIN SERPL BCP-MCNC: 3.4 G/DL (ref 3.4–5)
ALP SERPL-CCNC: 215 U/L (ref 33–136)
ALT SERPL W P-5'-P-CCNC: 11 U/L (ref 10–52)
ANION GAP SERPL CALCULATED.3IONS-SCNC: 14 MMOL/L (ref 10–20)
APPEARANCE UR: CLEAR
AST SERPL W P-5'-P-CCNC: 18 U/L (ref 9–39)
BASOPHILS # BLD AUTO: 0.02 X10*3/UL (ref 0–0.1)
BASOPHILS NFR BLD AUTO: 0.3 %
BILIRUB SERPL-MCNC: 0.6 MG/DL (ref 0–1.2)
BILIRUB UR STRIP.AUTO-MCNC: NEGATIVE MG/DL
BNP SERPL-MCNC: 169 PG/ML (ref 0–99)
BUN SERPL-MCNC: 13 MG/DL (ref 6–23)
CALCIUM SERPL-MCNC: 9 MG/DL (ref 8.6–10.3)
CARDIAC TROPONIN I PNL SERPL HS: 8 NG/L (ref 0–20)
CARDIAC TROPONIN I PNL SERPL HS: 8 NG/L (ref 0–20)
CHLORIDE SERPL-SCNC: 107 MMOL/L (ref 98–107)
CO2 SERPL-SCNC: 25 MMOL/L (ref 21–32)
COLOR UR: YELLOW
CREAT SERPL-MCNC: 1.12 MG/DL (ref 0.5–1.3)
DACRYOCYTES BLD QL SMEAR: NORMAL
EGFRCR SERPLBLD CKD-EPI 2021: 71 ML/MIN/1.73M*2
EOSINOPHIL # BLD AUTO: 0.02 X10*3/UL (ref 0–0.7)
EOSINOPHIL NFR BLD AUTO: 0.3 %
ERYTHROCYTE [DISTWIDTH] IN BLOOD BY AUTOMATED COUNT: 21.1 % (ref 11.5–14.5)
FLUAV RNA RESP QL NAA+PROBE: NOT DETECTED
FLUBV RNA RESP QL NAA+PROBE: NOT DETECTED
GLUCOSE SERPL-MCNC: 127 MG/DL (ref 74–99)
GLUCOSE UR STRIP.AUTO-MCNC: NORMAL MG/DL
HCT VFR BLD AUTO: 30.2 % (ref 41–52)
HGB BLD-MCNC: 8.9 G/DL (ref 13.5–17.5)
HYALINE CASTS #/AREA URNS AUTO: ABNORMAL /LPF
IMM GRANULOCYTES # BLD AUTO: 0.04 X10*3/UL (ref 0–0.7)
IMM GRANULOCYTES NFR BLD AUTO: 0.5 % (ref 0–0.9)
KETONES UR STRIP.AUTO-MCNC: NEGATIVE MG/DL
LEUKOCYTE ESTERASE UR QL STRIP.AUTO: NEGATIVE
LIPASE SERPL-CCNC: 54 U/L (ref 9–82)
LYMPHOCYTES # BLD AUTO: 1.29 X10*3/UL (ref 1.2–4.8)
LYMPHOCYTES NFR BLD AUTO: 17 %
MCH RBC QN AUTO: 26.6 PG (ref 26–34)
MCHC RBC AUTO-ENTMCNC: 29.5 G/DL (ref 32–36)
MCV RBC AUTO: 90 FL (ref 80–100)
MONOCYTES # BLD AUTO: 0.86 X10*3/UL (ref 0.1–1)
MONOCYTES NFR BLD AUTO: 11.3 %
MUCOUS THREADS #/AREA URNS AUTO: ABNORMAL /LPF
NEUTROPHILS # BLD AUTO: 5.35 X10*3/UL (ref 1.2–7.7)
NEUTROPHILS NFR BLD AUTO: 70.6 %
NITRITE UR QL STRIP.AUTO: NEGATIVE
NRBC BLD-RTO: 0 /100 WBCS (ref 0–0)
OVALOCYTES BLD QL SMEAR: NORMAL
PH UR STRIP.AUTO: 6.5 [PH]
PLATELET # BLD AUTO: 152 X10*3/UL (ref 150–450)
POLYCHROMASIA BLD QL SMEAR: NORMAL
POTASSIUM SERPL-SCNC: 4 MMOL/L (ref 3.5–5.3)
PROT SERPL-MCNC: 7.1 G/DL (ref 6.4–8.2)
PROT UR STRIP.AUTO-MCNC: ABNORMAL MG/DL
RBC # BLD AUTO: 3.34 X10*6/UL (ref 4.5–5.9)
RBC # UR STRIP.AUTO: NEGATIVE /UL
RBC #/AREA URNS AUTO: ABNORMAL /HPF
RBC MORPH BLD: NORMAL
SARS-COV-2 RNA RESP QL NAA+PROBE: NOT DETECTED
SODIUM SERPL-SCNC: 142 MMOL/L (ref 136–145)
SP GR UR STRIP.AUTO: 1.02
SQUAMOUS #/AREA URNS AUTO: ABNORMAL /HPF
UROBILINOGEN UR STRIP.AUTO-MCNC: ABNORMAL MG/DL
WBC # BLD AUTO: 7.6 X10*3/UL (ref 4.4–11.3)
WBC #/AREA URNS AUTO: ABNORMAL /HPF

## 2024-11-10 PROCEDURE — 84484 ASSAY OF TROPONIN QUANT: CPT | Performed by: EMERGENCY MEDICINE

## 2024-11-10 PROCEDURE — 87636 SARSCOV2 & INF A&B AMP PRB: CPT | Performed by: EMERGENCY MEDICINE

## 2024-11-10 PROCEDURE — 96374 THER/PROPH/DIAG INJ IV PUSH: CPT

## 2024-11-10 PROCEDURE — 71045 X-RAY EXAM CHEST 1 VIEW: CPT | Performed by: RADIOLOGY

## 2024-11-10 PROCEDURE — 2500000004 HC RX 250 GENERAL PHARMACY W/ HCPCS (ALT 636 FOR OP/ED): Performed by: EMERGENCY MEDICINE

## 2024-11-10 PROCEDURE — 99285 EMERGENCY DEPT VISIT HI MDM: CPT | Mod: 25

## 2024-11-10 PROCEDURE — 74176 CT ABD & PELVIS W/O CONTRAST: CPT

## 2024-11-10 PROCEDURE — 83690 ASSAY OF LIPASE: CPT | Performed by: EMERGENCY MEDICINE

## 2024-11-10 PROCEDURE — 93975 VASCULAR STUDY: CPT

## 2024-11-10 PROCEDURE — 96375 TX/PRO/DX INJ NEW DRUG ADDON: CPT

## 2024-11-10 PROCEDURE — 83880 ASSAY OF NATRIURETIC PEPTIDE: CPT | Performed by: EMERGENCY MEDICINE

## 2024-11-10 PROCEDURE — 74176 CT ABD & PELVIS W/O CONTRAST: CPT | Performed by: RADIOLOGY

## 2024-11-10 PROCEDURE — 93005 ELECTROCARDIOGRAM TRACING: CPT

## 2024-11-10 PROCEDURE — 85025 COMPLETE CBC W/AUTO DIFF WBC: CPT | Performed by: EMERGENCY MEDICINE

## 2024-11-10 PROCEDURE — 71045 X-RAY EXAM CHEST 1 VIEW: CPT

## 2024-11-10 PROCEDURE — 2500000001 HC RX 250 WO HCPCS SELF ADMINISTERED DRUGS (ALT 637 FOR MEDICARE OP): Performed by: EMERGENCY MEDICINE

## 2024-11-10 PROCEDURE — 76870 US EXAM SCROTUM: CPT | Performed by: RADIOLOGY

## 2024-11-10 PROCEDURE — 80053 COMPREHEN METABOLIC PANEL: CPT | Performed by: EMERGENCY MEDICINE

## 2024-11-10 PROCEDURE — 81001 URINALYSIS AUTO W/SCOPE: CPT | Performed by: EMERGENCY MEDICINE

## 2024-11-10 RX ORDER — FAMOTIDINE 10 MG/ML
20 INJECTION INTRAVENOUS ONCE
Status: COMPLETED | OUTPATIENT
Start: 2024-11-10 | End: 2024-11-10

## 2024-11-10 RX ORDER — IBUPROFEN 600 MG/1
600 TABLET ORAL EVERY 6 HOURS PRN
Qty: 20 TABLET | Refills: 0 | Status: SHIPPED | OUTPATIENT
Start: 2024-11-10 | End: 2024-11-15

## 2024-11-10 RX ORDER — ONDANSETRON 4 MG/1
4 TABLET, ORALLY DISINTEGRATING ORAL EVERY 8 HOURS PRN
Qty: 10 TABLET | Refills: 0 | Status: SHIPPED | OUTPATIENT
Start: 2024-11-10 | End: 2024-11-13

## 2024-11-10 RX ORDER — ACETAMINOPHEN 325 MG/1
975 TABLET ORAL ONCE
Status: COMPLETED | OUTPATIENT
Start: 2024-11-10 | End: 2024-11-10

## 2024-11-10 RX ORDER — ONDANSETRON HYDROCHLORIDE 2 MG/ML
4 INJECTION, SOLUTION INTRAVENOUS ONCE
Status: COMPLETED | OUTPATIENT
Start: 2024-11-10 | End: 2024-11-10

## 2024-11-10 RX ORDER — TRAMADOL HYDROCHLORIDE 50 MG/1
50 TABLET ORAL EVERY 6 HOURS PRN
Qty: 10 TABLET | Refills: 0 | Status: SHIPPED | OUTPATIENT
Start: 2024-11-10 | End: 2024-11-13

## 2024-11-10 RX ORDER — MORPHINE SULFATE 4 MG/ML
4 INJECTION, SOLUTION INTRAMUSCULAR; INTRAVENOUS ONCE
Status: COMPLETED | OUTPATIENT
Start: 2024-11-10 | End: 2024-11-10

## 2024-11-10 ASSESSMENT — PAIN - FUNCTIONAL ASSESSMENT
PAIN_FUNCTIONAL_ASSESSMENT: 0-10
PAIN_FUNCTIONAL_ASSESSMENT: 0-10

## 2024-11-10 ASSESSMENT — PAIN SCALES - GENERAL
PAINLEVEL_OUTOF10: 10 - WORST POSSIBLE PAIN
PAINLEVEL_OUTOF10: 4
PAINLEVEL_OUTOF10: 0 - NO PAIN

## 2024-11-11 ENCOUNTER — APPOINTMENT (OUTPATIENT)
Dept: HEMATOLOGY/ONCOLOGY | Facility: CLINIC | Age: 69
End: 2024-11-11
Payer: MEDICARE

## 2024-11-11 VITALS
RESPIRATION RATE: 16 BRPM | DIASTOLIC BLOOD PRESSURE: 63 MMHG | WEIGHT: 187 LBS | BODY MASS INDEX: 25.33 KG/M2 | OXYGEN SATURATION: 98 % | HEART RATE: 89 BPM | SYSTOLIC BLOOD PRESSURE: 128 MMHG | HEIGHT: 72 IN | TEMPERATURE: 97.9 F

## 2024-11-11 LAB
ATRIAL RATE: 73 BPM
P AXIS: -9 DEGREES
P OFFSET: 174 MS
P ONSET: 118 MS
PR INTERVAL: 214 MS
Q ONSET: 225 MS
QRS COUNT: 12 BEATS
QRS DURATION: 96 MS
QT INTERVAL: 430 MS
QTC CALCULATION(BAZETT): 473 MS
QTC FREDERICIA: 459 MS
R AXIS: -33 DEGREES
T AXIS: 29 DEGREES
T OFFSET: 440 MS
VENTRICULAR RATE: 73 BPM

## 2024-11-11 NOTE — DISCHARGE INSTRUCTIONS
Follow-up with your primary care physician within 1 to 2 days for further management of your current symptoms      Follow-up with your surgeon and with urology within 2 to 3 days for further management of your abdominal pain and testicular pain     return to the emergency department sooner with worsening of symptoms or onset of new symptoms

## 2024-11-11 NOTE — ED PROVIDER NOTES
HPI   Chief Complaint   Patient presents with    Urinary Pain     Pt presents to the ED with c/c of pain upon urination. Pt states he has had chills and n/v as well.        HPI        Patient History   Past Medical History:   Diagnosis Date    Abdominal pain     Acute non-ST elevation myocardial infarction (NSTEMI) (Multi)     Anemia     6/6/24 HGB 8.6; HCT 31.5    Anxiety     CAD (coronary artery disease)     Cardiology follow-up encounter 12/11/2023    Sharif Lau MD    Gout     H/O cardiac catheterization 06/01/2021    H/O cardiovascular stress test 09/03/2021    IMPRESSION: 1. No evidence of ischemia. 2. Suspicion of an infarct along the mid anterior wall. 3. Left ventricular dilatation is noted. 4. Left ventricular EF was calculated to be 50%. Summary:  1. No clinical or electrocardiographic evidence for ischemia at a submaximal workload. 3. The blunted heart rate diminshes the sensitivity of this test.  4. The submaximal level of stress was achieved.    H/O echocardiogram 06/02/2021    Mild concentric Left ventricle hypertrophy.  Global left ventricular wall motion and contractility are within normal limits.  There is normal left ventricular systolic function.  Estimated ejection fraction is 60-64%.  The left atrial size is mildly dilated.  Mild aortic regurgitation.  A trace of mitral regurgitation.  Trivial to mild tricuspid regurgitation.  There is no pulmonary hypertension.    High cholesterol     Hypertension     Overweight     Rectal cancer (Multi)     Type 2 diabetes mellitus     4/18/2024 A1C 7.5%     Past Surgical History:   Procedure Laterality Date    COLONOSCOPY  06/17/2024    HEMICOLECTOMY W/ OSTOMY      LEG SURGERY Left     rodrigo placed     Family History   Problem Relation Name Age of Onset    No Known Problems Mother      No Known Problems Father      No Known Problems Sister      Leukemia Brother       Social History     Tobacco Use    Smoking status: Never    Smokeless tobacco: Never    Vaping Use    Vaping status: Never Used   Substance Use Topics    Alcohol use: Not Currently    Drug use: Never       Physical Exam   ED Triage Vitals [11/10/24 1925]   Temperature Heart Rate Respirations BP   36.6 °C (97.9 °F) (!) 104 16 128/63      Pulse Ox Temp Source Heart Rate Source Patient Position   100 % Temporal Monitor Sitting      BP Location FiO2 (%)     Left arm --       Physical Exam      ED Course & MDM   Diagnoses as of 11/11/24 0142   Lower abdominal pain   Nausea and vomiting, unspecified vomiting type   Malaise and fatigue   Pain in both testicles                 No data recorded     [place] Coma Scale Score: 15 (11/10/24 1926 : Alana Johnson, EMT)                           Medical Decision Making    The patient is a 69-year-old male presenting to the emergency department for evaluation of generalized malaise, fatigue, intermittent nausea, subjective fever, Diffuse lower abdominal pain and intermittent testicular pain.  He states that he has had symptoms over the past week or so.  He states it has been slightly worse over the past day.  He also reports that he has had some pain when he urinates.  He states that he had a colonoscopy performed about a month ago for colon cancer.  He states that it was done at [facility] by Dr. Vasquez.  He denies any headache or visual changes.  No chest pain or shortness of breath.  No back pain.  No focal weakness or numbness.  No sick contacts or recent travel.  All pertinent positives and negatives are recorded above.  All other systems reviewed and otherwise negative.  Vital signs with mild tachycardia but otherwise within normal limits.  Physical exam with a well-nourished well-developed male in no acute distress.  HEENT exam within normal limits.  He has no evidence of airway compromise or respiratory distress.  He does have diffuse abdominal tenderness to palpation.  No rebound or guarding.  No palpable masses.  He does have a colostomy.  He  reports intermittent testicular pain but it does not localize to one testicle at this time.  He states it is diffuse throughout within the scrotal region.  He does not have any gross motor, neurologic or vascular deficits on exam.  Pulses are equal bilaterally.      EKG with sinus rhythm at 73 bpm, first-degree block, occasional PVCs, leftward axis, normal voltage, normal ST segment, normal T waves      Oral acetaminophen, IV Pepcid, IV Zofran and IV morphine ordered.      Diagnostic labs with mild anemia but otherwise unremarkable.      Initial troponin 8.  Repeat troponin 8      XR chest 1 view   Final Result   1. No acute cardiopulmonary process.        MACRO:   None.        Signed by: Ignacia Escobar 11/10/2024 10:28 PM   Dictation workstation:   [id number]      US scrotum w doppler   Final Result   1. Normal morphology and vascularity of the bilateral testicles.   2. Small bilateral hydroceles.        MACRO:   None        Signed by: Ignacia Escobar 11/10/2024 10:27 PM   Dictation workstation:   [id number]      CT abdomen pelvis wo IV contrast   Final Result   No acute abdominal or pelvic process.        Postsurgical changes of diverting left lower quadrant colostomy.   Redemonstration of wall thickening in the region of the terminal   ileum, cecum and ascending colon. No evidence for bowel obstruction.   Findings may relate to infectious/inflammatory enterocolitis.   Correlate with history of prior radiation to exclude radiation   enteritis.        There is redemonstration of several heterogeneous hypodense masses in   the right hepatic lobe not significantly changed from prior CT   consistent with known history of metastatic disease. Evaluation   somewhat limited by lack of contrast. No definite new lesion is   identified.        There is a punctate 2 mm nonobstructing calculus in the lower pole of   the right kidney.        Bladder is under distended with moderate wall thickening. Correlate   with  symptomatology and urinalysis to exclude infectious cystitis.        There is presacral edema. Interval resolution of previously noted   fluid along the left pericolic gutter.        There is a 1.3 cm peritoneal soft tissue nodule  which is decreased   in size from prior CT at which time it measured up to 1.8 cm. No   definite new nodule is seen. Attention on continued follow-up is   advised.        Additional findings as described above.        MACRO:   None        Signed by: Sandip Curtis 11/10/2024 9:51 PM   Dictation workstation:   [id number]           Patient does not have any evidence of ischemia on EKG or cardiac enzymes.  No events on telemetry.  He does not have any evidence of airway compromise or respiratory distress.  He does have diffuse abdominal tenderness on exam.  The CT abdomen pelvis shows no acute abdominal or pelvic process.  There is redemonstration of several heterogeneous hypodense masses in the right hepatic lobe not changed from previous imaging.  There is a punctate 2 mm nonobstructing calculus in the lower pole of the right kidney.  The bladder is under distended with some moderate wall thickening.  There is some presacral edema but there is interval resolution of previously noted fluid along the left pericolic gutter.  There is a 1.3 peritoneal soft tissue nodule that has decreased in size from previous imaging.  There are some postsurgical changes of diverting left lower quadrant colostomy.  No evidence of torsion or mass on testicular ultrasound.      The patient was released in good condition.  He was instructed to follow-up with his primary care physician within 1 to 2 days for further management of his current symptoms and review of the culture results.  He will follow-up with his surgeon within 1 to 2 days for further management of his abdominal pain.  He was given a referral to urology for further management of his states his testicular pain.  he will return to the emergency  department sooner with worsening of symptoms or onset of new symptoms.  Rx given for Ultram and Zofran.      Impression/diagnosis  Diffuse lower abdominal pain  Dysuria  Testicular pain  Malaise and fatigue  Nausea and vomiting      I independently interpreted the results of the EKG and diagnostic labs      I reviewed the results of the diagnostic labs and diagnostic imaging.  Formal radiology reading was completed by the radiologist    Procedure  Procedures     Leah Godinez MD  11/10/24 [id number]       Leah Godinez MD  11/11/24 [id number]

## 2024-11-12 ENCOUNTER — APPOINTMENT (OUTPATIENT)
Dept: HEMATOLOGY/ONCOLOGY | Facility: CLINIC | Age: 69
End: 2024-11-12
Payer: MEDICARE

## 2024-11-12 ENCOUNTER — TELEPHONE (OUTPATIENT)
Dept: ADMISSION | Facility: HOSPITAL | Age: 69
End: 2024-11-12
Payer: MEDICARE

## 2024-11-12 NOTE — TELEPHONE ENCOUNTER
Roma called to follow up on his ED visit from 11/10. He reported to [facility] with severe rectal pain (specifically when he would urinate) Patient has a colostomy but always has a small amount of rectal secretion when he urinates. The pain has since subsided.     Today he feels weak and fatigued. Asking if he needs to come in for IVF? He did have labs drawn on 11/10 (his Creatinine jumped from 0.83 to 1.12). He is also asking if Laurence Betancourt can review his CT results.     Other than feeling tired today he feels OK and denies any further needs at this time.

## 2024-11-13 ENCOUNTER — APPOINTMENT (OUTPATIENT)
Dept: HEMATOLOGY/ONCOLOGY | Facility: CLINIC | Age: 69
End: 2024-11-13
Payer: MEDICARE

## 2024-11-13 ENCOUNTER — TELEPHONE (OUTPATIENT)
Dept: SURGERY | Facility: CLINIC | Age: 69
End: 2024-11-13
Payer: MEDICARE

## 2024-11-13 LAB
LAB AP ASR DISCLAIMER: NORMAL
LABORATORY COMMENT REPORT: NORMAL
PATH REPORT.ADDENDUM SPEC: NORMAL
PATH REPORT.FINAL DX SPEC: NORMAL
PATH REPORT.GROSS SPEC: NORMAL
PATH REPORT.RELEVANT HX SPEC: NORMAL
PATH REPORT.TOTAL CANCER: NORMAL
PATHOLOGY SYNOPTIC REPORT: NORMAL

## 2024-11-13 NOTE — TELEPHONE ENCOUNTER
Attempted to return call to the patient.  He left a message that he wanted to make us aware of what happened to him recently.  No details were provided in his message.  I left Roma a message that I was returning his call from Dr. Vasquez's office.  I invited him to reach me at [phone].  Emily Canales RN

## 2024-11-13 NOTE — TELEPHONE ENCOUNTER
"I left Roma a voicemail advising that per Laurence Betancourt, \"he should drink more fluids as his Creatinine was still within the normal range. His scan did not have anything concerning. He can follow up at the next visit\".  "

## 2024-11-14 ENCOUNTER — PATIENT OUTREACH (OUTPATIENT)
Dept: HEMATOLOGY/ONCOLOGY | Facility: HOSPITAL | Age: 69
End: 2024-11-14
Payer: MEDICARE

## 2024-11-14 ENCOUNTER — INFUSION (OUTPATIENT)
Dept: HEMATOLOGY/ONCOLOGY | Facility: CLINIC | Age: 69
End: 2024-11-14
Payer: MEDICARE

## 2024-11-14 ENCOUNTER — TELEPHONE (OUTPATIENT)
Dept: HEMATOLOGY/ONCOLOGY | Facility: HOSPITAL | Age: 69
End: 2024-11-14
Payer: MEDICARE

## 2024-11-14 VITALS
OXYGEN SATURATION: 98 % | DIASTOLIC BLOOD PRESSURE: 72 MMHG | SYSTOLIC BLOOD PRESSURE: 135 MMHG | RESPIRATION RATE: 18 BRPM | BODY MASS INDEX: 24.85 KG/M2 | TEMPERATURE: 96.6 F | HEART RATE: 92 BPM | WEIGHT: 183.2 LBS

## 2024-11-14 DIAGNOSIS — C20 RECTAL CANCER (MULTI): ICD-10-CM

## 2024-11-14 PROCEDURE — 2500000004 HC RX 250 GENERAL PHARMACY W/ HCPCS (ALT 636 FOR OP/ED): Performed by: INTERNAL MEDICINE

## 2024-11-14 PROCEDURE — 96415 CHEMO IV INFUSION ADDL HR: CPT

## 2024-11-14 PROCEDURE — 96367 TX/PROPH/DG ADDL SEQ IV INF: CPT

## 2024-11-14 PROCEDURE — 96413 CHEMO IV INFUSION 1 HR: CPT

## 2024-11-14 PROCEDURE — 96416 CHEMO PROLONG INFUSE W/PUMP: CPT

## 2024-11-14 PROCEDURE — 96411 CHEMO IV PUSH ADDL DRUG: CPT

## 2024-11-14 PROCEDURE — 96375 TX/PRO/DX INJ NEW DRUG ADDON: CPT | Mod: INF

## 2024-11-14 PROCEDURE — 96417 CHEMO IV INFUS EACH ADDL SEQ: CPT

## 2024-11-14 RX ORDER — PALONOSETRON 0.05 MG/ML
0.25 INJECTION, SOLUTION INTRAVENOUS ONCE
Status: COMPLETED | OUTPATIENT
Start: 2024-11-14 | End: 2024-11-14

## 2024-11-14 RX ORDER — HEPARIN 100 UNIT/ML
500 SYRINGE INTRAVENOUS AS NEEDED
Status: CANCELLED | OUTPATIENT
Start: 2024-11-14

## 2024-11-14 RX ORDER — DEXAMETHASONE 6 MG/1
12 TABLET ORAL ONCE
Status: COMPLETED | OUTPATIENT
Start: 2024-11-14 | End: 2024-11-14

## 2024-11-14 RX ORDER — HEPARIN 100 UNIT/ML
500 SYRINGE INTRAVENOUS AS NEEDED
Status: DISCONTINUED | OUTPATIENT
Start: 2024-11-14 | End: 2024-11-14 | Stop reason: HOSPADM

## 2024-11-14 RX ORDER — HEPARIN SODIUM,PORCINE/PF 10 UNIT/ML
50 SYRINGE (ML) INTRAVENOUS AS NEEDED
Status: DISCONTINUED | OUTPATIENT
Start: 2024-11-14 | End: 2024-11-14 | Stop reason: HOSPADM

## 2024-11-14 RX ORDER — HEPARIN SODIUM,PORCINE/PF 10 UNIT/ML
50 SYRINGE (ML) INTRAVENOUS AS NEEDED
Status: CANCELLED | OUTPATIENT
Start: 2024-11-14

## 2024-11-14 RX ORDER — LORAZEPAM 2 MG/ML
1 INJECTION INTRAMUSCULAR AS NEEDED
Status: DISCONTINUED | OUTPATIENT
Start: 2024-11-14 | End: 2024-11-14 | Stop reason: HOSPADM

## 2024-11-14 RX ORDER — EPINEPHRINE 0.3 MG/.3ML
0.3 INJECTION SUBCUTANEOUS EVERY 5 MIN PRN
Status: DISCONTINUED | OUTPATIENT
Start: 2024-11-14 | End: 2024-11-14 | Stop reason: HOSPADM

## 2024-11-14 RX ORDER — DIPHENHYDRAMINE HYDROCHLORIDE 50 MG/ML
50 INJECTION INTRAMUSCULAR; INTRAVENOUS AS NEEDED
Status: DISCONTINUED | OUTPATIENT
Start: 2024-11-14 | End: 2024-11-14 | Stop reason: HOSPADM

## 2024-11-14 RX ORDER — FAMOTIDINE 10 MG/ML
20 INJECTION INTRAVENOUS ONCE AS NEEDED
Status: DISCONTINUED | OUTPATIENT
Start: 2024-11-14 | End: 2024-11-14 | Stop reason: HOSPADM

## 2024-11-14 RX ORDER — PROCHLORPERAZINE EDISYLATE 5 MG/ML
10 INJECTION INTRAMUSCULAR; INTRAVENOUS EVERY 6 HOURS PRN
Status: DISCONTINUED | OUTPATIENT
Start: 2024-11-14 | End: 2024-11-14 | Stop reason: HOSPADM

## 2024-11-14 RX ORDER — ALBUTEROL SULFATE 0.83 MG/ML
3 SOLUTION RESPIRATORY (INHALATION) AS NEEDED
Status: DISCONTINUED | OUTPATIENT
Start: 2024-11-14 | End: 2024-11-14 | Stop reason: HOSPADM

## 2024-11-14 RX ORDER — ATROPINE SULFATE 0.4 MG/ML
0.4 INJECTION, SOLUTION ENDOTRACHEAL; INTRAMEDULLARY; INTRAMUSCULAR; INTRAVENOUS; SUBCUTANEOUS
Status: DISCONTINUED | OUTPATIENT
Start: 2024-11-14 | End: 2024-11-14 | Stop reason: HOSPADM

## 2024-11-14 RX ORDER — PROCHLORPERAZINE MALEATE 10 MG
10 TABLET ORAL EVERY 6 HOURS PRN
Status: DISCONTINUED | OUTPATIENT
Start: 2024-11-14 | End: 2024-11-14 | Stop reason: HOSPADM

## 2024-11-14 ASSESSMENT — PAIN SCALES - GENERAL: PAINLEVEL_OUTOF10: 0-NO PAIN

## 2024-11-14 NOTE — TELEPHONE ENCOUNTER
11/12/2024  Patient called to speak to me per phone nurses.  I returned his call and he endorses new rectal pain. He stated that it feels like he has to have a BM but nothing comes out and then the pain is bad he feels like he is going to pass out.   He went to [facility] 11/10 and was given Tramadol but he states that it doesn't help - he is only taking his long acting morphine and tylenol.   I explained that this pain is unfortunately common with his type of cancer. I instructed him to continue long acting morphine and use the tramadol for break through pain. If he feels like he needs tylenol on top of that, he needs to check his temperature before taking to ensure he is afebrile.  He verbalized understanding and was grateful for the call.   I told him I would follow up later this week to see how he is feeling.

## 2024-11-15 ENCOUNTER — APPOINTMENT (OUTPATIENT)
Dept: HEMATOLOGY/ONCOLOGY | Facility: CLINIC | Age: 69
End: 2024-11-15
Payer: MEDICARE

## 2024-11-16 ENCOUNTER — INFUSION (OUTPATIENT)
Dept: HEMATOLOGY/ONCOLOGY | Facility: HOSPITAL | Age: 69
End: 2024-11-16
Payer: MEDICARE

## 2024-11-16 ENCOUNTER — APPOINTMENT (OUTPATIENT)
Dept: HEMATOLOGY/ONCOLOGY | Facility: HOSPITAL | Age: 69
End: 2024-11-16
Payer: MEDICARE

## 2024-11-16 VITALS
TEMPERATURE: 96.3 F | SYSTOLIC BLOOD PRESSURE: 119 MMHG | WEIGHT: 183.42 LBS | HEART RATE: 67 BPM | BODY MASS INDEX: 24.88 KG/M2 | OXYGEN SATURATION: 100 % | DIASTOLIC BLOOD PRESSURE: 89 MMHG | RESPIRATION RATE: 18 BRPM

## 2024-11-16 DIAGNOSIS — C20 RECTAL CANCER (MULTI): ICD-10-CM

## 2024-11-16 PROCEDURE — 2500000004 HC RX 250 GENERAL PHARMACY W/ HCPCS (ALT 636 FOR OP/ED): Performed by: NURSE PRACTITIONER

## 2024-11-16 PROCEDURE — 2500000004 HC RX 250 GENERAL PHARMACY W/ HCPCS (ALT 636 FOR OP/ED): Mod: JZ,JG | Performed by: INTERNAL MEDICINE

## 2024-11-16 PROCEDURE — 96372 THER/PROPH/DIAG INJ SC/IM: CPT

## 2024-11-16 PROCEDURE — 96523 IRRIG DRUG DELIVERY DEVICE: CPT

## 2024-11-16 RX ORDER — HEPARIN SODIUM,PORCINE/PF 10 UNIT/ML
50 SYRINGE (ML) INTRAVENOUS AS NEEDED
OUTPATIENT
Start: 2024-11-16

## 2024-11-16 RX ORDER — HEPARIN 100 UNIT/ML
500 SYRINGE INTRAVENOUS AS NEEDED
OUTPATIENT
Start: 2024-11-16

## 2024-11-16 RX ORDER — HEPARIN 100 UNIT/ML
500 SYRINGE INTRAVENOUS AS NEEDED
Status: DISCONTINUED | OUTPATIENT
Start: 2024-11-16 | End: 2024-11-16 | Stop reason: HOSPADM

## 2024-11-16 ASSESSMENT — PAIN SCALES - GENERAL: PAINLEVEL_OUTOF10: 0-NO PAIN

## 2024-11-16 NOTE — PROGRESS NOTES
Pt arrived to infusion for pump disconnect and growth factor.  Pt had no new complaints.  Pt tolerated chemo and was discharged home in stable condition.

## 2024-11-17 DIAGNOSIS — N20.0 KIDNEY STONE: Primary | ICD-10-CM

## 2024-11-18 ENCOUNTER — INFUSION (OUTPATIENT)
Dept: HEMATOLOGY/ONCOLOGY | Facility: CLINIC | Age: 69
End: 2024-11-18
Payer: MEDICARE

## 2024-11-18 ENCOUNTER — APPOINTMENT (OUTPATIENT)
Dept: HEMATOLOGY/ONCOLOGY | Facility: CLINIC | Age: 69
End: 2024-11-18
Payer: MEDICARE

## 2024-11-18 VITALS
DIASTOLIC BLOOD PRESSURE: 76 MMHG | RESPIRATION RATE: 18 BRPM | BODY MASS INDEX: 24.07 KG/M2 | HEART RATE: 92 BPM | OXYGEN SATURATION: 100 % | WEIGHT: 177.47 LBS | TEMPERATURE: 97.2 F | SYSTOLIC BLOOD PRESSURE: 130 MMHG

## 2024-11-18 DIAGNOSIS — C20 RECTAL CANCER (MULTI): ICD-10-CM

## 2024-11-18 PROCEDURE — 96360 HYDRATION IV INFUSION INIT: CPT | Mod: INF

## 2024-11-18 PROCEDURE — 2500000004 HC RX 250 GENERAL PHARMACY W/ HCPCS (ALT 636 FOR OP/ED): Performed by: INTERNAL MEDICINE

## 2024-11-18 PROCEDURE — 2500000004 HC RX 250 GENERAL PHARMACY W/ HCPCS (ALT 636 FOR OP/ED): Performed by: NURSE PRACTITIONER

## 2024-11-18 RX ORDER — HEPARIN 100 UNIT/ML
500 SYRINGE INTRAVENOUS AS NEEDED
Status: CANCELLED | OUTPATIENT
Start: 2024-11-18

## 2024-11-18 RX ORDER — HEPARIN SODIUM,PORCINE/PF 10 UNIT/ML
50 SYRINGE (ML) INTRAVENOUS AS NEEDED
Status: CANCELLED | OUTPATIENT
Start: 2024-11-18

## 2024-11-18 RX ORDER — HEPARIN 100 UNIT/ML
500 SYRINGE INTRAVENOUS AS NEEDED
Status: DISCONTINUED | OUTPATIENT
Start: 2024-11-18 | End: 2024-11-18 | Stop reason: HOSPADM

## 2024-11-18 ASSESSMENT — PAIN SCALES - GENERAL: PAINLEVEL_OUTOF10: 0-NO PAIN

## 2024-11-18 NOTE — PROGRESS NOTES
"Patient came to site because he thought his appointment was today instead of tomorrow. He states he has had increased weakness and has felt \"shaky\" over the weekend. Patient denies having any fevers or other signs of infection. Vital signs taken and Dr. Nieto notified. Patient to receive IV Fluids today and Wednesday.     Patient reports noticing some bright red blood when he wipes. He stated that he has the colostomy but occasionally has leakage from his rectum and when he cleans the area he has seen some blood on the tissue. Dr. Nieto notified.     Schedule reviewed with patient. Patient encouraged to keep appointment with [facility] tomorrow and if he feels he cannot make it to call and reschedule.   "

## 2024-11-19 ENCOUNTER — APPOINTMENT (OUTPATIENT)
Dept: HEMATOLOGY/ONCOLOGY | Facility: CLINIC | Age: 69
End: 2024-11-19
Payer: MEDICARE

## 2024-11-19 ENCOUNTER — TELEPHONE (OUTPATIENT)
Dept: PALLIATIVE MEDICINE | Facility: CLINIC | Age: 69
End: 2024-11-19
Payer: MEDICARE

## 2024-11-19 ENCOUNTER — OFFICE VISIT (OUTPATIENT)
Dept: PALLIATIVE MEDICINE | Facility: CLINIC | Age: 69
End: 2024-11-19
Payer: MEDICARE

## 2024-11-19 VITALS
RESPIRATION RATE: 18 BRPM | OXYGEN SATURATION: 98 % | WEIGHT: 179.68 LBS | DIASTOLIC BLOOD PRESSURE: 70 MMHG | TEMPERATURE: 97.7 F | SYSTOLIC BLOOD PRESSURE: 132 MMHG | BODY MASS INDEX: 24.37 KG/M2 | HEART RATE: 87 BPM

## 2024-11-19 DIAGNOSIS — G89.3 CANCER RELATED PAIN: ICD-10-CM

## 2024-11-19 DIAGNOSIS — Z51.5 PALLIATIVE CARE ENCOUNTER: Primary | ICD-10-CM

## 2024-11-19 DIAGNOSIS — C20 RECTAL CANCER (MULTI): ICD-10-CM

## 2024-11-19 DIAGNOSIS — K59.03 THERAPEUTIC OPIOID INDUCED CONSTIPATION: ICD-10-CM

## 2024-11-19 DIAGNOSIS — F41.9 ANXIETY AND DEPRESSION: ICD-10-CM

## 2024-11-19 DIAGNOSIS — Z71.89 GOALS OF CARE, COUNSELING/DISCUSSION: ICD-10-CM

## 2024-11-19 DIAGNOSIS — T40.2X5A THERAPEUTIC OPIOID INDUCED CONSTIPATION: ICD-10-CM

## 2024-11-19 DIAGNOSIS — F32.A ANXIETY AND DEPRESSION: ICD-10-CM

## 2024-11-19 PROCEDURE — 3075F SYST BP GE 130 - 139MM HG: CPT | Performed by: NURSE PRACTITIONER

## 2024-11-19 PROCEDURE — 99215 OFFICE O/P EST HI 40 MIN: CPT | Performed by: NURSE PRACTITIONER

## 2024-11-19 PROCEDURE — 3078F DIAST BP <80 MM HG: CPT | Performed by: NURSE PRACTITIONER

## 2024-11-19 PROCEDURE — 3051F HG A1C>EQUAL 7.0%<8.0%: CPT | Performed by: NURSE PRACTITIONER

## 2024-11-19 PROCEDURE — 3048F LDL-C <100 MG/DL: CPT | Performed by: NURSE PRACTITIONER

## 2024-11-19 RX ORDER — MORPHINE SULFATE 15 MG/1
TABLET, FILM COATED, EXTENDED RELEASE ORAL
Qty: 90 TABLET | Refills: 0 | Status: SHIPPED | OUTPATIENT
Start: 2024-11-19 | End: 2024-12-27

## 2024-11-19 RX ORDER — LORAZEPAM 0.5 MG/1
0.5 TABLET ORAL 2 TIMES DAILY PRN
Qty: 30 TABLET | Refills: 3 | Status: SHIPPED | OUTPATIENT
Start: 2024-11-19 | End: 2024-12-04

## 2024-11-19 ASSESSMENT — PATIENT HEALTH QUESTIONNAIRE - PHQ9
SUM OF ALL RESPONSES TO PHQ9 QUESTIONS 1 AND 2: 2
10. IF YOU CHECKED OFF ANY PROBLEMS, HOW DIFFICULT HAVE THESE PROBLEMS MADE IT FOR YOU TO DO YOUR WORK, TAKE CARE OF THINGS AT HOME, OR GET ALONG WITH OTHER PEOPLE: NOT DIFFICULT AT ALL
1. LITTLE INTEREST OR PLEASURE IN DOING THINGS: SEVERAL DAYS
2. FEELING DOWN, DEPRESSED OR HOPELESS: SEVERAL DAYS

## 2024-11-19 ASSESSMENT — ENCOUNTER SYMPTOMS
OCCASIONAL FEELINGS OF UNSTEADINESS: 1
DEPRESSION: 0
LOSS OF SENSATION IN FEET: 0

## 2024-11-19 NOTE — PROGRESS NOTES
"SUPPORTIVE AND PALLIATIVE ONCOLOGY CONSULT - OUTPATIENT      SERVICE DATE: 11/19/2024    Referred by:  Dr. Nieto  Medical Oncologist: MD An Cueto MD   Radiation Oncologist: No care team member to display  Primary Physician: Santiago Buckner  [phone]    REASON FOR CONSULT/CHIEF CONSULT COMPLAINT: pain management, Introduction to Supportive and Palliative Oncology Services, and goals of care discussion    Cancer History   Rectal cancer with liver mets  -7/2/24: s/p ex lap with colostomy creation  -completed XRT 7/17/24  -s/p 5 cycles FOLFOXFIRI/ Bevacizumab    --c/b painful kidney stones requiring ED visits     Onc: Emilia       Subjective   HISTORY OF PRESENT ILLNESS: Roma Corral is a 69 y.o. male with PMHx of NSTEMI, CAD, HTN, DMII, and metastatic rectal cancer.     He presents to supportive oncology for pain and symptom management.     Presents for NPV alone today. C/o constant pain in lower abdomen/ stomach area. Taking MSER 15mg bid, and PRN dilaudid sparingly. Has neuropathy in bottom of b/l feet, mouth, fingers, which he is \"used to.\" Has colostomy, takes weekly colace to keep stools soft, has been having some bright red blood per rectum, oncology aware per pt. No N/V/ reflux. Appetite is a \"toss up,\" has dysgeusia, has lost about 25-30 lbs in past few months, following with RD. Mood is depressed, tearful at times during visit, worries about being a \"burden\" to his wife. Not sleeping well, up about every 2 hrs, feels he needs to go to the bathroom and/or pain wakes him up. Energy levels are low, but he is still independent with ADLs, does have some CHAUDHARY. Does not have advance directives, but is interested. He is full code.     Pain Assessment:  Pain Score:  5  Location:  abdomen  Education:  Discussed taking dilaudid PRN when he truly needs it, this information is used to make adjustments to long acting pain medication dosing.       Symptom Assessment:  Pain:somewhat  Headache: " none  Dizziness:none  Lack of energy: a little  Difficulty sleeping: somewhat  Worrying: somewhat  Anxiety: somewhat  Depression: somewhat  Pain in mouth/swallowing: none  Dry mouth: none  Taste changes: somewhat  Shortness of breath: a little  Lack of appetite: a little   Nausea: none  Vomiting: none  Constipation: none  Diarrhea: none  Sore muscles: none  Numbness or tingling in hands/feet/other: a little  Weight loss: somewhat      Information obtained from: chart review and interview of patient  ______________________________________________________________________     Oncology History   Rectal cancer (Multi)   6/17/2024 Initial Diagnosis    Rectal cancer (Multi)     7/24/2024 -  Chemotherapy    Bevacizumab + mFOLFOXIRI (Fluorouracil Continuous Infusion / Leucovorin / Oxaliplatin / Irinotecan), 14 Day Cycles         Past Medical History:   Diagnosis Date    Abdominal pain     Acute non-ST elevation myocardial infarction (NSTEMI) (Multi)     Anemia     6/6/24 HGB 8.6; HCT 31.5    Anxiety     CAD (coronary artery disease)     Cardiology follow-up encounter 12/11/2023    Sharif Lau MD    [facility]     H/O cardiac catheterization 06/01/2021    H/O cardiovascular stress test 09/03/2021    IMPRESSION: 1. No evidence of ischemia. 2. Suspicion of an infarct along the mid anterior wall. 3. Left ventricular dilatation is noted. 4. Left ventricular EF was calculated to be 50%. Summary:  1. No clinical or electrocardiographic evidence for ischemia at a submaximal workload. 3. The blunted heart rate diminshes the sensitivity of this test.  4. The submaximal level of stress was achieved.    H/O echocardiogram 06/02/2021    Mild concentric Left ventricle hypertrophy.  Global left ventricular wall motion and contractility are within normal limits.  There is normal left ventricular systolic function.  Estimated ejection fraction is 60-64%.  The left atrial size is mildly dilated.  Mild aortic regurgitation.  A trace of mitral  regurgitation.  Trivial to mild tricuspid regurgitation.  There is no pulmonary hypertension.    High cholesterol     Hypertension     Overweight     Rectal cancer (Multi)     Type 2 diabetes mellitus     4/18/2024 A1C 7.5%     Past Surgical History:   Procedure Laterality Date    COLONOSCOPY  06/17/2024    HEMICOLECTOMY W/ OSTOMY      LEG SURGERY Left     rodrigo placed     Family History   Problem Relation Name Age of Onset    No Known Problems Mother      No Known Problems Father      No Known Problems Sister      Leukemia Brother          SOCIAL HISTORY  -, lives in a house with his wife, 1 dog- Rashel. Has one 41 yo son, has 1 living sister.   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 He has never used smokeless tobacco. He reports that he does not currently use alcohol. He reports that he does not use drugs.    Baptist and Importance of Baptist:  He is spiritual.     REVIEW OF SYSTEMS  Review of systems negative unless noted in HPI.       Objective     Palliative Performance Scale % (PPS)       Current Outpatient Medications   Medication Instructions    aspirin 81 mg, Once    atorvastatin (LIPITOR) 40 mg, oral, Nightly    ketorolac (TORADOL) 10 mg, oral, Every 6 hours PRN    loperamide (Imodium) 2 mg capsule Take 2 capsules (4 mg) by mouth with the first episode of diarrhea and 1 capsule (2 mg) by mouth with any additional episodes. Maximum 8 capsules (16 mg) per day.    metoprolol succinate XL (TOPROL-XL) 100 mg, oral, Daily    ondansetron (ZOFRAN) 4 mg, oral, Every 8 hours PRN    ondansetron (ZOFRAN) 8 mg, oral, Every 8 hours PRN    potassium chloride CR (Klor-Con M20) 20 mEq ER tablet 20 mEq, oral, 2 times daily, Do not crush or chew.    prochlorperazine (COMPAZINE) 10 mg, oral, Every 6 hours PRN       Allergies: No Known Allergies             PHYSICAL EXAMINATION  Vital Signs:   Vital signs reviewed  Vitals:    11/19/24  1333   BP: 132/70   Pulse: 87   Resp: 18   Temp: 36.5 °C (97.7 °F)   SpO2: 98%     Pain Score:  5         Physical Exam  Vitals reviewed.   Constitutional:       Appearance: Normal appearance.   HENT:      Head: Normocephalic.   Cardiovascular:      Rate and Rhythm: Normal rate.   Pulmonary:      Effort: Pulmonary effort is normal.   Abdominal:      Palpations: Abdomen is soft.      Comments: colostomy   Musculoskeletal:         General: Normal range of motion.   Skin:     General: Skin is warm and dry.      Coloration: Skin is pale.   Neurological:      General: No focal deficit present.      Mental Status: He is alert and oriented to person, place, and time.   Psychiatric:         Mood and Affect: Mood normal.         Judgment: Judgment normal.      Comments: Appropriately tearful at times         ASSESSMENT/PLAN    Pain  Pain is: cancer related pain  Type: visceral  Pain control: sub-optimally controlled  Home regimen:   -continue MSER 15mg bid. Rx sent today.   -continue dilaudid 4mg q4hrs PRN. Encouraged pt to use this medication as needed. No Rx needed today.   Intolerances/previously tried:     Opioid Use  Medication Management:   - OARRS report reviewed with no aberrant behavior; consistent with  prescriptions/records and patient history  - MED 0.  Overdose Risk Score 290.   This has been discussed with patient.   - We will continue to closely monitor the patient for signs of prescription misuse including UDS, OARRS review and subjective reports at each visit.  - concurrent benzodiazepine use with ativan, educated pt on medication safety when used in combination with opiates    - I am a provider who either is or has consulted and collaborated with a provider certified in Hospice and Palliative Medicine and have conducted a face-face visit and examination for this patient.  - Routine Urine Drug Screen completed deferred (MED 0) appropriately positive for opioids and negative for illicit substances  -  Controlled Substance Agreement completed deferred (MED 0)   - Specifically discussed that controlled substance prescriptions will only be provided by our group as outlined in the completed agreement  - Prescribed naloxone deferred  - Red Flags: none     Nausea   At risk for nausea without vomiting related to chemotherapy   -continue zofran 4mg q8hrs PRN.  -continue compazine 10mg q6hrs PRN.     Constipation   At risk for constipation related to opioids, has colostomy,  currently not constipated   Usual bowel pattern: daily   Current regimen:   -educated pt on importance of maintaining regular bowel schedule  -continue colace 100mg bid PRN for hard stools  -start senna 8.6mg, 1-2tabs, 1-2x/day PRN  -start MOM 15mL 4x/day PRN    Altered Mood  Acute on chronic anxiety and depression related to health concerns   uncontrolled with home regimen  Current regimen:   -start ativan 0.5mg bid PRN. Rx sent today.   -discussed long term medication management, will re-discuss next visit    Sleeping Difficulty:  Impaired sleep related to pain  Current regimen:    -goal for improved sleep with better pain control   -see pain plan/section above     Decreased appetite  Related to malignancy, chemotherapy, taste changes, and disease process  Nutrition following  Weight loss 25-30 lbs  Current regimen:    -encouraged smaller, more frequent meals through out the day  -encouraged use of supplements such as Boost, Ensure, etc.    -encouraged diet high in protein/ calories  -continue to follow with RD      Supportive Interventions: given copy of advance directive paperwork    Introduction to Supportive and Palliative Oncology:  Spoke with pt   Introduced the role and philosophy of Supportive and Palliative oncology in the evaluation and management of symptoms during cancer treatment  Palliative care was introduced as a service for patients with serious illness to help with symptoms, assist with goals of care conversations, navigate complex  decision making, improve quality of life for patients, and provide support both patients and families.  Patient seemed to appreciate the extra layer of support.    Advance Directives  Existence of Advance Directives:No - has interest  Decision maker: Surrogate decision maker is Betsy Corral (wife): [phone]  Code Status: Full code        Next Follow-Up Visit:  Return to clinic in 3 weeks    Signature and billing  Thank you for allowing us to participate in the care of this patient. Recommendations will be communicated back to the consulting service by way of shared electronic medical record or face-to-face.    Medical complexity was high level due to due to complexity of problems, extensive data review, and high risk of management/treatment.  Time was spent on the following: Prep Time, Time Directly with Patient/Family/Caregiver, Documentation Time. Total time spent: 70 mins      DATA   Diagnostic tests and information reviewed for today's visit:  Most recent labs       Some elements copied from oncology note on 11/6/24, the elements have been updated and all reflect current decision making from today, 11/19/2024.      Plan of Care discussed with: Patient      SIGNATURE: KEV Hall    Contact information:  Supportive and Palliative Oncology  Monday-Friday 8 AM-5 PM  Phone:  [phone], press option #5, then option #1.   Or Epic Secure Chat

## 2024-11-19 NOTE — TELEPHONE ENCOUNTER
Phone call to pharmacy to see if PA needed for MSER or Ativan. No PA needed, total cost of copays is $43. Patient did not want a call unless PA needed and planned to go to pharmacy from visit.

## 2024-11-19 NOTE — PATIENT INSTRUCTIONS
For your bowels:   --take colace 100mg 2x/day as needed for hard stools (stool softener)  --take senna 8.6mg, 1-2tabs, 1-2x/day as needed for constipation (mild/ maintenance laxative)  --take Milk of Magnesia 15mL every 4 hrs as needed until bowel movement is achieved    2. For pain:  --increase your MS Contin (morphine sulfate extended release) to 15 mg (1 tab) in the morning, and 30mg (2 tabs) at night. This is your long acting, or extended release pain medication. Please take this medication on a schedule every day, regardless of pain levels.   --continue hydromorphone (dilaudid) 4mg as NEEDED for pain. This is your short acting, or quick release, pain medication.

## 2024-11-20 ENCOUNTER — APPOINTMENT (OUTPATIENT)
Dept: HEMATOLOGY/ONCOLOGY | Facility: CLINIC | Age: 69
End: 2024-11-20
Payer: MEDICARE

## 2024-11-20 ENCOUNTER — INFUSION (OUTPATIENT)
Dept: HEMATOLOGY/ONCOLOGY | Facility: CLINIC | Age: 69
End: 2024-11-20
Payer: MEDICARE

## 2024-11-20 VITALS
OXYGEN SATURATION: 99 % | RESPIRATION RATE: 18 BRPM | DIASTOLIC BLOOD PRESSURE: 72 MMHG | WEIGHT: 177.58 LBS | TEMPERATURE: 96.8 F | BODY MASS INDEX: 24.08 KG/M2 | HEART RATE: 92 BPM | SYSTOLIC BLOOD PRESSURE: 119 MMHG

## 2024-11-20 DIAGNOSIS — C20 RECTAL CANCER (MULTI): ICD-10-CM

## 2024-11-20 PROCEDURE — 96360 HYDRATION IV INFUSION INIT: CPT | Mod: INF

## 2024-11-20 PROCEDURE — 2500000004 HC RX 250 GENERAL PHARMACY W/ HCPCS (ALT 636 FOR OP/ED): Performed by: NURSE PRACTITIONER

## 2024-11-20 PROCEDURE — 2500000004 HC RX 250 GENERAL PHARMACY W/ HCPCS (ALT 636 FOR OP/ED): Performed by: INTERNAL MEDICINE

## 2024-11-20 RX ORDER — HEPARIN 100 UNIT/ML
500 SYRINGE INTRAVENOUS AS NEEDED
Status: DISCONTINUED | OUTPATIENT
Start: 2024-11-20 | End: 2024-11-20 | Stop reason: HOSPADM

## 2024-11-20 RX ORDER — HEPARIN SODIUM,PORCINE/PF 10 UNIT/ML
50 SYRINGE (ML) INTRAVENOUS AS NEEDED
OUTPATIENT
Start: 2024-11-20

## 2024-11-20 RX ORDER — HEPARIN 100 UNIT/ML
500 SYRINGE INTRAVENOUS AS NEEDED
OUTPATIENT
Start: 2024-11-20

## 2024-11-20 RX ORDER — HEPARIN SODIUM,PORCINE/PF 10 UNIT/ML
50 SYRINGE (ML) INTRAVENOUS AS NEEDED
Status: DISCONTINUED | OUTPATIENT
Start: 2024-11-20 | End: 2024-11-20 | Stop reason: HOSPADM

## 2024-11-20 ASSESSMENT — PAIN SCALES - GENERAL: PAINLEVEL_OUTOF10: 5

## 2024-11-22 ENCOUNTER — APPOINTMENT (OUTPATIENT)
Dept: HEMATOLOGY/ONCOLOGY | Facility: CLINIC | Age: 69
End: 2024-11-22
Payer: MEDICARE

## 2024-11-25 ENCOUNTER — INFUSION (OUTPATIENT)
Dept: HEMATOLOGY/ONCOLOGY | Facility: CLINIC | Age: 69
End: 2024-11-25
Payer: MEDICARE

## 2024-11-25 DIAGNOSIS — C20 RECTAL CANCER (MULTI): ICD-10-CM

## 2024-11-25 LAB
ALBUMIN SERPL BCP-MCNC: 3.2 G/DL (ref 3.4–5)
ALP SERPL-CCNC: 267 U/L (ref 33–136)
ALT SERPL W P-5'-P-CCNC: 15 U/L (ref 10–52)
ANION GAP SERPL CALC-SCNC: 14 MMOL/L (ref 10–20)
APPEARANCE UR: CLEAR
AST SERPL W P-5'-P-CCNC: 19 U/L (ref 9–39)
BASOPHILS # BLD AUTO: 0.01 X10*3/UL (ref 0–0.1)
BASOPHILS NFR BLD AUTO: 0.3 %
BILIRUB SERPL-MCNC: 0.4 MG/DL (ref 0–1.2)
BILIRUB UR STRIP.AUTO-MCNC: NEGATIVE MG/DL
BUN SERPL-MCNC: 14 MG/DL (ref 6–23)
CALCIUM SERPL-MCNC: 8.4 MG/DL (ref 8.6–10.3)
CEA SERPL-MCNC: 11.1 UG/L
CHLORIDE SERPL-SCNC: 110 MMOL/L (ref 98–107)
CO2 SERPL-SCNC: 22 MMOL/L (ref 21–32)
COLOR UR: YELLOW
CREAT SERPL-MCNC: 0.87 MG/DL (ref 0.5–1.3)
DACRYOCYTES BLD QL SMEAR: NORMAL
DOHLE BOD BLD QL SMEAR: PRESENT
EGFRCR SERPLBLD CKD-EPI 2021: >90 ML/MIN/1.73M*2
EOSINOPHIL # BLD AUTO: 0.12 X10*3/UL (ref 0–0.7)
EOSINOPHIL NFR BLD AUTO: 3.6 %
ERYTHROCYTE [DISTWIDTH] IN BLOOD BY AUTOMATED COUNT: 19.6 % (ref 11.5–14.5)
GLUCOSE SERPL-MCNC: 108 MG/DL (ref 74–99)
GLUCOSE UR STRIP.AUTO-MCNC: NORMAL MG/DL
HCT VFR BLD AUTO: 24.4 % (ref 41–52)
HGB BLD-MCNC: 7.3 G/DL (ref 13.5–17.5)
HYALINE CASTS #/AREA URNS AUTO: ABNORMAL /LPF
HYPOCHROMIA BLD QL SMEAR: NORMAL
IMM GRANULOCYTES # BLD AUTO: 0.02 X10*3/UL (ref 0–0.7)
IMM GRANULOCYTES NFR BLD AUTO: 0.6 % (ref 0–0.9)
KETONES UR STRIP.AUTO-MCNC: NEGATIVE MG/DL
LEUKOCYTE ESTERASE UR QL STRIP.AUTO: ABNORMAL
LYMPHOCYTES # BLD AUTO: 0.98 X10*3/UL (ref 1.2–4.8)
LYMPHOCYTES NFR BLD AUTO: 29 %
MCH RBC QN AUTO: 27.4 PG (ref 26–34)
MCHC RBC AUTO-ENTMCNC: 29.9 G/DL (ref 32–36)
MCV RBC AUTO: 92 FL (ref 80–100)
MONOCYTES # BLD AUTO: 0.24 X10*3/UL (ref 0.1–1)
MONOCYTES NFR BLD AUTO: 7.1 %
MUCOUS THREADS #/AREA URNS AUTO: ABNORMAL /LPF
NEUTROPHILS # BLD AUTO: 2.01 X10*3/UL (ref 1.2–7.7)
NEUTROPHILS NFR BLD AUTO: 59.4 %
NITRITE UR QL STRIP.AUTO: NEGATIVE
NRBC BLD-RTO: 0 /100 WBCS (ref 0–0)
OVALOCYTES BLD QL SMEAR: NORMAL
PH UR STRIP.AUTO: 5.5 [PH]
PLATELET # BLD AUTO: 47 X10*3/UL (ref 150–450)
POLYCHROMASIA BLD QL SMEAR: NORMAL
POTASSIUM SERPL-SCNC: 3.9 MMOL/L (ref 3.5–5.3)
PROT SERPL-MCNC: 6.2 G/DL (ref 6.4–8.2)
PROT UR STRIP.AUTO-MCNC: ABNORMAL MG/DL
RBC # BLD AUTO: 2.66 X10*6/UL (ref 4.5–5.9)
RBC # UR STRIP.AUTO: NEGATIVE /UL
RBC #/AREA URNS AUTO: ABNORMAL /HPF
RBC MORPH BLD: NORMAL
SODIUM SERPL-SCNC: 142 MMOL/L (ref 136–145)
SP GR UR STRIP.AUTO: 1.02
TARGETS BLD QL SMEAR: NORMAL
TOXIC GRANULES BLD QL SMEAR: PRESENT
UROBILINOGEN UR STRIP.AUTO-MCNC: NORMAL MG/DL
WBC # BLD AUTO: 3.4 X10*3/UL (ref 4.4–11.3)
WBC #/AREA URNS AUTO: ABNORMAL /HPF

## 2024-11-25 PROCEDURE — 2500000004 HC RX 250 GENERAL PHARMACY W/ HCPCS (ALT 636 FOR OP/ED): Performed by: NURSE PRACTITIONER

## 2024-11-25 PROCEDURE — 80053 COMPREHEN METABOLIC PANEL: CPT

## 2024-11-25 PROCEDURE — 82378 CARCINOEMBRYONIC ANTIGEN: CPT

## 2024-11-25 PROCEDURE — 81001 URINALYSIS AUTO W/SCOPE: CPT

## 2024-11-25 PROCEDURE — 85025 COMPLETE CBC W/AUTO DIFF WBC: CPT

## 2024-11-25 PROCEDURE — RXMED WILLOW AMBULATORY MEDICATION CHARGE

## 2024-11-25 PROCEDURE — 36591 DRAW BLOOD OFF VENOUS DEVICE: CPT

## 2024-11-25 RX ORDER — HEPARIN 100 UNIT/ML
500 SYRINGE INTRAVENOUS AS NEEDED
Status: DISCONTINUED | OUTPATIENT
Start: 2024-11-25 | End: 2024-11-25 | Stop reason: HOSPADM

## 2024-11-25 RX ORDER — HEPARIN SODIUM,PORCINE/PF 10 UNIT/ML
50 SYRINGE (ML) INTRAVENOUS AS NEEDED
Status: CANCELLED | OUTPATIENT
Start: 2024-11-25

## 2024-11-25 RX ORDER — HEPARIN SODIUM,PORCINE/PF 10 UNIT/ML
50 SYRINGE (ML) INTRAVENOUS AS NEEDED
Status: DISCONTINUED | OUTPATIENT
Start: 2024-11-25 | End: 2024-11-25 | Stop reason: HOSPADM

## 2024-11-25 RX ORDER — HEPARIN 100 UNIT/ML
500 SYRINGE INTRAVENOUS AS NEEDED
Status: CANCELLED | OUTPATIENT
Start: 2024-11-25

## 2024-11-26 ENCOUNTER — APPOINTMENT (OUTPATIENT)
Dept: HEMATOLOGY/ONCOLOGY | Facility: CLINIC | Age: 69
End: 2024-11-26
Payer: MEDICARE

## 2024-11-26 ENCOUNTER — TELEPHONE (OUTPATIENT)
Dept: HEMATOLOGY/ONCOLOGY | Facility: CLINIC | Age: 69
End: 2024-11-26

## 2024-11-26 ENCOUNTER — INFUSION (OUTPATIENT)
Dept: HEMATOLOGY/ONCOLOGY | Facility: CLINIC | Age: 69
End: 2024-11-26
Payer: MEDICARE

## 2024-11-26 ENCOUNTER — OFFICE VISIT (OUTPATIENT)
Dept: HEMATOLOGY/ONCOLOGY | Facility: CLINIC | Age: 69
End: 2024-11-26
Payer: MEDICARE

## 2024-11-26 VITALS
BODY MASS INDEX: 24.01 KG/M2 | SYSTOLIC BLOOD PRESSURE: 118 MMHG | HEART RATE: 72 BPM | TEMPERATURE: 97.2 F | DIASTOLIC BLOOD PRESSURE: 69 MMHG | OXYGEN SATURATION: 95 % | WEIGHT: 177 LBS | RESPIRATION RATE: 18 BRPM

## 2024-11-26 DIAGNOSIS — C20 RECTAL CANCER (MULTI): Primary | ICD-10-CM

## 2024-11-26 DIAGNOSIS — C20 RECTAL CANCER (MULTI): ICD-10-CM

## 2024-11-26 LAB
BASOPHILS # BLD AUTO: 0.02 X10*3/UL (ref 0–0.1)
BASOPHILS NFR BLD AUTO: 0.5 %
EOSINOPHIL # BLD AUTO: 0.07 X10*3/UL (ref 0–0.7)
EOSINOPHIL NFR BLD AUTO: 1.6 %
ERYTHROCYTE [DISTWIDTH] IN BLOOD BY AUTOMATED COUNT: 19.7 % (ref 11.5–14.5)
HCT VFR BLD AUTO: 25.2 % (ref 41–52)
HGB BLD-MCNC: 7.4 G/DL (ref 13.5–17.5)
IMM GRANULOCYTES # BLD AUTO: 0.02 X10*3/UL (ref 0–0.7)
IMM GRANULOCYTES NFR BLD AUTO: 0.5 % (ref 0–0.9)
LYMPHOCYTES # BLD AUTO: 0.72 X10*3/UL (ref 1.2–4.8)
LYMPHOCYTES NFR BLD AUTO: 16.5 %
MCH RBC QN AUTO: 26.7 PG (ref 26–34)
MCHC RBC AUTO-ENTMCNC: 29.4 G/DL (ref 32–36)
MCV RBC AUTO: 91 FL (ref 80–100)
MONOCYTES # BLD AUTO: 0.37 X10*3/UL (ref 0.1–1)
MONOCYTES NFR BLD AUTO: 8.5 %
NEUTROPHILS # BLD AUTO: 3.16 X10*3/UL (ref 1.2–7.7)
NEUTROPHILS NFR BLD AUTO: 72.4 %
NRBC BLD-RTO: ABNORMAL /100{WBCS}
PATH REVIEW-CBC DIFFERENTIAL: NORMAL
PLATELET # BLD AUTO: 54 X10*3/UL (ref 150–450)
RBC # BLD AUTO: 2.77 X10*6/UL (ref 4.5–5.9)
WBC # BLD AUTO: 4.4 X10*3/UL (ref 4.4–11.3)

## 2024-11-26 PROCEDURE — 85025 COMPLETE CBC W/AUTO DIFF WBC: CPT

## 2024-11-26 PROCEDURE — 1125F AMNT PAIN NOTED PAIN PRSNT: CPT | Performed by: INTERNAL MEDICINE

## 2024-11-26 PROCEDURE — 3078F DIAST BP <80 MM HG: CPT | Performed by: INTERNAL MEDICINE

## 2024-11-26 PROCEDURE — 3048F LDL-C <100 MG/DL: CPT | Performed by: INTERNAL MEDICINE

## 2024-11-26 PROCEDURE — 3074F SYST BP LT 130 MM HG: CPT | Performed by: INTERNAL MEDICINE

## 2024-11-26 PROCEDURE — 1159F MED LIST DOCD IN RCRD: CPT | Performed by: INTERNAL MEDICINE

## 2024-11-26 PROCEDURE — 36591 DRAW BLOOD OFF VENOUS DEVICE: CPT

## 2024-11-26 PROCEDURE — 2500000004 HC RX 250 GENERAL PHARMACY W/ HCPCS (ALT 636 FOR OP/ED): Performed by: NURSE PRACTITIONER

## 2024-11-26 PROCEDURE — 3051F HG A1C>EQUAL 7.0%<8.0%: CPT | Performed by: INTERNAL MEDICINE

## 2024-11-26 PROCEDURE — 1036F TOBACCO NON-USER: CPT | Performed by: INTERNAL MEDICINE

## 2024-11-26 PROCEDURE — 99215 OFFICE O/P EST HI 40 MIN: CPT | Performed by: INTERNAL MEDICINE

## 2024-11-26 RX ORDER — DEXAMETHASONE 6 MG/1
12 TABLET ORAL ONCE
OUTPATIENT
Start: 2024-12-17 | End: 2024-12-11

## 2024-11-26 RX ORDER — HEPARIN 100 UNIT/ML
500 SYRINGE INTRAVENOUS AS NEEDED
Status: DISCONTINUED | OUTPATIENT
Start: 2024-11-26 | End: 2024-11-26 | Stop reason: HOSPADM

## 2024-11-26 RX ORDER — HEPARIN 100 UNIT/ML
500 SYRINGE INTRAVENOUS AS NEEDED
OUTPATIENT
Start: 2024-11-26

## 2024-11-26 RX ORDER — HEPARIN SODIUM,PORCINE/PF 10 UNIT/ML
50 SYRINGE (ML) INTRAVENOUS AS NEEDED
OUTPATIENT
Start: 2024-11-26

## 2024-11-26 RX ORDER — HEPARIN SODIUM,PORCINE/PF 10 UNIT/ML
50 SYRINGE (ML) INTRAVENOUS AS NEEDED
Status: DISCONTINUED | OUTPATIENT
Start: 2024-11-26 | End: 2024-11-26 | Stop reason: HOSPADM

## 2024-11-26 ASSESSMENT — PAIN SCALES - GENERAL: PAINLEVEL_OUTOF10: 10-WORST PAIN EVER

## 2024-11-26 NOTE — PROGRESS NOTES
Patient ID: Roma Corral is a 69 y.o. male from [place], [place].     Referring Physician: No referring provider defined for this encounter.    Primary Care Provider: Santiago Buckner MD    Diagnosis:   Rectal cancer with liver mets - 6/2024    Primary Oncologic Surgeon:   Christy    Primary Medical Oncologist:  Dann Nieto MD       Primary Radiation Oncologist:  Elvi    Oncologic Sugery History:  7/2/24 - ex lap with colostomy creation     Oncologic Therapy History:  N/a    Molecular Genetics:      Current Sites of Disease:  Liver, rectum    Oncologic Problem List:  Metastatic CRC  CAD  T2DM    Oncologic Narrative:  Roma Corral is a 69 y.o. male who is referred by Dr Vasquez for metastatic rectal cancer to liver s/p lap colostomy creation on 7/2/24. .  There is extensive metastatic disease in the liver with most of the right liver occupied with bulky disease which extends into segment 4.  Additionally there are 2 lesions in the left lateral section.  He initially presented with abdominal pain and pelvic pressure with labs showing anemia.  He has undergone imaging including CT scan, MRI liver, MRI rectum.  He has met with Dr. Boles from radiation oncology with a plan to start short course in the near future. He has met with Dr. Erazo and Dr. Harrison to discuss potential future surgeries.       Past Medical History: Roma has a past medical history of Abdominal pain, Acute non-ST elevation myocardial infarction (NSTEMI) (Multi), Anemia, Anxiety, CAD (coronary artery disease), Cardiology follow-up encounter (12/11/2023), Gout, H/O cardiac catheterization (06/01/2021), H/O cardiovascular stress test (09/03/2021), H/O echocardiogram (06/02/2021), High cholesterol, Hypertension, Overweight, Rectal cancer (Multi), and Type 2 diabetes mellitus.  Surgical History:  Roma has a past surgical history that includes Leg Surgery (Left); Colonoscopy (06/17/2024); and Hemicolectomy w/ ostomy.  Social History:  Roma reports that he has  never smoked. He has never used smokeless tobacco. He reports that he does not currently use alcohol. He reports that he does not use drugs.  Family History:    Family History   Problem Relation Name Age of Onset    No Known Problems Mother      No Known Problems Father      No Known Problems Sister      Leukemia Brother       Family Oncology History:  Cancer-related family history is not on file.    SUBJECTIVE:    History of Present Illness:  Roma Corral is a 69 y.o. male who was referred by No ref. provider found and presents with chemotherapy follow up   Mr. Corral is seen in follow up   Completed xrt 7/17 7/24 - received 1st dose FOLFOXIRI / Josefina  8/7/24 - 2nd dose    - chemo has been held since then due to painful kidney stones requiring ED visits / fluids/ pain meds   Resumed chemo late September with C3.  Has continued through on schedule except for another delay with c6 in mid November.     Significant abdominal pain on long acting morphine and hydromorphone.       OBJECTIVE:    VS / Pain:  /69 (BP Location: Left arm, Patient Position: Sitting, BP Cuff Size: Adult)   Pulse 72   Temp 36.2 °C (97.2 °F) (Core)   Resp 18   Wt 80.3 kg (177 lb)   SpO2 95%   BMI 24.01 kg/m²   BSA: 2.02 meters squared   Pain Scale: 0    Daily Weight  11/26/24 : 80.3 kg (177 lb)  11/20/24 : 80.6 kg (177 lb 9.3 oz)  11/19/24 : 81.5 kg (179 lb 10.8 oz)  11/18/24 : 80.5 kg (177 lb 7.5 oz)  11/16/24 : 83.2 kg (183 lb 6.8 oz)  11/14/24 : 83.1 kg (183 lb 3.2 oz)  11/10/24 : 84.8 kg (187 lb)  10/29/24 : 82.2 kg (181 lb 3.5 oz)  10/28/24 : 82.6 kg (182 lb)  10/23/24 : 80.4 kg (177 lb 4 oz)      Physical Exam  Constitutional:       Appearance: He is normal weight.   HENT:      Nose: Nose normal.      Mouth/Throat:      Mouth: Mucous membranes are moist.      Pharynx: Oropharynx is clear.   Eyes:      Extraocular Movements: Extraocular movements intact.      Conjunctiva/sclera: Conjunctivae normal.   Cardiovascular:      Rate and  Rhythm: Normal rate.      Pulses: Normal pulses.   Pulmonary:      Effort: Pulmonary effort is normal.   Abdominal:      General: Abdomen is flat.      Comments: Colostomy bag in place    Musculoskeletal:         General: Normal range of motion.   Skin:     General: Skin is warm.   Neurological:      General: No focal deficit present.      Mental Status: He is alert. Mental status is at baseline.   Psychiatric:         Mood and Affect: Mood normal.         Thought Content: Thought content normal.         Judgment: Judgment normal.         Performance Status:   ECOG 1    Diagnostic Results         WBC   Date/Time Value Ref Range Status   11/25/2024 12:46 PM 3.4 (L) 4.4 - 11.3 x10*3/uL Final   11/10/2024 08:42 PM 7.6 4.4 - 11.3 x10*3/uL Final   10/28/2024 02:03 PM 7.7 4.4 - 11.3 x10*3/uL Final     Hemoglobin   Date Value Ref Range Status   11/25/2024 7.3 (L) 13.5 - 17.5 g/dL Final   11/10/2024 8.9 (L) 13.5 - 17.5 g/dL Final   10/28/2024 8.6 (L) 13.5 - 17.5 g/dL Final     MCV   Date/Time Value Ref Range Status   11/25/2024 12:46 PM 92 80 - 100 fL Final   11/10/2024 08:42 PM 90 80 - 100 fL Final   10/28/2024 02:03 PM 88 80 - 100 fL Final     Platelets   Date/Time Value Ref Range Status   11/25/2024 12:46 PM 47 (L) 150 - 450 x10*3/uL Final   11/10/2024 08:42  150 - 450 x10*3/uL Final   10/28/2024 02:03  (L) 150 - 450 x10*3/uL Final     Neutrophils Absolute   Date/Time Value Ref Range Status   11/25/2024 12:46 PM 2.01 1.20 - 7.70 x10*3/uL Final     Comment:     Percent differential counts (%) should be interpreted in the context of the absolute cell counts (cells/uL).   11/10/2024 08:42 PM 5.35 1.20 - 7.70 x10*3/uL Final     Comment:     Percent differential counts (%) should be interpreted in the context of the absolute cell counts (cells/uL).   10/28/2024 02:03 PM 5.45 1.20 - 7.70 x10*3/uL Final     Comment:     Percent differential counts (%) should be interpreted in the context of the absolute cell counts  "(cells/uL).     Bilirubin, Total   Date/Time Value Ref Range Status   11/25/2024 12:46 PM 0.4 0.0 - 1.2 mg/dL Final   11/10/2024 08:42 PM 0.6 0.0 - 1.2 mg/dL Final   10/28/2024 02:03 PM 0.3 0.0 - 1.2 mg/dL Final     AST   Date/Time Value Ref Range Status   11/25/2024 12:46 PM 19 9 - 39 U/L Final   11/10/2024 08:42 PM 18 9 - 39 U/L Final   10/28/2024 02:03 PM 16 9 - 39 U/L Final     ALT   Date/Time Value Ref Range Status   11/25/2024 12:46 PM 15 10 - 52 U/L Final     Comment:     Patients treated with Sulfasalazine may generate falsely decreased results for ALT.   11/10/2024 08:42 PM 11 10 - 52 U/L Final     Comment:     Patients treated with Sulfasalazine may generate falsely decreased results for ALT.   10/28/2024 02:03 PM 10 10 - 52 U/L Final     Comment:     Patients treated with Sulfasalazine may generate falsely decreased results for ALT.     Creatinine   Date/Time Value Ref Range Status   11/25/2024 12:46 PM 0.87 0.50 - 1.30 mg/dL Final   11/10/2024 08:42 PM 1.12 0.50 - 1.30 mg/dL Final   10/28/2024 02:03 PM 0.83 0.50 - 1.30 mg/dL Final     Urea Nitrogen   Date/Time Value Ref Range Status   11/25/2024 12:46 PM 14 6 - 23 mg/dL Final   11/10/2024 08:42 PM 13 6 - 23 mg/dL Final   10/28/2024 02:03 PM 12 6 - 23 mg/dL Final     Albumin   Date/Time Value Ref Range Status   11/25/2024 12:46 PM 3.2 (L) 3.4 - 5.0 g/dL Final   11/10/2024 08:42 PM 3.4 3.4 - 5.0 g/dL Final   10/28/2024 02:03 PM 3.3 (L) 3.4 - 5.0 g/dL Final     No results found for: \"\"  Carcinoembryonic AG   Date/Time Value Ref Range Status   11/25/2024 12:46 PM 11.1 ug/L Final   10/28/2024 02:03 PM 34.9 ug/L Final   10/14/2024 02:41 PM 48.2 ug/L Final     === 08/28/24 ===    CT ABDOMEN PELVIS WO IV CONTRAST    - Impression -  1. Severe wall thickening of the terminal ileum with surrounding fat  stranding and small volume ascites, increased from 08/17/2024  concerning for severe terminal ileitis.  2. 0.8 cm calculus within the urinary bladder lumen. " No  hydronephrosis or hydroureter. Nonobstructing right renal calculi  measuring up to 0.2 cm.  3. Mild circumferential bladder wall thickening may reflect chronic  change or cystitis. Please correlate with urinalysis.  4. Postsurgical change related to diverting loop colostomy.  5. Rectosigmoid mass with stricturing and adjacent desmoplastic  reaction is better evaluated on the prior MRI.  6. Hepatic metastatic disease which appears overall similar to  08/17/2024; limited evaluation in the absence of IV contrast.    Signed by: Timo Macias 8/28/2024 11:39 PM  Dictation workstation:   [id number]     === 10/10/24 ===    CT CHEST ABDOMEN PELVIS W IV CONTRAST    - Impression -  Colorectal cancer restaging scan:    1. Interval slightly improved hepatic metastatic disease when compared to the most recent exam and overall, gradual improved hepatic metastatic disease when compared to the prior exams.  2. There is nodular pleural thickening in the posterior aspect of the right lower lobe, which is nonspecific and may represent atelectasis. Recommend attention on follow-up.  3. No additional new metastatic disease in the chest, abdomen or pelvis.  4. Redemonstration of multiple segment distal ileal loop thickening with surrounding inflammatory changes, along with the worsening of sigmoid and descending colon thickening and associated hyperemia.  Findings are concerning stable to mild worsening of enterocolitis. New and increasing loculated left paracolic gutter small collection likely secondary to lower abdominopelvic inflammatory changes  involving the bowel loops.  5. Additional chronic and incidental findings as above.    I personally reviewed the images/study and I agree with the findings as stated by resident physician Dr. Vic Villagomez . This study was interpreted at [facility],  [place], [place].    MACRO:  None    Signed by: Laci Bates 10/11/2024 9:33  PM  Dictation workstation:   [id number]    Assessment/Plan   This is a 69 y.o. man with diabetes and coronary artery disease who presented with abdominal pain and anemia in June 2024 and was found to have rectal cancer with diffuse liver metastases.  He has nearly confluent right hepatic liver metastases and 2 lesions in the left lobe.  He has met with Dr. Erazo to discuss potential future surgery should he do well with chemotherapy.  He is also met with Dr. Boles to discuss radiation to the primary tumor which is causing bleeding currently.  7/15 - 7/17 completed radiation.    -7/24 - first dose FOLFOXIRI + Josefina, tolerated with fatigue   -8/7/24 - 2nd dose FOLFOXIRI + Josefina   Tx has been held since then due to painful kidney stones requiring ED visit - both stones have passed - ready to restart treatment.     Despite only 2 txs, CEA has gone from >800 to 143    Plan:  Stage IV CRC:  NGS shows wt KRAS/NRAS/BRAF  C5 FOLFOXIRI / Josefina tomorrow 9/18 - fluids on day 3 & day 8   Dose reduce oxali for C6 given difficulty with C5.    He is not focused on having ostomy take down at this point although he would ultimately prefer it.   C7 this week if counts allow.    Will review in TB to start considering definitive surgery especially given cytopenias limiting frequency of treatment.       Anemia   -    - iron studies normal     Logistics -    - clinic appt - minoff tues   - infusion appt - mentor nikia Nieto MD  [facility]  Office: [phone]  Fax: [phone]

## 2024-11-26 NOTE — TELEPHONE ENCOUNTER
I called and spoke with Mr Corral to explain we were delaying treatment until next week due to low platelet count. We will check labs again next week. I sent a message to charge pool in mentor as well as let his infusion nurse know. I will reach out to him tomorrow with a new schedule after I hear from mentor charge nurse.  Keely RODRIGUEZ

## 2024-11-27 ENCOUNTER — APPOINTMENT (OUTPATIENT)
Dept: HEMATOLOGY/ONCOLOGY | Facility: CLINIC | Age: 69
End: 2024-11-27
Payer: MEDICARE

## 2024-11-27 ENCOUNTER — PHARMACY VISIT (OUTPATIENT)
Dept: PHARMACY | Facility: CLINIC | Age: 69
End: 2024-11-27
Payer: COMMERCIAL

## 2024-11-29 ENCOUNTER — APPOINTMENT (OUTPATIENT)
Dept: HEMATOLOGY/ONCOLOGY | Facility: CLINIC | Age: 69
End: 2024-11-29
Payer: MEDICARE

## 2024-12-02 ENCOUNTER — TELEPHONE (OUTPATIENT)
Dept: HEMATOLOGY/ONCOLOGY | Facility: CLINIC | Age: 69
End: 2024-12-02

## 2024-12-02 ENCOUNTER — TELEPHONE (OUTPATIENT)
Dept: UROLOGY | Facility: CLINIC | Age: 69
End: 2024-12-02

## 2024-12-02 ENCOUNTER — APPOINTMENT (OUTPATIENT)
Dept: UROLOGY | Facility: CLINIC | Age: 69
End: 2024-12-02
Payer: MEDICARE

## 2024-12-02 ENCOUNTER — APPOINTMENT (OUTPATIENT)
Dept: HEMATOLOGY/ONCOLOGY | Facility: CLINIC | Age: 69
End: 2024-12-02
Payer: MEDICARE

## 2024-12-02 NOTE — TELEPHONE ENCOUNTER
Left a message for Mr Corral to let him know he does not need to come in for his visit with Dr Nieto tomorrow. We can also reschedule his line draw for mentor so he can get labs prior to his infusion next week. Left phone number for him to call to reschedule labs.  Keely RODRIGUEZ

## 2024-12-02 NOTE — TELEPHONE ENCOUNTER
Left message offering patient video appt for today due to inclimate weather. Left office phone number and asked patient to call us back and let us know what they would prefer

## 2024-12-03 ENCOUNTER — APPOINTMENT (OUTPATIENT)
Dept: HEMATOLOGY/ONCOLOGY | Facility: CLINIC | Age: 69
End: 2024-12-03
Payer: MEDICARE

## 2024-12-03 ENCOUNTER — PHARMACY VISIT (OUTPATIENT)
Dept: PHARMACY | Facility: CLINIC | Age: 69
End: 2024-12-03
Payer: COMMERCIAL

## 2024-12-03 ENCOUNTER — LAB (OUTPATIENT)
Dept: HEMATOLOGY/ONCOLOGY | Facility: CLINIC | Age: 69
End: 2024-12-03
Payer: MEDICARE

## 2024-12-03 DIAGNOSIS — C20 RECTAL CANCER (MULTI): Primary | ICD-10-CM

## 2024-12-03 LAB
BASOPHILS # BLD AUTO: 0.01 X10*3/UL (ref 0–0.1)
BASOPHILS NFR BLD AUTO: 0.2 %
DACRYOCYTES BLD QL SMEAR: NORMAL
EOSINOPHIL # BLD AUTO: 0.08 X10*3/UL (ref 0–0.7)
EOSINOPHIL NFR BLD AUTO: 1.7 %
ERYTHROCYTE [DISTWIDTH] IN BLOOD BY AUTOMATED COUNT: 21.1 % (ref 11.5–14.5)
GIANT PLATELETS BLD QL SMEAR: NORMAL
HCT VFR BLD AUTO: 25 % (ref 41–52)
HGB BLD-MCNC: 7.3 G/DL (ref 13.5–17.5)
HYPOCHROMIA BLD QL SMEAR: NORMAL
IMM GRANULOCYTES # BLD AUTO: 0.03 X10*3/UL (ref 0–0.7)
IMM GRANULOCYTES NFR BLD AUTO: 0.6 % (ref 0–0.9)
LYMPHOCYTES # BLD AUTO: 1.17 X10*3/UL (ref 1.2–4.8)
LYMPHOCYTES NFR BLD AUTO: 24.6 %
MCH RBC QN AUTO: 27 PG (ref 26–34)
MCHC RBC AUTO-ENTMCNC: 29.2 G/DL (ref 32–36)
MCV RBC AUTO: 93 FL (ref 80–100)
MONOCYTES # BLD AUTO: 0.48 X10*3/UL (ref 0.1–1)
MONOCYTES NFR BLD AUTO: 10.1 %
NEUTROPHILS # BLD AUTO: 2.98 X10*3/UL (ref 1.2–7.7)
NEUTROPHILS NFR BLD AUTO: 62.8 %
NRBC BLD-RTO: 0 /100 WBCS (ref 0–0)
OVALOCYTES BLD QL SMEAR: NORMAL
PLATELET # BLD AUTO: 73 X10*3/UL (ref 150–450)
POLYCHROMASIA BLD QL SMEAR: NORMAL
RBC # BLD AUTO: 2.7 X10*6/UL (ref 4.5–5.9)
RBC MORPH BLD: NORMAL
TOXIC GRANULES BLD QL SMEAR: PRESENT
WBC # BLD AUTO: 4.8 X10*3/UL (ref 4.4–11.3)

## 2024-12-03 PROCEDURE — RXMED WILLOW AMBULATORY MEDICATION CHARGE

## 2024-12-03 PROCEDURE — 36591 DRAW BLOOD OFF VENOUS DEVICE: CPT

## 2024-12-03 PROCEDURE — 2500000004 HC RX 250 GENERAL PHARMACY W/ HCPCS (ALT 636 FOR OP/ED): Performed by: NURSE PRACTITIONER

## 2024-12-03 PROCEDURE — 85025 COMPLETE CBC W/AUTO DIFF WBC: CPT

## 2024-12-03 RX ORDER — HEPARIN 100 UNIT/ML
500 SYRINGE INTRAVENOUS AS NEEDED
OUTPATIENT
Start: 2024-12-03

## 2024-12-03 RX ORDER — HEPARIN SODIUM,PORCINE/PF 10 UNIT/ML
50 SYRINGE (ML) INTRAVENOUS AS NEEDED
OUTPATIENT
Start: 2024-12-03

## 2024-12-03 RX ORDER — HEPARIN SODIUM,PORCINE/PF 10 UNIT/ML
50 SYRINGE (ML) INTRAVENOUS AS NEEDED
Status: DISCONTINUED | OUTPATIENT
Start: 2024-12-03 | End: 2024-12-03 | Stop reason: HOSPADM

## 2024-12-03 RX ORDER — HEPARIN 100 UNIT/ML
500 SYRINGE INTRAVENOUS AS NEEDED
Status: DISCONTINUED | OUTPATIENT
Start: 2024-12-03 | End: 2024-12-03 | Stop reason: HOSPADM

## 2024-12-03 NOTE — PROGRESS NOTES
Patient presented for port draw, only CBC obtained per MD Nieto as patient should have CEA/CMP/and urine done 12/9 next week prior to treatment on 12/10 as well as another CBC. Reviewed platelet count and hemoglobin level with MD Nieto and patient. Patient scheduled for treatment on 12/10 with pending labs, plan for follow up with SHONA Betancourt on 12/17. All questions answered, AVS reviewed.

## 2024-12-04 ENCOUNTER — APPOINTMENT (OUTPATIENT)
Dept: HEMATOLOGY/ONCOLOGY | Facility: CLINIC | Age: 69
End: 2024-12-04
Payer: MEDICARE

## 2024-12-04 ENCOUNTER — APPOINTMENT (OUTPATIENT)
Dept: HEMATOLOGY/ONCOLOGY | Facility: HOSPITAL | Age: 69
End: 2024-12-04
Payer: MEDICARE

## 2024-12-06 ENCOUNTER — APPOINTMENT (OUTPATIENT)
Dept: HEMATOLOGY/ONCOLOGY | Facility: CLINIC | Age: 69
End: 2024-12-06
Payer: MEDICARE

## 2024-12-09 ENCOUNTER — OFFICE VISIT (OUTPATIENT)
Dept: PALLIATIVE MEDICINE | Facility: CLINIC | Age: 69
End: 2024-12-09
Payer: MEDICARE

## 2024-12-09 ENCOUNTER — APPOINTMENT (OUTPATIENT)
Dept: HEMATOLOGY/ONCOLOGY | Facility: CLINIC | Age: 69
End: 2024-12-09
Payer: MEDICARE

## 2024-12-09 ENCOUNTER — INFUSION (OUTPATIENT)
Dept: HEMATOLOGY/ONCOLOGY | Facility: CLINIC | Age: 69
End: 2024-12-09
Payer: MEDICARE

## 2024-12-09 VITALS
DIASTOLIC BLOOD PRESSURE: 72 MMHG | OXYGEN SATURATION: 99 % | TEMPERATURE: 97.7 F | BODY MASS INDEX: 24.65 KG/M2 | RESPIRATION RATE: 18 BRPM | WEIGHT: 181.77 LBS | SYSTOLIC BLOOD PRESSURE: 131 MMHG | HEART RATE: 73 BPM

## 2024-12-09 DIAGNOSIS — G89.3 CANCER RELATED PAIN: ICD-10-CM

## 2024-12-09 DIAGNOSIS — Z51.5 PALLIATIVE CARE ENCOUNTER: Primary | ICD-10-CM

## 2024-12-09 DIAGNOSIS — Z79.891 ENCOUNTER FOR MONITORING OPIOID MAINTENANCE THERAPY: ICD-10-CM

## 2024-12-09 DIAGNOSIS — C20 RECTAL CANCER (MULTI): ICD-10-CM

## 2024-12-09 DIAGNOSIS — Z51.81 ENCOUNTER FOR MONITORING OPIOID MAINTENANCE THERAPY: ICD-10-CM

## 2024-12-09 LAB
ALBUMIN SERPL BCP-MCNC: 3 G/DL (ref 3.4–5)
ALP SERPL-CCNC: 207 U/L (ref 33–136)
ALT SERPL W P-5'-P-CCNC: 10 U/L (ref 10–52)
ANION GAP SERPL CALC-SCNC: 12 MMOL/L (ref 10–20)
AST SERPL W P-5'-P-CCNC: 21 U/L (ref 9–39)
BASOPHILS # BLD AUTO: 0.02 X10*3/UL (ref 0–0.1)
BASOPHILS NFR BLD AUTO: 0.4 %
BILIRUB SERPL-MCNC: 0.5 MG/DL (ref 0–1.2)
BUN SERPL-MCNC: 12 MG/DL (ref 6–23)
CALCIUM SERPL-MCNC: 7.8 MG/DL (ref 8.6–10.3)
CEA SERPL-MCNC: 11.1 UG/L
CHLORIDE SERPL-SCNC: 107 MMOL/L (ref 98–107)
CO2 SERPL-SCNC: 26 MMOL/L (ref 21–32)
CREAT SERPL-MCNC: 0.85 MG/DL (ref 0.5–1.3)
DACRYOCYTES BLD QL SMEAR: NORMAL
EGFRCR SERPLBLD CKD-EPI 2021: >90 ML/MIN/1.73M*2
EOSINOPHIL # BLD AUTO: 0.05 X10*3/UL (ref 0–0.7)
EOSINOPHIL NFR BLD AUTO: 0.9 %
ERYTHROCYTE [DISTWIDTH] IN BLOOD BY AUTOMATED COUNT: 21.1 % (ref 11.5–14.5)
GLUCOSE SERPL-MCNC: 100 MG/DL (ref 74–99)
HCT VFR BLD AUTO: 24.8 % (ref 41–52)
HGB BLD-MCNC: 7.2 G/DL (ref 13.5–17.5)
HYPOCHROMIA BLD QL SMEAR: NORMAL
IMM GRANULOCYTES # BLD AUTO: 0.02 X10*3/UL (ref 0–0.7)
IMM GRANULOCYTES NFR BLD AUTO: 0.4 % (ref 0–0.9)
LYMPHOCYTES # BLD AUTO: 0.96 X10*3/UL (ref 1.2–4.8)
LYMPHOCYTES NFR BLD AUTO: 18.2 %
MCH RBC QN AUTO: 26.8 PG (ref 26–34)
MCHC RBC AUTO-ENTMCNC: 29 G/DL (ref 32–36)
MCV RBC AUTO: 92 FL (ref 80–100)
MONOCYTES # BLD AUTO: 0.72 X10*3/UL (ref 0.1–1)
MONOCYTES NFR BLD AUTO: 13.6 %
NEUTROPHILS # BLD AUTO: 3.51 X10*3/UL (ref 1.2–7.7)
NEUTROPHILS NFR BLD AUTO: 66.5 %
NRBC BLD-RTO: 0 /100 WBCS (ref 0–0)
OVALOCYTES BLD QL SMEAR: NORMAL
PLATELET # BLD AUTO: 132 X10*3/UL (ref 150–450)
POLYCHROMASIA BLD QL SMEAR: NORMAL
POTASSIUM SERPL-SCNC: 3.9 MMOL/L (ref 3.5–5.3)
PROT SERPL-MCNC: 5.9 G/DL (ref 6.4–8.2)
RBC # BLD AUTO: 2.69 X10*6/UL (ref 4.5–5.9)
RBC MORPH BLD: NORMAL
SODIUM SERPL-SCNC: 141 MMOL/L (ref 136–145)
TARGETS BLD QL SMEAR: NORMAL
WBC # BLD AUTO: 5.3 X10*3/UL (ref 4.4–11.3)

## 2024-12-09 PROCEDURE — 82378 CARCINOEMBRYONIC ANTIGEN: CPT

## 2024-12-09 PROCEDURE — 3048F LDL-C <100 MG/DL: CPT | Performed by: NURSE PRACTITIONER

## 2024-12-09 PROCEDURE — 99213 OFFICE O/P EST LOW 20 MIN: CPT | Performed by: NURSE PRACTITIONER

## 2024-12-09 PROCEDURE — 3078F DIAST BP <80 MM HG: CPT | Performed by: NURSE PRACTITIONER

## 2024-12-09 PROCEDURE — 80053 COMPREHEN METABOLIC PANEL: CPT

## 2024-12-09 PROCEDURE — 1126F AMNT PAIN NOTED NONE PRSNT: CPT | Performed by: NURSE PRACTITIONER

## 2024-12-09 PROCEDURE — 96523 IRRIG DRUG DELIVERY DEVICE: CPT

## 2024-12-09 PROCEDURE — 85025 COMPLETE CBC W/AUTO DIFF WBC: CPT

## 2024-12-09 PROCEDURE — 3075F SYST BP GE 130 - 139MM HG: CPT | Performed by: NURSE PRACTITIONER

## 2024-12-09 PROCEDURE — 81003 URINALYSIS AUTO W/O SCOPE: CPT

## 2024-12-09 PROCEDURE — 3051F HG A1C>EQUAL 7.0%<8.0%: CPT | Performed by: NURSE PRACTITIONER

## 2024-12-09 RX ORDER — MORPHINE SULFATE 15 MG/1
TABLET, FILM COATED, EXTENDED RELEASE ORAL
Qty: 90 TABLET | Refills: 0 | Status: SHIPPED | OUTPATIENT
Start: 2024-12-09 | End: 2025-01-08

## 2024-12-09 ASSESSMENT — PAIN SCALES - GENERAL: PAINLEVEL_OUTOF10: 0-NO PAIN

## 2024-12-10 ENCOUNTER — INFUSION (OUTPATIENT)
Dept: HEMATOLOGY/ONCOLOGY | Facility: CLINIC | Age: 69
End: 2024-12-10
Payer: MEDICARE

## 2024-12-10 ENCOUNTER — APPOINTMENT (OUTPATIENT)
Dept: HEMATOLOGY/ONCOLOGY | Facility: CLINIC | Age: 69
End: 2024-12-10
Payer: MEDICARE

## 2024-12-10 VITALS
TEMPERATURE: 97 F | HEART RATE: 84 BPM | OXYGEN SATURATION: 98 % | DIASTOLIC BLOOD PRESSURE: 71 MMHG | RESPIRATION RATE: 18 BRPM | WEIGHT: 183.75 LBS | BODY MASS INDEX: 24.92 KG/M2 | SYSTOLIC BLOOD PRESSURE: 130 MMHG

## 2024-12-10 DIAGNOSIS — C20 RECTAL CANCER (MULTI): ICD-10-CM

## 2024-12-10 LAB
APPEARANCE UR: CLEAR
BILIRUB UR STRIP.AUTO-MCNC: NEGATIVE MG/DL
COLOR UR: NORMAL
GLUCOSE UR STRIP.AUTO-MCNC: NORMAL MG/DL
KETONES UR STRIP.AUTO-MCNC: NEGATIVE MG/DL
LEUKOCYTE ESTERASE UR QL STRIP.AUTO: NEGATIVE
NITRITE UR QL STRIP.AUTO: NEGATIVE
PH UR STRIP.AUTO: 6 [PH]
PROT UR STRIP.AUTO-MCNC: NEGATIVE MG/DL
RBC # UR STRIP.AUTO: NEGATIVE /UL
SP GR UR STRIP.AUTO: 1.01
UROBILINOGEN UR STRIP.AUTO-MCNC: NORMAL MG/DL

## 2024-12-10 PROCEDURE — 96376 TX/PRO/DX INJ SAME DRUG ADON: CPT

## 2024-12-10 PROCEDURE — 96411 CHEMO IV PUSH ADDL DRUG: CPT

## 2024-12-10 PROCEDURE — 96415 CHEMO IV INFUSION ADDL HR: CPT

## 2024-12-10 PROCEDURE — 2500000004 HC RX 250 GENERAL PHARMACY W/ HCPCS (ALT 636 FOR OP/ED): Performed by: INTERNAL MEDICINE

## 2024-12-10 PROCEDURE — 96417 CHEMO IV INFUS EACH ADDL SEQ: CPT

## 2024-12-10 PROCEDURE — 96375 TX/PRO/DX INJ NEW DRUG ADDON: CPT | Mod: INF

## 2024-12-10 PROCEDURE — 96367 TX/PROPH/DG ADDL SEQ IV INF: CPT

## 2024-12-10 PROCEDURE — 96413 CHEMO IV INFUSION 1 HR: CPT

## 2024-12-10 PROCEDURE — 96416 CHEMO PROLONG INFUSE W/PUMP: CPT

## 2024-12-10 RX ORDER — PROCHLORPERAZINE EDISYLATE 5 MG/ML
10 INJECTION INTRAMUSCULAR; INTRAVENOUS EVERY 6 HOURS PRN
Status: DISCONTINUED | OUTPATIENT
Start: 2024-12-10 | End: 2024-12-10 | Stop reason: HOSPADM

## 2024-12-10 RX ORDER — PROCHLORPERAZINE MALEATE 10 MG
10 TABLET ORAL EVERY 6 HOURS PRN
Status: DISCONTINUED | OUTPATIENT
Start: 2024-12-10 | End: 2024-12-10 | Stop reason: HOSPADM

## 2024-12-10 RX ORDER — EPINEPHRINE 0.3 MG/.3ML
0.3 INJECTION SUBCUTANEOUS EVERY 5 MIN PRN
Status: DISCONTINUED | OUTPATIENT
Start: 2024-12-10 | End: 2024-12-10 | Stop reason: HOSPADM

## 2024-12-10 RX ORDER — ATROPINE SULFATE 0.4 MG/ML
0.4 INJECTION, SOLUTION ENDOTRACHEAL; INTRAMEDULLARY; INTRAMUSCULAR; INTRAVENOUS; SUBCUTANEOUS
Status: COMPLETED | OUTPATIENT
Start: 2024-12-10 | End: 2024-12-10

## 2024-12-10 RX ORDER — LORAZEPAM 2 MG/ML
1 INJECTION INTRAMUSCULAR AS NEEDED
Status: DISCONTINUED | OUTPATIENT
Start: 2024-12-10 | End: 2024-12-10 | Stop reason: HOSPADM

## 2024-12-10 RX ORDER — FAMOTIDINE 10 MG/ML
20 INJECTION INTRAVENOUS ONCE AS NEEDED
Status: DISCONTINUED | OUTPATIENT
Start: 2024-12-10 | End: 2024-12-10 | Stop reason: HOSPADM

## 2024-12-10 RX ORDER — DIPHENHYDRAMINE HYDROCHLORIDE 50 MG/ML
50 INJECTION INTRAMUSCULAR; INTRAVENOUS AS NEEDED
Status: DISCONTINUED | OUTPATIENT
Start: 2024-12-10 | End: 2024-12-10 | Stop reason: HOSPADM

## 2024-12-10 RX ORDER — DEXAMETHASONE 6 MG/1
12 TABLET ORAL ONCE
Status: COMPLETED | OUTPATIENT
Start: 2024-12-10 | End: 2024-12-10

## 2024-12-10 RX ORDER — PALONOSETRON 0.05 MG/ML
0.25 INJECTION, SOLUTION INTRAVENOUS ONCE
Status: COMPLETED | OUTPATIENT
Start: 2024-12-10 | End: 2024-12-10

## 2024-12-10 RX ORDER — ALBUTEROL SULFATE 0.83 MG/ML
3 SOLUTION RESPIRATORY (INHALATION) AS NEEDED
Status: DISCONTINUED | OUTPATIENT
Start: 2024-12-10 | End: 2024-12-10 | Stop reason: HOSPADM

## 2024-12-10 ASSESSMENT — PAIN SCALES - GENERAL: PAINLEVEL_OUTOF10: 4

## 2024-12-10 NOTE — PROGRESS NOTES
Started CADD pump with 5-FU continuous infusion over 46 hours per written orders. Dressing at site reinforced and there is positive blood return before hooking up to home pump. Patient educated to come back  @ 11Am on Thursday 12/12/24, her verbalized understanding.    Gale Gasca RN    
normal balance

## 2024-12-11 ENCOUNTER — APPOINTMENT (OUTPATIENT)
Dept: HEMATOLOGY/ONCOLOGY | Facility: CLINIC | Age: 69
End: 2024-12-11
Payer: MEDICARE

## 2024-12-12 ENCOUNTER — INFUSION (OUTPATIENT)
Dept: HEMATOLOGY/ONCOLOGY | Facility: CLINIC | Age: 69
End: 2024-12-12
Payer: MEDICARE

## 2024-12-12 VITALS
RESPIRATION RATE: 18 BRPM | TEMPERATURE: 96.6 F | WEIGHT: 184.53 LBS | SYSTOLIC BLOOD PRESSURE: 109 MMHG | OXYGEN SATURATION: 99 % | DIASTOLIC BLOOD PRESSURE: 68 MMHG | BODY MASS INDEX: 25.03 KG/M2 | HEART RATE: 71 BPM

## 2024-12-12 DIAGNOSIS — C20 RECTAL CANCER (MULTI): ICD-10-CM

## 2024-12-12 PROCEDURE — 2500000004 HC RX 250 GENERAL PHARMACY W/ HCPCS (ALT 636 FOR OP/ED): Performed by: INTERNAL MEDICINE

## 2024-12-12 PROCEDURE — 2500000004 HC RX 250 GENERAL PHARMACY W/ HCPCS (ALT 636 FOR OP/ED): Performed by: NURSE PRACTITIONER

## 2024-12-12 PROCEDURE — 96372 THER/PROPH/DIAG INJ SC/IM: CPT

## 2024-12-12 PROCEDURE — 2500000004 HC RX 250 GENERAL PHARMACY W/ HCPCS (ALT 636 FOR OP/ED): Mod: JZ,JG | Performed by: INTERNAL MEDICINE

## 2024-12-12 RX ORDER — HEPARIN SODIUM,PORCINE/PF 10 UNIT/ML
50 SYRINGE (ML) INTRAVENOUS AS NEEDED
OUTPATIENT
Start: 2024-12-12

## 2024-12-12 RX ORDER — FAMOTIDINE 10 MG/ML
20 INJECTION INTRAVENOUS ONCE AS NEEDED
Status: DISCONTINUED | OUTPATIENT
Start: 2024-12-12 | End: 2024-12-12 | Stop reason: HOSPADM

## 2024-12-12 RX ORDER — HEPARIN 100 UNIT/ML
500 SYRINGE INTRAVENOUS AS NEEDED
OUTPATIENT
Start: 2024-12-12

## 2024-12-12 RX ORDER — ALBUTEROL SULFATE 0.83 MG/ML
3 SOLUTION RESPIRATORY (INHALATION) AS NEEDED
Status: DISCONTINUED | OUTPATIENT
Start: 2024-12-12 | End: 2024-12-12 | Stop reason: HOSPADM

## 2024-12-12 RX ORDER — HEPARIN 100 UNIT/ML
500 SYRINGE INTRAVENOUS AS NEEDED
Status: DISCONTINUED | OUTPATIENT
Start: 2024-12-12 | End: 2024-12-12 | Stop reason: HOSPADM

## 2024-12-12 RX ORDER — DIPHENHYDRAMINE HYDROCHLORIDE 50 MG/ML
50 INJECTION INTRAMUSCULAR; INTRAVENOUS AS NEEDED
Status: DISCONTINUED | OUTPATIENT
Start: 2024-12-12 | End: 2024-12-12 | Stop reason: HOSPADM

## 2024-12-12 RX ORDER — HEPARIN SODIUM,PORCINE/PF 10 UNIT/ML
50 SYRINGE (ML) INTRAVENOUS AS NEEDED
Status: DISCONTINUED | OUTPATIENT
Start: 2024-12-12 | End: 2024-12-12 | Stop reason: HOSPADM

## 2024-12-12 RX ORDER — EPINEPHRINE 0.3 MG/.3ML
0.3 INJECTION SUBCUTANEOUS EVERY 5 MIN PRN
Status: DISCONTINUED | OUTPATIENT
Start: 2024-12-12 | End: 2024-12-12 | Stop reason: HOSPADM

## 2024-12-12 ASSESSMENT — PAIN SCALES - GENERAL: PAINLEVEL_OUTOF10: 0-NO PAIN

## 2024-12-13 ENCOUNTER — APPOINTMENT (OUTPATIENT)
Dept: HEMATOLOGY/ONCOLOGY | Facility: CLINIC | Age: 69
End: 2024-12-13
Payer: MEDICARE

## 2024-12-17 ENCOUNTER — OFFICE VISIT (OUTPATIENT)
Dept: HEMATOLOGY/ONCOLOGY | Facility: CLINIC | Age: 69
End: 2024-12-17
Payer: MEDICARE

## 2024-12-17 VITALS
RESPIRATION RATE: 18 BRPM | OXYGEN SATURATION: 93 % | TEMPERATURE: 97.7 F | WEIGHT: 176.59 LBS | BODY MASS INDEX: 23.95 KG/M2 | DIASTOLIC BLOOD PRESSURE: 68 MMHG | HEART RATE: 84 BPM | SYSTOLIC BLOOD PRESSURE: 107 MMHG

## 2024-12-17 DIAGNOSIS — C20 RECTAL CANCER (MULTI): Primary | ICD-10-CM

## 2024-12-17 PROCEDURE — 1159F MED LIST DOCD IN RCRD: CPT | Performed by: NURSE PRACTITIONER

## 2024-12-17 PROCEDURE — 3078F DIAST BP <80 MM HG: CPT | Performed by: NURSE PRACTITIONER

## 2024-12-17 PROCEDURE — 3051F HG A1C>EQUAL 7.0%<8.0%: CPT | Performed by: NURSE PRACTITIONER

## 2024-12-17 PROCEDURE — 99214 OFFICE O/P EST MOD 30 MIN: CPT | Performed by: NURSE PRACTITIONER

## 2024-12-17 PROCEDURE — 3074F SYST BP LT 130 MM HG: CPT | Performed by: NURSE PRACTITIONER

## 2024-12-17 PROCEDURE — 1125F AMNT PAIN NOTED PAIN PRSNT: CPT | Performed by: NURSE PRACTITIONER

## 2024-12-17 PROCEDURE — 3048F LDL-C <100 MG/DL: CPT | Performed by: NURSE PRACTITIONER

## 2024-12-17 ASSESSMENT — PAIN SCALES - GENERAL: PAINLEVEL_OUTOF10: 3

## 2024-12-17 NOTE — PROGRESS NOTES
Patient ID: Roma Corral is a 69 y.o. male from [place], [place].     Referring Physician: No referring provider defined for this encounter.    Primary Care Provider: Santiago Buckner MD    Diagnosis:   Rectal cancer with liver mets - 6/2024    Primary Oncologic Surgeon:   Christy    Primary Medical Oncologist:  Dann Nieto MD       Primary Radiation Oncologist:  Elvi    Oncologic Sugery History:  7/2/24 - ex lap with colostomy creation     Oncologic Therapy History:  N/a    Molecular Genetics:      Current Sites of Disease:  Liver, rectum    Oncologic Problem List:  Metastatic CRC  CAD  T2DM    Oncologic Narrative:  Roma Corral is a 69 y.o. male who is referred by Dr Vasquez for metastatic rectal cancer to liver s/p lap colostomy creation on 7/2/24. .  There is extensive metastatic disease in the liver with most of the right liver occupied with bulky disease which extends into segment 4.  Additionally there are 2 lesions in the left lateral section.  He initially presented with abdominal pain and pelvic pressure with labs showing anemia.  He has undergone imaging including CT scan, MRI liver, MRI rectum.  He has met with Dr. Boles from radiation oncology with a plan to start short course in the near future. He has met with Dr. Erazo and Dr. Harrison to discuss potential future surgeries.       Past Medical History: Roma has a past medical history of Abdominal pain, Acute non-ST elevation myocardial infarction (NSTEMI) (Multi), Anemia, Anxiety, CAD (coronary artery disease), Cardiology follow-up encounter (12/11/2023), Gout, H/O cardiac catheterization (06/01/2021), H/O cardiovascular stress test (09/03/2021), H/O echocardiogram (06/02/2021), High cholesterol, Hypertension, Overweight, Rectal cancer (Multi), and Type 2 diabetes mellitus.  Surgical History:  Roma has a past surgical history that includes Leg Surgery (Left); Colonoscopy (06/17/2024); and Hemicolectomy w/ ostomy.  Social History:  Roma reports that he has  never smoked. He has never used smokeless tobacco. He reports that he does not currently use alcohol. He reports that he does not use drugs.  Family History:    Family History   Problem Relation Name Age of Onset    No Known Problems Mother      No Known Problems Father      No Known Problems Sister      Leukemia Brother       Family Oncology History:  Cancer-related family history is not on file.    SUBJECTIVE:    History of Present Illness:  Roma Corral is a 69 y.o. male who was referred by Dann Nieto MD and presents with chemotherapy follow up   Mr. Corral is seen in follow up   Completed xrt 7/17 7/24 - received 1st dose FOLFOXIRI / Josefina  8/7/24 - 2nd dose  - break in tx after this due to kidney stone     -Resumed chemo late September with C3.    -Has continued on treatment QOW except for some delays due to cytopenias.   -Tolerating chemo with fatigue, decreased appetite.   -Abdominal pain controlled on long acting morphine and hydromorphone - follows with supportive onc.     Neuropathy unchanged  Some liquid stools during chemo - 1 dose of imodium per week on average        OBJECTIVE:    VS / Pain:  /68   Pulse 84   Temp 36.5 °C (97.7 °F)   Resp 18   Wt 80.1 kg (176 lb 9.4 oz)   SpO2 93%   BMI 23.95 kg/m²   BSA: 2.02 meters squared   Pain Scale: 0    Daily Weight  12/17/24 : 80.1 kg (176 lb 9.4 oz)  12/12/24 : 83.7 kg (184 lb 8.4 oz)  12/10/24 : 83.3 kg (183 lb 12.1 oz)  12/09/24 : 82.5 kg (181 lb 12.3 oz)  11/26/24 : 80.3 kg (177 lb)  11/20/24 : 80.6 kg (177 lb 9.3 oz)  11/19/24 : 81.5 kg (179 lb 10.8 oz)  11/18/24 : 80.5 kg (177 lb 7.5 oz)  11/16/24 : 83.2 kg (183 lb 6.8 oz)  11/14/24 : 83.1 kg (183 lb 3.2 oz)      Physical Exam  Constitutional:       Appearance: He is normal weight.   HENT:      Nose: Nose normal.      Mouth/Throat:      Mouth: Mucous membranes are moist.      Pharynx: Oropharynx is clear.   Eyes:      Extraocular Movements: Extraocular movements intact.      Conjunctiva/sclera:  Conjunctivae normal.   Cardiovascular:      Rate and Rhythm: Normal rate.      Pulses: Normal pulses.   Pulmonary:      Effort: Pulmonary effort is normal.   Abdominal:      General: Abdomen is flat.      Comments: Colostomy bag in place    Musculoskeletal:         General: Normal range of motion.   Skin:     General: Skin is warm.   Neurological:      General: No focal deficit present.      Mental Status: He is alert. Mental status is at baseline.   Psychiatric:         Mood and Affect: Mood normal.         Thought Content: Thought content normal.         Judgment: Judgment normal.         Performance Status:   ECOG 1    Diagnostic Results         WBC   Date/Time Value Ref Range Status   12/09/2024 03:24 PM 5.3 4.4 - 11.3 x10*3/uL Final   12/03/2024 02:15 PM 4.8 4.4 - 11.3 x10*3/uL Final   11/26/2024 03:31 PM 4.4 4.4 - 11.3 x10*3/uL Final     Hemoglobin   Date Value Ref Range Status   12/09/2024 7.2 (L) 13.5 - 17.5 g/dL Final   12/03/2024 7.3 (L) 13.5 - 17.5 g/dL Final   11/26/2024 7.4 (L) 13.5 - 17.5 g/dL Final     MCV   Date/Time Value Ref Range Status   12/09/2024 03:24 PM 92 80 - 100 fL Final   12/03/2024 02:15 PM 93 80 - 100 fL Final   11/26/2024 03:31 PM 91 80 - 100 fL Final     Platelets   Date/Time Value Ref Range Status   12/09/2024 03:24  (L) 150 - 450 x10*3/uL Final   12/03/2024 02:15 PM 73 (L) 150 - 450 x10*3/uL Final   11/26/2024 03:31 PM 54 (L) 150 - 450 x10*3/uL Final     Neutrophils Absolute   Date/Time Value Ref Range Status   12/09/2024 03:24 PM 3.51 1.20 - 7.70 x10*3/uL Final     Comment:     Percent differential counts (%) should be interpreted in the context of the absolute cell counts (cells/uL).   12/03/2024 02:15 PM 2.98 1.20 - 7.70 x10*3/uL Final     Comment:     Percent differential counts (%) should be interpreted in the context of the absolute cell counts (cells/uL).   11/26/2024 03:31 PM 3.16 1.20 - 7.70 x10*3/uL Final     Comment:     Percent differential counts (%) should be  "interpreted in the context of the absolute cell counts (cells/uL).     Bilirubin, Total   Date/Time Value Ref Range Status   12/09/2024 03:24 PM 0.5 0.0 - 1.2 mg/dL Final   11/25/2024 12:46 PM 0.4 0.0 - 1.2 mg/dL Final   11/10/2024 08:42 PM 0.6 0.0 - 1.2 mg/dL Final     AST   Date/Time Value Ref Range Status   12/09/2024 03:24 PM 21 9 - 39 U/L Final   11/25/2024 12:46 PM 19 9 - 39 U/L Final   11/10/2024 08:42 PM 18 9 - 39 U/L Final     ALT   Date/Time Value Ref Range Status   12/09/2024 03:24 PM 10 10 - 52 U/L Final     Comment:     Patients treated with Sulfasalazine may generate falsely decreased results for ALT.   11/25/2024 12:46 PM 15 10 - 52 U/L Final     Comment:     Patients treated with Sulfasalazine may generate falsely decreased results for ALT.   11/10/2024 08:42 PM 11 10 - 52 U/L Final     Comment:     Patients treated with Sulfasalazine may generate falsely decreased results for ALT.     Creatinine   Date/Time Value Ref Range Status   12/09/2024 03:24 PM 0.85 0.50 - 1.30 mg/dL Final   11/25/2024 12:46 PM 0.87 0.50 - 1.30 mg/dL Final   11/10/2024 08:42 PM 1.12 0.50 - 1.30 mg/dL Final     Urea Nitrogen   Date/Time Value Ref Range Status   12/09/2024 03:24 PM 12 6 - 23 mg/dL Final   11/25/2024 12:46 PM 14 6 - 23 mg/dL Final   11/10/2024 08:42 PM 13 6 - 23 mg/dL Final     Albumin   Date/Time Value Ref Range Status   12/09/2024 03:24 PM 3.0 (L) 3.4 - 5.0 g/dL Final   11/25/2024 12:46 PM 3.2 (L) 3.4 - 5.0 g/dL Final   11/10/2024 08:42 PM 3.4 3.4 - 5.0 g/dL Final     No results found for: \"\"  Carcinoembryonic AG   Date/Time Value Ref Range Status   12/09/2024 03:24 PM 11.1 ug/L Final   11/25/2024 12:46 PM 11.1 ug/L Final   10/28/2024 02:03 PM 34.9 ug/L Final     === 10/10/24 ===    CT CHEST ABDOMEN PELVIS W IV CONTRAST    - Impression -  Colorectal cancer restaging scan:    1. Interval slightly improved hepatic metastatic disease when compared to the most recent exam and overall, gradual improved hepatic " metastatic disease when compared to the prior exams.  2. There is nodular pleural thickening in the posterior aspect of the right lower lobe, which is nonspecific and may represent atelectasis. Recommend attention on follow-up.  3. No additional new metastatic disease in the chest, abdomen or pelvis.  4. Redemonstration of multiple segment distal ileal loop thickening with surrounding inflammatory changes, along with the worsening of sigmoid and descending colon thickening and associated hyperemia.  Findings are concerning stable to mild worsening of enterocolitis. New and increasing loculated left paracolic gutter small collection likely secondary to lower abdominopelvic inflammatory changes  involving the bowel loops.  5. Additional chronic and incidental findings as above.      Assessment/Plan   This is a 69 y.o. man with diabetes and coronary artery disease who presented with abdominal pain and anemia in June 2024 and was found to have rectal cancer with diffuse liver metastases.  He has nearly confluent right hepatic liver metastases and 2 lesions in the left lobe.  He has met with Dr. Erazo to discuss potential future surgery should he do well with chemotherapy.  He is also met with Dr. Boles to discuss radiation to the primary tumor which is causing bleeding currently.  7/15 - 7/17 completed radiation.    -7/24 - first dose FOLFOXIRI + Josefina, tolerated with fatigue   -8/7/24 - 2nd dose FOLFOXIRI + Josefina   Tx has been held due to issues with kidney stones   Despite only 2 txs, CEA has gone from >800 to 143  Resumed tx in Sept -   C6 lowered oxali to 75% of dose   Oct CT with tx response    Plan:  Stage IV CRC:  NGS shows wt KRAS/NRAS/BRAF  C8 FOLFOXIRI / Josefina with neulasta on  12/24 - wants to continue  - fluids on day 3 & day 8 (may depend on fluid shortage)   RTC prior to C9 with scans     He is not focused on having ostomy take down at this point although he would ultimately prefer it.     Will review in TB to  start considering definitive surgery especially given cytopenias limiting frequency of treatment.       Anemia   -    - transfuse for hgb <7     Logistics -    - clinic appt - minoff tues   - infusion appt - mentor MELISSA De Guzman[facility]/ [facility]  Office: [phone]  Fax: [phone]

## 2024-12-18 ENCOUNTER — APPOINTMENT (OUTPATIENT)
Dept: HEMATOLOGY/ONCOLOGY | Facility: CLINIC | Age: 69
End: 2024-12-18
Payer: MEDICARE

## 2024-12-19 ENCOUNTER — APPOINTMENT (OUTPATIENT)
Dept: HEMATOLOGY/ONCOLOGY | Facility: HOSPITAL | Age: 69
End: 2024-12-19
Payer: MEDICARE

## 2024-12-23 ENCOUNTER — PHARMACY VISIT (OUTPATIENT)
Dept: PHARMACY | Facility: CLINIC | Age: 69
End: 2024-12-23
Payer: MEDICARE

## 2024-12-23 ENCOUNTER — TELEPHONE (OUTPATIENT)
Dept: ADMISSION | Facility: HOSPITAL | Age: 69
End: 2024-12-23
Payer: MEDICARE

## 2024-12-23 DIAGNOSIS — I25.10 CORONARY ARTERY DISEASE INVOLVING NATIVE CORONARY ARTERY OF NATIVE HEART WITHOUT ANGINA PECTORIS: ICD-10-CM

## 2024-12-23 DIAGNOSIS — E78.2 MIXED HYPERLIPIDEMIA: ICD-10-CM

## 2024-12-23 PROCEDURE — RXMED WILLOW AMBULATORY MEDICATION CHARGE

## 2024-12-23 RX ORDER — ATORVASTATIN CALCIUM 40 MG/1
40 TABLET, FILM COATED ORAL NIGHTLY
Qty: 90 TABLET | Refills: 3 | Status: SHIPPED | OUTPATIENT
Start: 2024-12-23 | End: 2025-12-23

## 2024-12-23 RX ORDER — METOPROLOL SUCCINATE 100 MG/1
100 TABLET, EXTENDED RELEASE ORAL DAILY
Qty: 90 TABLET | Refills: 3 | Status: SHIPPED | OUTPATIENT
Start: 2024-12-23 | End: 2025-12-23

## 2024-12-23 NOTE — TELEPHONE ENCOUNTER
Pt requesting refills atorvastatin and metoprolol.   Message left on VM that these medications should be filled/monitored by PCP.   Contact info left if further questions/concerns.

## 2024-12-24 ENCOUNTER — INFUSION (OUTPATIENT)
Dept: HEMATOLOGY/ONCOLOGY | Facility: CLINIC | Age: 69
End: 2024-12-24
Payer: MEDICARE

## 2024-12-24 ENCOUNTER — OFFICE VISIT (OUTPATIENT)
Dept: PALLIATIVE MEDICINE | Facility: CLINIC | Age: 69
End: 2024-12-24
Payer: MEDICARE

## 2024-12-24 VITALS
BODY MASS INDEX: 23.86 KG/M2 | DIASTOLIC BLOOD PRESSURE: 65 MMHG | TEMPERATURE: 96.6 F | WEIGHT: 175.93 LBS | RESPIRATION RATE: 18 BRPM | SYSTOLIC BLOOD PRESSURE: 132 MMHG | HEART RATE: 90 BPM | OXYGEN SATURATION: 98 %

## 2024-12-24 DIAGNOSIS — Z51.5 PALLIATIVE CARE ENCOUNTER: Primary | ICD-10-CM

## 2024-12-24 DIAGNOSIS — Z51.81 ENCOUNTER FOR MONITORING OPIOID MAINTENANCE THERAPY: ICD-10-CM

## 2024-12-24 DIAGNOSIS — C20 RECTAL CANCER (MULTI): Primary | ICD-10-CM

## 2024-12-24 DIAGNOSIS — G89.3 CANCER RELATED PAIN: ICD-10-CM

## 2024-12-24 DIAGNOSIS — T45.1X5A CHEMOTHERAPY INDUCED NAUSEA AND VOMITING: ICD-10-CM

## 2024-12-24 DIAGNOSIS — R11.2 CHEMOTHERAPY INDUCED NAUSEA AND VOMITING: ICD-10-CM

## 2024-12-24 DIAGNOSIS — Z79.891 ENCOUNTER FOR MONITORING OPIOID MAINTENANCE THERAPY: ICD-10-CM

## 2024-12-24 LAB
ALBUMIN SERPL BCP-MCNC: 3.2 G/DL (ref 3.4–5)
ALP SERPL-CCNC: 268 U/L (ref 33–136)
ALT SERPL W P-5'-P-CCNC: 14 U/L (ref 10–52)
ANION GAP SERPL CALC-SCNC: 13 MMOL/L (ref 10–20)
APPEARANCE UR: CLEAR
AST SERPL W P-5'-P-CCNC: 24 U/L (ref 9–39)
BACTERIA #/AREA URNS AUTO: ABNORMAL /HPF
BASOPHILS # BLD AUTO: 0.02 X10*3/UL (ref 0–0.1)
BASOPHILS NFR BLD AUTO: 0.4 %
BILIRUB SERPL-MCNC: 0.3 MG/DL (ref 0–1.2)
BILIRUB UR STRIP.AUTO-MCNC: NEGATIVE MG/DL
BUN SERPL-MCNC: 14 MG/DL (ref 6–23)
CALCIUM SERPL-MCNC: 8.1 MG/DL (ref 8.6–10.3)
CEA SERPL-MCNC: 9.7 UG/L
CHLORIDE SERPL-SCNC: 110 MMOL/L (ref 98–107)
CO2 SERPL-SCNC: 25 MMOL/L (ref 21–32)
COLOR UR: NORMAL
CREAT SERPL-MCNC: 0.92 MG/DL (ref 0.5–1.3)
DACRYOCYTES BLD QL SMEAR: NORMAL
EGFRCR SERPLBLD CKD-EPI 2021: 90 ML/MIN/1.73M*2
EOSINOPHIL # BLD AUTO: 0.07 X10*3/UL (ref 0–0.7)
EOSINOPHIL NFR BLD AUTO: 1.2 %
ERYTHROCYTE [DISTWIDTH] IN BLOOD BY AUTOMATED COUNT: 20.7 % (ref 11.5–14.5)
GLUCOSE SERPL-MCNC: 113 MG/DL (ref 74–99)
GLUCOSE UR STRIP.AUTO-MCNC: NORMAL MG/DL
HCT VFR BLD AUTO: 25.8 % (ref 41–52)
HGB BLD-MCNC: 7.5 G/DL (ref 13.5–17.5)
HYPOCHROMIA BLD QL SMEAR: NORMAL
IMM GRANULOCYTES # BLD AUTO: 0.02 X10*3/UL (ref 0–0.7)
IMM GRANULOCYTES NFR BLD AUTO: 0.4 % (ref 0–0.9)
KETONES UR STRIP.AUTO-MCNC: NEGATIVE MG/DL
LEUKOCYTE ESTERASE UR QL STRIP.AUTO: NEGATIVE
LYMPHOCYTES # BLD AUTO: 1.13 X10*3/UL (ref 1.2–4.8)
LYMPHOCYTES NFR BLD AUTO: 19.8 %
MCH RBC QN AUTO: 27 PG (ref 26–34)
MCHC RBC AUTO-ENTMCNC: 29.1 G/DL (ref 32–36)
MCV RBC AUTO: 93 FL (ref 80–100)
MONOCYTES # BLD AUTO: 0.48 X10*3/UL (ref 0.1–1)
MONOCYTES NFR BLD AUTO: 8.4 %
MUCOUS THREADS #/AREA URNS AUTO: ABNORMAL /LPF
NEUTROPHILS # BLD AUTO: 3.99 X10*3/UL (ref 1.2–7.7)
NEUTROPHILS NFR BLD AUTO: 69.8 %
NITRITE UR QL STRIP.AUTO: NEGATIVE
NRBC BLD-RTO: 0 /100 WBCS (ref 0–0)
OVALOCYTES BLD QL SMEAR: NORMAL
PH UR STRIP.AUTO: 5.5 [PH]
PLATELET # BLD AUTO: 100 X10*3/UL (ref 150–450)
POLYCHROMASIA BLD QL SMEAR: NORMAL
POTASSIUM SERPL-SCNC: 3.9 MMOL/L (ref 3.5–5.3)
PROT SERPL-MCNC: 6.1 G/DL (ref 6.4–8.2)
PROT UR STRIP.AUTO-MCNC: NEGATIVE MG/DL
RBC # BLD AUTO: 2.78 X10*6/UL (ref 4.5–5.9)
RBC # UR STRIP.AUTO: NEGATIVE /UL
RBC #/AREA URNS AUTO: ABNORMAL /HPF
RBC MORPH BLD: NORMAL
SODIUM SERPL-SCNC: 144 MMOL/L (ref 136–145)
SP GR UR STRIP.AUTO: 1.02
UROBILINOGEN UR STRIP.AUTO-MCNC: NORMAL MG/DL
WBC # BLD AUTO: 5.7 X10*3/UL (ref 4.4–11.3)
WBC #/AREA URNS AUTO: ABNORMAL /HPF

## 2024-12-24 PROCEDURE — 96411 CHEMO IV PUSH ADDL DRUG: CPT

## 2024-12-24 PROCEDURE — 99213 OFFICE O/P EST LOW 20 MIN: CPT | Performed by: NURSE PRACTITIONER

## 2024-12-24 PROCEDURE — 85025 COMPLETE CBC W/AUTO DIFF WBC: CPT

## 2024-12-24 PROCEDURE — 82378 CARCINOEMBRYONIC ANTIGEN: CPT

## 2024-12-24 PROCEDURE — 96416 CHEMO PROLONG INFUSE W/PUMP: CPT

## 2024-12-24 PROCEDURE — 96375 TX/PRO/DX INJ NEW DRUG ADDON: CPT | Mod: INF

## 2024-12-24 PROCEDURE — 96415 CHEMO IV INFUSION ADDL HR: CPT

## 2024-12-24 PROCEDURE — 96413 CHEMO IV INFUSION 1 HR: CPT

## 2024-12-24 PROCEDURE — 99213 OFFICE O/P EST LOW 20 MIN: CPT | Mod: 25 | Performed by: NURSE PRACTITIONER

## 2024-12-24 PROCEDURE — 96367 TX/PROPH/DG ADDL SEQ IV INF: CPT

## 2024-12-24 PROCEDURE — 96417 CHEMO IV INFUS EACH ADDL SEQ: CPT

## 2024-12-24 PROCEDURE — 3048F LDL-C <100 MG/DL: CPT | Performed by: NURSE PRACTITIONER

## 2024-12-24 PROCEDURE — 81001 URINALYSIS AUTO W/SCOPE: CPT

## 2024-12-24 PROCEDURE — 3051F HG A1C>EQUAL 7.0%<8.0%: CPT | Performed by: NURSE PRACTITIONER

## 2024-12-24 PROCEDURE — 80053 COMPREHEN METABOLIC PANEL: CPT | Performed by: INTERNAL MEDICINE

## 2024-12-24 PROCEDURE — 2500000004 HC RX 250 GENERAL PHARMACY W/ HCPCS (ALT 636 FOR OP/ED): Performed by: INTERNAL MEDICINE

## 2024-12-24 RX ORDER — PROCHLORPERAZINE MALEATE 10 MG
10 TABLET ORAL EVERY 6 HOURS PRN
Status: DISCONTINUED | OUTPATIENT
Start: 2024-12-24 | End: 2024-12-24 | Stop reason: HOSPADM

## 2024-12-24 RX ORDER — ATROPINE SULFATE 0.4 MG/ML
0.4 INJECTION, SOLUTION ENDOTRACHEAL; INTRAMEDULLARY; INTRAMUSCULAR; INTRAVENOUS; SUBCUTANEOUS
Status: DISCONTINUED | OUTPATIENT
Start: 2024-12-24 | End: 2024-12-24 | Stop reason: HOSPADM

## 2024-12-24 RX ORDER — ALBUTEROL SULFATE 0.83 MG/ML
3 SOLUTION RESPIRATORY (INHALATION) AS NEEDED
Status: DISCONTINUED | OUTPATIENT
Start: 2024-12-24 | End: 2024-12-24 | Stop reason: HOSPADM

## 2024-12-24 RX ORDER — MORPHINE SULFATE 15 MG/1
TABLET, FILM COATED, EXTENDED RELEASE ORAL
Qty: 90 TABLET | Refills: 0 | Status: SHIPPED | OUTPATIENT
Start: 2024-12-24 | End: 2025-01-23

## 2024-12-24 RX ORDER — EPINEPHRINE 0.3 MG/.3ML
0.3 INJECTION SUBCUTANEOUS EVERY 5 MIN PRN
Status: DISCONTINUED | OUTPATIENT
Start: 2024-12-24 | End: 2024-12-24 | Stop reason: HOSPADM

## 2024-12-24 RX ORDER — FAMOTIDINE 10 MG/ML
20 INJECTION INTRAVENOUS ONCE AS NEEDED
Status: DISCONTINUED | OUTPATIENT
Start: 2024-12-24 | End: 2024-12-24 | Stop reason: HOSPADM

## 2024-12-24 RX ORDER — PROCHLORPERAZINE EDISYLATE 5 MG/ML
10 INJECTION INTRAMUSCULAR; INTRAVENOUS EVERY 6 HOURS PRN
Status: DISCONTINUED | OUTPATIENT
Start: 2024-12-24 | End: 2024-12-24 | Stop reason: HOSPADM

## 2024-12-24 RX ORDER — DIPHENHYDRAMINE HYDROCHLORIDE 50 MG/ML
50 INJECTION INTRAMUSCULAR; INTRAVENOUS AS NEEDED
Status: DISCONTINUED | OUTPATIENT
Start: 2024-12-24 | End: 2024-12-24 | Stop reason: HOSPADM

## 2024-12-24 RX ORDER — PALONOSETRON 0.05 MG/ML
0.25 INJECTION, SOLUTION INTRAVENOUS ONCE
Status: COMPLETED | OUTPATIENT
Start: 2024-12-24 | End: 2024-12-24

## 2024-12-24 RX ORDER — DEXAMETHASONE 6 MG/1
12 TABLET ORAL ONCE
Status: COMPLETED | OUTPATIENT
Start: 2024-12-24 | End: 2024-12-24

## 2024-12-24 RX ORDER — LORAZEPAM 2 MG/ML
1 INJECTION INTRAMUSCULAR AS NEEDED
Status: DISCONTINUED | OUTPATIENT
Start: 2024-12-24 | End: 2024-12-24 | Stop reason: HOSPADM

## 2024-12-24 ASSESSMENT — PAIN SCALES - GENERAL: PAINLEVEL_OUTOF10: 0-NO PAIN

## 2024-12-24 NOTE — PROGRESS NOTES
SUPPORTIVE AND PALLIATIVE ONCOLOGY OUTPATIENT FOLLOW-UP      SERVICE DATE: 12/24/2024    Cancer History   Rectal cancer with liver mets  -7/2/24: s/p ex lap with colostomy creation  -completed XRT 7/17/24  -s/p 5 cycles FOLFOXFIRI/ Bevacizumab    --c/b painful kidney stones requiring ED visits      Onc: [facility]       Subjective   HISTORY OF PRESENT ILLNESS: Roma Corral is a 69 y.o. male with PMHx of NSTEMI, CAD, HTN, DMII, and metastatic rectal cancer.      He presents to supportive oncology for follow up for pain and symptom management.      Pt seen in infusion for follow up. Doing well today, currently denies pain. Taking MSER 15/30mg bid, occasionally requires dose of PRN dilaudid. Moving bowels daily, takes colace/senna about once/week. Occasional nausea, takes zofran with good relief. Appetite is good this week, staying well hydrated. Mood is stable, has not required PRN ativan. Sleeping fairly well this pas week, getting 4 hr stretch. Energy levels are low, independent with ADLs.     Pain Assessment:  Pain Score: 0  Location:   Education:     Symptom Assessment:  Pain:none  Headache: none  Dizziness:none  Lack of energy: somewhat  Difficulty sleeping: none  Worrying: a little  Anxiety: a little  Depression: none  Pain in mouth/swallowing: none  Dry mouth: none  Taste changes: a little  Shortness of breath: a little  Lack of appetite: a little   Nausea: a little  Vomiting: none  Constipation: none  Diarrhea: none  Sore muscles: none  Numbness or tingling in hands/feet/other: a little  Weight loss: none      Information obtained from: interview of patient  ______________________________________________________________________        Objective                PHYSICAL EXAMINATION   Vital Signs:   Vital signs reviewed  132/65, 90, 18, 35.9, 98%    Pain Score:  0     Physical Exam  Vitals reviewed.   Constitutional:       Appearance: Normal appearance.   HENT:      Head: Normocephalic.   Cardiovascular:      Rate and  Rhythm: Normal rate.   Pulmonary:      Effort: Pulmonary effort is normal.   Abdominal:      Palpations: Abdomen is soft.      Comments: colostomy   Musculoskeletal:         General: Normal range of motion.   Skin:     General: Skin is warm and dry.      Coloration: Skin is pale.   Neurological:      General: No focal deficit present.      Mental Status: He is alert and oriented to person, place, and time.   Psychiatric:         Mood and Affect: Mood normal.         Judgment: Judgment normal.         ASSESSMENT/PLAN    Pain  Pain is: cancer related pain  Type: visceral  Pain control: sub-optimally controlled  Home regimen:   -continue MSER 15/30mg bid. Rx sent today.   -continue dilaudid 4mg q4hrs PRN. Encouraged pt to use this medication as needed. No Rx needed today.   Intolerances/previously tried:      Opioid Use  Medication Management:   - OARRS report reviewed with no aberrant behavior; consistent with  prescriptions/records and patient history  - MED 45.  Overdose Risk Score 270.   This has been discussed with patient.   - We will continue to closely monitor the patient for signs of prescription misuse including UDS, OARRS review and subjective reports at each visit.  - concurrent benzodiazepine use with ativan, educated pt on medication safety when used in combination with opiates    - I am a provider who either is or has consulted and collaborated with a provider certified in Hospice and Palliative Medicine and have conducted a face-face visit and examination for this patient.  - Routine Urine Drug Screen completed deferred (MED 0) appropriately positive for opioids and negative for illicit substances  - Controlled Substance Agreement completed deferred (MED 0)   - Specifically discussed that controlled substance prescriptions will only be provided by our group as outlined in the completed agreement  - Prescribed naloxone deferred  - Red Flags: none      Nausea   At risk for nausea without vomiting  related to chemotherapy   -continue zofran 4mg q8hrs PRN.  -continue compazine 10mg q6hrs PRN.      Constipation   At risk for constipation related to opioids, has colostomy,  currently not constipated   Usual bowel pattern: daily   Current regimen:   -educated pt on importance of maintaining regular bowel schedule  -continue colace 100mg bid PRN for hard stools  -continue senna 8.6mg, 1-2tabs, 1-2x/day PRN  -continue MOM 15mL 4x/day PRN     Altered Mood  Acute on chronic anxiety and depression related to health concerns   uncontrolled with home regimen  Current regimen:   -continue ativan 0.5mg bid PRN. No Rx needed today.   -discussed long term medication management, will re-address PRN     Sleeping Difficulty:  Impaired sleep related to pain  Current regimen:    -goal for improved sleep with better pain control   -see pain plan/section above      Decreased appetite  Related to malignancy, chemotherapy, taste changes, and disease process  Nutrition following  Weight loss 25-30 lbs  Current regimen:    -encouraged smaller, more frequent meals through out the day  -encouraged use of supplements such as Boost, Ensure, etc.    -encouraged diet high in protein/ calories  -continue to follow with RD    Supportive Interventions:    Supportive and Palliative Oncology encounter:  Emotional support provided  Coordination of care  We will continue to follow and address symptoms as needed    Advance Directives  Existence of Advance Directives:No - has interest  Decision maker: Surrogate decision maker is Betsy Corral (wife): [phone]  Code Status: Full code        Next Follow-Up Visit:  Return to clinic in 6 weeks    Signature and billing  Medical complexity was low level due to due to complexity of problems, extensive data review, and high risk of management/treatment.  Time was spent on the following: Prep Time, Time Directly with Patient/Family/Caregiver, Documentation Time. Total time spent: 20  mins      Data  Diagnostic tests and information reviewed for today's visit:  Most recent labs         Some elements copied from my note on 12/9/24, the elements have been updated and all reflect current decision making from today, 12/24/2024.      Plan of Care discussed with: Patient    SIGNATURE: MELISSA Hall-CNP    Contact information:  Supportive and Palliative Oncology  Monday-Friday 8 AM-5 PM  Phone:  [phone], press option #5, then option #1.   Or Epic Secure Chat

## 2024-12-26 ENCOUNTER — INFUSION (OUTPATIENT)
Dept: HEMATOLOGY/ONCOLOGY | Facility: CLINIC | Age: 69
End: 2024-12-26
Payer: MEDICARE

## 2024-12-26 VITALS
RESPIRATION RATE: 18 BRPM | SYSTOLIC BLOOD PRESSURE: 132 MMHG | TEMPERATURE: 96.6 F | OXYGEN SATURATION: 100 % | HEART RATE: 59 BPM | DIASTOLIC BLOOD PRESSURE: 78 MMHG

## 2024-12-26 DIAGNOSIS — C20 RECTAL CANCER (MULTI): ICD-10-CM

## 2024-12-26 DIAGNOSIS — C20 RECTAL CANCER (MULTI): Primary | ICD-10-CM

## 2024-12-26 PROCEDURE — 2500000004 HC RX 250 GENERAL PHARMACY W/ HCPCS (ALT 636 FOR OP/ED): Mod: JZ,JG | Performed by: INTERNAL MEDICINE

## 2024-12-26 PROCEDURE — 2500000004 HC RX 250 GENERAL PHARMACY W/ HCPCS (ALT 636 FOR OP/ED): Performed by: INTERNAL MEDICINE

## 2024-12-26 PROCEDURE — 2500000004 HC RX 250 GENERAL PHARMACY W/ HCPCS (ALT 636 FOR OP/ED): Performed by: NURSE PRACTITIONER

## 2024-12-26 PROCEDURE — 96372 THER/PROPH/DIAG INJ SC/IM: CPT

## 2024-12-26 RX ORDER — ALBUTEROL SULFATE 0.83 MG/ML
3 SOLUTION RESPIRATORY (INHALATION) AS NEEDED
OUTPATIENT
Start: 2025-01-08

## 2024-12-26 RX ORDER — DIPHENHYDRAMINE HYDROCHLORIDE 50 MG/ML
50 INJECTION INTRAMUSCULAR; INTRAVENOUS AS NEEDED
OUTPATIENT
Start: 2025-01-10

## 2024-12-26 RX ORDER — LORAZEPAM 2 MG/ML
1 INJECTION INTRAMUSCULAR AS NEEDED
OUTPATIENT
Start: 2025-01-08

## 2024-12-26 RX ORDER — DIPHENHYDRAMINE HYDROCHLORIDE 50 MG/ML
50 INJECTION INTRAMUSCULAR; INTRAVENOUS AS NEEDED
Status: DISCONTINUED | OUTPATIENT
Start: 2024-12-26 | End: 2024-12-26 | Stop reason: HOSPADM

## 2024-12-26 RX ORDER — PALONOSETRON 0.05 MG/ML
0.25 INJECTION, SOLUTION INTRAVENOUS ONCE
OUTPATIENT
Start: 2025-01-22

## 2024-12-26 RX ORDER — LORAZEPAM 2 MG/ML
1 INJECTION INTRAMUSCULAR AS NEEDED
OUTPATIENT
Start: 2025-01-22

## 2024-12-26 RX ORDER — PROCHLORPERAZINE EDISYLATE 5 MG/ML
10 INJECTION INTRAMUSCULAR; INTRAVENOUS EVERY 6 HOURS PRN
OUTPATIENT
Start: 2025-01-22

## 2024-12-26 RX ORDER — EPINEPHRINE 0.3 MG/.3ML
0.3 INJECTION SUBCUTANEOUS EVERY 5 MIN PRN
OUTPATIENT
Start: 2025-01-24

## 2024-12-26 RX ORDER — FAMOTIDINE 10 MG/ML
20 INJECTION INTRAVENOUS ONCE AS NEEDED
Status: DISCONTINUED | OUTPATIENT
Start: 2024-12-26 | End: 2024-12-26 | Stop reason: HOSPADM

## 2024-12-26 RX ORDER — ATROPINE SULFATE 0.4 MG/ML
0.4 INJECTION, SOLUTION ENDOTRACHEAL; INTRAMEDULLARY; INTRAMUSCULAR; INTRAVENOUS; SUBCUTANEOUS
OUTPATIENT
Start: 2025-01-22

## 2024-12-26 RX ORDER — FAMOTIDINE 10 MG/ML
20 INJECTION INTRAVENOUS ONCE AS NEEDED
OUTPATIENT
Start: 2025-01-24

## 2024-12-26 RX ORDER — PROCHLORPERAZINE MALEATE 10 MG
10 TABLET ORAL EVERY 6 HOURS PRN
OUTPATIENT
Start: 2025-01-08

## 2024-12-26 RX ORDER — ALBUTEROL SULFATE 0.83 MG/ML
3 SOLUTION RESPIRATORY (INHALATION) AS NEEDED
OUTPATIENT
Start: 2025-01-10

## 2024-12-26 RX ORDER — DEXAMETHASONE 4 MG/1
12 TABLET ORAL ONCE
OUTPATIENT
Start: 2025-01-22 | End: 2025-01-22

## 2024-12-26 RX ORDER — EPINEPHRINE 0.3 MG/.3ML
0.3 INJECTION SUBCUTANEOUS EVERY 5 MIN PRN
OUTPATIENT
Start: 2025-01-10

## 2024-12-26 RX ORDER — FAMOTIDINE 10 MG/ML
20 INJECTION INTRAVENOUS ONCE AS NEEDED
OUTPATIENT
Start: 2025-01-22

## 2024-12-26 RX ORDER — ALBUTEROL SULFATE 0.83 MG/ML
3 SOLUTION RESPIRATORY (INHALATION) AS NEEDED
Status: DISCONTINUED | OUTPATIENT
Start: 2024-12-26 | End: 2024-12-26 | Stop reason: HOSPADM

## 2024-12-26 RX ORDER — ALBUTEROL SULFATE 0.83 MG/ML
3 SOLUTION RESPIRATORY (INHALATION) AS NEEDED
OUTPATIENT
Start: 2025-01-24

## 2024-12-26 RX ORDER — DIPHENHYDRAMINE HYDROCHLORIDE 50 MG/ML
50 INJECTION INTRAMUSCULAR; INTRAVENOUS AS NEEDED
OUTPATIENT
Start: 2025-01-22

## 2024-12-26 RX ORDER — EPINEPHRINE 0.3 MG/.3ML
0.3 INJECTION SUBCUTANEOUS EVERY 5 MIN PRN
OUTPATIENT
Start: 2025-01-22

## 2024-12-26 RX ORDER — PALONOSETRON 0.05 MG/ML
0.25 INJECTION, SOLUTION INTRAVENOUS ONCE
OUTPATIENT
Start: 2025-01-08

## 2024-12-26 RX ORDER — DIPHENHYDRAMINE HYDROCHLORIDE 50 MG/ML
50 INJECTION INTRAMUSCULAR; INTRAVENOUS AS NEEDED
OUTPATIENT
Start: 2025-01-08

## 2024-12-26 RX ORDER — HEPARIN SODIUM,PORCINE/PF 10 UNIT/ML
50 SYRINGE (ML) INTRAVENOUS AS NEEDED
Status: DISCONTINUED | OUTPATIENT
Start: 2024-12-26 | End: 2024-12-26 | Stop reason: HOSPADM

## 2024-12-26 RX ORDER — PROCHLORPERAZINE EDISYLATE 5 MG/ML
10 INJECTION INTRAMUSCULAR; INTRAVENOUS EVERY 6 HOURS PRN
OUTPATIENT
Start: 2025-01-08

## 2024-12-26 RX ORDER — HEPARIN 100 UNIT/ML
500 SYRINGE INTRAVENOUS AS NEEDED
OUTPATIENT
Start: 2024-12-26

## 2024-12-26 RX ORDER — HEPARIN SODIUM,PORCINE/PF 10 UNIT/ML
50 SYRINGE (ML) INTRAVENOUS AS NEEDED
OUTPATIENT
Start: 2024-12-26

## 2024-12-26 RX ORDER — ALBUTEROL SULFATE 0.83 MG/ML
3 SOLUTION RESPIRATORY (INHALATION) AS NEEDED
OUTPATIENT
Start: 2025-01-22

## 2024-12-26 RX ORDER — PROCHLORPERAZINE MALEATE 10 MG
10 TABLET ORAL EVERY 6 HOURS PRN
OUTPATIENT
Start: 2025-01-22

## 2024-12-26 RX ORDER — EPINEPHRINE 0.3 MG/.3ML
0.3 INJECTION SUBCUTANEOUS EVERY 5 MIN PRN
Status: DISCONTINUED | OUTPATIENT
Start: 2024-12-26 | End: 2024-12-26 | Stop reason: HOSPADM

## 2024-12-26 RX ORDER — ATROPINE SULFATE 0.4 MG/ML
0.4 INJECTION, SOLUTION ENDOTRACHEAL; INTRAMEDULLARY; INTRAMUSCULAR; INTRAVENOUS; SUBCUTANEOUS
OUTPATIENT
Start: 2025-01-08

## 2024-12-26 RX ORDER — FAMOTIDINE 10 MG/ML
20 INJECTION INTRAVENOUS ONCE AS NEEDED
OUTPATIENT
Start: 2025-01-10

## 2024-12-26 RX ORDER — FAMOTIDINE 10 MG/ML
20 INJECTION INTRAVENOUS ONCE AS NEEDED
OUTPATIENT
Start: 2025-01-08

## 2024-12-26 RX ORDER — EPINEPHRINE 0.3 MG/.3ML
0.3 INJECTION SUBCUTANEOUS EVERY 5 MIN PRN
OUTPATIENT
Start: 2025-01-08

## 2024-12-26 RX ORDER — DIPHENHYDRAMINE HYDROCHLORIDE 50 MG/ML
50 INJECTION INTRAMUSCULAR; INTRAVENOUS AS NEEDED
OUTPATIENT
Start: 2025-01-24

## 2024-12-26 RX ORDER — HEPARIN 100 UNIT/ML
500 SYRINGE INTRAVENOUS AS NEEDED
Status: DISCONTINUED | OUTPATIENT
Start: 2024-12-26 | End: 2024-12-26 | Stop reason: HOSPADM

## 2024-12-26 RX ORDER — DEXAMETHASONE 4 MG/1
12 TABLET ORAL ONCE
OUTPATIENT
Start: 2025-01-08 | End: 2025-01-08

## 2024-12-26 ASSESSMENT — PAIN SCALES - GENERAL: PAINLEVEL_OUTOF10: 0-NO PAIN

## 2024-12-31 ENCOUNTER — INFUSION (OUTPATIENT)
Dept: HEMATOLOGY/ONCOLOGY | Facility: CLINIC | Age: 69
End: 2024-12-31
Payer: MEDICARE

## 2024-12-31 VITALS
DIASTOLIC BLOOD PRESSURE: 72 MMHG | RESPIRATION RATE: 18 BRPM | BODY MASS INDEX: 22.83 KG/M2 | SYSTOLIC BLOOD PRESSURE: 146 MMHG | HEART RATE: 99 BPM | WEIGHT: 168.32 LBS | OXYGEN SATURATION: 99 % | TEMPERATURE: 97.2 F

## 2024-12-31 DIAGNOSIS — C20 RECTAL CANCER (MULTI): ICD-10-CM

## 2024-12-31 PROCEDURE — 2500000004 HC RX 250 GENERAL PHARMACY W/ HCPCS (ALT 636 FOR OP/ED): Performed by: NURSE PRACTITIONER

## 2024-12-31 PROCEDURE — 2500000004 HC RX 250 GENERAL PHARMACY W/ HCPCS (ALT 636 FOR OP/ED): Performed by: INTERNAL MEDICINE

## 2024-12-31 PROCEDURE — 96360 HYDRATION IV INFUSION INIT: CPT | Mod: INF

## 2024-12-31 RX ORDER — HEPARIN SODIUM,PORCINE/PF 10 UNIT/ML
50 SYRINGE (ML) INTRAVENOUS AS NEEDED
OUTPATIENT
Start: 2024-12-31

## 2024-12-31 RX ORDER — HEPARIN 100 UNIT/ML
500 SYRINGE INTRAVENOUS AS NEEDED
Status: DISCONTINUED | OUTPATIENT
Start: 2024-12-31 | End: 2024-12-31 | Stop reason: HOSPADM

## 2024-12-31 RX ORDER — HEPARIN 100 UNIT/ML
500 SYRINGE INTRAVENOUS AS NEEDED
OUTPATIENT
Start: 2024-12-31

## 2024-12-31 RX ORDER — HEPARIN SODIUM,PORCINE/PF 10 UNIT/ML
50 SYRINGE (ML) INTRAVENOUS AS NEEDED
Status: DISCONTINUED | OUTPATIENT
Start: 2024-12-31 | End: 2024-12-31 | Stop reason: HOSPADM

## 2024-12-31 RX ADMIN — HEPARIN 500 UNITS: 100 SYRINGE at 08:52

## 2024-12-31 RX ADMIN — SODIUM CHLORIDE 1000 ML: 9 INJECTION, SOLUTION INTRAVENOUS at 07:52

## 2024-12-31 ASSESSMENT — PAIN SCALES - GENERAL: PAINLEVEL_OUTOF10: 7

## 2025-01-07 ENCOUNTER — OFFICE VISIT (OUTPATIENT)
Dept: HEMATOLOGY/ONCOLOGY | Facility: CLINIC | Age: 70
End: 2025-01-07
Payer: MEDICARE

## 2025-01-07 ENCOUNTER — TELEPHONE (OUTPATIENT)
Dept: HEMATOLOGY/ONCOLOGY | Facility: CLINIC | Age: 70
End: 2025-01-07

## 2025-01-07 ENCOUNTER — TELEPHONE (OUTPATIENT)
Dept: PALLIATIVE MEDICINE | Facility: HOSPITAL | Age: 70
End: 2025-01-07
Payer: MEDICARE

## 2025-01-07 VITALS
DIASTOLIC BLOOD PRESSURE: 73 MMHG | RESPIRATION RATE: 18 BRPM | WEIGHT: 169 LBS | BODY MASS INDEX: 22.92 KG/M2 | TEMPERATURE: 98.6 F | SYSTOLIC BLOOD PRESSURE: 129 MMHG | OXYGEN SATURATION: 99 % | HEART RATE: 78 BPM

## 2025-01-07 DIAGNOSIS — Z51.5 PALLIATIVE CARE ENCOUNTER: ICD-10-CM

## 2025-01-07 DIAGNOSIS — C20 RECTAL CANCER (MULTI): Primary | ICD-10-CM

## 2025-01-07 DIAGNOSIS — G89.3 CANCER RELATED PAIN: ICD-10-CM

## 2025-01-07 PROCEDURE — 3074F SYST BP LT 130 MM HG: CPT | Performed by: INTERNAL MEDICINE

## 2025-01-07 PROCEDURE — 1125F AMNT PAIN NOTED PAIN PRSNT: CPT | Performed by: INTERNAL MEDICINE

## 2025-01-07 PROCEDURE — 1036F TOBACCO NON-USER: CPT | Performed by: INTERNAL MEDICINE

## 2025-01-07 PROCEDURE — 1159F MED LIST DOCD IN RCRD: CPT | Performed by: INTERNAL MEDICINE

## 2025-01-07 PROCEDURE — 3078F DIAST BP <80 MM HG: CPT | Performed by: INTERNAL MEDICINE

## 2025-01-07 PROCEDURE — RXMED WILLOW AMBULATORY MEDICATION CHARGE

## 2025-01-07 PROCEDURE — 99214 OFFICE O/P EST MOD 30 MIN: CPT | Performed by: INTERNAL MEDICINE

## 2025-01-07 RX ORDER — MORPHINE SULFATE 15 MG/1
TABLET, FILM COATED, EXTENDED RELEASE ORAL
Qty: 90 TABLET | Refills: 0 | Status: SHIPPED | OUTPATIENT
Start: 2025-01-07 | End: 2025-02-06

## 2025-01-07 ASSESSMENT — PAIN SCALES - GENERAL: PAINLEVEL_OUTOF10: 2

## 2025-01-07 NOTE — TELEPHONE ENCOUNTER
Spoke with Mr. Corral who states he needs a refill of MS CONTIN 15 mg. Dom to [facility], The script written on 24 was not picked up and the script is now . New script pended to Provider Severo NAGEL.

## 2025-01-07 NOTE — PROGRESS NOTES
Patient ID: Roma Corral is a 69 y.o. male from [place], [place].     Referring Physician: Dann Nieto MD  [id number] [place] [address]    Primary Care Provider: Santiago Buckner MD    Diagnosis:   Rectal cancer with liver mets - 6/2024    Primary Oncologic Surgeon:   Christy    Primary Medical Oncologist:  Dann Nieto MD       Primary Radiation Oncologist:  Elvi    Oncologic Sugery History:  7/2/24 - ex lap with colostomy creation     Oncologic Therapy History:  N/a    Molecular Genetics:      Current Sites of Disease:  Liver, rectum    Oncologic Problem List:  Metastatic CRC  CAD  T2DM    Oncologic Narrative:  Roma Corral is a 69 y.o. male who is referred by Dr Vasquez for metastatic rectal cancer to liver s/p lap colostomy creation on 7/2/24. .  There is extensive metastatic disease in the liver with most of the right liver occupied with bulky disease which extends into segment 4.  Additionally there are 2 lesions in the left lateral section.  He initially presented with abdominal pain and pelvic pressure with labs showing anemia.  He has undergone imaging including CT scan, MRI liver, MRI rectum.  He has met with Dr. Boles from radiation oncology with a plan to start short course in the near future. He has met with Dr. Erazo and Dr. Harrison to discuss potential future surgeries.       Past Medical History: Roma has a past medical history of Abdominal pain, Acute non-ST elevation myocardial infarction (NSTEMI) (Multi), Anemia, Anxiety, CAD (coronary artery disease), Cardiology follow-up encounter (12/11/2023), Gout, H/O cardiac catheterization (06/01/2021), H/O cardiovascular stress test (09/03/2021), H/O echocardiogram (06/02/2021), High cholesterol, Hypertension, Overweight, Rectal cancer (Multi), and Type 2 diabetes mellitus.  Surgical History:  Roma has a past surgical history that includes Leg Surgery (Left); Colonoscopy (06/17/2024); and Hemicolectomy w/ ostomy.  Social History:  Roma reports  that he has never smoked. He has never used smokeless tobacco. He reports that he does not currently use alcohol. He reports that he does not use drugs.  Family History:    Family History   Problem Relation Name Age of Onset    No Known Problems Mother      No Known Problems Father      No Known Problems Sister      Leukemia Brother       Family Oncology History:  Cancer-related family history is not on file.    SUBJECTIVE:    History of Present Illness:  Roma Corral is a 69 y.o. male who was referred by Dann Nieto MD and presents with chemotherapy follow up   Mr. Corral is seen in follow up   Completed xrt 7/17 7/24 - received 1st dose FOLFOXIRI / Josefina  8/7/24 - 2nd dose  - break in tx after this due to kidney stone     -Resumed chemo late September with C3.    -Has continued on treatment QOW except for some delays due to cytopenias.   -Tolerating chemo with fatigue, decreased appetite.   -Abdominal pain controlled on long acting morphine and hydromorphone - follows with supportive onc.     Neuropathy unchanged  Some liquid stools during chemo - 1 dose of imodium per week on average      OBJECTIVE:    VS / Pain:  /73 (BP Location: Right arm, Patient Position: Sitting, BP Cuff Size: Adult)   Pulse 78   Temp 37 °C (98.6 °F) (Core)   Resp 18   Wt 76.7 kg (169 lb)   SpO2 99%   BMI 22.92 kg/m²   BSA: 1.97 meters squared   Pain Scale: 0    Daily Weight  01/07/25 : 76.7 kg (169 lb)  12/31/24 : 76.4 kg (168 lb 5.1 oz)  12/24/24 : 79.8 kg (175 lb 14.8 oz)  12/17/24 : 80.1 kg (176 lb 9.4 oz)  12/12/24 : 83.7 kg (184 lb 8.4 oz)  12/10/24 : 83.3 kg (183 lb 12.1 oz)  12/09/24 : 82.5 kg (181 lb 12.3 oz)  11/26/24 : 80.3 kg (177 lb)  11/20/24 : 80.6 kg (177 lb 9.3 oz)  11/19/24 : 81.5 kg (179 lb 10.8 oz)      Physical Exam  Constitutional:       Appearance: He is normal weight.   HENT:      Nose: Nose normal.      Mouth/Throat:      Mouth: Mucous membranes are moist.      Pharynx: Oropharynx is clear.   Eyes:       Extraocular Movements: Extraocular movements intact.      Conjunctiva/sclera: Conjunctivae normal.   Cardiovascular:      Rate and Rhythm: Normal rate.      Pulses: Normal pulses.   Pulmonary:      Effort: Pulmonary effort is normal.   Abdominal:      General: Abdomen is flat.      Comments: Colostomy bag in place    Musculoskeletal:         General: Normal range of motion.   Skin:     General: Skin is warm.   Neurological:      General: No focal deficit present.      Mental Status: He is alert. Mental status is at baseline.   Psychiatric:         Mood and Affect: Mood normal.         Thought Content: Thought content normal.         Judgment: Judgment normal.       Performance Status:   ECOG 1    Diagnostic Results         WBC   Date/Time Value Ref Range Status   12/24/2024 07:51 AM 5.7 4.4 - 11.3 x10*3/uL Final   12/09/2024 03:24 PM 5.3 4.4 - 11.3 x10*3/uL Final   12/03/2024 02:15 PM 4.8 4.4 - 11.3 x10*3/uL Final     Hemoglobin   Date Value Ref Range Status   12/24/2024 7.5 (L) 13.5 - 17.5 g/dL Final   12/09/2024 7.2 (L) 13.5 - 17.5 g/dL Final   12/03/2024 7.3 (L) 13.5 - 17.5 g/dL Final     MCV   Date/Time Value Ref Range Status   12/24/2024 07:51 AM 93 80 - 100 fL Final   12/09/2024 03:24 PM 92 80 - 100 fL Final   12/03/2024 02:15 PM 93 80 - 100 fL Final     Platelets   Date/Time Value Ref Range Status   12/24/2024 07:51  (L) 150 - 450 x10*3/uL Final   12/09/2024 03:24  (L) 150 - 450 x10*3/uL Final   12/03/2024 02:15 PM 73 (L) 150 - 450 x10*3/uL Final     Neutrophils Absolute   Date/Time Value Ref Range Status   12/24/2024 07:51 AM 3.99 1.20 - 7.70 x10*3/uL Final     Comment:     Percent differential counts (%) should be interpreted in the context of the absolute cell counts (cells/uL).   12/09/2024 03:24 PM 3.51 1.20 - 7.70 x10*3/uL Final     Comment:     Percent differential counts (%) should be interpreted in the context of the absolute cell counts (cells/uL).   12/03/2024 02:15 PM 2.98 1.20 - 7.70  "x10*3/uL Final     Comment:     Percent differential counts (%) should be interpreted in the context of the absolute cell counts (cells/uL).     Bilirubin, Total   Date/Time Value Ref Range Status   12/24/2024 07:51 AM 0.3 0.0 - 1.2 mg/dL Final   12/09/2024 03:24 PM 0.5 0.0 - 1.2 mg/dL Final   11/25/2024 12:46 PM 0.4 0.0 - 1.2 mg/dL Final     AST   Date/Time Value Ref Range Status   12/24/2024 07:51 AM 24 9 - 39 U/L Final   12/09/2024 03:24 PM 21 9 - 39 U/L Final   11/25/2024 12:46 PM 19 9 - 39 U/L Final     ALT   Date/Time Value Ref Range Status   12/24/2024 07:51 AM 14 10 - 52 U/L Final     Comment:     Patients treated with Sulfasalazine may generate falsely decreased results for ALT.   12/09/2024 03:24 PM 10 10 - 52 U/L Final     Comment:     Patients treated with Sulfasalazine may generate falsely decreased results for ALT.   11/25/2024 12:46 PM 15 10 - 52 U/L Final     Comment:     Patients treated with Sulfasalazine may generate falsely decreased results for ALT.     Creatinine   Date/Time Value Ref Range Status   12/24/2024 07:51 AM 0.92 0.50 - 1.30 mg/dL Final   12/09/2024 03:24 PM 0.85 0.50 - 1.30 mg/dL Final   11/25/2024 12:46 PM 0.87 0.50 - 1.30 mg/dL Final     Urea Nitrogen   Date/Time Value Ref Range Status   12/24/2024 07:51 AM 14 6 - 23 mg/dL Final   12/09/2024 03:24 PM 12 6 - 23 mg/dL Final   11/25/2024 12:46 PM 14 6 - 23 mg/dL Final     Albumin   Date/Time Value Ref Range Status   12/24/2024 07:51 AM 3.2 (L) 3.4 - 5.0 g/dL Final   12/09/2024 03:24 PM 3.0 (L) 3.4 - 5.0 g/dL Final   11/25/2024 12:46 PM 3.2 (L) 3.4 - 5.0 g/dL Final     No results found for: \"\"  Carcinoembryonic AG   Date/Time Value Ref Range Status   12/24/2024 07:51 AM 9.7 ug/L Final   12/09/2024 03:24 PM 11.1 ug/L Final   11/25/2024 12:46 PM 11.1 ug/L Final     === 10/10/24 ===    CT CHEST ABDOMEN PELVIS W IV CONTRAST    - Impression -  Colorectal cancer restaging scan:    1. Interval slightly improved hepatic metastatic " disease when compared to the most recent exam and overall, gradual improved hepatic metastatic disease when compared to the prior exams.  2. There is nodular pleural thickening in the posterior aspect of the right lower lobe, which is nonspecific and may represent atelectasis. Recommend attention on follow-up.  3. No additional new metastatic disease in the chest, abdomen or pelvis.  4. Redemonstration of multiple segment distal ileal loop thickening with surrounding inflammatory changes, along with the worsening of sigmoid and descending colon thickening and associated hyperemia.  Findings are concerning stable to mild worsening of enterocolitis. New and increasing loculated left paracolic gutter small collection likely secondary to lower abdominopelvic inflammatory changes  involving the bowel loops.  5. Additional chronic and incidental findings as above.    === 11/10/24 ===    CT ABDOMEN PELVIS WO IV CONTRAST    - Impression -  No acute abdominal or pelvic process.    Postsurgical changes of diverting left lower quadrant colostomy.  Redemonstration of wall thickening in the region of the terminal ileum, cecum and ascending colon. No evidence for bowel obstruction.  Findings may relate to infectious/inflammatory enterocolitis. Correlate with history of prior radiation to exclude radiation enteritis.    There is redemonstration of several heterogeneous hypodense masses in the right hepatic lobe not significantly changed from prior CT consistent with known history of metastatic disease. Evaluation somewhat limited by lack of contrast. No definite new lesion is identified.    There is a punctate 2 mm nonobstructing calculus in the lower pole of the right kidney.    Bladder is under distended with moderate wall thickening. Correlate with symptomatology and urinalysis to exclude infectious cystitis.    There is presacral edema. Interval resolution of previously noted fluid along the left pericolic gutter.    There is  a 1.3 cm peritoneal soft tissue nodule  which is decreased in size from prior CT at which time it measured up to 1.8 cm. No  definite new nodule is seen. Attention on continued follow-up is advised.    Additional findings as described above.    MACRO:  None    Signed by: Sandip Curtis 11/10/2024 9:51 PM  Dictation workstation:   [id number]      Assessment/Plan   This is a 69 y.o. man with diabetes and coronary artery disease who presented with abdominal pain and anemia in June 2024 and was found to have rectal cancer with diffuse liver metastases.  He has nearly confluent right hepatic liver metastases and 2 lesions in the left lobe.  He has met with Dr. Erazo to discuss potential future surgery should he do well with chemotherapy.  He is also met with Dr. Boles to discuss radiation to the primary tumor which was causing bleeding at time of diagnosis.      7/15 - 7/17 completed radiation (short-course).    -7/24/24 - first dose FOLFOXIRI + Josefina, tolerated with fatigue   -8/7/24 - 2nd dose FOLFOXIRI + Josefina   Tx has been held due to issues with kidney stones   Despite only 2 txs, CEA has gone from >800 to 143  Resumed tx in Sept -   C6 lowered oxali to 75% of dose   Oct CT with tx response    Plan:  Stage IV CRC:  NGS shows wt KRAS/NRAS/BRAF  C8 FOLFOXIRI / Josefina with neulasta on  12/24 - wants to continue  - fluids on day 3 & day 8 (may depend on fluid shortage)  C9 due this week.  Over due for scans.  Will see back with scans.  Refer to Surg onc (Kacey) and CRS (Pedro) to discuss definitive surgery/ablation.      He is not focused on having ostomy take down at this point although he would ultimately prefer it.       Anemia   -    - transfuse for hgb <7     Logistics -    - clinic appt - minoff tues   - infusion appt - mentor nikia Nieto MD  [facility] [place]  Office: [phone]  Fax: [phone]

## 2025-01-07 NOTE — TELEPHONE ENCOUNTER
Call placed to schedule phone visit with Comfort Elkins NP. Message left for Mr/Mrs Corral encouraging  them to return the call.

## 2025-01-08 ENCOUNTER — INFUSION (OUTPATIENT)
Dept: HEMATOLOGY/ONCOLOGY | Facility: CLINIC | Age: 70
End: 2025-01-08
Payer: MEDICARE

## 2025-01-08 VITALS
DIASTOLIC BLOOD PRESSURE: 64 MMHG | OXYGEN SATURATION: 98 % | WEIGHT: 167.99 LBS | HEART RATE: 82 BPM | BODY MASS INDEX: 22.78 KG/M2 | TEMPERATURE: 97.2 F | RESPIRATION RATE: 18 BRPM | SYSTOLIC BLOOD PRESSURE: 122 MMHG

## 2025-01-08 DIAGNOSIS — C20 RECTAL CANCER (MULTI): ICD-10-CM

## 2025-01-08 LAB
ALBUMIN SERPL BCP-MCNC: 3.2 G/DL (ref 3.4–5)
ALP SERPL-CCNC: 290 U/L (ref 33–136)
ALT SERPL W P-5'-P-CCNC: 16 U/L (ref 10–52)
ANION GAP SERPL CALC-SCNC: 12 MMOL/L (ref 10–20)
APPEARANCE UR: CLEAR
AST SERPL W P-5'-P-CCNC: 28 U/L (ref 9–39)
BASOPHILS # BLD AUTO: 0.02 X10*3/UL (ref 0–0.1)
BASOPHILS NFR BLD AUTO: 0.3 %
BILIRUB SERPL-MCNC: 0.5 MG/DL (ref 0–1.2)
BILIRUB UR STRIP.AUTO-MCNC: NEGATIVE MG/DL
BUN SERPL-MCNC: 12 MG/DL (ref 6–23)
CALCIUM SERPL-MCNC: 8.1 MG/DL (ref 8.6–10.3)
CEA SERPL-MCNC: 8.5 UG/L
CHLORIDE SERPL-SCNC: 109 MMOL/L (ref 98–107)
CO2 SERPL-SCNC: 25 MMOL/L (ref 21–32)
COLOR UR: YELLOW
CREAT SERPL-MCNC: 0.99 MG/DL (ref 0.5–1.3)
DACRYOCYTES BLD QL SMEAR: NORMAL
EGFRCR SERPLBLD CKD-EPI 2021: 82 ML/MIN/1.73M*2
EOSINOPHIL # BLD AUTO: 0.06 X10*3/UL (ref 0–0.7)
EOSINOPHIL NFR BLD AUTO: 0.8 %
ERYTHROCYTE [DISTWIDTH] IN BLOOD BY AUTOMATED COUNT: 21.3 % (ref 11.5–14.5)
GLUCOSE SERPL-MCNC: 107 MG/DL (ref 74–99)
GLUCOSE UR STRIP.AUTO-MCNC: NORMAL MG/DL
HCT VFR BLD AUTO: 26.9 % (ref 41–52)
HGB BLD-MCNC: 7.8 G/DL (ref 13.5–17.5)
HYALINE CASTS #/AREA URNS AUTO: ABNORMAL /LPF
HYPOCHROMIA BLD QL SMEAR: NORMAL
IMM GRANULOCYTES # BLD AUTO: 0.03 X10*3/UL (ref 0–0.7)
IMM GRANULOCYTES NFR BLD AUTO: 0.4 % (ref 0–0.9)
KETONES UR STRIP.AUTO-MCNC: NEGATIVE MG/DL
LEUKOCYTE ESTERASE UR QL STRIP.AUTO: ABNORMAL
LYMPHOCYTES # BLD AUTO: 0.96 X10*3/UL (ref 1.2–4.8)
LYMPHOCYTES NFR BLD AUTO: 12 %
MCH RBC QN AUTO: 27.1 PG (ref 26–34)
MCHC RBC AUTO-ENTMCNC: 29 G/DL (ref 32–36)
MCV RBC AUTO: 93 FL (ref 80–100)
MONOCYTES # BLD AUTO: 0.62 X10*3/UL (ref 0.1–1)
MONOCYTES NFR BLD AUTO: 7.8 %
MUCOUS THREADS #/AREA URNS AUTO: ABNORMAL /LPF
NEUTROPHILS # BLD AUTO: 6.3 X10*3/UL (ref 1.2–7.7)
NEUTROPHILS NFR BLD AUTO: 78.7 %
NITRITE UR QL STRIP.AUTO: NEGATIVE
NRBC BLD-RTO: ABNORMAL /100{WBCS}
OVALOCYTES BLD QL SMEAR: NORMAL
PH UR STRIP.AUTO: 5.5 [PH]
PLATELET # BLD AUTO: 95 X10*3/UL (ref 150–450)
POLYCHROMASIA BLD QL SMEAR: NORMAL
POTASSIUM SERPL-SCNC: 4.3 MMOL/L (ref 3.5–5.3)
PROT SERPL-MCNC: 6.1 G/DL (ref 6.4–8.2)
PROT UR STRIP.AUTO-MCNC: ABNORMAL MG/DL
RBC # BLD AUTO: 2.88 X10*6/UL (ref 4.5–5.9)
RBC # UR STRIP.AUTO: NEGATIVE /UL
RBC #/AREA URNS AUTO: ABNORMAL /HPF
RBC MORPH BLD: NORMAL
SODIUM SERPL-SCNC: 142 MMOL/L (ref 136–145)
SP GR UR STRIP.AUTO: 1.02
SPHEROCYTES BLD QL SMEAR: NORMAL
UROBILINOGEN UR STRIP.AUTO-MCNC: NORMAL MG/DL
WBC # BLD AUTO: 8 X10*3/UL (ref 4.4–11.3)
WBC #/AREA URNS AUTO: ABNORMAL /HPF
WBC CLUMPS #/AREA URNS AUTO: ABNORMAL /HPF

## 2025-01-08 PROCEDURE — 96376 TX/PRO/DX INJ SAME DRUG ADON: CPT

## 2025-01-08 PROCEDURE — 85025 COMPLETE CBC W/AUTO DIFF WBC: CPT

## 2025-01-08 PROCEDURE — 96416 CHEMO PROLONG INFUSE W/PUMP: CPT

## 2025-01-08 PROCEDURE — 82378 CARCINOEMBRYONIC ANTIGEN: CPT | Mod: GEALAB

## 2025-01-08 PROCEDURE — 96375 TX/PRO/DX INJ NEW DRUG ADDON: CPT | Mod: INF

## 2025-01-08 PROCEDURE — 96417 CHEMO IV INFUS EACH ADDL SEQ: CPT

## 2025-01-08 PROCEDURE — 96411 CHEMO IV PUSH ADDL DRUG: CPT

## 2025-01-08 PROCEDURE — 84075 ASSAY ALKALINE PHOSPHATASE: CPT

## 2025-01-08 PROCEDURE — 81001 URINALYSIS AUTO W/SCOPE: CPT

## 2025-01-08 PROCEDURE — 96367 TX/PROPH/DG ADDL SEQ IV INF: CPT

## 2025-01-08 PROCEDURE — 96415 CHEMO IV INFUSION ADDL HR: CPT

## 2025-01-08 PROCEDURE — 2500000004 HC RX 250 GENERAL PHARMACY W/ HCPCS (ALT 636 FOR OP/ED): Performed by: INTERNAL MEDICINE

## 2025-01-08 PROCEDURE — 96413 CHEMO IV INFUSION 1 HR: CPT

## 2025-01-08 RX ORDER — HEPARIN SODIUM,PORCINE/PF 10 UNIT/ML
50 SYRINGE (ML) INTRAVENOUS AS NEEDED
Status: CANCELLED | OUTPATIENT
Start: 2025-01-08

## 2025-01-08 RX ORDER — ATROPINE SULFATE 0.4 MG/ML
0.4 INJECTION, SOLUTION ENDOTRACHEAL; INTRAMEDULLARY; INTRAMUSCULAR; INTRAVENOUS; SUBCUTANEOUS
Status: COMPLETED | OUTPATIENT
Start: 2025-01-08 | End: 2025-01-08

## 2025-01-08 RX ORDER — HEPARIN 100 UNIT/ML
500 SYRINGE INTRAVENOUS AS NEEDED
Status: CANCELLED | OUTPATIENT
Start: 2025-01-08

## 2025-01-08 RX ORDER — FAMOTIDINE 10 MG/ML
20 INJECTION INTRAVENOUS ONCE AS NEEDED
Status: DISCONTINUED | OUTPATIENT
Start: 2025-01-08 | End: 2025-01-08 | Stop reason: HOSPADM

## 2025-01-08 RX ORDER — PROCHLORPERAZINE EDISYLATE 5 MG/ML
10 INJECTION INTRAMUSCULAR; INTRAVENOUS EVERY 6 HOURS PRN
Status: DISCONTINUED | OUTPATIENT
Start: 2025-01-08 | End: 2025-01-08 | Stop reason: HOSPADM

## 2025-01-08 RX ORDER — LORAZEPAM 2 MG/ML
1 INJECTION INTRAMUSCULAR AS NEEDED
Status: DISCONTINUED | OUTPATIENT
Start: 2025-01-08 | End: 2025-01-08 | Stop reason: HOSPADM

## 2025-01-08 RX ORDER — ALBUTEROL SULFATE 0.83 MG/ML
3 SOLUTION RESPIRATORY (INHALATION) AS NEEDED
Status: DISCONTINUED | OUTPATIENT
Start: 2025-01-08 | End: 2025-01-08 | Stop reason: HOSPADM

## 2025-01-08 RX ORDER — EPINEPHRINE 0.3 MG/.3ML
0.3 INJECTION SUBCUTANEOUS EVERY 5 MIN PRN
Status: DISCONTINUED | OUTPATIENT
Start: 2025-01-08 | End: 2025-01-08 | Stop reason: HOSPADM

## 2025-01-08 RX ORDER — PALONOSETRON 0.05 MG/ML
0.25 INJECTION, SOLUTION INTRAVENOUS ONCE
Status: COMPLETED | OUTPATIENT
Start: 2025-01-08 | End: 2025-01-08

## 2025-01-08 RX ORDER — DIPHENHYDRAMINE HYDROCHLORIDE 50 MG/ML
50 INJECTION INTRAMUSCULAR; INTRAVENOUS AS NEEDED
Status: DISCONTINUED | OUTPATIENT
Start: 2025-01-08 | End: 2025-01-08 | Stop reason: HOSPADM

## 2025-01-08 RX ORDER — PROCHLORPERAZINE MALEATE 10 MG
10 TABLET ORAL EVERY 6 HOURS PRN
Status: DISCONTINUED | OUTPATIENT
Start: 2025-01-08 | End: 2025-01-08 | Stop reason: HOSPADM

## 2025-01-08 RX ORDER — DEXAMETHASONE 4 MG/1
12 TABLET ORAL ONCE
Status: COMPLETED | OUTPATIENT
Start: 2025-01-08 | End: 2025-01-08

## 2025-01-08 RX ADMIN — ATROPINE SULFATE 0.4 MG: 0.4 INJECTION, SOLUTION INTRAVENOUS at 16:01

## 2025-01-08 RX ADMIN — ATROPINE SULFATE 0.4 MG: 0.4 INJECTION, SOLUTION INTRAVENOUS at 12:34

## 2025-01-08 RX ADMIN — BEVACIZUMAB-AWWB 400 MG: 400 INJECTION, SOLUTION INTRAVENOUS at 12:25

## 2025-01-08 RX ADMIN — DEXAMETHASONE 12 MG: 4 TABLET ORAL at 11:36

## 2025-01-08 RX ADMIN — OXALIPLATIN 130 MG: 5 INJECTION, SOLUTION INTRAVENOUS at 14:10

## 2025-01-08 RX ADMIN — PALONOSETRON HYDROCHLORIDE 250 MCG: 0.25 INJECTION INTRAVENOUS at 11:36

## 2025-01-08 RX ADMIN — FOSAPREPITANT 150 MG: 150 INJECTION, POWDER, LYOPHILIZED, FOR SOLUTION INTRAVENOUS at 11:52

## 2025-01-08 RX ADMIN — FLUOROURACIL 4800 MG: 50 INJECTION, SOLUTION INTRAVENOUS at 16:19

## 2025-01-08 RX ADMIN — IRINOTECAN HYDROCHLORIDE 250 MG: 20 INJECTION, SOLUTION INTRAVENOUS at 12:39

## 2025-01-08 ASSESSMENT — PAIN SCALES - GENERAL: PAINLEVEL_OUTOF10: 3

## 2025-01-08 NOTE — PROGRESS NOTES
PT presents to infusion for treatment; received without incident except for an additional atropine. PT has a borrowed pump from [employer]; to be returned at pump disconnect. PT aware pump DC is at 1430 on 1/10/25 at [facility] [place]. [place] charge RN notified of both via secured chat.

## 2025-01-08 NOTE — PATIENT INSTRUCTIONS
Please disregard your appointment time on 1/1/025. Your PUMP DISCONNECT will be at 4:30pm on Friday 1/10/25 at [place], [place].

## 2025-01-10 ENCOUNTER — APPOINTMENT (OUTPATIENT)
Dept: HEMATOLOGY/ONCOLOGY | Facility: HOSPITAL | Age: 70
End: 2025-01-10
Payer: MEDICARE

## 2025-01-10 ENCOUNTER — INFUSION (OUTPATIENT)
Dept: HEMATOLOGY/ONCOLOGY | Facility: CLINIC | Age: 70
End: 2025-01-10
Payer: MEDICARE

## 2025-01-10 VITALS
OXYGEN SATURATION: 99 % | SYSTOLIC BLOOD PRESSURE: 145 MMHG | RESPIRATION RATE: 18 BRPM | BODY MASS INDEX: 22.59 KG/M2 | WEIGHT: 166.56 LBS | HEART RATE: 108 BPM | TEMPERATURE: 96.6 F | DIASTOLIC BLOOD PRESSURE: 79 MMHG

## 2025-01-10 DIAGNOSIS — C20 RECTAL CANCER (MULTI): ICD-10-CM

## 2025-01-10 PROCEDURE — 2500000004 HC RX 250 GENERAL PHARMACY W/ HCPCS (ALT 636 FOR OP/ED): Performed by: NURSE PRACTITIONER

## 2025-01-10 PROCEDURE — 96374 THER/PROPH/DIAG INJ IV PUSH: CPT | Mod: INF

## 2025-01-10 PROCEDURE — 96372 THER/PROPH/DIAG INJ SC/IM: CPT

## 2025-01-10 PROCEDURE — 2500000004 HC RX 250 GENERAL PHARMACY W/ HCPCS (ALT 636 FOR OP/ED): Mod: JZ,JG,TB | Performed by: INTERNAL MEDICINE

## 2025-01-10 PROCEDURE — 2500000004 HC RX 250 GENERAL PHARMACY W/ HCPCS (ALT 636 FOR OP/ED): Performed by: INTERNAL MEDICINE

## 2025-01-10 RX ORDER — EPINEPHRINE 0.3 MG/.3ML
0.3 INJECTION SUBCUTANEOUS EVERY 5 MIN PRN
Status: DISCONTINUED | OUTPATIENT
Start: 2025-01-10 | End: 2025-01-10 | Stop reason: HOSPADM

## 2025-01-10 RX ORDER — ONDANSETRON HYDROCHLORIDE 2 MG/ML
8 INJECTION, SOLUTION INTRAVENOUS ONCE
Status: COMPLETED | OUTPATIENT
Start: 2025-01-10 | End: 2025-01-10

## 2025-01-10 RX ORDER — HEPARIN 100 UNIT/ML
500 SYRINGE INTRAVENOUS AS NEEDED
Status: DISCONTINUED | OUTPATIENT
Start: 2025-01-10 | End: 2025-01-10 | Stop reason: HOSPADM

## 2025-01-10 RX ORDER — DIPHENHYDRAMINE HYDROCHLORIDE 50 MG/ML
50 INJECTION INTRAMUSCULAR; INTRAVENOUS AS NEEDED
Status: DISCONTINUED | OUTPATIENT
Start: 2025-01-10 | End: 2025-01-10 | Stop reason: HOSPADM

## 2025-01-10 RX ORDER — HEPARIN SODIUM,PORCINE/PF 10 UNIT/ML
50 SYRINGE (ML) INTRAVENOUS AS NEEDED
Status: DISCONTINUED | OUTPATIENT
Start: 2025-01-10 | End: 2025-01-10 | Stop reason: HOSPADM

## 2025-01-10 RX ORDER — HEPARIN 100 UNIT/ML
500 SYRINGE INTRAVENOUS AS NEEDED
OUTPATIENT
Start: 2025-01-10

## 2025-01-10 RX ORDER — HEPARIN SODIUM,PORCINE/PF 10 UNIT/ML
50 SYRINGE (ML) INTRAVENOUS AS NEEDED
OUTPATIENT
Start: 2025-01-10

## 2025-01-10 RX ORDER — ONDANSETRON HYDROCHLORIDE 2 MG/ML
8 INJECTION, SOLUTION INTRAVENOUS ONCE
Status: CANCELLED | OUTPATIENT
Start: 2025-01-10

## 2025-01-10 RX ORDER — ALBUTEROL SULFATE 0.83 MG/ML
3 SOLUTION RESPIRATORY (INHALATION) AS NEEDED
Status: DISCONTINUED | OUTPATIENT
Start: 2025-01-10 | End: 2025-01-10 | Stop reason: HOSPADM

## 2025-01-10 RX ORDER — FAMOTIDINE 10 MG/ML
20 INJECTION INTRAVENOUS ONCE AS NEEDED
Status: DISCONTINUED | OUTPATIENT
Start: 2025-01-10 | End: 2025-01-10 | Stop reason: HOSPADM

## 2025-01-10 RX ADMIN — PEGFILGRASTIM-CBQV 6 MG: 6 INJECTION, SOLUTION SUBCUTANEOUS at 15:22

## 2025-01-10 RX ADMIN — ONDANSETRON 8 MG: 2 INJECTION INTRAMUSCULAR; INTRAVENOUS at 15:36

## 2025-01-10 RX ADMIN — SODIUM CHLORIDE 1000 ML: 9 INJECTION, SOLUTION INTRAVENOUS at 15:26

## 2025-01-10 RX ADMIN — HEPARIN 500 UNITS: 100 SYRINGE at 16:27

## 2025-01-10 ASSESSMENT — PAIN SCALES - GENERAL: PAINLEVEL_OUTOF10: 0-NO PAIN

## 2025-01-12 ENCOUNTER — APPOINTMENT (OUTPATIENT)
Dept: HEMATOLOGY/ONCOLOGY | Facility: HOSPITAL | Age: 70
End: 2025-01-12
Payer: MEDICARE

## 2025-01-13 ENCOUNTER — HOSPITAL ENCOUNTER (OUTPATIENT)
Dept: RADIOLOGY | Facility: CLINIC | Age: 70
Discharge: HOME | End: 2025-01-13
Payer: MEDICARE

## 2025-01-13 DIAGNOSIS — C20 RECTAL CANCER (MULTI): ICD-10-CM

## 2025-01-13 PROCEDURE — 74177 CT ABD & PELVIS W/CONTRAST: CPT

## 2025-01-13 PROCEDURE — 2550000001 HC RX 255 CONTRASTS: Performed by: NURSE PRACTITIONER

## 2025-01-13 PROCEDURE — 71260 CT THORAX DX C+: CPT | Performed by: RADIOLOGY

## 2025-01-13 PROCEDURE — 74177 CT ABD & PELVIS W/CONTRAST: CPT | Performed by: RADIOLOGY

## 2025-01-13 RX ADMIN — IOHEXOL 75 ML: 350 INJECTION, SOLUTION INTRAVENOUS at 14:50

## 2025-01-14 ENCOUNTER — PHARMACY VISIT (OUTPATIENT)
Dept: PHARMACY | Facility: CLINIC | Age: 70
End: 2025-01-14
Payer: COMMERCIAL

## 2025-01-15 ENCOUNTER — APPOINTMENT (OUTPATIENT)
Dept: HEMATOLOGY/ONCOLOGY | Facility: CLINIC | Age: 70
End: 2025-01-15
Payer: MEDICARE

## 2025-01-15 ENCOUNTER — INFUSION (OUTPATIENT)
Dept: HEMATOLOGY/ONCOLOGY | Facility: CLINIC | Age: 70
End: 2025-01-15
Payer: MEDICARE

## 2025-01-15 VITALS
HEART RATE: 106 BPM | SYSTOLIC BLOOD PRESSURE: 122 MMHG | OXYGEN SATURATION: 100 % | TEMPERATURE: 97.3 F | BODY MASS INDEX: 21.66 KG/M2 | WEIGHT: 159.72 LBS | DIASTOLIC BLOOD PRESSURE: 79 MMHG | RESPIRATION RATE: 18 BRPM

## 2025-01-15 DIAGNOSIS — C20 RECTAL CANCER (MULTI): ICD-10-CM

## 2025-01-15 PROCEDURE — 96360 HYDRATION IV INFUSION INIT: CPT | Mod: INF

## 2025-01-15 PROCEDURE — 2500000004 HC RX 250 GENERAL PHARMACY W/ HCPCS (ALT 636 FOR OP/ED): Performed by: NURSE PRACTITIONER

## 2025-01-15 PROCEDURE — 2500000004 HC RX 250 GENERAL PHARMACY W/ HCPCS (ALT 636 FOR OP/ED): Performed by: INTERNAL MEDICINE

## 2025-01-15 RX ORDER — HEPARIN 100 UNIT/ML
500 SYRINGE INTRAVENOUS AS NEEDED
Status: DISCONTINUED | OUTPATIENT
Start: 2025-01-15 | End: 2025-01-15 | Stop reason: HOSPADM

## 2025-01-15 RX ORDER — HEPARIN 100 UNIT/ML
500 SYRINGE INTRAVENOUS AS NEEDED
OUTPATIENT
Start: 2025-01-15

## 2025-01-15 RX ORDER — HEPARIN SODIUM,PORCINE/PF 10 UNIT/ML
50 SYRINGE (ML) INTRAVENOUS AS NEEDED
OUTPATIENT
Start: 2025-01-15

## 2025-01-15 RX ORDER — HEPARIN SODIUM,PORCINE/PF 10 UNIT/ML
50 SYRINGE (ML) INTRAVENOUS AS NEEDED
Status: DISCONTINUED | OUTPATIENT
Start: 2025-01-15 | End: 2025-01-15 | Stop reason: HOSPADM

## 2025-01-15 RX ADMIN — HEPARIN 500 UNITS: 100 SYRINGE at 10:02

## 2025-01-15 RX ADMIN — SODIUM CHLORIDE 1000 ML: 9 INJECTION, SOLUTION INTRAVENOUS at 09:02

## 2025-01-15 ASSESSMENT — PAIN SCALES - GENERAL: PAINLEVEL_OUTOF10: 8

## 2025-01-16 ENCOUNTER — TELEPHONE (OUTPATIENT)
Dept: PRIMARY CARE | Facility: CLINIC | Age: 70
End: 2025-01-16
Payer: MEDICARE

## 2025-01-17 ENCOUNTER — OFFICE VISIT (OUTPATIENT)
Dept: PRIMARY CARE | Facility: CLINIC | Age: 70
End: 2025-01-17
Payer: MEDICARE

## 2025-01-17 ENCOUNTER — HOSPITAL ENCOUNTER (INPATIENT)
Facility: HOSPITAL | Age: 70
LOS: 1 days | Discharge: HOME | DRG: 809 | End: 2025-01-19
Attending: INTERNAL MEDICINE | Admitting: INTERNAL MEDICINE
Payer: MEDICARE

## 2025-01-17 ENCOUNTER — CLINICAL SUPPORT (OUTPATIENT)
Dept: EMERGENCY MEDICINE | Facility: HOSPITAL | Age: 70
DRG: 809 | End: 2025-01-17
Payer: MEDICARE

## 2025-01-17 VITALS
WEIGHT: 164 LBS | SYSTOLIC BLOOD PRESSURE: 122 MMHG | DIASTOLIC BLOOD PRESSURE: 82 MMHG | BODY MASS INDEX: 22.21 KG/M2 | HEIGHT: 72 IN

## 2025-01-17 DIAGNOSIS — D64.9 ANEMIA, UNSPECIFIED TYPE: ICD-10-CM

## 2025-01-17 DIAGNOSIS — C78.7 SECONDARY MALIGNANT NEOPLASM OF LIVER AND INTRAHEPATIC BILE DUCT (MULTI): ICD-10-CM

## 2025-01-17 DIAGNOSIS — B37.0 ORAL THRUSH: ICD-10-CM

## 2025-01-17 DIAGNOSIS — E11.9 DIABETES MELLITUS TYPE II, NON INSULIN DEPENDENT (MULTI): ICD-10-CM

## 2025-01-17 DIAGNOSIS — E78.5 HYPERLIPIDEMIA, UNSPECIFIED HYPERLIPIDEMIA TYPE: ICD-10-CM

## 2025-01-17 DIAGNOSIS — I10 PRIMARY HYPERTENSION: ICD-10-CM

## 2025-01-17 DIAGNOSIS — R11.2 NAUSEA AND VOMITING, UNSPECIFIED VOMITING TYPE: ICD-10-CM

## 2025-01-17 DIAGNOSIS — D72.819 LEUKOPENIA, UNSPECIFIED TYPE: ICD-10-CM

## 2025-01-17 DIAGNOSIS — D69.6 THROMBOCYTOPENIA (CMS-HCC): ICD-10-CM

## 2025-01-17 DIAGNOSIS — R53.1 WEAK: ICD-10-CM

## 2025-01-17 DIAGNOSIS — Z93.3 COLOSTOMY STATUS (MULTI): ICD-10-CM

## 2025-01-17 DIAGNOSIS — R10.9 ABDOMINAL PAIN, UNSPECIFIED ABDOMINAL LOCATION: Primary | ICD-10-CM

## 2025-01-17 DIAGNOSIS — B37.0 THRUSH: ICD-10-CM

## 2025-01-17 DIAGNOSIS — R19.7 DIARRHEA, UNSPECIFIED TYPE: ICD-10-CM

## 2025-01-17 DIAGNOSIS — Z85.048 HISTORY OF RECTAL CANCER: Primary | ICD-10-CM

## 2025-01-17 DIAGNOSIS — C18.9 MALIGNANT NEOPLASM OF COLON, UNSPECIFIED PART OF COLON (MULTI): ICD-10-CM

## 2025-01-17 PROCEDURE — 93010 ELECTROCARDIOGRAM REPORT: CPT | Performed by: EMERGENCY MEDICINE

## 2025-01-17 PROCEDURE — 93005 ELECTROCARDIOGRAM TRACING: CPT

## 2025-01-17 PROCEDURE — 3008F BODY MASS INDEX DOCD: CPT | Performed by: INTERNAL MEDICINE

## 2025-01-17 PROCEDURE — 3079F DIAST BP 80-89 MM HG: CPT | Performed by: INTERNAL MEDICINE

## 2025-01-17 PROCEDURE — 84075 ASSAY ALKALINE PHOSPHATASE: CPT

## 2025-01-17 PROCEDURE — 2500000004 HC RX 250 GENERAL PHARMACY W/ HCPCS (ALT 636 FOR OP/ED)

## 2025-01-17 PROCEDURE — 99285 EMERGENCY DEPT VISIT HI MDM: CPT | Performed by: EMERGENCY MEDICINE

## 2025-01-17 PROCEDURE — 1159F MED LIST DOCD IN RCRD: CPT | Performed by: INTERNAL MEDICINE

## 2025-01-17 PROCEDURE — 3074F SYST BP LT 130 MM HG: CPT | Performed by: INTERNAL MEDICINE

## 2025-01-17 PROCEDURE — 3044F HG A1C LEVEL LT 7.0%: CPT | Performed by: INTERNAL MEDICINE

## 2025-01-17 PROCEDURE — 96374 THER/PROPH/DIAG INJ IV PUSH: CPT

## 2025-01-17 PROCEDURE — 83036 HEMOGLOBIN GLYCOSYLATED A1C: CPT | Performed by: NURSE PRACTITIONER

## 2025-01-17 PROCEDURE — 83735 ASSAY OF MAGNESIUM: CPT

## 2025-01-17 PROCEDURE — 99214 OFFICE O/P EST MOD 30 MIN: CPT | Performed by: INTERNAL MEDICINE

## 2025-01-17 PROCEDURE — 85025 COMPLETE CBC W/AUTO DIFF WBC: CPT

## 2025-01-17 RX ORDER — NYSTATIN 100000 [USP'U]/ML
500000 SUSPENSION ORAL 4 TIMES DAILY
Qty: 280 ML | Refills: 0 | Status: SHIPPED | OUTPATIENT
Start: 2025-01-17 | End: 2025-01-19 | Stop reason: HOSPADM

## 2025-01-17 RX ORDER — ONDANSETRON HYDROCHLORIDE 2 MG/ML
4 INJECTION, SOLUTION INTRAVENOUS ONCE
Status: COMPLETED | OUTPATIENT
Start: 2025-01-17 | End: 2025-01-17

## 2025-01-17 RX ADMIN — ONDANSETRON 4 MG: 2 INJECTION INTRAMUSCULAR; INTRAVENOUS at 22:35

## 2025-01-17 ASSESSMENT — PAIN DESCRIPTION - LOCATION: LOCATION: ABDOMEN

## 2025-01-17 ASSESSMENT — PAIN DESCRIPTION - PAIN TYPE: TYPE: ACUTE PAIN

## 2025-01-17 ASSESSMENT — PAIN - FUNCTIONAL ASSESSMENT: PAIN_FUNCTIONAL_ASSESSMENT: 0-10

## 2025-01-17 ASSESSMENT — ENCOUNTER SYMPTOMS
OCCASIONAL FEELINGS OF UNSTEADINESS: 0
DEPRESSION: 0
LOSS OF SENSATION IN FEET: 0

## 2025-01-17 ASSESSMENT — PAIN SCALES - GENERAL: PAINLEVEL_OUTOF10: 10 - WORST POSSIBLE PAIN

## 2025-01-18 PROBLEM — Z85.048 HISTORY OF RECTAL CANCER: Status: ACTIVE | Noted: 2025-01-18

## 2025-01-18 LAB
ABO GROUP (TYPE) IN BLOOD: NORMAL
ALBUMIN SERPL BCP-MCNC: 3.7 G/DL (ref 3.4–5)
ALP SERPL-CCNC: 253 U/L (ref 33–136)
ALT SERPL W P-5'-P-CCNC: 11 U/L (ref 10–52)
ANION GAP SERPL CALC-SCNC: 16 MMOL/L (ref 10–20)
ANTIBODY SCREEN: NORMAL
AST SERPL W P-5'-P-CCNC: 17 U/L (ref 9–39)
BASOPHILS # BLD AUTO: 0.01 X10*3/UL (ref 0–0.1)
BASOPHILS # BLD AUTO: 0.02 X10*3/UL (ref 0–0.1)
BASOPHILS NFR BLD AUTO: 0.3 %
BASOPHILS NFR BLD AUTO: 0.5 %
BILIRUB DIRECT SERPL-MCNC: 0.1 MG/DL (ref 0–0.3)
BILIRUB SERPL-MCNC: 0.5 MG/DL (ref 0–1.2)
BLOOD EXPIRATION DATE: NORMAL
BUN SERPL-MCNC: 11 MG/DL (ref 6–23)
CALCIUM SERPL-MCNC: 8.8 MG/DL (ref 8.6–10.6)
CHLORIDE SERPL-SCNC: 109 MMOL/L (ref 98–107)
CO2 SERPL-SCNC: 23 MMOL/L (ref 21–32)
CREAT SERPL-MCNC: 0.93 MG/DL (ref 0.5–1.3)
DACRYOCYTES BLD QL SMEAR: NORMAL
DISPENSE STATUS: NORMAL
EGFRCR SERPLBLD CKD-EPI 2021: 89 ML/MIN/1.73M*2
EOSINOPHIL # BLD AUTO: 0.02 X10*3/UL (ref 0–0.7)
EOSINOPHIL # BLD AUTO: 0.03 X10*3/UL (ref 0–0.7)
EOSINOPHIL NFR BLD AUTO: 0.6 %
EOSINOPHIL NFR BLD AUTO: 0.8 %
ERYTHROCYTE [DISTWIDTH] IN BLOOD BY AUTOMATED COUNT: 21.2 % (ref 11.5–14.5)
ERYTHROCYTE [DISTWIDTH] IN BLOOD BY AUTOMATED COUNT: 21.4 % (ref 11.5–14.5)
EST. AVERAGE GLUCOSE BLD GHB EST-MCNC: 105 MG/DL
GLUCOSE SERPL-MCNC: 71 MG/DL (ref 74–99)
HBA1C MFR BLD: 5.3 %
HCT VFR BLD AUTO: 25.2 % (ref 41–52)
HCT VFR BLD AUTO: 25.3 % (ref 41–52)
HGB BLD-MCNC: 7.1 G/DL (ref 13.5–17.5)
HGB BLD-MCNC: 7.6 G/DL (ref 13.5–17.5)
IMM GRANULOCYTES # BLD AUTO: 0.01 X10*3/UL (ref 0–0.7)
IMM GRANULOCYTES # BLD AUTO: 0.04 X10*3/UL (ref 0–0.7)
IMM GRANULOCYTES NFR BLD AUTO: 0.3 % (ref 0–0.9)
IMM GRANULOCYTES NFR BLD AUTO: 1 % (ref 0–0.9)
LYMPHOCYTES # BLD AUTO: 1.01 X10*3/UL (ref 1.2–4.8)
LYMPHOCYTES # BLD AUTO: 1.04 X10*3/UL (ref 1.2–4.8)
LYMPHOCYTES NFR BLD AUTO: 26.7 %
LYMPHOCYTES NFR BLD AUTO: 29.6 %
MAGNESIUM SERPL-MCNC: 2 MG/DL (ref 1.6–2.4)
MCH RBC QN AUTO: 26.5 PG (ref 26–34)
MCH RBC QN AUTO: 27.4 PG (ref 26–34)
MCHC RBC AUTO-ENTMCNC: 28.1 G/DL (ref 32–36)
MCHC RBC AUTO-ENTMCNC: 30.2 G/DL (ref 32–36)
MCV RBC AUTO: 88 FL (ref 80–100)
MCV RBC AUTO: 98 FL (ref 80–100)
MONOCYTES # BLD AUTO: 0.49 X10*3/UL (ref 0.1–1)
MONOCYTES # BLD AUTO: 0.5 X10*3/UL (ref 0.1–1)
MONOCYTES NFR BLD AUTO: 12.9 %
MONOCYTES NFR BLD AUTO: 14.4 %
NEUTROPHILS # BLD AUTO: 1.87 X10*3/UL (ref 1.2–7.7)
NEUTROPHILS # BLD AUTO: 2.26 X10*3/UL (ref 1.2–7.7)
NEUTROPHILS NFR BLD AUTO: 54.8 %
NEUTROPHILS NFR BLD AUTO: 58.1 %
NRBC BLD-RTO: 0 /100 WBCS (ref 0–0)
NRBC BLD-RTO: 0.6 /100 WBCS (ref 0–0)
OVALOCYTES BLD QL SMEAR: NORMAL
PLATELET # BLD AUTO: 34 X10*3/UL (ref 150–450)
PLATELET # BLD AUTO: 36 X10*3/UL (ref 150–450)
POTASSIUM SERPL-SCNC: 4.6 MMOL/L (ref 3.5–5.3)
PRODUCT BLOOD TYPE: 8400
PRODUCT CODE: NORMAL
PROT SERPL-MCNC: 6.9 G/DL (ref 6.4–8.2)
RBC # BLD AUTO: 2.59 X10*6/UL (ref 4.5–5.9)
RBC # BLD AUTO: 2.87 X10*6/UL (ref 4.5–5.9)
RBC MORPH BLD: NORMAL
RBC MORPH BLD: NORMAL
RH FACTOR (ANTIGEN D): NORMAL
SCHISTOCYTES BLD QL SMEAR: NORMAL
SODIUM SERPL-SCNC: 143 MMOL/L (ref 136–145)
UNIT ABO: NORMAL
UNIT NUMBER: NORMAL
UNIT RH: NORMAL
UNIT VOLUME: 273
WBC # BLD AUTO: 3.4 X10*3/UL (ref 4.4–11.3)
WBC # BLD AUTO: 3.9 X10*3/UL (ref 4.4–11.3)

## 2025-01-18 PROCEDURE — 86850 RBC ANTIBODY SCREEN: CPT | Performed by: NURSE PRACTITIONER

## 2025-01-18 PROCEDURE — 99223 1ST HOSP IP/OBS HIGH 75: CPT | Performed by: STUDENT IN AN ORGANIZED HEALTH CARE EDUCATION/TRAINING PROGRAM

## 2025-01-18 PROCEDURE — 85025 COMPLETE CBC W/AUTO DIFF WBC: CPT | Performed by: NURSE PRACTITIONER

## 2025-01-18 PROCEDURE — 86922 COMPATIBILITY TEST ANTIGLOB: CPT

## 2025-01-18 PROCEDURE — P9073 PLATELETS PHERESIS PATH REDU: HCPCS

## 2025-01-18 PROCEDURE — 2500000004 HC RX 250 GENERAL PHARMACY W/ HCPCS (ALT 636 FOR OP/ED): Performed by: STUDENT IN AN ORGANIZED HEALTH CARE EDUCATION/TRAINING PROGRAM

## 2025-01-18 PROCEDURE — 1170000001 HC PRIVATE ONCOLOGY ROOM DAILY

## 2025-01-18 PROCEDURE — 2500000001 HC RX 250 WO HCPCS SELF ADMINISTERED DRUGS (ALT 637 FOR MEDICARE OP): Performed by: NURSE PRACTITIONER

## 2025-01-18 PROCEDURE — 2500000001 HC RX 250 WO HCPCS SELF ADMINISTERED DRUGS (ALT 637 FOR MEDICARE OP): Performed by: STUDENT IN AN ORGANIZED HEALTH CARE EDUCATION/TRAINING PROGRAM

## 2025-01-18 PROCEDURE — 2500000001 HC RX 250 WO HCPCS SELF ADMINISTERED DRUGS (ALT 637 FOR MEDICARE OP)

## 2025-01-18 PROCEDURE — P9040 RBC LEUKOREDUCED IRRADIATED: HCPCS

## 2025-01-18 PROCEDURE — P9037 PLATE PHERES LEUKOREDU IRRAD: HCPCS

## 2025-01-18 PROCEDURE — 36430 TRANSFUSION BLD/BLD COMPNT: CPT

## 2025-01-18 RX ORDER — HYDROMORPHONE HYDROCHLORIDE 1 MG/ML
1 INJECTION, SOLUTION INTRAMUSCULAR; INTRAVENOUS; SUBCUTANEOUS ONCE
Status: DISCONTINUED | OUTPATIENT
Start: 2025-01-18 | End: 2025-01-18

## 2025-01-18 RX ORDER — MORPHINE SULFATE 30 MG/1
30 TABLET, FILM COATED, EXTENDED RELEASE ORAL NIGHTLY
Status: DISCONTINUED | OUTPATIENT
Start: 2025-01-18 | End: 2025-01-19 | Stop reason: HOSPADM

## 2025-01-18 RX ORDER — MORPHINE SULFATE 15 MG/1
15 TABLET, FILM COATED, EXTENDED RELEASE ORAL DAILY
Status: DISCONTINUED | OUTPATIENT
Start: 2025-01-18 | End: 2025-01-19 | Stop reason: HOSPADM

## 2025-01-18 RX ORDER — DEXTROSE 50 % IN WATER (D50W) INTRAVENOUS SYRINGE
25
Status: DISCONTINUED | OUTPATIENT
Start: 2025-01-18 | End: 2025-01-18

## 2025-01-18 RX ORDER — ACETAMINOPHEN 325 MG/1
650 TABLET ORAL EVERY 4 HOURS PRN
Status: DISCONTINUED | OUTPATIENT
Start: 2025-01-18 | End: 2025-01-19 | Stop reason: HOSPADM

## 2025-01-18 RX ORDER — NYSTATIN 100000 [USP'U]/ML
500000 SUSPENSION ORAL 4 TIMES DAILY
Status: DISCONTINUED | OUTPATIENT
Start: 2025-01-18 | End: 2025-01-18

## 2025-01-18 RX ORDER — ACETAMINOPHEN 325 MG/1
650 TABLET ORAL ONCE
Status: DISCONTINUED | OUTPATIENT
Start: 2025-01-18 | End: 2025-01-19 | Stop reason: HOSPADM

## 2025-01-18 RX ORDER — DEXTROSE 50 % IN WATER (D50W) INTRAVENOUS SYRINGE
12.5
Status: DISCONTINUED | OUTPATIENT
Start: 2025-01-18 | End: 2025-01-18

## 2025-01-18 RX ORDER — ACETAMINOPHEN 325 MG/1
650 TABLET ORAL ONCE
Status: COMPLETED | OUTPATIENT
Start: 2025-01-18 | End: 2025-01-18

## 2025-01-18 RX ORDER — NYSTATIN 100000 [USP'U]/ML
500000 SUSPENSION ORAL 4 TIMES DAILY
Status: DISCONTINUED | OUTPATIENT
Start: 2025-01-18 | End: 2025-01-19 | Stop reason: HOSPADM

## 2025-01-18 RX ORDER — ONDANSETRON 4 MG/1
4 TABLET, FILM COATED ORAL EVERY 8 HOURS PRN
Status: DISCONTINUED | OUTPATIENT
Start: 2025-01-18 | End: 2025-01-19 | Stop reason: HOSPADM

## 2025-01-18 RX ORDER — METOPROLOL SUCCINATE 100 MG/1
100 TABLET, EXTENDED RELEASE ORAL DAILY
Status: DISCONTINUED | OUTPATIENT
Start: 2025-01-18 | End: 2025-01-19 | Stop reason: HOSPADM

## 2025-01-18 RX ORDER — OXYCODONE HYDROCHLORIDE 5 MG/1
5 TABLET ORAL EVERY 4 HOURS PRN
Status: DISCONTINUED | OUTPATIENT
Start: 2025-01-18 | End: 2025-01-19 | Stop reason: HOSPADM

## 2025-01-18 RX ORDER — OXYCODONE HYDROCHLORIDE 10 MG/1
10 TABLET ORAL EVERY 4 HOURS PRN
Status: DISCONTINUED | OUTPATIENT
Start: 2025-01-18 | End: 2025-01-19 | Stop reason: HOSPADM

## 2025-01-18 RX ORDER — DIPHENHYDRAMINE HCL 25 MG
25 CAPSULE ORAL ONCE
Status: COMPLETED | OUTPATIENT
Start: 2025-01-18 | End: 2025-01-18

## 2025-01-18 RX ORDER — POLYETHYLENE GLYCOL 3350 17 G/17G
17 POWDER, FOR SOLUTION ORAL DAILY
Status: DISCONTINUED | OUTPATIENT
Start: 2025-01-18 | End: 2025-01-19 | Stop reason: HOSPADM

## 2025-01-18 RX ORDER — ENOXAPARIN SODIUM 100 MG/ML
40 INJECTION SUBCUTANEOUS EVERY 24 HOURS
Status: DISCONTINUED | OUTPATIENT
Start: 2025-01-18 | End: 2025-01-19 | Stop reason: HOSPADM

## 2025-01-18 RX ORDER — ATORVASTATIN CALCIUM 40 MG/1
40 TABLET, FILM COATED ORAL NIGHTLY
Status: DISCONTINUED | OUTPATIENT
Start: 2025-01-18 | End: 2025-01-19 | Stop reason: HOSPADM

## 2025-01-18 RX ORDER — INSULIN LISPRO 100 [IU]/ML
0-5 INJECTION, SOLUTION INTRAVENOUS; SUBCUTANEOUS
Status: DISCONTINUED | OUTPATIENT
Start: 2025-01-18 | End: 2025-01-18

## 2025-01-18 RX ORDER — NAPROXEN SODIUM 220 MG/1
81 TABLET, FILM COATED ORAL ONCE
Status: COMPLETED | OUTPATIENT
Start: 2025-01-18 | End: 2025-01-18

## 2025-01-18 RX ADMIN — ACETAMINOPHEN 650 MG: 325 TABLET ORAL at 22:07

## 2025-01-18 RX ADMIN — DIPHENHYDRAMINE HYDROCHLORIDE 25 MG: 25 CAPSULE ORAL at 13:44

## 2025-01-18 RX ADMIN — ONDANSETRON HYDROCHLORIDE 4 MG: 4 TABLET, FILM COATED ORAL at 13:52

## 2025-01-18 RX ADMIN — MORPHINE SULFATE 15 MG: 15 TABLET, FILM COATED, EXTENDED RELEASE ORAL at 08:20

## 2025-01-18 RX ADMIN — ATORVASTATIN CALCIUM 40 MG: 40 TABLET, FILM COATED ORAL at 20:43

## 2025-01-18 RX ADMIN — NYSTATIN 500000 UNITS: 100000 SUSPENSION ORAL at 20:43

## 2025-01-18 RX ADMIN — DIPHENHYDRAMINE HYDROCHLORIDE 25 MG: 25 CAPSULE ORAL at 22:07

## 2025-01-18 RX ADMIN — POLYETHYLENE GLYCOL 3350 17 G: 17 POWDER, FOR SOLUTION ORAL at 08:20

## 2025-01-18 RX ADMIN — ASPIRIN 81 MG: 81 TABLET, CHEWABLE ORAL at 08:20

## 2025-01-18 RX ADMIN — METOPROLOL SUCCINATE 100 MG: 100 TABLET, EXTENDED RELEASE ORAL at 08:20

## 2025-01-18 RX ADMIN — NYSTATIN 500000 UNITS: 100000 SUSPENSION ORAL at 13:44

## 2025-01-18 RX ADMIN — NYSTATIN 500000 UNITS: 100000 SUSPENSION ORAL at 08:20

## 2025-01-18 RX ADMIN — OXYCODONE HYDROCHLORIDE 10 MG: 10 TABLET ORAL at 18:12

## 2025-01-18 RX ADMIN — MORPHINE SULFATE 30 MG: 30 TABLET, EXTENDED RELEASE ORAL at 20:41

## 2025-01-18 RX ADMIN — NYSTATIN 500000 UNITS: 100000 SUSPENSION ORAL at 17:51

## 2025-01-18 RX ADMIN — ACETAMINOPHEN 650 MG: 325 TABLET ORAL at 13:44

## 2025-01-18 SDOH — SOCIAL STABILITY: SOCIAL INSECURITY
WITHIN THE LAST YEAR, HAVE YOU BEEN KICKED, HIT, SLAPPED, OR OTHERWISE PHYSICALLY HURT BY YOUR PARTNER OR EX-PARTNER?: NO

## 2025-01-18 SDOH — ECONOMIC STABILITY: FOOD INSECURITY: WITHIN THE PAST 12 MONTHS, THE FOOD YOU BOUGHT JUST DIDN'T LAST AND YOU DIDN'T HAVE MONEY TO GET MORE.: NEVER TRUE

## 2025-01-18 SDOH — SOCIAL STABILITY: SOCIAL INSECURITY: WITHIN THE LAST YEAR, HAVE YOU BEEN AFRAID OF YOUR PARTNER OR EX-PARTNER?: NO

## 2025-01-18 SDOH — SOCIAL STABILITY: SOCIAL INSECURITY: HAVE YOU HAD THOUGHTS OF HARMING ANYONE ELSE?: NO

## 2025-01-18 SDOH — SOCIAL STABILITY: SOCIAL INSECURITY: WITHIN THE LAST YEAR, HAVE YOU BEEN HUMILIATED OR EMOTIONALLY ABUSED IN OTHER WAYS BY YOUR PARTNER OR EX-PARTNER?: NO

## 2025-01-18 SDOH — SOCIAL STABILITY: SOCIAL INSECURITY: DOES ANYONE TRY TO KEEP YOU FROM HAVING/CONTACTING OTHER FRIENDS OR DOING THINGS OUTSIDE YOUR HOME?: NO

## 2025-01-18 SDOH — SOCIAL STABILITY: SOCIAL INSECURITY
WITHIN THE LAST YEAR, HAVE YOU BEEN RAPED OR FORCED TO HAVE ANY KIND OF SEXUAL ACTIVITY BY YOUR PARTNER OR EX-PARTNER?: NO

## 2025-01-18 SDOH — SOCIAL STABILITY: SOCIAL INSECURITY: HAS ANYONE EVER THREATENED TO HURT YOUR FAMILY OR YOUR PETS?: NO

## 2025-01-18 SDOH — ECONOMIC STABILITY: INCOME INSECURITY: IN THE PAST 12 MONTHS HAS THE ELECTRIC, GAS, OIL, OR WATER COMPANY THREATENED TO SHUT OFF SERVICES IN YOUR HOME?: NO

## 2025-01-18 SDOH — ECONOMIC STABILITY: FOOD INSECURITY: WITHIN THE PAST 12 MONTHS, YOU WORRIED THAT YOUR FOOD WOULD RUN OUT BEFORE YOU GOT THE MONEY TO BUY MORE.: NEVER TRUE

## 2025-01-18 SDOH — SOCIAL STABILITY: SOCIAL INSECURITY: ABUSE: ADULT

## 2025-01-18 SDOH — SOCIAL STABILITY: SOCIAL INSECURITY: ARE YOU OR HAVE YOU BEEN THREATENED OR ABUSED PHYSICALLY, EMOTIONALLY, OR SEXUALLY BY ANYONE?: NO

## 2025-01-18 SDOH — SOCIAL STABILITY: SOCIAL INSECURITY: DO YOU FEEL ANYONE HAS EXPLOITED OR TAKEN ADVANTAGE OF YOU FINANCIALLY OR OF YOUR PERSONAL PROPERTY?: NO

## 2025-01-18 SDOH — SOCIAL STABILITY: SOCIAL INSECURITY: ARE THERE ANY APPARENT SIGNS OF INJURIES/BEHAVIORS THAT COULD BE RELATED TO ABUSE/NEGLECT?: NO

## 2025-01-18 SDOH — SOCIAL STABILITY: SOCIAL INSECURITY: HAVE YOU HAD ANY THOUGHTS OF HARMING ANYONE ELSE?: NO

## 2025-01-18 SDOH — ECONOMIC STABILITY: HOUSING INSECURITY: IN THE PAST 12 MONTHS, HOW MANY TIMES HAVE YOU MOVED WHERE YOU WERE LIVING?: 1

## 2025-01-18 ASSESSMENT — PAIN SCALES - GENERAL
PAINLEVEL_OUTOF10: 3
PAINLEVEL_OUTOF10: 4
PAINLEVEL_OUTOF10: 7
PAINLEVEL_OUTOF10: 8
PAINLEVEL_OUTOF10: 7

## 2025-01-18 ASSESSMENT — ACTIVITIES OF DAILY LIVING (ADL)
LACK_OF_TRANSPORTATION: NO
JUDGMENT_ADEQUATE_SAFELY_COMPLETE_DAILY_ACTIVITIES: YES
BATHING: INDEPENDENT
TOILETING: INDEPENDENT
PATIENT'S MEMORY ADEQUATE TO SAFELY COMPLETE DAILY ACTIVITIES?: YES
FEEDING YOURSELF: INDEPENDENT
GROOMING: INDEPENDENT
LACK_OF_TRANSPORTATION: NO
WALKS IN HOME: INDEPENDENT
DRESSING YOURSELF: INDEPENDENT
HEARING - RIGHT EAR: FUNCTIONAL
HEARING - LEFT EAR: FUNCTIONAL
ADEQUATE_TO_COMPLETE_ADL: YES

## 2025-01-18 ASSESSMENT — COGNITIVE AND FUNCTIONAL STATUS - GENERAL
MOBILITY SCORE: 24
DAILY ACTIVITIY SCORE: 24
PATIENT BASELINE BEDBOUND: NO
MOBILITY SCORE: 24
MOBILITY SCORE: 24

## 2025-01-18 ASSESSMENT — LIFESTYLE VARIABLES
AUDIT-C TOTAL SCORE: 0
HOW OFTEN DO YOU HAVE 6 OR MORE DRINKS ON ONE OCCASION: NEVER
HOW MANY STANDARD DRINKS CONTAINING ALCOHOL DO YOU HAVE ON A TYPICAL DAY: PATIENT DOES NOT DRINK
SKIP TO QUESTIONS 9-10: 1
HOW OFTEN DO YOU HAVE A DRINK CONTAINING ALCOHOL: NEVER
AUDIT-C TOTAL SCORE: 0

## 2025-01-18 ASSESSMENT — PAIN - FUNCTIONAL ASSESSMENT
PAIN_FUNCTIONAL_ASSESSMENT: 0-10

## 2025-01-18 NOTE — ED PROVIDER NOTES
History of Present Illness   History provided by: Patient  Limitations to History: None  External Records Reviewed with Brief Summary: Outpatient progress note from 1/7/2025 reviewed with the patient's primary oncologist and shows his current treatment regimen and past cancer treatment therapies.    HPI:  Roma Corral is a 69 y.o. male with a past medical history of CAD, chronic anemia, hypertension, hyperlipidemia, type 2 diabetes and rectal cancer with mets to the liver currently on bevacizumab/mFOLFOXiri that presents to the emergency department today for generalized weakness and abdominal pain.  The patient states that he began having fairly significant abdominal pain over this past weekend and discussed this with his primary care provider.  The pain was worse over the weekend and he did have some associated nausea and vomiting with this that which has been nonbloody and nonbilious in nature.  His pain has seemed to improve over the past few days but he is continue to have poor p.o. intake as well as some continued nausea and vomiting.  He describes the pain is generalized in nature and does not radiate anywhere else.  He does complain of some associated shortness of breath but has not had any hemoptysis, lower extremity edema/pain or productive cough.  He denies any previous history of PE/DVT.  He has not had any significant diarrhea or dark/tarry stools.  He denies any previous history of abdominal ascites or requiring paracentesis in the past.  The patient has had a small amount of bright red blood per rectum as well as occasional nosebleeds that have stopped spontaneously.    Physical Exam   Triage vitals:  T 36.8 °C (98.2 °F)  HR 78  /60  RR 18  O2 100 % None (Room air)    Vital signs reviewed in nursing triage note, EMR flow sheets, and at patient's bedside.   General: Awake, alert, in no acute distress.  Chronically ill-appearing.  Eyes: Gaze conjugate.  No scleral icterus or injection  HENT:  Normo-cephalic, atraumatic. No stridor. No rhinorrhea or epistaxis.  CV: Regular rate and rhythm. No murmurs appreciated. Radial pulses 2+ bilaterally  Respiratory: Breathing non-labored, speaking in full sentences.  Lungs clear to auscultation bilaterally.  GI: Soft, non-distended, non-tender. No rebound or guarding.  MSK/Extremities: No gross bony deformities. Moving all extremities. No lower extremity edema.  Skin: Warm.  Pale.  Neuro: Alert. Oriented. Face symmetric. Speech is fluent.  Gross strength and sensation intact in b/l UE and LEs  Psych: Appropriate mood and affect      Medical Decision Making & ED Course   Medical Decision Makin y.o. male with the above-stated past medical history that presents to the emergency department for generalized weakness and abdominal pain with nausea/vomiting.  Upon arrival to the emergency department the patient had stable vital signs, was afebrile and saturating well on room air.  History and physical exam are as above but notable for a chronically ill-appearing patient in no acute distress.  Overall the patient does have pale appearing skin but an otherwise benign physical examination.  He has a nontender abdomen with no evidence of peritonitis.  CT imaging from 1/15/2024 reviewed by me and agree with the interpretation of metastatic lesions to the liver which are slightly worsened when compared to previous examination.  Given there is improvement of the patient's abdominal pain since this CT imaging, there is no indication for repeat imaging at this time.  Will obtain basic labs to rule out any electrolyte derangements in the setting of nausea, vomiting and generalized weakness.  Will also obtain CBC to evaluate for any evidence of leukocytosis/leukopenia and other derangements.  The patient's symptoms are likely secondary to chemotherapy treatments with his nausea and vomiting and states that he typically has this abdominal pain when he has these symptoms.  He will  be treated with a dose of Zofran for his symptoms and reassess throughout the ED course.  See ED course below for further workup and final disposition.    Differential diagnoses considered include but are not limited to: Electrolyte derangement, gastroenteritis, worsening metastasis, dehydration, sepsis    ED Course:  ED Course as of 01/18/25 0224   Sat Jan 18, 2025   0036 Labs reviewed which show CBC notable for mild leukopenia down to 3.9 and stable hemoglobin.  The patient's platelets are low when compared to his baseline at 36 but no other concerning abnormalities appreciated.  BMP and hepatic function panel with no concerning abnormalities. [RS]   0135 Discussed the patient with the admissions coordinator who accepted the patient to the oncology service and a bed request under Dr. Bucio was placed. He was updated with this plan and is agreeable with this.  [RS]      ED Course User Index  [RS] Davide Joyce, DO         Diagnoses as of 01/18/25 0224   History of rectal cancer   Nausea and vomiting, unspecified vomiting type   Thrombocytopenia (CMS-HCC)   Leukopenia, unspecified type        Social Determinants of Health which Significantly Impact Care: None identified     EKG Independent Interpretation:  EKG personally interpreted by me showing atrial flutter at a rate of 70 bpm with normal axis.  QRS and QTc intervals are within normal limits.  There is no ST elevation or depression appreciated.  No concerning T wave inversions.  EKG is unchanged from prior noted on    Independent Result Review and Interpretation: Relevant laboratory and radiographic results were reviewed and independently interpreted by myself.  As necessary, they are commented on in the ED Course.    Chronic conditions affecting the patient's care: As documented in the HPI    The patient was discussed with the following consultants/services: Admission Coordinator who accepted the patient for admission    Care Considerations: As documented  above in MDM    Disposition   As a result of their workup, the patient will require admission to the hospital.  The patient was informed of his diagnosis.  The patient was given the opportunity to ask questions and I answered them. The patient agreed to be admitted to the hospital.    Procedures   Procedures    Patient seen and discussed with ED attending physician.    Davide Joyce DO   Emergency Medicine, PGY-2     Disclaimer: This note was dictated by speech recognition. Minor errors in transcription may be present.     [unfilled] ? SmartLinks last updated 1/17/2025 10:19 PM        Davide Joyce DO  Resident  01/18/25 0233

## 2025-01-18 NOTE — H&P
Subjective    HPI  Mr Corral is a 69 year old M w/ hx of metastatic rectal cancer w/ mets to the liver s/p diverting loop colostomy (07/2024) s/p chemoXRT (currently on FOLFOXIRI w/ Josefina), nephrolithiasis, CAD, HTN and T2D who presents to [facility] on 01/18/24 for abdominal pain, rectal bleeding and severe fatigue.     Briefly, patient initially evaluated back in 06/2024 for weight loss and abdominal pain and ultimately found to have large metastatic rectal adenocarcinoma w/ numerous mets to the liver. Initially underwent diverting loop colostomy procedure on 07/02/24. Subsequently received several session of radiation therapy (07/2024) and was then started on FOLFOXIRI w/ Bevacizumab. He has completed 8 cycles of chemotherapy with most surveillance CT on 01/14/25 demonstrating interval decrease in size of several hepatic metastasis. Nevertheless, over the last few cycles, he has noted severe exhaustion and accompanying abdominal pain as well as persistent rectal bleeding and colostomy bleeding which has essentially brought him to the hospital currently.     On arrival to the ED, patient was hemodynamically stable. Initial labs demonstrating chronic anemia, leukopenia, thrombocytopenia (WBC 3.9 Hb 7.6 Plt 36) and chronic mildly elevated ALP levels.     At bedside, patient reports he has been struggling with extreme fatigue for the last two chemo cycles. Last infusion was on 01/10/25. Reports also accompanying lower abdominal pain which is now more constant than it used to be. Pain is now described as moderate in severity. Also endorses noticing rectal bleeding as well bleeding into his colostomy bag. These bleeding episodes previously occurred sporadically but is now occurring multiple times per week.     Review of Systems - Oncology   12 point ROS negative unless as per HPI    Home meds:  MS contin 15 mg daily 10 AM  MS Contin 30 mg daily 10 PM  Atorvastatin 40 mg daily  Metop  mg daily  Imodium PRN  ASA 81 mg  daily      Objective   BSA: 1.94 meters squared  /79   Pulse 73   Temp 36.5 °C (97.7 °F) (Temporal)   Resp 16   Ht 1.829 m (6')   Wt 74.4 kg (164 lb)   SpO2 99%   BMI 22.24 kg/m²     Family History   Problem Relation Name Age of Onset    No Known Problems Mother      No Known Problems Father      No Known Problems Sister      Leukemia Brother       Oncology History   Rectal cancer (Multi)   6/17/2024 Initial Diagnosis    Rectal cancer (Multi)     7/24/2024 -  Chemotherapy    Bevacizumab + mFOLFOXIRI (Fluorouracil Continuous Infusion / Leucovorin / Oxaliplatin / Irinotecan), 14 Day Cycles         Roma Corral  reports that he has never smoked. He has never used smokeless tobacco.  He  reports that he does not currently use alcohol.  He  reports no history of drug use.    No current facility-administered medications on file prior to encounter.     Current Outpatient Medications on File Prior to Encounter   Medication Sig Dispense Refill    aspirin 81 mg chewable tablet Chew 1 tablet (81 mg) 1 time.      atorvastatin (Lipitor) 40 mg tablet Take 1 tablet (40 mg) by mouth once daily at bedtime. 90 tablet 3    loperamide (Imodium) 2 mg capsule Take 2 capsules (4 mg) by mouth with the first episode of diarrhea and 1 capsule (2 mg) by mouth with any additional episodes. Maximum 8 capsules (16 mg) per day. 100 capsule 2    metoprolol succinate XL (Toprol-XL) 100 mg 24 hr tablet Take 1 tablet (100 mg) by mouth once daily. 90 tablet 3    morphine CR (MS Contin) 15 mg 12 hr tablet Take 1 tablet (15 mg) by mouth once daily in the morning AND 2 tablets (30 mg) once daily at bedtime. Do not crush, chew, or split. 90 tablet 0    ondansetron (Zofran) 4 mg tablet Take 1 tablet (4 mg) by mouth every 8 hours if needed for nausea or vomiting. 20 tablet 0    potassium chloride CR (Klor-Con M20) 20 mEq ER tablet Take 1 tablet (20 mEq) by mouth 2 times a day. Do not crush or chew. 180 tablet 3    LORazepam (Ativan) 0.5 mg tablet  Take 1 tablet (0.5 mg) by mouth 2 times a day as needed for anxiety (nausea) for up to 15 days. (Patient not taking: Reported on 1/18/2025) 30 tablet 3    nystatin (Mycostatin) 100,000 unit/mL suspension Take 5 mL (500,000 Units) by mouth 4 times a day. Swish in mouth and swallow. (Patient not taking: Reported on 1/18/2025) 280 mL 0    prochlorperazine (Compazine) 10 mg tablet Take 1 tablet (10 mg) by mouth every 6 hours if needed for nausea or vomiting. (Patient not taking: Reported on 1/18/2025) 30 tablet 5      Physical Exam  Alert and oriented  CTAB  RRR  Abd soft ND, mild TTP on lower quadrant. Colostomy visualized with brown stool  Skin warm and dry    Assessment/Plan    Mr Corral is a 69 year old M w/ hx of metastatic rectal cancer w/ mets to the liver s/p diverting loop colostomy (07/2024) s/p chemoXRT (currently on FOLFOXIRI w/ Josefina), nephrolithiasis, CAD, HTN and T2D who presents to [facility] on 01/18/24 for abdominal pain, rectal bleeding and severe fatigue.     #Abdominal pain  #Fatigue  #Rectal bleeding  :: Symptoms likely related to known metastatic rectal Ca although based on recent surveillance imaging, his malignant burden has decreased. His pain is not debilitating but his admission is mostly due to severe fatigue.   :: Malignancy related rectal bleeding is challenging to manage with limited therapeutic options from an endoscopy standpoint. Although Hb is close to baseline, can consider discussion with primary oncologist if he would benefit from repeating radiation therapy.   - Monitor H/H  - Fluid PRN  - If recurrent rectal/colostomy bleeding, discuss with primary oncologist Dr Nieto if he would benefit from palliative XRT  - Continue home pain regimen: MS contin 15 mg at AM and 30 mg at PM once daily  - Zofran q8hrs PRN    #Metastatic rectal adenocarcinoma  #Hepatic metastasis  :: Diagnosed on 06/2024 via colon mass biopsy: Moderately differentiated adenocarcinoma  :: Underwent XRT from 07/15/24 to  07/19/24  :: Chemotherapy regimen   FOLFIRI Josefina (07/24/25 - current). Completed 9 cycles thus far  - Primary oncologist planning for possible definitive surgery/ablation of metastatic disease after discussion with Surg Onc and CRS.     #CAD  - Continue home ASA, Atorva and Metop XR    #T2D  - Last HbA1c% on 04/2024: 7.5.  - Currently not on any medical therapy  - Start Insulin SSI mild    Dispo: PT/OT pending    F:  Fluids PRN  E: Replete PRN  N: Regular diet  GI ppx: None  DVT: Mechanical device. Hold pharmacologic ppx given thrombocytopenia    FULL CODE discussed on admission    Dayyan Chayo   PGY-6

## 2025-01-18 NOTE — PROGRESS NOTES
Pharmacy Medication History Review    Roma Corral is a 69 y.o. male admitted for History of rectal cancer. Pharmacy reviewed the patient's hctci-vg-bidrrcgiz medications and allergies for accuracy.    Medications ADDED:  None  Medications CHANGED:  None  Medications REMOVED / PATIENT NOT TAKING:   Lorazepam 0.5 mg tablet - Patient Not Taking   Nystatin 100,000 unit / mL suspension - Patient Not Taking   Prochlorperazine 10 mg tablet - Patient Not Taking     The list below reflects the updated PTA list.   Prior to Admission Medications   Prescriptions Last Dose Informant   LORazepam (Ativan) 0.5 mg tablet Not Taking    Sig: Take 1 tablet (0.5 mg) by mouth 2 times a day as needed for anxiety (nausea) for up to 15 days.   Patient not taking: Reported on 1/18/2025   aspirin 81 mg chewable tablet 1/17/2025 Morning Self   Sig: Chew 1 tablet (81 mg) 1 time.   atorvastatin (Lipitor) 40 mg tablet 1/16/2025 Bedtime Self   Sig: Take 1 tablet (40 mg) by mouth once daily at bedtime.   loperamide (Imodium) 2 mg capsule Past Week Self   Sig: Take 2 capsules (4 mg) by mouth with the first episode of diarrhea and 1 capsule (2 mg) by mouth with any additional episodes. Maximum 8 capsules (16 mg) per day.   metoprolol succinate XL (Toprol-XL) 100 mg 24 hr tablet 1/17/2025 at 10:00 AM Self   Sig: Take 1 tablet (100 mg) by mouth once daily.   morphine CR (MS Contin) 15 mg 12 hr tablet 1/17/2025 Morning Self   Sig: Take 1 tablet (15 mg) by mouth once daily in the morning AND 2 tablets (30 mg) once daily at bedtime. Do not crush, chew, or split.   nystatin (Mycostatin) 100,000 unit/mL suspension Not Taking Self   Sig: Take 5 mL (500,000 Units) by mouth 4 times a day. Swish in mouth and swallow.   Patient not taking: Reported on 1/18/2025   ondansetron (Zofran) 4 mg tablet Past Week Self   Sig: Take 1 tablet (4 mg) by mouth every 8 hours if needed for nausea or vomiting.   potassium chloride CR (Klor-Con M20) 20 mEq ER tablet 1/17/2025  "Morning Self   Sig: Take 1 tablet (20 mEq) by mouth 2 times a day. Do not crush or chew.   prochlorperazine (Compazine) 10 mg tablet Not Taking Self   Sig: Take 1 tablet (10 mg) by mouth every 6 hours if needed for nausea or vomiting.   Patient not taking: Reported on 1/18/2025      Facility-Administered Medications: None        The list below reflects the updated allergy list. Please review each documented allergy for additional clarification and justification.  Allergies  Reviewed by Zakiya Sherman on 1/18/2025   No Known Allergies         Patient accepts M2B at discharge.     Sources:   [facility]RS - 01/07/2025 Morphine Sulfate ER 15 mg tablet 90 # / 30 days                     - 11/25/2024 Morphine Sulfate ER 15 mg tablet 90 # / 30 days   Patient interview  Patient dispense hx    chart review    admission med rec grid       Additional Comments:  No concerns , pt is a reliable historian.       Zakiya Sherman  Pharmacy Technician  01/18/25     Secure Chat preferred   If no response call [phone] or [employer] \"Med Rec\"   "

## 2025-01-18 NOTE — PROGRESS NOTES
Subjective   Patient ID: Roma Corral is a 69 y.o. male who presents for Abdominal Pain, Diarrhea, and Nausea.    This gentleman today came here for multiple medical issues.  Abdominal pain, nausea, vomiting and diarrhea, extreme weakness, lethargy, tired, fatigued, not feeling good.  He has got thrushes in mouth.  He cannot swallow.  Mouth hurting, difficulty swallowing.  He came for follow-up.    I have personally reviewed the patient's Past Medical History, Medications, Allergies, Social History, and Family History in the EMR.    Review of Systems   All other systems reviewed and are negative.    Objective   /82   Ht 1.829 m (6')   Wt 74.4 kg (164 lb)   BMI 22.24 kg/m²     Physical Exam  Vitals reviewed.   HENT:      Mouth/Throat:      Comments: Oral cavity looks like a fungal infection.  Cardiovascular:      Heart sounds: Normal heart sounds, S1 normal and S2 normal. No murmur heard.     No friction rub.   Pulmonary:      Effort: Pulmonary effort is normal.      Breath sounds: Normal breath sounds and air entry.   Abdominal:      Palpations: There is no hepatomegaly, splenomegaly or mass.      Comments: Abdomen, tenderness.  Very dehydrated.   Musculoskeletal:      Right lower leg: No edema.      Left lower leg: No edema.   Lymphadenopathy:      Lower Body: No right inguinal adenopathy. No left inguinal adenopathy.   Neurological:      Cranial Nerves: Cranial nerves 2-12 are intact.      Sensory: No sensory deficit.      Motor: Motor function is intact.      Deep Tendon Reflexes: Reflexes are normal and symmetric.     LAB WORK: Laboratory testing discussed.    Assessment/Plan   Problem List Items Addressed This Visit             ICD-10-CM       Cardiac and Vasculature    Hyperlipidemia E78.5    Relevant Orders    Comprehensive Metabolic Panel    Hypertension I10    Relevant Orders    CBC    Ammonia    Urinalysis with Reflex Microscopic     Other Visit Diagnoses         Codes    Abdominal pain, unspecified  abdominal location    -  Primary R10.9    Oral thrush     B37.0    Relevant Medications    nystatin (Mycostatin) 100,000 unit/mL suspension    Diarrhea, unspecified type     R19.7    Nausea and vomiting, unspecified vomiting type     R11.2    Anemia, unspecified type     D64.9    Weak     R53.1    Malignant neoplasm of colon, unspecified part of colon (Multi)     C18.9        1. Oral thrush.  Nystatin.  2. Abdominal pain, diarrhea, nausea, vomiting, cancer related.  Morphine actually causes constipation, which is paradoxical.  Diarrhea not better.  3. Dehydration.  4. Anemia.  5. The patient is very weak, he lives by himself.  There is no support system.  I think the patient should be admitted in [facility] for IV hydration, treatment and change of medication.  I urged him to go to [facility].  There is no cancer service available in [facility] at this week end as per my knowledge and the patient will be admitted at ______ go to  Emergency Room.  I will coordinate his care.    Shakira Attestation  By signing my name below, ILuz Scribe attest that this documentation has been prepared under the direction and in the presence of Santiago Buckner MD.     All medical record entries made by the shakira were personally dictated by me I have reviewed the chart and agree the record accurately reflects my personal performance of his history physical examination and management

## 2025-01-18 NOTE — SIGNIFICANT EVENT
ITALIA UPDATED PLAN OF CARE 1/18    Mr Corral is a 69 year old M with PMH of metastatic rectal cancer w/ mets to the liver s/p diverting loop colostomy (07/2024) s/p chemoXRT (currently on FOLFOXIRI w/ Josefina), nephrolithiasis, CAD, HTN and T2D who initially presented to his PCP 1/17 where he was found to be very weak and noted to have oral thrush. He was referred to [facility] ED via his PCP. He was admitted for further management of weakness, abdominal pain, and chronic rectal/colostomy bleed. Of note, his abdominal pain is somewhat chronic, although could be exacerbated somewhat by thrush. His recent CT C/A/P (1/13) overall shows decreased tumor burden without other acute findings. Deferring repeat imaging on admission at this time as pt without severe abd pain on admit and recent imaging without acute findings as mentioned above. Weakness likely 2/2 deconditioning from recent/ongoing chemotherapy (last infusion 1/8), pancytopenia, and poor PO intake 2/2 thrush. Plts 36 (1/8)- 1u plts ordered. Hgb 7.6 (1/18)- will consider transfusion pending repeat CBC. Will continue tx for thrush inpt with Nystatin (1/18-). DC pending improvement in plts, likely tomorrow 1/19.     # Abdominal pain/ Neoplasm-related pain  # Rectal bleeding  - No active bleeding via rectum or colostomy on admit, pt reports this has been transient  - Transient bleeding is likely 2/2 known metastatic rectal ca I/s/o intermittent thrombocytopenia 2/2 chemo regimen   - Based on recent surveillance imaging, his malignant burden has decreased. His pain is not debilitating but his admission is mostly due to severe fatigue  - Per GI fellow who admitted pt--> Malignancy related rectal bleeding is challenging to manage with limited therapeutic options from an endoscopy standpoint  - Per Dr. Nieto, no other inpatient workup needed at this time, he will see him outpt next week  - Hgb remains stable at this time, will repeat CBC and transfuse 1unit PRBC if needed  -  Continue home MS Contin 15mg AM and 30mg PM for neoplasm-related pain  - Will monitor for any recurrent bleeding via rectum or colostomy    # Pancytopenia  - 2/2 chemotherapy/disease  - Last chemo 1/8  - WBC 3.9 (1/17)  - Hgb 7.6 (1/17)- will consider 1unit PRBC pending repeat CBC  - Plts 36 (1/18)- plan 1unit plts today 1/18    # Oral Thrush  - Noted by OP PCP 1/17 and rec Nystatin  - Pt with oral plaques in mouth on exam  - Will continue with Nystatin 500,000 units swish & swallow 4x/day (1/18-)    # Fatigue/Weakness  - Likely 2/2 deconditioning from recent/ongoing chemotherapy (last infusion 1/8), pancytopenia, and poor PO intake 2/2 thrush  - PT/OT consulted but anticipate no needs as pt is fully mobile  - Will treat for thrush as above  - Encouraged PO intake     # Metastatic rectal adenocarcinoma  # Hepatic metastasis  - Diagnosed on 06/2024 via colon mass biopsy: Moderately differentiated adenocarcinoma  - Underwent XRT from 07/15/24 to 07/19/24  - Currently on FOLFIRI/Josefina (07/24/25 - current). Completed 9 cycles thus far; last cycle 1/8  - Primary oncologist planning for possible definitive surgery/ablation of metastatic disease after discussion with Surg Onc and CRS  - Last CT C/A/P (1/13/25) overall shows decreased tumor burden without other acute findings     # CAD  - Holding home ASA pending improvement in plts  - Continue home Atorvastatin 40mg nightly and Metoprolol Succs 100mg daily     # DMII  - Last HbA1c% on 04/2024: 7.5--> repeat 5.3 (1/17)  - Pt denies diagnosis  - Currently not on any medical therapy  - BS stable, holding off on SSI while inpt at this time     DVT prophy: Holding Lovenox I/s/o thrombocytopenia, encouraged SCDs, ambulation    DISPO:  - FULL CODE, confirmed on admission  - DC pending improvement in plts, likely tomorrow 1/19  - PT/OT consulted but anticipate no needs as pt is fully mobile  - Infusion 1/20, Dr. Nieto [facility] 1/21, Infusion 1/22, Infusion 1/24, Urology [facility] 1/27, Pall  Care (virtual) 1/28, Infusion 1/29, Cardiology [facility] 1/30

## 2025-01-18 NOTE — ED TRIAGE NOTES
Pt came into the ED d/t abdominal pain and weakness. Pt has stage four liver cancer and was seen by his doctor and told to come to main campus. Pt has a stoma and endorses discomfort in his esophagus. Pt is currently receiving chemotherapy. VSS.

## 2025-01-19 VITALS
HEIGHT: 72 IN | RESPIRATION RATE: 18 BRPM | SYSTOLIC BLOOD PRESSURE: 142 MMHG | TEMPERATURE: 97.3 F | HEART RATE: 57 BPM | DIASTOLIC BLOOD PRESSURE: 71 MMHG | OXYGEN SATURATION: 97 % | WEIGHT: 164 LBS | BODY MASS INDEX: 22.21 KG/M2

## 2025-01-19 PROBLEM — C78.7 SECONDARY MALIGNANT NEOPLASM OF LIVER AND INTRAHEPATIC BILE DUCT (MULTI): Status: ACTIVE | Noted: 2025-01-19

## 2025-01-19 LAB
ALBUMIN SERPL BCP-MCNC: 2.8 G/DL (ref 3.4–5)
ALP SERPL-CCNC: 271 U/L (ref 33–136)
ALT SERPL W P-5'-P-CCNC: 10 U/L (ref 10–52)
ANION GAP SERPL CALC-SCNC: 15 MMOL/L (ref 10–20)
APTT PPP: 29 SECONDS (ref 27–38)
AST SERPL W P-5'-P-CCNC: 20 U/L (ref 9–39)
ATRIAL RATE: 234 BPM
BASOPHILS # BLD AUTO: 0.03 X10*3/UL (ref 0–0.1)
BASOPHILS NFR BLD AUTO: 0.9 %
BILIRUB SERPL-MCNC: 1.1 MG/DL (ref 0–1.2)
BLOOD EXPIRATION DATE: NORMAL
BUN SERPL-MCNC: 12 MG/DL (ref 6–23)
CALCIUM SERPL-MCNC: 8.1 MG/DL (ref 8.6–10.6)
CHLORIDE SERPL-SCNC: 109 MMOL/L (ref 98–107)
CO2 SERPL-SCNC: 23 MMOL/L (ref 21–32)
CREAT SERPL-MCNC: 0.87 MG/DL (ref 0.5–1.3)
D DIMER PPP FEU-MCNC: 1724 NG/ML FEU
DACRYOCYTES BLD QL SMEAR: NORMAL
DISPENSE STATUS: NORMAL
EGFRCR SERPLBLD CKD-EPI 2021: >90 ML/MIN/1.73M*2
EOSINOPHIL # BLD AUTO: 0.03 X10*3/UL (ref 0–0.7)
EOSINOPHIL NFR BLD AUTO: 0.9 %
ERYTHROCYTE [DISTWIDTH] IN BLOOD BY AUTOMATED COUNT: 20.1 % (ref 11.5–14.5)
FIBRINOGEN PPP-MCNC: 475 MG/DL (ref 200–400)
GLUCOSE SERPL-MCNC: 58 MG/DL (ref 74–99)
HCT VFR BLD AUTO: 24.7 % (ref 41–52)
HGB BLD-MCNC: 7.4 G/DL (ref 13.5–17.5)
HYPOCHROMIA BLD QL SMEAR: NORMAL
IMM GRANULOCYTES # BLD AUTO: 0.02 X10*3/UL (ref 0–0.7)
IMM GRANULOCYTES NFR BLD AUTO: 0.6 % (ref 0–0.9)
INR PPP: 1.1 (ref 0.9–1.1)
LYMPHOCYTES # BLD AUTO: 0.73 X10*3/UL (ref 1.2–4.8)
LYMPHOCYTES NFR BLD AUTO: 22.4 %
MCH RBC QN AUTO: 28.5 PG (ref 26–34)
MCHC RBC AUTO-ENTMCNC: 30 G/DL (ref 32–36)
MCV RBC AUTO: 95 FL (ref 80–100)
MONOCYTES # BLD AUTO: 0.46 X10*3/UL (ref 0.1–1)
MONOCYTES NFR BLD AUTO: 14.1 %
NEUTROPHILS # BLD AUTO: 1.99 X10*3/UL (ref 1.2–7.7)
NEUTROPHILS NFR BLD AUTO: 61.1 %
NRBC BLD-RTO: 0 /100 WBCS (ref 0–0)
OVALOCYTES BLD QL SMEAR: NORMAL
P AXIS: 34 DEGREES
P OFFSET: 172 MS
P ONSET: 124 MS
PLATELET # BLD AUTO: 45 X10*3/UL (ref 150–450)
POLYCHROMASIA BLD QL SMEAR: NORMAL
POTASSIUM SERPL-SCNC: 3.9 MMOL/L (ref 3.5–5.3)
PRODUCT BLOOD TYPE: 2800
PRODUCT BLOOD TYPE: 2800
PRODUCT BLOOD TYPE: 6200
PRODUCT BLOOD TYPE: 7300
PRODUCT CODE: NORMAL
PROT SERPL-MCNC: 5.8 G/DL (ref 6.4–8.2)
PROTHROMBIN TIME: 12.9 SECONDS (ref 9.8–12.8)
Q ONSET: 223 MS
QRS COUNT: 11 BEATS
QRS DURATION: 90 MS
QT INTERVAL: 406 MS
QTC CALCULATION(BAZETT): 438 MS
QTC FREDERICIA: 427 MS
R AXIS: 15 DEGREES
RBC # BLD AUTO: 2.6 X10*6/UL (ref 4.5–5.9)
RBC MORPH BLD: NORMAL
SODIUM SERPL-SCNC: 143 MMOL/L (ref 136–145)
T AXIS: 73 DEGREES
T OFFSET: 426 MS
TOXIC GRANULES BLD QL SMEAR: PRESENT
UNIT ABO: NORMAL
UNIT NUMBER: NORMAL
UNIT RH: NORMAL
UNIT VOLUME: 188
UNIT VOLUME: 270
UNIT VOLUME: 350
UNIT VOLUME: 350
VENTRICULAR RATE: 70 BPM
WBC # BLD AUTO: 3.3 X10*3/UL (ref 4.4–11.3)
XM INTEP: NORMAL
XM INTEP: NORMAL

## 2025-01-19 PROCEDURE — 85384 FIBRINOGEN ACTIVITY: CPT | Performed by: NURSE PRACTITIONER

## 2025-01-19 PROCEDURE — P9040 RBC LEUKOREDUCED IRRADIATED: HCPCS

## 2025-01-19 PROCEDURE — P9073 PLATELETS PHERESIS PATH REDU: HCPCS

## 2025-01-19 PROCEDURE — RXMED WILLOW AMBULATORY MEDICATION CHARGE

## 2025-01-19 PROCEDURE — 99239 HOSP IP/OBS DSCHRG MGMT >30: CPT | Performed by: NURSE PRACTITIONER

## 2025-01-19 PROCEDURE — 2500000001 HC RX 250 WO HCPCS SELF ADMINISTERED DRUGS (ALT 637 FOR MEDICARE OP): Performed by: STUDENT IN AN ORGANIZED HEALTH CARE EDUCATION/TRAINING PROGRAM

## 2025-01-19 PROCEDURE — 80053 COMPREHEN METABOLIC PANEL: CPT

## 2025-01-19 PROCEDURE — 36416 COLLJ CAPILLARY BLOOD SPEC: CPT | Performed by: NURSE PRACTITIONER

## 2025-01-19 PROCEDURE — 85025 COMPLETE CBC W/AUTO DIFF WBC: CPT

## 2025-01-19 PROCEDURE — 2500000001 HC RX 250 WO HCPCS SELF ADMINISTERED DRUGS (ALT 637 FOR MEDICARE OP): Performed by: NURSE PRACTITIONER

## 2025-01-19 PROCEDURE — 85379 FIBRIN DEGRADATION QUANT: CPT | Performed by: NURSE PRACTITIONER

## 2025-01-19 PROCEDURE — 85610 PROTHROMBIN TIME: CPT | Performed by: NURSE PRACTITIONER

## 2025-01-19 PROCEDURE — 2500000004 HC RX 250 GENERAL PHARMACY W/ HCPCS (ALT 636 FOR OP/ED): Performed by: STUDENT IN AN ORGANIZED HEALTH CARE EDUCATION/TRAINING PROGRAM

## 2025-01-19 PROCEDURE — 36430 TRANSFUSION BLD/BLD COMPNT: CPT

## 2025-01-19 RX ORDER — DIPHENHYDRAMINE HCL 25 MG
25 CAPSULE ORAL ONCE
Status: COMPLETED | OUTPATIENT
Start: 2025-01-19 | End: 2025-01-19

## 2025-01-19 RX ORDER — NYSTATIN 100000 [USP'U]/ML
500000 SUSPENSION ORAL 4 TIMES DAILY
Qty: 140 ML | Refills: 0 | Status: SHIPPED | OUTPATIENT
Start: 2025-01-19 | End: 2025-01-19

## 2025-01-19 RX ORDER — ACETAMINOPHEN 325 MG/1
650 TABLET ORAL ONCE
Status: COMPLETED | OUTPATIENT
Start: 2025-01-19 | End: 2025-01-19

## 2025-01-19 RX ORDER — NYSTATIN 100000 [USP'U]/ML
500000 SUSPENSION ORAL 4 TIMES DAILY
Qty: 140 ML | Refills: 0 | Status: SHIPPED | OUTPATIENT
Start: 2025-01-19 | End: 2025-01-30

## 2025-01-19 RX ADMIN — ONDANSETRON HYDROCHLORIDE 4 MG: 4 TABLET, FILM COATED ORAL at 11:19

## 2025-01-19 RX ADMIN — OXYCODONE 5 MG: 5 TABLET ORAL at 08:39

## 2025-01-19 RX ADMIN — MORPHINE SULFATE 15 MG: 15 TABLET, FILM COATED, EXTENDED RELEASE ORAL at 08:39

## 2025-01-19 RX ADMIN — ACETAMINOPHEN 650 MG: 325 TABLET ORAL at 11:13

## 2025-01-19 RX ADMIN — DIPHENHYDRAMINE HYDROCHLORIDE 25 MG: 25 CAPSULE ORAL at 11:13

## 2025-01-19 RX ADMIN — METOPROLOL SUCCINATE 100 MG: 100 TABLET, EXTENDED RELEASE ORAL at 08:41

## 2025-01-19 RX ADMIN — NYSTATIN 500000 UNITS: 100000 SUSPENSION ORAL at 08:39

## 2025-01-19 RX ADMIN — NYSTATIN 500000 UNITS: 100000 SUSPENSION ORAL at 12:43

## 2025-01-19 ASSESSMENT — COGNITIVE AND FUNCTIONAL STATUS - GENERAL
MOBILITY SCORE: 24
DAILY ACTIVITIY SCORE: 24

## 2025-01-19 ASSESSMENT — PAIN SCALES - GENERAL
PAINLEVEL_OUTOF10: 6
PAINLEVEL_OUTOF10: 0 - NO PAIN

## 2025-01-19 ASSESSMENT — PAIN - FUNCTIONAL ASSESSMENT
PAIN_FUNCTIONAL_ASSESSMENT: 0-10
PAIN_FUNCTIONAL_ASSESSMENT: UNABLE TO SELF-REPORT
PAIN_FUNCTIONAL_ASSESSMENT: 0-10

## 2025-01-19 NOTE — CARE PLAN
The clinical goals for the shift include pt will remain safe and free from injury throughout shift 1/19/2025 1900      Problem: Pain - Adult  Goal: Verbalizes/displays adequate comfort level or baseline comfort level  Outcome: Progressing     Problem: Safety - Adult  Goal: Free from fall injury  Outcome: Progressing     Problem: Discharge Planning  Goal: Discharge to home or other facility with appropriate resources  Outcome: Progressing     Problem: Chronic Conditions and Co-morbidities  Goal: Patient's chronic conditions and co-morbidity symptoms are monitored and maintained or improved  Outcome: Progressing     Problem: Fall/Injury  Goal: Not fall by end of shift  Outcome: Progressing  Goal: Be free from injury by end of the shift  Outcome: Progressing  Goal: Verbalize understanding of personal risk factors for fall in the hospital  Outcome: Progressing  Goal: Verbalize understanding of risk factor reduction measures to prevent injury from fall in the home  Outcome: Progressing  Goal: Use assistive devices by end of the shift  Outcome: Progressing  Goal: Pace activities to prevent fatigue by end of the shift  Outcome: Progressing     Problem: Pain  Goal: Takes deep breaths with improved pain control throughout the shift  Outcome: Progressing  Goal: Turns in bed with improved pain control throughout the shift  Outcome: Progressing  Goal: Walks with improved pain control throughout the shift  Outcome: Progressing  Goal: Performs ADL's with improved pain control throughout shift  Outcome: Progressing  Goal: Participates in PT with improved pain control throughout the shift  Outcome: Progressing  Goal: Free from opioid side effects throughout the shift  Outcome: Progressing  Goal: Free from acute confusion related to pain meds throughout the shift  Outcome: Progressing

## 2025-01-19 NOTE — DISCHARGE SUMMARY
Discharge Diagnosis  History of rectal cancer    Issues Requiring Follow-Up  - Pancytopenia    Test Results Pending At Discharge  Pending Labs       No current pending labs.          Hospital Course  Mr Corral is a 69 year old M with PMH of metastatic rectal cancer w/ mets to the liver s/p diverting loop colostomy (07/2024), s/p chemoXRT (currently on FOLFOXIRI w/ Josefina), nephrolithiasis, CAD, HTN and T2D who initially presented to his PCP 1/17 where he was found to be very weak and noted to have oral thrush. He was referred to [facility] ED via his PCP. He was admitted for further management of weakness, abdominal pain, and chronic rectal/colostomy bleed. Of note, his abdominal pain is somewhat chronic, although could be exacerbated somewhat by thrush. His recent CT C/A/P (1/13) overall shows decreased tumor burden without other acute findings. Deferred repeat imaging as pt without severe abd pain and recent imaging without acute findings as mentioned above. Weakness likely 2/2 deconditioning from recent/ongoing chemotherapy (last infusion 1/8), pancytopenia, and poor PO intake 2/2 thrush. Plts 36 (1/88)- 1u plts ordered--> Plts 34--> 1unit plts ordered. 1/18 Hgb 7.6--> 7.1--> 1unit PRBC ordered. 1/19 Hgb 7.4 and Plts 45- ordered additional 1unit PRBC and 1unit Plts as pt is to receive chemo this week. Pt received a total of 3units plts and 2units PRBC. DIC workup sent without c/f DIC. Discussed pt with Dr. Nieto, no other inpt interventions needed. Also continued tx for thrush inpt with Nystatin 500,000units swish & swallow 4x/day (1/18-planned stop 1/25). Pt reported improving fatigue/weakness, and controlled abdominal pain. No rectal or colostomy bleed throughout hospital stay. He was discharged in stable condition.    Rx for Nystatin sent to home pharmacy ([facility] pharmacy)    Infusion 1/20, Dr. Nieto FUV 1/21, Infusion 1/22, Infusion 1/24, Urology FUV 1/27, Pall Care (virtual) 1/28, Infusion 1/29, Cardiology FUV  1/30     Pertinent Physical Exam At Time of Discharge  On the day of discharge, the patient reported feeling well and pain was controlled. Vitals and labs were stable.   On exam:  Constitutional: Awake, NAD, weak  ENMT: +thrush  Head/Neck: NCAT  Respiratory/Thorax: CTAB, no wheezing  Cardiovascular: RRR, S1+S2, no murmur  Gastrointestinal: Soft, NT, nondistended, +BS  Extremities: No LE edema  Psychological: Appropriate mood and behavior  Skin: No rash noted      Home Medications     Medication List      CHANGE how you take these medications     nystatin 100,000 unit/mL suspension; Commonly known as: Mycostatin;   Swish and swallow 5 mL (500,000 Units) 4 times a day for 7 days.; What   changed: how to take this, additional instructions     CONTINUE taking these medications     aspirin 81 mg chewable tablet   atorvastatin 40 mg tablet; Commonly known as: Lipitor; Take 1 tablet (40   mg) by mouth once daily at bedtime.   loperamide 2 mg capsule; Commonly known as: Imodium; Take 2 capsules (4   mg) by mouth with the first episode of diarrhea and 1 capsule (2 mg) by   mouth with any additional episodes. Maximum 8 capsules (16 mg) per day.   LORazepam 0.5 mg tablet; Commonly known as: Ativan; Take 1 tablet (0.5   mg) by mouth 2 times a day as needed for anxiety (nausea) for up to 15   days.   metoprolol succinate  mg 24 hr tablet; Commonly known as:   Toprol-XL; Take 1 tablet (100 mg) by mouth once daily.   morphine CR 15 mg 12 hr tablet; Commonly known as: MS Contin; Take 1   tablet (15 mg) by mouth once daily in the morning AND 2 tablets (30 mg)   once daily at bedtime. Do not crush, chew, or split.   ondansetron 4 mg tablet; Commonly known as: Zofran; Take 1 tablet (4 mg)   by mouth every 8 hours if needed for nausea or vomiting.   potassium chloride CR 20 mEq ER tablet; Commonly known as: Klor-Con M20;   Take 1 tablet (20 mEq) by mouth 2 times a day. Do not crush or chew.     ASK your doctor about these  medications     prochlorperazine 10 mg tablet; Commonly known as: Compazine; Take 1   tablet (10 mg) by mouth every 6 hours if needed for nausea or vomiting.       Outpatient Follow-Up  Future Appointments   Date Time Provider Department Center   1/20/2025 10:00 AM INF 10 MENTOR [id number] [facility]   1/21/2025  1:20 PM Dann Nieto MD [id number] [facility]   1/22/2025  7:30 AM INF 11 MENTOR [id number] [facility]   1/24/2025  2:30 PM INF 00 ARNEX MENTOR [id number] [facility]   1/27/2025  2:20 PM Shaheen Kiser MD [id number] [facility]   1/28/2025  2:00 PM MELISSA Hall-CNP [id number] [facility]   1/29/2025  8:00 AM INF 06 MENTOR [id number] [facility]   1/30/2025  3:00 PM Sharif Lau MD [id number] [facility]   2/3/2025  2:30 PM INF 00 ARNEX MENTOR [id number] [facility]   2/4/2025  3:00 PM Dann Nieto MD [id number] [facility]   2/5/2025  7:30 AM INF 11 MENTOR [id number] [facility]   2/7/2025  2:00 PM INF 00 ARNEX MENTOR [id number] [facility]   2/18/2025  9:00 AM INF 00 ARNEX MENTOR [id number] [facility]   2/18/2025 10:40 AM Dann Nieto MD [id number] [facility]   2/19/2025  7:30 AM INF 02 MENTOR [id number] [facility]   2/21/2025  1:00 PM INF 00 ARNEX MENTOR [id number] [facility]     >30 minutes was spent on the assessment/discharge plan of this patient    Assessment and plan as above discussed with attending physician Dr. Nicolás Ortez, APRN-CNP

## 2025-01-19 NOTE — CARE PLAN
Problem: Pain - Adult  Goal: Verbalizes/displays adequate comfort level or baseline comfort level  Outcome: Progressing     Problem: Safety - Adult  Goal: Free from fall injury  Outcome: Progressing     Problem: Fall/Injury  Goal: Not fall by end of shift  Outcome: Progressing  Goal: Be free from injury by end of the shift  Outcome: Progressing  Goal: Verbalize understanding of personal risk factors for fall in the hospital  Outcome: Progressing  Goal: Verbalize understanding of risk factor reduction measures to prevent injury from fall in the home  Outcome: Progressing  Goal: Use assistive devices by end of the shift  Outcome: Progressing  Goal: Pace activities to prevent fatigue by end of the shift  Outcome: Progressing     Problem: Pain  Goal: Takes deep breaths with improved pain control throughout the shift  Outcome: Progressing  Goal: Turns in bed with improved pain control throughout the shift  Outcome: Progressing  Goal: Walks with improved pain control throughout the shift  Outcome: Progressing  Goal: Performs ADL's with improved pain control throughout shift  Outcome: Progressing  Goal: Participates in PT with improved pain control throughout the shift  Outcome: Progressing  Goal: Free from opioid side effects throughout the shift  Outcome: Progressing  Goal: Free from acute confusion related to pain meds throughout the shift  Outcome: Progressing   The patient's goals for the shift include      The clinical goals for the shift include pt will remain HDS thru shift

## 2025-01-20 ENCOUNTER — TELEPHONE (OUTPATIENT)
Dept: HEMATOLOGY/ONCOLOGY | Facility: HOSPITAL | Age: 70
End: 2025-01-20

## 2025-01-20 ENCOUNTER — INFUSION (OUTPATIENT)
Dept: HEMATOLOGY/ONCOLOGY | Facility: CLINIC | Age: 70
End: 2025-01-20
Payer: MEDICARE

## 2025-01-20 DIAGNOSIS — C20 RECTAL CANCER (MULTI): ICD-10-CM

## 2025-01-20 LAB
ALBUMIN SERPL BCP-MCNC: 3.3 G/DL (ref 3.4–5)
ALP SERPL-CCNC: 283 U/L (ref 33–136)
ALT SERPL W P-5'-P-CCNC: 12 U/L (ref 10–52)
ANION GAP SERPL CALC-SCNC: 15 MMOL/L (ref 10–20)
APPEARANCE UR: ABNORMAL
AST SERPL W P-5'-P-CCNC: 20 U/L (ref 9–39)
BASOPHILS # BLD AUTO: 0.03 X10*3/UL (ref 0–0.1)
BASOPHILS NFR BLD AUTO: 0.6 %
BILIRUB SERPL-MCNC: 1.3 MG/DL (ref 0–1.2)
BILIRUB UR STRIP.AUTO-MCNC: NEGATIVE MG/DL
BUN SERPL-MCNC: 15 MG/DL (ref 6–23)
CALCIUM SERPL-MCNC: 8.5 MG/DL (ref 8.6–10.3)
CEA SERPL-MCNC: 6.5 UG/L
CHLORIDE SERPL-SCNC: 106 MMOL/L (ref 98–107)
CO2 SERPL-SCNC: 25 MMOL/L (ref 21–32)
COLOR UR: ABNORMAL
CREAT SERPL-MCNC: 0.84 MG/DL (ref 0.5–1.3)
DACRYOCYTES BLD QL SMEAR: NORMAL
DOHLE BOD BLD QL SMEAR: PRESENT
EGFRCR SERPLBLD CKD-EPI 2021: >90 ML/MIN/1.73M*2
EOSINOPHIL # BLD AUTO: 0.05 X10*3/UL (ref 0–0.7)
EOSINOPHIL NFR BLD AUTO: 1 %
ERYTHROCYTE [DISTWIDTH] IN BLOOD BY AUTOMATED COUNT: 21 % (ref 11.5–14.5)
GLUCOSE SERPL-MCNC: 80 MG/DL (ref 74–99)
GLUCOSE UR STRIP.AUTO-MCNC: NORMAL MG/DL
HCT VFR BLD AUTO: 30.4 % (ref 41–52)
HGB BLD-MCNC: 9.6 G/DL (ref 13.5–17.5)
HYALINE CASTS #/AREA URNS AUTO: ABNORMAL /LPF
IMM GRANULOCYTES # BLD AUTO: 0.03 X10*3/UL (ref 0–0.7)
IMM GRANULOCYTES NFR BLD AUTO: 0.6 % (ref 0–0.9)
KETONES UR STRIP.AUTO-MCNC: NEGATIVE MG/DL
LEUKOCYTE ESTERASE UR QL STRIP.AUTO: NEGATIVE
LYMPHOCYTES # BLD AUTO: 1.21 X10*3/UL (ref 1.2–4.8)
LYMPHOCYTES NFR BLD AUTO: 24 %
MCH RBC QN AUTO: 28.7 PG (ref 26–34)
MCHC RBC AUTO-ENTMCNC: 31.6 G/DL (ref 32–36)
MCV RBC AUTO: 91 FL (ref 80–100)
MONOCYTES # BLD AUTO: 0.55 X10*3/UL (ref 0.1–1)
MONOCYTES NFR BLD AUTO: 10.9 %
MUCOUS THREADS #/AREA URNS AUTO: ABNORMAL /LPF
NEUTROPHILS # BLD AUTO: 3.17 X10*3/UL (ref 1.2–7.7)
NEUTROPHILS NFR BLD AUTO: 62.9 %
NITRITE UR QL STRIP.AUTO: NEGATIVE
NRBC BLD-RTO: 0.4 /100 WBCS (ref 0–0)
OVALOCYTES BLD QL SMEAR: NORMAL
PH UR STRIP.AUTO: 5.5 [PH]
PLATELET # BLD AUTO: 59 X10*3/UL (ref 150–450)
POLYCHROMASIA BLD QL SMEAR: NORMAL
POTASSIUM SERPL-SCNC: 3.7 MMOL/L (ref 3.5–5.3)
PROT SERPL-MCNC: 6.2 G/DL (ref 6.4–8.2)
PROT UR STRIP.AUTO-MCNC: ABNORMAL MG/DL
RBC # BLD AUTO: 3.35 X10*6/UL (ref 4.5–5.9)
RBC # UR STRIP.AUTO: NEGATIVE /UL
RBC #/AREA URNS AUTO: ABNORMAL /HPF
RBC MORPH BLD: NORMAL
SCHISTOCYTES BLD QL SMEAR: NORMAL
SODIUM SERPL-SCNC: 142 MMOL/L (ref 136–145)
SP GR UR STRIP.AUTO: 1.02
TOXIC GRANULES BLD QL SMEAR: PRESENT
UROBILINOGEN UR STRIP.AUTO-MCNC: NORMAL MG/DL
WBC # BLD AUTO: 5 X10*3/UL (ref 4.4–11.3)
WBC #/AREA URNS AUTO: ABNORMAL /HPF

## 2025-01-20 PROCEDURE — 80053 COMPREHEN METABOLIC PANEL: CPT

## 2025-01-20 PROCEDURE — 82378 CARCINOEMBRYONIC ANTIGEN: CPT

## 2025-01-20 PROCEDURE — 85025 COMPLETE CBC W/AUTO DIFF WBC: CPT

## 2025-01-20 PROCEDURE — 36591 DRAW BLOOD OFF VENOUS DEVICE: CPT

## 2025-01-20 PROCEDURE — 81001 URINALYSIS AUTO W/SCOPE: CPT

## 2025-01-20 PROCEDURE — 2500000004 HC RX 250 GENERAL PHARMACY W/ HCPCS (ALT 636 FOR OP/ED): Performed by: NURSE PRACTITIONER

## 2025-01-20 RX ORDER — HEPARIN SODIUM,PORCINE/PF 10 UNIT/ML
50 SYRINGE (ML) INTRAVENOUS AS NEEDED
Status: CANCELLED | OUTPATIENT
Start: 2025-01-20

## 2025-01-20 RX ORDER — HEPARIN SODIUM,PORCINE/PF 10 UNIT/ML
50 SYRINGE (ML) INTRAVENOUS AS NEEDED
Status: DISCONTINUED | OUTPATIENT
Start: 2025-01-20 | End: 2025-01-20 | Stop reason: HOSPADM

## 2025-01-20 RX ORDER — HEPARIN 100 UNIT/ML
500 SYRINGE INTRAVENOUS AS NEEDED
Status: CANCELLED | OUTPATIENT
Start: 2025-01-20

## 2025-01-20 RX ORDER — HEPARIN 100 UNIT/ML
500 SYRINGE INTRAVENOUS AS NEEDED
Status: DISCONTINUED | OUTPATIENT
Start: 2025-01-20 | End: 2025-01-20 | Stop reason: HOSPADM

## 2025-01-20 RX ADMIN — HEPARIN 500 UNITS: 100 SYRINGE at 10:45

## 2025-01-20 NOTE — TELEPHONE ENCOUNTER
Contacted patient to inform him that I have switched his appointment to [facility] for tomorrow.   He was very grateful for the call and confirmed VV.

## 2025-01-20 NOTE — TELEPHONE ENCOUNTER
Patient calls back. He would just like to know if he could change his visit tomorrow from an in-person visit to a telephone visit due to the fact that the weather is inclement and he was just released from the hospital yesterday.    Message sent to team.

## 2025-01-20 NOTE — TELEPHONE ENCOUNTER
Roma is calling to let you know that he was released from the hospital.  He came home last night.    He would like a call back from [employer] sent

## 2025-01-21 ENCOUNTER — APPOINTMENT (OUTPATIENT)
Dept: HEMATOLOGY/ONCOLOGY | Facility: CLINIC | Age: 70
End: 2025-01-21
Payer: MEDICARE

## 2025-01-21 ENCOUNTER — TELEMEDICINE (OUTPATIENT)
Dept: HEMATOLOGY/ONCOLOGY | Facility: CLINIC | Age: 70
End: 2025-01-21
Payer: MEDICARE

## 2025-01-21 DIAGNOSIS — C20 RECTAL CANCER (MULTI): Primary | ICD-10-CM

## 2025-01-21 PROCEDURE — 99214 OFFICE O/P EST MOD 30 MIN: CPT | Performed by: INTERNAL MEDICINE

## 2025-01-21 PROCEDURE — 1111F DSCHRG MED/CURRENT MED MERGE: CPT | Performed by: INTERNAL MEDICINE

## 2025-01-21 PROCEDURE — 3044F HG A1C LEVEL LT 7.0%: CPT | Performed by: INTERNAL MEDICINE

## 2025-01-21 RX ORDER — ALBUTEROL SULFATE 0.83 MG/ML
3 SOLUTION RESPIRATORY (INHALATION) AS NEEDED
OUTPATIENT
Start: 2025-02-07

## 2025-01-21 RX ORDER — DEXAMETHASONE 6 MG/1
12 TABLET ORAL ONCE
OUTPATIENT
Start: 2025-02-05 | End: 2025-02-05

## 2025-01-21 RX ORDER — EPINEPHRINE 0.3 MG/.3ML
0.3 INJECTION SUBCUTANEOUS EVERY 5 MIN PRN
OUTPATIENT
Start: 2025-02-05

## 2025-01-21 RX ORDER — PROCHLORPERAZINE MALEATE 10 MG
10 TABLET ORAL EVERY 6 HOURS PRN
OUTPATIENT
Start: 2025-02-05

## 2025-01-21 RX ORDER — PROCHLORPERAZINE EDISYLATE 5 MG/ML
10 INJECTION INTRAMUSCULAR; INTRAVENOUS EVERY 6 HOURS PRN
OUTPATIENT
Start: 2025-02-05

## 2025-01-21 RX ORDER — DIPHENHYDRAMINE HYDROCHLORIDE 50 MG/ML
50 INJECTION INTRAMUSCULAR; INTRAVENOUS AS NEEDED
OUTPATIENT
Start: 2025-02-05

## 2025-01-21 RX ORDER — ALBUTEROL SULFATE 0.83 MG/ML
3 SOLUTION RESPIRATORY (INHALATION) AS NEEDED
OUTPATIENT
Start: 2025-02-05

## 2025-01-21 RX ORDER — ATROPINE SULFATE 0.4 MG/ML
0.4 INJECTION, SOLUTION ENDOTRACHEAL; INTRAMEDULLARY; INTRAMUSCULAR; INTRAVENOUS; SUBCUTANEOUS
OUTPATIENT
Start: 2025-02-05

## 2025-01-21 RX ORDER — DIPHENHYDRAMINE HYDROCHLORIDE 50 MG/ML
50 INJECTION INTRAMUSCULAR; INTRAVENOUS AS NEEDED
OUTPATIENT
Start: 2025-02-07

## 2025-01-21 RX ORDER — FAMOTIDINE 10 MG/ML
20 INJECTION INTRAVENOUS ONCE AS NEEDED
OUTPATIENT
Start: 2025-02-07

## 2025-01-21 RX ORDER — FAMOTIDINE 10 MG/ML
20 INJECTION INTRAVENOUS ONCE AS NEEDED
OUTPATIENT
Start: 2025-02-05

## 2025-01-21 RX ORDER — LORAZEPAM 2 MG/ML
1 INJECTION INTRAMUSCULAR AS NEEDED
OUTPATIENT
Start: 2025-02-05

## 2025-01-21 RX ORDER — PALONOSETRON 0.05 MG/ML
0.25 INJECTION, SOLUTION INTRAVENOUS ONCE
OUTPATIENT
Start: 2025-02-05

## 2025-01-21 RX ORDER — EPINEPHRINE 0.3 MG/.3ML
0.3 INJECTION SUBCUTANEOUS EVERY 5 MIN PRN
OUTPATIENT
Start: 2025-02-07

## 2025-01-21 NOTE — PROGRESS NOTES
Patient ID: Roma Corral is a 69 y.o. male from [place], [place].     Referring Physician: Dann Nieto MD  [address] [place] [address]    Primary Care Provider: Santiago Buckner MD    Diagnosis:   Rectal cancer with liver mets - 6/2024    Primary Oncologic Surgeon:   Christy    Primary Medical Oncologist:  Dann Nieto MD       Primary Radiation Oncologist:  Elvi    Oncologic Sugery History:  7/2/24 - ex lap with colostomy creation     Oncologic Therapy History:  N/a    Molecular Genetics:      Current Sites of Disease:  Liver, rectum    Oncologic Problem List:  Metastatic CRC  CAD  T2DM    Oncologic Narrative:  Roma Corral is a 69 y.o. male who is referred by Dr Vasquez for metastatic rectal cancer to liver s/p lap colostomy creation on 7/2/24. .  There is extensive metastatic disease in the liver with most of the right liver occupied with bulky disease which extends into segment 4.  Additionally there are 2 lesions in the left lateral section.  He initially presented with abdominal pain and pelvic pressure with labs showing anemia.  He has undergone imaging including CT scan, MRI liver, MRI rectum.  He has met with Dr. Boles from radiation oncology with a plan to start short course in the near future. He has met with Dr. Erazo and Dr. Harrison to discuss potential future surgeries.       Past Medical History: Roma has a past medical history of Abdominal pain, Acute non-ST elevation myocardial infarction (NSTEMI) (Multi), Anemia, Anxiety, CAD (coronary artery disease), Cardiology follow-up encounter (12/11/2023), Gout, H/O cardiac catheterization (06/01/2021), H/O cardiovascular stress test (09/03/2021), H/O echocardiogram (06/02/2021), High cholesterol, Hypertension, Overweight, Rectal cancer (Multi), and Type 2 diabetes mellitus.  Surgical History:  Roma has a past surgical history that includes Leg Surgery (Left); Colonoscopy (06/17/2024); and Hemicolectomy w/ ostomy.  Social History:  Roma reports  that he has never smoked. He has never used smokeless tobacco. He reports that he does not currently use alcohol. He reports that he does not use drugs.  Family History:    Family History   Problem Relation Name Age of Onset    No Known Problems Mother      No Known Problems Father      No Known Problems Sister      Leukemia Brother       Family Oncology History:  Cancer-related family history is not on file.    SUBJECTIVE:    History of Present Illness:  Roma Corral is a 69 y.o. male who was referred by Dann Nieto MD and presents with chemotherapy follow up   Mr. Corral is seen in follow up   Completed xrt 7/17 7/24 - received 1st dose FOLFOXIRI / Josefina  8/7/24 - 2nd dose  - break in tx after this due to kidney stone     -Resumed chemo late September with C3.    -Has continued on treatment QOW except for some delays due to cytopenias.   -Tolerating chemo with fatigue, decreased appetite.   -Abdominal pain controlled on long acting morphine and hydromorphone - follows with supportive onc.     Neuropathy unchanged  Some liquid stools during chemo - 1 dose of imodium per week on average        OBJECTIVE:    VS / Pain:  There were no vitals taken for this visit.  BSA: There is no height or weight on file to calculate BSA.   Pain Scale: 0    Daily Weight  01/17/25 : 74.4 kg (164 lb)  01/17/25 : 74.4 kg (164 lb)  01/15/25 : 72.4 kg (159 lb 11.6 oz)  01/10/25 : 75.5 kg (166 lb 8.9 oz)  01/08/25 : 76.2 kg (167 lb 15.9 oz)  01/07/25 : 76.7 kg (169 lb)  12/31/24 : 76.4 kg (168 lb 5.1 oz)  12/24/24 : 79.8 kg (175 lb 14.8 oz)  12/17/24 : 80.1 kg (176 lb 9.4 oz)  12/12/24 : 83.7 kg (184 lb 8.4 oz)      Physical Exam  Constitutional:       Appearance: He is normal weight.   HENT:      Nose: Nose normal.      Mouth/Throat:      Mouth: Mucous membranes are moist.      Pharynx: Oropharynx is clear.   Eyes:      Extraocular Movements: Extraocular movements intact.      Conjunctiva/sclera: Conjunctivae normal.   Cardiovascular:       Rate and Rhythm: Normal rate.      Pulses: Normal pulses.   Pulmonary:      Effort: Pulmonary effort is normal.   Abdominal:      General: Abdomen is flat.      Comments: Colostomy bag in place    Musculoskeletal:         General: Normal range of motion.   Skin:     General: Skin is warm.   Neurological:      General: No focal deficit present.      Mental Status: He is alert. Mental status is at baseline.   Psychiatric:         Mood and Affect: Mood normal.         Thought Content: Thought content normal.         Judgment: Judgment normal.         Performance Status:   ECOG 1    Diagnostic Results         WBC   Date/Time Value Ref Range Status   01/20/2025 10:58 AM 5.0 4.4 - 11.3 x10*3/uL Final   01/19/2025 07:01 AM 3.3 (L) 4.4 - 11.3 x10*3/uL Final   01/18/2025 06:53 PM 3.4 (L) 4.4 - 11.3 x10*3/uL Final     Hemoglobin   Date Value Ref Range Status   01/20/2025 9.6 (L) 13.5 - 17.5 g/dL Final   01/19/2025 7.4 (L) 13.5 - 17.5 g/dL Final   01/18/2025 7.1 (L) 13.5 - 17.5 g/dL Final     MCV   Date/Time Value Ref Range Status   01/20/2025 10:58 AM 91 80 - 100 fL Final   01/19/2025 07:01 AM 95 80 - 100 fL Final   01/18/2025 06:53 PM 98 80 - 100 fL Final     Platelets   Date/Time Value Ref Range Status   01/20/2025 10:58 AM 59 (L) 150 - 450 x10*3/uL Final   01/19/2025 07:01 AM 45 (L) 150 - 450 x10*3/uL Final   01/18/2025 06:53 PM 34 (LL) 150 - 450 x10*3/uL Final     Comment:     Previous result verified on 1/18/2025 0034 on specimen/case [id number] called with component PLT for procedure CBC and Auto Differential with value 36 x10*3/uL.     Neutrophils Absolute   Date/Time Value Ref Range Status   01/20/2025 10:58 AM 3.17 1.20 - 7.70 x10*3/uL Final     Comment:     Percent differential counts (%) should be interpreted in the context of the absolute cell counts (cells/uL).   01/19/2025 07:01 AM 1.99 1.20 - 7.70 x10*3/uL Final     Comment:     Percent differential counts (%) should be interpreted in the context of the  "absolute cell counts (cells/uL).   01/18/2025 06:53 PM 1.87 1.20 - 7.70 x10*3/uL Final     Comment:     Percent differential counts (%) should be interpreted in the context of the absolute cell counts (cells/uL).     Bilirubin, Total   Date/Time Value Ref Range Status   01/20/2025 10:58 AM 1.3 (H) 0.0 - 1.2 mg/dL Final   01/19/2025 07:01 AM 1.1 0.0 - 1.2 mg/dL Final   01/17/2025 10:34 PM 0.5 0.0 - 1.2 mg/dL Final     AST   Date/Time Value Ref Range Status   01/20/2025 10:58 AM 20 9 - 39 U/L Final   01/19/2025 07:01 AM 20 9 - 39 U/L Final   01/17/2025 10:34 PM 17 9 - 39 U/L Final     ALT   Date/Time Value Ref Range Status   01/20/2025 10:58 AM 12 10 - 52 U/L Final     Comment:     Patients treated with Sulfasalazine may generate falsely decreased results for ALT.   01/19/2025 07:01 AM 10 10 - 52 U/L Final     Comment:     Patients treated with Sulfasalazine may generate falsely decreased results for ALT.   01/17/2025 10:34 PM 11 10 - 52 U/L Final     Comment:     Patients treated with Sulfasalazine may generate falsely decreased results for ALT.     Creatinine   Date/Time Value Ref Range Status   01/20/2025 10:58 AM 0.84 0.50 - 1.30 mg/dL Final   01/19/2025 07:01 AM 0.87 0.50 - 1.30 mg/dL Final   01/17/2025 10:34 PM 0.93 0.50 - 1.30 mg/dL Final     Urea Nitrogen   Date/Time Value Ref Range Status   01/20/2025 10:58 AM 15 6 - 23 mg/dL Final   01/19/2025 07:01 AM 12 6 - 23 mg/dL Final   01/17/2025 10:34 PM 11 6 - 23 mg/dL Final     Albumin   Date/Time Value Ref Range Status   01/20/2025 10:58 AM 3.3 (L) 3.4 - 5.0 g/dL Final   01/19/2025 07:01 AM 2.8 (L) 3.4 - 5.0 g/dL Final   01/17/2025 10:34 PM 3.7 3.4 - 5.0 g/dL Final     No results found for: \"\"  Carcinoembryonic AG   Date/Time Value Ref Range Status   01/20/2025 10:58 AM 6.5 ug/L Final   01/08/2025 09:48 AM 8.5 ug/L Final   12/24/2024 07:51 AM 9.7 ug/L Final     === 10/10/24 ===    CT CHEST ABDOMEN PELVIS W IV CONTRAST    - Impression -  Colorectal cancer " restaging scan:    1. Interval slightly improved hepatic metastatic disease when compared to the most recent exam and overall, gradual improved hepatic metastatic disease when compared to the prior exams.  2. There is nodular pleural thickening in the posterior aspect of the right lower lobe, which is nonspecific and may represent atelectasis. Recommend attention on follow-up.  3. No additional new metastatic disease in the chest, abdomen or pelvis.  4. Redemonstration of multiple segment distal ileal loop thickening with surrounding inflammatory changes, along with the worsening of sigmoid and descending colon thickening and associated hyperemia.  Findings are concerning stable to mild worsening of enterocolitis. New and increasing loculated left paracolic gutter small collection likely secondary to lower abdominopelvic inflammatory changes  involving the bowel loops.  5. Additional chronic and incidental findings as above.    === 11/10/24 ===    CT ABDOMEN PELVIS WO IV CONTRAST    - Impression -  No acute abdominal or pelvic process.    Postsurgical changes of diverting left lower quadrant colostomy.  Redemonstration of wall thickening in the region of the terminal ileum, cecum and ascending colon. No evidence for bowel obstruction.  Findings may relate to infectious/inflammatory enterocolitis. Correlate with history of prior radiation to exclude radiation enteritis.    There is redemonstration of several heterogeneous hypodense masses in the right hepatic lobe not significantly changed from prior CT consistent with known history of metastatic disease. Evaluation somewhat limited by lack of contrast. No definite new lesion is identified.    There is a punctate 2 mm nonobstructing calculus in the lower pole of the right kidney.    Bladder is under distended with moderate wall thickening. Correlate with symptomatology and urinalysis to exclude infectious cystitis.    There is presacral edema. Interval resolution of  previously noted fluid along the left pericolic gutter.    There is a 1.3 cm peritoneal soft tissue nodule  which is decreased in size from prior CT at which time it measured up to 1.8 cm. No  definite new nodule is seen. Attention on continued follow-up is advised.    Additional findings as described above.    MACRO:  None    Signed by: Sandip Curtis 11/10/2024 9:51 PM  Dictation workstation:   [id number]    === 01/13/25 ===    CT CHEST ABDOMEN PELVIS W IV CONTRAST    - Impression -  Restaging of metastatic colorectal adenocarcinoma, as compared to CT  exams dated 11/10/2024 and 10/10/2024:  1. Interval decrease of metastatic disease burden as evidenced by both decreased size and morphologic changes of hepatic metastases, as detailed above.  2. Decrease of treatment-related diffuse small and large bowel wall thickening.  3. Additional chronic findings unchanged as detailed above.    I personally reviewed the images/study and I agree with the findings  as stated. This study was interpreted at [place], [place].    MACRO:  None    Signed by: Laci Bates 1/14/2025 8:47 PM  Dictation workstation:   [id number]        Assessment/Plan   This is a 69 y.o. man with diabetes and coronary artery disease who presented with abdominal pain and anemia in June 2024 and was found to have rectal cancer with diffuse liver metastases.  He has nearly confluent right hepatic liver metastases and 2 lesions in the left lobe.  He has met with Dr. Erazo to discuss potential future surgery should he do well with chemotherapy.  He is also met with Dr. Boles to discuss radiation to the primary tumor which was causing bleeding at time of diagnosis.      7/15 - 7/17 completed radiation (short-course).    -7/24/24 - first dose FOLFOXIRI + Josefina, tolerated with fatigue   -8/7/24 - 2nd dose FOLFOXIRI + Josefina   Tx has been held due to issues with kidney stones   Despite only 2 txs, CEA has gone from  >800 to 143  Resumed tx in Sept -   C6 lowered oxali to 75% of dose   Oct CT with tx response    Plan:  Stage IV CRC:  NGS shows wt KRAS/NRAS/BRAF  C8 FOLFOXIRI / Josefina with neulasta on  12/24 - wants to continue  - fluids on day 3 & day 8 (may depend on fluid shortage)  C9 due this week.  Over due for scans.  Will see back with scans.  Refer to Surg onc (Kacey) and CRS (Pedro) to discuss definitive surgery/ablation.      He has had a good response overall to chemo but is having more toxicity and cytopenias from chemo.  If he will ever be a candidate for liver directed therapy I think it will be now.      He is not focused on having ostomy take down at this point although he would ultimately prefer it.       Anemia   -    - transfuse for hgb <7     Logistics -    - clinic appt - minoff [facility]   - infusion appt - mentor nikia Nieto MD  [facility] [place] [place]  Office: [phone]  Fax: [phone]

## 2025-01-22 ENCOUNTER — APPOINTMENT (OUTPATIENT)
Dept: HEMATOLOGY/ONCOLOGY | Facility: CLINIC | Age: 70
End: 2025-01-22
Payer: MEDICARE

## 2025-01-23 ENCOUNTER — PHARMACY VISIT (OUTPATIENT)
Dept: PHARMACY | Facility: CLINIC | Age: 70
End: 2025-01-23
Payer: COMMERCIAL

## 2025-01-24 ENCOUNTER — INFUSION (OUTPATIENT)
Dept: HEMATOLOGY/ONCOLOGY | Facility: CLINIC | Age: 70
End: 2025-01-24
Payer: MEDICARE

## 2025-01-24 VITALS
HEART RATE: 68 BPM | OXYGEN SATURATION: 95 % | DIASTOLIC BLOOD PRESSURE: 80 MMHG | WEIGHT: 164.24 LBS | BODY MASS INDEX: 22.28 KG/M2 | RESPIRATION RATE: 18 BRPM | TEMPERATURE: 97.2 F | SYSTOLIC BLOOD PRESSURE: 130 MMHG

## 2025-01-24 DIAGNOSIS — C20 RECTAL CANCER (MULTI): ICD-10-CM

## 2025-01-24 PROCEDURE — 2500000004 HC RX 250 GENERAL PHARMACY W/ HCPCS (ALT 636 FOR OP/ED): Performed by: INTERNAL MEDICINE

## 2025-01-24 PROCEDURE — 96360 HYDRATION IV INFUSION INIT: CPT | Mod: INF

## 2025-01-24 PROCEDURE — 2500000004 HC RX 250 GENERAL PHARMACY W/ HCPCS (ALT 636 FOR OP/ED): Performed by: NURSE PRACTITIONER

## 2025-01-24 RX ORDER — HEPARIN 100 UNIT/ML
500 SYRINGE INTRAVENOUS AS NEEDED
OUTPATIENT
Start: 2025-01-24

## 2025-01-24 RX ORDER — HEPARIN 100 UNIT/ML
500 SYRINGE INTRAVENOUS AS NEEDED
Status: DISCONTINUED | OUTPATIENT
Start: 2025-01-24 | End: 2025-01-24 | Stop reason: HOSPADM

## 2025-01-24 RX ORDER — HEPARIN SODIUM,PORCINE/PF 10 UNIT/ML
50 SYRINGE (ML) INTRAVENOUS AS NEEDED
Status: DISCONTINUED | OUTPATIENT
Start: 2025-01-24 | End: 2025-01-24 | Stop reason: HOSPADM

## 2025-01-24 RX ORDER — HEPARIN SODIUM,PORCINE/PF 10 UNIT/ML
50 SYRINGE (ML) INTRAVENOUS AS NEEDED
OUTPATIENT
Start: 2025-01-24

## 2025-01-24 RX ADMIN — SODIUM CHLORIDE 1000 ML: 9 INJECTION, SOLUTION INTRAVENOUS at 14:42

## 2025-01-24 RX ADMIN — HEPARIN 500 UNITS: 100 SYRINGE at 15:42

## 2025-01-24 ASSESSMENT — PAIN SCALES - GENERAL: PAINLEVEL_OUTOF10: 0-NO PAIN

## 2025-01-25 NOTE — PROGRESS NOTES
Patient is a 69 y.o. male presenting with PMH of kidney stones, and scrotal pain.    SUBJECTIVE:  HPI   He had 2 distal left ureteral stones which he passed.  His CT from January 13, 2025 was reviewed and only tiny punctate stones were identified. His PTH was elevated at 105 in September 2024. His last calcium was 8.5 in January 2025.     He had scrotal pain. Scrotal ultrasound identified small bilateral hydroceles, no torsion. He reports mild residual pain of his scrotum when using the toilet, however pain is improved.    He mentions nocturia x2. His stream is occasionally weak, however he feels he empties well.    He has a history of metastatic rectal cancer.      Past Medical History:   Diagnosis Date    Abdominal pain     Acute non-ST elevation myocardial infarction (NSTEMI) (Multi)     Anemia     6/6/24 HGB 8.6; HCT 31.5    Anxiety     CAD (coronary artery disease)     Cardiology follow-up encounter 12/11/2023    Sharif Lau MD    Gout     H/O cardiac catheterization 06/01/2021    H/O cardiovascular stress test 09/03/2021    IMPRESSION: 1. No evidence of ischemia. 2. Suspicion of an infarct along the mid anterior wall. 3. Left ventricular dilatation is noted. 4. Left ventricular EF was calculated to be 50%. Summary:  1. No clinical or electrocardiographic evidence for ischemia at a submaximal workload. 3. The blunted heart rate diminshes the sensitivity of this test.  4. The submaximal level of stress was achieved.    H/O echocardiogram 06/02/2021    Mild concentric Left ventricle hypertrophy.  Global left ventricular wall motion and contractility are within normal limits.  There is normal left ventricular systolic function.  Estimated ejection fraction is 60-64%.  The left atrial size is mildly dilated.  Mild aortic regurgitation.  A trace of mitral regurgitation.  Trivial to mild tricuspid regurgitation.  There is no pulmonary hypertension.    High cholesterol     Hypertension     Overweight     Rectal  cancer (Multi)     Type 2 diabetes mellitus     4/18/2024 A1C 7.5%     Past Surgical History:   Procedure Laterality Date    COLONOSCOPY  06/17/2024    HEMICOLECTOMY W/ OSTOMY      LEG SURGERY Left     rodrigo placed      Family History   Problem Relation Name Age of Onset    No Known Problems Mother      No Known Problems Father      No Known Problems Sister      Leukemia Brother        Tobacco Use: Low Risk  (1/27/2025)    Patient History     Smoking Tobacco Use: Never     Smokeless Tobacco Use: Never     Passive Exposure: Not on file     OBJECTIVE:  Visit Vitals  Temp 37 °C (98.6 °F)     Physical Exam   Constitutional: No obvious distress.  Eyes: Non-injected conjunctiva, sclera clear, EOMI.  Ears/Nose/Mouth/Throat: No obvious drainage per ears or nose.  Cardiovascular: Extremities are warm and well perfused. No edema, cyanosis or pallor.  Respiratory: No audible wheezing/stridor; respirations do not appear labored.  Gastrointestinal: Abdomen soft, not distended.  Musculoskeletal: Normal ROM of extremities.  Skin: No obvious rashes or open sores.  Neurologic: Alert and oriented, CN 2-12 grossly intact.  Psychiatric: Answers questions appropriately with normal affect.  Hematologic/Lymphatic/Immunologic: No obvious bruises or sites of spontaneous bleeding.  Genitourinary: No CVA tenderness, bladder not palpable.     Labs:  Lab Results   Component Value Date    GLUCOSE 80 01/20/2025    CALCIUM 8.5 (L) 01/20/2025     01/20/2025    K 3.7 01/20/2025    CO2 25 01/20/2025     01/20/2025    BUN 15 01/20/2025    CREATININE 0.84 01/20/2025   PSA  06/23/2021          1.29    Lab Results   Component Value Date    URICACID 6.3 09/16/2024     Parathyroid Hormone, Intact (pg/mL)   Date Value   09/16/2024 105.0 (H)     Urine Culture (no units)   Date Value   10/23/2024 No growth      IMAGING:    PROCEDURES:  PVR 0 mL (1/27/2025)    ASSESSMENT/PLAN:  Problem List Items Addressed This Visit    None  Visit Diagnoses        Urinary symptom or sign        Relevant Orders    POCT UA Automated manually resulted (Completed)    Measure post void residual (Completed)           He had 2 distal left ureteral stones which he passed.  His CT from January 13, 2025 was reviewed and only tiny punctate stones were identified. His PTH was elevated at 105 in September 2024. His last calcium was 8.5 in January 2025.     He is undergoing chemotherapy for metastatic rectal cancer.     He has history of scrotal pain that appeared secondary to the stones passing. Ultrasound of the scrotum was performed on 11/10/2024, small bilateral hydroceles were identified. No evidence or torsion.     He will follow up in 6 months.    All questions were answered to the patient’s satisfaction.  Patient agrees with the plan and wishes to proceed.  Follow-up will be scheduled appropriately.     Scribe Attestation  By signing my name below, I, Marnie Browning,   attest that this documentation has been prepared under the direction and in the presence of Shaheen Kiser MD.

## 2025-01-27 ENCOUNTER — APPOINTMENT (OUTPATIENT)
Dept: UROLOGY | Facility: CLINIC | Age: 70
End: 2025-01-27
Payer: MEDICARE

## 2025-01-27 VITALS — TEMPERATURE: 98.6 F

## 2025-01-27 DIAGNOSIS — N20.0 KIDNEY STONES: ICD-10-CM

## 2025-01-27 DIAGNOSIS — R39.9 URINARY SYMPTOM OR SIGN: ICD-10-CM

## 2025-01-27 DIAGNOSIS — N50.82 SCROTAL PAIN: Primary | ICD-10-CM

## 2025-01-27 LAB
POC APPEARANCE, URINE: CLEAR
POC BILIRUBIN, URINE: NEGATIVE
POC BLOOD, URINE: NEGATIVE
POC COLOR, URINE: YELLOW
POC GLUCOSE, URINE: NEGATIVE MG/DL
POC KETONES, URINE: NEGATIVE MG/DL
POC LEUKOCYTES, URINE: NEGATIVE
POC NITRITE,URINE: NEGATIVE
POC PH, URINE: 5 PH
POC PROTEIN, URINE: NEGATIVE MG/DL
POC SPECIFIC GRAVITY, URINE: 1.02
POC UROBILINOGEN, URINE: 0.2 EU/DL

## 2025-01-27 PROCEDURE — 81003 URINALYSIS AUTO W/O SCOPE: CPT | Performed by: UROLOGY

## 2025-01-27 PROCEDURE — 1159F MED LIST DOCD IN RCRD: CPT | Performed by: UROLOGY

## 2025-01-27 PROCEDURE — 1126F AMNT PAIN NOTED NONE PRSNT: CPT | Performed by: UROLOGY

## 2025-01-27 PROCEDURE — 1111F DSCHRG MED/CURRENT MED MERGE: CPT | Performed by: UROLOGY

## 2025-01-27 PROCEDURE — 1036F TOBACCO NON-USER: CPT | Performed by: UROLOGY

## 2025-01-27 PROCEDURE — 99214 OFFICE O/P EST MOD 30 MIN: CPT | Performed by: UROLOGY

## 2025-01-27 PROCEDURE — G2211 COMPLEX E/M VISIT ADD ON: HCPCS | Performed by: UROLOGY

## 2025-01-27 PROCEDURE — 3044F HG A1C LEVEL LT 7.0%: CPT | Performed by: UROLOGY

## 2025-01-27 ASSESSMENT — PAIN SCALES - GENERAL: PAINLEVEL_OUTOF10: 0-NO PAIN

## 2025-01-28 ENCOUNTER — APPOINTMENT (OUTPATIENT)
Dept: PALLIATIVE MEDICINE | Facility: CLINIC | Age: 70
End: 2025-01-28
Payer: MEDICARE

## 2025-01-28 ENCOUNTER — TELEPHONE (OUTPATIENT)
Dept: PALLIATIVE MEDICINE | Facility: HOSPITAL | Age: 70
End: 2025-01-28
Payer: MEDICARE

## 2025-01-28 NOTE — TELEPHONE ENCOUNTER
Call placed to inform Mr. Corral that Provider Severo NAGEL   cancelled clinic for today. Message left for him requesting that he return the call to reschedule today's appointment .     Quality 431: Preventive Care And Screening: Unhealthy Alcohol Use - Screening: Patient not identified as an unhealthy alcohol user when screened for unhealthy alcohol use using a systematic screening method Detail Level: Detailed Quality 130: Documentation Of Current Medications In The Medical Record: Current Medications Documented Quality 110: Preventive Care And Screening: Influenza Immunization: Influenza Immunization Administered during Influenza season Quality 402: Tobacco Use And Help With Quitting Among Adolescents: Patient screened for tobacco and is an ex-smoker Quality 265: Biopsy Follow-Up: Biopsy results reviewed, communicated, tracked, and documented Quality 128: Preventive Care And Screening: Body Mass Index (Bmi) Screening And Follow-Up Plan: BMI is documented above normal parameters and a follow-up plan is documented Quality 111:Pneumonia Vaccination Status For Older Adults: Pneumococcal Vaccination not Administered or Previously Received, Reason not Otherwise Specified

## 2025-01-29 ENCOUNTER — APPOINTMENT (OUTPATIENT)
Dept: HEMATOLOGY/ONCOLOGY | Facility: CLINIC | Age: 70
End: 2025-01-29
Payer: MEDICARE

## 2025-01-29 ENCOUNTER — NURSE TRIAGE (OUTPATIENT)
Dept: ADMISSION | Facility: HOSPITAL | Age: 70
End: 2025-01-29
Payer: MEDICARE

## 2025-01-29 DIAGNOSIS — C20 RECTAL CANCER (MULTI): Primary | ICD-10-CM

## 2025-01-29 NOTE — TELEPHONE ENCOUNTER
Pt called reporting he has been having rectal bleeding for a few days. He is still having normal bowel movements via his stoma, but is now having intermittent episodes of sudden diarrhea rectally along with some bright red blood when he wipes. He has noticed some blood in the toilet also, but not every episode of diarrhea. He said it is noticeable but not a large amount. No chest pain, fever, SOB, dizziness, N/V or abdominal pain. He is eating/drinking and urinating normally. He is concerned though because he said this is new, but otherwise is feeling okay. Secure chat sent to Dr. Nieto.  Additional Information   Commented on: When did the bleeding start?     ~3 days ago    Protocols used: Bleeding

## 2025-01-29 NOTE — TELEPHONE ENCOUNTER
I spoke with Mr. Corral, I let him know that if he does not have any other symptoms to just get blood work tomorrow per Dr. Nieto. Lab orders were pended and sent to the provider. The patient was agreeable, he is OK with peripheral line draw. He is aware of symptoms to look out for and go to ER if needed (SOB, dizziness, weakness, heart racing/palpitations, etc), he was agreeable. Will be going to [place] lab

## 2025-01-30 ENCOUNTER — TELEPHONE (OUTPATIENT)
Dept: HEMATOLOGY/ONCOLOGY | Facility: CLINIC | Age: 70
End: 2025-01-30
Payer: MEDICARE

## 2025-01-30 ENCOUNTER — APPOINTMENT (OUTPATIENT)
Dept: CARDIOLOGY | Facility: CLINIC | Age: 70
End: 2025-01-30
Payer: MEDICARE

## 2025-01-30 ENCOUNTER — INFUSION (OUTPATIENT)
Dept: HEMATOLOGY/ONCOLOGY | Facility: CLINIC | Age: 70
End: 2025-01-30
Payer: MEDICARE

## 2025-01-30 DIAGNOSIS — E78.5 HYPERLIPIDEMIA, UNSPECIFIED HYPERLIPIDEMIA TYPE: ICD-10-CM

## 2025-01-30 DIAGNOSIS — C20 RECTAL CANCER (MULTI): ICD-10-CM

## 2025-01-30 DIAGNOSIS — I10 PRIMARY HYPERTENSION: ICD-10-CM

## 2025-01-30 LAB
ALBUMIN SERPL BCP-MCNC: 2.9 G/DL (ref 3.4–5)
ALP SERPL-CCNC: 261 U/L (ref 33–136)
ALT SERPL W P-5'-P-CCNC: 12 U/L (ref 10–52)
AMMONIA PLAS-SCNC: 37 UMOL/L (ref 16–53)
ANION GAP SERPL CALC-SCNC: 12 MMOL/L (ref 10–20)
APPEARANCE UR: CLEAR
AST SERPL W P-5'-P-CCNC: 22 U/L (ref 9–39)
BILIRUB SERPL-MCNC: 0.5 MG/DL (ref 0–1.2)
BILIRUB UR STRIP.AUTO-MCNC: NEGATIVE MG/DL
BUN SERPL-MCNC: 12 MG/DL (ref 6–23)
CALCIUM SERPL-MCNC: 8.4 MG/DL (ref 8.6–10.3)
CHLORIDE SERPL-SCNC: 108 MMOL/L (ref 98–107)
CO2 SERPL-SCNC: 27 MMOL/L (ref 21–32)
COLOR UR: YELLOW
CREAT SERPL-MCNC: 0.88 MG/DL (ref 0.5–1.3)
EGFRCR SERPLBLD CKD-EPI 2021: >90 ML/MIN/1.73M*2
ERYTHROCYTE [DISTWIDTH] IN BLOOD BY AUTOMATED COUNT: 20.9 % (ref 11.5–14.5)
GLUCOSE SERPL-MCNC: 79 MG/DL (ref 74–99)
GLUCOSE UR STRIP.AUTO-MCNC: NORMAL MG/DL
HCT VFR BLD AUTO: 28.8 % (ref 41–52)
HGB BLD-MCNC: 8.8 G/DL (ref 13.5–17.5)
KETONES UR STRIP.AUTO-MCNC: NEGATIVE MG/DL
LEUKOCYTE ESTERASE UR QL STRIP.AUTO: NEGATIVE
MCH RBC QN AUTO: 28.9 PG (ref 26–34)
MCHC RBC AUTO-ENTMCNC: 30.6 G/DL (ref 32–36)
MCV RBC AUTO: 94 FL (ref 80–100)
NITRITE UR QL STRIP.AUTO: NEGATIVE
NRBC BLD-RTO: 0 /100 WBCS (ref 0–0)
PH UR STRIP.AUTO: 5.5 [PH]
PLATELET # BLD AUTO: 93 X10*3/UL (ref 150–450)
POTASSIUM SERPL-SCNC: 4.2 MMOL/L (ref 3.5–5.3)
PROT SERPL-MCNC: 5.9 G/DL (ref 6.4–8.2)
PROT UR STRIP.AUTO-MCNC: NEGATIVE MG/DL
RBC # BLD AUTO: 3.05 X10*6/UL (ref 4.5–5.9)
RBC # UR STRIP.AUTO: NEGATIVE /UL
SODIUM SERPL-SCNC: 143 MMOL/L (ref 136–145)
SP GR UR STRIP.AUTO: 1.02
UROBILINOGEN UR STRIP.AUTO-MCNC: NORMAL MG/DL
WBC # BLD AUTO: 4.7 X10*3/UL (ref 4.4–11.3)

## 2025-01-30 PROCEDURE — 82140 ASSAY OF AMMONIA: CPT

## 2025-01-30 PROCEDURE — 81003 URINALYSIS AUTO W/O SCOPE: CPT

## 2025-01-30 PROCEDURE — 2500000004 HC RX 250 GENERAL PHARMACY W/ HCPCS (ALT 636 FOR OP/ED): Performed by: NURSE PRACTITIONER

## 2025-01-30 PROCEDURE — 36591 DRAW BLOOD OFF VENOUS DEVICE: CPT

## 2025-01-30 PROCEDURE — 80053 COMPREHEN METABOLIC PANEL: CPT

## 2025-01-30 PROCEDURE — 85027 COMPLETE CBC AUTOMATED: CPT

## 2025-01-30 RX ORDER — HEPARIN SODIUM,PORCINE/PF 10 UNIT/ML
50 SYRINGE (ML) INTRAVENOUS AS NEEDED
OUTPATIENT
Start: 2025-01-30

## 2025-01-30 RX ORDER — HEPARIN SODIUM,PORCINE/PF 10 UNIT/ML
50 SYRINGE (ML) INTRAVENOUS AS NEEDED
Status: DISCONTINUED | OUTPATIENT
Start: 2025-01-30 | End: 2025-01-30 | Stop reason: HOSPADM

## 2025-01-30 RX ORDER — HEPARIN 100 UNIT/ML
500 SYRINGE INTRAVENOUS AS NEEDED
Status: DISCONTINUED | OUTPATIENT
Start: 2025-01-30 | End: 2025-01-30 | Stop reason: HOSPADM

## 2025-01-30 RX ORDER — HEPARIN 100 UNIT/ML
500 SYRINGE INTRAVENOUS AS NEEDED
OUTPATIENT
Start: 2025-01-30

## 2025-01-30 RX ADMIN — HEPARIN 500 UNITS: 100 SYRINGE at 11:46

## 2025-01-30 NOTE — TELEPHONE ENCOUNTER
Port Draw space is open at 1130 today Spoke to Roma and he will be here.    Tabatha please schedule patient in [facility] chair.

## 2025-02-03 ENCOUNTER — LAB (OUTPATIENT)
Dept: HEMATOLOGY/ONCOLOGY | Facility: CLINIC | Age: 70
End: 2025-02-03
Payer: MEDICARE

## 2025-02-03 ENCOUNTER — TELEPHONE (OUTPATIENT)
Dept: ADMISSION | Facility: HOSPITAL | Age: 70
End: 2025-02-03

## 2025-02-03 DIAGNOSIS — C20 RECTAL CANCER (MULTI): ICD-10-CM

## 2025-02-03 LAB
ALBUMIN SERPL BCP-MCNC: 3.2 G/DL (ref 3.4–5)
ALP SERPL-CCNC: 294 U/L (ref 33–136)
ALT SERPL W P-5'-P-CCNC: 16 U/L (ref 10–52)
ANION GAP SERPL CALC-SCNC: 12 MMOL/L (ref 10–20)
APPEARANCE UR: CLEAR
AST SERPL W P-5'-P-CCNC: 30 U/L (ref 9–39)
BASOPHILS # BLD AUTO: 0.04 X10*3/UL (ref 0–0.1)
BASOPHILS NFR BLD AUTO: 0.7 %
BILIRUB SERPL-MCNC: 0.5 MG/DL (ref 0–1.2)
BILIRUB UR STRIP.AUTO-MCNC: NEGATIVE MG/DL
BUN SERPL-MCNC: 16 MG/DL (ref 6–23)
CALCIUM SERPL-MCNC: 8.7 MG/DL (ref 8.6–10.3)
CEA SERPL-MCNC: 5.1 UG/L
CHLORIDE SERPL-SCNC: 107 MMOL/L (ref 98–107)
CO2 SERPL-SCNC: 26 MMOL/L (ref 21–32)
COLOR UR: YELLOW
CREAT SERPL-MCNC: 0.97 MG/DL (ref 0.5–1.3)
DACRYOCYTES BLD QL SMEAR: NORMAL
EGFRCR SERPLBLD CKD-EPI 2021: 85 ML/MIN/1.73M*2
EOSINOPHIL # BLD AUTO: 0.06 X10*3/UL (ref 0–0.7)
EOSINOPHIL NFR BLD AUTO: 1.1 %
ERYTHROCYTE [DISTWIDTH] IN BLOOD BY AUTOMATED COUNT: 20.4 % (ref 11.5–14.5)
GLUCOSE SERPL-MCNC: 84 MG/DL (ref 74–99)
GLUCOSE UR STRIP.AUTO-MCNC: NORMAL MG/DL
HCT VFR BLD AUTO: 28.1 % (ref 41–52)
HGB BLD-MCNC: 8.6 G/DL (ref 13.5–17.5)
HYPOCHROMIA BLD QL SMEAR: NORMAL
IMM GRANULOCYTES # BLD AUTO: 0.03 X10*3/UL (ref 0–0.7)
IMM GRANULOCYTES NFR BLD AUTO: 0.6 % (ref 0–0.9)
KETONES UR STRIP.AUTO-MCNC: NEGATIVE MG/DL
LEUKOCYTE ESTERASE UR QL STRIP.AUTO: ABNORMAL
LYMPHOCYTES # BLD AUTO: 1.04 X10*3/UL (ref 1.2–4.8)
LYMPHOCYTES NFR BLD AUTO: 19.1 %
MCH RBC QN AUTO: 29.4 PG (ref 26–34)
MCHC RBC AUTO-ENTMCNC: 30.6 G/DL (ref 32–36)
MCV RBC AUTO: 96 FL (ref 80–100)
MONOCYTES # BLD AUTO: 0.68 X10*3/UL (ref 0.1–1)
MONOCYTES NFR BLD AUTO: 12.5 %
MUCOUS THREADS #/AREA URNS AUTO: ABNORMAL /LPF
NEUTROPHILS # BLD AUTO: 3.6 X10*3/UL (ref 1.2–7.7)
NEUTROPHILS NFR BLD AUTO: 66 %
NITRITE UR QL STRIP.AUTO: NEGATIVE
NRBC BLD-RTO: 0 /100 WBCS (ref 0–0)
OVALOCYTES BLD QL SMEAR: NORMAL
PH UR STRIP.AUTO: 5.5 [PH]
PLATELET # BLD AUTO: 120 X10*3/UL (ref 150–450)
POLYCHROMASIA BLD QL SMEAR: NORMAL
POTASSIUM SERPL-SCNC: 5 MMOL/L (ref 3.5–5.3)
PROT SERPL-MCNC: 6.4 G/DL (ref 6.4–8.2)
PROT UR STRIP.AUTO-MCNC: NEGATIVE MG/DL
RBC # BLD AUTO: 2.93 X10*6/UL (ref 4.5–5.9)
RBC # UR STRIP.AUTO: NEGATIVE /UL
RBC #/AREA URNS AUTO: ABNORMAL /HPF
RBC MORPH BLD: NORMAL
SODIUM SERPL-SCNC: 140 MMOL/L (ref 136–145)
SP GR UR STRIP.AUTO: 1.02
STOMATOCYTES BLD QL SMEAR: NORMAL
UROBILINOGEN UR STRIP.AUTO-MCNC: NORMAL MG/DL
WBC # BLD AUTO: 5.5 X10*3/UL (ref 4.4–11.3)
WBC #/AREA URNS AUTO: ABNORMAL /HPF

## 2025-02-03 PROCEDURE — 36591 DRAW BLOOD OFF VENOUS DEVICE: CPT

## 2025-02-03 PROCEDURE — 85025 COMPLETE CBC W/AUTO DIFF WBC: CPT

## 2025-02-03 PROCEDURE — 2500000004 HC RX 250 GENERAL PHARMACY W/ HCPCS (ALT 636 FOR OP/ED): Performed by: NURSE PRACTITIONER

## 2025-02-03 PROCEDURE — 84075 ASSAY ALKALINE PHOSPHATASE: CPT

## 2025-02-03 PROCEDURE — 81001 URINALYSIS AUTO W/SCOPE: CPT

## 2025-02-03 PROCEDURE — 82378 CARCINOEMBRYONIC ANTIGEN: CPT

## 2025-02-03 RX ORDER — HEPARIN 100 UNIT/ML
500 SYRINGE INTRAVENOUS AS NEEDED
Status: CANCELLED | OUTPATIENT
Start: 2025-02-03

## 2025-02-03 RX ORDER — HEPARIN SODIUM,PORCINE/PF 10 UNIT/ML
50 SYRINGE (ML) INTRAVENOUS AS NEEDED
Status: DISCONTINUED | OUTPATIENT
Start: 2025-02-03 | End: 2025-02-03 | Stop reason: HOSPADM

## 2025-02-03 RX ORDER — HEPARIN SODIUM,PORCINE/PF 10 UNIT/ML
50 SYRINGE (ML) INTRAVENOUS AS NEEDED
Status: CANCELLED | OUTPATIENT
Start: 2025-02-03

## 2025-02-03 RX ORDER — HEPARIN 100 UNIT/ML
500 SYRINGE INTRAVENOUS AS NEEDED
Status: DISCONTINUED | OUTPATIENT
Start: 2025-02-03 | End: 2025-02-03 | Stop reason: HOSPADM

## 2025-02-03 RX ADMIN — HEPARIN 500 UNITS: 100 SYRINGE at 14:56

## 2025-02-03 NOTE — TELEPHONE ENCOUNTER
Roma called and states he is having constant bowel movements with no muscle control of stool, no diarrhea.  Loose stool mixed with blood, coming from rectum and stoma.  Largest concern, more stool coming from rectum than what has been in past with no control.  No abdominal or rectal pain.  No fever, no SOB, no lightheadedness.  Difficulty eating/drinking, no muscle control to hold in stool.  Called last week for same symptoms but worsening per patient. Patient very concerned as he has line draw appointment today with [facility]UV and infusion tomorrow.

## 2025-02-04 ENCOUNTER — PHARMACY VISIT (OUTPATIENT)
Dept: PHARMACY | Facility: CLINIC | Age: 70
End: 2025-02-04
Payer: COMMERCIAL

## 2025-02-04 ENCOUNTER — OFFICE VISIT (OUTPATIENT)
Dept: HEMATOLOGY/ONCOLOGY | Facility: CLINIC | Age: 70
End: 2025-02-04
Payer: MEDICARE

## 2025-02-04 VITALS
HEART RATE: 90 BPM | SYSTOLIC BLOOD PRESSURE: 138 MMHG | DIASTOLIC BLOOD PRESSURE: 75 MMHG | OXYGEN SATURATION: 97 % | BODY MASS INDEX: 22.22 KG/M2 | RESPIRATION RATE: 18 BRPM | TEMPERATURE: 97.7 F | WEIGHT: 163.8 LBS

## 2025-02-04 DIAGNOSIS — A09 DIARRHEA OF INFECTIOUS ORIGIN: ICD-10-CM

## 2025-02-04 DIAGNOSIS — R16.0 MASS OF MULTIPLE SITES OF LIVER: Primary | ICD-10-CM

## 2025-02-04 DIAGNOSIS — C20 RECTAL CANCER (MULTI): ICD-10-CM

## 2025-02-04 DIAGNOSIS — G89.3 CANCER RELATED PAIN: ICD-10-CM

## 2025-02-04 DIAGNOSIS — R30.0 DYSURIA: ICD-10-CM

## 2025-02-04 DIAGNOSIS — Z51.5 PALLIATIVE CARE ENCOUNTER: ICD-10-CM

## 2025-02-04 PROCEDURE — 99214 OFFICE O/P EST MOD 30 MIN: CPT | Performed by: INTERNAL MEDICINE

## 2025-02-04 PROCEDURE — RXMED WILLOW AMBULATORY MEDICATION CHARGE

## 2025-02-04 PROCEDURE — 3075F SYST BP GE 130 - 139MM HG: CPT | Performed by: INTERNAL MEDICINE

## 2025-02-04 PROCEDURE — 3078F DIAST BP <80 MM HG: CPT | Performed by: INTERNAL MEDICINE

## 2025-02-04 PROCEDURE — 1159F MED LIST DOCD IN RCRD: CPT | Performed by: INTERNAL MEDICINE

## 2025-02-04 PROCEDURE — 1111F DSCHRG MED/CURRENT MED MERGE: CPT | Performed by: INTERNAL MEDICINE

## 2025-02-04 PROCEDURE — 3044F HG A1C LEVEL LT 7.0%: CPT | Performed by: INTERNAL MEDICINE

## 2025-02-04 PROCEDURE — 1036F TOBACCO NON-USER: CPT | Performed by: INTERNAL MEDICINE

## 2025-02-04 PROCEDURE — 1126F AMNT PAIN NOTED NONE PRSNT: CPT | Performed by: INTERNAL MEDICINE

## 2025-02-04 RX ORDER — PROCHLORPERAZINE MALEATE 10 MG
10 TABLET ORAL EVERY 6 HOURS PRN
OUTPATIENT
Start: 2025-02-19

## 2025-02-04 RX ORDER — DIPHENHYDRAMINE HYDROCHLORIDE 50 MG/ML
50 INJECTION INTRAMUSCULAR; INTRAVENOUS AS NEEDED
OUTPATIENT
Start: 2025-02-21

## 2025-02-04 RX ORDER — DIPHENHYDRAMINE HYDROCHLORIDE 50 MG/ML
50 INJECTION INTRAMUSCULAR; INTRAVENOUS AS NEEDED
OUTPATIENT
Start: 2025-03-05

## 2025-02-04 RX ORDER — EPINEPHRINE 0.3 MG/.3ML
0.3 INJECTION SUBCUTANEOUS EVERY 5 MIN PRN
OUTPATIENT
Start: 2025-02-21

## 2025-02-04 RX ORDER — PROCHLORPERAZINE EDISYLATE 5 MG/ML
10 INJECTION INTRAMUSCULAR; INTRAVENOUS EVERY 6 HOURS PRN
OUTPATIENT
Start: 2025-03-05

## 2025-02-04 RX ORDER — SULFAMETHOXAZOLE AND TRIMETHOPRIM 800; 160 MG/1; MG/1
1 TABLET ORAL 2 TIMES DAILY
Qty: 10 TABLET | Refills: 0 | Status: SHIPPED | OUTPATIENT
Start: 2025-02-04 | End: 2025-02-09

## 2025-02-04 RX ORDER — ALBUTEROL SULFATE 0.83 MG/ML
3 SOLUTION RESPIRATORY (INHALATION) AS NEEDED
OUTPATIENT
Start: 2025-02-21

## 2025-02-04 RX ORDER — PROCHLORPERAZINE EDISYLATE 5 MG/ML
10 INJECTION INTRAMUSCULAR; INTRAVENOUS EVERY 6 HOURS PRN
OUTPATIENT
Start: 2025-02-19

## 2025-02-04 RX ORDER — ATROPINE SULFATE 0.4 MG/ML
0.4 INJECTION, SOLUTION ENDOTRACHEAL; INTRAMEDULLARY; INTRAMUSCULAR; INTRAVENOUS; SUBCUTANEOUS
OUTPATIENT
Start: 2025-03-05

## 2025-02-04 RX ORDER — PALONOSETRON 0.05 MG/ML
0.25 INJECTION, SOLUTION INTRAVENOUS ONCE
OUTPATIENT
Start: 2025-02-19

## 2025-02-04 RX ORDER — FAMOTIDINE 10 MG/ML
20 INJECTION INTRAVENOUS ONCE AS NEEDED
OUTPATIENT
Start: 2025-02-21

## 2025-02-04 RX ORDER — DIPHENHYDRAMINE HYDROCHLORIDE 50 MG/ML
50 INJECTION INTRAMUSCULAR; INTRAVENOUS AS NEEDED
OUTPATIENT
Start: 2025-02-19

## 2025-02-04 RX ORDER — PROCHLORPERAZINE MALEATE 10 MG
10 TABLET ORAL EVERY 6 HOURS PRN
OUTPATIENT
Start: 2025-03-05

## 2025-02-04 RX ORDER — FAMOTIDINE 10 MG/ML
20 INJECTION INTRAVENOUS ONCE AS NEEDED
OUTPATIENT
Start: 2025-02-19

## 2025-02-04 RX ORDER — LORAZEPAM 2 MG/ML
1 INJECTION INTRAMUSCULAR AS NEEDED
OUTPATIENT
Start: 2025-02-19

## 2025-02-04 RX ORDER — ALBUTEROL SULFATE 0.83 MG/ML
3 SOLUTION RESPIRATORY (INHALATION) AS NEEDED
OUTPATIENT
Start: 2025-02-19

## 2025-02-04 RX ORDER — EPINEPHRINE 0.3 MG/.3ML
0.3 INJECTION SUBCUTANEOUS EVERY 5 MIN PRN
OUTPATIENT
Start: 2025-03-05

## 2025-02-04 RX ORDER — ATROPINE SULFATE 0.4 MG/ML
0.4 INJECTION, SOLUTION ENDOTRACHEAL; INTRAMEDULLARY; INTRAMUSCULAR; INTRAVENOUS; SUBCUTANEOUS
OUTPATIENT
Start: 2025-02-19

## 2025-02-04 RX ORDER — EPINEPHRINE 0.3 MG/.3ML
0.3 INJECTION SUBCUTANEOUS EVERY 5 MIN PRN
OUTPATIENT
Start: 2025-02-19

## 2025-02-04 RX ORDER — LORAZEPAM 2 MG/ML
1 INJECTION INTRAMUSCULAR AS NEEDED
OUTPATIENT
Start: 2025-03-05

## 2025-02-04 RX ORDER — FAMOTIDINE 10 MG/ML
20 INJECTION INTRAVENOUS ONCE AS NEEDED
OUTPATIENT
Start: 2025-03-05

## 2025-02-04 RX ORDER — PALONOSETRON 0.05 MG/ML
0.25 INJECTION, SOLUTION INTRAVENOUS ONCE
OUTPATIENT
Start: 2025-03-05

## 2025-02-04 RX ORDER — MORPHINE SULFATE 15 MG/1
TABLET, FILM COATED, EXTENDED RELEASE ORAL
Qty: 90 TABLET | Refills: 0 | Status: SHIPPED | OUTPATIENT
Start: 2025-02-04 | End: 2025-03-06

## 2025-02-04 RX ORDER — ALBUTEROL SULFATE 0.83 MG/ML
3 SOLUTION RESPIRATORY (INHALATION) AS NEEDED
OUTPATIENT
Start: 2025-03-05

## 2025-02-04 ASSESSMENT — ENCOUNTER SYMPTOMS
OCCASIONAL FEELINGS OF UNSTEADINESS: 0
LOSS OF SENSATION IN FEET: 0
DEPRESSION: 0

## 2025-02-04 ASSESSMENT — PATIENT HEALTH QUESTIONNAIRE - PHQ9
2. FEELING DOWN, DEPRESSED OR HOPELESS: NOT AT ALL
SUM OF ALL RESPONSES TO PHQ9 QUESTIONS 1 AND 2: 0
1. LITTLE INTEREST OR PLEASURE IN DOING THINGS: NOT AT ALL

## 2025-02-04 ASSESSMENT — PAIN SCALES - GENERAL: PAINLEVEL_OUTOF10: 0-NO PAIN

## 2025-02-04 NOTE — PROGRESS NOTES
Patient ID: Roma Corral is a 69 y.o. male from [place], [place].     Referring Physician: Dann Nieto MD  [id number] [place] [address]    Primary Care Provider: Santiago Buckner MD    Diagnosis:   Rectal cancer with liver mets - 6/2024    Primary Oncologic Surgeon:   Christy    Primary Medical Oncologist:  Dann Nieto MD       Primary Radiation Oncologist:  Elvi    Oncologic Sugery History:  7/2/24 - ex lap with colostomy creation     Oncologic Therapy History:  N/a    Molecular Genetics:      Current Sites of Disease:  Liver, rectum    Oncologic Problem List:  Metastatic CRC  CAD  T2DM    Oncologic Narrative:  Roma Corral is a 69 y.o. male who is referred by Dr Vasquez for metastatic rectal cancer to liver s/p lap colostomy creation on 7/2/24. .  There is extensive metastatic disease in the liver with most of the right liver occupied with bulky disease which extends into segment 4.  Additionally there are 2 lesions in the left lateral section.  He initially presented with abdominal pain and pelvic pressure with labs showing anemia.  He has undergone imaging including CT scan, MRI liver, MRI rectum.  He has met with Dr. Boles from radiation oncology with a plan to start short course in the near future. He has met with Dr. Erazo and Dr. Harrison to discuss potential future surgeries.       Past Medical History: Roma has a past medical history of Abdominal pain, Acute non-ST elevation myocardial infarction (NSTEMI) (Multi), Anemia, Anxiety, CAD (coronary artery disease), Cardiology follow-up encounter (12/11/2023), Gout, H/O cardiac catheterization (06/01/2021), H/O cardiovascular stress test (09/03/2021), H/O echocardiogram (06/02/2021), High cholesterol, Hypertension, Overweight, Rectal cancer (Multi), and Type 2 diabetes mellitus.  Surgical History:  Roma has a past surgical history that includes Leg Surgery (Left); Colonoscopy (06/17/2024); and Hemicolectomy w/ ostomy.  Social History:  Roma reports  that he has never smoked. He has never used smokeless tobacco. He reports that he does not currently use alcohol. He reports that he does not use drugs.  Family History:    Family History   Problem Relation Name Age of Onset    No Known Problems Mother      No Known Problems Father      No Known Problems Sister      Leukemia Brother       Family Oncology History:  Cancer-related family history is not on file.    SUBJECTIVE:    History of Present Illness:  Roma Corral is a 69 y.o. male here for  chemotherapy follow up   Mr. Corral is seen in follow up   Completed xrt 7/17 7/24 - received 1st dose FOLFOXIRI / Josefina  8/7/24 - 2nd dose  - break in tx after this due to kidney stone     -Resumed chemo late September with C3.    -Has continued on treatment QOW except for some delays due to cytopenias.   -Tolerating chemo with fatigue, decreased appetite.   -Abdominal pain controlled on long acting morphine and hydromorphone - follows with supportive onc.     Neuropathy unchanged  Some liquid stools during chemo - 1 dose of imodium per week on average      OBJECTIVE:    VS / Pain:  /75   Pulse 90   Temp 36.5 °C (97.7 °F)   Resp 18   Wt 74.3 kg (163 lb 12.8 oz)   SpO2 97%   BMI 22.22 kg/m²   BSA: 1.94 meters squared   Pain Scale: 0    Daily Weight  02/04/25 : 74.3 kg (163 lb 12.8 oz)  01/24/25 : 74.5 kg (164 lb 3.9 oz)  01/17/25 : 74.4 kg (164 lb)  01/17/25 : 74.4 kg (164 lb)  01/15/25 : 72.4 kg (159 lb 11.6 oz)  01/10/25 : 75.5 kg (166 lb 8.9 oz)  01/08/25 : 76.2 kg (167 lb 15.9 oz)  01/07/25 : 76.7 kg (169 lb)  12/31/24 : 76.4 kg (168 lb 5.1 oz)  12/24/24 : 79.8 kg (175 lb 14.8 oz)      Physical Exam  Constitutional:       Appearance: He is normal weight.   HENT:      Nose: Nose normal.      Mouth/Throat:      Mouth: Mucous membranes are moist.      Pharynx: Oropharynx is clear.   Eyes:      Extraocular Movements: Extraocular movements intact.      Conjunctiva/sclera: Conjunctivae normal.   Cardiovascular:       Rate and Rhythm: Normal rate.      Pulses: Normal pulses.   Pulmonary:      Effort: Pulmonary effort is normal.   Abdominal:      General: Abdomen is flat.      Comments: Colostomy bag in place    Musculoskeletal:         General: Normal range of motion.   Skin:     General: Skin is warm.   Neurological:      General: No focal deficit present.      Mental Status: He is alert. Mental status is at baseline.   Psychiatric:         Mood and Affect: Mood normal.         Thought Content: Thought content normal.         Judgment: Judgment normal.       Performance Status:   ECOG 1    Diagnostic Results         WBC   Date/Time Value Ref Range Status   02/03/2025 02:57 PM 5.5 4.4 - 11.3 x10*3/uL Final   01/30/2025 11:45 AM 4.7 4.4 - 11.3 x10*3/uL Final   01/20/2025 10:58 AM 5.0 4.4 - 11.3 x10*3/uL Final     Hemoglobin   Date Value Ref Range Status   02/03/2025 8.6 (L) 13.5 - 17.5 g/dL Final   01/30/2025 8.8 (L) 13.5 - 17.5 g/dL Final   01/20/2025 9.6 (L) 13.5 - 17.5 g/dL Final     MCV   Date/Time Value Ref Range Status   02/03/2025 02:57 PM 96 80 - 100 fL Final   01/30/2025 11:45 AM 94 80 - 100 fL Final   01/20/2025 10:58 AM 91 80 - 100 fL Final     Platelets   Date/Time Value Ref Range Status   02/03/2025 02:57  (L) 150 - 450 x10*3/uL Final   01/30/2025 11:45 AM 93 (L) 150 - 450 x10*3/uL Final   01/20/2025 10:58 AM 59 (L) 150 - 450 x10*3/uL Final     Neutrophils Absolute   Date/Time Value Ref Range Status   02/03/2025 02:57 PM 3.60 1.20 - 7.70 x10*3/uL Final     Comment:     Percent differential counts (%) should be interpreted in the context of the absolute cell counts (cells/uL).   01/20/2025 10:58 AM 3.17 1.20 - 7.70 x10*3/uL Final     Comment:     Percent differential counts (%) should be interpreted in the context of the absolute cell counts (cells/uL).   01/19/2025 07:01 AM 1.99 1.20 - 7.70 x10*3/uL Final     Comment:     Percent differential counts (%) should be interpreted in the context of the absolute cell  "counts (cells/uL).     Bilirubin, Total   Date/Time Value Ref Range Status   02/03/2025 02:57 PM 0.5 0.0 - 1.2 mg/dL Final   01/30/2025 11:45 AM 0.5 0.0 - 1.2 mg/dL Final   01/20/2025 10:58 AM 1.3 (H) 0.0 - 1.2 mg/dL Final     AST   Date/Time Value Ref Range Status   02/03/2025 02:57 PM 30 9 - 39 U/L Final   01/30/2025 11:45 AM 22 9 - 39 U/L Final   01/20/2025 10:58 AM 20 9 - 39 U/L Final     ALT   Date/Time Value Ref Range Status   02/03/2025 02:57 PM 16 10 - 52 U/L Final     Comment:     Patients treated with Sulfasalazine may generate falsely decreased results for ALT.   01/30/2025 11:45 AM 12 10 - 52 U/L Final     Comment:     Patients treated with Sulfasalazine may generate falsely decreased results for ALT.   01/20/2025 10:58 AM 12 10 - 52 U/L Final     Comment:     Patients treated with Sulfasalazine may generate falsely decreased results for ALT.     Creatinine   Date/Time Value Ref Range Status   02/03/2025 02:57 PM 0.97 0.50 - 1.30 mg/dL Final   01/30/2025 11:45 AM 0.88 0.50 - 1.30 mg/dL Final   01/20/2025 10:58 AM 0.84 0.50 - 1.30 mg/dL Final     Urea Nitrogen   Date/Time Value Ref Range Status   02/03/2025 02:57 PM 16 6 - 23 mg/dL Final   01/30/2025 11:45 AM 12 6 - 23 mg/dL Final   01/20/2025 10:58 AM 15 6 - 23 mg/dL Final     Albumin   Date/Time Value Ref Range Status   02/03/2025 02:57 PM 3.2 (L) 3.4 - 5.0 g/dL Final   01/30/2025 11:45 AM 2.9 (L) 3.4 - 5.0 g/dL Final   01/20/2025 10:58 AM 3.3 (L) 3.4 - 5.0 g/dL Final     No results found for: \"\"  Carcinoembryonic AG   Date/Time Value Ref Range Status   02/03/2025 02:57 PM 5.1 ug/L Final   01/20/2025 10:58 AM 6.5 ug/L Final   01/08/2025 09:48 AM 8.5 ug/L Final     === 10/10/24 ===    CT CHEST ABDOMEN PELVIS W IV CONTRAST    - Impression -  Colorectal cancer restaging scan:    1. Interval slightly improved hepatic metastatic disease when compared to the most recent exam and overall, gradual improved hepatic metastatic disease when compared to the " prior exams.  2. There is nodular pleural thickening in the posterior aspect of the right lower lobe, which is nonspecific and may represent atelectasis. Recommend attention on follow-up.  3. No additional new metastatic disease in the chest, abdomen or pelvis.  4. Redemonstration of multiple segment distal ileal loop thickening with surrounding inflammatory changes, along with the worsening of sigmoid and descending colon thickening and associated hyperemia.  Findings are concerning stable to mild worsening of enterocolitis. New and increasing loculated left paracolic gutter small collection likely secondary to lower abdominopelvic inflammatory changes  involving the bowel loops.  5. Additional chronic and incidental findings as above.    === 11/10/24 ===    CT ABDOMEN PELVIS WO IV CONTRAST    - Impression -  No acute abdominal or pelvic process.    Postsurgical changes of diverting left lower quadrant colostomy.  Redemonstration of wall thickening in the region of the terminal ileum, cecum and ascending colon. No evidence for bowel obstruction.  Findings may relate to infectious/inflammatory enterocolitis. Correlate with history of prior radiation to exclude radiation enteritis.    There is redemonstration of several heterogeneous hypodense masses in the right hepatic lobe not significantly changed from prior CT consistent with known history of metastatic disease. Evaluation somewhat limited by lack of contrast. No definite new lesion is identified.    There is a punctate 2 mm nonobstructing calculus in the lower pole of the right kidney.    Bladder is under distended with moderate wall thickening. Correlate with symptomatology and urinalysis to exclude infectious cystitis.    There is presacral edema. Interval resolution of previously noted fluid along the left pericolic gutter.    There is a 1.3 cm peritoneal soft tissue nodule  which is decreased in size from prior CT at which time it measured up to 1.8 cm.  No  definite new nodule is seen. Attention on continued follow-up is advised.    Additional findings as described above.    MACRO:  None    Signed by: Sandip Curtis 11/10/2024 9:51 PM  Dictation workstation:   [id number]    === 01/13/25 ===    CT CHEST ABDOMEN PELVIS W IV CONTRAST    - Impression -  Restaging of metastatic colorectal adenocarcinoma, as compared to CT  exams dated 11/10/2024 and 10/10/2024:  1. Interval decrease of metastatic disease burden as evidenced by both decreased size and morphologic changes of hepatic metastases, as detailed above.  2. Decrease of treatment-related diffuse small and large bowel wall thickening.  3. Additional chronic findings unchanged as detailed above.    I personally reviewed the images/study and I agree with the findings  as stated. This study was interpreted at [place], [place].    MACRO:  None    Signed by: Laci Bates 1/14/2025 8:47 PM  Dictation workstation:   [id number]        Assessment/Plan   This is a 69 y.o. man with diabetes and coronary artery disease who presented with abdominal pain and anemia in June 2024 and was found to have rectal cancer with diffuse liver metastases.  He has nearly confluent right hepatic liver metastases and 2 lesions in the left lobe.  He has met with Dr. Erazo to discuss potential future surgery should he do well with chemotherapy.  He is also met with Dr. Boles to discuss radiation to the primary tumor which was causing bleeding at time of diagnosis.      7/15 - 7/17 completed radiation (short-course).    -7/24/24 - first dose FOLFOXIRI + Josefina, tolerated with fatigue   -8/7/24 - 2nd dose FOLFOXIRI + Josefina   Tx has been held due to issues with kidney stones   Despite only 2 txs, CEA has gone from >800 to 143  Resumed tx in Sept -   C6 lowered oxali to 75% of dose   Oct CT with tx response.      Plan:  Stage IV CRC:  NGS shows wt KRAS/NRAS/BRAF  C8 FOLFOXIRI / Josefina with neulasta on   12/24 - wants to continue  - fluids on day 3 & day 8 (may depend on fluid shortage)  C9 due this week.  Over due for scans.  Will see back with scans.  Refer to Surg onc (Kacey) and CRS (Pedro) to discuss definitive surgery/ablation.      He has had a good response overall to chemo but is having more toxicity and cytopenias from chemo.  If he will ever be a candidate for liver directed therapy I think it will be now.      He is also having increased bleeding from primary rectal tumor so a primary tumor resection may be warranted in conjunction with liver directed therapy.     He is not focused on having ostomy take down at this point although he would ultimately prefer it.       Anemia   -    - transfuse for hgb <7     Logistics -    - clinic appt - [place]   - infusion appt - mentor nikia Nieto MD  [facility]  Office: [phone]  Fax: [phone]

## 2025-02-05 ENCOUNTER — INFUSION (OUTPATIENT)
Dept: HEMATOLOGY/ONCOLOGY | Facility: CLINIC | Age: 70
End: 2025-02-05
Payer: MEDICARE

## 2025-02-05 VITALS
BODY MASS INDEX: 21.8 KG/M2 | OXYGEN SATURATION: 97 % | HEART RATE: 89 BPM | DIASTOLIC BLOOD PRESSURE: 74 MMHG | TEMPERATURE: 96.4 F | RESPIRATION RATE: 18 BRPM | WEIGHT: 160.72 LBS | SYSTOLIC BLOOD PRESSURE: 126 MMHG

## 2025-02-05 DIAGNOSIS — G89.3 CANCER ASSOCIATED PAIN: Primary | ICD-10-CM

## 2025-02-05 DIAGNOSIS — C20 RECTAL CANCER (MULTI): ICD-10-CM

## 2025-02-05 DIAGNOSIS — G89.3 CANCER ASSOCIATED PAIN: ICD-10-CM

## 2025-02-05 DIAGNOSIS — R30.0 DYSURIA: ICD-10-CM

## 2025-02-05 DIAGNOSIS — R16.0 MASS OF MULTIPLE SITES OF LIVER: ICD-10-CM

## 2025-02-05 LAB
AMORPH CRY #/AREA UR COMP ASSIST: ABNORMAL /HPF
APPEARANCE UR: ABNORMAL
BILIRUB UR STRIP.AUTO-MCNC: NEGATIVE MG/DL
COLOR UR: ABNORMAL
GLUCOSE UR STRIP.AUTO-MCNC: NORMAL MG/DL
HYALINE CASTS #/AREA URNS AUTO: ABNORMAL /LPF
KETONES UR STRIP.AUTO-MCNC: NEGATIVE MG/DL
LEUKOCYTE ESTERASE UR QL STRIP.AUTO: NEGATIVE
MUCOUS THREADS #/AREA URNS AUTO: ABNORMAL /LPF
NITRITE UR QL STRIP.AUTO: NEGATIVE
PH UR STRIP.AUTO: 5.5 [PH]
PROT UR STRIP.AUTO-MCNC: ABNORMAL MG/DL
RBC # UR STRIP.AUTO: NEGATIVE MG/DL
RBC #/AREA URNS AUTO: ABNORMAL /HPF
SP GR UR STRIP.AUTO: 1.02
UROBILINOGEN UR STRIP.AUTO-MCNC: NORMAL MG/DL
WBC #/AREA URNS AUTO: ABNORMAL /HPF

## 2025-02-05 PROCEDURE — 96417 CHEMO IV INFUS EACH ADDL SEQ: CPT

## 2025-02-05 PROCEDURE — 96416 CHEMO PROLONG INFUSE W/PUMP: CPT

## 2025-02-05 PROCEDURE — 2500000004 HC RX 250 GENERAL PHARMACY W/ HCPCS (ALT 636 FOR OP/ED): Mod: TB | Performed by: INTERNAL MEDICINE

## 2025-02-05 PROCEDURE — 96413 CHEMO IV INFUSION 1 HR: CPT

## 2025-02-05 PROCEDURE — 96367 TX/PROPH/DG ADDL SEQ IV INF: CPT

## 2025-02-05 PROCEDURE — 96411 CHEMO IV PUSH ADDL DRUG: CPT

## 2025-02-05 PROCEDURE — 81001 URINALYSIS AUTO W/SCOPE: CPT

## 2025-02-05 PROCEDURE — 87493 C DIFF AMPLIFIED PROBE: CPT

## 2025-02-05 PROCEDURE — 96415 CHEMO IV INFUSION ADDL HR: CPT

## 2025-02-05 PROCEDURE — 96375 TX/PRO/DX INJ NEW DRUG ADDON: CPT | Mod: INF

## 2025-02-05 RX ORDER — DIPHENHYDRAMINE HYDROCHLORIDE 50 MG/ML
50 INJECTION INTRAMUSCULAR; INTRAVENOUS AS NEEDED
Status: DISCONTINUED | OUTPATIENT
Start: 2025-02-05 | End: 2025-02-05 | Stop reason: HOSPADM

## 2025-02-05 RX ORDER — DEXAMETHASONE 6 MG/1
12 TABLET ORAL ONCE
Status: COMPLETED | OUTPATIENT
Start: 2025-02-05 | End: 2025-02-05

## 2025-02-05 RX ORDER — PALONOSETRON 0.05 MG/ML
0.25 INJECTION, SOLUTION INTRAVENOUS ONCE
Status: COMPLETED | OUTPATIENT
Start: 2025-02-05 | End: 2025-02-05

## 2025-02-05 RX ORDER — EPINEPHRINE 0.3 MG/.3ML
0.3 INJECTION SUBCUTANEOUS EVERY 5 MIN PRN
Status: DISCONTINUED | OUTPATIENT
Start: 2025-02-05 | End: 2025-02-05 | Stop reason: HOSPADM

## 2025-02-05 RX ORDER — HYDROMORPHONE HYDROCHLORIDE 0.2 MG/ML
0.6 INJECTION INTRAMUSCULAR; INTRAVENOUS; SUBCUTANEOUS ONCE
Status: CANCELLED | OUTPATIENT
Start: 2025-02-05 | End: 2025-02-05

## 2025-02-05 RX ORDER — LORAZEPAM 2 MG/ML
1 INJECTION INTRAMUSCULAR AS NEEDED
Status: DISCONTINUED | OUTPATIENT
Start: 2025-02-05 | End: 2025-02-05 | Stop reason: HOSPADM

## 2025-02-05 RX ORDER — PROCHLORPERAZINE MALEATE 10 MG
10 TABLET ORAL EVERY 6 HOURS PRN
Status: DISCONTINUED | OUTPATIENT
Start: 2025-02-05 | End: 2025-02-05 | Stop reason: HOSPADM

## 2025-02-05 RX ORDER — ATROPINE SULFATE 0.4 MG/ML
0.4 INJECTION, SOLUTION ENDOTRACHEAL; INTRAMEDULLARY; INTRAMUSCULAR; INTRAVENOUS; SUBCUTANEOUS
Status: DISCONTINUED | OUTPATIENT
Start: 2025-02-05 | End: 2025-02-05 | Stop reason: HOSPADM

## 2025-02-05 RX ORDER — FAMOTIDINE 10 MG/ML
20 INJECTION INTRAVENOUS ONCE AS NEEDED
Status: DISCONTINUED | OUTPATIENT
Start: 2025-02-05 | End: 2025-02-05 | Stop reason: HOSPADM

## 2025-02-05 RX ORDER — ALBUTEROL SULFATE 0.83 MG/ML
3 SOLUTION RESPIRATORY (INHALATION) AS NEEDED
Status: DISCONTINUED | OUTPATIENT
Start: 2025-02-05 | End: 2025-02-05 | Stop reason: HOSPADM

## 2025-02-05 RX ORDER — PROCHLORPERAZINE EDISYLATE 5 MG/ML
10 INJECTION INTRAMUSCULAR; INTRAVENOUS EVERY 6 HOURS PRN
Status: DISCONTINUED | OUTPATIENT
Start: 2025-02-05 | End: 2025-02-05 | Stop reason: HOSPADM

## 2025-02-05 RX ADMIN — ATROPINE SULFATE 0.4 MG: 0.4 INJECTION, SOLUTION INTRAVENOUS at 08:51

## 2025-02-05 RX ADMIN — IRINOTECAN HYDROCHLORIDE 250 MG: 20 INJECTION, SOLUTION INTRAVENOUS at 08:51

## 2025-02-05 RX ADMIN — OXALIPLATIN 130 MG: 5 INJECTION, SOLUTION INTRAVENOUS at 10:30

## 2025-02-05 RX ADMIN — FOSAPREPITANT 150 MG: 150 INJECTION, POWDER, LYOPHILIZED, FOR SOLUTION INTRAVENOUS at 08:04

## 2025-02-05 RX ADMIN — BEVACIZUMAB-AWWB 400 MG: 400 INJECTION, SOLUTION INTRAVENOUS at 08:39

## 2025-02-05 RX ADMIN — FLUOROURACIL 4800 MG: 50 INJECTION, SOLUTION INTRAVENOUS at 12:42

## 2025-02-05 RX ADMIN — PALONOSETRON HYDROCHLORIDE 250 MCG: 0.25 INJECTION INTRAVENOUS at 08:01

## 2025-02-05 RX ADMIN — DEXAMETHASONE 12 MG: 6 TABLET ORAL at 08:01

## 2025-02-05 RX ADMIN — HYDROMORPHONE HYDROCHLORIDE 0.6 MG: 2 INJECTION, SOLUTION INTRAMUSCULAR; INTRAVENOUS; SUBCUTANEOUS at 09:45

## 2025-02-05 ASSESSMENT — PAIN SCALES - GENERAL
PAINLEVEL_OUTOF10: 10-WORST PAIN EVER
PAINLEVEL_OUTOF10: 10-WORST PAIN EVER
PAINLEVEL_OUTOF10: 10 - WORST POSSIBLE PAIN
PAINLEVEL_OUTOF10: 6

## 2025-02-05 NOTE — PROGRESS NOTES
Receieved messaged from infusion [facility], that pt is in infusion, c/o 10/10 abdominal pain, last took PRN pain medication last night. Dr. Nieto placing order for one time dose of IV dilaudid.     No other acute s/sx such as N/V, constipation, etc. Instructed pt to carry PRN pain medication with him to appointments, and to present to ED if any worsening/new symptoms.

## 2025-02-06 LAB
C DIF TOX TCDA+TCDB STL QL NAA+PROBE: NOT DETECTED
HOLD SPECIMEN: NORMAL

## 2025-02-07 ENCOUNTER — INFUSION (OUTPATIENT)
Dept: HEMATOLOGY/ONCOLOGY | Facility: CLINIC | Age: 70
End: 2025-02-07
Payer: MEDICARE

## 2025-02-07 VITALS
WEIGHT: 160.5 LBS | HEART RATE: 76 BPM | DIASTOLIC BLOOD PRESSURE: 68 MMHG | SYSTOLIC BLOOD PRESSURE: 119 MMHG | OXYGEN SATURATION: 98 % | RESPIRATION RATE: 17 BRPM | BODY MASS INDEX: 21.77 KG/M2 | TEMPERATURE: 96.6 F

## 2025-02-07 DIAGNOSIS — C20 RECTAL CANCER (MULTI): ICD-10-CM

## 2025-02-07 PROCEDURE — 96372 THER/PROPH/DIAG INJ SC/IM: CPT

## 2025-02-07 PROCEDURE — 96361 HYDRATE IV INFUSION ADD-ON: CPT | Mod: INF

## 2025-02-07 PROCEDURE — 96374 THER/PROPH/DIAG INJ IV PUSH: CPT | Mod: INF

## 2025-02-07 PROCEDURE — 2500000004 HC RX 250 GENERAL PHARMACY W/ HCPCS (ALT 636 FOR OP/ED): Performed by: NURSE PRACTITIONER

## 2025-02-07 PROCEDURE — 2500000004 HC RX 250 GENERAL PHARMACY W/ HCPCS (ALT 636 FOR OP/ED): Performed by: INTERNAL MEDICINE

## 2025-02-07 PROCEDURE — 2500000004 HC RX 250 GENERAL PHARMACY W/ HCPCS (ALT 636 FOR OP/ED): Mod: JZ,TB | Performed by: INTERNAL MEDICINE

## 2025-02-07 RX ORDER — FAMOTIDINE 10 MG/ML
20 INJECTION INTRAVENOUS ONCE AS NEEDED
Status: DISCONTINUED | OUTPATIENT
Start: 2025-02-07 | End: 2025-02-07 | Stop reason: HOSPADM

## 2025-02-07 RX ORDER — HEPARIN 100 UNIT/ML
500 SYRINGE INTRAVENOUS AS NEEDED
Status: DISCONTINUED | OUTPATIENT
Start: 2025-02-07 | End: 2025-02-07 | Stop reason: HOSPADM

## 2025-02-07 RX ORDER — PROCHLORPERAZINE EDISYLATE 5 MG/ML
10 INJECTION INTRAMUSCULAR; INTRAVENOUS EVERY 6 HOURS PRN
Status: CANCELLED | OUTPATIENT
Start: 2025-02-07

## 2025-02-07 RX ORDER — HEPARIN SODIUM,PORCINE/PF 10 UNIT/ML
50 SYRINGE (ML) INTRAVENOUS AS NEEDED
Status: DISCONTINUED | OUTPATIENT
Start: 2025-02-07 | End: 2025-02-07 | Stop reason: HOSPADM

## 2025-02-07 RX ORDER — EPINEPHRINE 0.3 MG/.3ML
0.3 INJECTION SUBCUTANEOUS EVERY 5 MIN PRN
Status: DISCONTINUED | OUTPATIENT
Start: 2025-02-07 | End: 2025-02-07 | Stop reason: HOSPADM

## 2025-02-07 RX ORDER — PROCHLORPERAZINE EDISYLATE 5 MG/ML
10 INJECTION INTRAMUSCULAR; INTRAVENOUS EVERY 6 HOURS PRN
Status: DISCONTINUED | OUTPATIENT
Start: 2025-02-07 | End: 2025-02-07 | Stop reason: HOSPADM

## 2025-02-07 RX ORDER — HEPARIN SODIUM,PORCINE/PF 10 UNIT/ML
50 SYRINGE (ML) INTRAVENOUS AS NEEDED
OUTPATIENT
Start: 2025-02-07

## 2025-02-07 RX ORDER — ALBUTEROL SULFATE 0.83 MG/ML
3 SOLUTION RESPIRATORY (INHALATION) AS NEEDED
Status: DISCONTINUED | OUTPATIENT
Start: 2025-02-07 | End: 2025-02-07 | Stop reason: HOSPADM

## 2025-02-07 RX ORDER — DIPHENHYDRAMINE HYDROCHLORIDE 50 MG/ML
50 INJECTION INTRAMUSCULAR; INTRAVENOUS AS NEEDED
Status: DISCONTINUED | OUTPATIENT
Start: 2025-02-07 | End: 2025-02-07 | Stop reason: HOSPADM

## 2025-02-07 RX ORDER — HEPARIN 100 UNIT/ML
500 SYRINGE INTRAVENOUS AS NEEDED
OUTPATIENT
Start: 2025-02-07

## 2025-02-07 RX ADMIN — HEPARIN 500 UNITS: 100 SYRINGE at 12:30

## 2025-02-07 RX ADMIN — SODIUM CHLORIDE 1000 ML: 9 INJECTION, SOLUTION INTRAVENOUS at 11:23

## 2025-02-07 RX ADMIN — PROCHLORPERAZINE EDISYLATE 10 MG: 5 INJECTION INTRAMUSCULAR; INTRAVENOUS at 11:42

## 2025-02-07 RX ADMIN — PEGFILGRASTIM-CBQV 6 MG: 6 INJECTION, SOLUTION SUBCUTANEOUS at 11:40

## 2025-02-07 ASSESSMENT — PAIN SCALES - GENERAL: PAINLEVEL_OUTOF10: 0-NO PAIN

## 2025-02-07 NOTE — PROGRESS NOTES
"Laurence Betancourt CNP ordered IV compazine for pt with IVF - pt states he has taken compazine at home & it does not make him tired/groggy - he states \"I will be fine to drive home\" when asked.   "

## 2025-02-10 ENCOUNTER — TELEPHONE (OUTPATIENT)
Dept: HEMATOLOGY/ONCOLOGY | Facility: HOSPITAL | Age: 70
End: 2025-02-10
Payer: MEDICARE

## 2025-02-10 NOTE — PROGRESS NOTES
Subjective     HPI  Roma Corral is a 69 y.o. male who is here in followup for metastatic rectal cancer to liver rectal cancer s/p lap colostomy creation on 7/2/24. .  There is extensive metastatic disease in the liver with most of the right liver occupied with bulky disease which extends into segment 4.  Additionally there are 2 lesions in the left lateral section.  He initially presented with abdominal pain and pelvic pressure with labs showing anemia.  He has received short course radiation as well as on his 9th or 10th cycle of mFOLFOXIRI and bevacizumab.  He is starting to struggle with chemotherapy. I reviewed CT from January 2025. There is a good response overall. Also, CEA down to 6.5.    He is struggling somewhat with chemo, has had cytopenias. More recently, he is having persistent lifestyle limiting passage of blood per rectum as well as incontinence numerous times throughout the day to the point that he cannot leave the house.  This has been happening for the past 2 weeks.    Here today with wife.    Past Medical History: Roma has a past medical history of Abdominal pain, Acute non-ST elevation myocardial infarction (NSTEMI) (Multi), Anemia, Anxiety, CAD (coronary artery disease), Cardiology follow-up encounter (12/11/2023), Gout, H/O cardiac catheterization (06/01/2021), H/O cardiovascular stress test (09/03/2021), H/O echocardiogram (06/02/2021), High cholesterol, Hypertension, Overweight, Rectal cancer (Multi), and Type 2 diabetes mellitus.  Surgical History:  Roma has a past surgical history that includes Leg Surgery (Left); Colonoscopy (06/17/2024); and Hemicolectomy w/ ostomy.  Social History:  Roma reports that he has never smoked. He has never used smokeless tobacco. He reports that he does not currently use alcohol. He reports that he does not use drugs.    Review of Systems   Constitutional:  Positive for activity change, appetite change and fatigue.   HENT:  Positive for nosebleeds.     Gastrointestinal:  Positive for anal bleeding.   Hematological:  Bruises/bleeds easily.   Psychiatric/Behavioral:  Negative for agitation and behavioral problems.         Past Medical History:   Diagnosis Date    Abdominal pain     Acute non-ST elevation myocardial infarction (NSTEMI) (Multi)     Anemia     6/6/24 HGB 8.6; HCT 31.5    Anxiety     CAD (coronary artery disease)     Cardiology follow-up encounter 12/11/2023    Sharif Lau MD    Gout     H/O cardiac catheterization 06/01/2021    H/O cardiovascular stress test 09/03/2021    IMPRESSION: 1. No evidence of ischemia. 2. Suspicion of an infarct along the mid anterior wall. 3. Left ventricular dilatation is noted. 4. Left ventricular EF was calculated to be 50%. Summary:  1. No clinical or electrocardiographic evidence for ischemia at a submaximal workload. 3. The blunted heart rate diminshes the sensitivity of this test.  4. The submaximal level of stress was achieved.    H/O echocardiogram 06/02/2021    Mild concentric Left ventricle hypertrophy.  Global left ventricular wall motion and contractility are within normal limits.  There is normal left ventricular systolic function.  Estimated ejection fraction is 60-64%.  The left atrial size is mildly dilated.  Mild aortic regurgitation.  A trace of mitral regurgitation.  Trivial to mild tricuspid regurgitation.  There is no pulmonary hypertension.    High cholesterol     Hypertension     Overweight     Rectal cancer (Multi)     Type 2 diabetes mellitus     4/18/2024 A1C 7.5%      Past Surgical History:   Procedure Laterality Date    COLONOSCOPY  06/17/2024    HEMICOLECTOMY W/ OSTOMY      LEG SURGERY Left     rodrigo placed      Current Outpatient Medications on File Prior to Visit   Medication Sig Dispense Refill    aspirin 81 mg chewable tablet Chew 1 tablet (81 mg) 1 time.      atorvastatin (Lipitor) 40 mg tablet Take 1 tablet (40 mg) by mouth once daily at bedtime. 90 tablet 3    loperamide (Imodium) 2  mg capsule Take 2 capsules (4 mg) by mouth with the first episode of diarrhea and 1 capsule (2 mg) by mouth with any additional episodes. Maximum 8 capsules (16 mg) per day. 100 capsule 2    LORazepam (Ativan) 0.5 mg tablet Take 1 tablet (0.5 mg) by mouth 2 times a day as needed for anxiety (nausea) for up to 15 days. (Patient not taking: Reported on 2025) 30 tablet 3    metoprolol succinate XL (Toprol-XL) 100 mg 24 hr tablet Take 1 tablet (100 mg) by mouth once daily. 90 tablet 3    morphine CR (MS Contin) 15 mg 12 hr tablet Take 1 tablet (15 mg) by mouth once daily in the morning AND 2 tablets (30 mg) once daily at bedtime. Do not crush, chew, or split. 90 tablet 0    ondansetron (Zofran) 4 mg tablet Take 1 tablet (4 mg) by mouth every 8 hours if needed for nausea or vomiting. 20 tablet 0    potassium chloride CR (Klor-Con M20) 20 mEq ER tablet Take 1 tablet (20 mEq) by mouth 2 times a day. Do not crush or chew. 180 tablet 3    prochlorperazine (Compazine) 10 mg tablet Take 1 tablet (10 mg) by mouth every 6 hours if needed for nausea or vomiting. (Patient not taking: Reported on 2025) 30 tablet 5    [] sulfamethoxazole-trimethoprim (Bactrim DS) 800-160 mg tablet Take 1 tablet by mouth 2 times a day for 5 days. 10 tablet 0    [DISCONTINUED] morphine CR (MS Contin) 15 mg 12 hr tablet Take 1 tablet (15 mg) by mouth once daily in the morning AND 2 tablets (30 mg) once daily at bedtime. Do not crush, chew, or split. 90 tablet 0     Current Facility-Administered Medications on File Prior to Visit   Medication Dose Route Frequency Provider Last Rate Last Admin    alteplase (Cathflo Activase) injection 2 mg  2 mg intra-catheter PRN Laurence S Castelein, APRN-CNP        heparin flush 10 unit/mL syringe 50 Units  50 Units intra-catheter PRN Laurence S Castelein, APRN-CNP        heparin flush 100 unit/mL syringe 500 Units  500 Units intra-catheter PRN Laurence S Castelein, APRN-CNP          No Known Allergies    Social History     Socioeconomic History    Marital status:      Spouse name: Not on file    Number of children: 1    Years of education: Not on file    Highest education level: Not on file   Occupational History    Occupation: retired   Tobacco Use    Smoking status: Never    Smokeless tobacco: Never   Vaping Use    Vaping status: Never Used   Substance and Sexual Activity    Alcohol use: Not Currently    Drug use: Never    Sexual activity: Defer   Other Topics Concern    Not on file   Social History Narrative    Not on file     Social Drivers of Health     Financial Resource Strain: Low Risk  (1/18/2025)    Overall Financial Resource Strain (CARDIA)     Difficulty of Paying Living Expenses: Not hard at all   Food Insecurity: No Food Insecurity (1/18/2025)    Hunger Vital Sign     Worried About Running Out of Food in the Last Year: Never true     Ran Out of Food in the Last Year: Never true   Transportation Needs: No Transportation Needs (1/18/2025)    PRAPARE - Transportation     Lack of Transportation (Medical): No     Lack of Transportation (Non-Medical): No   Physical Activity: Not on file   Stress: Not on file   Social Connections: Unknown (7/3/2024)    Social Connection and Isolation Panel [NHANES]     Frequency of Communication with Friends and Family: Not on file     Frequency of Social Gatherings with Friends and Family: Not on file     Attends Restoration Services: Not on file     Active Member of Clubs or Organizations: Not on file     Attends Club or Organization Meetings: Not on file     Marital Status:    Intimate Partner Violence: Not At Risk (1/18/2025)    Humiliation, Afraid, Rape, and Kick questionnaire     Fear of Current or Ex-Partner: No     Emotionally Abused: No     Physically Abused: No     Sexually Abused: No   Housing Stability: Low Risk  (1/18/2025)    Housing Stability Vital Sign     Unable to Pay for Housing in the Last Year: No     Number of Times Moved in the Last Year:  1     Homeless in the Last Year: No      Family History   Problem Relation Name Age of Onset    No Known Problems Mother      No Known Problems Father      No Known Problems Sister      Leukemia Brother            Objective     Vitals:    02/12/25 1113   BP: 137/78   Pulse: (!) 116   Resp: 16   Temp: 36 °C (96.8 °F)   SpO2: 100%          Physical Exam  Constitutional:       Appearance: He is ill-appearing (chronically ill appearing, and fatigued. lost weight compared to prior visit).   HENT:      Head: Atraumatic.      Nose: Nose normal.   Eyes:      Extraocular Movements: Extraocular movements intact.      Conjunctiva/sclera: Conjunctivae normal.   Cardiovascular:      Rate and Rhythm: Normal rate.   Pulmonary:      Effort: Pulmonary effort is normal.   Abdominal:      Palpations: Abdomen is soft.      Tenderness: There is no abdominal tenderness.      Comments: Left-sided colostomy in place. Right sided laparoscopic scars.   Musculoskeletal:         General: Normal range of motion.   Skin:     Findings: No rash.   Neurological:      General: No focal deficit present.      Mental Status: He is alert.   Psychiatric:         Behavior: Behavior normal.          Lab Results   Component Value Date    WBC 5.5 02/03/2025    HGB 8.6 (L) 02/03/2025    HCT 28.1 (L) 02/03/2025     (L) 02/03/2025     Lab Results   Component Value Date     02/03/2025    K 5.0 02/03/2025     02/03/2025    CO2 26 02/03/2025    BUN 16 02/03/2025    CREATININE 0.97 02/03/2025    EGFR 85 02/03/2025    CALCIUM 8.7 02/03/2025    ALBUMIN 3.2 (L) 02/03/2025    PROT 6.4 02/03/2025    AST 30 02/03/2025    ALT 16 02/03/2025    BILITOT 0.5 02/03/2025     Lab Results   Component Value Date    CEA 5.1 02/03/2025    CEA 6.5 01/20/2025    CEA 8.5 01/08/2025        === 06/19/24 ===    MR RECTUM W AND WO CONTRAST    - Impression -  1. Semi circumferential rectosigmoid junction mass with extramural  invasion and extension into the anterior  mesorectal fascia and  peritoneal reflection with significant luminal narrowing at the  rectosigmoid junction as detailed above. Findings are consistent with  known rectal malignancy.  2. Few nonspecific left sided pelvic lymph nodes with no evidence of  lymphadenopathy by MRI criteria.      The MR imaging based stage of this rectal cancer is T4.  Based on MRI the kiran stage is Nx.    I personally reviewed the images/study and I agree with the findings  as stated by Resident Rex Monaco MD.    MACRO:  None    Signed by: Jamil Higgins Krystin 6/21/2024 12:08 AM  Dictation workstation:   [id number]      === 06/18/24 ===    MR LIVER WITH EOVIST CONTRAST    - Impression -  1. Multiple hepatic metastases predominantly involving the right lobe  of the liver as described above.  2. Few small portacaval lymph nodes with no lymphadenopathy by size  criteria.  3. Patent portal venous system and no extrahepatic or intrahepatic  biliary duct dilatation.    I personally reviewed the images/study and I agree with the findings  as stated. This study was interpreted at [place], [place].    MACRO:  None    Signed by: Des Villeda 6/22/2024 12:13 AM  Dictation workstation:   [id number]  Rad Onc CT Sim Images    Result Date: 7/8/2024  These images are not reportable by radiology and will not be interpreted by  Radiologists.    ECG 12 Lead    Result Date: 6/28/2024  Sinus rhythm with 1st degree AV block Otherwise normal ECG When compared with ECG of 02-DEC-2022 10:01, No significant change was found Confirmed by All Lennon ([id number]) on 6/28/2024 9:46:33 AM    MR liver w EOVIST contrast    Result Date: 6/22/2024  Interpreted By:  Des Villeda and Dervishi Mario STUDY: MR LIVER WITH EOVIST CONTRAST;  6/18/2024 2:29 pm   INDICATION: Signs/Symptoms:rectal cancer with hepatic metastases.   COMPARISON: CT abdomen pelvis: 06/10/2024   ACCESSION NUMBER(S):  [id number]   ORDERING CLINICIAN: RAYMUNDO GUERRA   TECHNIQUE: MRI LIVER; Multiplanar magnetic resonance images of the abdomen were obtained including the following sequences; T2-weighted SSFSE with and without fat saturation, T1-weighted GRE in/opposed phase, DWI, fat saturated 3D-T1w GRE pre and dynamically post contrast.  9 ml of Gadolinium contrast agent Eovist were administered intravenously without immediate complication.   FINDINGS: LIVER: There are multiple confluent, ill-defined, infiltrative lesions throughout the right hepatic lobe which demonstrate minimal enhancement on early postcontrast imaging. There is no uptake of contrast on hepatocyte phase imaging when compared to normal hepatic parenchyma. The largest measurable lesion is seen in hepatic segment 6/7 which measures 5.2 x 11 cm (series 19, image 24) and hepatic segment 6/5 measuring 6 x 7.5 cm. There are 2 well-circumscribed heterogenous hypoenhancing lesions seen on left hepatic lobe with the lesion seen on hepatic segment 4A which measures 2.3 x 2.5 cm (series 19, image 41) and hepatic segment 3 lesion measuring 0.7 x 0.8 cm (series 19, image 33). The liver measures 18 cm in craniocaudal dimension.   BILE DUCTS: No intrahepatic or extrahepatic bile duct dilatation is demonstrated.   GALLBLADDER: Gallbladder is partially distended. There is a T1 hyperintense material in the gallbladder lumen likely representing sludge.   PANCREAS: Normal signal intensity. Normal enhancement. No masses. The pancreatic duct is normal.   SPLEEN: Within normal limits.   ADRENAL GLANDS: Within normal limits.   KIDNEYS: Within normal limits.   LYMPH NODES: There are few small portacaval lymph nodes. Otherwise there is no lymphadenopathy by size criteria.   ABDOMINAL VESSELS: Aorta and the major abdominal arterial vessels demonstrate no gross abnormality.  The portal vein, hepatic veins, superior mesenteric and splenic veins are patent.  No significant collaterals or  esophageal varices are present.   BOWEL: Stomach and duodenum are within normal limits. Visualized small and large bowel loops are normal in caliber.   PERITONEUM/RETROPERITONEUM: No ascites.   BONES AND LOWER THORAX: No abnormal enhancing focal bony lesions are identified. The visualized lower lung fields are unremarkable. The heart is normal in size.       1. Multiple hepatic metastases predominantly involving the right lobe of the liver as described above. 2. Few small portacaval lymph nodes with no lymphadenopathy by size criteria. 3. Patent portal venous system and no extrahepatic or intrahepatic biliary duct dilatation.   I personally reviewed the images/study and I agree with the findings as stated. This study was interpreted at [place], [place].   MACRO: None   Signed by: Des Villeda 6/22/2024 12:13 AM Dictation workstation:   [id number]    MR rectum w and wo IV contrast    Result Date: 6/21/2024  Interpreted By:  Jamil Gustafson,  and Jacinda Goodman STUDY: MR RECTUM W AND WO IV CONTRAST;  6/19/2024 5:09 pm   INDICATION: Signs/Symptoms:rectal mass   COMPARISON: CT abdomen pelvis: 06/10/2024.   ACCESSION NUMBER(S): [id number]   ORDERING CLINICIAN: RAYMUNDO GUERRA   TECHNIQUE: Multiplanar MRI of the pelvis was obtained including axial, sagittal and coronal T2 weighted SSFSE, (para)axial, (para)coronal and sagittal T2 FSE , axial DWI, pre and post gadolinium dynamic T1 GRE sequences in 3 planes.  Patient received 60CC of gel per rectum. 20 ml of Gadolinium contrast agent Dotarem were administered intravenously without complication.   FINDINGS: RECTAL MASS: Within the  upper rectum at the rectosigmoid junction there is a semi circumferential rectal wall mass measuring up to 10mm in thickness (series 10, image 17). The mass measures 7.6 cm longitudinally and demonstrates predominant T2w  isointensity compared to normal rectal wall. There is  marked narrowing at the rectosigmoid junction with abnormal peripheral T2 hypointense striations.   DISTANCE FROM ANAL VERGE: The inferior aspect of the lesion is approximately 8-9 cm from the anorectal junction and  10 cm from the anal verge.   EXTRAMURAL INVASION: The mass demonstrates extramural extension into the perirectal fat as seen on series 10, image 25.   EXTRAMURAL VASCULAR INVASION: There is no extramural vascular invasion.   DISTANCE TO MESORECTAL FASCIA/PERITONEAL REFLECTION: Abnormal, enhancing T2 dark striations, extending beyond the wall of the rectal mass involving the anterior mesorectal fascia and peritoneal reflection (series 10, image 25.   ADJACENT ORGANS: The urinary bladder is  not involved.  The prostate and seminal vesicles appear within normal limits.   LYMPH NODE STATUS: There are few nonspecific left-sided superior rectal lymph nodes with the largest measuring 0.4 cm in diameter (series 6, image 21). Otherwise there is no evidence of lymphadenopathy.   BONES: No focal lesions are noted in the bone.       1. Semi circumferential rectosigmoid junction mass with extramural invasion and extension into the anterior mesorectal fascia and peritoneal reflection with significant luminal narrowing at the rectosigmoid junction as detailed above. Findings are consistent with known rectal malignancy. 2. Few nonspecific left sided pelvic lymph nodes with no evidence of lymphadenopathy by MRI criteria.     The MR imaging based stage of this rectal cancer is T4. Based on MRI the kiran stage is Nx.   I personally reviewed the images/study and I agree with the findings as stated by Resident Rex Monaco MD.   MACRO: None   Signed by: Jamil Mcclelland 6/21/2024 12:08 AM Dictation workstation:   [id number]    Colonoscopy Diagnostic    Result Date: 6/17/2024  Impression Single malignant-appearing mass (not traversable) in the rectum 8 cm from the anal verge, covering the whole circumference;  there was stigmata of recent hemorrhage, and bleeding occurred after intervention; performed cold forceps biopsy with partial removal Findings Single malignant-appearing mass (not traversable) in the rectum 8 cm from the anal verge observed during digital rectal exam, covering the whole circumference; there was stigmata of recent hemorrhage, and bleeding occurred after intervention; performed cold forceps biopsy with partial removal. Obstructing nontraversable circumferential rectal mass just proximal to second rectal valve, approximately 8 cm from the anus       Final 1/13/2025 14:20 1/13/2025 14:49     Study Result    Narrative & Impression   Interpreted By:  Laci Bates and Booth Cameron   STUDY:  CT CHEST ABDOMEN PELVIS W IV CONTRAST;  1/13/2025 2:49 pm      INDICATION:  Signs/Symptoms:metastatic colorectal cancer to the liver - on  chemotherapy - follow up scan, compare to last ct; 69-year-old male  with prior treatment including surgical resection of primary tumor  with colostomy creation on 07/02/2020 as well as FOLFOXIRI +  bevacizumab (1st cycle 07/24/2024; held after 2 cycles due to kidney  stones, resumed treatment in September 2024 no completed 8 cycles  total) and short course radiation therapy (7/15/24-7/17/24.      ,C20 Malignant neoplasm of rectum (Multi)      COMPARISON:  CT abdomen pelvis 11/10/2024, CT chest abdomen and pelvis 10/10/2024      ACCESSION NUMBER(S):  [id number]      ORDERING CLINICIAN:  KERRY CARDOZA      TECHNIQUE:  CT of the chest, abdomen, and pelvis was performed.  Contiguous axial  images were obtained at 3 mm slice thickness through the chest,  abdomen and pelvis. Coronal and sagittal reconstructions at 3 mm  slice thickness were performed. 75 ML of Omnipaque 350 was  administered intravenously without immediate complication.      FINDINGS:  CHEST:      LUNG/PLEURA/LARGE AIRWAYS:  No lung masses, pulmonary nodules or consolidations are present.  There is no pleural  effusion or pneumothorax.      VESSELS:  Aorta and pulmonary arteries are normal caliber.  Mild  atherosclerotic changes are noted of the aorta and branching vessels.  Severe coronary artery calcifications are present.      HEART:  The heart is normal in size.  There is no pericardial effusion.  Pericardial calcific plaque is again noted at the apex.      MEDIASTINUM AND DIAZ:  No mediastinal, hilar or axillary lymphadenopathy is present.  The  esophagus is normal.      CHEST WALL AND LOWER NECK:  Right chest wall MediPort is visualized with distal catheter tip  terminating at the cavoatrial junction. The visualized thyroid gland  appears within normal limits.      ABDOMEN:      LIVER:  Interval decrease in size and morphology (decreased attenuation,  increased definition of interface with surrounding parenchyma) of  several hepatic metastases, including a dominant lesion within  segment 5 that now measures 7.8 x 3.6 cm (series 3, image 109),  previously 9.2 x 4.3 cm. An additional lesion within segment 6  measures 5.0 x 4.1 cm (series 3, image 102), previously 5.8 x 4.1 cm.  Several smaller metastatic foci are also less conspicuous compared to  prior.      BILE DUCTS:  The intrahepatic and extrahepatic ducts are not dilated.      GALLBLADDER:  The gallbladder is nondistended and without evidence of radiopaque  stones.      PANCREAS:  The pancreas appears unremarkable.      SPLEEN:  The spleen is enlarged measuring 14.1 cm in AP diameter and has been  gradually increasing in size since baseline exam dated 06/10/2024,  most likely secondary to treatment side effect.      ADRENAL GLANDS:  Bilateral adrenal glands appear normal.      KIDNEYS AND URETERS:  The kidney appears atrophic bilaterally, left slightly worse than  right. Stable size of hypoattenuating focus within the inferior pole  of the left kidney, which features simple fluid attenuation and is  likely a benign cyst. Redemonstration of punctate  nonobstructing  renal calculus within the mid polar aspect of the right kidney. No  hydroureteronephrosis or nephroureterolithiasis is identified.      PELVIS:      BLADDER:  The urinary bladder is decompressed, limited for evaluation.      REPRODUCTIVE ORGANS:  The prostate is not enlarged.      BOWEL:  Postsurgical changes consistent with sigmoidectomy with mucous  fistula and end colostomy noted at the left lower quadrant abdominal  wall. The small and large bowel are normal in caliber with gradual  improvement of treatment-related bowel wall thickening. The appendix  appears normal.          VESSELS:  There is no aneurysmal dilatation of the abdominal aorta. The IVC  appears normal.      PERITONEUM/RETROPERITONEUM/LYMPH NODES:  No ascites or free air, no fluid collection.  No abdominopelvic  lymphadenopathy is present.      BONE AND SOFT TISSUE:  No suspicious osseous lesions are identified. Stable appearance of  punctate sclerotic focus within the T12 vertebral body. Degenerative  discogenic disease is noted in the lower thoracic and lumbar spine.  Deficiency within the left lower quadrant anterior abdominal wall  corresponding with mucous fistula and end colostomy.      IMPRESSION:  Restaging of metastatic colorectal adenocarcinoma, as compared to CT  exams dated 11/10/2024 and 10/10/2024:  1. Interval decrease of metastatic disease burden as evidenced by  both decreased size and morphologic changes of hepatic metastases, as  detailed above.  2. Decrease of treatment-related diffuse small and large bowel wall  thickening.  3. Additional chronic findings unchanged as detailed above.      I personally reviewed the images/study and I agree with the findings  as stated. This study was interpreted at [place], [place].      MACRO:  None      Signed by: Laci Bates 1/14/2025 8:47 PM  Dictation workstation:   [id number]         Assessment/Plan     69-year-old man  with rectosigmoid cancer and extensive liver metastases. Will check labs today.  With his current rectal symptoms, I think it may be best to stage his surgery by doing the rectal cancer operation and ablating the 2 left liver lesions in the first operation.  I would then have him undergo right portal vein embolization and plan for right hepatectomy in about 2-3 months or so to clear the liver.  I will defer to Dr Vasquez's judgment, but my sense is that he may not do well from a functional standpoint if he was reconstructed and might be better off with an end colostomy. I expressed this to the patient and his wife, but also expressed that I defer to Dr Vasquez's expertise. I have reached out to Kira Nieto and Pedro to finalize the care plan.    ADDENDUM  Had further discussions about the case.  Plan will be for surgery in combination with Dr. Modi on 3/17/2025.  Specifically, robotic LAR with diverting loop ileostomy in that first setting as well as laparoscopic left-sided liver tumor ablations.  Case request placed.  Informed consent will need to be signed on the morning of surgery.    Baron Erazo MD

## 2025-02-10 NOTE — TELEPHONE ENCOUNTER
Patient scheduled for rnex in [place] and fuv with Dr Nieto at [facility] on 2/18. States he normally would see Dr Nieto the following week on 2/25. Please clarify.

## 2025-02-12 ENCOUNTER — LAB (OUTPATIENT)
Dept: LAB | Facility: HOSPITAL | Age: 70
End: 2025-02-12
Payer: MEDICARE

## 2025-02-12 ENCOUNTER — APPOINTMENT (OUTPATIENT)
Dept: HEMATOLOGY/ONCOLOGY | Facility: CLINIC | Age: 70
End: 2025-02-12
Payer: MEDICARE

## 2025-02-12 ENCOUNTER — OFFICE VISIT (OUTPATIENT)
Dept: SURGICAL ONCOLOGY | Facility: HOSPITAL | Age: 70
End: 2025-02-12
Payer: MEDICARE

## 2025-02-12 VITALS
TEMPERATURE: 96.8 F | RESPIRATION RATE: 16 BRPM | DIASTOLIC BLOOD PRESSURE: 78 MMHG | SYSTOLIC BLOOD PRESSURE: 137 MMHG | HEART RATE: 116 BPM | OXYGEN SATURATION: 100 % | WEIGHT: 153.22 LBS | BODY MASS INDEX: 20.78 KG/M2

## 2025-02-12 DIAGNOSIS — C20 RECTAL CANCER (MULTI): Primary | ICD-10-CM

## 2025-02-12 DIAGNOSIS — C20 RECTAL CANCER (MULTI): ICD-10-CM

## 2025-02-12 LAB
ABO GROUP (TYPE) IN BLOOD: NORMAL
ALBUMIN SERPL BCP-MCNC: 3.7 G/DL (ref 3.4–5)
ALP SERPL-CCNC: 373 U/L (ref 33–136)
ALT SERPL W P-5'-P-CCNC: 12 U/L (ref 10–52)
ANION GAP SERPL CALC-SCNC: 15 MMOL/L (ref 10–20)
ANTIBODY SCREEN: NORMAL
APPEARANCE UR: CLEAR
AST SERPL W P-5'-P-CCNC: 20 U/L (ref 9–39)
BASOPHILS # BLD AUTO: 0.04 X10*3/UL (ref 0–0.1)
BASOPHILS NFR BLD AUTO: 0.4 %
BILIRUB SERPL-MCNC: 0.6 MG/DL (ref 0–1.2)
BILIRUB UR STRIP.AUTO-MCNC: NEGATIVE MG/DL
BUN SERPL-MCNC: 15 MG/DL (ref 6–23)
CALCIUM SERPL-MCNC: 9.3 MG/DL (ref 8.6–10.3)
CHLORIDE SERPL-SCNC: 104 MMOL/L (ref 98–107)
CO2 SERPL-SCNC: 24 MMOL/L (ref 21–32)
COLOR UR: YELLOW
CREAT SERPL-MCNC: 1.05 MG/DL (ref 0.5–1.3)
DACRYOCYTES BLD QL SMEAR: NORMAL
DOHLE BOD BLD QL SMEAR: PRESENT
EGFRCR SERPLBLD CKD-EPI 2021: 77 ML/MIN/1.73M*2
EOSINOPHIL # BLD AUTO: 0.1 X10*3/UL (ref 0–0.7)
EOSINOPHIL NFR BLD AUTO: 0.9 %
ERYTHROCYTE [DISTWIDTH] IN BLOOD BY AUTOMATED COUNT: 19.5 % (ref 11.5–14.5)
GLUCOSE SERPL-MCNC: 105 MG/DL (ref 74–99)
GLUCOSE UR STRIP.AUTO-MCNC: NORMAL MG/DL
HCT VFR BLD AUTO: 31.9 % (ref 41–52)
HGB BLD-MCNC: 9.8 G/DL (ref 13.5–17.5)
HYPOCHROMIA BLD QL SMEAR: NORMAL
IMM GRANULOCYTES # BLD AUTO: 0.12 X10*3/UL (ref 0–0.7)
IMM GRANULOCYTES NFR BLD AUTO: 1.1 % (ref 0–0.9)
KETONES UR STRIP.AUTO-MCNC: NEGATIVE MG/DL
LEUKOCYTE ESTERASE UR QL STRIP.AUTO: NEGATIVE
LYMPHOCYTES # BLD AUTO: 1.11 X10*3/UL (ref 1.2–4.8)
LYMPHOCYTES NFR BLD AUTO: 9.9 %
MCH RBC QN AUTO: 28.9 PG (ref 26–34)
MCHC RBC AUTO-ENTMCNC: 30.7 G/DL (ref 32–36)
MCV RBC AUTO: 94 FL (ref 80–100)
MONOCYTES # BLD AUTO: 1.02 X10*3/UL (ref 0.1–1)
MONOCYTES NFR BLD AUTO: 9.1 %
NEUTROPHILS # BLD AUTO: 8.83 X10*3/UL (ref 1.2–7.7)
NEUTROPHILS NFR BLD AUTO: 78.6 %
NEUTS VAC BLD QL SMEAR: PRESENT
NITRITE UR QL STRIP.AUTO: NEGATIVE
NRBC BLD-RTO: 0 /100 WBCS (ref 0–0)
OVALOCYTES BLD QL SMEAR: NORMAL
PH UR STRIP.AUTO: 5 [PH]
PLATELET # BLD AUTO: 63 X10*3/UL (ref 150–450)
POLYCHROMASIA BLD QL SMEAR: NORMAL
POTASSIUM SERPL-SCNC: 5.3 MMOL/L (ref 3.5–5.3)
PROT SERPL-MCNC: 7.1 G/DL (ref 6.4–8.2)
PROT UR STRIP.AUTO-MCNC: NEGATIVE MG/DL
RBC # BLD AUTO: 3.39 X10*6/UL (ref 4.5–5.9)
RBC # UR STRIP.AUTO: NEGATIVE MG/DL
RBC MORPH BLD: NORMAL
RH FACTOR (ANTIGEN D): NORMAL
SCHISTOCYTES BLD QL SMEAR: NORMAL
SODIUM SERPL-SCNC: 138 MMOL/L (ref 136–145)
SP GR UR STRIP.AUTO: 1.02
UROBILINOGEN UR STRIP.AUTO-MCNC: NORMAL MG/DL
WBC # BLD AUTO: 11.2 X10*3/UL (ref 4.4–11.3)

## 2025-02-12 PROCEDURE — 1159F MED LIST DOCD IN RCRD: CPT | Performed by: SURGERY

## 2025-02-12 PROCEDURE — 3078F DIAST BP <80 MM HG: CPT | Performed by: SURGERY

## 2025-02-12 PROCEDURE — 99214 OFFICE O/P EST MOD 30 MIN: CPT | Performed by: SURGERY

## 2025-02-12 PROCEDURE — 3044F HG A1C LEVEL LT 7.0%: CPT | Performed by: SURGERY

## 2025-02-12 PROCEDURE — 1111F DSCHRG MED/CURRENT MED MERGE: CPT | Performed by: SURGERY

## 2025-02-12 PROCEDURE — 3075F SYST BP GE 130 - 139MM HG: CPT | Performed by: SURGERY

## 2025-02-12 PROCEDURE — 84075 ASSAY ALKALINE PHOSPHATASE: CPT

## 2025-02-12 PROCEDURE — 1126F AMNT PAIN NOTED NONE PRSNT: CPT | Performed by: SURGERY

## 2025-02-12 PROCEDURE — 85025 COMPLETE CBC W/AUTO DIFF WBC: CPT | Performed by: INTERNAL MEDICINE

## 2025-02-12 PROCEDURE — 86850 RBC ANTIBODY SCREEN: CPT | Performed by: INTERNAL MEDICINE

## 2025-02-12 PROCEDURE — 1036F TOBACCO NON-USER: CPT | Performed by: SURGERY

## 2025-02-12 PROCEDURE — 81003 URINALYSIS AUTO W/O SCOPE: CPT | Performed by: NURSE PRACTITIONER

## 2025-02-12 ASSESSMENT — ENCOUNTER SYMPTOMS
ACTIVITY CHANGE: 1
AGITATION: 0
APPETITE CHANGE: 1
BRUISES/BLEEDS EASILY: 1
FATIGUE: 1
ANAL BLEEDING: 1

## 2025-02-12 ASSESSMENT — PAIN SCALES - GENERAL: PAINLEVEL_OUTOF10: 0-NO PAIN

## 2025-02-13 ENCOUNTER — TELEPHONE (OUTPATIENT)
Dept: SURGERY | Facility: CLINIC | Age: 70
End: 2025-02-13
Payer: MEDICARE

## 2025-02-13 DIAGNOSIS — C20 RECTAL CANCER METASTASIZED TO LIVER (MULTI): ICD-10-CM

## 2025-02-13 DIAGNOSIS — C78.7 RECTAL CANCER METASTASIZED TO LIVER (MULTI): ICD-10-CM

## 2025-02-13 RX ORDER — METRONIDAZOLE 500 MG/100ML
500 INJECTION, SOLUTION INTRAVENOUS ONCE
OUTPATIENT
Start: 2025-02-13 | End: 2025-02-13

## 2025-02-13 RX ORDER — HEPARIN SODIUM 5000 [USP'U]/ML
5000 INJECTION, SOLUTION INTRAVENOUS; SUBCUTANEOUS ONCE
OUTPATIENT
Start: 2025-02-13 | End: 2025-02-13

## 2025-02-13 NOTE — H&P (VIEW-ONLY)
Roma Corral is a 69 year old male history of metastatic rectal cancer to liver.  He presented in June with abdominal pain and anemia.   He was found to have rectal cancer with diffuse liver metastases.     He was brought to the OR on July 2, 2024 for Creation of loop colostomy. Roma returns to the office to discuss surgery. He is scheduled for 3/17/2025 Robotic APR with colostomy, combo surgery with Dr. Erazo.    HISTORY:  6/17/2024 Colonoscopy to Rectum  Findings  Single malignant-appearing mass (not traversable) in the rectum 8 cm from the anal verge observed during digital rectal exam, covering the whole circumference; there was stigmata of recent hemorrhage, and bleeding occurred after intervention; performed cold forceps biopsy with partial removal. Obstructing nontraversable circumferential rectal mass just proximal to second rectal valve, approximately 8 cm from the anus  Pathology:  A. Rectum, mass, biopsy:  Invasive adenocarcinoma, moderately differentiated.    MISMATCH REPAIR PROTEIN EXPRESSION                    Protein:  Result                    MLH-1:             Expression Present                                                   PMS-2:            Expression Present                                                   MSH-2:            Expression Present                                                   MSH-6:            Expression Present                                       INTERPRETATION: Neoplasm with normal mismatch repair protein expression.      Completed xrt 7/17 7/24 - received 1st dose FOLFOXIRI / Josefina  8/7/24 - 2nd dose  - break in tx after this due to kidney stone     -Resumed chemo late September with C3.    -Has continued on treatment QOW except for some delays due to cytopenias.     11/04/2024 MRI Rectum  Persistent restricted diffusion in the region of known rectal malignancy involving the upper 3rd rectum suggestive of residual disease. Given the involvement of the anterior peritoneal  reflection this would be considered [id number].      1/14/2025 CT Chest/Abd/Pelvis with contrast  Restaging of metastatic colorectal adenocarcinoma, as compared to CT exams dated 11/10/2024 and 10/10/2024:  1. Interval decrease of metastatic disease burden as evidenced by both decreased size and morphologic changes of hepatic metastases, as detailed above.  2. Decrease of treatment-related diffuse small and large bowel wall thickening.  3. Additional chronic findings unchanged as detailed above.      2/19/2025 MRI Rectum: Pending      He has been having solid stool per rectum for some months.  He has he has urgency and incontinence daily.  He has lost significant weight since diagnosis but his last albumin was  3.7    Visit Vitals  /59   Pulse 87   Temp 36 °C (96.8 °F)   Wt 67.1 kg (148 lb)   SpO2 95%   BMI 20.07 kg/m²   Smoking Status Never   BSA 1.85 m²     Past Medical History  Metastatic Rectal cancer to liver  MI  Anemia  CAD  Gout  HTN  HLD  DM Type 2     Surgical History  LX Colostomy creation  Cardiac Cath  Leg surgery     Review of Systems  Constitutional: Negative for fever, chills, anorexia, weight loss, malaise             ENMT: Negative for nasal discharge, congestion, ear pain, mouth pain, throat pain                 Respiratory: Negative for cough, hemoptysis, wheezing, shortness of breath                Cardiac: Negative for chest pain, dyspnea on exertion, orthopnea, palpitations, syncope , (+)CAD, (+)MI , (+)HTN, (+)HLD     Gastrointestinal: Negative for nausea, vomiting, diarrhea, constipation, abdominal pain, (+)RECTAL CANCER     Genitourinary: Negative for discharge, dysuria, flank pain, frequency, hematuria          Musculoskeletal: Negative for decreased ROM, pain, swelling, weakness          Neurological: Negative for dizziness, confusion, headache, seizures, syncope               Psychiatric: Negative for mood changes, anxiety, hallucinations, sleep changes, suicidal ideas        Skin:  Negative for mass, pain, itching, rash, ulcer            Endocrine: Negative for heat intolerance, cold intolerance, excessive sweating, polyuria, excess thirst , (+)DM        Hematologic/Lymph: Negative for anemia, bruising, easy bleeding, night sweats, petechiae, history of DVT/PE or cancer               Allergic/Immunologic: Negative for anaphylaxis, itchy/ teary eyes, itching, sneezing, swelling     Physical Exam  Constitutional: Thin frail and cachectic appearing, awake/alert/oriented x3, no distress, alert and cooperative             Eyes: Sclera anicteric, no conjunctival inflammation, conjugate gaze    ENMT: mucous membranes moist, no apparent injury,            Head/Neck: Neck supple, no apparent injury, No JVD, trachea midline, no bruits              Respiratory/Thorax: Patent airways, CTAB, normal breath sounds with good chest expansion, thorax symmetric         Cardiovascular: Regular, rate and rhythm, no murmurs, normal S1 and S2         Gastrointestinal: Loop colostomy left lower quadrant somewhat retracted, proximal limb is still slightly above the skin, distal limb is below.  Nondistended, soft, non-tender, no rebound tenderness or guarding, no masses palpable, no organomegaly, +BS, no bruits               Extremities: normal extremities, no cyanosis edema, contusions or wounds, 2+ femoral pulses B/L              Neurological: alert and oriented x3, normal strength, Normal gait          Lymphatic: No palpable inguinal lymphadenopathy   Psychological: Appropriate mood and behavior         Skin: Warm and dry, no lesions, no rashes                Anorectal:  Verbal consent was obtained and with chaperone present the patient was placed in left lateral decubitus position. Perianal skin was examined without abnormality.  No external hemorrhoids identified.  Digital rectal exam resulted in pain and discomfort.  Very poor squeeze upon request.  No palpable masses appreciated.      Flexible Sigmoidoscopy In  Clinic    Date/Time: 2/18/2025 9:21 AM    Performed by: Javier Vasquez MD  Authorized by: Javier Vasquez MD  Indications: rectal mass  Comments: Endoscope inserted through the anus and advanced approximately 10 cm.  There was a very poor prep with formed stool throughout.  There is mucosal irritation and signs of radiation proctitis.  There was a stricture at approximately 10 cm and significant patient discomfort preventing more proximal evaluation, no mass appreciated.         Impression:  Metastatic rectal cancer status post chemoradiation    Plan:    Restaging MRI November 2024 with persistent disease, repeat MRI tomorrow.  Unable to complete flexible sigmoidoscopy today secondary to patient discomfort, no mass identified to 10 cm.  If this happens to show complete response he would need repeat flex sig with anesthesia for further evaluation. Given apparent rectal stricture and incontinence, stoma reversal would likely not be in his interest.   Severe weight loss resulting in stoma retraction and now having stool per rectum with significant continence and urgency issues  Plan for combo liver ablation and low anterior resection with end colostomy

## 2025-02-13 NOTE — TELEPHONE ENCOUNTER
Attempted to reach patient to review new appointments with Dr. Vasquez and MRI Rectum for next week.  Message left inviting him to please call us at his earliest convenience about appointments that we have scheduled for next week.  Emily Canales RN

## 2025-02-14 ENCOUNTER — TELEPHONE (OUTPATIENT)
Dept: HEMATOLOGY/ONCOLOGY | Facility: HOSPITAL | Age: 70
End: 2025-02-14
Payer: MEDICARE

## 2025-02-14 NOTE — TELEPHONE ENCOUNTER
Roma calls today to get an urgent message to Dr. Nieto to call him back. He states that Dr. Erazo has advised him to stop chemotherapy. He urgently wants to speak to Dr. Nieto/team.    Message sent to team.

## 2025-02-17 RX ORDER — DEXAMETHASONE 6 MG/1
12 TABLET ORAL ONCE
OUTPATIENT
Start: 2025-02-19

## 2025-02-17 RX ORDER — DEXAMETHASONE 6 MG/1
12 TABLET ORAL ONCE
OUTPATIENT
Start: 2025-03-05

## 2025-02-17 NOTE — TELEPHONE ENCOUNTER
Spoke with patient he wanted to be sure his infusion visits were scheduled - made him aware that Dr. Nieto spoke with Dr. Erazo and okay to pause infusions. Infusions cancelled.

## 2025-02-18 ENCOUNTER — APPOINTMENT (OUTPATIENT)
Dept: HEMATOLOGY/ONCOLOGY | Facility: CLINIC | Age: 70
End: 2025-02-18
Payer: MEDICARE

## 2025-02-18 ENCOUNTER — OFFICE VISIT (OUTPATIENT)
Dept: SURGERY | Facility: CLINIC | Age: 70
End: 2025-02-18
Payer: MEDICARE

## 2025-02-18 VITALS
BODY MASS INDEX: 20.07 KG/M2 | OXYGEN SATURATION: 95 % | HEART RATE: 87 BPM | SYSTOLIC BLOOD PRESSURE: 117 MMHG | WEIGHT: 148 LBS | DIASTOLIC BLOOD PRESSURE: 59 MMHG | TEMPERATURE: 96.8 F

## 2025-02-18 DIAGNOSIS — C20 RECTAL CANCER (MULTI): Primary | ICD-10-CM

## 2025-02-18 DIAGNOSIS — C20 RECTAL CANCER (MULTI): ICD-10-CM

## 2025-02-18 PROCEDURE — 1125F AMNT PAIN NOTED PAIN PRSNT: CPT | Performed by: SURGERY

## 2025-02-18 PROCEDURE — 3078F DIAST BP <80 MM HG: CPT | Performed by: SURGERY

## 2025-02-18 PROCEDURE — 3044F HG A1C LEVEL LT 7.0%: CPT | Performed by: SURGERY

## 2025-02-18 PROCEDURE — 1111F DSCHRG MED/CURRENT MED MERGE: CPT | Performed by: SURGERY

## 2025-02-18 PROCEDURE — 45330 DIAGNOSTIC SIGMOIDOSCOPY: CPT | Performed by: SURGERY

## 2025-02-18 PROCEDURE — 99215 OFFICE O/P EST HI 40 MIN: CPT | Performed by: SURGERY

## 2025-02-18 PROCEDURE — 3074F SYST BP LT 130 MM HG: CPT | Performed by: SURGERY

## 2025-02-18 RX ORDER — NEOMYCIN SULFATE 500 MG/1
TABLET ORAL
Qty: 6 TABLET | Refills: 0 | Status: SHIPPED | OUTPATIENT
Start: 2025-02-18

## 2025-02-18 RX ORDER — CHLORHEXIDINE GLUCONATE ORAL RINSE 1.2 MG/ML
SOLUTION DENTAL
Qty: 473 ML | Refills: 0 | Status: SHIPPED | OUTPATIENT
Start: 2025-02-18

## 2025-02-18 RX ORDER — GABAPENTIN 100 MG/1
CAPSULE ORAL
Qty: 3 CAPSULE | Refills: 0 | Status: SHIPPED | OUTPATIENT
Start: 2025-02-18

## 2025-02-18 RX ORDER — METRONIDAZOLE 250 MG/1
TABLET ORAL
Qty: 3 TABLET | Refills: 0 | Status: SHIPPED | OUTPATIENT
Start: 2025-02-18

## 2025-02-18 ASSESSMENT — PAIN SCALES - GENERAL: PAINLEVEL_OUTOF10: 8

## 2025-02-18 NOTE — NURSING NOTE
"Ostomy/Wound Care Consult     Visit Date: 2/18/2025      Patient Name: Roma Corral         MRN: [id number]           YOB: 1955     [facility] nursing visit outcome: Pt here for follow up with Dr. Vasquez.  Has upcoming surgery in March.  Current loop colostomy site ideal if new stoma/revision to end stoma.     [facility] next scheduled visit/plan: Will call in 1-2 weeks to assess revised pouching system.     Stoma Type: Loop Colostomy  Diameter: rounds to 1\"  Location: LLQ  Protrusion: Protrudes slightly but goes flush  Mucosal Condition and Color: Moist, Red  Mucocutaneous Junction: Intact  Peristomal Skin:  hyperpigmented, darker ring of intact skin around stoma  Location of Skin Impairment: circumferentially around  Peristomal Contour: Flat and Deep Concave  Supportive Tissue: Very soft  Character of Output: Formed Stool, occasionally \"pellets\"  Emptying Frequency: varies, takes Senna and Dulcolax weekly  Removed/Current Pouching System: 2 3/4\" Bliss soft convex wafer, stomahesive paste and drainable transparent pouch  Current Wearing Time: 3-4 Days    Recommendations:   Skin Care: dust with stoma powder per pt preference  Pouching System: 1 3/4\" Bliss New Image firm convex wafer, stoma paste in crease at 5 and 7 o'clock, stomahesive paste to caulk around aperture and drainable pouch.  Belt applied today.  Wear Time: same, 3-4 Days  Other: discussed use of closed end pouch, samples of transparent closed end Bliss pouch, pre-cut 1\" firm convex wafer requested from [employer].  Convex-it wafer and drainable/closed end pouches requested from [employer].    Photo: 2/18/2025       Incision: healed    Supplier: [employer]    Comments: OR 3/17, uncertain plans for stoma at this time.  Pt is considering whether he would like it reversed.    Time Increment: 45 minutes    Dalia MYRICKN, RN, CWOCN  2/18/2025  10:12 AM        "

## 2025-02-19 ENCOUNTER — APPOINTMENT (OUTPATIENT)
Dept: HEMATOLOGY/ONCOLOGY | Facility: CLINIC | Age: 70
End: 2025-02-19
Payer: MEDICARE

## 2025-02-19 ENCOUNTER — HOSPITAL ENCOUNTER (OUTPATIENT)
Dept: RADIOLOGY | Facility: HOSPITAL | Age: 70
Discharge: HOME | End: 2025-02-19
Payer: MEDICARE

## 2025-02-19 DIAGNOSIS — C20 RECTAL CANCER METASTASIZED TO LIVER (MULTI): ICD-10-CM

## 2025-02-19 DIAGNOSIS — C78.7 RECTAL CANCER METASTASIZED TO LIVER (MULTI): ICD-10-CM

## 2025-02-19 PROCEDURE — 72197 MRI PELVIS W/O & W/DYE: CPT

## 2025-02-19 PROCEDURE — 2550000001 HC RX 255 CONTRASTS: Performed by: SURGERY

## 2025-02-19 PROCEDURE — A9575 INJ GADOTERATE MEGLUMI 0.1ML: HCPCS | Performed by: SURGERY

## 2025-02-19 RX ORDER — GADOTERATE MEGLUMINE 376.9 MG/ML
13 INJECTION INTRAVENOUS
Status: COMPLETED | OUTPATIENT
Start: 2025-02-19 | End: 2025-02-19

## 2025-02-19 RX ADMIN — GADOTERATE MEGLUMINE 13 ML: 376.9 INJECTION INTRAVENOUS at 14:47

## 2025-02-21 ENCOUNTER — APPOINTMENT (OUTPATIENT)
Dept: HEMATOLOGY/ONCOLOGY | Facility: CLINIC | Age: 70
End: 2025-02-21
Payer: MEDICARE

## 2025-02-24 PROCEDURE — RXMED WILLOW AMBULATORY MEDICATION CHARGE

## 2025-02-25 ENCOUNTER — PHARMACY VISIT (OUTPATIENT)
Dept: PHARMACY | Facility: CLINIC | Age: 70
End: 2025-02-25
Payer: COMMERCIAL

## 2025-03-03 ENCOUNTER — APPOINTMENT (OUTPATIENT)
Dept: HEMATOLOGY/ONCOLOGY | Facility: CLINIC | Age: 70
End: 2025-03-03
Payer: MEDICARE

## 2025-03-04 ENCOUNTER — APPOINTMENT (OUTPATIENT)
Dept: HEMATOLOGY/ONCOLOGY | Facility: CLINIC | Age: 70
End: 2025-03-04
Payer: MEDICARE

## 2025-03-05 ENCOUNTER — APPOINTMENT (OUTPATIENT)
Dept: HEMATOLOGY/ONCOLOGY | Facility: CLINIC | Age: 70
End: 2025-03-05
Payer: MEDICARE

## 2025-03-07 ENCOUNTER — APPOINTMENT (OUTPATIENT)
Dept: HEMATOLOGY/ONCOLOGY | Facility: CLINIC | Age: 70
End: 2025-03-07
Payer: MEDICARE

## 2025-03-10 DIAGNOSIS — C20 RECTAL CANCER (MULTI): Primary | ICD-10-CM

## 2025-03-12 ENCOUNTER — PRE-ADMISSION TESTING (OUTPATIENT)
Dept: PREADMISSION TESTING | Facility: HOSPITAL | Age: 70
End: 2025-03-12
Payer: MEDICARE

## 2025-03-12 VITALS
DIASTOLIC BLOOD PRESSURE: 58 MMHG | HEART RATE: 66 BPM | TEMPERATURE: 98.6 F | HEIGHT: 71 IN | WEIGHT: 156 LBS | SYSTOLIC BLOOD PRESSURE: 108 MMHG | OXYGEN SATURATION: 96 % | BODY MASS INDEX: 21.84 KG/M2

## 2025-03-12 DIAGNOSIS — R94.31 ABNORMAL ECG: ICD-10-CM

## 2025-03-12 DIAGNOSIS — C20 RECTAL CANCER (MULTI): ICD-10-CM

## 2025-03-12 DIAGNOSIS — Z01.818 PREOPERATIVE EXAMINATION: Primary | ICD-10-CM

## 2025-03-12 LAB
ERYTHROCYTE [DISTWIDTH] IN BLOOD BY AUTOMATED COUNT: 19.4 % (ref 11.5–14.5)
HCT VFR BLD AUTO: 28.7 % (ref 41–52)
HGB BLD-MCNC: 8.3 G/DL (ref 13.5–17.5)
MCH RBC QN AUTO: 29.6 PG (ref 26–34)
MCHC RBC AUTO-ENTMCNC: 28.9 G/DL (ref 32–36)
MCV RBC AUTO: 103 FL (ref 80–100)
NRBC BLD-RTO: 0 /100 WBCS (ref 0–0)
PLATELET # BLD AUTO: 95 X10*3/UL (ref 150–450)
RBC # BLD AUTO: 2.8 X10*6/UL (ref 4.5–5.9)
WBC # BLD AUTO: 4.5 X10*3/UL (ref 4.4–11.3)

## 2025-03-12 PROCEDURE — 93005 ELECTROCARDIOGRAM TRACING: CPT

## 2025-03-12 PROCEDURE — RXMED WILLOW AMBULATORY MEDICATION CHARGE

## 2025-03-12 PROCEDURE — 87081 CULTURE SCREEN ONLY: CPT

## 2025-03-12 PROCEDURE — 99214 OFFICE O/P EST MOD 30 MIN: CPT

## 2025-03-12 PROCEDURE — 85027 COMPLETE CBC AUTOMATED: CPT

## 2025-03-12 RX ORDER — CHLORHEXIDINE GLUCONATE ORAL RINSE 1.2 MG/ML
15 SOLUTION DENTAL AS NEEDED
Qty: 473 ML | Refills: 0 | Status: SHIPPED | OUTPATIENT
Start: 2025-03-12 | End: 2025-04-13

## 2025-03-12 RX ORDER — CHLORHEXIDINE GLUCONATE 40 MG/ML
SOLUTION TOPICAL DAILY PRN
Qty: 473 ML | Refills: 0 | Status: SHIPPED | OUTPATIENT
Start: 2025-03-12

## 2025-03-12 ASSESSMENT — DUKE ACTIVITY SCORE INDEX (DASI)
CAN YOU PARTICIPATE IN MODERATE RECREATIONAL ACTIVITIES LIKE GOLF, BOWLING, DANCING, DOUBLES TENNIS OR THROWING A BASEBALL OR FOOTBALL: NO
CAN YOU TAKE CARE OF YOURSELF (EAT, DRESS, BATHE, OR USE TOILET): YES
CAN YOU DO HEAVY WORK AROUND THE HOUSE LIKE SCRUBBING FLOORS OR LIFTING AND MOVING HEAVY FURNITURE: NO
CAN YOU DO LIGHT WORK AROUND THE HOUSE LIKE DUSTING OR WASHING DISHES: YES
CAN YOU HAVE SEXUAL RELATIONS: NO
TOTAL_SCORE: 15.45
CAN YOU RUN A SHORT DISTANCE: NO
DASI METS SCORE: 4.6
CAN YOU DO YARD WORK LIKE RAKING LEAVES, WEEDING OR PUSHING A MOWER: NO
CAN YOU DO MODERATE WORK AROUND THE HOUSE LIKE VACUUMING, SWEEPING FLOORS OR CARRYING GROCERIES: NO
CAN YOU WALK A BLOCK OR TWO ON LEVEL GROUND: YES
CAN YOU PARTICIPATE IN STRENOUS SPORTS LIKE SWIMMING, SINGLES TENNIS, FOOTBALL, BASKETBALL, OR SKIING: NO
CAN YOU WALK INDOORS, SUCH AS AROUND YOUR HOUSE: YES
CAN YOU CLIMB A FLIGHT OF STAIRS OR WALK UP A HILL: YES

## 2025-03-12 ASSESSMENT — ENCOUNTER SYMPTOMS
SKIN CHANGES: 0
NECK MASS: 0
DYSURIA: 0
CONFUSION: 0
JOINT SWELLING: 0
DYSPNEA WITH EXERTION: 0
DYSPNEA AT REST: 0
BRUISES/BLEEDS EASILY: 0
NECK SWELLING: 0
TROUBLE SWALLOWING: 0
COUGH: 0
WEAKNESS: 0
HEMOPTYSIS: 0
PALPITATIONS: 0
EYE DISCHARGE: 0
FEVER: 0
VOICE CHANGE: 0
EXCESSIVE BLEEDING: 0
POLYDIPSIA: 0
MYALGIAS: 0
BLOOD IN STOOL: 0
TREMORS: 0
VOMITING: 0
NUMBNESS: 0
ABDOMINAL PAIN: 1
RHINORRHEA: 0
NECK STIFFNESS: 0
CONSTIPATION: 0
WOUND: 0
PND: 0
SHORTNESS OF BREATH: 0
VERTIGO: 0
DIFFICULTY URINATING: 0
VISUAL CHANGE: 0
DOUBLE VISION: 0
WHEEZING: 0
NAUSEA: 0
LIGHT-HEADEDNESS: 0
NECK PAIN: 0
SINUS CONGESTION: 0
LIMITED RANGE OF MOTION: 0
ABDOMINAL DISTENTION: 0
UNEXPECTED WEIGHT CHANGE: 0
NERVOUS/ANXIOUS: 0
DIARRHEA: 0
CHILLS: 0
ARTHRALGIAS: 0

## 2025-03-12 ASSESSMENT — LIFESTYLE VARIABLES: SMOKING_STATUS: NONSMOKER

## 2025-03-12 NOTE — PREPROCEDURE INSTRUCTIONS
"      Thank you for visiting [facility] ([facility]) today for your pre-procedure evaluation, you were seen by     Katey Bello PA-C   Department of Anesthesiology and Perioperative Medicine  Main phone [phone]  Fax [phone]    - Type and Screen lab work to be completed on Saturday 3/15    This summary includes instructions and information to aid you during your perioperative period.  Please read carefully. If you have any questions about your visit today, please call the number listed above.  If you become ill or have any changes to your health before your surgery, please contact your primary care provider and alert your surgeon.    Preparing for Surgery       Preoperative Fasting Guidelines    Why must I stop eating and drinking near surgery time?  With sedation, food or liquid in your stomach can enter your lungs causing serious complications  Food can increase nausea and vomiting  When do I need to stop eating and drinking before my surgery?  Do not eat any food after midnight the night before your surgery/procedure.  You may have up to 13.5 ounces of clear liquid until TWO hours before your instructed arrival time to the hospital.  This includes water, black tea/coffee, (no milk or cream) apple juice, and electrolyte drinks (Gatorade)  You may chew gum until TWO hours before your surgery/procedure    Tobacco and Alcohol;  Do not drink alcohol or smoke within 24 hours of surgery.  It is best to quit smoking for as long as possible before any surgery or procedure.       Other Instructions  Why did I have my nose, under my arms, and groin swabbed? The purpose of the swab is to identify Staphylococcus aureus inside your nose or on your skin.  The swab was sent to the laboratory for culture.  A positive swab/culture for Staphylococcus aureus is called colonization or carriage.   What is Staphylococcus aureus? Staphylococcus aureus, also known as \"staph\", is a germ found on the skin or " in the nose of healthy people.  Sometimes Staphylococcus aureus can get into the body and cause an infection.  This can be minor (such as pimples, boils, or other skin problems).  It might also be serious (such as a blood infection, pneumonia, or a surgical site infection). What is Staphylococcus aureus colonization or carriage? Colonization or carriage means that a person has the germ but is not sick from it.  These bacteria can be spread on the hands or when breathing or sneezing. How is Staphylococcus aureus spread? It is most often spread by close contact with a person or item that carries it. What happens if my culture is positive for Staphylococcus aureus? Your doctor/medical team will use this information to guide any antibiotic treatment which may be necessary.  Regardless of the culture results, we will clean the inside of your nose with a betadine swab just before you have your surgery. Will I get an infection if I have Staphylococcus aureus in my nose or on my skin? Anyone can get an infection with Staphylococcus aureus.  However, the best way to reduce your risk of infection is to follow the instructions provided to you for the use of your CHG soap and dental rinse.  , Body Wash; What is a home preoperative antibacterial shower? This shower is a way of cleaning the skin with a germ-killing solution before surgery.  The solution contains chlorhexidine, commonly known as CHG.  CHG is a skin cleanser with germ-killing ability.  Let your doctor know if you are allergic to chlorhexidine. Why do I need to take a preoperative antibacterial shower? Skin is not sterile.  It is best to try to make your skin as free of germs as possible before surgery.  Proper cleansing with a germ-killing soap before surgery can lower the number of germs on your skin.  This helps to reduce the risk of infection at the surgical site.  Following the instructions listed below will help you prepare your skin for surgery.   How do I  use the solution? Steps:  Begin using your CHG soap 5 days before your scheduled surgery on ___________.   First, wash and rinse your hair using the CHG soap. Keep CHG soap away from ear canals and eyes.  Rinse completely, do not condition.  Hair extensions should be removed. , Oral/Dental Rinse: What is oral/dental rinse?  It is a mouthwash. It is a way of cleaning the mouth with a germ-killing solution before your surgery.  The solution contains chlorhexidine, commonly known as CHG. It is used inside the mouth to kill a bacteria known as Staphylococcus aureus.  Let your doctor know if you are allergic to Chlorhexidine. Why do I need to use CHG oral/dental rinse? The CHG oral/dental rinse helps to kill a bacteria in your mouth known as Staphylococcus aureus.  This reduces the risk of infection at the surgical site.  Using your CHG oral/dental rinse STEPS: Use your CHG oral/dental rinse after you brush your teeth the night before (at bedtime) and the morning of your surgery.  Follow all directions on your prescription label.  Use the cap on the container to measure 15 ml.  Swish (gargle if you can) the mouthwash in your mouth for at least 30 seconds, (do not swallow) and spit out.  After you use your CHG rinse, do not rinse your mouth with water, drink or eat.  Please refer to the prescription label for the appropriate time to resume oral intake What side effects might I have using the CHG oral/dental rinse? CHG rinse will stick to plaque on the teeth.  Brush and floss just before use.  Teeth brushing will help avoid staining of plaque during use.       The Week before Surgery        Seven days before Surgery  Check your CPM medication instructions  Do the exercises provided to you by CPM   Arrange for a responsible, adult licensed  to take you home after surgery and stay with you for 24 hours.  You will not be permitted to drive yourself home if you have received any anesthetic/sedation  Five days before  surgery  Check your CPM medication instructions  Do the exercises provided to you by CPM   Start using Chlorhexidene (CHG) body wash if prescribed (Continue till day of surgery)      The Day before Surgery       Check your CPM medication and all other CPM instructions including when to stop eating and drinking  You will be called with your arrival time for surgery in the late afternoon.  If you do not receive a call please reach out to your surgeon's office.  Do not smoke or drink 24 hours before surgery  Prepare items to bring with you to the hospital  Shower with your chlorhexidine wash if prescribed  Brush your teeth and use your chlorhexidine dental rinse if prescribed    The Day of Surgery       Check your CPM medication instructions  Ensure you follow the instructions for when to stop eating and drinking  Shower, if prescribed use CHG.  Do not apply any lotions, creams, moisturizers, perfume or deodorant  Brush your teeth and use your CHG dental rinse if prescribed  Wear loose comfortable clothing  Avoid make-up  Remove  jewelry and piercings, consider professional piercing removal with a plastic spacer if needed  Bring photo ID and Insurance card  Bring an accurate medication list that includes medication dose, frequency and allergies  Bring a copy of your advanced directives (will, health care power of )  Bring any devices and controllers as well as medical devices you have been provided with for surgery (CPAP, slings, braces, etc.)  Dentures, eyeglasses, and contacts will be removed before surgery, please bring cases for contacts or glasses    Preoperative Deep Breathing Exercises    Why it is important to do deep breathing exercises before my surgery?  Deep breathing exercises strengthen your breathing muscles.  This helps you to recover after your surgery and decreases the chance of breathing complications.    How are the deep breathing exercises done?  Sit straight with your back  supported.  Breathe in deeply and slowly through your nose. Your lower rib cage should expand and your abdomen may move forward.  Hold that breath for 3 to 5 seconds.  Breathe out through pursed lips, slowly and completely.  Rest and repeat 10 times every hour while awake.  Rest longer if you become dizzy or lightheaded.      Preoperative Brain Exercises    What are brain exercises?  A brain exercise is any activity that engages your thinking (cognitive) skills.    What types of activities are considered brain exercises?  Jigsaw puzzles, crossword puzzles, word jumble, memory games, word search, and many more.  Many can be found free online or on your phone via a mobile jody.    Why should I do brain exercises before my surgery?  More recent research has shown brain exercise before surgery can lower the risk of postoperative delirium (confusion) which can be especially important for older adults.  Patients who did brain exercises for 5 to 10 hours the days before surgery, cut their risk of postoperative delirium in half up to 1 week after surgery.    Sit-to-Stand Exercise    What is the sit-to-stand exercise?  The sit-to-stand exercise strengthens the muscles of your lower body and muscles in the center of your body (core muscles for stability) helping to maintain and improve your strength and mobility.  How do I do the sit-to-stand exercise?  The goal is to do this exercise without using your arms or hands.  If this is too difficult, use your arms and hands or a chair with armrests to help slowly push yourself to the standing position and lower yourself back to the sitting position. As the movement becomes easier use your arms and hands less.    Steps to the sit-to-stand exercise  Sit up tall in a sturdy chair, knees bent, feet flat on the floor shoulder-width apart.  Shift your hips/pelvis forward in the chair to correctly position yourself for the next movement.  Lean forward at your hips.  Stand up straight  putting equal weight on both feet.  Check to be sure you are properly aligned with the chair, in a slow controlled movement sit back down.  Repeat this exercise 10-15 times.  If needed you can do it fewer times until your strength improves.  Rest for 1 minute.  Do another 10-15 sit-to-stand exercises.  Try to do this in the morning and evening.       Simple things you can do to help prevent blood clots     Blood clots are blockages that can form in the body's veins. When a blood clot forms in your deep veins, it may be called a deep vein thrombosis, or DVT for short. Blood clots can happen in any part of the body where blood flows, but they are most common in the arms and legs. If a piece of a blood clot breaks free and travels to the lungs, it is called a pulmonary embolus (PE). A PE can be a very serious problem.      Being in the hospital or having surgery can raise your chances of getting a blood clot because you may not be well enough to move around as much as you normally do.         Ways you can help prevent blood clots in the hospital       Wearing SCDs  SCDs stands for Sequential Compression Devices.   SCDs are special sleeves that wrap around your legs. They attach to a pump that fills them with air to gently squeeze your legs every few minutes.  This helps return the blood in your legs to your heart.   SCDs should only be taken off when walking or bathing. SCDs may not be comfortable, but they can help save your life.              Pump SCD leg sleeves  Wearing compression stockings - if your doctor orders them. These special snug-fitting stockings gently squeeze your legs to help blood flow.       Walking. Walking helps move the blood in your legs.   If your doctor says it is ok, try walking the halls at least   5 times a day. Ask us to help you get up, so you don't fall.      Taking any blood-thinning medicines your doctor orders.              Ways you can help prevent blood clots at home         Wearing  compression stockings - if your doctor orders them.   Walking - to help move the blood in your legs.    Taking any blood-thinning medicines your doctor orders.      Signs of a blood clot or PE    Tell your doctor or nurse right away if you have any of the problems listed below.         If you are at home, seek medical care right away. Call 911 for chest pain or problems breathing.            Signs of a blood clot (DVT) - such as pain, swelling, redness, or warmth in your arm or legs.  Signs of a pulmonary embolism (PE) - such as chest pain or feeling short of breath

## 2025-03-12 NOTE — CPM/PAT H&P
CPM/PAT Evaluation       Name: Roma oCrral (Roma Corral)  /Age: 1955/70 y.o.     Visit Type:   In-Person       Chief Complaint: perioperative evaluation    HPI HPI: 69 y/o male scheduled for Laparoscopic Microwave Ablation Liver Tumor(s) with Ultrasound and Navigation, Liver Biopsy Ultrasound Guided RESECTION, RECTUM AND SIGMOID COLON, ROBOT-ASSISTED  on 2025 with Dr. Baron Erazo and Javier Vasquez secondary to Rectal cancer .   Presents to CPM today for perioperative risk stratification and optimization. PMHX includes Anxiety, HLD, HTN, CAD( NSTEMI), DM2, Rectal cancer, Liver mass/malignancy, Anemia Vitamin D Deficiency, Gout        PCP: Santiago Buckner MD   Specialists:   Colon &Rectal surgery - Javier Vasquez MD   Surgical Oncology - Baron Erazo MD   Oncology - Dann Nieto MD   Urology - Shaheen Kiser MD   Palliative Medicine - Comfort Elkins, APRN-CNP            Past Medical History:   Diagnosis Date    Abdominal pain     Acute non-ST elevation myocardial infarction (NSTEMI) (Multi)     Anemia     24 HGB 8.6; HCT 31.5    Anxiety     CAD (coronary artery disease)     Cardiology follow-up encounter 2023    Sharif Lau MD    Gout     H/O cardiac catheterization 2021    H/O cardiovascular stress test 2021    IMPRESSION: 1. No evidence of ischemia. 2. Suspicion of an infarct along the mid anterior wall. 3. Left ventricular dilatation is noted. 4. Left ventricular EF was calculated to be 50%. Summary:  1. No clinical or electrocardiographic evidence for ischemia at a submaximal workload. 3. The blunted heart rate diminshes the sensitivity of this test.  4. The submaximal level of stress was achieved.    H/O echocardiogram 2021    Mild concentric Left ventricle hypertrophy.  Global left ventricular wall motion and contractility are within normal limits.  There is normal left ventricular systolic function.  Estimated ejection fraction is 60-64%.  The left  atrial size is mildly dilated.  Mild aortic regurgitation.  A trace of mitral regurgitation.  Trivial to mild tricuspid regurgitation.  There is no pulmonary hypertension.    High cholesterol     Hypertension     Overweight     Rectal cancer (Multi)     Type 2 diabetes mellitus     4/18/2024 A1C 7.5%       Past Surgical History:   Procedure Laterality Date    COLONOSCOPY  06/17/2024    HEMICOLECTOMY W/ OSTOMY      LEG SURGERY Left     rodrigo placed       Patient Sexual activity questions deferred to the physician.    Family History   Problem Relation Name Age of Onset    No Known Problems Mother      No Known Problems Father      No Known Problems Sister      Leukemia Brother      Stroke Maternal Grandfather         No Known Allergies    Prior to Admission medications    Medication Sig Start Date End Date Taking? Authorizing Provider   aspirin 81 mg chewable tablet Chew 1 tablet (81 mg) 1 time.    Historical Provider, MD   atorvastatin (Lipitor) 40 mg tablet Take 1 tablet (40 mg) by mouth once daily at bedtime. 12/23/24 12/23/25  Sharif Lau MD   chlorhexidine (Peridex) 0.12 % solution Rinse mouth with 15 ml after toothbrushing the night before surgery and on the morning of surgery.  Expectorate after rinsing.  Do not swallow. 2/18/25   Javier Vasquez MD   gabapentin (Neurontin) 100 mg capsule Take one capsule by mouth once daily starting three days before surgery at bedtime. 2/18/25   Javier Vasquez MD   loperamide (Imodium) 2 mg capsule Take 2 capsules (4 mg) by mouth with the first episode of diarrhea and 1 capsule (2 mg) by mouth with any additional episodes. Maximum 8 capsules (16 mg) per day. 7/11/24   Dann Nieto MD   LORazepam (Ativan) 0.5 mg tablet Take 1 tablet (0.5 mg) by mouth 2 times a day as needed for anxiety (nausea) for up to 15 days.  Patient not taking: Reported on 1/18/2025 11/19/24 12/4/24  Comfort Elkins, APRN-CNP   metoprolol succinate XL (Toprol-XL) 100 mg 24 hr tablet Take 1  tablet (100 mg) by mouth once daily. 12/23/24 12/23/25  Sharif Lau MD   metroNIDAZOLE (Flagyl) 250 mg tablet Take one tablet by mouth daily at 6p, 7p, and 11p the night before surgery. 2/18/25   Javier Vasquez MD   morphine CR (MS Contin) 15 mg 12 hr tablet Take 1 tablet (15 mg) by mouth once daily in the morning AND 2 tablets (30 mg) once daily at bedtime. Do not crush, chew, or split. 2/4/25 3/27/25  Dann Nieto MD   neomycin (Mycifradin) 500 mg tablet Take two tabs by mouth daily at 6p, 7pm, and 11p the night before surgery. 2/18/25   Javier Vasquez MD   ondansetron (Zofran) 4 mg tablet Take 1 tablet (4 mg) by mouth every 8 hours if needed for nausea or vomiting. 7/5/24   Maikel Beckwith PA-C   potassium chloride CR (Klor-Con M20) 20 mEq ER tablet Take 1 tablet (20 mEq) by mouth 2 times a day. Do not crush or chew. 10/28/24 10/28/25  Sharif Lau MD   prochlorperazine (Compazine) 10 mg tablet Take 1 tablet (10 mg) by mouth every 6 hours if needed for nausea or vomiting. 7/11/24   Dann Nieto MD        PAT ROS:   Constitutional:    no fever   no chills   no unexpected weight change  Neuro/Psych:    no numbness   no weakness   no light-headedness   no tremors   no confusion   not nervous/anxious   no self-injury   no suicidal ideation  Eyes:    no discharge   no vision loss   no diplopia   no visual disturbance   no corrective lenses  Ears:    no ear pain   no hearing loss   no vertigo   no tinnitus   no hearing aides  Nose:    no nasal discharge   no sinus congestion   no epistaxis  Mouth:    no dental issues   no mouth pain   no oral bleeding   no mouth lesions  Throat:    no throat pain   no dysphagia   no voice change  Neck:    no neck pain   neck swelling   no neck stiffness   no neck masses  Cardio:    no chest pain   no palpitations   no peripheral edema   no dyspnea   no CHAUDHARY   no paroxysmal nocturnal dyspnea  Respiratory:    no cough   no wheezing   no hemoptysis   no shortness of  breath  Endocrine:    no cold intolerance   no heat intolerance   no polydipsia  GI:    no abdominal distention   abdominal pain (chronic and unchanged)   no constipation   no diarrhea   no nausea   no vomiting   no blood in stool  :    no difficulty urinating   no dysuria   no oliguria   no polyuria  Musculoskeletal:    no arthralgias   no myalgias   no decreased ROM   no swelling  Hematologic:    does not bruise/bleed easily   no excessive bleeding   no history of blood transfusion   no blood clots  Skin:   no skin changes   no sores/wound   no rash      Physical Exam  Vitals reviewed.   Constitutional:       General: He is not in acute distress.     Appearance: Normal appearance. He is normal weight. He is not ill-appearing, toxic-appearing or diaphoretic.   HENT:      Head: Normocephalic.      Nose: Nose normal. No congestion or rhinorrhea.      Mouth/Throat:      Mouth: Mucous membranes are moist.      Pharynx: Oropharynx is clear. No oropharyngeal exudate or posterior oropharyngeal erythema.   Eyes:      General: No scleral icterus.        Right eye: No discharge.         Left eye: No discharge.      Conjunctiva/sclera: Conjunctivae normal.   Neck:      Vascular: No carotid bruit.   Cardiovascular:      Rate and Rhythm: Normal rate and regular rhythm.      Pulses: Normal pulses.      Heart sounds: Normal heart sounds. No murmur heard.     No friction rub. No gallop.   Pulmonary:      Effort: Pulmonary effort is normal. No respiratory distress.      Breath sounds: Normal breath sounds. No stridor. No wheezing, rhonchi or rales.   Chest:      Chest wall: No tenderness.   Abdominal:      Comments: Colostomy present    Musculoskeletal:         General: No swelling or tenderness.      Cervical back: Normal range of motion. No rigidity or tenderness.      Comments: Midline on right chest   Neurological:      General: No focal deficit present.      Mental Status: He is alert.   Psychiatric:         Mood and Affect:  "Mood normal.         Behavior: Behavior normal.         Thought Content: Thought content normal.         Judgment: Judgment normal.          PAT AIRWAY:   Airway:     Mallampati::  I    TM distance::  >3 FB    Neck ROM::  Full        Visit Vitals  /58   Pulse 66   Temp 37 °C (98.6 °F) (Oral)   Ht 1.803 m (5' 11\")   Wt 70.8 kg (156 lb)   SpO2 96%   BMI 21.76 kg/m²   Smoking Status Never   BSA 1.88 m²       DASI Risk Score      Flowsheet Row Pre-Admission Testing from 3/12/2025 in [facility]   Can you take care of yourself (eat, dress, bathe, or use toilet)?  2.75 filed at 03/12/2025 1506   Can you walk indoors, such as around your house? 1.75 filed at 03/12/2025 1506   Can you walk a block or two on level ground?  2.75 filed at 03/12/2025 1506   Can you climb a flight of stairs or walk up a hill? 5.5 filed at 03/12/2025 1506   Can you run a short distance? 0 filed at 03/12/2025 1506   Can you do light work around the house like dusting or washing dishes? 2.7 filed at 03/12/2025 1506   Can you do moderate work around the house like vacuuming, sweeping floors or carrying groceries? 0 filed at 03/12/2025 1506   Can you do heavy work around the house like scrubbing floors or lifting and moving heavy furniture?  0 filed at 03/12/2025 1506   Can you do yard work like raking leaves, weeding or pushing a mower? 0 filed at 03/12/2025 1506   Can you have sexual relations? 0 filed at 03/12/2025 1506   Can you participate in moderate recreational activities like golf, bowling, dancing, doubles tennis or throwing a baseball or football? 0 filed at 03/12/2025 1506   Can you participate in strenous sports like swimming, singles tennis, football, basketball, or skiing? 0 filed at 03/12/2025 1506   DASI SCORE 15.45 filed at 03/12/2025 1506   METS Score (Will be calculated only when all the questions are answered) 4.6 filed at 03/12/2025 1506          Caprini DVT Assessment      Flowsheet Row Pre-Admission " Testing from 3/12/2025 in [facility] ED to Hosp-Admission (Discharged) from 1/17/2025 in [facility] 4 with Nicolás Bucio MD   DVT Score (IF A SCORE IS NOT CALCULATING, MUST SELECT A BMI TO COMPLETE) 10 filed at 03/12/2025 1607 5 filed at 01/18/2025 0522   Medical Factors Present cancer, chemotherapy, or previous malignancy filed at 03/12/2025 1607 Present cancer, chemotherapy, or previous malignancy filed at 01/18/2025 0522   Surgical Factors Major surgery planned, lasting over 3 hours filed at 03/12/2025 1607 --   BMI (BMI MUST BE CHOSEN) 30 or less filed at 03/12/2025 1607 30 or less filed at 01/18/2025 0522          Modified Frailty Index      Flowsheet Row Pre-Admission Testing from 3/12/2025 in [facility]   Non-independent functional status (problems with dressing, bathing, personal grooming, or cooking) 0 filed at 03/12/2025 1607   History of diabetes mellitus  0 filed at 03/12/2025 1607   History of COPD 0 filed at 03/12/2025 1607   History of CHF No filed at 03/12/2025 1607   History of MI 0.0909 filed at 03/12/2025 1607   History of Percutaneous Coronary Intervention, Cardiac Surgery, or Angina No filed at 03/12/2025 1607   Hypertension requiring the use of medication  0.0909 filed at 03/12/2025 1607   Peripheral vascular disease 0 filed at 03/12/2025 1607   Impaired sensorium (cognitive impairement or loss, clouding, or delirium) 0 filed at 03/12/2025 1607   TIA or CVA withouy residual deficit 0 filed at 03/12/2025 1607   Cerebrovascular accident with deficit 0 filed at 03/12/2025 1607   Modified Frailty Index Calculator .1818 filed at 03/12/2025 1607          [id number] Stroke Risk Points  Current as of just now        N/A 0 to 9 Points:      Last Change: N/A          The [id number] risk score (Lip STEF, et al. 2009. © 2010 [employer]) quantifies the risk of stroke for a patient with atrial fibrillation. For patients without  atrial fibrillation or under the age of 18 this score appears as N/A. Higher score values generally indicate higher risk of stroke.        This score is not applicable to this patient. Components are not calculated.          Revised Cardiac Risk Index      Flowsheet Row Pre-Admission Testing from 3/12/2025 in [facility]   High-Risk Surgery (Intraperitoneal, Intrathoracic,Suprainguinal vascular) 1 filed at 03/12/2025 1605   History of ischemic heart disease (History of MI, History of positive exercuse test, Current chest paint considered due to myocardial ischemia, Use of nitrate therapy, ECG with pathological Q Waves) 1 filed at 03/12/2025 1605   History of congestive heart failure (pulmonary edemia, bilateral rales or S3 gallop, Paroxysmal nocturnal dyspnea, CXR showing pulmonary vascular redistribution) 0 filed at 03/12/2025 1605   History of cerebrovascular disease (Prior TIA or stroke) 0 filed at 03/12/2025 1605   Pre-operative insulin treatment 0 filed at 03/12/2025 1605   Pre-operative creatinine>2 mg/dl 0 filed at 03/12/2025 1605   Revised Cardiac Risk Calculator 2 filed at 03/12/2025 1605          Apfel Simplified Score      Flowsheet Row Pre-Admission Testing from 3/12/2025 in [facility]   Smoking status 1 filed at 03/12/2025 1607   History of motion sickness or PONV  0 filed at 03/12/2025 1607   Use of postoperative opioids 1 filed at 03/12/2025 1607   Gender - Female 0=No filed at 03/12/2025 1607   Apfel Simplified Score Calculator 2 filed at 03/12/2025 1607          Risk Analysis Index Results This Encounter    No data found in the last 10 encounters.       Stop Bang Score      Flowsheet Row Pre-Admission Testing from 3/12/2025 in [facility]   Do you snore loudly? 0 filed at 03/12/2025 1506   Do you often feel tired or fatigued after your sleep? 1 filed at 03/12/2025 1506   Has anyone ever observed you stop breathing in your sleep? 0 filed at 03/12/2025  1506   Do you have or are you being treated for high blood pressure? 1 filed at 03/12/2025 1506   Recent BMI (Calculated) 22.2 filed at 03/12/2025 1506   Is BMI greater than 35 kg/m2? 0=No filed at 03/12/2025 1506   Age older than 50 years old? 1=Yes filed at 03/12/2025 1506   Is your neck circumference greater than 17 inches (Male) or 16 inches (Female)? 0 filed at 03/12/2025 1506   Gender - Male 1=Yes filed at 03/12/2025 1506   STOP-BANG Total Score 4 filed at 03/12/2025 1506          Prodigy: High Risk  Total Score: 23              Prodigy Age Score      Prodigy Gender Score     Prodigy Previous Opioid Use Score           ARISCAT Score for Postoperative Pulmonary Complications      Flowsheet Row Pre-Admission Testing from 3/12/2025 in [facility]   Age Calculated Score 3 filed at 03/12/2025 1606   Preoperative SpO2 0 filed at 03/12/2025 1606   Respiratory infection in the last month Either upper or lower (i.e., URI, bronchitis, pneumonia), with fever and antibiotic treatment 0 filed at 03/12/2025 1606   Preoperative anemia (Hgb less than 10 g/dl) 0 filed at 03/12/2025 1606   Surgical incision  15 filed at 03/12/2025 1606   Duration of surgery  23 filed at 03/12/2025 1606   Emergency Procedure  0 filed at 03/12/2025 1606   ARISCAT Total Score  41 filed at 03/12/2025 1606          Leona Perioperative Risk for Myocardial Infarction or Cardiac Arrest (FITO)    No data to display         Assessment and Plan:   Anesthesia/Airway:  No anesthesia complications      Neuro:    #Anxiety, -medicated with lorazepam (continue), follows with PCP.  No further perioperative intervention    Patient is at an increased risk for post operative delirium secondary to age >/= 65 and type and duration of surgery.  Preoperative brain exercise educational handout provided to patient.    The patient is at an increased risk for perioperative stroke secondary to cardiac disease, increased age, HTN, HLD, general anesthesia,  hypercoagulable state, and op time >2.5 hours.       HEENT/Airway:    No HEENT diagnosis or significant findings on chart review or clinical presentation and evaluation. No further preoperative testing/intervention indicated at this time.      Cardiovascular:    #HLD, HTN, CAD(2021 NSTEMI), -  medicated with aspirin (hold 3/12), atorvastatin (continue), metoprolol succinate (continue), potassium chloride (continue), and follows with PCP. BP today is 108/58 and denies cardiovascular symptoms. Physical exam is benign. METS >4 for noncardiac surgery. During hospital admission 1/15/2025 patient EKG was noted with new finding of atrial flutter.  Repeat EKG today Sinus rhythm with 1st degree AV , rate of 61 bpm.  No additional preoperative testing is currently indicated, however patient directed to follow up with cardiologist following procedure.     METS are 4.6    RCRI  2 which is 10.1% 30 day risk of MACE (risk for cardiac death, nonfatal myocardial infarction, and nonfactal cardiac arrest    FITO score which indicates a   0.9 % risk of intraoperative or 30-day postoperative MACE    EKG 3/12/25  Sinus rhythm with 1st degree AV block, rate 61 bpm   Spetal infarct, age undetermined  Abnormal ecg    EKG 1/17/2025  Atrial flutter  Abnormal ECG  When compared with ECG of 10-NOV-2024 23:10,  Atrial flutter has replaced Sinus rhythm    Stress test 9/3/2021  IMPRESSION:  1. No definite evidence of ischemia.  2. Suspicion of an infarct along the mid anterior wall.  3. Left ventricular dilatation is noted.  4. Left ventricular ejection fraction was calculated to be 50%.    Echo 6/1/2021    Cardiac Cath 6/1/2021          Pulmonary:    No Pulmonary diagnosis or significant findings on chart review or clinical presentation and evaluation. Preoperative deep breathing educational handout provided to patient.    No pulmonary diagnosis, however patient is at increased risk of perioperative complications secondary to  age > 60, duration  of surgery > 2 hours, types of anesthetic    ARISCAT:    41  points which is a intermediate (13.3%) risk of in-hospital post-op pulmonary complications     PRODIGY:  20  points which is a high risk of post op opioid induced respiratory depression episodes    STOP BAN   points which is a intermediate risk for moderate to severe BOB    Pumonary toilet education discussed, patient also provided deep breathing exercises and incentive spirometry educational handout      Renal:   No renal diagnosis, however patient is at increase risk for perioperative renal complications secondary to  Age equal to or greater than 56, HTN, intraperitoneal surgery       Endocrine:   #DM2 - denies, not on any current medications. Last A1c on 25 was 5.3.  No further testing or intervention is indicated at this time.      Hematologic:      #Rectal cancer, Liver mass/malignancy, Anemia -seeking surgical intervention and currently follows with heme oncology, radiation oncology.  Patient reports his last chemoradiation was about 3 weeks ago.  Takes medications for as needed symptoms following chemoradiation such as Compazine and Zofran (continue both, morphine (continue), Imodium (hold day of surgery), gabapentin (continue).   #Admitted -25: for abdominal pain/weakness. Noted pancytopenia receieved total of 3units plts and 2units PRBC. Dr. Nieto, consulted and notified no other inpt interventions needed and was discharged.  Patient to report to  lab facility on Saturday 3/15 for T/S prior to surgery due to history of transfusion, If not completed will need done day of surgery.     Preoperative DVT educational handout provided to patient.  Caprini Score:  10  points which is a highest risk of perioperative VTE      Gastrointestinal:   No GI diagnosis or significant findings on chart review or clinical presentation and evaluation. However please refer to below risk factor scores.     EAT-10 score of    0  : self-perceived  oropharyngeal dysphagia scale (0-40)     Apfel: 2 points 39% risk for post operative N/V      Infectious disease:     No infectious diagnosis or significant findings on chart review or clinical presentation and evaluation.   Prescription provided for CHG body wash and dental rinse. CHG use instructions reviewed and provided to patient.  Staph screen collected      Musculoskeletal:    #Vitamin D Deficiency, Gout-follows with PCP no further perioperative intervention.  Patient denies any current flares of gout      Other:     Hold all vitamins and supplements 7 days prior to surgery  Tylenol okay to continue, please hold Aleve/naproxen/ibuprofen (NSAIDs) for 7 days prior to surgery        Labs ordered  cbc, t/s, and MSSA/MRSA culture      Medication instructions and NPO guidelines reviewed with the patient.  All questions or concerns discussed and addressed.      Katey Bello PA-C

## 2025-03-12 NOTE — H&P (VIEW-ONLY)
CPM/PAT Evaluation       Name: Roma Corral (Roma Corral)  /Age: 1955/70 y.o.     Visit Type:   In-Person       Chief Complaint: perioperative evaluation    HPI HPI: 69 y/o male scheduled for Laparoscopic Microwave Ablation Liver Tumor(s) with Ultrasound and Navigation, Liver Biopsy Ultrasound Guided RESECTION, RECTUM AND SIGMOID COLON, ROBOT-ASSISTED  on 2025 with Dr. Baron Erazo and Javier Vasquez secondary to Rectal cancer .   Presents to CPM today for perioperative risk stratification and optimization. PMHX includes Anxiety, HLD, HTN, CAD( NSTEMI), DM2, Rectal cancer, Liver mass/malignancy, Anemia Vitamin D Deficiency, Gout        PCP: Santiago Buckner MD   Specialists:   Colon &Rectal surgery - Javier Vasquez MD   Surgical Oncology - Baron Erazo MD   Oncology - Dann Nieto MD   Urology - Shaheen Kiser MD   Palliative Medicine - Comfort Elkins, APRN-CNP            Past Medical History:   Diagnosis Date    Abdominal pain     Acute non-ST elevation myocardial infarction (NSTEMI) (Multi)     Anemia     24 HGB 8.6; HCT 31.5    Anxiety     CAD (coronary artery disease)     Cardiology follow-up encounter 2023    Sharif Lau MD    Gout     H/O cardiac catheterization 2021    H/O cardiovascular stress test 2021    IMPRESSION: 1. No evidence of ischemia. 2. Suspicion of an infarct along the mid anterior wall. 3. Left ventricular dilatation is noted. 4. Left ventricular EF was calculated to be 50%. Summary:  1. No clinical or electrocardiographic evidence for ischemia at a submaximal workload. 3. The blunted heart rate diminshes the sensitivity of this test.  4. The submaximal level of stress was achieved.    H/O echocardiogram 2021    Mild concentric Left ventricle hypertrophy.  Global left ventricular wall motion and contractility are within normal limits.  There is normal left ventricular systolic function.  Estimated ejection fraction is 60-64%.  The left  atrial size is mildly dilated.  Mild aortic regurgitation.  A trace of mitral regurgitation.  Trivial to mild tricuspid regurgitation.  There is no pulmonary hypertension.    High cholesterol     Hypertension     Overweight     Rectal cancer (Multi)     Type 2 diabetes mellitus     4/18/2024 A1C 7.5%       Past Surgical History:   Procedure Laterality Date    COLONOSCOPY  06/17/2024    HEMICOLECTOMY W/ OSTOMY      LEG SURGERY Left     rodrigo placed       Patient Sexual activity questions deferred to the physician.    Family History   Problem Relation Name Age of Onset    No Known Problems Mother      No Known Problems Father      No Known Problems Sister      Leukemia Brother      Stroke Maternal Grandfather         No Known Allergies    Prior to Admission medications    Medication Sig Start Date End Date Taking? Authorizing Provider   aspirin 81 mg chewable tablet Chew 1 tablet (81 mg) 1 time.    Historical Provider, MD   atorvastatin (Lipitor) 40 mg tablet Take 1 tablet (40 mg) by mouth once daily at bedtime. 12/23/24 12/23/25  Sharif Lau MD   chlorhexidine (Peridex) 0.12 % solution Rinse mouth with 15 ml after toothbrushing the night before surgery and on the morning of surgery.  Expectorate after rinsing.  Do not swallow. 2/18/25   Javier Vasquez MD   gabapentin (Neurontin) 100 mg capsule Take one capsule by mouth once daily starting three days before surgery at bedtime. 2/18/25   Javier Vasquez MD   loperamide (Imodium) 2 mg capsule Take 2 capsules (4 mg) by mouth with the first episode of diarrhea and 1 capsule (2 mg) by mouth with any additional episodes. Maximum 8 capsules (16 mg) per day. 7/11/24   Dann Nieto MD   LORazepam (Ativan) 0.5 mg tablet Take 1 tablet (0.5 mg) by mouth 2 times a day as needed for anxiety (nausea) for up to 15 days.  Patient not taking: Reported on 1/18/2025 11/19/24 12/4/24  Comfort Elkins, APRN-CNP   metoprolol succinate XL (Toprol-XL) 100 mg 24 hr tablet Take 1  tablet (100 mg) by mouth once daily. 12/23/24 12/23/25  Sharif Lau MD   metroNIDAZOLE (Flagyl) 250 mg tablet Take one tablet by mouth daily at 6p, 7p, and 11p the night before surgery. 2/18/25   Javier Vasquez MD   morphine CR (MS Contin) 15 mg 12 hr tablet Take 1 tablet (15 mg) by mouth once daily in the morning AND 2 tablets (30 mg) once daily at bedtime. Do not crush, chew, or split. 2/4/25 3/27/25  Dann Nieto MD   neomycin (Mycifradin) 500 mg tablet Take two tabs by mouth daily at 6p, 7pm, and 11p the night before surgery. 2/18/25   Javier Vasquez MD   ondansetron (Zofran) 4 mg tablet Take 1 tablet (4 mg) by mouth every 8 hours if needed for nausea or vomiting. 7/5/24   Maikel Beckwith PA-C   potassium chloride CR (Klor-Con M20) 20 mEq ER tablet Take 1 tablet (20 mEq) by mouth 2 times a day. Do not crush or chew. 10/28/24 10/28/25  Sharif Lau MD   prochlorperazine (Compazine) 10 mg tablet Take 1 tablet (10 mg) by mouth every 6 hours if needed for nausea or vomiting. 7/11/24   Dann Nieto MD        PAT ROS:   Constitutional:    no fever   no chills   no unexpected weight change  Neuro/Psych:    no numbness   no weakness   no light-headedness   no tremors   no confusion   not nervous/anxious   no self-injury   no suicidal ideation  Eyes:    no discharge   no vision loss   no diplopia   no visual disturbance   no corrective lenses  Ears:    no ear pain   no hearing loss   no vertigo   no tinnitus   no hearing aides  Nose:    no nasal discharge   no sinus congestion   no epistaxis  Mouth:    no dental issues   no mouth pain   no oral bleeding   no mouth lesions  Throat:    no throat pain   no dysphagia   no voice change  Neck:    no neck pain   neck swelling   no neck stiffness   no neck masses  Cardio:    no chest pain   no palpitations   no peripheral edema   no dyspnea   no CHAUDHARY   no paroxysmal nocturnal dyspnea  Respiratory:    no cough   no wheezing   no hemoptysis   no shortness of  breath  Endocrine:    no cold intolerance   no heat intolerance   no polydipsia  GI:    no abdominal distention   abdominal pain (chronic and unchanged)   no constipation   no diarrhea   no nausea   no vomiting   no blood in stool  :    no difficulty urinating   no dysuria   no oliguria   no polyuria  Musculoskeletal:    no arthralgias   no myalgias   no decreased ROM   no swelling  Hematologic:    does not bruise/bleed easily   no excessive bleeding   no history of blood transfusion   no blood clots  Skin:   no skin changes   no sores/wound   no rash      Physical Exam  Vitals reviewed.   Constitutional:       General: He is not in acute distress.     Appearance: Normal appearance. He is normal weight. He is not ill-appearing, toxic-appearing or diaphoretic.   HENT:      Head: Normocephalic.      Nose: Nose normal. No congestion or rhinorrhea.      Mouth/Throat:      Mouth: Mucous membranes are moist.      Pharynx: Oropharynx is clear. No oropharyngeal exudate or posterior oropharyngeal erythema.   Eyes:      General: No scleral icterus.        Right eye: No discharge.         Left eye: No discharge.      Conjunctiva/sclera: Conjunctivae normal.   Neck:      Vascular: No carotid bruit.   Cardiovascular:      Rate and Rhythm: Normal rate and regular rhythm.      Pulses: Normal pulses.      Heart sounds: Normal heart sounds. No murmur heard.     No friction rub. No gallop.   Pulmonary:      Effort: Pulmonary effort is normal. No respiratory distress.      Breath sounds: Normal breath sounds. No stridor. No wheezing, rhonchi or rales.   Chest:      Chest wall: No tenderness.   Abdominal:      Comments: Colostomy present    Musculoskeletal:         General: No swelling or tenderness.      Cervical back: Normal range of motion. No rigidity or tenderness.      Comments: Midline on right chest   Neurological:      General: No focal deficit present.      Mental Status: He is alert.   Psychiatric:         Mood and Affect:  "Mood normal.         Behavior: Behavior normal.         Thought Content: Thought content normal.         Judgment: Judgment normal.          PAT AIRWAY:   Airway:     Mallampati::  I    TM distance::  >3 FB    Neck ROM::  Full        Visit Vitals  /58   Pulse 66   Temp 37 °C (98.6 °F) (Oral)   Ht 1.803 m (5' 11\")   Wt 70.8 kg (156 lb)   SpO2 96%   BMI 21.76 kg/m²   Smoking Status Never   BSA 1.88 m²       DASI Risk Score      Flowsheet Row Pre-Admission Testing [facility]/12/[facility] in [facility]   Can you take care of yourself (eat, dress, bathe, or use toilet)?  2.75 filed at 03/12/2025 1506   Can you walk indoors, such as around your house? 1.75 filed at 03/12/2025 1506   Can you walk a block or two on level ground?  2.75 filed at 03/12/2025 1506   Can you climb a flight of stairs or walk up a hill? 5.5 filed at 03/12/2025 1506   Can you run a short distance? 0 filed at 03/12/2025 1506   Can you do light work around the house like dusting or washing dishes? 2.7 filed at 03/12/2025 1506   Can you do moderate work around the house like vacuuming, sweeping floors or carrying groceries? 0 filed at 03/12/2025 1506   Can you do heavy work around the house like scrubbing floors or lifting and moving heavy furniture?  0 filed at 03/12/2025 1506   Can you do yard work like raking leaves, weeding or pushing a mower? 0 filed at 03/12/2025 1506   Can you have sexual relations? 0 filed at 03/12/2025 1506   Can you participate in moderate recreational activities like golf, bowling, dancing, doubles tennis or throwing a baseball or football? 0 filed at 03/12/2025 1506   Can you participate in strenous sports like swimming, singles tennis, football, basketball, or skiing? 0 filed at 03/12/2025 1506   DASI SCORE 15.45 filed at 03/12/2025 1506   METS Score (Will be calculated only when all the questions are answered) 4.6 filed at 03/12/2025 1506          Caprini DVT Assessment      Flowsheet Row Pre-Admission " Testing from 3/12/2025 in [facility] ED to Hosp-Admission (Discharged) from 1/17/2025 in [facility] 4 with Nicolás Bucio MD   DVT Score (IF A SCORE IS NOT CALCULATING, MUST SELECT A BMI TO COMPLETE) 10 filed at 03/12/2025 1607 5 filed at 01/18/2025 0522   Medical Factors Present cancer, chemotherapy, or previous malignancy filed at 03/12/2025 1607 Present cancer, chemotherapy, or previous malignancy filed at 01/18/2025 0522   Surgical Factors Major surgery planned, lasting over 3 hours filed at 03/12/2025 1607 --   BMI (BMI MUST BE CHOSEN) 30 or less filed at 03/12/2025 1607 30 or less filed at 01/18/2025 0522          Modified Frailty Index      Flowsheet Row Pre-Admission Testing from 3/12/2025 in [facility]   Non-independent functional status (problems with dressing, bathing, personal grooming, or cooking) 0 filed at 03/12/2025 1607   History of diabetes mellitus  0 filed at 03/12/2025 1607   History of COPD 0 filed at 03/12/2025 1607   History of CHF No filed at 03/12/2025 1607   History of MI 0.0909 filed at 03/12/2025 1607   History of Percutaneous Coronary Intervention, Cardiac Surgery, or Angina No filed at 03/12/2025 1607   Hypertension requiring the use of medication  0.0909 filed at 03/12/2025 1607   Peripheral vascular disease 0 filed at 03/12/2025 1607   Impaired sensorium (cognitive impairement or loss, clouding, or delirium) 0 filed at 03/12/2025 1607   TIA or CVA withouy residual deficit 0 filed at 03/12/2025 1607   Cerebrovascular accident with deficit 0 filed at 03/12/2025 1607   Modified Frailty Index Calculator .1818 filed at 03/12/2025 1607          [id number] Stroke Risk Points  Current as of just now        N/A 0 to 9 Points:      Last Change: N/A          The [id number] risk score (Lip STEF, et al. 2009. © 2010 [employer]) quantifies the risk of stroke for a patient with atrial fibrillation. For patients without  atrial fibrillation or under the age of 18 this score appears as N/A. Higher score values generally indicate higher risk of stroke.        This score is not applicable to this patient. Components are not calculated.          Revised Cardiac Risk Index      Flowsheet Row Pre-Admission Testing from 3/12/2025 in [facility]   High-Risk Surgery (Intraperitoneal, Intrathoracic,Suprainguinal vascular) 1 filed at 03/12/2025 1605   History of ischemic heart disease (History of MI, History of positive exercuse test, Current chest paint considered due to myocardial ischemia, Use of nitrate therapy, ECG with pathological Q Waves) 1 filed at 03/12/2025 1605   History of congestive heart failure (pulmonary edemia, bilateral rales or S3 gallop, Paroxysmal nocturnal dyspnea, CXR showing pulmonary vascular redistribution) 0 filed at 03/12/2025 1605   History of cerebrovascular disease (Prior TIA or stroke) 0 filed at 03/12/2025 1605   Pre-operative insulin treatment 0 filed at 03/12/2025 1605   Pre-operative creatinine>2 mg/dl 0 filed at 03/12/2025 1605   Revised Cardiac Risk Calculator 2 filed at 03/12/2025 1605          Apfel Simplified Score      Flowsheet Row Pre-Admission Testing from 3/12/2025 in [facility]   Smoking status 1 filed at 03/12/2025 1607   History of motion sickness or PONV  0 filed at 03/12/2025 1607   Use of postoperative opioids 1 filed at 03/12/2025 1607   Gender - Female 0=No filed at 03/12/2025 1607   Apfel Simplified Score Calculator 2 filed at 03/12/2025 1607          Risk Analysis Index Results This Encounter    No data found in the last 10 encounters.       Stop Bang Score      Flowsheet Row Pre-Admission Testing from 3/12/2025 in [facility]   Do you snore loudly? 0 filed at 03/12/2025 1506   Do you often feel tired or fatigued after your sleep? 1 filed at 03/12/2025 1506   Has anyone ever observed you stop breathing in your sleep? 0 filed at 03/12/2025  1506   Do you have or are you being treated for high blood pressure? 1 filed at 03/12/2025 1506   Recent BMI (Calculated) 22.2 filed at 03/12/2025 1506   Is BMI greater than 35 kg/m2? 0=No filed at 03/12/2025 1506   Age older than 50 years old? 1=Yes filed at 03/12/2025 1506   Is your neck circumference greater than 17 inches (Male) or 16 inches (Female)? 0 filed at 03/12/2025 1506   Gender - Male 1=Yes filed at 03/12/2025 1506   STOP-BANG Total Score 4 filed at 03/12/2025 1506          Prodigy: High Risk  Total Score: 23              Prodigy Age Score      Prodigy Gender Score     Prodigy Previous Opioid Use Score           ARISCAT Score for Postoperative Pulmonary Complications      Flowsheet Row Pre-Admission Testing from 3/12/2025 in [facility]   Age Calculated Score 3 filed at 03/12/2025 1606   Preoperative SpO2 0 filed at 03/12/2025 1606   Respiratory infection in the last month Either upper or lower (i.e., URI, bronchitis, pneumonia), with fever and antibiotic treatment 0 filed at 03/12/2025 1606   Preoperative anemia (Hgb less than 10 g/dl) 0 filed at 03/12/2025 1606   Surgical incision  15 filed at 03/12/2025 1606   Duration of surgery  23 filed at 03/12/2025 1606   Emergency Procedure  0 filed at 03/12/2025 1606   ARISCAT Total Score  41 filed at 03/12/2025 1606          Leona Perioperative Risk for Myocardial Infarction or Cardiac Arrest (FITO)    No data to display         Assessment and Plan:   Anesthesia/Airway:  No anesthesia complications      Neuro:    #Anxiety, -medicated with lorazepam (continue), follows with PCP.  No further perioperative intervention    Patient is at an increased risk for post operative delirium secondary to age >/= 65 and type and duration of surgery.  Preoperative brain exercise educational handout provided to patient.    The patient is at an increased risk for perioperative stroke secondary to cardiac disease, increased age, HTN, HLD, general anesthesia,  hypercoagulable state, and op time >2.5 hours.       HEENT/Airway:    No HEENT diagnosis or significant findings on chart review or clinical presentation and evaluation. No further preoperative testing/intervention indicated at this time.      Cardiovascular:    #HLD, HTN, CAD(2021 NSTEMI), -  medicated with aspirin (hold 3/12), atorvastatin (continue), metoprolol succinate (continue), potassium chloride (continue), and follows with PCP. BP today is 108/58 and denies cardiovascular symptoms. Physical exam is benign. METS >4 for noncardiac surgery. During hospital admission 1/15/2025 patient EKG was noted with new finding of atrial flutter.  Repeat EKG today Sinus rhythm with 1st degree AV , rate of 61 bpm.  No additional preoperative testing is currently indicated, however patient directed to follow up with cardiologist following procedure.     METS are 4.6    RCRI  2 which is 10.1% 30 day risk of MACE (risk for cardiac death, nonfatal myocardial infarction, and nonfactal cardiac arrest    FITO score which indicates a   0.9 % risk of intraoperative or 30-day postoperative MACE    EKG 3/12/25  Sinus rhythm with 1st degree AV block, rate 61 bpm   Spetal infarct, age undetermined  Abnormal ecg    EKG 1/17/2025  Atrial flutter  Abnormal ECG  When compared with ECG of 10-NOV-2024 23:10,  Atrial flutter has replaced Sinus rhythm    Stress test 9/3/2021  IMPRESSION:  1. No definite evidence of ischemia.  2. Suspicion of an infarct along the mid anterior wall.  3. Left ventricular dilatation is noted.  4. Left ventricular ejection fraction was calculated to be 50%.    Echo 6/1/2021    Cardiac Cath 6/1/2021          Pulmonary:    No Pulmonary diagnosis or significant findings on chart review or clinical presentation and evaluation. Preoperative deep breathing educational handout provided to patient.    No pulmonary diagnosis, however patient is at increased risk of perioperative complications secondary to  age > 60, duration  of surgery > 2 hours, types of anesthetic    ARISCAT:    41  points which is a intermediate (13.3%) risk of in-hospital post-op pulmonary complications     PRODIGY:  20  points which is a high risk of post op opioid induced respiratory depression episodes    STOP BAN   points which is a intermediate risk for moderate to severe BOB    Pumonary toilet education discussed, patient also provided deep breathing exercises and incentive spirometry educational handout      Renal:   No renal diagnosis, however patient is at increase risk for perioperative renal complications secondary to  Age equal to or greater than 56, HTN, intraperitoneal surgery       Endocrine:   #DM2 - denies, not on any current medications. Last A1c on 25 was 5.3.  No further testing or intervention is indicated at this time.      Hematologic:      #Rectal cancer, Liver mass/malignancy, Anemia -seeking surgical intervention and currently follows with heme oncology, radiation oncology.  Patient reports his last chemoradiation was about 3 weeks ago.  Takes medications for as needed symptoms following chemoradiation such as Compazine and Zofran (continue both, morphine (continue), Imodium (hold day of surgery), gabapentin (continue).   #Admitted -25: for abdominal pain/weakness. Noted pancytopenia receieved total of 3units plts and 2units PRBC. Dr. Nieto, consulted and notified no other inpt interventions needed and was discharged.  Patient to report to  lab facility on Saturday 3/15 for T/S prior to surgery due to history of transfusion, If not completed will need done day of surgery.     Preoperative DVT educational handout provided to patient.  Caprini Score:  10  points which is a highest risk of perioperative VTE      Gastrointestinal:   No GI diagnosis or significant findings on chart review or clinical presentation and evaluation. However please refer to below risk factor scores.     EAT-10 score of    0  : self-perceived  oropharyngeal dysphagia scale (0-40)     Apfel: 2 points 39% risk for post operative N/V      Infectious disease:     No infectious diagnosis or significant findings on chart review or clinical presentation and evaluation.   Prescription provided for CHG body wash and dental rinse. CHG use instructions reviewed and provided to patient.  Staph screen collected      Musculoskeletal:    #Vitamin D Deficiency, Gout-follows with PCP no further perioperative intervention.  Patient denies any current flares of gout      Other:     Hold all vitamins and supplements 7 days prior to surgery  Tylenol okay to continue, please hold Aleve/naproxen/ibuprofen (NSAIDs) for 7 days prior to surgery        Labs ordered  cbc, t/s, and MSSA/MRSA culture      Medication instructions and NPO guidelines reviewed with the patient.  All questions or concerns discussed and addressed.      Katey Bello PA-C

## 2025-03-13 DIAGNOSIS — C20 RECTAL CANCER (MULTI): ICD-10-CM

## 2025-03-13 DIAGNOSIS — Z01.818 PREOPERATIVE EXAMINATION: ICD-10-CM

## 2025-03-14 ENCOUNTER — PHARMACY VISIT (OUTPATIENT)
Dept: PHARMACY | Facility: CLINIC | Age: 70
End: 2025-03-14

## 2025-03-14 ENCOUNTER — TELEPHONE (OUTPATIENT)
Dept: SURGICAL ONCOLOGY | Facility: HOSPITAL | Age: 70
End: 2025-03-14
Payer: MEDICARE

## 2025-03-14 LAB — STAPHYLOCOCCUS SPEC CULT: NORMAL

## 2025-03-14 NOTE — PROGRESS NOTES
Pharmacy Medication History Review    Roma Corral is a 70 y.o. male who is planned to be admitted for Rectal cancer (Multi). Pharmacy called the patient prior to their scheduled procedure and reviewed the patient's [id number] medications for accuracy.    Medications ADDED:  none  Medications CHANGED:  Lorazepam 0.5mg directions from #1BIDprn to #1PRN  Morhpine ER 15mg directions from #1AM&#2PM to #2BID  Medications REMOVED:   none    Please review updated prior to admission medication list and comments regarding how patient may be taking medications differently by going to Admission tab --> Admission Orders --> Admit Orders / Review prior to admission medications.     Preferred pharmacy, last doses of medications, and allergies to be confirmed with patient by nursing the day of procedure.     Sources used to complete the med history include:  OAS  Pharmacy dispense history  Patient interview  Chart Review  Care Everywhere     Below are additional concerns with the patient's PTA list.  Patient states they are taking #2 tablets of morhine ER 15mg twice daily. L.F. 02/24/25 #90/30d and [place]ARRS verified. Prescription states #1 tab in morning and #2 tabs at bedtime. There was NO documentation found stating patient is to take #2 tablets twice daily.  Patient states they take lorazepam 0.5mg as needed. There is NO fill history of this medication and NO [place]ARRS (went back to 01/01/2021). Prescription ordered on 11/19/24 was sent to [facility] pharmacy and shows it has never been dispensed    Maida Gordon [facility]  Please reach out via Secure Chat for questions

## 2025-03-14 NOTE — TELEPHONE ENCOUNTER
Called patient to give arrival instructions for his procedure Monday left detailed message to arrive by 6am, nothing by mouth after midnight or follow dietary instructions given to him, and to check in to [facility] admitting on 2nd floor of [facility].

## 2025-03-15 ENCOUNTER — LAB (OUTPATIENT)
Dept: LAB | Facility: HOSPITAL | Age: 70
End: 2025-03-15
Payer: MEDICARE

## 2025-03-15 ENCOUNTER — TELEPHONE (OUTPATIENT)
Dept: PRIMARY CARE | Facility: CLINIC | Age: 70
End: 2025-03-15
Payer: MEDICARE

## 2025-03-15 ENCOUNTER — ANESTHESIA EVENT (OUTPATIENT)
Dept: OPERATING ROOM | Facility: HOSPITAL | Age: 70
End: 2025-03-15
Payer: MEDICARE

## 2025-03-15 DIAGNOSIS — Z01.818 ENCOUNTER FOR OTHER PREPROCEDURAL EXAMINATION: Primary | ICD-10-CM

## 2025-03-15 LAB
ABO GROUP (TYPE) IN BLOOD: NORMAL
ANTIBODY SCREEN: NORMAL
RH FACTOR (ANTIGEN D): NORMAL

## 2025-03-15 PROCEDURE — 86922 COMPATIBILITY TEST ANTIGLOB: CPT

## 2025-03-15 PROCEDURE — 86850 RBC ANTIBODY SCREEN: CPT

## 2025-03-15 PROCEDURE — 80053 COMPREHEN METABOLIC PANEL: CPT

## 2025-03-15 PROCEDURE — 86900 BLOOD TYPING SEROLOGIC ABO: CPT

## 2025-03-15 PROCEDURE — 86901 BLOOD TYPING SEROLOGIC RH(D): CPT

## 2025-03-15 PROCEDURE — 82378 CARCINOEMBRYONIC ANTIGEN: CPT

## 2025-03-15 PROCEDURE — 85025 COMPLETE CBC W/AUTO DIFF WBC: CPT

## 2025-03-15 NOTE — TELEPHONE ENCOUNTER
----- Message from Santiago Buckner sent at 1/30/2025  1:38 PM EST -----  Patient needs to make a follow up appt.

## 2025-03-16 ENCOUNTER — LAB (OUTPATIENT)
Dept: LAB | Facility: HOSPITAL | Age: 70
End: 2025-03-16
Payer: MEDICARE

## 2025-03-16 DIAGNOSIS — C20 RECTAL CANCER (MULTI): ICD-10-CM

## 2025-03-16 LAB
ALBUMIN SERPL BCP-MCNC: 3.2 G/DL (ref 3.4–5)
ALP SERPL-CCNC: 312 U/L (ref 33–136)
ALT SERPL W P-5'-P-CCNC: 15 U/L (ref 10–52)
AMMONIA PLAS-SCNC: 27 UMOL/L
ANION GAP SERPL CALC-SCNC: 12 MMOL/L (ref 10–20)
APPEARANCE UR: CLEAR
AST SERPL W P-5'-P-CCNC: 31 U/L (ref 9–39)
BASOPHILS # BLD AUTO: 0.01 X10*3/UL (ref 0–0.1)
BASOPHILS NFR BLD AUTO: 0.4 %
BILIRUB SERPL-MCNC: 0.4 MG/DL (ref 0–1.2)
BILIRUB UR QL STRIP: NEGATIVE
BUN SERPL-MCNC: 18 MG/DL (ref 6–23)
CALCIUM SERPL-MCNC: 8.4 MG/DL (ref 8.6–10.6)
CEA SERPL-MCNC: 5.1 UG/L
CHLORIDE SERPL-SCNC: 109 MMOL/L (ref 98–107)
CO2 SERPL-SCNC: 26 MMOL/L (ref 21–32)
COLOR UR: YELLOW
CREAT SERPL-MCNC: 0.93 MG/DL (ref 0.5–1.3)
EGFRCR SERPLBLD CKD-EPI 2021: 88 ML/MIN/1.73M*2
EOSINOPHIL # BLD AUTO: 0.07 X10*3/UL (ref 0–0.7)
EOSINOPHIL NFR BLD AUTO: 2.6 %
ERYTHROCYTE [DISTWIDTH] IN BLOOD BY AUTOMATED COUNT: 19.9 % (ref 11.5–14.5)
GLUCOSE SERPL-MCNC: 124 MG/DL (ref 74–99)
GLUCOSE UR QL STRIP: NEGATIVE
HCT VFR BLD AUTO: 29.1 % (ref 41–52)
HGB BLD-MCNC: 8.1 G/DL (ref 13.5–17.5)
HGB UR QL STRIP: NEGATIVE
IMM GRANULOCYTES # BLD AUTO: 0 X10*3/UL (ref 0–0.7)
IMM GRANULOCYTES NFR BLD AUTO: 0 % (ref 0–0.9)
KETONES UR QL STRIP: ABNORMAL
LEUKOCYTE ESTERASE UR QL STRIP: NEGATIVE
LYMPHOCYTES # BLD AUTO: 0.68 X10*3/UL (ref 1.2–4.8)
LYMPHOCYTES NFR BLD AUTO: 25.4 %
MCH RBC QN AUTO: 29.5 PG (ref 26–34)
MCHC RBC AUTO-ENTMCNC: 27.8 G/DL (ref 32–36)
MCV RBC AUTO: 106 FL (ref 80–100)
MONOCYTES # BLD AUTO: 0.24 X10*3/UL (ref 0.1–1)
MONOCYTES NFR BLD AUTO: 9 %
NEUTROPHILS # BLD AUTO: 1.68 X10*3/UL (ref 1.2–7.7)
NEUTROPHILS NFR BLD AUTO: 62.6 %
NITRITE UR QL STRIP: NEGATIVE
NRBC BLD-RTO: 0 /100 WBCS (ref 0–0)
PH UR STRIP: ABNORMAL [PH] (ref 5–8)
PLATELET # BLD AUTO: 95 X10*3/UL (ref 150–450)
POTASSIUM SERPL-SCNC: 4.7 MMOL/L (ref 3.5–5.3)
PROT SERPL-MCNC: 6 G/DL (ref 6.4–8.2)
PROT UR QL STRIP: NEGATIVE
RBC # BLD AUTO: 2.75 X10*6/UL (ref 4.5–5.9)
RBC MORPH BLD: NORMAL
SODIUM SERPL-SCNC: 142 MMOL/L (ref 136–145)
SP GR UR STRIP: 1.02 (ref 1–1.03)
WBC # BLD AUTO: 2.7 X10*3/UL (ref 4.4–11.3)

## 2025-03-17 ENCOUNTER — HOSPITAL ENCOUNTER (INPATIENT)
Facility: HOSPITAL | Age: 70
End: 2025-03-17
Attending: SURGERY | Admitting: SURGERY
Payer: MEDICARE

## 2025-03-17 ENCOUNTER — ANESTHESIA (OUTPATIENT)
Dept: OPERATING ROOM | Facility: HOSPITAL | Age: 70
End: 2025-03-17
Payer: MEDICARE

## 2025-03-17 DIAGNOSIS — C20 RECTAL CANCER (MULTI): Primary | ICD-10-CM

## 2025-03-17 DIAGNOSIS — C78.7 SECONDARY MALIGNANT NEOPLASM OF LIVER AND INTRAHEPATIC BILE DUCT (MULTI): ICD-10-CM

## 2025-03-17 DIAGNOSIS — Z93.3 S/P COLOSTOMY (MULTI): ICD-10-CM

## 2025-03-17 DIAGNOSIS — R33.9 ACUTE ON CHRONIC URINARY RETENTION: ICD-10-CM

## 2025-03-17 DIAGNOSIS — C78.7 ADENOCARCINOMA OF RECTUM METASTATIC TO LIVER (MULTI): ICD-10-CM

## 2025-03-17 DIAGNOSIS — Z78.9 ON TOTAL PARENTERAL NUTRITION: ICD-10-CM

## 2025-03-17 DIAGNOSIS — G89.18 POST-OPERATIVE PAIN: ICD-10-CM

## 2025-03-17 DIAGNOSIS — G89.18 POST-OP PAIN: ICD-10-CM

## 2025-03-17 DIAGNOSIS — Z78.9 IMPAIRED MOBILITY AND ACTIVITIES OF DAILY LIVING: ICD-10-CM

## 2025-03-17 DIAGNOSIS — E43 PROTEIN-CALORIE MALNUTRITION, SEVERE (MULTI): ICD-10-CM

## 2025-03-17 DIAGNOSIS — C20 ADENOCARCINOMA OF RECTUM METASTATIC TO LIVER (MULTI): ICD-10-CM

## 2025-03-17 DIAGNOSIS — I89.8 CHYLOPERITONEUM: ICD-10-CM

## 2025-03-17 DIAGNOSIS — Z51.5 PALLIATIVE CARE ENCOUNTER: ICD-10-CM

## 2025-03-17 DIAGNOSIS — Z74.09 IMPAIRED MOBILITY AND ACTIVITIES OF DAILY LIVING: ICD-10-CM

## 2025-03-17 DIAGNOSIS — Z90.49 HISTORY OF LOW ANTERIOR RESECTION OF RECTUM: ICD-10-CM

## 2025-03-17 DIAGNOSIS — I89.9 LYMPH LEAK: ICD-10-CM

## 2025-03-17 DIAGNOSIS — G89.3 CANCER RELATED PAIN: ICD-10-CM

## 2025-03-17 DIAGNOSIS — Z95.828 STATUS POST PICC CENTRAL LINE PLACEMENT: ICD-10-CM

## 2025-03-17 LAB
ANION GAP BLDA CALCULATED.4IONS-SCNC: 11 MMO/L (ref 10–25)
ANION GAP BLDA CALCULATED.4IONS-SCNC: 12 MMO/L (ref 10–25)
ANION GAP BLDA CALCULATED.4IONS-SCNC: 9 MMO/L (ref 10–25)
BASE EXCESS BLDA CALC-SCNC: -2.3 MMOL/L (ref -2–3)
BASE EXCESS BLDA CALC-SCNC: -2.6 MMOL/L (ref -2–3)
BASE EXCESS BLDA CALC-SCNC: -4.4 MMOL/L (ref -2–3)
BASE EXCESS BLDA CALC-SCNC: -4.4 MMOL/L (ref -2–3)
BASE EXCESS BLDA CALC-SCNC: -4.9 MMOL/L (ref -2–3)
BASE EXCESS BLDA CALC-SCNC: -5.1 MMOL/L (ref -2–3)
BASE EXCESS BLDA CALC-SCNC: -6.2 MMOL/L (ref -2–3)
BODY TEMPERATURE: 37 DEGREES CELSIUS
CA-I BLDA-SCNC: 1.09 MMOL/L (ref 1.1–1.33)
CA-I BLDA-SCNC: 1.18 MMOL/L (ref 1.1–1.33)
CA-I BLDA-SCNC: 1.2 MMOL/L (ref 1.1–1.33)
CA-I BLDA-SCNC: 1.2 MMOL/L (ref 1.1–1.33)
CA-I BLDA-SCNC: 1.25 MMOL/L (ref 1.1–1.33)
CA-I BLDA-SCNC: 1.28 MMOL/L (ref 1.1–1.33)
CHLORIDE BLDA-SCNC: 111 MMOL/L (ref 98–107)
CHLORIDE BLDA-SCNC: 112 MMOL/L (ref 98–107)
CHLORIDE BLDA-SCNC: 112 MMOL/L (ref 98–107)
CHLORIDE BLDA-SCNC: 113 MMOL/L (ref 98–107)
CHLORIDE BLDA-SCNC: 114 MMOL/L (ref 98–107)
CHLORIDE BLDA-SCNC: 115 MMOL/L (ref 98–107)
GLUCOSE BLD MANUAL STRIP-MCNC: 125 MG/DL (ref 74–99)
GLUCOSE BLDA-MCNC: 113 MG/DL (ref 74–99)
GLUCOSE BLDA-MCNC: 132 MG/DL (ref 74–99)
GLUCOSE BLDA-MCNC: 139 MG/DL (ref 74–99)
GLUCOSE BLDA-MCNC: 161 MG/DL (ref 74–99)
GLUCOSE BLDA-MCNC: 168 MG/DL (ref 74–99)
GLUCOSE BLDA-MCNC: 75 MG/DL (ref 74–99)
HCO3 BLDA-SCNC: 19.6 MMOL/L (ref 22–26)
HCO3 BLDA-SCNC: 19.8 MMOL/L (ref 22–26)
HCO3 BLDA-SCNC: 20.6 MMOL/L (ref 22–26)
HCO3 BLDA-SCNC: 21 MMOL/L (ref 22–26)
HCO3 BLDA-SCNC: 21 MMOL/L (ref 22–26)
HCO3 BLDA-SCNC: 22.4 MMOL/L (ref 22–26)
HCO3 BLDA-SCNC: 22.6 MMOL/L (ref 22–26)
HCT VFR BLD EST: 20 % (ref 41–52)
HCT VFR BLD EST: 20 % (ref 41–52)
HCT VFR BLD EST: 22 % (ref 41–52)
HCT VFR BLD EST: 22 % (ref 41–52)
HCT VFR BLD EST: 26 % (ref 41–52)
HCT VFR BLD EST: 28 % (ref 41–52)
HGB BLDA-MCNC: 6.6 G/DL (ref 13.5–17.5)
HGB BLDA-MCNC: 6.6 G/DL (ref 13.5–17.5)
HGB BLDA-MCNC: 7.4 G/DL (ref 13.5–17.5)
HGB BLDA-MCNC: 7.4 G/DL (ref 13.5–17.5)
HGB BLDA-MCNC: 8.6 G/DL (ref 13.5–17.5)
HGB BLDA-MCNC: 9.2 G/DL (ref 13.5–17.5)
INHALED O2 CONCENTRATION: 34 %
INHALED O2 CONCENTRATION: 35 %
INHALED O2 CONCENTRATION: 35 %
INHALED O2 CONCENTRATION: 40 %
INHALED O2 CONCENTRATION: 40 %
INHALED O2 CONCENTRATION: 50 %
INHALED O2 CONCENTRATION: 50 %
LACTATE BLDA-SCNC: 1 MMOL/L (ref 0.4–2)
LACTATE BLDA-SCNC: 1.1 MMOL/L (ref 0.4–2)
LACTATE BLDA-SCNC: 1.1 MMOL/L (ref 0.4–2)
LACTATE BLDA-SCNC: 1.2 MMOL/L (ref 0.4–2)
OXYHGB MFR BLDA: 96.1 % (ref 94–98)
OXYHGB MFR BLDA: 96.3 % (ref 94–98)
OXYHGB MFR BLDA: 97.6 % (ref 94–98)
OXYHGB MFR BLDA: 97.8 % (ref 94–98)
OXYHGB MFR BLDA: 97.8 % (ref 94–98)
OXYHGB MFR BLDA: 98 % (ref 94–98)
OXYHGB MFR BLDA: 98.2 % (ref 94–98)
PCO2 BLDA: 35 MM HG (ref 38–42)
PCO2 BLDA: 37 MM HG (ref 38–42)
PCO2 BLDA: 39 MM HG (ref 38–42)
PCO2 BLDA: 40 MM HG (ref 38–42)
PH BLDA: 7.31 PH (ref 7.38–7.42)
PH BLDA: 7.33 PH (ref 7.38–7.42)
PH BLDA: 7.34 PH (ref 7.38–7.42)
PH BLDA: 7.34 PH (ref 7.38–7.42)
PH BLDA: 7.36 PH (ref 7.38–7.42)
PH BLDA: 7.36 PH (ref 7.38–7.42)
PH BLDA: 7.39 PH (ref 7.38–7.42)
PO2 BLDA: 145 MM HG (ref 85–95)
PO2 BLDA: 172 MM HG (ref 85–95)
PO2 BLDA: 181 MM HG (ref 85–95)
PO2 BLDA: 182 MM HG (ref 85–95)
PO2 BLDA: 190 MM HG (ref 85–95)
PO2 BLDA: 191 MM HG (ref 85–95)
PO2 BLDA: 196 MM HG (ref 85–95)
POTASSIUM BLDA-SCNC: 4 MMOL/L (ref 3.5–5.3)
POTASSIUM BLDA-SCNC: 4.1 MMOL/L (ref 3.5–5.3)
POTASSIUM BLDA-SCNC: 4.2 MMOL/L (ref 3.5–5.3)
POTASSIUM BLDA-SCNC: 4.3 MMOL/L (ref 3.5–5.3)
POTASSIUM BLDA-SCNC: 4.4 MMOL/L (ref 3.5–5.3)
POTASSIUM BLDA-SCNC: 4.5 MMOL/L (ref 3.5–5.3)
SAO2 % BLDA: 100 % (ref 94–100)
SAO2 % BLDA: 98 % (ref 94–100)
SAO2 % BLDA: 99 % (ref 94–100)
SODIUM BLDA-SCNC: 139 MMOL/L (ref 136–145)
SODIUM BLDA-SCNC: 141 MMOL/L (ref 136–145)

## 2025-03-17 PROCEDURE — 36430 TRANSFUSION BLD/BLD COMPNT: CPT | Mod: GC | Performed by: STUDENT IN AN ORGANIZED HEALTH CARE EDUCATION/TRAINING PROGRAM

## 2025-03-17 PROCEDURE — 2500000005 HC RX 250 GENERAL PHARMACY W/O HCPCS: Performed by: SURGERY

## 2025-03-17 PROCEDURE — 3600000018 HC OR TIME - INITIAL BASE CHARGE - PROCEDURE LEVEL SIX: Performed by: SURGERY

## 2025-03-17 PROCEDURE — 2500000001 HC RX 250 WO HCPCS SELF ADMINISTERED DRUGS (ALT 637 FOR MEDICARE OP): Performed by: ANESTHESIOLOGY

## 2025-03-17 PROCEDURE — 88309 TISSUE EXAM BY PATHOLOGIST: CPT | Performed by: PATHOLOGY

## 2025-03-17 PROCEDURE — 3700000002 HC GENERAL ANESTHESIA TIME - EACH INCREMENTAL 1 MINUTE: Performed by: SURGERY

## 2025-03-17 PROCEDURE — 2500000004 HC RX 250 GENERAL PHARMACY W/ HCPCS (ALT 636 FOR OP/ED)

## 2025-03-17 PROCEDURE — 2500000004 HC RX 250 GENERAL PHARMACY W/ HCPCS (ALT 636 FOR OP/ED): Performed by: STUDENT IN AN ORGANIZED HEALTH CARE EDUCATION/TRAINING PROGRAM

## 2025-03-17 PROCEDURE — 84132 ASSAY OF SERUM POTASSIUM: CPT | Performed by: STUDENT IN AN ORGANIZED HEALTH CARE EDUCATION/TRAINING PROGRAM

## 2025-03-17 PROCEDURE — 3600000017 HC OR TIME - EACH INCREMENTAL 1 MINUTE - PROCEDURE LEVEL SIX: Performed by: SURGERY

## 2025-03-17 PROCEDURE — 99222 1ST HOSP IP/OBS MODERATE 55: CPT | Performed by: ANESTHESIOLOGY

## 2025-03-17 PROCEDURE — 2500000001 HC RX 250 WO HCPCS SELF ADMINISTERED DRUGS (ALT 637 FOR MEDICARE OP): Performed by: STUDENT IN AN ORGANIZED HEALTH CARE EDUCATION/TRAINING PROGRAM

## 2025-03-17 PROCEDURE — 2500000004 HC RX 250 GENERAL PHARMACY W/ HCPCS (ALT 636 FOR OP/ED): Performed by: ANESTHESIOLOGY

## 2025-03-17 PROCEDURE — 88307 TISSUE EXAM BY PATHOLOGIST: CPT | Performed by: PATHOLOGY

## 2025-03-17 PROCEDURE — 76940 US GUIDE TISSUE ABLATION: CPT | Performed by: SURGERY

## 2025-03-17 PROCEDURE — 2500000005 HC RX 250 GENERAL PHARMACY W/O HCPCS

## 2025-03-17 PROCEDURE — 2500000004 HC RX 250 GENERAL PHARMACY W/ HCPCS (ALT 636 FOR OP/ED): Performed by: SURGERY

## 2025-03-17 PROCEDURE — 7100000001 HC RECOVERY ROOM TIME - INITIAL BASE CHARGE: Performed by: SURGERY

## 2025-03-17 PROCEDURE — P9045 ALBUMIN (HUMAN), 5%, 250 ML: HCPCS | Mod: JZ,TB

## 2025-03-17 PROCEDURE — P9040 RBC LEUKOREDUCED IRRADIATED: HCPCS

## 2025-03-17 PROCEDURE — 96372 THER/PROPH/DIAG INJ SC/IM: CPT | Performed by: SURGERY

## 2025-03-17 PROCEDURE — 8E0W4CZ ROBOTIC ASSISTED PROCEDURE OF TRUNK REGION, PERCUTANEOUS ENDOSCOPIC APPROACH: ICD-10-PCS | Performed by: STUDENT IN AN ORGANIZED HEALTH CARE EDUCATION/TRAINING PROGRAM

## 2025-03-17 PROCEDURE — 88307 TISSUE EXAM BY PATHOLOGIST: CPT | Mod: TC,SUR,WESLAB | Performed by: SURGERY

## 2025-03-17 PROCEDURE — 0D1E4Z4 BYPASS LARGE INTESTINE TO CUTANEOUS, PERCUTANEOUS ENDOSCOPIC APPROACH: ICD-10-PCS | Performed by: STUDENT IN AN ORGANIZED HEALTH CARE EDUCATION/TRAINING PROGRAM

## 2025-03-17 PROCEDURE — 30233N1 TRANSFUSION OF NONAUTOLOGOUS RED BLOOD CELLS INTO PERIPHERAL VEIN, PERCUTANEOUS APPROACH: ICD-10-PCS | Performed by: STUDENT IN AN ORGANIZED HEALTH CARE EDUCATION/TRAINING PROGRAM

## 2025-03-17 PROCEDURE — 84132 ASSAY OF SERUM POTASSIUM: CPT

## 2025-03-17 PROCEDURE — 44204 LAPARO PARTIAL COLECTOMY: CPT | Performed by: SURGERY

## 2025-03-17 PROCEDURE — 3700000001 HC GENERAL ANESTHESIA TIME - INITIAL BASE CHARGE: Performed by: SURGERY

## 2025-03-17 PROCEDURE — 7100000002 HC RECOVERY ROOM TIME - EACH INCREMENTAL 1 MINUTE: Performed by: SURGERY

## 2025-03-17 PROCEDURE — 2720000007 HC OR 272 NO HCPCS: Performed by: SURGERY

## 2025-03-17 PROCEDURE — 1200000003 HC ONCOLOGY  ROOM WITH TELEMETRY DAILY

## 2025-03-17 PROCEDURE — 0F500ZZ DESTRUCTION OF LIVER, OPEN APPROACH: ICD-10-PCS | Performed by: STUDENT IN AN ORGANIZED HEALTH CARE EDUCATION/TRAINING PROGRAM

## 2025-03-17 PROCEDURE — 88302 TISSUE EXAM BY PATHOLOGIST: CPT | Performed by: PATHOLOGY

## 2025-03-17 PROCEDURE — 47370 LAPARO ABLATE LIVER TUMOR RF: CPT | Performed by: SURGERY

## 2025-03-17 PROCEDURE — 45395 LAP REMOVAL OF RECTUM: CPT | Performed by: SURGERY

## 2025-03-17 PROCEDURE — 0DBP0ZZ EXCISION OF RECTUM, OPEN APPROACH: ICD-10-PCS | Performed by: STUDENT IN AN ORGANIZED HEALTH CARE EDUCATION/TRAINING PROGRAM

## 2025-03-17 PROCEDURE — 82805 BLOOD GASES W/O2 SATURATION: CPT

## 2025-03-17 PROCEDURE — 2500000005 HC RX 250 GENERAL PHARMACY W/O HCPCS: Performed by: STUDENT IN AN ORGANIZED HEALTH CARE EDUCATION/TRAINING PROGRAM

## 2025-03-17 PROCEDURE — 82947 ASSAY GLUCOSE BLOOD QUANT: CPT

## 2025-03-17 RX ORDER — CEFAZOLIN SODIUM 1 G/50ML
1 SOLUTION INTRAVENOUS ONCE
Status: DISCONTINUED | OUTPATIENT
Start: 2025-03-17 | End: 2025-03-17 | Stop reason: HOSPADM

## 2025-03-17 RX ORDER — METRONIDAZOLE 500 MG/100ML
500 INJECTION, SOLUTION INTRAVENOUS EVERY 8 HOURS
Status: COMPLETED | OUTPATIENT
Start: 2025-03-17 | End: 2025-03-22

## 2025-03-17 RX ORDER — BUPIVACAINE HYDROCHLORIDE 5 MG/ML
INJECTION, SOLUTION PERINEURAL AS NEEDED
Status: DISCONTINUED | OUTPATIENT
Start: 2025-03-17 | End: 2025-03-17 | Stop reason: HOSPADM

## 2025-03-17 RX ORDER — HYDROMORPHONE HYDROCHLORIDE 1 MG/ML
0.4 INJECTION, SOLUTION INTRAMUSCULAR; INTRAVENOUS; SUBCUTANEOUS EVERY 2 HOUR PRN
Status: DISCONTINUED | OUTPATIENT
Start: 2025-03-17 | End: 2025-03-18

## 2025-03-17 RX ORDER — HYDRALAZINE HYDROCHLORIDE 20 MG/ML
10 INJECTION INTRAMUSCULAR; INTRAVENOUS EVERY 30 MIN PRN
Status: DISCONTINUED | OUTPATIENT
Start: 2025-03-17 | End: 2025-03-17 | Stop reason: HOSPADM

## 2025-03-17 RX ORDER — PHENYLEPHRINE HCL IN 0.9% NACL 0.4MG/10ML
SYRINGE (ML) INTRAVENOUS AS NEEDED
Status: DISCONTINUED | OUTPATIENT
Start: 2025-03-17 | End: 2025-03-17

## 2025-03-17 RX ORDER — FENTANYL CITRATE 50 UG/ML
50 INJECTION, SOLUTION INTRAMUSCULAR; INTRAVENOUS EVERY 5 MIN PRN
Status: DISCONTINUED | OUTPATIENT
Start: 2025-03-17 | End: 2025-03-17 | Stop reason: HOSPADM

## 2025-03-17 RX ORDER — ESMOLOL HYDROCHLORIDE 10 MG/ML
INJECTION INTRAVENOUS AS NEEDED
Status: DISCONTINUED | OUTPATIENT
Start: 2025-03-17 | End: 2025-03-17

## 2025-03-17 RX ORDER — ATORVASTATIN CALCIUM 40 MG/1
40 TABLET, FILM COATED ORAL NIGHTLY
Status: DISCONTINUED | OUTPATIENT
Start: 2025-03-17 | End: 2025-03-25

## 2025-03-17 RX ORDER — LIDOCAINE HYDROCHLORIDE 20 MG/ML
INJECTION, SOLUTION INFILTRATION; PERINEURAL AS NEEDED
Status: DISCONTINUED | OUTPATIENT
Start: 2025-03-17 | End: 2025-03-17

## 2025-03-17 RX ORDER — SODIUM CHLORIDE 0.9 G/100ML
INJECTION, SOLUTION IRRIGATION AS NEEDED
Status: DISCONTINUED | OUTPATIENT
Start: 2025-03-17 | End: 2025-03-17 | Stop reason: HOSPADM

## 2025-03-17 RX ORDER — ONDANSETRON 4 MG/1
4 TABLET, ORALLY DISINTEGRATING ORAL EVERY 8 HOURS PRN
Status: DISCONTINUED | OUTPATIENT
Start: 2025-03-17 | End: 2025-04-07 | Stop reason: HOSPADM

## 2025-03-17 RX ORDER — OXYCODONE HYDROCHLORIDE 5 MG/1
5 TABLET ORAL EVERY 4 HOURS PRN
Status: DISCONTINUED | OUTPATIENT
Start: 2025-03-17 | End: 2025-03-17 | Stop reason: HOSPADM

## 2025-03-17 RX ORDER — METOCLOPRAMIDE HYDROCHLORIDE 5 MG/ML
10 INJECTION INTRAMUSCULAR; INTRAVENOUS ONCE AS NEEDED
Status: DISCONTINUED | OUTPATIENT
Start: 2025-03-17 | End: 2025-03-17 | Stop reason: HOSPADM

## 2025-03-17 RX ORDER — ONDANSETRON HYDROCHLORIDE 2 MG/ML
4 INJECTION, SOLUTION INTRAVENOUS ONCE AS NEEDED
Status: DISCONTINUED | OUTPATIENT
Start: 2025-03-17 | End: 2025-03-17 | Stop reason: HOSPADM

## 2025-03-17 RX ORDER — METOPROLOL TARTRATE 50 MG/1
50 TABLET ORAL 2 TIMES DAILY
Status: DISCONTINUED | OUTPATIENT
Start: 2025-03-17 | End: 2025-04-03

## 2025-03-17 RX ORDER — MEPERIDINE HYDROCHLORIDE 25 MG/ML
12.5 INJECTION INTRAMUSCULAR; INTRAVENOUS; SUBCUTANEOUS EVERY 10 MIN PRN
Status: DISCONTINUED | OUTPATIENT
Start: 2025-03-17 | End: 2025-03-17 | Stop reason: HOSPADM

## 2025-03-17 RX ORDER — METRONIDAZOLE 500 MG/100ML
500 INJECTION, SOLUTION INTRAVENOUS ONCE
Status: COMPLETED | OUTPATIENT
Start: 2025-03-17 | End: 2025-03-17

## 2025-03-17 RX ORDER — PROPOFOL 10 MG/ML
INJECTION, EMULSION INTRAVENOUS AS NEEDED
Status: DISCONTINUED | OUTPATIENT
Start: 2025-03-17 | End: 2025-03-17

## 2025-03-17 RX ORDER — MAGNESIUM SULFATE HEPTAHYDRATE 40 MG/ML
2 INJECTION, SOLUTION INTRAVENOUS ONCE
Status: COMPLETED | OUTPATIENT
Start: 2025-03-17 | End: 2025-03-18

## 2025-03-17 RX ORDER — LABETALOL HYDROCHLORIDE 5 MG/ML
INJECTION, SOLUTION INTRAVENOUS AS NEEDED
Status: DISCONTINUED | OUTPATIENT
Start: 2025-03-17 | End: 2025-03-17

## 2025-03-17 RX ORDER — NALOXONE HYDROCHLORIDE 0.4 MG/ML
0.2 INJECTION, SOLUTION INTRAMUSCULAR; INTRAVENOUS; SUBCUTANEOUS EVERY 5 MIN PRN
Status: DISCONTINUED | OUTPATIENT
Start: 2025-03-17 | End: 2025-04-07 | Stop reason: HOSPADM

## 2025-03-17 RX ORDER — ACETAMINOPHEN 325 MG/1
650 TABLET ORAL EVERY 4 HOURS PRN
Status: DISCONTINUED | OUTPATIENT
Start: 2025-03-17 | End: 2025-03-18

## 2025-03-17 RX ORDER — CEFAZOLIN SODIUM 2 G/100ML
2 INJECTION, SOLUTION INTRAVENOUS EVERY 8 HOURS
Status: DISCONTINUED | OUTPATIENT
Start: 2025-03-17 | End: 2025-03-18

## 2025-03-17 RX ORDER — ENOXAPARIN SODIUM 100 MG/ML
40 INJECTION SUBCUTANEOUS EVERY 24 HOURS
Status: DISCONTINUED | OUTPATIENT
Start: 2025-03-18 | End: 2025-03-24

## 2025-03-17 RX ORDER — CEFAZOLIN 1 G/1
INJECTION, POWDER, FOR SOLUTION INTRAVENOUS AS NEEDED
Status: DISCONTINUED | OUTPATIENT
Start: 2025-03-17 | End: 2025-03-17

## 2025-03-17 RX ORDER — SODIUM CHLORIDE 9 MG/ML
40 INJECTION, SOLUTION INTRAVENOUS CONTINUOUS
Status: DISCONTINUED | OUTPATIENT
Start: 2025-03-17 | End: 2025-03-18

## 2025-03-17 RX ORDER — MAGNESIUM SULFATE HEPTAHYDRATE 500 MG/ML
INJECTION, SOLUTION INTRAMUSCULAR; INTRAVENOUS AS NEEDED
Status: DISCONTINUED | OUTPATIENT
Start: 2025-03-17 | End: 2025-03-17

## 2025-03-17 RX ORDER — OXYCODONE HYDROCHLORIDE 5 MG/1
10 TABLET ORAL EVERY 4 HOURS PRN
Status: DISCONTINUED | OUTPATIENT
Start: 2025-03-17 | End: 2025-03-18

## 2025-03-17 RX ORDER — ONDANSETRON HYDROCHLORIDE 2 MG/ML
4 INJECTION, SOLUTION INTRAVENOUS EVERY 8 HOURS PRN
Status: DISCONTINUED | OUTPATIENT
Start: 2025-03-17 | End: 2025-04-07 | Stop reason: HOSPADM

## 2025-03-17 RX ORDER — SODIUM CHLORIDE, SODIUM LACTATE, POTASSIUM CHLORIDE, CALCIUM CHLORIDE 600; 310; 30; 20 MG/100ML; MG/100ML; MG/100ML; MG/100ML
INJECTION, SOLUTION INTRAVENOUS CONTINUOUS PRN
Status: DISCONTINUED | OUTPATIENT
Start: 2025-03-17 | End: 2025-03-17

## 2025-03-17 RX ORDER — ROCURONIUM BROMIDE 10 MG/ML
INJECTION, SOLUTION INTRAVENOUS AS NEEDED
Status: DISCONTINUED | OUTPATIENT
Start: 2025-03-17 | End: 2025-03-17

## 2025-03-17 RX ORDER — CALCIUM CHLORIDE INJECTION 100 MG/ML
INJECTION, SOLUTION INTRAVENOUS AS NEEDED
Status: DISCONTINUED | OUTPATIENT
Start: 2025-03-17 | End: 2025-03-17

## 2025-03-17 RX ORDER — NITROGLYCERIN 40 MG/100ML
INJECTION INTRAVENOUS AS NEEDED
Status: DISCONTINUED | OUTPATIENT
Start: 2025-03-17 | End: 2025-03-17

## 2025-03-17 RX ORDER — METRONIDAZOLE 500 MG/100ML
500 INJECTION, SOLUTION INTRAVENOUS ONCE
Status: CANCELLED | OUTPATIENT
Start: 2025-03-17

## 2025-03-17 RX ORDER — NORETHINDRONE AND ETHINYL ESTRADIOL 0.5-0.035
KIT ORAL AS NEEDED
Status: DISCONTINUED | OUTPATIENT
Start: 2025-03-17 | End: 2025-03-17

## 2025-03-17 RX ORDER — NAPROXEN SODIUM 220 MG/1
81 TABLET, FILM COATED ORAL DAILY
Status: DISCONTINUED | OUTPATIENT
Start: 2025-03-17 | End: 2025-04-07 | Stop reason: HOSPADM

## 2025-03-17 RX ORDER — OXYCODONE HYDROCHLORIDE 5 MG/1
5 TABLET ORAL EVERY 4 HOURS PRN
Status: DISCONTINUED | OUTPATIENT
Start: 2025-03-17 | End: 2025-03-18

## 2025-03-17 RX ORDER — HEPARIN SODIUM 5000 [USP'U]/ML
5000 INJECTION, SOLUTION INTRAVENOUS; SUBCUTANEOUS ONCE
Status: COMPLETED | OUTPATIENT
Start: 2025-03-17 | End: 2025-03-17

## 2025-03-17 RX ORDER — HEPARIN SODIUM 5000 [USP'U]/ML
5000 INJECTION, SOLUTION INTRAVENOUS; SUBCUTANEOUS ONCE
Status: CANCELLED | OUTPATIENT
Start: 2025-03-17

## 2025-03-17 RX ORDER — LIDOCAINE HYDROCHLORIDE 10 MG/ML
0.1 INJECTION, SOLUTION INFILTRATION; PERINEURAL ONCE
Status: DISCONTINUED | OUTPATIENT
Start: 2025-03-17 | End: 2025-03-17 | Stop reason: HOSPADM

## 2025-03-17 RX ORDER — MIDAZOLAM HYDROCHLORIDE 1 MG/ML
INJECTION INTRAMUSCULAR; INTRAVENOUS AS NEEDED
Status: DISCONTINUED | OUTPATIENT
Start: 2025-03-17 | End: 2025-03-17

## 2025-03-17 RX ORDER — ALBUTEROL SULFATE 0.83 MG/ML
2.5 SOLUTION RESPIRATORY (INHALATION) ONCE AS NEEDED
Status: DISCONTINUED | OUTPATIENT
Start: 2025-03-17 | End: 2025-03-17 | Stop reason: HOSPADM

## 2025-03-17 RX ORDER — ALBUMIN HUMAN 50 G/1000ML
SOLUTION INTRAVENOUS AS NEEDED
Status: DISCONTINUED | OUTPATIENT
Start: 2025-03-17 | End: 2025-03-17

## 2025-03-17 RX ORDER — FENTANYL CITRATE 50 UG/ML
INJECTION, SOLUTION INTRAMUSCULAR; INTRAVENOUS AS NEEDED
Status: DISCONTINUED | OUTPATIENT
Start: 2025-03-17 | End: 2025-03-17

## 2025-03-17 RX ORDER — METHOCARBAMOL 500 MG/1
1000 TABLET, FILM COATED ORAL EVERY 8 HOURS SCHEDULED
Status: DISCONTINUED | OUTPATIENT
Start: 2025-03-17 | End: 2025-03-18

## 2025-03-17 RX ORDER — GABAPENTIN 300 MG/1
300 CAPSULE ORAL 3 TIMES DAILY
Status: DISCONTINUED | OUTPATIENT
Start: 2025-03-17 | End: 2025-03-28

## 2025-03-17 RX ORDER — SODIUM CHLORIDE, SODIUM LACTATE, POTASSIUM CHLORIDE, CALCIUM CHLORIDE 600; 310; 30; 20 MG/100ML; MG/100ML; MG/100ML; MG/100ML
100 INJECTION, SOLUTION INTRAVENOUS CONTINUOUS
Status: DISCONTINUED | OUTPATIENT
Start: 2025-03-17 | End: 2025-03-17 | Stop reason: HOSPADM

## 2025-03-17 RX ORDER — DEXMEDETOMIDINE HYDROCHLORIDE 4 UG/ML
INJECTION, SOLUTION INTRAVENOUS CONTINUOUS PRN
Status: DISCONTINUED | OUTPATIENT
Start: 2025-03-17 | End: 2025-03-17

## 2025-03-17 RX ORDER — ACETAMINOPHEN 10 MG/ML
INJECTION, SOLUTION INTRAVENOUS AS NEEDED
Status: DISCONTINUED | OUTPATIENT
Start: 2025-03-17 | End: 2025-03-17

## 2025-03-17 RX ADMIN — FENTANYL CITRATE 50 MCG: 50 INJECTION, SOLUTION INTRAMUSCULAR; INTRAVENOUS at 15:35

## 2025-03-17 RX ADMIN — PROPOFOL 150 MG: 10 INJECTION, EMULSION INTRAVENOUS at 07:43

## 2025-03-17 RX ADMIN — FENTANYL CITRATE 50 MCG: 50 INJECTION, SOLUTION INTRAMUSCULAR; INTRAVENOUS at 16:52

## 2025-03-17 RX ADMIN — HYDROMORPHONE HYDROCHLORIDE 0.5 MG: 1 INJECTION, SOLUTION INTRAMUSCULAR; INTRAVENOUS; SUBCUTANEOUS at 17:38

## 2025-03-17 RX ADMIN — FENTANYL CITRATE 50 MCG: 50 INJECTION, SOLUTION INTRAMUSCULAR; INTRAVENOUS at 19:01

## 2025-03-17 RX ADMIN — MAGNESIUM SULFATE HEPTAHYDRATE 2 G: 40 INJECTION, SOLUTION INTRAVENOUS at 23:10

## 2025-03-17 RX ADMIN — Medication 120 MCG: at 07:55

## 2025-03-17 RX ADMIN — ALBUMIN HUMAN 250 ML: 0.05 INJECTION, SOLUTION INTRAVENOUS at 10:04

## 2025-03-17 RX ADMIN — PROPOFOL 30 MG: 10 INJECTION, EMULSION INTRAVENOUS at 08:43

## 2025-03-17 RX ADMIN — DEXAMETHASONE SODIUM PHOSPHATE 4 MG: 4 INJECTION INTRA-ARTICULAR; INTRALESIONAL; INTRAMUSCULAR; INTRAVENOUS; SOFT TISSUE at 07:51

## 2025-03-17 RX ADMIN — SODIUM CHLORIDE 40 ML/HR: 9 INJECTION, SOLUTION INTRAVENOUS at 20:47

## 2025-03-17 RX ADMIN — ROCURONIUM 10 MG: 50 INJECTION, SOLUTION INTRAVENOUS at 12:53

## 2025-03-17 RX ADMIN — METOPROLOL TARTRATE 50 MG: 50 TABLET, FILM COATED ORAL at 20:47

## 2025-03-17 RX ADMIN — SODIUM CHLORIDE, POTASSIUM CHLORIDE, SODIUM LACTATE AND CALCIUM CHLORIDE: 600; 310; 30; 20 INJECTION, SOLUTION INTRAVENOUS at 07:20

## 2025-03-17 RX ADMIN — FENTANYL CITRATE 100 MCG: 50 INJECTION, SOLUTION INTRAMUSCULAR; INTRAVENOUS at 07:42

## 2025-03-17 RX ADMIN — HYDROMORPHONE HYDROCHLORIDE 0.4 MG: 1 INJECTION, SOLUTION INTRAMUSCULAR; INTRAVENOUS; SUBCUTANEOUS at 23:12

## 2025-03-17 RX ADMIN — METHOCARBAMOL 1000 MG: 500 TABLET ORAL at 20:48

## 2025-03-17 RX ADMIN — Medication 80 MCG: at 08:15

## 2025-03-17 RX ADMIN — MAGNESIUM SULFATE HEPTAHYDRATE 1 G: 500 INJECTION, SOLUTION INTRAMUSCULAR; INTRAVENOUS at 13:44

## 2025-03-17 RX ADMIN — SODIUM CHLORIDE: 9 INJECTION, SOLUTION INTRAVENOUS at 07:20

## 2025-03-17 RX ADMIN — EPHEDRINE SULFATE 5 MG: 50 INJECTION, SOLUTION INTRAVENOUS at 15:22

## 2025-03-17 RX ADMIN — CEFAZOLIN SODIUM 2 G: 2 INJECTION, SOLUTION INTRAVENOUS at 20:47

## 2025-03-17 RX ADMIN — Medication 80 MCG: at 08:11

## 2025-03-17 RX ADMIN — Medication 80 MCG: at 08:37

## 2025-03-17 RX ADMIN — LIDOCAINE HYDROCHLORIDE 100 MG: 20 INJECTION, SOLUTION INFILTRATION; PERINEURAL at 07:42

## 2025-03-17 RX ADMIN — NITROGLYCERIN 50 MCG: 10 INJECTION INTRAVENOUS at 08:39

## 2025-03-17 RX ADMIN — MIDAZOLAM HYDROCHLORIDE 1 MG: 2 INJECTION, SOLUTION INTRAMUSCULAR; INTRAVENOUS at 07:11

## 2025-03-17 RX ADMIN — FENTANYL CITRATE 50 MCG: 50 INJECTION, SOLUTION INTRAMUSCULAR; INTRAVENOUS at 09:52

## 2025-03-17 RX ADMIN — ESMOLOL HYDROCHLORIDE 30 MG: 10 INJECTION, SOLUTION INTRAVENOUS at 08:39

## 2025-03-17 RX ADMIN — ALBUMIN HUMAN 250 ML: 0.05 INJECTION, SOLUTION INTRAVENOUS at 12:42

## 2025-03-17 RX ADMIN — FENTANYL CITRATE 50 MCG: 50 INJECTION, SOLUTION INTRAMUSCULAR; INTRAVENOUS at 19:11

## 2025-03-17 RX ADMIN — EPHEDRINE SULFATE 5 MG: 50 INJECTION, SOLUTION INTRAVENOUS at 13:55

## 2025-03-17 RX ADMIN — METRONIDAZOLE 500 MG: 5 INJECTION, SOLUTION INTRAVENOUS at 07:58

## 2025-03-17 RX ADMIN — ROCURONIUM 50 MG: 50 INJECTION, SOLUTION INTRAVENOUS at 07:44

## 2025-03-17 RX ADMIN — GABAPENTIN 300 MG: 300 CAPSULE ORAL at 20:47

## 2025-03-17 RX ADMIN — ACETAMINOPHEN 1000 MG: 10 INJECTION, SOLUTION INTRAVENOUS at 08:05

## 2025-03-17 RX ADMIN — ESMOLOL HYDROCHLORIDE 20 MG: 10 INJECTION, SOLUTION INTRAVENOUS at 09:55

## 2025-03-17 RX ADMIN — ROCURONIUM 10 MG: 50 INJECTION, SOLUTION INTRAVENOUS at 12:04

## 2025-03-17 RX ADMIN — DEXMEDETOMIDINE HYDROCHLORIDE 0.2 MCG/KG/HR: 4 INJECTION, SOLUTION INTRAVENOUS at 09:47

## 2025-03-17 RX ADMIN — CALCIUM CHLORIDE 0.2 G: 100 INJECTION INTRAVENOUS; INTRAVENTRICULAR at 11:44

## 2025-03-17 RX ADMIN — ROCURONIUM 10 MG: 50 INJECTION, SOLUTION INTRAVENOUS at 13:42

## 2025-03-17 RX ADMIN — NITROGLYCERIN 100 MCG: 10 INJECTION INTRAVENOUS at 08:42

## 2025-03-17 RX ADMIN — MIDAZOLAM HYDROCHLORIDE 1 MG: 2 INJECTION, SOLUTION INTRAMUSCULAR; INTRAVENOUS at 07:22

## 2025-03-17 RX ADMIN — FENTANYL CITRATE 50 MCG: 50 INJECTION, SOLUTION INTRAMUSCULAR; INTRAVENOUS at 08:56

## 2025-03-17 RX ADMIN — ROCURONIUM 10 MG: 50 INJECTION, SOLUTION INTRAVENOUS at 11:53

## 2025-03-17 RX ADMIN — Medication 120 MCG: at 08:25

## 2025-03-17 RX ADMIN — OXYCODONE 5 MG: 5 TABLET ORAL at 19:17

## 2025-03-17 RX ADMIN — MAGNESIUM SULFATE HEPTAHYDRATE 1 G: 500 INJECTION, SOLUTION INTRAMUSCULAR; INTRAVENOUS at 13:13

## 2025-03-17 RX ADMIN — Medication 120 MCG: at 07:59

## 2025-03-17 RX ADMIN — Medication 80 MCG: at 07:51

## 2025-03-17 RX ADMIN — OXYCODONE 10 MG: 5 TABLET ORAL at 20:47

## 2025-03-17 RX ADMIN — CEFAZOLIN 2 G: 1 INJECTION, POWDER, FOR SOLUTION INTRAMUSCULAR; INTRAVENOUS at 07:55

## 2025-03-17 RX ADMIN — CALCIUM CHLORIDE 0.3 G: 100 INJECTION INTRAVENOUS; INTRAVENTRICULAR at 11:47

## 2025-03-17 RX ADMIN — CEFAZOLIN 2 G: 1 INJECTION, POWDER, FOR SOLUTION INTRAMUSCULAR; INTRAVENOUS at 11:59

## 2025-03-17 RX ADMIN — SUGAMMADEX 200 MG: 100 INJECTION, SOLUTION INTRAVENOUS at 15:30

## 2025-03-17 RX ADMIN — ROCURONIUM 20 MG: 50 INJECTION, SOLUTION INTRAVENOUS at 08:43

## 2025-03-17 RX ADMIN — ALBUMIN HUMAN 250 ML: 0.05 INJECTION, SOLUTION INTRAVENOUS at 15:32

## 2025-03-17 RX ADMIN — FENTANYL CITRATE 50 MCG: 50 INJECTION, SOLUTION INTRAMUSCULAR; INTRAVENOUS at 08:45

## 2025-03-17 RX ADMIN — NITROGLYCERIN 50 MCG: 10 INJECTION INTRAVENOUS at 08:40

## 2025-03-17 RX ADMIN — HEPARIN SODIUM 5000 UNITS: 5000 INJECTION, SOLUTION INTRAVENOUS; SUBCUTANEOUS at 07:01

## 2025-03-17 RX ADMIN — METRONIDAZOLE 500 MG: 5 INJECTION, SOLUTION INTRAVENOUS at 23:10

## 2025-03-17 RX ADMIN — ROCURONIUM 10 MG: 50 INJECTION, SOLUTION INTRAVENOUS at 11:18

## 2025-03-17 RX ADMIN — LABETALOL HYDROCHLORIDE 5 MG: 5 INJECTION INTRAVENOUS at 16:22

## 2025-03-17 RX ADMIN — NITROGLYCERIN 50 MCG: 10 INJECTION INTRAVENOUS at 09:57

## 2025-03-17 RX ADMIN — ROCURONIUM 10 MG: 50 INJECTION, SOLUTION INTRAVENOUS at 10:19

## 2025-03-17 SDOH — ECONOMIC STABILITY: FOOD INSECURITY: WITHIN THE PAST 12 MONTHS, THE FOOD YOU BOUGHT JUST DIDN'T LAST AND YOU DIDN'T HAVE MONEY TO GET MORE.: NEVER TRUE

## 2025-03-17 SDOH — SOCIAL STABILITY: SOCIAL INSECURITY: WITHIN THE LAST YEAR, HAVE YOU BEEN HUMILIATED OR EMOTIONALLY ABUSED IN OTHER WAYS BY YOUR PARTNER OR EX-PARTNER?: NO

## 2025-03-17 SDOH — ECONOMIC STABILITY: INCOME INSECURITY: IN THE PAST 12 MONTHS HAS THE ELECTRIC, GAS, OIL, OR WATER COMPANY THREATENED TO SHUT OFF SERVICES IN YOUR HOME?: NO

## 2025-03-17 SDOH — ECONOMIC STABILITY: FOOD INSECURITY: WITHIN THE PAST 12 MONTHS, YOU WORRIED THAT YOUR FOOD WOULD RUN OUT BEFORE YOU GOT THE MONEY TO BUY MORE.: NEVER TRUE

## 2025-03-17 SDOH — SOCIAL STABILITY: SOCIAL INSECURITY: ARE YOU OR HAVE YOU BEEN THREATENED OR ABUSED PHYSICALLY, EMOTIONALLY, OR SEXUALLY BY ANYONE?: NO

## 2025-03-17 SDOH — SOCIAL STABILITY: SOCIAL INSECURITY: WERE YOU ABLE TO COMPLETE ALL THE BEHAVIORAL HEALTH SCREENINGS?: YES

## 2025-03-17 SDOH — SOCIAL STABILITY: SOCIAL INSECURITY: WITHIN THE LAST YEAR, HAVE YOU BEEN AFRAID OF YOUR PARTNER OR EX-PARTNER?: NO

## 2025-03-17 SDOH — SOCIAL STABILITY: SOCIAL INSECURITY: DO YOU FEEL UNSAFE GOING BACK TO THE PLACE WHERE YOU ARE LIVING?: NO

## 2025-03-17 SDOH — SOCIAL STABILITY: SOCIAL INSECURITY: ARE THERE ANY APPARENT SIGNS OF INJURIES/BEHAVIORS THAT COULD BE RELATED TO ABUSE/NEGLECT?: NO

## 2025-03-17 SDOH — SOCIAL STABILITY: SOCIAL INSECURITY: HAS ANYONE EVER THREATENED TO HURT YOUR FAMILY OR YOUR PETS?: NO

## 2025-03-17 SDOH — SOCIAL STABILITY: SOCIAL INSECURITY: ABUSE: ADULT

## 2025-03-17 SDOH — SOCIAL STABILITY: SOCIAL INSECURITY: HAVE YOU HAD THOUGHTS OF HARMING ANYONE ELSE?: NO

## 2025-03-17 SDOH — HEALTH STABILITY: MENTAL HEALTH: CURRENT SMOKER: 0

## 2025-03-17 SDOH — SOCIAL STABILITY: SOCIAL INSECURITY: DO YOU FEEL ANYONE HAS EXPLOITED OR TAKEN ADVANTAGE OF YOU FINANCIALLY OR OF YOUR PERSONAL PROPERTY?: NO

## 2025-03-17 SDOH — SOCIAL STABILITY: SOCIAL INSECURITY: DOES ANYONE TRY TO KEEP YOU FROM HAVING/CONTACTING OTHER FRIENDS OR DOING THINGS OUTSIDE YOUR HOME?: NO

## 2025-03-17 ASSESSMENT — COGNITIVE AND FUNCTIONAL STATUS - GENERAL
DRESSING REGULAR LOWER BODY CLOTHING: A LITTLE
MOVING TO AND FROM BED TO CHAIR: A LITTLE
HELP NEEDED FOR BATHING: A LITTLE
MOVING FROM LYING ON BACK TO SITTING ON SIDE OF FLAT BED WITH BEDRAILS: A LITTLE
STANDING UP FROM CHAIR USING ARMS: A LITTLE
TOILETING: A LITTLE
CLIMB 3 TO 5 STEPS WITH RAILING: A LITTLE
DRESSING REGULAR UPPER BODY CLOTHING: A LITTLE
WALKING IN HOSPITAL ROOM: A LITTLE
DAILY ACTIVITIY SCORE: 20
TURNING FROM BACK TO SIDE WHILE IN FLAT BAD: A LITTLE
MOBILITY SCORE: 18
PATIENT BASELINE BEDBOUND: NO

## 2025-03-17 ASSESSMENT — LIFESTYLE VARIABLES
HOW MANY STANDARD DRINKS CONTAINING ALCOHOL DO YOU HAVE ON A TYPICAL DAY: PATIENT DOES NOT DRINK
HOW OFTEN DO YOU HAVE A DRINK CONTAINING ALCOHOL: NEVER
HOW OFTEN DO YOU HAVE 6 OR MORE DRINKS ON ONE OCCASION: NEVER
AUDIT-C TOTAL SCORE: 0
SKIP TO QUESTIONS 9-10: 1
AUDIT-C TOTAL SCORE: 0

## 2025-03-17 ASSESSMENT — PATIENT HEALTH QUESTIONNAIRE - PHQ9
1. LITTLE INTEREST OR PLEASURE IN DOING THINGS: NOT AT ALL
2. FEELING DOWN, DEPRESSED OR HOPELESS: NOT AT ALL
SUM OF ALL RESPONSES TO PHQ9 QUESTIONS 1 & 2: 0

## 2025-03-17 ASSESSMENT — ACTIVITIES OF DAILY LIVING (ADL)
GROOMING: INDEPENDENT
TOILETING: INDEPENDENT
HEARING - RIGHT EAR: FUNCTIONAL
HEARING - LEFT EAR: FUNCTIONAL
FEEDING YOURSELF: INDEPENDENT
ADEQUATE_TO_COMPLETE_ADL: YES
LACK_OF_TRANSPORTATION: NO
PATIENT'S MEMORY ADEQUATE TO SAFELY COMPLETE DAILY ACTIVITIES?: YES
BATHING: INDEPENDENT
WALKS IN HOME: INDEPENDENT
LACK_OF_TRANSPORTATION: NO
DRESSING YOURSELF: INDEPENDENT
JUDGMENT_ADEQUATE_SAFELY_COMPLETE_DAILY_ACTIVITIES: YES

## 2025-03-17 ASSESSMENT — PAIN DESCRIPTION - LOCATION
LOCATION: ABDOMEN

## 2025-03-17 ASSESSMENT — COLUMBIA-SUICIDE SEVERITY RATING SCALE - C-SSRS
2. HAVE YOU ACTUALLY HAD ANY THOUGHTS OF KILLING YOURSELF?: NO
2. HAVE YOU ACTUALLY HAD ANY THOUGHTS OF KILLING YOURSELF?: NO
6. HAVE YOU EVER DONE ANYTHING, STARTED TO DO ANYTHING, OR PREPARED TO DO ANYTHING TO END YOUR LIFE?: NO
1. IN THE PAST MONTH, HAVE YOU WISHED YOU WERE DEAD OR WISHED YOU COULD GO TO SLEEP AND NOT WAKE UP?: NO
6. HAVE YOU EVER DONE ANYTHING, STARTED TO DO ANYTHING, OR PREPARED TO DO ANYTHING TO END YOUR LIFE?: NO
6. HAVE YOU EVER DONE ANYTHING, STARTED TO DO ANYTHING, OR PREPARED TO DO ANYTHING TO END YOUR LIFE?: NO
2. HAVE YOU ACTUALLY HAD ANY THOUGHTS OF KILLING YOURSELF?: NO

## 2025-03-17 ASSESSMENT — PAIN SCALES - GENERAL
PAINLEVEL_OUTOF10: 10 - WORST POSSIBLE PAIN
PAINLEVEL_OUTOF10: 5 - MODERATE PAIN
PAINLEVEL_OUTOF10: 9
PAINLEVEL_OUTOF10: 9
PAINLEVEL_OUTOF10: 6
PAINLEVEL_OUTOF10: 9
PAINLEVEL_OUTOF10: 6
PAINLEVEL_OUTOF10: 0 - NO PAIN
PAINLEVEL_OUTOF10: 5 - MODERATE PAIN
PAINLEVEL_OUTOF10: 6
PAINLEVEL_OUTOF10: 7
PAINLEVEL_OUTOF10: 0 - NO PAIN

## 2025-03-17 ASSESSMENT — PAIN - FUNCTIONAL ASSESSMENT
PAIN_FUNCTIONAL_ASSESSMENT: UNABLE TO SELF-REPORT
PAIN_FUNCTIONAL_ASSESSMENT: 0-10

## 2025-03-17 ASSESSMENT — PAIN SCALES - WONG BAKER: WONGBAKER_NUMERICALRESPONSE: HURTS WHOLE LOT

## 2025-03-17 NOTE — Clinical Note
Hepatic vein, portal vein, and splenic tract vein embolization complete. 2 mg of versed and 150 mcg of fentanyl given throughout procedure. VSS. Splenic site and R internal jugular closed with gauze and tegaderm. Dressings are C/D/I. Report given to [facility] 6 RN . Pt tolerated procedure well. Dr. Jb Gonzalez updated pt's wife. Pt in transport back to room.

## 2025-03-17 NOTE — ANESTHESIA POSTPROCEDURE EVALUATION
Patient: Roma Corral    Procedure Summary       Date: 03/17/25 Room / Location: [facility] OR 12 / [facility] OR    Anesthesia Start: 0726 Anesthesia Stop: 1624    Procedures:       Laparoscopic Microwave Ablation Liver Tumor(s) with Ultrasound and Navigation      Liver Biopsy Ultrasound Guided      RESECTION, RECTUM AND SIGMOID COLON, ROBOT-ASSISTED Diagnosis:       Rectal cancer (Multi)      (Rectal cancer (Multi) [C20])    Surgeons: Baron Erazo MD; Javier Vasquez MD Responsible Provider: Dann Goode DO    Anesthesia Type: general ASA Status: 3            Anesthesia Type: general    Vitals Value Taken Time   /77 03/17/25 1615   Temp 35.3 03/17/25 1624   Pulse 61 03/17/25 1622   Resp 12 03/17/25 1622   SpO2 100 % 03/17/25 1622   Vitals shown include unfiled device data.    Anesthesia Post Evaluation    Patient location during evaluation: PACU  Patient participation: complete - patient participated  Level of consciousness: responsive to light touch  Pain management: adequate  Airway patency: patent  Cardiovascular status: acceptable  Respiratory status: acceptable and face mask  Hydration status: acceptable  Postoperative Nausea and Vomiting: none  Comments: Pt temp improved to 35.3 in PACU.   Patient with elvis hugger in PACU and heated blankets.   Nurses instructed to continue to re warm and trend temp         No notable events documented.

## 2025-03-17 NOTE — OP NOTE
Laparoscopic Microwave Ablation Liver Tumor(s) with Ultrasound and Navigation, Liver Biopsy Ultrasound Guided Operative Note     Date: 3/17/2025  OR Location: [facility] OR    Name: Roma Corral YOB: 1955, Age: 70 y.o., MRN: [id number], Sex: male    Diagnosis  Pre-op Diagnosis      * Rectal cancer (Multi) [C20] Post-op Diagnosis     * Rectal cancer (Multi) [C20]     Procedures  Laparoscopic Microwave Ablation Liver Tumor(s) with Ultrasound and Navigation  71923 - SD LAPS SURG ABLTJ 1/> LVR JAEL RF    Liver Biopsy Ultrasound Guided  36109 - CHG US &MNTR PARENCHYMAL TISSUE ABLATION      Surgeons   Panel 1:     * Baron Erazo - Primary      Resident/Fellow/Other Assistant:  Surgeons and Role:  Panel 1:     * Tammy Cannon MD - Resident - Assisting     * Laith Palacios MD - Resident - Assisting  Panel 2:     * Missael Lyles MD - Resident - Assisting    Staff:   Circulator: Keith  Scrub Person: Katarzyna Ponceub Person: Mira  Surgical Assistant: Bita Puentes Scrub: Radha  Relief Circulator: Radha  Circulator: Radha    Anesthesia Staff: Anesthesiologist: Sharif Langford MD  CRNA: MELISSA Barakat-CRNA  C-AA: KEV Mayer  Anesthesia Resident: Reuben Waggoner MD; Lucy Russo MD    Procedure Summary  Anesthesia: General  ASA: III  Estimated Blood Loss: 5mL  Intra-op Medications:   Administrations occurring from 0635 to 1545 on 25:   Medication Name Total Dose   sodium chloride 0.9 % irrigation solution 1,000 mL   acetaminophen (Ofirmev) injection 1,000 mg   albumin human bottle 5% 250 mL   calcium chloride 100 mg/mL syringe 0.5 g   ceFAZolin (Ancef) vial 1 g 4 g   dexAMETHasone (Decadron) 4 mg/mL 4 mg   dexmedeTOMIDine 4 mcg/mL in 100 mL NS infusion 103.1 mcg   esmolol 10 mg/mL 50 mg   fentaNYL (Sublimaze) injection 50 mcg/mL 250 mcg   LR infusion Cannot be calculated   lidocaine (Xylocaine) injection 2 % 100 mg   midazolam PF (Versed) injection 1 mg/mL 2 mg   nitroglycerin 1 mg in 10 mL D5W IV  bolus 250 mcg   phenylephrine 40 mcg/mL syringe 10 mL 680 mcg   propofol (Diprivan) injection 10 mg/mL 150 mg   rocuronium (ZeMuron) 50 mg/5 mL injection 110 mg   heparin (porcine) injection 5,000 Units 5,000 Units   metroNIDAZOLE (Flagyl) 500 mg in sodium chloride (iso)  mL 500 mg              Anesthesia Record               Intraprocedure I/O Totals          Intake    PRBC 700.00 mL    Dexmedetomidine 0.00 mL    The total shown is the total volume documented since Anesthesia Start was filed.    Total Intake 700 mL       Output    Urine 175 mL    Total Output 175 mL       Net    Net Volume 525 mL          Specimen:   ID Type Source Tests Collected by Time   1 : ILEOCECECTOMY Tissue ILEUM ILEOCOLIC RESECTION SURGICAL PATHOLOGY EXAM Javier Vasquez MD 3/17/2025 0932                 Drains and/or Catheters:   Colostomy Loop LUQ (Active)   Stomal Appliance 1 piece 03/17/25 0637       Urethral Catheter Coude;Latex 18 Fr. (Active)         Findings: Nearly 1.5 cm tumor in segment 4 which was ablated at 100 W for 1 minute and 30 seconds.  Segment 3 tumor which had tremendously reduced on preoperative imaging was very challenging to definitively identify.  A tiny area was noted in the region which the preoperative imaging showed a lesion in this area was ablated at 100 W for 1 minute.  There was a segment 4B/5 lesion which was abutting the right portal pedicle.  I opted to ablate this at 100 W for 1 minute 30 seconds as progression of this lesion would potentially make the patient unresectable in the future.    Indications: Roma Corral is an 70 y.o. male who is having surgery for Rectal cancer (Multi) [C20].  He has been treated with neoadjuvant chemotherapy and radiation.  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  Has numerous bilobar liver lesions.  Plan was for two-stage hepatectomy approach.  At this for stage of the plan is to clear the left liver and also deal with the primary tumor.    The patient was seen in the  preoperative area. The risks, benefits, complications, treatment options, non-operative alternatives, expected recovery and outcomes were discussed with the patient. The possibilities of reaction to medication, pulmonary aspiration, injury to surrounding structures, bleeding, recurrent infection, the need for additional procedures, failure to diagnose a condition, and creating a complication requiring transfusion or operation were discussed with the patient. The patient concurred with the proposed plan, giving informed consent.  The site of surgery was properly noted/marked if necessary per policy. The patient has been actively warmed in preoperative area. Preoperative antibiotics have been ordered and given within 1 hours of incision. Venous thrombosis prophylaxis have been ordered including bilateral sequential compression devices and chemical prophylaxis    Procedure Details:   The patient was brought to the operating room and placed supine on the table. General anesthesia was smoothly induced. The abdomen was prepped and draped in the usual fashion. Timeout was performed.    Dr. Modi had entered the abdomen, placed ports, and identified fistula to the terminal ileum.  He performed ileocecectomy.  At this point I entered the operating room.  There was no evidence of extrahepatic metastatic disease. Ultrasound was used to evaluate the entire liver. The liver tumors were identified. Using the navigation technology with ultrasound guidance, microwave ablation of the liver tumors was now performed with ultrasound monitoring of the parenchymal ablation.  See above under findings for the details.  Hemostasis was assured.  I now turned the operation back to Dr. Modi.    The patient tolerated the procedure well without any apparent intraoperative complications. All sponge, needle, and instrument counts were correct.       Complications:  None; patient tolerated the procedure well.    Disposition: PACU -  hemodynamically stable.  Condition: stable         Task Performed by RNFA or Surgical Assistant:  None.  Resident assisted with this procedure      Additional Details: Dr. Modi will dictate his portion in a separate note    Attending Attestation: I was present and scrubbed for the entire procedure.    Baron Erazo  Phone Number: [phone]

## 2025-03-17 NOTE — ANESTHESIA PREPROCEDURE EVALUATION
Patient: Roma Corral    Procedure Information       Date/Time: 25 0635    Procedures:       Laparoscopic Microwave Ablation Liver Tumor(s) with Ultrasound and Navigation      Liver Biopsy Ultrasound Guided      RESECTION, RECTUM AND SIGMOID COLON, ROBOT-ASSISTED    Location: [facility] OR 12 / [facility]    Surgeons: Baron Erazo MD; Javier Vasquez MD     HPI:  71 y/o male scheduled for Laparoscopic Microwave Ablation Liver Tumor(s) with Ultrasound and Navigation, Liver Biopsy Ultrasound Guided RESECTION, RECTUM AND SIGMOID COLON, ROBOT-ASSISTED  on 2025 with Dr. Baron Erazo and Javier Vasquez secondary to Rectal cancer .   Presents to CPM today for perioperative risk stratification and optimization. PMHX includes Anxiety, HLD, HTN, CAD( NSTEMI), DM2, Rectal cancer, Liver mass/malignancy, Anemia Vitamin D Deficiency, Gout     EK2025  Atrial flutter  Abnormal ECG  When compared with ECG of 10-NOV-2024 23:10,  Atrial flutter has replaced Sinus rhythm  QRS axis Shifted right     See ED provider note for full interpretation and clinical correlation  Confirmed by Pilar Galicia ([id number]) on 2025 5:29:40 AM    ECHO: 2021  Mild LVH with LVEF >60%; Mild LAE; O/W nl    Card Cath: 2021  Diffuse CAD with scattered leasions in the RCA and LAD, max occlusion 45-50%     Component  Ref Range & Units 2 d ago  (3/15/25) 5 d ago  (3/12/25) 1 mo ago  (25)   WBC  4.4 - 11.3 x10*3/uL 2.7 Low  4.5 11.2   RBC  4.50 - 5.90 x10*6/uL 2.75 Low  2.80 Low  3.39 Low    Hemoglobin  13.5 - 17.5 g/dL 8.1 Low  8.3 Low  9.8 Low    Hematocrit  41.0 - 52.0 % 29.1 Low  28.7 Low  31.9 Low    Platelets  150 - 450 x10*3/uL 95 Low  95 Low  63 Low      Relevant Problems   Cardiac   (+) Acute non-ST elevation myocardial infarction (NSTEMI) (Multi)   (+) CAD (coronary artery disease)   (+) Hyperlipidemia   (+) Hypertension   (-) Stented coronary artery      Pulmonary   (+) SOB (shortness of breath) on  exertion      Neuro   (+) Anxiety      GI   (+) Rectal cancer (Multi)      /Renal   (+) Kidney stones      Liver   (+) Rectal cancer (Multi)   (+) 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    intravenous induction   Postoperative administration of opioids is intended.  Trial extubation is planned.  Anesthetic plan and risks discussed with patient and spouse.  Use of blood products discussed with patient and spouse who consented to blood products.    Plan discussed with resident.

## 2025-03-17 NOTE — BRIEF OP NOTE
Date: 3/17/2025  OR Location: [facility] OR    Name: Roma Corral YOB: 1955, Age: 70 y.o., MRN: [id number], Sex: male    Diagnosis  Pre-op Diagnosis      * Rectal cancer (Multi) [C20] Post-op Diagnosis     * Rectal cancer (Multi) [C20]     Procedures  Laparoscopic Microwave Ablation Liver Tumor(s) with Ultrasound and Navigation  05710 - NE LAPS SURG ABLTJ 1/> LVR JAEL RF    Liver Biopsy Ultrasound Guided  48435 - CHG US &MNTR PARENCHYMAL TISSUE ABLATION    RESECTION, RECTUM AND SIGMOID COLON, ROBOT-ASSISTED   - [place] SURGICAL TECHNIQUES REQUIRING USE OF ROBOTIC SURGICAL SYSTEM (LIST SEPARATELY IN ADDITION TO CODE FOR PRIMARY PROCEDURE)    RESECTION, RECTUM AND SIGMOID COLON, ROBOT-ASSISTED  63524 - NE LAPAROSCOPY COLECTOMY PARTIAL W/ANASTOMOSIS      Surgeons   Panel 1:     * Baron Erazo - Primary  Panel 2:     * Javier Vasquez - Primary    Resident/Fellow/Other Assistant:  Surgeons and Role:  Panel 1:     * Tammy Cannon MD - Resident - Assisting     * Laith Palacios MD - Resident - Assisting  Panel 2:     * Missael Lyles MD - Resident - Assisting    Staff:   Circulator: Keith  Scrub Person: Katarzyna  Scrub Person: Mira  Surgical Assistant: Bita Puentes Scrub: Radha  Relief Circulator: Radha  Circulator: Radha  Relief Scrub: Chandni    Anesthesia Staff: Anesthesiologist: Dann Goode DO; Sharif Langford MD  CRNA: MELISSA Barakat-CRNA  C-AA: KEV Mayer  Anesthesia Resident: Reuben Waggoner MD; Lucy Russo MD    Procedure Summary  Anesthesia: General  ASA: III  Estimated Blood Loss: 100mL  Intra-op Medications:   Administrations occurring from 0635 to 1545 on 25:   Medication Name Total Dose   BUPivacaine HCl (Marcaine) 0.5 % (5 mg/mL) injection 40 mL   sodium chloride 0.9 % irrigation solution 1,000 mL   heparin (porcine) injection 5,000 Units 5,000 Units   metroNIDAZOLE (Flagyl) 500 mg in sodium chloride (iso)  mL 500 mg   acetaminophen (Ofirmev) injection 1,000 mg    albumin human bottle 5% 500 mL   calcium chloride 100 mg/mL syringe 0.5 g   ceFAZolin (Ancef) vial 1 g 4 g   dexAMETHasone (Decadron) 4 mg/mL 4 mg   dexmedeTOMIDine 4 mcg/mL in 100 mL NS infusion 84.97 mcg   ePHEDrine injection 10 mg   esmolol 10 mg/mL 50 mg   fentaNYL (Sublimaze) injection 50 mcg/mL 300 mcg   LR infusion Cannot be calculated   lidocaine (Xylocaine) injection 2 % 100 mg   magnesium sulfate 50% 2 g   midazolam PF (Versed) injection 1 mg/mL 2 mg   nitroglycerin 1 mg in 10 mL D5W IV bolus 250 mcg   phenylephrine 40 mcg/mL syringe 10 mL 680 mcg   propofol (Diprivan) injection 10 mg/mL 180 mg   rocuronium (ZeMuron) 50 mg/5 mL injection 130 mg   NaCl 0.9 % bolus Cannot be calculated   sugammadex (Bridion) 200 mg/2 mL injection 200 mg              Anesthesia Record               Intraprocedure I/O Totals          Intake    PRBC 700.00 mL    Dexmedetomidine 0.00 mL    The total shown is the total volume documented since Anesthesia Start was filed.    NaCl 0.9 % bolus 2000.00 mL    LR infusion 2000.00 mL    Total Intake 4700 mL       Output    Urine 325 mL    Est. Blood Loss 100 mL    Total Output 425 mL       Net    Net Volume 4275 mL          Specimen:   ID Type Source Tests Collected by Time   1 : ILEOCECECTOMY Tissue ILEUM ILEOCOLIC RESECTION SURGICAL PATHOLOGY EXAM Javier Vasquez MD 3/17/2025 0932   2 : LOW ANTERIOR SEGMENT Tissue SOFT TISSUE RESECTION SURGICAL PATHOLOGY EXAM Javier Vasquez MD 3/17/2025 1520   3 : HERNIA SAC Tissue HERNIA SAC SURGICAL PATHOLOGY EXAM Javier Vasquez MD 3/17/2025 1522                  Findings: Fistula between terminal ileum and rectum, with some radiation changes of small bowel, requiring ileocecectomy. Radiation changes of rectum and surrounding tissue with fibrosis, edema and obliterated planes. Resection margin confirmed with flex sig.    Complications:  None; patient tolerated the procedure well.     Disposition: PACU - hemodynamically stable.  Condition:  stable  Specimens Collected:   ID Type Source Tests Collected by Time   1 : ILEOCECECTOMY Tissue ILEUM ILEOCOLIC RESECTION SURGICAL PATHOLOGY EXAM Javier Vasquez MD 3/17/2025 0932   2 : LOW ANTERIOR SEGMENT Tissue SOFT TISSUE RESECTION SURGICAL PATHOLOGY EXAM Javier Vasquez MD 3/17/2025 1520   3 : HERNIA SAC Tissue HERNIA SAC SURGICAL PATHOLOGY EXAM Javier Vasquez MD 3/17/2025 1522     Attending Attestation:

## 2025-03-17 NOTE — INTERVAL H&P NOTE
H&P reviewed. The patient was examined and there are no changes to the H&P. Pt will be undergoing laparoscopic microwave ablation of left lobe hepatic lesions with Dr. Erazo as combination case with Dr. Vasquez

## 2025-03-17 NOTE — BRIEF OP NOTE
Date: 3/17/2025  OR Location: [facility] OR    Name: Roma Corral, : 1955, Age: 70 y.o., MRN: [id number], Sex: male    Diagnosis  Pre-op Diagnosis      * Rectal cancer (Multi) [C20] Post-op Diagnosis     * Rectal cancer (Multi) [C20]     Procedures  Laparoscopic Microwave Ablation Liver Tumor(s) with Ultrasound and Navigation  27213 - [place] LAPS SURG ABLTJ 1/> LVR JAEL RF    Liver Biopsy Ultrasound Guided  17015 - CHG US &MNTR PARENCHYMAL TISSUE ABLATION    RESECTION, RECTUM AND SIGMOID COLON, ROBOT-ASSISTED   - [place] SURGICAL TECHNIQUES REQUIRING USE OF ROBOTIC SURGICAL SYSTEM (LIST SEPARATELY IN ADDITION TO CODE FOR PRIMARY PROCEDURE)    RESECTION, RECTUM AND SIGMOID COLON, ROBOT-ASSISTED  02571 - [place] LAPAROSCOPY COLECTOMY PARTIAL W/ANASTOMOSIS      Microwave ablation of segments III (<1 cm), IV (1.5 cm), IV (~3 cm, encroaching on portal bifurcation)    Surgeons   Panel 1:     * Baron Erazo - Primary  Panel 2:     * Javier Vasquez - Primary    Resident/Fellow/Other Assistant:  Surgeons and Role:  Panel 1:     * Tammy Cannon MD - Resident - Assisting     * Laith Palacios MD - Resident - Assisting  Panel 2:     * Missael Lyles MD - Resident - Assisting    Staff:   Circulator: Keith Ponceub Person: Katarzyna Ponceub Person: Mira  Surgical Assistant: Bita    Anesthesia Staff: Anesthesiologist: Sharif Langford MD  C-AA: KEV Mayer  Anesthesia Resident: Reuben Waggoner MD; Lucy Russo MD    Procedure Summary  Anesthesia: General  ASA: III  Estimated Blood Loss: 5 mL  Intra-op Medications:   Administrations occurring from 0635 to 1545 on 25:   Medication Name Total Dose   sodium chloride 0.9 % irrigation solution 1,000 mL   acetaminophen (Ofirmev) injection 1,000 mg   albumin human bottle 5% 250 mL   ceFAZolin (Ancef) vial 1 g 2 g   dexAMETHasone (Decadron) 4 mg/mL 4 mg   dexmedeTOMIDine 4 mcg/mL in 100 mL NS infusion 126.73 mcg   esmolol 10 mg/mL 50 mg   fentaNYL (Sublimaze) injection 50  mcg/mL 250 mcg   LR infusion Cannot be calculated   lidocaine (Xylocaine) injection 2 % 100 mg   midazolam PF (Versed) injection 1 mg/mL 2 mg   nitroglycerin 1 mg in 10 mL D5W IV bolus 250 mcg   phenylephrine 40 mcg/mL syringe 10 mL 680 mcg   propofol (Diprivan) injection 10 mg/mL 150 mg   rocuronium (ZeMuron) 50 mg/5 mL injection 80 mg   heparin (porcine) injection 5,000 Units 5,000 Units   metroNIDAZOLE (Flagyl) 500 mg in sodium chloride (iso)  mL 500 mg              Anesthesia Record               Intraprocedure I/O Totals          Intake    PRBC 700.00 mL    Dexmedetomidine 0.00 mL    The total shown is the total volume documented since Anesthesia Start was filed.    Total Intake 700 mL       Output    Urine 60 mL    Total Output 60 mL       Net    Net Volume 640 mL          Specimen:   ID Type Source Tests Collected by Time   1 : ILEOCECECTOMY Tissue ILEUM ILEOCOLIC RESECTION SURGICAL PATHOLOGY EXAM Javier Vasquez MD 3/17/2025 0326            Findings: Large bulky lesions throughout right lobe of liver consistent with preoperative imaging. Segment IV lesion identified, consistent with preoperative imaging. Segment III lesion very subtle (presumed treatment effect). No other lesions identified in left lobe. Large lesion identified at portal bifurcation; ablated to prevent further growth while awaiting second stage liver resection.    Complications:  None; patient tolerated the procedure well.     Disposition:  Operation turned back over to colorectal team  Condition: stable  Specimens Collected:   ID Type Source Tests Collected by Time   1 : ILEOCECECTOMY Tissue ILEUM ILEOCOLIC RESECTION SURGICAL PATHOLOGY EXAM Javier Vasquez MD 3/17/2025 3805     Attending Attestation:     Baron Erazo  Phone Number: [phone]

## 2025-03-18 PROBLEM — G89.18 POST-OPERATIVE PAIN: Status: ACTIVE | Noted: 2025-03-18

## 2025-03-18 PROBLEM — G89.18 POST-OP PAIN: Status: ACTIVE | Noted: 2025-03-18

## 2025-03-18 LAB
ALBUMIN SERPL BCP-MCNC: 2.7 G/DL (ref 3.4–5)
ALP SERPL-CCNC: 179 U/L (ref 33–136)
ALT SERPL W P-5'-P-CCNC: 212 U/L (ref 10–52)
ANION GAP SERPL CALC-SCNC: 13 MMOL/L (ref 10–20)
AST SERPL W P-5'-P-CCNC: 388 U/L (ref 9–39)
BILIRUB DIRECT SERPL-MCNC: 0.4 MG/DL (ref 0–0.3)
BILIRUB SERPL-MCNC: 0.9 MG/DL (ref 0–1.2)
BLOOD EXPIRATION DATE: NORMAL
BUN SERPL-MCNC: 15 MG/DL (ref 6–23)
CALCIUM SERPL-MCNC: 8 MG/DL (ref 8.6–10.6)
CHLORIDE SERPL-SCNC: 111 MMOL/L (ref 98–107)
CO2 SERPL-SCNC: 23 MMOL/L (ref 21–32)
CREAT SERPL-MCNC: 0.98 MG/DL (ref 0.5–1.3)
DISPENSE STATUS: NORMAL
EGFRCR SERPLBLD CKD-EPI 2021: 83 ML/MIN/1.73M*2
ERYTHROCYTE [DISTWIDTH] IN BLOOD BY AUTOMATED COUNT: 21.8 % (ref 11.5–14.5)
GLUCOSE BLD MANUAL STRIP-MCNC: 99 MG/DL (ref 74–99)
GLUCOSE SERPL-MCNC: 104 MG/DL (ref 74–99)
HCT VFR BLD AUTO: 28.2 % (ref 41–52)
HGB BLD-MCNC: 8.8 G/DL (ref 13.5–17.5)
MAGNESIUM SERPL-MCNC: 2.26 MG/DL (ref 1.6–2.4)
MCH RBC QN AUTO: 29.7 PG (ref 26–34)
MCHC RBC AUTO-ENTMCNC: 31.2 G/DL (ref 32–36)
MCV RBC AUTO: 95 FL (ref 80–100)
NRBC BLD-RTO: 0 /100 WBCS (ref 0–0)
PHOSPHATE SERPL-MCNC: 4.5 MG/DL (ref 2.5–4.9)
PLATELET # BLD AUTO: 83 X10*3/UL (ref 150–450)
POTASSIUM SERPL-SCNC: 4.7 MMOL/L (ref 3.5–5.3)
PRODUCT BLOOD TYPE: 2800
PRODUCT CODE: NORMAL
PROT SERPL-MCNC: 5.1 G/DL (ref 6.4–8.2)
RBC # BLD AUTO: 2.96 X10*6/UL (ref 4.5–5.9)
SODIUM SERPL-SCNC: 142 MMOL/L (ref 136–145)
UNIT ABO: NORMAL
UNIT NUMBER: NORMAL
UNIT RH: NORMAL
UNIT VOLUME: 350
WBC # BLD AUTO: 6.5 X10*3/UL (ref 4.4–11.3)
XM INTEP: NORMAL

## 2025-03-18 PROCEDURE — 1200000003 HC ONCOLOGY  ROOM WITH TELEMETRY DAILY

## 2025-03-18 PROCEDURE — 82248 BILIRUBIN DIRECT: CPT | Performed by: STUDENT IN AN ORGANIZED HEALTH CARE EDUCATION/TRAINING PROGRAM

## 2025-03-18 PROCEDURE — 84100 ASSAY OF PHOSPHORUS: CPT | Performed by: STUDENT IN AN ORGANIZED HEALTH CARE EDUCATION/TRAINING PROGRAM

## 2025-03-18 PROCEDURE — 99231 SBSQ HOSP IP/OBS SF/LOW 25: CPT | Performed by: ANESTHESIOLOGY

## 2025-03-18 PROCEDURE — 2500000001 HC RX 250 WO HCPCS SELF ADMINISTERED DRUGS (ALT 637 FOR MEDICARE OP): Performed by: STUDENT IN AN ORGANIZED HEALTH CARE EDUCATION/TRAINING PROGRAM

## 2025-03-18 PROCEDURE — 82947 ASSAY GLUCOSE BLOOD QUANT: CPT

## 2025-03-18 PROCEDURE — 99223 1ST HOSP IP/OBS HIGH 75: CPT

## 2025-03-18 PROCEDURE — 85027 COMPLETE CBC AUTOMATED: CPT | Performed by: STUDENT IN AN ORGANIZED HEALTH CARE EDUCATION/TRAINING PROGRAM

## 2025-03-18 PROCEDURE — 2500000004 HC RX 250 GENERAL PHARMACY W/ HCPCS (ALT 636 FOR OP/ED): Performed by: STUDENT IN AN ORGANIZED HEALTH CARE EDUCATION/TRAINING PROGRAM

## 2025-03-18 PROCEDURE — 2500000004 HC RX 250 GENERAL PHARMACY W/ HCPCS (ALT 636 FOR OP/ED)

## 2025-03-18 PROCEDURE — 2500000002 HC RX 250 W HCPCS SELF ADMINISTERED DRUGS (ALT 637 FOR MEDICARE OP, ALT 636 FOR OP/ED): Performed by: STUDENT IN AN ORGANIZED HEALTH CARE EDUCATION/TRAINING PROGRAM

## 2025-03-18 PROCEDURE — 83735 ASSAY OF MAGNESIUM: CPT | Performed by: STUDENT IN AN ORGANIZED HEALTH CARE EDUCATION/TRAINING PROGRAM

## 2025-03-18 PROCEDURE — 80048 BASIC METABOLIC PNL TOTAL CA: CPT | Performed by: STUDENT IN AN ORGANIZED HEALTH CARE EDUCATION/TRAINING PROGRAM

## 2025-03-18 RX ORDER — MORPHINE SULFATE 30 MG/1
30 TABLET, FILM COATED, EXTENDED RELEASE ORAL EVERY 12 HOURS SCHEDULED
Status: DISCONTINUED | OUTPATIENT
Start: 2025-03-18 | End: 2025-04-07 | Stop reason: HOSPADM

## 2025-03-18 RX ORDER — CIPROFLOXACIN 2 MG/ML
400 INJECTION, SOLUTION INTRAVENOUS EVERY 12 HOURS
Status: CANCELLED | OUTPATIENT
Start: 2025-03-18

## 2025-03-18 RX ORDER — DEXTROSE 50 % IN WATER (D50W) INTRAVENOUS SYRINGE
25
Status: DISCONTINUED | OUTPATIENT
Start: 2025-03-18 | End: 2025-04-07 | Stop reason: HOSPADM

## 2025-03-18 RX ORDER — NALOXONE HYDROCHLORIDE 0.4 MG/ML
0.2 INJECTION, SOLUTION INTRAMUSCULAR; INTRAVENOUS; SUBCUTANEOUS AS NEEDED
Status: DISCONTINUED | OUTPATIENT
Start: 2025-03-18 | End: 2025-03-20

## 2025-03-18 RX ORDER — TAMSULOSIN HYDROCHLORIDE 0.4 MG/1
0.4 CAPSULE ORAL DAILY
Status: DISCONTINUED | OUTPATIENT
Start: 2025-03-18 | End: 2025-04-07 | Stop reason: HOSPADM

## 2025-03-18 RX ORDER — DEXTROSE 50 % IN WATER (D50W) INTRAVENOUS SYRINGE
12.5
Status: DISCONTINUED | OUTPATIENT
Start: 2025-03-18 | End: 2025-04-07 | Stop reason: HOSPADM

## 2025-03-18 RX ORDER — INSULIN LISPRO 100 [IU]/ML
0-5 INJECTION, SOLUTION INTRAVENOUS; SUBCUTANEOUS
Status: DISCONTINUED | OUTPATIENT
Start: 2025-03-18 | End: 2025-03-19

## 2025-03-18 RX ORDER — HYDROMORPHONE HCL/0.9% NACL/PF 15 MG/30ML
PATIENT CONTROLLED ANALGESIA SYRINGE INTRAVENOUS CONTINUOUS
Status: DISCONTINUED | OUTPATIENT
Start: 2025-03-18 | End: 2025-03-20

## 2025-03-18 RX ORDER — SODIUM CHLORIDE 9 MG/ML
40 INJECTION, SOLUTION INTRAVENOUS CONTINUOUS
Status: DISCONTINUED | OUTPATIENT
Start: 2025-03-18 | End: 2025-03-19

## 2025-03-18 RX ORDER — ACETAMINOPHEN 325 MG/1
650 TABLET ORAL EVERY 6 HOURS
Status: DISCONTINUED | OUTPATIENT
Start: 2025-03-18 | End: 2025-03-28

## 2025-03-18 RX ORDER — METHOCARBAMOL 500 MG/1
500 TABLET, FILM COATED ORAL EVERY 6 HOURS
Status: DISCONTINUED | OUTPATIENT
Start: 2025-03-18 | End: 2025-04-07 | Stop reason: HOSPADM

## 2025-03-18 RX ORDER — CIPROFLOXACIN 2 MG/ML
400 INJECTION, SOLUTION INTRAVENOUS EVERY 12 HOURS
Status: COMPLETED | OUTPATIENT
Start: 2025-03-18 | End: 2025-03-21

## 2025-03-18 RX ORDER — HYDROMORPHONE HYDROCHLORIDE 1 MG/ML
0.5 INJECTION, SOLUTION INTRAMUSCULAR; INTRAVENOUS; SUBCUTANEOUS ONCE AS NEEDED
Status: DISCONTINUED | OUTPATIENT
Start: 2025-03-18 | End: 2025-03-19

## 2025-03-18 RX ORDER — TAMSULOSIN HYDROCHLORIDE 0.4 MG/1
0.4 CAPSULE ORAL DAILY
Status: CANCELLED | OUTPATIENT
Start: 2025-03-18

## 2025-03-18 RX ADMIN — METOPROLOL TARTRATE 50 MG: 50 TABLET, FILM COATED ORAL at 22:19

## 2025-03-18 RX ADMIN — METRONIDAZOLE 500 MG: 5 INJECTION, SOLUTION INTRAVENOUS at 16:02

## 2025-03-18 RX ADMIN — CEFAZOLIN SODIUM 2 G: 2 INJECTION, SOLUTION INTRAVENOUS at 05:06

## 2025-03-18 RX ADMIN — ACETAMINOPHEN 650 MG: 325 TABLET, FILM COATED ORAL at 12:47

## 2025-03-18 RX ADMIN — Medication: at 05:25

## 2025-03-18 RX ADMIN — METOPROLOL TARTRATE 50 MG: 50 TABLET, FILM COATED ORAL at 10:20

## 2025-03-18 RX ADMIN — MORPHINE SULFATE 30 MG: 30 TABLET, FILM COATED, EXTENDED RELEASE ORAL at 22:17

## 2025-03-18 RX ADMIN — ENOXAPARIN SODIUM 40 MG: 100 INJECTION SUBCUTANEOUS at 10:27

## 2025-03-18 RX ADMIN — METRONIDAZOLE 500 MG: 5 INJECTION, SOLUTION INTRAVENOUS at 05:50

## 2025-03-18 RX ADMIN — TAMSULOSIN HYDROCHLORIDE 0.4 MG: 0.4 CAPSULE ORAL at 10:20

## 2025-03-18 RX ADMIN — CIPROFLOXACIN 400 MG: 2 INJECTION, SOLUTION INTRAVENOUS at 22:17

## 2025-03-18 RX ADMIN — OXYCODONE 10 MG: 5 TABLET ORAL at 01:00

## 2025-03-18 RX ADMIN — HYDROMORPHONE HYDROCHLORIDE 0.4 MG: 1 INJECTION, SOLUTION INTRAMUSCULAR; INTRAVENOUS; SUBCUTANEOUS at 03:07

## 2025-03-18 RX ADMIN — CIPROFLOXACIN 400 MG: 2 INJECTION, SOLUTION INTRAVENOUS at 10:20

## 2025-03-18 RX ADMIN — GABAPENTIN 300 MG: 300 CAPSULE ORAL at 16:02

## 2025-03-18 RX ADMIN — SODIUM CHLORIDE 75 ML/HR: 9 INJECTION, SOLUTION INTRAVENOUS at 10:21

## 2025-03-18 RX ADMIN — GABAPENTIN 300 MG: 300 CAPSULE ORAL at 22:17

## 2025-03-18 RX ADMIN — METHOCARBAMOL TABLETS 500 MG: 500 TABLET, COATED ORAL at 22:17

## 2025-03-18 RX ADMIN — METHOCARBAMOL TABLETS 500 MG: 500 TABLET, COATED ORAL at 14:00

## 2025-03-18 RX ADMIN — GABAPENTIN 300 MG: 300 CAPSULE ORAL at 10:20

## 2025-03-18 RX ADMIN — METHOCARBAMOL 1000 MG: 500 TABLET ORAL at 05:50

## 2025-03-18 ASSESSMENT — PAIN SCALES - GENERAL
PAINLEVEL_OUTOF10: 5 - MODERATE PAIN
PAINLEVEL_OUTOF10: 10 - WORST POSSIBLE PAIN
PAINLEVEL_OUTOF10: 7
PAINLEVEL_OUTOF10: 8
PAINLEVEL_OUTOF10: 10 - WORST POSSIBLE PAIN
PAINLEVEL_OUTOF10: 10 - WORST POSSIBLE PAIN
PAINLEVEL_OUTOF10: 9

## 2025-03-18 ASSESSMENT — COGNITIVE AND FUNCTIONAL STATUS - GENERAL
MOVING TO AND FROM BED TO CHAIR: A LITTLE
STANDING UP FROM CHAIR USING ARMS: A LITTLE
MOBILITY SCORE: 18
DAILY ACTIVITIY SCORE: 22
STANDING UP FROM CHAIR USING ARMS: A LITTLE
MOVING TO AND FROM BED TO CHAIR: A LITTLE
CLIMB 3 TO 5 STEPS WITH RAILING: A LITTLE
DAILY ACTIVITIY SCORE: 22
MOVING FROM LYING ON BACK TO SITTING ON SIDE OF FLAT BED WITH BEDRAILS: A LITTLE
MOBILITY SCORE: 18
WALKING IN HOSPITAL ROOM: A LITTLE
DRESSING REGULAR LOWER BODY CLOTHING: A LITTLE
MOVING FROM LYING ON BACK TO SITTING ON SIDE OF FLAT BED WITH BEDRAILS: A LITTLE
HELP NEEDED FOR BATHING: A LITTLE
TURNING FROM BACK TO SIDE WHILE IN FLAT BAD: A LITTLE
WALKING IN HOSPITAL ROOM: A LITTLE
CLIMB 3 TO 5 STEPS WITH RAILING: A LITTLE
HELP NEEDED FOR BATHING: A LITTLE
DRESSING REGULAR LOWER BODY CLOTHING: A LITTLE
TURNING FROM BACK TO SIDE WHILE IN FLAT BAD: A LITTLE

## 2025-03-18 ASSESSMENT — PAIN DESCRIPTION - LOCATION
LOCATION: ABDOMEN
LOCATION: ABDOMEN

## 2025-03-18 NOTE — PROGRESS NOTES
Colorectal Surgery Progress Note      03/18/25    Roma Corral    Summary:  Roma Corral is 69M PMHx CAD (NSTEMI 2021), DMII (last A1c 5.4 normal 1/17/25), HLD, HTN, gout, anxiety, rectal cancer metastatic to liver (s/p loop colostomy, chemoradiation, c/b entrorectal fistula) now s/p robotic LAR w/ end colostomy, ilecocecetomy with Dr. Vasquez and laparoscopic microwave liver ablation with Dr. Erazo on 3/17/25.    SUBJECTIVE  Overnight, patient had trouble with pain control, and PCA was started.   Patient seen and evaluated this AM during team rounds. He still reports having 10/10 generalized abdominal pain worst around the stoma site. Patient denies nausea or vomiting. He was able to drink some water, but because his movement was limited by pain he was not reaching for water much. Denies f/c/cp/sob.    OBJECTIVE  Vitals:  Temp:  [35.1 °C (95.2 °F)-37 °C (98.6 °F)] 37 °C (98.6 °F)  Heart Rate:  [] 73  Resp:  [10-18] 17  BP: (107-151)/(52-77) 107/52  Arterial Line BP 1: (142-161)/(59-74) 157/59    I/Os:  I/O last 2 completed shifts:  In: 5074 (69.2 mL/kg) [P.O.:200; I.V.:2174 (29.7 mL/kg); Blood:700; IV Piggyback:2000]  Out: 1835 (25 mL/kg) [Urine:710 (0.4 mL/kg/hr); Drains:1025; Blood:100]  Weight: 73.3 kg     Exam:  Gen:  NAD, non-toxic, but uncomfortable   Eyes:  No scleral icterus  Card:  RRR  Resp:  Non-labored breathing on RA  Abd:  Soft, non-distended, moderately tender to palpation in LLQ and suprapubic area, rest of abdomen mildly tender, no peritonitis, no guarding. Incisions with skin glue in place, c/d/I. Left side ostomy with appliance in place, minimal stool, no flatus. R drain with ss output.   Ext:  WWP, no swelling of BLE  MSK:  KERRY  Neuro:  AOx3, no gross neurological deficits  Psych:  Appropriate mood and affect    Labs:            8.8   142  111  15   6.5>-----<83  ----------------<104             28.2   4.7  23  0.98          Ca 8.0 Phos 4.5 Mg 2.26         AlkPhos 179  "tBili 0.9        Coagulation: No results found for: \"INR\", \"APTT\"    Meds:  Scheduled medications  [Held by provider] aspirin, 81 mg, oral, Daily  [Held by provider] atorvastatin, 40 mg, oral, Nightly  ciprofloxacin, 400 mg, intravenous, q12h  enoxaparin, 40 mg, subcutaneous, q24h  gabapentin, 300 mg, oral, TID  insulin lispro, 0-5 Units, subcutaneous, TID AC  methocarbamol, 1,000 mg, oral, q8h ALEX  metoprolol tartrate, 50 mg, oral, BID  metroNIDAZOLE, 500 mg, intravenous, q8h  tamsulosin, 0.4 mg, oral, Daily      Continuous medications  HYDROmorphone,   sodium chloride 0.9%, 75 mL/hr      PRN medications  PRN medications: acetaminophen, dextrose, dextrose, glucagon, glucagon, naloxone, naloxone, ondansetron ODT **OR** ondansetron, oxygen     Imaging:  No new imaging    Assessment:    Roma Corral is 69M PMHx CAD (NSTEMI 2021), DMII (last A1c 5.4 normal 1/17/25), HLD, HTN, gout, anxiety, rectal cancer metastatic to liver (s/p loop colostomy, chemoradiation, c/b entrorectal fistula) now s/p robotic LAR w/ end colostomy, ilecocecetomy with Dr. Vasquez and laparoscopic microwave liver ablation with Dr. Erazo on 3/17/25.    Overall, patient recovering as expected, however, has difficulty with pain control.     Plan:      Neuro:   -hold home MS Contin, continue tylenol, robaxin, gabapentin, PCA, appreciate supportive oncology recommendation for pain regimen   CV:   -hold home ASA and atorvastatin, continue metoprolol 50 BID (home 100 succinate XL DAILY)  Resp:   -encourage IS 10x/hr while awake, encourage OOB, sitting in chair, ambulating   FEN/GI:  F:  NS @ 75mL/hr   E: Replete as needed, K and Mg wnl today  N: Full liquid diet   - zofran PRN nausea  - wound care and ostomy nursing   :   - continue Arriaga, start Flomax   Heme: received 2u intra-op  -Hgb 8.8(8.1) today, continue to trend CBC  ID:   -continue abx with cipro and flagyl   Endo: last A1c 5.4 on 1/17/25   -POCT glucose while having low PO intake    DVT ppx: " Lovenox 40 daily   PT/OT Recs:  pending evaluation      Hospital Day: 2     Dispo: Continue current level of care.     Patient's exam, labs, and findings discussed with  Dr. Vasquez , who agrees with the plan as described above.     Missael Lyles MD  PGY-5 General Surgery  Colorectal Surgery [id number]

## 2025-03-18 NOTE — CONSULTS
SUPPORTIVE AND PALLIATIVE ONCOLOGY CONSULT    SERVICE DATE: 3/18/2025    ASSESSMENT/PLAN:  Roma Corral is a 70 y.o. male diagnosed with metastatic rectal cancer (dx 6/2024) to liver.and is s/p robotic LAR with end colostomy, ilecocecetomy with Dr. Vasquez and laparoscopic microwave liver ablation with Dr. Erazo on 3/17/25.  PMH significant for CAD (2021 NSTEMI), DM@, and HTN.. Admitted 3/17/2025 for surgical procedure. Supportive and Palliative Oncology is consulted for pain management.     Started on Hydromorphone PCA this AM (0525) 0 basal rate, demand dose of 0.2 mg, lock out interval q 10 min, 1 hour max dose of 1.2 mg.      Symptom Management Plan:  Recommended changes are bolded    Pain:  Post-operative pain:  abdominal pain , visceral and neuropathic, muscular; sub-optimally controlled  Cancer related pain: rectal , somatic and neuropathic, well-controlled  Home regimen:  Morphine ER 30 mg po bid and Hydromorphone 4 mg po q 4 hrs prn  Intolerances/previously tried: None  Risk factors:  none  Renal function WNL and Hepatic function impaired (elevated Alk Phos, AST, and ALT)  Continue Acetaminophen 650 mg po q 6 hrs prn for mild pain  Recommend changing Methocarbamol to 500 mg po q 6 hrs scheduled  Continue Gabapentin 300 mg po tid-HOLD FOR Sedation  Please restart pt's home regimen of Morphine ER 30 mg po q 12 hrs (give first dose now and then schedule for 0900 and 2100)  Recommend changing Hydromorphone PCA to:  Basal rate: note  Demand dose: 0.4 mg (increase from 0.2 mg)  Lock out interval: q 10 min  1 hr max dose: 2.4 mg  Recommend additional IV RN dose of Hydromorphone 0.5 mg q 1 hrs prn for breakthrough pain    Nausea:  At risk for nausea without vomiting related to opioids and pain    Home regimen:  Ondansetron  and Prochlorperazine   QTc:  within normal limits  Denies  Continue Ondansetron 4 mg ODT/IV q 8 hrs prn-1st line  Can add Prochlorperazine 10 mg IV/PO q 6 hrs prn as a second  line    Constipation  At risk for constipation related to medication side effects (including opioids), decreased oral intake, and prolonged immobility in the setting of hospitalization ,   Home regimen: Senna 1-2 tabs po daily prn, Colace 100 mg po daily prn, MOM 15 mL QID prn  LBM ostomy-minimal output since surgery  Defer to primary team for management as pt is acute post-op  Goal to have BM without straining q48-72h, adjust regimen as needed    Sleeping Difficulty:  Impaired sleep related to pain  Home regimen:  none  Sub-optimally controller per patient due to pain  See pain recs    Disposition:  Please start the process of having prior authorization with meds to beds deliver medications to patient prior to discharge via [facility] pharmacy. Prescriptions will need to be sent 48-72 hours prior to discharge so that a prior authorization can be completed.     Discharge date: unknown pending acute issues and pain control  Will request an appointment with Outpatient Supportive Oncology [facility] (last seen 12/24/25)    SIGNATURE: KEV Fisher  PAGER/CONTACT:  Contact information:  Supportive and Palliative Oncology  Monday-Friday 8 AM-5 PM  Epic Secure chat or pager [phone].  After hours and weekends:  pager [phone]  ==========================================================================================================================  Inpatient consult to [facility] Adult Supportive Oncology  Consult performed by: KEV Fisher  Consult ordered by: Javier Vasquez MD    PALLIATIVE MEDICINE OUTPATIENT PROVIDER:  KEV Fonseca  CURRENT ATTENDING PROVIDER: Javier Vasquez MD     Medical Oncologist: MD An Cueto MD   Radiation Oncologist: No care team member to display  Primary Physician: Santiago Buckner  [phone]    REASON FOR CONSULT/CHIEF CONSULT COMPLAINT: pain management    Subjective   HISTORY OF PRESENT ILLNESS: Roma Corral is a 70 y.o.  "male diagnosed with metastatic rectal cancer (dx 6/2024) to liver.and is s/p robotic LAR with end colostomy, ilecocecetomy with Dr. Vasquez and laparoscopic microwave liver ablation with Dr. Erazo on 3/17/25.  PMH significant for CAD (2021 NSTEMI), DM@, and HTN.. Admitted 3/17/2025 for surgical procedure. Supportive and Palliative Oncology is consulted for pain management.     Pain Assessment:  Onset: 1day   Location:  diffuse abdomen, worse at site of stoma  Duration: Constant  Characteristics:   Rating: 10   Descriptors: sharp, shooting, and stabbing   Aggravating:  \"everything\"     Relieving: None   Intolerances:Roma Corral has No Known Allergies.   Personal Pain Goal: 3    Interference with Function: Very Much   Barriers to Pain Management: None    Opioid Requirements  Past 24 h opioid requirements (3/17/25 at 0800 to 03/18/25 at 0800):   Oxycodone IR 5 mg PO x 1 doses = 5 mg = 6.2 OME  Oxycodone IR 10 mg PO x 2 doses = 20 mg = 25 OME  Hydromorphone 0.4 mg IV x 2 doses = 0.8 mg = 5 OME  Hydromorphone 0.5 mg IV x 1 doses = 0.5 mg = 6.2 OME  Total 24h OME use:  42.4 OME    Additionally pt was started on Hydromorphone PCA this AM at 0525 until 1030 this AM, OME as follows:  Hydromorphone PCA:    11 attempts, 10 doses of 0.2 mg received for total of 2 mg=25 OME    OARRS/PDMP reviewed; unintentional overdose risk score of 220 (moderate risk);  no aberrant behavior noted.    Symptom Assessment:  Pain:very much   Headache: none  Dizziness:none  Lack of energy: very much  Difficulty sleeping: a little  Worrying: a little  Anxiety: none  Depressive symptoms/low mood: none  Pain in mouth/swallowing: none  Dry mouth: a little  Taste changes: none  Shortness of breath: a little  Lack of appetite: a little   Nausea: none  Vomiting: none  Constipation: none  Diarrhea: none  Sore muscles: somewhat  Numbness or tingling in hands/feet/other: none  Weight loss: none    Information obtained from: chart review, interview of patient, " and discussion with primary team  ______________________________________________________________________     Oncology History   Rectal cancer (Multi)   6/17/2024 Initial Diagnosis    Rectal cancer (Multi)     7/24/2024 -  Chemotherapy    Bevacizumab + mFOLFOXIRI (Fluorouracil Continuous Infusion / Leucovorin / Oxaliplatin / Irinotecan), 14 Day Cycles       Past Medical History:   Diagnosis Date    Abdominal pain     Acute non-ST elevation myocardial infarction (NSTEMI) (Multi)     Anemia     6/6/24 HGB 8.6; HCT 31.5    Anxiety     CAD (coronary artery disease)     Cardiology follow-up encounter 12/11/2023    Sharif Lau MD    Gout     H/O cardiac catheterization 06/01/2021    H/O cardiovascular stress test 09/03/2021    IMPRESSION: 1. No evidence of ischemia. 2. Suspicion of an infarct along the mid anterior wall. 3. Left ventricular dilatation is noted. 4. Left ventricular EF was calculated to be 50%. Summary:  1. No clinical or electrocardiographic evidence for ischemia at a submaximal workload. 3. The blunted heart rate diminshes the sensitivity of this test.  4. The submaximal level of stress was achieved.    H/O echocardiogram 06/02/2021    Mild concentric Left ventricle hypertrophy.  Global left ventricular wall motion and contractility are within normal limits.  There is normal left ventricular systolic function.  Estimated ejection fraction is 60-64%.  The left atrial size is mildly dilated.  Mild aortic regurgitation.  A trace of mitral regurgitation.  Trivial to mild tricuspid regurgitation.  There is no pulmonary hypertension.    High cholesterol     Hypertension     Overweight     Rectal cancer (Multi)     Type 2 diabetes mellitus     4/18/2024 A1C 7.5%     Past Surgical History:   Procedure Laterality Date    COLONOSCOPY  06/17/2024    HEMICOLECTOMY W/ OSTOMY      LEG SURGERY Left     rodrigo placed     Family History   Problem Relation Name Age of Onset    No Known Problems Mother      No Known  Problems Father      No Known Problems Sister      Leukemia Brother      Stroke Maternal Grandfather        SOCIAL HISTORY:  Marital Status  to wife Betsy Corral, Children 1 40 yr old son, Support system wife, son, sister, and 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   Social History:  reports that he has never smoked. He has never used smokeless tobacco. He reports that he does not currently use alcohol. He reports that he does not use drugs.    Evangelical and Importance of Evangelical:  None    REVIEW OF SYSTEMS:  Review of systems negative unless noted in HPI.     Objective     Lab Results   Component Value Date    WBC 6.5 03/18/2025    HGB 8.8 (L) 03/18/2025    HCT 28.2 (L) 03/18/2025    MCV 95 03/18/2025    PLT 83 (L) 03/18/2025      Lab Results   Component Value Date    GLUCOSE 104 (H) 03/18/2025    CALCIUM 8.0 (L) 03/18/2025     03/18/2025    K 4.7 03/18/2025    CO2 23 03/18/2025     (H) 03/18/2025    BUN 15 03/18/2025    CREATININE 0.98 03/18/2025     Lab Results   Component Value Date     (H) 03/18/2025     (H) 03/18/2025    ALKPHOS 179 (H) 03/18/2025    BILITOT 0.9 03/18/2025     Estimated Creatinine Clearance: 72.7 mL/min (by C-G formula based on SCr of 0.98 mg/dL).     Encounter Date: 01/17/25   ECG 12 lead   Result Value    Ventricular Rate 70    Atrial Rate 234    QRS Duration 90    QT Interval 406    QTC Calculation(Bazett) 438    P Axis 34    R Axis 15    T Axis 73    QRS Count 11    Q Onset 223    P Onset 124    P Offset 172    T Offset 426    QTC Fredericia 427    Narrative    Atrial flutter  Abnormal ECG  When compared with ECG of 10-NOV-2024 23:10,  Atrial flutter has replaced Sinus rhythm  QRS axis Shifted right    See ED provider note for full interpretation and clinical correlation  Confirmed by Pilar Galicia ([id number]) on 1/19/2025 5:29:40 AM     Wt Readings from Last 5 Encounters:   03/17/25 73.3 kg (161 lb 9.6 oz)   03/12/25 70.8 kg  (156 lb)   02/18/25 67.1 kg (148 lb)   02/12/25 69.5 kg (153 lb 3.5 oz)   02/07/25 72.8 kg (160 lb 7.9 oz)     Current Outpatient Medications   Medication Instructions    aspirin 81 mg, oral, Daily    atorvastatin (LIPITOR) 40 mg, oral, Nightly    chlorhexidine (Hibiclens) 4 % external liquid Topical, Daily PRN, Use for 5 days prior to surgery as body wash, do not use on face or genital region    chlorhexidine (Peridex) 0.12 % solution 15 mL, Mouth/Throat, As needed, Use as mouth wash for night before and morning of surgery    gabapentin (Neurontin) 100 mg capsule Take one capsule by mouth once daily starting three days before surgery at bedtime.    loperamide (Imodium) 2 mg capsule Take 2 capsules (4 mg) by mouth with the first episode of diarrhea and 1 capsule (2 mg) by mouth with any additional episodes. Maximum 8 capsules (16 mg) per day.    LORazepam (ATIVAN) 0.5 mg, oral, 2 times daily PRN    metoprolol succinate XL (TOPROL-XL) 100 mg, oral, Daily    metroNIDAZOLE (Flagyl) 250 mg tablet Take one tablet by mouth daily at 6p, 7p, and 11p the night before surgery.    morphine CR (MS Contin) 15 mg 12 hr tablet Take 1 tablet (15 mg) by mouth once daily in the morning AND 2 tablets (30 mg) once daily at bedtime. Do not crush, chew, or split.    ondansetron (ZOFRAN) 4 mg, oral, Every 8 hours PRN    potassium chloride CR (Klor-Con M20) 20 mEq ER tablet 20 mEq, oral, 2 times daily, Do not crush or chew.    prochlorperazine (COMPAZINE) 10 mg, oral, Every 6 hours PRN     Scheduled medications   [Held by provider] aspirin, 81 mg, oral, Daily  [Held by provider] atorvastatin, 40 mg, oral, Nightly  ciprofloxacin, 400 mg, intravenous, q12h  enoxaparin, 40 mg, subcutaneous, q24h  gabapentin, 300 mg, oral, TID  insulin lispro, 0-5 Units, subcutaneous, TID AC  methocarbamol, 1,000 mg, oral, q8h ALEX  metoprolol tartrate, 50 mg, oral, BID  metroNIDAZOLE, 500 mg, intravenous, q8h  tamsulosin, 0.4 mg, oral, Daily    Continuous  medications  HYDROmorphone,   sodium chloride 0.9%, 75 mL/hr    PRN medications  acetaminophen, 650 mg, q4h PRN  dextrose, 12.5 g, q15 min PRN  dextrose, 25 g, q15 min PRN  glucagon, 1 mg, q15 min PRN  glucagon, 1 mg, q15 min PRN  naloxone, 0.2 mg, q5 min PRN  naloxone, 0.2 mg, PRN  ondansetron ODT, 4 mg, q8h PRN   Or  ondansetron, 4 mg, q8h PRN  oxygen, , Continuous PRN - O2/gases    Allergies: No Known Allergies          PHYSICAL EXAMINATION:  Vital Signs:   Vital signs reviewed  Vitals:    03/18/25 1014   BP: 109/72   Pulse: 68   Resp: 18   Temp: 37 °C (98.6 °F)   SpO2: 98%     Pain Score: 10 - Worst possible pain     Physical Exam  Vitals reviewed.   Constitutional:       Appearance: He is ill-appearing.      Comments: A&O X 3; pleasant; appears to be in pain; tearful at times.     HENT:      Head: Normocephalic and atraumatic.      Mouth/Throat:      Mouth: Mucous membranes are dry.   Eyes:      General: No scleral icterus.  Cardiovascular:      Rate and Rhythm: Normal rate.      Pulses: Normal pulses.   Pulmonary:      Effort: Pulmonary effort is normal. No respiratory distress.      Comments: Respirations even and unlabored; on room air  Abdominal:      Tenderness: There is abdominal tenderness.      Comments: Left side ostomy with scant amount of brown stool; right sided drain; appropriately tender    Musculoskeletal:      Right lower leg: No edema.      Left lower leg: No edema.   Skin:     General: Skin is warm and dry.      Capillary Refill: Capillary refill takes less than 2 seconds.      Coloration: Skin is pale.   Neurological:      General: No focal deficit present.      Mental Status: He is alert and oriented to person, place, and time.   Psychiatric:         Behavior: Behavior normal.         Thought Content: Thought content normal.      Comments: Tearful at times due to pain      ==========================================================================================================================  PALLIATIVE CARE ENCOUNTER:  Introduction to Supportive and Palliative Oncology:  Spoke with patient at bedside  Introduced the role and philosophy of Supportive and Palliative oncology in the evaluation and management of symptoms during cancer treatment  Palliative care was introduced as a service for patients with serious illness to help with symptoms, assist with goals of care conversations, navigate complex decision making, improve quality of life for patients, and provide support both patients and families.    Supportive and Palliative Oncology encounter:  Emotional support provided  Coordination of care:  medication changes    Medical Decision Making/Goals of Care/Advance Care Planning:  Patient's current clinical condition, including diagnosis, prognosis, and management plan, and goals of care were discussed.   Life limiting disease: metastatic malignancy  Family: Supportive wife, son, sister  Performance status: Moderate limitations due to pain  Joys/meaning/strength: Family and Elbe  Understanding of health: Demonstrates good prognostic understanding of disease process, understands plan for management of acute post-operative pain and anticipated recovery/follow up  Information:Wants full disclosure  Goals: symptom control and cancer directed therapy  Worries and fears now and future: ongoing symptoms and inability to receive cancer treatment   Minimum acceptable outcome/QOL:  adequate pain control and able to consequently sleep and eat well  Code status discussion:  Full code    Advance Directives  Existence of Advance Directives:None  Decision maker: Surrogate decision maker is wife Betsy Corral [phone]    Supportive Interventions: Will discuss at a future visit-pt in significant pain     Signature and billing:  Medical complexity was high level due to due to  complexity of problems, extensive data review, and high risk of management/treatment.    I spent 75 minutes in the care of this patient which included chart review, interviewing patient/family, discussion with primary team, coordination of care, and documentation.    DATA   Diagnostic tests and information reviewed for today's visit:  Conversation with primary team, Most recent labs and imaging results, Most recent EKG, Medications     Some elements copied from  H&P  note on 3/17/25, the elements have been updated and all reflect current decision making from today, 3/18/2025.    Plan of Care discussed with: Provider, RN, Patient    Thank you for asking Supportive and Palliative Oncology to assist with care of this patient.  Recommendations will be communicated back to the consulting service by way of shared electronic medical record/secure chat/email or face-to-face.   We will continue to follow.  Please contact us for additional questions or concerns.    SIGNATURE: MELISSA Fisher-CNP  PAGER/CONTACT:  Contact information:  Supportive and Palliative Oncology  Monday-Friday 8 AM-5 PM  Epic Secure chat or pager [phone].  After hours and weekends:  pager [phone]

## 2025-03-18 NOTE — SIGNIFICANT EVENT
POST OP CHECK NOTE    Subjective  Roma Corral is a  70 y.o. male now POD 0 s/p MWA of liver mets and ileocectomy . Pt tolerated the procedure well and was extubated prior to being brought to PACU.    Pain is well controlled on current pain regimen. No complaints of headaches, N/V, chest pain, SOB.     Drains: 19 Fr CIRILO drain Right Abdomen   Drips: NS at 40 ml/hr    Objective  Vitals:    03/17/25 2026   BP: 127/69   Pulse: 100   Resp: 16   Temp: 36.9 °C (98.4 °F)   SpO2: 95%       Constitutional: no acute distress  Skin: warm and dry overall   Neuro: A/O x4, no gross deficits   HEENT: Atraumatic, no scleral icterus  Cardiac: RRR  Pulmonary: Unlabored respirations   Abdomen: Non distended, Diffusely tender, guarding present in RLQ Soft without rebound. CIRILO SS Port sites covered with dermabond.   : Voiding       Assessment/Plan:  Roma Corral is a  70 y.o. male now POD 0 s/p MWA of liver mets and ileocectomy.    - Multimodal pain control   - Diet: CLD with Eduardo supplements  - Morning labs: Ordered  -Q4 vitals checks   -strict I/Os  - continue to optimize pain control  -DVT Ppx: lovenox subQ + SCDs     Will continue to monitor  overnight.     Leilani Whittaker MD PGY-1  General Surgery Night Float  Surgical Oncology Han/Ramiro: [id number]  Acute Care Surgery: [id number]  Abdominal Transplant Surgery: [id number]    I am the night resident, I can only be reached 6pm-6am. Epic chat preferred.   If no response, for emergencies, please page the appropriate pager.

## 2025-03-18 NOTE — PROGRESS NOTES
Surgical Oncology Progress Note    HPI:  Roma Corral is a 70 y.o. male with history of rectal cancer with mets to the liver, CAD (2021 NSTEMI), DM2, HTN  who is s/p robotic LAR with end colostomy, ilecocecetomy with Dr. Vasquez, and laparoscopic microwave liver ablation with Dr. Erazo on 3/17/25.     Subjective:  NAEON. Pt states he had trouble sleeping from pain. He is slightly better this AM. Denies N/V, chest pain, SOB.     A 12-point ROS was performed and was unremarkable except as above.    Objective    Temp:  [35.1 °C (95.2 °F)-37 °C (98.6 °F)] 37 °C (98.6 °F)  Heart Rate:  [] 73  Resp:  [10-18] 17  BP: (107-151)/(52-77) 107/52  Arterial Line BP 1: (142-161)/(59-74) 157/59  I/O last 2 completed shifts:  In: 5074 (69.2 mL/kg) [P.O.:200; I.V.:2174 (29.7 mL/kg); Blood:700; IV Piggyback:2000]  Out: 1835 (25 mL/kg) [Urine:710 (0.4 mL/kg/hr); Drains:1025; Blood:100]  Weight: 73.3 kg     Physical Exam:  GEN: No acute distress. Alert, awake and conversive.  HEENT: Sclera anicteric. Moist mucous membranes.  RESP: Breathing non-labored, equal chest rise. On RA.  CV: Regular rate, normotensive  GI: Abdomen soft, mildly distended, appropriately tender for postoperative course. Incisions c/d/I and well approximated with dermabond. Ostomy sunken but appears viable. Scant stool in bag  : Arriaga in place with yellow urine.  MSK: No gross deformities. Moves all extremities spontaneously.  NEURO: Alert and oriented x3. No focal deficits.  PSYCH: Appropriate mood and affect.  SKIN: No rashes or lesions.    Labs within past 24h:  Results for orders placed or performed during the hospital encounter of 03/17/25 (from the past 24 hours)   BLOOD GAS ARTERIAL   Result Value Ref Range    POCT pH, Arterial 7.34 (L) 7.38 - 7.42 pH    POCT pCO2, Arterial 39 38 - 42 mm Hg    POCT pO2, Arterial 181 (H) 85 - 95 mm Hg    POCT SO2, Arterial 99 94 - 100 %    POCT Oxy Hemoglobin, Arterial 96.3 94.0 - 98.0 %    POCT Base Excess, Arterial  -4.4 (L) -2.0 - 3.0 mmol/L    POCT HCO3 Calculated, Arterial 21.0 (L) 22.0 - 26.0 mmol/L    Patient Temperature 37.0 degrees Celsius    FiO2 40 %   BLOOD GAS ARTERIAL FULL PANEL   Result Value Ref Range    POCT pH, Arterial 7.36 (L) 7.38 - 7.42 pH    POCT pCO2, Arterial 40 38 - 42 mm Hg    POCT pO2, Arterial 172 (H) 85 - 95 mm Hg    POCT SO2, Arterial 100 94 - 100 %    POCT Oxy Hemoglobin, Arterial 98.0 94.0 - 98.0 %    POCT Hematocrit Calculated, Arterial 20.0 (L) 41.0 - 52.0 %    POCT Sodium, Arterial 139 136 - 145 mmol/L    POCT Potassium, Arterial 4.2 3.5 - 5.3 mmol/L    POCT Chloride, Arterial 112 (H) 98 - 107 mmol/L    POCT Ionized Calcium, Arterial 1.20 1.10 - 1.33 mmol/L    POCT Glucose, Arterial 113 (H) 74 - 99 mg/dL    POCT Lactate, Arterial 1.0 0.4 - 2.0 mmol/L    POCT Base Excess, Arterial -2.6 (L) -2.0 - 3.0 mmol/L    POCT HCO3 Calculated, Arterial 22.6 22.0 - 26.0 mmol/L    POCT Hemoglobin, Arterial 6.6 (L) 13.5 - 17.5 g/dL    POCT Anion Gap, Arterial 9 (L) 10 - 25 mmo/L    Patient Temperature 37.0 degrees Celsius    FiO2 40 %   Blood Gas Arterial Full Panel   Result Value Ref Range    POCT pH, Arterial 7.34 (L) 7.38 - 7.42 pH    POCT pCO2, Arterial 39 38 - 42 mm Hg    POCT pO2, Arterial 182 (H) 85 - 95 mm Hg    POCT SO2, Arterial 100 94 - 100 %    POCT Oxy Hemoglobin, Arterial 98.2 (H) 94.0 - 98.0 %    POCT Hematocrit Calculated, Arterial 22.0 (L) 41.0 - 52.0 %    POCT Sodium, Arterial 141 136 - 145 mmol/L    POCT Potassium, Arterial 4.3 3.5 - 5.3 mmol/L    POCT Chloride, Arterial 115 (H) 98 - 107 mmol/L    POCT Ionized Calcium, Arterial 1.09 (L) 1.10 - 1.33 mmol/L    POCT Glucose, Arterial 139 (H) 74 - 99 mg/dL    POCT Lactate, Arterial 1.2 0.4 - 2.0 mmol/L    POCT Base Excess, Arterial -4.4 (L) -2.0 - 3.0 mmol/L    POCT HCO3 Calculated, Arterial 21.0 (L) 22.0 - 26.0 mmol/L    POCT Hemoglobin, Arterial 7.4 (L) 13.5 - 17.5 g/dL    POCT Anion Gap, Arterial 9 (L) 10 - 25 mmo/L    Patient Temperature  37.0 degrees Celsius    FiO2 34 %   BLOOD GAS ARTERIAL FULL PANEL   Result Value Ref Range    POCT pH, Arterial 7.36 (L) 7.38 - 7.42 pH    POCT pCO2, Arterial 35 (L) 38 - 42 mm Hg    POCT pO2, Arterial 196 (H) 85 - 95 mm Hg    POCT SO2, Arterial 100 94 - 100 %    POCT Oxy Hemoglobin, Arterial 97.6 94.0 - 98.0 %    POCT Hematocrit Calculated, Arterial 22.0 (L) 41.0 - 52.0 %    POCT Sodium, Arterial 139 136 - 145 mmol/L    POCT Potassium, Arterial 4.1 3.5 - 5.3 mmol/L    POCT Chloride, Arterial 114 (H) 98 - 107 mmol/L    POCT Ionized Calcium, Arterial 1.25 1.10 - 1.33 mmol/L    POCT Glucose, Arterial 132 (H) 74 - 99 mg/dL    POCT Lactate, Arterial 1.0 0.4 - 2.0 mmol/L    POCT Base Excess, Arterial -5.1 (L) -2.0 - 3.0 mmol/L    POCT HCO3 Calculated, Arterial 19.8 (L) 22.0 - 26.0 mmol/L    POCT Hemoglobin, Arterial 7.4 (L) 13.5 - 17.5 g/dL    POCT Anion Gap, Arterial 9 (L) 10 - 25 mmo/L    Patient Temperature 37.0 degrees Celsius    FiO2 50 %   BLOOD GAS ARTERIAL FULL PANEL   Result Value Ref Range    POCT pH, Arterial 7.33 (L) 7.38 - 7.42 pH    POCT pCO2, Arterial 39 38 - 42 mm Hg    POCT pO2, Arterial 190 (H) 85 - 95 mm Hg    POCT SO2, Arterial 100 94 - 100 %    POCT Oxy Hemoglobin, Arterial 97.8 94.0 - 98.0 %    POCT Hematocrit Calculated, Arterial 26.0 (L) 41.0 - 52.0 %    POCT Sodium, Arterial 139 136 - 145 mmol/L    POCT Potassium, Arterial 4.4 3.5 - 5.3 mmol/L    POCT Chloride, Arterial 111 (H) 98 - 107 mmol/L    POCT Ionized Calcium, Arterial 1.28 1.10 - 1.33 mmol/L    POCT Glucose, Arterial 168 (H) 74 - 99 mg/dL    POCT Lactate, Arterial 1.1 0.4 - 2.0 mmol/L    POCT Base Excess, Arterial -4.9 (L) -2.0 - 3.0 mmol/L    POCT HCO3 Calculated, Arterial 20.6 (L) 22.0 - 26.0 mmol/L    POCT Hemoglobin, Arterial 8.6 (L) 13.5 - 17.5 g/dL    POCT Anion Gap, Arterial 12 10 - 25 mmo/L    Patient Temperature 37.0 degrees Celsius    FiO2 50 %   Blood Gas Arterial Full Panel   Result Value Ref Range    POCT pH, Arterial 7.31  (L) 7.38 - 7.42 pH    POCT pCO2, Arterial 39 38 - 42 mm Hg    POCT pO2, Arterial 145 (H) 85 - 95 mm Hg    POCT SO2, Arterial 98 94 - 100 %    POCT Oxy Hemoglobin, Arterial 96.1 94.0 - 98.0 %    POCT Hematocrit Calculated, Arterial 28.0 (L) 41.0 - 52.0 %    POCT Sodium, Arterial 139 136 - 145 mmol/L    POCT Potassium, Arterial 4.5 3.5 - 5.3 mmol/L    POCT Chloride, Arterial 113 (H) 98 - 107 mmol/L    POCT Ionized Calcium, Arterial 1.18 1.10 - 1.33 mmol/L    POCT Glucose, Arterial 161 (H) 74 - 99 mg/dL    POCT Lactate, Arterial 1.1 0.4 - 2.0 mmol/L    POCT Base Excess, Arterial -6.2 (L) -2.0 - 3.0 mmol/L    POCT HCO3 Calculated, Arterial 19.6 (L) 22.0 - 26.0 mmol/L    POCT Hemoglobin, Arterial 9.2 (L) 13.5 - 17.5 g/dL    POCT Anion Gap, Arterial 11 10 - 25 mmo/L    Patient Temperature 37.0 degrees Celsius    FiO2 35 %   POCT GLUCOSE   Result Value Ref Range    POCT Glucose 125 (H) 74 - 99 mg/dL   Magnesium   Result Value Ref Range    Magnesium 2.26 1.60 - 2.40 mg/dL   Hepatic Function Panel   Result Value Ref Range    Albumin 2.7 (L) 3.4 - 5.0 g/dL    Bilirubin, Total 0.9 0.0 - 1.2 mg/dL    Bilirubin, Direct 0.4 (H) 0.0 - 0.3 mg/dL    Alkaline Phosphatase 179 (H) 33 - 136 U/L     (H) 10 - 52 U/L     (H) 9 - 39 U/L    Total Protein 5.1 (L) 6.4 - 8.2 g/dL   CBC   Result Value Ref Range    WBC 6.5 4.4 - 11.3 x10*3/uL    nRBC 0.0 0.0 - 0.0 /100 WBCs    RBC 2.96 (L) 4.50 - 5.90 x10*6/uL    Hemoglobin 8.8 (L) 13.5 - 17.5 g/dL    Hematocrit 28.2 (L) 41.0 - 52.0 %    MCV 95 80 - 100 fL    MCH 29.7 26.0 - 34.0 pg    MCHC 31.2 (L) 32.0 - 36.0 g/dL    RDW 21.8 (H) 11.5 - 14.5 %    Platelets 83 (L) 150 - 450 x10*3/uL   Phosphorus   Result Value Ref Range    Phosphorus 4.5 2.5 - 4.9 mg/dL   Basic Metabolic Panel   Result Value Ref Range    Glucose 104 (H) 74 - 99 mg/dL    Sodium 142 136 - 145 mmol/L    Potassium 4.7 3.5 - 5.3 mmol/L    Chloride 111 (H) 98 - 107 mmol/L    Bicarbonate 23 21 - 32 mmol/L    Anion Gap 13  10 - 20 mmol/L    Urea Nitrogen 15 6 - 23 mg/dL    Creatinine 0.98 0.50 - 1.30 mg/dL    eGFR 83 >60 mL/min/1.73m*2    Calcium 8.0 (L) 8.6 - 10.6 mg/dL     *Note: Due to a large number of results and/or encounters for the requested time period, some results have not been displayed. A complete set of results can be found in Results Review.       Imaging within past 24h:  No results found.    ASSESSMENT  Roma Corral is a 70 y.o. male with history of rectal cancer with mets to the liver, CAD (2021 NSTEMI), DM2, HTN who is s/p robotic LAR with end colostomy, ilecocecetomy with Dr. Vasquez, and laparoscopic microwave liver ablation with Dr. Erazo on 3/17/25.     PLAN:  - Continue to trend H&H  - Surg onc will continue to follow and monitor drain output character  - Will order volumetric liver CT for operative planning       Patient's exam, labs, and findings discussed with Dr. Erazo, who agrees with the plan as described above.    Dalton Solomon MD  PGY-1 General Surgery  Surgical Oncology [phone]

## 2025-03-18 NOTE — CONSULTS
Roma Corral is a 70 y.o. year old male patient who presents for Procedure(s):  Laparoscopic Microwave Ablation Liver Tumor(s) with Ultrasound and Navigation  Liver Biopsy Ultrasound Guided  RESECTION, RECTUM AND SIGMOID COLON, ROBOT-ASSISTED with Javier Vasquez MD on 3/17/2025.  Acute Pain consulted for assistance with pain control.     Pt w/ hx of HTN, HLD, CAD(2021 NSTEMI), DM2, and rectal cancer w/ possible hepatic involvement (liver mass), acute pain consulted for post-op pain control. Patient taking MS Contin 15mg TID (x1 AM, x2 PM) and followed by supportive onc.    Anticipated Postop Pain Issues -   Palliative: typically relieved with IV analgesics and regional local anesthetics  Provocative: typically with movement  Quality: typically burning and aching  Radiation: typically none  Severity: typically severe 8-10/10  Timing: typically constant    Past Medical History:   Diagnosis Date    Abdominal pain     Acute non-ST elevation myocardial infarction (NSTEMI) (Multi)     Anemia     6/6/24 HGB 8.6; HCT 31.5    Anxiety     CAD (coronary artery disease)     Cardiology follow-up encounter 12/11/2023    Sharif Lau MD    Gout     H/O cardiac catheterization 06/01/2021    H/O cardiovascular stress test 09/03/2021    IMPRESSION: 1. No evidence of ischemia. 2. Suspicion of an infarct along the mid anterior wall. 3. Left ventricular dilatation is noted. 4. Left ventricular EF was calculated to be 50%. Summary:  1. No clinical or electrocardiographic evidence for ischemia at a submaximal workload. 3. The blunted heart rate diminshes the sensitivity of this test.  4. The submaximal level of stress was achieved.    H/O echocardiogram 06/02/2021    Mild concentric Left ventricle hypertrophy.  Global left ventricular wall motion and contractility are within normal limits.  There is normal left ventricular systolic function.  Estimated ejection fraction is 60-64%.  The left atrial size is mildly dilated.  Mild aortic  regurgitation.  A trace of mitral regurgitation.  Trivial to mild tricuspid regurgitation.  There is no pulmonary hypertension.    High cholesterol     Hypertension     Overweight     Rectal cancer (Multi)     Type 2 diabetes mellitus     4/18/2024 A1C 7.5%        Past Surgical History:   Procedure Laterality Date    COLONOSCOPY  06/17/2024    HEMICOLECTOMY W/ OSTOMY      LEG SURGERY Left     rodrigo placed        Family History   Problem Relation Name Age of Onset    No Known Problems Mother      No Known Problems Father      No Known Problems Sister      Leukemia Brother      Stroke Maternal Grandfather          Social History     Socioeconomic History    Marital status:      Spouse name: Not on file    Number of children: 1    Years of education: Not on file    Highest education level: Not on file   Occupational History    Occupation: retired   Tobacco Use    Smoking status: Never    Smokeless tobacco: Never   Vaping Use    Vaping status: Never Used   Substance and Sexual Activity    Alcohol use: Not Currently    Drug use: Never    Sexual activity: Defer   Other Topics Concern    Not on file   Social History Narrative    Not on file     Social Drivers of Health     Financial Resource Strain: Low Risk  (3/17/2025)    Overall Financial Resource Strain (CARDIA)     Difficulty of Paying Living Expenses: Not hard at all   Food Insecurity: No Food Insecurity (3/17/2025)    Hunger Vital Sign     Worried About Running Out of Food in the Last Year: Never true     Ran Out of Food in the Last Year: Never true   Transportation Needs: No Transportation Needs (3/17/2025)    PRAPARE - Transportation     Lack of Transportation (Medical): No     Lack of Transportation (Non-Medical): No   Physical Activity: Not on file   Stress: Not on file   Social Connections: Unknown (7/3/2024)    Social Connection and Isolation Panel [NHANES]     Frequency of Communication with Friends and Family: Not on file     Frequency of Social  Gatherings with Friends and Family: Not on file     Attends Muslim Services: Not on file     Active Member of Clubs or Organizations: Not on file     Attends Club or Organization Meetings: Not on file     Marital Status:    Intimate Partner Violence: Not At Risk (3/17/2025)    Humiliation, Afraid, Rape, and Kick questionnaire     Fear of Current or Ex-Partner: No     Emotionally Abused: No     Physically Abused: No     Sexually Abused: No   Housing Stability: Low Risk  (3/17/2025)    Housing Stability Vital Sign     Unable to Pay for Housing in the Last Year: No     Number of Times Moved in the Last Year: 0     Homeless in the Last Year: No        No Known Allergies      Review of Systems  Gen: No fatigue, anorexia, insomnia, fever.   Eyes: No vision loss, double vision, drainage, eye pain.   ENT: No pharyngitis, dry mouth, no hearing changes or ear discharge  Cardiac: No chest pain, palpitations, syncope, near syncope.   Pulmonary: No shortness of breath, cough, hemoptysis.   Heme/lymph: No swollen glands, fever, bleeding.   GI: No abdominal pain, change in bowel habits, melena, hematemesis, hematochezia, nausea, vomiting, diarrhea.   : No discharge, dysuria, frequency, urgency, hematuria.  Endo: No polyuria or weight loss.   Musculoskeletal: Negative for any pain or loss of ROM/weakness  Skin: No rashes or lesions  Neuro: Normal speech, no numbness or weakness. No gait difficulties  Review of systems is otherwise negative unless stated above or in history of present illness.    Physical Exam:  Constitutional:  no distress, alert and cooperative  Eyes: clear sclera  Head/Neck: No apparent injury, trachea midline  Respiratory/Thorax: Patent airways, thorax symmetric, breathing comfortably  Cardiovascular: no pitting edema  Gastrointestinal: Nondistended  Musculoskeletal: ROM intact  Extremities: no clubbing  Neurological: alert, mejia x4  Psychological: Appropriate affect    Results for orders placed or  performed during the hospital encounter of 03/17/25 (from the past 24 hours)   Prepare RBC: 4 Units   Result Value Ref Range    PRODUCT CODE Y1540N51     Unit Number [id number]     Unit ABO AB     Unit RH NEG     XM INTEP COMP     Dispense Status TR     Blood Expiration Date 3/26/2025 11:59:00 PM EDT     PRODUCT BLOOD TYPE 2800     UNIT VOLUME 350     PRODUCT CODE J8181V71     Unit Number [id number]     Unit ABO AB     Unit RH NEG     XM INTEP COMP     Dispense Status TR     Blood Expiration Date 3/28/2025 11:59:00 PM EDT     PRODUCT BLOOD TYPE 2800     UNIT VOLUME 350     PRODUCT CODE P6066O25     Unit Number [id number]     Unit ABO AB     Unit RH NEG     XM INTEP COMP     Dispense Status XM     Blood Expiration Date 3/31/2025 11:59:00 PM EDT     PRODUCT BLOOD TYPE 2800     UNIT VOLUME 350     PRODUCT CODE K6091O01     Unit Number [id number]     Unit ABO AB     Unit RH NEG     XM INTEP COMP     Dispense Status XM     Blood Expiration Date 4/8/2025 11:59:00 PM EDT     PRODUCT BLOOD TYPE 2800     UNIT VOLUME 350    BLOOD GAS ARTERIAL FULL PANEL   Result Value Ref Range    POCT pH, Arterial 7.39 7.38 - 7.42 pH    POCT pCO2, Arterial 37 (L) 38 - 42 mm Hg    POCT pO2, Arterial 191 (H) 85 - 95 mm Hg    POCT SO2, Arterial 100 94 - 100 %    POCT Oxy Hemoglobin, Arterial 97.8 94.0 - 98.0 %    POCT Hematocrit Calculated, Arterial 20.0 (L) 41.0 - 52.0 %    POCT Sodium, Arterial 139 136 - 145 mmol/L    POCT Potassium, Arterial 4.0 3.5 - 5.3 mmol/L    POCT Chloride, Arterial 112 (H) 98 - 107 mmol/L    POCT Ionized Calcium, Arterial 1.20 1.10 - 1.33 mmol/L    POCT Glucose, Arterial 75 74 - 99 mg/dL    POCT Lactate, Arterial 1.0 0.4 - 2.0 mmol/L    POCT Base Excess, Arterial -2.3 (L) -2.0 - 3.0 mmol/L    POCT HCO3 Calculated, Arterial 22.4 22.0 - 26.0 mmol/L    POCT Hemoglobin, Arterial 6.6 (L) 13.5 - 17.5 g/dL    POCT Anion Gap, Arterial 9 (L) 10 - 25 mmo/L    Patient Temperature 37.0 degrees Celsius    FiO2  35 %   BLOOD GAS ARTERIAL   Result Value Ref Range    POCT pH, Arterial 7.34 (L) 7.38 - 7.42 pH    POCT pCO2, Arterial 39 38 - 42 mm Hg    POCT pO2, Arterial 181 (H) 85 - 95 mm Hg    POCT SO2, Arterial 99 94 - 100 %    POCT Oxy Hemoglobin, Arterial 96.3 94.0 - 98.0 %    POCT Base Excess, Arterial -4.4 (L) -2.0 - 3.0 mmol/L    POCT HCO3 Calculated, Arterial 21.0 (L) 22.0 - 26.0 mmol/L    Patient Temperature 37.0 degrees Celsius    FiO2 40 %   BLOOD GAS ARTERIAL FULL PANEL   Result Value Ref Range    POCT pH, Arterial 7.36 (L) 7.38 - 7.42 pH    POCT pCO2, Arterial 40 38 - 42 mm Hg    POCT pO2, Arterial 172 (H) 85 - 95 mm Hg    POCT SO2, Arterial 100 94 - 100 %    POCT Oxy Hemoglobin, Arterial 98.0 94.0 - 98.0 %    POCT Hematocrit Calculated, Arterial 20.0 (L) 41.0 - 52.0 %    POCT Sodium, Arterial 139 136 - 145 mmol/L    POCT Potassium, Arterial 4.2 3.5 - 5.3 mmol/L    POCT Chloride, Arterial 112 (H) 98 - 107 mmol/L    POCT Ionized Calcium, Arterial 1.20 1.10 - 1.33 mmol/L    POCT Glucose, Arterial 113 (H) 74 - 99 mg/dL    POCT Lactate, Arterial 1.0 0.4 - 2.0 mmol/L    POCT Base Excess, Arterial -2.6 (L) -2.0 - 3.0 mmol/L    POCT HCO3 Calculated, Arterial 22.6 22.0 - 26.0 mmol/L    POCT Hemoglobin, Arterial 6.6 (L) 13.5 - 17.5 g/dL    POCT Anion Gap, Arterial 9 (L) 10 - 25 mmo/L    Patient Temperature 37.0 degrees Celsius    FiO2 40 %   Blood Gas Arterial Full Panel   Result Value Ref Range    POCT pH, Arterial 7.34 (L) 7.38 - 7.42 pH    POCT pCO2, Arterial 39 38 - 42 mm Hg    POCT pO2, Arterial 182 (H) 85 - 95 mm Hg    POCT SO2, Arterial 100 94 - 100 %    POCT Oxy Hemoglobin, Arterial 98.2 (H) 94.0 - 98.0 %    POCT Hematocrit Calculated, Arterial 22.0 (L) 41.0 - 52.0 %    POCT Sodium, Arterial 141 136 - 145 mmol/L    POCT Potassium, Arterial 4.3 3.5 - 5.3 mmol/L    POCT Chloride, Arterial 115 (H) 98 - 107 mmol/L    POCT Ionized Calcium, Arterial 1.09 (L) 1.10 - 1.33 mmol/L    POCT Glucose, Arterial 139 (H) 74 - 99  mg/dL    POCT Lactate, Arterial 1.2 0.4 - 2.0 mmol/L    POCT Base Excess, Arterial -4.4 (L) -2.0 - 3.0 mmol/L    POCT HCO3 Calculated, Arterial 21.0 (L) 22.0 - 26.0 mmol/L    POCT Hemoglobin, Arterial 7.4 (L) 13.5 - 17.5 g/dL    POCT Anion Gap, Arterial 9 (L) 10 - 25 mmo/L    Patient Temperature 37.0 degrees Celsius    FiO2 34 %   BLOOD GAS ARTERIAL FULL PANEL   Result Value Ref Range    POCT pH, Arterial 7.36 (L) 7.38 - 7.42 pH    POCT pCO2, Arterial 35 (L) 38 - 42 mm Hg    POCT pO2, Arterial 196 (H) 85 - 95 mm Hg    POCT SO2, Arterial 100 94 - 100 %    POCT Oxy Hemoglobin, Arterial 97.6 94.0 - 98.0 %    POCT Hematocrit Calculated, Arterial 22.0 (L) 41.0 - 52.0 %    POCT Sodium, Arterial 139 136 - 145 mmol/L    POCT Potassium, Arterial 4.1 3.5 - 5.3 mmol/L    POCT Chloride, Arterial 114 (H) 98 - 107 mmol/L    POCT Ionized Calcium, Arterial 1.25 1.10 - 1.33 mmol/L    POCT Glucose, Arterial 132 (H) 74 - 99 mg/dL    POCT Lactate, Arterial 1.0 0.4 - 2.0 mmol/L    POCT Base Excess, Arterial -5.1 (L) -2.0 - 3.0 mmol/L    POCT HCO3 Calculated, Arterial 19.8 (L) 22.0 - 26.0 mmol/L    POCT Hemoglobin, Arterial 7.4 (L) 13.5 - 17.5 g/dL    POCT Anion Gap, Arterial 9 (L) 10 - 25 mmo/L    Patient Temperature 37.0 degrees Celsius    FiO2 50 %   BLOOD GAS ARTERIAL FULL PANEL   Result Value Ref Range    POCT pH, Arterial 7.33 (L) 7.38 - 7.42 pH    POCT pCO2, Arterial 39 38 - 42 mm Hg    POCT pO2, Arterial 190 (H) 85 - 95 mm Hg    POCT SO2, Arterial 100 94 - 100 %    POCT Oxy Hemoglobin, Arterial 97.8 94.0 - 98.0 %    POCT Hematocrit Calculated, Arterial 26.0 (L) 41.0 - 52.0 %    POCT Sodium, Arterial 139 136 - 145 mmol/L    POCT Potassium, Arterial 4.4 3.5 - 5.3 mmol/L    POCT Chloride, Arterial 111 (H) 98 - 107 mmol/L    POCT Ionized Calcium, Arterial 1.28 1.10 - 1.33 mmol/L    POCT Glucose, Arterial 168 (H) 74 - 99 mg/dL    POCT Lactate, Arterial 1.1 0.4 - 2.0 mmol/L    POCT Base Excess, Arterial -4.9 (L) -2.0 - 3.0 mmol/L     POCT HCO3 Calculated, Arterial 20.6 (L) 22.0 - 26.0 mmol/L    POCT Hemoglobin, Arterial 8.6 (L) 13.5 - 17.5 g/dL    POCT Anion Gap, Arterial 12 10 - 25 mmo/L    Patient Temperature 37.0 degrees Celsius    FiO2 50 %   Blood Gas Arterial Full Panel   Result Value Ref Range    POCT pH, Arterial 7.31 (L) 7.38 - 7.42 pH    POCT pCO2, Arterial 39 38 - 42 mm Hg    POCT pO2, Arterial 145 (H) 85 - 95 mm Hg    POCT SO2, Arterial 98 94 - 100 %    POCT Oxy Hemoglobin, Arterial 96.1 94.0 - 98.0 %    POCT Hematocrit Calculated, Arterial 28.0 (L) 41.0 - 52.0 %    POCT Sodium, Arterial 139 136 - 145 mmol/L    POCT Potassium, Arterial 4.5 3.5 - 5.3 mmol/L    POCT Chloride, Arterial 113 (H) 98 - 107 mmol/L    POCT Ionized Calcium, Arterial 1.18 1.10 - 1.33 mmol/L    POCT Glucose, Arterial 161 (H) 74 - 99 mg/dL    POCT Lactate, Arterial 1.1 0.4 - 2.0 mmol/L    POCT Base Excess, Arterial -6.2 (L) -2.0 - 3.0 mmol/L    POCT HCO3 Calculated, Arterial 19.6 (L) 22.0 - 26.0 mmol/L    POCT Hemoglobin, Arterial 9.2 (L) 13.5 - 17.5 g/dL    POCT Anion Gap, Arterial 11 10 - 25 mmo/L    Patient Temperature 37.0 degrees Celsius    FiO2 35 %   POCT GLUCOSE   Result Value Ref Range    POCT Glucose 125 (H) 74 - 99 mg/dL     *Note: Due to a large number of results and/or encounters for the requested time period, some results have not been displayed. A complete set of results can be found in Results Review.        Roma Corral is a 70 y.o. year old male patient who presents for Procedure(s):  Laparoscopic Microwave Ablation Liver Tumor(s) with Ultrasound and Navigation  Liver Biopsy Ultrasound Guided  RESECTION, RECTUM AND SIGMOID COLON, ROBOT-ASSISTED with Javier Vasquez MD on 3/17/2025. Acute Pain consulted for assistance with pain control.     Plan:    Recommendations:    - IV Mg 2g Q8H x 3 doses  - Restart home MS Contin (30mg BID) when able  - IV Robaxin 1000mg TID x24hrs  - Tylenol 975mg Q8H, time 3 hours between tylenol and toradol  - Oxycodone  5/10mg Q4h, Dilaudid 0.4mg breakthrough Q2h  - Dilaudid PCA if pain still uncontrolled  - Recommend consulting Supportive Onc for further recommendations  - Continuous pulse ox  - May consider regional nerve block depending on patient evaluation  - Acute pain service will continue to follow  - Rest of pain management per primary team    Acute Pain Team  pg [phone] ph [phone]

## 2025-03-18 NOTE — OP NOTE
Laparoscopic Microwave Ablation Liver Tumor(s) with Ultrasound and Navigation, Liver Biopsy Ultrasound Guided Operative Note     Date: 3/17/2025  OR Location: [facility] OR    Name: Roma Corral YOB: 1955, Age: 70 y.o., MRN: [id number], Sex: male    Diagnosis  Pre-op Diagnosis      * Rectal cancer (Multi) [C20] Post-op Diagnosis     * Rectal cancer (Multi) [C20]     Procedures  Laparoscopic Microwave Ablation Liver Tumor(s) with Ultrasound and Navigation  21378 - HI LAPS SURG ABLTJ 1/> LVR JAEL RF    Liver Biopsy Ultrasound Guided  82182 - CHG US &MNTR PARENCHYMAL TISSUE ABLATION    RESECTION, RECTUM AND SIGMOID COLON, ROBOT-ASSISTED   - HI SURGICAL TECHNIQUES REQUIRING USE OF ROBOTIC SURGICAL SYSTEM (LIST SEPARATELY IN ADDITION TO CODE FOR PRIMARY PROCEDURE)    RESECTION, RECTUM AND SIGMOID COLON, ROBOT-ASSISTED  20460 - HI LAPAROSCOPY COLECTOMY PARTIAL W/ANASTOMOSIS  Laparoscopic ileocecal resection with ileocolic anastomosis  Robotic low anterior resection with end colostomy    Surgeons   Panel 1:     * Baron Erazo - Primary  Panel 2:     * Javier Vasquez - Primary    Resident/Fellow/Other Assistant:  Surgeons and Role:  Panel 1:     * Tammy Cannon MD - Resident - Assisting     * Laith Palacios MD - Resident - Assisting  Panel 2:     * Missael Lyles MD - Resident - Assisting    Staff:   Circulator: Keith  Scrub Person: Katarzyna Ponceub Person: Mira  Surgical Assistant: Bita Puentes Scrub: Radha  Relief Circulator: Radha  Circulator: Radha  Relief Scrub: Chandni    Anesthesia Staff: Anesthesiologist: Dann Goode DO; Sharif Langford MD  CRNA: MELISSA Barakat-CRNA  C-AA: KEV Mayer  Anesthesia Resident: Reuben Waggoner MD; Lucy Russo MD    Procedure Summary  Anesthesia: General  ASA: III  Estimated Blood Loss: 100 mL  Intra-op Medications:   Administrations occurring from 0635 to 1545 on 25:   Medication Name Total Dose   BUPivacaine HCl (Marcaine) 0.5 % (5 mg/mL)  injection 40 mL   sodium chloride 0.9 % irrigation solution 1,000 mL   heparin (porcine) injection 5,000 Units 5,000 Units   metroNIDAZOLE (Flagyl) 500 mg in sodium chloride (iso)  mL 500 mg   acetaminophen (Ofirmev) injection 1,000 mg   albumin human bottle 5% 750 mL   calcium chloride 100 mg/mL syringe 0.5 g   ceFAZolin (Ancef) vial 1 g 4 g   dexAMETHasone (Decadron) 4 mg/mL 4 mg   dexmedeTOMIDine 4 mcg/mL in 100 mL NS infusion 84.97 mcg   ePHEDrine injection 10 mg   esmolol 10 mg/mL 50 mg   fentaNYL (Sublimaze) injection 50 mcg/mL 300 mcg   LR infusion Cannot be calculated   lidocaine (Xylocaine) injection 2 % 100 mg   magnesium sulfate 50% 2 g   midazolam PF (Versed) injection 1 mg/mL 2 mg   nitroglycerin 1 mg in 10 mL D5W IV bolus 250 mcg   phenylephrine 40 mcg/mL syringe 10 mL 680 mcg   propofol (Diprivan) injection 10 mg/mL 180 mg   rocuronium (ZeMuron) 50 mg/5 mL injection 130 mg   NaCl 0.9 % bolus Cannot be calculated   sugammadex (Bridion) 200 mg/2 mL injection 200 mg              Anesthesia Record               Intraprocedure I/O Totals          Intake    PRBC 700.00 mL    Dexmedetomidine 0.00 mL    The total shown is the total volume documented since Anesthesia Start was filed.    NaCl 0.9 % bolus 2000.00 mL    LR infusion 2000.00 mL    Total Intake 4700 mL       Output    Urine 325 mL    Est. Blood Loss 100 mL    Total Output 425 mL       Net    Net Volume 4275 mL          Specimen:   ID Type Source Tests Collected by Time   1 : ILEOCECECTOMY Tissue ILEUM ILEOCOLIC RESECTION SURGICAL PATHOLOGY EXAM Javier Vasquez MD 3/17/2025 0932   2 : LOW ANTERIOR SEGMENT Tissue RECTUM LOW ANTERIOR RESECTION SURGICAL PATHOLOGY EXAM Javier Vasquez MD 3/17/2025 1520   3 : HERNIA SAC Tissue HERNIA SAC SURGICAL PATHOLOGY EXAM Javier Vasquez MD 3/17/2025 1522                 Drains and/or Catheters:   Closed/Suction Drain 1 Right Abdomen Bulb 19 Fr. (Active)   Site Description Healing;Leaking at site 03/17/25  2028   Dressing Status Clean;Dry;Occlusive 03/17/25 2028   Drainage Appearance Serosanguineous;Clots 03/18/25 0108   Status To bulb suction 03/17/25 2028   Output (mL) 100 mL 03/18/25 0858       Colostomy Loop LUQ (Active)   Stomal Appliance 2 piece;Clean;Dry;Intact 03/17/25 1945   Site/Stoma Assessment Clean;Intact 03/17/25 2030   Peristomal Assessment Clean;Intact 03/17/25 2030   Drainage Characteristics Red streaked;Brown 03/17/25 2030   Output (mL) 0 mL 03/18/25 0858       Urethral Catheter Coude;Latex 18 Fr. 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 male who is having surgery for Rectal cancer (Multi) [C20].  Obstructing rectal cancer with multiple liver metastasis status post diverting loop colostomy and FAWN.  Now presenting for ablation of left-sided liver lesions and primary resection.  Preoperative imaging and symptoms suspicious for ileorectal fistula.        The patient was seen in the preoperative area. The risks, benefits, complications, treatment options, non-operative alternatives, expected recovery and outcomes were discussed with the patient. The possibilities of reaction to medication, pulmonary aspiration, injury to surrounding structures, bleeding, recurrent infection, the need for additional procedures, failure to diagnose a condition, and creating a complication requiring transfusion or operation were discussed with the patient. The patient concurred with the proposed plan, giving informed consent.  The site of surgery was properly  noted/marked if necessary per policy. The patient has been actively warmed in preoperative area. Preoperative antibiotics have been ordered and given within 1 hours of incision. Venous thrombosis prophylaxis have been ordered including bilateral sequential compression devices and chemical prophylaxis    Procedure Details:   Patient was identified in the preoperative area and transported to the operating room.  They were placed supine on the or table.  General anesthesia was induced.  They were then repositioned in modified lithotomy with yellowfin stirrups with appropriate padding.  Arriaga catheter, SCDs, OG were placed.  Stoma was closed.  Abdomen was clipped, prepped, and draped in the usual sterile fashion.  Time-out was performed confirming patient, procedure, and perioperative criteria. We began with a circular incision around the end colostomy was carried down to the subcutaneous tissues and we entered the abdomen without issue.  Loop colostomy was returned to the abdominal cavity and Myke retractor was placed.  Abdomen was insufflated 15 mmHg.  Camera was inserted revealing no significant superior abdominal adhesions.   3 additional robotic ports were placed in the right lower quadrant just above the umbilicus with 2 right upper quadrant assistant 5 mm ports.   We began with laparoscopic evaluation and found a segment of terminal ileum only 5 to 6 cm from the ileocecal valve densely adherent to the pelvis and rectum.  There were adhesions likely from radiation involving the cecum, appendix, rectum.  These were sharply divided and the terminal ileum was  from the rectum revealing approximately 50% rectal defect consistent with a large ileorectal fistula.   Appendix and cecum were freed from the pelvis and the white line of Toldt was taken up to the hepatic flexure allowing full mobilization.  Previous colostomy site was extended and the cecum was extracorporealized.   A window was made in the  terminal ileum had an area of normal-appearing small bowel and this was divided with a 75 millimeter blue load  CHRIS stapler.  A window was made just distal to the cecum and this was divided.  Remaining mesentery was divided with energy device .  The stapled ends were then aligned confirming there was no twisting of the mesentery and small enterotomies were made.  A blue load CHRIS stapler was then passed down the lumens and fired creating a side-to-side functional end anastomosis.  A 3 0 silk crotch stitch was placed.  Common enterotomy was then elevated and closed with a blue load stapler.  Mesenteric window was closed with 3 0 Vicryl.    This was to return to the abdominal cavity and then we turned the case over Dr. Erazo who performed the liver ablation which will be dictated separately.  Once completed we docked the robot and focus on the rectal portion.    Sigmoid colon was elevated and a mesenteric window was made at the sacral promontory just inferior to the DONOVAN.  Medial dissection was difficult as these planes were thickened, fibrotic, and there is a unsuspected amount of lymphatic leakage.  We attempted lateral dissection as well but this plane had already been distorted from the loop colostomy and the anatomy was somewhat distorted.  We were able to clear the DONOVAN circumferentially and this was taken with energy.  Posterior dissection was started in the presacral space along the TME plane.  Again this plane in the surrounding pelvis had a increased response to the prior radiation and these planes were fairly fibrotic and friable.  Posterior dissection was taken down to the pelvic floor.  We then opened the peritoneum bilaterally and sharply dissected the lateral stalks.  After this, we moved to the anterior peritoneal reflection and identified the seminal vesicles.  Even with retraction there was some disruption of the TME plane the mesorectal envelope.  We performed flexible sigmoidoscopy and the distal  rectum was without malignancy but radiation changes. The rectum about 5 cm from the sphincter complex then divided multiple firings of black load robotic stapler.  The remaining rectum was significantly thickened and required additional compression and small bites for firing and complete division.     Intraoperative tap block was performed in 4 quadrants using a total of 40 milliliters of 0.25% Marcaine.  19 [place]an drain was placed behind the anastomosis to the right sided assistant.  Right lower quadrant 12 Hall port was closed with Bill Galicia suture passer and 0 Vicryl.     The divided rectum was delivered through the right lower quadrant wound retractor.  The the fascia of the ostomy site was snugged together with interrupted 0 PDS figure-of-eight sutures. The descending/sigmoid colon was then divided and an end colostomy was created in standard fashion with interrupted 3-0 Vicryl suture.    Prior to complete closure all counts were confirmed correct.  Patient was then extubated and transferred to the PACU in stable condition.                Complications:  None; patient tolerated the procedure well.    Disposition: PACU - hemodynamically stable.  Condition: stable         Task Performed by RNFA or Surgical Assistant:            Additional Details:     Attending Attestation: I was present and scrubbed for the entire procedure.    Baron Erazo  Phone Number: [phone]

## 2025-03-18 NOTE — CONSULTS
Postop Pain HPI -   Palliative: relieved with IV analgesics and regional local anesthetics  Provocative: movement  Quality:  burning and aching  Radiation:  none  Severity:  10/10  Timing: constant    24-HOUR OPIOID CONSUMPTION:  25mg Oxy, 1.3mg Dilaudid, Dilaudid PCA    Scheduled medications  [Held by provider] aspirin, 81 mg, oral, Daily  [Held by provider] atorvastatin, 40 mg, oral, Nightly  enoxaparin, 40 mg, subcutaneous, q24h  gabapentin, 300 mg, oral, TID  insulin lispro, 0-5 Units, subcutaneous, TID AC  methocarbamol, 1,000 mg, oral, q8h ALEX  metoprolol tartrate, 50 mg, oral, BID  metroNIDAZOLE, 500 mg, intravenous, q8h  tamsulosin, 0.4 mg, oral, Daily      Continuous medications  HYDROmorphone,   sodium chloride 0.9%, 75 mL/hr      PRN medications  PRN medications: acetaminophen, dextrose, dextrose, glucagon, glucagon, naloxone, naloxone, ondansetron ODT **OR** ondansetron, oxygen     Physical Exam:  Constitutional:  no distress, alert and cooperative  Eyes: clear sclera  Head/Neck: No apparent injury, trachea midline  Respiratory/Thorax: Patent airways, thorax symmetric, breathing comfortably  Cardiovascular: no pitting edema  Gastrointestinal: Nondistended  Musculoskeletal: ROM intact  Extremities: no clubbing  Neurological: alert, mejia x4  Psychological: Appropriate affect    Results for orders placed or performed during the hospital encounter of 03/17/25 (from the past 24 hours)   BLOOD GAS ARTERIAL FULL PANEL   Result Value Ref Range    POCT pH, Arterial 7.39 7.38 - 7.42 pH    POCT pCO2, Arterial 37 (L) 38 - 42 mm Hg    POCT pO2, Arterial 191 (H) 85 - 95 mm Hg    POCT SO2, Arterial 100 94 - 100 %    POCT Oxy Hemoglobin, Arterial 97.8 94.0 - 98.0 %    POCT Hematocrit Calculated, Arterial 20.0 (L) 41.0 - 52.0 %    POCT Sodium, Arterial 139 136 - 145 mmol/L    POCT Potassium, Arterial 4.0 3.5 - 5.3 mmol/L    POCT Chloride, Arterial 112 (H) 98 - 107 mmol/L    POCT Ionized Calcium, Arterial 1.20 1.10 - 1.33  mmol/L    POCT Glucose, Arterial 75 74 - 99 mg/dL    POCT Lactate, Arterial 1.0 0.4 - 2.0 mmol/L    POCT Base Excess, Arterial -2.3 (L) -2.0 - 3.0 mmol/L    POCT HCO3 Calculated, Arterial 22.4 22.0 - 26.0 mmol/L    POCT Hemoglobin, Arterial 6.6 (L) 13.5 - 17.5 g/dL    POCT Anion Gap, Arterial 9 (L) 10 - 25 mmo/L    Patient Temperature 37.0 degrees Celsius    FiO2 35 %   BLOOD GAS ARTERIAL   Result Value Ref Range    POCT pH, Arterial 7.34 (L) 7.38 - 7.42 pH    POCT pCO2, Arterial 39 38 - 42 mm Hg    POCT pO2, Arterial 181 (H) 85 - 95 mm Hg    POCT SO2, Arterial 99 94 - 100 %    POCT Oxy Hemoglobin, Arterial 96.3 94.0 - 98.0 %    POCT Base Excess, Arterial -4.4 (L) -2.0 - 3.0 mmol/L    POCT HCO3 Calculated, Arterial 21.0 (L) 22.0 - 26.0 mmol/L    Patient Temperature 37.0 degrees Celsius    FiO2 40 %   BLOOD GAS ARTERIAL FULL PANEL   Result Value Ref Range    POCT pH, Arterial 7.36 (L) 7.38 - 7.42 pH    POCT pCO2, Arterial 40 38 - 42 mm Hg    POCT pO2, Arterial 172 (H) 85 - 95 mm Hg    POCT SO2, Arterial 100 94 - 100 %    POCT Oxy Hemoglobin, Arterial 98.0 94.0 - 98.0 %    POCT Hematocrit Calculated, Arterial 20.0 (L) 41.0 - 52.0 %    POCT Sodium, Arterial 139 136 - 145 mmol/L    POCT Potassium, Arterial 4.2 3.5 - 5.3 mmol/L    POCT Chloride, Arterial 112 (H) 98 - 107 mmol/L    POCT Ionized Calcium, Arterial 1.20 1.10 - 1.33 mmol/L    POCT Glucose, Arterial 113 (H) 74 - 99 mg/dL    POCT Lactate, Arterial 1.0 0.4 - 2.0 mmol/L    POCT Base Excess, Arterial -2.6 (L) -2.0 - 3.0 mmol/L    POCT HCO3 Calculated, Arterial 22.6 22.0 - 26.0 mmol/L    POCT Hemoglobin, Arterial 6.6 (L) 13.5 - 17.5 g/dL    POCT Anion Gap, Arterial 9 (L) 10 - 25 mmo/L    Patient Temperature 37.0 degrees Celsius    FiO2 40 %   Blood Gas Arterial Full Panel   Result Value Ref Range    POCT pH, Arterial 7.34 (L) 7.38 - 7.42 pH    POCT pCO2, Arterial 39 38 - 42 mm Hg    POCT pO2, Arterial 182 (H) 85 - 95 mm Hg    POCT SO2, Arterial 100 94 - 100 %     POCT Oxy Hemoglobin, Arterial 98.2 (H) 94.0 - 98.0 %    POCT Hematocrit Calculated, Arterial 22.0 (L) 41.0 - 52.0 %    POCT Sodium, Arterial 141 136 - 145 mmol/L    POCT Potassium, Arterial 4.3 3.5 - 5.3 mmol/L    POCT Chloride, Arterial 115 (H) 98 - 107 mmol/L    POCT Ionized Calcium, Arterial 1.09 (L) 1.10 - 1.33 mmol/L    POCT Glucose, Arterial 139 (H) 74 - 99 mg/dL    POCT Lactate, Arterial 1.2 0.4 - 2.0 mmol/L    POCT Base Excess, Arterial -4.4 (L) -2.0 - 3.0 mmol/L    POCT HCO3 Calculated, Arterial 21.0 (L) 22.0 - 26.0 mmol/L    POCT Hemoglobin, Arterial 7.4 (L) 13.5 - 17.5 g/dL    POCT Anion Gap, Arterial 9 (L) 10 - 25 mmo/L    Patient Temperature 37.0 degrees Celsius    FiO2 34 %   BLOOD GAS ARTERIAL FULL PANEL   Result Value Ref Range    POCT pH, Arterial 7.36 (L) 7.38 - 7.42 pH    POCT pCO2, Arterial 35 (L) 38 - 42 mm Hg    POCT pO2, Arterial 196 (H) 85 - 95 mm Hg    POCT SO2, Arterial 100 94 - 100 %    POCT Oxy Hemoglobin, Arterial 97.6 94.0 - 98.0 %    POCT Hematocrit Calculated, Arterial 22.0 (L) 41.0 - 52.0 %    POCT Sodium, Arterial 139 136 - 145 mmol/L    POCT Potassium, Arterial 4.1 3.5 - 5.3 mmol/L    POCT Chloride, Arterial 114 (H) 98 - 107 mmol/L    POCT Ionized Calcium, Arterial 1.25 1.10 - 1.33 mmol/L    POCT Glucose, Arterial 132 (H) 74 - 99 mg/dL    POCT Lactate, Arterial 1.0 0.4 - 2.0 mmol/L    POCT Base Excess, Arterial -5.1 (L) -2.0 - 3.0 mmol/L    POCT HCO3 Calculated, Arterial 19.8 (L) 22.0 - 26.0 mmol/L    POCT Hemoglobin, Arterial 7.4 (L) 13.5 - 17.5 g/dL    POCT Anion Gap, Arterial 9 (L) 10 - 25 mmo/L    Patient Temperature 37.0 degrees Celsius    FiO2 50 %   BLOOD GAS ARTERIAL FULL PANEL   Result Value Ref Range    POCT pH, Arterial 7.33 (L) 7.38 - 7.42 pH    POCT pCO2, Arterial 39 38 - 42 mm Hg    POCT pO2, Arterial 190 (H) 85 - 95 mm Hg    POCT SO2, Arterial 100 94 - 100 %    POCT Oxy Hemoglobin, Arterial 97.8 94.0 - 98.0 %    POCT Hematocrit Calculated, Arterial 26.0 (L) 41.0 -  52.0 %    POCT Sodium, Arterial 139 136 - 145 mmol/L    POCT Potassium, Arterial 4.4 3.5 - 5.3 mmol/L    POCT Chloride, Arterial 111 (H) 98 - 107 mmol/L    POCT Ionized Calcium, Arterial 1.28 1.10 - 1.33 mmol/L    POCT Glucose, Arterial 168 (H) 74 - 99 mg/dL    POCT Lactate, Arterial 1.1 0.4 - 2.0 mmol/L    POCT Base Excess, Arterial -4.9 (L) -2.0 - 3.0 mmol/L    POCT HCO3 Calculated, Arterial 20.6 (L) 22.0 - 26.0 mmol/L    POCT Hemoglobin, Arterial 8.6 (L) 13.5 - 17.5 g/dL    POCT Anion Gap, Arterial 12 10 - 25 mmo/L    Patient Temperature 37.0 degrees Celsius    FiO2 50 %   Blood Gas Arterial Full Panel   Result Value Ref Range    POCT pH, Arterial 7.31 (L) 7.38 - 7.42 pH    POCT pCO2, Arterial 39 38 - 42 mm Hg    POCT pO2, Arterial 145 (H) 85 - 95 mm Hg    POCT SO2, Arterial 98 94 - 100 %    POCT Oxy Hemoglobin, Arterial 96.1 94.0 - 98.0 %    POCT Hematocrit Calculated, Arterial 28.0 (L) 41.0 - 52.0 %    POCT Sodium, Arterial 139 136 - 145 mmol/L    POCT Potassium, Arterial 4.5 3.5 - 5.3 mmol/L    POCT Chloride, Arterial 113 (H) 98 - 107 mmol/L    POCT Ionized Calcium, Arterial 1.18 1.10 - 1.33 mmol/L    POCT Glucose, Arterial 161 (H) 74 - 99 mg/dL    POCT Lactate, Arterial 1.1 0.4 - 2.0 mmol/L    POCT Base Excess, Arterial -6.2 (L) -2.0 - 3.0 mmol/L    POCT HCO3 Calculated, Arterial 19.6 (L) 22.0 - 26.0 mmol/L    POCT Hemoglobin, Arterial 9.2 (L) 13.5 - 17.5 g/dL    POCT Anion Gap, Arterial 11 10 - 25 mmo/L    Patient Temperature 37.0 degrees Celsius    FiO2 35 %   POCT GLUCOSE   Result Value Ref Range    POCT Glucose 125 (H) 74 - 99 mg/dL     *Note: Due to a large number of results and/or encounters for the requested time period, some results have not been displayed. A complete set of results can be found in Results Review.      Roma Corral is a 70 y.o. year old male patient who presents for Procedure(s):  Laparoscopic Microwave Ablation Liver Tumor(s) with Ultrasound and Navigation  Liver Biopsy Ultrasound  Guided  RESECTION, RECTUM AND SIGMOID COLON, ROBOT-ASSISTED with Javier Vasquez MD on 3/17/2025. Acute Pain consulted for assistance with pain control.     Pt reports 10/10 pain near ileostomy site. Pt just started dilaudid PCA, will reassess patients pain later today to determine if he is a candidate for nerve block.     Plan:    - Restart home MS Contin (30mg BID) when able  - Tylenol 975mg Q8H, time 3 hours between tylenol and toradol  - Oxycodone 5/10mg Q4h, Dilaudid 0.4mg breakthrough Q2h  - Dilaudid PCA if pain uncontrolled  - Recommend consulting Supportive Onc for further recommendations  - Continuous pulse ox  - May consider regional nerve block depending on patient evaluation  - Acute pain service will continue to follow  - Rest of pain management per primary team     Acute Pain Team  pg [phone] ph [phone]

## 2025-03-18 NOTE — CARE PLAN
Left message of appointment tomorrow with pulmonary disease management.   The patient's goals for the shift include        Problem: Pain - Adult  Goal: Verbalizes/displays adequate comfort level or baseline comfort level  Outcome: Progressing     Problem: Safety - Adult  Goal: Free from fall injury  Outcome: Progressing     Problem: Discharge Planning  Goal: Discharge to home or other facility with appropriate resources  Outcome: Progressing     Problem: Chronic Conditions and Co-morbidities  Goal: Patient's chronic conditions and co-morbidity symptoms are monitored and maintained or improved  Outcome: Progressing     Problem: Fall/Injury  Goal: Not fall by end of shift  Outcome: Progressing  Goal: Be free from injury by end of the shift  Outcome: Progressing  Goal: Verbalize understanding of personal risk factors for fall in the hospital  Outcome: Progressing  Goal: Verbalize understanding of risk factor reduction measures to prevent injury from fall in the home  Outcome: Progressing  Goal: Use assistive devices by end of the shift  Outcome: Progressing  Goal: Pace activities to prevent fatigue by end of the shift  Outcome: Progressing     Problem: Nutrition  Goal: Nutrient intake appropriate for maintaining nutritional needs  Outcome: Progressing

## 2025-03-18 NOTE — PROGRESS NOTES
03/18/25 1400   Discharge Planning   Living Arrangements Spouse/significant other   Support Systems Spouse/significant other   Assistance Needed none   Type of Residence Private residence   Who is requesting discharge planning? Provider   Home or Post Acute Services None   Expected Discharge Disposition Home   Does the patient need discharge transport arranged? No     Roma Corral is 69M PMHx CAD (NSTEMI 2021), DMII (last A1c 5.4 normal 1/17/25), HLD, HTN, gout, anxiety, rectal cancer metastatic to liver (s/p loop colostomy, chemoradiation, c/b entrorectal fistula) now s/p robotic LAR w/ end colostomy, ilecocecetomy with Dr. Vasquez and laparoscopic microwave liver ablation with Dr. Erazo on 3/17/25. ADOD 3/20  Anticipate HNN    Per the medical team, this patient has no anticipated discharge needs; full assessment deferred at this time. Care transitions will continue to follow for any discharge needs that arise. Tabby Shields RN, TCC

## 2025-03-18 NOTE — PROGRESS NOTES
Postop Pain HPI -   Palliative: relieved with IV analgesics and regional local anesthetics  Provocative: movement  Quality:  burning and aching  Radiation:  none  Severity:  10/10  Timing: constant    24-HOUR OPIOID CONSUMPTION:  25mg Oxy, 1.3mg Dilaudid, Dilaudid PCA        Scheduled medications  [Held by provider] aspirin, 81 mg, oral, Daily  [Held by provider] atorvastatin, 40 mg, oral, Nightly  ciprofloxacin, 400 mg, intravenous, q12h  enoxaparin, 40 mg, subcutaneous, q24h  gabapentin, 300 mg, oral, TID  insulin lispro, 0-5 Units, subcutaneous, TID AC  methocarbamol, 1,000 mg, oral, q8h ALEX  metoprolol tartrate, 50 mg, oral, BID  metroNIDAZOLE, 500 mg, intravenous, q8h  tamsulosin, 0.4 mg, oral, Daily      Continuous medications  HYDROmorphone,   sodium chloride 0.9%, 75 mL/hr      PRN medications  PRN medications: acetaminophen, dextrose, dextrose, glucagon, glucagon, naloxone, naloxone, ondansetron ODT **OR** ondansetron, oxygen     Physical Exam:  Constitutional:  no distress, alert and cooperative  Eyes: clear sclera  Head/Neck: No apparent injury, trachea midline  Respiratory/Thorax: Patent airways, thorax symmetric, breathing comfortably  Cardiovascular: no pitting edema  Gastrointestinal: Nondistended  Musculoskeletal: ROM intact  Extremities: no clubbing  Neurological: alert, mejia x4  Psychological: Appropriate affect    Results for orders placed or performed during the hospital encounter of 03/17/25 (from the past 24 hours)   BLOOD GAS ARTERIAL   Result Value Ref Range    POCT pH, Arterial 7.34 (L) 7.38 - 7.42 pH    POCT pCO2, Arterial 39 38 - 42 mm Hg    POCT pO2, Arterial 181 (H) 85 - 95 mm Hg    POCT SO2, Arterial 99 94 - 100 %    POCT Oxy Hemoglobin, Arterial 96.3 94.0 - 98.0 %    POCT Base Excess, Arterial -4.4 (L) -2.0 - 3.0 mmol/L    POCT HCO3 Calculated, Arterial 21.0 (L) 22.0 - 26.0 mmol/L    Patient Temperature 37.0 degrees Celsius    FiO2 40 %   BLOOD GAS ARTERIAL FULL PANEL   Result Value Ref  Range    POCT pH, Arterial 7.36 (L) 7.38 - 7.42 pH    POCT pCO2, Arterial 40 38 - 42 mm Hg    POCT pO2, Arterial 172 (H) 85 - 95 mm Hg    POCT SO2, Arterial 100 94 - 100 %    POCT Oxy Hemoglobin, Arterial 98.0 94.0 - 98.0 %    POCT Hematocrit Calculated, Arterial 20.0 (L) 41.0 - 52.0 %    POCT Sodium, Arterial 139 136 - 145 mmol/L    POCT Potassium, Arterial 4.2 3.5 - 5.3 mmol/L    POCT Chloride, Arterial 112 (H) 98 - 107 mmol/L    POCT Ionized Calcium, Arterial 1.20 1.10 - 1.33 mmol/L    POCT Glucose, Arterial 113 (H) 74 - 99 mg/dL    POCT Lactate, Arterial 1.0 0.4 - 2.0 mmol/L    POCT Base Excess, Arterial -2.6 (L) -2.0 - 3.0 mmol/L    POCT HCO3 Calculated, Arterial 22.6 22.0 - 26.0 mmol/L    POCT Hemoglobin, Arterial 6.6 (L) 13.5 - 17.5 g/dL    POCT Anion Gap, Arterial 9 (L) 10 - 25 mmo/L    Patient Temperature 37.0 degrees Celsius    FiO2 40 %   Blood Gas Arterial Full Panel   Result Value Ref Range    POCT pH, Arterial 7.34 (L) 7.38 - 7.42 pH    POCT pCO2, Arterial 39 38 - 42 mm Hg    POCT pO2, Arterial 182 (H) 85 - 95 mm Hg    POCT SO2, Arterial 100 94 - 100 %    POCT Oxy Hemoglobin, Arterial 98.2 (H) 94.0 - 98.0 %    POCT Hematocrit Calculated, Arterial 22.0 (L) 41.0 - 52.0 %    POCT Sodium, Arterial 141 136 - 145 mmol/L    POCT Potassium, Arterial 4.3 3.5 - 5.3 mmol/L    POCT Chloride, Arterial 115 (H) 98 - 107 mmol/L    POCT Ionized Calcium, Arterial 1.09 (L) 1.10 - 1.33 mmol/L    POCT Glucose, Arterial 139 (H) 74 - 99 mg/dL    POCT Lactate, Arterial 1.2 0.4 - 2.0 mmol/L    POCT Base Excess, Arterial -4.4 (L) -2.0 - 3.0 mmol/L    POCT HCO3 Calculated, Arterial 21.0 (L) 22.0 - 26.0 mmol/L    POCT Hemoglobin, Arterial 7.4 (L) 13.5 - 17.5 g/dL    POCT Anion Gap, Arterial 9 (L) 10 - 25 mmo/L    Patient Temperature 37.0 degrees Celsius    FiO2 34 %   BLOOD GAS ARTERIAL FULL PANEL   Result Value Ref Range    POCT pH, Arterial 7.36 (L) 7.38 - 7.42 pH    POCT pCO2, Arterial 35 (L) 38 - 42 mm Hg    POCT pO2,  Arterial 196 (H) 85 - 95 mm Hg    POCT SO2, Arterial 100 94 - 100 %    POCT Oxy Hemoglobin, Arterial 97.6 94.0 - 98.0 %    POCT Hematocrit Calculated, Arterial 22.0 (L) 41.0 - 52.0 %    POCT Sodium, Arterial 139 136 - 145 mmol/L    POCT Potassium, Arterial 4.1 3.5 - 5.3 mmol/L    POCT Chloride, Arterial 114 (H) 98 - 107 mmol/L    POCT Ionized Calcium, Arterial 1.25 1.10 - 1.33 mmol/L    POCT Glucose, Arterial 132 (H) 74 - 99 mg/dL    POCT Lactate, Arterial 1.0 0.4 - 2.0 mmol/L    POCT Base Excess, Arterial -5.1 (L) -2.0 - 3.0 mmol/L    POCT HCO3 Calculated, Arterial 19.8 (L) 22.0 - 26.0 mmol/L    POCT Hemoglobin, Arterial 7.4 (L) 13.5 - 17.5 g/dL    POCT Anion Gap, Arterial 9 (L) 10 - 25 mmo/L    Patient Temperature 37.0 degrees Celsius    FiO2 50 %   BLOOD GAS ARTERIAL FULL PANEL   Result Value Ref Range    POCT pH, Arterial 7.33 (L) 7.38 - 7.42 pH    POCT pCO2, Arterial 39 38 - 42 mm Hg    POCT pO2, Arterial 190 (H) 85 - 95 mm Hg    POCT SO2, Arterial 100 94 - 100 %    POCT Oxy Hemoglobin, Arterial 97.8 94.0 - 98.0 %    POCT Hematocrit Calculated, Arterial 26.0 (L) 41.0 - 52.0 %    POCT Sodium, Arterial 139 136 - 145 mmol/L    POCT Potassium, Arterial 4.4 3.5 - 5.3 mmol/L    POCT Chloride, Arterial 111 (H) 98 - 107 mmol/L    POCT Ionized Calcium, Arterial 1.28 1.10 - 1.33 mmol/L    POCT Glucose, Arterial 168 (H) 74 - 99 mg/dL    POCT Lactate, Arterial 1.1 0.4 - 2.0 mmol/L    POCT Base Excess, Arterial -4.9 (L) -2.0 - 3.0 mmol/L    POCT HCO3 Calculated, Arterial 20.6 (L) 22.0 - 26.0 mmol/L    POCT Hemoglobin, Arterial 8.6 (L) 13.5 - 17.5 g/dL    POCT Anion Gap, Arterial 12 10 - 25 mmo/L    Patient Temperature 37.0 degrees Celsius    FiO2 50 %   Blood Gas Arterial Full Panel   Result Value Ref Range    POCT pH, Arterial 7.31 (L) 7.38 - 7.42 pH    POCT pCO2, Arterial 39 38 - 42 mm Hg    POCT pO2, Arterial 145 (H) 85 - 95 mm Hg    POCT SO2, Arterial 98 94 - 100 %    POCT Oxy Hemoglobin, Arterial 96.1 94.0 - 98.0 %     POCT Hematocrit Calculated, Arterial 28.0 (L) 41.0 - 52.0 %    POCT Sodium, Arterial 139 136 - 145 mmol/L    POCT Potassium, Arterial 4.5 3.5 - 5.3 mmol/L    POCT Chloride, Arterial 113 (H) 98 - 107 mmol/L    POCT Ionized Calcium, Arterial 1.18 1.10 - 1.33 mmol/L    POCT Glucose, Arterial 161 (H) 74 - 99 mg/dL    POCT Lactate, Arterial 1.1 0.4 - 2.0 mmol/L    POCT Base Excess, Arterial -6.2 (L) -2.0 - 3.0 mmol/L    POCT HCO3 Calculated, Arterial 19.6 (L) 22.0 - 26.0 mmol/L    POCT Hemoglobin, Arterial 9.2 (L) 13.5 - 17.5 g/dL    POCT Anion Gap, Arterial 11 10 - 25 mmo/L    Patient Temperature 37.0 degrees Celsius    FiO2 35 %   POCT GLUCOSE   Result Value Ref Range    POCT Glucose 125 (H) 74 - 99 mg/dL   Magnesium   Result Value Ref Range    Magnesium 2.26 1.60 - 2.40 mg/dL   Hepatic Function Panel   Result Value Ref Range    Albumin 2.7 (L) 3.4 - 5.0 g/dL    Bilirubin, Total 0.9 0.0 - 1.2 mg/dL    Bilirubin, Direct 0.4 (H) 0.0 - 0.3 mg/dL    Alkaline Phosphatase 179 (H) 33 - 136 U/L     (H) 10 - 52 U/L     (H) 9 - 39 U/L    Total Protein 5.1 (L) 6.4 - 8.2 g/dL   CBC   Result Value Ref Range    WBC 6.5 4.4 - 11.3 x10*3/uL    nRBC 0.0 0.0 - 0.0 /100 WBCs    RBC 2.96 (L) 4.50 - 5.90 x10*6/uL    Hemoglobin 8.8 (L) 13.5 - 17.5 g/dL    Hematocrit 28.2 (L) 41.0 - 52.0 %    MCV 95 80 - 100 fL    MCH 29.7 26.0 - 34.0 pg    MCHC 31.2 (L) 32.0 - 36.0 g/dL    RDW 21.8 (H) 11.5 - 14.5 %    Platelets 83 (L) 150 - 450 x10*3/uL   Phosphorus   Result Value Ref Range    Phosphorus 4.5 2.5 - 4.9 mg/dL   Basic Metabolic Panel   Result Value Ref Range    Glucose 104 (H) 74 - 99 mg/dL    Sodium 142 136 - 145 mmol/L    Potassium 4.7 3.5 - 5.3 mmol/L    Chloride 111 (H) 98 - 107 mmol/L    Bicarbonate 23 21 - 32 mmol/L    Anion Gap 13 10 - 20 mmol/L    Urea Nitrogen 15 6 - 23 mg/dL    Creatinine 0.98 0.50 - 1.30 mg/dL    eGFR 83 >60 mL/min/1.73m*2    Calcium 8.0 (L) 8.6 - 10.6 mg/dL     *Note: Due to a large number of results  and/or encounters for the requested time period, some results have not been displayed. A complete set of results can be found in Results Review.      Roma Corral is a 70 y.o. year old male patient who presents for Procedure(s):  Laparoscopic Microwave Ablation Liver Tumor(s) with Ultrasound and Navigation  Liver Biopsy Ultrasound Guided  RESECTION, RECTUM AND SIGMOID COLON, ROBOT-ASSISTED with Javier Vasquez MD on 3/17/2025. Acute Pain consulted for assistance with pain control.      Pt reports 10/10 pain near ileostomy site. Pt just started dilaudid PCA, will reassess patients pain later today to determine if he is a candidate for nerve block.     Plan:     - Restart home MS Contin (30mg BID) when able  - Tylenol 975mg Q8H, time 3 hours between tylenol and toradol  - Oxycodone 5/10mg Q4h, Dilaudid 0.4mg breakthrough Q2h  - Dilaudid PCA if pain uncontrolled  - Recommend consulting Supportive Onc for further recommendations  - Continuous pulse ox  - May consider regional nerve block depending on patient evaluation  - Acute pain service will continue to follow  - Rest of pain management per primary team     Acute Pain Team  pg [phone] ph [phone]

## 2025-03-18 NOTE — SIGNIFICANT EVENT
Post-op Check  S:    POD 0 from MWA of liver mets and ileocectomy    O:   Vital signs are stable, normotensive, afebrile, no new or worsening oxygen requirement, not tachycardic  Visit Vitals  /69 (BP Location: Right arm, Patient Position: Lying)   Pulse 100   Temp 36.9 °C (98.4 °F) (Temporal)   Resp 16        Constitutional: no acute distress  Skin: warm and dry overall   Neuro: A/O x4, no gross deficits   HEENT: Atraumatic, no scleral icterus  Cardiac: RRR  Pulmonary: Unlabored respirations   Abdomen: Non distended, tender to palpation in the abdomen. Soft without rebound. CIRILO SS  : Voiding    A/P:  Overall, patient is doing well postoperatively with no acute concerns.  Will continue to optimize pain control as needed.  Will continue to monitor clinical exam, vitals, I&O's, and labs when available.  Will follow up on the patient in the a.m. or sooner as needed.    Blayne Restrepo DO  PGY-1

## 2025-03-18 NOTE — PROGRESS NOTES
Physical Therapy                 Therapy Communication Note    Patient Name: Roma Corral  MRN: [id number]  Department: [facility]  Room: 600/6006-A  Today's Date: 3/18/2025     Discipline: Physical Therapy    PT Missed Visit: Yes     Missed Visit Reason: Patient refused (Pt noting severe pain on this date and unable to tolerate attempts for mobility. Will revisit tomorrow at pt's request pending improved pain control. Will follow.)    Missed Time: Attempt

## 2025-03-18 NOTE — SIGNIFICANT EVENT
Evaluated the patient overnight due to complaints of abdominal pain.    His pain has remained unchanged from surgery, and he remains diffusely tender to palpation. Review of his most recent vitals signs are stable and he remains on room air.     Abdominal exam shows a soft abdomen in all 4 quadrants. I cannot appreciate any guarding. His pain occurs with palpation and not suddenly releasing - no rebound. There is no rigidity. This exam is consistent with my post op check.    Plan:  -PCA per signout  -Will discontinue iv dilaudid when PCA is setup    Blayne Restrepo DO PGY-1  General Surgery

## 2025-03-18 NOTE — CONSULTS
"  Wound Care Consult     Visit Date: 3/18/2025      Patient Name: Roma Corral         MRN: [id number]           YOB: 1955     Reason for Consult: assess colostomy stoma /pouching              Pertinent Labs:   Albumin   Date Value Ref Range Status   03/18/2025 2.7 (L) 3.4 - 5.0 g/dL Final       Wound Assessment:  Wound 03/17/25 Incision Abdomen Anterior (Active)   Site Assessment Clean;Dry 03/18/25 1010   Summer-Wound Assessment Clean;Dry;Intact 03/18/25 1010   State of Healing Closed wound edges 03/18/25 1010   Margins Attached edges 03/18/25 1010   Closure Glue 03/18/25 1010   Sutures/Staple Line Approximated 03/18/25 1010   Drainage Description None 03/17/25 2030   Drainage Amount None 03/18/25 1010   Dressing Open to air 03/18/25 1010   Dressing Status Clean;Dry 03/17/25 1945       Wound Team Summary Assessment:   Ostomy type: colostomy    size: 1\" oval       color: dark red       protruding: flush  Functioning: scant hard stool  Mucocutaneous junction: intact  Peristomal skin: 2 cm incision at 7 o'clock- Aquacel over incision/ barrier ring    Pouching: one piece drainable pouch   Ostomy Education: patient has had an ostomy sine 7/2024. Knowledgeable in ostomy pouching. Will help patient transition to end colostomy  Plan: assess stoma/pouching      Wound Team Plan: Ostomy team will follow      Gale ALCANTAR   3/18/2025  2:52 PM        "

## 2025-03-19 LAB
ALBUMIN SERPL BCP-MCNC: 2.2 G/DL (ref 3.4–5)
ALP SERPL-CCNC: 201 U/L (ref 33–136)
ALT SERPL W P-5'-P-CCNC: 254 U/L (ref 10–52)
ANION GAP SERPL CALC-SCNC: 11 MMOL/L (ref 10–20)
AST SERPL W P-5'-P-CCNC: 264 U/L (ref 9–39)
BILIRUB SERPL-MCNC: 1.7 MG/DL (ref 0–1.2)
BUN SERPL-MCNC: 19 MG/DL (ref 6–23)
CALCIUM SERPL-MCNC: 7.3 MG/DL (ref 8.6–10.6)
CHLORIDE SERPL-SCNC: 110 MMOL/L (ref 98–107)
CO2 SERPL-SCNC: 21 MMOL/L (ref 21–32)
CREAT FLD-MCNC: 1 MG/DL
CREAT SERPL-MCNC: 1.12 MG/DL (ref 0.5–1.3)
EGFRCR SERPLBLD CKD-EPI 2021: 71 ML/MIN/1.73M*2
ERYTHROCYTE [DISTWIDTH] IN BLOOD BY AUTOMATED COUNT: 21.5 % (ref 11.5–14.5)
GLUCOSE BLD MANUAL STRIP-MCNC: 115 MG/DL (ref 74–99)
GLUCOSE BLD MANUAL STRIP-MCNC: 75 MG/DL (ref 74–99)
GLUCOSE SERPL-MCNC: 96 MG/DL (ref 74–99)
HCT VFR BLD AUTO: 32.7 % (ref 41–52)
HGB BLD-MCNC: 9 G/DL (ref 13.5–17.5)
MCH RBC QN AUTO: 28.9 PG (ref 26–34)
MCHC RBC AUTO-ENTMCNC: 27.5 G/DL (ref 32–36)
MCV RBC AUTO: 105 FL (ref 80–100)
NRBC BLD-RTO: 0 /100 WBCS (ref 0–0)
PLATELET # BLD AUTO: 69 X10*3/UL (ref 150–450)
POTASSIUM SERPL-SCNC: 4.3 MMOL/L (ref 3.5–5.3)
PROT SERPL-MCNC: 4.2 G/DL (ref 6.4–8.2)
RBC # BLD AUTO: 3.11 X10*6/UL (ref 4.5–5.9)
SODIUM SERPL-SCNC: 138 MMOL/L (ref 136–145)
WBC # BLD AUTO: 9.1 X10*3/UL (ref 4.4–11.3)

## 2025-03-19 PROCEDURE — 1200000003 HC ONCOLOGY  ROOM WITH TELEMETRY DAILY

## 2025-03-19 PROCEDURE — 2500000004 HC RX 250 GENERAL PHARMACY W/ HCPCS (ALT 636 FOR OP/ED)

## 2025-03-19 PROCEDURE — 99231 SBSQ HOSP IP/OBS SF/LOW 25: CPT

## 2025-03-19 PROCEDURE — 80053 COMPREHEN METABOLIC PANEL: CPT

## 2025-03-19 PROCEDURE — 2500000002 HC RX 250 W HCPCS SELF ADMINISTERED DRUGS (ALT 637 FOR MEDICARE OP, ALT 636 FOR OP/ED): Performed by: STUDENT IN AN ORGANIZED HEALTH CARE EDUCATION/TRAINING PROGRAM

## 2025-03-19 PROCEDURE — 99232 SBSQ HOSP IP/OBS MODERATE 35: CPT

## 2025-03-19 PROCEDURE — 97162 PT EVAL MOD COMPLEX 30 MIN: CPT | Mod: GP

## 2025-03-19 PROCEDURE — 97165 OT EVAL LOW COMPLEX 30 MIN: CPT | Mod: GO

## 2025-03-19 PROCEDURE — 2500000001 HC RX 250 WO HCPCS SELF ADMINISTERED DRUGS (ALT 637 FOR MEDICARE OP): Performed by: STUDENT IN AN ORGANIZED HEALTH CARE EDUCATION/TRAINING PROGRAM

## 2025-03-19 PROCEDURE — 82947 ASSAY GLUCOSE BLOOD QUANT: CPT

## 2025-03-19 PROCEDURE — 97530 THERAPEUTIC ACTIVITIES: CPT | Mod: GP

## 2025-03-19 PROCEDURE — 99233 SBSQ HOSP IP/OBS HIGH 50: CPT

## 2025-03-19 PROCEDURE — 85027 COMPLETE CBC AUTOMATED: CPT

## 2025-03-19 PROCEDURE — 82570 ASSAY OF URINE CREATININE: CPT

## 2025-03-19 PROCEDURE — 2500000004 HC RX 250 GENERAL PHARMACY W/ HCPCS (ALT 636 FOR OP/ED): Performed by: STUDENT IN AN ORGANIZED HEALTH CARE EDUCATION/TRAINING PROGRAM

## 2025-03-19 RX ORDER — SODIUM CHLORIDE 9 MG/ML
40 INJECTION, SOLUTION INTRAVENOUS CONTINUOUS
Status: ACTIVE | OUTPATIENT
Start: 2025-03-19 | End: 2025-03-20

## 2025-03-19 RX ADMIN — METRONIDAZOLE 500 MG: 5 INJECTION, SOLUTION INTRAVENOUS at 00:47

## 2025-03-19 RX ADMIN — ENOXAPARIN SODIUM 40 MG: 100 INJECTION SUBCUTANEOUS at 08:57

## 2025-03-19 RX ADMIN — METHOCARBAMOL TABLETS 500 MG: 500 TABLET, COATED ORAL at 04:57

## 2025-03-19 RX ADMIN — CIPROFLOXACIN 400 MG: 2 INJECTION, SOLUTION INTRAVENOUS at 11:50

## 2025-03-19 RX ADMIN — METRONIDAZOLE 500 MG: 5 INJECTION, SOLUTION INTRAVENOUS at 22:49

## 2025-03-19 RX ADMIN — TAMSULOSIN HYDROCHLORIDE 0.4 MG: 0.4 CAPSULE ORAL at 08:57

## 2025-03-19 RX ADMIN — GABAPENTIN 300 MG: 300 CAPSULE ORAL at 08:57

## 2025-03-19 RX ADMIN — METOPROLOL TARTRATE 50 MG: 50 TABLET, FILM COATED ORAL at 21:18

## 2025-03-19 RX ADMIN — METHOCARBAMOL TABLETS 500 MG: 500 TABLET, COATED ORAL at 21:17

## 2025-03-19 RX ADMIN — MORPHINE SULFATE 30 MG: 30 TABLET, FILM COATED, EXTENDED RELEASE ORAL at 08:57

## 2025-03-19 RX ADMIN — SODIUM CHLORIDE 500 ML: 9 INJECTION, SOLUTION INTRAVENOUS at 12:20

## 2025-03-19 RX ADMIN — SODIUM CHLORIDE 40 ML/HR: 9 INJECTION, SOLUTION INTRAVENOUS at 09:00

## 2025-03-19 RX ADMIN — METHOCARBAMOL TABLETS 500 MG: 500 TABLET, COATED ORAL at 11:50

## 2025-03-19 RX ADMIN — METRONIDAZOLE 500 MG: 5 INJECTION, SOLUTION INTRAVENOUS at 08:57

## 2025-03-19 RX ADMIN — MORPHINE SULFATE 30 MG: 30 TABLET, FILM COATED, EXTENDED RELEASE ORAL at 21:17

## 2025-03-19 RX ADMIN — SODIUM CHLORIDE 500 ML: 9 INJECTION, SOLUTION INTRAVENOUS at 22:44

## 2025-03-19 RX ADMIN — CIPROFLOXACIN 400 MG: 2 INJECTION, SOLUTION INTRAVENOUS at 21:18

## 2025-03-19 RX ADMIN — ACETAMINOPHEN 650 MG: 325 TABLET, FILM COATED ORAL at 08:56

## 2025-03-19 RX ADMIN — GABAPENTIN 300 MG: 300 CAPSULE ORAL at 15:18

## 2025-03-19 RX ADMIN — METHOCARBAMOL TABLETS 500 MG: 500 TABLET, COATED ORAL at 17:41

## 2025-03-19 RX ADMIN — ACETAMINOPHEN 650 MG: 325 TABLET, FILM COATED ORAL at 17:41

## 2025-03-19 RX ADMIN — ACETAMINOPHEN 650 MG: 325 TABLET, FILM COATED ORAL at 00:48

## 2025-03-19 RX ADMIN — GABAPENTIN 300 MG: 300 CAPSULE ORAL at 21:18

## 2025-03-19 RX ADMIN — METRONIDAZOLE 500 MG: 5 INJECTION, SOLUTION INTRAVENOUS at 17:41

## 2025-03-19 ASSESSMENT — PAIN SCALES - GENERAL
PAINLEVEL_OUTOF10: 5 - MODERATE PAIN
PAINLEVEL_OUTOF10: 5 - MODERATE PAIN
PAINLEVEL_OUTOF10: 6
PAINLEVEL_OUTOF10: 4

## 2025-03-19 ASSESSMENT — COGNITIVE AND FUNCTIONAL STATUS - GENERAL
TURNING FROM BACK TO SIDE WHILE IN FLAT BAD: A LITTLE
DRESSING REGULAR LOWER BODY CLOTHING: A LOT
PERSONAL GROOMING: A LITTLE
DRESSING REGULAR UPPER BODY CLOTHING: A LITTLE
MOVING FROM LYING ON BACK TO SITTING ON SIDE OF FLAT BED WITH BEDRAILS: A LOT
CLIMB 3 TO 5 STEPS WITH RAILING: A LITTLE
DRESSING REGULAR LOWER BODY CLOTHING: A LITTLE
TOILETING: A LOT
HELP NEEDED FOR BATHING: A LITTLE
DAILY ACTIVITIY SCORE: 22
MOVING TO AND FROM BED TO CHAIR: A LOT
CLIMB 3 TO 5 STEPS WITH RAILING: TOTAL
TURNING FROM BACK TO SIDE WHILE IN FLAT BAD: A LOT
MOBILITY SCORE: 10
HELP NEEDED FOR BATHING: A LOT
MOBILITY SCORE: 18
STANDING UP FROM CHAIR USING ARMS: A LITTLE
STANDING UP FROM CHAIR USING ARMS: A LOT
DAILY ACTIVITIY SCORE: 16
MOVING FROM LYING ON BACK TO SITTING ON SIDE OF FLAT BED WITH BEDRAILS: A LITTLE
MOVING TO AND FROM BED TO CHAIR: A LITTLE
WALKING IN HOSPITAL ROOM: TOTAL
WALKING IN HOSPITAL ROOM: A LITTLE

## 2025-03-19 ASSESSMENT — ACTIVITIES OF DAILY LIVING (ADL)
ADL_ASSISTANCE: INDEPENDENT
BATHING_ASSISTANCE: MAXIMAL
ADL_ASSISTANCE: INDEPENDENT

## 2025-03-19 ASSESSMENT — PAIN - FUNCTIONAL ASSESSMENT
PAIN_FUNCTIONAL_ASSESSMENT: 0-10

## 2025-03-19 ASSESSMENT — PAIN SCALES - PAIN ASSESSMENT IN ADVANCED DEMENTIA (PAINAD): TOTALSCORE: MEDICATION (SEE MAR)

## 2025-03-19 NOTE — PROGRESS NOTES
Colorectal Surgery Progress Note      HPI: Roma Corral is a 70 y.o. male with a past medical history of CAD (NSTEMI 2021), DMII (last A1c 5.4 normal 1/17/25), HLD, HTN, gout, anxiety, metastatic rectal cancer to liver s/p loop colostomy July 2024 & FAWN c/b persistent disease and rectal stricture, now hospital day 2 s/p robotic low anterior resection with transition from loop to end colostomy & 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 Vasquez, and laparoscopic MWA x3 with liver biopsy by Dr. Erazo on 3/18/25.      Subjective  No acute events overnight  Surgical pain much better controlled with PCA added yesterday  Denies nausea, vomiting, bloating on full liquid diet  Colostomy pouch with small amount of gas  Denies urinary symptoms with evans cathter in place  Has not ambulated post-op due to poor pain control yesterday    Objective  Physical Exam:  Gen: in no physical distress and alert, well appearing, pleasant & conversational, lying in bed  HENT: Head normocephalic, atraumatic.  Mucous membranes moist.    Neuro: Alert and oriented x3.  Moves all extremities spontaneously x4.  CV: Normal rate and rhythm on palpated radial pulse. No BLE edema.  Resp: No acute respiratory distress.  Normal effort on room air. Chest expansion symmetrical.   GI: Abdomen is soft, nontender, and nondistended. Laparoscopic incisions are clean, dry and intact with glue.  Stoma is pink and healthy,w ell pouched, with gas in pouch. CIRILO drain is serosanguinous and full, no erythema or drainage at insertion site.  : Evans catheter draining clear, yellow urine  Skin: Warm and dry, without rashes or lesions    Visit Vitals  /59 (BP Location: Right arm, Patient Position: Lying)   Pulse 84   Temp 37.4 °C (99.4 °F) (Temporal)   Resp 18        I/O last 3 completed shifts:  In: 1514 (20.6 mL/kg) [P.O.:1040; I.V.:174 (2.4 mL/kg); IV Piggyback:300]  Out: 3605 (49 mL/kg) [Urine:1615 (0.6 mL/kg/hr); Drains:1990]  Weight: 73.5  "kg   I/O this shift:  In: 800 [IV Piggyback:800]  Out: 1225 [Urine:175; Drains:1050]             Data Review:  CBC:   Lab Results   Component Value Date    WBC 9.1 03/19/2025    RBC 3.11 (L) 03/19/2025     BMP:   Lab Results   Component Value Date    GLUCOSE 96 03/19/2025    CO2 21 03/19/2025    BUN 19 03/19/2025    CREATININE 1.12 03/19/2025    CALCIUM 7.3 (L) 03/19/2025     Coagulation: No results found for: \"INR\", \"APTT\"    Labs reviewed and personally interpreted as: CBC with pancytopenia (similar to baseline), without leukocytosis.  RFP without electrolyte derangements, ORALIA, or hypo/hyperglycemia.      Imaging:  No recent imaging to review.    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 Vasquez, and laparoscopic MWA x3 with liver biopsy by Dr. Erazo on 3/18/25. Progressing well through post-op course, with return of bowel function and toleration of diet.  Pain is much better controlled today with PCA. Has high serosanguinous CIRILO output (1.5L over past 24 hours) and needs to ambulate multiple times today.    Plan:  -Advance diet to regular & encourage PO hydration, as tolerated  -Decrease IVF to 40ml/hr, continue to monitor I&Os with high drain losses  -DC evans, follow up trial void  -OOB during day/ambulation in halls x3  -CIRILO creatinine for high CIRILO output    Neuro: Post-op pain controlled, history of anxiety  -Continue current pain regimen with dilaudid PCA, tylenol, gabapentin, robaxin  -Continue home MS Contin, hold home ativan  -Sleep hygiene/OOB during day/PT    CV: hemodynamically stable; hx of CAD (NSTEMI 2021), HTN, HLD  -Q4 vital signs  -EKG prn for ACS symptoms  -Continue home metoprolol  -Hold home ASA 81, Lipitor  -Continuous telemetry monitoring     Resp: " no acute issues on room air, no significant pulmonary history  -ICS 10x every hour  -OOB/ambulation    GI: s/p robotic LAR/loop to end colostomy, ileocecectomy & MWA; hx of rectal CA with mets to liver  -Advance diet to regular, as tolerated, only eating if hungry & not bloated, burping, hiccupping, nauseous  -Zofran prn for nausea  -continue assessment of stoma and output, stoma not new for patient.  WOCN for supplies & support.  -Monitor CIRILO drain output, drain creatinine to r/o urine leak, fluid replacement if decent amount of losses  -Daily CMP to monitor LFTs  -Volumetric liver CT Friday per surg onc, for operative planning    : making sufficient urine via evans catheter; no significant urological hx  -Continue flomax for low resection, DC evans today after 2nd dose & follow up trial void  - Strict intake and output  - Decrease mIVF to 40ml/hr but continue in setting of high CIRILO output  - Daily RFP/replace electrolytes as needed    Heme: H&H stable with no evidence of bleeding, history of pancytopenia with platelets slightly lower than baseline  -CBC daily; monitor for s/s of bleeding    ID: afebrile, no leukocytosis  -Q4 temp  -trend CBC; monitor for s/s of infection  -Continue cipro/flagyl    Endo: no acute issues, no significant endocrine history (last A1C normal)  -hypoglycemia protocol  -Monitor BG on AM labs  -no indication for SSI    DVT Prophy: SCDs & Lovenox (monitoring platelets)    Dispo:   -Continue care on RNF  -Patient declined home health care needs.  Tentative discharge over weekend with no needs (pending PT recs) once patient has complete return of bowel function and pain controlled on PO meds.    Plan of care discussed with Colorectal surgical team, Dr. Vasquez, and patient.    Yuridia TORRES-CNP  Colorectal Surgery NP   [facility] Service Pager #[phone]

## 2025-03-19 NOTE — PROGRESS NOTES
Physical Therapy    Physical Therapy Evaluation    Patient Name: Roma Corral  MRN: [id number]  Department: [facility]  Room: [address] [place] [address]  Today's Date: 3/19/2025   Time Calculation  Start Time: 1333  Stop Time: 1400  Time Calculation (min): 27 min    Assessment/Plan   PT Assessment  PT Assessment Results: Decreased strength, Decreased range of motion, Decreased endurance, Impaired balance, Decreased mobility  Rehab Prognosis: Good  Barriers to Discharge Home: Caregiver assistance, Physical needs  Caregiver Assistance: Caregiver assistance needed per identified barriers - however, level of patient's required assistance exceeds assistance available at home  Physical Needs: Ambulating household distances limited by function/safety, 24hr mobility assistance needed, High falls risk due to function or environment, Stair navigation into home limited by function/safety  End of Session Communication: Bedside nurse  Assessment Comment: 70 y.o. M h/o rectal CA s/p extensive OR currently demonstrating impaired strength, balance, and function. Pt will benefit from continued PT in house and after discharge a MOD intensity to restore capacity.  End of Session Patient Position: Bed, 3 rail up, Alarm on  IP OR SWING BED PT PLAN  Inpatient or Swing Bed: Inpatient  PT Plan  Treatment/Interventions: Bed mobility, Transfer training, Gait training, Balance training, Strengthening, Therapeutic exercise, Therapeutic activity, Endurance training  PT Plan: Ongoing PT  PT Frequency: 4 times per week  PT Discharge Recommendations: Moderate intensity level of continued care  PT Recommended Transfer Status: Assist x1 (moderate)  PT - OK to Discharge: Yes    Subjective   General Visit Information:  General  Reason for Referral: rectal CA  Past Medical History Relevant to Rehab: 70 y.o. male with history of rectal cancer with mets to the liver, CAD (2021 NSTEMI), DM2, HTN who is s/p robotic LAR with end colostomy, ilecocecetomy, and laparoscopic microwave  liver ablation on 3/17/25.  Family/Caregiver Present: No  Prior to Session Communication: Bedside nurse  Patient Position Received: Bed, 3 rail up, Alarm on  Preferred Learning Style: verbal, auditory  General Comment: Pt resting in bed upon entry. Pleasant and cooperative. Willing to work with PT.  Home Living:  Home Living  Type of Home: House  Lives With: Spouse  Home Adaptive Equipment: None  Home Layout: Two level, Able to live on main level with bedroom/bathroom  Home Living Comments: No concerns with living arrangements.  Prior Level of Function:  Prior Function Per Pt/Caregiver Report  Level of [place]: Independent with ADLs and functional transfers, Independent with homemaking with ambulation  Receives Help From: Family  ADL Assistance: Independent  Homemaking Assistance: Independent  Ambulatory Assistance: Independent  Precautions:  Precautions  Medical Precautions: Fall precautions     Objective   Pain:  Pain Assessment  Pain Assessment: 0-10  0-10 (Numeric) Pain Score: 6  Pain Type: Acute pain, Surgical pain  Pain Location: Abdomen  Pain Interventions:  (Pt reports having been given pain medications previously)  Cognition:  Cognition  Overall Cognitive Status: Within Functional Limits  Orientation Level: Oriented X4  Insight: Within function limits  Impulsive: Mildly    General Assessments:     Activity Tolerance  Endurance: Tolerates 10 - 20 min exercise with multiple rests, Decreased tolerance for upright activites  Activity Tolerance Comments: Limited by post op pain/soreness and fatigue.    Sensation  Light Touch: No apparent deficits    Strength  Strength Comments: Limited by soreness though grossly >4-/5 throughout BLE.  Strength  Strength Comments: Limited by soreness though grossly >4-/5 throughout BLE.    Perception  Inattention/Neglect: Appears intact    Coordination  Movements are Fluid and Coordinated: Yes  Coordination Comment: Pt notes he's been experiencing LUE twitching/spasming with  mobility.    Postural Control  Postural Control: Impaired  Posture Comment: flexed posture with rounded shoulders    Static Sitting Balance  Static Sitting-Balance Support: Feet supported, Bilateral upper extremity supported  Static Sitting-Level of Assistance: Close supervision, Contact guard  Static Sitting-Comment/Number of Minutes: [place] 11-12 minutes  Dynamic Sitting Balance  Dynamic Sitting-Level of Assistance: Contact guard  Dynamic Sitting-Comments: Scooting towards L    Static Standing Balance  Static Standing-Balance Support: Right upper extremity supported  Static Standing-Level of Assistance: Maximum assistance  Static Standing-Comment/Number of Minutes: Pt unable to fully extend hip/trunk. Achieved approximately 1/3 of partial static stance prior to return back to sitting due to persistent discomfort and rapid onset of fatigue.  Functional Assessments:     Bed Mobility  Bed Mobility: Yes  Bed Mobility 1  Bed Mobility 1: Supine to sitting, Sitting to supine  Level of Assistance 1: Moderate assistance    Transfers  Transfer: Yes  Transfer 1  Transfer From 1: Sit to, Stand to  Transfer to 1: Stand, Sit  Transfer Level of Assistance 1: Moderate assistance, Minimal verbal cues, Minimal tactile cues  Trials/Comments 1: Able to clear buttocks from bed though unable to extend hip/trunk past approximately 33% of sit-stand transfer.    Ambulation/Gait Training  Ambulation/Gait Training Performed: No    Outcome Measures:  [facility] Basic Mobility  Turning from your back to your side while in a flat bed without using bedrails: A lot  Moving from lying on your back to sitting on the side of a flat bed without using bedrails: A lot  Moving to and from bed to chair (including a wheelchair): A lot  Standing up from a chair using your arms (e.g. wheelchair or bedside chair): A lot  To walk in hospital room: Total  Climbing 3-5 steps with railing: Total  Basic Mobility - Total Score: 10    Encounter Problems        Encounter Problems (Active)       Mobility       STG - Patient will ambulate >25ft, wheeled walker, min assist       Start:  03/19/25    Expected End:  04/02/25               PT Transfers       STG - Patient to transfer to and from sit to supine CGA       Start:  03/19/25    Expected End:  04/02/25            STG - Patient will transfer sit to and from stand min assist       Start:  03/19/25    Expected End:  04/02/25                   Education Documentation  Precautions, taught by Keon Amaro, PT at 3/19/2025  2:41 PM.  Learner: Patient  Readiness: Acceptance  Method: Explanation  Response: Verbalizes Understanding    Mobility Training, taught by Keon Amaro, PT at 3/19/2025  2:41 PM.  Learner: Patient  Readiness: Acceptance  Method: Explanation  Response: Verbalizes Understanding    Education Comments  No comments found.

## 2025-03-19 NOTE — CONSULTS
"  Wound Care Consult     Visit Date: 3/19/2025      Patient Name: Roma Corral         MRN: [id number]           YOB: 1955     Reason for Consult: assess colostomy stoma/ pouching         Wound History: Patient  with history of rectal cancer with mets to the liver, CAD (2021 NSTEMI), DM2, HTN  who is s/p robotic LAR with end colostomy, ilecocecetomy with Dr. Vasquez, and laparoscopic microwave liver ablation with Dr. Erazo on 3/17/25.      Pertinent Labs:   Albumin   Date Value Ref Range Status   03/19/2025 2.2 (L) 3.4 - 5.0 g/dL Final       Wound Assessment:  Wound 03/17/25 Incision Abdomen Anterior (Active)   Site Assessment Clean;Dry 03/18/25 2100   Summer-Wound Assessment Clean;Dry;Intact 03/18/25 2100   State of Healing Closed wound edges 03/18/25 2100   Margins Attached edges 03/18/25 2100   Closure Glue;Open to air 03/18/25 2100   Sutures/Staple Line Approximated 03/18/25 2100   Drainage Description None 03/18/25 2100   Drainage Amount None 03/18/25 2100   Dressing Open to air 03/18/25 2100   Dressing Status Clean;Dry 03/18/25 2100       Wound Team Summary Assessment:   Ostomy type: colostomy    size: 1\" oval       color: red       protruding: flush  Functioning: scant hard stool  Mucocutaneous junction: intact  Peristomal skin: 2 cm incision at 7 o'clock,  barrier ring    Pouching: one piece drainable pouch   Ostomy Education: patient has had an ostomy since 7/2024. Knowledgeable in ostomy pouching. Will help patient transition to end colostomy  Plan: assess stoma/pouching      Wound Team Plan: Ostomy tea will follow      Gale Espana RN CWON   3/19/2025  4:22 PM        "

## 2025-03-19 NOTE — CARE PLAN
Problem: Safety - Adult  Goal: Free from fall injury  Outcome: Progressing     Problem: Discharge Planning  Goal: Discharge to home or other facility with appropriate resources  Outcome: Progressing     Problem: Chronic Conditions and Co-morbidities  Goal: Patient's chronic conditions and co-morbidity symptoms are monitored and maintained or improved  Outcome: Progressing     Problem: Nutrition  Goal: Nutrient intake appropriate for maintaining nutritional needs  Outcome: Progressing     Problem: Fall/Injury  Goal: Not fall by end of shift  Outcome: Progressing  Goal: Be free from injury by end of the shift  Outcome: Progressing  Goal: Verbalize understanding of personal risk factors for fall in the hospital  Outcome: Progressing  Goal: Verbalize understanding of risk factor reduction measures to prevent injury from fall in the home  Outcome: Progressing  Goal: Use assistive devices by end of the shift  Outcome: Progressing  Goal: Pace activities to prevent fatigue by end of the shift  Outcome: Progressing

## 2025-03-19 NOTE — PROGRESS NOTES
03/19/25 0800   Discharge Planning   Living Arrangements Spouse/significant other   Support Systems Spouse/significant other   Type of Residence Private residence   Who is requesting discharge planning? Provider   Home or Post Acute Services None   Expected Discharge Disposition Home     [facility] Note:    Plan per Medical/Surgical Team: advance to regular diet, discontinue evans today, pt to work with ostomy nurse, CIRILO will stay in d/t high output, pending PT recs  Payor: Medicare  Discharge disposition: Home pending PT rec  Potential Barriers: None  ADOD: 3/22-3/23    Tabby Shields RN, [facility]

## 2025-03-19 NOTE — PROGRESS NOTES
Occupational Therapy    Evaluation    Patient Name: Roma Corral  MRN: [id number]  Today's Date: 3/19/2025  Room: [address] [place] [address]  Time Calculation  Start Time: 0925  Stop Time: 0957  Time Calculation (min): 32 min    Assessment  IP OT Assessment  OT Assessment: Pt demonstrates ability to participate in ADL/IADL activties but requires SBA-Max assistance in grooming, UE/LE dressing, toileting, and bathing activities due to decreased strength, endurance, balance, tolerance for upright sitting, and increased pain. Pt would continue to benefit from OT services to increase progression towards PLOF in ADL/IADL's.  Prognosis: Good  Barriers to Discharge Home: Physical needs  Physical Needs: 24hr mobility assistance needed, 24hr ADL assistance needed  Evaluation/Treatment Tolerance: Patient limited by pain  Medical Staff Made Aware: Yes  End of Session Communication: Bedside nurse  End of Session Patient Position: Bed, 3 rail up, Alarm off, not on at start of session  Plan:  Inpatient Plan  Treatment Interventions: ADL retraining, Functional transfer training, UE strengthening/ROM, Endurance training, Patient/family training, Equipment evaluation/education, Compensatory technique education  OT Frequency: 3 times per week  OT Discharge Recommendations: Moderate intensity level of continued care  OT Recommended Transfer Status: Assist of 1  OT - OK to Discharge: Yes  OT Assessment  OT Assessment Results: Decreased ADL status, Decreased endurance, Decreased functional mobility, Decreased gross motor control, Decreased IADLs  Prognosis: Good  Barriers to Discharge:  (CLOF)  Evaluation/Treatment Tolerance: Patient limited by pain  Medical Staff Made Aware: Yes  Strengths: Attitude of self, Leisure activity, Housing layout  Barriers to Participation: Comorbidities    Subjective   Current Problem:  1. Rectal cancer (Multi)  metroNIDAZOLE (Flagyl) 500 mg in sodium chloride (iso)  mL    heparin (porcine) injection 5,000 Units     Surgical Pathology Exam    Surgical Pathology Exam    MR liver w and wo IV contrast    DISCONTINUED: ceFAZolin (Ancef) 1 g in dextrose (iso) IV 50 mL    CANCELED: NPO Diet Except: Sips with meds; Effective now    CANCELED: Height and weight    CANCELED: POCT Glucose    CANCELED: Type And Screen    CANCELED: Apply sequential compression device    CANCELED: Vital Signs    CANCELED: Pulse oximetry, spot    CANCELED: Please follow ERAS protocol    CANCELED: NPO Diet Except: Sips with meds; Effective now    CANCELED: Height and weight    CANCELED: POCT Glucose    CANCELED: Apply sequential compression device    CANCELED: Vital Signs    CANCELED: Pulse oximetry, spot    CANCELED: Please follow ERAS protocol    CANCELED: Surgical Pathology Exam    CANCELED: Surgical Pathology Exam        General:  Reason for Referral: 2 s/p robotic low anterior resection with transition from loop to end colostomy & 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 Vasquez, and laparoscopic MWA x3 with liver biopsy by Dr. Erazo on 3/18/25.  Past Medical History Relevant to Rehab: 70 y.o. male with a past medical history of CAD (NSTEMI 2021), DMII (last A1c 5.4 normal 1/17/25), HLD, HTN, gout, anxiety, metastatic rectal cancer to liver s/p loop colostomy July 2024 & FAWN c/b persistent disease and rectal stricture  Prior to Session Communication: Bedside nurse  Patient Position Received: Bed, 3 rail up, Alarm off, not on at start of session  Family/Caregiver Present: No  General Comment: Pt supine in bed upon arrival. Pt pleasant and agreeable to OT session   Precautions:  Medical Precautions: Fall precautions  Post-Surgical Precautions: Abdominal surgery precautions  Vital Signs:   Date/Time Vitals Session Patient Position Pulse Resp SpO2 BP MAP (mmHg)    03/19/25 0925 Pre OT  Lying  88  --  100 %  94/56  --     03/19/25 0926 During OT  Sitting  89  --  95 %  76/48  --     03/19/25 0927 Post OT  Lying  101  --  95 %  103/58  --            Pain:  Pain Assessment  Pain Assessment: 0-10  0-10 (Numeric) Pain Score: 5 - Moderate pain  Pain Type: Surgical pain  Pain Location: Abdomen  Pain Interventions: Repositioned  Response to Interventions: Resting quietly  Lines/Tubes/Drains:  Implantable Port 07/23/24 Right Chest Single lumen port (Active)   Number of days: 239       Urethral Catheter Coude;Latex 18 Fr. (Active)   Number of days: 2       Closed/Suction Drain 1 Right Abdomen Bulb 19 Fr. (Active)   Number of days: 1       Colostomy Loop LUQ (Active)   Number of days: 259     Objective   Cognition:  Overall Cognitive Status: Within Functional Limits  Orientation Level: Oriented X4  Following Commands: Follows all commands and directions without difficulty  Insight: Within function limits  Impulsive: Within functional limits  Processing Speed: Within funtional limits  Home Living:  Type of Home: House  Lives With: Spouse (Wife)  Home Adaptive Equipment: None  Home Layout: Two level, Able to live on main level with bedroom/bathroom  Bathroom Shower/Tub: Tub/shower unit  Bathroom Toilet: Standard  Bathroom Equipment: Grab bars in shower, Grab bars around toilet   Prior Function:  Level of [place]: Independent with ADLs and functional transfers, Independent with homemaking with ambulation  ADL Assistance: Independent  Homemaking Assistance: Independent  Ambulatory Assistance: Independent  Vocational: Retired ()  Leisure: Rest  Hand Dominance: Left  Prior Function Comments: Pt reports no falls within the past 6 months  IADL History:  Homemaking Responsibilities: Yes  Meal Prep Responsibility: Primary  Laundry Responsibility: Primary  Cleaning Responsibility: Primary  Bill Paying/Finance Responsibility: Primary  Shopping Responsibility: Primary  Current License: Yes  Mode of Transportation: Car  Occupation: Retired  Type of Occupation:   Leisure and Hobbies: Rest  ADL:  Eating Assistance: Independent  (Anticipated)  Grooming Assistance: Stand by (Anticipated)  Bathing Assistance: Maximal (Anticipated)  UE Dressing Assistance: Stand by (Anticipated)  LE Dressing Assistance: Maximal (Anticipated)  Toileting Assistance with Device: Maximal (Anticipated)  ADL Comments: Pt's BP dropped to 76/48 when sitting EOB and pt unable to perform ADL's at this time.  Activity Tolerance:  Endurance: Tolerates less than 10 min exercise with changes in vital signs  Balance:  Static Sitting Balance  Static Sitting-Balance Support: Feet supported, Bilateral upper extremity supported  Static Sitting-Level of Assistance: Contact guard, Close supervision  Static Sitting-Comment/Number of Minutes: Progressed from CGA-close supervision. Pt utilizes bed rails  Bed Mobility/Transfers: Bed Mobility  Bed Mobility: Yes  Bed Mobility 1  Bed Mobility 1: Supine to sitting, Sitting to supine  Level of Assistance 1: Maximum assistance  Bed Mobility Comments 1: Pt requires assistance to push/lift BLE off/onto bed and trunk elevation/descent. Pt requires rest breaks during log roll due to increased pain while mobilizing.   and Transfers  Transfer: No (Due to pt's decrease in BP)  IADL's:   Homemaking Responsibilities: Yes  Meal Prep Responsibility: Primary  Laundry Responsibility: Primary  Cleaning Responsibility: Primary  Bill Paying/Finance Responsibility: Primary  Shopping Responsibility: Primary  Current License: Yes  Mode of Transportation: Car  Occupation: Retired  Type of Occupation:   Leisure and Hobbies: Rest  Vision: Vision - Basic Assessment  Current Vision: No visual deficits   and    Sensation:  Light Touch: No apparent deficits  Strength:  Strength Comments: BUE WFL. Resisted not assessed due to precautions  Perception:  Inattention/Neglect: Appears intact  Coordination:  Movements are Fluid and Coordinated: Yes   Hand Function:  Hand Function  Gross Grasp: Functional  Coordination: Functional  Extremities: RUE   RUE :  Within Functional Limits, LUE   LUE: Within Functional Limits,  , and    Outcome Measures: [facility] Daily Activity  Putting on and taking off regular lower body clothing: A lot  Bathing (including washing, rinsing, drying): A lot  Putting on and taking off regular upper body clothing: A little  Toileting, which includes using toilet, bedpan or urinal: A lot  Taking care of personal grooming such as brushing teeth: A little  Eating Meals: None  Daily Activity - Total Score: 16         ,     OT Adult Other Outcome Measures  4AT: Negative    Education Documentation  Body Mechanics, taught by CHICA Garcia at 3/19/2025 11:14 AM.  Learner: Patient  Readiness: Acceptance  Method: Explanation  Response: Verbalizes Understanding, Demonstrated Understanding    Precautions, taught by CHICA Garcia at 3/19/2025 11:14 AM.  Learner: Patient  Readiness: Acceptance  Method: Explanation  Response: Verbalizes Understanding, Demonstrated Understanding    ADL Training, taught by CHICA Garcia at 3/19/2025 11:14 AM.  Learner: Patient  Readiness: Acceptance  Method: Explanation  Response: Verbalizes Understanding, Demonstrated Understanding    Education Comments  No comments found.      Goals:     Encounter Problems       Encounter Problems (Active)       ADLs       Patient will perform UB and LB bathing with minimal assist  level of assistance and PRN AE.       Start:  03/19/25    Expected End:  04/09/25            Patient with complete lower body dressing with minimal assist  level of assistance donning and doffing all LE clothes  with PRN adaptive equipment while edge of bed        Start:  03/19/25    Expected End:  04/09/25            Patient will complete toileting including hygiene clothing management/hygiene with minimal assist  level of assistance and PRN AE.       Start:  03/19/25    Expected End:  04/09/25               BALANCE       Pt will maintain dynamic standing balance during ADL task with  minimal assist  level of assistance in order to demonstrate decreased risk of falling and improved postural control.       Start:  03/19/25    Expected End:  04/09/25               COGNITION/SAFETY       Patient will recall and adhere to abdominal precautions during all functional mobility/ADL tasks in order to demonstrate improved understanding and promote healing post op       Start:  03/19/25    Expected End:  04/09/25               TRANSFERS       Patient will perform bed mobility minimal assist  level of assistance and bed rails in order to improve safety and independence with mobility       Start:  03/19/25    Expected End:  04/09/25            Patient will complete sit to stand transfer with minimal assist  level of assistance and least restrictive device in order to improve safety and prepare for out of bed mobility.       Start:  03/19/25    Expected End:  04/09/25 03/19/25 at 11:15 AM   CHICA AARON   Rehab Office: [phone]

## 2025-03-19 NOTE — PROGRESS NOTES
SUPPORTIVE AND PALLIATIVE ONCOLOGY INPATIENT FOLLOW-UP    SERVICE DATE: 03/19/25     Updates 03/19/25, recommended changes are bolded below:  Symptoms controlled, no changes    ASSESSMENT/PLAN:  Roma Corral is a 70 y.o. male diagnosed with metastatic rectal cancer (dx 6/2024) to liver.and is s/p robotic LAR with end colostomy, ilecocecetomy with Dr. Vasquez and laparoscopic microwave liver ablation with Dr. Erazo on 3/17/25.  PMH significant for CAD (2021 NSTEMI), DM@, and HTN.. Admitted 3/17/2025 for surgical procedure. Supportive and Palliative Oncology is consulted for pain management.     Pain:  Post-operative pain:  abdominal pain , visceral and neuropathic, muscular; sub-optimally controlled  Cancer related pain: rectal , somatic and neuropathic, well-controlled  Home regimen:  Morphine ER 30 mg po bid and Hydromorphone 4 mg po q 4 hrs prn  Intolerances/previously tried: None  Risk factors:  none  Renal function WNL and Hepatic function impaired (elevated Alk Phos, AST, and ALT)  Continue Acetaminophen 650 mg po q 6 hrs prn for mild pain  Continue Methocarbamol to 500 mg po q 6 hrs scheduled  Continue Gabapentin 300 mg po tid-HOLD FOR Sedation  Continue Morphine ER 30 mg po q 12 hrs   Continue Hydromorphone PCA to:  Basal rate: note  Demand dose: 0.4 mg   Lock out interval: q 10 min  1 hr max dose: 2.4 mg  Recommend additional IV RN dose of Hydromorphone 0.5 mg q 1 hrs prn for breakthrough pain     Nausea:  At risk for nausea without vomiting related to opioids and pain    Home regimen:  Ondansetron  and Prochlorperazine   QTc:  within normal limits  Denies  Continue Ondansetron 4 mg ODT/IV q 8 hrs prn-1st line  Can add Prochlorperazine 10 mg IV/PO q 6 hrs prn as a second line     Constipation  At risk for constipation related to medication side effects (including opioids), decreased oral intake, and prolonged immobility in the setting of hospitalization ,   Home regimen: Senna 1-2 tabs po daily prn, Colace 100  mg po daily prn, MOM 15 mL QID prn  LBM ostomy-minimal output since surgery  Defer to primary team for management as pt is acute post-op  Goal to have BM without straining q48-72h, adjust regimen as needed     Sleeping Difficulty:  Impaired sleep related to pain  Home regimen:  none  Sub-optimally controller per patient due to pain  See pain recs    Disposition:  Please start the process of having prior authorization with meds to beds deliver medications to patient prior to discharge via [facility] pharmacy. Prescriptions will need to be sent 48-72 hours prior to discharge so that a prior authorization can be completed.      Discharge date: unknown pending acute issues and symptom control  Will request an appointment with Outpatient Supportive Oncology [facility] (last seen 25)     SIGNATURE: KEV Fisher   PAGER/CONTACT:  Contact information:  Supportive and Palliative Oncology  Monday-Friday 8 AM-5 PM  Epic Secure chat or pager [phone].  After hours and weekends:  pager [phone]  =================================================================  SUBJECTIVE:  Interval Events:  Plan for volumetric liver CT for operative planning on Friday   Pain improved per pt with PCA adjustments and restarting Morphine ER home regimen of 30 mg po bid  Tentative discharge this weekend with no needs (pending PT recs) once patient has complete return of bowel function and pain controlled on PO meds (per CRS)  Up with PT 2x today to sit on edge of bed  Will evaluate in AM and transition to oral regimen    Pain Assessment:  Location:  diffuse abdomen  Duration: Constant  Characteristics:   Ratin   Descriptors: sharp, shooting, and stabbing   Aggravating: movement and coughing   Relieving: Analgesics Hydromorphone PCA, Positioning, Splinting, and Modifying activity   Interference with Function: Somewhat    Opioid Requirements  Past 24 h opioid requirements (3/18/25 at 0800 to 25 at 0800):   Morphine  CR 30 mg PO x 1 doses = 30 mg = 30 OME  Hydromorphone PCA:  0 basal rate, 19 attempts, 17 demand doses of 0.4 mg received = 4.52 mg = 56 OME  Total 24h OME use:  86 OME    Symptom Assessment:  Nausea none  Difficulty Sleeping none  Worrying none  Lack of appetite a little    Information obtained from: chart review, interview of patient, and discussion with primary team  _________________________________________________________________   OBJECTIVE:  Lab Results   Component Value Date    WBC 9.1 03/19/2025    HGB 9.0 (L) 03/19/2025    HCT 32.7 (L) 03/19/2025     (H) 03/19/2025    PLT 69 (L) 03/19/2025     Lab Results   Component Value Date    GLUCOSE 96 03/19/2025    CALCIUM 7.3 (L) 03/19/2025     03/19/2025    K 4.3 03/19/2025    CO2 21 03/19/2025     (H) 03/19/2025    BUN 19 03/19/2025    CREATININE 1.12 03/19/2025     Lab Results   Component Value Date     (H) 03/19/2025     (H) 03/19/2025    ALKPHOS 201 (H) 03/19/2025    BILITOT 1.7 (H) 03/19/2025     Estimated Creatinine Clearance: 63.8 mL/min (by C-G formula based on SCr of 1.12 mg/dL).  Scheduled medications   acetaminophen, 650 mg, oral, q6h  [Held by provider] aspirin, 81 mg, oral, Daily  [Held by provider] atorvastatin, 40 mg, oral, Nightly  ciprofloxacin, 400 mg, intravenous, q12h  enoxaparin, 40 mg, subcutaneous, q24h  gabapentin, 300 mg, oral, TID  methocarbamol, 500 mg, oral, q6h  metoprolol tartrate, 50 mg, oral, BID  metroNIDAZOLE, 500 mg, intravenous, q8h  morphine CR, 30 mg, oral, q12h ALEX  tamsulosin, 0.4 mg, oral, Daily    Continuous medications  HYDROmorphone,   sodium chloride 0.9%, 40 mL/hr, Last Rate: 40 mL/hr (03/19/25 0900)    PRN medications  dextrose, 12.5 g, q15 min PRN  dextrose, 25 g, q15 min PRN  glucagon, 1 mg, q15 min PRN  glucagon, 1 mg, q15 min PRN  naloxone, 0.2 mg, q5 min PRN  naloxone, 0.2 mg, PRN  ondansetron ODT, 4 mg, q8h PRN   Or  ondansetron, 4 mg, q8h PRN    PHYSICAL EXAMINATION:  Vital  Signs:   Vital signs reviewed  Visit Vitals  /59 (BP Location: Right arm, Patient Position: Lying)   Pulse 84   Temp 37.4 °C (99.4 °F) (Temporal)   Resp 18      Numeric Pain Score: 5    Physical Exam  Vitals reviewed.   Constitutional:       General: He is not in acute distress.     Appearance: Normal appearance.      Comments: A&O x 3; pleasant and cooperative; engaged in conversation; eating   HENT:      Head: Normocephalic and atraumatic.      Mouth/Throat:      Mouth: Mucous membranes are moist.   Eyes:      Pupils: Pupils are equal, round, and reactive to light.   Cardiovascular:      Rate and Rhythm: Normal rate.   Pulmonary:      Effort: Pulmonary effort is normal. No respiratory distress.      Comments: Respirations even and unlabored; on room air  Abdominal:      Palpations: Abdomen is soft.      Tenderness: There is abdominal tenderness.      Comments: Appropriately tender post-operatively; stoma left abdomen, drain right abdomen   Musculoskeletal:      Right lower leg: No edema.      Left lower leg: No edema.   Skin:     Capillary Refill: Capillary refill takes less than 2 seconds.      Coloration: Skin is pale.   Neurological:      General: No focal deficit present.      Mental Status: He is alert and oriented to person, place, and time.   Psychiatric:         Mood and Affect: Mood normal.         Behavior: Behavior normal.         Thought Content: Thought content normal.         Judgment: Judgment normal.      PALLIATIVE CARE ENCOUNTER:  Supportive and Palliative Oncology encounter:  Spoke with patient at bedside  Emotional support provided  Coordination of care:   medication management and evaluation    Medical Decision Making/Goals of Care/Advance Care Planning:  Patient's current clinical condition, including diagnosis, prognosis, and management plan, and goals of care were discussed.   Life limiting disease: metastatic malignancy  Family: Supportive wife, son, sister  Performance status:  Moderate limitations due to pain  Joys/meaning/strength: Family and [place]  Understanding of health: Demonstrates good prognostic understanding of disease process, understands plan for management of acute post-operative pain and anticipated recovery/follow up  Information:Wants full disclosure  Goals: symptom control and cancer directed therapy  Worries and fears now and future: ongoing symptoms and inability to receive cancer treatment   Minimum acceptable outcome/QOL:  adequate pain control and able to consequently sleep and eat well  Code status discussion:  Full code     Advance Directives  Existence of Advance Directives:None  Decision maker: Surrogate decision maker is wife Betsy Corral [phone]  =================================================================  Signature and billing:  Medical complexity was high level due to due to complexity of problems, extensive data review, and high risk of management/treatment.    I spent 50 minutes in the care of this patient which included chart review, interviewing patient/family, discussion with primary team, coordination of care, and documentation.    Data:   Diagnostic tests and information reviewed for today's visit:  Conversation with primary team, Most recent labs and imaging results, Most recent EKG, Medications    Some elements copied from my note on 3/18/25, the elements have been updated and all reflect current decision making from today, 03/19/25     Plan of Care discussed with: Provider, RN, Patient    Thank you for asking Supportive and Palliative Oncology to assist with care of this patient.  Recommendations will be communicated back to the consulting service by way of shared electronic medical record/secure chat/email or face-to-face.   We will continue to follow  Please contact us for additional questions or concerns.    SIGNATURE: MELISSA Fisher-CNP   PAGER/CONTACT:  Contact information:  Supportive and Palliative  Oncology  Monday-Friday 8 AM-5 PM  Epic Secure chat or pager [phone].  After hours and weekends:  pager [phone]

## 2025-03-19 NOTE — PROGRESS NOTES
Surgical Oncology Progress Note    HPI:  Roma Corral is a 70 y.o. male with history of rectal cancer with mets to the liver, CAD (2021 NSTEMI), DM2, HTN  who is s/p robotic LAR with end colostomy, ilecocecetomy with Dr. Vasquez, and laparoscopic microwave liver ablation with Dr. Erazo on 3/17/25.     Subjective:  NAEON. Pt doing better this AM. Denies N/V, chest pain, SOB.     A 12-point ROS was performed and was unremarkable except as above.    Objective    Temp:  [37.4 °C (99.3 °F)-37.7 °C (99.9 °F)] 37.7 °C (99.9 °F)  Heart Rate:  [66-90] 90  Resp:  [16-18] 16  BP: ()/(55-62) 92/58  I/O last 2 completed shifts:  In: 1140 (15.5 mL/kg) [P.O.:840; IV Piggyback:300]  Out: 2840 (38.6 mL/kg) [Urine:1300 (0.7 mL/kg/hr); Drains:1540]  Weight: 73.5 kg     Physical Exam:  GEN: No acute distress. Alert, awake and conversive.  HEENT: Sclera anicteric. Moist mucous membranes.  RESP: Breathing non-labored, equal chest rise. On RA.  CV: Regular rate, normotensive  GI: Abdomen soft, mildly distended, appropriately tender for postoperative course. Incisions c/d/I and well approximated with dermabond. Ostomy sunken but appears viable. Scant stool in bag. CIRILO drain with serosang output.  MSK: No gross deformities. Moves all extremities spontaneously.  NEURO: Alert and oriented x3. No focal deficits.  PSYCH: Appropriate mood and affect.  SKIN: No rashes or lesions.    Labs within past 24h:  Results for orders placed or performed during the hospital encounter of 03/17/25 (from the past 24 hours)   POCT GLUCOSE   Result Value Ref Range    POCT Glucose 99 74 - 99 mg/dL   POCT GLUCOSE   Result Value Ref Range    POCT Glucose 75 74 - 99 mg/dL   CBC   Result Value Ref Range    WBC 9.1 4.4 - 11.3 x10*3/uL    nRBC 0.0 0.0 - 0.0 /100 WBCs    RBC 3.11 (L) 4.50 - 5.90 x10*6/uL    Hemoglobin 9.0 (L) 13.5 - 17.5 g/dL    Hematocrit 32.7 (L) 41.0 - 52.0 %     (H) 80 - 100 fL    MCH 28.9 26.0 - 34.0 pg    MCHC 27.5 (L) 32.0 - 36.0 g/dL     RDW 21.5 (H) 11.5 - 14.5 %    Platelets 69 (L) 150 - 450 x10*3/uL     *Note: Due to a large number of results and/or encounters for the requested time period, some results have not been displayed. A complete set of results can be found in Results Review.       Imaging within past 24h:  No results found.    ASSESSMENT  Roma Corral is a 70 y.o. male with history of rectal cancer with mets to the liver, CAD (2021 NSTEMI), DM2, HTN who is s/p robotic LAR with end colostomy, ilecocecetomy with Dr. Vasquez, and laparoscopic microwave liver ablation with Dr. Erazo on 3/17/25.     PLAN:  - Surg onc will continue to follow and monitor drain output character  - Plan for volumetric liver CT for operative planning on Friday      Patient's exam, labs, and findings discussed with Dr. Erazo, who agrees with the plan as described above.    Dalton Solomon MD  PGY-1 General Surgery  Surgical Oncology [id number]

## 2025-03-19 NOTE — PROGRESS NOTES
Postop Pain HPI -   Palliative: relieved with IV analgesics and regional local anesthetics  Provocative: movement  Quality:  burning and aching  Radiation:  none  Severity:  4/10  Timing: constant    24-HOUR OPIOID CONSUMPTION:  Morphine 30 mg , PCA Dilaudid stopped this AM         Scheduled medications  acetaminophen, 650 mg, oral, q6h  [Held by provider] aspirin, 81 mg, oral, Daily  [Held by provider] atorvastatin, 40 mg, oral, Nightly  ciprofloxacin, 400 mg, intravenous, q12h  enoxaparin, 40 mg, subcutaneous, q24h  gabapentin, 300 mg, oral, TID  methocarbamol, 500 mg, oral, q6h  metoprolol tartrate, 50 mg, oral, BID  metroNIDAZOLE, 500 mg, intravenous, q8h  morphine CR, 30 mg, oral, q12h ALEX  tamsulosin, 0.4 mg, oral, Daily      Continuous medications  HYDROmorphone,   sodium chloride 0.9%, 40 mL/hr, Last Rate: 75 mL/hr (03/18/25 1021)  sodium chloride 0.9%, 40 mL/hr      PRN medications  PRN medications: dextrose, dextrose, glucagon, glucagon, naloxone, naloxone, ondansetron ODT **OR** ondansetron     Physical Exam:  Constitutional:  no distress, alert and cooperative  Eyes: clear sclera  Head/Neck: No apparent injury, trachea midline  Respiratory/Thorax: Patent airways, thorax symmetric, breathing comfortably  Cardiovascular: no pitting edema  Gastrointestinal: Nondistended  Musculoskeletal: ROM intact  Extremities: no clubbing  Neurological: alert, mejia x4  Psychological: Appropriate affect    Results for orders placed or performed during the hospital encounter of 03/17/25 (from the past 24 hours)   POCT GLUCOSE   Result Value Ref Range    POCT Glucose 99 74 - 99 mg/dL   POCT GLUCOSE   Result Value Ref Range    POCT Glucose 75 74 - 99 mg/dL     *Note: Due to a large number of results and/or encounters for the requested time period, some results have not been displayed. A complete set of results can be found in Results Review.      Roma Corral is a 70 y.o. year old male patient who presents for  Procedure(s):  Laparoscopic Microwave Ablation Liver Tumor(s) with Ultrasound and Navigation  Liver Biopsy Ultrasound Guided  RESECTION, RECTUM AND SIGMOID COLON, ROBOT-ASSISTED with Javier Vasquez MD on 3/17/2025. Acute Pain consulted for assistance with pain control.      Pt reports 10/10 pain near ileostomy site. Pt just started dilaudid PCA, will reassess patients pain later today to determine if he is a candidate for nerve block.    Patient report new upper extremity bilaterally weakness , primary is aware      Plan:     - Restart home MS Contin (30mg BID) when able  - Tylenol 975mg Q8H, time 3 hours between tylenol and toradol  - Oxycodone 5/10mg Q4h, Dilaudid 0.4mg breakthrough Q2h  - Stop Dilaudid PCA   - Recommend consulting Supportive Onc given that they are managing chronic pain as an outpatient   - Continuous pulse ox  - Acute pain team will sign off     Acute Pain Team  pg [phone] ph [phone]

## 2025-03-19 NOTE — CARE PLAN
The patient's goals for the shift include        Problem: Pain - Adult  Goal: Verbalizes/displays adequate comfort level or baseline comfort level  Outcome: Progressing     Problem: Safety - Adult  Goal: Free from fall injury  Outcome: Progressing     Problem: Discharge Planning  Goal: Discharge to home or other facility with appropriate resources  Outcome: Progressing     Problem: Nutrition  Goal: Nutrient intake appropriate for maintaining nutritional needs  Outcome: Progressing

## 2025-03-20 LAB
ALBUMIN SERPL BCP-MCNC: 2.3 G/DL (ref 3.4–5)
ALP SERPL-CCNC: 186 U/L (ref 33–136)
ALT SERPL W P-5'-P-CCNC: 182 U/L (ref 10–52)
ANION GAP SERPL CALC-SCNC: 10 MMOL/L (ref 10–20)
AST SERPL W P-5'-P-CCNC: 129 U/L (ref 9–39)
BILIRUB SERPL-MCNC: 1.6 MG/DL (ref 0–1.2)
BUN SERPL-MCNC: 21 MG/DL (ref 6–23)
CALCIUM SERPL-MCNC: 7.7 MG/DL (ref 8.6–10.6)
CHLORIDE SERPL-SCNC: 107 MMOL/L (ref 98–107)
CO2 SERPL-SCNC: 23 MMOL/L (ref 21–32)
CREAT SERPL-MCNC: 1.07 MG/DL (ref 0.5–1.3)
EGFRCR SERPLBLD CKD-EPI 2021: 75 ML/MIN/1.73M*2
ERYTHROCYTE [DISTWIDTH] IN BLOOD BY AUTOMATED COUNT: 20.6 % (ref 11.5–14.5)
GLUCOSE SERPL-MCNC: 99 MG/DL (ref 74–99)
HCT VFR BLD AUTO: 28.1 % (ref 41–52)
HGB BLD-MCNC: 8.5 G/DL (ref 13.5–17.5)
MAGNESIUM SERPL-MCNC: 2.08 MG/DL (ref 1.6–2.4)
MCH RBC QN AUTO: 29.1 PG (ref 26–34)
MCHC RBC AUTO-ENTMCNC: 30.2 G/DL (ref 32–36)
MCV RBC AUTO: 96 FL (ref 80–100)
NRBC BLD-RTO: 0 /100 WBCS (ref 0–0)
PLATELET # BLD AUTO: 69 X10*3/UL (ref 150–450)
POTASSIUM SERPL-SCNC: 4.3 MMOL/L (ref 3.5–5.3)
PROT SERPL-MCNC: 4.8 G/DL (ref 6.4–8.2)
RBC # BLD AUTO: 2.92 X10*6/UL (ref 4.5–5.9)
SODIUM SERPL-SCNC: 136 MMOL/L (ref 136–145)
TRIGL FLD-MCNC: 80 MG/DL
WBC # BLD AUTO: 7.9 X10*3/UL (ref 4.4–11.3)

## 2025-03-20 PROCEDURE — 97530 THERAPEUTIC ACTIVITIES: CPT | Mod: GP

## 2025-03-20 PROCEDURE — 99222 1ST HOSP IP/OBS MODERATE 55: CPT | Performed by: NURSE PRACTITIONER

## 2025-03-20 PROCEDURE — 2550000001 HC RX 255 CONTRASTS

## 2025-03-20 PROCEDURE — 80053 COMPREHEN METABOLIC PANEL: CPT

## 2025-03-20 PROCEDURE — 99232 SBSQ HOSP IP/OBS MODERATE 35: CPT

## 2025-03-20 PROCEDURE — 99233 SBSQ HOSP IP/OBS HIGH 50: CPT

## 2025-03-20 PROCEDURE — 85027 COMPLETE CBC AUTOMATED: CPT

## 2025-03-20 PROCEDURE — 2500000002 HC RX 250 W HCPCS SELF ADMINISTERED DRUGS (ALT 637 FOR MEDICARE OP, ALT 636 FOR OP/ED): Performed by: STUDENT IN AN ORGANIZED HEALTH CARE EDUCATION/TRAINING PROGRAM

## 2025-03-20 PROCEDURE — 2500000004 HC RX 250 GENERAL PHARMACY W/ HCPCS (ALT 636 FOR OP/ED)

## 2025-03-20 PROCEDURE — 1170000001 HC PRIVATE ONCOLOGY ROOM DAILY

## 2025-03-20 PROCEDURE — 2500000001 HC RX 250 WO HCPCS SELF ADMINISTERED DRUGS (ALT 637 FOR MEDICARE OP): Performed by: STUDENT IN AN ORGANIZED HEALTH CARE EDUCATION/TRAINING PROGRAM

## 2025-03-20 PROCEDURE — 2500000004 HC RX 250 GENERAL PHARMACY W/ HCPCS (ALT 636 FOR OP/ED): Mod: JZ,TB | Performed by: STUDENT IN AN ORGANIZED HEALTH CARE EDUCATION/TRAINING PROGRAM

## 2025-03-20 PROCEDURE — 2500000004 HC RX 250 GENERAL PHARMACY W/ HCPCS (ALT 636 FOR OP/ED): Performed by: STUDENT IN AN ORGANIZED HEALTH CARE EDUCATION/TRAINING PROGRAM

## 2025-03-20 PROCEDURE — 84478 ASSAY OF TRIGLYCERIDES: CPT | Performed by: NURSE PRACTITIONER

## 2025-03-20 PROCEDURE — 2500000005 HC RX 250 GENERAL PHARMACY W/O HCPCS

## 2025-03-20 PROCEDURE — 83735 ASSAY OF MAGNESIUM: CPT

## 2025-03-20 RX ORDER — HYDROMORPHONE HYDROCHLORIDE 2 MG/1
2 TABLET ORAL EVERY 4 HOURS PRN
Status: DISCONTINUED | OUTPATIENT
Start: 2025-03-20 | End: 2025-03-27

## 2025-03-20 RX ORDER — HYDROMORPHONE HYDROCHLORIDE 0.2 MG/ML
0.2 INJECTION INTRAMUSCULAR; INTRAVENOUS; SUBCUTANEOUS EVERY 2 HOUR PRN
Status: DISCONTINUED | OUTPATIENT
Start: 2025-03-20 | End: 2025-03-21

## 2025-03-20 RX ORDER — HYDROMORPHONE HYDROCHLORIDE 4 MG/1
4 TABLET ORAL EVERY 4 HOURS PRN
Status: DISCONTINUED | OUTPATIENT
Start: 2025-03-20 | End: 2025-03-27

## 2025-03-20 RX ORDER — SODIUM CHLORIDE 9 MG/ML
40 INJECTION, SOLUTION INTRAVENOUS CONTINUOUS
Status: ACTIVE | OUTPATIENT
Start: 2025-03-20 | End: 2025-03-21

## 2025-03-20 RX ORDER — DIATRIZOATE MEGLUMINE AND DIATRIZOATE SODIUM 660; 100 MG/ML; MG/ML
30 SOLUTION ORAL; RECTAL ONCE
Status: COMPLETED | OUTPATIENT
Start: 2025-03-20 | End: 2025-03-20

## 2025-03-20 RX ORDER — TALC
3 POWDER (GRAM) TOPICAL NIGHTLY
Status: DISCONTINUED | OUTPATIENT
Start: 2025-03-20 | End: 2025-04-03

## 2025-03-20 RX ADMIN — GABAPENTIN 300 MG: 300 CAPSULE ORAL at 21:19

## 2025-03-20 RX ADMIN — CIPROFLOXACIN 400 MG: 2 INJECTION, SOLUTION INTRAVENOUS at 09:12

## 2025-03-20 RX ADMIN — ACETAMINOPHEN 650 MG: 325 TABLET, FILM COATED ORAL at 03:52

## 2025-03-20 RX ADMIN — ACETAMINOPHEN 650 MG: 325 TABLET, FILM COATED ORAL at 10:23

## 2025-03-20 RX ADMIN — METHOCARBAMOL TABLETS 500 MG: 500 TABLET, COATED ORAL at 03:52

## 2025-03-20 RX ADMIN — METHOCARBAMOL TABLETS 500 MG: 500 TABLET, COATED ORAL at 16:16

## 2025-03-20 RX ADMIN — METHOCARBAMOL TABLETS 500 MG: 500 TABLET, COATED ORAL at 21:20

## 2025-03-20 RX ADMIN — METRONIDAZOLE 500 MG: 5 INJECTION, SOLUTION INTRAVENOUS at 11:41

## 2025-03-20 RX ADMIN — MORPHINE SULFATE 30 MG: 30 TABLET, FILM COATED, EXTENDED RELEASE ORAL at 09:06

## 2025-03-20 RX ADMIN — GABAPENTIN 300 MG: 300 CAPSULE ORAL at 09:06

## 2025-03-20 RX ADMIN — ACETAMINOPHEN 650 MG: 325 TABLET, FILM COATED ORAL at 16:16

## 2025-03-20 RX ADMIN — SODIUM CHLORIDE 40 ML/HR: 9 INJECTION, SOLUTION INTRAVENOUS at 11:42

## 2025-03-20 RX ADMIN — METRONIDAZOLE 500 MG: 5 INJECTION, SOLUTION INTRAVENOUS at 21:24

## 2025-03-20 RX ADMIN — Medication 3 MG: at 21:19

## 2025-03-20 RX ADMIN — Medication: at 10:00

## 2025-03-20 RX ADMIN — ENOXAPARIN SODIUM 40 MG: 100 INJECTION SUBCUTANEOUS at 09:06

## 2025-03-20 RX ADMIN — SODIUM CHLORIDE 40 ML/HR: 9 INJECTION, SOLUTION INTRAVENOUS at 15:30

## 2025-03-20 RX ADMIN — TAMSULOSIN HYDROCHLORIDE 0.4 MG: 0.4 CAPSULE ORAL at 09:06

## 2025-03-20 RX ADMIN — MORPHINE SULFATE 30 MG: 30 TABLET, FILM COATED, EXTENDED RELEASE ORAL at 21:19

## 2025-03-20 RX ADMIN — CIPROFLOXACIN 400 MG: 2 INJECTION, SOLUTION INTRAVENOUS at 21:19

## 2025-03-20 RX ADMIN — DIATRIZOATE MEGLUMINE AND DIATRIZOATE SODIUM 30 ML: 660; 100 LIQUID ORAL; RECTAL at 13:44

## 2025-03-20 RX ADMIN — METHOCARBAMOL TABLETS 500 MG: 500 TABLET, COATED ORAL at 10:00

## 2025-03-20 RX ADMIN — GABAPENTIN 300 MG: 300 CAPSULE ORAL at 14:58

## 2025-03-20 RX ADMIN — SODIUM CHLORIDE 1000 ML: 9 INJECTION, SOLUTION INTRAVENOUS at 15:30

## 2025-03-20 ASSESSMENT — ENCOUNTER SYMPTOMS
CARDIOVASCULAR NEGATIVE: 1
FATIGUE: 1
GASTROINTESTINAL NEGATIVE: 1
PSYCHIATRIC NEGATIVE: 1
NEUROLOGICAL NEGATIVE: 1
MUSCULOSKELETAL NEGATIVE: 1
RESPIRATORY NEGATIVE: 1

## 2025-03-20 ASSESSMENT — COGNITIVE AND FUNCTIONAL STATUS - GENERAL
WALKING IN HOSPITAL ROOM: A LITTLE
CLIMB 3 TO 5 STEPS WITH RAILING: TOTAL
TURNING FROM BACK TO SIDE WHILE IN FLAT BAD: A LITTLE
TURNING FROM BACK TO SIDE WHILE IN FLAT BAD: A LOT
CLIMB 3 TO 5 STEPS WITH RAILING: A LITTLE
MOVING TO AND FROM BED TO CHAIR: A LOT
DAILY ACTIVITIY SCORE: 22
MOBILITY SCORE: 18
MOBILITY SCORE: 11
STANDING UP FROM CHAIR USING ARMS: A LOT
MOVING FROM LYING ON BACK TO SITTING ON SIDE OF FLAT BED WITH BEDRAILS: A LOT
DRESSING REGULAR LOWER BODY CLOTHING: A LITTLE
HELP NEEDED FOR BATHING: A LITTLE
MOVING FROM LYING ON BACK TO SITTING ON SIDE OF FLAT BED WITH BEDRAILS: A LITTLE
MOVING TO AND FROM BED TO CHAIR: A LITTLE
WALKING IN HOSPITAL ROOM: A LOT
STANDING UP FROM CHAIR USING ARMS: A LITTLE

## 2025-03-20 ASSESSMENT — PAIN - FUNCTIONAL ASSESSMENT
PAIN_FUNCTIONAL_ASSESSMENT: 0-10

## 2025-03-20 ASSESSMENT — PAIN SCALES - GENERAL
PAINLEVEL_OUTOF10: 7
PAINLEVEL_OUTOF10: 0 - NO PAIN
PAINLEVEL_OUTOF10: 3
PAINLEVEL_OUTOF10: 4
PAINLEVEL_OUTOF10: 5 - MODERATE PAIN

## 2025-03-20 ASSESSMENT — PAIN DESCRIPTION - DESCRIPTORS: DESCRIPTORS: ACHING

## 2025-03-20 ASSESSMENT — PAIN DESCRIPTION - LOCATION
LOCATION: ABDOMEN
LOCATION: ABDOMEN

## 2025-03-20 ASSESSMENT — PAIN SCALES - PAIN ASSESSMENT IN ADVANCED DEMENTIA (PAINAD): TOTALSCORE: MEDICATION (SEE MAR)

## 2025-03-20 NOTE — PROGRESS NOTES
Colorectal Surgery Progress Note      HPI: Roma Corral is a 70 y.o. 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 Vasquez, and laparoscopic MWA x3 with liver biopsy by Dr. Erazo on 3/18/25.      Subjective  No acute events overnight. Reports sleeping well.  Surgical pain controlled with PCA and multimodal pain regimen.  Denies nausea, vomiting, bloating on regular diet; had a few bites of chicken & mac & cheese for dinner.  Colostomy pouch without gas/stool.  Has only sat on side of bed a few times yesterday, refusing to ambulate due to pain but did not use PCA prior to movement.    Objective  Physical Exam:  Gen: in no physical distress and alert, well appearing, pleasant & conversational, lying in bed  HENT: Head normocephalic, atraumatic.  Mucous membranes moist.    Neuro: Alert and oriented x3.  Moves all extremities spontaneously x4.  CV: Normal rate and rhythm on palpated radial pulse. No BLE edema.  Resp: No acute respiratory distress.  Normal effort on room air. Chest expansion symmetrical.   GI: Abdomen is soft, nontender, and nondistended. Laparoscopic incisions are clean, dry and intact with glue.  Stoma is pink and healthy, well pouched, without gas or stool in pouch. CIRILO drain is serosanguinous and full, no erythema but with scant serosang drainage at insertion site.  Skin: Warm and dry, pale, without rashes or lesions    Visit Vitals  BP 91/54 (BP Location: Right arm, Patient Position: Sitting)   Pulse 75   Temp 37.1 °C (98.8 °F) (Temporal)   Resp 16        I/O last 3 completed shifts:  In: 2092 (28.5 mL/kg) [P.O.:1192; IV Piggyback:900]  Out: 2577 (35.1 mL/kg)  "[Urine:915 (0.3 mL/kg/hr); Drains:5072]  Weight: 73.5 kg   I/O this shift:  In: -   Out: 60 [Drains:60]             Data Review:  CBC:   Lab Results   Component Value Date    WBC 7.9 03/20/2025    RBC 2.92 (L) 03/20/2025     BMP:   Lab Results   Component Value Date    GLUCOSE 99 03/20/2025    CO2 23 03/20/2025    BUN 21 03/20/2025    CREATININE 1.07 03/20/2025    CALCIUM 7.7 (L) 03/20/2025     Coagulation: No results found for: \"INR\", \"APTT\"    Labs reviewed and personally interpreted as: CBC with slight downtrend in H&H, without leukocytosis. Moderate thrombocytopenia, similar to baseline.  RFP without electrolyte derangements, ORALIA, or hypo/hyperglycemia      Imaging:  No recent imaging to review.    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 Vasquez, and laparoscopic MWA x3 with liver biopsy by Dr. Erazo on 3/18/25. Progressing well through post-op course. CIRILO Creatinine negative.  CIRILO triglycerides positive. Passed trial void yesterday with low volume retention. Continues with high serosanguinous CIRILO output (1.1L over past 24 hours). Must ambulate multiple times today.    Plan:  -One dose of therapeutic gastrograffin to facilitate bowel function  -Continue regular diet & encourage PO hydration, as tolerated  -Continue IVF @ 40ml/hr in setting of high CIRILO output & low PO hydration  -Transition IV meds to PO  -OOB during day/ambulation in halls x3, reminding to use PCA prior to movement    Neuro: Post-op pain controlled, history of anxiety  -Continue current pain regimen with tylenol, gabapentin, robaxin; change dilaudid PCA to PO oxy; appreciate supportive onc for recs & support  -Continue home MS Contin, hold home ativan  -Sleep hygiene/OOB during day/PT/multiple walks in " halls today    CV: hemodynamically stable; hx of CAD (NSTEMI 2021), HTN, HLD  -Q4 vital signs  -EKG prn for ACS symptoms  -Continue home metoprolol with hold parameters  -Hold home ASA 81, Lipitor  -DC continuous telemetry    Resp: no acute issues on room air, no significant pulmonary history  -ICS 10x every hour  -OOB/ambulation    GI: s/p robotic LAR/loop to end colostomy, ileocecectomy & MWA; hx of rectal CA with mets to liver  -Continue regular diet as tolerated, only eating if hungry & not bloated, burping, hiccupping, nauseous  -Therapeutic gastrografin x1 to facilitate bowel function  -Zofran prn for nausea  -continue assessment of stoma and output, stoma not new for patient.  WOCN for supplies & support.  -Monitor CIRILO drain output, leave off suction. Drain TG positive for lymph leak, fluid bolus for high drain output  -Daily CMP to monitor LFTs  -Volumetric liver CT Friday per surg onc, for operative planning    :Voiding with minimal volume retention; no significant urological hx  -Continue flomax for low resection, needs to stand when voiding to completely empty.   - Strict intake and output  - Continue mIVF to 40ml/hr but continue in setting of high CIRILO output  - Daily RFP/replace electrolytes as needed    Heme: H&H stable with no evidence of bleeding, history of pancytopenia with platelets slightly lower than baseline  -CBC daily; monitor for s/s of bleeding    ID: afebrile, no leukocytosis  -Q4 temp  -trend CBC; monitor for s/s of infection  -Continue cipro/flagyl for total of 4 days; end date set for 3/22    Endo: no acute issues, no significant endocrine history (last A1C normal)  -hypoglycemia protocol  -Monitor BG on AM labs  -no indication for SSI    DVT Prophy: SCDs & Lovenox (monitoring platelets)    Dispo:   -Continue care on RNF  -Patient declined home health care needs.  Tentative discharge over weekend; once patient has complete return of bowel function and pain controlled on PO meds. PT  recommending SNF, will start process.    Plan of care discussed with Colorectal surgical team, Dr. Connell, and patient.    Yuridia TORRES-CNP  Colorectal Surgery NP   [facility] Service Pager #[phone]

## 2025-03-20 NOTE — PROGRESS NOTES
03/20/25 1200   Discharge Planning   Living Arrangements Spouse/significant other   Support Systems Spouse/significant other   Type of Residence Private residence   Number of Stairs to Enter Residence 2   Number of Stairs Within Residence 10   Do you have animals or pets at home? Yes   Type of Animals or Pets one dog   Home or Post Acute Services Other (Comment)  (SNF vs HHC)   Expected Discharge Disposition   (SNF vs HHC)     SW met with pt to discuss SNF.  Pt is alert and oriented x3.  SW explained SNF.  Pt asked if he could go home and then go to SNF if he needs it.  SW explained Medicare would not cover SNF stay if he went home from the hospital.  Pt reports that is unfair, but he will think over SNF.  SW did leave message for pt's spouse to discuss. Will follow.  KHUSHI Porras spoke with pt's spouse, Betsy.  SW explained SNF and pt's response.  Spouse is receptive and plans to speak with pt later.  Will follow.  KHUSHI Porras

## 2025-03-20 NOTE — PROGRESS NOTES
"SUPPORTIVE AND PALLIATIVE ONCOLOGY INPATIENT FOLLOW-UP    SERVICE DATE: 03/20/25     Updates 03/20/25, recommended changes are bolded below:  Recommend discontinuing Hydromorphone PCA (this afternoon per patient request) and start the following regimen\"  Hydromorphone 2 mg po q 4 hrs prn for moderate pain  Hydromorphone 4 mg po q 4 hrs prn for severe pain  Hydromorphone 0.4 mg IV q 2 hrs prn for breakthrough pain  Recommend Melatonin 3 mg po q hs    ASSESSMENT/PLAN:  Roma Corral is a 70 y.o. male diagnosed with metastatic rectal cancer (dx 6/2024) to liver.and is s/p robotic LAR with end colostomy, ilecocecetomy with Dr. Vasquez and laparoscopic microwave liver ablation with Dr. Erazo on 3/17/25.  PMH significant for CAD (2021 NSTEMI), DM@, and HTN.. Admitted 3/17/2025 for surgical procedure. Supportive and Palliative Oncology is consulted for pain management.     Pain:  Post-operative pain:  abdominal pain , visceral and neuropathic, muscular; sub-optimally controlled  Cancer related pain: rectal , somatic and neuropathic, well-controlled  Home regimen:  Morphine ER 30 mg po bid and Hydromorphone 4 mg po q 4 hrs prn  Intolerances/previously tried: None  Risk factors:  none  Renal function WNL and Hepatic function impaired (elevated Alk Phos, AST, and ALT)  Continue Acetaminophen 650 mg po q 6 hrs prn for mild pain  Continue Methocarbamol to 500 mg po q 6 hrs scheduled  Continue Gabapentin 300 mg po tid-HOLD FOR Sedation  Continue Morphine ER 30 mg po q 12 hrs   Discontinue Hydromorphone PCA   Recommend Hydromorphone 2 mg po q 4 hrs prn for moderate pain  Recommend Hydromorphone 4 mg po q 4 hrs prn for severe pain  Recommend Hydromorphone IV 0.4 mg q 2 hours prn for breakthrough pain     Nausea:  At risk for nausea without vomiting related to opioids and pain    Home regimen:  Ondansetron  and Prochlorperazine   QTc:  within normal limits  Denies  Continue Ondansetron 4 mg ODT/IV q 8 hrs prn-1st line  Can add " Prochlorperazine 10 mg IV/PO q 6 hrs prn as a second line     Constipation  At risk for constipation related to medication side effects (including opioids), decreased oral intake, and prolonged immobility in the setting of hospitalization ,   Home regimen: Senna 1-2 tabs po daily prn, Colace 100 mg po daily prn, MOM 15 mL QID prn  LBM ostomy-minimal output since surgery  Defer to primary team for management as pt is acute post-op  Goal to have BM without straining q48-72h, adjust regimen as needed     Sleeping Difficulty:  Impaired sleep related to pain  Home regimen:  none  Sub-optimally controller per patient due to hospital environment and pain  See pain recs  Recommend Melatonin 3 mg po q hs    Disposition:  Please start the process of having prior authorization with meds to beds deliver medications to patient prior to discharge via [facility] pharmacy. Prescriptions will need to be sent 48-72 hours prior to discharge so that a prior authorization can be completed.      Discharge date: unknown pending acute issues and symptom control  Will request an appointment with Outpatient Supportive On[facility] (last seen 25)     SIGNATURE: KEV Fisher   PAGER/CONTACT:  Contact information:  Supportive and Palliative Oncology  Monday-Friday 8 AM-5 PM  Epic Secure chat or pager [phone].  After hours and weekends:  pager [phone]  =================================================================  SUBJECTIVE:  Interval Events:  Primary team requesting recommendations to transition to po regimen  Pt reports not having slept well due to hospital environment; requesting to hold off on transition to po regimen until this afternoon after he can take a nap  Sitting up in chair      Pain Assessment:  Location:  diffuse abdomen  Duration: Constant  Characteristics:   Ratin   Descriptors: sharp, shooting, and stabbing   Aggravating: movement and coughing   Relieving: Analgesics Hydromorphone  PCA, Positioning, Splinting, and Modifying activity   Interference with Function: Somewhat    Opioid Requirements  Past 24 h opioid requirements (3/19/25 at 0800 to 03/20/25 at 0800):   Morphine CR 30 mg PO x 2 doses = 30 mg = 60 OME  Hydromorphone PCA:  0 basal rate, 17 attempts, 15 demand doses of 0.4 mg received = 6 mg = 75 OME  Total 24h OME use:  135 OME    Symptom Assessment:  Nausea none  Difficulty Sleeping somewhat  Worrying none  Lack of appetite a little    Information obtained from: chart review, interview of patient, and discussion with primary team  _________________________________________________________________   OBJECTIVE:  Lab Results   Component Value Date    WBC 7.9 03/20/2025    HGB 8.5 (L) 03/20/2025    HCT 28.1 (L) 03/20/2025    MCV 96 03/20/2025    PLT 69 (L) 03/20/2025     Lab Results   Component Value Date    GLUCOSE 99 03/20/2025    CALCIUM 7.7 (L) 03/20/2025     03/20/2025    K 4.3 03/20/2025    CO2 23 03/20/2025     03/20/2025    BUN 21 03/20/2025    CREATININE 1.07 03/20/2025     Lab Results   Component Value Date     (H) 03/20/2025     (H) 03/20/2025    ALKPHOS 186 (H) 03/20/2025    BILITOT 1.6 (H) 03/20/2025     Estimated Creatinine Clearance: 66.8 mL/min (by C-G formula based on SCr of 1.07 mg/dL).  Scheduled medications   acetaminophen, 650 mg, oral, q6h  [Held by provider] aspirin, 81 mg, oral, Daily  [Held by provider] atorvastatin, 40 mg, oral, Nightly  ciprofloxacin, 400 mg, intravenous, q12h  diatrizoate epifanio-diatrizoat sod, 30 mL, oral, Once  enoxaparin, 40 mg, subcutaneous, q24h  gabapentin, 300 mg, oral, TID  methocarbamol, 500 mg, oral, q6h  metoprolol tartrate, 50 mg, oral, BID  metroNIDAZOLE, 500 mg, intravenous, q8h  morphine CR, 30 mg, oral, q12h ALEX  tamsulosin, 0.4 mg, oral, Daily    Continuous medications  HYDROmorphone,     PRN medications  dextrose, 12.5 g, q15 min PRN  dextrose, 25 g, q15 min PRN  glucagon, 1 mg, q15 min PRN  glucagon, 1  mg, q15 min PRN  naloxone, 0.2 mg, q5 min PRN  naloxone, 0.2 mg, PRN  ondansetron ODT, 4 mg, q8h PRN   Or  ondansetron, 4 mg, q8h PRN    PHYSICAL EXAMINATION:  Vital Signs:   Vital signs reviewed  Visit Vitals  BP 90/54   Pulse 90   Temp 37.1 °C (98.8 °F) (Temporal)   Resp 16      Numeric Pain Score: 5    Physical Exam  Vitals reviewed.   Constitutional:       General: He is not in acute distress.     Appearance: Normal appearance.      Comments: A&O x 3; pleasant and cooperative; sitting up in chair; appears tired   HENT:      Head: Normocephalic and atraumatic.      Mouth/Throat:      Mouth: Mucous membranes are moist.   Eyes:      Pupils: Pupils are equal, round, and reactive to light.   Cardiovascular:      Rate and Rhythm: Normal rate.      Pulses: Normal pulses.   Pulmonary:      Effort: Pulmonary effort is normal. No respiratory distress.      Comments: Respirations even and unlabored; on room air  Abdominal:      Palpations: Abdomen is soft.      Tenderness: There is abdominal tenderness.      Comments: Appropriately tender post-operatively; stoma left abdomen, CIRILO drain right abdomen with damp dressing (serosang); moderate output in CIRILO drain   Musculoskeletal:      Right lower leg: No edema.      Left lower leg: No edema.   Skin:     General: Skin is warm.      Capillary Refill: Capillary refill takes less than 2 seconds.      Coloration: Skin is pale.   Neurological:      General: No focal deficit present.      Mental Status: He is alert and oriented to person, place, and time.   Psychiatric:         Mood and Affect: Mood normal.         Behavior: Behavior normal.         Thought Content: Thought content normal.         Judgment: Judgment normal.      PALLIATIVE CARE ENCOUNTER:  Supportive and Palliative Oncology encounter:  Spoke with patient at bedside  Emotional support provided  Coordination of care:   medication management and evaluation    Medical Decision Making/Goals of Care/Advance Care  Planning:  Patient's current clinical condition, including diagnosis, prognosis, and management plan, and goals of care were discussed.   Life limiting disease: metastatic malignancy  Family: Supportive wife, son, sister  Performance status: Moderate limitations due to pain  Joys/meaning/strength: Family and [place]  Understanding of health: Demonstrates good prognostic understanding of disease process, understands plan for management of acute post-operative pain and anticipated recovery/follow up  Information:Wants full disclosure  Goals: symptom control and cancer directed therapy  Worries and fears now and future: ongoing symptoms and inability to receive cancer treatment   Minimum acceptable outcome/QOL:  adequate pain control and able to consequently sleep and eat well  Code status discussion:  Full code     Advance Directives  Existence of Advance Directives:None  Decision maker: Surrogate decision maker is wife Betsy Corral [phone]  =================================================================  Signature and billing:  Medical complexity was high level due to due to complexity of problems, extensive data review, and high risk of management/treatment.    I spent 50 minutes in the care of this patient which included chart review, interviewing patient/family, discussion with primary team, coordination of care, and documentation.    Data:   Diagnostic tests and information reviewed for today's visit:  Conversation with primary team, Most recent labs and imaging results, Most recent EKG, Medications    Some elements copied from my note on 3/19/25, the elements have been updated and all reflect current decision making from today, 03/20/25     Plan of Care discussed with: Provider, RN, Patient    Thank you for asking Supportive and Palliative Oncology to assist with care of this patient.  Recommendations will be communicated back to the consulting service by way of shared electronic medical  record/secure chat/email or face-to-face.   We will continue to follow  Please contact us for additional questions or concerns.    SIGNATURE: MELISSA Fisher-CNP   PAGER/CONTACT:  Contact information:  Supportive and Palliative Oncology  Monday-Friday 8 AM-5 PM  Epic Secure chat or pager [phone].  After hours and weekends:  pager [phone]

## 2025-03-20 NOTE — CARE PLAN
The clinical goals for the shift include pt will remain aware of POC.        Problem: Safety - Adult  Goal: Free from fall injury  Outcome: Progressing     Problem: Discharge Planning  Goal: Discharge to home or other facility with appropriate resources  Outcome: Progressing     Problem: Chronic Conditions and Co-morbidities  Goal: Patient's chronic conditions and co-morbidity symptoms are monitored and maintained or improved  Outcome: Progressing     Problem: Nutrition  Goal: Nutrient intake appropriate for maintaining nutritional needs  Outcome: Progressing     Problem: Fall/Injury  Goal: Not fall by end of shift  Outcome: Progressing  Goal: Be free from injury by end of the shift  Outcome: Progressing  Goal: Verbalize understanding of personal risk factors for fall in the hospital  Outcome: Progressing  Goal: Verbalize understanding of risk factor reduction measures to prevent injury from fall in the home  Outcome: Progressing  Goal: Use assistive devices by end of the shift  Outcome: Progressing  Goal: Pace activities to prevent fatigue by end of the shift  Outcome: Progressing

## 2025-03-20 NOTE — PROGRESS NOTES
Physical Therapy    Physical Therapy Treatment    Patient Name: Roma Corral  MRN: [id number]  Department: [facility]  Room: [address] [place] [address]  Today's Date: 3/20/2025  Time Calculation  Start Time: 1418  Stop Time: 1435  Time Calculation (min): 17 min         Assessment/Plan   PT Assessment  PT Assessment Results: Decreased strength, Decreased range of motion, Decreased endurance, Impaired balance, Decreased mobility  Rehab Prognosis: Good  Barriers to Discharge Home: Caregiver assistance, Physical needs  Caregiver Assistance: Caregiver assistance needed per identified barriers - however, level of patient's required assistance exceeds assistance available at home  Physical Needs: Ambulating household distances limited by function/safety, 24hr mobility assistance needed, High falls risk due to function or environment, Stair navigation into home limited by function/safety  Evaluation/Treatment Tolerance: Patient tolerated treatment well, Patient limited by fatigue  End of Session Communication: Bedside nurse  Assessment Comment: Pt continues to demonstrate impaired strength and function to that of baseline and remains appropriate for continued PT in house and after discharge at MOD intensity.  End of Session Patient Position: Up in chair  PT Plan  Inpatient/Swing Bed or Outpatient: Inpatient  PT Plan  Treatment/Interventions: Bed mobility, Transfer training, Gait training, Balance training, Endurance training, Therapeutic exercise, Therapeutic activity  PT Plan: Ongoing PT  PT Frequency: 3 times per week  PT Discharge Recommendations: Moderate intensity level of continued care  PT Recommended Transfer Status: Assist x1  PT - OK to Discharge: Yes    General Visit Information:   PT  Visit  PT Received On: 03/20/25  General  Past Medical History Relevant to Rehab: Pt  Family/Caregiver Present: No  Prior to Session Communication:  (NP)  Patient Position Received: Up in chair, Alarm off, not on at start of session  Preferred Learning  Style: verbal, auditory    Subjective   Precautions:  Precautions  Medical Precautions: Fall precautions      Objective   Pain:  Pain Assessment  Pain Assessment: 0-10  0-10 (Numeric) Pain Score: 7  Pain Interventions:  (Using PCA appropriately)  Cognition:  Cognition  Overall Cognitive Status: Within Functional Limits  Orientation Level: Oriented X4  Following Commands: Follows all commands and directions without difficulty  Processing Speed: Delayed (Due to somewhat lethargic from PCA use.)    Postural Control:  Postural Control  Postural Control: Impaired  Static Sitting Balance  Static Sitting-Balance Support: Feet supported  Static Sitting-Level of Assistance: Close supervision    Activity Tolerance:  Activity Tolerance  Endurance: Decreased tolerance for upright activites  Treatments:     Therapeutic Activity  Therapeutic Activity Performed: Yes  Therapeutic Activity 1: transfers, static stance, gait    Bed Mobility  Bed Mobility: No  Bed Mobility 1  Bed Mobility Comments 1: Pt OOB in chair pre/post visit.    Ambulation/Gait Training  Ambulation/Gait Training Performed: Yes  Ambulation/Gait Training 1  Surface 1: Level tile  Device 1: No device  Assistance 1: Arm in arm assistance, Minimum assistance  Comments/Distance (ft) 1: 2-3ft fwd/retro. Wide GONZÁLEZ of increased sway and dizziness. Unable to safely tolerate longer distance due to high falls risk.  Transfers  Transfer: Yes  Transfer 1  Transfer From 1: Sit to, Stand to  Transfer to 1: Stand, Sit  Transfer Level of Assistance 1: Minimum assistance, Arm in arm assistance    Outcome Measures:  [facility] Basic Mobility  Turning from your back to your side while in a flat bed without using bedrails: A lot  Moving from lying on your back to sitting on the side of a flat bed without using bedrails: A lot  Moving to and from bed to chair (including a wheelchair): A lot  Standing up from a chair using your arms (e.g. wheelchair or bedside chair): A lot  To walk in  hospital room: A lot  Climbing 3-5 steps with railing: Total  Basic Mobility - Total Score: 11    Education Documentation  Precautions, taught by Keon Amaro, PT at 3/20/2025  2:49 PM.  Learner: Patient  Readiness: Acceptance  Method: Explanation  Response: Verbalizes Understanding    Mobility Training, taught by Keon Amaro, PT at 3/20/2025  2:49 PM.  Learner: Patient  Readiness: Acceptance  Method: Explanation  Response: Verbalizes Understanding    Education Comments  No comments found.      Encounter Problems       Encounter Problems (Active)       Mobility       STG - Patient will ambulate >25ft, wheeled walker, min assist (Progressing)       Start:  03/19/25    Expected End:  04/02/25               PT Transfers       STG - Patient to transfer to and from sit to supine CGA (Progressing)       Start:  03/19/25    Expected End:  04/02/25            STG - Patient will transfer sit to and from stand min assist (Progressing)       Start:  03/19/25    Expected End:  04/02/25

## 2025-03-20 NOTE — PROGRESS NOTES
Roma Corral is a 70 year old male with metastatic rectal cancer to liver.  s/p loop colostomy July 2024 & FAWN c/b persistent disease and rectal stricture.  He returns to office today for a post op visit.    3/17/2025 Operation:  Laparoscopic ileocecal resection with ileocolic anastomosis  Robotic low anterior resection with end colostomy  Laparoscopic Microwave Ablation Liver Tumor(s) with Ultrasound and Navigation     Pathology:  A. Segment of small bowel, appendix and cecum, ileocecectomy:  - Segment of terminal ileum with deep ulceration and extensive serosal fibroinflammatory reaction, consistent with clinical history of fistula.  - Appendix with fibrous obliteration of the tip.  - Cecum with serosal fibroinflammatory reaction.  - Seven lymph nodes, negative for malignancy (0/7).     B. Sigmoid colon and rectum, low anterior resection:  - Residual, microscopic foci of invasive adenocarcinoma with treatment effect.  - Thirty-one lymph nodes, negative for malignancy ((0/31).  - Adjacent portion of rectum with transmural ulceration and extensive adhesions.  - Inflamed colocutaneous tissue consistent with stoma site changes.  - See synoptic report.  --T2N0     C.  Hernia sac, excision:  - Mesothelial-lined vascularized fibroadipose tissue, consistent with hernia sac.        Past Medical History  Metastatic Rectal cancer to liver  MI  Anemia  CAD  Gout  HTN  HLD  DM Type 2     Surgical History  LX Colostomy creation  Cardiac Cath  Leg surgery     Review of Systems  Constitutional: Negative for fever, chills, anorexia, weight loss, malaise             ENMT: Negative for nasal discharge, congestion, ear pain, mouth pain, throat pain                 Respiratory: Negative for cough, hemoptysis, wheezing, shortness of breath                Cardiac: Negative for chest pain, dyspnea on exertion, orthopnea, palpitations, syncope , (+)CAD, (+)MI , (+)HTN, (+)HLD     Gastrointestinal: Negative for nausea, vomiting, diarrhea,  constipation, abdominal pain, (+)RECTAL CANCER     Genitourinary: Negative for discharge, dysuria, flank pain, frequency, hematuria          Musculoskeletal: Negative for decreased ROM, pain, swelling, weakness          Neurological: Negative for dizziness, confusion, headache, seizures, syncope               Psychiatric: Negative for mood changes, anxiety, hallucinations, sleep changes, suicidal ideas        Skin: Negative for mass, pain, itching, rash, ulcer            Endocrine: Negative for heat intolerance, cold intolerance, excessive sweating, polyuria, excess thirst , (+)DM        Hematologic/Lymph: Negative for anemia, bruising, easy bleeding, night sweats, petechiae, history of DVT/PE or cancer               Allergic/Immunologic: Negative for anaphylaxis, itchy/ teary eyes, itching, sneezing, swelling     Physical Exam  Constitutional: Thin frail and cachectic appearing, awake/alert/oriented x3, no distress, alert and cooperative             Eyes: Sclera anicteric, no conjunctival inflammation, conjugate gaze    ENMT: mucous membranes moist, no apparent injury,            Head/Neck: Neck supple, no apparent injury, No JVD, trachea midline, no bruits              Respiratory/Thorax: Patent airways, CTAB, normal breath sounds with good chest expansion, thorax symmetric         Cardiovascular: Regular, rate and rhythm, no murmurs, normal S1 and S2         Gastrointestinal: Loop colostomy left lower quadrant somewhat retracted, proximal limb is still slightly above the skin, distal limb is below.  Nondistended, soft, non-tender, no rebound tenderness or guarding, no masses palpable, no organomegaly, +BS, no bruits               Extremities: normal extremities, no cyanosis edema, contusions or wounds, 2+ femoral pulses B/L              Neurological: alert and oriented x3, normal strength, Normal gait          Lymphatic: No palpable inguinal lymphadenopathy   Psychological: Appropriate mood and behavior          Skin: Warm and dry, no lesions, no rashes                Anorectal:    Impression:    Plan:

## 2025-03-20 NOTE — CONSULTS
INTERVENTIONAL RADIOLOGY INPATIENT CONSULT NOTE  [facility]    Assessment & Plan     Review of pertinent imaging demonstrates interval decrease in size and morphology of liver metastases on most recent CT. L PV branch feeding segments VIII/Fina and V/Ivb respectively.    Plan: At bedside we discussed portal vein embolization PVE including indication, risks, benefits, and anticipated outcomes. Patient is agreeable to procedure. Contact information provided to patient to follow up as needed. He will be scheduled for embolization 4/1/25 as an outpatient.    Case discussed with FAIZA Gonzalez MD.    Subjective  Roma Corral, 70 y.o. male is a patent presenting with:  Rectal cancer (Multi) [C20]     Mr Corral is a 69 year old M w/ hx of metastatic rectal cancer w/ mets to the liver s/p diverting loop colostomy (07/2024) s/p chemoXRT (currently on FOLFOXIRI w/ Josefina), nephrolithiasis, CAD, HTN and T2D who presents to [facility] on 01/18/24 for abdominal pain, rectal bleeding and severe fatigue.      Briefly, patient initially evaluated back in 06/2024 for weight loss and abdominal pain and ultimately found to have large metastatic rectal adenocarcinoma w/ numerous mets to the liver. Initially underwent diverting loop colostomy procedure on 07/02/24. Subsequently received several session of radiation therapy (07/2024) and was then started on FOLFOXIRI w/ Bevacizumab. He has completed 8 cycles of chemotherapy with most surveillance CT on 01/14/25 demonstrating interval decrease in size of several hepatic metastasis. Nevertheless, over the last few cycles, he has noted severe exhaustion and accompanying abdominal pain as well as persistent rectal bleeding and colostomy bleeding which has essentially brought him to the hospital currently.      On arrival to the ED, patient was hemodynamically stable. Initial labs demonstrating chronic anemia, leukopenia, thrombocytopenia (WBC 3.9 Hb 7.6 Plt 36) and chronic mildly elevated ALP  levels.      At bedside, patient reports he has been struggling with extreme fatigue for the last two chemo cycles. Last infusion was on 01/10/25. Reports also accompanying lower abdominal pain which is now more constant than it used to be. Pain is now described as moderate in severity. Also endorses noticing rectal bleeding as well bleeding into his colostomy bag. These bleeding episodes previously occurred sporadically but is now occurring multiple times per week.           Review of Systems   Constitutional:  Positive for fatigue.   Respiratory: Negative.     Cardiovascular: Negative.    Gastrointestinal: Negative.    Musculoskeletal: Negative.    Skin: Negative.    Neurological: Negative.    Psychiatric/Behavioral: Negative.         Past Medical History:   Diagnosis Date    Abdominal pain     Acute non-ST elevation myocardial infarction (NSTEMI) (Multi)     Anemia     6/6/24 HGB 8.6; HCT 31.5    Anxiety     CAD (coronary artery disease)     Cardiology follow-up encounter 12/11/2023    Sharif Lau MD    [facility]     H/O cardiac catheterization 06/01/2021    H/O cardiovascular stress test 09/03/2021    IMPRESSION: 1. No evidence of ischemia. 2. Suspicion of an infarct along the mid anterior wall. 3. Left ventricular dilatation is noted. 4. Left ventricular EF was calculated to be 50%. Summary:  1. No clinical or electrocardiographic evidence for ischemia at a submaximal workload. 3. The blunted heart rate diminshes the sensitivity of this test.  4. The submaximal level of stress was achieved.    H/O echocardiogram 06/02/2021    Mild concentric Left ventricle hypertrophy.  Global left ventricular wall motion and contractility are within normal limits.  There is normal left ventricular systolic function.  Estimated ejection fraction is 60-64%.  The left atrial size is mildly dilated.  Mild aortic regurgitation.  A trace of mitral regurgitation.  Trivial to mild tricuspid regurgitation.  There is no pulmonary  hypertension.    High cholesterol     Hypertension     Overweight     Rectal cancer (Multi)     Type 2 diabetes mellitus     4/18/2024 A1C 7.5%     Past Surgical History:   Procedure Laterality Date    COLONOSCOPY  06/17/2024    HEMICOLECTOMY W/ OSTOMY      LEG SURGERY Left     rodrigo placed     Social History     Socioeconomic History    Marital status:      Spouse name: Not on file    Number of children: 1    Years of education: Not on file    Highest education level: Not on file   Occupational History    Occupation: retired   Tobacco Use    Smoking status: Never    Smokeless tobacco: Never   Vaping Use    Vaping status: Never Used   Substance and Sexual Activity    Alcohol use: Not Currently    Drug use: Never    Sexual activity: Defer   Other Topics Concern    Not on file   Social History Narrative    Not on file     Social Drivers of Health     Financial Resource Strain: Low Risk  (3/17/2025)    Overall Financial Resource Strain (CARDIA)     Difficulty of Paying Living Expenses: Not hard at all   Food Insecurity: No Food Insecurity (3/17/2025)    Hunger Vital Sign     Worried About Running Out of Food in the Last Year: Never true     Ran Out of Food in the Last Year: Never true   Transportation Needs: No Transportation Needs (3/17/2025)    PRAPARE - Transportation     Lack of Transportation (Medical): No     Lack of Transportation (Non-Medical): No   Physical Activity: Not on file   Stress: Not on file   Social Connections: Unknown (7/3/2024)    Social Connection and Isolation Panel [NHANES]     Frequency of Communication with Friends and Family: Not on file     Frequency of Social Gatherings with Friends and Family: Not on file     Attends Evangelical Services: Not on file     Active Member of Clubs or Organizations: Not on file     Attends Club or Organization Meetings: Not on file     Marital Status:    Intimate Partner Violence: Not At Risk (3/17/2025)    Humiliation, Afraid, Rape, and Kick  questionnaire     Fear of Current or Ex-Partner: No     Emotionally Abused: No     Physically Abused: No     Sexually Abused: No   Housing Stability: Low Risk  (3/17/2025)    Housing Stability Vital Sign     Unable to Pay for Housing in the Last Year: No     Number of Times Moved in the Last Year: 0     Homeless in the Last Year: No     Family History   Problem Relation Name Age of Onset    No Known Problems Mother      No Known Problems Father      No Known Problems Sister      Leukemia Brother      Stroke Maternal Grandfather       No Known Allergies  Current Facility-Administered Medications on File Prior to Encounter   Medication Dose Route Frequency Provider Last Rate Last Admin    alteplase (Cathflo Activase) injection 2 mg  2 mg intra-catheter PRN Laurence S Castelein, APRN-CNP        alteplase (Cathflo Activase) injection 2 mg  2 mg intra-catheter PRN Laurence S Castelein, APRN-CNP        heparin flush 10 unit/mL syringe 50 Units  50 Units intra-catheter PRN Laurence S Castelein, APRN-CNP        heparin flush 10 unit/mL syringe 50 Units  50 Units intra-catheter PRN Laurence S Castelein, APRN-CNP        heparin flush 100 unit/mL syringe 500 Units  500 Units intra-catheter PRN Laurence S Castelein, APRN-CNP        heparin flush 100 unit/mL syringe 500 Units  500 Units intra-catheter PRN Laurence S Castelein, APRN-CNP         Current Outpatient Medications on File Prior to Encounter   Medication Sig Dispense Refill    metoprolol succinate XL (Toprol-XL) 100 mg 24 hr tablet Take 1 tablet (100 mg) by mouth once daily. 90 tablet 3    aspirin 81 mg chewable tablet Chew 1 tablet (81 mg) once daily.      atorvastatin (Lipitor) 40 mg tablet Take 1 tablet (40 mg) by mouth once daily at bedtime. 90 tablet 3    loperamide (Imodium) 2 mg capsule Take 2 capsules (4 mg) by mouth with the first episode of diarrhea and 1 capsule (2 mg) by mouth with any additional episodes. Maximum 8 capsules (16 mg) per day. 100 capsule 2     LORazepam (Ativan) 0.5 mg tablet Take 1 tablet (0.5 mg) by mouth 2 times a day as needed for anxiety (nausea) for up to 15 days. (Patient taking differently: Take 1 tablet (0.5 mg) by mouth 2 times a day as needed for anxiety (nausea). Takes PRN.) 30 tablet 3    morphine CR (MS Contin) 15 mg 12 hr tablet Take 1 tablet (15 mg) by mouth once daily in the morning AND 2 tablets (30 mg) once daily at bedtime. Do not crush, chew, or split. (Patient taking differently: Takes #2 tablets twice daily) 90 tablet 0    ondansetron (Zofran) 4 mg tablet Take 1 tablet (4 mg) by mouth every 8 hours if needed for nausea or vomiting. 20 tablet 0    potassium chloride CR (Klor-Con M20) 20 mEq ER tablet Take 1 tablet (20 mEq) by mouth 2 times a day. Do not crush or chew. 180 tablet 3    prochlorperazine (Compazine) 10 mg tablet Take 1 tablet (10 mg) by mouth every 6 hours if needed for nausea or vomiting. 30 tablet 5       Objective    Vitals:    03/20/25 0349 03/20/25 0721 03/20/25 0928 03/20/25 1100   BP: 93/56 91/54 90/54 107/64   BP Location: Right arm Right arm  Right arm   Patient Position: Sitting Sitting  Lying   Pulse: 71 75 90 81   Resp: 18 16  16   Temp: 36.7 °C (98.1 °F) 37.1 °C (98.8 °F)  37.1 °C (98.8 °F)   TempSrc: Temporal Temporal  Temporal   SpO2: 96% 98%  94%   Weight:       Height:           Results for orders placed or performed during the hospital encounter of 03/17/25 (from the past 24 hours)   Triglycerides, Body Fluid   Result Value Ref Range    Triglycerides, Fluid 80 No established mg/dL   Comprehensive metabolic panel   Result Value Ref Range    Glucose 99 74 - 99 mg/dL    Sodium 136 136 - 145 mmol/L    Potassium 4.3 3.5 - 5.3 mmol/L    Chloride 107 98 - 107 mmol/L    Bicarbonate 23 21 - 32 mmol/L    Anion Gap 10 10 - 20 mmol/L    Urea Nitrogen 21 6 - 23 mg/dL    Creatinine 1.07 0.50 - 1.30 mg/dL    eGFR 75 >60 mL/min/1.73m*2    Calcium 7.7 (L) 8.6 - 10.6 mg/dL    Albumin 2.3 (L) 3.4 - 5.0 g/dL    Alkaline  Phosphatase 186 (H) 33 - 136 U/L    Total Protein 4.8 (L) 6.4 - 8.2 g/dL     (H) 9 - 39 U/L    Bilirubin, Total 1.6 (H) 0.0 - 1.2 mg/dL     (H) 10 - 52 U/L   Magnesium   Result Value Ref Range    Magnesium 2.08 1.60 - 2.40 mg/dL   CBC   Result Value Ref Range    WBC 7.9 4.4 - 11.3 x10*3/uL    nRBC 0.0 0.0 - 0.0 /100 WBCs    RBC 2.92 (L) 4.50 - 5.90 x10*6/uL    Hemoglobin 8.5 (L) 13.5 - 17.5 g/dL    Hematocrit 28.1 (L) 41.0 - 52.0 %    MCV 96 80 - 100 fL    MCH 29.1 26.0 - 34.0 pg    MCHC 30.2 (L) 32.0 - 36.0 g/dL    RDW 20.6 (H) 11.5 - 14.5 %    Platelets 69 (L) 150 - 450 x10*3/uL     *Note: Due to a large number of results and/or encounters for the requested time period, some results have not been displayed. A complete set of results can be found in Results Review.       MELD 3.0: 8 at 1/20/2025 10:58 AM  MELD-Na: 8 at 1/20/2025 10:58 AM  Calculated from:  Serum Creatinine: 0.84 mg/dL (Using min of 1 mg/dL) at 1/20/2025 10:58 AM  Serum Sodium: 142 mmol/L (Using max of 137 mmol/L) at 1/20/2025 10:58 AM  Total Bilirubin: 1.3 mg/dL at 1/20/2025 10:58 AM  Serum Albumin: 3.3 g/dL at 1/20/2025 10:58 AM  INR(ratio): 1.1 at 1/19/2025 10:49 AM  Age at listing (hypothetical): 69 years  Sex: Male at 1/20/2025 10:58 AM      Physical Exam  Constitutional:       Appearance: He is normal weight.   Cardiovascular:      Rate and Rhythm: Normal rate.      Pulses: Normal pulses.      Heart sounds: Normal heart sounds.   Pulmonary:      Effort: Pulmonary effort is normal.   Abdominal:      General: Bowel sounds are normal.      Palpations: Abdomen is soft.      Comments: ostomy   Musculoskeletal:         General: Normal range of motion.      Cervical back: Normal range of motion.   Skin:     General: Skin is warm and dry.      Capillary Refill: Capillary refill takes less than 2 seconds.   Neurological:      Mental Status: He is alert and oriented to person, place, and time.   Psychiatric:         Mood and Affect: Mood  normal.         Behavior: Behavior normal.         Thought Content: Thought content normal.         Judgment: Judgment normal.             I personally spent 60 minutes on this consult with over 50% of time spent discussing management and care of specified diagnosis with patient and/or coordinating care for this patient.       Vascular and Interventional Radiology  [facility]  [phone] Inpatient Nursing Quarterback  [phone] Nursing Line 7a-5p  [phone] Resident on-call pager    NON-Urgent on call weekends and after hours weekdays (5pm - 5am) IR pager: [phone]  Urgent & emergent on call weekends and after hours weekdays (5pm-7am) IR pager: [phone]

## 2025-03-20 NOTE — PROGRESS NOTES
Surgical Oncology Progress Note    HPI:  Roma Corral is a 70 y.o. male with history of rectal cancer with mets to the liver, CAD (2021 NSTEMI), DM2, HTN  who is s/p robotic LAR with end colostomy, ilecocecetomy with Dr. Vasquez, and laparoscopic microwave liver ablation with Dr. Erazo on 3/17/25.     Subjective:  NAEON. T bili yesterday elevated to 1.7. Continued drain output although downtrending 1137 (1540), serosang.     A 12-point ROS was performed and was unremarkable except as above.    Objective    Temp:  [36.4 °C (97.6 °F)-37.7 °C (99.9 °F)] 37.1 °C (98.8 °F)  Heart Rate:  [] 75  Resp:  [14-18] 16  BP: ()/(48-63) 91/54  I/O last 2 completed shifts:  In: 1832 (24.9 mL/kg) [P.O.:932; IV Piggyback:900]  Out: 1652 (22.5 mL/kg) [Urine:515 (0.3 mL/kg/hr); Drains:1137]  Weight: 73.5 kg     Physical Exam:  GEN: No acute distress. Alert, awake and conversive.  HEENT: Sclera anicteric. Moist mucous membranes.  RESP: Breathing non-labored, equal chest rise. On RA.  CV: Regular rate, normotensive  GI: Abdomen soft, mildly distended, appropriately tender for postoperative course. Incisions c/d/I and well approximated with dermabond. Ostomy sunken but appears viable. No stool in bag. CIRILO drain with serosang output.  MSK: No gross deformities. Moves all extremities spontaneously.  NEURO: Alert and oriented x3. No focal deficits.  PSYCH: Appropriate mood and affect.  SKIN: No rashes or lesions.    Labs within past 24h:  Results for orders placed or performed during the hospital encounter of 03/17/25 (from the past 24 hours)   CBC   Result Value Ref Range    WBC 9.1 4.4 - 11.3 x10*3/uL    nRBC 0.0 0.0 - 0.0 /100 WBCs    RBC 3.11 (L) 4.50 - 5.90 x10*6/uL    Hemoglobin 9.0 (L) 13.5 - 17.5 g/dL    Hematocrit 32.7 (L) 41.0 - 52.0 %     (H) 80 - 100 fL    MCH 28.9 26.0 - 34.0 pg    MCHC 27.5 (L) 32.0 - 36.0 g/dL    RDW 21.5 (H) 11.5 - 14.5 %    Platelets 69 (L) 150 - 450 x10*3/uL   POCT GLUCOSE   Result Value  Ref Range    POCT Glucose 115 (H) 74 - 99 mg/dL   Creatinine, Body Fluid   Result Value Ref Range    Creatinine,  Fluid 1.00 Not established mg/dL   Comprehensive metabolic panel   Result Value Ref Range    Glucose 96 74 - 99 mg/dL    Sodium 138 136 - 145 mmol/L    Potassium 4.3 3.5 - 5.3 mmol/L    Chloride 110 (H) 98 - 107 mmol/L    Bicarbonate 21 21 - 32 mmol/L    Anion Gap 11 10 - 20 mmol/L    Urea Nitrogen 19 6 - 23 mg/dL    Creatinine 1.12 0.50 - 1.30 mg/dL    eGFR 71 >60 mL/min/1.73m*2    Calcium 7.3 (L) 8.6 - 10.6 mg/dL    Albumin 2.2 (L) 3.4 - 5.0 g/dL    Alkaline Phosphatase 201 (H) 33 - 136 U/L    Total Protein 4.2 (L) 6.4 - 8.2 g/dL     (H) 9 - 39 U/L    Bilirubin, Total 1.7 (H) 0.0 - 1.2 mg/dL     (H) 10 - 52 U/L   Triglycerides, Body Fluid   Result Value Ref Range    Triglycerides, Fluid 80 No established mg/dL   Comprehensive metabolic panel   Result Value Ref Range    Glucose 99 74 - 99 mg/dL    Sodium 136 136 - 145 mmol/L    Potassium 4.3 3.5 - 5.3 mmol/L    Chloride 107 98 - 107 mmol/L    Bicarbonate 23 21 - 32 mmol/L    Anion Gap 10 10 - 20 mmol/L    Urea Nitrogen 21 6 - 23 mg/dL    Creatinine 1.07 0.50 - 1.30 mg/dL    eGFR 75 >60 mL/min/1.73m*2    Calcium 7.7 (L) 8.6 - 10.6 mg/dL    Albumin 2.3 (L) 3.4 - 5.0 g/dL    Alkaline Phosphatase 186 (H) 33 - 136 U/L    Total Protein 4.8 (L) 6.4 - 8.2 g/dL     (H) 9 - 39 U/L    Bilirubin, Total 1.6 (H) 0.0 - 1.2 mg/dL     (H) 10 - 52 U/L   Magnesium   Result Value Ref Range    Magnesium 2.08 1.60 - 2.40 mg/dL   CBC   Result Value Ref Range    WBC 7.9 4.4 - 11.3 x10*3/uL    nRBC 0.0 0.0 - 0.0 /100 WBCs    RBC 2.92 (L) 4.50 - 5.90 x10*6/uL    Hemoglobin 8.5 (L) 13.5 - 17.5 g/dL    Hematocrit 28.1 (L) 41.0 - 52.0 %    MCV 96 80 - 100 fL    MCH 29.1 26.0 - 34.0 pg    MCHC 30.2 (L) 32.0 - 36.0 g/dL    RDW 20.6 (H) 11.5 - 14.5 %    Platelets 69 (L) 150 - 450 x10*3/uL     *Note: Due to a large number of results and/or encounters for the  requested time period, some results have not been displayed. A complete set of results can be found in Results Review.       Imaging within past 24h:  No results found.    ASSESSMENT  Roma Corral is a 70 y.o. male with history of rectal cancer with mets to the liver, CAD (2021 NSTEMI), DM2, HTN who is s/p robotic LAR with end colostomy, ilecocecetomy with Dr. Vasquez, and laparoscopic microwave liver ablation with Dr. Erazo on 3/17/25.     PLAN:  - Surg onc will continue to follow and monitor drain output character, follow up bilirubin trend   - Plan for volumetric liver CT for operative planning on Friday      Patient's exam, labs, and findings discussed with Dr. Erazo, who agrees with the plan as described above.    Tammy Cannon MD  PGY-3 General Surgery  Surgical Oncology [id number]

## 2025-03-21 ENCOUNTER — APPOINTMENT (OUTPATIENT)
Dept: RADIOLOGY | Facility: HOSPITAL | Age: 70
DRG: 329 | End: 2025-03-21
Payer: MEDICARE

## 2025-03-21 ENCOUNTER — APPOINTMENT (OUTPATIENT)
Dept: RADIOLOGY | Facility: HOSPITAL | Age: 70
End: 2025-03-21
Payer: MEDICARE

## 2025-03-21 LAB
ALBUMIN SERPL BCP-MCNC: 1.9 G/DL (ref 3.4–5)
ALP SERPL-CCNC: 196 U/L (ref 33–136)
ALT SERPL W P-5'-P-CCNC: 109 U/L (ref 10–52)
ANION GAP SERPL CALC-SCNC: 11 MMOL/L (ref 10–20)
APTT PPP: 28 SECONDS (ref 26–36)
AST SERPL W P-5'-P-CCNC: 54 U/L (ref 9–39)
BILIRUB SERPL-MCNC: 1.5 MG/DL (ref 0–1.2)
BUN SERPL-MCNC: 27 MG/DL (ref 6–23)
CALCIUM SERPL-MCNC: 7.1 MG/DL (ref 8.6–10.6)
CHLORIDE SERPL-SCNC: 112 MMOL/L (ref 98–107)
CO2 SERPL-SCNC: 22 MMOL/L (ref 21–32)
CREAT SERPL-MCNC: 1.16 MG/DL (ref 0.5–1.3)
EGFRCR SERPLBLD CKD-EPI 2021: 68 ML/MIN/1.73M*2
ERYTHROCYTE [DISTWIDTH] IN BLOOD BY AUTOMATED COUNT: 20.5 % (ref 11.5–14.5)
GLUCOSE SERPL-MCNC: 96 MG/DL (ref 74–99)
HCT VFR BLD AUTO: 26.8 % (ref 41–52)
HGB BLD-MCNC: 7.7 G/DL (ref 13.5–17.5)
INR PPP: 1.6 (ref 0.9–1.1)
MAGNESIUM SERPL-MCNC: 2.01 MG/DL (ref 1.6–2.4)
MCH RBC QN AUTO: 29.1 PG (ref 26–34)
MCHC RBC AUTO-ENTMCNC: 28.7 G/DL (ref 32–36)
MCV RBC AUTO: 101 FL (ref 80–100)
NRBC BLD-RTO: 0 /100 WBCS (ref 0–0)
PHOSPHATE SERPL-MCNC: 2.9 MG/DL (ref 2.5–4.9)
PLATELET # BLD AUTO: 72 X10*3/UL (ref 150–450)
POTASSIUM SERPL-SCNC: 4.1 MMOL/L (ref 3.5–5.3)
PROT SERPL-MCNC: 3.9 G/DL (ref 6.4–8.2)
PROTHROMBIN TIME: 17.9 SECONDS (ref 9.8–12.4)
RBC # BLD AUTO: 2.65 X10*6/UL (ref 4.5–5.9)
SODIUM SERPL-SCNC: 141 MMOL/L (ref 136–145)
WBC # BLD AUTO: 5.1 X10*3/UL (ref 4.4–11.3)

## 2025-03-21 PROCEDURE — 2550000001 HC RX 255 CONTRASTS: Performed by: NURSE PRACTITIONER

## 2025-03-21 PROCEDURE — 74160 CT ABDOMEN W/CONTRAST: CPT | Performed by: RADIOLOGY

## 2025-03-21 PROCEDURE — 99232 SBSQ HOSP IP/OBS MODERATE 35: CPT

## 2025-03-21 PROCEDURE — 85027 COMPLETE CBC AUTOMATED: CPT

## 2025-03-21 PROCEDURE — 83735 ASSAY OF MAGNESIUM: CPT

## 2025-03-21 PROCEDURE — 74160 CT ABDOMEN W/CONTRAST: CPT

## 2025-03-21 PROCEDURE — 2500000001 HC RX 250 WO HCPCS SELF ADMINISTERED DRUGS (ALT 637 FOR MEDICARE OP)

## 2025-03-21 PROCEDURE — 2500000004 HC RX 250 GENERAL PHARMACY W/ HCPCS (ALT 636 FOR OP/ED): Performed by: STUDENT IN AN ORGANIZED HEALTH CARE EDUCATION/TRAINING PROGRAM

## 2025-03-21 PROCEDURE — 2500000004 HC RX 250 GENERAL PHARMACY W/ HCPCS (ALT 636 FOR OP/ED)

## 2025-03-21 PROCEDURE — 84100 ASSAY OF PHOSPHORUS: CPT

## 2025-03-21 PROCEDURE — 2500000002 HC RX 250 W HCPCS SELF ADMINISTERED DRUGS (ALT 637 FOR MEDICARE OP, ALT 636 FOR OP/ED): Performed by: STUDENT IN AN ORGANIZED HEALTH CARE EDUCATION/TRAINING PROGRAM

## 2025-03-21 PROCEDURE — 2500000001 HC RX 250 WO HCPCS SELF ADMINISTERED DRUGS (ALT 637 FOR MEDICARE OP): Performed by: STUDENT IN AN ORGANIZED HEALTH CARE EDUCATION/TRAINING PROGRAM

## 2025-03-21 PROCEDURE — 2500000005 HC RX 250 GENERAL PHARMACY W/O HCPCS

## 2025-03-21 PROCEDURE — P9045 ALBUMIN (HUMAN), 5%, 250 ML: HCPCS | Mod: JZ,TB

## 2025-03-21 PROCEDURE — 1170000001 HC PRIVATE ONCOLOGY ROOM DAILY

## 2025-03-21 PROCEDURE — 76377 3D RENDER W/INTRP POSTPROCES: CPT

## 2025-03-21 PROCEDURE — 85610 PROTHROMBIN TIME: CPT | Performed by: STUDENT IN AN ORGANIZED HEALTH CARE EDUCATION/TRAINING PROGRAM

## 2025-03-21 PROCEDURE — 84075 ASSAY ALKALINE PHOSPHATASE: CPT

## 2025-03-21 RX ORDER — SODIUM CHLORIDE 9 MG/ML
40 INJECTION, SOLUTION INTRAVENOUS CONTINUOUS
Status: ACTIVE | OUTPATIENT
Start: 2025-03-21 | End: 2025-03-22

## 2025-03-21 RX ORDER — ALBUMIN HUMAN 50 G/1000ML
12.5 SOLUTION INTRAVENOUS ONCE
Status: COMPLETED | OUTPATIENT
Start: 2025-03-21 | End: 2025-03-21

## 2025-03-21 RX ORDER — DRONABINOL 2.5 MG/1
2.5 CAPSULE ORAL
Status: DISCONTINUED | OUTPATIENT
Start: 2025-03-21 | End: 2025-03-31

## 2025-03-21 RX ADMIN — ENOXAPARIN SODIUM 40 MG: 100 INJECTION SUBCUTANEOUS at 10:45

## 2025-03-21 RX ADMIN — MORPHINE SULFATE 30 MG: 30 TABLET, FILM COATED, EXTENDED RELEASE ORAL at 21:25

## 2025-03-21 RX ADMIN — ACETAMINOPHEN 650 MG: 325 TABLET, FILM COATED ORAL at 00:42

## 2025-03-21 RX ADMIN — CIPROFLOXACIN 400 MG: 2 INJECTION, SOLUTION INTRAVENOUS at 22:56

## 2025-03-21 RX ADMIN — ACETAMINOPHEN 650 MG: 325 TABLET, FILM COATED ORAL at 10:45

## 2025-03-21 RX ADMIN — Medication 3 MG: at 21:25

## 2025-03-21 RX ADMIN — ACETAMINOPHEN 650 MG: 325 TABLET, FILM COATED ORAL at 04:58

## 2025-03-21 RX ADMIN — METHOCARBAMOL TABLETS 500 MG: 500 TABLET, COATED ORAL at 04:58

## 2025-03-21 RX ADMIN — METHOCARBAMOL TABLETS 500 MG: 500 TABLET, COATED ORAL at 15:55

## 2025-03-21 RX ADMIN — SODIUM CHLORIDE 500 ML: 9 INJECTION, SOLUTION INTRAVENOUS at 00:42

## 2025-03-21 RX ADMIN — METRONIDAZOLE 500 MG: 5 INJECTION, SOLUTION INTRAVENOUS at 04:59

## 2025-03-21 RX ADMIN — METRONIDAZOLE 500 MG: 5 INJECTION, SOLUTION INTRAVENOUS at 21:28

## 2025-03-21 RX ADMIN — METRONIDAZOLE 500 MG: 5 INJECTION, SOLUTION INTRAVENOUS at 14:40

## 2025-03-21 RX ADMIN — TAMSULOSIN HYDROCHLORIDE 0.4 MG: 0.4 CAPSULE ORAL at 10:45

## 2025-03-21 RX ADMIN — HYDROMORPHONE HYDROCHLORIDE 2 MG: 2 TABLET ORAL at 20:35

## 2025-03-21 RX ADMIN — DRONABINOL 2.5 MG: 2.5 CAPSULE ORAL at 15:55

## 2025-03-21 RX ADMIN — METHOCARBAMOL TABLETS 500 MG: 500 TABLET, COATED ORAL at 21:23

## 2025-03-21 RX ADMIN — ALBUMIN HUMAN 12.5 G: 0.05 INJECTION, SOLUTION INTRAVENOUS at 17:08

## 2025-03-21 RX ADMIN — GABAPENTIN 300 MG: 300 CAPSULE ORAL at 10:45

## 2025-03-21 RX ADMIN — METHOCARBAMOL TABLETS 500 MG: 500 TABLET, COATED ORAL at 10:45

## 2025-03-21 RX ADMIN — GABAPENTIN 300 MG: 300 CAPSULE ORAL at 21:27

## 2025-03-21 RX ADMIN — METOPROLOL TARTRATE 50 MG: 50 TABLET, FILM COATED ORAL at 10:45

## 2025-03-21 RX ADMIN — IOHEXOL 75 ML: 350 INJECTION, SOLUTION INTRAVENOUS at 10:01

## 2025-03-21 RX ADMIN — GABAPENTIN 300 MG: 300 CAPSULE ORAL at 15:55

## 2025-03-21 RX ADMIN — CIPROFLOXACIN 400 MG: 2 INJECTION, SOLUTION INTRAVENOUS at 10:45

## 2025-03-21 RX ADMIN — MORPHINE SULFATE 30 MG: 30 TABLET, FILM COATED, EXTENDED RELEASE ORAL at 10:45

## 2025-03-21 ASSESSMENT — COGNITIVE AND FUNCTIONAL STATUS - GENERAL
WALKING IN HOSPITAL ROOM: A LITTLE
TOILETING: A LITTLE
HELP NEEDED FOR BATHING: A LITTLE
MOBILITY SCORE: 20
STANDING UP FROM CHAIR USING ARMS: A LITTLE
MOVING TO AND FROM BED TO CHAIR: A LITTLE
CLIMB 3 TO 5 STEPS WITH RAILING: A LITTLE
DRESSING REGULAR LOWER BODY CLOTHING: A LOT
DAILY ACTIVITIY SCORE: 19
DRESSING REGULAR UPPER BODY CLOTHING: A LITTLE

## 2025-03-21 ASSESSMENT — PAIN - FUNCTIONAL ASSESSMENT
PAIN_FUNCTIONAL_ASSESSMENT: 0-10

## 2025-03-21 ASSESSMENT — PAIN SCALES - GENERAL
PAINLEVEL_OUTOF10: 5 - MODERATE PAIN
PAINLEVEL_OUTOF10: 3
PAINLEVEL_OUTOF10: 3
PAINLEVEL_OUTOF10: 0 - NO PAIN
PAINLEVEL_OUTOF10: 0 - NO PAIN

## 2025-03-21 ASSESSMENT — PAIN DESCRIPTION - LOCATION: LOCATION: ABDOMEN

## 2025-03-21 NOTE — PROGRESS NOTES
03/21/25 1300   Discharge Planning   Living Arrangements Spouse/significant other   Support Systems Spouse/significant other   Type of Residence Private residence   Number of Stairs to Enter Residence 2   Number of Stairs Within Residence 10   Do you have animals or pets at home? Yes   Type of Animals or Pets one dog   Home or Post Acute Services Post acute facilities (Rehab/SNF/etc)   Type of Post Acute Facility Services Skilled nursing   Expected Discharge Disposition SNF   Does the patient need discharge transport arranged? Yes   RoundTrip coordination needed? Yes   Has discharge transport been arranged? No     SW met with pt with his spouse on the phone.  SNF choices were obtained - [facility], [facility], [facility], and [facility].  Referrals made via [facility].  Will follow.  KHUSHI Porras

## 2025-03-21 NOTE — PROGRESS NOTES
Surgical Oncology Progress Note    HPI:  Roma Corral is a 70 y.o. male with history of rectal cancer with mets to the liver, CAD (2021 NSTEMI), DM2, HTN  who is s/p robotic LAR with end colostomy, ilecocecetomy with Dr. Vasquez, and laparoscopic microwave liver ablation with Dr. Erazo on 3/17/25.     Subjective:  Patient feeling okay this morning.  Denies nausea, vomiting.  Having ostomy output.  Abdominal pain well-controlled    A 12-point ROS was performed and was unremarkable except as above.    Objective    Temp:  [36.1 °C (97 °F)-37.1 °C (98.8 °F)] 36.1 °C (97 °F)  Heart Rate:  [73-94] 94  Resp:  [15-18] 18  BP: ()/(54-66) 110/65  I/O last 2 completed shifts:  In: 4010 (54.6 mL/kg) [P.O.:1020; I.V.:790 (10.7 mL/kg); IV Piggyback:2200]  Out: 2135 (29 mL/kg) [Urine:250 (0.1 mL/kg/hr); Drains:1210; Stool:675]  Weight: 73.5 kg     Physical Exam:  NAD  Anicteric sclera  On room air  Nontachycardic  Abdomen soft, appropriate tender to palpation, left-sided colostomy with thin brown stool, right sided CIRILO drain with serous output     Labs within past 24h:  Labs pending for the morning      Imaging within past 24h:  CT scan pending    ASSESSMENT  Roma Corral is a 70 y.o. male with history of rectal cancer with mets to the liver, CAD (2021 NSTEMI), DM2, HTN who is s/p robotic LAR with end colostomy, ilecocecetomy with Dr. Vasquez, and laparoscopic microwave liver ablation with Dr. Erazo on 3/17/25.     PLAN:  -CT scan with volumetrics today for planning purposes for future right hepatectomy   -Continue to trend CMP  -Patient will get right portal vein embolization with interventional radiology on 4/1/2025    Discussed with Dr. Erazo    [facility]  Surgical Oncology [id number]

## 2025-03-21 NOTE — CARE PLAN
Problem: Safety - Adult  Goal: Free from fall injury  Outcome: Progressing     Problem: Nutrition  Goal: Nutrient intake appropriate for maintaining nutritional needs  Outcome: Progressing     Problem: Fall/Injury  Goal: Not fall by end of shift  Outcome: Progressing  Goal: Be free from injury by end of the shift  Outcome: Progressing  Goal: Verbalize understanding of personal risk factors for fall in the hospital  Outcome: Progressing  Goal: Verbalize understanding of risk factor reduction measures to prevent injury from fall in the home  Outcome: Progressing  Goal: Use assistive devices by end of the shift  Outcome: Progressing  Goal: Pace activities to prevent fatigue by end of the shift  Outcome: Progressing

## 2025-03-21 NOTE — SIGNIFICANT EVENT
"Patient seen overnight due to a sense of brain fog and drainage from the drain site.    Regarding the brain fog:  He states that he is not able to think as clearly as he was able to yesterday. He attributes this to feeling like he has not made progress during his hospital stay. He does not endorse symptoms of cp or sob. His vitals were stable at the time of interview.     He is neuro intact: A+Ox4, CN II-XII grossly intact, intact sensation and strength of the bilateral upper and lower extremities. His AM labs were reviewed which were unremarkable.     This brain fog abated during follow-up evaluation.    Regarding drainage from his drain site:  His drain is connected to a bile bag and has SS output. Serous material is expressing from around his drain. Reinforced the dressing and repeat evaluation did not show re-collection of fluid on the dressing.    Per signout, and given his mucous membranes were dry (dry tongue on CNXII exam), SBPs in the 90s, decreased uop, and poor po intake during the day (\"few bites of mac and cheese and pizza\"), 500 ml fluid bolus re-ordered.    Blayne Restrepo DO PGY-1  General Surgery    "

## 2025-03-21 NOTE — CARE PLAN
Problem: Safety - Adult  Goal: Free from fall injury  Outcome: Progressing     Problem: Discharge Planning  Goal: Discharge to home or other facility with appropriate resources  Outcome: Progressing     Problem: Fall/Injury  Goal: Not fall by end of shift  Outcome: Progressing  Goal: Be free from injury by end of the shift  Outcome: Progressing  Goal: Verbalize understanding of personal risk factors for fall in the hospital  Outcome: Progressing  Goal: Verbalize understanding of risk factor reduction measures to prevent injury from fall in the home  Outcome: Progressing  Goal: Use assistive devices by end of the shift  Outcome: Progressing  Goal: Pace activities to prevent fatigue by end of the shift  Outcome: Progressing      The clinical goals for the shift include Pt to remain HDS thru shift.       No

## 2025-03-21 NOTE — PROGRESS NOTES
Colorectal Surgery Progress Note      HPI: Roma Corral is a 70 y.o. 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 Vasquez, and laparoscopic MWA x3 with liver biopsy by Dr. Erazo on 3/18/25.      Subjective  Reports surgical pain tolerable after PCA discontinuation yesterday with prn PO narcotics available.  Increased serosanguinous drainage around CIRILO insertion site yesterday necessitating multiple gown changes, despite extra stitch placed at site.   Denies nausea, vomiting, bloating on regular diet, however has had minimal PO intake. Is drinking some Ensure.  Colostomy pouch with gas and pudding stool in pouch after gastrografin yesterday.  Ambulated with PT in room yesterday but wasn't able to get out into halls due to drowsiness.     Objective  Physical Exam:  Gen: in no physical distress and alert, well appearing, pleasant & conversational, lying in bed  HENT: Head normocephalic, atraumatic.  Mucous membranes moist.    Neuro: Alert and oriented x3.  Moves all extremities spontaneously x4.  CV: Normal rate and rhythm on palpated radial pulse. Trace edema bilaterally at ankles.  Resp: No acute respiratory distress.  Normal effort on room air. Chest expansion symmetrical.   GI: Abdomen is soft, nontender, and nondistended. Laparoscopic incisions are clean, dry and intact with glue.  Stoma is pink and healthy, well pouched, with gas and pudding stool in pouch. Abdominal drain to bile bag high output and  more chylous today, without erythema around site.  Skin: Warm and dry, pale, without rashes or lesions    Visit Vitals  /65 (BP Location: Right arm, Patient Position:  Lying)   Pulse 94   Temp 36.1 °C (97 °F) (Temporal)   Resp 18        I/O last 3 completed shifts:  In: 4447 (60.5 mL/kg) [P.O.:1357; I.V.:790 (10.7 mL/kg); IV Piggyback:2300]  Out: 2485 (33.8 mL/kg) [Urine:590 (0.2 mL/kg/hr); Drains:1220; Stool:675]  Weight: 73.5 kg   No intake/output data recorded.             Data Review:  CBC:   Lab Results   Component Value Date    WBC 5.1 03/21/2025    RBC 2.65 (L) 03/21/2025     BMP:   Lab Results   Component Value Date    GLUCOSE 96 03/21/2025    CO2 22 03/21/2025    BUN 27 (H) 03/21/2025    CREATININE 1.16 03/21/2025    CALCIUM 7.1 (L) 03/21/2025     Coagulation:   Lab Results   Component Value Date    INR 1.6 (H) 03/21/2025    APTT 28 03/21/2025       Labs reviewed and personally interpreted as: CBC with slight downtrend in H&H, without leukocytosis. Moderate thrombocytopenia, similar to baseline.  RFP without electrolyte derangements, ORALIA, or hypo/hyperglycemia. Albumin down trending.  LFTs elevated but downtrending      Imaging:  No recent imaging to review.    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 Vasquez, and laparoscopic MWA x3 with liver biopsy by Dr. Erazo on 3/18/25. Post-op course c/b high pelvic drain output 2/2 lymph leak. CIRILO changed to bile bag gravity drainage to minimize output. Diet changed to low fat. Passed trial void yesterday with low volume retention. Continues to have oliguria 2/2 drain losses, requiring fluid boluses.  PT recommending SNF at discharge.      Plan:  -Albumin if hypovolemic, in setting of lymph leak  -Low fat diet for lymph leak; encourage PO hydration, as tolerated  -OOB during day/ambulation in halls x3/PT    Neuro: Post-op pain controlled, history of anxiety  -Continue current pain  regimen with tylenol, gabapentin, robaxin; prn PO dilaudid; appreciate supportive onc for recs & support  -Continue home MS Contin, hold home ativan  -Sleep hygiene/OOB during day/PT/multiple walks in halls today    CV: hemodynamically stable; hx of CAD (NSTEMI 2021), HTN, HLD  -Q4 vital signs  -EKG prn for ACS symptoms  -Continue home metoprolol with hold parameters  -Hold home ASA 81, Lipitor in setting of elevated LFTs/thrombocytopenia  -DC continuous telemetry    Resp: no acute issues on room air, no significant pulmonary history  -ICS 10x every hour  -OOB/ambulation    GI: s/p robotic LAR/loop to end colostomy, ileocecectomy & MWA; hx of rectal CA with mets to liver  -Low fat diet as tolerated, only eating if hungry & not bloated, burping, hiccupping, nauseous  -Zofran prn for nausea  -continue assessment of stoma and output, stoma not new for patient.  WOCN for supplies & support.  -Monitor drain output. Albumin if fluid replacement needed for high drain output  -Daily CMP to monitor LFTs  -Volumetric liver CT today 3/21 per surg onc, for operative planning    : Oliguria with small volume retention; no significant urological hx  -Continue flomax for low resection, needs to stand when voiding to completely empty.   - Strict intake and output, monitoring fluid & electrolyte balance closely in setting of high output drain  - Bladder scan Q4 to assess need for fluid replacement  - Daily RFP/replace electrolytes as needed    Heme: H&H stable with no evidence of bleeding, history of pancytopenia with platelets similar to baseline  -CBC daily; monitor for s/s of bleeding    ID: afebrile, no leukocytosis  -Q4 temp  -trend CBC; monitor for s/s of infection  -Continue cipro/flagyl for total of 4 days; end date set for 3/22    Endo: no acute issues, no significant endocrine history (last A1C normal)  -hypoglycemia protocol  -Monitor BG on AM labs  -no indication for SSI    DVT Prophy: SCDs & Lovenox (monitoring  platelets)    Dispo:   -Continue care on RNF  -PT recommending SNF, appreciate TCCs for support & hard work. Patient & wife to make facility choices today.    Plan of care discussed with Colorectal surgical team, Dr. Connell, and patient.    Yuridia TORRES-CNP  Colorectal Surgery NP   [facility] Service Pager #[phone]

## 2025-03-22 ENCOUNTER — APPOINTMENT (OUTPATIENT)
Dept: RADIOLOGY | Facility: HOSPITAL | Age: 70
End: 2025-03-22
Payer: MEDICARE

## 2025-03-22 LAB
ALBUMIN SERPL BCP-MCNC: 2.4 G/DL (ref 3.4–5)
ALP SERPL-CCNC: 294 U/L (ref 33–136)
ALT SERPL W P-5'-P-CCNC: 71 U/L (ref 10–52)
ANION GAP SERPL CALC-SCNC: 13 MMOL/L (ref 10–20)
AST SERPL W P-5'-P-CCNC: 31 U/L (ref 9–39)
BILIRUB SERPL-MCNC: 1.4 MG/DL (ref 0–1.2)
BUN SERPL-MCNC: 25 MG/DL (ref 6–23)
CA-I BLD-SCNC: 1.04 MMOL/L (ref 1.1–1.33)
CALCIUM SERPL-MCNC: 7.5 MG/DL (ref 8.6–10.6)
CHLORIDE SERPL-SCNC: 113 MMOL/L (ref 98–107)
CO2 SERPL-SCNC: 17 MMOL/L (ref 21–32)
CREAT SERPL-MCNC: 0.94 MG/DL (ref 0.5–1.3)
EGFRCR SERPLBLD CKD-EPI 2021: 87 ML/MIN/1.73M*2
ERYTHROCYTE [DISTWIDTH] IN BLOOD BY AUTOMATED COUNT: 20.5 % (ref 11.5–14.5)
GLUCOSE SERPL-MCNC: 98 MG/DL (ref 74–99)
HCT VFR BLD AUTO: 27.3 % (ref 41–52)
HGB BLD-MCNC: 7.8 G/DL (ref 13.5–17.5)
MAGNESIUM SERPL-MCNC: 2.04 MG/DL (ref 1.6–2.4)
MCH RBC QN AUTO: 29 PG (ref 26–34)
MCHC RBC AUTO-ENTMCNC: 28.6 G/DL (ref 32–36)
MCV RBC AUTO: 102 FL (ref 80–100)
NRBC BLD-RTO: 0 /100 WBCS (ref 0–0)
PLATELET # BLD AUTO: 81 X10*3/UL (ref 150–450)
POTASSIUM SERPL-SCNC: 4.1 MMOL/L (ref 3.5–5.3)
PROT SERPL-MCNC: 4.6 G/DL (ref 6.4–8.2)
RBC # BLD AUTO: 2.69 X10*6/UL (ref 4.5–5.9)
SODIUM SERPL-SCNC: 139 MMOL/L (ref 136–145)
TRIGL FLD-MCNC: 89 MG/DL
WBC # BLD AUTO: 5 X10*3/UL (ref 4.4–11.3)

## 2025-03-22 PROCEDURE — 73502 X-RAY EXAM HIP UNI 2-3 VIEWS: CPT | Mod: RIGHT SIDE | Performed by: STUDENT IN AN ORGANIZED HEALTH CARE EDUCATION/TRAINING PROGRAM

## 2025-03-22 PROCEDURE — 85027 COMPLETE CBC AUTOMATED: CPT

## 2025-03-22 PROCEDURE — 2500000004 HC RX 250 GENERAL PHARMACY W/ HCPCS (ALT 636 FOR OP/ED)

## 2025-03-22 PROCEDURE — 1170000001 HC PRIVATE ONCOLOGY ROOM DAILY

## 2025-03-22 PROCEDURE — 2500000004 HC RX 250 GENERAL PHARMACY W/ HCPCS (ALT 636 FOR OP/ED): Performed by: STUDENT IN AN ORGANIZED HEALTH CARE EDUCATION/TRAINING PROGRAM

## 2025-03-22 PROCEDURE — 73502 X-RAY EXAM HIP UNI 2-3 VIEWS: CPT | Mod: RT

## 2025-03-22 PROCEDURE — 2500000002 HC RX 250 W HCPCS SELF ADMINISTERED DRUGS (ALT 637 FOR MEDICARE OP, ALT 636 FOR OP/ED): Performed by: STUDENT IN AN ORGANIZED HEALTH CARE EDUCATION/TRAINING PROGRAM

## 2025-03-22 PROCEDURE — 2500000005 HC RX 250 GENERAL PHARMACY W/O HCPCS

## 2025-03-22 PROCEDURE — 2500000001 HC RX 250 WO HCPCS SELF ADMINISTERED DRUGS (ALT 637 FOR MEDICARE OP): Performed by: STUDENT IN AN ORGANIZED HEALTH CARE EDUCATION/TRAINING PROGRAM

## 2025-03-22 PROCEDURE — 99231 SBSQ HOSP IP/OBS SF/LOW 25: CPT | Performed by: STUDENT IN AN ORGANIZED HEALTH CARE EDUCATION/TRAINING PROGRAM

## 2025-03-22 PROCEDURE — 82330 ASSAY OF CALCIUM: CPT

## 2025-03-22 PROCEDURE — 83735 ASSAY OF MAGNESIUM: CPT

## 2025-03-22 PROCEDURE — 84478 ASSAY OF TRIGLYCERIDES: CPT | Performed by: STUDENT IN AN ORGANIZED HEALTH CARE EDUCATION/TRAINING PROGRAM

## 2025-03-22 PROCEDURE — 84075 ASSAY ALKALINE PHOSPHATASE: CPT

## 2025-03-22 PROCEDURE — 2500000001 HC RX 250 WO HCPCS SELF ADMINISTERED DRUGS (ALT 637 FOR MEDICARE OP)

## 2025-03-22 RX ORDER — CALCIUM GLUCONATE 20 MG/ML
2 INJECTION, SOLUTION INTRAVENOUS ONCE
Status: COMPLETED | OUTPATIENT
Start: 2025-03-22 | End: 2025-03-22

## 2025-03-22 RX ADMIN — GABAPENTIN 300 MG: 300 CAPSULE ORAL at 10:12

## 2025-03-22 RX ADMIN — ACETAMINOPHEN 650 MG: 325 TABLET, FILM COATED ORAL at 23:41

## 2025-03-22 RX ADMIN — METHOCARBAMOL TABLETS 500 MG: 500 TABLET, COATED ORAL at 23:41

## 2025-03-22 RX ADMIN — METHOCARBAMOL TABLETS 500 MG: 500 TABLET, COATED ORAL at 10:24

## 2025-03-22 RX ADMIN — DRONABINOL 2.5 MG: 2.5 CAPSULE ORAL at 16:48

## 2025-03-22 RX ADMIN — ACETAMINOPHEN 650 MG: 325 TABLET, FILM COATED ORAL at 16:48

## 2025-03-22 RX ADMIN — TAMSULOSIN HYDROCHLORIDE 0.4 MG: 0.4 CAPSULE ORAL at 10:12

## 2025-03-22 RX ADMIN — METRONIDAZOLE 500 MG: 5 INJECTION, SOLUTION INTRAVENOUS at 05:12

## 2025-03-22 RX ADMIN — MORPHINE SULFATE 30 MG: 30 TABLET, FILM COATED, EXTENDED RELEASE ORAL at 10:22

## 2025-03-22 RX ADMIN — METHOCARBAMOL TABLETS 500 MG: 500 TABLET, COATED ORAL at 05:12

## 2025-03-22 RX ADMIN — HYDROMORPHONE HYDROCHLORIDE 2 MG: 2 TABLET ORAL at 16:52

## 2025-03-22 RX ADMIN — SODIUM CHLORIDE, POTASSIUM CHLORIDE, SODIUM LACTATE AND CALCIUM CHLORIDE 300 ML: 600; 310; 30; 20 INJECTION, SOLUTION INTRAVENOUS at 20:22

## 2025-03-22 RX ADMIN — Medication 3 MG: at 20:23

## 2025-03-22 RX ADMIN — SODIUM CHLORIDE, POTASSIUM CHLORIDE, SODIUM LACTATE AND CALCIUM CHLORIDE 500 ML: 600; 310; 30; 20 INJECTION, SOLUTION INTRAVENOUS at 07:23

## 2025-03-22 RX ADMIN — GABAPENTIN 300 MG: 300 CAPSULE ORAL at 20:23

## 2025-03-22 RX ADMIN — METHOCARBAMOL TABLETS 500 MG: 500 TABLET, COATED ORAL at 16:48

## 2025-03-22 RX ADMIN — ENOXAPARIN SODIUM 40 MG: 100 INJECTION SUBCUTANEOUS at 10:12

## 2025-03-22 RX ADMIN — ACETAMINOPHEN 650 MG: 325 TABLET, FILM COATED ORAL at 10:22

## 2025-03-22 RX ADMIN — CALCIUM GLUCONATE 2 G: 20 INJECTION, SOLUTION INTRAVENOUS at 10:12

## 2025-03-22 RX ADMIN — DRONABINOL 2.5 MG: 2.5 CAPSULE ORAL at 10:12

## 2025-03-22 RX ADMIN — MORPHINE SULFATE 30 MG: 30 TABLET, FILM COATED, EXTENDED RELEASE ORAL at 20:23

## 2025-03-22 RX ADMIN — GABAPENTIN 300 MG: 300 CAPSULE ORAL at 16:48

## 2025-03-22 ASSESSMENT — PAIN SCALES - GENERAL
PAINLEVEL_OUTOF10: 5 - MODERATE PAIN
PAINLEVEL_OUTOF10: 0 - NO PAIN
PAINLEVEL_OUTOF10: 7
PAINLEVEL_OUTOF10: 0 - NO PAIN
PAINLEVEL_OUTOF10: 4
PAINLEVEL_OUTOF10: 0 - NO PAIN

## 2025-03-22 ASSESSMENT — COGNITIVE AND FUNCTIONAL STATUS - GENERAL
TOILETING: A LITTLE
HELP NEEDED FOR BATHING: A LITTLE
DRESSING REGULAR LOWER BODY CLOTHING: A LITTLE
MOBILITY SCORE: 17
MOVING TO AND FROM BED TO CHAIR: A LITTLE
TURNING FROM BACK TO SIDE WHILE IN FLAT BAD: A LITTLE
CLIMB 3 TO 5 STEPS WITH RAILING: A LOT
DRESSING REGULAR UPPER BODY CLOTHING: A LITTLE
DAILY ACTIVITIY SCORE: 20
WALKING IN HOSPITAL ROOM: A LITTLE
STANDING UP FROM CHAIR USING ARMS: A LITTLE
MOBILITY SCORE: 18
DRESSING REGULAR UPPER BODY CLOTHING: A LITTLE
TURNING FROM BACK TO SIDE WHILE IN FLAT BAD: A LITTLE
WALKING IN HOSPITAL ROOM: A LOT
TOILETING: A LITTLE
STANDING UP FROM CHAIR USING ARMS: A LITTLE
DRESSING REGULAR LOWER BODY CLOTHING: A LITTLE
MOVING TO AND FROM BED TO CHAIR: A LITTLE
CLIMB 3 TO 5 STEPS WITH RAILING: A LOT
DAILY ACTIVITIY SCORE: 21

## 2025-03-22 ASSESSMENT — PAIN SCALES - PAIN ASSESSMENT IN ADVANCED DEMENTIA (PAINAD): TOTALSCORE: MEDICATION (SEE MAR)

## 2025-03-22 ASSESSMENT — PAIN - FUNCTIONAL ASSESSMENT
PAIN_FUNCTIONAL_ASSESSMENT: 0-10

## 2025-03-22 NOTE — SIGNIFICANT EVENT
Patient seen ovn due to unwitnessed fall.    Subjective:  He states he stood up, his left knee buckled (denies hx of knee problems), and he fell and hit his right side. Denies head strike, denies LOC. Denies cp, SOB. Denies new abdominal pain, abdominal pain at baseline. Denies any new pain as a sequelae of his fall.    Objective:  VS repeated and were reviewed - stable.  Neuro: A+Ox4, EOMI. BL UE and LE with intact strength and sensation.  CV: RRR  Pulm: Non labored  Abdomen: Soft, tender to palpation diffusely - at baseline from multiple evaluations over the past few nights. Ostomy with enteric contents. Drain with SS fluid. New right groin ecchymosis - see picture in the chart.       A/P:  Fall on to the right hip. Etiology does not seem to be cardiogenic or neurogenic in nature. Will need to evaluate for injury to right hip with:    -RIGHT hip XRAY    Dw night team      Blayne Restrepo DO Pgy-1  General Surgery

## 2025-03-22 NOTE — CARE PLAN
The patient's goals for the shift include      The clinical goals for the shift include Pt will be safe, comfortable, and HD stable overnight.    Problem: Safety - Adult  Goal: Free from fall injury  Outcome: Progressing     Problem: Discharge Planning  Goal: Discharge to home or other facility with appropriate resources  Outcome: Progressing     Problem: Chronic Conditions and Co-morbidities  Goal: Patient's chronic conditions and co-morbidity symptoms are monitored and maintained or improved  Outcome: Progressing     Problem: Nutrition  Goal: Nutrient intake appropriate for maintaining nutritional needs  Outcome: Progressing     Problem: Fall/Injury  Goal: Not fall by end of shift  Outcome: Progressing  Goal: Be free from injury by end of the shift  Outcome: Progressing  Goal: Verbalize understanding of personal risk factors for fall in the hospital  Outcome: Progressing  Goal: Verbalize understanding of risk factor reduction measures to prevent injury from fall in the home  Outcome: Progressing  Goal: Use assistive devices by end of the shift  Outcome: Progressing  Goal: Pace activities to prevent fatigue by end of the shift  Outcome: Progressing

## 2025-03-22 NOTE — CARE PLAN
The patient's goals for the shift include      The clinical goals for the shift include Pt will be safe, comfortable, and HD stable overnight.      Problem: Safety - Adult  Goal: Free from fall injury  3/21/2025 2131 by Bharati Oswald RN  Outcome: Progressing  3/21/2025 2011 by Bharati Oswald RN  Outcome: Progressing     Problem: Discharge Planning  Goal: Discharge to home or other facility with appropriate resources  3/21/2025 2131 by Bharati Oswald RN  Outcome: Progressing  3/21/2025 2011 by Bharati Oswald RN  Outcome: Progressing     Problem: Chronic Conditions and Co-morbidities  Goal: Patient's chronic conditions and co-morbidity symptoms are monitored and maintained or improved  3/21/2025 2131 by Bharati Oswald RN  Outcome: Progressing  3/21/2025 2011 by Bharati Oswald RN  Outcome: Progressing     Problem: Nutrition  Goal: Nutrient intake appropriate for maintaining nutritional needs  3/21/2025 2131 by Bharati Oswald RN  Outcome: Progressing  3/21/2025 2011 by Bharati Oswald RN  Outcome: Progressing     Problem: Fall/Injury  Goal: Not fall by end of shift  3/21/2025 2131 by Bharati Oswald RN  Outcome: Progressing  3/21/2025 2011 by Bharati Oswald RN  Outcome: Progressing  Goal: Be free from injury by end of the shift  3/21/2025 2131 by Bharati Oswald RN  Outcome: Progressing  3/21/2025 2011 by Bharati Oswald RN  Outcome: Progressing  Goal: Verbalize understanding of personal risk factors for fall in the hospital  3/21/2025 2131 by Bharati Oswald RN  Outcome: Progressing  3/21/2025 2011 by Bharati Oswald RN  Outcome: Progressing  Goal: Verbalize understanding of risk factor reduction measures to prevent injury from fall in the home  3/21/2025 2131 by Bharati Oswald RN  Outcome: Progressing  3/21/2025 2011 by Bharati Oswald RN  Outcome: Progressing  Goal: Use assistive devices by end of the shift  3/21/2025 2131 by Bharati Oswald RN  Outcome: Progressing  3/21/2025 2011 by  Bharati Oswald, RN  Outcome: Progressing  Goal: Pace activities to prevent fatigue by end of the shift  3/21/2025 2131 by Bharati Oswald RN  Outcome: Progressing  3/21/2025 2011 by Bharait Oswald, RN  Outcome: Progressing

## 2025-03-22 NOTE — PROGRESS NOTES
Surgical Oncology Progress Note    HPI:  Roma Corral is a 70 y.o. male with history of rectal cancer with mets to the liver, CAD (2021 NSTEMI), DM2, HTN  who is s/p robotic LAR with end colostomy, ilecocecetomy with Dr. Vasquez, and laparoscopic microwave liver ablation with Dr. Erazo on 3/17/25.     Subjective:  Patient fell overnight, did not lose consciousness or hit his head (see clinical event notes overnight). Hit his right hip, large bruising. XR negative of any acute fractures or dislocation. T bili still slowly downtrending, now 1.4 from peak 1.7 post operatively. Persistent high output from drain, still serous appearing now 2.2 from ~1L. Minimal PO intake.     A 12-point ROS was performed and was unremarkable except as above.    Objective    Temp:  [36.4 °C (97.5 °F)-37.2 °C (99 °F)] 36.6 °C (97.9 °F)  Heart Rate:  [68-97] 86  Resp:  [16-20] 19  BP: ()/(49-64) 107/49  I/O last 2 completed shifts:  In: 1263.2 (17.2 mL/kg) [P.O.:120; I.V.:793.2 (10.8 mL/kg); Blood:250; IV Piggyback:100]  Out: 3045 (41.4 mL/kg) [Urine:750 (0.4 mL/kg/hr); Drains:2275; Stool:20]  Weight: 73.5 kg     Physical Exam:  NAD  Anicteric sclera  On room air  Nontachycardic  Abdomen soft, appropriate tender to palpation, left-sided colostomy with thin brown stool, right sided CIRILO drain with serous output   Large right sided bruise at hip, sensory and motor function of RLE intact    Labs within past 24h:            7.8   139  113  25   5.0>-----<81  ----------------<98             27.3   4.1  17  0.94          Ca 7.5 Phos 2.9 Mg 2.04       ALT 71 AST 31 AlkPhos 294 tBili 1.4           Imaging within past 24h:  CT volumetrics 3/21   Findings consistent with patient history including multifocal   areas of ablation within the liver and free air. No fluid collection   to suggest abscess or evidence of bowel obstruction/leak.     ASSESSMENT  Roma Corral is a 70 y.o. male with history of rectal cancer with mets to the liver, CAD (2021  NSTEMI), DM2, HTN who is s/p robotic LAR with end colostomy, ilecocecetomy with Dr. Vasquez, and laparoscopic microwave liver ablation with Dr. Erazo on 3/17/25. Progressing appropriately with ostomy function starting 3/21. Fell overnight into 3/22 AM onto right hip, no other injuries. No LOC or head strike, neg hip XR. Elevated T bili post operatively, now downtrending.     PLAN:  -Continue to trend CMP  -Recommend careful management of fluid status do to high output from drain and low PO intake  -Patient will get right portal vein embolization with interventional radiology on 4/1/2025  -defer rest of plan to colorectal surgery team     Discussed with Dr. Kirk Cannon   Surgical Oncology [id number]

## 2025-03-22 NOTE — CARE PLAN
Problem: Nutrition  Goal: Nutrient intake appropriate for maintaining nutritional needs  3/22/2025 1209 by Sylvia Bingham RN  Outcome: Met  3/22/2025 1151 by Syliva Bingham RN  Outcome: Progressing     Problem: Skin  Goal: Decreased wound size/increased tissue granulation at next dressing change  3/22/2025 1210 by Sylvia Bingham RN  Outcome: Progressing  3/22/2025 1151 by Sylvia Bingham RN  Outcome: Progressing  Goal: Participates in plan/prevention/treatment measures  3/22/2025 1210 by Sylvia Bignham RN  Outcome: Progressing  3/22/2025 1151 by Sylvia Bingham RN  Outcome: Progressing  Goal: Prevent/manage excess moisture  3/22/2025 1210 by Sylvia Bingham RN  Outcome: Progressing  3/22/2025 1151 by Sylvia Bingham RN  Outcome: Progressing  Goal: Prevent/minimize sheer/friction injuries  3/22/2025 1210 by Sylvia Bingham RN  Outcome: Progressing  3/22/2025 1151 by Sylvia Bingham RN  Outcome: Progressing  Goal: Promote/optimize nutrition  3/22/2025 1210 by Sylvia Bingham RN  Outcome: Progressing  3/22/2025 1151 by Sylvia Bingham RN  Outcome: Progressing  Goal: Promote skin healing  3/22/2025 1210 by Sylvia Bingham RN  Outcome: Progressing  3/22/2025 1151 by Sylvia Bingham RN  Outcome: Progressing     Problem: Pain  Goal: Takes deep breaths with improved pain control throughout the shift  Outcome: Progressing  Goal: Turns in bed with improved pain control throughout the shift  Outcome: Progressing  Goal: Walks with improved pain control throughout the shift  Outcome: Progressing  Goal: Performs ADL's with improved pain control throughout shift  Outcome: Progressing  Goal: Participates in PT with improved pain control throughout the shift  Outcome: Progressing  Goal: Free from opioid side effects throughout the shift  Outcome: Progressing  Goal: Free from acute confusion related to pain meds throughout the shift  Outcome: Progressing

## 2025-03-22 NOTE — CONSULTS
"  Wound Care Consult     Visit Date: 3/22/2025      Patient Name: Roma Corral         MRN: [id number]           YOB: 1955     Reason for Consult: ostomy care and education             Pertinent Labs:   Albumin   Date Value Ref Range Status   03/22/2025 2.4 (L) 3.4 - 5.0 g/dL Final       Wound Assessment:     03/22/25 1700   Colostomy Loop LUQ   Placement Date/Time: 07/02/24 1416   Hand Hygiene Completed: Yes  Colostomy Type: Loop  Location: LUQ   Stomal Appliance 2 piece;Leaking;Changed   Site/Stoma Assessment Red   Stoma Size (cm) 3.2 cm   Peristomal Assessment Clean;Wound;Other (Comment)  (incision at 6 o clock)   Treatment Pouch change   Drainage Characteristics Brown       Wound Team Summary Assessment:      Ostomy type: colostomy    size: 1\" oval       color: red       protruding: flush  Functioning: brown stool  Mucocutaneous junction: intact  Peristomal skin: 2 cm incision at 7 o'clock,  barrier ring    Pouching: Karrie one piece drainable pouch   Ostomy Education: patient has had an ostomy since 7/2024. Knowledgeable in ostomy pouching. Will help patient transition to end colostomy  Plan: assess stoma/pouching while inpatient     Pavithra Andrea, MSN, RN, CWCN, COCN  03/22/25 6:18 PM          "

## 2025-03-22 NOTE — PROGRESS NOTES
"    Colorectal Surgery Progress Note      HPI: Roma Corral is a 70 y.o. 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 Vasquez, and laparoscopic MWA x3 with liver biopsy by Dr. Erazo on 3/18/25.    Subjective  Overnight patient had an unwitnessed fall during which patient hit his right side of body. Denies hitting his head. Denies dizziness/lightheadedness. Reports \"buckling of knee\" that caused the fall. Now has some tenderness of right hip but denies worsening abdominal pain from baseline.   Still having high output from CIRILO drain. Tolerating PO without nausea, vomiting, and still having ostomy output. However does not have much appetite and admits he has not been eating much.      Objective  Physical Exam:  Gen: in no physical distress and alert, well appearing, resting in bed comfortably   HENT: Head normocephalic, atraumatic.  Mucous membranes moist.    Neuro: Alert and oriented x3.  Moves all extremities spontaneously x4.  CV: Normal rate and rhythm. Trace edema bilaterally at ankles.  Resp: No acute respiratory distress.  Normal effort on room air. Chest expansion symmetrical.   GI: Abdomen is soft, nondistended, mildly diffusely tender. Laparoscopic incisions are clean, dry and intact with glue.  Stoma is pink and healthy, well pouched, with gas and stool in pouch. Abdominal drain to bile bag, chylous mixed serous output, without erythema around site.  Skin: Warm and dry, pale, without rashes or lesions    Visit Vitals  /64 (BP Location: Right arm, Patient Position: Lying)   Pulse 97   Temp 36.9 °C (98.4 °F) (Temporal)   Resp 20        I/O last 3 completed shifts:  In: " 3181.2 (43.3 mL/kg) [P.O.:660; I.V.:1271.2 (17.3 mL/kg); Blood:250; IV Piggyback:1000]  Out: 4695 (63.9 mL/kg) [Urine:850 (0.3 mL/kg/hr); Drains:3150; Stool:695]  Weight: 73.5 kg   I/O this shift:  In: -   Out: 400 [Drains:400]        Data Review:  Pending labs from today   CBC:   Lab Results   Component Value Date    WBC 5.1 03/21/2025    RBC 2.65 (L) 03/21/2025     BMP:   Lab Results   Component Value Date    GLUCOSE 96 03/21/2025    CO2 22 03/21/2025    BUN 27 (H) 03/21/2025    CREATININE 1.16 03/21/2025    CALCIUM 7.1 (L) 03/21/2025     Coagulation:   Lab Results   Component Value Date    INR 1.6 (H) 03/21/2025    APTT 28 03/21/2025     Imaging:  Pending hip X ray     Assessment: 70 year old male with hx of metastatic rectal cancer to liver s/p loop colostomy July 2024 & FAWN c/b persistent disease and rectal stricture s/p robotic low anterior resection with transition from loop to end colostomy & 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 Vasquez, and laparoscopic MWA x3 with liver biopsy by Dr. Erazo on 3/18/25. Post-op course c/b high pelvic drain output 2/2 possible lymph leak. Diet changed to low fat. Passed trial void with low volume retention. PT recommending SNF at discharge.    Plan:  -Bolus as needed for high drain output  -Continue to encourage PO hydration and intake   -OOB during day/ambulation in halls x3/PT    Neuro: Post-op pain controlled, history of anxiety  -Continue current pain regimen with tylenol, gabapentin, robaxin; prn PO dilaudid; appreciate supportive onc for recs & support  -Continue home MS Contin, hold home ativan  -Sleep hygiene/OOB during day/PT/multiple walks in halls today    CV: hemodynamically stable; hx of CAD (NSTEMI 2021), HTN, HLD  -Q4 vital signs, EKG prn for ACS symptoms  -Continue home metoprolol with hold parameters  -Hold home ASA 81, Lipitor in setting of elevated LFTs/thrombocytopenia    Resp: no acute issues on room air, no significant pulmonary  history  -ICS 10x every hour  -OOB/ambulation    GI: s/p robotic LAR/loop to end colostomy, ileocecectomy & MWA; hx of rectal CA with mets to liver  -Low fat diet as tolerated  -Zofran prn for nausea  -continue assessment of stoma and output, stoma not new for patient.  WOCN for supplies & support.  -Monitor drain output. Bolus IVF as needed for fluid replacement.  -Daily CMP to monitor LFTs  -Volumetric liver CT 3/21 per surg onc, for operative planning    : Oliguria with small volume retention; no significant urological hx  -Continue flomax for low resection, needs to stand when voiding to completely empty.   - Strict intake and output, monitoring fluid & electrolyte balance closely in setting of high output drain  - Bladder scan PRN  - Daily RFP/replace electrolytes as needed    Heme: H&H stable with no evidence of bleeding, history of pancytopenia with platelets similar to baseline  -CBC daily; monitor for s/s of bleeding    ID: afebrile, no leukocytosis  -Q4 temp  -trend CBC; monitor for s/s of infection  -cipro/flagyl for total of 4 days; end date 3/22    Endo: no acute issues, no significant endocrine history (last A1C normal)  -hypoglycemia protocol  -Monitor BG on AM labs  -no indication for SSI    DVT Prophy: SCDs & Lovenox (monitoring platelets)    Dispo:   -Continue care on RNF  -PT recommending SNF, appreciate TCCs for support & hard work.     Patient seen and discussed with attending, Dr. Bradley.     Missael Lyles MD  General Surgery  Colorectal Surgery [facility] Service Pager #[phone]

## 2025-03-22 NOTE — CARE PLAN
Problem: Safety - Adult  Goal: Free from fall injury  Outcome: Progressing   The patient's goals for the shift include      The clinical goals for the shift include Patient will remain hemodynamically stable and free from fall/injury for entirety of shift.      Problem: Nutrition  Goal: Nutrient intake appropriate for maintaining nutritional needs  Outcome: Progressing     Problem: Fall/Injury  Goal: Not fall by end of shift  Outcome: Progressing  Goal: Be free from injury by end of the shift  Outcome: Progressing  Goal: Verbalize understanding of personal risk factors for fall in the hospital  Outcome: Progressing  Goal: Verbalize understanding of risk factor reduction measures to prevent injury from fall in the home  Outcome: Progressing  Goal: Use assistive devices by end of the shift  Outcome: Progressing  Goal: Pace activities to prevent fatigue by end of the shift  Outcome: Progressing

## 2025-03-23 VITALS
OXYGEN SATURATION: 97 % | RESPIRATION RATE: 18 BRPM | HEART RATE: 83 BPM | TEMPERATURE: 98.7 F | DIASTOLIC BLOOD PRESSURE: 65 MMHG | HEIGHT: 71 IN | BODY MASS INDEX: 22.69 KG/M2 | WEIGHT: 162.04 LBS | SYSTOLIC BLOOD PRESSURE: 108 MMHG

## 2025-03-23 LAB
ALBUMIN SERPL BCP-MCNC: 2.1 G/DL (ref 3.4–5)
ALP SERPL-CCNC: 502 U/L (ref 33–136)
ALT SERPL W P-5'-P-CCNC: 46 U/L (ref 10–52)
ANION GAP SERPL CALC-SCNC: 10 MMOL/L (ref 10–20)
AST SERPL W P-5'-P-CCNC: 32 U/L (ref 9–39)
BILIRUB SERPL-MCNC: 1.5 MG/DL (ref 0–1.2)
BUN SERPL-MCNC: 23 MG/DL (ref 6–23)
CALCIUM SERPL-MCNC: 7.4 MG/DL (ref 8.6–10.6)
CHLORIDE SERPL-SCNC: 113 MMOL/L (ref 98–107)
CO2 SERPL-SCNC: 18 MMOL/L (ref 21–32)
CREAT SERPL-MCNC: 0.99 MG/DL (ref 0.5–1.3)
EGFRCR SERPLBLD CKD-EPI 2021: 82 ML/MIN/1.73M*2
ERYTHROCYTE [DISTWIDTH] IN BLOOD BY AUTOMATED COUNT: 20.7 % (ref 11.5–14.5)
GLUCOSE SERPL-MCNC: 88 MG/DL (ref 74–99)
HCT VFR BLD AUTO: 27.9 % (ref 41–52)
HGB BLD-MCNC: 7.8 G/DL (ref 13.5–17.5)
MAGNESIUM SERPL-MCNC: 2.03 MG/DL (ref 1.6–2.4)
MCH RBC QN AUTO: 29.1 PG (ref 26–34)
MCHC RBC AUTO-ENTMCNC: 28 G/DL (ref 32–36)
MCV RBC AUTO: 104 FL (ref 80–100)
NRBC BLD-RTO: 0 /100 WBCS (ref 0–0)
PLATELET # BLD AUTO: 42 X10*3/UL (ref 150–450)
POTASSIUM SERPL-SCNC: 4.5 MMOL/L (ref 3.5–5.3)
PROT SERPL-MCNC: 4.4 G/DL (ref 6.4–8.2)
RBC # BLD AUTO: 2.68 X10*6/UL (ref 4.5–5.9)
SODIUM SERPL-SCNC: 136 MMOL/L (ref 136–145)
WBC # BLD AUTO: 3.3 X10*3/UL (ref 4.4–11.3)

## 2025-03-23 PROCEDURE — 36416 COLLJ CAPILLARY BLOOD SPEC: CPT

## 2025-03-23 PROCEDURE — P9047 ALBUMIN (HUMAN), 25%, 50ML: HCPCS | Performed by: STUDENT IN AN ORGANIZED HEALTH CARE EDUCATION/TRAINING PROGRAM

## 2025-03-23 PROCEDURE — 83735 ASSAY OF MAGNESIUM: CPT

## 2025-03-23 PROCEDURE — 85027 COMPLETE CBC AUTOMATED: CPT

## 2025-03-23 PROCEDURE — 2500000004 HC RX 250 GENERAL PHARMACY W/ HCPCS (ALT 636 FOR OP/ED): Performed by: STUDENT IN AN ORGANIZED HEALTH CARE EDUCATION/TRAINING PROGRAM

## 2025-03-23 PROCEDURE — 1170000001 HC PRIVATE ONCOLOGY ROOM DAILY

## 2025-03-23 PROCEDURE — 2500000004 HC RX 250 GENERAL PHARMACY W/ HCPCS (ALT 636 FOR OP/ED)

## 2025-03-23 PROCEDURE — 80053 COMPREHEN METABOLIC PANEL: CPT

## 2025-03-23 PROCEDURE — 2500000002 HC RX 250 W HCPCS SELF ADMINISTERED DRUGS (ALT 637 FOR MEDICARE OP, ALT 636 FOR OP/ED): Performed by: STUDENT IN AN ORGANIZED HEALTH CARE EDUCATION/TRAINING PROGRAM

## 2025-03-23 PROCEDURE — 2500000005 HC RX 250 GENERAL PHARMACY W/O HCPCS

## 2025-03-23 PROCEDURE — 2500000001 HC RX 250 WO HCPCS SELF ADMINISTERED DRUGS (ALT 637 FOR MEDICARE OP): Performed by: STUDENT IN AN ORGANIZED HEALTH CARE EDUCATION/TRAINING PROGRAM

## 2025-03-23 RX ORDER — ALBUMIN HUMAN 250 G/1000ML
12.5 SOLUTION INTRAVENOUS EVERY 6 HOURS
Status: COMPLETED | OUTPATIENT
Start: 2025-03-23 | End: 2025-03-24

## 2025-03-23 RX ADMIN — ACETAMINOPHEN 650 MG: 325 TABLET, FILM COATED ORAL at 06:31

## 2025-03-23 RX ADMIN — METHOCARBAMOL TABLETS 500 MG: 500 TABLET, COATED ORAL at 21:52

## 2025-03-23 RX ADMIN — Medication 3 MG: at 21:52

## 2025-03-23 RX ADMIN — GABAPENTIN 300 MG: 300 CAPSULE ORAL at 10:18

## 2025-03-23 RX ADMIN — SODIUM CHLORIDE, POTASSIUM CHLORIDE, SODIUM LACTATE AND CALCIUM CHLORIDE 500 ML: 600; 310; 30; 20 INJECTION, SOLUTION INTRAVENOUS at 01:20

## 2025-03-23 RX ADMIN — METHOCARBAMOL TABLETS 500 MG: 500 TABLET, COATED ORAL at 16:46

## 2025-03-23 RX ADMIN — METOPROLOL TARTRATE 50 MG: 50 TABLET, FILM COATED ORAL at 10:27

## 2025-03-23 RX ADMIN — SODIUM CHLORIDE, POTASSIUM CHLORIDE, SODIUM LACTATE AND CALCIUM CHLORIDE 500 ML: 600; 310; 30; 20 INJECTION, SOLUTION INTRAVENOUS at 11:30

## 2025-03-23 RX ADMIN — ALBUMIN HUMAN 12.5 G: 0.25 SOLUTION INTRAVENOUS at 10:25

## 2025-03-23 RX ADMIN — SODIUM CHLORIDE, SODIUM LACTATE, POTASSIUM CHLORIDE, AND CALCIUM CHLORIDE 750 ML: .6; .31; .03; .02 INJECTION, SOLUTION INTRAVENOUS at 19:45

## 2025-03-23 RX ADMIN — METHOCARBAMOL TABLETS 500 MG: 500 TABLET, COATED ORAL at 10:18

## 2025-03-23 RX ADMIN — DRONABINOL 2.5 MG: 2.5 CAPSULE ORAL at 06:31

## 2025-03-23 RX ADMIN — ACETAMINOPHEN 650 MG: 325 TABLET, FILM COATED ORAL at 16:46

## 2025-03-23 RX ADMIN — DRONABINOL 2.5 MG: 2.5 CAPSULE ORAL at 16:46

## 2025-03-23 RX ADMIN — GABAPENTIN 300 MG: 300 CAPSULE ORAL at 21:52

## 2025-03-23 RX ADMIN — METHOCARBAMOL TABLETS 500 MG: 500 TABLET, COATED ORAL at 04:11

## 2025-03-23 RX ADMIN — TAMSULOSIN HYDROCHLORIDE 0.4 MG: 0.4 CAPSULE ORAL at 10:18

## 2025-03-23 RX ADMIN — GABAPENTIN 300 MG: 300 CAPSULE ORAL at 16:57

## 2025-03-23 RX ADMIN — MORPHINE SULFATE 30 MG: 30 TABLET, FILM COATED, EXTENDED RELEASE ORAL at 10:18

## 2025-03-23 RX ADMIN — MORPHINE SULFATE 30 MG: 30 TABLET, FILM COATED, EXTENDED RELEASE ORAL at 21:52

## 2025-03-23 RX ADMIN — ALBUMIN HUMAN 12.5 G: 0.25 SOLUTION INTRAVENOUS at 16:48

## 2025-03-23 ASSESSMENT — COGNITIVE AND FUNCTIONAL STATUS - GENERAL
EATING MEALS: A LITTLE
MOBILITY SCORE: 18
STANDING UP FROM CHAIR USING ARMS: A LITTLE
MOVING TO AND FROM BED TO CHAIR: A LITTLE
DRESSING REGULAR UPPER BODY CLOTHING: A LITTLE
DAILY ACTIVITIY SCORE: 18
DAILY ACTIVITIY SCORE: 21
WALKING IN HOSPITAL ROOM: A LITTLE
DRESSING REGULAR LOWER BODY CLOTHING: A LITTLE
MOVING TO AND FROM BED TO CHAIR: A LITTLE
STANDING UP FROM CHAIR USING ARMS: A LITTLE
DRESSING REGULAR UPPER BODY CLOTHING: A LITTLE
HELP NEEDED FOR BATHING: A LITTLE
DRESSING REGULAR LOWER BODY CLOTHING: A LITTLE
TOILETING: A LITTLE
PERSONAL GROOMING: A LITTLE
WALKING IN HOSPITAL ROOM: A LITTLE
TURNING FROM BACK TO SIDE WHILE IN FLAT BAD: A LITTLE
MOBILITY SCORE: 18
TOILETING: A LITTLE
TURNING FROM BACK TO SIDE WHILE IN FLAT BAD: A LITTLE
CLIMB 3 TO 5 STEPS WITH RAILING: A LOT
CLIMB 3 TO 5 STEPS WITH RAILING: A LOT

## 2025-03-23 ASSESSMENT — PAIN - FUNCTIONAL ASSESSMENT: PAIN_FUNCTIONAL_ASSESSMENT: 0-10

## 2025-03-23 ASSESSMENT — PAIN SCALES - GENERAL: PAINLEVEL_OUTOF10: 0 - NO PAIN

## 2025-03-23 NOTE — CARE PLAN
Problem: Safety - Adult  Goal: Free from fall injury  Outcome: Progressing     Problem: Fall/Injury  Goal: Not fall by end of shift  Outcome: Progressing  Goal: Be free from injury by end of the shift  Outcome: Progressing  Goal: Verbalize understanding of personal risk factors for fall in the hospital  Outcome: Progressing  Goal: Verbalize understanding of risk factor reduction measures to prevent injury from fall in the home  Outcome: Progressing  Goal: Use assistive devices by end of the shift  Outcome: Progressing  Goal: Pace activities to prevent fatigue by end of the shift  Outcome: Progressing

## 2025-03-23 NOTE — PROGRESS NOTES
Surgical Oncology Progress Note    HPI:  Roma Corral is a 70 y.o. male with history of rectal cancer with mets to the liver, CAD (2021 NSTEMI), DM2, HTN  who is s/p robotic LAR with end colostomy, ilecocecetomy with Dr. Vasquez, and laparoscopic microwave liver ablation with Dr. Erazo on 3/17/25.     Subjective:  NAEON. Persistently high drain output >2L. No colostomy output. Minimal PO intake     Patient fell overnight, did not lose consciousness or hit his head (see clinical event notes overnight). Hit his right hip, large bruising. XR negative of any acute fractures or dislocation. T bili still slowly downtrending, now 1.4 from peak 1.7 post operatively. Persistent high output from drain, still serous appearing now 2.2 from ~1L. Minimal PO intake. T bili higher than admission but stable ~1.5.     A 12-point ROS was performed and was unremarkable except as above.    Objective    Temp:  [36.4 °C (97.5 °F)-36.9 °C (98.4 °F)] 36.9 °C (98.4 °F)  Heart Rate:  [71-86] 86  Resp:  [16] 16  BP: ()/(50-65) 103/65  I/O last 2 completed shifts:  In: 2380 (32.4 mL/kg) [P.O.:680; IV Piggyback:1700]  Out: 2600 (35.4 mL/kg) [Urine:450 (0.3 mL/kg/hr); Drains:2150]  Weight: 73.5 kg     Physical Exam:  NAD  Anicteric sclera  On room air  Nontachycardic  Abdomen soft, mildly tender to palpation, left-sided colostomy with minimal brown stool, right sided CIRILO drain with serous mildly cloudy output   Large right sided bruise at hip, sensory and motor function of RLE intact    Labs within past 24h:            7.8   136  113  23   3.3>-----<42  ----------------<88             27.9   4.5  18  0.99          Ca 7.4 Phos 2.9 Mg 2.03       ALT 46 AST 32 AlkPhos 502 tBili 1.5           Imaging within past 24h:  CT volumetrics 3/21   Findings consistent with patient history including multifocal   areas of ablation within the liver and free air. No fluid collection   to suggest abscess or evidence of bowel obstruction/leak.      ASSESSMENT  Roma Corral is a 70 y.o. male with history of rectal cancer with mets to the liver, CAD (2021 NSTEMI), DM2, HTN who is s/p robotic LAR with end colostomy, ilecocecetomy with Dr. Vasquez, and laparoscopic microwave liver ablation with Dr. Erazo on 3/17/25. Progressing appropriately with ostomy function starting 3/21. Fell overnight into 3/22 AM onto right hip, no other injuries. No LOC or head strike, neg hip XR. Elevated T bili post operatively, persistently ~1.5    PLAN:  -Agree with scheduled albumin  -Continue to trend CMP  -Recommend careful management of fluid status do to high output from drain and low PO intake  -Patient will get right portal vein embolization with interventional radiology on 4/1/2025  -defer rest of plan to colorectal surgery team     Discussed with Dr. Kirk Cannon   Surgical Oncology [id number]

## 2025-03-23 NOTE — PROGRESS NOTES
03/23/25 1200   Discharge Planning   Living Arrangements Spouse/significant other   Support Systems Spouse/significant other   Type of Residence Private residence   Number of Stairs to Enter Residence 2   Number of Stairs Within Residence 10   Do you have animals or pets at home? Yes   Type of Animals or Pets one dog   Home or Post Acute Services Post acute facilities (Rehab/SNF/etc)   Type of Post Acute Facility Services Skilled nursing   Expected Discharge Disposition SNF     SW following to assist with discharge planning. Anticipate pt to be medically ready for SNF mid week.  Choice list previously provided and referrals sent. [facility] has accepted pt.  Pt considering if this is his FOC.  SW met with pt to discuss.  Pt asked about rating.  SW provided Medicare rating print out.  Pt still considering if this is FOC.  SW to continue to follow and assist as needed.  Pt has traditional medicare and will not need precert.  Elizabeth Gunsalus LISW-S  Care Transitions Supervisor

## 2025-03-23 NOTE — PROGRESS NOTES
"     Colorectal Surgery Progress Note        HPI: Roma Corral is a 70 y.o. 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 Vasquez, and laparoscopic MWA x3 with liver biopsy by Dr. Erazo on 3/18/25.     Subjective  Patient did not have any additional falls.   Denies fevers, chills, chest pain, SOB.   Still having high output from CIRILO drain (2L). Tolerating PO without nausea, vomiting, and still having ostomy output. However does not have much appetite and admits he has not been eating much.       Objective  Physical Exam:  Gen: in no physical distress and alert, well appearing, resting in bed comfortably   HENT: Head normocephalic, atraumatic.  Mucous membranes moist.    Neuro: Alert and oriented x3.  Moves all extremities spontaneously x4.  CV: Normal rate and rhythm. Trace edema bilaterally at ankles.  Resp: No acute respiratory distress.  Normal effort on room air. Chest expansion symmetrical.   GI: Abdomen is soft, nondistended, mildly diffusely tender. Laparoscopic incisions are clean, dry and intact with glue.  Stoma is pink and healthy, well pouched, with gas and stool in pouch. Abdominal drain to bile bag, cloudy serous output, without erythema around site.  Skin: Warm and dry, pale, without rashes or lesions       Last Recorded Vitals  Blood pressure 103/65, pulse 86, temperature 36.9 °C (98.4 °F), resp. rate 16, height 1.803 m (5' 10.98\"), weight 73.5 kg (162 lb 0.6 oz), SpO2 98%.  Intake/Output last 3 Shifts:  I/O last 3 completed shifts:  In: 2789.2 (37.9 mL/kg) [P.O.:680; I.V.:409.2 (5.6 mL/kg); IV Piggyback:1700]  Out: 4645 (63.2 mL/kg) [Urine:1000 (0.4 mL/kg/hr); " Drains:3625; Stool:20]  Weight: 73.5 kg     Relevant Results            7.8  3.3>-----<42              27.9   136  113  23                  ----------------<88     4.5  18  0.99  Mg 2.03 Ca 7.4 Phos 2.9            Malnutrition Diagnosis Status: New  Malnutrition Diagnosis: Severe malnutrition related to chronic disease or condition     As Evidenced by: reports of poor appetite and intake PTA and here along with a weight loss of 12% in 3 months and 18% in 6 months and 34% from his usual body weight; he has areas of severe muscle and fat loss on physical exam  I agree with the dietitian's malnutrition diagnosis.      Assessment/Plan   Assessment: 70 year old male with hx of metastatic rectal cancer to liver s/p loop colostomy July 2024 & FAWN c/b persistent disease and rectal stricture s/p robotic low anterior resection with transition from loop to end colostomy & 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 Vasquez, and laparoscopic MWA x3 with liver biopsy by Dr. Erazo on 3/18/25. Post-op course c/b high pelvic drain output 2/2 possible lymph leak. Diet changed to low fat. Passed trial void with low volume retention. PT recommending SNF at discharge.     Plan:  -ALEX ALB for 24 hours  -Bolus as needed for high drain output  -Continue to encourage PO hydration and intake   -OOB during day/ambulation in halls x3/PT  -Hold Lovenox, resume when PLT>50     Neuro: Post-op pain controlled, history of anxiety  -Continue current pain regimen with tylenol, gabapentin, robaxin; prn PO dilaudid; appreciate supportive onc for recs & support  -Continue home MS Contin, hold home ativan  -Sleep hygiene/OOB during day/PT/multiple walks in halls today     CV: hemodynamically stable; hx of CAD (NSTEMI 2021), HTN, HLD  -Q4 vital signs, EKG prn for ACS symptoms  -Continue home metoprolol with hold parameters  -Hold home ASA 81, Lipitor in setting of elevated LFTs/thrombocytopenia     Resp: no acute issues on room air, no  significant pulmonary history  -ICS 10x every hour  -OOB/ambulation     GI: s/p robotic LAR/loop to end colostomy, ileocecectomy & MWA; hx of rectal CA with mets to liver  -Low fat diet as tolerated  -Zofran prn for nausea  -continue assessment of stoma and output, stoma not new for patient.  WOCN for supplies & support.  -ALEX albumin q6h for 24 hours to optimize drain output if due to hepatic insufficiency   -Monitor drain output. Bolus IVF as needed for fluid replacement.  -Daily CMP to monitor LFTs  -Volumetric liver CT 3/21 per surg onc, for operative planning     : Oliguria with small volume retention; no significant urological hx  -Continue flomax for low resection, needs to stand when voiding to completely empty.   - Strict intake and output, monitoring fluid & electrolyte balance closely in setting of high output drain  - Bladder scan PRN  - Daily RFP/replace electrolytes as needed     Heme: H&H stable with no evidence of bleeding, history of pancytopenia with platelets similar to baseline  -CBC daily; monitor for s/s of bleeding     ID: afebrile, no leukocytosis  -Q4 temp  -trend CBC; monitor for s/s of infection  -cipro/flagyl for total of 4 days; end date 3/22     Endo: no acute issues, no significant endocrine history (last A1C normal)  -hypoglycemia protocol  -Monitor BG on AM labs  -no indication for SSI     DVT Prophy: SCDs & Hold Lovenox today for Plt <50     Dispo:   -Continue care on RNF  -PT recommending SNF, appreciate TCCs for support & hard work.      Patient discussed with attending, Dr. Bradley.      Abdelrahman Mendoza MD  General Surgery Resident  [facility] [phone]

## 2025-03-23 NOTE — CARE PLAN
Problem: Safety - Adult  Goal: Free from fall injury  Outcome: Progressing     Problem: Discharge Planning  Goal: Discharge to home or other facility with appropriate resources  Outcome: Progressing     Problem: Chronic Conditions and Co-morbidities  Goal: Patient's chronic conditions and co-morbidity symptoms are monitored and maintained or improved  Outcome: Progressing     Problem: Fall/Injury  Goal: Not fall by end of shift  Outcome: Progressing  Goal: Be free from injury by end of the shift  Outcome: Progressing  Goal: Verbalize understanding of personal risk factors for fall in the hospital  Outcome: Progressing  Goal: Verbalize understanding of risk factor reduction measures to prevent injury from fall in the home  Outcome: Progressing  Goal: Use assistive devices by end of the shift  Outcome: Progressing  Goal: Pace activities to prevent fatigue by end of the shift  Outcome: Progressing     Problem: Skin  Goal: Decreased wound size/increased tissue granulation at next dressing change  Outcome: Progressing  Flowsheets (Taken 3/23/2025 0326)  Decreased wound size/increased tissue granulation at next dressing change: Promote sleep for wound healing  Goal: Participates in plan/prevention/treatment measures  Outcome: Progressing  Flowsheets (Taken 3/23/2025 0326)  Participates in plan/prevention/treatment measures: Increase activity/out of bed for meals  Goal: Prevent/manage excess moisture  Outcome: Progressing  Flowsheets (Taken 3/23/2025 0326)  Prevent/manage excess moisture: Monitor for/manage infection if present  Goal: Prevent/minimize sheer/friction injuries  Outcome: Progressing  Flowsheets (Taken 3/23/2025 0326)  Prevent/minimize sheer/friction injuries: Use pull sheet  Goal: Promote/optimize nutrition  Outcome: Progressing  Flowsheets (Taken 3/23/2025 0326)  Promote/optimize nutrition: Monitor/record intake including meals  Goal: Promote skin healing  Outcome: Progressing  Flowsheets (Taken 3/23/2025  0326)  Promote skin healing: Assess skin/pad under line(s)/device(s)     Problem: Diabetes  Goal: Achieve decreasing blood glucose levels by end of shift  Outcome: Progressing  Goal: Increase stability of blood glucose readings by end of shift  Outcome: Progressing  Goal: Decrease in ketones present in urine by end of shift  Outcome: Progressing  Goal: Maintain electrolyte levels within acceptable range throughout shift  Outcome: Progressing  Goal: Maintain glucose levels >70mg/dl to <250mg/dl throughout shift  Outcome: Progressing  Goal: No changes in neurological exam by end of shift  Outcome: Progressing  Goal: Learn about and adhere to nutrition recommendations by end of shift  Outcome: Progressing  Goal: Vital signs within normal range for age by end of shift  Outcome: Progressing  Goal: Increase self care and/or family involovement by end of shift  Outcome: Progressing  Goal: Receive DSME education by end of shift  Outcome: Progressing     Problem: Pain  Goal: Takes deep breaths with improved pain control throughout the shift  Outcome: Progressing  Goal: Turns in bed with improved pain control throughout the shift  Outcome: Progressing  Goal: Walks with improved pain control throughout the shift  Outcome: Progressing  Goal: Performs ADL's with improved pain control throughout shift  Outcome: Progressing  Goal: Participates in PT with improved pain control throughout the shift  Outcome: Progressing  Goal: Free from opioid side effects throughout the shift  Outcome: Progressing  Goal: Free from acute confusion related to pain meds throughout the shift  Outcome: Progressing

## 2025-03-24 LAB
ABO GROUP (TYPE) IN BLOOD: NORMAL
ALBUMIN SERPL BCP-MCNC: 2.8 G/DL (ref 3.4–5)
ALP SERPL-CCNC: 730 U/L (ref 33–136)
ALT SERPL W P-5'-P-CCNC: 34 U/L (ref 10–52)
AMMONIA PLAS-SCNC: 40 UMOL/L (ref 16–53)
ANION GAP SERPL CALC-SCNC: 10 MMOL/L (ref 10–20)
ANTIBODY SCREEN: NORMAL
APTT PPP: 29 SECONDS (ref 26–36)
AST SERPL W P-5'-P-CCNC: 25 U/L (ref 9–39)
BILIRUB SERPL-MCNC: 1.4 MG/DL (ref 0–1.2)
BUN SERPL-MCNC: 17 MG/DL (ref 6–23)
CALCIUM SERPL-MCNC: 8.1 MG/DL (ref 8.6–10.6)
CHLORIDE SERPL-SCNC: 113 MMOL/L (ref 98–107)
CO2 SERPL-SCNC: 24 MMOL/L (ref 21–32)
CREAT SERPL-MCNC: 0.76 MG/DL (ref 0.5–1.3)
EGFRCR SERPLBLD CKD-EPI 2021: >90 ML/MIN/1.73M*2
ERYTHROCYTE [DISTWIDTH] IN BLOOD BY AUTOMATED COUNT: 19.9 % (ref 11.5–14.5)
GLUCOSE SERPL-MCNC: 89 MG/DL (ref 74–99)
HCT VFR BLD AUTO: 26.6 % (ref 41–52)
HGB BLD-MCNC: 8.3 G/DL (ref 13.5–17.5)
INR PPP: 1.3 (ref 0.9–1.1)
MAGNESIUM SERPL-MCNC: 2.1 MG/DL (ref 1.6–2.4)
MCH RBC QN AUTO: 29.4 PG (ref 26–34)
MCHC RBC AUTO-ENTMCNC: 31.2 G/DL (ref 32–36)
MCV RBC AUTO: 94 FL (ref 80–100)
NRBC BLD-RTO: 0 /100 WBCS (ref 0–0)
PLATELET # BLD AUTO: 82 X10*3/UL (ref 150–450)
POTASSIUM SERPL-SCNC: 3.8 MMOL/L (ref 3.5–5.3)
PROT SERPL-MCNC: 5.4 G/DL (ref 6.4–8.2)
PROTHROMBIN TIME: 14.5 SECONDS (ref 9.8–12.4)
RBC # BLD AUTO: 2.82 X10*6/UL (ref 4.5–5.9)
RH FACTOR (ANTIGEN D): NORMAL
SODIUM SERPL-SCNC: 143 MMOL/L (ref 136–145)
TRIGL FLD-MCNC: 120 MG/DL
WBC # BLD AUTO: 4.6 X10*3/UL (ref 4.4–11.3)

## 2025-03-24 PROCEDURE — 82140 ASSAY OF AMMONIA: CPT | Performed by: STUDENT IN AN ORGANIZED HEALTH CARE EDUCATION/TRAINING PROGRAM

## 2025-03-24 PROCEDURE — 2500000004 HC RX 250 GENERAL PHARMACY W/ HCPCS (ALT 636 FOR OP/ED): Performed by: STUDENT IN AN ORGANIZED HEALTH CARE EDUCATION/TRAINING PROGRAM

## 2025-03-24 PROCEDURE — 97530 THERAPEUTIC ACTIVITIES: CPT | Mod: GP,CQ

## 2025-03-24 PROCEDURE — 83735 ASSAY OF MAGNESIUM: CPT

## 2025-03-24 PROCEDURE — 2500000004 HC RX 250 GENERAL PHARMACY W/ HCPCS (ALT 636 FOR OP/ED)

## 2025-03-24 PROCEDURE — 1170000001 HC PRIVATE ONCOLOGY ROOM DAILY

## 2025-03-24 PROCEDURE — 80053 COMPREHEN METABOLIC PANEL: CPT

## 2025-03-24 PROCEDURE — 97116 GAIT TRAINING THERAPY: CPT | Mod: GP,CQ

## 2025-03-24 PROCEDURE — P9047 ALBUMIN (HUMAN), 25%, 50ML: HCPCS | Performed by: STUDENT IN AN ORGANIZED HEALTH CARE EDUCATION/TRAINING PROGRAM

## 2025-03-24 PROCEDURE — 2500000001 HC RX 250 WO HCPCS SELF ADMINISTERED DRUGS (ALT 637 FOR MEDICARE OP)

## 2025-03-24 PROCEDURE — 85027 COMPLETE CBC AUTOMATED: CPT

## 2025-03-24 PROCEDURE — 2500000001 HC RX 250 WO HCPCS SELF ADMINISTERED DRUGS (ALT 637 FOR MEDICARE OP): Performed by: STUDENT IN AN ORGANIZED HEALTH CARE EDUCATION/TRAINING PROGRAM

## 2025-03-24 PROCEDURE — 36416 COLLJ CAPILLARY BLOOD SPEC: CPT | Performed by: STUDENT IN AN ORGANIZED HEALTH CARE EDUCATION/TRAINING PROGRAM

## 2025-03-24 PROCEDURE — 2500000002 HC RX 250 W HCPCS SELF ADMINISTERED DRUGS (ALT 637 FOR MEDICARE OP, ALT 636 FOR OP/ED): Performed by: STUDENT IN AN ORGANIZED HEALTH CARE EDUCATION/TRAINING PROGRAM

## 2025-03-24 PROCEDURE — 2500000005 HC RX 250 GENERAL PHARMACY W/O HCPCS

## 2025-03-24 PROCEDURE — 86901 BLOOD TYPING SEROLOGIC RH(D): CPT | Performed by: STUDENT IN AN ORGANIZED HEALTH CARE EDUCATION/TRAINING PROGRAM

## 2025-03-24 PROCEDURE — 84478 ASSAY OF TRIGLYCERIDES: CPT | Performed by: NURSE PRACTITIONER

## 2025-03-24 PROCEDURE — 99233 SBSQ HOSP IP/OBS HIGH 50: CPT

## 2025-03-24 PROCEDURE — 99232 SBSQ HOSP IP/OBS MODERATE 35: CPT

## 2025-03-24 PROCEDURE — 85610 PROTHROMBIN TIME: CPT | Performed by: STUDENT IN AN ORGANIZED HEALTH CARE EDUCATION/TRAINING PROGRAM

## 2025-03-24 RX ORDER — NYSTATIN 100000 [USP'U]/ML
4 SUSPENSION ORAL 3 TIMES DAILY
Status: DISCONTINUED | OUTPATIENT
Start: 2025-03-24 | End: 2025-04-07 | Stop reason: HOSPADM

## 2025-03-24 RX ORDER — DEXTROSE MONOHYDRATE AND SODIUM CHLORIDE 5; .45 G/100ML; G/100ML
75 INJECTION, SOLUTION INTRAVENOUS CONTINUOUS
Status: DISCONTINUED | OUTPATIENT
Start: 2025-03-25 | End: 2025-03-26

## 2025-03-24 RX ORDER — OCTREOTIDE ACETATE 100 UG/ML
100 INJECTION, SOLUTION INTRAVENOUS; SUBCUTANEOUS 3 TIMES DAILY
Status: DISCONTINUED | OUTPATIENT
Start: 2025-03-24 | End: 2025-03-28

## 2025-03-24 RX ORDER — ENOXAPARIN SODIUM 100 MG/ML
40 INJECTION SUBCUTANEOUS EVERY 24 HOURS
Status: DISCONTINUED | OUTPATIENT
Start: 2025-03-24 | End: 2025-03-25

## 2025-03-24 RX ADMIN — SODIUM CHLORIDE, SODIUM LACTATE, POTASSIUM CHLORIDE, AND CALCIUM CHLORIDE 500 ML: .6; .31; .03; .02 INJECTION, SOLUTION INTRAVENOUS at 13:06

## 2025-03-24 RX ADMIN — METHOCARBAMOL TABLETS 500 MG: 500 TABLET, COATED ORAL at 21:36

## 2025-03-24 RX ADMIN — DRONABINOL 2.5 MG: 2.5 CAPSULE ORAL at 15:35

## 2025-03-24 RX ADMIN — OCTREOTIDE ACETATE 100 MCG: 100 INJECTION, SOLUTION INTRAVENOUS; SUBCUTANEOUS at 13:06

## 2025-03-24 RX ADMIN — ENOXAPARIN SODIUM 40 MG: 100 INJECTION SUBCUTANEOUS at 13:06

## 2025-03-24 RX ADMIN — ACETAMINOPHEN 650 MG: 325 TABLET, FILM COATED ORAL at 05:50

## 2025-03-24 RX ADMIN — MORPHINE SULFATE 30 MG: 30 TABLET, FILM COATED, EXTENDED RELEASE ORAL at 08:54

## 2025-03-24 RX ADMIN — HYDROMORPHONE HYDROCHLORIDE 2 MG: 2 TABLET ORAL at 18:01

## 2025-03-24 RX ADMIN — METHOCARBAMOL TABLETS 500 MG: 500 TABLET, COATED ORAL at 10:15

## 2025-03-24 RX ADMIN — DRONABINOL 2.5 MG: 2.5 CAPSULE ORAL at 06:00

## 2025-03-24 RX ADMIN — Medication 3 MG: at 21:36

## 2025-03-24 RX ADMIN — ACETAMINOPHEN 650 MG: 325 TABLET, FILM COATED ORAL at 00:44

## 2025-03-24 RX ADMIN — GABAPENTIN 300 MG: 300 CAPSULE ORAL at 21:37

## 2025-03-24 RX ADMIN — ACETAMINOPHEN 650 MG: 325 TABLET, FILM COATED ORAL at 17:14

## 2025-03-24 RX ADMIN — METHOCARBAMOL TABLETS 500 MG: 500 TABLET, COATED ORAL at 05:50

## 2025-03-24 RX ADMIN — ALBUMIN HUMAN 12.5 G: 0.25 SOLUTION INTRAVENOUS at 00:44

## 2025-03-24 RX ADMIN — MORPHINE SULFATE 30 MG: 30 TABLET, FILM COATED, EXTENDED RELEASE ORAL at 21:36

## 2025-03-24 RX ADMIN — HYDROMORPHONE HYDROCHLORIDE 2 MG: 2 TABLET ORAL at 10:28

## 2025-03-24 RX ADMIN — TAMSULOSIN HYDROCHLORIDE 0.4 MG: 0.4 CAPSULE ORAL at 08:54

## 2025-03-24 RX ADMIN — ALBUMIN HUMAN 12.5 G: 0.25 SOLUTION INTRAVENOUS at 05:44

## 2025-03-24 RX ADMIN — NYSTATIN 400000 UNITS: 500000 SUSPENSION ORAL at 21:49

## 2025-03-24 RX ADMIN — GABAPENTIN 300 MG: 300 CAPSULE ORAL at 15:34

## 2025-03-24 RX ADMIN — GABAPENTIN 300 MG: 300 CAPSULE ORAL at 08:54

## 2025-03-24 RX ADMIN — SODIUM CHLORIDE, POTASSIUM CHLORIDE, SODIUM LACTATE AND CALCIUM CHLORIDE 500 ML: 600; 310; 30; 20 INJECTION, SOLUTION INTRAVENOUS at 01:20

## 2025-03-24 RX ADMIN — METHOCARBAMOL TABLETS 500 MG: 500 TABLET, COATED ORAL at 15:34

## 2025-03-24 RX ADMIN — OCTREOTIDE ACETATE 100 MCG: 100 INJECTION, SOLUTION INTRAVENOUS; SUBCUTANEOUS at 21:40

## 2025-03-24 ASSESSMENT — COGNITIVE AND FUNCTIONAL STATUS - GENERAL
MOVING TO AND FROM BED TO CHAIR: A LITTLE
DAILY ACTIVITIY SCORE: 18
STANDING UP FROM CHAIR USING ARMS: A LITTLE
DRESSING REGULAR UPPER BODY CLOTHING: A LITTLE
TURNING FROM BACK TO SIDE WHILE IN FLAT BAD: A LITTLE
HELP NEEDED FOR BATHING: A LITTLE
WALKING IN HOSPITAL ROOM: A LITTLE
MOVING FROM LYING ON BACK TO SITTING ON SIDE OF FLAT BED WITH BEDRAILS: A LITTLE
CLIMB 3 TO 5 STEPS WITH RAILING: TOTAL
EATING MEALS: A LITTLE
CLIMB 3 TO 5 STEPS WITH RAILING: A LOT
MOBILITY SCORE: 16
TURNING FROM BACK TO SIDE WHILE IN FLAT BAD: A LITTLE
MOBILITY SCORE: 18
STANDING UP FROM CHAIR USING ARMS: A LITTLE
TOILETING: A LITTLE
DRESSING REGULAR LOWER BODY CLOTHING: A LITTLE
WALKING IN HOSPITAL ROOM: A LITTLE
PERSONAL GROOMING: A LITTLE
MOVING TO AND FROM BED TO CHAIR: A LITTLE

## 2025-03-24 ASSESSMENT — PAIN SCALES - GENERAL
PAINLEVEL_OUTOF10: 5 - MODERATE PAIN
PAINLEVEL_OUTOF10: 6
PAINLEVEL_OUTOF10: 6
PAINLEVEL_OUTOF10: 0 - NO PAIN
PAINLEVEL_OUTOF10: 4

## 2025-03-24 ASSESSMENT — PAIN - FUNCTIONAL ASSESSMENT
PAIN_FUNCTIONAL_ASSESSMENT: 0-10

## 2025-03-24 NOTE — PROGRESS NOTES
Colorectal Surgery Progress Note      HPI: Roma Corral is a 70 y.o. male with a past medical history of CAD (NSTEMI 2021), DMII (last A1c 5.4 normal 1/17/25), HLD, HTN, gout, anxiety, metastatic rectal cancer to liver s/p loop colostomy July 2024 & FAWN c/b persistent disease and rectal stricture, now hospital day 7 s/p robotic low anterior resection with transition from loop to end colostomy & 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 Vasquez, and laparoscopic MWA x3 with liver biopsy by Dr. Erazo on 3/18/25.      Subjective  Reports falling on right side over the weekend when standing to go to bathroom.   Surgical pain and right sided pain from fall are unchanged and controlled on current pain regimen.    Reports abdominal drain still putting out a large amount.  Denies nausea, vomiting, bloating on diet.  Wife visited yesterday & brought [employer], which he ate 3/4 of a burger with some fries & a frosty. Is drinking at least one Ensure clear per day.  Colostomy with gas and pudding stool in pouch   Ambulating with PT    Objective  Physical Exam:  Gen: in no physical distress and alert, deconditioned appearing, pleasant & conversational, lying in bed  HENT: Head normocephalic, atraumatic.  Mucous membranes moist.    Neuro: Alert and oriented x3.  Moves all extremities spontaneously x4.  CV: Normal rate and rhythm on palpated radial pulse. Trace edema bilaterally at ankles.  Resp: No acute respiratory distress.  Normal effort on room air. Chest expansion symmetrical.   GI: Abdomen is soft, nontender, and nondistended. Laparoscopic incisions are clean, dry and intact with glue.  Stoma is pink and healthy, well pouched, with gas and pudding stool in pouch. Abdominal drain to bile bag high output and milky yellow chyle today, without erythema around site.  Skin: Warm and dry, pale, without rashes or lesions    Visit Vitals  BP 99/61 (BP Location: Right arm)   Pulse 86   Temp 36.8 °C (98.2 °F) (Oral)    Resp 16        I/O last 3 completed shifts:  In: 3715 (50.5 mL/kg) [P.O.:840; IV Piggyback:2875]  Out: 5600 (76.2 mL/kg) [Urine:1150 (0.4 mL/kg/hr); Drains:4375; Stool:75]  Weight: 73.5 kg   I/O this shift:  In: -   Out: 600 [Drains:600]             Data Review:  CBC:   Lab Results   Component Value Date    WBC 4.6 03/24/2025    RBC 2.82 (L) 03/24/2025     BMP:   Lab Results   Component Value Date    GLUCOSE 89 03/24/2025    CO2 24 03/24/2025    BUN 17 03/24/2025    CREATININE 0.76 03/24/2025    CALCIUM 8.1 (L) 03/24/2025     Coagulation:   Lab Results   Component Value Date    INR 1.3 (H) 03/24/2025    APTT 29 03/24/2025       Labs reviewed and personally interpreted as: CBC with stable anemia of chronic disease, without leukocytosis. Moderate thrombocytopenia, similar to baseline.  CMP without electrolyte derangements, ORALIA, or hypo/hyperglycemia. Albumin down trending.  Alk Phos uptrending, ALT/AST, t bili downtrending.      Imaging:  No recent imaging to review.    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 Vasquez, and laparoscopic MWA x3 with liver biopsy by Dr. Erazo on 3/18/25. Post-op course c/b high pelvic drain output 2/2 persistent lymph leak, requiring fluid & albumin replacement, with no improvement on low fat diet.  Also c/b unwitnessed fall with negative right hip/pelvis xray. PT recommending SNF at discharge.      Plan:  -octreotide 100mg subcutaneous TID for lymph leak  -Consult IR for lymphoscintigraphy in setting of persistent lymph leak.  Make NPO/hold Lovenox in anticipation for IR.  -OOB during day/ambulation in halls x3/PT    Neuro: Post-op pain controlled, history of anxiety  -Continue current pain regimen with tylenol, gabapentin, robaxin; prn PO  hyacinth; appreciate supportive onc for recs & support  -Continue home MS Contin, hold home ativan  -Sleep hygiene/OOB during day/PT/multiple walks in halls     CV: hemodynamically stable; hx of CAD (NSTEMI 2021), HTN, HLD  -Q4 vital signs  -EKG prn for ACS symptoms  -Continue home metoprolol with hold parameters  -Hold home ASA 81, Lipitor in setting of elevated LFTs/thrombocytopenia    Resp: no acute issues on room air, no significant pulmonary history  -ICS 10x every hour  -OOB/ambulation    GI: s/p robotic LAR/loop to end colostomy, ileocecectomy & MWA c/b persistent lymph leak; hx of rectal CA with mets to liver  -NPO for IR, then can resume low fat diet as tolerated, Ensure Clears  -Octreotide TID for lymph leak; consult IR for lymphoscintigraphy  -Zofran prn for nausea  -continue assessment of stoma and output, stoma not new for patient.  WOCN for supplies & support. Continue to monitor drain output/replace fluid prn  -Daily CMP to monitor LFTs    : Oliguria with small volume retention; no significant urological hx  -Continue flomax for low resection, needs to stand when voiding to completely empty.   - Strict intake and output, monitoring fluid & electrolyte balance closely in setting of high output drain  - Bladder scan prn to monitor retention/urine output  - Daily RFP/replace electrolytes as needed    Heme: H&H stable with no evidence of bleeding, history of pancytopenia with platelets similar to baseline  -CBC daily; monitor for s/s of bleeding    ID: afebrile, no leukocytosis  -Q4 temp  -trend CBC; monitor for s/s of infection    Endo: no acute issues, no significant endocrine history   -hypoglycemia protocol  -Monitor BG on AM labs  -no indication for SSI    DVT Prophy: SCDs.  Holding Lovenox for IR today, then can resume    Dispo:   -Continue care on RNF  -PT recommending SNF, patient accepted at facility of choice, no pre-cert needed.    Plan of care discussed with Colorectal surgical team,   Ko, and patient.    Yuridia TORRES-CNP  Colorectal Surgery NP   [facility] Service Pager #[phone]

## 2025-03-24 NOTE — PROGRESS NOTES
Physical Therapy Treatment    Patient Name: Roma Corral  MRN: [id number]  Today's Date: 3/24/2025  Room: [address] [place] [address]-  Time Calculation  Start Time: 1046  Stop Time: 1124  Time Calculation (min): 38 min       Assessment/Plan   PT Assessment  PT Assessment Results: Decreased strength, Decreased range of motion, Decreased endurance, Impaired balance, Decreased mobility  Rehab Prognosis: Good  Barriers to Discharge Home: Caregiver assistance, Physical needs  Caregiver Assistance: Caregiver assistance needed per identified barriers - however, level of patient's required assistance exceeds assistance available at home  Physical Needs: Ambulating household distances limited by function/safety, 24hr mobility assistance needed, High falls risk due to function or environment, Stair navigation into home limited by function/safety  Evaluation/Treatment Tolerance: Patient limited by fatigue  Medical Staff Made Aware: Yes  Strengths: Attitude of self  Barriers to Participation: Comorbidities  End of Session Communication: Bedside nurse  End of Session Patient Position: Bed, 3 rail up, Alarm on  PT Plan  Inpatient/Swing Bed or Outpatient: Inpatient  PT Plan  Treatment/Interventions: Bed mobility, Transfer training, Gait training, Balance training, Endurance training, Therapeutic exercise, Therapeutic activity  PT Plan: Ongoing PT  PT Frequency: 3 times per week  PT Discharge Recommendations: Moderate intensity level of continued care  PT Recommended Transfer Status: Assist x1  PT - OK to Discharge: Yes  Assessment: Patient is progressing Well with therapy this date. Limited by fatigue, needs extended rest breaks to recover. Would continue to benefit from continued skilled PT to address all mobility deficits; Patient remains appropriate for MOD intensity therapy when medically ready for discharge from acute stay.  Will continue to follow.     General Visit Information:   PT  Visit  PT Received On: 03/24/25  Prior to Session  Communication: Bedside nurse  Patient Position Received: Alarm off, not on at start of session, Bed, 3 rail up, Alarm on     Subjective   Subjective: Pt pleasant and agreeable to therapy upon approach    Precautions:  Precautions  Medical Precautions: Fall precautions  Post-Surgical Precautions: Abdominal surgery precautions  Vital Signs:   Date/Time Vitals Session Patient Position Pulse Resp SpO2 BP MAP (mmHg)    03/24/25 1217 --  --  74  16  97 %  101/63  75             Objective   Pain:  Pain Assessment  Pain Assessment: 0-10  0-10 (Numeric) Pain Score: 4  Pain Type: Acute pain  Pain Location: Abdomen  Cognition:  Cognition  Overall Cognitive Status: Within Functional Limits  Orientation Level: Oriented X4  Following Commands: Follows all commands and directions without difficulty  Safety Judgment: Decreased awareness of need for assistance  Cognition Comments: easily distracted at times, needs redirection  Insight: Mild  Impulsive: Within functional limits    Lines/Tubes/Drains:  Implantable Port 07/23/24 Right Chest Single lumen port (Active)   Number of days: 244       Closed/Suction Drain 1 Right Abdomen Bulb 19 Fr. (Active)   Number of days: 6       Colostomy Loop LUQ (Active)   Number of days: 264     PT Treatments:           Bed Mobility 1  Bed Mobility 1: Supine to sitting, Sitting to supine  Level of Assistance 1: Close supervision  Bed Mobility Comments 1: increased time to get EOB  Ambulation/Gait Training 1  Surface 1: Level tile  Device 1: Rolling walker  Assistance 1: Contact guard  Quality of Gait 1: Decreased step length, Soft knee(s), Forward flexed posture (path deviation)  Comments/Distance (ft) 1: 75', unsteady at times but no LOB  Transfer 1  Transfer From 1: Sit to  Transfer to 1: Stand  Technique 1: Sit to stand, Stand to sit  Transfer Device 1: Walker  Transfer Level of Assistance 1: Contact guard  Trials/Comments 1: vc for handplacement  Transfers 2  Transfer From 2: Stand to  Transfer to  2: Bed  Technique 2: Stand pivot  Transfer Device 2: Walker  Transfer Level of Assistance 2: Contact guard  Trials/Comments 2: vc for safety backing up to bed             Activity tolerance:  Activity Tolerance  Endurance: Tolerates 10 - 20 min exercise with multiple rests    Outcome Measures:  [facility] Basic Mobility  Turning from your back to your side while in a flat bed without using bedrails: A little  Moving from lying on your back to sitting on the side of a flat bed without using bedrails: A little  Moving to and from bed to chair (including a wheelchair): A little  Standing up from a chair using your arms (e.g. wheelchair or bedside chair): A little  To walk in hospital room: A little  Climbing 3-5 steps with railing: Total  Basic Mobility - Total Score: 16    Timed Up and Go Test  How many seconds did it take to complete the 5 tasks?:  (25s first trial, 30s second trial)       Education Documentation  Precautions, taught by Kylah Jolley PTA at 3/24/2025 12:51 PM.  Learner: Patient  Readiness: Acceptance  Method: Explanation  Response: Verbalizes Understanding    Mobility Training, taught by Kylah Jolley PTA at 3/24/2025 12:51 PM.  Learner: Patient  Readiness: Acceptance  Method: Explanation  Response: Verbalizes Understanding    Handouts, taught by Kylah Jolley PTA at 3/24/2025 12:51 PM.  Learner: Patient  Readiness: Acceptance  Method: Explanation  Response: Verbalizes Understanding    Body Mechanics, taught by Kylah Jolley PTA at 3/24/2025 12:51 PM.  Learner: Patient  Readiness: Acceptance  Method: Explanation  Response: Verbalizes Understanding    Precautions, taught by Kylah Jolley PTA at 3/24/2025 12:51 PM.  Learner: Patient  Readiness: Acceptance  Method: Explanation  Response: Verbalizes Understanding    Home Exercise Program, taught by Kylah Jolley PTA at 3/24/2025 12:51 PM.  Learner: Patient  Readiness: Acceptance  Method: Explanation  Response: Verbalizes Understanding    ADL Training, taught by  Kylah Jolley, PTA at 3/24/2025 12:51 PM.  Learner: Patient  Readiness: Acceptance  Method: Explanation  Response: Verbalizes Understanding    Education Comments  No comments found.          OP EDUCATION:       Encounter Problems       Encounter Problems (Resolved)       Mobility       STG - Patient will ambulate >25ft, wheeled walker, min assist (Met)       Start:  03/19/25    Expected End:  04/02/25    Resolved:  03/24/25            PT Transfers       STG - Patient to transfer to and from sit to supine CGA (Met)       Start:  03/19/25    Expected End:  04/02/25    Resolved:  03/24/25         STG - Patient will transfer sit to and from stand min assist (Met)       Start:  03/19/25    Expected End:  04/02/25    Resolved:  03/24/25

## 2025-03-24 NOTE — CARE PLAN
The patient's goals for the shift include      The clinical goals for the shift include Pt will remain HDS      Problem: Safety - Adult  Goal: Free from fall injury  Outcome: Progressing     Problem: Discharge Planning  Goal: Discharge to home or other facility with appropriate resources  Outcome: Progressing     Problem: Chronic Conditions and Co-morbidities  Goal: Patient's chronic conditions and co-morbidity symptoms are monitored and maintained or improved  Outcome: Progressing     Problem: Fall/Injury  Goal: Not fall by end of shift  Outcome: Progressing  Goal: Be free from injury by end of the shift  Outcome: Progressing  Goal: Verbalize understanding of personal risk factors for fall in the hospital  Outcome: Progressing  Goal: Verbalize understanding of risk factor reduction measures to prevent injury from fall in the home  Outcome: Progressing  Goal: Use assistive devices by end of the shift  Outcome: Progressing  Goal: Pace activities to prevent fatigue by end of the shift  Outcome: Progressing     Problem: Skin  Goal: Decreased wound size/increased tissue granulation at next dressing change  Outcome: Progressing  Goal: Participates in plan/prevention/treatment measures  Outcome: Progressing  Goal: Prevent/manage excess moisture  Outcome: Progressing  Goal: Prevent/minimize sheer/friction injuries  Outcome: Progressing  Goal: Promote/optimize nutrition  Outcome: Progressing  Goal: Promote skin healing  Outcome: Progressing     Problem: Diabetes  Goal: Achieve decreasing blood glucose levels by end of shift  Outcome: Progressing  Goal: Increase stability of blood glucose readings by end of shift  Outcome: Progressing  Goal: Decrease in ketones present in urine by end of shift  Outcome: Progressing  Goal: Maintain electrolyte levels within acceptable range throughout shift  Outcome: Progressing  Goal: Maintain glucose levels >70mg/dl to <250mg/dl throughout shift  Outcome: Progressing  Goal: No changes in  neurological exam by end of shift  Outcome: Progressing  Goal: Learn about and adhere to nutrition recommendations by end of shift  Outcome: Progressing  Goal: Vital signs within normal range for age by end of shift  Outcome: Progressing  Goal: Increase self care and/or family involovement by end of shift  Outcome: Progressing  Goal: Receive DSME education by end of shift  Outcome: Progressing     Problem: Pain  Goal: Takes deep breaths with improved pain control throughout the shift  Outcome: Progressing  Goal: Turns in bed with improved pain control throughout the shift  Outcome: Progressing  Goal: Walks with improved pain control throughout the shift  Outcome: Progressing  Goal: Performs ADL's with improved pain control throughout shift  Outcome: Progressing  Goal: Participates in PT with improved pain control throughout the shift  Outcome: Progressing  Goal: Free from opioid side effects throughout the shift  Outcome: Progressing  Goal: Free from acute confusion related to pain meds throughout the shift  Outcome: Progressing

## 2025-03-24 NOTE — PROGRESS NOTES
Surgical Oncology Progress Note    HPI:  Roma Corral is a 70 y.o. male with history of rectal cancer with mets to the liver, CAD (2021 NSTEMI), DM2, HTN  who is s/p robotic LAR with end colostomy, ilecocecetomy with Dr. Vasquez, and laparoscopic microwave liver ablation with Dr. Erazo on 3/17/25.     Subjective:  NAEON. Persistently high drain output >2L. Gas and stool in bag. Wife brought [employer] burger and pt finished majority, drain more chylous appearing this AM. T bili still elevated but stable at ~1.5      A 12-point ROS was performed and was unremarkable except as above.    Objective    Temp:  [36.4 °C (97.6 °F)-37.1 °C (98.7 °F)] 36.8 °C (98.3 °F)  Heart Rate:  [74-83] 74  Resp:  [16-18] 18  BP: (108-122)/(61-70) 115/66  I/O last 2 completed shifts:  In: 2255 (30.7 mL/kg) [P.O.:480; IV Piggyback:1775]  Out: 3800 (51.7 mL/kg) [Urine:850 (0.5 mL/kg/hr); Drains:2875; Stool:75]  Weight: 73.5 kg     Physical Exam:  NAD  Anicteric sclera  On room air  Nontachycardic  Abdomen soft, mildly tender to palpation, left-sided colostomy with minimal brown stool, right sided CIRILO drain with serous/cloudy output   Large right sided bruise at hip, sensory and motor function of RLE intact    Labs within past 24h:            7.8   136  113  23   3.3>-----<42  ----------------<88             27.9   4.5  18  0.99          Ca 7.4 Phos 2.9 Mg 2.03       ALT 46 AST 32 AlkPhos 502 tBili 1.5           Imaging within past 24h:  CT volumetrics 3/21   Findings consistent with patient history including multifocal   areas of ablation within the liver and free air. No fluid collection   to suggest abscess or evidence of bowel obstruction/leak.     ASSESSMENT  Roma Corral is a 70 y.o. male with history of rectal cancer with mets to the liver, CAD (2021 NSTEMI), DM2, HTN who is s/p robotic LAR with end colostomy, ilecocecetomy with Dr. Vasquez, and laparoscopic microwave liver ablation with Dr. Erazo on 3/17/25. Progressing appropriately  with ostomy function starting 3/21. Fell overnight into 3/22 AM onto right hip, no other injuries. No LOC or head strike, neg hip XR. Elevated T bili post operatively, persistently ~1.5    PLAN:  -drain TG ordered, recommend octreotide if high   -Continue to trend CMP  -Recommend careful management of fluid status do to high output from drain and low PO intake  -Patient will get right portal vein embolization with interventional radiology on 4/1/2025  -defer rest of plan to colorectal surgery team     Discussed with Dr. Kacey Cannon   Surgical Oncology [id number]

## 2025-03-24 NOTE — PROGRESS NOTES
SUPPORTIVE AND PALLIATIVE ONCOLOGY INPATIENT FOLLOW-UP    SERVICE DATE: 03/24/25     Updates 03/24/25, recommended changes are bolded below:  Symptoms well controlled; no changes recommended    ASSESSMENT/PLAN:  Roma Corral is a 70 y.o. male diagnosed with metastatic rectal cancer (dx 6/2024) to liver.and is s/p robotic LAR with end colostomy, ilecocecetomy with Dr. Vasquez and laparoscopic microwave liver ablation with Dr. Erazo on 3/17/25.  PMH significant for CAD (2021 NSTEMI), DM@, and HTN.. Admitted 3/17/2025 for surgical procedure. Supportive and Palliative Oncology is consulted for pain management.     Pain:  Post-operative pain:  abdominal pain , visceral and neuropathic, muscular; sub-optimally controlled  Cancer related pain: rectal , somatic and neuropathic, well-controlled  Home regimen:  Morphine ER 30 mg po bid and Hydromorphone 4 mg po q 4 hrs prn  Intolerances/previously tried: None  Risk factors:  none  Renal function WNL and Hepatic function impaired (elevated Alk Phos, AST, and ALT)  Continue Acetaminophen 650 mg po q 6 hrs prn for mild pain  Continue Methocarbamol to 500 mg po q 6 hrs scheduled  Continue Gabapentin 300 mg po tid-HOLD FOR Sedation  Continue Morphine ER 30 mg po q 12 hrs   Continue Hydromorphone 2 mg po q 4 hrs prn for moderate pain  Continue Hydromorphone 4 mg po q 4 hrs prn for severe pain  Educated pt on asking for prn meds when needed; wrote reminder on white board in room with Medication, use, dose, and frequency as pt has not used any prn doses     Nausea:  At risk for nausea without vomiting related to opioids and pain    Home regimen:  Ondansetron  and Prochlorperazine   QTc:  within normal limits  Denies  Continue Ondansetron 4 mg ODT/IV q 8 hrs prn-1st line  Can add Prochlorperazine 10 mg IV/PO q 6 hrs prn as a second line     Constipation  At risk for constipation related to medication side effects (including opioids), decreased oral intake, and prolonged immobility in  the setting of hospitalization ,   Home regimen: Senna 1-2 tabs po daily prn, Colace 100 mg po daily prn, MOM 15 mL QID prn  LBM ostomy-adequate output  Defer to primary team for management as pt is acute post-op  Goal to have BM without straining q48-72h, adjust regimen as needed     Sleeping Difficulty:  Impaired sleep related to pain  Home regimen:  none  Improved per patient and well controlled  See pain recs  Melatonin 3 mg po q hs    Disposition:  Please start the process of having prior authorization with meds to beds deliver medications to patient prior to discharge via [facility] pharmacy. Prescriptions will need to be sent 48-72 hours prior to discharge so that a prior authorization can be completed.      Discharge date: unknown pending acute issues and symptom control  Will request an appointment with Outpatient Supportive Oncology [facility] (last seen 12/24/25)     SIGNATURE: KEV Fisher   PAGER/CONTACT:  Contact information:  Supportive and Palliative Oncology  Monday-Friday 8 AM-5 PM  Epic Secure chat or pager [phone].  After hours and weekends:  pager [phone]  =================================================================  SUBJECTIVE:  Interval Events:  Fell early hours of 3/23; did not lose consciousness and denies hitting head; XR negative for any fractures or dislocations.  Fell onto right hip, bruising noted.   Continues to have high output from CIRILO drain  Discharging to SNF, possibly mid week  Pain well controlled; reports improvement in sleep    Pain Assessment:  Location:  diffuse abdomen  Duration: Constant  Characteristics:   Rating: 3   Descriptors: sharp, shooting, and stabbing   Aggravating: movement and coughing   Relieving: Analgesics  , Positioning, Splinting, and Modifying activity   Interference with Function: Somewhat    Opioid Requirements  Past 24 h opioid requirements (3/23/25 at 0800 to 03/24/25 at 0800):   Morphine ER 30 mg po x 2 doses=60 mg=60  OME  Total 24h OME use:  60 OME    Symptom Assessment:  Nausea none  Difficulty Sleeping none  Worrying none  Lack of appetite a little    Information obtained from: chart review, interview of patient, and discussion with primary team_________________________________________________________________   OBJECTIVE:  Lab Results   Component Value Date    WBC 3.3 (L) 03/23/2025    HGB 7.8 (L) 03/23/2025    HCT 27.9 (L) 03/23/2025     (H) 03/23/2025    PLT 42 (L) 03/23/2025     Lab Results   Component Value Date    GLUCOSE 88 03/23/2025    CALCIUM 7.4 (L) 03/23/2025     03/23/2025    K 4.5 03/23/2025    CO2 18 (L) 03/23/2025     (H) 03/23/2025    BUN 23 03/23/2025    CREATININE 0.99 03/23/2025     Lab Results   Component Value Date    ALT 46 03/23/2025    AST 32 03/23/2025    ALKPHOS 502 (H) 03/23/2025    BILITOT 1.5 (H) 03/23/2025     Estimated Creatinine Clearance: 72.2 mL/min (by C-G formula based on SCr of 0.99 mg/dL).  Scheduled medications   acetaminophen, 650 mg, oral, q6h  [Held by provider] aspirin, 81 mg, oral, Daily  [Held by provider] atorvastatin, 40 mg, oral, Nightly  dronabinol, 2.5 mg, oral, BID AC  [Held by provider] enoxaparin, 40 mg, subcutaneous, q24h  gabapentin, 300 mg, oral, TID  melatonin, 3 mg, oral, Nightly  methocarbamol, 500 mg, oral, q6h  metoprolol tartrate, 50 mg, oral, BID  morphine CR, 30 mg, oral, q12h ALEX  tamsulosin, 0.4 mg, oral, Daily    Continuous medications     PRN medications  dextrose, 12.5 g, q15 min PRN  dextrose, 25 g, q15 min PRN  glucagon, 1 mg, q15 min PRN  glucagon, 1 mg, q15 min PRN  HYDROmorphone, 2 mg, q4h PRN  HYDROmorphone, 4 mg, q4h PRN  naloxone, 0.2 mg, q5 min PRN  ondansetron ODT, 4 mg, q8h PRN   Or  ondansetron, 4 mg, q8h PRN    PHYSICAL EXAMINATION:  Vital Signs:   Vital signs reviewed  Visit Vitals  /66 (BP Location: Left arm, Patient Position: Lying)   Pulse 74   Temp 36.8 °C (98.3 °F) (Oral)   Resp 18      Numeric Pain Score:  3    Physical Exam  Vitals reviewed.   Constitutional:       General: He is not in acute distress.     Appearance: Normal appearance.      Comments: A&O x 3; pleasant and cooperative; resting in bed   HENT:      Head: Normocephalic and atraumatic.      Mouth/Throat:      Mouth: Mucous membranes are moist.   Eyes:      Pupils: Pupils are equal, round, and reactive to light.   Cardiovascular:      Rate and Rhythm: Normal rate.      Pulses: Normal pulses.   Pulmonary:      Effort: Pulmonary effort is normal. No respiratory distress.      Comments: Respirations even and unlabored; on room air  Abdominal:      Palpations: Abdomen is soft.      Tenderness: There is abdominal tenderness.      Comments: Appropriately tender post-operatively; stoma left abdomen, moderate output in CIRILO drain   Musculoskeletal:      Right lower leg: No edema.      Left lower leg: No edema.   Skin:     General: Skin is warm and dry.      Capillary Refill: Capillary refill takes less than 2 seconds.      Coloration: Skin is pale.   Neurological:      General: No focal deficit present.      Mental Status: He is alert and oriented to person, place, and time.   Psychiatric:         Mood and Affect: Mood normal.         Behavior: Behavior normal.         Thought Content: Thought content normal.         Judgment: Judgment normal.      PALLIATIVE CARE ENCOUNTER:  Supportive and Palliative Oncology encounter:  Spoke with patient at bedside  Emotional support provided  Coordination of care:   medication management and evaluation    Medical Decision Making/Goals of Care/Advance Care Planning:  Patient's current clinical condition, including diagnosis, prognosis, and management plan, and goals of care were discussed.   Life limiting disease: metastatic malignancy  Family: Supportive wife, son, sister  Performance status: Moderate limitations due to pain  Joys/meaning/strength: Family and [place]  Understanding of health: Demonstrates good prognostic  understanding of disease process, understands plan for management of acute post-operative pain and anticipated recovery/follow up  Information:Wants full disclosure  Goals: symptom control and cancer directed therapy  Worries and fears now and future: ongoing symptoms and inability to receive cancer treatment   Minimum acceptable outcome/QOL:  adequate pain control and able to consequently sleep and eat well  Code status discussion:  Full code     Advance Directives  Existence of Advance Directives:None  Decision maker: Surrogate decision maker is wife Betsy Corral [phone]  =================================================================  Signature and billing:  Medical complexity was high level due to due to complexity of problems, extensive data review, and high risk of management/treatment.    I spent 50 minutes in the care of this patient which included chart review, interviewing patient/family, discussion with primary team, coordination of care, and documentation.    Data:   Diagnostic tests and information reviewed for today's visit:  Conversation with primary team, Most recent labs and imaging results, Most recent EKG, Medications    Some elements copied from my note on 3/20/25, the elements have been updated and all reflect current decision making from today, 03/24/25     Plan of Care discussed with: Provider, RN, Patient    Thank you for asking Supportive and Palliative Oncology to assist with care of this patient.  Recommendations will be communicated back to the consulting service by way of shared electronic medical record/secure chat/email or face-to-face.   We will continue to follow  Please contact us for additional questions or concerns.    SIGNATURE: KEV Fisher   PAGER/CONTACT:  Contact information:  Supportive and Palliative Oncology  Monday-Friday 8 AM-5 PM  Epic Secure chat or pager [phone].  After hours and weekends:  pager [phone]

## 2025-03-24 NOTE — HOSPITAL COURSE
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 Vasquez, and laparoscopic microwave ablation x3 with liver biopsy by Dr. Erazo on 3/18/25.  After recovering in the PACU, he was transferred to a regular nursing floor.  Intake and output were closely monitored and diet was slowly advanced as tolerated.  Supportive oncology was consulted to help manage pain since patient follows outpatient, and pain was controlled with IV and oral medication.  DVT prophylaxis was managed by Lovenox 40mg daily and intermittent compression stockings. Electrolytes were monitored with daily labs and replaced as needed.  His post-op course was complicated by high drain output, in which a chyle leak was found.  His diet was changed to low fat and octreotide was started.  IR was consulted and performed a lymphoscintigraphy on 3/25 without decrease in drain output.  Midodrine was added and he was made NPO with TPN. Repeat lymphoscintigraphy was performed 3/28 with successful decrease in drain output. Ostomy nursing saw him for supplies and support.  He worked with PT who recommended SNF due to deconditioned status.  He had an unwitnessed fall on his right side during his hospital stay, in which xrays were negative and there were no further signs of complications. IR was again re-engaged for portal/hepatic vein embolization done on 4/2. He had a lot of serosanguinous output initially that resolved by the second day. He was offered psychosocial support via psych but declined their consult at this time. Once pain was controlled with oral medication, he was tolerating a regular soft diet,  drain output slowed to a safe amount and character, and bowel was functioning appropriately with soft stool and gas in the pouch, he was deemed fit for discharge. He will follow up with colorectal surgery in clinic in 2-4 weeks, surg onc in a few weeks, and discharge to SNF with lovenox, home pain medication regimen, TPN, weekly labs, drain, and prn miralax.  He was happy to discharge.

## 2025-03-24 NOTE — CARE PLAN
Problem: Safety - Adult  Goal: Free from fall injury  Outcome: Progressing     Problem: Discharge Planning  Goal: Discharge to home or other facility with appropriate resources  Outcome: Progressing     Problem: Chronic Conditions and Co-morbidities  Goal: Patient's chronic conditions and co-morbidity symptoms are monitored and maintained or improved  Outcome: Progressing     Problem: Fall/Injury  Goal: Not fall by end of shift  Outcome: Progressing  Goal: Be free from injury by end of the shift  Outcome: Progressing  Goal: Verbalize understanding of personal risk factors for fall in the hospital  Outcome: Progressing  Goal: Verbalize understanding of risk factor reduction measures to prevent injury from fall in the home  Outcome: Progressing  Goal: Use assistive devices by end of the shift  Outcome: Progressing  Goal: Pace activities to prevent fatigue by end of the shift  Outcome: Progressing     Problem: Skin  Goal: Decreased wound size/increased tissue granulation at next dressing change  Outcome: Progressing  Flowsheets (Taken 3/24/2025 0124)  Decreased wound size/increased tissue granulation at next dressing change: Promote sleep for wound healing  Goal: Participates in plan/prevention/treatment measures  Outcome: Progressing  Flowsheets (Taken 3/24/2025 0124)  Participates in plan/prevention/treatment measures: Elevate heels  Goal: Prevent/manage excess moisture  Outcome: Progressing  Flowsheets (Taken 3/24/2025 0124)  Prevent/manage excess moisture: Monitor for/manage infection if present  Goal: Prevent/minimize sheer/friction injuries  Outcome: Progressing  Flowsheets (Taken 3/24/2025 0124)  Prevent/minimize sheer/friction injuries: Use pull sheet  Goal: Promote/optimize nutrition  Outcome: Progressing  Flowsheets (Taken 3/24/2025 0124)  Promote/optimize nutrition: Monitor/record intake including meals  Goal: Promote skin healing  Outcome: Progressing  Flowsheets (Taken 3/24/2025 0124)  Promote skin  healing: Assess skin/pad under line(s)/device(s)     Problem: Diabetes  Goal: Achieve decreasing blood glucose levels by end of shift  Outcome: Progressing  Goal: Increase stability of blood glucose readings by end of shift  Outcome: Progressing  Goal: Decrease in ketones present in urine by end of shift  Outcome: Progressing  Goal: Maintain electrolyte levels within acceptable range throughout shift  Outcome: Progressing  Goal: Maintain glucose levels >70mg/dl to <250mg/dl throughout shift  Outcome: Progressing  Goal: No changes in neurological exam by end of shift  Outcome: Progressing  Goal: Learn about and adhere to nutrition recommendations by end of shift  Outcome: Progressing  Goal: Vital signs within normal range for age by end of shift  Outcome: Progressing  Goal: Increase self care and/or family involovement by end of shift  Outcome: Progressing  Goal: Receive DSME education by end of shift  Outcome: Progressing     Problem: Pain  Goal: Takes deep breaths with improved pain control throughout the shift  Outcome: Progressing  Goal: Turns in bed with improved pain control throughout the shift  Outcome: Progressing  Goal: Walks with improved pain control throughout the shift  Outcome: Progressing  Goal: Performs ADL's with improved pain control throughout shift  Outcome: Progressing  Goal: Participates in PT with improved pain control throughout the shift  Outcome: Progressing  Goal: Free from opioid side effects throughout the shift  Outcome: Progressing  Goal: Free from acute confusion related to pain meds throughout the shift  Outcome: Progressing

## 2025-03-25 ENCOUNTER — APPOINTMENT (OUTPATIENT)
Dept: RADIOLOGY | Facility: HOSPITAL | Age: 70
DRG: 329 | End: 2025-03-25
Payer: MEDICARE

## 2025-03-25 ENCOUNTER — APPOINTMENT (OUTPATIENT)
Dept: RADIOLOGY | Facility: HOSPITAL | Age: 70
End: 2025-03-25
Payer: MEDICARE

## 2025-03-25 LAB
ALBUMIN SERPL BCP-MCNC: 2.3 G/DL (ref 3.4–5)
ALP SERPL-CCNC: 451 U/L (ref 33–136)
ALT SERPL W P-5'-P-CCNC: 20 U/L (ref 10–52)
ANION GAP SERPL CALC-SCNC: 10 MMOL/L (ref 10–20)
AST SERPL W P-5'-P-CCNC: 14 U/L (ref 9–39)
BILIRUB SERPL-MCNC: 1 MG/DL (ref 0–1.2)
BUN SERPL-MCNC: 16 MG/DL (ref 6–23)
CALCIUM SERPL-MCNC: 7.4 MG/DL (ref 8.6–10.6)
CHLORIDE SERPL-SCNC: 111 MMOL/L (ref 98–107)
CO2 SERPL-SCNC: 22 MMOL/L (ref 21–32)
CREAT SERPL-MCNC: 0.77 MG/DL (ref 0.5–1.3)
EGFRCR SERPLBLD CKD-EPI 2021: >90 ML/MIN/1.73M*2
ERYTHROCYTE [DISTWIDTH] IN BLOOD BY AUTOMATED COUNT: 19.8 % (ref 11.5–14.5)
GLUCOSE SERPL-MCNC: 166 MG/DL (ref 74–99)
HCT VFR BLD AUTO: 27.1 % (ref 41–52)
HGB BLD-MCNC: 8.1 G/DL (ref 13.5–17.5)
MAGNESIUM SERPL-MCNC: 1.89 MG/DL (ref 1.6–2.4)
MCH RBC QN AUTO: 28.6 PG (ref 26–34)
MCHC RBC AUTO-ENTMCNC: 29.9 G/DL (ref 32–36)
MCV RBC AUTO: 96 FL (ref 80–100)
NRBC BLD-RTO: 0 /100 WBCS (ref 0–0)
PLATELET # BLD AUTO: 60 X10*3/UL (ref 150–450)
POTASSIUM SERPL-SCNC: 4.1 MMOL/L (ref 3.5–5.3)
PROT SERPL-MCNC: 4.6 G/DL (ref 6.4–8.2)
RBC # BLD AUTO: 2.83 X10*6/UL (ref 4.5–5.9)
SODIUM SERPL-SCNC: 139 MMOL/L (ref 136–145)
WBC # BLD AUTO: 3.6 X10*3/UL (ref 4.4–11.3)

## 2025-03-25 PROCEDURE — 2500000004 HC RX 250 GENERAL PHARMACY W/ HCPCS (ALT 636 FOR OP/ED)

## 2025-03-25 PROCEDURE — 83735 ASSAY OF MAGNESIUM: CPT

## 2025-03-25 PROCEDURE — 2780000003 HC OR 278 NO HCPCS

## 2025-03-25 PROCEDURE — 2500000001 HC RX 250 WO HCPCS SELF ADMINISTERED DRUGS (ALT 637 FOR MEDICARE OP)

## 2025-03-25 PROCEDURE — 2500000001 HC RX 250 WO HCPCS SELF ADMINISTERED DRUGS (ALT 637 FOR MEDICARE OP): Performed by: STUDENT IN AN ORGANIZED HEALTH CARE EDUCATION/TRAINING PROGRAM

## 2025-03-25 PROCEDURE — C1729 CATH, DRAINAGE: HCPCS

## 2025-03-25 PROCEDURE — 37244 VASC EMBOLIZE/OCCLUDE BLEED: CPT

## 2025-03-25 PROCEDURE — 1170000001 HC PRIVATE ONCOLOGY ROOM DAILY

## 2025-03-25 PROCEDURE — 71045 X-RAY EXAM CHEST 1 VIEW: CPT | Performed by: RADIOLOGY

## 2025-03-25 PROCEDURE — 80053 COMPREHEN METABOLIC PANEL: CPT

## 2025-03-25 PROCEDURE — 75807 LYMPH VESSEL X-RAY TRUNK: CPT

## 2025-03-25 PROCEDURE — 99152 MOD SED SAME PHYS/QHP 5/>YRS: CPT

## 2025-03-25 PROCEDURE — 71045 X-RAY EXAM CHEST 1 VIEW: CPT

## 2025-03-25 PROCEDURE — 7100000010 HC PHASE TWO TIME - EACH INCREMENTAL 1 MINUTE

## 2025-03-25 PROCEDURE — 2720000007 HC OR 272 NO HCPCS

## 2025-03-25 PROCEDURE — 2500000002 HC RX 250 W HCPCS SELF ADMINISTERED DRUGS (ALT 637 FOR MEDICARE OP, ALT 636 FOR OP/ED): Performed by: STUDENT IN AN ORGANIZED HEALTH CARE EDUCATION/TRAINING PROGRAM

## 2025-03-25 PROCEDURE — 76942 ECHO GUIDE FOR BIOPSY: CPT

## 2025-03-25 PROCEDURE — 7100000009 HC PHASE TWO TIME - INITIAL BASE CHARGE

## 2025-03-25 PROCEDURE — 2500000004 HC RX 250 GENERAL PHARMACY W/ HCPCS (ALT 636 FOR OP/ED): Performed by: RADIOLOGY

## 2025-03-25 PROCEDURE — 36416 COLLJ CAPILLARY BLOOD SPEC: CPT

## 2025-03-25 PROCEDURE — 2500000004 HC RX 250 GENERAL PHARMACY W/ HCPCS (ALT 636 FOR OP/ED): Performed by: STUDENT IN AN ORGANIZED HEALTH CARE EDUCATION/TRAINING PROGRAM

## 2025-03-25 PROCEDURE — 2500000005 HC RX 250 GENERAL PHARMACY W/O HCPCS

## 2025-03-25 PROCEDURE — 99153 MOD SED SAME PHYS/QHP EA: CPT

## 2025-03-25 PROCEDURE — 85027 COMPLETE CBC AUTOMATED: CPT

## 2025-03-25 RX ORDER — FENTANYL CITRATE 50 UG/ML
INJECTION, SOLUTION INTRAMUSCULAR; INTRAVENOUS
Status: COMPLETED | OUTPATIENT
Start: 2025-03-25 | End: 2025-03-25

## 2025-03-25 RX ORDER — ENOXAPARIN SODIUM 100 MG/ML
40 INJECTION SUBCUTANEOUS EVERY 24 HOURS
Status: DISCONTINUED | OUTPATIENT
Start: 2025-03-26 | End: 2025-03-28

## 2025-03-25 RX ORDER — MIDAZOLAM HYDROCHLORIDE 1 MG/ML
INJECTION INTRAMUSCULAR; INTRAVENOUS
Status: COMPLETED | OUTPATIENT
Start: 2025-03-25 | End: 2025-03-25

## 2025-03-25 RX ORDER — MAGNESIUM SULFATE HEPTAHYDRATE 40 MG/ML
2 INJECTION, SOLUTION INTRAVENOUS ONCE
Status: COMPLETED | OUTPATIENT
Start: 2025-03-25 | End: 2025-03-25

## 2025-03-25 RX ORDER — ATORVASTATIN CALCIUM 40 MG/1
40 TABLET, FILM COATED ORAL NIGHTLY
Status: DISCONTINUED | OUTPATIENT
Start: 2025-03-25 | End: 2025-04-03

## 2025-03-25 RX ADMIN — METHOCARBAMOL TABLETS 500 MG: 500 TABLET, COATED ORAL at 09:42

## 2025-03-25 RX ADMIN — METOPROLOL TARTRATE 50 MG: 50 TABLET, FILM COATED ORAL at 09:42

## 2025-03-25 RX ADMIN — HYDROMORPHONE HYDROCHLORIDE 2 MG: 2 TABLET ORAL at 09:44

## 2025-03-25 RX ADMIN — METHOCARBAMOL TABLETS 500 MG: 500 TABLET, COATED ORAL at 04:36

## 2025-03-25 RX ADMIN — OCTREOTIDE ACETATE 100 MCG: 100 INJECTION, SOLUTION INTRAVENOUS; SUBCUTANEOUS at 17:27

## 2025-03-25 RX ADMIN — OCTREOTIDE ACETATE 100 MCG: 100 INJECTION, SOLUTION INTRAVENOUS; SUBCUTANEOUS at 22:51

## 2025-03-25 RX ADMIN — MORPHINE SULFATE 30 MG: 30 TABLET, FILM COATED, EXTENDED RELEASE ORAL at 09:42

## 2025-03-25 RX ADMIN — MAGNESIUM SULFATE HEPTAHYDRATE 2 G: 40 INJECTION, SOLUTION INTRAVENOUS at 17:27

## 2025-03-25 RX ADMIN — DEXTROSE AND SODIUM CHLORIDE 75 ML/HR: 5; 450 INJECTION, SOLUTION INTRAVENOUS at 12:16

## 2025-03-25 RX ADMIN — ACETAMINOPHEN 650 MG: 325 TABLET, FILM COATED ORAL at 00:19

## 2025-03-25 RX ADMIN — NYSTATIN 400000 UNITS: 500000 SUSPENSION ORAL at 22:49

## 2025-03-25 RX ADMIN — METHOCARBAMOL TABLETS 500 MG: 500 TABLET, COATED ORAL at 22:49

## 2025-03-25 RX ADMIN — GABAPENTIN 300 MG: 300 CAPSULE ORAL at 09:42

## 2025-03-25 RX ADMIN — GABAPENTIN 300 MG: 300 CAPSULE ORAL at 17:27

## 2025-03-25 RX ADMIN — OCTREOTIDE ACETATE 100 MCG: 100 INJECTION, SOLUTION INTRAVENOUS; SUBCUTANEOUS at 09:41

## 2025-03-25 RX ADMIN — DEXTROSE AND SODIUM CHLORIDE 75 ML/HR: 5; 450 INJECTION, SOLUTION INTRAVENOUS at 00:19

## 2025-03-25 RX ADMIN — DRONABINOL 2.5 MG: 2.5 CAPSULE ORAL at 17:27

## 2025-03-25 RX ADMIN — DRONABINOL 2.5 MG: 2.5 CAPSULE ORAL at 09:42

## 2025-03-25 RX ADMIN — ACETAMINOPHEN 650 MG: 325 TABLET, FILM COATED ORAL at 22:49

## 2025-03-25 RX ADMIN — MIDAZOLAM HYDROCHLORIDE 1 MG: 2 INJECTION, SOLUTION INTRAMUSCULAR; INTRAVENOUS at 16:14

## 2025-03-25 RX ADMIN — NYSTATIN 400000 UNITS: 500000 SUSPENSION ORAL at 17:27

## 2025-03-25 RX ADMIN — GABAPENTIN 300 MG: 300 CAPSULE ORAL at 22:50

## 2025-03-25 RX ADMIN — METOPROLOL TARTRATE 50 MG: 50 TABLET, FILM COATED ORAL at 22:50

## 2025-03-25 RX ADMIN — NYSTATIN 400000 UNITS: 500000 SUSPENSION ORAL at 09:41

## 2025-03-25 RX ADMIN — FENTANYL CITRATE 50 MCG: 50 INJECTION, SOLUTION INTRAMUSCULAR; INTRAVENOUS at 16:15

## 2025-03-25 RX ADMIN — ATORVASTATIN CALCIUM 40 MG: 40 TABLET, FILM COATED ORAL at 22:51

## 2025-03-25 RX ADMIN — MORPHINE SULFATE 30 MG: 30 TABLET, FILM COATED, EXTENDED RELEASE ORAL at 22:50

## 2025-03-25 RX ADMIN — MIDAZOLAM HYDROCHLORIDE 1 MG: 2 INJECTION, SOLUTION INTRAMUSCULAR; INTRAVENOUS at 15:25

## 2025-03-25 RX ADMIN — ACETAMINOPHEN 650 MG: 325 TABLET, FILM COATED ORAL at 17:27

## 2025-03-25 RX ADMIN — TAMSULOSIN HYDROCHLORIDE 0.4 MG: 0.4 CAPSULE ORAL at 09:42

## 2025-03-25 RX ADMIN — METHOCARBAMOL TABLETS 500 MG: 500 TABLET, COATED ORAL at 17:27

## 2025-03-25 RX ADMIN — SODIUM CHLORIDE, SODIUM LACTATE, POTASSIUM CHLORIDE, AND CALCIUM CHLORIDE 500 ML: .6; .31; .03; .02 INJECTION, SOLUTION INTRAVENOUS at 02:52

## 2025-03-25 RX ADMIN — SODIUM CHLORIDE, SODIUM LACTATE, POTASSIUM CHLORIDE, AND CALCIUM CHLORIDE 500 ML: .6; .31; .03; .02 INJECTION, SOLUTION INTRAVENOUS at 18:56

## 2025-03-25 RX ADMIN — ACETAMINOPHEN 650 MG: 325 TABLET, FILM COATED ORAL at 12:16

## 2025-03-25 RX ADMIN — Medication 3 MG: at 22:49

## 2025-03-25 RX ADMIN — FENTANYL CITRATE 50 MCG: 50 INJECTION, SOLUTION INTRAMUSCULAR; INTRAVENOUS at 15:25

## 2025-03-25 ASSESSMENT — PAIN SCALES - GENERAL
PAINLEVEL_OUTOF10: 0 - NO PAIN
PAINLEVEL_OUTOF10: 8
PAINLEVEL_OUTOF10: 0 - NO PAIN
PAINLEVEL_OUTOF10: 7
PAINLEVEL_OUTOF10: 0 - NO PAIN
PAINLEVEL_OUTOF10: 7
PAINLEVEL_OUTOF10: 0 - NO PAIN
PAINLEVEL_OUTOF10: 7
PAINLEVEL_OUTOF10: 0 - NO PAIN

## 2025-03-25 ASSESSMENT — PAIN - FUNCTIONAL ASSESSMENT
PAIN_FUNCTIONAL_ASSESSMENT: 0-10

## 2025-03-25 ASSESSMENT — PAIN DESCRIPTION - LOCATION: LOCATION: ABDOMEN

## 2025-03-25 NOTE — PRE-PROCEDURE NOTE
Interventional Radiology Preprocedure Note    Indication for procedure: The primary encounter diagnosis was Rectal cancer (Multi). 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    Relevant review of systems: NA    Relevant Labs:   Lab Results   Component Value Date    CREATININE 0.77 03/25/2025    EGFR >90 03/25/2025    INR 1.3 (H) 03/24/2025    PROTIME 14.5 (H) 03/24/2025       Planned Sedation/Anesthesia: Moderate    Airway assessment: normal    Directed physical examination:    Ao, calm    Mallampati: III (soft and hard palate and base of uvula visible)    ASA Score: ASA 2 - Patient with mild systemic disease with no functional limitations    Benefits, risks and alternatives of procedure and planned sedation have been discussed with the patient and/or their representative. All questions answered and they agree to proceed.

## 2025-03-25 NOTE — CARE PLAN
The clinical goals for the shift include Pt will remain HDS and free from falls/injury      Problem: Safety - Adult  Goal: Free from fall injury  Outcome: Progressing     Problem: Discharge Planning  Goal: Discharge to home or other facility with appropriate resources  Outcome: Progressing     Problem: Chronic Conditions and Co-morbidities  Goal: Patient's chronic conditions and co-morbidity symptoms are monitored and maintained or improved  Outcome: Progressing     Problem: Fall/Injury  Goal: Not fall by end of shift  Outcome: Progressing  Goal: Be free from injury by end of the shift  Outcome: Progressing  Goal: Verbalize understanding of personal risk factors for fall in the hospital  Outcome: Progressing  Goal: Verbalize understanding of risk factor reduction measures to prevent injury from fall in the home  Outcome: Progressing  Goal: Use assistive devices by end of the shift  Outcome: Progressing  Goal: Pace activities to prevent fatigue by end of the shift  Outcome: Progressing     Problem: Skin  Goal: Decreased wound size/increased tissue granulation at next dressing change  Outcome: Progressing  Goal: Participates in plan/prevention/treatment measures  Outcome: Progressing  Goal: Prevent/manage excess moisture  Outcome: Progressing  Goal: Prevent/minimize sheer/friction injuries  Outcome: Progressing  Goal: Promote/optimize nutrition  Outcome: Progressing  Goal: Promote skin healing  Outcome: Progressing     Problem: Diabetes  Goal: Achieve decreasing blood glucose levels by end of shift  Outcome: Progressing  Goal: Increase stability of blood glucose readings by end of shift  Outcome: Progressing  Goal: Decrease in ketones present in urine by end of shift  Outcome: Progressing  Goal: Maintain electrolyte levels within acceptable range throughout shift  Outcome: Progressing  Goal: Maintain glucose levels >70mg/dl to <250mg/dl throughout shift  Outcome: Progressing  Goal: No changes in neurological exam by  end of shift  Outcome: Progressing  Goal: Learn about and adhere to nutrition recommendations by end of shift  Outcome: Progressing  Goal: Vital signs within normal range for age by end of shift  Outcome: Progressing  Goal: Increase self care and/or family involovement by end of shift  Outcome: Progressing  Goal: Receive DSME education by end of shift  Outcome: Progressing     Problem: Pain  Goal: Takes deep breaths with improved pain control throughout the shift  Outcome: Progressing  Goal: Turns in bed with improved pain control throughout the shift  Outcome: Progressing  Goal: Walks with improved pain control throughout the shift  Outcome: Progressing  Goal: Performs ADL's with improved pain control throughout shift  Outcome: Progressing  Goal: Participates in PT with improved pain control throughout the shift  Outcome: Progressing  Goal: Free from opioid side effects throughout the shift  Outcome: Progressing  Goal: Free from acute confusion related to pain meds throughout the shift  Outcome: Progressing

## 2025-03-25 NOTE — POST-PROCEDURE NOTE
Interventional Radiology Brief Postprocedure Note    Attending: Dr. Guzman    Assistant: Dr. Vazquez    Diagnosis: lymphatic leak    Description of procedure:     The patient was placed in the supine position. Limited sonographic  images of the bilateral groins were obtained for purposes of needle  guidance, which demonstrated nonenlarged normal-appearing  lymph nodes with normal fatty hilum. Bilateral groins were prepped and draped under sterile  technique.      INGUINAL LYMPH NODE LYMPHANGIOGRAM:        A 22 gauge spinal needle was passed into the hilum of the targeted right inguinal lymph node. Subsequently, 5 cc of lipiodol was injected into the lymph node hilum  under ultrasound and fluoroscopic guidance. This process was repeated in an inguinal lymph node on the contralateral left groin.      Subsequent fluoroscopic images demonstrated antegrade flow of lipiodol within bilateral groins. There is a small amount of extravasation of lipiodol within the left groin soft tissues.      Sterile dressings were placed over bilateral sites. The patient was  then monitored in the CT suite for 15 minutes, allowing lipiodol to flow antegrade  with time.      LYMPHATIC DUCT EMBOLIZATION:      Approximately 15 minutes later, a limited CT scan of the pelvis was obtained with the patient in the supine decubitus position. Lipiodol was identified within the lymphaticsystem traveling in anterograde fashion superiorly. A focus of Lipiodol extravasation was noted near a right pelvic lymphatic duct adjacent to the midportion of the right external iliac vessels, consistent with a lymphatic leak. There with no evidence of lymphatic leak within the contralateral left pelvic lymphatic channels. Utilizing CT guidance, the overlying skin was marked, prepped and draped in normal sterile fashion. Local anesthesia was achieved using 1% lidocaine. A 20 gauge Chiba needle was advanced using CT and fluoroscopic guidance. A small amount of  lidocaine was injected through the needle system to fluid dissect away the bowel loops. Subsequently, the   Chiba  needle was in confirmed to be in adequate position at the site of the lymphatic leak for deployment of one 5mm to 2mm tornado coil under CT and fluoroscopic guidance followed by injection of a small amount of fibrin glue.      Palpation of the right femoral artery at the right groin demonstrated intact strong, pulsatile flow.      A postprocedural CT was obtained demonstrating the coil in the  desired location. A sterile, occlusive dressing was applied. Patient tolerated the procedure without immediate complications.      Anesthesia:  Local and MAC Moderate    Complications: None    Estimated Blood Loss: minimal    Medications  As of 03/25/25 1706      oxyCODONE (Roxicodone) immediate release tablet 5 mg (mg) Total dose:  5 mg Dosing weight:  70.8      Date/Time Rate/Dose/Volume Action       03/17/25  1917 5 mg Given               oxyCODONE (Roxicodone) immediate release tablet 10 mg (mg) Total dose:  20 mg Dosing weight:  71.6      Date/Time Rate/Dose/Volume Action       03/17/25  2047 10 mg Given     03/18/25  0100 10 mg Given               HYDROmorphone (Dilaudid) injection 0.5 mg (mg) Total dose:  0.5 mg Dosing weight:  70.8      Date/Time Rate/Dose/Volume Action       03/17/25  1738 0.5 mg Given               fentaNYL PF (Sublimaze) injection 50 mcg (mcg) Total dose:  150 mcg Dosing weight:  70.8      Date/Time Rate/Dose/Volume Action       03/17/25  1652 50 mcg Given      1901 50 mcg Given      1911 50 mcg Given               fentaNYL PF (Sublimaze) injection (mcg) Total dose:  50 mcg      Date/Time Rate/Dose/Volume Action       03/25/25  1525 50 mcg Given               fentaNYL PF (Sublimaze) injection (mcg) Total dose:  50 mcg      Date/Time Rate/Dose/Volume Action       03/25/25  1615 50 mcg Given               heparin (porcine) injection 5,000 Units (Units) Total dose:  5,000 Units Dosing  weight:  70.8      Date/Time Rate/Dose/Volume Action       03/17/25  0701 5,000 Units Given               aspirin chewable tablet 81 mg (mg) Total dose:  Cannot be calculated* Dosing weight:  71.6   *Administration dose not documented     Date/Time Rate/Dose/Volume Action       03/17/25 2027 *Not included in total Held by provider      2045 *81 mg Missed     03/18/25  0900 *81 mg Missed     03/19/25  0900 *81 mg Missed     03/20/25  0900 *81 mg Missed     03/21/25  0900 *81 mg Missed     03/22/25  0900 *81 mg Missed     03/23/25  0900 *81 mg Missed     03/24/25  Canceled Entry     03/25/25 0900 *81 mg Missed               atorvastatin (Lipitor) tablet 40 mg (mg) Total dose:  Cannot be calculated*   *Administration dose not documented     Date/Time Rate/Dose/Volume Action       03/17/25 2027 *Not included in total Held by provider      2100 *40 mg Missed     03/18/25  2100 *40 mg Missed     03/19/25 2100 *40 mg Missed     03/20/25  2100 *40 mg Missed     03/21/25  2100 *40 mg Missed     03/22/25  2100 *40 mg Missed     03/23/25  2100 *40 mg Missed     03/24/25  2100 *Not included in total Automatically Held               metoprolol tartrate (Lopressor) tablet 50 mg (mg) Total dose:  350 mg* Dosing weight:  71.6   *Administration not included in total     Date/Time Rate/Dose/Volume Action       03/17/25 2047 50 mg Given     03/18/25  1020 50 mg Given      2219 50 mg Given     03/19/25  0908 *50 mg Missed      2118 50 mg Given     03/20/25  0906 *50 mg Missed      2119 *50 mg Missed     03/21/25  1045 50 mg Given      2123 *50 mg Missed     03/22/25  1012 *50 mg Missed      2022 *50 mg Missed     03/23/25  1027 50 mg Given      2153 *50 mg Missed     03/24/25  0856 *50 mg Missed      2137 *50 mg Missed     03/25/25  0942 50 mg Given               enoxaparin (Lovenox) syringe 40 mg (mg) Total dose:  200 mg* Dosing weight:  71.6   *Administration not included in total     Date/Time Rate/Dose/Volume Action        03/18/25  1027 40 mg Given     03/19/25  0857 40 mg Given     03/20/25  0906 40 mg Given     03/21/25  1045 40 mg Given     03/22/25  1012 40 mg Given     03/23/25  0803 *Not included in total Held by provider      0900 *40 mg Missed     03/24/25  Canceled Entry      1203 *Not included in total Unheld by provider               enoxaparin (Lovenox) syringe 40 mg (mg) Total dose:  40 mg Dosing weight:  73.5      Date/Time Rate/Dose/Volume Action       03/24/25  1306 40 mg Given      1554 *Not included in total Held by provider     03/25/25  1300 *Not included in total Automatically Held               sodium chloride 0.9% infusion (mL/hr) Total volume:  174 mL* Dosing weight:  71.6   *From user-documented volume     Date/Time Rate/Dose/Volume Action       03/17/25  2047 40 mL/hr New Bag     03/18/25  0108 174 mL       1030  Stopped               sodium chloride 0.9% infusion (mL/hr) Total volume:  Not documented* Dosing weight:  73.3   *Total volume has not been documented. View each administration to see the amount administered.     Date/Time Rate/Dose/Volume Action       03/18/25  1021 75 mL/hr New Bag     03/19/25  0830 40 mL/hr Rate Change      0859  Stopped               sodium chloride 0.9% infusion (mL/hr) Total volume:  160 mL* Dosing weight:  73.5   *From user-documented volume     Date/Time Rate/Dose/Volume Action       03/19/25  0900 40 mL/hr New Bag     03/20/25  1100 160 mL                sodium chloride 0.9% infusion (mL/hr) Total volume:  1,014 mL* Dosing weight:  73.5   *From user-documented volume     Date/Time Rate/Dose/Volume Action       03/20/25  1142 40 mL/hr New Bag      1530 40 mL/hr - 152 mL New Bag     03/21/25  0005 395 mL       0502 83 mL       1438 384 mL Stopped               sodium chloride 0.9% infusion (mL/hr) Total volume:  388 mL* Dosing weight:  73.5   *From user-documented volume     Date/Time Rate/Dose/Volume Action       03/21/25  1849 40 mL/hr Restarted     03/22/25  0043 40  mL/hr - 236 mL Rate Verify      0126 40 mL/hr - 28.67 mL Rate Verify      0431 40 mL/hr - 123.33 mL Rate Verify     03/23/25  1034  Stopped               gabapentin (Neurontin) capsule 300 mg (mg) Total dose:  6,900 mg Dosing weight:  71.6      Date/Time Rate/Dose/Volume Action       03/17/25  2047 300 mg Given     03/18/25  1020 300 mg Given      1602 300 mg Given      2217 300 mg Given     03/19/25  0857 300 mg Given      1518 300 mg Given      2118 300 mg Given     03/20/25  0906 300 mg Given      1458 300 mg Given      2119 300 mg Given     03/21/25  1045 300 mg Given      1555 300 mg Given      2127 300 mg Given     03/22/25  1012 300 mg Given      1648 300 mg Given      2023 300 mg Given     03/23/25  1018 300 mg Given      1657 300 mg Given      2152 300 mg Given     03/24/25  0854 300 mg Given      1534 300 mg Given      2137 300 mg Given     03/25/25  0942 300 mg Given               HYDROmorphone (Dilaudid) injection 0.4 mg (mg) Total dose:  0.8 mg Dosing weight:  71.6      Date/Time Rate/Dose/Volume Action       03/17/25  2312 0.4 mg Given     03/18/25  0307 0.4 mg Given               methocarbamol (Robaxin) tablet 1,000 mg (mg) Total dose:  2,000 mg Dosing weight:  71.6      Date/Time Rate/Dose/Volume Action       03/17/25  2048 1,000 mg Given     03/18/25  0550 1,000 mg Given               methocarbamol (Robaxin) tablet 500 mg (mg) Total dose:  14,000 mg Dosing weight:  73.3      Date/Time Rate/Dose/Volume Action       03/18/25  1400 500 mg Given      2217 500 mg Given     03/19/25  0457 500 mg Given      1150 500 mg Given      1741 500 mg Given      2117 500 mg Given     03/20/25  0352 500 mg Given      1000 500 mg Given      1616 500 mg Given      2120 500 mg Given     03/21/25  0458 500 mg Given      1045 500 mg Given      1555 500 mg Given      2123 500 mg Given     03/22/25  0512 500 mg Given      1024 500 mg Given      1648 500 mg Given      2341 500 mg Given     03/23/25  0411 500 mg Given       1018 500 mg Given      1646 500 mg Given      2152 500 mg Given     03/24/25  0550 500 mg Given      1015 500 mg Given      1534 500 mg Given      2136 500 mg Given     03/25/25  0436 500 mg Given      0942 500 mg Given               magnesium sulfate 2 g in sterile water for injection 50 mL (mL/hr) Total volume:  Not documented* Dosing weight:  71.6   *Total volume has not been documented. View each administration to see the amount administered.     Date/Time Rate/Dose/Volume Action       03/17/25  2310 2 g - 25 mL/hr (over 120 min) New Bag     03/18/25  0108  (over 120 min) Stopped               ceFAZolin (Ancef) 2 g in dextrose (iso)  mL (g) Total dose:  4 g Dosing weight:  71.6      Date/Time Rate/Dose/Volume Action       03/17/25  2047 2 g (over 30 min) New Bag      2138  (over 30 min) Stopped     03/18/25  0506 2 g (over 30 min) New Bag      0537  (over 30 min) Stopped               metroNIDAZOLE (Flagyl) 500 mg in sodium chloride (iso)  mL (mg) Total dose:  3,500 mg* Dosing weight:  71.6   *From user-documented volume     Date/Time Rate/Dose/Volume Action       03/17/25  2310 500 mg (over 60 min) New Bag     03/18/25  0036  (over 60 min) Stopped      0550 500 mg (over 60 min) New Bag      0652  (over 60 min) Stopped      1602 500 mg (over 60 min) New Bag      1730 100 mL Stopped     03/19/25  0047 500 mg (over 60 min) New Bag      0147  (over 60 min) Stopped      0857 500 mg (over 60 min) New Bag      1005 100 mL Stopped      1741 500 mg (over 60 min) New Bag      1901 100 mL Stopped      2249 500 mg (over 60 min) New Bag      2349  (over 60 min) Stopped     03/20/25  1141 500 mg (over 60 min) New Bag      1241 100 mL Stopped      2124 500 mg (over 60 min) New Bag      2306 100 mL Stopped     03/21/25  0459 500 mg (over 60 min) New Bag      0543 100 mL Stopped      1440 500 mg (over 60 min) New Bag      1600 100 mL Stopped      2128 500 mg (over 60 min) New Bag      2203  (over 60 min) Stopped      03/22/25  0512 500 mg (over 60 min) New Bag      0533  (over 60 min) Stopped               hydromorphone PCA 0.5 mg/mL in NS opioid tolerant Total dose:  0 mg Dosing weight:  73.3      Date/Time Rate/Dose/Volume Action       03/18/25  0525  New Syringe/Cartridge      0741  Handoff      1400  Rate Change      1920  Handoff     03/19/25  1953  Handoff     03/20/25  0720  Handoff      1000  New Syringe/Cartridge      1630  Stopped               insulin lispro injection 0-5 Units (Units) Total dose:  Cannot be calculated* Dosing weight:  73.3   *Administration dose not documented     Date/Time Rate/Dose/Volume Action       03/18/25  1032 *Not included in total Missed      1246 *Not included in total Missed      1802 *Not included in total Missed     03/19/25  0758 *Not included in total Missed               tamsulosin (Flomax) 24 hr capsule 0.4 mg (mg) Total dose:  3.2 mg Dosing weight:  73.3      Date/Time Rate/Dose/Volume Action       03/18/25  1020 0.4 mg Given     03/19/25  0857 0.4 mg Given     03/20/25  0906 0.4 mg Given     03/21/25  1045 0.4 mg Given     03/22/25  1012 0.4 mg Given     03/23/25  1018 0.4 mg Given     03/24/25  0854 0.4 mg Given     03/25/25  0942 0.4 mg Given               ciprofloxacin (Cipro) 400 mg in dextrose 5%  mL (mL/hr) Total dose:  1,600 mg* Dosing weight:  73.3   *From user-documented volume     Date/Time Rate/Dose/Volume Action       03/18/25  1020 400 mg - 200 mL/hr (over 60 min) New Bag      1138 200 mL Stopped      2217 400 mg - 200 mL/hr (over 60 min) New Bag      2324  (over 60 min) Stopped     03/19/25  1150 400 mg - 200 mL/hr (over 60 min) New Bag      1301 200 mL Stopped      2118 400 mg - 200 mL/hr (over 60 min) New Bag      2218  (over 60 min) Stopped     03/20/25  0912 400 mg - 200 mL/hr (over 60 min) New Bag      1012  (over 60 min) Stopped      1142 200 mL       2119 400 mg - 200 mL/hr (over 60 min) New Bag      2219 200 mL Stopped     03/21/25  1045 400 mg - 200  mL/hr (over 60 min) New Bag      1150  (over 60 min) Stopped      2256 400 mg - 200 mL/hr (over 60 min) New Bag      2309  (over 60 min) Stopped               acetaminophen (Tylenol) tablet 650 mg (mg) Total dose:  13,000 mg* Dosing weight:  73.3   *Administration not included in total     Date/Time Rate/Dose/Volume Action       03/18/25  1247 650 mg Given      1801 *650 mg Missed     03/19/25  0048 650 mg Given      0856 650 mg Given      1514 *650 mg Missed      1741 650 mg Given      2248 *650 mg Missed     03/20/25  0352 650 mg Given      1023 650 mg Given      1616 650 mg Given     03/21/25  0042 650 mg Given      0458 650 mg Given      1045 650 mg Given      1849 *650 mg Missed      2300 *650 mg Missed     03/22/25  0514 *650 mg Missed      1022 650 mg Given      1648 650 mg Given      2341 650 mg Given     03/23/25  0631 650 mg Given      1209 *650 mg Missed      1646 650 mg Given     03/24/25  0044 650 mg Given      0550 650 mg Given      1025 *650 mg Missed      1714 650 mg Given     03/25/25  0019 650 mg Given      0528 *650 mg Missed      1216 650 mg Given               morphine CR (MS Contin) 12 hr tablet 30 mg (mg) Total dose:  420 mg* Dosing weight:  73.3   *Administration not included in total     Date/Time Rate/Dose/Volume Action       03/18/25  2217 30 mg Given      2226 *30 mg Missed     03/19/25  0857 30 mg Given      2117 30 mg Given     03/20/25  0906 30 mg Given      2119 30 mg Given     03/21/25  1045 30 mg Given      2125 30 mg Given     03/22/25  1022 30 mg Given      2023 30 mg Given     03/23/25  1018 30 mg Given      2152 30 mg Given     03/24/25  0854 30 mg Given      2136 30 mg Given     03/25/25  0942 30 mg Given               sodium chloride 0.9 % bolus 500 mL (mL/hr) Total volume:  1,000 mL* Dosing weight:  73.5   *From user-documented volume     Date/Time Rate/Dose/Volume Action       03/19/25  1220 500 mL - 250 mL/hr (over 120 min) New Bag      1332 500 mL Stopped      2244 500 mL  - 250 mL/hr (over 120 min) New Bag     03/20/25  0044  (over 120 min) Stopped     03/21/25  0042 500 mL - 250 mL/hr (over 120 min) New Bag      0224 500 mL Stopped               sodium chloride 0.9 % bolus 1,000 mL (mL/hr) Total volume:  1,000 mL* Dosing weight:  73.5   *From user-documented volume     Date/Time Rate/Dose/Volume Action       03/20/25  1530 1,000 mL - 500 mL/hr (over 120 min) New Bag      1730 1,000 mL Stopped               diatrizoate epifanio-diatrizoat sod (Gastrografin 37% organic bound iodine) solution 30 mL (mL) Total volume:  30 mL Dosing weight:  73.5      Date/Time Rate/Dose/Volume Action       03/20/25  1344 30 mL Given               melatonin tablet 3 mg (mg) Total dose:  15 mg Dosing weight:  73.5      Date/Time Rate/Dose/Volume Action       03/20/25  2119 3 mg Given     03/21/25  2125 3 mg Given     03/22/25  2023 3 mg Given     03/23/25  2152 3 mg Given     03/24/25  2136 3 mg Given               HYDROmorphone (Dilaudid) tablet 2 mg (mg) Total dose:  10 mg Dosing weight:  73.5      Date/Time Rate/Dose/Volume Action       03/21/25  2035 2 mg Given     03/22/25  1652 2 mg Given     03/24/25  1028 2 mg Given      1801 2 mg Given     03/25/25  0944 2 mg Given               iohexol (OMNIPaque) 350 mg iodine/mL solution 75 mL (mL) Total volume:  75 mL Dosing weight:  73.5      Date/Time Rate/Dose/Volume Action       03/21/25  1001 75 mL Given               albumin human 5 % infusion 12.5 g (mL/hr) Total volume:  Not documented* Dosing weight:  73.5   *Total volume has not been documented. View each administration to see the amount administered.     Date/Time Rate/Dose/Volume Action       03/21/25  1708 12.5 g - 250 mL/hr (over 60 min) New Bag      1849  (over 60 min) Stopped               dronabinol (Marinol) capsule 2.5 mg (mg) Total dose:  20 mg Dosing weight:  73.5      Date/Time Rate/Dose/Volume Action       03/21/25  1555 2.5 mg Given     03/22/25  1012 2.5 mg Given      1648 2.5 mg Given      03/23/25  0631 2.5 mg Given      1646 2.5 mg Given     03/24/25  0600 2.5 mg Given      1535 2.5 mg Given     03/25/25  0942 2.5 mg Given               lactated Ringer's bolus 500 mL (mL/hr) Total volume:  2,500 mL* Dosing weight:  73.5   *From user-documented volume     Date/Time Rate/Dose/Volume Action       03/22/25  0723 500 mL - 250 mL/hr (over 120 min) New Bag      0930 500 mL Stopped     03/23/25  0120 500 mL - 125 mL/hr (over 240 min) New Bag      0340 125 mL/hr - 291.67 mL Rate Verify      0520 208.33 mL Stopped      1130 500 mL - 250 mL/hr (over 120 min) New Bag      1330  (over 120 min) Stopped     03/24/25  0120 500 mL - 250 mL/hr (over 120 min) [dose] - 500 mL [vol] New Bag      0320 500 mL Stopped      1306 500 mL - 250 mL/hr (over 120 min) New Bag      1527  (over 120 min) Stopped     03/25/25  0252 500 mL - 250 mL/hr (over 120 min) New Bag      0502 500 mL Stopped               lactated Ringer's bolus 300 mL (mL/hr) Total volume:  600 mL* Dosing weight:  73.5   *From user-documented volume     Date/Time Rate/Dose/Volume Action       03/22/25  2022 300 mL - 150 mL/hr (over 120 min) [dose] - 300 mL [vol] New Bag      2222 300 mL Stopped               lactated Ringer's bolus 750 mL (mL/hr) Total volume:  775 mL* Dosing weight:  73.5   *From user-documented volume     Date/Time Rate/Dose/Volume Action       03/23/25  1945 750 mL - 375 mL/hr (over 120 min) New Bag      2149 775 mL Stopped               calcium gluconate 2 g in sodium chloride (iso)  mL (mL/hr) Total dose:  2 g* Dosing weight:  73.5   *From user-documented volume     Date/Time Rate/Dose/Volume Action       03/22/25  1012 2 g - 100 mL/hr (over 60 min) New Bag      1115 100 mL Stopped               albumin human 25 % solution 12.5 g (mL/hr) Total volume:  Not documented* Dosing weight:  73.5   *Total volume has not been documented. View each administration to see the amount administered.     Date/Time Rate/Dose/Volume Action        03/23/25  1025 12.5 g - 50 mL/hr (over 60 min) New Bag      1130  (over 60 min) Stopped      1648 12.5 g - 50 mL/hr (over 60 min) New Bag      1750  (over 60 min) Stopped     03/24/25  0044 12.5 g - 50 mL/hr (over 60 min) New Bag      0144  (over 60 min) Stopped      0544 12.5 g - 50 mL/hr (over 60 min) New Bag      0644  (over 60 min) Stopped               octreotide (SandoSTATIN) injection 100 mcg (mcg) Total dose:  300 mcg* Dosing weight:  73.5   *Administration not included in total     Date/Time Rate/Dose/Volume Action       03/24/25  1306 100 mcg Given      1405 *100 mcg Missed      2140 100 mcg Given     03/25/25  0941 100 mcg Given               nystatin (Mycostatin) 100,000 unit/mL suspension 400,000 Units (Units) Total volume:  8 mL* Dosing weight:  73.5   *Administration not included in total     Date/Time Rate/Dose/Volume Action       03/24/25  1656 *400,000 Units Missed      2149 400,000 Units Given     03/25/25  0941 400,000 Units Given               dextrose 5%-0.45 % sodium chloride infusion (mL/hr) Total volume:  Not documented* Dosing weight:  73.5   *Total volume has not been documented. View each administration to see the amount administered.     Date/Time Rate/Dose/Volume Action       03/25/25  0019 75 mL/hr New Bag      1216 75 mL/hr New Bag      1230  Stopped               midazolam (Versed) injection (mg) Total dose:  2 mg      Date/Time Rate/Dose/Volume Action       03/25/25  1525 1 mg Given      1614 1 mg Given                     See detailed result report with images in PACS.    The patient tolerated the procedure well without incident or complication and is in stable condition.

## 2025-03-25 NOTE — CARE PLAN
The patient's goals for the shift include      The clinical goals for the shift include Pt will remain safe and free from any harm      Problem: Fall/Injury  Goal: Not fall by end of shift  Outcome: Progressing     Problem: Fall/Injury  Goal: Be free from injury by end of the shift  Outcome: Progressing     Problem: Fall/Injury  Goal: Verbalize understanding of personal risk factors for fall in the hospital  Outcome: Progressing     Problem: Fall/Injury  Goal: Verbalize understanding of risk factor reduction measures to prevent injury from fall in the home  Outcome: Progressing     Problem: Skin  Goal: Prevent/minimize sheer/friction injuries  Outcome: Progressing     Problem: Skin  Goal: Prevent/manage excess moisture  Outcome: Progressing

## 2025-03-25 NOTE — PROGRESS NOTES
Colorectal Surgery Progress Note      HPI: Roma Corral is a 70 y.o. male with a past medical history of CAD (NSTEMI 2021), DMII (last A1c 5.4 normal 1/17/25), HLD, HTN, gout, anxiety, metastatic rectal cancer to liver s/p loop colostomy July 2024 & FAWN c/b persistent disease and rectal stricture, now hospital day 8 s/p robotic low anterior resection with transition from loop to end colostomy & 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 Vasquez, and laparoscopic MWA x3 with liver biopsy by Dr. Erazo on 3/18/25.      Subjective  Patient has been doing okay overall. Pain is under reasonable control - feels okay resting but has pain while getting up out of bed. Also reports having pain in the right lower ribs while taking deep breath. He did work with PT yesterday, got out of bed and walked several times.   Tolerating diet without nausea, vomiting, though having low appetite. Continues to have colostomy output.     Objective  Physical Exam:  Gen: No acute distress, appears frail, resting in bed comfortably, pleasant & conversational  HENT: Head normocephalic, atraumatic.  Mucous membranes moist.    Neuro: Alert and oriented x3.  Moves all extremities spontaneously x4.  CV: Normal rate, normotensive. Trace edema bilaterally at ankles.  Resp: No acute respiratory distress.  Normal effort on room air. Chest expansion symmetrical. Tenderness to right lower chest.   GI: Abdomen is soft, nontender, and nondistended. Laparoscopic incisions are clean, dry and intact with glue.  Stoma is pink and healthy, well pouched, with gas and pasty stool in pouch. Abdominal drain to bile bag with serous output.  Skin: Warm and dry, pale, without rashes or lesions    Visit Vitals  /70 (BP Location: Right arm, Patient Position: Lying)   Pulse 69   Temp 37.4 °C (99.3 °F) (Temporal)   Resp 16      I/O last 3 completed shifts:  In: 2905 (39.5 mL/kg) [P.O.:630; IV Piggyback:2275]  Out: 4255 (57.9 mL/kg) [Urine:830 (0.3  mL/kg/hr); Drains:3275; Stool:150]  Weight: 73.5 kg   No intake/output data recorded.     Data Review:  AM labs pending     CBC:   Lab Results   Component Value Date    WBC 4.6 03/24/2025    RBC 2.82 (L) 03/24/2025     BMP:   Lab Results   Component Value Date    GLUCOSE 89 03/24/2025    CO2 24 03/24/2025    BUN 17 03/24/2025    CREATININE 0.76 03/24/2025    CALCIUM 8.1 (L) 03/24/2025     Coagulation:   Lab Results   Component Value Date    INR 1.3 (H) 03/24/2025    APTT 29 03/24/2025       Imaging:  No recent imaging to review. Pending IR and CXR.    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 Vasquez, and laparoscopic MWA x3 with liver biopsy by Dr. Erazo on 3/18/25. Post-op course c/b high pelvic drain output 2/2 persistent lymph leak, requiring fluid & albumin replacement, with no improvement on low fat diet.  Also c/b unwitnessed fall with negative right hip/pelvis xray. PT recommending SNF at discharge.      Plan:  -CXR to r/o rib fracture  -IR lymphoscintigraphy today. Resume diet and lovenox after.   -OOB during day/ambulation in halls x3/PT    Neuro: Post-op pain controlled, history of anxiety  -Continue current pain regimen with tylenol, gabapentin, robaxin; prn PO dilaudid  -Continue home MS Contin, hold home ativan  -Sleep hygiene/OOB during day/PT/multiple walks in halls     CV: hemodynamically stable; hx of CAD (NSTEMI 2021), HTN, HLD  -Q4 vital signs  -EKG prn for ACS symptoms  -Continue home metoprolol with hold parameters  -Hold home ASA 81, Lipitor in setting of elevated LFTs/thrombocytopenia    Resp: no acute issues on room air, no significant pulmonary history  -CXR to rule out rib fracture   -ICS 10x every hour  -OOB/ambulation    GI: s/p robotic  LAR/loop to end colostomy, ileocecectomy & MWA c/b persistent lymph leak; hx of rectal CA with mets to liver  -NPO for IR, after which can resume low fat diet as tolerated, Ensure Clears  -Octreotide TID for lymph leak; IR consulted for lymphoscintigraphy  -Continue to monitor drain output/replace fluid prn  -Zofran prn for nausea  -continue assessment of stoma and output, stoma not new for patient.  WOCN for supplies & support.   -Daily CMP to monitor LFTs - Alk Phos uptrending     : Oliguria with small volume retention; no significant urological hx  -Continue flomax for low resection, needs to stand when voiding to completely empty.   - Strict intake and output, monitoring fluid & electrolyte balance closely in setting of high output drain  - Bladder scan prn to monitor retention/urine output  - Daily RFP/replace electrolytes as needed    Heme: H&H stable with no evidence of bleeding, history of pancytopenia with platelets similar to baseline  -CBC daily; monitor for s/s of bleeding    ID: afebrile, no leukocytosis  -Q4 temp  -trend CBC; monitor for s/s of infection    Endo: no acute issues, no significant endocrine history   -hypoglycemia protocol  -Monitor BG on AM labs  -no indication for SSI    DVT Prophy: SCDs.  Holding Lovenox for IR today, then can resume    Dispo:   -Continue care on RNF  -PT recommending SNF, patient accepted at facility of choice, no pre-cert needed.    Plan of care discussed with Dr. Connell.    Missael Lyles MD  Colorectal Surgery   [facility] Service Pager #[phone]

## 2025-03-25 NOTE — PROGRESS NOTES
Spiritual Care Visit  Spiritual Care Request    Reason for Visit:  Routine Visit: Introduction  Continue Visiting: Yes     Request Received From:  Referral From: Nurse    Focus of Care:  Visited With: Patient       Refer to :  Referral To:        Spiritual Care Assessment    Care Provided:  Intended Effects: Demonstrate caring and concern, Establish rapport and connectedness  Methods: Collaborate with care team member, Encourage self reflection, Explore judith and values, Explore spiritual/Amish beliefs, Offer support  Interventions: Acknowledge current situation, Active listening, Discuss spirituality/Pentecostal with someone, Provide hospitality    Sense of Community and or Restorationism Affiliation:  Spiritual (Not Restorationism)      Spiritual Care Annotation    Annotation:   introduced self and role to patient Roma Corral. Patient welcomed visit from facility [facility]. Hand  provided to him before and after visit. Patient shared that he is spiritual; he was raised Scientologist and appreciates some teachings but is more open to God's presence with him. He and his spouse have been  for 48 years. He expressed hope they would get to celebrate 50. Patient expressed appreciation for care and requested that  hold him in prayer, which  agreed to. Patient did not have any further needs at this time. Spiritual Care remains available as needed/requested.    Rev. Lexis Harris MDiv, BCC

## 2025-03-25 NOTE — PROGRESS NOTES
Surgical Oncology Progress Note    HPI:  Roma Corral is a 70 y.o. male with history of rectal cancer with mets to the liver, CAD (2021 NSTEMI), DM2, HTN  who is s/p robotic LAR with end colostomy, ilecocecetomy with Dr. Vasquez, and laparoscopic microwave liver ablation with Dr. Erazo on 3/17/25.     Subjective:  NAEON. TG from drain 128, appearing more serous this AM. Still >2L per day from drain. Minimal appetite.       Objective    Temp:  [36.6 °C (97.9 °F)-37.4 °C (99.3 °F)] 37.1 °C (98.8 °F)  Heart Rate:  [68-99] 99  Resp:  [16-18] 16  BP: (100-121)/(58-71) 115/58  I/O last 2 completed shifts:  In: 890 (12.1 mL/kg) [P.O.:390; IV Piggyback:500]  Out: 2630 (35.8 mL/kg) [Urine:430 (0.2 mL/kg/hr); Drains:2075; Stool:125]  Weight: 73.5 kg     Physical Exam:  NAD  Anicteric sclera  On room air  Nontachycardic  Abdomen soft, mildly tender to palpation, left-sided colostomy with minimal brown stool, right sided CIRILO drain with serous/cloudy output   Large right sided bruise at hip, sensory and motor function of RLE intact    Labs within past 24h:            8.1   139  111  16   3.6>-----<60  ----------------<166             27.1   4.1  22  0.77          Ca 7.4 Phos 2.9 Mg 1.89       ALT 20 AST 14 AlkPhos 451 tBili 1.0           Imaging within past 24h:  CT volumetrics 3/21   Findings consistent with patient history including multifocal   areas of ablation within the liver and free air. No fluid collection   to suggest abscess or evidence of bowel obstruction/leak.     ASSESSMENT  Roma Corral is a 70 y.o. male with history of rectal cancer with mets to the liver, CAD (2021 NSTEMI), DM2, HTN who is s/p robotic LAR with end colostomy, ilecocecetomy with Dr. Vasquez, and laparoscopic microwave liver ablation with Dr. Erazo on 3/17/25. Progressing appropriately with ostomy function starting 3/21. Fell overnight into 3/22 AM onto right hip, no other injuries. No LOC or head strike, neg hip XR. Elevated T bili post  operatively, persistently ~1.5    PLAN:  -Continue to trend CMP  -Continue octreotide  -Recommend careful management of fluid status do to high output from drain and low PO intake  -Patient will get right portal vein embolization pushed back due to extended hospital stay   -defer rest of plan to colorectal surgery team     Discussed with Dr. Kacey Cannon   Surgical Oncology [id number]

## 2025-03-26 PROBLEM — G89.18 POST-OP PAIN: Status: RESOLVED | Noted: 2025-03-18 | Resolved: 2025-03-26

## 2025-03-26 PROBLEM — G89.18 POST-OPERATIVE PAIN: Status: RESOLVED | Noted: 2025-03-18 | Resolved: 2025-03-26

## 2025-03-26 LAB
ALBUMIN SERPL BCP-MCNC: 2.4 G/DL (ref 3.4–5)
ALP SERPL-CCNC: 532 U/L (ref 33–136)
ALT SERPL W P-5'-P-CCNC: 16 U/L (ref 10–52)
ANION GAP SERPL CALC-SCNC: 11 MMOL/L (ref 10–20)
AST SERPL W P-5'-P-CCNC: 14 U/L (ref 9–39)
BILIRUB SERPL-MCNC: 0.8 MG/DL (ref 0–1.2)
BUN SERPL-MCNC: 14 MG/DL (ref 6–23)
CALCIUM SERPL-MCNC: 7.5 MG/DL (ref 8.6–10.6)
CHLORIDE SERPL-SCNC: 110 MMOL/L (ref 98–107)
CO2 SERPL-SCNC: 22 MMOL/L (ref 21–32)
CREAT SERPL-MCNC: 0.83 MG/DL (ref 0.5–1.3)
EGFRCR SERPLBLD CKD-EPI 2021: >90 ML/MIN/1.73M*2
ERYTHROCYTE [DISTWIDTH] IN BLOOD BY AUTOMATED COUNT: 19.6 % (ref 11.5–14.5)
GLUCOSE SERPL-MCNC: 182 MG/DL (ref 74–99)
HCT VFR BLD AUTO: 28.1 % (ref 41–52)
HGB BLD-MCNC: 8.5 G/DL (ref 13.5–17.5)
MAGNESIUM SERPL-MCNC: 2.24 MG/DL (ref 1.6–2.4)
MCH RBC QN AUTO: 29.2 PG (ref 26–34)
MCHC RBC AUTO-ENTMCNC: 30.2 G/DL (ref 32–36)
MCV RBC AUTO: 97 FL (ref 80–100)
NRBC BLD-RTO: 0 /100 WBCS (ref 0–0)
PLATELET # BLD AUTO: 79 X10*3/UL (ref 150–450)
POTASSIUM SERPL-SCNC: 4.3 MMOL/L (ref 3.5–5.3)
PROT SERPL-MCNC: 5.1 G/DL (ref 6.4–8.2)
RBC # BLD AUTO: 2.91 X10*6/UL (ref 4.5–5.9)
SODIUM SERPL-SCNC: 139 MMOL/L (ref 136–145)
WBC # BLD AUTO: 6.1 X10*3/UL (ref 4.4–11.3)

## 2025-03-26 PROCEDURE — 2500000001 HC RX 250 WO HCPCS SELF ADMINISTERED DRUGS (ALT 637 FOR MEDICARE OP): Performed by: STUDENT IN AN ORGANIZED HEALTH CARE EDUCATION/TRAINING PROGRAM

## 2025-03-26 PROCEDURE — 1170000001 HC PRIVATE ONCOLOGY ROOM DAILY

## 2025-03-26 PROCEDURE — 80053 COMPREHEN METABOLIC PANEL: CPT

## 2025-03-26 PROCEDURE — 2500000005 HC RX 250 GENERAL PHARMACY W/O HCPCS

## 2025-03-26 PROCEDURE — 2500000001 HC RX 250 WO HCPCS SELF ADMINISTERED DRUGS (ALT 637 FOR MEDICARE OP)

## 2025-03-26 PROCEDURE — 2500000004 HC RX 250 GENERAL PHARMACY W/ HCPCS (ALT 636 FOR OP/ED)

## 2025-03-26 PROCEDURE — 2500000004 HC RX 250 GENERAL PHARMACY W/ HCPCS (ALT 636 FOR OP/ED): Performed by: STUDENT IN AN ORGANIZED HEALTH CARE EDUCATION/TRAINING PROGRAM

## 2025-03-26 PROCEDURE — 2500000002 HC RX 250 W HCPCS SELF ADMINISTERED DRUGS (ALT 637 FOR MEDICARE OP, ALT 636 FOR OP/ED): Performed by: STUDENT IN AN ORGANIZED HEALTH CARE EDUCATION/TRAINING PROGRAM

## 2025-03-26 PROCEDURE — 87081 CULTURE SCREEN ONLY: CPT

## 2025-03-26 PROCEDURE — 85027 COMPLETE CBC AUTOMATED: CPT

## 2025-03-26 PROCEDURE — 83735 ASSAY OF MAGNESIUM: CPT

## 2025-03-26 RX ORDER — LIDOCAINE HYDROCHLORIDE 10 MG/ML
5 INJECTION, SOLUTION INFILTRATION; PERINEURAL ONCE
Status: DISCONTINUED | OUTPATIENT
Start: 2025-03-26 | End: 2025-03-28

## 2025-03-26 RX ADMIN — METHOCARBAMOL TABLETS 500 MG: 500 TABLET, COATED ORAL at 04:07

## 2025-03-26 RX ADMIN — HYDROMORPHONE HYDROCHLORIDE 4 MG: 4 TABLET ORAL at 08:52

## 2025-03-26 RX ADMIN — ACETAMINOPHEN 650 MG: 325 TABLET, FILM COATED ORAL at 06:01

## 2025-03-26 RX ADMIN — TAMSULOSIN HYDROCHLORIDE 0.4 MG: 0.4 CAPSULE ORAL at 08:56

## 2025-03-26 RX ADMIN — NYSTATIN 400000 UNITS: 500000 SUSPENSION ORAL at 15:16

## 2025-03-26 RX ADMIN — GABAPENTIN 300 MG: 300 CAPSULE ORAL at 08:52

## 2025-03-26 RX ADMIN — GABAPENTIN 300 MG: 300 CAPSULE ORAL at 22:53

## 2025-03-26 RX ADMIN — OCTREOTIDE ACETATE 100 MCG: 100 INJECTION, SOLUTION INTRAVENOUS; SUBCUTANEOUS at 22:52

## 2025-03-26 RX ADMIN — MORPHINE SULFATE 30 MG: 30 TABLET, FILM COATED, EXTENDED RELEASE ORAL at 22:53

## 2025-03-26 RX ADMIN — DRONABINOL 2.5 MG: 2.5 CAPSULE ORAL at 06:02

## 2025-03-26 RX ADMIN — MORPHINE SULFATE 30 MG: 30 TABLET, FILM COATED, EXTENDED RELEASE ORAL at 08:53

## 2025-03-26 RX ADMIN — ENOXAPARIN SODIUM 40 MG: 100 INJECTION SUBCUTANEOUS at 12:26

## 2025-03-26 RX ADMIN — Medication 3 MG: at 22:52

## 2025-03-26 RX ADMIN — GABAPENTIN 300 MG: 300 CAPSULE ORAL at 15:16

## 2025-03-26 RX ADMIN — SODIUM CHLORIDE, SODIUM LACTATE, POTASSIUM CHLORIDE, AND CALCIUM CHLORIDE 500 ML: .6; .31; .03; .02 INJECTION, SOLUTION INTRAVENOUS at 17:54

## 2025-03-26 RX ADMIN — METHOCARBAMOL TABLETS 500 MG: 500 TABLET, COATED ORAL at 11:11

## 2025-03-26 RX ADMIN — METHOCARBAMOL TABLETS 500 MG: 500 TABLET, COATED ORAL at 17:12

## 2025-03-26 RX ADMIN — DRONABINOL 2.5 MG: 2.5 CAPSULE ORAL at 15:16

## 2025-03-26 RX ADMIN — NYSTATIN 400000 UNITS: 500000 SUSPENSION ORAL at 08:53

## 2025-03-26 RX ADMIN — METOPROLOL TARTRATE 50 MG: 50 TABLET, FILM COATED ORAL at 22:53

## 2025-03-26 RX ADMIN — ACETAMINOPHEN 650 MG: 325 TABLET, FILM COATED ORAL at 22:53

## 2025-03-26 RX ADMIN — NYSTATIN 400000 UNITS: 500000 SUSPENSION ORAL at 23:17

## 2025-03-26 RX ADMIN — ATORVASTATIN CALCIUM 40 MG: 40 TABLET, FILM COATED ORAL at 22:52

## 2025-03-26 RX ADMIN — OCTREOTIDE ACETATE 100 MCG: 100 INJECTION, SOLUTION INTRAVENOUS; SUBCUTANEOUS at 11:13

## 2025-03-26 RX ADMIN — OCTREOTIDE ACETATE 100 MCG: 100 INJECTION, SOLUTION INTRAVENOUS; SUBCUTANEOUS at 17:12

## 2025-03-26 RX ADMIN — ACETAMINOPHEN 650 MG: 325 TABLET, FILM COATED ORAL at 17:12

## 2025-03-26 RX ADMIN — HYDROMORPHONE HYDROCHLORIDE 4 MG: 4 TABLET ORAL at 04:07

## 2025-03-26 RX ADMIN — ACETAMINOPHEN 650 MG: 325 TABLET, FILM COATED ORAL at 11:11

## 2025-03-26 RX ADMIN — METHOCARBAMOL TABLETS 500 MG: 500 TABLET, COATED ORAL at 22:53

## 2025-03-26 ASSESSMENT — PAIN SCALES - WONG BAKER
WONGBAKER_NUMERICALRESPONSE: HURTS WHOLE LOT
WONGBAKER_NUMERICALRESPONSE: HURTS EVEN MORE

## 2025-03-26 ASSESSMENT — PAIN SCALES - GENERAL
PAINLEVEL_OUTOF10: 10 - WORST POSSIBLE PAIN
PAINLEVEL_OUTOF10: 6
PAINLEVEL_OUTOF10: 4
PAINLEVEL_OUTOF10: 6
PAINLEVEL_OUTOF10: 5 - MODERATE PAIN
PAINLEVEL_OUTOF10: 7
PAINLEVEL_OUTOF10: 5 - MODERATE PAIN
PAINLEVEL_OUTOF10: 5 - MODERATE PAIN
PAINLEVEL_OUTOF10: 7

## 2025-03-26 ASSESSMENT — COGNITIVE AND FUNCTIONAL STATUS - GENERAL
STANDING UP FROM CHAIR USING ARMS: A LITTLE
PERSONAL GROOMING: A LITTLE
MOVING TO AND FROM BED TO CHAIR: A LITTLE
MOBILITY SCORE: 18
HELP NEEDED FOR BATHING: A LITTLE
MOVING FROM LYING ON BACK TO SITTING ON SIDE OF FLAT BED WITH BEDRAILS: A LITTLE
TOILETING: A LITTLE
TURNING FROM BACK TO SIDE WHILE IN FLAT BAD: A LITTLE
DRESSING REGULAR UPPER BODY CLOTHING: A LITTLE
CLIMB 3 TO 5 STEPS WITH RAILING: A LITTLE
WALKING IN HOSPITAL ROOM: A LITTLE
DRESSING REGULAR LOWER BODY CLOTHING: A LITTLE

## 2025-03-26 ASSESSMENT — PAIN DESCRIPTION - ORIENTATION
ORIENTATION: MID

## 2025-03-26 ASSESSMENT — PAIN DESCRIPTION - LOCATION
LOCATION: ABDOMEN
LOCATION: ABDOMEN

## 2025-03-26 NOTE — CONSULTS
"  Wound Care Consult     Visit Date: 3/26/2025      Patient Name: Roma Corral         MRN: [id number]           YOB: 1955     Reason for Consult: Ostomy care/ education         Wound History: Patient  with history of rectal cancer with mets to the liver, CAD (2021 NSTEMI), DM2, HTN  who is s/p robotic LAR with end colostomy, ilecocecetomy with Dr. Vasquez, and laparoscopic microwave liver ablation with Dr. Erazo on 3/17/25.      Pertinent Labs:   Albumin   Date Value Ref Range Status   03/26/2025 2.4 (L) 3.4 - 5.0 g/dL Final       Wound Assessment:  Wound 03/17/25 Incision Abdomen Anterior (Active)   Site Assessment Clean;Dry 03/25/25 0900   Summer-Wound Assessment Clean;Dry 03/25/25 0900   State of Healing Closed wound edges 03/25/25 0900   Margins Attached edges 03/25/25 0900   Closure Glue;Open to air 03/25/25 0900   Sutures/Staple Line Approximated 03/25/25 0900   Drainage Description None 03/24/25 2100   Drainage Amount None 03/24/25 0800   Dressing Open to air 03/25/25 0900   Dressing Status Clean;Dry 03/25/25 0900       Wound 03/25/25 Coccyx (Active)   Wound Image    03/25/25 1714   Drainage Description Purulent 03/25/25 1714   Drainage Amount Small 03/25/25 1714   Dressing Foam 03/25/25 1714   Dressing Changed New 03/25/25 1714   Dressing Status Clean;Dry;Occlusive 03/25/25 1714       Wound Team Summary Assessment:   Ostomy type: colostomy    size: 1\" oval       color: red       protruding: flush  Functioning: soft brown stool  Mucocutaneous junction: intact  Peristomal skin: 2 cm incision at 7 o'clock,  Pouching: [place] one piece soft convex drainable pouch   Ostomy Education: patient has had an ostomy since 7/2024. Knowledgeable in ostomy pouching. Patient changed pouch today. Needed cueing 50% of pouch change   Plan: assess stoma/pouching while inpatient     Wound Team Plan: ostomy team will follow      Gale Espana RN CWON   3/26/2025  5:33 PM        "

## 2025-03-26 NOTE — SIGNIFICANT EVENT
Patient seen overnight for post procedure check    S:    POD 0 from lymphoscintigraphy  Endorses testicular pain with movement which has been ongoing since fall.    O:   Vital signs are stable, normotensive, afebrile, no new or worsening oxygen requirement, not tachycardic  Visit Vitals  /72 (BP Location: Right arm, Patient Position: Lying)   Pulse 85   Temp 37.1 °C (98.8 °F) (Temporal)   Resp 18        Constitutional: no acute distress  Skin: warm and dry overall   Neuro: A/O x4, no gross deficits   HEENT: Atraumatic, no scleral icterus  Cardiac: RRR  Pulmonary: Unlabored respirations   Abdomen: Non distended, diffusely tender - baseline from prior exams - improving tenderness  : Voiding - endorses testicular pain, no erythema or ecchymosis.  Surgical Site: Dressings without strikethrough or underlying hematoma    A/P:  Overall, patient is doing well postoperatively with no acute concerns.  Will continue to optimize pain control as needed.  Will continue to monitor clinical exam, vitals, I&O's, and labs when available.  Will follow up on the patient in the a.m. or sooner as needed.  Scrotal sling    Blayne Restrepo DO  PGY-1

## 2025-03-26 NOTE — NURSING NOTE
PICC Delay        Patient is eating a full meal at this time and is not amenable to proceed with PICC placement at this time.  Will re-evaluate tomorrow with the ordering team.

## 2025-03-26 NOTE — PROGRESS NOTES
Surgical Oncology Progress Note    HPI:  Roma Corral is a 70 y.o. male with history of rectal cancer with mets to the liver, CAD (2021 NSTEMI), DM2, HTN  who is s/p robotic LAR with end colostomy, ilecocecetomy with Dr. Vasquez, and laparoscopic microwave liver ablation with Dr. Erazo on 3/17/25.     Subjective:  NAEON. 1480 from drain serous. Minimal appetite.       Objective    Temp:  [36.2 °C (97.2 °F)-37.3 °C (99.1 °F)] 37.3 °C (99.1 °F)  Heart Rate:  [64-99] 80  Resp:  [13-18] 14  BP: ()/(54-72) 100/63  I/O last 2 completed shifts:  In: 1413 (19.2 mL/kg) [I.V.:913 (12.4 mL/kg); IV Piggyback:500]  Out: 2125 (28.9 mL/kg) [Urine:645 (0.4 mL/kg/hr); Drains:1480]  Weight: 73.5 kg     Physical Exam:  NAD  Anicteric sclera  On room air  Nontachycardic  Abdomen soft, mildly tender to palpation, left-sided colostomy with minimal brown stool, right sided CIRILO drain with serous output   Large right sided bruise at hip, sensory and motor function of RLE intact    Labs within past 24h:            8.5   139  110  14   6.1>-----<79  ----------------<182             28.1   4.3  22  0.83          Ca 7.5 Phos 2.9 Mg 2.24       ALT 16 AST 14 AlkPhos 532 tBili 0.8           Imaging within past 24h:  CT volumetrics 3/21   Findings consistent with patient history including multifocal   areas of ablation within the liver and free air. No fluid collection   to suggest abscess or evidence of bowel obstruction/leak.     ASSESSMENT  Roma Corral is a 70 y.o. male with history of rectal cancer with mets to the liver, CAD (2021 NSTEMI), DM2, HTN who is s/p robotic LAR with end colostomy, ilecocecetomy with Dr. Vasquez, and laparoscopic microwave liver ablation with Dr. Erazo on 3/17/25. Progressing appropriately with ostomy function starting 3/21. Fell overnight into 3/22 AM onto right hip, no other injuries. No LOC or head strike, neg hip XR. Elevated T bili post operatively, persistently ~1.5. 1-2L per day out of drain, likely  ascites 2/2 liver dysfunction. Lymphoscintigraphy with embolization 3/25.    PLAN:  -Continue to trend CMP  -Continue octreotide  -continue to monitor drain output  -Recommend careful management of fluid status do to high output from drain and low PO intake  -Patient will get right portal vein embolization pushed back due to extended hospital stay   -defer rest of plan to colorectal surgery team     Discussed with Dr. Kacey Cannon   Surgical Oncology [id number]

## 2025-03-26 NOTE — PROGRESS NOTES
Colorectal Surgery Progress Note      HPI: Roma Corral is a 70 y.o. male with a past medical history of CAD (NSTEMI 2021), DMII (last A1c 5.4 normal 1/17/25), HLD, HTN, gout, anxiety, metastatic rectal cancer to liver s/p loop colostomy July 2024 & FAWN c/b persistent disease and rectal stricture, now hospital day 9 s/p robotic low anterior resection with transition from loop to end colostomy & 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 Vasquez, and laparoscopic MWA x3 with liver biopsy by Dr. Erazo on 3/18/25.      Subjective  Patient has been doing okay overall. Pain is under reasonable control - feels okay resting but has pain while getting up out of bed. XR for rib pain after fall over the weekend without fracture. Patient reporting right scrotal and penile pain possibly worsening with voiding. Getting out of bed and working with PT appropriately. Continues with poor appetite but tolerating intake without nausea/vomiting. Continues with colostomy output.     Now s/p IR lymphogram and lymph embolization with small decrease in drain output (~1500 in last 24 from 2L prior)      Objective  Physical Exam:  Gen: No acute distress, appears frail, resting in bed comfortably, pleasant & conversational  HENT: Head normocephalic, atraumatic.  Mucous membranes moist.    Neuro: Alert and oriented x3.  Moves all extremities spontaneously x4.  CV: Normal rate, normotensive. Trace edema bilaterally at ankles.  Resp: No acute respiratory distress.  Normal effort on room air. Chest expansion symmetrical. Tenderness to right lower chest.   GI: Abdomen is soft, nontender, and nondistended. Laparoscopic incisions are clean, dry and intact with glue.  Stoma is pink and healthy, well pouched, with gas and pasty stool in pouch. Abdominal drain to bile bag with serous output.  : Normal, circumcised phallus with orthotopic meatus. Bilateral testes palpable and in normal position, no appreciable scrotal masses. No area  of swell, discoloration or erythema to the scrotum. Mild dry skin noted.   Skin: Warm and dry, pale, without rashes or lesions    Visit Vitals  BP 99/70 (BP Location: Right arm, Patient Position: Lying)   Pulse 78   Temp 37.3 °C (99.1 °F) (Temporal)   Resp 14      I/O last 3 completed shifts:  In: 1943 (26.4 mL/kg) [P.O.:30; I.V.:913 (12.4 mL/kg); IV Piggyback:1000]  Out: 3225 (43.9 mL/kg) [Urine:945 (0.4 mL/kg/hr); Drains:2255; Stool:25]  Weight: 73.5 kg   I/O this shift:  In: -   Out: 575 [Urine:100; Drains:475]     Data Review:  AM labs pending     CBC:   Lab Results   Component Value Date    WBC 6.1 03/26/2025    RBC 2.91 (L) 03/26/2025     BMP:   Lab Results   Component Value Date    GLUCOSE 166 (H) 03/25/2025    CO2 22 03/25/2025    BUN 16 03/25/2025    CREATININE 0.77 03/25/2025    CALCIUM 7.4 (L) 03/25/2025     Coagulation:   Lab Results   Component Value Date    INR 1.3 (H) 03/24/2025    APTT 29 03/24/2025       Imaging:  No recent imaging to review. Pending IR and CXR.    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 Vasquez, and laparoscopic MWA x3 with liver biopsy by Dr. Erazo on 3/18/25. Post-op course c/b high pelvic drain output 2/2 persistent lymph leak, requiring fluid & albumin replacement, with no improvement on low fat diet now s/p IR lymph emobolization of R external iliac lymph node on 3/25.  Also c/b unwitnessed fall with negative right hip/pelvis xray and negative rib XR for pain. PT recommending SNF at discharge.      Plan:  -Close follow up of IR drain output today after embolization  -OOB during day/ambulation in halls x3/PT  -UA for complaint of dysuria/scrotal pain  -Plan for PICC line later today for likely initiation of TPN tomorrow given poor  PO intake  -Calorie count starting this AM   -Continue supportive measures for penile/scrotal pain: tight briefs, scrotal support while sitting/lying     Neuro: Post-op pain controlled, history of anxiety  -Continue current pain regimen with tylenol, gabapentin, robaxin; prn PO dilaudid  -Continue home MS Contin, hold home ativan  -Sleep hygiene/OOB during day/PT/multiple walks in halls     CV: hemodynamically stable; hx of CAD (NSTEMI 2021), HTN, HLD  -Q4 vital signs  -EKG prn for ACS symptoms  -Continue home metoprolol with hold parameters  -Hold home ASA 81, Lipitor in setting of elevated LFTs/thrombocytopenia    Resp: no acute issues on room air, no significant pulmonary history  -CXR to rule out rib fracture   -ICS 10x every hour  -OOB/ambulation    GI: s/p robotic LAR/loop to end colostomy, ileocecectomy & MWA c/b persistent lymph leak; hx of rectal CA with mets to liver  -Fat restricted diet 40g   -Starting calorie count  -PICC insertion today for likely TPN initiation tomorrow given chronically poor PO intake  -Octreotide TID for lymph leak; s/p IR lymph embo for leak 3/26  -Continue to monitor drain output/replace fluid prn  -Zofran prn for nausea  -continue assessment of stoma and output, stoma not new for patient.  WOCN for supplies & support.   -Daily CMP to monitor LFTs - Alk Phos uptrending     : Oliguria with small volume retention; no significant urological hx  -Continue flomax for low resection, needs to stand when voiding to completely empty.   - Strict intake and output, monitoring fluid & electrolyte balance closely in setting of high output drain  - Bladder scan prn to monitor retention/urine output  - Daily RFP/replace electrolytes as needed    Heme: H&H stable with no evidence of bleeding, history of pancytopenia with platelets similar to baseline  -CBC daily; monitor for s/s of bleeding    ID: afebrile, no leukocytosis  -Q4 temp  -trend CBC; monitor for s/s of infection    Endo: no acute  issues, no significant endocrine history   -hypoglycemia protocol  -Monitor BG on AM labs  -no indication for SSI    DVT Prophy: SCDs.  Holding Lovenox for IR today, then can resume    Dispo:   -Continue care on RNF  -PT recommending SNF, patient accepted at facility of choice, no pre-cert needed.    Plan of care discussed with Dr. Connell.    Sabiha Rodriguez MD  Colorectal Surgery   [facility] Service Pager #[phone]

## 2025-03-26 NOTE — CARE PLAN
The clinical goals for the shift include Pt will remain free from falls/injury      Problem: Safety - Adult  Goal: Free from fall injury  Outcome: Progressing     Problem: Discharge Planning  Goal: Discharge to home or other facility with appropriate resources  Outcome: Progressing     Problem: Chronic Conditions and Co-morbidities  Goal: Patient's chronic conditions and co-morbidity symptoms are monitored and maintained or improved  Outcome: Progressing     Problem: Fall/Injury  Goal: Not fall by end of shift  Outcome: Progressing  Goal: Be free from injury by end of the shift  Outcome: Progressing  Goal: Verbalize understanding of personal risk factors for fall in the hospital  Outcome: Progressing  Goal: Verbalize understanding of risk factor reduction measures to prevent injury from fall in the home  Outcome: Progressing  Goal: Use assistive devices by end of the shift  Outcome: Progressing  Goal: Pace activities to prevent fatigue by end of the shift  Outcome: Progressing     Problem: Skin  Goal: Decreased wound size/increased tissue granulation at next dressing change  Outcome: Progressing  Goal: Participates in plan/prevention/treatment measures  Outcome: Progressing  Goal: Prevent/manage excess moisture  Outcome: Progressing  Goal: Prevent/minimize sheer/friction injuries  Outcome: Progressing  Goal: Promote/optimize nutrition  Outcome: Progressing  Goal: Promote skin healing  Outcome: Progressing     Problem: Diabetes  Goal: Achieve decreasing blood glucose levels by end of shift  Outcome: Progressing  Goal: Increase stability of blood glucose readings by end of shift  Outcome: Progressing  Goal: Decrease in ketones present in urine by end of shift  Outcome: Progressing  Goal: Maintain electrolyte levels within acceptable range throughout shift  Outcome: Progressing  Goal: Maintain glucose levels >70mg/dl to <250mg/dl throughout shift  Outcome: Progressing  Goal: No changes in neurological exam by end of  shift  Outcome: Progressing  Goal: Learn about and adhere to nutrition recommendations by end of shift  Outcome: Progressing  Goal: Vital signs within normal range for age by end of shift  Outcome: Progressing  Goal: Increase self care and/or family involovement by end of shift  Outcome: Progressing  Goal: Receive DSME education by end of shift  Outcome: Progressing     Problem: Pain  Goal: Takes deep breaths with improved pain control throughout the shift  Outcome: Progressing  Goal: Turns in bed with improved pain control throughout the shift  Outcome: Progressing  Goal: Walks with improved pain control throughout the shift  Outcome: Progressing  Goal: Performs ADL's with improved pain control throughout shift  Outcome: Progressing  Goal: Participates in PT with improved pain control throughout the shift  Outcome: Progressing  Goal: Free from opioid side effects throughout the shift  Outcome: Progressing  Goal: Free from acute confusion related to pain meds throughout the shift  Outcome: Progressing

## 2025-03-26 NOTE — CARE PLAN
The patient's goals for the shift include      The clinical goals for the shift include Pt will remain free from falls/injury    Problem: Safety - Adult  Goal: Free from fall injury  Outcome: Progressing     Problem: Chronic Conditions and Co-morbidities  Goal: Patient's chronic conditions and co-morbidity symptoms are monitored and maintained or improved  Outcome: Progressing     Problem: Fall/Injury  Goal: Not fall by end of shift  Outcome: Progressing  Goal: Be free from injury by end of the shift  Outcome: Progressing  Goal: Verbalize understanding of personal risk factors for fall in the hospital  Outcome: Progressing  Goal: Verbalize understanding of risk factor reduction measures to prevent injury from fall in the home  Outcome: Progressing  Goal: Use assistive devices by end of the shift  Outcome: Progressing  Goal: Pace activities to prevent fatigue by end of the shift  Outcome: Progressing     Problem: Discharge Planning  Goal: Discharge to home or other facility with appropriate resources  Outcome: Progressing     Problem: Skin  Goal: Decreased wound size/increased tissue granulation at next dressing change  Outcome: Progressing  Flowsheets (Taken 3/26/2025 1002)  Decreased wound size/increased tissue granulation at next dressing change: Promote sleep for wound healing  Goal: Participates in plan/prevention/treatment measures  Outcome: Progressing  Flowsheets (Taken 3/26/2025 1002)  Participates in plan/prevention/treatment measures: Elevate heels  Goal: Prevent/manage excess moisture  Outcome: Progressing  Flowsheets (Taken 3/26/2025 1002)  Prevent/manage excess moisture: Moisturize dry skin  Goal: Prevent/minimize sheer/friction injuries  Outcome: Progressing  Flowsheets (Taken 3/26/2025 1002)  Prevent/minimize sheer/friction injuries: Increase activity/out of bed for meals  Goal: Promote/optimize nutrition  Outcome: Progressing  Flowsheets (Taken 3/26/2025 1002)  Promote/optimize nutrition:  Monitor/record intake including meals  Goal: Promote skin healing  Outcome: Progressing  Flowsheets (Taken 3/26/2025 1002)  Promote skin healing: Assess skin/pad under line(s)/device(s)     Problem: Diabetes  Goal: Achieve decreasing blood glucose levels by end of shift  Outcome: Progressing  Goal: Increase stability of blood glucose readings by end of shift  Outcome: Progressing  Goal: Decrease in ketones present in urine by end of shift  Outcome: Progressing  Goal: Maintain electrolyte levels within acceptable range throughout shift  Outcome: Progressing  Goal: Maintain glucose levels >70mg/dl to <250mg/dl throughout shift  Outcome: Progressing  Goal: No changes in neurological exam by end of shift  Outcome: Progressing  Goal: Learn about and adhere to nutrition recommendations by end of shift  Outcome: Progressing  Goal: Vital signs within normal range for age by end of shift  Outcome: Progressing  Goal: Increase self care and/or family involovement by end of shift  Outcome: Progressing  Goal: Receive DSME education by end of shift  Outcome: Progressing     Problem: Pain  Goal: Takes deep breaths with improved pain control throughout the shift  Outcome: Progressing  Goal: Turns in bed with improved pain control throughout the shift  Outcome: Progressing  Goal: Walks with improved pain control throughout the shift  Outcome: Progressing  Goal: Performs ADL's with improved pain control throughout shift  Outcome: Progressing  Goal: Participates in PT with improved pain control throughout the shift  Outcome: Progressing  Goal: Free from opioid side effects throughout the shift  Outcome: Progressing  Goal: Free from acute confusion related to pain meds throughout the shift  Outcome: Progressing

## 2025-03-26 NOTE — CONSULTS
"Nutrition Follow Up Assessment:   Nutrition Assessment    Reason for Assessment: Parenteral assessment/recommendation (TPN/PPN)    Patient is a 70 y.o. male s/p robotic LAR with end colostomy, ilecocecetomy with Dr. Vasquez, and laparoscopic microwave liver ablation on 3/17.       3/21: Last RDN note  3/22: fell overnight onto right hip  3/25: lymphoscintigraphy with embolization       Team requesting TPN recommendations given ongoing lymph leak with continued evaluation.     Anthropometrics:  Height: 180.3 cm (5' 10.98\")   Weight: 73.5 kg (162 lb 0.6 oz)   BMI (Calculated): 22.61  IBW/kg (Dietitian Calculated): 78.2 kg  Percent of IBW: 94 %       Weight History:   Updated weight needed      Nutrition Significant Labs:  TPN/PPN Labs:   Results from last 7 days   Lab Units 03/26/25  0559 03/25/25  0547 03/24/25  0630 03/23/25  0529 03/22/25  0628 03/21/25  0800   GLUCOSE mg/dL 182* 166* 89 88   < > 96   POTASSIUM mmol/L 4.3 4.1 3.8 4.5   < > 4.1   PHOSPHORUS mg/dL  --   --   --   --   --  2.9   MAGNESIUM mg/dL 2.24 1.89 2.10 2.03   < > 2.01   SODIUM mmol/L 139 139 143 136   < > 141   CHLORIDE mmol/L 110* 111* 113* 113*   < > 112*   ALT U/L 16 20 34 46   < > 109*   AST U/L 14 14 25 32   < > 54*   ALK PHOS U/L 532* 451* 730* 502*   < > 196*   BILIRUBIN TOTAL mg/dL 0.8 1.0 1.4* 1.5*   < > 1.5*    < > = values in this interval not displayed.        Medications:  Scheduled medications  acetaminophen, 650 mg, oral, q6h  [Held by provider] aspirin, 81 mg, oral, Daily  atorvastatin, 40 mg, oral, Nightly  dronabinol, 2.5 mg, oral, BID AC  enoxaparin, 40 mg, subcutaneous, q24h  gabapentin, 300 mg, oral, TID  lidocaine, 5 mL, infiltration, Once  melatonin, 3 mg, oral, Nightly  methocarbamol, 500 mg, oral, q6h  metoprolol tartrate, 50 mg, oral, BID  morphine CR, 30 mg, oral, q12h ALEX  nystatin, 4 mL, Swish & Swallow, TID  octreotide, 100 mcg, subcutaneous, TID  tamsulosin, 0.4 mg, oral, Daily      PRN medications  PRN " medications: alteplase, dextrose, dextrose, glucagon, glucagon, HYDROmorphone, HYDROmorphone, naloxone, ondansetron ODT **OR** ondansetron      I/O:   Last BM Date: 03/21/25;      Dietary Orders (From admission, onward)       Start     Ordered    03/26/25 1028  Calorie count  Once         03/26/25 1027    03/25/25 1736  Adult diet Fat restricted 40 gm  Diet effective now        Question:  Diet type  Answer:  Fat restricted 40 gm    03/25/25 1735    03/21/25 1510  Oral nutritional supplements  Until discontinued        Comments: Please send [facility] Custard shake in Chocolate once daily-- ok per RDN while on fat restriction.   Question Answer Comment   Deliver with Dinner    Select supplement: Product from home - please specify    Additional Details send from kitchen        03/21/25 1510    03/21/25 1509  Oral nutritional supplements  Until discontinued        Question Answer Comment   Deliver with Breakfast chocolate or mixed berry-- ok to send per RDN and not count towards fat restriction   Deliver with Lunch    Select supplement: Magic Cup        03/21/25 1509    03/21/25 1508  Oral nutritional supplements  Until discontinued        Question Answer Comment   Deliver with Breakfast    Deliver with Dinner    Select supplement: Ensure Clear        03/21/25 1508    03/17/25 2350  May Participate in Room Service  ( ROOM SERVICE MAY PARTICIPATE)  Once        Question:  .  Answer:  Yes    03/17/25 2349                     Estimated Needs:   Total Energy Estimated Needs in 24 hours (kCal):  (5252-7246)  Method for Estimating Needs: MSJ= 1503  Total Protein Estimated Needs in 24 Hours (g):  (105+)  Method for Estimating 24 Hour Protein Needs: 1.5 x 71.6kg  Total Fluid Estimated Needs in 24 Hours (mL):  (per team)           Nutrition Diagnosis   Malnutrition Diagnosis  Patient has Malnutrition Diagnosis: Yes  Diagnosis Status: Active  Malnutrition Diagnosis: Severe malnutrition related to chronic disease or  condition  As Evidenced by: reports of poor appetite and intake PTA and here along with a weight loss of 12% in 3 months and 18% in 6 months and 34% from his usual body weight; he has areas of severe muscle and fat loss on physical exam            Nutrition Interventions/Recommendations   Nutrition prescription for parenteral nutrition, Nutrition prescription for oral nutrition    Nutrition Recommendations:  Individualized Nutrition Prescription Provided for : :  Continue a low fat diet per team     Prior to starting TPN, check baseline TG, RFP, & Mg daily - replete electrolytes as needed.    Recommend 100 mg IV/PO thiamin x 7-10 days d/t patient being at risk for refeeding syndrome.    When central access is obtained, recommend the following continuous TPN regimen:  Day 1: Initiate Standard TPN 5% Amino Acids, 20% Dextrose @ 40mL/hr  Day 2: If BS <180 mg/dL and no major shifts in lytes occur, increase to 60mL/hr  Day 3: If BS <180 mg/dL and no major shifts in lytes occur, increase to GOAL of 83mL/hr    Include MVI + trace elements         TPN monitoring:      - daily weights      - RFP + Mg daily; replete lytes PRN      - LFTs + TGs weekly      - accuchecks q6h    TPN + lipids to provide daily: 1992mL volume (just AA/Dex),  1754kcals,100 g protein, 398g dextrose,  (meeting 79% kcal & 95% protein needs)      Note: no lipids ordered d/t low fat diet     If plan for NPO then reconsult for possible SMOF lipids       Nutrition Interventions/Goals:   Interventions: Meals and snacks, Parenteral nutrition  Coordination of Care with Providers: Provider      Nutrition Monitoring and Evaluation   Food/Nutrient Related History Monitoring  Monitoring and Evaluation Plan: Enteral and parenteral nutrition intake determination  Enteral and Parenteral Nutrition Intake Determination: Parenteral nutrition intake - Titrate to goal without metabolic complications    Anthropometric Measurements  Monitoring and Evaluation Plan: Body  weight  Body Weight: Body weight - Maintain stable weight    Biochemical Data, Medical Tests and Procedures  Monitoring and Evaluation Plan: Electrolyte/renal panel, Glucose/endocrine profile  Electrolyte and Renal Panel: Electrolytes within normal limits  Glucose/Endocrine Profile: Glucose within normal limits ( mg/dL)    Goal Status: New goal(s) identified    Time Spent (min): 90 minutes

## 2025-03-27 LAB
ALBUMIN SERPL BCP-MCNC: 2.2 G/DL (ref 3.4–5)
ALP SERPL-CCNC: 522 U/L (ref 33–136)
ALT SERPL W P-5'-P-CCNC: 13 U/L (ref 10–52)
ANION GAP SERPL CALC-SCNC: 12 MMOL/L (ref 10–20)
APPEARANCE UR: CLEAR
AST SERPL W P-5'-P-CCNC: 14 U/L (ref 9–39)
BILIRUB SERPL-MCNC: 0.9 MG/DL (ref 0–1.2)
BILIRUB UR STRIP.AUTO-MCNC: NEGATIVE MG/DL
BUN SERPL-MCNC: 15 MG/DL (ref 6–23)
CALCIUM SERPL-MCNC: 7.5 MG/DL (ref 8.6–10.6)
CHLORIDE SERPL-SCNC: 109 MMOL/L (ref 98–107)
CO2 SERPL-SCNC: 22 MMOL/L (ref 21–32)
COLOR UR: YELLOW
CREAT SERPL-MCNC: 0.87 MG/DL (ref 0.5–1.3)
EGFRCR SERPLBLD CKD-EPI 2021: >90 ML/MIN/1.73M*2
ERYTHROCYTE [DISTWIDTH] IN BLOOD BY AUTOMATED COUNT: 19.4 % (ref 11.5–14.5)
GLUCOSE SERPL-MCNC: 134 MG/DL (ref 74–99)
GLUCOSE UR STRIP.AUTO-MCNC: NORMAL MG/DL
HCT VFR BLD AUTO: 27.2 % (ref 41–52)
HGB BLD-MCNC: 8.3 G/DL (ref 13.5–17.5)
HOLD SPECIMEN: NORMAL
HYALINE CASTS #/AREA URNS AUTO: ABNORMAL /LPF
KETONES UR STRIP.AUTO-MCNC: NEGATIVE MG/DL
LEUKOCYTE ESTERASE UR QL STRIP.AUTO: NEGATIVE
MAGNESIUM SERPL-MCNC: 2.2 MG/DL (ref 1.6–2.4)
MCH RBC QN AUTO: 29.3 PG (ref 26–34)
MCHC RBC AUTO-ENTMCNC: 30.5 G/DL (ref 32–36)
MCV RBC AUTO: 96 FL (ref 80–100)
MUCOUS THREADS #/AREA URNS AUTO: ABNORMAL /LPF
NITRITE UR QL STRIP.AUTO: NEGATIVE
NRBC BLD-RTO: 0 /100 WBCS (ref 0–0)
PH UR STRIP.AUTO: 6 [PH]
PLATELET # BLD AUTO: 83 X10*3/UL (ref 150–450)
POTASSIUM SERPL-SCNC: 4.2 MMOL/L (ref 3.5–5.3)
PROT SERPL-MCNC: 5 G/DL (ref 6.4–8.2)
PROT UR STRIP.AUTO-MCNC: ABNORMAL MG/DL
RBC # BLD AUTO: 2.83 X10*6/UL (ref 4.5–5.9)
RBC # UR STRIP.AUTO: NEGATIVE MG/DL
RBC #/AREA URNS AUTO: ABNORMAL /HPF
SODIUM SERPL-SCNC: 139 MMOL/L (ref 136–145)
SP GR UR STRIP.AUTO: 1.03
STAPHYLOCOCCUS SPEC CULT: NORMAL
TRIGL FLD-MCNC: 433 MG/DL
UROBILINOGEN UR STRIP.AUTO-MCNC: NORMAL MG/DL
WBC # BLD AUTO: 8.2 X10*3/UL (ref 4.4–11.3)
WBC #/AREA URNS AUTO: ABNORMAL /HPF

## 2025-03-27 PROCEDURE — 2500000004 HC RX 250 GENERAL PHARMACY W/ HCPCS (ALT 636 FOR OP/ED)

## 2025-03-27 PROCEDURE — 2500000001 HC RX 250 WO HCPCS SELF ADMINISTERED DRUGS (ALT 637 FOR MEDICARE OP)

## 2025-03-27 PROCEDURE — 81001 URINALYSIS AUTO W/SCOPE: CPT | Performed by: STUDENT IN AN ORGANIZED HEALTH CARE EDUCATION/TRAINING PROGRAM

## 2025-03-27 PROCEDURE — 2500000001 HC RX 250 WO HCPCS SELF ADMINISTERED DRUGS (ALT 637 FOR MEDICARE OP): Performed by: STUDENT IN AN ORGANIZED HEALTH CARE EDUCATION/TRAINING PROGRAM

## 2025-03-27 PROCEDURE — 85027 COMPLETE CBC AUTOMATED: CPT

## 2025-03-27 PROCEDURE — 80053 COMPREHEN METABOLIC PANEL: CPT

## 2025-03-27 PROCEDURE — 83735 ASSAY OF MAGNESIUM: CPT

## 2025-03-27 PROCEDURE — 1170000001 HC PRIVATE ONCOLOGY ROOM DAILY

## 2025-03-27 PROCEDURE — 84478 ASSAY OF TRIGLYCERIDES: CPT

## 2025-03-27 PROCEDURE — 99232 SBSQ HOSP IP/OBS MODERATE 35: CPT

## 2025-03-27 PROCEDURE — 2500000002 HC RX 250 W HCPCS SELF ADMINISTERED DRUGS (ALT 637 FOR MEDICARE OP, ALT 636 FOR OP/ED): Performed by: STUDENT IN AN ORGANIZED HEALTH CARE EDUCATION/TRAINING PROGRAM

## 2025-03-27 RX ORDER — HYDROMORPHONE HYDROCHLORIDE 2 MG/1
2 TABLET ORAL EVERY 4 HOURS PRN
Status: DISCONTINUED | OUTPATIENT
Start: 2025-03-27 | End: 2025-03-28

## 2025-03-27 RX ORDER — MIDODRINE HYDROCHLORIDE 2.5 MG/1
2.5 TABLET ORAL
Status: DISCONTINUED | OUTPATIENT
Start: 2025-03-27 | End: 2025-04-07 | Stop reason: HOSPADM

## 2025-03-27 RX ORDER — HYDROMORPHONE HYDROCHLORIDE 2 MG/1
1 TABLET ORAL EVERY 4 HOURS PRN
Status: DISCONTINUED | OUTPATIENT
Start: 2025-03-27 | End: 2025-03-28

## 2025-03-27 RX ADMIN — ACETAMINOPHEN 650 MG: 325 TABLET, FILM COATED ORAL at 11:51

## 2025-03-27 RX ADMIN — ACETAMINOPHEN 650 MG: 325 TABLET, FILM COATED ORAL at 05:18

## 2025-03-27 RX ADMIN — METHOCARBAMOL TABLETS 500 MG: 500 TABLET, COATED ORAL at 05:18

## 2025-03-27 RX ADMIN — GABAPENTIN 300 MG: 300 CAPSULE ORAL at 15:09

## 2025-03-27 RX ADMIN — DRONABINOL 2.5 MG: 2.5 CAPSULE ORAL at 09:18

## 2025-03-27 RX ADMIN — METHOCARBAMOL TABLETS 500 MG: 500 TABLET, COATED ORAL at 11:51

## 2025-03-27 RX ADMIN — SODIUM CHLORIDE 500 ML: 9 INJECTION, SOLUTION INTRAVENOUS at 15:09

## 2025-03-27 RX ADMIN — MORPHINE SULFATE 30 MG: 30 TABLET, FILM COATED, EXTENDED RELEASE ORAL at 09:15

## 2025-03-27 RX ADMIN — MIDODRINE HYDROCHLORIDE 2.5 MG: 2.5 TABLET ORAL at 11:51

## 2025-03-27 RX ADMIN — NYSTATIN 400000 UNITS: 500000 SUSPENSION ORAL at 15:09

## 2025-03-27 RX ADMIN — METHOCARBAMOL TABLETS 500 MG: 500 TABLET, COATED ORAL at 17:03

## 2025-03-27 RX ADMIN — ACETAMINOPHEN 650 MG: 325 TABLET, FILM COATED ORAL at 17:03

## 2025-03-27 RX ADMIN — MIDODRINE HYDROCHLORIDE 2.5 MG: 2.5 TABLET ORAL at 15:09

## 2025-03-27 RX ADMIN — GABAPENTIN 300 MG: 300 CAPSULE ORAL at 09:16

## 2025-03-27 RX ADMIN — TAMSULOSIN HYDROCHLORIDE 0.4 MG: 0.4 CAPSULE ORAL at 09:15

## 2025-03-27 RX ADMIN — OCTREOTIDE ACETATE 100 MCG: 100 INJECTION, SOLUTION INTRAVENOUS; SUBCUTANEOUS at 11:51

## 2025-03-27 RX ADMIN — HYDROMORPHONE HYDROCHLORIDE 4 MG: 4 TABLET ORAL at 05:23

## 2025-03-27 RX ADMIN — NYSTATIN 400000 UNITS: 500000 SUSPENSION ORAL at 09:16

## 2025-03-27 RX ADMIN — OCTREOTIDE ACETATE 100 MCG: 100 INJECTION, SOLUTION INTRAVENOUS; SUBCUTANEOUS at 15:09

## 2025-03-27 RX ADMIN — DRONABINOL 2.5 MG: 2.5 CAPSULE ORAL at 15:09

## 2025-03-27 ASSESSMENT — PAIN SCALES - GENERAL
PAINLEVEL_OUTOF10: 8
PAINLEVEL_OUTOF10: 7
PAINLEVEL_OUTOF10: 8
PAINLEVEL_OUTOF10: 5 - MODERATE PAIN

## 2025-03-27 ASSESSMENT — COGNITIVE AND FUNCTIONAL STATUS - GENERAL
MOBILITY SCORE: 15
DAILY ACTIVITIY SCORE: 19
MOVING FROM LYING ON BACK TO SITTING ON SIDE OF FLAT BED WITH BEDRAILS: A LITTLE
PERSONAL GROOMING: A LITTLE
TURNING FROM BACK TO SIDE WHILE IN FLAT BAD: A LITTLE
CLIMB 3 TO 5 STEPS WITH RAILING: A LOT
DRESSING REGULAR UPPER BODY CLOTHING: A LITTLE
TOILETING: A LITTLE
DRESSING REGULAR LOWER BODY CLOTHING: A LITTLE
HELP NEEDED FOR BATHING: A LITTLE
WALKING IN HOSPITAL ROOM: A LOT
MOVING TO AND FROM BED TO CHAIR: A LITTLE
STANDING UP FROM CHAIR USING ARMS: A LOT

## 2025-03-27 ASSESSMENT — PAIN DESCRIPTION - ORIENTATION
ORIENTATION: LOWER
ORIENTATION: LOWER

## 2025-03-27 ASSESSMENT — PAIN SCALES - WONG BAKER
WONGBAKER_NUMERICALRESPONSE: HURTS EVEN MORE
WONGBAKER_NUMERICALRESPONSE: HURTS LITTLE MORE

## 2025-03-27 ASSESSMENT — PAIN DESCRIPTION - LOCATION: LOCATION: ABDOMEN

## 2025-03-27 NOTE — CARE PLAN
The clinical goals for the shift include Pt will remain HDS and free from fall/injury      Problem: Safety - Adult  Goal: Free from fall injury  Outcome: Progressing     Problem: Discharge Planning  Goal: Discharge to home or other facility with appropriate resources  Outcome: Progressing     Problem: Chronic Conditions and Co-morbidities  Goal: Patient's chronic conditions and co-morbidity symptoms are monitored and maintained or improved  Outcome: Progressing     Problem: Fall/Injury  Goal: Not fall by end of shift  Outcome: Progressing  Goal: Be free from injury by end of the shift  Outcome: Progressing  Goal: Verbalize understanding of personal risk factors for fall in the hospital  Outcome: Progressing  Goal: Verbalize understanding of risk factor reduction measures to prevent injury from fall in the home  Outcome: Progressing  Goal: Use assistive devices by end of the shift  Outcome: Progressing  Goal: Pace activities to prevent fatigue by end of the shift  Outcome: Progressing     Problem: Skin  Goal: Decreased wound size/increased tissue granulation at next dressing change  Outcome: Progressing  Goal: Participates in plan/prevention/treatment measures  Outcome: Progressing  Goal: Prevent/manage excess moisture  Outcome: Progressing  Goal: Prevent/minimize sheer/friction injuries  Outcome: Progressing  Goal: Promote/optimize nutrition  Outcome: Progressing  Goal: Promote skin healing  Outcome: Progressing     Problem: Diabetes  Goal: Achieve decreasing blood glucose levels by end of shift  Outcome: Progressing  Goal: Increase stability of blood glucose readings by end of shift  Outcome: Progressing  Goal: Decrease in ketones present in urine by end of shift  Outcome: Progressing  Goal: Maintain electrolyte levels within acceptable range throughout shift  Outcome: Progressing  Goal: Maintain glucose levels >70mg/dl to <250mg/dl throughout shift  Outcome: Progressing  Goal: No changes in neurological exam by  end of shift  Outcome: Progressing  Goal: Learn about and adhere to nutrition recommendations by end of shift  Outcome: Progressing  Goal: Vital signs within normal range for age by end of shift  Outcome: Progressing  Goal: Increase self care and/or family involovement by end of shift  Outcome: Progressing  Goal: Receive DSME education by end of shift  Outcome: Progressing     Problem: Pain  Goal: Takes deep breaths with improved pain control throughout the shift  Outcome: Progressing  Goal: Turns in bed with improved pain control throughout the shift  Outcome: Progressing  Goal: Walks with improved pain control throughout the shift  Outcome: Progressing  Goal: Performs ADL's with improved pain control throughout shift  Outcome: Progressing  Goal: Participates in PT with improved pain control throughout the shift  Outcome: Progressing  Goal: Free from opioid side effects throughout the shift  Outcome: Progressing  Goal: Free from acute confusion related to pain meds throughout the shift  Outcome: Progressing

## 2025-03-27 NOTE — CARE PLAN
Problem: Safety - Adult  Goal: Free from fall injury  Outcome: Progressing   The patient's goals for the shift include      The clinical goals for the shift include Pt will remain HDS and free from fall/injury       Problem: Discharge Planning  Goal: Discharge to home or other facility with appropriate resources  Outcome: Progressing     Problem: Chronic Conditions and Co-morbidities  Goal: Patient's chronic conditions and co-morbidity symptoms are monitored and maintained or improved  Outcome: Progressing     Problem: Fall/Injury  Goal: Not fall by end of shift  Outcome: Progressing  Goal: Be free from injury by end of the shift  Outcome: Progressing  Goal: Verbalize understanding of personal risk factors for fall in the hospital  Outcome: Progressing  Goal: Verbalize understanding of risk factor reduction measures to prevent injury from fall in the home  Outcome: Progressing  Goal: Use assistive devices by end of the shift  Outcome: Progressing  Goal: Pace activities to prevent fatigue by end of the shift  Outcome: Progressing     Problem: Skin  Goal: Decreased wound size/increased tissue granulation at next dressing change  Outcome: Progressing  Goal: Participates in plan/prevention/treatment measures  Outcome: Progressing  Goal: Prevent/manage excess moisture  Outcome: Progressing  Goal: Prevent/minimize sheer/friction injuries  Outcome: Progressing  Goal: Promote/optimize nutrition  Outcome: Progressing  Goal: Promote skin healing  Outcome: Progressing     Problem: Diabetes  Goal: Achieve decreasing blood glucose levels by end of shift  Outcome: Progressing  Goal: Increase stability of blood glucose readings by end of shift  Outcome: Progressing  Goal: Decrease in ketones present in urine by end of shift  Outcome: Progressing  Goal: Maintain electrolyte levels within acceptable range throughout shift  Outcome: Progressing  Goal: Maintain glucose levels >70mg/dl to <250mg/dl throughout shift  Outcome:  Progressing  Goal: No changes in neurological exam by end of shift  Outcome: Progressing  Goal: Learn about and adhere to nutrition recommendations by end of shift  Outcome: Progressing  Goal: Vital signs within normal range for age by end of shift  Outcome: Progressing  Goal: Increase self care and/or family involovement by end of shift  Outcome: Progressing  Goal: Receive DSME education by end of shift  Outcome: Progressing     Problem: Pain  Goal: Takes deep breaths with improved pain control throughout the shift  Outcome: Progressing  Goal: Turns in bed with improved pain control throughout the shift  Outcome: Progressing  Goal: Walks with improved pain control throughout the shift  Outcome: Progressing  Goal: Performs ADL's with improved pain control throughout shift  Outcome: Progressing  Goal: Participates in PT with improved pain control throughout the shift  Outcome: Progressing  Goal: Free from opioid side effects throughout the shift  Outcome: Progressing  Goal: Free from acute confusion related to pain meds throughout the shift  Outcome: Progressing

## 2025-03-27 NOTE — PROGRESS NOTES
Colorectal Surgery Progress Note      HPI: Roma Corral is a 70 y.o. male with a past medical history of CAD (NSTEMI 2021), DMII (last A1c 5.4 normal 1/17/25), HLD, HTN, gout, anxiety, metastatic rectal cancer to liver s/p loop colostomy July 2024 & FAWN c/b persistent disease and rectal stricture, now hospital day 10 s/p robotic low anterior resection with transition from loop to end colostomy & 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 Vasquez, and laparoscopic MWA x3 with liver biopsy by Dr. Erazo on 3/18/25.      Subjective  No acute events overnight, had visit with music therapy yesterday.   Surgical pain is controlled, reports unchanged right sided testicular pain post fall on 3/22.  Reports abdominal drain still putting out a large amount.  Denies nausea, vomiting, bloating on diet, appetite improving on Marinol.  Is drinking two Ensure clears per day.  Colostomy with gas and pudding stool in pouch   Ambulating with PT/up to bathroom with walker.    Objective  Physical Exam:  Gen: in no physical distress and alert, deconditioned and weak appearing, pleasant & conversational, lying in bed  HENT: Head normocephalic, atraumatic.  Mucous membranes moist.    Neuro: Alert and oriented x3.  Moves all extremities spontaneously x4.  CV: Normal rate and rhythm on palpated radial pulse. Trace edema bilaterally at ankles.  Resp: No acute respiratory distress.  Normal effort on room air. Chest expansion symmetrical.   GI: Abdomen is soft, nontender, and nondistended. Laparoscopic incisions are clean, dry and intact with glue.  Stoma is pink and healthy, well pouched, with gas and pudding stool in pouch. Abdominal drain to bile bag high output and milky yellow chyle, without erythema around site.  : Testicles without erythema or ecchymosis. Nontender to palpation, without palpable masses or abnormalities.  Skin: Warm and dry, pale, without rashes or lesions    Visit Vitals  /57 (BP Location: Left  "arm, Patient Position: Lying)   Pulse 80   Temp 36.7 °C (98.1 °F) (Temporal)   Resp 18        I/O last 3 completed shifts:  In: 1450 (19.7 mL/kg) [P.O.:950; IV Piggyback:500]  Out: 3455 (47 mL/kg) [Urine:1325 (0.5 mL/kg/hr); Drains:2105; Stool:25]  Weight: 73.5 kg   I/O this shift:  In: -   Out: 350 [Drains:350]             Data Review:  CBC:   Lab Results   Component Value Date    WBC 8.2 03/27/2025    RBC 2.83 (L) 03/27/2025     BMP:   Lab Results   Component Value Date    GLUCOSE 134 (H) 03/27/2025    CO2 22 03/27/2025    BUN 15 03/27/2025    CREATININE 0.87 03/27/2025    CALCIUM 7.5 (L) 03/27/2025     Coagulation:   No results found for: \"INR\", \"APTT\"      Labs reviewed and personally interpreted as: CBC with stable anemia of chronic disease, without leukocytosis. Moderate thrombocytopenia, similar to baseline.  CMP without electrolyte derangements or ORALIA.  Mild hyperglycemia.       Imaging:  No recent imaging to review.    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 Vasquez, and laparoscopic MWA x3 with liver biopsy by Dr. Erazo on 3/18/25. Post-op course c/b high pelvic drain output 2/2 persistent lymph leak (elevated drain TG) s/p IR lymphoscintigraphy 3/25 & low fat diet without improvement/decrease in output.  Also c/b unwitnessed fall with negative right hip/pelvis xray. PT recommending SNF at discharge.      Plan:  -Midodrine 2.5mg TID & re-engagement of IR for repeat lymphoscintigraphy in setting of persistent lymph leak per surg onc recs.  Would need to make NPO/hold Lovenox if IR agreeable.  -OOB during day/ambulation in halls x3/PT  -Continue lowfat diet, monitoring of drain output & replacing losses 1/2:1.    Neuro: Post-op pain controlled, history of " anxiety  -Continue current pain regimen with tylenol, gabapentin, robaxin;appreciate supportive onc for recs & support  -Wean prn dilaudid to 1mg moderate pain & 2mg severe pain  -Continue home MS Contin, hold home ativan  -Sleep hygiene/OOB during day/PT/multiple walks in halls   -Pet/music/art therapy    CV: hemodynamically stable; hx of CAD (NSTEMI 2021), HTN, HLD  -Q4 vital signs  -EKG prn for ACS symptoms  -Continue home metoprolol with hold parameters  -Hold home ASA 81, Lipitor in setting of elevated LFTs/thrombocytopenia    Resp: no acute issues on room air, no significant pulmonary history  -ICS 10x every hour  -OOB/ambulation    GI: s/p robotic LAR/loop to end colostomy, ileocecectomy & MWA c/b persistent lymph leak; hx of rectal CA with mets to liver  -low fat diet as tolerated, Ensure Clears  -Calorie count to ensure adequate PO nutrition  -Octreotide TID for lymph leak  -Add Midodrine 2.5mg TID for lymph leak, appreciate surg onc support & expertise  -Re-engage IR for repeat embolization  -Zofran prn for nausea  -continue assessment of stoma and output, stoma not new for patient.  WOCN for supplies & support. Continue to monitor drain output/replace fluid prn  -Daily CMP to monitor LFTs    : Oliguria with small volume retention, testicular pain post fall; no significant urological hx  -UA negative 3/26  -Continue flomax for low resection, needs to stand when voiding to completely empty.   - Strict intake and output, monitoring fluid & electrolyte balance closely in setting of high output drain  - Bladder scan prn to monitor retention/urine output  - Daily RFP/replace electrolytes as needed    Heme: H&H stable with no evidence of bleeding, history of pancytopenia with platelets similar to baseline  -CBC daily; monitor for s/s of bleeding    ID: afebrile, no leukocytosis  -Q4 temp  -trend CBC; monitor for s/s of infection    Endo: no acute issues, no significant endocrine history   -hypoglycemia  protocol  -Monitor BG on AM labs  -no indication for SSI    DVT Prophy: SCDs.  Lovenox    Dispo:   -Continue care on RNF  -PT recommending SNF, patient accepted at facility of choice, no pre-cert needed.  Awaiting medical readiness for discharge.    Plan of care discussed with Colorectal surgical team, Dr. Henao, and patient.    Yuridia TORRES-CNP  Colorectal Surgery NP   [facility] Service Pager #[phone]

## 2025-03-27 NOTE — PROGRESS NOTES
Therapy Communication Note    Patient Name: Roma Corral  MRN: [id number]  Department: [facility]  Room: [facility] [place]  Today's Date: 3/27/2025     Discipline: Physical Therapy    Missed Visit Reason: Missed Visit Reason: Patient refused (states too much pain and received pain meds not too long ago)    Missed Time: Attempt    Comment:

## 2025-03-27 NOTE — PROGRESS NOTES
Surgical Oncology Progress Note    HPI:  Roma Corral is a 70 y.o. male with history of rectal cancer with mets to the liver, CAD (2021 NSTEMI), DM2, HTN  who is s/p robotic LAR with end colostomy, ilecocecetomy with Dr. Vasquez, and laparoscopic microwave liver ablation with Dr. Erazo on 3/17/25.     Subjective:  No acute events overnight.      Objective    Temp:  [36.4 °C (97.5 °F)-36.7 °C (98.1 °F)] 36.6 °C (97.8 °F)  Heart Rate:  [75-83] 80  Resp:  [17-18] 17  BP: ()/(56-74) 95/56  I/O last 2 completed shifts:  In: 1450 (19.7 mL/kg) [P.O.:950; IV Piggyback:500]  Out: 2400 (32.7 mL/kg) [Urine:900 (0.5 mL/kg/hr); Drains:1475; Stool:25]  Weight: 73.5 kg     Physical Exam:  NAD  Nontachycardic  On room air  Abdomen soft, appropriate tender to palpation, minimally distended, colostomy pink with scant stool output, right sided drain in place with chylous output    Labs within past 24h:            8.3   139  109  15   8.2>-----<83  ----------------<134             27.2   4.2  22  0.87          Ca 7.5 Phos 2.9 Mg 2.20       ALT 13 AST 14 AlkPhos 522 tBili 0.9           Imaging within past 24h:  CT volumetrics 3/21   Findings consistent with patient history including multifocal   areas of ablation within the liver and free air. No fluid collection   to suggest abscess or evidence of bowel obstruction/leak.     ASSESSMENT  Roma Corral is a 70 y.o. male with history of rectal cancer with mets to the liver, CAD (2021 NSTEMI), DM2, HTN who is s/p robotic LAR with end colostomy, ilecocecetomy with Dr. Vasquez, and laparoscopic microwave liver ablation with Dr. Erazo on 3/17/25. Progressing appropriately with ostomy function starting 3/21. Fell overnight into 3/22 AM onto right hip, no other injuries. No LOC or head strike, neg hip XR. Elevated T bili post operatively, persistently ~1.5. 1-2L per day out of drain, likely ascites 2/2 liver dysfunction. Lymphoscintigraphy with embolization 3/25.    PLAN:  - Continue  octreotide  - Can trial low-dose midodrine as this has been described in prior case reports to help with chyle leaks  -Would recommend repeat attempt at lymph embolization with IR  -May need to consider TPN and prolonged bowel rest given ongoing high drain output  -Holding off on portal vein and hepatic vein embolization until chyle leak is under better control    Discussed with Dr. Kacey Palacios  Surgical Oncology [id number]

## 2025-03-27 NOTE — PROGRESS NOTES
"Music Therapy Note    Roma Corral     Therapy Session  Referral Type: New referral this admission  Visit Type: New visit  Session Start Time: 1236  Session End Time: 1347  Intervention Delivery: In-person  Conflict of Service: None  Number of family members present: 0  Number of staff members present: 0     Pre-assessment  Unable to Assess Reason: Cognitive limitation  Mood/Affect: Calm, Tired/lethargic, Participative  Verbalized Emotional State: Frustration, Hopefulness         Treatment/Interventions  Areas of Focus: Locus of control, Normalization, Self-expression  Music Therapy Interventions: Empathic listening/validating emotions, Music sharing/discussion, Live music listening, Recorded music listening, Improvisation  Interruption: Yes  Interrupted by: Staff  Interruption Duration (min): 2 minutes  Interruption Outcome: Session resumed  Patient Fell Asleep at End of Session: No    Post-assessment  Unable to Assess Reason: Cognitive limitation  Mood/Affect: Tired/lethargic, Participative, Goal focused, Anxious, Appropriate, Cooperative  Verbalized Emotional State: Relief, Happiness, Gratitude, Enjoyment  Continue Visiting: Yes  Total Session Time (min): 71 minutes    Narrative  Assessment Detail: Upon arrival of music therapist, pt was seen in his room lethargic, alert, displaying appropriate affect. Pt expressed that he was doing \"okay\" and \"Well I am here.\" Pt also expressed frustration due to having to be in the hospital. 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 Pt has always wanted to play the piano. He is , been so for approx 45 plus years.  Plan: Music therapist introduced services to pt followed by assessing and implementing services with a focus on using music as a means of locus of control, normalization, self expression, and coping using live and recorded music listening, " "improvisation, and a therapeutic present via supportive listening and validation.  Intervention: During intervention, pt began with actively choosing preferred genre of music. Pt requested classical music, expressing that it made him feel grounded and good. Pt passively listening as music therapist played recorded classical music played by ronald cardenas ma and guests \"[place]\" and \" Back Cello Suite.\" Music provided the opportunity for pt to express thoughts and feelings, as music therapist provided support via listening and validating pt. Music therapist sang and played guitar to pt's prefence, pt was receptive via actively tapping his feet, moving to the music, and stating, \"This was great, how awesome.\"  Evaluation: Pt responded by displaying positive affect via displaying an increase in smiling and stating, \"It's been so hard to be here, so many medical people coming in and out, then you show up and changed my whole day!\"  Follow-up: If applicable, MT will plan to follow up with pt next week.  Patient Comments: \"It's been so hard to be here, so many medical people coming in and out, then you show up and changed my whole day!\"    Education Documentation  No documentation found.          "

## 2025-03-28 ENCOUNTER — APPOINTMENT (OUTPATIENT)
Dept: RADIOLOGY | Facility: HOSPITAL | Age: 70
End: 2025-03-28
Payer: MEDICARE

## 2025-03-28 LAB
ALBUMIN SERPL BCP-MCNC: 2 G/DL (ref 3.4–5)
ALP SERPL-CCNC: 505 U/L (ref 33–136)
ALT SERPL W P-5'-P-CCNC: 11 U/L (ref 10–52)
ANION GAP SERPL CALC-SCNC: 10 MMOL/L (ref 10–20)
AST SERPL W P-5'-P-CCNC: 16 U/L (ref 9–39)
BILIRUB SERPL-MCNC: 0.7 MG/DL (ref 0–1.2)
BUN SERPL-MCNC: 17 MG/DL (ref 6–23)
CALCIUM SERPL-MCNC: 7.2 MG/DL (ref 8.6–10.6)
CHLORIDE SERPL-SCNC: 109 MMOL/L (ref 98–107)
CO2 SERPL-SCNC: 23 MMOL/L (ref 21–32)
CREAT SERPL-MCNC: 0.75 MG/DL (ref 0.5–1.3)
EGFRCR SERPLBLD CKD-EPI 2021: >90 ML/MIN/1.73M*2
ERYTHROCYTE [DISTWIDTH] IN BLOOD BY AUTOMATED COUNT: 19.4 % (ref 11.5–14.5)
GLUCOSE SERPL-MCNC: 96 MG/DL (ref 74–99)
HCT VFR BLD AUTO: 24.1 % (ref 41–52)
HGB BLD-MCNC: 7.3 G/DL (ref 13.5–17.5)
MAGNESIUM SERPL-MCNC: 2.04 MG/DL (ref 1.6–2.4)
MCH RBC QN AUTO: 28.6 PG (ref 26–34)
MCHC RBC AUTO-ENTMCNC: 30.3 G/DL (ref 32–36)
MCV RBC AUTO: 95 FL (ref 80–100)
NRBC BLD-RTO: 0 /100 WBCS (ref 0–0)
PLATELET # BLD AUTO: 67 X10*3/UL (ref 150–450)
POTASSIUM SERPL-SCNC: 4.1 MMOL/L (ref 3.5–5.3)
PROT SERPL-MCNC: 4.6 G/DL (ref 6.4–8.2)
RBC # BLD AUTO: 2.55 X10*6/UL (ref 4.5–5.9)
SODIUM SERPL-SCNC: 138 MMOL/L (ref 136–145)
WBC # BLD AUTO: 5.1 X10*3/UL (ref 4.4–11.3)

## 2025-03-28 PROCEDURE — 2500000004 HC RX 250 GENERAL PHARMACY W/ HCPCS (ALT 636 FOR OP/ED): Performed by: RADIOLOGY

## 2025-03-28 PROCEDURE — 2500000001 HC RX 250 WO HCPCS SELF ADMINISTERED DRUGS (ALT 637 FOR MEDICARE OP): Performed by: STUDENT IN AN ORGANIZED HEALTH CARE EDUCATION/TRAINING PROGRAM

## 2025-03-28 PROCEDURE — 37244 VASC EMBOLIZE/OCCLUDE BLEED: CPT | Performed by: RADIOLOGY

## 2025-03-28 PROCEDURE — 2780000003 HC OR 278 NO HCPCS

## 2025-03-28 PROCEDURE — 2500000005 HC RX 250 GENERAL PHARMACY W/O HCPCS

## 2025-03-28 PROCEDURE — 2720000007 HC OR 272 NO HCPCS

## 2025-03-28 PROCEDURE — 7100000009 HC PHASE TWO TIME - INITIAL BASE CHARGE

## 2025-03-28 PROCEDURE — 83735 ASSAY OF MAGNESIUM: CPT

## 2025-03-28 PROCEDURE — 2500000002 HC RX 250 W HCPCS SELF ADMINISTERED DRUGS (ALT 637 FOR MEDICARE OP, ALT 636 FOR OP/ED): Performed by: STUDENT IN AN ORGANIZED HEALTH CARE EDUCATION/TRAINING PROGRAM

## 2025-03-28 PROCEDURE — 2500000004 HC RX 250 GENERAL PHARMACY W/ HCPCS (ALT 636 FOR OP/ED): Performed by: NURSE PRACTITIONER

## 2025-03-28 PROCEDURE — 2500000001 HC RX 250 WO HCPCS SELF ADMINISTERED DRUGS (ALT 637 FOR MEDICARE OP)

## 2025-03-28 PROCEDURE — 36416 COLLJ CAPILLARY BLOOD SPEC: CPT

## 2025-03-28 PROCEDURE — 80053 COMPREHEN METABOLIC PANEL: CPT

## 2025-03-28 PROCEDURE — C1729 CATH, DRAINAGE: HCPCS

## 2025-03-28 PROCEDURE — 99232 SBSQ HOSP IP/OBS MODERATE 35: CPT

## 2025-03-28 PROCEDURE — 99152 MOD SED SAME PHYS/QHP 5/>YRS: CPT

## 2025-03-28 PROCEDURE — 85027 COMPLETE CBC AUTOMATED: CPT

## 2025-03-28 PROCEDURE — 2500000004 HC RX 250 GENERAL PHARMACY W/ HCPCS (ALT 636 FOR OP/ED)

## 2025-03-28 PROCEDURE — 7100000010 HC PHASE TWO TIME - EACH INCREMENTAL 1 MINUTE

## 2025-03-28 PROCEDURE — 99153 MOD SED SAME PHYS/QHP EA: CPT

## 2025-03-28 PROCEDURE — 1170000001 HC PRIVATE ONCOLOGY ROOM DAILY

## 2025-03-28 RX ORDER — FENTANYL CITRATE 50 UG/ML
INJECTION, SOLUTION INTRAMUSCULAR; INTRAVENOUS
Status: COMPLETED | OUTPATIENT
Start: 2025-03-28 | End: 2025-03-28

## 2025-03-28 RX ORDER — HYDROMORPHONE HYDROCHLORIDE 2 MG/1
1 TABLET ORAL EVERY 4 HOURS PRN
Status: DISCONTINUED | OUTPATIENT
Start: 2025-03-28 | End: 2025-04-03

## 2025-03-28 RX ORDER — ENOXAPARIN SODIUM 100 MG/ML
40 INJECTION SUBCUTANEOUS EVERY 24 HOURS
Status: DISCONTINUED | OUTPATIENT
Start: 2025-03-28 | End: 2025-04-07 | Stop reason: HOSPADM

## 2025-03-28 RX ORDER — OCTREOTIDE ACETATE 500 UG/ML
200 INJECTION, SOLUTION INTRAVENOUS; SUBCUTANEOUS 3 TIMES DAILY
Status: DISCONTINUED | OUTPATIENT
Start: 2025-03-28 | End: 2025-04-07 | Stop reason: HOSPADM

## 2025-03-28 RX ORDER — MIDAZOLAM HYDROCHLORIDE 1 MG/ML
INJECTION INTRAMUSCULAR; INTRAVENOUS
Status: COMPLETED | OUTPATIENT
Start: 2025-03-28 | End: 2025-03-28

## 2025-03-28 RX ORDER — SODIUM CHLORIDE 9 MG/ML
75 INJECTION, SOLUTION INTRAVENOUS CONTINUOUS
Status: DISCONTINUED | OUTPATIENT
Start: 2025-03-28 | End: 2025-03-28

## 2025-03-28 RX ORDER — ACETAMINOPHEN 325 MG/1
650 TABLET ORAL EVERY 6 HOURS PRN
Status: DISCONTINUED | OUTPATIENT
Start: 2025-03-28 | End: 2025-04-07 | Stop reason: HOSPADM

## 2025-03-28 RX ADMIN — OCTREOTIDE ACETATE 200 MCG: 500 INJECTION, SOLUTION INTRAVENOUS; SUBCUTANEOUS at 11:12

## 2025-03-28 RX ADMIN — METHOCARBAMOL TABLETS 500 MG: 500 TABLET, COATED ORAL at 21:59

## 2025-03-28 RX ADMIN — FENTANYL CITRATE 50 MCG: 50 INJECTION, SOLUTION INTRAMUSCULAR; INTRAVENOUS at 09:42

## 2025-03-28 RX ADMIN — METHOCARBAMOL TABLETS 500 MG: 500 TABLET, COATED ORAL at 00:06

## 2025-03-28 RX ADMIN — OCTREOTIDE ACETATE 200 MCG: 500 INJECTION, SOLUTION INTRAVENOUS; SUBCUTANEOUS at 21:58

## 2025-03-28 RX ADMIN — ENOXAPARIN SODIUM 40 MG: 100 INJECTION SUBCUTANEOUS at 15:47

## 2025-03-28 RX ADMIN — ATORVASTATIN CALCIUM 40 MG: 40 TABLET, FILM COATED ORAL at 00:06

## 2025-03-28 RX ADMIN — NYSTATIN 400000 UNITS: 500000 SUSPENSION ORAL at 00:05

## 2025-03-28 RX ADMIN — SODIUM CHLORIDE 500 ML: 0.9 INJECTION, SOLUTION INTRAVENOUS at 17:16

## 2025-03-28 RX ADMIN — MORPHINE SULFATE 30 MG: 30 TABLET, FILM COATED, EXTENDED RELEASE ORAL at 11:08

## 2025-03-28 RX ADMIN — MIDAZOLAM HYDROCHLORIDE 1 MG: 2 INJECTION, SOLUTION INTRAMUSCULAR; INTRAVENOUS at 09:42

## 2025-03-28 RX ADMIN — HYDROMORPHONE HYDROCHLORIDE 1 MG: 2 TABLET ORAL at 17:09

## 2025-03-28 RX ADMIN — MIDODRINE HYDROCHLORIDE 2.5 MG: 2.5 TABLET ORAL at 11:21

## 2025-03-28 RX ADMIN — GABAPENTIN 300 MG: 300 CAPSULE ORAL at 00:06

## 2025-03-28 RX ADMIN — HYDROMORPHONE HYDROCHLORIDE 1 MG: 2 TABLET ORAL at 04:20

## 2025-03-28 RX ADMIN — FENTANYL CITRATE 50 MCG: 50 INJECTION, SOLUTION INTRAMUSCULAR; INTRAVENOUS at 10:00

## 2025-03-28 RX ADMIN — DRONABINOL 2.5 MG: 2.5 CAPSULE ORAL at 15:47

## 2025-03-28 RX ADMIN — OCTREOTIDE ACETATE 100 MCG: 100 INJECTION, SOLUTION INTRAVENOUS; SUBCUTANEOUS at 00:06

## 2025-03-28 RX ADMIN — HYDROMORPHONE HYDROCHLORIDE 1 MG: 2 TABLET ORAL at 11:20

## 2025-03-28 RX ADMIN — Medication 3 MG: at 21:59

## 2025-03-28 RX ADMIN — TAMSULOSIN HYDROCHLORIDE 0.4 MG: 0.4 CAPSULE ORAL at 11:07

## 2025-03-28 RX ADMIN — DRONABINOL 2.5 MG: 2.5 CAPSULE ORAL at 08:47

## 2025-03-28 RX ADMIN — MORPHINE SULFATE 30 MG: 30 TABLET, FILM COATED, EXTENDED RELEASE ORAL at 00:06

## 2025-03-28 RX ADMIN — MIDODRINE HYDROCHLORIDE 2.5 MG: 2.5 TABLET ORAL at 17:09

## 2025-03-28 RX ADMIN — NYSTATIN 400000 UNITS: 500000 SUSPENSION ORAL at 21:58

## 2025-03-28 RX ADMIN — MIDODRINE HYDROCHLORIDE 2.5 MG: 2.5 TABLET ORAL at 08:47

## 2025-03-28 RX ADMIN — METHOCARBAMOL TABLETS 500 MG: 500 TABLET, COATED ORAL at 17:09

## 2025-03-28 RX ADMIN — MORPHINE SULFATE 30 MG: 30 TABLET, FILM COATED, EXTENDED RELEASE ORAL at 21:59

## 2025-03-28 RX ADMIN — SODIUM CHLORIDE, SODIUM LACTATE, POTASSIUM CHLORIDE, AND CALCIUM CHLORIDE 250 ML: .6; .31; .03; .02 INJECTION, SOLUTION INTRAVENOUS at 01:46

## 2025-03-28 RX ADMIN — Medication 3 MG: at 00:06

## 2025-03-28 RX ADMIN — METHOCARBAMOL TABLETS 500 MG: 500 TABLET, COATED ORAL at 11:08

## 2025-03-28 RX ADMIN — ATORVASTATIN CALCIUM 40 MG: 40 TABLET, FILM COATED ORAL at 21:59

## 2025-03-28 RX ADMIN — ACETAMINOPHEN 650 MG: 325 TABLET, FILM COATED ORAL at 00:06

## 2025-03-28 RX ADMIN — OCTREOTIDE ACETATE 200 MCG: 500 INJECTION, SOLUTION INTRAVENOUS; SUBCUTANEOUS at 15:47

## 2025-03-28 RX ADMIN — MIDAZOLAM HYDROCHLORIDE 1 MG: 2 INJECTION, SOLUTION INTRAMUSCULAR; INTRAVENOUS at 10:00

## 2025-03-28 ASSESSMENT — PAIN SCALES - GENERAL
PAINLEVEL_OUTOF10: 6
PAINLEVEL_OUTOF10: 3
PAINLEVEL_OUTOF10: 7
PAINLEVEL_OUTOF10: 0 - NO PAIN
PAINLEVEL_OUTOF10: 0 - NO PAIN
PAINLEVEL_OUTOF10: 10 - WORST POSSIBLE PAIN
PAINLEVEL_OUTOF10: 5 - MODERATE PAIN
PAINLEVEL_OUTOF10: 0 - NO PAIN
PAINLEVEL_OUTOF10: 6
PAINLEVEL_OUTOF10: 0 - NO PAIN
PAINLEVEL_OUTOF10: 0 - NO PAIN
PAINLEVEL_OUTOF10: 6
PAINLEVEL_OUTOF10: 5 - MODERATE PAIN
PAINLEVEL_OUTOF10: 7
PAINLEVEL_OUTOF10: 0 - NO PAIN
PAINLEVEL_OUTOF10: 0 - NO PAIN

## 2025-03-28 ASSESSMENT — COGNITIVE AND FUNCTIONAL STATUS - GENERAL
HELP NEEDED FOR BATHING: A LITTLE
TOILETING: A LITTLE
DRESSING REGULAR LOWER BODY CLOTHING: A LITTLE
DRESSING REGULAR UPPER BODY CLOTHING: A LITTLE
MOVING TO AND FROM BED TO CHAIR: A LITTLE
MOBILITY SCORE: 16
STANDING UP FROM CHAIR USING ARMS: A LOT
PERSONAL GROOMING: A LITTLE
WALKING IN HOSPITAL ROOM: A LITTLE
CLIMB 3 TO 5 STEPS WITH RAILING: A LOT
TURNING FROM BACK TO SIDE WHILE IN FLAT BAD: A LITTLE
DAILY ACTIVITIY SCORE: 19
MOVING FROM LYING ON BACK TO SITTING ON SIDE OF FLAT BED WITH BEDRAILS: A LITTLE

## 2025-03-28 NOTE — PROGRESS NOTES
Surgical Oncology Progress Note    HPI:  Roma Corral is a 70 y.o. male with history of rectal cancer with mets to the liver, CAD (2021 NSTEMI), DM2, HTN  who is s/p robotic LAR with end colostomy, ilecocecetomy with Dr. Vasquez, and laparoscopic microwave liver ablation with Dr. Erazo on 3/17/25.     Subjective:  No acute events. Drain still with chylous fluid, >1.5L per 24 hours.     Objective    Temp:  [36.2 °C (97.2 °F)-36.8 °C (98.3 °F)] 36.2 °C (97.2 °F)  Heart Rate:  [] 90  Resp:  [10-18] 16  BP: ()/(49-83) 98/64  I/O last 2 completed shifts:  In: 850 (11.6 mL/kg) [P.O.:600; IV Piggyback:250]  Out: 2570 (35 mL/kg) [Urine:570 (0.3 mL/kg/hr); Drains:1925; Stool:75]  Weight: 73.5 kg     Physical Exam:  NAD  Nontachycardic  On room air  Abdomen soft, appropriate tender to palpation, minimally distended, colostomy pink with scant stool output, right sided drain in place with chylous output    Labs within past 24h:            7.3   138  109  17   5.1>-----<67  ----------------<96             24.1   4.1  23  0.75          Ca 7.2 Phos 2.9 Mg 2.04       ALT 11 AST 16 AlkPhos 505 tBili 0.7           Imaging within past 24h:  CT volumetrics 3/21   Findings consistent with patient history including multifocal   areas of ablation within the liver and free air. No fluid collection   to suggest abscess or evidence of bowel obstruction/leak.     ASSESSMENT  Roma Corral is a 70 y.o. male with history of rectal cancer with mets to the liver, CAD (2021 NSTEMI), DM2, HTN who is s/p robotic LAR with end colostomy, ilecocecetomy with Dr. Vasquez, and laparoscopic microwave liver ablation with Dr. Erazo on 3/17/25. Progressing appropriately with ostomy function starting 3/21. Fell overnight into 3/22 AM onto right hip, no other injuries. No LOC or head strike, neg hip XR. Elevated T bili post operatively, persistently ~1.5. 1-2L per day out of drain, likely ascites 2/2 liver dysfunction. Lymphoscintigraphy with  embolization 3/25, did not improve.     PLAN:  - Continue octreotide TID  - Can trial low-dose midodrine as this has been described in prior case reports to help with chyle leaks  -IR for repeat lymph embolization today   -May need to consider TPN and prolonged bowel rest given ongoing high drain output  -Holding off on portal vein and hepatic vein embolization until chyle leak is under better control    Discussed with Dr. Kacey Cannon   Surgical Oncology [id number]

## 2025-03-28 NOTE — CARE PLAN
Problem: Safety - Adult  Goal: Free from fall injury  Outcome: Progressing     Problem: Discharge Planning  Goal: Discharge to home or other facility with appropriate resources  Outcome: Progressing     Problem: Chronic Conditions and Co-morbidities  Goal: Patient's chronic conditions and co-morbidity symptoms are monitored and maintained or improved  Outcome: Progressing     Problem: Fall/Injury  Goal: Not fall by end of shift  Outcome: Progressing  Goal: Be free from injury by end of the shift  Outcome: Progressing  Goal: Verbalize understanding of personal risk factors for fall in the hospital  Outcome: Progressing  Goal: Verbalize understanding of risk factor reduction measures to prevent injury from fall in the home  Outcome: Progressing  Goal: Use assistive devices by end of the shift  Outcome: Progressing  Goal: Pace activities to prevent fatigue by end of the shift  Outcome: Progressing     Problem: Skin  Goal: Decreased wound size/increased tissue granulation at next dressing change  Outcome: Progressing  Goal: Participates in plan/prevention/treatment measures  Outcome: Progressing  Goal: Prevent/manage excess moisture  Outcome: Progressing  Goal: Prevent/minimize sheer/friction injuries  Outcome: Progressing  Goal: Promote/optimize nutrition  Outcome: Progressing  Goal: Promote skin healing  Outcome: Progressing     Problem: Diabetes  Goal: Achieve decreasing blood glucose levels by end of shift  Outcome: Progressing  Goal: Increase stability of blood glucose readings by end of shift  Outcome: Progressing  Goal: Decrease in ketones present in urine by end of shift  Outcome: Progressing  Goal: Maintain electrolyte levels within acceptable range throughout shift  Outcome: Progressing  Goal: Maintain glucose levels >70mg/dl to <250mg/dl throughout shift  Outcome: Progressing  Goal: No changes in neurological exam by end of shift  Outcome: Progressing  Goal: Learn about and adhere to nutrition  recommendations by end of shift  Outcome: Progressing  Goal: Vital signs within normal range for age by end of shift  Outcome: Progressing  Goal: Increase self care and/or family involovement by end of shift  Outcome: Progressing  Goal: Receive DSME education by end of shift  Outcome: Progressing     Problem: Pain  Goal: Takes deep breaths with improved pain control throughout the shift  Outcome: Progressing  Goal: Turns in bed with improved pain control throughout the shift  Outcome: Progressing  Goal: Walks with improved pain control throughout the shift  Outcome: Progressing  Goal: Performs ADL's with improved pain control throughout shift  Outcome: Progressing  Goal: Participates in PT with improved pain control throughout the shift  Outcome: Progressing  Goal: Free from opioid side effects throughout the shift  Outcome: Progressing  Goal: Free from acute confusion related to pain meds throughout the shift  Outcome: Progressing

## 2025-03-28 NOTE — PROGRESS NOTES
Therapy Communication Note    Patient Name: Roma Corral  MRN: [id number]  Department: [facility]  Room: [address] [place] [address]  Today's Date: 3/28/2025     Discipline: Physical Therapy    Missed Visit Reason: Missed Visit Reason: Other (Comment) (per nurse, pt going off to IR shortly. will return with time permitting)    Missed Time: Attempt    Comment:

## 2025-03-28 NOTE — POST-PROCEDURE NOTE
Interventional Radiology Brief Postprocedure Note    Attending: Dr. Guzman    Assistant: Dr. Vaqzuez    Diagnosis:   Lymphatic leak    Description of procedure:     Patient was placed in [supine] position. Limited axial CT images were obtained through the [pelvis] for the purposes of needle guidance were taken. The images demonstrate [residual lipiodol contrast within the pelvic lymphatic cyst, with areas of lipiodol in the pelvis adjacent to the surgical drain, which were not seen on prior exam and likely represent additional areas of lipiodol extravasation.]. Radiopaque coils are noted in the more proximal right external iliac region, corresponding to prior lymphatic embolization 03/25/2025. Note is also made of residual extravasated lipiodol in the bilateral groin subcutaneous tissues from prior lipiodol injection for the lymphangiogram 03/25/2025.      Lymphatic ducts adjacent bilateral external iliac vessels, distal to the site of prior embolization, were targeted for cannulation. Utilizing CT laser, the overlying skin was marked,  prepped and draped in normal sterile fashion. Local anesthesia was  achieved using 1% lidocaine.      A 20 gauge spinal needle was advanced under intermittent CT guidance into a left lymphatic duct adjacent to mid left iliac vessels. Care was made to avoid the vessels and bowel loops. The inner stylet was removed and a tornado coil was deployed with placement confirmed under both fluoroscopic and CT imaging. The needle was subsequently repositioned under CT guidance into a more distal left lymphatic duct adjacent to the mid to distal left iliac vessels. Another tornado coil was deployed with placement confirmed under CT imaging.     Subsequently, the spinal needle was removed and a 20 gauge Chiba needle was advanced under intermittent CT guidance into another left lymphatic duct adjacent to the mid to distal left iliac vessels. The needle was curved to account for the course of the  iliac vessels and bowel loops. Once needle placement was confirmed within the lymphatic duct under CT guidance, the inner stylet was removed and both a tornado coil as well as fibrin glue were deployed using CT guidance.      Attention was then turned to the right lymphatic ducts. A 5 Guyanese Yueh needle system was advanced under intermittent CT guidance into the right lymphatic duct adjacent to the distal right iliac vessels. Once needle placement was confirmed within the lymphatic duct on CT images, both a tornado coil as well as fibrin glue were deployed through the Yueh using CT guidance.      Post embolization CT images was obtained demonstrating the coils in the  desired location and no evidence of hemorrhage. A sterile, occlusive dressing was applied. The bilateral femoral arteries were palpated in the groins and were intact with strong pulsatile flow to palpation.      Patient tolerated procedure well without immediate complications.       Anesthesia:  Local and MAC Moderate    Complications: None    Estimated Blood Loss: minimal      The patient tolerated the procedure well without incident or complication and is in stable condition.

## 2025-03-28 NOTE — PROGRESS NOTES
Colorectal Surgery Progress Note      HPI: Roma Corral is a 70 y.o. male with a past medical history of CAD (NSTEMI 2021), DMII (last A1c 5.4 normal 1/17/25), HLD, HTN, gout, anxiety, metastatic rectal cancer to liver s/p loop colostomy July 2024 & FAWN c/b persistent disease and rectal stricture, now hospital day 11 s/p robotic low anterior resection with transition from loop to end colostomy & 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 Vasquez, and laparoscopic MWA x3 with liver biopsy by Dr. Erazo on 3/18/25.      Subjective  No acute events overnight, reports sleeping well.  Surgical pain is controlled, testicular pain improving.  Reports abdominal drain still putting out a large amount.  Denies nausea, vomiting, bloating on diet, appetite improving on Marinol.  Is drinking two Ensure clears per day.  Colostomy with gas and pudding stool in pouch   Ambulating with PT/up to bathroom with walker.    Objective  Physical Exam:  Gen: in no physical distress and alert, deconditioned and weak appearing, pleasant & conversational, lying in bed  HENT: Head normocephalic, atraumatic.  Mucous membranes moist.    Neuro: Alert and oriented x3.  Moves all extremities spontaneously x4.  CV: Normal rate and rhythm on palpated radial pulse. Trace edema bilaterally at ankles.  Resp: No acute respiratory distress.  Normal effort on room air. Chest expansion symmetrical.   GI: Abdomen is soft, nontender, and nondistended. Laparoscopic incisions are clean, dry and intact with glue.  Stoma is pink and healthy, well pouched, with gas and pudding stool in pouch. Abdominal drain to bile bag high output and milky yellow chyle, without erythema around site.  : Testicles without erythema or ecchymosis. Nontender to palpation, without palpable masses or abnormalities.  Skin: Warm and dry, pale, without rashes or lesions    Visit Vitals  BP (!) 102/49 (BP Location: Left arm, Patient Position: Lying)   Pulse 68   Temp 36.8  "°C (98.3 °F) (Temporal)   Resp 18        I/O last 3 completed shifts:  In: 1550 (21.1 mL/kg) [P.O.:800; IV Piggyback:750]  Out: 3145 (42.8 mL/kg) [Urine:745 (0.3 mL/kg/hr); Drains:2325; Stool:75]  Weight: 73.5 kg   No intake/output data recorded.             Data Review:  CBC:   Lab Results   Component Value Date    WBC 8.2 03/27/2025    RBC 2.83 (L) 03/27/2025     BMP:   Lab Results   Component Value Date    GLUCOSE 134 (H) 03/27/2025    CO2 22 03/27/2025    BUN 15 03/27/2025    CREATININE 0.87 03/27/2025    CALCIUM 7.5 (L) 03/27/2025     Coagulation:   No results found for: \"INR\", \"APTT\"      Today's labs pending.    3/27/25 Drain TG - 433  Imaging:  No recent imaging to review.    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 Vasquez, and laparoscopic MWA x3 with liver biopsy by Dr. Eraoz on 3/18/25. Post-op course c/b high pelvic drain output 2/2 persistent lymph leak (elevated drain TG) s/p IR lymphoscintigraphy 3/25 & low fat diet without improvement/decrease in output.  Also c/b unwitnessed fall with negative right hip/pelvis xray. PT recommending SNF at discharge.      Plan:  -Plan for IR repeat lymphoscintigraphy today in setting of persistent lymph leak  -OOB during day/ambulation in halls x3/PT  -Continue lowfat diet, monitoring of drain output & replacing losses 1/2:1    Neuro: Post-op pain controlled, history of anxiety  -Continue current pain regimen with tylenol, robaxin; appreciate supportive onc for recs & support  -Weaning prn dilaudid further to 1mg moderate/severe pain   -Continue home MS Contin, hold home ativan  -Sleep hygiene/OOB during day/PT/multiple walks in halls   -Pet/music/art therapy    CV: hemodynamically stable; hx of CAD (NSTEMI 2021), HTN, " HLD  -Q4 vital signs  -EKG prn for ACS symptoms  -Continue home metoprolol with hold parameters  -Hold home ASA 81, Lipitor in setting of elevated LFTs/thrombocytopenia    Resp: no acute issues on room air, no significant pulmonary history  -ICS 10x every hour  -OOB/ambulation    GI: s/p robotic LAR/loop to end colostomy, ileocecectomy & MWA c/b persistent lymph leak; hx of rectal CA with mets to liver  -low fat diet as tolerated, Ensure Clears  -Calorie count to ensure adequate PO nutrition  -Octreotide & Midodrine TID for lymph leak; appreciate surg onc support & expertise  -Repeat embolization today 3/28  -Zofran prn for nausea  -continue assessment of stoma and output, stoma not new for patient.  WOCN for supplies & support. Continue to monitor drain output/replace fluid prn  -Daily CMP to monitor LFTs    : Oliguria with small volume retention, testicular pain post fall; no significant urological hx  -UA negative 3/26  -Continue flomax for low resection, needs to stand when voiding to completely empty.   - Strict intake and output, monitoring fluid & electrolyte balance closely in setting of high output drain, encouraging PO hydration  - Bladder scan prn to monitor retention/urine output  - Daily RFP/replace electrolytes as needed    Heme: H&H stable with no evidence of bleeding, history of pancytopenia with platelets similar to baseline  -CBC daily; monitor for s/s of bleeding    ID: afebrile, no leukocytosis  -Q4 temp  -trend CBC; monitor for s/s of infection    Endo: no acute issues, no significant endocrine history   -hypoglycemia protocol  -Monitor BG on AM labs  -no indication for SSI    DVT Prophy: SCDs;  Lovenox    Dispo:   -Continue care on RNF  -PT recommending SNF, patient accepted at facility of choice, no pre-cert needed.  Awaiting medical readiness for discharge.    Plan of care discussed with Colorectal surgical team, Dr. Henao, and patient.    Yuridia TORRES-CNP  Colorectal Surgery NP    [facility] Pager #[phone]

## 2025-03-28 NOTE — CARE PLAN
Problem: Safety - Adult  Goal: Free from fall injury  Outcome: Progressing     Problem: Discharge Planning  Goal: Discharge to home or other facility with appropriate resources  Outcome: Progressing     Problem: Chronic Conditions and Co-morbidities  Goal: Patient's chronic conditions and co-morbidity symptoms are monitored and maintained or improved  Outcome: Progressing     Problem: Fall/Injury  Goal: Not fall by end of shift  Outcome: Progressing  Goal: Be free from injury by end of the shift  Outcome: Progressing  Goal: Verbalize understanding of personal risk factors for fall in the hospital  Outcome: Progressing  Goal: Verbalize understanding of risk factor reduction measures to prevent injury from fall in the home  Outcome: Progressing  Goal: Use assistive devices by end of the shift  Outcome: Progressing  Goal: Pace activities to prevent fatigue by end of the shift  Outcome: Progressing     Problem: Skin  Goal: Decreased wound size/increased tissue granulation at next dressing change  Outcome: Progressing  Flowsheets (Taken 3/28/2025 0443)  Decreased wound size/increased tissue granulation at next dressing change: Promote sleep for wound healing  Goal: Participates in plan/prevention/treatment measures  Outcome: Progressing  Goal: Prevent/manage excess moisture  Outcome: Progressing  Flowsheets (Taken 3/28/2025 0443)  Prevent/manage excess moisture: Cleanse incontinence/protect with barrier cream  Goal: Prevent/minimize sheer/friction injuries  Outcome: Progressing  Flowsheets (Taken 3/28/2025 0443)  Prevent/minimize sheer/friction injuries: Use pull sheet  Goal: Promote/optimize nutrition  Outcome: Progressing  Goal: Promote skin healing  Outcome: Progressing  Flowsheets (Taken 3/28/2025 0443)  Promote skin healing:   Assess skin/pad under line(s)/device(s)   Rotate device position/do not position patient on device     Problem: Diabetes  Goal: Achieve decreasing blood glucose levels by end of  shift  Outcome: Progressing  Goal: Increase stability of blood glucose readings by end of shift  Outcome: Progressing  Goal: Decrease in ketones present in urine by end of shift  Outcome: Progressing  Goal: Maintain electrolyte levels within acceptable range throughout shift  Outcome: Progressing  Goal: Maintain glucose levels >70mg/dl to <250mg/dl throughout shift  Outcome: Progressing  Goal: No changes in neurological exam by end of shift  Outcome: Progressing  Goal: Learn about and adhere to nutrition recommendations by end of shift  Outcome: Progressing  Goal: Vital signs within normal range for age by end of shift  Outcome: Progressing  Goal: Increase self care and/or family involovement by end of shift  Outcome: Progressing  Goal: Receive DSME education by end of shift  Outcome: Progressing     Problem: Pain  Goal: Takes deep breaths with improved pain control throughout the shift  Outcome: Progressing  Goal: Turns in bed with improved pain control throughout the shift  Outcome: Progressing  Goal: Walks with improved pain control throughout the shift  Outcome: Progressing  Goal: Performs ADL's with improved pain control throughout shift  Outcome: Progressing  Goal: Participates in PT with improved pain control throughout the shift  Outcome: Progressing  Goal: Free from opioid side effects throughout the shift  Outcome: Progressing  Goal: Free from acute confusion related to pain meds throughout the shift  Outcome: Progressing

## 2025-03-28 NOTE — PRE-PROCEDURE NOTE
Interventional Radiology Preprocedure Note    Indication for procedure: The primary encounter diagnosis was Rectal cancer (Multi). 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    Relevant review of systems: NA    Relevant Labs:   Lab Results   Component Value Date    CREATININE 0.75 03/28/2025    EGFR >90 03/28/2025    INR 1.3 (H) 03/24/2025    PROTIME 14.5 (H) 03/24/2025       Planned Sedation/Anesthesia: Moderate    Airway assessment: normal    Directed physical examination:    Ao, calm    Mallampati: III (soft and hard palate and base of uvula visible)    ASA Score: ASA 3 - Patient with moderate systemic disease with functional limitations    Benefits, risks and alternatives of procedure and planned sedation have been discussed with the patient and/or their representative. All questions answered and they agree to proceed.

## 2025-03-29 ENCOUNTER — APPOINTMENT (OUTPATIENT)
Dept: RADIOLOGY | Facility: HOSPITAL | Age: 70
End: 2025-03-29
Payer: MEDICARE

## 2025-03-29 LAB
ALBUMIN SERPL BCP-MCNC: 1.9 G/DL (ref 3.4–5)
ALP SERPL-CCNC: 737 U/L (ref 33–136)
ALT SERPL W P-5'-P-CCNC: 12 U/L (ref 10–52)
ANION GAP SERPL CALC-SCNC: 13 MMOL/L (ref 10–20)
AST SERPL W P-5'-P-CCNC: 23 U/L (ref 9–39)
BILIRUB SERPL-MCNC: 0.7 MG/DL (ref 0–1.2)
BUN SERPL-MCNC: 18 MG/DL (ref 6–23)
CALCIUM SERPL-MCNC: 7.2 MG/DL (ref 8.6–10.6)
CHLORIDE SERPL-SCNC: 108 MMOL/L (ref 98–107)
CO2 SERPL-SCNC: 23 MMOL/L (ref 21–32)
CREAT SERPL-MCNC: 0.88 MG/DL (ref 0.5–1.3)
EGFRCR SERPLBLD CKD-EPI 2021: >90 ML/MIN/1.73M*2
ERYTHROCYTE [DISTWIDTH] IN BLOOD BY AUTOMATED COUNT: 19.3 % (ref 11.5–14.5)
GLUCOSE SERPL-MCNC: 148 MG/DL (ref 74–99)
HCT VFR BLD AUTO: 25.8 % (ref 41–52)
HGB BLD-MCNC: 7.6 G/DL (ref 13.5–17.5)
MAGNESIUM SERPL-MCNC: 2.16 MG/DL (ref 1.6–2.4)
MCH RBC QN AUTO: 28.8 PG (ref 26–34)
MCHC RBC AUTO-ENTMCNC: 29.5 G/DL (ref 32–36)
MCV RBC AUTO: 98 FL (ref 80–100)
NRBC BLD-RTO: 0 /100 WBCS (ref 0–0)
PHOSPHATE SERPL-MCNC: 3.6 MG/DL (ref 2.5–4.9)
PLATELET # BLD AUTO: 87 X10*3/UL (ref 150–450)
POTASSIUM SERPL-SCNC: 4.5 MMOL/L (ref 3.5–5.3)
PROT SERPL-MCNC: 5 G/DL (ref 6.4–8.2)
RBC # BLD AUTO: 2.64 X10*6/UL (ref 4.5–5.9)
SODIUM SERPL-SCNC: 139 MMOL/L (ref 136–145)
WBC # BLD AUTO: 6 X10*3/UL (ref 4.4–11.3)

## 2025-03-29 PROCEDURE — 2500000001 HC RX 250 WO HCPCS SELF ADMINISTERED DRUGS (ALT 637 FOR MEDICARE OP): Performed by: STUDENT IN AN ORGANIZED HEALTH CARE EDUCATION/TRAINING PROGRAM

## 2025-03-29 PROCEDURE — 2500000004 HC RX 250 GENERAL PHARMACY W/ HCPCS (ALT 636 FOR OP/ED): Performed by: STUDENT IN AN ORGANIZED HEALTH CARE EDUCATION/TRAINING PROGRAM

## 2025-03-29 PROCEDURE — 2500000001 HC RX 250 WO HCPCS SELF ADMINISTERED DRUGS (ALT 637 FOR MEDICARE OP)

## 2025-03-29 PROCEDURE — 83735 ASSAY OF MAGNESIUM: CPT

## 2025-03-29 PROCEDURE — 84100 ASSAY OF PHOSPHORUS: CPT | Performed by: STUDENT IN AN ORGANIZED HEALTH CARE EDUCATION/TRAINING PROGRAM

## 2025-03-29 PROCEDURE — 2500000005 HC RX 250 GENERAL PHARMACY W/O HCPCS

## 2025-03-29 PROCEDURE — C1751 CATH, INF, PER/CENT/MIDLINE: HCPCS

## 2025-03-29 PROCEDURE — 80053 COMPREHEN METABOLIC PANEL: CPT

## 2025-03-29 PROCEDURE — 2500000005 HC RX 250 GENERAL PHARMACY W/O HCPCS: Performed by: STUDENT IN AN ORGANIZED HEALTH CARE EDUCATION/TRAINING PROGRAM

## 2025-03-29 PROCEDURE — 36573 INSJ PICC RS&I 5 YR+: CPT

## 2025-03-29 PROCEDURE — 85027 COMPLETE CBC AUTOMATED: CPT

## 2025-03-29 PROCEDURE — 87081 CULTURE SCREEN ONLY: CPT

## 2025-03-29 PROCEDURE — 2500000004 HC RX 250 GENERAL PHARMACY W/ HCPCS (ALT 636 FOR OP/ED): Performed by: NURSE PRACTITIONER

## 2025-03-29 PROCEDURE — 2780000003 HC OR 278 NO HCPCS

## 2025-03-29 PROCEDURE — 2500000004 HC RX 250 GENERAL PHARMACY W/ HCPCS (ALT 636 FOR OP/ED)

## 2025-03-29 PROCEDURE — 2500000002 HC RX 250 W HCPCS SELF ADMINISTERED DRUGS (ALT 637 FOR MEDICARE OP, ALT 636 FOR OP/ED): Performed by: STUDENT IN AN ORGANIZED HEALTH CARE EDUCATION/TRAINING PROGRAM

## 2025-03-29 PROCEDURE — 1170000001 HC PRIVATE ONCOLOGY ROOM DAILY

## 2025-03-29 RX ORDER — LIDOCAINE HYDROCHLORIDE 10 MG/ML
5 INJECTION, SOLUTION INFILTRATION; PERINEURAL ONCE
Status: DISCONTINUED | OUTPATIENT
Start: 2025-03-29 | End: 2025-03-31

## 2025-03-29 RX ORDER — THIAMINE HYDROCHLORIDE 100 MG/ML
100 INJECTION, SOLUTION INTRAMUSCULAR; INTRAVENOUS DAILY
Status: COMPLETED | OUTPATIENT
Start: 2025-03-29 | End: 2025-04-07

## 2025-03-29 RX ADMIN — ENOXAPARIN SODIUM 40 MG: 100 INJECTION SUBCUTANEOUS at 14:00

## 2025-03-29 RX ADMIN — METHOCARBAMOL TABLETS 500 MG: 500 TABLET, COATED ORAL at 03:11

## 2025-03-29 RX ADMIN — THIAMINE HYDROCHLORIDE 100 MG: 100 INJECTION, SOLUTION INTRAMUSCULAR; INTRAVENOUS at 08:44

## 2025-03-29 RX ADMIN — NYSTATIN 400000 UNITS: 500000 SUSPENSION ORAL at 08:43

## 2025-03-29 RX ADMIN — MORPHINE SULFATE 30 MG: 30 TABLET, FILM COATED, EXTENDED RELEASE ORAL at 22:14

## 2025-03-29 RX ADMIN — MIDODRINE HYDROCHLORIDE 2.5 MG: 2.5 TABLET ORAL at 08:44

## 2025-03-29 RX ADMIN — OCTREOTIDE ACETATE 200 MCG: 500 INJECTION, SOLUTION INTRAVENOUS; SUBCUTANEOUS at 14:00

## 2025-03-29 RX ADMIN — HYDROMORPHONE HYDROCHLORIDE 1 MG: 2 TABLET ORAL at 03:11

## 2025-03-29 RX ADMIN — MIDODRINE HYDROCHLORIDE 2.5 MG: 2.5 TABLET ORAL at 16:51

## 2025-03-29 RX ADMIN — OCTREOTIDE ACETATE 200 MCG: 500 INJECTION, SOLUTION INTRAVENOUS; SUBCUTANEOUS at 22:15

## 2025-03-29 RX ADMIN — ASCORBIC ACID, VITAMIN A PALMITATE, CHOLECALCIFEROL, THIAMINE HYDROCHLORIDE, RIBOFLAVIN-5 PHOSPHATE SODIUM, PYRIDOXINE HYDROCHLORIDE, NIACINAMIDE, DEXPANTHENOL, ALPHA-TOCOPHEROL ACETATE, VITAMIN K1, FOLIC ACID, BIOTIN, CYANOCOBALAMIN: 200; 3300; 200; 6; 3.6; 6; 40; 15; 10; 150; 600; 60; 5 INJECTION, SOLUTION INTRAVENOUS at 22:16

## 2025-03-29 RX ADMIN — METHOCARBAMOL TABLETS 500 MG: 500 TABLET, COATED ORAL at 16:51

## 2025-03-29 RX ADMIN — ATORVASTATIN CALCIUM 40 MG: 40 TABLET, FILM COATED ORAL at 22:14

## 2025-03-29 RX ADMIN — HYDROMORPHONE HYDROCHLORIDE 1 MG: 2 TABLET ORAL at 11:55

## 2025-03-29 RX ADMIN — Medication 3 MG: at 22:14

## 2025-03-29 RX ADMIN — NYSTATIN 400000 UNITS: 500000 SUSPENSION ORAL at 22:26

## 2025-03-29 RX ADMIN — DRONABINOL 2.5 MG: 2.5 CAPSULE ORAL at 08:47

## 2025-03-29 RX ADMIN — METHOCARBAMOL TABLETS 500 MG: 500 TABLET, COATED ORAL at 22:14

## 2025-03-29 RX ADMIN — MORPHINE SULFATE 30 MG: 30 TABLET, FILM COATED, EXTENDED RELEASE ORAL at 08:43

## 2025-03-29 RX ADMIN — OCTREOTIDE ACETATE 200 MCG: 500 INJECTION, SOLUTION INTRAVENOUS; SUBCUTANEOUS at 08:43

## 2025-03-29 RX ADMIN — NYSTATIN 400000 UNITS: 500000 SUSPENSION ORAL at 14:00

## 2025-03-29 RX ADMIN — TAMSULOSIN HYDROCHLORIDE 0.4 MG: 0.4 CAPSULE ORAL at 08:44

## 2025-03-29 RX ADMIN — DRONABINOL 2.5 MG: 2.5 CAPSULE ORAL at 16:51

## 2025-03-29 RX ADMIN — MIDODRINE HYDROCHLORIDE 2.5 MG: 2.5 TABLET ORAL at 11:51

## 2025-03-29 RX ADMIN — METHOCARBAMOL TABLETS 500 MG: 500 TABLET, COATED ORAL at 08:47

## 2025-03-29 ASSESSMENT — COGNITIVE AND FUNCTIONAL STATUS - GENERAL
STANDING UP FROM CHAIR USING ARMS: A LOT
DRESSING REGULAR LOWER BODY CLOTHING: A LITTLE
HELP NEEDED FOR BATHING: A LITTLE
TOILETING: A LITTLE
PERSONAL GROOMING: A LITTLE
CLIMB 3 TO 5 STEPS WITH RAILING: A LOT
DRESSING REGULAR UPPER BODY CLOTHING: A LITTLE
TURNING FROM BACK TO SIDE WHILE IN FLAT BAD: A LITTLE
MOVING TO AND FROM BED TO CHAIR: A LITTLE
DAILY ACTIVITIY SCORE: 19
MOBILITY SCORE: 17
WALKING IN HOSPITAL ROOM: A LITTLE

## 2025-03-29 ASSESSMENT — PAIN SCALES - GENERAL
PAINLEVEL_OUTOF10: 7
PAINLEVEL_OUTOF10: 3
PAINLEVEL_OUTOF10: 5 - MODERATE PAIN
PAINLEVEL_OUTOF10: 8

## 2025-03-29 ASSESSMENT — PAIN - FUNCTIONAL ASSESSMENT
PAIN_FUNCTIONAL_ASSESSMENT: 0-10

## 2025-03-29 ASSESSMENT — PAIN DESCRIPTION - LOCATION: LOCATION: ABDOMEN

## 2025-03-29 ASSESSMENT — PAIN SCALES - PAIN ASSESSMENT IN ADVANCED DEMENTIA (PAINAD): TOTALSCORE: MEDICATION (SEE MAR)

## 2025-03-29 NOTE — POST-PROCEDURE NOTE
PICC PLACEMENT            Placed:3/29/68315/29/[id number].Earliest Known Present:3/29/10177/29/2025.Hand Hygiene Completed:YesYes.Catheter Time Out Checklist Completed:YesYes.Size (Fr):44.Lumen Type:Double lumenDouble lumen.Catheter to Vein Ratio Less Than 45%:YesYes.Total Length (cm):4747.External Length (cm):00.Orientation:LeftLeft.Location:Brachial veinBrachial vein.Site Prep:Chlorhexidine ; Usual sterile procedure followedChlorhexidine ; Usual sterile procedure followed.Local Anesthetic:InjectableInjectable.Indication:Parenteral nutritionParenteral nutrition.Insertion Team Members In The Room:Nurse. The comment is Gregoria Dixon LPN.Initial Extremity Circumference (cm):2424.Placed by:Becky Brown RN-Joel Brown RN-MAREK.Insertion attempts:11.Patient Tolerance:Tolerated wellTolerated well.Comfort Measures:Subcutaneous anestheticSubcutaneous anesthetic.Procedure Location:BedsideBedside. The comment is timeout with Gerri Rn.Safety Measures:Patient specific safety measures addressed with RNPatient specific safety measures addressed with RN.Estimated Blood Loss (mL):00.Vessel Fully Compressible Proximally and Distally to Insertion Site:YesYes.Brisk Blood Return Obtained and Line Draws Easily:YesYes.Tip Location:Distal superior vena cavaDistal superior vena cava. The comment is SVC.Line Confirmation:Blood return; ECGBlood return; ECG.Lot #:[id number].:BDBD.PICC Line Exp Date:9/30/20259/30/2025.Post Procedure Checklist:Handoff with RN; Bed at lowest level and wheels locked; Obtain all new IV tubing prior to useHandoff with RN; Bed at lowest level and wheels locked; Obtain all new IV tubing prior to use.

## 2025-03-29 NOTE — PROGRESS NOTES
"     Colorectal Surgery Progress Note    HPI: Roma Corral is a 70 y.o. 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 Vasquez, and laparoscopic MWA x3 with liver biopsy by Dr. Erazo on 3/18/25.     Subjective  No acute events overnight, reports sleeping well.  Surgical pain is controlled, testicular pain improving.  Reports abdominal drain still putting out a large amount.  Denies nausea, vomiting, bloating on low fat diet, appetite improving on Marinol. Colostomy with gas in pouch   Ambulating with PT/up to bathroom with walker.    Objective  Physical Exam:  Gen: in no physical distress and alert, deconditioned and weak appearing, pleasant & conversational, lying in bed  HENT: Head normocephalic, atraumatic.  Mucous membranes moist.    Neuro: Alert and oriented x3.  Moves all extremities spontaneously x4.  CV: Normal rate and rhythm on palpated radial pulse. Trace edema bilaterally at ankles.  Resp: No acute respiratory distress.  Normal effort on room air. Chest expansion symmetrical.   GI: Abdomen is soft, nontender, and nondistended. Laparoscopic incisions are clean, dry and intact with glue.  Stoma is pink and healthy, well pouched, with gas in pouch. Abdominal drain to bile bag high output and milky yellow chyle, without erythema around site.  : Testicles without erythema or ecchymosis. Nontender to palpation, without palpable masses or abnormalities.  Skin: Warm and dry, pale, without rashes or lesions    Last Recorded Vitals  Blood pressure 106/69, pulse 77, temperature 36.2 °C (97.2 °F), temperature source Temporal, resp. rate 16, height 1.803 m (5' 10.98\"), weight 73.5 " kg (162 lb 0.6 oz), SpO2 97%.  Intake/Output last 3 Shifts:  I/O last 3 completed shifts:  In: 1460 (19.9 mL/kg) [P.O.:710; IV Piggyback:750]  Out: 3120 (42.4 mL/kg) [Urine:1070 (0.4 mL/kg/hr); Drains:1975; Stool:75]  Weight: 73.5 kg     Results for orders placed or performed during the hospital encounter of 03/17/25 (from the past 24 hours)   Comprehensive metabolic panel   Result Value Ref Range    Glucose 148 (H) 74 - 99 mg/dL    Sodium 139 136 - 145 mmol/L    Potassium 4.5 3.5 - 5.3 mmol/L    Chloride 108 (H) 98 - 107 mmol/L    Bicarbonate 23 21 - 32 mmol/L    Anion Gap 13 10 - 20 mmol/L    Urea Nitrogen 18 6 - 23 mg/dL    Creatinine 0.88 0.50 - 1.30 mg/dL    eGFR >90 >60 mL/min/1.73m*2    Calcium 7.2 (L) 8.6 - 10.6 mg/dL    Albumin 1.9 (L) 3.4 - 5.0 g/dL    Alkaline Phosphatase 737 (H) 33 - 136 U/L    Total Protein 5.0 (L) 6.4 - 8.2 g/dL    AST 23 9 - 39 U/L    Bilirubin, Total 0.7 0.0 - 1.2 mg/dL    ALT 12 10 - 52 U/L   Magnesium   Result Value Ref Range    Magnesium 2.16 1.60 - 2.40 mg/dL   CBC   Result Value Ref Range    WBC 6.0 4.4 - 11.3 x10*3/uL    nRBC 0.0 0.0 - 0.0 /100 WBCs    RBC 2.64 (L) 4.50 - 5.90 x10*6/uL    Hemoglobin 7.6 (L) 13.5 - 17.5 g/dL    Hematocrit 25.8 (L) 41.0 - 52.0 %    MCV 98 80 - 100 fL    MCH 28.8 26.0 - 34.0 pg    MCHC 29.5 (L) 32.0 - 36.0 g/dL    RDW 19.3 (H) 11.5 - 14.5 %    Platelets 87 (L) 150 - 450 x10*3/uL      Assessment: 70 year old male with hx of metastatic rectal cancer to liver s/p loop colostomy July 2024 & FAWN c/b persistent disease and rectal stricture s/p robotic low anterior resection with transition from loop to end colostomy & 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 Vasquez, and laparoscopic MWA x3 with liver biopsy by Dr. Erazo on 3/18/25. Post-op course c/b high pelvic drain output 2/2 persistent lymph leak (elevated drain TG) s/p IR lymphoscintigraphy 3/25 and 3/28 & low fat diet without improvement/decrease in output.  Also c/b unwitnessed  fall with negative right hip/pelvis xray. PT recommending SNF at discharge.     Plan:   -Restart Lovenox   -Plan for NPO prior to initiation of TPN, monitoring of drain output & replacing losses 1/2:1    Neuro: Post-op pain controlled, history of anxiety  -Continue current pain regimen with tylenol, robaxin; appreciate supportive onc for recs & support  -Weaning prn dilaudid further to 1mg moderate/severe pain   -Continue home MS Contin, hold home ativan  -Sleep hygiene/OOB during day/PT/multiple walks in halls   -Pet/music/art therapy     CV: hemodynamically stable; hx of CAD (NSTEMI 2021), HTN, HLD  -Q4 vital signs  -EKG prn for ACS symptoms  -Continue home metoprolol with hold parameters  -Hold home ASA 81, Lipitor in setting of elevated LFTs/thrombocytopenia     Resp: no acute issues on room air, no significant pulmonary history  -ICS 10x every hour  -OOB/ambulation     GI: s/p robotic LAR/loop to end colostomy, ileocecectomy & MWA c/b persistent lymph leak; hx of rectal CA with mets to liver  -low fat diet as tolerated, Ensure Clears, initiate NPO prior to TPN.   -Calorie count to ensure adequate PO nutrition  -Octreotide & Midodrine TID for lymph leak; appreciate surg onc support & expertise  -Zofran prn for nausea  -continue assessment of stoma and output, stoma not new for patient.  WOCN for supplies & support. Continue to monitor drain output/replace fluid prn  -Daily CMP to monitor LFTs     : Oliguria with small volume retention, testicular pain post fall; no significant urological hx  -UA negative 3/26  -Continue flomax for low resection, needs to stand when voiding to completely empty.   - Strict intake and output, monitoring fluid & electrolyte balance closely in setting of high output drain, encouraging PO hydration  - Bladder scan prn to monitor retention/urine output  - Daily RFP/replace electrolytes as needed     Heme: H&H stable with no evidence of bleeding, history of pancytopenia with platelets  similar to baseline  -CBC daily; monitor for s/s of bleeding     ID: afebrile, no leukocytosis  -Q4 temp  -trend CBC; monitor for s/s of infection     Endo: no acute issues, no significant endocrine history   -hypoglycemia protocol  -Monitor BG on AM labs  -no indication for SSI     DVT Prophy: SCDs;  Lovenox     Dispo:   -Continue care on RNF  -PT recommending SNF, patient accepted at facility of choice, no pre-cert needed.  Awaiting medical readiness for discharge.    ADRIÁN LYNCH  MS3     Discussed with Dr. Henao    I agree with the documentation in the medical student's note except where I have edited it and it reflects the Subjective, Objective, Assessment and Plan of the surgical service.     Abdelrahman Mendoza MD  General Surgery Resident  [place] [phone]

## 2025-03-29 NOTE — NURSING NOTE
PICC Delay         At patient's bedside to discuss PICC with him.  Patient very resistant to agree with placement and wanted to discuss further with his bedside RN.  Bedside RN aware

## 2025-03-29 NOTE — CONSULTS
Wound Care Consult     Visit Date: 3/29/2025      Patient Name: Roma Corral         MRN: [id number]           YOB: 1955     Reason for Consult: pressure injury to buttocks      Pertinent Labs:   Albumin   Date Value Ref Range Status   03/29/2025 1.9 (L) 3.4 - 5.0 g/dL Final       Wound Assessment:  Wound 03/25/25 Pressure Injury Coccyx (Active)   Wound Image   03/29/25 1020   Site Assessment Pink;Painful 03/29/25 1020   Summer-Wound Assessment Excoriated;Friable 03/29/25 1020   Non-staged Wound Description Partial thickness 03/29/25 1020   Pressure Injury Stage DTPI 03/29/25 1020   Wound Length (cm) 4 cm 03/29/25 1020   Wound Width (cm) 4 cm 03/29/25 1020   Wound Surface Area (cm^2) 16 cm^2 03/29/25 1020   Wound Depth (cm) 0 cm 03/29/25 1020   Wound Volume (cm^3) 0 cm^3 03/29/25 1020   State of Healing Eschar 03/29/25 1020   Margins Well-defined edges 03/29/25 1020   Drainage Description None 03/29/25 1020   Drainage Amount None 03/29/25 1020   Dressing Silicone border dressing 03/29/25 1020   Dressing Changed Changed 03/29/25 1020   Dressing Status Clean;Dry;Occlusive 03/29/25 0742     Wound Team Summary Assessment:   Pt with new pressure injury to coccyx. Area now peeling, pink tender skin exposed with no drainage. Aleady being treated with Triad paste, nursing to continue with treatment.      Wound Team Plan: Apply Triad paste to coccyx area daily. Primary team please sign pended wound care orders if agreeable to treatment plan. Will continue to follow for ostomy and wound care needs while admitted.      JARETH RUFFIN RN  3/29/2025  11:43 AM

## 2025-03-30 LAB
ALBUMIN SERPL BCP-MCNC: 1.7 G/DL (ref 3.4–5)
ALP SERPL-CCNC: 778 U/L (ref 33–136)
ALT SERPL W P-5'-P-CCNC: 9 U/L (ref 10–52)
ANION GAP SERPL CALC-SCNC: 10 MMOL/L (ref 10–20)
AST SERPL W P-5'-P-CCNC: 20 U/L (ref 9–39)
BILIRUB SERPL-MCNC: 0.6 MG/DL (ref 0–1.2)
BUN SERPL-MCNC: 16 MG/DL (ref 6–23)
CALCIUM SERPL-MCNC: 6.7 MG/DL (ref 8.6–10.6)
CHLORIDE SERPL-SCNC: 108 MMOL/L (ref 98–107)
CO2 SERPL-SCNC: 23 MMOL/L (ref 21–32)
CREAT SERPL-MCNC: 0.79 MG/DL (ref 0.5–1.3)
EGFRCR SERPLBLD CKD-EPI 2021: >90 ML/MIN/1.73M*2
ERYTHROCYTE [DISTWIDTH] IN BLOOD BY AUTOMATED COUNT: 18.9 % (ref 11.5–14.5)
GLUCOSE BLD MANUAL STRIP-MCNC: 151 MG/DL (ref 74–99)
GLUCOSE BLD MANUAL STRIP-MCNC: 151 MG/DL (ref 74–99)
GLUCOSE BLD MANUAL STRIP-MCNC: 187 MG/DL (ref 74–99)
GLUCOSE SERPL-MCNC: 182 MG/DL (ref 74–99)
HCT VFR BLD AUTO: 24.1 % (ref 41–52)
HGB BLD-MCNC: 7.3 G/DL (ref 13.5–17.5)
MAGNESIUM SERPL-MCNC: 2.09 MG/DL (ref 1.6–2.4)
MCH RBC QN AUTO: 28.6 PG (ref 26–34)
MCHC RBC AUTO-ENTMCNC: 30.3 G/DL (ref 32–36)
MCV RBC AUTO: 95 FL (ref 80–100)
NRBC BLD-RTO: 0 /100 WBCS (ref 0–0)
PLATELET # BLD AUTO: 90 X10*3/UL (ref 150–450)
POTASSIUM SERPL-SCNC: 4.2 MMOL/L (ref 3.5–5.3)
PROT SERPL-MCNC: 4.3 G/DL (ref 6.4–8.2)
RBC # BLD AUTO: 2.55 X10*6/UL (ref 4.5–5.9)
SODIUM SERPL-SCNC: 137 MMOL/L (ref 136–145)
STAPHYLOCOCCUS SPEC CULT: NORMAL
WBC # BLD AUTO: 4.9 X10*3/UL (ref 4.4–11.3)

## 2025-03-30 PROCEDURE — 2500000002 HC RX 250 W HCPCS SELF ADMINISTERED DRUGS (ALT 637 FOR MEDICARE OP, ALT 636 FOR OP/ED): Performed by: STUDENT IN AN ORGANIZED HEALTH CARE EDUCATION/TRAINING PROGRAM

## 2025-03-30 PROCEDURE — 2500000005 HC RX 250 GENERAL PHARMACY W/O HCPCS: Performed by: STUDENT IN AN ORGANIZED HEALTH CARE EDUCATION/TRAINING PROGRAM

## 2025-03-30 PROCEDURE — 80053 COMPREHEN METABOLIC PANEL: CPT

## 2025-03-30 PROCEDURE — 85027 COMPLETE CBC AUTOMATED: CPT

## 2025-03-30 PROCEDURE — 2500000005 HC RX 250 GENERAL PHARMACY W/O HCPCS

## 2025-03-30 PROCEDURE — 1170000001 HC PRIVATE ONCOLOGY ROOM DAILY

## 2025-03-30 PROCEDURE — 2500000004 HC RX 250 GENERAL PHARMACY W/ HCPCS (ALT 636 FOR OP/ED): Performed by: NURSE PRACTITIONER

## 2025-03-30 PROCEDURE — 2500000001 HC RX 250 WO HCPCS SELF ADMINISTERED DRUGS (ALT 637 FOR MEDICARE OP)

## 2025-03-30 PROCEDURE — 2500000001 HC RX 250 WO HCPCS SELF ADMINISTERED DRUGS (ALT 637 FOR MEDICARE OP): Performed by: STUDENT IN AN ORGANIZED HEALTH CARE EDUCATION/TRAINING PROGRAM

## 2025-03-30 PROCEDURE — 83735 ASSAY OF MAGNESIUM: CPT

## 2025-03-30 PROCEDURE — 82947 ASSAY GLUCOSE BLOOD QUANT: CPT

## 2025-03-30 PROCEDURE — 2500000004 HC RX 250 GENERAL PHARMACY W/ HCPCS (ALT 636 FOR OP/ED)

## 2025-03-30 PROCEDURE — 2500000004 HC RX 250 GENERAL PHARMACY W/ HCPCS (ALT 636 FOR OP/ED): Performed by: STUDENT IN AN ORGANIZED HEALTH CARE EDUCATION/TRAINING PROGRAM

## 2025-03-30 RX ORDER — CALCIUM GLUCONATE 20 MG/ML
2 INJECTION, SOLUTION INTRAVENOUS ONCE
Status: COMPLETED | OUTPATIENT
Start: 2025-03-30 | End: 2025-03-30

## 2025-03-30 RX ORDER — LIDOCAINE HYDROCHLORIDE 20 MG/ML
1 JELLY TOPICAL ONCE
Status: COMPLETED | OUTPATIENT
Start: 2025-03-30 | End: 2025-03-30

## 2025-03-30 RX ORDER — INSULIN LISPRO 100 [IU]/ML
0-5 INJECTION, SOLUTION INTRAVENOUS; SUBCUTANEOUS EVERY 6 HOURS
Status: DISCONTINUED | OUTPATIENT
Start: 2025-03-30 | End: 2025-04-07 | Stop reason: HOSPADM

## 2025-03-30 RX ADMIN — MIDODRINE HYDROCHLORIDE 2.5 MG: 2.5 TABLET ORAL at 09:25

## 2025-03-30 RX ADMIN — OCTREOTIDE ACETATE 200 MCG: 500 INJECTION, SOLUTION INTRAVENOUS; SUBCUTANEOUS at 09:26

## 2025-03-30 RX ADMIN — ACETAMINOPHEN 650 MG: 325 TABLET, FILM COATED ORAL at 09:26

## 2025-03-30 RX ADMIN — HYDROMORPHONE HYDROCHLORIDE 1 MG: 2 TABLET ORAL at 14:54

## 2025-03-30 RX ADMIN — TAMSULOSIN HYDROCHLORIDE 0.4 MG: 0.4 CAPSULE ORAL at 09:26

## 2025-03-30 RX ADMIN — ATORVASTATIN CALCIUM 40 MG: 40 TABLET, FILM COATED ORAL at 21:49

## 2025-03-30 RX ADMIN — MIDODRINE HYDROCHLORIDE 2.5 MG: 2.5 TABLET ORAL at 17:34

## 2025-03-30 RX ADMIN — ASCORBIC ACID, VITAMIN A PALMITATE, CHOLECALCIFEROL, THIAMINE HYDROCHLORIDE, RIBOFLAVIN-5 PHOSPHATE SODIUM, PYRIDOXINE HYDROCHLORIDE, NIACINAMIDE, DEXPANTHENOL, ALPHA-TOCOPHEROL ACETATE, VITAMIN K1, FOLIC ACID, BIOTIN, CYANOCOBALAMIN: 200; 3300; 200; 6; 3.6; 6; 40; 15; 10; 150; 600; 60; 5 INJECTION, SOLUTION INTRAVENOUS at 21:57

## 2025-03-30 RX ADMIN — NYSTATIN 400000 UNITS: 500000 SUSPENSION ORAL at 21:50

## 2025-03-30 RX ADMIN — MIDODRINE HYDROCHLORIDE 2.5 MG: 2.5 TABLET ORAL at 12:05

## 2025-03-30 RX ADMIN — METHOCARBAMOL TABLETS 500 MG: 500 TABLET, COATED ORAL at 06:45

## 2025-03-30 RX ADMIN — NYSTATIN 400000 UNITS: 500000 SUSPENSION ORAL at 14:41

## 2025-03-30 RX ADMIN — THIAMINE HYDROCHLORIDE 100 MG: 100 INJECTION, SOLUTION INTRAMUSCULAR; INTRAVENOUS at 09:27

## 2025-03-30 RX ADMIN — CALCIUM GLUCONATE 2 G: 20 INJECTION, SOLUTION INTRAVENOUS at 14:37

## 2025-03-30 RX ADMIN — DRONABINOL 2.5 MG: 2.5 CAPSULE ORAL at 17:34

## 2025-03-30 RX ADMIN — INSULIN LISPRO 1 UNITS: 100 INJECTION, SOLUTION INTRAVENOUS; SUBCUTANEOUS at 17:41

## 2025-03-30 RX ADMIN — METHOCARBAMOL TABLETS 500 MG: 500 TABLET, COATED ORAL at 22:25

## 2025-03-30 RX ADMIN — Medication 3 MG: at 21:50

## 2025-03-30 RX ADMIN — ENOXAPARIN SODIUM 40 MG: 100 INJECTION SUBCUTANEOUS at 14:41

## 2025-03-30 RX ADMIN — METHOCARBAMOL TABLETS 500 MG: 500 TABLET, COATED ORAL at 12:05

## 2025-03-30 RX ADMIN — METHOCARBAMOL TABLETS 500 MG: 500 TABLET, COATED ORAL at 17:34

## 2025-03-30 RX ADMIN — LIDOCAINE HYDROCHLORIDE 1 APPLICATION: 20 JELLY TOPICAL at 12:05

## 2025-03-30 RX ADMIN — MORPHINE SULFATE 30 MG: 30 TABLET, FILM COATED, EXTENDED RELEASE ORAL at 21:50

## 2025-03-30 RX ADMIN — MORPHINE SULFATE 30 MG: 30 TABLET, FILM COATED, EXTENDED RELEASE ORAL at 09:25

## 2025-03-30 RX ADMIN — OCTREOTIDE ACETATE 200 MCG: 500 INJECTION, SOLUTION INTRAVENOUS; SUBCUTANEOUS at 21:50

## 2025-03-30 RX ADMIN — OCTREOTIDE ACETATE 200 MCG: 500 INJECTION, SOLUTION INTRAVENOUS; SUBCUTANEOUS at 14:42

## 2025-03-30 RX ADMIN — NYSTATIN 400000 UNITS: 500000 SUSPENSION ORAL at 09:26

## 2025-03-30 RX ADMIN — HYDROMORPHONE HYDROCHLORIDE 1 MG: 2 TABLET ORAL at 02:40

## 2025-03-30 RX ADMIN — DRONABINOL 2.5 MG: 2.5 CAPSULE ORAL at 12:05

## 2025-03-30 ASSESSMENT — COGNITIVE AND FUNCTIONAL STATUS - GENERAL
DAILY ACTIVITIY SCORE: 18
DRESSING REGULAR UPPER BODY CLOTHING: A LITTLE
DRESSING REGULAR LOWER BODY CLOTHING: A LITTLE
MOVING TO AND FROM BED TO CHAIR: A LITTLE
DRESSING REGULAR LOWER BODY CLOTHING: A LITTLE
TOILETING: A LITTLE
STANDING UP FROM CHAIR USING ARMS: A LITTLE
CLIMB 3 TO 5 STEPS WITH RAILING: A LITTLE
DAILY ACTIVITIY SCORE: 18
MOBILITY SCORE: 18
MOBILITY SCORE: 18
STANDING UP FROM CHAIR USING ARMS: A LITTLE
WALKING IN HOSPITAL ROOM: A LITTLE
TURNING FROM BACK TO SIDE WHILE IN FLAT BAD: A LITTLE
CLIMB 3 TO 5 STEPS WITH RAILING: A LITTLE
WALKING IN HOSPITAL ROOM: A LITTLE
EATING MEALS: A LITTLE
MOVING TO AND FROM BED TO CHAIR: A LITTLE
TOILETING: A LITTLE
MOVING FROM LYING ON BACK TO SITTING ON SIDE OF FLAT BED WITH BEDRAILS: A LITTLE
HELP NEEDED FOR BATHING: A LITTLE
EATING MEALS: A LITTLE
HELP NEEDED FOR BATHING: A LITTLE
DRESSING REGULAR UPPER BODY CLOTHING: A LITTLE
TURNING FROM BACK TO SIDE WHILE IN FLAT BAD: A LITTLE
PERSONAL GROOMING: A LITTLE
MOVING FROM LYING ON BACK TO SITTING ON SIDE OF FLAT BED WITH BEDRAILS: A LITTLE
PERSONAL GROOMING: A LITTLE

## 2025-03-30 ASSESSMENT — PAIN - FUNCTIONAL ASSESSMENT: PAIN_FUNCTIONAL_ASSESSMENT: 0-10

## 2025-03-30 ASSESSMENT — PAIN SCALES - WONG BAKER
WONGBAKER_NUMERICALRESPONSE: HURTS EVEN MORE
WONGBAKER_NUMERICALRESPONSE: HURTS LITTLE MORE

## 2025-03-30 ASSESSMENT — PAIN SCALES - GENERAL
PAINLEVEL_OUTOF10: 4
PAINLEVEL_OUTOF10: 5 - MODERATE PAIN
PAINLEVEL_OUTOF10: 6
PAINLEVEL_OUTOF10: 6
PAINLEVEL_OUTOF10: 7

## 2025-03-30 ASSESSMENT — PAIN DESCRIPTION - LOCATION
LOCATION: ABDOMEN
LOCATION: ABDOMEN

## 2025-03-30 ASSESSMENT — PAIN DESCRIPTION - ORIENTATION
ORIENTATION: LOWER
ORIENTATION: LOWER

## 2025-03-30 NOTE — CARE PLAN
Problem: Safety - Adult  Goal: Free from fall injury  Outcome: Progressing     Problem: Discharge Planning  Goal: Discharge to home or other facility with appropriate resources  Outcome: Progressing     Problem: Chronic Conditions and Co-morbidities  Goal: Patient's chronic conditions and co-morbidity symptoms are monitored and maintained or improved  Outcome: Progressing     Problem: Fall/Injury  Goal: Not fall by end of shift  Outcome: Progressing  Goal: Be free from injury by end of the shift  Outcome: Progressing  Goal: Verbalize understanding of personal risk factors for fall in the hospital  Outcome: Progressing  Goal: Verbalize understanding of risk factor reduction measures to prevent injury from fall in the home  Outcome: Progressing  Goal: Use assistive devices by end of the shift  Outcome: Progressing  Goal: Pace activities to prevent fatigue by end of the shift  Outcome: Progressing     Problem: Skin  Goal: Decreased wound size/increased tissue granulation at next dressing change  Outcome: Progressing  Flowsheets (Taken 3/30/2025 0156)  Decreased wound size/increased tissue granulation at next dressing change: Promote sleep for wound healing  Goal: Participates in plan/prevention/treatment measures  Outcome: Progressing  Flowsheets (Taken 3/30/2025 0156)  Participates in plan/prevention/treatment measures:   Discuss with provider PT/OT consult   Elevate heels  Goal: Prevent/manage excess moisture  Outcome: Progressing  Flowsheets (Taken 3/30/2025 0156)  Prevent/manage excess moisture:   Cleanse incontinence/protect with barrier cream   Monitor for/manage infection if present  Goal: Prevent/minimize sheer/friction injuries  Outcome: Progressing  Flowsheets (Taken 3/30/2025 0156)  Prevent/minimize sheer/friction injuries:   Use pull sheet   Complete micro-shifts as needed if patient unable. Adjust patient position to relieve pressure points, not a full turn  Goal: Promote/optimize nutrition  Outcome:  Progressing  Flowsheets (Taken 3/30/2025 0156)  Promote/optimize nutrition:   Monitor/record intake including meals   Offer water/supplements/favorite foods   Consume > 50% meals/supplements  Goal: Promote skin healing  Outcome: Progressing  Flowsheets (Taken 3/30/2025 0156)  Promote skin healing:   Assess skin/pad under line(s)/device(s)   Rotate device position/do not position patient on device     Problem: Diabetes  Goal: Achieve decreasing blood glucose levels by end of shift  Outcome: Progressing  Goal: Increase stability of blood glucose readings by end of shift  Outcome: Progressing  Goal: Decrease in ketones present in urine by end of shift  Outcome: Progressing  Goal: Maintain electrolyte levels within acceptable range throughout shift  Outcome: Progressing  Goal: Maintain glucose levels >70mg/dl to <250mg/dl throughout shift  Outcome: Progressing  Goal: No changes in neurological exam by end of shift  Outcome: Progressing  Goal: Learn about and adhere to nutrition recommendations by end of shift  Outcome: Progressing  Goal: Vital signs within normal range for age by end of shift  Outcome: Progressing  Goal: Increase self care and/or family involovement by end of shift  Outcome: Progressing  Goal: Receive DSME education by end of shift  Outcome: Progressing     Problem: Pain  Goal: Takes deep breaths with improved pain control throughout the shift  Outcome: Progressing  Goal: Turns in bed with improved pain control throughout the shift  Outcome: Progressing  Goal: Walks with improved pain control throughout the shift  Outcome: Progressing  Goal: Performs ADL's with improved pain control throughout shift  Outcome: Progressing  Goal: Participates in PT with improved pain control throughout the shift  Outcome: Progressing  Goal: Free from opioid side effects throughout the shift  Outcome: Progressing  Goal: Free from acute confusion related to pain meds throughout the shift  Outcome: Progressing

## 2025-03-30 NOTE — CARE PLAN
The patient's goals for the shift include      The clinical goals for the shift include remain HDS      Problem: Safety - Adult  Goal: Free from fall injury  Outcome: Progressing     Problem: Discharge Planning  Goal: Discharge to home or other facility with appropriate resources  Outcome: Progressing     Problem: Chronic Conditions and Co-morbidities  Goal: Patient's chronic conditions and co-morbidity symptoms are monitored and maintained or improved  Outcome: Progressing     Problem: Fall/Injury  Goal: Not fall by end of shift  Outcome: Progressing  Goal: Be free from injury by end of the shift  Outcome: Progressing  Goal: Verbalize understanding of personal risk factors for fall in the hospital  Outcome: Progressing  Goal: Verbalize understanding of risk factor reduction measures to prevent injury from fall in the home  Outcome: Progressing  Goal: Use assistive devices by end of the shift  Outcome: Progressing  Goal: Pace activities to prevent fatigue by end of the shift  Outcome: Progressing     Problem: Skin  Goal: Decreased wound size/increased tissue granulation at next dressing change  Outcome: Progressing  Flowsheets (Taken 3/30/2025 1009)  Decreased wound size/increased tissue granulation at next dressing change: Promote sleep for wound healing  Goal: Participates in plan/prevention/treatment measures  Outcome: Progressing  Flowsheets (Taken 3/30/2025 1009)  Participates in plan/prevention/treatment measures: Elevate heels  Goal: Prevent/manage excess moisture  Outcome: Progressing  Flowsheets (Taken 3/30/2025 1009)  Prevent/manage excess moisture: Moisturize dry skin  Goal: Prevent/minimize sheer/friction injuries  Outcome: Progressing  Flowsheets (Taken 3/30/2025 1009)  Prevent/minimize sheer/friction injuries: Use pull sheet  Goal: Promote/optimize nutrition  Outcome: Progressing  Flowsheets (Taken 3/30/2025 1009)  Promote/optimize nutrition: Offer water/supplements/favorite foods  Goal: Promote skin  healing  Outcome: Progressing  Flowsheets (Taken 3/30/2025 1009)  Promote skin healing: Assess skin/pad under line(s)/device(s)     Problem: Diabetes  Goal: Achieve decreasing blood glucose levels by end of shift  Outcome: Progressing  Goal: Increase stability of blood glucose readings by end of shift  Outcome: Progressing  Goal: Decrease in ketones present in urine by end of shift  Outcome: Progressing  Goal: Maintain electrolyte levels within acceptable range throughout shift  Outcome: Progressing  Goal: Maintain glucose levels >70mg/dl to <250mg/dl throughout shift  Outcome: Progressing  Goal: No changes in neurological exam by end of shift  Outcome: Progressing  Goal: Learn about and adhere to nutrition recommendations by end of shift  Outcome: Progressing  Goal: Vital signs within normal range for age by end of shift  Outcome: Progressing  Goal: Increase self care and/or family involovement by end of shift  Outcome: Progressing  Goal: Receive DSME education by end of shift  Outcome: Progressing     Problem: Pain  Goal: Takes deep breaths with improved pain control throughout the shift  Outcome: Progressing  Goal: Turns in bed with improved pain control throughout the shift  Outcome: Progressing  Goal: Walks with improved pain control throughout the shift  Outcome: Progressing  Goal: Performs ADL's with improved pain control throughout shift  Outcome: Progressing  Goal: Participates in PT with improved pain control throughout the shift  Outcome: Progressing  Goal: Free from opioid side effects throughout the shift  Outcome: Progressing  Goal: Free from acute confusion related to pain meds throughout the shift  Outcome: Progressing

## 2025-03-30 NOTE — PROGRESS NOTES
Colorectal Surgery Progress Note      HPI: Roma Corral is a 70 y.o. 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 Vasquez, and laparoscopic MWA x3 with liver biopsy by Dr. Erazo on 3/18/25.         Subjective  No acute events overnight, reports sleeping well.  Surgical pain is controlled, testicular pain improving.  Reports abdominal drain still putting out a large amount.  Denies nausea, vomiting,  Colostomy with gas in pouch, no stool yesterday  Ambulating with PT/up to bathroom with walker.    Objective  Physical Exam:  Gen: in no physical distress and alert, deconditioned and weak appearing, pleasant & conversational, lying in bed  HENT: Head normocephalic, atraumatic.  Mucous membranes moist.    Neuro: Alert and oriented x3.  Moves all extremities spontaneously x4.  CV: Normal rate and rhythm on palpated radial pulse. Trace edema bilaterally at ankles.  Resp: No acute respiratory distress.  Normal effort on room air. Chest expansion symmetrical.   GI: Abdomen is soft, nontender, and nondistended. Laparoscopic incisions are clean, dry and intact with glue.  Stoma is pink and healthy, well pouched, minimal gas in pouch. Abdominal drain to bile bag high output and milky yellow chyle, without erythema around site.  : Testicles without erythema or ecchymosis. Nontender to palpation, without palpable masses or abnormalities.  Skin: Warm and dry, pale, without rashes or lesions    Visit Vitals  /66 (BP Location: Right arm, Patient Position: Lying)   Pulse 78   Temp 37 °C (98.6 °F) (Temporal)   Resp 18        I/O last 3 completed shifts:  In: 1026 (14 mL/kg)  "[P.O.:900]  Out: 3147 (42.8 mL/kg) [Urine:1052 (0.4 mL/kg/hr); Drains:2095]  Weight: 73.5 kg   I/O this shift:  In: -   Out: 150 [Drains:150]             Data Review:  CBC:   Lab Results   Component Value Date    WBC 4.9 03/30/2025    RBC 2.55 (L) 03/30/2025     BMP:   Lab Results   Component Value Date    GLUCOSE 182 (H) 03/30/2025    CO2 23 03/30/2025    BUN 16 03/30/2025    CREATININE 0.79 03/30/2025    CALCIUM 6.7 (L) 03/30/2025     Coagulation: No results found for: \"INR\", \"APTT\"      Assessment:   70 year old male with hx of metastatic rectal cancer to liver s/p loop colostomy July 2024 & FAWN c/b persistent disease and rectal stricture s/p robotic low anterior resection with transition from loop to end colostomy & 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 Vasquez, and laparoscopic MWA x3 with liver biopsy by Dr. Erazo on 3/18/25. Post-op course c/b high pelvic drain output 2/2 persistent lymph leak (elevated drain TG) s/p IR lymphoscintigraphy 3/25 and 3/28 & low fat diet without improvement/decrease in output.  Also c/b unwitnessed fall with negative right hip/pelvis xray. PT recommending SNF at discharge.      Plan:   -Full TPN today  -NPO diet ordered      Neuro: Post-op pain controlled, history of anxiety  -Continue current pain regimen with tylenol, robaxin; appreciate supportive onc for recs & support  -Weaning prn dilaudid further to 1mg moderate/severe pain   -Continue home MS Contin, hold home ativan  -Sleep hygiene/OOB during day/PT/multiple walks in halls   -Pet/music/art therapy     CV: hemodynamically stable; hx of CAD (NSTEMI 2021), HTN, HLD  -Q4 vital signs  -EKG prn for ACS symptoms  -Continue home metoprolol with hold parameters  -Hold home ASA 81, Lipitor in setting of elevated LFTs/thrombocytopenia     Resp: no acute issues on room air, no significant pulmonary history  -ICS 10x every hour  -OOB/ambulation     GI: s/p robotic LAR/loop to end colostomy, ileocecectomy & MWA c/b " persistent lymph leak; hx of rectal CA with mets to liver  -NPO  -Octreotide & Midodrine TID for lymph leak; appreciate surg onc support & expertise  -Zofran prn for nausea  -continue assessment of stoma and output, stoma not new for patient.  WOCN for supplies & support. Continue to monitor drain output/replace fluid prn  -Daily CMP to monitor LFTs  -TPN to full rate tonight  -Discuss Lipids with dietary tomorrow  -Monitor Colostomy output     : Oliguria with small volume retention, testicular pain post fall; no significant urological hx  -UA negative 3/26  -Continue flomax for low resection, needs to stand when voiding to completely empty.   - Strict intake and output, monitoring fluid & electrolyte balance closely in setting of high output drain, encouraging PO hydration  - Bladder scan prn to monitor retention/urine output  - Daily RFP/replace electrolytes as needed     Heme: H&H stable with no evidence of bleeding, history of pancytopenia with platelets similar to baseline  -CBC daily; monitor for s/s of bleeding     ID: afebrile, no leukocytosis  -Q4 temp  -trend CBC; monitor for s/s of infection     Endo: no acute issues, no significant endocrine history   -hypoglycemia protocol  -Monitor BG Q6 H on TPN  -Initiate SSI based upon clinical course     DVT Prophy: SCDs;  Lovenox     Dispo:   -Continue care on RNF  -PT recommending SNF, patient accepted at facility of choice, no pre-cert needed.  Awaiting medical readiness for discharge.    Discussed with Dr. Juliano Mendoza MD  General Surgery Resident  [place] [phone]

## 2025-03-30 NOTE — PROGRESS NOTES
Surgical Oncology Progress Note    HPI:  Roma Corral is a 70 y.o. male with history of rectal cancer with mets to the liver, CAD (2021 NSTEMI), DM2, HTN  who is s/p robotic LAR with end colostomy, ilecocecetomy with Dr. Vasquez, and laparoscopic microwave liver ablation with Dr. Erazo on 3/17/25.     Subjective:  Continues on TPN. No new complaints. No n/v    Objective    Temp:  [36.4 °C (97.5 °F)-37.3 °C (99.2 °F)] 36.9 °C (98.4 °F)  Heart Rate:  [68-86] 68  Resp:  [16-18] 18  BP: ()/(57-68) 115/65  I/O last 2 completed shifts:  In: 666 (9.1 mL/kg) [P.O.:540]  Out: 2147 (29.2 mL/kg) [Urine:777 (0.4 mL/kg/hr); Drains:1370]  Weight: 73.5 kg     CIRILO output 1 L    Physical Exam:  NAD  Nontachycardic  On room air  Abdomen soft, appropriate tenderness to palpation, minimally distended, colostomy pink without gas or stool in the bag, right sided drain in place with chylous output (thinner compared to yesterday)    Labs within past 24h:            7.3   137  108  16   4.9>-----<90  ----------------<182             24.1   4.2  23  0.79          Ca 6.7 Phos 3.6 Mg 2.09       ALT 9 AST 20 AlkPhos 778 tBili 0.6               ASSESSMENT  Roma Corral is a 70 y.o. male with history of rectal cancer with mets to the liver, CAD (2021 NSTEMI), DM2, HTN who is s/p robotic LAR with end colostomy, ilecocecetomy with Dr. Vasquez, and laparoscopic microwave liver ablation with Dr. Erazo on 3/17/25, postop course notable for persistent chyle leak    PLAN:  - Continue octreotide TID and midodrine  - Continue TPN  - Holding off on portal vein and hepatic vein embolization until chyle leak is controled    Discussed with Dr. Ivanna Palacios  Surgical Oncology [id number]

## 2025-03-31 DIAGNOSIS — Z78.9 ON TOTAL PARENTERAL NUTRITION: ICD-10-CM

## 2025-03-31 LAB
ALBUMIN SERPL BCP-MCNC: 1.8 G/DL (ref 3.4–5)
ALP SERPL-CCNC: 894 U/L (ref 33–136)
ALT SERPL W P-5'-P-CCNC: 10 U/L (ref 10–52)
ANION GAP SERPL CALC-SCNC: 10 MMOL/L (ref 10–20)
AST SERPL W P-5'-P-CCNC: 26 U/L (ref 9–39)
BILIRUB SERPL-MCNC: 0.6 MG/DL (ref 0–1.2)
BUN SERPL-MCNC: 17 MG/DL (ref 6–23)
CALCIUM SERPL-MCNC: 7.3 MG/DL (ref 8.6–10.6)
CHLORIDE SERPL-SCNC: 106 MMOL/L (ref 98–107)
CO2 SERPL-SCNC: 24 MMOL/L (ref 21–32)
CREAT SERPL-MCNC: 0.62 MG/DL (ref 0.5–1.3)
EGFRCR SERPLBLD CKD-EPI 2021: >90 ML/MIN/1.73M*2
ERYTHROCYTE [DISTWIDTH] IN BLOOD BY AUTOMATED COUNT: 18.3 % (ref 11.5–14.5)
GLUCOSE BLD MANUAL STRIP-MCNC: 136 MG/DL (ref 74–99)
GLUCOSE BLD MANUAL STRIP-MCNC: 174 MG/DL (ref 74–99)
GLUCOSE BLD MANUAL STRIP-MCNC: 186 MG/DL (ref 74–99)
GLUCOSE BLD MANUAL STRIP-MCNC: 191 MG/DL (ref 74–99)
GLUCOSE BLD MANUAL STRIP-MCNC: 205 MG/DL (ref 74–99)
GLUCOSE SERPL-MCNC: 189 MG/DL (ref 74–99)
HCT VFR BLD AUTO: 22.1 % (ref 41–52)
HGB BLD-MCNC: 7.3 G/DL (ref 13.5–17.5)
MAGNESIUM SERPL-MCNC: 2.07 MG/DL (ref 1.6–2.4)
MCH RBC QN AUTO: 29.2 PG (ref 26–34)
MCHC RBC AUTO-ENTMCNC: 33 G/DL (ref 32–36)
MCV RBC AUTO: 88 FL (ref 80–100)
NRBC BLD-RTO: 0 /100 WBCS (ref 0–0)
PLATELET # BLD AUTO: 102 X10*3/UL (ref 150–450)
POTASSIUM SERPL-SCNC: 4.3 MMOL/L (ref 3.5–5.3)
PROT SERPL-MCNC: 4.5 G/DL (ref 6.4–8.2)
RBC # BLD AUTO: 2.5 X10*6/UL (ref 4.5–5.9)
SODIUM SERPL-SCNC: 136 MMOL/L (ref 136–145)
WBC # BLD AUTO: 5.3 X10*3/UL (ref 4.4–11.3)

## 2025-03-31 PROCEDURE — 2500000001 HC RX 250 WO HCPCS SELF ADMINISTERED DRUGS (ALT 637 FOR MEDICARE OP): Performed by: STUDENT IN AN ORGANIZED HEALTH CARE EDUCATION/TRAINING PROGRAM

## 2025-03-31 PROCEDURE — 1170000001 HC PRIVATE ONCOLOGY ROOM DAILY

## 2025-03-31 PROCEDURE — 2500000005 HC RX 250 GENERAL PHARMACY W/O HCPCS

## 2025-03-31 PROCEDURE — 2500000004 HC RX 250 GENERAL PHARMACY W/ HCPCS (ALT 636 FOR OP/ED)

## 2025-03-31 PROCEDURE — 83735 ASSAY OF MAGNESIUM: CPT

## 2025-03-31 PROCEDURE — 2500000004 HC RX 250 GENERAL PHARMACY W/ HCPCS (ALT 636 FOR OP/ED): Performed by: STUDENT IN AN ORGANIZED HEALTH CARE EDUCATION/TRAINING PROGRAM

## 2025-03-31 PROCEDURE — 99232 SBSQ HOSP IP/OBS MODERATE 35: CPT

## 2025-03-31 PROCEDURE — 2500000001 HC RX 250 WO HCPCS SELF ADMINISTERED DRUGS (ALT 637 FOR MEDICARE OP)

## 2025-03-31 PROCEDURE — 80053 COMPREHEN METABOLIC PANEL: CPT

## 2025-03-31 PROCEDURE — 2500000002 HC RX 250 W HCPCS SELF ADMINISTERED DRUGS (ALT 637 FOR MEDICARE OP, ALT 636 FOR OP/ED): Performed by: STUDENT IN AN ORGANIZED HEALTH CARE EDUCATION/TRAINING PROGRAM

## 2025-03-31 PROCEDURE — 2500000004 HC RX 250 GENERAL PHARMACY W/ HCPCS (ALT 636 FOR OP/ED): Performed by: NURSE PRACTITIONER

## 2025-03-31 PROCEDURE — 82947 ASSAY GLUCOSE BLOOD QUANT: CPT

## 2025-03-31 PROCEDURE — 85027 COMPLETE CBC AUTOMATED: CPT

## 2025-03-31 RX ORDER — MORPHINE SULFATE 30 MG/1
30 TABLET, FILM COATED, EXTENDED RELEASE ORAL EVERY 12 HOURS SCHEDULED
Start: 2025-03-31

## 2025-03-31 RX ORDER — OCTREOTIDE ACETATE 500 UG/ML
200 INJECTION, SOLUTION INTRAVENOUS; SUBCUTANEOUS 3 TIMES DAILY
Start: 2025-03-31

## 2025-03-31 RX ORDER — POLYETHYLENE GLYCOL 3350 17 G/17G
17 POWDER, FOR SOLUTION ORAL DAILY
Status: DISCONTINUED | OUTPATIENT
Start: 2025-03-31 | End: 2025-04-02

## 2025-03-31 RX ORDER — THIAMINE HYDROCHLORIDE 100 MG/ML
100 INJECTION, SOLUTION INTRAMUSCULAR; INTRAVENOUS DAILY
Status: ON HOLD
Start: 2025-03-31 | End: 2025-04-10

## 2025-03-31 RX ORDER — ENOXAPARIN SODIUM 100 MG/ML
40 INJECTION SUBCUTANEOUS EVERY 24 HOURS
Refills: 0
Start: 2025-03-31 | End: 2025-04-12

## 2025-03-31 RX ORDER — TAMSULOSIN HYDROCHLORIDE 0.4 MG/1
0.4 CAPSULE ORAL DAILY
Start: 2025-03-31

## 2025-03-31 RX ORDER — MIDODRINE HYDROCHLORIDE 2.5 MG/1
2.5 TABLET ORAL
Start: 2025-03-31

## 2025-03-31 RX ORDER — ACETAMINOPHEN 325 MG/1
650 TABLET ORAL EVERY 6 HOURS PRN
Start: 2025-03-31

## 2025-03-31 RX ORDER — POLYETHYLENE GLYCOL 3350 17 G/17G
17 POWDER, FOR SOLUTION ORAL DAILY
Start: 2025-03-31 | End: 2025-04-02 | Stop reason: HOSPADM

## 2025-03-31 RX ADMIN — MIDODRINE HYDROCHLORIDE 2.5 MG: 2.5 TABLET ORAL at 09:27

## 2025-03-31 RX ADMIN — INSULIN LISPRO 1 UNITS: 100 INJECTION, SOLUTION INTRAVENOUS; SUBCUTANEOUS at 05:03

## 2025-03-31 RX ADMIN — POLYETHYLENE GLYCOL 3350 17 G: 17 POWDER, FOR SOLUTION ORAL at 09:26

## 2025-03-31 RX ADMIN — MIDODRINE HYDROCHLORIDE 2.5 MG: 2.5 TABLET ORAL at 17:00

## 2025-03-31 RX ADMIN — OCTREOTIDE ACETATE 200 MCG: 500 INJECTION, SOLUTION INTRAVENOUS; SUBCUTANEOUS at 09:27

## 2025-03-31 RX ADMIN — NYSTATIN 400000 UNITS: 500000 SUSPENSION ORAL at 09:26

## 2025-03-31 RX ADMIN — INSULIN LISPRO 2 UNITS: 100 INJECTION, SOLUTION INTRAVENOUS; SUBCUTANEOUS at 00:22

## 2025-03-31 RX ADMIN — Medication 3 MG: at 21:19

## 2025-03-31 RX ADMIN — MORPHINE SULFATE 30 MG: 30 TABLET, FILM COATED, EXTENDED RELEASE ORAL at 09:27

## 2025-03-31 RX ADMIN — METHOCARBAMOL TABLETS 500 MG: 500 TABLET, COATED ORAL at 17:00

## 2025-03-31 RX ADMIN — METHOCARBAMOL TABLETS 500 MG: 500 TABLET, COATED ORAL at 12:03

## 2025-03-31 RX ADMIN — TAMSULOSIN HYDROCHLORIDE 0.4 MG: 0.4 CAPSULE ORAL at 09:27

## 2025-03-31 RX ADMIN — NYSTATIN 400000 UNITS: 500000 SUSPENSION ORAL at 21:23

## 2025-03-31 RX ADMIN — OCTREOTIDE ACETATE 200 MCG: 500 INJECTION, SOLUTION INTRAVENOUS; SUBCUTANEOUS at 14:47

## 2025-03-31 RX ADMIN — INSULIN LISPRO 1 UNITS: 100 INJECTION, SOLUTION INTRAVENOUS; SUBCUTANEOUS at 12:06

## 2025-03-31 RX ADMIN — MORPHINE SULFATE 30 MG: 30 TABLET, FILM COATED, EXTENDED RELEASE ORAL at 21:19

## 2025-03-31 RX ADMIN — ATORVASTATIN CALCIUM 40 MG: 40 TABLET, FILM COATED ORAL at 21:19

## 2025-03-31 RX ADMIN — INSULIN LISPRO 1 UNITS: 100 INJECTION, SOLUTION INTRAVENOUS; SUBCUTANEOUS at 17:41

## 2025-03-31 RX ADMIN — ACETAMINOPHEN 650 MG: 325 TABLET, FILM COATED ORAL at 05:05

## 2025-03-31 RX ADMIN — METHOCARBAMOL TABLETS 500 MG: 500 TABLET, COATED ORAL at 21:35

## 2025-03-31 RX ADMIN — ENOXAPARIN SODIUM 40 MG: 100 INJECTION SUBCUTANEOUS at 14:46

## 2025-03-31 RX ADMIN — THIAMINE HYDROCHLORIDE 100 MG: 100 INJECTION, SOLUTION INTRAMUSCULAR; INTRAVENOUS at 09:27

## 2025-03-31 RX ADMIN — METHOCARBAMOL TABLETS 500 MG: 500 TABLET, COATED ORAL at 05:05

## 2025-03-31 RX ADMIN — NYSTATIN 400000 UNITS: 500000 SUSPENSION ORAL at 14:47

## 2025-03-31 RX ADMIN — DRONABINOL 2.5 MG: 2.5 CAPSULE ORAL at 06:44

## 2025-03-31 RX ADMIN — HYDROMORPHONE HYDROCHLORIDE 1 MG: 2 TABLET ORAL at 14:40

## 2025-03-31 RX ADMIN — MIDODRINE HYDROCHLORIDE 2.5 MG: 2.5 TABLET ORAL at 12:03

## 2025-03-31 RX ADMIN — OCTREOTIDE ACETATE 200 MCG: 500 INJECTION, SOLUTION INTRAVENOUS; SUBCUTANEOUS at 21:20

## 2025-03-31 ASSESSMENT — COGNITIVE AND FUNCTIONAL STATUS - GENERAL
WALKING IN HOSPITAL ROOM: A LITTLE
MOVING TO AND FROM BED TO CHAIR: A LITTLE
DRESSING REGULAR UPPER BODY CLOTHING: A LITTLE
CLIMB 3 TO 5 STEPS WITH RAILING: A LITTLE
TOILETING: A LITTLE
HELP NEEDED FOR BATHING: A LITTLE
DRESSING REGULAR UPPER BODY CLOTHING: A LITTLE
HELP NEEDED FOR BATHING: A LITTLE
TOILETING: A LITTLE
DRESSING REGULAR LOWER BODY CLOTHING: A LITTLE
MOBILITY SCORE: 18
DAILY ACTIVITIY SCORE: 20
STANDING UP FROM CHAIR USING ARMS: A LITTLE
MOBILITY SCORE: 19
DRESSING REGULAR LOWER BODY CLOTHING: A LITTLE
DAILY ACTIVITIY SCORE: 20
MOVING TO AND FROM BED TO CHAIR: A LITTLE
TURNING FROM BACK TO SIDE WHILE IN FLAT BAD: A LITTLE
TURNING FROM BACK TO SIDE WHILE IN FLAT BAD: A LITTLE
CLIMB 3 TO 5 STEPS WITH RAILING: A LOT
WALKING IN HOSPITAL ROOM: A LITTLE
STANDING UP FROM CHAIR USING ARMS: A LITTLE

## 2025-03-31 ASSESSMENT — PAIN SCALES - GENERAL
PAINLEVEL_OUTOF10: 5 - MODERATE PAIN
PAINLEVEL_OUTOF10: 6
PAINLEVEL_OUTOF10: 0 - NO PAIN
PAINLEVEL_OUTOF10: 4
PAINLEVEL_OUTOF10: 10 - WORST POSSIBLE PAIN
PAINLEVEL_OUTOF10: 7
PAINLEVEL_OUTOF10: 3

## 2025-03-31 ASSESSMENT — PAIN - FUNCTIONAL ASSESSMENT
PAIN_FUNCTIONAL_ASSESSMENT: 0-10

## 2025-03-31 ASSESSMENT — PAIN DESCRIPTION - LOCATION: LOCATION: ABDOMEN

## 2025-03-31 ASSESSMENT — PAIN DESCRIPTION - ORIENTATION: ORIENTATION: MID

## 2025-03-31 NOTE — PROGRESS NOTES
Therapy Communication Note    Patient Name: Roma Corral  MRN: [id number]  Department: [facility]  Room: [address] [place] [address]  Today's Date: 3/31/2025     Discipline: Physical Therapy    Missed Visit Reason: Missed Visit Reason: Patient refused (declined states he had a bad weekend and needed to rest)    Missed Time: Attempt    Comment:

## 2025-03-31 NOTE — PROGRESS NOTES
Art Therapy Note    Roma Corral     Therapy Session  Referral Type: New referral this admission  Visit Type: New visit  Session Start Time: 1449  Intervention Delivery: In-person  Conflict of Service: Off unit              Treatment/Interventions            Narrative  Assessment Detail: AT time of visit Ppt was off the unit at IR.  ATR willfollow up another time to introduce and offer services.    Education Documentation  No documentation found.

## 2025-03-31 NOTE — CARE PLAN
The patient's goals for the shift include        Problem: Safety - Adult  Goal: Free from fall injury  Outcome: Progressing     Problem: Chronic Conditions and Co-morbidities  Goal: Patient's chronic conditions and co-morbidity symptoms are monitored and maintained or improved  Outcome: Progressing     Problem: Fall/Injury  Goal: Not fall by end of shift  Outcome: Progressing  Goal: Be free from injury by end of the shift  Outcome: Progressing  Goal: Verbalize understanding of personal risk factors for fall in the hospital  Outcome: Progressing  Goal: Verbalize understanding of risk factor reduction measures to prevent injury from fall in the home  Outcome: Progressing  Goal: Use assistive devices by end of the shift  Outcome: Progressing  Goal: Pace activities to prevent fatigue by end of the shift  Outcome: Progressing     Problem: Skin  Goal: Decreased wound size/increased tissue granulation at next dressing change  Outcome: Progressing  Goal: Participates in plan/prevention/treatment measures  Outcome: Progressing  Goal: Prevent/manage excess moisture  Outcome: Progressing  Goal: Prevent/minimize sheer/friction injuries  Outcome: Progressing  Goal: Promote/optimize nutrition  Outcome: Progressing  Goal: Promote skin healing  Outcome: Progressing     Problem: Diabetes  Goal: Achieve decreasing blood glucose levels by end of shift  Outcome: Progressing  Goal: Increase stability of blood glucose readings by end of shift  Outcome: Progressing  Goal: Decrease in ketones present in urine by end of shift  Outcome: Progressing  Goal: Maintain electrolyte levels within acceptable range throughout shift  Outcome: Progressing  Goal: Maintain glucose levels >70mg/dl to <250mg/dl throughout shift  Outcome: Progressing  Goal: No changes in neurological exam by end of shift  Outcome: Progressing  Goal: Learn about and adhere to nutrition recommendations by end of shift  Outcome: Progressing  Goal: Vital signs within normal  range for age by end of shift  Outcome: Progressing  Goal: Increase self care and/or family involovement by end of shift  Outcome: Progressing  Goal: Receive DSME education by end of shift  Outcome: Progressing     Problem: Pain  Goal: Takes deep breaths with improved pain control throughout the shift  Outcome: Progressing  Goal: Turns in bed with improved pain control throughout the shift  Outcome: Progressing  Goal: Walks with improved pain control throughout the shift  Outcome: Progressing  Goal: Performs ADL's with improved pain control throughout shift  Outcome: Progressing  Goal: Participates in PT with improved pain control throughout the shift  Outcome: Progressing  Goal: Free from opioid side effects throughout the shift  Outcome: Progressing  Goal: Free from acute confusion related to pain meds throughout the shift  Outcome: Progressing

## 2025-03-31 NOTE — PROGRESS NOTES
03/31/25 1100   Discharge Planning   Living Arrangements Spouse/significant other   Support Systems Spouse/significant other   Type of Residence Private residence   Number of Stairs to Enter Residence 2   Number of Stairs Within Residence 10   Do you have animals or pets at home? Yes   Type of Animals or Pets one dog   Home or Post Acute Services Post acute facilities (Rehab/SNF/etc)   Type of Post Acute Facility Services Skilled nursing   Expected Discharge Disposition SNF   Does the patient need discharge transport arranged? Yes   RoundTrip coordination needed? Yes   Has discharge transport been arranged? No     Pt is now on TPN.  None of the SNFs that were explored are unable to accommodate TPN.  SW met with pt to discuss.  Pt states he would like to go home and did not want to discuss SNF.  Pt states he believes his spouse is able to manage and will speak with her.  SW did leave a list of SNFs in a 20 mile radius that can accommodate TPN in pt's room.  MODESTO did relay to care team pt wanting to go home.  Will follow.  KHUSHI Porras

## 2025-03-31 NOTE — CONSULTS
"Nutrition Follow Up Assessment:   Nutrition Assessment    Reason for Assessment: Parenteral assessment/recommendation (TPN/PPN)    Patient is a 70 y.o. male with history of rectal cancer with mets to the liver who is s/p robotic LAR with end colostomy, ileocecectomy and laparoscopic microwave liver ablation (3/17/25)    Postop cource notable for persistent chyle leak    3/26: Last RDN note   3/29: PICC placement and initiation of TPN     Team requesting plan to cycle TPN   Plan to discharge to SNF when able    TPN is currently running @83 ml/hr Clinimix E 5/15 formula; this service recommends (5/20) formula per previous RDN note - communicated with team    Nutrition History:  Food and Nutrient History: Plan to cycle TPN.       Anthropometrics:  Height: 180.3 cm (5' 10.98\")   Weight: 73.5 kg (162 lb 0.6 oz)   BMI (Calculated): 22.61  IBW/kg (Dietitian Calculated): 78.2 kg  Percent of IBW: 94 %       Weight History:   Updated weight needed- communicated with RN      Nutrition Significant Labs:  CBC Trend:   Results from last 7 days   Lab Units 03/31/25  0459 03/30/25  0654 03/29/25  0543 03/28/25  0552   WBC AUTO x10*3/uL 5.3 4.9 6.0 5.1   RBC AUTO x10*6/uL 2.50* 2.55* 2.64* 2.55*   HEMOGLOBIN g/dL 7.3* 7.3* 7.6* 7.3*   HEMATOCRIT % 22.1* 24.1* 25.8* 24.1*   MCV fL 88 95 98 95   PLATELETS AUTO x10*3/uL 102* 90* 87* 67*    , BMP Trend:   Results from last 7 days   Lab Units 03/31/25  0459 03/30/25  0654 03/29/25  0543 03/28/25  0552   GLUCOSE mg/dL 189* 182* 148* 96   CALCIUM mg/dL 7.3* 6.7* 7.2* 7.2*   SODIUM mmol/L 136 137 139 138   POTASSIUM mmol/L 4.3 4.2 4.5 4.1   CO2 mmol/L 24 23 23 23   CHLORIDE mmol/L 106 108* 108* 109*   BUN mg/dL 17 16 18 17   CREATININE mg/dL 0.62 0.79 0.88 0.75    , TPN/PPN Labs:   Results from last 7 days   Lab Units 03/31/25  0459 03/30/25  0654 03/29/25  0543 03/28/25  0552   GLUCOSE mg/dL 189* 182* 148* 96   POTASSIUM mmol/L 4.3 4.2 4.5 4.1   PHOSPHORUS mg/dL  --   --  3.6  --  "   MAGNESIUM mg/dL 2.07 2.09 2.16 2.04   SODIUM mmol/L 136 137 139 138   CHLORIDE mmol/L 106 108* 108* 109*   ALT U/L 10 9* 12 11   AST U/L 26 20 23 16   ALK PHOS U/L 894* 778* 737* 505*   BILIRUBIN TOTAL mg/dL 0.6 0.6 0.7 0.7        Medications:  Scheduled medications  [Held by provider] aspirin, 81 mg, oral, Daily  atorvastatin, 40 mg, oral, Nightly  enoxaparin, 40 mg, subcutaneous, q24h  insulin lispro, 0-5 Units, subcutaneous, q6h  melatonin, 3 mg, oral, Nightly  methocarbamol, 500 mg, oral, q6h  metoprolol tartrate, 50 mg, oral, BID  midodrine, 2.5 mg, oral, TID  morphine CR, 30 mg, oral, q12h ALEX  nystatin, 4 mL, Swish & Swallow, TID  octreotide, 200 mcg, subcutaneous, TID  polyethylene glycol, 17 g, oral, Daily  tamsulosin, 0.4 mg, oral, Daily  thiamine, 100 mg, intravenous, Daily      Continuous medications  Adult Clinimix Parenteral Nutrition Continuous, 83 mL/hr, Last Rate: 83 mL/hr (03/30/25 2157)      PRN medications  PRN medications: acetaminophen, alteplase, dextrose, dextrose, glucagon, glucagon, HYDROmorphone, naloxone, ondansetron ODT **OR** ondansetron      I/O:   Last BM Date: 03/29/25; Stool Appearance: Formed (ostomy) (03/31/25 0027)    Dietary Orders (From admission, onward)       Start     Ordered    03/30/25 0635  NPO Diet Except: Ice chips, Sips with meds, Sips of clear liquids, Other (specify); Additional Details: Small bites of food, should not order a whole tray; Effective now  Diet effective now        Question Answer Comment   Except: Ice chips    Except: Sips with meds    Except: Sips of clear liquids    Except: Other (specify)    Additional Details Small bites of food, should not order a whole tray        03/30/25 0635    03/17/25 2350  May Participate in Room Service  ( ROOM SERVICE MAY PARTICIPATE)  Once        Question:  .  Answer:  Yes    03/17/25 1349                     Estimated Needs:   Total Energy Estimated Needs in 24 hours (kCal):  (4156-9305)  Method for Estimating  Needs: MSJ= 1503  Total Protein Estimated Needs in 24 Hours (g):  (105+)  Method for Estimating 24 Hour Protein Needs: 1.5 x 71.6kg  Total Fluid Estimated Needs in 24 Hours (mL):  (per team)           Nutrition Diagnosis   Malnutrition Diagnosis  Patient has Malnutrition Diagnosis: Yes  Diagnosis Status: Active  Malnutrition Diagnosis: Severe malnutrition related to chronic disease or condition  As Evidenced by: reports of poor appetite and intake PTA and here along with a weight loss of 12% in 3 months and 18% in 6 months and 34% from his usual body weight; he has areas of severe muscle and fat loss on physical exam            Nutrition Interventions/Recommendations   Nutrition prescription for parenteral nutrition, Nutrition prescription for oral nutrition    Nutrition Recommendations:  Individualized Nutrition Prescription Provided for : :    Team requesting cycled TPN recommendations     -- Communicated with team d/t 5/15 formula at goal rate currently running consider switching to 5/20 formula at goal rate today, discontinuing cycled TPN tonight  3/31 and then cycling TPN during the day  4/1; d/t SNF requesting TPN cycled during the day    1. When ready to cycle pts TPN, recommend Clinimix 5% AA, 20% Dextrose x 12 hrs      - For the first hour: Run @ 90mL/hr      - For x 10hrs in between: Titrate up to 180mL/hr      - For the final hour: Decrease rate to 90mL/hr      - Include MVI + trace elements         TPN monitoring:      - Daily weights      - RFP + Mg daily; replete lytes PRN      - LFTs + TGs weekly      - Accuchecks q 6hrs     TPN + lipids provide: 1742kcals, 99g protein, 396 g dextrose, (meeting 79% kcal & 95% protein needs)    2. Pt to continue on a diet per team report  3.  Obtain updated weight- RN notified         Note: No lipids ordered d/t pt on a diet; if plan for NPO then reconsult for possible SMOF lipids  Nutrition Interventions/Goals:   Interventions: Parenteral nutrition, Meals and  snacks  Parenteral Nutrition: Management of schedule of parenteral nutrition  Coordination of Care with Providers: Provider    Nutrition Monitoring and Evaluation   Food/Nutrient Related History Monitoring  Monitoring and Evaluation Plan: Enteral and parenteral nutrition intake determination, Estimated Energy Intake  Estimated Energy Intake: Other criteria  Criteria: PO as tolerated  Enteral and Parenteral Nutrition Intake Determination: Parenteral nutrition intake - To meet > 75% estimated energy needs, Parenteral nutrition intake - Tolerate TPN at goal rate    Anthropometric Measurements  Monitoring and Evaluation Plan: Body weight  Body Weight: Body weight - Maintain stable weight    Biochemical Data, Medical Tests and Procedures  Monitoring and Evaluation Plan: Electrolyte/renal panel, Glucose/endocrine profile  Electrolyte and Renal Panel: Electrolytes within normal limits  Glucose/Endocrine Profile: Glucose within normal limits ( mg/dL)    Goal Status: Some progress toward goal(s)    Time Spent (min): 45 minutes

## 2025-03-31 NOTE — PROGRESS NOTES
Colorectal Surgery Progress Note      HPI: Roma Corral is a 70 y.o. male with a past medical history of CAD (NSTEMI 2021), DMII (last A1c 5.4 normal 1/17/25), HLD, HTN, gout, anxiety, metastatic rectal cancer to liver s/p loop colostomy July 2024 & FAWN c/b persistent disease and rectal stricture, now hospital day 14 s/p robotic low anterior resection with transition from loop to end colostomy & 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 Vasquez, and laparoscopic MWA x3 with liver biopsy by Dr. Erazo on 3/18/25 c/b post-operative lymphatic leak and high drain output.      Subjective  No acute events overnight.  Pain is controlled.    Reports abdominal drain still putting out a large amount but color is changing.  Was made NPO with TPN over weekend but is eating a few bites of food throughout the day and sipping liquids.  Colostomy with gas and formed stool in pouch   Denies ambulating with PT over weekend, turned them away due to morale.    Objective  Physical Exam:  Gen: in no physical distress and alert, deconditioned and weak appearing, quiet but smiling, lying in bed  HENT: Head normocephalic, atraumatic.  Mucous membranes moist.    Neuro: Alert and oriented x3.  Moves all extremities spontaneously x4.  CV: Normal rate and rhythm on palpated radial pulse. Trace edema bilaterally at ankles.  Resp: No acute respiratory distress.  Normal effort on room air. Chest expansion symmetrical.   GI: Abdomen is soft, nontender, and nondistended. Laparoscopic incisions are clean, dry and intact with glue.  Stoma is pink and healthy, well pouched, with gas and firmer, formed stool in pouch. Abdominal drain to bile bag high output with yellow serous drainage, without erythema around site.  : Testicles without erythema or ecchymosis. Nontender to palpation, without palpable masses or abnormalities.  Skin: Warm and dry, pale, without rashes or lesions.  Now with new coccyx/sacral wound.    Visit Vitals  BP  "96/54 (BP Location: Right arm, Patient Position: Lying)   Pulse 82   Temp 36.8 °C (98.2 °F) (Temporal)   Resp 18        I/O last 3 completed shifts:  In: 1594.8 (21.7 mL/kg) [P.O.:590]  Out: 2997 (40.8 mL/kg) [Urine:1027 (0.4 mL/kg/hr); Drains:1970]  Weight: 73.5 kg   I/O this shift:  In: 289 [P.O.:100]  Out: 350 [Drains:350]             Data Review:  CBC:   Lab Results   Component Value Date    WBC 5.3 03/31/2025    RBC 2.50 (L) 03/31/2025     BMP:   Lab Results   Component Value Date    GLUCOSE 189 (H) 03/31/2025    CO2 24 03/31/2025    BUN 17 03/31/2025    CREATININE 0.62 03/31/2025    CALCIUM 7.3 (L) 03/31/2025     Coagulation:   No results found for: \"INR\", \"APTT\"      Labs reviewed and personally interpreted as: CBC slightly dilutional without acute blood loss anem or leukocytosis; mild thrombocytopenia but similar to baseline.  RFP without electrolyte derangements or ORALIA. Mild hyperglycemia likely due to TPN.      Imaging:  No recent imaging to review.    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 Vasquez, and laparoscopic MWA x3 with liver biopsy by Dr. Erazo on 3/18/25. Post-op course c/b high pelvic drain output 2/2 persistent lymph leak (elevated drain TG) s/p low fat diet & IR lymphoscintigraphy 3/25 & 3/28 without major improvement/decrease in output.  Made NPO with TPN over weekend with decrease in output (~2L/day --> 1350ml over past 24 hours)      Plan:  -OOB during day/ambulation in halls x3/PT  -Continue NPO/TPN.  Cycle TPN  -Coccyx/sacral wound care  -Await new SNF facility that will accept TPN    Neuro: Post-op pain controlled, history of anxiety  -Continue current pain regimen with robaxin,prn tylenol, prn dilaudid; appreciate supportive onc for recs & " support  -Continue home MS Contin, hold home ativan  -Sleep hygiene/OOB during day/PT/multiple walks in halls   -Pet/music/art therapy  -Melatonin HS    CV: hemodynamically stable; hx of CAD (NSTEMI 2021), HTN, HLD  -Q4 vital signs  -EKG prn for ACS symptoms  -Continue home metoprolol with hold parameters  -Hold home ASA 81 in setting of elevated LFTs/thrombocytopenia    Resp: no acute issues on room air, no significant pulmonary history  -ICS 10x every hour  -OOB/ambulation    GI: s/p robotic LAR/loop to end colostomy, ileocecectomy & MWA c/b persistent lymph leak; hx of rectal CA with mets to liver  -NPO, TPN - cycle TPN to run during day for SNF.  Check BG one hour after TPN is off tonight at 9pm.  -Thiamine daily  -Octreotide & Midodrine TID for lymph leak; appreciate surg onc support & expertise  -Zofran prn for nausea  -Miralax daily for firm colostomy output  -continue assessment of stoma and output, stoma not new for patient.  WOCN for supplies & support. Continue to monitor drain output/replace fluid prn  -Daily CMP to monitor LFTs    : Oliguria with small volume retention, testicular pain post fall; no significant urological hx  -Continue flomax for low resection, needs to stand when voiding to completely empty.   - Strict intake and output, monitoring fluid & electrolyte balance closely in setting of high output drain, encouraging PO hydration  - Bladder scan prn to monitor retention/urine output  - Daily RFP/replace electrolytes as needed    Heme: H&H stable with no evidence of bleeding, history of pancytopenia with platelets similar to baseline  -CBC daily; monitor for s/s of bleeding    ID: afebrile, no leukocytosis  -Q4 temp  -trend CBC; monitor for s/s of infection    Endo: no acute issues, no significant endocrine history   -hypoglycemia protocol  -Q6 BG on TPN  -no indication for SSI    DVT Prophy: SCDs;  Lovenox    Dispo:   -Continue care on RNF  -PT recommending SNF for physical rehabilitation;  also needed for TPN, drain monitoring & management. Social work providing list of SNFs that accept TPN to family today.    Plan of care discussed with Colorectal surgical team, Dr. Connell, and patient.    Yuridia TORRES-CNP  Colorectal Surgery NP   [facility] Service Pager #[phone]

## 2025-03-31 NOTE — PROGRESS NOTES
Surgical Oncology Progress Note    HPI:  Roma Corral is a 70 y.o. male with history of rectal cancer with mets to the liver, CAD (2021 NSTEMI), DM2, HTN  who is s/p robotic LAR with end colostomy, ilecocecetomy with Dr. Vasquez, and laparoscopic microwave liver ablation with Dr. Erazo on 3/17/25.     Subjective:  Continues on TPN. No new complaints. No n/v    Objective    Temp:  [36.8 °C (98.2 °F)-37.5 °C (99.5 °F)] 36.8 °C (98.2 °F)  Heart Rate:  [68-85] 82  Resp:  [17-18] 18  BP: ()/(54-68) 96/54  I/O last 2 completed shifts:  In: 928.8 (12.6 mL/kg) [P.O.:50]  Out: 2025 (27.6 mL/kg) [Urine:675 (0.4 mL/kg/hr); Drains:1350]  Weight: 73.5 kg     CIRILO output 1.3 L    Physical Exam:  NAD  Nontachycardic  On room air  Abdomen soft, appropriate tenderness to palpation, minimally distended, colostomy pink without gas or stool in the bag, right sided drain in place with serosang output (thinner compared to yesterday)    Labs within past 24h:            7.3   136  106  17   5.3>-----<102  ----------------<189             22.1   4.3  24  0.62          Ca 7.3 Phos 3.6 Mg 2.07       ALT 10 AST 26 AlkPhos 894 tBili 0.6               ASSESSMENT  Roma Corral is a 70 y.o. male with history of rectal cancer with mets to the liver, CAD (2021 NSTEMI), DM2, HTN who is s/p robotic LAR with end colostomy, ilecocecetomy with Dr. Vasquez, and laparoscopic microwave liver ablation with Dr. Erazo on 3/17/25, postop course notable for persistent chyle leak    PLAN:  - Continue octreotide TID and midodrine  - Continue TPN  - Will discuss portal vein and hepatic vein embolization with IR    Discussed with Dr. Kacey Solomon MD  General Surgery - PGY1  Surgical Oncology Service  [facility] Team Pager: [phone]

## 2025-04-01 ENCOUNTER — APPOINTMENT (OUTPATIENT)
Dept: RADIOLOGY | Facility: HOSPITAL | Age: 70
End: 2025-04-01
Payer: MEDICARE

## 2025-04-01 LAB
ALBUMIN SERPL BCP-MCNC: 1.9 G/DL (ref 3.4–5)
ALP SERPL-CCNC: 862 U/L (ref 33–136)
ALT SERPL W P-5'-P-CCNC: 13 U/L (ref 10–52)
ANION GAP SERPL CALC-SCNC: 14 MMOL/L (ref 10–20)
AST SERPL W P-5'-P-CCNC: 36 U/L (ref 9–39)
BILIRUB SERPL-MCNC: 0.7 MG/DL (ref 0–1.2)
BUN SERPL-MCNC: 22 MG/DL (ref 6–23)
CALCIUM SERPL-MCNC: 7.5 MG/DL (ref 8.6–10.6)
CHLORIDE SERPL-SCNC: 106 MMOL/L (ref 98–107)
CO2 SERPL-SCNC: 23 MMOL/L (ref 21–32)
CREAT SERPL-MCNC: 0.85 MG/DL (ref 0.5–1.3)
EGFRCR SERPLBLD CKD-EPI 2021: >90 ML/MIN/1.73M*2
ERYTHROCYTE [DISTWIDTH] IN BLOOD BY AUTOMATED COUNT: 18.7 % (ref 11.5–14.5)
GLUCOSE BLD MANUAL STRIP-MCNC: 122 MG/DL (ref 74–99)
GLUCOSE BLD MANUAL STRIP-MCNC: 158 MG/DL (ref 74–99)
GLUCOSE BLD MANUAL STRIP-MCNC: 337 MG/DL (ref 74–99)
GLUCOSE BLD MANUAL STRIP-MCNC: 352 MG/DL (ref 74–99)
GLUCOSE BLD MANUAL STRIP-MCNC: 90 MG/DL (ref 74–99)
GLUCOSE SERPL-MCNC: 111 MG/DL (ref 74–99)
HCT VFR BLD AUTO: 22.3 % (ref 41–52)
HGB BLD-MCNC: 7 G/DL (ref 13.5–17.5)
MAGNESIUM SERPL-MCNC: 1.99 MG/DL (ref 1.6–2.4)
MCH RBC QN AUTO: 28.8 PG (ref 26–34)
MCHC RBC AUTO-ENTMCNC: 31.4 G/DL (ref 32–36)
MCV RBC AUTO: 92 FL (ref 80–100)
NRBC BLD-RTO: 0 /100 WBCS (ref 0–0)
PLATELET # BLD AUTO: 114 X10*3/UL (ref 150–450)
POTASSIUM SERPL-SCNC: 4.6 MMOL/L (ref 3.5–5.3)
PROT SERPL-MCNC: 5.4 G/DL (ref 6.4–8.2)
RBC # BLD AUTO: 2.43 X10*6/UL (ref 4.5–5.9)
SODIUM SERPL-SCNC: 138 MMOL/L (ref 136–145)
WBC # BLD AUTO: 5.5 X10*3/UL (ref 4.4–11.3)

## 2025-04-01 PROCEDURE — 99233 SBSQ HOSP IP/OBS HIGH 50: CPT

## 2025-04-01 PROCEDURE — 2500000001 HC RX 250 WO HCPCS SELF ADMINISTERED DRUGS (ALT 637 FOR MEDICARE OP): Performed by: STUDENT IN AN ORGANIZED HEALTH CARE EDUCATION/TRAINING PROGRAM

## 2025-04-01 PROCEDURE — 2500000002 HC RX 250 W HCPCS SELF ADMINISTERED DRUGS (ALT 637 FOR MEDICARE OP, ALT 636 FOR OP/ED): Performed by: STUDENT IN AN ORGANIZED HEALTH CARE EDUCATION/TRAINING PROGRAM

## 2025-04-01 PROCEDURE — 2500000004 HC RX 250 GENERAL PHARMACY W/ HCPCS (ALT 636 FOR OP/ED): Performed by: STUDENT IN AN ORGANIZED HEALTH CARE EDUCATION/TRAINING PROGRAM

## 2025-04-01 PROCEDURE — 97530 THERAPEUTIC ACTIVITIES: CPT | Mod: GO

## 2025-04-01 PROCEDURE — 2500000001 HC RX 250 WO HCPCS SELF ADMINISTERED DRUGS (ALT 637 FOR MEDICARE OP)

## 2025-04-01 PROCEDURE — 85027 COMPLETE CBC AUTOMATED: CPT

## 2025-04-01 PROCEDURE — 82947 ASSAY GLUCOSE BLOOD QUANT: CPT

## 2025-04-01 PROCEDURE — 1170000001 HC PRIVATE ONCOLOGY ROOM DAILY

## 2025-04-01 PROCEDURE — 06V83DZ RESTRICTION OF PORTAL VEIN WITH INTRALUMINAL DEVICE, PERCUTANEOUS APPROACH: ICD-10-PCS | Performed by: RADIOLOGY

## 2025-04-01 PROCEDURE — 97110 THERAPEUTIC EXERCISES: CPT | Mod: GP

## 2025-04-01 PROCEDURE — 83735 ASSAY OF MAGNESIUM: CPT

## 2025-04-01 PROCEDURE — 2500000005 HC RX 250 GENERAL PHARMACY W/O HCPCS

## 2025-04-01 PROCEDURE — 80053 COMPREHEN METABOLIC PANEL: CPT

## 2025-04-01 PROCEDURE — 2500000004 HC RX 250 GENERAL PHARMACY W/ HCPCS (ALT 636 FOR OP/ED): Performed by: NURSE PRACTITIONER

## 2025-04-01 RX ADMIN — INSULIN LISPRO 4 UNITS: 100 INJECTION, SOLUTION INTRAVENOUS; SUBCUTANEOUS at 13:37

## 2025-04-01 RX ADMIN — INSULIN LISPRO 1 UNITS: 100 INJECTION, SOLUTION INTRAVENOUS; SUBCUTANEOUS at 18:05

## 2025-04-01 RX ADMIN — HYDROMORPHONE HYDROCHLORIDE 1 MG: 2 TABLET ORAL at 13:30

## 2025-04-01 RX ADMIN — ONDANSETRON 4 MG: 2 INJECTION INTRAMUSCULAR; INTRAVENOUS at 14:33

## 2025-04-01 RX ADMIN — METHOCARBAMOL TABLETS 500 MG: 500 TABLET, COATED ORAL at 04:26

## 2025-04-01 RX ADMIN — MIDODRINE HYDROCHLORIDE 2.5 MG: 2.5 TABLET ORAL at 08:57

## 2025-04-01 RX ADMIN — Medication 3 MG: at 21:03

## 2025-04-01 RX ADMIN — METOPROLOL TARTRATE 50 MG: 50 TABLET, FILM COATED ORAL at 08:57

## 2025-04-01 RX ADMIN — ATORVASTATIN CALCIUM 40 MG: 40 TABLET, FILM COATED ORAL at 21:03

## 2025-04-01 RX ADMIN — TAMSULOSIN HYDROCHLORIDE 0.4 MG: 0.4 CAPSULE ORAL at 08:57

## 2025-04-01 RX ADMIN — NYSTATIN 400000 UNITS: 500000 SUSPENSION ORAL at 08:57

## 2025-04-01 RX ADMIN — ENOXAPARIN SODIUM 40 MG: 100 INJECTION SUBCUTANEOUS at 13:37

## 2025-04-01 RX ADMIN — OCTREOTIDE ACETATE 200 MCG: 500 INJECTION, SOLUTION INTRAVENOUS; SUBCUTANEOUS at 08:57

## 2025-04-01 RX ADMIN — MORPHINE SULFATE 30 MG: 30 TABLET, FILM COATED, EXTENDED RELEASE ORAL at 08:56

## 2025-04-01 RX ADMIN — OCTREOTIDE ACETATE 200 MCG: 500 INJECTION, SOLUTION INTRAVENOUS; SUBCUTANEOUS at 21:04

## 2025-04-01 RX ADMIN — NYSTATIN 400000 UNITS: 500000 SUSPENSION ORAL at 14:33

## 2025-04-01 RX ADMIN — ASCORBIC ACID, VITAMIN A PALMITATE, CHOLECALCIFEROL, THIAMINE HYDROCHLORIDE, RIBOFLAVIN-5 PHOSPHATE SODIUM, PYRIDOXINE HYDROCHLORIDE, NIACINAMIDE, DEXPANTHENOL, ALPHA-TOCOPHEROL ACETATE, VITAMIN K1, FOLIC ACID, BIOTIN, CYANOCOBALAMIN: 200; 3300; 200; 6; 3.6; 6; 40; 15; 10; 150; 600; 60; 5 INJECTION, SOLUTION INTRAVENOUS at 08:58

## 2025-04-01 RX ADMIN — MORPHINE SULFATE 30 MG: 30 TABLET, FILM COATED, EXTENDED RELEASE ORAL at 21:03

## 2025-04-01 RX ADMIN — SODIUM CHLORIDE, POTASSIUM CHLORIDE, SODIUM LACTATE AND CALCIUM CHLORIDE 500 ML: 600; 310; 30; 20 INJECTION, SOLUTION INTRAVENOUS at 23:57

## 2025-04-01 RX ADMIN — METHOCARBAMOL TABLETS 500 MG: 500 TABLET, COATED ORAL at 10:45

## 2025-04-01 RX ADMIN — NYSTATIN 400000 UNITS: 500000 SUSPENSION ORAL at 21:03

## 2025-04-01 RX ADMIN — MIDODRINE HYDROCHLORIDE 2.5 MG: 2.5 TABLET ORAL at 13:30

## 2025-04-01 RX ADMIN — MIDODRINE HYDROCHLORIDE 2.5 MG: 2.5 TABLET ORAL at 18:03

## 2025-04-01 RX ADMIN — METHOCARBAMOL TABLETS 500 MG: 500 TABLET, COATED ORAL at 23:17

## 2025-04-01 RX ADMIN — METHOCARBAMOL TABLETS 500 MG: 500 TABLET, COATED ORAL at 18:03

## 2025-04-01 RX ADMIN — THIAMINE HYDROCHLORIDE 100 MG: 100 INJECTION, SOLUTION INTRAMUSCULAR; INTRAVENOUS at 08:57

## 2025-04-01 RX ADMIN — OCTREOTIDE ACETATE 200 MCG: 500 INJECTION, SOLUTION INTRAVENOUS; SUBCUTANEOUS at 14:59

## 2025-04-01 ASSESSMENT — COGNITIVE AND FUNCTIONAL STATUS - GENERAL
TOILETING: A LOT
DAILY ACTIVITIY SCORE: 18
DRESSING REGULAR UPPER BODY CLOTHING: A LITTLE
DRESSING REGULAR LOWER BODY CLOTHING: A LITTLE
TURNING FROM BACK TO SIDE WHILE IN FLAT BAD: A LITTLE
TOILETING: A LITTLE
MOVING FROM LYING ON BACK TO SITTING ON SIDE OF FLAT BED WITH BEDRAILS: A LITTLE
EATING MEALS: A LITTLE
PERSONAL GROOMING: A LITTLE
WALKING IN HOSPITAL ROOM: A LITTLE
MOVING TO AND FROM BED TO CHAIR: A LITTLE
HELP NEEDED FOR BATHING: A LOT
TOILETING: A LITTLE
HELP NEEDED FOR BATHING: A LITTLE
MOVING TO AND FROM BED TO CHAIR: A LITTLE
DRESSING REGULAR UPPER BODY CLOTHING: A LITTLE
MOVING TO AND FROM BED TO CHAIR: A LITTLE
CLIMB 3 TO 5 STEPS WITH RAILING: TOTAL
PERSONAL GROOMING: A LITTLE
DAILY ACTIVITIY SCORE: 18
DAILY ACTIVITIY SCORE: 16
CLIMB 3 TO 5 STEPS WITH RAILING: A LOT
STANDING UP FROM CHAIR USING ARMS: A LITTLE
MOBILITY SCORE: 16
MOBILITY SCORE: 18
TURNING FROM BACK TO SIDE WHILE IN FLAT BAD: A LITTLE
STANDING UP FROM CHAIR USING ARMS: A LITTLE
EATING MEALS: A LITTLE
HELP NEEDED FOR BATHING: A LITTLE
PERSONAL GROOMING: A LITTLE
DRESSING REGULAR LOWER BODY CLOTHING: A LITTLE
CLIMB 3 TO 5 STEPS WITH RAILING: A LOT
DRESSING REGULAR LOWER BODY CLOTHING: A LITTLE
TURNING FROM BACK TO SIDE WHILE IN FLAT BAD: A LITTLE
DRESSING REGULAR UPPER BODY CLOTHING: A LITTLE
STANDING UP FROM CHAIR USING ARMS: A LITTLE
WALKING IN HOSPITAL ROOM: A LITTLE
MOBILITY SCORE: 18
WALKING IN HOSPITAL ROOM: A LITTLE
EATING MEALS: A LITTLE

## 2025-04-01 ASSESSMENT — PAIN - FUNCTIONAL ASSESSMENT
PAIN_FUNCTIONAL_ASSESSMENT: 0-10

## 2025-04-01 ASSESSMENT — PAIN SCALES - GENERAL
PAINLEVEL_OUTOF10: 0 - NO PAIN
PAINLEVEL_OUTOF10: 0 - NO PAIN
PAINLEVEL_OUTOF10: 5 - MODERATE PAIN
PAINLEVEL_OUTOF10: 0 - NO PAIN
PAINLEVEL_OUTOF10: 7
PAINLEVEL_OUTOF10: 7

## 2025-04-01 ASSESSMENT — PAIN DESCRIPTION - LOCATION
LOCATION: ABDOMEN
LOCATION: ABDOMEN

## 2025-04-01 NOTE — PROGRESS NOTES
Physical Therapy    Physical Therapy Treatment    Patient Name: Roma Corral  MRN: [id number]  Department: [facility]  Room: [address] [place] [address]  Today's Date: 4/1/2025  Time Calculation  Start Time: 1422  Stop Time: 1435  Time Calculation (min): 13 min         Assessment/Plan   PT Assessment  PT Assessment Results: Decreased strength, Decreased range of motion, Decreased endurance, Impaired balance, Decreased mobility  Rehab Prognosis: Good  Barriers to Discharge Home: Caregiver assistance, Physical needs  Caregiver Assistance: Caregiver assistance needed per identified barriers - however, level of patient's required assistance exceeds assistance available at home  Physical Needs: Ambulating household distances limited by function/safety, 24hr mobility assistance needed, High falls risk due to function or environment, Stair navigation into home limited by function/safety  Evaluation/Treatment Tolerance: Patient limited by fatigue  End of Session Communication: Bedside nurse  Assessment Comment: Pt continues to demonstrate impaired strength and function to that of baseline and remains appropriate for continued PT in house and after discharge at MOD intensity.  End of Session Patient Position: Bed, 3 rail up, Alarm off, not on at start of session  PT Plan  Inpatient/Swing Bed or Outpatient: Inpatient  PT Plan  Treatment/Interventions: Bed mobility, Transfer training, Gait training, Balance training, Neuromuscular re-education, Strengthening, Therapeutic exercise  PT Plan: Ongoing PT  PT Frequency: 3 times per week  PT Discharge Recommendations: Moderate intensity level of continued care  PT Recommended Transfer Status: Assist x1  PT - OK to Discharge: Yes    General Visit Information:   PT  Visit  PT Received On: 04/01/25  General  Patient Position Received: Bed, 3 rail up, Alarm off, not on at start of session  Preferred Learning Style: verbal, auditory  General Comment: Pt resting in bed upon entry. Pt noting having been up with OT  earlier and now with increased abdominal pain and fatigue. Declining attempts for mobility though receptive to reviewing supine level TE with PT.    Subjective   Precautions:  Precautions  Medical Precautions: Fall precautions    Objective   Pain:  Pain Assessment  Pain Assessment: 0-10  0-10 (Numeric) Pain Score: 7 (Recently received pain medications and now also nauseated. RN present and to provide medications.)  Pain Type: Acute pain  Pain Location: Abdomen  Cognition:  Cognition  Overall Cognitive Status: Within Functional Limits  Orientation Level: Oriented X4  Following Commands: Follows all commands and directions without difficulty  Insight: Within function limits  Coordination:  Movements are Fluid and Coordinated: Yes  Postural Control:  Static Sitting Balance  Static Sitting-Comment/Number of Minutes: Pt unable to tolerate attempts with PT after having working with OT.    Activity Tolerance:  Activity Tolerance  Activity Tolerance Comments: Limited on this date after having previously worked with OT.  Treatments:  Therapeutic Exercise  Therapeutic Exercise Performed: Yes  Therapeutic Exercise Activity 1: Issued and reviewed handout of supine TE. Pt verbalized and demonstrated understanding of Supine: AP, QS, GS, HS, hip abduction.    Therapeutic Activity  Therapeutic Activity Performed: No (Pt declining attempts on this date after having been up earlier with OT.)    Bed Mobility  Bed Mobility: No    Ambulation/Gait Training  Ambulation/Gait Training Performed: No  Transfers  Transfer: No    Outcome Measures:  [facility] Basic Mobility  Turning from your back to your side while in a flat bed without using bedrails: A little  Moving from lying on your back to sitting on the side of a flat bed without using bedrails: A little  Moving to and from bed to chair (including a wheelchair): A little  Standing up from a chair using your arms (e.g. wheelchair or bedside chair): A little  To walk in hospital room: A  little  Climbing 3-5 steps with railing: Total  Basic Mobility - Total Score: 16    Education Documentation  Precautions, taught by Keon Amaro, PT at 4/1/2025  2:59 PM.  Learner: Patient  Readiness: Acceptance  Method: Explanation  Response: Verbalizes Understanding    Mobility Training, taught by Keon Amaro, PT at 4/1/2025  2:59 PM.  Learner: Patient  Readiness: Acceptance  Method: Explanation  Response: Verbalizes Understanding    Education Comments  No comments found.      Encounter Problems       Encounter Problems (Active)       Mobility       STG - Patient will ambulate >25ft, wheeled walker, min assist       Start:  03/19/25    Expected End:  04/15/25               PT Transfers       STG - Patient to transfer to and from sit to supine CGA       Start:  03/19/25    Expected End:  04/15/25            STG - Patient will transfer sit to and from stand min assist       Start:  03/19/25    Expected End:  04/15/25

## 2025-04-01 NOTE — CONSULTS
"  Wound Care Consult     Visit Date: 4/1/2025      Patient Name: Roma Corral         MRN: [id number]           YOB: 1955     Reason for Consult: Ostomy care         Wound History: Patient has H/O loop colostomy now end colostomy      Pertinent Labs:   Albumin   Date Value Ref Range Status   04/01/2025 1.9 (L) 3.4 - 5.0 g/dL Final       Wound Assessment:  Wound 03/17/25 Incision Abdomen Anterior (Active)   Site Assessment Clean;Dry;Intact 03/31/25 2000   Summer-Wound Assessment Intact 03/31/25 2000   State of Healing Closed wound edges 03/31/25 0920   Margins Attached edges 03/31/25 2000   Closure Approximated;Glue 03/31/25 2000   Sutures/Staple Line Approximated 03/31/25 2000   Drainage Description None 03/31/25 2000   Drainage Amount None 03/31/25 0920   Dressing Open to air 04/01/25 0800   Dressing Changed Changed 03/28/25 1147   Dressing Status Clean;Dry 03/28/25 1147       Wound 03/25/25 Pressure Injury Coccyx (Active)   Wound Image   04/01/25 1206   Site Assessment Unable to assess 03/31/25 2000   Summer-Wound Assessment Pink 04/01/25 1206   Non-staged Wound Description Partial thickness 03/29/25 1020   Pressure Injury Stage DTPI 03/29/25 1020   Wound Length (cm) 4 cm 03/29/25 1020   Wound Width (cm) 4 cm 03/29/25 1020   Wound Surface Area (cm^2) 16 cm^2 03/29/25 1020   Wound Depth (cm) 0 cm 03/29/25 1020   Wound Volume (cm^3) 0 cm^3 03/29/25 1020   State of Healing Eschar 03/29/25 1020   Margins Well-defined edges 03/29/25 1020   Drainage Description None 03/29/25 1020   Drainage Amount None 03/29/25 1020   Dressing Silicone border dressing 04/01/25 0800   Dressing Changed Changed 04/01/25 0800   Dressing Status Clean;Dry 03/31/25 2000       Wound Team Summary Assessment:   Ostomy type: colostomy    size: 1\" oval       color: red       protruding: flush  Functioning: soft brown stool - large   Mucocutaneous junction: intact  Peristomal skin: 2 cm incision at 7 o'clock,  Pouching: [employer] one piece " soft convex drainable pouch with barrier ring   Ostomy Education: patient has had an ostomy since 7/2024. Knowledgeable in ostomy pouching. Patient in flat pouch- large amount of pancaking behind flat pouch.   Plan: assess stoma/pouching while inpatient     Wound Team Plan: Ostomy team will follow      Gale ALCANTAR   4/1/2025  7:35 PM

## 2025-04-01 NOTE — PROGRESS NOTES
04/01/25 1100   Discharge Planning   Living Arrangements Spouse/significant other   Support Systems Spouse/significant other   Type of Residence Private residence   Number of Stairs to Enter Residence 2   Number of Stairs Within Residence 10   Do you have animals or pets at home? Yes   Type of Animals or Pets one dog   Home or Post Acute Services Post acute facilities (Rehab/SNF/etc)   Type of Post Acute Facility Services Skilled nursing   Expected Discharge Disposition SNF   Does the patient need discharge transport arranged? Yes   RoundTrip coordination needed? Yes   Has discharge transport been arranged? No     SW met with pt to discuss SNF.  He did agree to have referrals sent.  SW made referrals to the following SNFs - [facility], [facility] [place], [facility], [facility], [facility], [facility], [facility], [facility].  Will await response.  KHUSHI Porras    Referrals were also sent to [facility], [facility], [facility], [facility], [facility], [facility], [facility], [facility], [facility], [facility], [facility], and [place] via [facility]. Responses are below   [facility]- Interested, want to know run time for TPN   [facility] - Does not accept TPN   [facility] - Interested, will pt discharge on TPN   [facility] - Interested, will pt have cancer treatment   [facility] - Does not accept TPN   [facility] - No bed available   [facility] - No bed available   [facility] - Does not accept TPN   [facility] - Does not accept TPN   [facility] - Does not accept TPN   [facility] - Does not accept TPN   [facility] - [facility] available   [facility] - Does not accept TPN   [facility] - Does not accept TPN   [facility] [place] - No bed available   [facility] - Does not accept TPN   [facility] - No response   [facility] - No response   [facility] - No response  KHUSHI Porras

## 2025-04-01 NOTE — PROGRESS NOTES
SUPPORTIVE AND PALLIATIVE ONCOLOGY INPATIENT FOLLOW-UP    SERVICE DATE: 04/01/25     Updates 04/01/25, recommended changes are bolded below:  Symptoms well controlled; no changes recommended (pt encouraged to use his prns)    ASSESSMENT/PLAN:  Roma Corral is a 70 y.o. male diagnosed with metastatic rectal cancer (dx 6/2024) to liver.and is s/p robotic LAR with end colostomy, ilecocecetomy with Dr. Vasquez and laparoscopic microwave liver ablation with Dr. Erazo on 3/17/25.  PMH significant for CAD (2021 NSTEMI), DM@, and HTN.. Admitted 3/17/2025 for surgical procedure. Supportive and Palliative Oncology is consulted for pain management.     Pain:  Post-operative pain:  abdominal pain , visceral and neuropathic, muscular; well-controlled  Cancer related pain: rectal , somatic and neuropathic, well-controlled  Home regimen:  Morphine ER 30 mg po bid and Hydromorphone 4 mg po q 4 hrs prn  Intolerances/previously tried: None  Risk factors:  none  Renal function WNL and Hepatic function impaired (elevated Alk Phos)  Continue Acetaminophen 650 mg po q 6 hrs prn for mild pain  Continue Methocarbamol to 500 mg po q 6 hrs scheduled  Continue Morphine ER 30 mg po q 12 hrs   Continue Hydromorphone 1 mg po q 4 hrs prn for moderate pain  Educated pt on asking for prn meds when needed; wrote reminder on white board in room with Medication, use, dose, and frequency as pt has not used any prn doses     Nausea:  At risk for nausea without vomiting related to opioids and pain    Home regimen:  Ondansetron  and Prochlorperazine   QTc:  within normal limits  Denies  Continue Ondansetron 4 mg ODT/IV q 8 hrs prn-1st line  Can add Prochlorperazine 10 mg IV/PO q 6 hrs prn as a second line     Constipation  At risk for constipation related to medication side effects (including opioids), decreased oral intake, and prolonged immobility in the setting of hospitalization ,   Home regimen: Senna 1-2 tabs po daily prn, Colace 100 mg po daily prn,  MOM 15 mL QID prn  LBM ostomy-adequate output  Defer to primary team for management as pt is acute post-op  Goal to have BM without straining q48-72h, adjust regimen as needed     Sleeping Difficulty:  Impaired sleep related to pain  Home regimen:  none  Improved  See pain recs  Melatonin 3 mg po q hs    Disposition:  Please start the process of having prior authorization with meds to beds deliver medications to patient prior to discharge via [facility] pharmacy. Prescriptions will need to be sent 48-72 hours prior to discharge so that a prior authorization can be completed.      Discharge date: unknown pending acute issues and symptom control  Will request an appointment with Outpatient Supportive Oncology [facility] (last seen 25)     SIGNATURE: KEV Fisher   PAGER/CONTACT:  Contact information:  Supportive and Palliative Oncology  Monday-Friday 8 AM-5 PM  Epic Secure chat or pager [phone].  After hours and weekends:  pager [phone]  =================================================================  SUBJECTIVE:  Interval Events:  Gabapentin 300 mg po tid discontinued by primary team last week on 3/28  S/P repeat lymphoscintigraphy on 3/28 with IR due to persistent lymph leak  Currently NPO with TPN   Planning for portal vein and hepatic vein remobilization with IR  Planning to discharge to SNF that will accept TPN    Pain Assessment:  Location:  diffuse abdomen  Duration: Constant  Characteristics:   Ratin   Descriptors: sharp, shooting, and stabbing   Aggravating: movement and coughing   Relieving: Analgesics  , Positioning, Splinting, and Modifying activity   Interference with Function: Somewhat    Opioid Requirements  Past 24 h opioid requirements (3/31/25 at 0800 to 25 at 0800):   Morphine ER 30 mg po x 2 doses=60 mg=60 OME  Hydromorphone 1 mg po x 1 dose=1 mg=5 OME  Total 24h OME use:  65 OME    Symptom Assessment:  Nausea none  Difficulty Sleeping none  Worrying  none  Lack of appetite a little    Information obtained from: chart review, interview of patient, and discussion with primary team_________________________________________________________________   OBJECTIVE:  Lab Results   Component Value Date    WBC 5.5 04/01/2025    HGB 7.0 (L) 04/01/2025    HCT 22.3 (L) 04/01/2025    MCV 92 04/01/2025     (L) 04/01/2025     Lab Results   Component Value Date    GLUCOSE 111 (H) 04/01/2025    CALCIUM 7.5 (L) 04/01/2025     04/01/2025    K 4.6 04/01/2025    CO2 23 04/01/2025     04/01/2025    BUN 22 04/01/2025    CREATININE 0.85 04/01/2025     Lab Results   Component Value Date    ALT 13 04/01/2025    AST 36 04/01/2025    ALKPHOS 862 (H) 04/01/2025    BILITOT 0.7 04/01/2025     Estimated Creatinine Clearance: 76.1 mL/min (by C-G formula based on SCr of 0.85 mg/dL).  Scheduled medications   [Held by provider] aspirin, 81 mg, oral, Daily  atorvastatin, 40 mg, oral, Nightly  enoxaparin, 40 mg, subcutaneous, q24h  insulin lispro, 0-5 Units, subcutaneous, q6h  melatonin, 3 mg, oral, Nightly  methocarbamol, 500 mg, oral, q6h  metoprolol tartrate, 50 mg, oral, BID  midodrine, 2.5 mg, oral, TID  morphine CR, 30 mg, oral, q12h ALEX  nystatin, 4 mL, Swish & Swallow, TID  octreotide, 200 mcg, subcutaneous, TID  polyethylene glycol, 17 g, oral, Daily  tamsulosin, 0.4 mg, oral, Daily  thiamine, 100 mg, intravenous, Daily    Continuous medications  Adult Clinimix TPN Cyclic, , Last Rate: 182 mL/hr at 04/01/25 1006    PRN medications  acetaminophen, 650 mg, q6h PRN  alteplase, 2 mg, PRN  dextrose, 12.5 g, q15 min PRN  dextrose, 25 g, q15 min PRN  glucagon, 1 mg, q15 min PRN  glucagon, 1 mg, q15 min PRN  HYDROmorphone, 1 mg, q4h PRN  naloxone, 0.2 mg, q5 min PRN  ondansetron ODT, 4 mg, q8h PRN   Or  ondansetron, 4 mg, q8h PRN    PHYSICAL EXAMINATION:  Vital Signs:   Vital signs reviewed  Visit Vitals  /61 (BP Location: Right arm, Patient Position: Lying)   Pulse 91   Temp  37.3 °C (99.1 °F) (Temporal)   Resp 16      Numeric Pain Score: 5    Physical Exam  Vitals reviewed.   Constitutional:       General: He is not in acute distress.     Appearance: He is ill-appearing.      Comments: A&O x 3; pleasant and cooperative; sitting in chair; chronically ill appearing, cachectic   HENT:      Head: Normocephalic and atraumatic.      Mouth/Throat:      Mouth: Mucous membranes are moist.   Eyes:      Pupils: Pupils are equal, round, and reactive to light.   Cardiovascular:      Rate and Rhythm: Normal rate.      Pulses: Normal pulses.   Pulmonary:      Effort: Pulmonary effort is normal. No respiratory distress.      Comments: Respirations even and unlabored; on room air  Abdominal:      Palpations: Abdomen is soft.      Tenderness: There is abdominal tenderness.      Comments: Appropriately tender post-operatively; stoma left abdomen, moderate output in surgical drain   Musculoskeletal:      Right lower leg: No edema.      Left lower leg: No edema.   Skin:     General: Skin is warm and dry.      Capillary Refill: Capillary refill takes less than 2 seconds.      Coloration: Skin is pale.   Neurological:      General: No focal deficit present.      Mental Status: He is alert and oriented to person, place, and time.   Psychiatric:         Mood and Affect: Mood normal.         Behavior: Behavior normal.         Thought Content: Thought content normal.         Judgment: Judgment normal.      PALLIATIVE CARE ENCOUNTER:  Supportive and Palliative Oncology encounter:  Spoke with patient at bedside  Emotional support provided  Coordination of care:   medication management and evaluation    Medical Decision Making/Goals of Care/Advance Care Planning:  Patient's current clinical condition, including diagnosis, prognosis, and management plan, and goals of care were discussed.   Life limiting disease: metastatic malignancy  Family: Supportive wife, son, sister  Performance status: Moderate limitations due  to pain  Joys/meaning/strength: Family and [place]  Understanding of health: Demonstrates good prognostic understanding of disease process, understands plan for management of acute post-operative pain and anticipated recovery/follow up  Information:Wants full disclosure  Goals: symptom control and cancer directed therapy  Worries and fears now and future: ongoing symptoms and inability to receive cancer treatment   Minimum acceptable outcome/QOL:  adequate pain control and able to consequently sleep and eat well  Code status discussion:  Full code     Advance Directives  Existence of Advance Directives:None  Decision maker: Surrogate decision maker is wife Betsy Corral [phone]  =================================================================  Signature and billing:  Medical complexity was high level due to due to complexity of problems, extensive data review, and high risk of management/treatment.    I spent 50 minutes in the care of this patient which included chart review, interviewing patient/family, discussion with primary team, coordination of care, and documentation.    Data:   Diagnostic tests and information reviewed for today's visit:  Conversation with primary team, Most recent labs and imaging results, Most recent EKG, Medications    Some elements copied from my note on 3/24/25, the elements have been updated and all reflect current decision making from today, 04/01/25     Plan of Care discussed with: Provider, RN, Patient    Thank you for asking Supportive and Palliative Oncology to assist with care of this patient.  Recommendations will be communicated back to the consulting service by way of shared electronic medical record/secure chat/email or face-to-face.   We will continue to follow  Please contact us for additional questions or concerns.    SIGNATURE: MELISSA Fisher-CNP   PAGER/CONTACT:  Contact information:  Supportive and Palliative Oncology  Monday-Friday 8 AM-5 PM  Epic  Secure chat or pager [phone].  After hours and weekends:  pager [phone]

## 2025-04-01 NOTE — PROGRESS NOTES
"Music Therapy Note    Roma Corral    Therapy Session  Referral Type: New referral this admission  Visit Type: Follow-up visit  Session Start Time: 1518  Session End Time: 1518  Intervention Delivery: In-person  Conflict of Service: Declined treatment               Treatment/Interventions       Post-assessment  Total Session Time (min): 0 minutes    Narrative  Assessment Detail: Upon arrival of music therapist, pt was sitting up in bed. Pt expressed that it was not a good time stating, \"It's not a good day for me.\"  Plan: MT followed up with pt with a focus on using music as a means of coping.                                                .  Intervention: No intervention at this time, pt declined to participate.  Evaluation: NA  Follow-up: If applicable, music therapy will follow up at at later time.    Education Documentation  No documentation found.          "

## 2025-04-01 NOTE — PROGRESS NOTES
Art Therapy Note    Roma Corral     Therapy Session  Referral Type: New referral this admission  Visit Type: Follow-up visit  Session Start Time: 1356  Intervention Delivery: In-person  Conflict of Service: Working with other staff              Treatment/Interventions  Art Therapy Interventions: Education/instruction, Assessment         Narrative  Assessment Detail: At time of visit RN entering room to care for Pt.  ATR will follow up with Pt another time to offer services.    Education Documentation  No documentation found.

## 2025-04-01 NOTE — PROGRESS NOTES
Colorectal Surgery Progress Note      HPI: Roma Corral is a 70 y.o. 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 Vasquez, and laparoscopic MWA x3 with liver biopsy by Dr. Erazo on 3/18/25 c/b post-operative lymphatic leak and high drain output.      Subjective  No acute events overnight.  No f/c/n/v. Pain under control.     Mild leakage around the drain, overall output trending down.  Resume diet now since portal vein embolization is being postponed.     Objective  Physical Exam:  Gen: in no physical distress and alert, deconditioned and weak appearing, resting in bed comfortably  HENT: Head normocephalic, atraumatic.  Mucous membranes moist.    Neuro: Alert and oriented x3.  Moves all extremities spontaneously x4.  CV: Normal rate, normotensive. Trace edema bilaterally at ankles.  Resp: No acute respiratory distress.  Normal effort on room air. Chest expansion symmetrical.   GI: Abdomen is soft, nontender, and nondistended. Laparoscopic incisions are clean, dry and intact with glue.  Stoma is pink and healthy, well pouched, with minimal output in pouch. Abdominal drain to bile bag with yellow serous drainage, slight leakage around the drain.  : Testicles without erythema or ecchymosis. Nontender to palpation, without palpable masses or abnormalities.  Skin: Warm and dry, pale, without rashes or lesions.  Coccyx/sacral pressure wound.    Visit Vitals  /52 (BP Location: Right arm, Patient Position: Lying)   Pulse 86   Temp 37 °C (98.6 °F) (Temporal)   Resp 16        I/O last 3 completed shifts:  In: 1902.1 (28.6 mL/kg) [P.O.:350]  Out: 3050 (45.9 mL/kg) [Urine:1250 (0.5  mL/kg/hr); Drains:1750; Stool:50]  Weight: 66.5 kg   I/O this shift:  In: -   Out: 200 [Drains:200]         Data Review:            7.0     5.5>-----<114              22.3   138  106  22                  ----------------<111     4.6  23  0.85          Ca 7.5 Phos 3.6 Mg 1.99       ALT 13 AST 36 AlkPhos 862 tBili 0.7         Imaging:  No recent imaging to review.    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 Vasquez, and laparoscopic MWA x3 with liver biopsy by Dr. Erazo on 3/18/25. Post-op course c/b high pelvic drain output 2/2 persistent lymph leak (elevated drain TG) s/p low fat diet & IR lymphoscintigraphy 3/25 & 3/28 without major improvement/decrease in output.  Made NPO with TPN over weekend with decrease in output. 700 in the past 24 hours from 1.3L.       Plan:  -OOB during day/ambulation in halls x3/PT  -Continue NPO/TPN.  Cycle TPN  -Coccyx/sacral wound care  -Await new SNF facility that will accept TPN    Neuro: Post-op pain controlled, history of anxiety  -Continue current pain regimen with robaxin,prn tylenol, prn dilaudid  -Continue home MS Contin, hold home ativan  -Sleep hygiene/OOB during day/PT/multiple walks in halls   -Pet/music/art therapy  -Melatonin HS    CV: hemodynamically stable; hx of CAD (NSTEMI 2021), HTN, HLD  -Q4 vital signs  -EKG prn for ACS symptoms  -Continue home metoprolol with hold parameters  -Hold home ASA 81 in setting of elevated LFTs/thrombocytopenia    Resp: no acute issues on room air, no significant pulmonary history  -ICS 10x every hour  -OOB/ambulation    GI: s/p robotic LAR/loop to end colostomy, ileocecectomy & MWA c/b persistent lymph leak; hx of rectal CA with mets to liver  -NPO, TPN - cycle TPN to run during day for  SNF. No hypoglycemia.  -Thiamine daily  -Octreotide & Midodrine TID for lymph leak; appreciate surg onc support & expertise  -Zofran prn for nausea  -Miralax daily for firm colostomy output  -continue assessment of stoma and output, stoma not new for patient.  WOCN for supplies & support. Continue to monitor drain output/replace fluid prn  -Daily CMP to monitor LFTs    : Oliguria with small volume retention, testicular pain post fall; no significant urological hx  -Continue flomax, needs to stand when voiding to completely empty.   - Strict intake and output, monitoring fluid & electrolyte balance closely in setting of high output drain  - Bladder scan prn to monitor retention/urine output  - Daily RFP/replace electrolytes as needed    Heme: H&H stable with no evidence of bleeding, history of pancytopenia with platelets similar to baseline  -CBC daily; monitor for s/s of bleeding    ID: afebrile, no leukocytosis  -Q4 temp  -trend CBC; monitor for s/s of infection    Endo: no acute issues, no significant endocrine history   -hypoglycemia protocol  -Q6 BG on TPN  -no indication for SSI    DVT Prophy: SCDs;  Lovenox    Dispo:   -Continue care on RNF  -PT recommending SNF for physical rehabilitation; also needed for TPN, drain monitoring & management. Pending SNF choices.     Plan of care discussed with Dr. Connell and patient.    Missael Lyles MD  Colorectal Surgery  [facility] Pager #[phone]

## 2025-04-01 NOTE — PROGRESS NOTES
Surgical Oncology Progress Note    HPI:  Roma Corral is a 70 y.o. male with history of rectal cancer with mets to the liver, CAD (2021 NSTEMI), DM2, HTN  who is s/p robotic LAR with end colostomy, ilecocecetomy with Dr. Vasquez, and laparoscopic microwave liver ablation with Dr. Erazo on 3/17/25.     Subjective:  NAEON. Continues on TPN. No new complaints. No n/v. Coordinating with IR for portal/hepatic vein embolization    Objective  Temp:  [36.3 °C (97.3 °F)-37.4 °C (99.3 °F)] 37 °C (98.6 °F)  Heart Rate:  [] 86  Resp:  [16-18] 16  BP: ()/(52-75) 100/52  I/O last 2 completed shifts:  In: 1298 (19.5 mL/kg) [P.O.:320]  Out: 1575 (23.7 mL/kg) [Urine:825 (0.5 mL/kg/hr); Drains:700; Stool:50]  Weight: 66.5 kg     CIRILO output 0.7 L    Physical Exam:  NAD  Nontachycardic  On room air  Abdomen soft, appropriate tenderness to palpation, minimally distended, colostomy pink without gas or stool in the bag, right sided drain in place with serosang output (thinner compared to yesterday)    Labs within past 24h:            7.0   138  106  22   5.5>-----<114  ----------------<111             22.3   4.6  23  0.85          Ca 7.5 Phos 3.6 Mg 1.99       ALT 13 AST 36 AlkPhos 862 tBili 0.7               ASSESSMENT  Roma Corral is a 70 y.o. male with history of rectal cancer with mets to the liver, CAD (2021 NSTEMI), DM2, HTN who is s/p robotic LAR with end colostomy, ilecocecetomy with Dr. Vasquez, and laparoscopic microwave liver ablation with Dr. Erazo on 3/17/25, postop course notable for persistent chyle leak.    PLAN:  - Continue octreotide TID and midodrine  - Continue TPN  - Planning portal vein and hepatic vein embolization with IR    Discussed with Dr. Kacey Johnson MD- PGY1  Surgical Oncology [id number]    [facility] Team Pager: [phone]

## 2025-04-01 NOTE — PROGRESS NOTES
Occupational Therapy    Occupational Therapy Treatment    Name: Roma Corral  MRN: [id number]  : 1955  Date: 25  Room: [address] [place] [address]      Time Calculation  Start Time: 1231  Stop Time: 1258  Time Calculation (min): 27 min    Assessment:  OT Assessment: Pt continues to present with decreased ADL/IADL and functional mobilty independence. Pt remains appropriate for MOD intensity OT  Prognosis: Good  Barriers to Discharge Home: Physical needs  Physical Needs: 24hr mobility assistance needed, Intermittent ADL assistance needed, High falls risk due to function or environment  Evaluation/Treatment Tolerance: Patient tolerated treatment well  Medical Staff Made Aware: Yes  End of Session Communication: Bedside nurse  End of Session Patient Position: Up in chair, Alarm on    Plan:  Treatment Interventions: ADL retraining, Functional transfer training, Endurance training, Patient/family training, Equipment evaluation/education, Compensatory technique education  OT Frequency: 3 times per week  OT Discharge Recommendations: Moderate intensity level of continued care  Equipment Recommended upon Discharge:  (tbd)  OT Recommended Transfer Status: Assist of 1  OT - OK to Discharge: Yes (OT eval complete and d/c recs made)    Subjective   General:  OT Last Visit  OT Received On: 25  Prior to Session Communication: Bedside nurse  Patient Position Received: Bed, 3 rail up, Alarm off, not on at start of session  Family/Caregiver Present: No  General Comment: Pt supine in bed and agreeable to therapy. Completed fx mobility and fx transfer to the chair and simulated LB dressing. Pt remains appropriate for MOD intensity OT.     Precautions:  Medical Precautions: Fall precautions  Post-Surgical Precautions: Abdominal surgery precautions    Vitals:   Date/Time Vitals Session Patient Position Pulse Resp SpO2 BP MAP (mmHg)    25 1231 During OT  --  89  --  98 %  --  --    At EOB, mild dizziness  endorsed    Lines/Tubes/Drains:  PICC - Adult 03/29/25 Double lumen Left Brachial vein (Active)   Number of days: 2       Implantable Port 07/23/24 Right Chest Single lumen port (Active)   Number of days: 252       Closed/Suction Drain 1 Right Abdomen Bulb 19 Fr. (Active)   Number of days: 14       Colostomy Loop LUQ (Active)   Number of days: 272     Cognition:  Overall Cognitive Status: Within Functional Limits  Orientation Level: Oriented X4  Following Commands: Follows all commands and directions without difficulty  Safety Judgment: Good awareness of safety precautions  Problem Solving: Within Functional Limits  Safety/Judgement: Within Functional Limits  Insight: Within function limits  Impulsive: Within functional limits  Processing Speed: Within funtional limits    Pain Assessment:  Pain Assessment  Pain Assessment:  (endorsed discomfort in abdomen)     Objective   Activities of Daily Living:      LE Dressing  LE Dressing: Yes  LE Dressing Where Assessed: Edge of bed  LE Dressing Comments: pt demonstrated figure 4 position bilaterally to simulate donning/doffing socks while maintaining abdominal precautions     Functional Standing Tolerance:  Functional Mobility  Functional Mobility Performed: Yes  Functional Mobility 1  Surface 1: Level tile  Device 1: Rolling walker  Assistance 1: Contact guard  Comments 1: max household distances through the hallway    Bed Mobility/Transfers:   Bed Mobility  Bed Mobility: Yes  Bed Mobility 1  Bed Mobility 1: Supine to sitting  Level of Assistance 1: Contact guard, Minimal verbal cues  Bed Mobility Comments 1: log rol technique utilized, HOB elevated    Transfers  Transfer: Yes  Transfer 1  Transfer From 1: Bed to, Stand to  Transfer to 1: Stand, Bed  Technique 1: Sit to stand, Stand to sit  Transfer Device 1: Walker  Transfer Level of Assistance 1: Contact guard, Minimal verbal cues    Transfers 2  Transfer From 2: Bed to  Transfer to 2: Chair with arms  Technique 2: Sit  to stand, Stand to sit  Transfer Device 2:  (no device)  Transfer Level of Assistance 2: Contact guard, Minimal verbal cues    Transfers 3  Transfer From 3: Chair with arms to, Stand to  Transfer to 3: Stand, Chair with arms  Transfer Device 3:  (no device)  Transfer Level of Assistance 3: Contact guard, Minimal verbal cues  Trials/Comments 3: 3 trials    Balance:  Dynamic Sitting Balance  Dynamic Sitting-Level of Assistance: Close supervision  Dynamic Standing Balance  Dynamic Standing-Level of Assistance: Contact guard  Static Sitting Balance  Static Sitting-Level of Assistance: Close supervision  Static Standing Balance  Static Standing-Level of Assistance: Contact guard    Therapy/Activity:      Therapeutic Activity  Therapeutic Activity Performed: Yes  Therapeutic Activity 1: completed fx mobility and fx transfer in preparation for ADL/IADL and functional mobility independence  Therapeutic Activity 2: challenged balance and endurance wth multiple sit<>stand tx  Therapeutic Activity 3: VCs/review for abdominal precautions during bed mobility and ADL tasks     Strength:  Strength  Strength Comments: BUE appears WFL via fx mobility/transfers    Outcome Measures:  [facility] Daily Activity  Putting on and taking off regular lower body clothing: A little  Bathing (including washing, rinsing, drying): A lot  Putting on and taking off regular upper body clothing: A little  Toileting, which includes using toilet, bedpan or urinal: A lot  Taking care of personal grooming such as brushing teeth: A little  Eating Meals: A little  Daily Activity - Total Score: 16  Brief Confusion Assessment Method (bCAM)  CAM Result: CAM -     Education Documentation  Body Mechanics, taught by Xiomara Paredes OT at 4/1/2025  1:53 PM.  Learner: Patient  Readiness: Acceptance  Method: Explanation, Demonstration  Response: Verbalizes Understanding, Demonstrated Understanding    Precautions, taught by Xiomara Paredes OT at 4/1/2025  1:53  PM.  Learner: Patient  Readiness: Acceptance  Method: Explanation, Demonstration  Response: Verbalizes Understanding, Demonstrated Understanding    ADL Training, taught by Xiomara Paredes OT at 4/1/2025  1:53 PM.  Learner: Patient  Readiness: Acceptance  Method: Explanation, Demonstration  Response: Verbalizes Understanding, Demonstrated Understanding    Education Comments  No comments found.      Goals:  Encounter Problems       Encounter Problems (Active)       ADLs       Patient will perform UB and LB bathing with minimal assist  level of assistance and PRN AE. (Progressing)       Start:  03/19/25    Expected End:  04/09/25            Patient with complete lower body dressing with minimal assist  level of assistance donning and doffing all LE clothes  with PRN adaptive equipment while edge of bed  (Progressing)       Start:  03/19/25    Expected End:  04/09/25            Patient will complete toileting including hygiene clothing management/hygiene with minimal assist  level of assistance and PRN AE. (Progressing)       Start:  03/19/25    Expected End:  04/09/25               BALANCE       Pt will maintain dynamic standing balance during ADL task with minimal assist  level of assistance in order to demonstrate decreased risk of falling and improved postural control. (Progressing)       Start:  03/19/25    Expected End:  04/09/25               COGNITION/SAFETY       Patient will recall and adhere to abdominal precautions during all functional mobility/ADL tasks in order to demonstrate improved understanding and promote healing post op (Progressing)       Start:  03/19/25    Expected End:  04/09/25               TRANSFERS       Patient will perform bed mobility minimal assist  level of assistance and bed rails in order to improve safety and independence with mobility (Progressing)       Start:  03/19/25    Expected End:  04/09/25            Patient will complete sit to stand transfer with minimal assist  level of  assistance and least restrictive device in order to improve safety and prepare for out of bed mobility. (Progressing)       Start:  03/19/25    Expected End:  04/09/25 04/01/25 at 1:54 PM   Xiomara Paredes, OTR/L  [phone]

## 2025-04-01 NOTE — PROGRESS NOTES
Art Therapy Note    Roma Corral     Therapy Session  Referral Type: New referral this admission  Visit Type: Follow-up visit  Session Start Time: 1606  Intervention Delivery: In-person  Conflict of Service: Working with other staff              Treatment/Interventions  Art Therapy Interventions: Education/instruction, Assessment         Narrative  Assessment Detail: AT time of visit Pt with nursing staff receiving care.  ATR will follow up another time to [facility] and offer services.    Education Documentation  No documentation found.

## 2025-04-02 ENCOUNTER — APPOINTMENT (OUTPATIENT)
Dept: SURGICAL ONCOLOGY | Facility: HOSPITAL | Age: 70
End: 2025-04-02
Payer: MEDICARE

## 2025-04-02 ENCOUNTER — APPOINTMENT (OUTPATIENT)
Dept: RADIOLOGY | Facility: HOSPITAL | Age: 70
End: 2025-04-02
Payer: MEDICARE

## 2025-04-02 DIAGNOSIS — Z78.9 ON TOTAL PARENTERAL NUTRITION: ICD-10-CM

## 2025-04-02 LAB
ABO GROUP (TYPE) IN BLOOD: NORMAL
ALBUMIN SERPL BCP-MCNC: 1.7 G/DL (ref 3.4–5)
ALP SERPL-CCNC: 826 U/L (ref 33–136)
ALT SERPL W P-5'-P-CCNC: 17 U/L (ref 10–52)
ANION GAP SERPL CALC-SCNC: 11 MMOL/L (ref 10–20)
ANTIBODY SCREEN: NORMAL
AST SERPL W P-5'-P-CCNC: 51 U/L (ref 9–39)
BILIRUB SERPL-MCNC: 0.7 MG/DL (ref 0–1.2)
BUN SERPL-MCNC: 28 MG/DL (ref 6–23)
CALCIUM SERPL-MCNC: 7 MG/DL (ref 8.6–10.6)
CHLORIDE SERPL-SCNC: 105 MMOL/L (ref 98–107)
CO2 SERPL-SCNC: 25 MMOL/L (ref 21–32)
CREAT SERPL-MCNC: 0.79 MG/DL (ref 0.5–1.3)
EGFRCR SERPLBLD CKD-EPI 2021: >90 ML/MIN/1.73M*2
ERYTHROCYTE [DISTWIDTH] IN BLOOD BY AUTOMATED COUNT: 18.3 % (ref 11.5–14.5)
ERYTHROCYTE [DISTWIDTH] IN BLOOD BY AUTOMATED COUNT: 22.4 % (ref 11.5–14.5)
GLUCOSE BLD MANUAL STRIP-MCNC: 141 MG/DL (ref 74–99)
GLUCOSE BLD MANUAL STRIP-MCNC: 312 MG/DL (ref 74–99)
GLUCOSE BLD MANUAL STRIP-MCNC: 98 MG/DL (ref 74–99)
GLUCOSE SERPL-MCNC: 96 MG/DL (ref 74–99)
HCT VFR BLD AUTO: 21.6 % (ref 41–52)
HCT VFR BLD AUTO: 29.1 % (ref 41–52)
HGB BLD-MCNC: 6.7 G/DL (ref 13.5–17.5)
HGB BLD-MCNC: 8.9 G/DL (ref 13.5–17.5)
MAGNESIUM SERPL-MCNC: 2.12 MG/DL (ref 1.6–2.4)
MCH RBC QN AUTO: 28.3 PG (ref 26–34)
MCH RBC QN AUTO: 29.1 PG (ref 26–34)
MCHC RBC AUTO-ENTMCNC: 30.6 G/DL (ref 32–36)
MCHC RBC AUTO-ENTMCNC: 31 G/DL (ref 32–36)
MCV RBC AUTO: 93 FL (ref 80–100)
MCV RBC AUTO: 94 FL (ref 80–100)
NRBC BLD-RTO: 0 /100 WBCS (ref 0–0)
NRBC BLD-RTO: 0 /100 WBCS (ref 0–0)
PLATELET # BLD AUTO: 114 X10*3/UL (ref 150–450)
PLATELET # BLD AUTO: 123 X10*3/UL (ref 150–450)
POTASSIUM SERPL-SCNC: 4.8 MMOL/L (ref 3.5–5.3)
PROT SERPL-MCNC: 4.4 G/DL (ref 6.4–8.2)
RBC # BLD AUTO: 2.3 X10*6/UL (ref 4.5–5.9)
RBC # BLD AUTO: 3.14 X10*6/UL (ref 4.5–5.9)
RH FACTOR (ANTIGEN D): NORMAL
SODIUM SERPL-SCNC: 136 MMOL/L (ref 136–145)
WBC # BLD AUTO: 4.9 X10*3/UL (ref 4.4–11.3)
WBC # BLD AUTO: 6.1 X10*3/UL (ref 4.4–11.3)

## 2025-04-02 PROCEDURE — 2550000001 HC RX 255 CONTRASTS: Performed by: SURGERY

## 2025-04-02 PROCEDURE — 80053 COMPREHEN METABOLIC PANEL: CPT

## 2025-04-02 PROCEDURE — C1768 GRAFT, VASCULAR: HCPCS

## 2025-04-02 PROCEDURE — C1887 CATHETER, GUIDING: HCPCS

## 2025-04-02 PROCEDURE — 2500000004 HC RX 250 GENERAL PHARMACY W/ HCPCS (ALT 636 FOR OP/ED)

## 2025-04-02 PROCEDURE — 99152 MOD SED SAME PHYS/QHP 5/>YRS: CPT

## 2025-04-02 PROCEDURE — 2500000002 HC RX 250 W HCPCS SELF ADMINISTERED DRUGS (ALT 637 FOR MEDICARE OP, ALT 636 FOR OP/ED): Performed by: STUDENT IN AN ORGANIZED HEALTH CARE EDUCATION/TRAINING PROGRAM

## 2025-04-02 PROCEDURE — 2500000001 HC RX 250 WO HCPCS SELF ADMINISTERED DRUGS (ALT 637 FOR MEDICARE OP): Performed by: STUDENT IN AN ORGANIZED HEALTH CARE EDUCATION/TRAINING PROGRAM

## 2025-04-02 PROCEDURE — 99153 MOD SED SAME PHYS/QHP EA: CPT | Performed by: RADIOLOGY

## 2025-04-02 PROCEDURE — 2780000003 HC OR 278 NO HCPCS

## 2025-04-02 PROCEDURE — 2500000005 HC RX 250 GENERAL PHARMACY W/O HCPCS

## 2025-04-02 PROCEDURE — 2720000007 HC OR 272 NO HCPCS

## 2025-04-02 PROCEDURE — 82947 ASSAY GLUCOSE BLOOD QUANT: CPT

## 2025-04-02 PROCEDURE — C1894 INTRO/SHEATH, NON-LASER: HCPCS

## 2025-04-02 PROCEDURE — P9040 RBC LEUKOREDUCED IRRADIATED: HCPCS

## 2025-04-02 PROCEDURE — 99153 MOD SED SAME PHYS/QHP EA: CPT

## 2025-04-02 PROCEDURE — 37243 VASC EMBOLIZE/OCCLUDE ORGAN: CPT | Performed by: RADIOLOGY

## 2025-04-02 PROCEDURE — 99232 SBSQ HOSP IP/OBS MODERATE 35: CPT

## 2025-04-02 PROCEDURE — 83735 ASSAY OF MAGNESIUM: CPT

## 2025-04-02 PROCEDURE — C1769 GUIDE WIRE: HCPCS

## 2025-04-02 PROCEDURE — 86901 BLOOD TYPING SEROLOGIC RH(D): CPT | Performed by: NURSE PRACTITIONER

## 2025-04-02 PROCEDURE — 99152 MOD SED SAME PHYS/QHP 5/>YRS: CPT | Performed by: RADIOLOGY

## 2025-04-02 PROCEDURE — C1889 IMPLANT/INSERT DEVICE, NOC: HCPCS

## 2025-04-02 PROCEDURE — 85027 COMPLETE CBC AUTOMATED: CPT

## 2025-04-02 PROCEDURE — 2500000004 HC RX 250 GENERAL PHARMACY W/ HCPCS (ALT 636 FOR OP/ED): Performed by: RADIOLOGY

## 2025-04-02 PROCEDURE — 2500000004 HC RX 250 GENERAL PHARMACY W/ HCPCS (ALT 636 FOR OP/ED): Performed by: NURSE PRACTITIONER

## 2025-04-02 PROCEDURE — 1170000001 HC PRIVATE ONCOLOGY ROOM DAILY

## 2025-04-02 PROCEDURE — 86870 RBC ANTIBODY IDENTIFICATION: CPT

## 2025-04-02 PROCEDURE — C2629 INTRO/SHEATH, LASER: HCPCS

## 2025-04-02 PROCEDURE — 36010 PLACE CATHETER IN VEIN: CPT | Performed by: RADIOLOGY

## 2025-04-02 PROCEDURE — 2500000001 HC RX 250 WO HCPCS SELF ADMINISTERED DRUGS (ALT 637 FOR MEDICARE OP)

## 2025-04-02 PROCEDURE — 2550000001 HC RX 255 CONTRASTS: Performed by: RADIOLOGY

## 2025-04-02 PROCEDURE — 86077 PHYS BLOOD BANK SERV XMATCH: CPT | Performed by: STUDENT IN AN ORGANIZED HEALTH CARE EDUCATION/TRAINING PROGRAM

## 2025-04-02 PROCEDURE — 86922 COMPATIBILITY TEST ANTIGLOB: CPT

## 2025-04-02 PROCEDURE — 2500000004 HC RX 250 GENERAL PHARMACY W/ HCPCS (ALT 636 FOR OP/ED): Performed by: STUDENT IN AN ORGANIZED HEALTH CARE EDUCATION/TRAINING PROGRAM

## 2025-04-02 PROCEDURE — 36430 TRANSFUSION BLD/BLD COMPNT: CPT

## 2025-04-02 PROCEDURE — 36011 PLACE CATHETER IN VEIN: CPT | Performed by: RADIOLOGY

## 2025-04-02 RX ORDER — SODIUM CHLORIDE, SODIUM LACTATE, POTASSIUM CHLORIDE, CALCIUM CHLORIDE 600; 310; 30; 20 MG/100ML; MG/100ML; MG/100ML; MG/100ML
100 INJECTION, SOLUTION INTRAVENOUS CONTINUOUS
Status: DISCONTINUED | OUTPATIENT
Start: 2025-04-02 | End: 2025-04-02

## 2025-04-02 RX ORDER — FENTANYL CITRATE 50 UG/ML
INJECTION, SOLUTION INTRAMUSCULAR; INTRAVENOUS
Status: COMPLETED | OUTPATIENT
Start: 2025-04-02 | End: 2025-04-02

## 2025-04-02 RX ORDER — MIDAZOLAM HYDROCHLORIDE 1 MG/ML
INJECTION INTRAMUSCULAR; INTRAVENOUS
Status: COMPLETED | OUTPATIENT
Start: 2025-04-02 | End: 2025-04-02

## 2025-04-02 RX ORDER — POLYETHYLENE GLYCOL 3350 17 G/17G
17 POWDER, FOR SOLUTION ORAL EVERY OTHER DAY
Status: DISCONTINUED | OUTPATIENT
Start: 2025-04-03 | End: 2025-04-07 | Stop reason: HOSPADM

## 2025-04-02 RX ORDER — CEFTRIAXONE 1 G/1
INJECTION, POWDER, FOR SOLUTION INTRAMUSCULAR; INTRAVENOUS
Status: COMPLETED | OUTPATIENT
Start: 2025-04-02 | End: 2025-04-02

## 2025-04-02 RX ORDER — POLYETHYLENE GLYCOL 3350 17 G/17G
17 POWDER, FOR SOLUTION ORAL EVERY OTHER DAY
Start: 2025-04-03

## 2025-04-02 RX ADMIN — FENTANYL CITRATE 50 MCG: 50 INJECTION, SOLUTION INTRAMUSCULAR; INTRAVENOUS at 18:38

## 2025-04-02 RX ADMIN — ATORVASTATIN CALCIUM 40 MG: 40 TABLET, FILM COATED ORAL at 20:10

## 2025-04-02 RX ADMIN — MIDODRINE HYDROCHLORIDE 2.5 MG: 2.5 TABLET ORAL at 08:55

## 2025-04-02 RX ADMIN — SODIUM CHLORIDE, POTASSIUM CHLORIDE, SODIUM LACTATE AND CALCIUM CHLORIDE 100 ML/HR: 600; 310; 30; 20 INJECTION, SOLUTION INTRAVENOUS at 04:08

## 2025-04-02 RX ADMIN — OCTREOTIDE ACETATE 200 MCG: 500 INJECTION, SOLUTION INTRAVENOUS; SUBCUTANEOUS at 20:19

## 2025-04-02 RX ADMIN — METHOCARBAMOL TABLETS 500 MG: 500 TABLET, COATED ORAL at 06:07

## 2025-04-02 RX ADMIN — MIDAZOLAM HYDROCHLORIDE 0.5 MG: 2 INJECTION, SOLUTION INTRAMUSCULAR; INTRAVENOUS at 18:38

## 2025-04-02 RX ADMIN — MIDAZOLAM HYDROCHLORIDE 0.5 MG: 2 INJECTION, SOLUTION INTRAMUSCULAR; INTRAVENOUS at 15:54

## 2025-04-02 RX ADMIN — METHOCARBAMOL TABLETS 500 MG: 500 TABLET, COATED ORAL at 20:10

## 2025-04-02 RX ADMIN — MORPHINE SULFATE 30 MG: 30 TABLET, FILM COATED, EXTENDED RELEASE ORAL at 20:10

## 2025-04-02 RX ADMIN — MIDAZOLAM HYDROCHLORIDE 0.5 MG: 2 INJECTION, SOLUTION INTRAMUSCULAR; INTRAVENOUS at 14:06

## 2025-04-02 RX ADMIN — FENTANYL CITRATE 25 MCG: 50 INJECTION, SOLUTION INTRAMUSCULAR; INTRAVENOUS at 14:06

## 2025-04-02 RX ADMIN — INSULIN LISPRO 4 UNITS: 100 INJECTION, SOLUTION INTRAVENOUS; SUBCUTANEOUS at 12:13

## 2025-04-02 RX ADMIN — IOHEXOL 50 ML: 350 INJECTION, SOLUTION INTRAVENOUS at 18:03

## 2025-04-02 RX ADMIN — IOHEXOL 200 ML: 350 INJECTION, SOLUTION INTRAVENOUS at 16:23

## 2025-04-02 RX ADMIN — OCTREOTIDE ACETATE 200 MCG: 500 INJECTION, SOLUTION INTRAVENOUS; SUBCUTANEOUS at 09:07

## 2025-04-02 RX ADMIN — CEFTRIAXONE SODIUM 2 G: 1 INJECTION, POWDER, FOR SOLUTION INTRAMUSCULAR; INTRAVENOUS at 14:01

## 2025-04-02 RX ADMIN — MIDODRINE HYDROCHLORIDE 2.5 MG: 2.5 TABLET ORAL at 20:10

## 2025-04-02 RX ADMIN — MORPHINE SULFATE 30 MG: 30 TABLET, FILM COATED, EXTENDED RELEASE ORAL at 08:54

## 2025-04-02 RX ADMIN — ASCORBIC ACID, VITAMIN A PALMITATE, CHOLECALCIFEROL, THIAMINE HYDROCHLORIDE, RIBOFLAVIN-5 PHOSPHATE SODIUM, PYRIDOXINE HYDROCHLORIDE, NIACINAMIDE, DEXPANTHENOL, ALPHA-TOCOPHEROL ACETATE, VITAMIN K1, FOLIC ACID, BIOTIN, CYANOCOBALAMIN: 200; 3300; 200; 6; 3.6; 6; 40; 15; 10; 150; 600; 60; 5 INJECTION, SOLUTION INTRAVENOUS at 09:08

## 2025-04-02 RX ADMIN — MIDAZOLAM HYDROCHLORIDE 0.5 MG: 2 INJECTION, SOLUTION INTRAMUSCULAR; INTRAVENOUS at 16:48

## 2025-04-02 RX ADMIN — THIAMINE HYDROCHLORIDE 100 MG: 100 INJECTION, SOLUTION INTRAMUSCULAR; INTRAVENOUS at 08:55

## 2025-04-02 RX ADMIN — Medication 3 MG: at 20:10

## 2025-04-02 RX ADMIN — FENTANYL CITRATE 25 MCG: 50 INJECTION, SOLUTION INTRAMUSCULAR; INTRAVENOUS at 15:54

## 2025-04-02 RX ADMIN — FENTANYL CITRATE 25 MCG: 50 INJECTION, SOLUTION INTRAMUSCULAR; INTRAVENOUS at 16:48

## 2025-04-02 RX ADMIN — MIDODRINE HYDROCHLORIDE 2.5 MG: 2.5 TABLET ORAL at 12:14

## 2025-04-02 RX ADMIN — NYSTATIN 400000 UNITS: 500000 SUSPENSION ORAL at 08:55

## 2025-04-02 RX ADMIN — NYSTATIN 400000 UNITS: 500000 SUSPENSION ORAL at 20:19

## 2025-04-02 RX ADMIN — FENTANYL CITRATE 25 MCG: 50 INJECTION, SOLUTION INTRAMUSCULAR; INTRAVENOUS at 14:55

## 2025-04-02 RX ADMIN — METHOCARBAMOL TABLETS 500 MG: 500 TABLET, COATED ORAL at 12:14

## 2025-04-02 RX ADMIN — TAMSULOSIN HYDROCHLORIDE 0.4 MG: 0.4 CAPSULE ORAL at 08:54

## 2025-04-02 ASSESSMENT — PAIN SCALES - GENERAL
PAINLEVEL_OUTOF10: 0 - NO PAIN
PAINLEVEL_OUTOF10: 4
PAINLEVEL_OUTOF10: 0 - NO PAIN
PAINLEVEL_OUTOF10: 5 - MODERATE PAIN
PAINLEVEL_OUTOF10: 0 - NO PAIN
PAINLEVEL_OUTOF10: 5 - MODERATE PAIN
PAINLEVEL_OUTOF10: 0 - NO PAIN
PAINLEVEL_OUTOF10: 4
PAINLEVEL_OUTOF10: 0 - NO PAIN
PAINLEVEL_OUTOF10: 5 - MODERATE PAIN
PAINLEVEL_OUTOF10: 4
PAINLEVEL_OUTOF10: 0 - NO PAIN
PAINLEVEL_OUTOF10: 5 - MODERATE PAIN
PAINLEVEL_OUTOF10: 5 - MODERATE PAIN
PAINLEVEL_OUTOF10: 0 - NO PAIN

## 2025-04-02 ASSESSMENT — COGNITIVE AND FUNCTIONAL STATUS - GENERAL
WALKING IN HOSPITAL ROOM: A LITTLE
MOBILITY SCORE: 20
DRESSING REGULAR LOWER BODY CLOTHING: A LITTLE
DAILY ACTIVITIY SCORE: 22
DRESSING REGULAR LOWER BODY CLOTHING: A LITTLE
MOVING TO AND FROM BED TO CHAIR: A LITTLE
MOVING TO AND FROM BED TO CHAIR: A LITTLE
HELP NEEDED FOR BATHING: A LITTLE
MOBILITY SCORE: 20
WALKING IN HOSPITAL ROOM: A LITTLE
CLIMB 3 TO 5 STEPS WITH RAILING: A LOT
DAILY ACTIVITIY SCORE: 22
CLIMB 3 TO 5 STEPS WITH RAILING: A LOT
HELP NEEDED FOR BATHING: A LITTLE

## 2025-04-02 ASSESSMENT — PAIN - FUNCTIONAL ASSESSMENT

## 2025-04-02 ASSESSMENT — PAIN DESCRIPTION - LOCATION: LOCATION: ABDOMEN

## 2025-04-02 NOTE — CARE PLAN
The patient's goals for the shift include      The clinical goals for the shift include Pt giuseppe be safe, comfortable, and HD stable throughout the shift.      Problem: Safety - Adult  Goal: Free from fall injury  Outcome: Progressing     Problem: Discharge Planning  Goal: Discharge to home or other facility with appropriate resources  Outcome: Progressing     Problem: Chronic Conditions and Co-morbidities  Goal: Patient's chronic conditions and co-morbidity symptoms are monitored and maintained or improved  Outcome: Progressing     Problem: Fall/Injury  Goal: Not fall by end of shift  Outcome: Progressing  Goal: Be free from injury by end of the shift  Outcome: Progressing  Goal: Verbalize understanding of personal risk factors for fall in the hospital  Outcome: Progressing  Goal: Verbalize understanding of risk factor reduction measures to prevent injury from fall in the home  Outcome: Progressing  Goal: Use assistive devices by end of the shift  Outcome: Progressing  Goal: Pace activities to prevent fatigue by end of the shift  Outcome: Progressing     Problem: Skin  Goal: Decreased wound size/increased tissue granulation at next dressing change  Outcome: Progressing  Goal: Participates in plan/prevention/treatment measures  Outcome: Progressing  Goal: Prevent/manage excess moisture  Outcome: Progressing  Goal: Prevent/minimize sheer/friction injuries  Outcome: Progressing  Goal: Promote/optimize nutrition  Outcome: Progressing  Goal: Promote skin healing  Outcome: Progressing     Problem: Diabetes  Goal: Achieve decreasing blood glucose levels by end of shift  Outcome: Progressing  Goal: Increase stability of blood glucose readings by end of shift  Outcome: Progressing  Goal: Decrease in ketones present in urine by end of shift  Outcome: Progressing  Goal: Maintain electrolyte levels within acceptable range throughout shift  Outcome: Progressing  Goal: Maintain glucose levels >70mg/dl to <250mg/dl throughout  shift  Outcome: Progressing  Goal: No changes in neurological exam by end of shift  Outcome: Progressing  Goal: Learn about and adhere to nutrition recommendations by end of shift  Outcome: Progressing  Goal: Vital signs within normal range for age by end of shift  Outcome: Progressing  Goal: Increase self care and/or family involovement by end of shift  Outcome: Progressing  Goal: Receive DSME education by end of shift  Outcome: Progressing     Problem: Pain  Goal: Takes deep breaths with improved pain control throughout the shift  Outcome: Progressing  Goal: Turns in bed with improved pain control throughout the shift  Outcome: Progressing  Goal: Walks with improved pain control throughout the shift  Outcome: Progressing  Goal: Performs ADL's with improved pain control throughout shift  Outcome: Progressing  Goal: Participates in PT with improved pain control throughout the shift  Outcome: Progressing  Goal: Free from opioid side effects throughout the shift  Outcome: Progressing  Goal: Free from acute confusion related to pain meds throughout the shift  Outcome: Progressing

## 2025-04-02 NOTE — PROGRESS NOTES
Surgical Oncology Progress Note    HPI:  Roma Corral is a 70 y.o. male with history of rectal cancer with mets to the liver, CAD (2021 NSTEMI), DM2, HTN  who is s/p robotic LAR with end colostomy, ilecocecetomy with Dr. Vasquez, and laparoscopic microwave liver ablation with Dr. Erazo on 3/17/25.     Subjective:  No acute events overnight.  Continues on full-strength TPN.  Having more ostomy output.  No new complaints    Objective  Temp:  [36.5 °C (97.7 °F)-36.9 °C (98.4 °F)] 36.5 °C (97.7 °F)  Heart Rate:  [67-89] 67  Resp:  [15-16] 15  BP: ()/(50-62) 108/62  I/O last 2 completed shifts:  In: 2517 (37.9 mL/kg) [IV Piggyback:500]  Out: 1775 (26.7 mL/kg) [Urine:850 (0.5 mL/kg/hr); Drains:725; Stool:200]  Weight: 66.5 kg     CIRILO output: 700 ml  Urine output: 850 cc    Physical Exam:  NAD  Nontachycardic  On room air  Abdomen soft, minimal tenderness to palpation, minimally distended, colostomy pink with some gas and liquid brown stool in the bag, right sided drain in place with serosang output (stable compared to yesterday)    Labs within past 24h:            6.7   136  105  28   4.9>-----<114  ----------------<96             21.6   4.8  25  0.79          Ca 7.0 Phos 3.6 Mg 2.12       ALT 17 AST 51 AlkPhos 826 tBili 0.7               ASSESSMENT  Roma Corral is a 70 y.o. male with history of rectal cancer with mets to the liver, CAD (2021 NSTEMI), DM2, HTN who is s/p robotic LAR with end colostomy, ilecocecetomy with Dr. Vasquez, and laparoscopic microwave liver ablation with Dr. Erazo on 3/17/25, postop course notable for persistent chyle leak.    PLAN:  - Continue octreotide TID and midodrine  - Continue TPN  - Planning portal vein and hepatic vein embolization with IR (today)  - Maintain drain to gravity drainage    Discussed with Dr. Kacey Rubio Team Pager: [phone]

## 2025-04-02 NOTE — POST-PROCEDURE NOTE
Interventional Radiology Post-Procedure Note    Right Portal Vein Embolization via splenic vein access and right Hepatic vein embolization via right internal jugular vein access    Procedure Details:  Technically successful and uncomplicated Right Portal Vein Embolization via splenic vein access and right Hepatic vein embolization via right internal jugular vein access.  Please see PACS for full procedural details.    Patient Tolerance: good  Complications: None    Indication for procedure: The primary encounter diagnosis was Rectal cancer (Multi). 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    Pre-Procedure Verification and Time Out:  · Procedure Location procedure area   · HUDDLE - Pre-procedure Verification completed   · TIME OUT - Final Verification completed immediately prior to procedure start   · DEBRIEF completed     General Information:  Date/Time of Procedure: 04/02/25 at 7:14 PM  Indication(s): metastatic rectal cancer  Findings: See PACS  Procedure performed by: Michael Estevez MD   Assistant(s): Dr. Jb Gonzalez MD  Estimated Blood Loss (mL):  25cc  Specimen: No  Informed Consent: written consent obtained    Prep:  Ultrasound Guided Insertion: Yes  Large Drape, Hand Hygiene, Surgical Cap, Surgical Mask, Sterile Gown, Sterile Gloves, Glasses, and Scrubs  Patient Position: Supine  Site Prep: chlorhexidine, draped, usual sterile procedure followed    Anesthesia/Medications:  Procedural  Sedation:  Medications  As of 04/02/25 1914      lactated Ringer's infusion (mL/hr) Total volume:  Not documented* Dosing weight:  66.5   *Total volume has not been documented. View each administration to see the amount administered.     Date/Time Rate/Dose/Volume Action       04/02/25  0408 100 mL/hr New Bag      0631  Stopped               oxyCODONE (Roxicodone) immediate release tablet 5 mg (mg) Total dose:  5 mg Dosing weight:  70.8      Date/Time Rate/Dose/Volume Action       03/17/25  1917 5 mg Given               oxyCODONE (Roxicodone) immediate release tablet 10 mg (mg) Total dose:  20 mg Dosing weight:  71.6      Date/Time Rate/Dose/Volume Action       03/17/25  2047 10 mg Given     03/18/25  0100 10 mg Given               HYDROmorphone (Dilaudid) injection 0.5 mg (mg) Total dose:  0.5 mg Dosing weight:  70.8      Date/Time Rate/Dose/Volume Action       03/17/25  1738 0.5 mg Given               fentaNYL PF (Sublimaze) injection 50 mcg (mcg) Total dose:  150 mcg Dosing weight:  70.8      Date/Time Rate/Dose/Volume Action       03/17/25  1652 50 mcg Given      1901 50 mcg Given      1911 50 mcg Given               fentaNYL PF (Sublimaze) injection (mcg) Total dose:  50 mcg      Date/Time Rate/Dose/Volume Action       03/25/25  1525 50 mcg Given               fentaNYL PF (Sublimaze) injection (mcg) Total dose:  50 mcg      Date/Time Rate/Dose/Volume Action       03/25/25  1615 50 mcg Given               fentaNYL PF (Sublimaze) injection (mcg) Total dose:  50 mcg      Date/Time Rate/Dose/Volume Action       03/28/25  0942 50 mcg Given               fentaNYL PF (Sublimaze) injection (mcg) Total dose:  50 mcg      Date/Time Rate/Dose/Volume Action       03/28/25  1000 50 mcg Given               fentaNYL PF (Sublimaze) injection (mcg) Total dose:  25 mcg      Date/Time Rate/Dose/Volume Action       04/02/25  1406 25 mcg Given               fentaNYL PF (Sublimaze) injection (mcg) Total dose:  25 mcg      Date/Time  Rate/Dose/Volume Action       04/02/25  1455 25 mcg Given               fentaNYL PF (Sublimaze) injection (mcg) Total dose:  25 mcg      Date/Time Rate/Dose/Volume Action       04/02/25  1554 25 mcg Given               fentaNYL PF (Sublimaze) injection (mcg) Total dose:  25 mcg      Date/Time Rate/Dose/Volume Action       04/02/25  1648 25 mcg Given               fentaNYL PF (Sublimaze) injection (mcg) Total dose:  50 mcg      Date/Time Rate/Dose/Volume Action       04/02/25  1838 50 mcg Given               heparin (porcine) injection 5,000 Units (Units) Total dose:  5,000 Units Dosing weight:  70.8      Date/Time Rate/Dose/Volume Action       03/17/25  0701 5,000 Units Given               aspirin chewable tablet 81 mg (mg) Total dose:  Cannot be calculated* Dosing weight:  71.6   *Administration dose not documented     Date/Time Rate/Dose/Volume Action       03/17/25 2027 *Not included in total Held by provider      2045 *81 mg Missed     03/18/25  0900 *81 mg Missed     03/19/25  0900 *81 mg Missed     03/20/25  0900 *81 mg Missed     03/21/25  0900 *81 mg Missed     03/22/25  0900 *81 mg Missed     03/23/25  0900 *81 mg Missed     03/24/25  Canceled Entry     03/25/25  0900 *81 mg Missed     03/26/25  0900 *81 mg Missed     03/27/25  0900 *81 mg Missed     03/28/25  0900 *81 mg Missed     03/29/25  0900 *81 mg Missed     03/30/25  0900 *81 mg Missed     03/31/25  0900 *81 mg Missed     04/01/25  0900 *81 mg Missed     04/02/25  0900 *81 mg Missed               atorvastatin (Lipitor) tablet 40 mg (mg) Total dose:  Cannot be calculated*   *Administration dose not documented     Date/Time Rate/Dose/Volume Action       03/17/25 2027 *Not included in total Held by provider      2100 *40 mg Missed     03/18/25  2100 *40 mg Missed     03/19/25  2100 *40 mg Missed     03/20/25  2100 *40 mg Missed     03/21/25  2100 *40 mg Missed     03/22/25  2100 *40 mg Missed     03/23/25  2100 *40 mg Missed     03/24/25 2100 *Not  included in total Automatically Held     03/25/25  1737 *Not included in total Unheld by provider               atorvastatin (Lipitor) tablet 40 mg (mg) Total dose:  320 mg      Date/Time Rate/Dose/Volume Action       03/25/25  2251 40 mg Given     03/26/25  2252 40 mg Given     03/28/25  0006 40 mg Given      2159 40 mg Given     03/29/25  2214 40 mg Given     03/30/25  2149 40 mg Given     03/31/25  2119 40 mg Given     04/01/25  2103 40 mg Given               metoprolol tartrate (Lopressor) tablet 50 mg (mg) Total dose:  500 mg* Dosing weight:  71.6   *Administration not included in total     Date/Time Rate/Dose/Volume Action       03/17/25  2047 50 mg Given     03/18/25  1020 50 mg Given      2219 50 mg Given     03/19/25  0908 *50 mg Missed      2118 50 mg Given     03/20/25  0906 *50 mg Missed      2119 *50 mg Missed     03/21/25  1045 50 mg Given      2123 *50 mg Missed     03/22/25  1012 *50 mg Missed      2022 *50 mg Missed     03/23/25  1027 50 mg Given      2153 *50 mg Missed     03/24/25  0856 *50 mg Missed      2137 *50 mg Missed     03/25/25  0942 50 mg Given      2250 50 mg Given     03/26/25  0852 *50 mg Missed      2253 50 mg Given     03/27/25  0915 *50 mg Missed     03/28/25  0007 *50 mg Missed      1107 *50 mg Missed      2222 *50 mg Missed     03/29/25  0834 *50 mg Missed      2212 *50 mg Missed     03/30/25  0925 *50 mg Missed      2148 *50 mg Missed     03/31/25  0937 *50 mg Missed      2108 *50 mg Missed     04/01/25  0857 50 mg Given      2103 *50 mg Missed     04/02/25  0854 *50 mg Missed               enoxaparin (Lovenox) syringe 40 mg (mg) Total dose:  200 mg* Dosing weight:  71.6   *Administration not included in total     Date/Time Rate/Dose/Volume Action       03/18/25  1027 40 mg Given     03/19/25  0857 40 mg Given     03/20/25  0906 40 mg Given     03/21/25  1045 40 mg Given     03/22/25  1012 40 mg Given     03/23/25  0803 *Not included in total Held by provider      0900 *40 mg  Missed     03/24/25  Canceled Entry      1203 *Not included in total Unheld by provider               enoxaparin (Lovenox) syringe 40 mg (mg) Total dose:  40 mg* Dosing weight:  73.5   *Administration not included in total     Date/Time Rate/Dose/Volume Action       03/24/25  1306 40 mg Given      1554 *Not included in total Held by provider     03/25/25  1300 *40 mg Missed      1737 *Not included in total Unheld by provider               enoxaparin (Lovenox) syringe 40 mg (mg) Total dose:  40 mg* Dosing weight:  73.5   *Administration not included in total     Date/Time Rate/Dose/Volume Action       03/26/25  1226 40 mg Given     03/27/25  1439 *Not included in total Held by provider      1600 *40 mg Missed     03/28/25  1107 *Not included in total Unheld by provider               enoxaparin (Lovenox) syringe 40 mg (mg) Total dose:  200 mg* Dosing weight:  73.5   *Administration not included in total     Date/Time Rate/Dose/Volume Action       03/27/25  1439 *Not included in total Held by provider     03/28/25  1108 *Not included in total Unheld by provider      1547 40 mg Given     03/29/25  1400 40 mg Given     03/30/25  1441 40 mg Given     03/31/25  1446 40 mg Given     04/01/25  0800 *Not included in total Held by provider      0821 *Not included in total Unheld by provider      1152 *Not included in total Held by provider      1258 *Not included in total Unheld by provider      1337 40 mg Given     04/02/25  0739 *Not included in total Held by provider      1400 *40 mg Missed               sodium chloride 0.9% infusion (mL/hr) Total volume:  174 mL* Dosing weight:  71.6   *From user-documented volume     Date/Time Rate/Dose/Volume Action       03/17/25  2047 40 mL/hr New Bag     03/18/25  0108 174 mL       1030  Stopped               sodium chloride 0.9% infusion (mL/hr) Total volume:  Not documented* Dosing weight:  73.3   *Total volume has not been documented. View each administration to see the amount  administered.     Date/Time Rate/Dose/Volume Action       03/18/25  1021 75 mL/hr New Bag     03/19/25  0830 40 mL/hr Rate Change      0859  Stopped               sodium chloride 0.9% infusion (mL/hr) Total volume:  160 mL* Dosing weight:  73.5   *From user-documented volume     Date/Time Rate/Dose/Volume Action       03/19/25  0900 40 mL/hr New Bag     03/20/25  1100 160 mL                sodium chloride 0.9% infusion (mL/hr) Total volume:  1,014 mL* Dosing weight:  73.5   *From user-documented volume     Date/Time Rate/Dose/Volume Action       03/20/25  1142 40 mL/hr New Bag      1530 40 mL/hr - 152 mL New Bag     03/21/25  0005 395 mL       0502 83 mL       1438 384 mL Stopped               sodium chloride 0.9% infusion (mL/hr) Total volume:  388 mL* Dosing weight:  73.5   *From user-documented volume     Date/Time Rate/Dose/Volume Action       03/21/25  1849 40 mL/hr Restarted     03/22/25  0043 40 mL/hr - 236 mL Rate Verify      0126 40 mL/hr - 28.67 mL Rate Verify      0431 40 mL/hr - 123.33 mL Rate Verify     03/23/25  1034  Stopped               sodium chloride 0.9% infusion (mL/hr) Total volume:  Not documented* Dosing weight:  73.5   *Total volume has not been documented. View each administration to see the amount administered.     Date/Time Rate/Dose/Volume Action       03/28/25  1114 *75 mL/hr Missed               gabapentin (Neurontin) capsule 300 mg (mg) Total dose:  9,300 mg Dosing weight:  71.6      Date/Time Rate/Dose/Volume Action       03/17/25  2047 300 mg Given     03/18/25  1020 300 mg Given      1602 300 mg Given      2217 300 mg Given     03/19/25  0857 300 mg Given      1518 300 mg Given      2118 300 mg Given     03/20/25  0906 300 mg Given      1458 300 mg Given      2119 300 mg Given     03/21/25  1045 300 mg Given      1555 300 mg Given      2127 300 mg Given     03/22/25  1012 300 mg Given      1648 300 mg Given      2023 300 mg Given     03/23/25  1018 300 mg Given      1657 300 mg  Given      2152 300 mg Given     03/24/25  0854 300 mg Given      1534 300 mg Given      2137 300 mg Given     03/25/25  0942 300 mg Given      1727 300 mg Given      2250 300 mg Given     03/26/25  0852 300 mg Given      1516 300 mg Given      2253 300 mg Given     03/27/25  0916 300 mg Given      1509 300 mg Given     03/28/25  0006 300 mg Given               ondansetron ODT (Zofran-ODT) disintegrating tablet 4 mg (mg) Total dose:  Cannot be calculated* Dosing weight:  71.6   *Administration dose not documented     Date/Time Rate/Dose/Volume Action       04/01/25  1433 *Not included in total See Alternative               ondansetron (Zofran) injection 4 mg (mg) Total dose:  4 mg Dosing weight:  71.6      Date/Time Rate/Dose/Volume Action       04/01/25  1433 4 mg Given               HYDROmorphone (Dilaudid) injection 0.4 mg (mg) Total dose:  0.8 mg Dosing weight:  71.6      Date/Time Rate/Dose/Volume Action       03/17/25  2312 0.4 mg Given     03/18/25  0307 0.4 mg Given               methocarbamol (Robaxin) tablet 1,000 mg (mg) Total dose:  2,000 mg Dosing weight:  71.6      Date/Time Rate/Dose/Volume Action       03/17/25  2048 1,000 mg Given     03/18/25  0550 1,000 mg Given               methocarbamol (Robaxin) tablet 500 mg (mg) Total dose:  29,500 mg* Dosing weight:  73.3   *Administration not included in total     Date/Time Rate/Dose/Volume Action       03/18/25  1400 500 mg Given      2217 500 mg Given     03/19/25  0457 500 mg Given      1150 500 mg Given      1741 500 mg Given      2117 500 mg Given     03/20/25  0352 500 mg Given      1000 500 mg Given      1616 500 mg Given      2120 500 mg Given     03/21/25  0458 500 mg Given      1045 500 mg Given      1555 500 mg Given      2123 500 mg Given     03/22/25  0512 500 mg Given      1024 500 mg Given      1648 500 mg Given      2341 500 mg Given     03/23/25  0411 500 mg Given      1018 500 mg Given      1646 500 mg Given      2152 500 mg Given      03/24/25  0550 500 mg Given      1015 500 mg Given      1534 500 mg Given      2136 500 mg Given     03/25/25  0436 500 mg Given      0942 500 mg Given      1727 500 mg Given      2249 500 mg Given     03/26/25  0407 500 mg Given      1111 500 mg Given      1712 500 mg Given      2253 500 mg Given     03/27/25  0518 500 mg Given      1151 500 mg Given      1703 500 mg Given     03/28/25  0006 500 mg Given      0539 *500 mg Missed      1108 500 mg Given      1709 500 mg Given      2159 500 mg Given     03/29/25  0311 500 mg Given      0847 500 mg Given      1651 500 mg Given      2214 500 mg Given     03/30/25  0645 500 mg Given      1205 500 mg Given      1734 500 mg Given      2225 500 mg Given     03/31/25  0505 500 mg Given      1203 500 mg Given      1700 500 mg Given      2135 500 mg Given     04/01/25  0426 500 mg Given      1045 500 mg Given      1803 500 mg Given      2317 500 mg Given     04/02/25  0607 500 mg Given      1214 500 mg Given               magnesium sulfate 2 g in sterile water for injection 50 mL (mL/hr) Total volume:  Not documented* Dosing weight:  71.6   *Total volume has not been documented. View each administration to see the amount administered.     Date/Time Rate/Dose/Volume Action       03/17/25  2310 2 g - 25 mL/hr (over 120 min) New Bag     03/18/25  0108  (over 120 min) Stopped               magnesium sulfate 2 g in sterile water for injection 50 mL (mL/hr) Total volume:  Not documented* Dosing weight:  73.5   *Total volume has not been documented. View each administration to see the amount administered.     Date/Time Rate/Dose/Volume Action       03/25/25  1727 2 g - 25 mL/hr (over 120 min) New Bag      1927  (over 120 min) Stopped               ceFAZolin (Ancef) 2 g in dextrose (iso)  mL (g) Total dose:  4 g Dosing weight:  71.6      Date/Time Rate/Dose/Volume Action       03/17/25  2047 2 g (over 30 min) New Bag      2138  (over 30 min) Stopped     03/18/25  0506 2 g (over  30 min) New Bag      0537  (over 30 min) Stopped               metroNIDAZOLE (Flagyl) 500 mg in sodium chloride (iso)  mL (mg) Total dose:  3,500 mg* Dosing weight:  71.6   *From user-documented volume     Date/Time Rate/Dose/Volume Action       03/17/25  2310 500 mg (over 60 min) New Bag     03/18/25  0036  (over 60 min) Stopped      0550 500 mg (over 60 min) New Bag      0652  (over 60 min) Stopped      1602 500 mg (over 60 min) New Bag      1730 100 mL Stopped     03/19/25  0047 500 mg (over 60 min) New Bag      0147  (over 60 min) Stopped      0857 500 mg (over 60 min) New Bag      1005 100 mL Stopped      1741 500 mg (over 60 min) New Bag      1901 100 mL Stopped      2249 500 mg (over 60 min) New Bag      2349  (over 60 min) Stopped     03/20/25  1141 500 mg (over 60 min) New Bag      1241 100 mL Stopped      2124 500 mg (over 60 min) New Bag      2306 100 mL Stopped     03/21/25  0459 500 mg (over 60 min) New Bag      0543 100 mL Stopped      1440 500 mg (over 60 min) New Bag      1600 100 mL Stopped      2128 500 mg (over 60 min) New Bag      2203  (over 60 min) Stopped     03/22/25  0512 500 mg (over 60 min) New Bag      0533  (over 60 min) Stopped               hydromorphone PCA 0.5 mg/mL in NS opioid tolerant Total dose:  0 mg Dosing weight:  73.3      Date/Time Rate/Dose/Volume Action       03/18/25  0525  New Syringe/Cartridge      0741  Handoff      1400  Rate Change      1920  Handoff     03/19/25  1953  Handoff     03/20/25  0720  Handoff      1000  New Syringe/Cartridge      1630  Stopped               insulin lispro injection 0-5 Units (Units) Total dose:  Cannot be calculated* Dosing weight:  73.3   *Administration dose not documented     Date/Time Rate/Dose/Volume Action       03/18/25  1032 *Not included in total Missed      1246 *Not included in total Missed      1802 *Not included in total Missed     03/19/25  0758 *Not included in total Missed               insulin lispro injection 0-5  Units (Units) Total dose:  15 Units Dosing weight:  73.5      Date/Time Rate/Dose/Volume Action       03/30/25  1741 1 Units Given     03/31/25  0022 2 Units Given      0503 1 Units Given      1206 1 Units Given      1741 1 Units Given      2317 *Not included in total Missed     04/01/25  0537 *Not included in total Missed      1337 4 Units Given      1805 1 Units Given      2316 *Not included in total Missed     04/02/25  0607 *Not included in total Missed      1213 4 Units Given               tamsulosin (Flomax) 24 hr capsule 0.4 mg (mg) Total dose:  6.4 mg Dosing weight:  73.3      Date/Time Rate/Dose/Volume Action       03/18/25  1020 0.4 mg Given     03/19/25  0857 0.4 mg Given     03/20/25  0906 0.4 mg Given     03/21/25  1045 0.4 mg Given     03/22/25  1012 0.4 mg Given     03/23/25  1018 0.4 mg Given     03/24/25  0854 0.4 mg Given     03/25/25  0942 0.4 mg Given     03/26/25  0856 0.4 mg Given     03/27/25  0915 0.4 mg Given     03/28/25  1107 0.4 mg Given     03/29/25  0844 0.4 mg Given     03/30/25  0926 0.4 mg Given     03/31/25  0927 0.4 mg Given     04/01/25  0857 0.4 mg Given     04/02/25  0854 0.4 mg Given               ciprofloxacin (Cipro) 400 mg in dextrose 5%  mL (mL/hr) Total dose:  1,600 mg* Dosing weight:  73.3   *From user-documented volume     Date/Time Rate/Dose/Volume Action       03/18/25  1020 400 mg - 200 mL/hr (over 60 min) New Bag      1138 200 mL Stopped      2217 400 mg - 200 mL/hr (over 60 min) New Bag      2324  (over 60 min) Stopped     03/19/25  1150 400 mg - 200 mL/hr (over 60 min) New Bag      1301 200 mL Stopped      2118 400 mg - 200 mL/hr (over 60 min) New Bag      2218  (over 60 min) Stopped     03/20/25  0912 400 mg - 200 mL/hr (over 60 min) New Bag      1012  (over 60 min) Stopped      1142 200 mL       2119 400 mg - 200 mL/hr (over 60 min) New Bag      2219 200 mL Stopped     03/21/25  1045 400 mg - 200 mL/hr (over 60 min) New Bag      1150  (over 60 min) Stopped       2256 400 mg - 200 mL/hr (over 60 min) New Bag      2309  (over 60 min) Stopped               acetaminophen (Tylenol) tablet 650 mg (mg) Total dose:  19,500 mg* Dosing weight:  73.3   *Administration not included in total     Date/Time Rate/Dose/Volume Action       03/18/25  1247 650 mg Given      1801 *650 mg Missed     03/19/25  0048 650 mg Given      0856 650 mg Given      1514 *650 mg Missed      1741 650 mg Given      2248 *650 mg Missed     03/20/25  0352 650 mg Given      1023 650 mg Given      1616 650 mg Given     03/21/25  0042 650 mg Given      0458 650 mg Given      1045 650 mg Given      1849 *650 mg Missed      2300 *650 mg Missed     03/22/25  0514 *650 mg Missed      1022 650 mg Given      1648 650 mg Given      2341 650 mg Given     03/23/25  0631 650 mg Given      1209 *650 mg Missed      1646 650 mg Given     03/24/25  0044 650 mg Given      0550 650 mg Given      1025 *650 mg Missed      1714 650 mg Given     03/25/25  0019 650 mg Given      0528 *650 mg Missed      1216 650 mg Given      1727 650 mg Given      2249 650 mg Given     03/26/25  0601 650 mg Given      1111 650 mg Given      1712 650 mg Given      2253 650 mg Given     03/27/25  0518 650 mg Given      1151 650 mg Given      1703 650 mg Given     03/28/25  0006 650 mg Given      0539 *650 mg Missed               acetaminophen (Tylenol) tablet 650 mg (mg) Total dose:  1,300 mg Dosing weight:  73.5      Date/Time Rate/Dose/Volume Action       03/30/25  0926 650 mg Given     03/31/25  0505 650 mg Given               morphine CR (MS Contin) 12 hr tablet 30 mg (mg) Total dose:  900 mg* Dosing weight:  73.3   *Administration not included in total     Date/Time Rate/Dose/Volume Action       03/18/25  2217 30 mg Given      2226 *30 mg Missed     03/19/25  0857 30 mg Given      2117 30 mg Given     03/20/25  0906 30 mg Given      2119 30 mg Given     03/21/25  1045 30 mg Given      2125 30 mg Given     03/22/25  1022 30 mg Given      2023  30 mg Given     03/23/25  1018 30 mg Given      2152 30 mg Given     03/24/25  0854 30 mg Given      2136 30 mg Given     03/25/25  0942 30 mg Given      2250 30 mg Given     03/26/25  0853 30 mg Given      2253 30 mg Given     03/27/25  0915 30 mg Given     03/28/25  0006 30 mg Given      1108 30 mg Given      2159 30 mg Given     03/29/25  0843 30 mg Given      2214 30 mg Given     03/30/25  0925 30 mg Given      2150 30 mg Given     03/31/25  0927 30 mg Given      2119 30 mg Given     04/01/25  0856 30 mg Given      2103 30 mg Given     04/02/25  0854 30 mg Given               sodium chloride 0.9 % bolus 500 mL (mL/hr) Total volume:  1,500 mL* Dosing weight:  73.5   *From user-documented volume     Date/Time Rate/Dose/Volume Action       03/19/25  1220 500 mL - 250 mL/hr (over 120 min) New Bag      1332 500 mL Stopped      2244 500 mL - 250 mL/hr (over 120 min) New Bag     03/20/25  0044  (over 120 min) Stopped     03/21/25  0042 500 mL - 250 mL/hr (over 120 min) New Bag      0224 500 mL Stopped     03/27/25  1509 500 mL - 250 mL/hr (over 120 min) New Bag      1718  (over 120 min) Stopped     03/28/25  1716 500 mL - 250 mL/hr (over 120 min) [dose] - 500 mL [vol] New Bag      1945  (over 120 min) Stopped               sodium chloride 0.9 % bolus 1,000 mL (mL/hr) Total volume:  1,000 mL* Dosing weight:  73.5   *From user-documented volume     Date/Time Rate/Dose/Volume Action       03/20/25  1530 1,000 mL - 500 mL/hr (over 120 min) New Bag      1730 1,000 mL Stopped               diatrizoate epifanio-diatrizoat sod (Gastrografin 37% organic bound iodine) solution 30 mL (mL) Total volume:  30 mL Dosing weight:  73.5      Date/Time Rate/Dose/Volume Action       03/20/25  1344 30 mL Given               melatonin tablet 3 mg (mg) Total dose:  39 mg Dosing weight:  73.5      Date/Time Rate/Dose/Volume Action       03/20/25 2119 3 mg Given     03/21/25 2125 3 mg Given     03/22/25 2023 3 mg Given     03/23/25 2152 3 mg  Given     03/24/25  2136 3 mg Given     03/25/25  2249 3 mg Given     03/26/25  2252 3 mg Given     03/28/25  0006 3 mg Given      2159 3 mg Given     03/29/25  2214 3 mg Given     03/30/25  2150 3 mg Given     03/31/25  2119 3 mg Given     04/01/25  2103 3 mg Given               HYDROmorphone (Dilaudid) tablet 2 mg (mg) Total dose:  10 mg Dosing weight:  73.5      Date/Time Rate/Dose/Volume Action       03/21/25  2035 2 mg Given     03/22/25  1652 2 mg Given     03/24/25  1028 2 mg Given      1801 2 mg Given     03/25/25  0944 2 mg Given               HYDROmorphone (Dilaudid) tablet 1 mg (mg) Total dose:  1 mg Dosing weight:  73.5      Date/Time Rate/Dose/Volume Action       03/28/25  0420 1 mg Given               HYDROmorphone (Dilaudid) tablet 1 mg (mg) Total dose:  8 mg Dosing weight:  73.5      Date/Time Rate/Dose/Volume Action       03/28/25  1120 1 mg Given      1709 1 mg Given     03/29/25  0311 1 mg Given      1155 1 mg Given     03/30/25  0240 1 mg Given      1454 1 mg Given     03/31/25  1440 1 mg Given     04/01/25  1330 1 mg Given               HYDROmorphone (Dilaudid) tablet 4 mg (mg) Total dose:  12 mg Dosing weight:  73.5      Date/Time Rate/Dose/Volume Action       03/26/25  0407 4 mg Given      0852 4 mg Given     03/27/25  0523 4 mg Given               iohexol (OMNIPaque) 350 mg iodine/mL solution 75 mL (mL) Total volume:  75 mL Dosing weight:  73.5      Date/Time Rate/Dose/Volume Action       03/21/25  1001 75 mL Given               iohexol (OMNIPaque) 350 mg iodine/mL solution 200 mL (mL) Total volume:  200 mL Dosing weight:  66.5      Date/Time Rate/Dose/Volume Action       04/02/25  1623 200 mL Given               iohexol (OMNIPaque) 350 mg iodine/mL solution 50 mL (mL) Total volume:  50 mL Dosing weight:  66.5      Date/Time Rate/Dose/Volume Action       04/02/25  1803 50 mL Given               albumin human 5 % infusion 12.5 g (mL/hr) Total volume:  Not documented* Dosing weight:  73.5    *Total volume has not been documented. View each administration to see the amount administered.     Date/Time Rate/Dose/Volume Action       03/21/25  1708 12.5 g - 250 mL/hr (over 60 min) New Bag      1849  (over 60 min) Stopped               dronabinol (Marinol) capsule 2.5 mg (mg) Total dose:  50 mg Dosing weight:  73.5      Date/Time Rate/Dose/Volume Action       03/21/25  1555 2.5 mg Given     03/22/25  1012 2.5 mg Given      1648 2.5 mg Given     03/23/25  0631 2.5 mg Given      1646 2.5 mg Given     03/24/25  0600 2.5 mg Given      1535 2.5 mg Given     03/25/25  0942 2.5 mg Given      1727 2.5 mg Given     03/26/25  0602 2.5 mg Given      1516 2.5 mg Given     03/27/25  0918 2.5 mg Given      1509 2.5 mg Given     03/28/25  0847 2.5 mg Given      1547 2.5 mg Given     03/29/25  0847 2.5 mg Given      1651 2.5 mg Given     03/30/25  1205 2.5 mg Given      1734 2.5 mg Given     03/31/25  0644 2.5 mg Given               lactated Ringer's bolus 500 mL (mL/hr) Total volume:  3,500 mL* Dosing weight:  73.5   *From user-documented volume     Date/Time Rate/Dose/Volume Action       03/22/25  0723 500 mL - 250 mL/hr (over 120 min) New Bag      0930 500 mL Stopped     03/23/25  0120 500 mL - 125 mL/hr (over 240 min) New Bag      0340 125 mL/hr - 291.67 mL Rate Verify      0520 208.33 mL Stopped      1130 500 mL - 250 mL/hr (over 120 min) New Bag      1330  (over 120 min) Stopped     03/24/25  0120 500 mL - 250 mL/hr (over 120 min) [dose] - 500 mL [vol] New Bag      0320 500 mL Stopped      1306 500 mL - 250 mL/hr (over 120 min) New Bag      1527  (over 120 min) Stopped     03/25/25  0252 500 mL - 250 mL/hr (over 120 min) New Bag      0502 500 mL Stopped      1856 500 mL - 250 mL/hr (over 120 min) [dose] - 500 mL [vol] New Bag      2231  (over 120 min) Stopped     03/26/25  1754 500 mL - 250 mL/hr (over 120 min) New Bag      2151 500 mL Stopped               lactated Ringer's bolus 300 mL (mL/hr) Total volume:  600  mL* Dosing weight:  73.5   *From user-documented volume     Date/Time Rate/Dose/Volume Action       03/22/25  2022 300 mL - 150 mL/hr (over 120 min) [dose] - 300 mL [vol] New Bag      2222 300 mL Stopped               lactated Ringer's bolus 750 mL (mL/hr) Total volume:  775 mL* Dosing weight:  73.5   *From user-documented volume     Date/Time Rate/Dose/Volume Action       03/23/25  1945 750 mL - 375 mL/hr (over 120 min) New Bag      2149 775 mL Stopped               lactated Ringer's bolus 250 mL (mL/hr) Total volume:  250 mL* Dosing weight:  73.5   *From user-documented volume     Date/Time Rate/Dose/Volume Action       03/28/25  0146 250 mL - 125 mL/hr (over 120 min) New Bag      0400 250 mL Stopped               lactated Ringer's bolus 500 mL (mL/hr) Total volume:  500 mL* Dosing weight:  66.5   *From user-documented volume     Date/Time Rate/Dose/Volume Action       04/01/25  2357 500 mL - 250 mL/hr (over 120 min) New Bag     04/02/25  0205 500 mL Stopped               calcium gluconate 2 g in sodium chloride (iso)  mL (mL/hr) Total dose:  2 g* Dosing weight:  73.5   *From user-documented volume     Date/Time Rate/Dose/Volume Action       03/22/25  1012 2 g - 100 mL/hr (over 60 min) New Bag      1115 100 mL Stopped               calcium gluconate 2 g in sodium chloride (iso)  mL (mL/hr) Total volume:  Not documented* Dosing weight:  73.5   *Total volume has not been documented. View each administration to see the amount administered.     Date/Time Rate/Dose/Volume Action       03/30/25  1437 2 g - 100 mL/hr (over 60 min) New Bag      1541  (over 60 min) Stopped               albumin human 25 % solution 12.5 g (mL/hr) Total volume:  Not documented* Dosing weight:  73.5   *Total volume has not been documented. View each administration to see the amount administered.     Date/Time Rate/Dose/Volume Action       03/23/25  1025 12.5 g - 50 mL/hr (over 60 min) New Bag      1130  (over 60 min) Stopped       1648 12.5 g - 50 mL/hr (over 60 min) New Bag      1750  (over 60 min) Stopped     03/24/25  0044 12.5 g - 50 mL/hr (over 60 min) New Bag      0144  (over 60 min) Stopped      0544 12.5 g - 50 mL/hr (over 60 min) New Bag      0644  (over 60 min) Stopped               octreotide (SandoSTATIN) injection 100 mcg (mcg) Total dose:  1,100 mcg* Dosing weight:  73.5   *Administration not included in total     Date/Time Rate/Dose/Volume Action       03/24/25  1306 100 mcg Given      1405 *100 mcg Missed      2140 100 mcg Given     03/25/25  0941 100 mcg Given      1727 100 mcg Given      2251 100 mcg Given     03/26/25  1113 100 mcg Given      1712 100 mcg Given      2252 100 mcg Given     03/27/25  1151 100 mcg Given      1509 100 mcg Given     03/28/25  0006 100 mcg Given               nystatin (Mycostatin) 100,000 unit/mL suspension 400,000 Units (Units) Total volume:  96 mL* Dosing weight:  73.5   *Administration not included in total     Date/Time Rate/Dose/Volume Action       03/24/25  1656 *400,000 Units Missed      2149 400,000 Units Given     03/25/25  0941 400,000 Units Given      1727 400,000 Units Given      2249 400,000 Units Given     03/26/25  0853 400,000 Units Given      1516 400,000 Units Given      2317 400,000 Units Given     03/27/25  0916 400,000 Units Given      1509 400,000 Units Given     03/28/25  0005 400,000 Units Given      1145 *400,000 Units Missed      1623 *400,000 Units Missed      2158 400,000 Units Given     03/29/25  0843 400,000 Units Given      1400 400,000 Units Given      2226 400,000 Units Given     03/30/25  0926 400,000 Units Given      1441 400,000 Units Given      2150 400,000 Units Given     03/31/25  0926 400,000 Units Given      1447 400,000 Units Given      2123 400,000 Units Given     04/01/25  0857 400,000 Units Given      1433 400,000 Units Given      2103 400,000 Units Given     04/02/25  0855 400,000 Units Given      1720 *400,000 Units Missed               dextrose  5%-0.45 % sodium chloride infusion (mL/hr) Total volume:  913 mL* Dosing weight:  73.5   *From user-documented volume     Date/Time Rate/Dose/Volume Action       03/25/25  0019 75 mL/hr New Bag      1216 75 mL/hr New Bag      1230  Stopped      1655 75 mL/hr Restarted      1845 913 mL      03/26/25  0654  Stopped               midazolam (Versed) injection (mg) Total dose:  6 mg      Date/Time Rate/Dose/Volume Action       03/25/25  1525 1 mg Given      1614 1 mg Given     03/28/25  0942 1 mg Given      1000 1 mg Given     04/02/25  1406 0.5 mg Given      1554 0.5 mg Given      1648 0.5 mg Given      1838 0.5 mg Given               midodrine (Proamatine) tablet 2.5 mg (mg) Total dose:  47.5 mg* Dosing weight:  73.5   *Administration not included in total     Date/Time Rate/Dose/Volume Action       03/27/25  1151 2.5 mg Given      1509 2.5 mg Given     03/28/25  0323 *2.5 mg Missed      0847 2.5 mg Given      1121 2.5 mg Given      1709 2.5 mg Given     03/29/25  0844 2.5 mg Given      1151 2.5 mg Given      1651 2.5 mg Given     03/30/25  0925 2.5 mg Given      1205 2.5 mg Given      1734 2.5 mg Given     03/31/25  0927 2.5 mg Given      1203 2.5 mg Given      1700 2.5 mg Given     04/01/25  0857 2.5 mg Given      1330 2.5 mg Given      1803 2.5 mg Given     04/02/25  0855 2.5 mg Given      1214 2.5 mg Given               octreotide (SandoSTATIN) injection 200 mcg (mcg) Total dose:  3,200 mcg* Dosing weight:  73.5   *Administration not included in total     Date/Time Rate/Dose/Volume Action       03/28/25  1112 200 mcg Given      1547 200 mcg Given      2158 200 mcg Given     03/29/25  0843 200 mcg Given      1400 200 mcg Given      2215 200 mcg Given     03/30/25  0926 200 mcg Given      1442 200 mcg Given      2150 200 mcg Given     03/31/25  0927 200 mcg Given      1447 200 mcg Given      2120 200 mcg Given     04/01/25  0857 200 mcg Given      1459 200 mcg Given      2104 200 mcg Given     04/02/25  0907 200 mcg  Given      1720 *200 mcg Missed               thiamine (Vitamin B1) injection 100 mg (mg) Total dose:  500 mg Dosing weight:  73.5      Date/Time Rate/Dose/Volume Action       03/29/25  0844 100 mg Given     03/30/25  0927 100 mg Given     03/31/25  0927 100 mg Given     04/01/25  0857 100 mg Given     04/02/25  0855 100 mg Given               Adult Clinimix Parenteral Nutrition Continuous (mL/hr) Total volume:  1,982.75 mL* Dosing weight:  73.5   *From user-documented volume     Date/Time Rate/Dose/Volume Action       03/29/25  2216 40 mL/hr (over 1440 min) New Bag     03/30/25  0233 126 mL       1010 304.67 mL       2157 83 mL/hr (over 1440 min) New Bag      2207  (over 1440 min) Stopped      2315 *83 mL/hr (over 1440 min) Handoff     03/31/25  0452 574.08 mL       0709 *83 mL/hr (over 1440 min) Handoff      0714 83 mL/hr (over 1440 min) - 189 mL Rate Verify      1203 83 mL/hr (over 1440 min) Rate Verify      1700 374 mL       2151  (over 1440 min) Stopped      2200 415 mL Stopped               lidocaine 2 % mucosal jelly (Uro-Jet) 1 Application (Application) Total dose:  1 Application Dosing weight:  73.5      Date/Time Rate/Dose/Volume Action       03/30/25  1205 1 Application Given               polyethylene glycol (Glycolax, Miralax) packet 17 g (g) Total dose:  17 g* Dosing weight:  73.5   *Administration not included in total     Date/Time Rate/Dose/Volume Action       03/31/25  0926 17 g Given     04/01/25  0916 *17 g Missed               Adult Clinimix TPN Cyclic (mL/hr) Total volume:  2,353.7 mL* Dosing weight:  73.5   *From user-documented volume     Date/Time Rate/Dose/Volume Action       04/01/25  0858 90 mL/hr (over 720 min) New Bag      1006 182 mL/hr (over 720 min) Rate Change      2003 90 mL/hr (over 720 min) Rate Change      2113  (over 720 min) Stopped      2225 2,017 mL      04/02/25  0908 91 mL/hr (over 720 min) New Bag      1006 182 mL/hr (over 720 min) Rate Change      1128 336.7 mL                 cefTRIAXone (Rocephin) vial (g) Total dose:  2 g      Date/Time Rate/Dose/Volume Action       04/02/25  1401 2 g Given                   Fentanyl: 100 mcg  Versed: 2 mg  1% Lidocaine: 15 mL  Additional Medications:  2g Ceftriaxone IV    Michael Estevez MD, PGY-6  Interventional Radiology  IR pager: [phone]    NON-Urgent on call weekends and after hours weekdays (5pm - 5am) IR pager: [phone]  Urgent & emergent on call weekends and after hours weekdays (5pm-7am) IR pager: [phone]

## 2025-04-02 NOTE — PROGRESS NOTES
Colorectal Surgery Progress Note      HPI: Roma Corral is a 70 y.o. 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 Vasquez, and laparoscopic MWA x3 with liver biopsy by Dr. Erazo on 3/18/25 c/b post-operative lymphatic leak and high drain output s/p IR embolization x2 and NPO/TPN.      Subjective  No acute events overnight.    Pain well controlled on current regimen.     Denies nausea/vomiting/bloating with bites of food for comfort.  Voiding without issues  Reports drain pouch is filling up less frequently and decreasing.  Ambulated with PT yesterday. Gave social work permissino to send out blanket referrals given discharging on TPN.      Objective  Physical Exam:  Gen: in no physical distress and alert, deconditioned and weak appearing, cachectic, resting in bed comfortably  HENT: Head normocephalic, atraumatic.  Mucous membranes moist.    Neuro: Alert and oriented x3.  Quiet and withdrawn appearing. Moves all extremities spontaneously x4.  CV: Normal rate, normotensive per chart review. Trace edema bilaterally at ankles.  Resp: No acute respiratory distress.  Normal effort on room air. Chest expansion symmetrical.   GI: Abdomen is soft, nontender, and nondistended. Laparoscopic incisions are clean, dry and intact with glue.  Stoma is pink and healthy, well pouched, with gas and pudding stool in colostomy pouch. Abdominal drain to bile bag with minimal yellow serous drainage, slight leakage around the drain.  Skin: Warm and dry, pale, without rashes or lesions.  Coccyx/sacral pressure wound with mepalex covering site.    Visit Vitals  /57 (BP Location: Right arm, Patient  Position: Lying)   Pulse 67   Temp 36.8 °C (98.2 °F) (Oral)   Resp 16        I/O last 3 completed shifts:  In: 2932 (44.1 mL/kg) [IV Piggyback:500]  Out: 2750 (41.4 mL/kg) [Urine:1475 (0.6 mL/kg/hr); Drains:1025; Stool:250]  Weight: 66.5 kg   No intake/output data recorded.         Data Review:            6.7     4.9>-----<114              21.6   136  105  28                  ----------------<96     4.8  25  0.79          Ca 7.0 Phos 3.6 Mg 2.12       ALT 17 AST 51 AlkPhos 826 tBili 0.7         Labs reviewed and personally interpreted as: CBC with down trend in H&H (suspect some element of dilution), without  leukocytosis. Mild thrombocytopenia, similar to baseline.  RFP without electrolyte derangements, ORALIA, or hypo/hyperglycemia.    Imaging:  No recent imaging to review.    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 Vasquez, and laparoscopic MWA x3 with liver biopsy by Dr. Erazo on 3/18/25. Post-op course c/b high pelvic drain output 2/2 persistent lymph leak (elevated drain TG) s/p low fat diet & IR lymphoscintigraphy 3/25 & 3/28 without major improvement/decrease in output.  Has been NPO with TPN for a few days with decrease in output, at 725ml over past 24 hours. Scheduled for IR portal/hepatic vein embolization today and awaiting SNF acceptance (TPN is barrier)    Plan:  -IR today for portal/hepatic vein embolization for surg onc.  -Give 1 unit RBCs for down trend in H&H  -OOB during day/ambulation in halls x3/PT  -Continue NPO/cycled TPN & monitoring drain output  -Coccyx/sacral wound care  -Await new SNF facility that will accept TPN    Neuro: Post-op pain controlled, history of anxiety  -Continue current pain regimen with robaxin,prn tylenol, prn  dilaudid  -Continue home MS Contin, hold home ativan  -Sleep hygiene/OOB during day/PT/multiple walks in halls   -Pet/music/art therapy  -Melatonin HS    CV: hemodynamically stable; hx of CAD (NSTEMI 2021), HTN, HLD  -Q4 vital signs  -EKG prn for ACS symptoms  -Continue home metoprolol with hold parameters  -Hold home ASA 81 in setting of elevated LFTs/thrombocytopenia    Resp: no acute issues on room air, no significant pulmonary history  -ICS 10x every hour  -OOB/ambulation    GI: s/p robotic LAR/loop to end colostomy, ileocecectomy & MWA c/b persistent lymph leak; hx of rectal CA with mets to liver  -IR today for portal/hepatic vein embolization per surg onc  -NPO, cycled TPN (running during day 9a-9p)  -Thiamine daily  -Octreotide & Midodrine TID for lymph leak; appreciate surg onc support & expertise  -Zofran prn for nausea  -Miralax every other day for colostomy constipation  -continue assessment of stoma and output, stoma not new for patient.  WOCN for supplies & support. Continue to monitor drain output/replace fluid prn  -Daily CMP to monitor LFTs    : Oliguria with small volume retention, testicular pain post fall; no significant urological hx  -Continue flomax, needs to stand when voiding to completely empty.   - Strict intake and output, monitoring fluid & electrolyte balance closely in setting of high output drain  - Daily RFP/replace electrolytes as needed    Heme: H&H down trend this AM with no evidence of bleeding, history of pancytopenia with platelets similar to baseline  -T&S sent 4/2; give 1 unit RBCs  -CBC daily; monitor for s/s of bleeding    ID: afebrile, no leukocytosis  -Q4 temp  -monitor for s/s of infection    Endo: no acute issues, no significant endocrine history   -hypoglycemia protocol  -Q6 BG on TPN  -no indication for SSI    DVT Prophy: SCDs;  Lovenox    Dispo:   -Continue care on RNF  -Awaiting SNF acceptance with cycled TPN    Plan of care discussed with Dr. Connell and  patient.    MELISSA Ramirez-CNP  Colorectal Surgery  [facility] Service Pager #[phone]

## 2025-04-02 NOTE — PROGRESS NOTES
04/02/25 1100   Discharge Planning   Living Arrangements Spouse/significant other   Support Systems Spouse/significant other   Type of Residence Private residence   Number of Stairs to Enter Residence 2   Number of Stairs Within Residence 10   Do you have animals or pets at home? Yes   Type of Animals or Pets one dog   Home or Post Acute Services Post acute facilities (Rehab/SNF/etc)   Type of Post Acute Facility Services Skilled nursing   Expected Discharge Disposition SNF   Does the patient need discharge transport arranged? Yes   RoundTrip coordination needed? Yes   Has discharge transport been arranged? No     [facility] is not able to accept, pt's needs exceed current capacity.  [facility], [place], and [facility] are not able to accommodate TPN.  There are no bed available at previous SNFs that are full.  SW did send additional referrals to the [facility]  - [facility], [facility], [facility], and [facility].  Will await response.  KHUSHI Porras

## 2025-04-02 NOTE — PRE-PROCEDURE NOTE
Interventional Radiology Preprocedure Note    Procedure: IR right portal vein and hepatic vein embolization    Indication for procedure: The primary encounter diagnosis was Rectal cancer (Multi). 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    Relevant review of systems: NA    Relevant Labs:   Lab Results   Component Value Date    CREATININE 0.79 04/02/2025    EGFR >90 04/02/2025    INR 1.3 (H) 03/24/2025    PROTIME 14.5 (H) 03/24/2025       Planned Sedation/Anesthesia: Moderate    Airway assessment: normal    Directed physical examination:    A&Ox3   No evidence of respiratory distress     Mallampati: N/A    ASA Score: ASA 3 - Patient with moderate systemic disease with functional limitations    Benefits, risks and alternatives of procedure and planned sedation have been discussed with the patient and/or their representative. All questions answered and they agree to proceed.     Margo Childs DO, PGY-3   Diagnostic Radiology   [facility]

## 2025-04-03 ENCOUNTER — OFFICE VISIT (OUTPATIENT)
Dept: SURGICAL ONCOLOGY | Facility: HOSPITAL | Age: 70
DRG: 329 | End: 2025-04-03
Payer: MEDICARE

## 2025-04-03 ENCOUNTER — APPOINTMENT (OUTPATIENT)
Dept: RADIOLOGY | Facility: HOSPITAL | Age: 70
DRG: 329 | End: 2025-04-03
Payer: MEDICARE

## 2025-04-03 LAB
ALBUMIN SERPL BCP-MCNC: 1.9 G/DL (ref 3.4–5)
ALP SERPL-CCNC: 910 U/L (ref 33–136)
ALT SERPL W P-5'-P-CCNC: 35 U/L (ref 10–52)
ANION GAP SERPL CALC-SCNC: 14 MMOL/L (ref 10–20)
AST SERPL W P-5'-P-CCNC: 90 U/L (ref 9–39)
BB ANTIBODY IDENTIFICATION: NORMAL
BILIRUB SERPL-MCNC: 0.8 MG/DL (ref 0–1.2)
BLOOD EXPIRATION DATE: NORMAL
BUN SERPL-MCNC: 26 MG/DL (ref 6–23)
CALCIUM SERPL-MCNC: 7.1 MG/DL (ref 8.6–10.6)
CASE #: NORMAL
CHLORIDE SERPL-SCNC: 105 MMOL/L (ref 98–107)
CO2 SERPL-SCNC: 23 MMOL/L (ref 21–32)
CREAT SERPL-MCNC: 0.78 MG/DL (ref 0.5–1.3)
DISPENSE STATUS: NORMAL
EGFRCR SERPLBLD CKD-EPI 2021: >90 ML/MIN/1.73M*2
ERYTHROCYTE [DISTWIDTH] IN BLOOD BY AUTOMATED COUNT: 21.2 % (ref 11.5–14.5)
ERYTHROCYTE [DISTWIDTH] IN BLOOD BY AUTOMATED COUNT: 21.6 % (ref 11.5–14.5)
ERYTHROCYTE [DISTWIDTH] IN BLOOD BY AUTOMATED COUNT: 21.8 % (ref 11.5–14.5)
GLUCOSE BLD MANUAL STRIP-MCNC: 130 MG/DL (ref 74–99)
GLUCOSE BLD MANUAL STRIP-MCNC: 198 MG/DL (ref 74–99)
GLUCOSE BLD MANUAL STRIP-MCNC: 358 MG/DL (ref 74–99)
GLUCOSE BLD MANUAL STRIP-MCNC: 363 MG/DL (ref 74–99)
GLUCOSE SERPL-MCNC: 139 MG/DL (ref 74–99)
HCT VFR BLD AUTO: 23.7 % (ref 41–52)
HCT VFR BLD AUTO: 28 % (ref 41–52)
HCT VFR BLD AUTO: 28.3 % (ref 41–52)
HGB BLD-MCNC: 7.6 G/DL (ref 13.5–17.5)
HGB BLD-MCNC: 8.4 G/DL (ref 13.5–17.5)
HGB BLD-MCNC: 8.8 G/DL (ref 13.5–17.5)
LAB AP BLOCK FOR ADDITIONAL STUDIES: NORMAL
LABORATORY COMMENT REPORT: NORMAL
MAGNESIUM SERPL-MCNC: 2.21 MG/DL (ref 1.6–2.4)
MCH RBC QN AUTO: 27.5 PG (ref 26–34)
MCH RBC QN AUTO: 28.3 PG (ref 26–34)
MCH RBC QN AUTO: 28.4 PG (ref 26–34)
MCHC RBC AUTO-ENTMCNC: 30 G/DL (ref 32–36)
MCHC RBC AUTO-ENTMCNC: 31.1 G/DL (ref 32–36)
MCHC RBC AUTO-ENTMCNC: 32.1 G/DL (ref 32–36)
MCV RBC AUTO: 86 FL (ref 80–100)
MCV RBC AUTO: 91 FL (ref 80–100)
MCV RBC AUTO: 94 FL (ref 80–100)
NRBC BLD-RTO: 0 /100 WBCS (ref 0–0)
PATH REPORT.FINAL DX SPEC: NORMAL
PATH REPORT.GROSS SPEC: NORMAL
PATH REPORT.RELEVANT HX SPEC: NORMAL
PATH REPORT.TOTAL CANCER: NORMAL
PATHOLOGY SYNOPTIC REPORT: NORMAL
PLATELET # BLD AUTO: 102 X10*3/UL (ref 150–450)
PLATELET # BLD AUTO: 122 X10*3/UL (ref 150–450)
PLATELET # BLD AUTO: 125 X10*3/UL (ref 150–450)
POTASSIUM SERPL-SCNC: 4.6 MMOL/L (ref 3.5–5.3)
PRODUCT BLOOD TYPE: 9500
PRODUCT CODE: NORMAL
PROT SERPL-MCNC: 5 G/DL (ref 6.4–8.2)
RBC # BLD AUTO: 2.76 X10*6/UL (ref 4.5–5.9)
RBC # BLD AUTO: 2.97 X10*6/UL (ref 4.5–5.9)
RBC # BLD AUTO: 3.1 X10*6/UL (ref 4.5–5.9)
SODIUM SERPL-SCNC: 137 MMOL/L (ref 136–145)
UNIT ABO: NORMAL
UNIT NUMBER: NORMAL
UNIT RH: NORMAL
UNIT VOLUME: 350
WBC # BLD AUTO: 5.5 X10*3/UL (ref 4.4–11.3)
WBC # BLD AUTO: 6.3 X10*3/UL (ref 4.4–11.3)
WBC # BLD AUTO: 6.5 X10*3/UL (ref 4.4–11.3)
XM INTEP: NORMAL

## 2025-04-03 PROCEDURE — 71045 X-RAY EXAM CHEST 1 VIEW: CPT | Performed by: RADIOLOGY

## 2025-04-03 PROCEDURE — 99232 SBSQ HOSP IP/OBS MODERATE 35: CPT

## 2025-04-03 PROCEDURE — 71045 X-RAY EXAM CHEST 1 VIEW: CPT

## 2025-04-03 PROCEDURE — 2500000001 HC RX 250 WO HCPCS SELF ADMINISTERED DRUGS (ALT 637 FOR MEDICARE OP)

## 2025-04-03 PROCEDURE — 85027 COMPLETE CBC AUTOMATED: CPT

## 2025-04-03 PROCEDURE — 2500000001 HC RX 250 WO HCPCS SELF ADMINISTERED DRUGS (ALT 637 FOR MEDICARE OP): Performed by: STUDENT IN AN ORGANIZED HEALTH CARE EDUCATION/TRAINING PROGRAM

## 2025-04-03 PROCEDURE — 2500000004 HC RX 250 GENERAL PHARMACY W/ HCPCS (ALT 636 FOR OP/ED): Performed by: NURSE PRACTITIONER

## 2025-04-03 PROCEDURE — 1200000003 HC ONCOLOGY  ROOM WITH TELEMETRY DAILY

## 2025-04-03 PROCEDURE — 80053 COMPREHEN METABOLIC PANEL: CPT

## 2025-04-03 PROCEDURE — 93005 ELECTROCARDIOGRAM TRACING: CPT

## 2025-04-03 PROCEDURE — 2500000004 HC RX 250 GENERAL PHARMACY W/ HCPCS (ALT 636 FOR OP/ED): Performed by: STUDENT IN AN ORGANIZED HEALTH CARE EDUCATION/TRAINING PROGRAM

## 2025-04-03 PROCEDURE — 2500000002 HC RX 250 W HCPCS SELF ADMINISTERED DRUGS (ALT 637 FOR MEDICARE OP, ALT 636 FOR OP/ED): Performed by: STUDENT IN AN ORGANIZED HEALTH CARE EDUCATION/TRAINING PROGRAM

## 2025-04-03 PROCEDURE — 82947 ASSAY GLUCOSE BLOOD QUANT: CPT

## 2025-04-03 PROCEDURE — 2500000005 HC RX 250 GENERAL PHARMACY W/O HCPCS

## 2025-04-03 PROCEDURE — 83735 ASSAY OF MAGNESIUM: CPT

## 2025-04-03 PROCEDURE — 2500000004 HC RX 250 GENERAL PHARMACY W/ HCPCS (ALT 636 FOR OP/ED): Mod: JZ,TB

## 2025-04-03 RX ORDER — TALC
6 POWDER (GRAM) TOPICAL NIGHTLY
Status: DISCONTINUED | OUTPATIENT
Start: 2025-04-03 | End: 2025-04-07 | Stop reason: HOSPADM

## 2025-04-03 RX ORDER — HYDROMORPHONE HYDROCHLORIDE 0.2 MG/ML
0.2 INJECTION INTRAMUSCULAR; INTRAVENOUS; SUBCUTANEOUS
Status: DISCONTINUED | OUTPATIENT
Start: 2025-04-03 | End: 2025-04-03

## 2025-04-03 RX ORDER — METOPROLOL TARTRATE 1 MG/ML
5 INJECTION, SOLUTION INTRAVENOUS EVERY 6 HOURS
Status: DISCONTINUED | OUTPATIENT
Start: 2025-04-03 | End: 2025-04-07 | Stop reason: HOSPADM

## 2025-04-03 RX ORDER — HYDROMORPHONE HYDROCHLORIDE 0.2 MG/ML
0.2 INJECTION INTRAMUSCULAR; INTRAVENOUS; SUBCUTANEOUS EVERY 2 HOUR PRN
Status: DISCONTINUED | OUTPATIENT
Start: 2025-04-03 | End: 2025-04-04

## 2025-04-03 RX ORDER — PANTOPRAZOLE SODIUM 40 MG/10ML
40 INJECTION, POWDER, LYOPHILIZED, FOR SOLUTION INTRAVENOUS DAILY
Status: DISCONTINUED | OUTPATIENT
Start: 2025-04-04 | End: 2025-04-07 | Stop reason: HOSPADM

## 2025-04-03 RX ADMIN — TAMSULOSIN HYDROCHLORIDE 0.4 MG: 0.4 CAPSULE ORAL at 08:23

## 2025-04-03 RX ADMIN — Medication 6 MG: at 21:35

## 2025-04-03 RX ADMIN — OCTREOTIDE ACETATE 200 MCG: 500 INJECTION, SOLUTION INTRAVENOUS; SUBCUTANEOUS at 15:32

## 2025-04-03 RX ADMIN — MORPHINE SULFATE 30 MG: 30 TABLET, FILM COATED, EXTENDED RELEASE ORAL at 08:23

## 2025-04-03 RX ADMIN — HYDROMORPHONE HYDROCHLORIDE 0.2 MG: 0.2 INJECTION, SOLUTION INTRAMUSCULAR; INTRAVENOUS; SUBCUTANEOUS at 17:02

## 2025-04-03 RX ADMIN — HYDROMORPHONE HYDROCHLORIDE 0.2 MG: 0.2 INJECTION, SOLUTION INTRAMUSCULAR; INTRAVENOUS; SUBCUTANEOUS at 09:38

## 2025-04-03 RX ADMIN — ASCORBIC ACID, VITAMIN A PALMITATE, CHOLECALCIFEROL, THIAMINE HYDROCHLORIDE, RIBOFLAVIN-5 PHOSPHATE SODIUM, PYRIDOXINE HYDROCHLORIDE, NIACINAMIDE, DEXPANTHENOL, ALPHA-TOCOPHEROL ACETATE, VITAMIN K1, FOLIC ACID, BIOTIN, CYANOCOBALAMIN: 200; 3300; 200; 6; 3.6; 6; 40; 15; 10; 150; 600; 60; 5 INJECTION, SOLUTION INTRAVENOUS at 09:38

## 2025-04-03 RX ADMIN — INSULIN LISPRO 5 UNITS: 100 INJECTION, SOLUTION INTRAVENOUS; SUBCUTANEOUS at 13:47

## 2025-04-03 RX ADMIN — MIDODRINE HYDROCHLORIDE 2.5 MG: 2.5 TABLET ORAL at 16:20

## 2025-04-03 RX ADMIN — THIAMINE HYDROCHLORIDE 100 MG: 100 INJECTION, SOLUTION INTRAMUSCULAR; INTRAVENOUS at 08:22

## 2025-04-03 RX ADMIN — METHOCARBAMOL TABLETS 500 MG: 500 TABLET, COATED ORAL at 21:35

## 2025-04-03 RX ADMIN — METHOCARBAMOL TABLETS 500 MG: 500 TABLET, COATED ORAL at 13:45

## 2025-04-03 RX ADMIN — NYSTATIN 400000 UNITS: 500000 SUSPENSION ORAL at 15:15

## 2025-04-03 RX ADMIN — OCTREOTIDE ACETATE 200 MCG: 500 INJECTION, SOLUTION INTRAVENOUS; SUBCUTANEOUS at 08:33

## 2025-04-03 RX ADMIN — OCTREOTIDE ACETATE 200 MCG: 500 INJECTION, SOLUTION INTRAVENOUS; SUBCUTANEOUS at 21:52

## 2025-04-03 RX ADMIN — NYSTATIN 400000 UNITS: 500000 SUSPENSION ORAL at 08:23

## 2025-04-03 RX ADMIN — METHOCARBAMOL TABLETS 500 MG: 500 TABLET, COATED ORAL at 08:22

## 2025-04-03 RX ADMIN — METHOCARBAMOL TABLETS 500 MG: 500 TABLET, COATED ORAL at 01:42

## 2025-04-03 RX ADMIN — MORPHINE SULFATE 30 MG: 30 TABLET, FILM COATED, EXTENDED RELEASE ORAL at 21:35

## 2025-04-03 RX ADMIN — MIDODRINE HYDROCHLORIDE 2.5 MG: 2.5 TABLET ORAL at 08:23

## 2025-04-03 RX ADMIN — MIDODRINE HYDROCHLORIDE 2.5 MG: 2.5 TABLET ORAL at 13:45

## 2025-04-03 RX ADMIN — NYSTATIN 400000 UNITS: 500000 SUSPENSION ORAL at 21:35

## 2025-04-03 RX ADMIN — INSULIN LISPRO 3 UNITS: 100 INJECTION, SOLUTION INTRAVENOUS; SUBCUTANEOUS at 18:41

## 2025-04-03 RX ADMIN — HYDROMORPHONE HYDROCHLORIDE 0.2 MG: 0.2 INJECTION, SOLUTION INTRAMUSCULAR; INTRAVENOUS; SUBCUTANEOUS at 14:06

## 2025-04-03 RX ADMIN — INSULIN LISPRO 5 UNITS: 100 INJECTION, SOLUTION INTRAVENOUS; SUBCUTANEOUS at 00:38

## 2025-04-03 ASSESSMENT — PAIN - FUNCTIONAL ASSESSMENT
PAIN_FUNCTIONAL_ASSESSMENT: 0-10

## 2025-04-03 ASSESSMENT — COGNITIVE AND FUNCTIONAL STATUS - GENERAL
TOILETING: A LITTLE
DAILY ACTIVITIY SCORE: 21
WALKING IN HOSPITAL ROOM: A LITTLE
HELP NEEDED FOR BATHING: A LITTLE
MOVING TO AND FROM BED TO CHAIR: A LITTLE
CLIMB 3 TO 5 STEPS WITH RAILING: A LOT
CLIMB 3 TO 5 STEPS WITH RAILING: A LOT
DAILY ACTIVITIY SCORE: 21
DRESSING REGULAR LOWER BODY CLOTHING: A LITTLE
TOILETING: A LITTLE
HELP NEEDED FOR BATHING: A LITTLE
MOBILITY SCORE: 20
MOBILITY SCORE: 20
MOVING TO AND FROM BED TO CHAIR: A LITTLE
WALKING IN HOSPITAL ROOM: A LITTLE
DRESSING REGULAR LOWER BODY CLOTHING: A LITTLE

## 2025-04-03 ASSESSMENT — PAIN SCALES - GENERAL
PAINLEVEL_OUTOF10: 8
PAINLEVEL_OUTOF10: 7
PAINLEVEL_OUTOF10: 6
PAINLEVEL_OUTOF10: 5 - MODERATE PAIN
PAINLEVEL_OUTOF10: 4
PAINLEVEL_OUTOF10: 3
PAINLEVEL_OUTOF10: 3

## 2025-04-03 ASSESSMENT — PAIN DESCRIPTION - LOCATION
LOCATION: ABDOMEN

## 2025-04-03 NOTE — PROGRESS NOTES
Art Therapy Note    Roma Corral     Therapy Session  Referral Type: New referral this admission  Visit Type: Follow-up visit  Session Start Time: 1500  Intervention Delivery: In-person  Conflict of Service: Off unit  Number of family members present: 0              Treatment/Interventions            Narrative  Assessment Detail: At time of visit Pt not in room and off the floor.  ATR will follow up another time to offer services.    Education Documentation  No documentation found.

## 2025-04-03 NOTE — SIGNIFICANT EVENT
Post-op Check  S:    POD 0 from IR guided portal vein embolization and embolization    O:   Vital signs are stable, normotensive, afebrile, no new or worsening oxygen requirement, not tachycardic  Visit Vitals  /63 (BP Location: Right arm, Patient Position: Lying)   Pulse 103   Temp 36.6 °C (97.9 °F) (Temporal)   Resp 13        Constitutional: no acute distress  Skin: warm and dry overall   Neuro: A/O x4, no gross deficits   HEENT: Atraumatic, no scleral icterus  Cardiac: Mildly tachycardic to 103, ranges between 98 and 105  Pulmonary: Unlabored respirations   Abdomen: Non distended, minimally diffusely tender to palpation ( IR drain with blood does not appear to be joel blood, appears to be sanguinous content mixed with ascitic fluid      A/P:  Patient is status post IR guided portal vein embolization via transcolonic axis and hepatic vein embolization.  There was concern that his drain had turned bloody towards the end of the procedure.  He had remained hemodynamically stable during the procedure.  A posttransfusion CBC, after receiving 1 unit of blood this afternoon reveals an appropriately incremented hemoglobin.    Will continue to optimize pain control as needed.  Will continue to monitor clinical exam, vitals, I&O's, and labs when available.    Plan to continue to monitor drain content and output over the course of the night, vital signs, abdominal exam    11 PM chec:  VSS:  SBP: 118/62  HR: 91  SpO2: 97%  Abdomen: Soft, diffusely tender to palpation - felt to be at baseline. Drain appears sanguinous, possibly lightening.    3 AM check:  Blood pressure: 111/73  Heart rate: 99  SpO2: 97%  Remains minimally tender to palpation diffusely soft nondistended.  Drain serosanguineous output outputs have decreased between 11 and 3 AM.  Ostomy with enteric content.  Vital signs stable.    Assessment:  Status post IR guided portal vein embolization and hepatic vein embolization.  Initially there was concern for  bloody output from the drain.  Over the course of the night, patient's vitals have been stable.  His drain output has been sanguinous to serosanguineous, possibly lightening up.  Drain output volume is consistent with yesterday's output.  Abdominal exam has remained at baseline showing a soft abdomen, it is minimally but diffusely tender to palpation.  There are no signs of hematoma at the access sites.  There are no overlying skin changes.  Patient's postprocedural course appears to be stable.    Blayne Restrepo DO  PGY-1

## 2025-04-03 NOTE — CARE PLAN
The patient's goals for the shift include      The clinical goals for the shift include Pt will be safe, comfortable, and HD stable throughout the day.    Problem: Safety - Adult  Goal: Free from fall injury  Outcome: Progressing     Problem: Discharge Planning  Goal: Discharge to home or other facility with appropriate resources  Outcome: Progressing     Problem: Chronic Conditions and Co-morbidities  Goal: Patient's chronic conditions and co-morbidity symptoms are monitored and maintained or improved  Outcome: Progressing     Problem: Fall/Injury  Goal: Not fall by end of shift  Outcome: Progressing  Goal: Be free from injury by end of the shift  Outcome: Progressing  Goal: Verbalize understanding of personal risk factors for fall in the hospital  Outcome: Progressing  Goal: Verbalize understanding of risk factor reduction measures to prevent injury from fall in the home  Outcome: Progressing  Goal: Use assistive devices by end of the shift  Outcome: Progressing  Goal: Pace activities to prevent fatigue by end of the shift  Outcome: Progressing     Problem: Skin  Goal: Decreased wound size/increased tissue granulation at next dressing change  Outcome: Progressing  Goal: Participates in plan/prevention/treatment measures  Outcome: Progressing  Goal: Prevent/manage excess moisture  Outcome: Progressing  Goal: Prevent/minimize sheer/friction injuries  Outcome: Progressing  Goal: Promote/optimize nutrition  Outcome: Progressing  Goal: Promote skin healing  Outcome: Progressing     Problem: Diabetes  Goal: Achieve decreasing blood glucose levels by end of shift  Outcome: Progressing  Goal: Increase stability of blood glucose readings by end of shift  Outcome: Progressing  Goal: Decrease in ketones present in urine by end of shift  Outcome: Progressing  Goal: Maintain electrolyte levels within acceptable range throughout shift  Outcome: Progressing  Goal: Maintain glucose levels >70mg/dl to <250mg/dl throughout  shift  Outcome: Progressing  Goal: No changes in neurological exam by end of shift  Outcome: Progressing  Goal: Learn about and adhere to nutrition recommendations by end of shift  Outcome: Progressing  Goal: Vital signs within normal range for age by end of shift  Outcome: Progressing  Goal: Increase self care and/or family involovement by end of shift  Outcome: Progressing  Goal: Receive DSME education by end of shift  Outcome: Progressing     Problem: Pain  Goal: Takes deep breaths with improved pain control throughout the shift  Outcome: Progressing  Goal: Turns in bed with improved pain control throughout the shift  Outcome: Progressing  Goal: Walks with improved pain control throughout the shift  Outcome: Progressing  Goal: Performs ADL's with improved pain control throughout shift  Outcome: Progressing  Goal: Participates in PT with improved pain control throughout the shift  Outcome: Progressing  Goal: Free from opioid side effects throughout the shift  Outcome: Progressing  Goal: Free from acute confusion related to pain meds throughout the shift  Outcome: Progressing

## 2025-04-03 NOTE — SIGNIFICANT EVENT
Post-Procedure check Note    Subjective  Roma Corral is a  70 y.o. male now POD 0 s/p Right Portal Vein Embolization via splenic vein access and right Hepatic vein embolization via right internal jugular vein access with IR.  Pain is moderately controlled on current pain regimen. No complaints of headaches, N/V, chest pain, SOB.     Drains: Bag had scant SS output in at the time of this exam      Objective  Vitals:    04/02/25 2300   BP: 118/62   Pulse: 91   Resp: 13   Temp: 36.1 °C (97 °F)   SpO2: 97%       Physical Exam:  General: laying bed, in NAD  CV/Resp: regular rate, breathing comfortably on room air, no accessory muscle use  Abdomen/GI: Soft, nondistended, diffusely tender. Colostomy pink with liquid brown stool and gas in bag.   Neuro: MAEX4    Assessment/Plan:  Roma Corral is a  70 y.o. male now POD 0 s/p Right Portal Vein Embolization via splenic vein access and right Hepatic vein embolization via right internal jugular vein access with IR.    - Continue octreotide TID and midodrine  - Continue TPN  - Maintain drain to gravity drainage      Primary team will assess in the morning but night team is available for any immediately questions or concerns.      Leilani Whittaker MD PGY-1  General Surgery Night Float  Surgical Oncology Han/Ramiro: [phone]      I am the night resident, I can only be reached 6pm-6am. Epic chat preferred.   If no response, for emergencies, please page the appropriate pager.

## 2025-04-03 NOTE — PROGRESS NOTES
Surgical Oncology Progress Note    HPI:  Roma Corral is a 70 y.o. male with history of rectal cancer with mets to the liver, CAD (2021 NSTEMI), DM2, HTN  who is s/p robotic LAR with end colostomy, ilecocecetomy with Dr. Vasquez, and laparoscopic microwave liver ablation with Dr. Erazo on 3/17/25. Patient underwent R portal vein embo via splenic vein and R hepatic vein embo via R internal jugular with IR on 4/2/25.     Subjective:  No acute events overnight, patient s/p Right Portal Vein Embolization via splenic vein access and right Hepatic vein embolization via right internal jugular vein access with IR on 4/2. Continues on full-strength TPN.  Having more ostomy output. Right drain bloody likely from procedure. Mild chest pain this morning, following with EKG and CXR.     Objective  Temp:  [36.1 °C (97 °F)-36.8 °C (98.2 °F)] 36.7 °C (98 °F)  Heart Rate:  [] 95  Resp:  [10-20] 15  BP: ()/(51-80) 107/70  I/O last 2 completed shifts:  In: 376.7 (5.7 mL/kg) [P.O.:40]  Out: 2250 (33.8 mL/kg) [Urine:1225 (0.8 mL/kg/hr); Drains:1025]  Weight: 66.5 kg     CIRILO output: 1025 ml  Urine output: 1225 cc    Physical Exam:  Mild distress  Nontachycardic  On room air  Right neck bandage from IR access  Abdomen soft, mild tenderness to palpation, minimally distended, colostomy pink with some gas and liquid brown stool in the bag, right sided drain in place with blood (likely d/t IR procedure)    Labs within past 24h:            8.8   137  105  26   6.5>-----<122  ----------------<139             28.3   4.6  23  0.78          Ca 7.1 Phos 3.6 Mg 2.21       ALT 35 AST 90 AlkPhos 910 tBili 0.8               ASSESSMENT  Roma Corral is a 70 y.o. male with history of rectal cancer with mets to the liver, CAD (2021 NSTEMI), DM2, HTN who is s/p robotic LAR with end colostomy, ilecocecetomy with Dr. Vasquez, and laparoscopic microwave liver ablation with Dr. Erazo on 3/17/25, postop course notable for persistent chyle  leak.Patient underwent R portal vein embo via splenic vein and R hepatic vein embo via R internal jugular with IR on 4/2/25.     PLAN:  - Continue octreotide TID and midodrine  - Continue TPN  - Maintain drain to gravity drainage  - Follow up chest pain with EKG and CXR    Discussed with Dr. Kacey Johnson MD- PGY1  Surgical Oncology [id number]    [facility] Team Pager: [phone]

## 2025-04-04 DIAGNOSIS — Z78.9 ON TOTAL PARENTERAL NUTRITION: ICD-10-CM

## 2025-04-04 LAB
ALBUMIN SERPL BCP-MCNC: 1.9 G/DL (ref 3.4–5)
ALP SERPL-CCNC: 891 U/L (ref 33–136)
ALT SERPL W P-5'-P-CCNC: 57 U/L (ref 10–52)
ANION GAP SERPL CALC-SCNC: 12 MMOL/L (ref 10–20)
AST SERPL W P-5'-P-CCNC: 142 U/L (ref 9–39)
ATRIAL RATE: 94 BPM
BILIRUB SERPL-MCNC: 0.8 MG/DL (ref 0–1.2)
BUN SERPL-MCNC: 31 MG/DL (ref 6–23)
CALCIUM SERPL-MCNC: 7.2 MG/DL (ref 8.6–10.6)
CHLORIDE SERPL-SCNC: 105 MMOL/L (ref 98–107)
CO2 SERPL-SCNC: 24 MMOL/L (ref 21–32)
CREAT SERPL-MCNC: 0.83 MG/DL (ref 0.5–1.3)
EGFRCR SERPLBLD CKD-EPI 2021: >90 ML/MIN/1.73M*2
ERYTHROCYTE [DISTWIDTH] IN BLOOD BY AUTOMATED COUNT: 20.5 % (ref 11.5–14.5)
ERYTHROCYTE [DISTWIDTH] IN BLOOD BY AUTOMATED COUNT: 20.5 % (ref 11.5–14.5)
ERYTHROCYTE [DISTWIDTH] IN BLOOD BY AUTOMATED COUNT: 21.1 % (ref 11.5–14.5)
GLUCOSE BLD MANUAL STRIP-MCNC: 109 MG/DL (ref 74–99)
GLUCOSE BLD MANUAL STRIP-MCNC: 133 MG/DL (ref 74–99)
GLUCOSE BLD MANUAL STRIP-MCNC: 148 MG/DL (ref 74–99)
GLUCOSE BLD MANUAL STRIP-MCNC: 271 MG/DL (ref 74–99)
GLUCOSE BLD MANUAL STRIP-MCNC: 283 MG/DL (ref 74–99)
GLUCOSE BLD MANUAL STRIP-MCNC: 296 MG/DL (ref 74–99)
GLUCOSE BLD MANUAL STRIP-MCNC: 357 MG/DL (ref 74–99)
GLUCOSE BLD MANUAL STRIP-MCNC: 361 MG/DL (ref 74–99)
GLUCOSE SERPL-MCNC: 124 MG/DL (ref 74–99)
HCT VFR BLD AUTO: 20.5 % (ref 41–52)
HCT VFR BLD AUTO: 21.5 % (ref 41–52)
HCT VFR BLD AUTO: 26.4 % (ref 41–52)
HGB BLD-MCNC: 6.5 G/DL (ref 13.5–17.5)
HGB BLD-MCNC: 6.6 G/DL (ref 13.5–17.5)
HGB BLD-MCNC: 8.1 G/DL (ref 13.5–17.5)
MAGNESIUM SERPL-MCNC: 2.16 MG/DL (ref 1.6–2.4)
MCH RBC QN AUTO: 28.3 PG (ref 26–34)
MCH RBC QN AUTO: 28.8 PG (ref 26–34)
MCH RBC QN AUTO: 29.5 PG (ref 26–34)
MCHC RBC AUTO-ENTMCNC: 30.7 G/DL (ref 32–36)
MCHC RBC AUTO-ENTMCNC: 30.7 G/DL (ref 32–36)
MCHC RBC AUTO-ENTMCNC: 31.7 G/DL (ref 32–36)
MCV RBC AUTO: 92 FL (ref 80–100)
MCV RBC AUTO: 93 FL (ref 80–100)
MCV RBC AUTO: 94 FL (ref 80–100)
NRBC BLD-RTO: 0 /100 WBCS (ref 0–0)
P AXIS: 81 DEGREES
P OFFSET: 148 MS
P ONSET: 114 MS
PATH REV-IMMUNOHEMATOLOGY-PR30: NORMAL
PLATELET # BLD AUTO: 116 X10*3/UL (ref 150–450)
PLATELET # BLD AUTO: 90 X10*3/UL (ref 150–450)
PLATELET # BLD AUTO: 92 X10*3/UL (ref 150–450)
POTASSIUM SERPL-SCNC: 4.9 MMOL/L (ref 3.5–5.3)
PR INTERVAL: 200 MS
PROT SERPL-MCNC: 4.9 G/DL (ref 6.4–8.2)
Q ONSET: 214 MS
QRS COUNT: 15 BEATS
QRS DURATION: 90 MS
QT INTERVAL: 366 MS
QTC CALCULATION(BAZETT): 457 MS
QTC FREDERICIA: 425 MS
R AXIS: -44 DEGREES
RBC # BLD AUTO: 2.2 X10*6/UL (ref 4.5–5.9)
RBC # BLD AUTO: 2.29 X10*6/UL (ref 4.5–5.9)
RBC # BLD AUTO: 2.86 X10*6/UL (ref 4.5–5.9)
SODIUM SERPL-SCNC: 136 MMOL/L (ref 136–145)
T AXIS: 48 DEGREES
T OFFSET: 397 MS
VENTRICULAR RATE: 94 BPM
WBC # BLD AUTO: 3.7 X10*3/UL (ref 4.4–11.3)
WBC # BLD AUTO: 4.1 X10*3/UL (ref 4.4–11.3)
WBC # BLD AUTO: 7.1 X10*3/UL (ref 4.4–11.3)

## 2025-04-04 PROCEDURE — 2500000004 HC RX 250 GENERAL PHARMACY W/ HCPCS (ALT 636 FOR OP/ED): Mod: JZ,TB

## 2025-04-04 PROCEDURE — 2500000002 HC RX 250 W HCPCS SELF ADMINISTERED DRUGS (ALT 637 FOR MEDICARE OP, ALT 636 FOR OP/ED): Performed by: STUDENT IN AN ORGANIZED HEALTH CARE EDUCATION/TRAINING PROGRAM

## 2025-04-04 PROCEDURE — 2500000001 HC RX 250 WO HCPCS SELF ADMINISTERED DRUGS (ALT 637 FOR MEDICARE OP): Performed by: STUDENT IN AN ORGANIZED HEALTH CARE EDUCATION/TRAINING PROGRAM

## 2025-04-04 PROCEDURE — 84075 ASSAY ALKALINE PHOSPHATASE: CPT

## 2025-04-04 PROCEDURE — 2500000004 HC RX 250 GENERAL PHARMACY W/ HCPCS (ALT 636 FOR OP/ED): Performed by: STUDENT IN AN ORGANIZED HEALTH CARE EDUCATION/TRAINING PROGRAM

## 2025-04-04 PROCEDURE — 82947 ASSAY GLUCOSE BLOOD QUANT: CPT

## 2025-04-04 PROCEDURE — 2500000001 HC RX 250 WO HCPCS SELF ADMINISTERED DRUGS (ALT 637 FOR MEDICARE OP)

## 2025-04-04 PROCEDURE — P9040 RBC LEUKOREDUCED IRRADIATED: HCPCS

## 2025-04-04 PROCEDURE — 85027 COMPLETE CBC AUTOMATED: CPT

## 2025-04-04 PROCEDURE — 2500000005 HC RX 250 GENERAL PHARMACY W/O HCPCS

## 2025-04-04 PROCEDURE — 36430 TRANSFUSION BLD/BLD COMPNT: CPT

## 2025-04-04 PROCEDURE — 83735 ASSAY OF MAGNESIUM: CPT

## 2025-04-04 PROCEDURE — 1200000003 HC ONCOLOGY  ROOM WITH TELEMETRY DAILY

## 2025-04-04 PROCEDURE — P9045 ALBUMIN (HUMAN), 5%, 250 ML: HCPCS | Mod: JZ,TB

## 2025-04-04 PROCEDURE — 2500000004 HC RX 250 GENERAL PHARMACY W/ HCPCS (ALT 636 FOR OP/ED): Performed by: NURSE PRACTITIONER

## 2025-04-04 RX ORDER — ALBUMIN HUMAN 50 G/1000ML
12.5 SOLUTION INTRAVENOUS ONCE
Status: DISCONTINUED | OUTPATIENT
Start: 2025-04-04 | End: 2025-04-05

## 2025-04-04 RX ORDER — ALBUMIN HUMAN 50 G/1000ML
12.5 SOLUTION INTRAVENOUS ONCE
Status: COMPLETED | OUTPATIENT
Start: 2025-04-04 | End: 2025-04-04

## 2025-04-04 RX ORDER — ALBUMIN HUMAN 250 G/1000ML
12.5 SOLUTION INTRAVENOUS EVERY 6 HOURS
Status: DISCONTINUED | OUTPATIENT
Start: 2025-04-04 | End: 2025-04-04

## 2025-04-04 RX ADMIN — TAMSULOSIN HYDROCHLORIDE 0.4 MG: 0.4 CAPSULE ORAL at 08:48

## 2025-04-04 RX ADMIN — METHOCARBAMOL TABLETS 500 MG: 500 TABLET, COATED ORAL at 15:19

## 2025-04-04 RX ADMIN — Medication 6 MG: at 20:02

## 2025-04-04 RX ADMIN — METHOCARBAMOL TABLETS 500 MG: 500 TABLET, COATED ORAL at 02:51

## 2025-04-04 RX ADMIN — MORPHINE SULFATE 30 MG: 30 TABLET, FILM COATED, EXTENDED RELEASE ORAL at 08:48

## 2025-04-04 RX ADMIN — OCTREOTIDE ACETATE 200 MCG: 500 INJECTION, SOLUTION INTRAVENOUS; SUBCUTANEOUS at 21:25

## 2025-04-04 RX ADMIN — MIDODRINE HYDROCHLORIDE 2.5 MG: 2.5 TABLET ORAL at 17:48

## 2025-04-04 RX ADMIN — MORPHINE SULFATE 30 MG: 30 TABLET, FILM COATED, EXTENDED RELEASE ORAL at 20:02

## 2025-04-04 RX ADMIN — MIDODRINE HYDROCHLORIDE 2.5 MG: 2.5 TABLET ORAL at 08:47

## 2025-04-04 RX ADMIN — METHOCARBAMOL TABLETS 500 MG: 500 TABLET, COATED ORAL at 08:48

## 2025-04-04 RX ADMIN — MIDODRINE HYDROCHLORIDE 2.5 MG: 2.5 TABLET ORAL at 11:54

## 2025-04-04 RX ADMIN — INSULIN LISPRO 4 UNITS: 100 INJECTION, SOLUTION INTRAVENOUS; SUBCUTANEOUS at 11:55

## 2025-04-04 RX ADMIN — ALBUMIN HUMAN 12.5 G: 0.05 INJECTION, SOLUTION INTRAVENOUS at 09:12

## 2025-04-04 RX ADMIN — ONDANSETRON 4 MG: 2 INJECTION INTRAMUSCULAR; INTRAVENOUS at 08:57

## 2025-04-04 RX ADMIN — NYSTATIN 400000 UNITS: 500000 SUSPENSION ORAL at 08:50

## 2025-04-04 RX ADMIN — ALBUMIN HUMAN 12.5 G: 0.05 INJECTION, SOLUTION INTRAVENOUS at 10:47

## 2025-04-04 RX ADMIN — THIAMINE HYDROCHLORIDE 100 MG: 100 INJECTION, SOLUTION INTRAMUSCULAR; INTRAVENOUS at 08:51

## 2025-04-04 RX ADMIN — METHOCARBAMOL TABLETS 500 MG: 500 TABLET, COATED ORAL at 20:02

## 2025-04-04 RX ADMIN — NYSTATIN 400000 UNITS: 500000 SUSPENSION ORAL at 20:02

## 2025-04-04 RX ADMIN — OCTREOTIDE ACETATE 200 MCG: 500 INJECTION, SOLUTION INTRAVENOUS; SUBCUTANEOUS at 15:19

## 2025-04-04 RX ADMIN — ASCORBIC ACID, VITAMIN A PALMITATE, CHOLECALCIFEROL, THIAMINE HYDROCHLORIDE, RIBOFLAVIN-5 PHOSPHATE SODIUM, PYRIDOXINE HYDROCHLORIDE, NIACINAMIDE, DEXPANTHENOL, ALPHA-TOCOPHEROL ACETATE, VITAMIN K1, FOLIC ACID, BIOTIN, CYANOCOBALAMIN: 200; 3300; 200; 6; 3.6; 6; 40; 15; 10; 150; 600; 60; 5 INJECTION, SOLUTION INTRAVENOUS at 08:46

## 2025-04-04 RX ADMIN — OCTREOTIDE ACETATE 200 MCG: 500 INJECTION, SOLUTION INTRAVENOUS; SUBCUTANEOUS at 08:54

## 2025-04-04 RX ADMIN — INSULIN LISPRO 3 UNITS: 100 INJECTION, SOLUTION INTRAVENOUS; SUBCUTANEOUS at 18:10

## 2025-04-04 RX ADMIN — PANTOPRAZOLE SODIUM 40 MG: 40 INJECTION, POWDER, FOR SOLUTION INTRAVENOUS at 08:52

## 2025-04-04 RX ADMIN — HYDROMORPHONE HYDROCHLORIDE 0.2 MG: 0.2 INJECTION, SOLUTION INTRAMUSCULAR; INTRAVENOUS; SUBCUTANEOUS at 01:13

## 2025-04-04 ASSESSMENT — COGNITIVE AND FUNCTIONAL STATUS - GENERAL
DRESSING REGULAR UPPER BODY CLOTHING: A LITTLE
CLIMB 3 TO 5 STEPS WITH RAILING: A LITTLE
STANDING UP FROM CHAIR USING ARMS: A LITTLE
MOBILITY SCORE: 20
HELP NEEDED FOR BATHING: A LITTLE
TURNING FROM BACK TO SIDE WHILE IN FLAT BAD: A LITTLE
TOILETING: A LITTLE
DRESSING REGULAR LOWER BODY CLOTHING: A LITTLE
MOVING FROM LYING ON BACK TO SITTING ON SIDE OF FLAT BED WITH BEDRAILS: A LITTLE
WALKING IN HOSPITAL ROOM: A LITTLE
WALKING IN HOSPITAL ROOM: A LITTLE
DAILY ACTIVITIY SCORE: 20
HELP NEEDED FOR BATHING: A LITTLE
DRESSING REGULAR LOWER BODY CLOTHING: A LITTLE
MOBILITY SCORE: 18
TOILETING: A LITTLE
DAILY ACTIVITIY SCORE: 21
MOVING TO AND FROM BED TO CHAIR: A LITTLE
MOVING TO AND FROM BED TO CHAIR: A LITTLE
CLIMB 3 TO 5 STEPS WITH RAILING: A LOT

## 2025-04-04 ASSESSMENT — PAIN - FUNCTIONAL ASSESSMENT
PAIN_FUNCTIONAL_ASSESSMENT: 0-10

## 2025-04-04 ASSESSMENT — PAIN SCALES - GENERAL
PAINLEVEL_OUTOF10: 7
PAINLEVEL_OUTOF10: 6
PAINLEVEL_OUTOF10: 5 - MODERATE PAIN
PAINLEVEL_OUTOF10: 6

## 2025-04-04 ASSESSMENT — PAIN DESCRIPTION - LOCATION: LOCATION: ABDOMEN

## 2025-04-04 NOTE — CARE PLAN
The patient's goals for the shift include   Problem: Safety - Adult  Goal: Free from fall injury  Outcome: Progressing     Problem: Discharge Planning  Goal: Discharge to home or other facility with appropriate resources  Outcome: Progressing     Problem: Chronic Conditions and Co-morbidities  Goal: Patient's chronic conditions and co-morbidity symptoms are monitored and maintained or improved  Outcome: Progressing     Problem: Fall/Injury  Goal: Not fall by end of shift  Outcome: Progressing  Goal: Be free from injury by end of the shift  Outcome: Progressing  Goal: Verbalize understanding of personal risk factors for fall in the hospital  Outcome: Progressing  Goal: Verbalize understanding of risk factor reduction measures to prevent injury from fall in the home  Outcome: Progressing  Goal: Use assistive devices by end of the shift  Outcome: Progressing  Goal: Pace activities to prevent fatigue by end of the shift  Outcome: Progressing     Problem: Skin  Goal: Decreased wound size/increased tissue granulation at next dressing change  Outcome: Progressing  Goal: Participates in plan/prevention/treatment measures  Outcome: Progressing  Goal: Prevent/manage excess moisture  Outcome: Progressing  Flowsheets (Taken 4/4/2025 4425)  Prevent/manage excess moisture: Moisturize dry skin  Goal: Prevent/minimize sheer/friction injuries  Outcome: Progressing  Goal: Promote/optimize nutrition  Outcome: Progressing  Goal: Promote skin healing  Outcome: Progressing     Problem: Diabetes  Goal: Achieve decreasing blood glucose levels by end of shift  Outcome: Progressing  Goal: Increase stability of blood glucose readings by end of shift  Outcome: Progressing  Goal: Decrease in ketones present in urine by end of shift  Outcome: Progressing  Goal: Maintain electrolyte levels within acceptable range throughout shift  Outcome: Progressing  Goal: Maintain glucose levels >70mg/dl to <250mg/dl throughout shift  Outcome:  Progressing  Goal: No changes in neurological exam by end of shift  Outcome: Progressing  Goal: Learn about and adhere to nutrition recommendations by end of shift  Outcome: Progressing  Goal: Vital signs within normal range for age by end of shift  Outcome: Progressing  Goal: Increase self care and/or family involovement by end of shift  Outcome: Progressing  Goal: Receive DSME education by end of shift  Outcome: Progressing     Problem: Pain  Goal: Takes deep breaths with improved pain control throughout the shift  Outcome: Progressing  Goal: Turns in bed with improved pain control throughout the shift  Outcome: Progressing  Goal: Walks with improved pain control throughout the shift  Outcome: Progressing  Goal: Performs ADL's with improved pain control throughout shift  Outcome: Progressing  Goal: Participates in PT with improved pain control throughout the shift  Outcome: Progressing  Goal: Free from opioid side effects throughout the shift  Outcome: Progressing  Goal: Free from acute confusion related to pain meds throughout the shift  Outcome: Progressing        The clinical goals for the shift include pt will remain hds throughout shift.

## 2025-04-04 NOTE — CONSULTS
"  Wound Care Consult     Visit Date: 4/4/2025      Patient Name: Roma Corral         MRN: [id number]           YOB: 1955     Reason for Consult: ostomy care/education        Pertinent Labs:   Albumin   Date Value Ref Range Status   04/04/2025 1.9 (L) 3.4 - 5.0 g/dL Final       Wound Assessment:     04/04/25 1600   Colostomy Loop LUQ   Placement Date/Time: 07/02/24 1416   Hand Hygiene Completed: Yes  Colostomy Type: Loop  Location: LUQ   Stomal Appliance 1 piece;Changed   Site/Stoma Assessment Red;Budded   Peristomal Assessment Clean;Intact   Treatment Pouch change   Drainage Characteristics Brown       Wound Team Summary Assessment:    Ostomy type: colostomy    size: 1\" oval       color: red       protruding: flush  Functioning: soft brown stool - large   Mucocutaneous junction: intact  Peristomal skin: 2 cm incision at 7 o'clock,  Pouching: Karrie one piece soft convex drainable pouch with barrier ring   Ostomy Education: patient has had an ostomy since 7/2024. Knowledgeable in ostomy pouching. Patient in flat pouch- large amount of pancaking behind flat pouch.   Plan: assess stoma/pouching while inpatient     Wound Team Plan: Ostomy team will follow     Pavithra Andrea, MSN, RN, CWCN, COCN  04/04/25 6:47 PM          "

## 2025-04-04 NOTE — PROGRESS NOTES
Therapy Communication Note    Patient Name: Roma Corral  MRN: [id number]  Department: [facility]  Room: [facility]  Today's Date: 4/4/2025     Discipline: Physical Therapy    Missed Visit Reason: Missed Visit Reason: Patient refused    Missed Time: Attempt    Comment:

## 2025-04-04 NOTE — PROGRESS NOTES
Spiritual Care Visit  Spiritual Care Request    Reason for Visit:  Routine Visit: Follow-up  Continue Visiting: Yes     Focus of Care:  Visited With: Patient       Spiritual Care Assessment    Care Provided:  Intended Effects: Build relationship of care and support, Demonstrate caring and concern  Methods: Collaborate with care team member, Encourage sharing of feelings, Offer support  Interventions: Acknowledge current situation, Acknowledge response to difficult experience, Active listening, Discuss coping mechanisms with someone, Provide hospitality    Spiritual Care Annotation    Annotation:   visited patient Roma Corral. Patient welcomed visit from facility Emma holden. Hand  provided to patient and a clean sheet was placed on a chair beside him for facility dog to visit. Sheet placed in soiled linen bin following visit.     Patient expressed how he was feeling since his procedure, the anxiety he had been feeling. He shared that he has been praying to God for strength and comfort.  provided care through reflective listening, validation of feelings, and supportive conversation. Patient was appreciative of care and did not have any further needs at this time. Spiritual Care remains available as needed/requested.    Rev. Lexis Harris MDiv, BCC

## 2025-04-04 NOTE — PROGRESS NOTES
Surgical Oncology Progress Note    HPI:  Roma Corral is a 70 y.o. male with history of rectal cancer with mets to the liver, CAD (2021 NSTEMI), DM2, HTN  who is s/p robotic LAR with end colostomy, ilecocecetomy with Dr. Vasquez, and laparoscopic microwave liver ablation with Dr. Erazo on 3/17/25. Patient underwent R portal vein embo via splenic vein and R hepatic vein embo via R internal jugular with IR on 4/2/25.     Subjective:  NAEO. Patient received 1u pRBCs yesterday with downtrending Hgb. Having ostomy output. Right drain more serosanguinous compared to yesterday.     Objective  Temp:  [36.4 °C (97.5 °F)-37.6 °C (99.6 °F)] 37.6 °C (99.6 °F)  Heart Rate:  [] 90  Resp:  [16-18] 16  BP: ()/(61-72) 98/62  I/O last 2 completed shifts:  In: 2457.1 (36.9 mL/kg) [P.O.:40; Blood:352.5]  Out: 2900 (43.6 mL/kg) [Urine:1225 (0.8 mL/kg/hr); Drains:1625; Stool:50]  Weight: 66.5 kg     Drain output: 1280 (1025) cc  Urine output: 950 cc    Physical Exam:  Mild distress  Nontachycardic  On room air  Right neck bandage from IR access  Abdomen soft, mild tenderness to palpation, minimally distended, colostomy pink with some output in the bag, right sided drain in place with SSG fluid improved from yesterday    Labs within past 24h:            8.1   136  105  31   7.1>-----<116  ----------------<124             26.4   4.9  24  0.83          Ca 7.2 Phos 3.6 Mg 2.16       ALT 57  AlkPhos 891 tBili 0.8               ASSESSMENT  Roma Corral is a 70 y.o. male with history of rectal cancer with mets to the liver, CAD (2021 NSTEMI), DM2, HTN who is s/p robotic LAR with end colostomy, ilecocecetomy with Dr. Vasquez, and laparoscopic microwave liver ablation with Dr. Erazo on 3/17/25, postop course notable for persistent chyle leak.Patient underwent R portal vein embo via splenic vein and R hepatic vein embo via R internal jugular with IR on 4/2/25. Postop had chest pain with negative EKG and CXR. Downtrending Hgb  s/p 1u pRBCs on 4/3.      PLAN:  - Afternoon CBC, possible r/s SQH  - Patient to rest and recover  - Continue octreotide TID and midodrine  - Continue TPN  - Maintain drain to gravity drainage    Discussed with Dr. Kacey Johnson MD- PGY1  Surgical Oncology   [facility] Team Pager: [phone]

## 2025-04-04 NOTE — PROGRESS NOTES
"   04/04/25 1000   Discharge Planning   Living Arrangements Spouse/significant other   Support Systems Spouse/significant other   Type of Residence Private residence   Number of Stairs to Enter Residence 2   Number of Stairs Within Residence 10   Do you have animals or pets at home? Yes   Type of Animals or Pets one dog   Home or Post Acute Services Post acute facilities (Rehab/SNF/etc)   Type of Post Acute Facility Services Skilled nursing   Expected Discharge Disposition SNF   Does the patient need discharge transport arranged? Yes   RoundTrip coordination needed? Yes   Has discharge transport been arranged? No     [facility] able to accept pt.  [facility] is reviewing.  There is no bed available at [facility].  SW attempted to meet with pt to discuss.  Pt told SW \"not today\", told pt I would check in this afternoon.  KHUSHI Porras    [facility] does not accept TPN anymore.  KHUSHI Porras  "

## 2025-04-04 NOTE — PROGRESS NOTES
"    Colorectal Surgery Progress Note      HPI: Roma Corral is a 70 y.o. 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 Vasquez, and laparoscopic MWA x3 with liver biopsy by Dr. Erazo on 3/18/25 c/b post-operative lymphatic leak and high drain output s/p IR embolization x2 and NPO/TPN. Now s/p right portal vein embolization via splenic vein access and right hepatic vein embolization via right internal jugular vein access on 4/2/25.    Subjective    Patient is resting in bed this morning. Reports abdominal pain \"is there\" but somewhat better with pain medications. No fever, chills, shortness of breath, nausea/vomit. Drain output is getting lighter.     Objective  Physical Exam:  Gen: in no physical distress and alert, deconditioned and weak appearing, cachectic, sleeping in bed, looks more comfortable than before  HENT: Head normocephalic, atraumatic.  Mucous membranes moist.    Neuro: Alert and oriented x3.  Quiet and withdrawn appearing. Moves all extremities spontaneously x4.  CV: Normal rate, normotensive per chart review.  No BLE edema.  Resp: No acute respiratory distress.  Normal effort on room air. Chest expansion symmetrical.   GI: Abdomen is soft, nontender, and nondistended. Laparoscopic incisions are clean, dry and intact with glue.  Stoma is pink and healthy, well pouched, with gas and liquid stool in colostomy pouch. Abdominal drain to bile bag with serosanguinous output lighter than yesterday  Skin: Warm and dry, pale, without rashes or lesions.  Coccyx/sacral pressure wound with mepalex covering site.    Visit Vitals  BP 98/62   Pulse 90   Temp 37.6 °C (99.6 °F) " (Temporal)   Resp 16        I/O last 3 completed shifts:  In: 2457.1 (36.9 mL/kg) [P.O.:40; Blood:352.5]  Out: 3830 (57.6 mL/kg) [Urine:1725 (0.7 mL/kg/hr); Drains:2055; Stool:50]  Weight: 66.5 kg   No intake/output data recorded.         Data Review:            8.1     7.1>-----<116              26.4   136  105  31                  ----------------<124     4.9  24  0.83          Ca 7.2 Phos 3.6 Mg 2.16       ALT 57  AlkPhos 891 tBili 0.8         Imaging:  No new imaging to review.    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 Vasquez, and laparoscopic MWA x3 with liver biopsy by Dr. Erazo on 3/18/25. Post-op course c/b high pelvic drain output 2/2 persistent chyle leak s/p low fat diet & IR lymphoscintigraphy 3/25 & 3/28 with down trend in drainage (~700ml/day) after made NPO with TPN. Received 1 unit RBCs yesterday 4/2 for anemia and incremented appropriately. S/p IR portal/hepatic vein embolization 4/2 now with large volume, serosanguinous output from abdominal drain.    Plan:  -PM CBC, if stable, will resume lovenox  -CT liver per surg onc  -OOB during day/PT  -Continue NPO/cycled TPN & monitoring drain output  -Coccyx/sacral wound care    Neuro: Post-op pain controlled, history of anxiety  -Continue current pain regimen with robaxin,prn tylenol, prn dilaudid  -Continue home MS Contin, hold home ativan  -Sleep hygiene/OOB during day/PT  -Pet/music/art therapy  -Melatonin HS    CV: mild intermittent hypotension, new chest pain/sob this AM; hx of CAD (NSTEMI 2021), HTN, HLD  -Q4 vital signs  -Continuous tele/pulse ox monitoring  -EKG prn for ACS symptoms  -Continue home metoprolol with hold parameters  -Hold home ASA 81, lipitor in setting of elevated  LFTs/thrombocytopenia    Resp: new SOB on room air without increase in O2 requirement, no significant pulmonary history  -CXR ordered, follow up  -ICS 10x every hour  -OOB/ambulation    GI: s/p robotic LAR/loop to end colostomy, ileocecectomy & MWA c/b persistent lymph leak s/p IR embol x2 & s/p portal/hepatic vein embol 4/2 with dark serosang abd drain output; hx of rectal CA with mets to liver  -NPO, cycled TPN (running during day 9a-9p)  -Thiamine daily  -CT liver per surg onc; Octreotide & Midodrine TID for lymph leak; appreciate surg onc support & expertise  -PRN repletion for drain output  -Zofran prn for nausea  -continue assessment of stoma and output, stoma not new for patient.  WOCN for supplies & support. Continue to monitor drain output/replace fluid prn  -Daily CMP to monitor LFTs    : adequate urine output; no significant urological hx  -Continue flomax, needs to stand when voiding to completely empty.   - Strict intake and output, monitoring fluid & electrolyte balance closely in setting of high output drain  - Daily RFP/replace electrolytes as needed    Heme: H&H down trend s/p 1u RBCs 4/2, history of pancytopenia with platelets similar to baseline  -T&S sent 4/2  -PM CBC, if stable can resume lovenox    ID: afebrile, no leukocytosis  -Q4 temp  -monitor for s/s of infection    Endo: no acute issues, no significant endocrine history   -hypoglycemia protocol  -Q6 BG on TPN  -SSI #1 while on TPN    DVT Prophy: SCDs;  Lovenox (if PM CBC stable, plan to resume)    Dispo:   -Continue care on RNF  -Awaiting SNF acceptance with cycled TPN.     Discussed with attending Dr. Connell.     Missael Lyles MD  Colorectal Surgery  [facility] Service Pager #[phone]

## 2025-04-04 NOTE — SIGNIFICANT EVENT
Attempts to call Mr. Corral's wife Betsy at 14:00 & 15:08 to update on plan of care & plans for discharge to SNF tomorrow unsuccessful.  Mr. Corral did speak with wife earlier today &  Ivana updated wife on the phone as well.    Yuridia TORRES-CNP  Colorectal Surgery NP   [facility] [place] Service Pager #[phone]

## 2025-04-04 NOTE — CARE PLAN
The clinical goals for the shift include pt will remain hds throughout shift.    Problem: Safety - Adult  Goal: Free from fall injury  Outcome: Progressing     Problem: Discharge Planning  Goal: Discharge to home or other facility with appropriate resources  Outcome: Progressing     Problem: Chronic Conditions and Co-morbidities  Goal: Patient's chronic conditions and co-morbidity symptoms are monitored and maintained or improved  Outcome: Progressing     Problem: Fall/Injury  Goal: Not fall by end of shift  Outcome: Progressing  Goal: Be free from injury by end of the shift  Outcome: Progressing  Goal: Verbalize understanding of personal risk factors for fall in the hospital  Outcome: Progressing  Goal: Verbalize understanding of risk factor reduction measures to prevent injury from fall in the home  Outcome: Progressing  Goal: Use assistive devices by end of the shift  Outcome: Progressing  Goal: Pace activities to prevent fatigue by end of the shift  Outcome: Progressing     Problem: Skin  Goal: Decreased wound size/increased tissue granulation at next dressing change  Outcome: Progressing  Goal: Participates in plan/prevention/treatment measures  Outcome: Progressing  Goal: Prevent/manage excess moisture  Outcome: Progressing  Goal: Prevent/minimize sheer/friction injuries  Outcome: Progressing  Goal: Promote/optimize nutrition  Outcome: Progressing  Goal: Promote skin healing  Outcome: Progressing     Problem: Diabetes  Goal: Achieve decreasing blood glucose levels by end of shift  Outcome: Progressing  Goal: Increase stability of blood glucose readings by end of shift  Outcome: Progressing  Goal: Decrease in ketones present in urine by end of shift  Outcome: Progressing  Goal: Maintain electrolyte levels within acceptable range throughout shift  Outcome: Progressing  Goal: Maintain glucose levels >70mg/dl to <250mg/dl throughout shift  Outcome: Progressing  Goal: No changes in neurological exam by end of  shift  Outcome: Progressing  Goal: Learn about and adhere to nutrition recommendations by end of shift  Outcome: Progressing  Goal: Vital signs within normal range for age by end of shift  Outcome: Progressing  Goal: Increase self care and/or family involovement by end of shift  Outcome: Progressing  Goal: Receive DSME education by end of shift  Outcome: Progressing     Problem: Pain  Goal: Takes deep breaths with improved pain control throughout the shift  Outcome: Progressing  Goal: Turns in bed with improved pain control throughout the shift  Outcome: Progressing  Goal: Walks with improved pain control throughout the shift  Outcome: Progressing  Goal: Performs ADL's with improved pain control throughout shift  Outcome: Progressing  Goal: Participates in PT with improved pain control throughout the shift  Outcome: Progressing  Goal: Free from opioid side effects throughout the shift  Outcome: Progressing  Goal: Free from acute confusion related to pain meds throughout the shift  Outcome: Progressing

## 2025-04-05 LAB
ABO GROUP (TYPE) IN BLOOD: NORMAL
ALBUMIN SERPL BCP-MCNC: 1.9 G/DL (ref 3.4–5)
ALP SERPL-CCNC: 716 U/L (ref 33–136)
ALT SERPL W P-5'-P-CCNC: 54 U/L (ref 10–52)
ANION GAP SERPL CALC-SCNC: 9 MMOL/L (ref 10–20)
ANTIBODY SCREEN: NORMAL
AST SERPL W P-5'-P-CCNC: 114 U/L (ref 9–39)
BILIRUB SERPL-MCNC: 0.8 MG/DL (ref 0–1.2)
BLOOD EXPIRATION DATE: NORMAL
BLOOD EXPIRATION DATE: NORMAL
BUN SERPL-MCNC: 28 MG/DL (ref 6–23)
CALCIUM SERPL-MCNC: 6.8 MG/DL (ref 8.6–10.6)
CHLORIDE SERPL-SCNC: 105 MMOL/L (ref 98–107)
CO2 SERPL-SCNC: 25 MMOL/L (ref 21–32)
CREAT SERPL-MCNC: 0.89 MG/DL (ref 0.5–1.3)
DISPENSE STATUS: NORMAL
DISPENSE STATUS: NORMAL
EGFRCR SERPLBLD CKD-EPI 2021: >90 ML/MIN/1.73M*2
ERYTHROCYTE [DISTWIDTH] IN BLOOD BY AUTOMATED COUNT: 20.2 % (ref 11.5–14.5)
ERYTHROCYTE [DISTWIDTH] IN BLOOD BY AUTOMATED COUNT: 20.3 % (ref 11.5–14.5)
GLUCOSE BLD MANUAL STRIP-MCNC: 127 MG/DL (ref 74–99)
GLUCOSE BLD MANUAL STRIP-MCNC: 146 MG/DL (ref 74–99)
GLUCOSE BLD MANUAL STRIP-MCNC: 224 MG/DL (ref 74–99)
GLUCOSE BLD MANUAL STRIP-MCNC: 274 MG/DL (ref 74–99)
GLUCOSE SERPL-MCNC: 131 MG/DL (ref 74–99)
HCT VFR BLD AUTO: 23.6 % (ref 41–52)
HCT VFR BLD AUTO: 24.5 % (ref 41–52)
HGB BLD-MCNC: 7.6 G/DL (ref 13.5–17.5)
HGB BLD-MCNC: 7.9 G/DL (ref 13.5–17.5)
MAGNESIUM SERPL-MCNC: 2.2 MG/DL (ref 1.6–2.4)
MCH RBC QN AUTO: 28.9 PG (ref 26–34)
MCH RBC QN AUTO: 28.9 PG (ref 26–34)
MCHC RBC AUTO-ENTMCNC: 32.2 G/DL (ref 32–36)
MCHC RBC AUTO-ENTMCNC: 32.2 G/DL (ref 32–36)
MCV RBC AUTO: 90 FL (ref 80–100)
MCV RBC AUTO: 90 FL (ref 80–100)
NRBC BLD-RTO: 0 /100 WBCS (ref 0–0)
NRBC BLD-RTO: 0 /100 WBCS (ref 0–0)
PLATELET # BLD AUTO: 95 X10*3/UL (ref 150–450)
PLATELET # BLD AUTO: 97 X10*3/UL (ref 150–450)
POTASSIUM SERPL-SCNC: 4.5 MMOL/L (ref 3.5–5.3)
PRODUCT BLOOD TYPE: 1700
PRODUCT BLOOD TYPE: 1700
PRODUCT CODE: NORMAL
PRODUCT CODE: NORMAL
PROT SERPL-MCNC: 4.6 G/DL (ref 6.4–8.2)
RBC # BLD AUTO: 2.63 X10*6/UL (ref 4.5–5.9)
RBC # BLD AUTO: 2.73 X10*6/UL (ref 4.5–5.9)
RH FACTOR (ANTIGEN D): NORMAL
SODIUM SERPL-SCNC: 134 MMOL/L (ref 136–145)
UNIT ABO: NORMAL
UNIT ABO: NORMAL
UNIT NUMBER: NORMAL
UNIT NUMBER: NORMAL
UNIT RH: NORMAL
UNIT RH: NORMAL
UNIT VOLUME: 281
UNIT VOLUME: 350
WBC # BLD AUTO: 4.9 X10*3/UL (ref 4.4–11.3)
WBC # BLD AUTO: 4.9 X10*3/UL (ref 4.4–11.3)
XM INTEP: NORMAL
XM INTEP: NORMAL

## 2025-04-05 PROCEDURE — 2500000004 HC RX 250 GENERAL PHARMACY W/ HCPCS (ALT 636 FOR OP/ED)

## 2025-04-05 PROCEDURE — 2500000004 HC RX 250 GENERAL PHARMACY W/ HCPCS (ALT 636 FOR OP/ED): Performed by: STUDENT IN AN ORGANIZED HEALTH CARE EDUCATION/TRAINING PROGRAM

## 2025-04-05 PROCEDURE — 2500000002 HC RX 250 W HCPCS SELF ADMINISTERED DRUGS (ALT 637 FOR MEDICARE OP, ALT 636 FOR OP/ED): Performed by: STUDENT IN AN ORGANIZED HEALTH CARE EDUCATION/TRAINING PROGRAM

## 2025-04-05 PROCEDURE — 85027 COMPLETE CBC AUTOMATED: CPT

## 2025-04-05 PROCEDURE — P9045 ALBUMIN (HUMAN), 5%, 250 ML: HCPCS | Mod: JZ,TB

## 2025-04-05 PROCEDURE — 83735 ASSAY OF MAGNESIUM: CPT

## 2025-04-05 PROCEDURE — 80053 COMPREHEN METABOLIC PANEL: CPT

## 2025-04-05 PROCEDURE — 86901 BLOOD TYPING SEROLOGIC RH(D): CPT | Performed by: NURSE PRACTITIONER

## 2025-04-05 PROCEDURE — 2500000005 HC RX 250 GENERAL PHARMACY W/O HCPCS

## 2025-04-05 PROCEDURE — 2500000001 HC RX 250 WO HCPCS SELF ADMINISTERED DRUGS (ALT 637 FOR MEDICARE OP)

## 2025-04-05 PROCEDURE — 1170000001 HC PRIVATE ONCOLOGY ROOM DAILY

## 2025-04-05 PROCEDURE — 2500000001 HC RX 250 WO HCPCS SELF ADMINISTERED DRUGS (ALT 637 FOR MEDICARE OP): Performed by: STUDENT IN AN ORGANIZED HEALTH CARE EDUCATION/TRAINING PROGRAM

## 2025-04-05 PROCEDURE — 82947 ASSAY GLUCOSE BLOOD QUANT: CPT

## 2025-04-05 PROCEDURE — 2500000004 HC RX 250 GENERAL PHARMACY W/ HCPCS (ALT 636 FOR OP/ED): Performed by: NURSE PRACTITIONER

## 2025-04-05 PROCEDURE — 99024 POSTOP FOLLOW-UP VISIT: CPT | Performed by: STUDENT IN AN ORGANIZED HEALTH CARE EDUCATION/TRAINING PROGRAM

## 2025-04-05 PROCEDURE — 2500000004 HC RX 250 GENERAL PHARMACY W/ HCPCS (ALT 636 FOR OP/ED): Mod: JZ,TB

## 2025-04-05 RX ORDER — ALBUMIN HUMAN 50 G/1000ML
25 SOLUTION INTRAVENOUS ONCE
Status: COMPLETED | OUTPATIENT
Start: 2025-04-05 | End: 2025-04-05

## 2025-04-05 RX ADMIN — OCTREOTIDE ACETATE 200 MCG: 500 INJECTION, SOLUTION INTRAVENOUS; SUBCUTANEOUS at 14:50

## 2025-04-05 RX ADMIN — Medication 6 MG: at 20:42

## 2025-04-05 RX ADMIN — OCTREOTIDE ACETATE 200 MCG: 500 INJECTION, SOLUTION INTRAVENOUS; SUBCUTANEOUS at 09:45

## 2025-04-05 RX ADMIN — ENOXAPARIN SODIUM 40 MG: 100 INJECTION SUBCUTANEOUS at 14:51

## 2025-04-05 RX ADMIN — INSULIN LISPRO 2 UNITS: 100 INJECTION, SOLUTION INTRAVENOUS; SUBCUTANEOUS at 12:35

## 2025-04-05 RX ADMIN — ASCORBIC ACID, VITAMIN A PALMITATE, CHOLECALCIFEROL, THIAMINE HYDROCHLORIDE, RIBOFLAVIN-5 PHOSPHATE SODIUM, PYRIDOXINE HYDROCHLORIDE, NIACINAMIDE, DEXPANTHENOL, ALPHA-TOCOPHEROL ACETATE, VITAMIN K1, FOLIC ACID, BIOTIN, CYANOCOBALAMIN: 200; 3300; 200; 6; 3.6; 6; 40; 15; 10; 150; 600; 60; 5 INJECTION, SOLUTION INTRAVENOUS at 09:48

## 2025-04-05 RX ADMIN — MORPHINE SULFATE 30 MG: 30 TABLET, FILM COATED, EXTENDED RELEASE ORAL at 20:42

## 2025-04-05 RX ADMIN — ALBUMIN HUMAN 25 G: 0.05 INJECTION, SOLUTION INTRAVENOUS at 09:44

## 2025-04-05 RX ADMIN — MIDODRINE HYDROCHLORIDE 2.5 MG: 2.5 TABLET ORAL at 12:35

## 2025-04-05 RX ADMIN — OCTREOTIDE ACETATE 200 MCG: 500 INJECTION, SOLUTION INTRAVENOUS; SUBCUTANEOUS at 20:42

## 2025-04-05 RX ADMIN — THIAMINE HYDROCHLORIDE 100 MG: 100 INJECTION, SOLUTION INTRAMUSCULAR; INTRAVENOUS at 09:45

## 2025-04-05 RX ADMIN — NYSTATIN 400000 UNITS: 500000 SUSPENSION ORAL at 14:50

## 2025-04-05 RX ADMIN — PANTOPRAZOLE SODIUM 40 MG: 40 INJECTION, POWDER, FOR SOLUTION INTRAVENOUS at 09:45

## 2025-04-05 RX ADMIN — METHOCARBAMOL TABLETS 500 MG: 500 TABLET, COATED ORAL at 09:45

## 2025-04-05 RX ADMIN — NYSTATIN 400000 UNITS: 500000 SUSPENSION ORAL at 09:45

## 2025-04-05 RX ADMIN — MIDODRINE HYDROCHLORIDE 2.5 MG: 2.5 TABLET ORAL at 09:45

## 2025-04-05 RX ADMIN — TAMSULOSIN HYDROCHLORIDE 0.4 MG: 0.4 CAPSULE ORAL at 09:45

## 2025-04-05 RX ADMIN — METHOCARBAMOL TABLETS 500 MG: 500 TABLET, COATED ORAL at 20:42

## 2025-04-05 RX ADMIN — METHOCARBAMOL TABLETS 500 MG: 500 TABLET, COATED ORAL at 01:54

## 2025-04-05 RX ADMIN — INSULIN LISPRO 3 UNITS: 100 INJECTION, SOLUTION INTRAVENOUS; SUBCUTANEOUS at 18:28

## 2025-04-05 RX ADMIN — NYSTATIN 400000 UNITS: 500000 SUSPENSION ORAL at 20:42

## 2025-04-05 RX ADMIN — MORPHINE SULFATE 30 MG: 30 TABLET, FILM COATED, EXTENDED RELEASE ORAL at 09:45

## 2025-04-05 RX ADMIN — METOPROLOL TARTRATE 5 MG: 1 INJECTION, SOLUTION INTRAVENOUS at 14:50

## 2025-04-05 RX ADMIN — MIDODRINE HYDROCHLORIDE 2.5 MG: 2.5 TABLET ORAL at 18:27

## 2025-04-05 RX ADMIN — METHOCARBAMOL TABLETS 500 MG: 500 TABLET, COATED ORAL at 14:50

## 2025-04-05 ASSESSMENT — COGNITIVE AND FUNCTIONAL STATUS - GENERAL
MOVING FROM LYING ON BACK TO SITTING ON SIDE OF FLAT BED WITH BEDRAILS: A LITTLE
STANDING UP FROM CHAIR USING ARMS: A LITTLE
DAILY ACTIVITIY SCORE: 20
MOVING TO AND FROM BED TO CHAIR: A LITTLE
TOILETING: A LITTLE
DRESSING REGULAR UPPER BODY CLOTHING: A LITTLE
HELP NEEDED FOR BATHING: A LITTLE
DRESSING REGULAR UPPER BODY CLOTHING: A LITTLE
WALKING IN HOSPITAL ROOM: A LITTLE
TOILETING: A LITTLE
CLIMB 3 TO 5 STEPS WITH RAILING: A LITTLE
MOVING FROM LYING ON BACK TO SITTING ON SIDE OF FLAT BED WITH BEDRAILS: A LITTLE
HELP NEEDED FOR BATHING: A LITTLE
DRESSING REGULAR LOWER BODY CLOTHING: A LITTLE
STANDING UP FROM CHAIR USING ARMS: A LITTLE
CLIMB 3 TO 5 STEPS WITH RAILING: A LITTLE
MOBILITY SCORE: 18
TURNING FROM BACK TO SIDE WHILE IN FLAT BAD: A LITTLE
MOVING TO AND FROM BED TO CHAIR: A LITTLE
DRESSING REGULAR LOWER BODY CLOTHING: A LITTLE
MOBILITY SCORE: 18
TURNING FROM BACK TO SIDE WHILE IN FLAT BAD: A LITTLE
DAILY ACTIVITIY SCORE: 20
WALKING IN HOSPITAL ROOM: A LITTLE

## 2025-04-05 ASSESSMENT — PAIN SCALES - GENERAL
PAINLEVEL_OUTOF10: 5 - MODERATE PAIN
PAINLEVEL_OUTOF10: 0 - NO PAIN

## 2025-04-05 ASSESSMENT — PAIN - FUNCTIONAL ASSESSMENT
PAIN_FUNCTIONAL_ASSESSMENT: 0-10
PAIN_FUNCTIONAL_ASSESSMENT: 0-10

## 2025-04-05 ASSESSMENT — ACTIVITIES OF DAILY LIVING (ADL): LACK_OF_TRANSPORTATION: NO

## 2025-04-05 NOTE — PROGRESS NOTES
"    Colorectal Surgery Progress Note      HPI: Roma Corral is a 70 y.o. 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 Vasquez, and laparoscopic MWA x3 with liver biopsy by Dr. Erazo on 3/18/25 c/b post-operative lymphatic leak and high drain output s/p IR embolization x2 and NPO/TPN. Now s/p right portal vein embolization via splenic vein access and right hepatic vein embolization via right internal jugular vein access on 4/2/25.    Subjective  Received 1 u pRBCs yesterday for hbg of 6.6 from 8.1, reports feeling \"brighter\" this AM  Incremented appropriately to 7.6   Afebrile, VSS  Drain continues to be more sanguinous but ~1L output q24 for past 2 days  Minimal colostomy output     Objective  Physical Exam:  Gen: in no physical distress and alert, deconditioned and weak appearing, cachectic, sleeping in bed, looks more comfortable than before  HENT: Head normocephalic, atraumatic.  Mucous membranes moist.    Neuro: Moves all extremities spontaneously x4.  CV: Normal rate, normotensive per chart review.  No BLE edema.  Resp: No acute respiratory distress.  Normal effort on room air. Chest expansion symmetrical.   GI: Abdomen is soft, nontender, and nondistended. Laparoscopic incisions are clean, dry and intact with glue.  Stoma is pink and healthy, well pouched, without output. Abdominal drain to bile bag with serous   Skin: Warm and dry, pale, without rashes or lesions.  Coccyx/sacral pressure wound with mepalex covering site.    Visit Vitals  BP 90/54 (BP Location: Right arm, Patient Position: Lying)   Pulse 70   Temp 36.9 °C (98.4 °F) (Temporal)   Resp 18        I/O last 3 " completed shifts:  In: 2337.2 (35 mL/kg) [P.O.:118; Blood:245.8]  Out: 3175 (47.6 mL/kg) [Urine:1610 (0.7 mL/kg/hr); Drains:1565]  Weight: 66.7 kg   I/O this shift:  In: -   Out: 300 [Drains:300]         Data Review:            7.9     4.9>-----<95              24.5   134  105  28                  ----------------<131     4.5  25  0.89          Ca 6.8 Phos 3.6 Mg 2.20       ALT 54  AlkPhos 716 tBili 0.8         Imaging:  No new imaging to review.    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 Vasquez, and laparoscopic MWA x3 with liver biopsy by Dr. Erazo on 3/18/25. Post-op course c/b high pelvic drain output 2/2 persistent chyle leak s/p low fat diet & IR lymphoscintigraphy 3/25 & 3/28 with down trend in drainage (~700ml/day) after made NPO with TPN. Received 1 unit RBCs yesterday 4/2 for anemia and incremented appropriately. S/p IR portal/hepatic vein embolization 4/2 now with large volume, serosanguinous output from abdominal drain. Received additional unit RBCs 4/4 for anemia, again with appropriate incrementation thought drain output remains high at ~1L.     Plan:  -Resumed Lovenox given appropriate incrementation of hgb   -CT liver per surg onc  -OOB during day/PT  -Continue NPO/cycled TPN & monitoring drain output  -Coccyx/sacral wound care    Neuro: Post-op pain controlled, history of anxiety  -Continue current pain regimen with robaxin,prn tylenol, prn dilaudid  -Continue home MS Contin, hold home ativan  -Sleep hygiene/OOB during day/PT  -Pet/music/art therapy  -Melatonin HS    CV: mild intermittent hypotension, new chest pain/sob this AM; hx of CAD (NSTEMI 2021), HTN, HLD  -Q4 vital signs  -Continuous tele/pulse ox monitoring  -EKG prn for ACS  symptoms  -Continue home metoprolol with hold parameters  -Hold home ASA 81, lipitor in setting of elevated LFTs/thrombocytopenia    Resp: new SOB on room air without increase in O2 requirement, no significant pulmonary history  -CXR ordered, follow up  -ICS 10x every hour  -OOB/ambulation    GI: s/p robotic LAR/loop to end colostomy, ileocecectomy & MWA c/b persistent lymph leak s/p IR embol x2 & s/p portal/hepatic vein embol 4/2 with dark serosang abd drain output; hx of rectal CA with mets to liver  -NPO, cycled TPN (running during day 9a-9p), ok for sips and small snacks for comfort  -Thiamine daily  -CT liver per surg onc; Octreotide & Midodrine TID for lymph leak; appreciate surg onc support & expertise  -PRN repletion for drain output  -Zofran prn for nausea  -continue assessment of stoma and output, stoma not new for patient.  WOCN for supplies & support. Continue to monitor drain output/replace fluid prn  -Daily CMP to monitor LFTs    : adequate urine output; no significant urological hx  -Continue flomax, needs to stand when voiding to completely empty.   - Strict intake and output, monitoring fluid & electrolyte balance closely in setting of high output drain  - Daily RFP/replace electrolytes as needed    Heme: H&H down trend s/p 1u RBCs 4/2, history of pancytopenia with platelets similar to baseline  -T&S sent 4/2, 2u on hold after IR embolization with bloody drain output  -Daily CBC    ID: afebrile, no leukocytosis  -Q4 temp  -monitor for s/s of infection    Endo: no acute issues, no significant endocrine history   -hypoglycemia protocol  -Q6 BG on TPN  -SSI #1 while on TPN    DVT Prophy: SCDs;  Lovenox resumed    Dispo:   -Continue care on RNF  -Awaiting drain output stability and repeat CBC stability with possible discharge to SNF (accepted) in next 1-2 days    Seen with fellow Dr. Ballard and attending Dr. Kirk Rodrigeuz MD  Colorectal Surgery  [facility] [place] Service Pager #[phone]

## 2025-04-05 NOTE — CARE PLAN
The patient's goals for the shift include        Problem: Safety - Adult  Goal: Free from fall injury  Outcome: Progressing     Problem: Discharge Planning  Goal: Discharge to home or other facility with appropriate resources  Outcome: Progressing     Problem: Chronic Conditions and Co-morbidities  Goal: Patient's chronic conditions and co-morbidity symptoms are monitored and maintained or improved  Outcome: Progressing     Problem: Fall/Injury  Goal: Not fall by end of shift  Outcome: Progressing  Goal: Be free from injury by end of the shift  Outcome: Progressing  Goal: Verbalize understanding of personal risk factors for fall in the hospital  Outcome: Progressing  Goal: Verbalize understanding of risk factor reduction measures to prevent injury from fall in the home  Outcome: Progressing  Goal: Use assistive devices by end of the shift  Outcome: Progressing  Goal: Pace activities to prevent fatigue by end of the shift  Outcome: Progressing     Problem: Skin  Goal: Decreased wound size/increased tissue granulation at next dressing change  Outcome: Progressing  Goal: Participates in plan/prevention/treatment measures  Outcome: Progressing  Goal: Prevent/manage excess moisture  Outcome: Progressing  Goal: Prevent/minimize sheer/friction injuries  Outcome: Progressing  Goal: Promote/optimize nutrition  Outcome: Progressing  Goal: Promote skin healing  Outcome: Progressing     Problem: Pain  Goal: Takes deep breaths with improved pain control throughout the shift  Outcome: Progressing  Goal: Turns in bed with improved pain control throughout the shift  Outcome: Progressing  Goal: Walks with improved pain control throughout the shift  Outcome: Progressing  Goal: Performs ADL's with improved pain control throughout shift  Outcome: Progressing  Goal: Participates in PT with improved pain control throughout the shift  Outcome: Progressing  Goal: Free from opioid side effects throughout the shift  Outcome:  Progressing  Goal: Free from acute confusion related to pain meds throughout the shift  Outcome: Progressing

## 2025-04-05 NOTE — PROGRESS NOTES
04/05/25 0900   Discharge Planning   Living Arrangements Spouse/significant other   Support Systems Spouse/significant other   Assistance Needed ADLs   Type of Residence Private residence   Number of Stairs to Enter Residence 2   Number of Stairs Within Residence 10   Do you have animals or pets at home? Yes   Type of Animals or Pets one dog   Home or Post Acute Services Post acute facilities (Rehab/SNF/etc)   Type of Post Acute Facility Services Skilled nursing   Expected Discharge Disposition SNF  ([facility])   Does the patient need discharge transport arranged? Yes   RoundTrip coordination needed? Yes   Has discharge transport been arranged? No   What day is the transport expected? 04/06/25   What time is the transport expected? 1300   Financial Resource Strain   How hard is it for you to pay for the very basics like food, housing, medical care, and heating? Not hard   Housing Stability   In the last 12 months, was there a time when you were not able to pay the mortgage or rent on time? N   In the past 12 months, how many times have you moved where you were living? 0   At any time in the past 12 months, were you homeless or living in a shelter (including now)? N   Transportation Needs   In the past 12 months, has lack of transportation kept you from medical appointments or from getting medications? no   In the past 12 months, has lack of transportation kept you from meetings, work, or from getting things needed for daily living? No   Patient Choice   Provider Choice list and CMS website (https://medicare.gov/care-compare#search) for post-acute Quality and Resource Measure Data were provided and reviewed with: Family;Patient     Per care team, pt is not medically ready to discharge to [facility] today.  Care team requests that transport be set up for tomorrow, Sun 4/6.  Per facility, yesterday, liaison was checking with their pharmacy that pt's TPN was available.  SW asked  for an update this morning.  Once SNF confirms that everything is set for pt to admit tomorrow, SW will request transport for 1300 Sun 4/6.  SW will follow. Darrion Reed [facility] [place]    04/05/2025 1100  Per facility liaison, Keely, she did not hear back from facility pharmacy re pt TPN. Keely does not expect to hear a response until Monday but will notify via [employer]Port if/when she hears back this weekend.  Transport request on hold.   Care team notified via secure chat.  Transport put back in RoundTrip will call for Monday.  [facility] will follow. [facility]

## 2025-04-05 NOTE — CARE PLAN
Problem: Safety - Adult  Goal: Free from fall injury  Outcome: Progressing     Problem: Discharge Planning  Goal: Discharge to home or other facility with appropriate resources  Outcome: Progressing     Problem: Chronic Conditions and Co-morbidities  Goal: Patient's chronic conditions and co-morbidity symptoms are monitored and maintained or improved  Outcome: Progressing     Problem: Fall/Injury  Goal: Not fall by end of shift  Outcome: Progressing  Goal: Be free from injury by end of the shift  Outcome: Progressing  Goal: Verbalize understanding of personal risk factors for fall in the hospital  Outcome: Progressing  Goal: Verbalize understanding of risk factor reduction measures to prevent injury from fall in the home  Outcome: Progressing  Goal: Use assistive devices by end of the shift  Outcome: Progressing  Goal: Pace activities to prevent fatigue by end of the shift  Outcome: Progressing     Problem: Skin  Goal: Decreased wound size/increased tissue granulation at next dressing change  Outcome: Progressing  Flowsheets (Taken 4/4/2025 2145)  Decreased wound size/increased tissue granulation at next dressing change: Promote sleep for wound healing  Goal: Participates in plan/prevention/treatment measures  Outcome: Progressing  Flowsheets (Taken 4/4/2025 2145)  Participates in plan/prevention/treatment measures:   Elevate heels   Increase activity/out of bed for meals  Goal: Prevent/manage excess moisture  Outcome: Progressing  Flowsheets (Taken 4/4/2025 2145)  Prevent/manage excess moisture: Monitor for/manage infection if present  Goal: Prevent/minimize sheer/friction injuries  Outcome: Progressing  Flowsheets (Taken 4/4/2025 2145)  Prevent/minimize sheer/friction injuries: Use pull sheet  Goal: Promote/optimize nutrition  Outcome: Progressing  Flowsheets (Taken 4/4/2025 2145)  Promote/optimize nutrition: Monitor/record intake including meals  Goal: Promote skin healing  Outcome: Progressing  Flowsheets  (Taken 4/4/2025 2147)  Promote skin healing: Assess skin/pad under line(s)/device(s)     Problem: Diabetes  Goal: Achieve decreasing blood glucose levels by end of shift  Outcome: Progressing  Goal: Increase stability of blood glucose readings by end of shift  Outcome: Progressing  Goal: Decrease in ketones present in urine by end of shift  Outcome: Progressing  Goal: Maintain electrolyte levels within acceptable range throughout shift  Outcome: Progressing  Goal: Maintain glucose levels >70mg/dl to <250mg/dl throughout shift  Outcome: Progressing  Goal: No changes in neurological exam by end of shift  Outcome: Progressing  Goal: Learn about and adhere to nutrition recommendations by end of shift  Outcome: Progressing  Goal: Vital signs within normal range for age by end of shift  Outcome: Progressing  Goal: Increase self care and/or family involovement by end of shift  Outcome: Progressing  Goal: Receive DSME education by end of shift  Outcome: Progressing     Problem: Pain  Goal: Takes deep breaths with improved pain control throughout the shift  Outcome: Progressing  Goal: Turns in bed with improved pain control throughout the shift  Outcome: Progressing  Goal: Walks with improved pain control throughout the shift  Outcome: Progressing  Goal: Performs ADL's with improved pain control throughout shift  Outcome: Progressing  Goal: Participates in PT with improved pain control throughout the shift  Outcome: Progressing  Goal: Free from opioid side effects throughout the shift  Outcome: Progressing  Goal: Free from acute confusion related to pain meds throughout the shift  Outcome: Progressing

## 2025-04-05 NOTE — PROGRESS NOTES
Surgical Oncology Progress Note    HPI:  Roma Corral is a 70 y.o. male with history of rectal cancer with mets to the liver, CAD (2021 NSTEMI), DM2, HTN  who is s/p robotic LAR with end colostomy, ilecocecetomy with Dr. Vasquez, and laparoscopic microwave liver ablation with Dr. Erazo on 3/17/25. Patient underwent R portal vein embo via splenic vein and R hepatic vein embo via R internal jugular with IR on 4/2/25.     Subjective:  NAEO. Patient received 1u pRBCs yesterday for Hgb of 6.6 which incremented appropriately to 7.6. Minimal output in colostomy, drain is more serosanguinous than yesterday.     Objective  Temp:  [36.4 °C (97.6 °F)-37.4 °C (99.3 °F)] 36.9 °C (98.4 °F)  Heart Rate:  [67-78] 70  Resp:  [17-18] 18  BP: ()/(42-62) 90/54  I/O last 2 completed shifts:  In: 2337.2 (35 mL/kg) [P.O.:118; Blood:245.8]  Out: 2245 (33.7 mL/kg) [Urine:1110 (0.7 mL/kg/hr); Drains:1135]  Weight: 66.7 kg       Physical Exam:  Mild distress  Nontachycardic  On room air  Abdomen soft, mild tenderness to palpation, minimally distended, colostomy pink with no output in the bag, right sided drain in place with SSG fluid improved from previous    Labs within past 24h:            7.9   134  105  28   4.9>-----<95  ----------------<131             24.5   4.5  25  0.89          Ca 6.8 Phos 3.6 Mg 2.20       ALT 54  AlkPhos 716 tBili 0.8             ASSESSMENT  Roma Corral is a 70 y.o. male with history of rectal cancer with mets to the liver, CAD (2021 NSTEMI), DM2, HTN who is s/p robotic LAR with end colostomy, ilecocecetomy with Dr. Vasquez, and laparoscopic microwave liver ablation with Dr. Erazo on 3/17/25, postop course notable for persistent chyle leak.Patient underwent R portal vein embo via splenic vein and R hepatic vein embo via R internal jugular with IR on 4/2/25. Postop had chest pain with negative EKG and CXR. Downtrending Hgb s/p 1u pRBCs on 4/3 and another 1u pRBC on 4/4 with incrementing  appropriately.     PLAN:  - Patient to rest and recover  - Continue octreotide TID and midodrine  - Continue TPN  - Maintain drain to gravity drainage    Discussed with Dr. Kirk Johnson MD- PGY1  Surgical Oncology   [facility] Team Pager: [phone]

## 2025-04-06 VITALS
OXYGEN SATURATION: 98 % | WEIGHT: 147.49 LBS | DIASTOLIC BLOOD PRESSURE: 54 MMHG | RESPIRATION RATE: 16 BRPM | TEMPERATURE: 99.9 F | BODY MASS INDEX: 20.65 KG/M2 | HEIGHT: 71 IN | HEART RATE: 89 BPM | SYSTOLIC BLOOD PRESSURE: 93 MMHG

## 2025-04-06 LAB
ALBUMIN SERPL BCP-MCNC: 2.1 G/DL (ref 3.4–5)
ALP SERPL-CCNC: 608 U/L (ref 33–136)
ALT SERPL W P-5'-P-CCNC: 44 U/L (ref 10–52)
ANION GAP SERPL CALC-SCNC: 11 MMOL/L (ref 10–20)
AST SERPL W P-5'-P-CCNC: 70 U/L (ref 9–39)
BILIRUB SERPL-MCNC: 0.8 MG/DL (ref 0–1.2)
BUN SERPL-MCNC: 27 MG/DL (ref 6–23)
CALCIUM SERPL-MCNC: 7.1 MG/DL (ref 8.6–10.6)
CHLORIDE SERPL-SCNC: 104 MMOL/L (ref 98–107)
CO2 SERPL-SCNC: 25 MMOL/L (ref 21–32)
CREAT SERPL-MCNC: 0.75 MG/DL (ref 0.5–1.3)
EGFRCR SERPLBLD CKD-EPI 2021: >90 ML/MIN/1.73M*2
ERYTHROCYTE [DISTWIDTH] IN BLOOD BY AUTOMATED COUNT: 20.2 % (ref 11.5–14.5)
GLUCOSE BLD MANUAL STRIP-MCNC: 100 MG/DL (ref 74–99)
GLUCOSE BLD MANUAL STRIP-MCNC: 216 MG/DL (ref 74–99)
GLUCOSE BLD MANUAL STRIP-MCNC: 230 MG/DL (ref 74–99)
GLUCOSE BLD MANUAL STRIP-MCNC: 93 MG/DL (ref 74–99)
GLUCOSE SERPL-MCNC: 90 MG/DL (ref 74–99)
HCT VFR BLD AUTO: 24.8 % (ref 41–52)
HGB BLD-MCNC: 8 G/DL (ref 13.5–17.5)
MAGNESIUM SERPL-MCNC: 2.22 MG/DL (ref 1.6–2.4)
MCH RBC QN AUTO: 29 PG (ref 26–34)
MCHC RBC AUTO-ENTMCNC: 32.3 G/DL (ref 32–36)
MCV RBC AUTO: 90 FL (ref 80–100)
NRBC BLD-RTO: 0 /100 WBCS (ref 0–0)
PLATELET # BLD AUTO: 103 X10*3/UL (ref 150–450)
POTASSIUM SERPL-SCNC: 4.5 MMOL/L (ref 3.5–5.3)
PROT SERPL-MCNC: 4.8 G/DL (ref 6.4–8.2)
RBC # BLD AUTO: 2.76 X10*6/UL (ref 4.5–5.9)
SODIUM SERPL-SCNC: 135 MMOL/L (ref 136–145)
WBC # BLD AUTO: 4.4 X10*3/UL (ref 4.4–11.3)

## 2025-04-06 PROCEDURE — 2500000001 HC RX 250 WO HCPCS SELF ADMINISTERED DRUGS (ALT 637 FOR MEDICARE OP)

## 2025-04-06 PROCEDURE — 2500000005 HC RX 250 GENERAL PHARMACY W/O HCPCS

## 2025-04-06 PROCEDURE — 2500000002 HC RX 250 W HCPCS SELF ADMINISTERED DRUGS (ALT 637 FOR MEDICARE OP, ALT 636 FOR OP/ED): Performed by: STUDENT IN AN ORGANIZED HEALTH CARE EDUCATION/TRAINING PROGRAM

## 2025-04-06 PROCEDURE — 80053 COMPREHEN METABOLIC PANEL: CPT

## 2025-04-06 PROCEDURE — 2500000001 HC RX 250 WO HCPCS SELF ADMINISTERED DRUGS (ALT 637 FOR MEDICARE OP): Performed by: STUDENT IN AN ORGANIZED HEALTH CARE EDUCATION/TRAINING PROGRAM

## 2025-04-06 PROCEDURE — 83735 ASSAY OF MAGNESIUM: CPT

## 2025-04-06 PROCEDURE — 2500000004 HC RX 250 GENERAL PHARMACY W/ HCPCS (ALT 636 FOR OP/ED)

## 2025-04-06 PROCEDURE — 2500000004 HC RX 250 GENERAL PHARMACY W/ HCPCS (ALT 636 FOR OP/ED): Performed by: NURSE PRACTITIONER

## 2025-04-06 PROCEDURE — 1170000001 HC PRIVATE ONCOLOGY ROOM DAILY

## 2025-04-06 PROCEDURE — 82947 ASSAY GLUCOSE BLOOD QUANT: CPT

## 2025-04-06 PROCEDURE — 2500000004 HC RX 250 GENERAL PHARMACY W/ HCPCS (ALT 636 FOR OP/ED): Performed by: STUDENT IN AN ORGANIZED HEALTH CARE EDUCATION/TRAINING PROGRAM

## 2025-04-06 PROCEDURE — 2550000001 HC RX 255 CONTRASTS

## 2025-04-06 PROCEDURE — 85027 COMPLETE CBC AUTOMATED: CPT

## 2025-04-06 RX ORDER — LIDOCAINE 560 MG/1
1 PATCH PERCUTANEOUS; TOPICAL; TRANSDERMAL DAILY
Status: DISCONTINUED | OUTPATIENT
Start: 2025-04-06 | End: 2025-04-07 | Stop reason: HOSPADM

## 2025-04-06 RX ORDER — DIATRIZOATE MEGLUMINE AND DIATRIZOATE SODIUM 660; 100 MG/ML; MG/ML
30 SOLUTION ORAL; RECTAL ONCE
Status: COMPLETED | OUTPATIENT
Start: 2025-04-06 | End: 2025-04-06

## 2025-04-06 RX ADMIN — LIDOCAINE 4% 1 PATCH: 40 PATCH TOPICAL at 08:46

## 2025-04-06 RX ADMIN — THIAMINE HYDROCHLORIDE 100 MG: 100 INJECTION, SOLUTION INTRAMUSCULAR; INTRAVENOUS at 08:46

## 2025-04-06 RX ADMIN — ASCORBIC ACID, VITAMIN A PALMITATE, CHOLECALCIFEROL, THIAMINE HYDROCHLORIDE, RIBOFLAVIN-5 PHOSPHATE SODIUM, PYRIDOXINE HYDROCHLORIDE, NIACINAMIDE, DEXPANTHENOL, ALPHA-TOCOPHEROL ACETATE, VITAMIN K1, FOLIC ACID, BIOTIN, CYANOCOBALAMIN: 200; 3300; 200; 6; 3.6; 6; 40; 15; 10; 150; 600; 60; 5 INJECTION, SOLUTION INTRAVENOUS at 08:57

## 2025-04-06 RX ADMIN — Medication 6 MG: at 20:52

## 2025-04-06 RX ADMIN — METHOCARBAMOL TABLETS 500 MG: 500 TABLET, COATED ORAL at 09:50

## 2025-04-06 RX ADMIN — NYSTATIN 400000 UNITS: 500000 SUSPENSION ORAL at 08:57

## 2025-04-06 RX ADMIN — DIATRIZOATE MEGLUMINE AND DIATRIZOATE SODIUM 30 ML: 660; 100 LIQUID ORAL; RECTAL at 09:50

## 2025-04-06 RX ADMIN — METOPROLOL TARTRATE 5 MG: 1 INJECTION, SOLUTION INTRAVENOUS at 18:34

## 2025-04-06 RX ADMIN — NYSTATIN 400000 UNITS: 500000 SUSPENSION ORAL at 20:52

## 2025-04-06 RX ADMIN — ACETAMINOPHEN 650 MG: 325 TABLET, FILM COATED ORAL at 20:52

## 2025-04-06 RX ADMIN — OCTREOTIDE ACETATE 200 MCG: 500 INJECTION, SOLUTION INTRAVENOUS; SUBCUTANEOUS at 08:46

## 2025-04-06 RX ADMIN — OCTREOTIDE ACETATE 200 MCG: 500 INJECTION, SOLUTION INTRAVENOUS; SUBCUTANEOUS at 14:35

## 2025-04-06 RX ADMIN — METOPROLOL TARTRATE 5 MG: 1 INJECTION, SOLUTION INTRAVENOUS at 13:06

## 2025-04-06 RX ADMIN — ENOXAPARIN SODIUM 40 MG: 100 INJECTION SUBCUTANEOUS at 14:35

## 2025-04-06 RX ADMIN — INSULIN LISPRO 2 UNITS: 100 INJECTION, SOLUTION INTRAVENOUS; SUBCUTANEOUS at 13:13

## 2025-04-06 RX ADMIN — METHOCARBAMOL TABLETS 500 MG: 500 TABLET, COATED ORAL at 20:51

## 2025-04-06 RX ADMIN — MORPHINE SULFATE 30 MG: 30 TABLET, FILM COATED, EXTENDED RELEASE ORAL at 08:46

## 2025-04-06 RX ADMIN — TAMSULOSIN HYDROCHLORIDE 0.4 MG: 0.4 CAPSULE ORAL at 08:46

## 2025-04-06 RX ADMIN — MORPHINE SULFATE 30 MG: 30 TABLET, FILM COATED, EXTENDED RELEASE ORAL at 20:52

## 2025-04-06 RX ADMIN — MIDODRINE HYDROCHLORIDE 2.5 MG: 2.5 TABLET ORAL at 08:46

## 2025-04-06 RX ADMIN — METHOCARBAMOL TABLETS 500 MG: 500 TABLET, COATED ORAL at 14:35

## 2025-04-06 RX ADMIN — OCTREOTIDE ACETATE 200 MCG: 500 INJECTION, SOLUTION INTRAVENOUS; SUBCUTANEOUS at 20:52

## 2025-04-06 RX ADMIN — METHOCARBAMOL TABLETS 500 MG: 500 TABLET, COATED ORAL at 04:02

## 2025-04-06 RX ADMIN — INSULIN LISPRO 2 UNITS: 100 INJECTION, SOLUTION INTRAVENOUS; SUBCUTANEOUS at 18:34

## 2025-04-06 RX ADMIN — MIDODRINE HYDROCHLORIDE 2.5 MG: 2.5 TABLET ORAL at 18:34

## 2025-04-06 RX ADMIN — PANTOPRAZOLE SODIUM 40 MG: 40 INJECTION, POWDER, FOR SOLUTION INTRAVENOUS at 08:46

## 2025-04-06 RX ADMIN — MIDODRINE HYDROCHLORIDE 2.5 MG: 2.5 TABLET ORAL at 13:06

## 2025-04-06 RX ADMIN — NYSTATIN 400000 UNITS: 500000 SUSPENSION ORAL at 14:35

## 2025-04-06 ASSESSMENT — PAIN - FUNCTIONAL ASSESSMENT
PAIN_FUNCTIONAL_ASSESSMENT: 0-10

## 2025-04-06 ASSESSMENT — COGNITIVE AND FUNCTIONAL STATUS - GENERAL
DRESSING REGULAR UPPER BODY CLOTHING: A LITTLE
TURNING FROM BACK TO SIDE WHILE IN FLAT BAD: A LITTLE
DRESSING REGULAR UPPER BODY CLOTHING: A LITTLE
HELP NEEDED FOR BATHING: A LITTLE
STANDING UP FROM CHAIR USING ARMS: A LITTLE
DAILY ACTIVITIY SCORE: 20
TOILETING: A LITTLE
DRESSING REGULAR LOWER BODY CLOTHING: A LITTLE
MOBILITY SCORE: 19
WALKING IN HOSPITAL ROOM: A LITTLE
MOVING TO AND FROM BED TO CHAIR: A LITTLE
MOVING TO AND FROM BED TO CHAIR: A LITTLE
STANDING UP FROM CHAIR USING ARMS: A LITTLE
CLIMB 3 TO 5 STEPS WITH RAILING: A LITTLE
MOBILITY SCORE: 19
DAILY ACTIVITIY SCORE: 20
WALKING IN HOSPITAL ROOM: A LITTLE
CLIMB 3 TO 5 STEPS WITH RAILING: A LITTLE
HELP NEEDED FOR BATHING: A LITTLE
DRESSING REGULAR LOWER BODY CLOTHING: A LITTLE
TURNING FROM BACK TO SIDE WHILE IN FLAT BAD: A LITTLE
TOILETING: A LITTLE

## 2025-04-06 ASSESSMENT — PAIN SCALES - GENERAL
PAINLEVEL_OUTOF10: 0 - NO PAIN
PAINLEVEL_OUTOF10: 6
PAINLEVEL_OUTOF10: 5 - MODERATE PAIN
PAINLEVEL_OUTOF10: 5 - MODERATE PAIN

## 2025-04-06 NOTE — CARE PLAN
Problem: Safety - Adult  Goal: Free from fall injury  Outcome: Progressing     Problem: Discharge Planning  Goal: Discharge to home or other facility with appropriate resources  Outcome: Progressing     Problem: Chronic Conditions and Co-morbidities  Goal: Patient's chronic conditions and co-morbidity symptoms are monitored and maintained or improved  Outcome: Progressing     Problem: Fall/Injury  Goal: Not fall by end of shift  Outcome: Progressing  Goal: Be free from injury by end of the shift  Outcome: Progressing  Goal: Verbalize understanding of personal risk factors for fall in the hospital  Outcome: Progressing  Goal: Verbalize understanding of risk factor reduction measures to prevent injury from fall in the home  Outcome: Progressing  Goal: Use assistive devices by end of the shift  Outcome: Progressing  Goal: Pace activities to prevent fatigue by end of the shift  Outcome: Progressing     Problem: Skin  Goal: Decreased wound size/increased tissue granulation at next dressing change  Outcome: Progressing  Flowsheets (Taken 4/5/2025 2016)  Decreased wound size/increased tissue granulation at next dressing change: Promote sleep for wound healing  Goal: Participates in plan/prevention/treatment measures  Outcome: Progressing  Flowsheets (Taken 4/5/2025 2016)  Participates in plan/prevention/treatment measures:   Increase activity/out of bed for meals   Elevate heels  Goal: Prevent/manage excess moisture  Outcome: Progressing  Flowsheets (Taken 4/5/2025 2016)  Prevent/manage excess moisture: Monitor for/manage infection if present  Goal: Prevent/minimize sheer/friction injuries  Outcome: Progressing  Flowsheets (Taken 4/5/2025 2016)  Prevent/minimize sheer/friction injuries: Use pull sheet  Goal: Promote/optimize nutrition  Outcome: Progressing  Flowsheets (Taken 4/5/2025 2016)  Promote/optimize nutrition: Monitor/record intake including meals  Goal: Promote skin healing  Outcome: Progressing  Flowsheets  (Taken 4/5/2025 2016)  Promote skin healing: Assess skin/pad under line(s)/device(s)     Problem: Diabetes  Goal: Achieve decreasing blood glucose levels by end of shift  Outcome: Progressing  Goal: Increase stability of blood glucose readings by end of shift  Outcome: Progressing  Goal: Decrease in ketones present in urine by end of shift  Outcome: Progressing  Goal: Maintain electrolyte levels within acceptable range throughout shift  Outcome: Progressing  Goal: Maintain glucose levels >70mg/dl to <250mg/dl throughout shift  Outcome: Progressing  Goal: No changes in neurological exam by end of shift  Outcome: Progressing  Goal: Learn about and adhere to nutrition recommendations by end of shift  Outcome: Progressing  Goal: Vital signs within normal range for age by end of shift  Outcome: Progressing  Goal: Increase self care and/or family involovement by end of shift  Outcome: Progressing  Goal: Receive DSME education by end of shift  Outcome: Progressing     Problem: Pain  Goal: Takes deep breaths with improved pain control throughout the shift  Outcome: Progressing  Goal: Turns in bed with improved pain control throughout the shift  Outcome: Progressing  Goal: Walks with improved pain control throughout the shift  Outcome: Progressing  Goal: Performs ADL's with improved pain control throughout shift  Outcome: Progressing  Goal: Participates in PT with improved pain control throughout the shift  Outcome: Progressing  Goal: Free from opioid side effects throughout the shift  Outcome: Progressing  Goal: Free from acute confusion related to pain meds throughout the shift  Outcome: Progressing

## 2025-04-06 NOTE — PROGRESS NOTES
Surgical Oncology Progress Note      04/06/25      Subjective:  NAEO. No output in ostomy. Drain serous output 1425. Reports some mild abdominal pain    Review of Systems:    A 12-point review of systems was performed, and was negative except as above.       Objective    Objective:  Vital signs:   Temp:  [36.5 °C (97.7 °F)-37 °C (98.6 °F)] 36.5 °C (97.7 °F)  Heart Rate:  [66-72] 69  Resp:  [16] 16  BP: ()/(52-65) 93/58    Physical Exam:  GEN: No acute distress. Alert, awake and conversant.  HEENT: Sclera anicteric. Moist mucous membranes.  RESP: Breathing non-labored, equal chest rise.   CV: rrr  GI: Abdomen soft, nondistended,  mildly tender to palpation  MSK: No gross deformities. Moves all extremities spontaneously.  NEURO: Alert and oriented x3. No focal deficits.  PSYCH: Appropriate mood and affect.      I/O last 2 completed shifts:  In: 360 (5.4 mL/kg) [P.O.:360]  Out: 2500 (37.4 mL/kg) [Urine:1075 (0.7 mL/kg/hr); Drains:1425]  Weight: 66.9 kg      Labs Past 18 Hours:  Recent Results (from the past 18 hours)   POCT GLUCOSE    Collection Time: 04/05/25  6:26 PM   Result Value Ref Range    POCT Glucose 274 (H) 74 - 99 mg/dL   POCT GLUCOSE    Collection Time: 04/06/25  1:04 AM   Result Value Ref Range    POCT Glucose 100 (H) 74 - 99 mg/dL   Comprehensive metabolic panel    Collection Time: 04/06/25  3:58 AM   Result Value Ref Range    Glucose 90 74 - 99 mg/dL    Sodium 135 (L) 136 - 145 mmol/L    Potassium 4.5 3.5 - 5.3 mmol/L    Chloride 104 98 - 107 mmol/L    Bicarbonate 25 21 - 32 mmol/L    Anion Gap 11 10 - 20 mmol/L    Urea Nitrogen 27 (H) 6 - 23 mg/dL    Creatinine 0.75 0.50 - 1.30 mg/dL    eGFR >90 >60 mL/min/1.73m*2    Calcium 7.1 (L) 8.6 - 10.6 mg/dL    Albumin 2.1 (L) 3.4 - 5.0 g/dL    Alkaline Phosphatase 608 (H) 33 - 136 U/L    Total Protein 4.8 (L) 6.4 - 8.2 g/dL    AST 70 (H) 9 - 39 U/L    Bilirubin, Total 0.8 0.0 - 1.2 mg/dL    ALT 44 10 - 52 U/L   Magnesium    Collection Time: 04/06/25   3:58 AM   Result Value Ref Range    Magnesium 2.22 1.60 - 2.40 mg/dL   CBC    Collection Time: 04/06/25  3:58 AM   Result Value Ref Range    WBC 4.4 4.4 - 11.3 x10*3/uL    nRBC 0.0 0.0 - 0.0 /100 WBCs    RBC 2.76 (L) 4.50 - 5.90 x10*6/uL    Hemoglobin 8.0 (L) 13.5 - 17.5 g/dL    Hematocrit 24.8 (L) 41.0 - 52.0 %    MCV 90 80 - 100 fL    MCH 29.0 26.0 - 34.0 pg    MCHC 32.3 32.0 - 36.0 g/dL    RDW 20.2 (H) 11.5 - 14.5 %    Platelets 103 (L) 150 - 450 x10*3/uL   POCT GLUCOSE    Collection Time: 04/06/25  6:36 AM   Result Value Ref Range    POCT Glucose 93 74 - 99 mg/dL            Assessment/Plan    Roma Corral is a 70 y.o. male with history of rectal cancer with mets to the liver, CAD (2021 NSTEMI), DM2, HTN who is s/p robotic LAR with end colostomy, ilecocecetomy with Dr. Vasquez, and laparoscopic microwave liver ablation with Dr. Erazo on 3/17/25, postop course notable for persistent chyle leak.Patient underwent R portal vein embo via splenic vein and R hepatic vein embo via R internal jugular with IR on 4/2/25. Postop had chest pain with negative EKG and CXR. Downtrending Hgb s/p 1u pRBCs on 4/3 and another 1u pRBC on 4/4 with incrementing appropriately.      PLAN:  - give diet  - cont octreotide  - will assess drain tomorrow after he tolerates a diet  - cont drain to gravity     D/w Dr. Choiehn    Surg onc [id number]

## 2025-04-06 NOTE — PROGRESS NOTES
Colorectal Surgery Progress Note      HPI: Roma Corral is a 70 y.o. 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 Vasquez, and laparoscopic MWA x3 with liver biopsy by Dr. Erazo on 3/18/25 c/b post-operative lymphatic leak and high drain output s/p IR embolization x2 and NPO/TPN. Now s/p right portal vein embolization via splenic vein access and right hepatic vein embolization via right internal jugular vein access on 4/2/25.    Subjective  Afebrile, VSS, NAEON  Had increased abdominal pain around stoma but resolved spontaneously  Drain continues to be more sanguinous with 1.5L in past 24 hours  No colostomy output or gas in last ~36hours  Patient reports feeling hungry but no nausea/vomiting/fevers or chills    Objective  Physical Exam:  Gen: in no physical distress and alert, deconditioned and weak appearing, cachectic, sleeping in bed, looks more comfortable than prior  HENT: Head normocephalic, atraumatic.  Mucous membranes moist.    Neuro: Moves all extremities spontaneously x4.  CV: Normal rate, normotensive per chart review.  No BLE edema.  Resp: No acute respiratory distress.  Normal effort on room air. Chest expansion symmetrical.   GI: Abdomen is soft, nontender, and nondistended. Laparoscopic incisions are clean, dry and intact with glue.  Stoma is pink and healthy, well pouched, without output or gas. Abdominal drain to bile bag with serous fluid  Skin: Warm and dry, pale, without rashes or lesions.     Visit Vitals  /60 (BP Location: Right arm, Patient Position: Lying)   Pulse 66   Temp 36.7 °C (98.1 °F) (Temporal)   Resp 16        I/O last 3 completed  shifts:  In: 1183 (17.7 mL/kg) [P.O.:420; Blood:245.8]  Out: 3310 (49.5 mL/kg) [Urine:1435 (0.6 mL/kg/hr); Drains:1875]  Weight: 66.9 kg   No intake/output data recorded.         Data Review:            8.0     4.4>-----<103              24.8   135  104  27                  ----------------<90     4.5  25  0.75          Ca 7.1 Phos 3.6 Mg 2.22       ALT 44 AST 70 AlkPhos 608 tBili 0.8         Imaging:  No new imaging to review.    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 Vasquez, and laparoscopic MWA x3 with liver biopsy by Dr. Erazo on 3/18/25. Post-op course c/b high pelvic drain output 2/2 persistent chyle leak s/p low fat diet & IR lymphoscintigraphy 3/25 & 3/28 with down trend in drainage (~700ml/day) after made NPO with TPN. Received 1 unit RBCs yesterday 4/2 for anemia and incremented appropriately. S/p IR portal/hepatic vein embolization 4/2 now with large volume, serosanguinous output from abdominal drain. Received additional unit RBCs 4/4 for anemia, again with appropriate incrementation thought drain output remains high at >1L per 24 hours for past 2-3 days.     Plan:  -Resumed Lovenox given appropriate incrementation of hgb   -Will consider discontinuing octreotide given lack of colostomy output  -Will discuss advancement of diet for comfort with surgical oncology given apparent resolution of lymph leak  -CT liver per surg onc  -OOB during day/PT  -Continue NPO/cycled TPN & monitoring drain output  -Coccyx/sacral wound care    Neuro: Post-op pain controlled, history of anxiety  -Continue current pain regimen with robaxin,prn tylenol, prn dilaudid  -Continue home MS Contin, hold home ativan  -Sleep hygiene/OOB during day/PT  -Pet/music/art therapy  -Melatonin  HS    CV: mild intermittent hypotension, new chest pain/sob this AM; hx of CAD (NSTEMI 2021), HTN, HLD  -Q4 vital signs  -Continuous tele/pulse ox monitoring  -EKG prn for ACS symptoms  -Continue home metoprolol with hold parameters  -Hold home ASA 81, lipitor in setting of elevated LFTs/thrombocytopenia    Resp: new SOB on room air without increase in O2 requirement, no significant pulmonary history  -CXR ordered, follow up  -ICS 10x every hour  -OOB/ambulation    GI: s/p robotic LAR/loop to end colostomy, ileocecectomy & MWA c/b persistent lymph leak s/p IR embol x2 & s/p portal/hepatic vein embol 4/2 with dark serosang abd drain output; hx of rectal CA with mets to liver  -NPO, cycled TPN (running during day 9a-9p), ok for sips and small snacks for comfort  -Thiamine daily  -CT liver per surg onc; Octreotide & Midodrine TID for lymph leak; appreciate surg onc support & expertise  -PRN repletion for drain output  -Zofran prn for nausea  -continue assessment of stoma and output, stoma not new for patient.  WOCN for supplies & support. Continue to monitor drain output/replace fluid prn  -Daily CMP to monitor LFTs    : adequate urine output; no significant urological hx  -Continue flomax, needs to stand when voiding to completely empty.   - Strict intake and output, monitoring fluid & electrolyte balance closely in setting of high output drain  - Daily RFP/replace electrolytes as needed    Heme: H&H down trend s/p 1u RBCs 4/2-->now stable, history of pancytopenia with platelets similar to baseline  -T&S sent 4/2, 2u on hold after IR embolization with bloody drain output  -Daily CBC    ID: afebrile, no leukocytosis  -Q4 temp  -monitor for s/s of infection    Endo: no acute issues, no significant endocrine history   -hypoglycemia protocol  -Q6 BG on TPN  -SSI #1 while on TPN    DVT Prophy: SCDs;  Lovenox resumed    Dispo:   -Continue care on RNF  -Awaiting drain output stability with discharge to SNF (accepted) in  next 1-2 days    Seen with fellow Dr. Ballard and attending Dr. Kirk Rodriguez MD  Colorectal Surgery  [facility] Service Pager #[phone]

## 2025-04-06 NOTE — SIGNIFICANT EVENT
POINT OF CARE NOTE:    Informed patient had significant abdominal pain, preventing him from sleeping. Seen at bedside, VS normal, sleeping without issue. Patient reporting pain near ostomy site that worsens when he moves, fine currently. Ostomy site examined, no gas or enteric contents. Drain with strictly serous fluid. Abdomen soft, some TTP near ostomy site, nondistended. Patient with limited ostomy output, avoiding narcotics. Ordering lidocaine patch and will CTM.     Donell Dugan MD  General Surgery: PGY-1  [facility] Service Pager #[phone]

## 2025-04-06 NOTE — CARE PLAN
The patient's goals for the shift include        Problem: Safety - Adult  Goal: Free from fall injury  Outcome: Progressing     Problem: Discharge Planning  Goal: Discharge to home or other facility with appropriate resources  Outcome: Progressing     Problem: Fall/Injury  Goal: Not fall by end of shift  Outcome: Progressing  Goal: Be free from injury by end of the shift  Outcome: Progressing  Goal: Verbalize understanding of personal risk factors for fall in the hospital  Outcome: Progressing  Goal: Verbalize understanding of risk factor reduction measures to prevent injury from fall in the home  Outcome: Progressing  Goal: Use assistive devices by end of the shift  Outcome: Progressing  Goal: Pace activities to prevent fatigue by end of the shift  Outcome: Progressing     Problem: Diabetes  Goal: Achieve decreasing blood glucose levels by end of shift  Outcome: Progressing  Goal: Increase stability of blood glucose readings by end of shift  Outcome: Progressing  Goal: Decrease in ketones present in urine by end of shift  Outcome: Progressing  Goal: Maintain electrolyte levels within acceptable range throughout shift  Outcome: Progressing  Goal: Maintain glucose levels >70mg/dl to <250mg/dl throughout shift  Outcome: Progressing  Goal: No changes in neurological exam by end of shift  Outcome: Progressing  Goal: Learn about and adhere to nutrition recommendations by end of shift  Outcome: Progressing  Goal: Vital signs within normal range for age by end of shift  Outcome: Progressing  Goal: Increase self care and/or family involovement by end of shift  Outcome: Progressing  Goal: Receive DSME education by end of shift  Outcome: Progressing

## 2025-04-07 VITALS
HEIGHT: 71 IN | DIASTOLIC BLOOD PRESSURE: 55 MMHG | SYSTOLIC BLOOD PRESSURE: 100 MMHG | RESPIRATION RATE: 17 BRPM | BODY MASS INDEX: 20.65 KG/M2 | HEART RATE: 105 BPM | OXYGEN SATURATION: 100 % | WEIGHT: 147.49 LBS | TEMPERATURE: 99 F

## 2025-04-07 LAB
ALBUMIN SERPL BCP-MCNC: 1.9 G/DL (ref 3.4–5)
ALP SERPL-CCNC: 716 U/L (ref 33–136)
ALT SERPL W P-5'-P-CCNC: 45 U/L (ref 10–52)
ANION GAP SERPL CALC-SCNC: 10 MMOL/L (ref 10–20)
AST SERPL W P-5'-P-CCNC: 92 U/L (ref 9–39)
BILIRUB SERPL-MCNC: 1 MG/DL (ref 0–1.2)
BUN SERPL-MCNC: 27 MG/DL (ref 6–23)
CALCIUM SERPL-MCNC: 7 MG/DL (ref 8.6–10.6)
CHLORIDE SERPL-SCNC: 105 MMOL/L (ref 98–107)
CO2 SERPL-SCNC: 25 MMOL/L (ref 21–32)
CREAT SERPL-MCNC: 0.89 MG/DL (ref 0.5–1.3)
EGFRCR SERPLBLD CKD-EPI 2021: >90 ML/MIN/1.73M*2
ERYTHROCYTE [DISTWIDTH] IN BLOOD BY AUTOMATED COUNT: 20.1 % (ref 11.5–14.5)
GLUCOSE BLD MANUAL STRIP-MCNC: 114 MG/DL (ref 74–99)
GLUCOSE BLD MANUAL STRIP-MCNC: 120 MG/DL (ref 74–99)
GLUCOSE BLD MANUAL STRIP-MCNC: 205 MG/DL (ref 74–99)
GLUCOSE SERPL-MCNC: 115 MG/DL (ref 74–99)
HCT VFR BLD AUTO: 25.7 % (ref 41–52)
HGB BLD-MCNC: 8 G/DL (ref 13.5–17.5)
MAGNESIUM SERPL-MCNC: 2.34 MG/DL (ref 1.6–2.4)
MCH RBC QN AUTO: 28.5 PG (ref 26–34)
MCHC RBC AUTO-ENTMCNC: 31.1 G/DL (ref 32–36)
MCV RBC AUTO: 92 FL (ref 80–100)
NRBC BLD-RTO: 0 /100 WBCS (ref 0–0)
PLATELET # BLD AUTO: 110 X10*3/UL (ref 150–450)
POTASSIUM SERPL-SCNC: 5 MMOL/L (ref 3.5–5.3)
PROT SERPL-MCNC: 4.9 G/DL (ref 6.4–8.2)
RBC # BLD AUTO: 2.81 X10*6/UL (ref 4.5–5.9)
SODIUM SERPL-SCNC: 135 MMOL/L (ref 136–145)
TRIGL FLD-MCNC: 18 MG/DL
WBC # BLD AUTO: 4 X10*3/UL (ref 4.4–11.3)

## 2025-04-07 PROCEDURE — 97535 SELF CARE MNGMENT TRAINING: CPT | Mod: GO

## 2025-04-07 PROCEDURE — 80053 COMPREHEN METABOLIC PANEL: CPT

## 2025-04-07 PROCEDURE — 97530 THERAPEUTIC ACTIVITIES: CPT | Mod: GP

## 2025-04-07 PROCEDURE — 2500000004 HC RX 250 GENERAL PHARMACY W/ HCPCS (ALT 636 FOR OP/ED): Performed by: STUDENT IN AN ORGANIZED HEALTH CARE EDUCATION/TRAINING PROGRAM

## 2025-04-07 PROCEDURE — 84478 ASSAY OF TRIGLYCERIDES: CPT | Performed by: NURSE PRACTITIONER

## 2025-04-07 PROCEDURE — 85027 COMPLETE CBC AUTOMATED: CPT

## 2025-04-07 PROCEDURE — 2500000004 HC RX 250 GENERAL PHARMACY W/ HCPCS (ALT 636 FOR OP/ED): Performed by: NURSE PRACTITIONER

## 2025-04-07 PROCEDURE — 82947 ASSAY GLUCOSE BLOOD QUANT: CPT

## 2025-04-07 PROCEDURE — 2500000005 HC RX 250 GENERAL PHARMACY W/O HCPCS

## 2025-04-07 PROCEDURE — 2500000002 HC RX 250 W HCPCS SELF ADMINISTERED DRUGS (ALT 637 FOR MEDICARE OP, ALT 636 FOR OP/ED): Performed by: STUDENT IN AN ORGANIZED HEALTH CARE EDUCATION/TRAINING PROGRAM

## 2025-04-07 PROCEDURE — 2500000004 HC RX 250 GENERAL PHARMACY W/ HCPCS (ALT 636 FOR OP/ED)

## 2025-04-07 PROCEDURE — 83735 ASSAY OF MAGNESIUM: CPT

## 2025-04-07 PROCEDURE — 2500000001 HC RX 250 WO HCPCS SELF ADMINISTERED DRUGS (ALT 637 FOR MEDICARE OP): Performed by: STUDENT IN AN ORGANIZED HEALTH CARE EDUCATION/TRAINING PROGRAM

## 2025-04-07 PROCEDURE — 2500000001 HC RX 250 WO HCPCS SELF ADMINISTERED DRUGS (ALT 637 FOR MEDICARE OP)

## 2025-04-07 RX ADMIN — LIDOCAINE 4% 1 PATCH: 40 PATCH TOPICAL at 08:10

## 2025-04-07 RX ADMIN — ENOXAPARIN SODIUM 40 MG: 100 INJECTION SUBCUTANEOUS at 13:24

## 2025-04-07 RX ADMIN — TAMSULOSIN HYDROCHLORIDE 0.4 MG: 0.4 CAPSULE ORAL at 08:10

## 2025-04-07 RX ADMIN — MORPHINE SULFATE 30 MG: 30 TABLET, FILM COATED, EXTENDED RELEASE ORAL at 08:10

## 2025-04-07 RX ADMIN — METHOCARBAMOL TABLETS 500 MG: 500 TABLET, COATED ORAL at 02:31

## 2025-04-07 RX ADMIN — OCTREOTIDE ACETATE 200 MCG: 500 INJECTION, SOLUTION INTRAVENOUS; SUBCUTANEOUS at 16:13

## 2025-04-07 RX ADMIN — MIDODRINE HYDROCHLORIDE 2.5 MG: 2.5 TABLET ORAL at 13:24

## 2025-04-07 RX ADMIN — METOPROLOL TARTRATE 5 MG: 1 INJECTION, SOLUTION INTRAVENOUS at 13:27

## 2025-04-07 RX ADMIN — INSULIN LISPRO 2 UNITS: 100 INJECTION, SOLUTION INTRAVENOUS; SUBCUTANEOUS at 13:34

## 2025-04-07 RX ADMIN — THIAMINE HYDROCHLORIDE 100 MG: 100 INJECTION, SOLUTION INTRAMUSCULAR; INTRAVENOUS at 08:11

## 2025-04-07 RX ADMIN — MIDODRINE HYDROCHLORIDE 2.5 MG: 2.5 TABLET ORAL at 16:12

## 2025-04-07 RX ADMIN — OCTREOTIDE ACETATE 200 MCG: 500 INJECTION, SOLUTION INTRAVENOUS; SUBCUTANEOUS at 08:28

## 2025-04-07 RX ADMIN — METHOCARBAMOL TABLETS 500 MG: 500 TABLET, COATED ORAL at 08:10

## 2025-04-07 RX ADMIN — MIDODRINE HYDROCHLORIDE 2.5 MG: 2.5 TABLET ORAL at 08:10

## 2025-04-07 RX ADMIN — ASCORBIC ACID, VITAMIN A PALMITATE, CHOLECALCIFEROL, THIAMINE HYDROCHLORIDE, RIBOFLAVIN-5 PHOSPHATE SODIUM, PYRIDOXINE HYDROCHLORIDE, NIACINAMIDE, DEXPANTHENOL, ALPHA-TOCOPHEROL ACETATE, VITAMIN K1, FOLIC ACID, BIOTIN, CYANOCOBALAMIN: 200; 3300; 200; 6; 3.6; 6; 40; 15; 10; 150; 600; 60; 5 INJECTION, SOLUTION INTRAVENOUS at 08:09

## 2025-04-07 RX ADMIN — METHOCARBAMOL TABLETS 500 MG: 500 TABLET, COATED ORAL at 13:25

## 2025-04-07 RX ADMIN — PANTOPRAZOLE SODIUM 40 MG: 40 INJECTION, POWDER, FOR SOLUTION INTRAVENOUS at 08:11

## 2025-04-07 RX ADMIN — POLYETHYLENE GLYCOL 3350 17 G: 17 POWDER, FOR SOLUTION ORAL at 08:10

## 2025-04-07 ASSESSMENT — COGNITIVE AND FUNCTIONAL STATUS - GENERAL
DRESSING REGULAR LOWER BODY CLOTHING: A LITTLE
TURNING FROM BACK TO SIDE WHILE IN FLAT BAD: A LITTLE
TOILETING: A LOT
TOILETING: A LITTLE
MOBILITY SCORE: 16
DAILY ACTIVITIY SCORE: 17
PERSONAL GROOMING: A LITTLE
DRESSING REGULAR LOWER BODY CLOTHING: A LITTLE
HELP NEEDED FOR BATHING: A LITTLE
STANDING UP FROM CHAIR USING ARMS: A LITTLE
DRESSING REGULAR UPPER BODY CLOTHING: A LITTLE
TURNING FROM BACK TO SIDE WHILE IN FLAT BAD: A LITTLE
MOVING TO AND FROM BED TO CHAIR: A LITTLE
MOVING FROM LYING ON BACK TO SITTING ON SIDE OF FLAT BED WITH BEDRAILS: A LITTLE
DRESSING REGULAR UPPER BODY CLOTHING: A LITTLE
MOVING TO AND FROM BED TO CHAIR: A LITTLE
WALKING IN HOSPITAL ROOM: A LITTLE
MOBILITY SCORE: 18
CLIMB 3 TO 5 STEPS WITH RAILING: A LOT
CLIMB 3 TO 5 STEPS WITH RAILING: A LITTLE
MOVING FROM LYING ON BACK TO SITTING ON SIDE OF FLAT BED WITH BEDRAILS: A LITTLE
HELP NEEDED FOR BATHING: A LOT
STANDING UP FROM CHAIR USING ARMS: A LITTLE
WALKING IN HOSPITAL ROOM: A LOT
DAILY ACTIVITIY SCORE: 20

## 2025-04-07 ASSESSMENT — ACTIVITIES OF DAILY LIVING (ADL)
LACK_OF_TRANSPORTATION: NO
HOME_MANAGEMENT_TIME_ENTRY: 15

## 2025-04-07 ASSESSMENT — PAIN DESCRIPTION - LOCATION: LOCATION: ABDOMEN

## 2025-04-07 ASSESSMENT — PAIN DESCRIPTION - ORIENTATION: ORIENTATION: MID

## 2025-04-07 ASSESSMENT — PAIN - FUNCTIONAL ASSESSMENT: PAIN_FUNCTIONAL_ASSESSMENT: 0-10

## 2025-04-07 ASSESSMENT — PAIN SCALES - GENERAL: PAINLEVEL_OUTOF10: 6

## 2025-04-07 NOTE — DISCHARGE SUMMARY
Discharge Diagnosis  Rectal cancer (Multi)    Issues Requiring Follow-Up    Test Results Pending At Discharge  Pending Labs       No current pending labs.            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 Vasquez, and laparoscopic microwave ablation x3 with liver biopsy by Dr. Erazo on 3/18/25.  After recovering in the PACU, he was transferred to a regular nursing floor.  Intake and output were closely monitored and diet was slowly advanced as tolerated.  Supportive oncology was consulted to help manage pain since patient follows outpatient, and pain was controlled with IV and oral medication.  DVT prophylaxis was managed by Lovenox 40mg daily and intermittent compression stockings. Electrolytes were monitored with daily labs and replaced as needed.  His post-op course was complicated by high drain output, in which a chyle leak was found.  His diet was changed to low fat and octreotide was started.  IR was consulted and performed a lymphoscintigraphy on 3/25 without decrease in drain output.  Midodrine was added and he was made NPO with TPN. Repeat lymphoscintigraphy was performed 3/28 with successful decrease in drain output. Ostomy nursing saw him for supplies and support.  He worked with PT who recommended SNF due to deconditioned status.  He had an unwitnessed fall on his right side during his hospital stay, in which xrays were negative and there were no further signs of complications. IR was again re-engaged for portal/hepatic vein embolization done on 4/2. He had a lot of serosanguinous output initially that resolved by the  second day. He was offered psychosocial support via psych but declined their consult at this time. Once pain was controlled with oral medication, he was tolerating a regular soft diet, drain output slowed to a safe amount and character, and bowel was functioning appropriately with soft stool and gas in the pouch, he was deemed fit for discharge. He will follow up with colorectal surgery in clinic in 2-4 weeks, and discharge to SNF with lovenox, home pain medication regimen, TPN, weekly labs, drain, and prn miralax. Per surgical oncology, tentative plans for volumetric scans mid May with follow up scheduled on 4/30 and possible OR early June. He was deemed medically ready for discharge to SNF on 4/7 with appropriate follow up.    Pertinent Physical Exam At Time of Discharge  Physical Exam    Gen: in no physical distress and alert, deconditioned and weak appearing, cachectic, resting comfortably in bed  HENT: Head normocephalic, atraumatic.  Mucous membranes moist.    Neuro: Moves all extremities spontaneously x4.  CV: Normal rate, normotensive per chart review.  No BLE edema.  Resp: No acute respiratory distress.  Normal effort on room air. Chest expansion symmetrical.   GI: Abdomen is soft, nontender, and nondistended. Laparoscopic incisions are clean, dry and intact with glue.  Stoma is pink and healthy, well pouched, with thickened brown stool in the bag. Abdominal drain to bile bag with clear yellow serous fluid  Skin: Warm and dry, pale, without rashes or lesions.   Home Medications     Medication List      START taking these medications     acetaminophen 325 mg tablet; Commonly known as: Tylenol; Take 2 tablets   (650 mg) by mouth every 6 hours if needed for mild pain (1 - 3).   Adult Clinimix Parenteral Nutrition Cyclic; Infuse  mL/hr at    mL/hr over 12 hours into a venous catheter (central line)   continuously. Taper up for 1 Hours. Taper down for 1 Hours. Do not fill   before April 1, 2025.    alteplase 2 mg injection; Commonly known as: Cathflo Activase; 2 mL (2   mg) by intra-catheter route if needed (Use as needed for occluded PICC   Line).   enoxaparin 40 mg/0.4 mL syringe; Commonly known as: Lovenox; Inject 0.4   mL (40 mg) under the skin once every 24 hours for 12 days.   midodrine 2.5 mg tablet; Commonly known as: Proamatine; Take 1 tablet   (2.5 mg) by mouth 3 times daily (morning, midday, late afternoon).   * morphine CR 30 mg 12 hr tablet; Commonly known as: MS Contin; Take 1   tablet (30 mg) by mouth every 12 hours. Do not crush, chew, or split.   octreotide 500 mcg/mL injection; Commonly known as: SandoSTATIN; Inject   0.4 mL (200 mcg) under the skin 3 times a day.   polyethylene glycol 17 gram packet; Commonly known as: Glycolax,   Miralax; Take 17 g by mouth every other day. Skip the dose for the day if   watery/loose colostomy output Do not fill before April 3, 2025.   tamsulosin 0.4 mg 24 hr capsule; Commonly known as: Flomax; Take 1   capsule (0.4 mg) by mouth once daily.   thiamine 100 mg/mL injection; Commonly known as: Vitamin B1; Infuse 1 mL   (100 mg) into a venous catheter once daily for 8 doses.  * This list has 1 medication(s) that are the same as other medications   prescribed for you. Read the directions carefully, and ask your doctor or   other care provider to review them with you.     CHANGE how you take these medications     LORazepam 0.5 mg tablet; Commonly known as: Ativan; Take 1 tablet (0.5   mg) by mouth 2 times a day as needed for anxiety (nausea) for up to 15   days.; What changed: additional instructions     CONTINUE taking these medications     aspirin 81 mg chewable tablet   atorvastatin 40 mg tablet; Commonly known as: Lipitor; Take 1 tablet (40   mg) by mouth once daily at bedtime.   metoprolol succinate  mg 24 hr tablet; Commonly known as:   Toprol-XL; Take 1 tablet (100 mg) by mouth once daily.   prochlorperazine 10 mg tablet; Commonly known as:  Compazine; Take 1   tablet (10 mg) by mouth every 6 hours if needed for nausea or vomiting.     STOP taking these medications     Betasept Surgical Scrub 4 % external liquid; Generic drug: chlorhexidine   chlorhexidine 0.12 % solution; Commonly known as: Peridex   gabapentin 100 mg capsule; Commonly known as: Neurontin   loperamide 2 mg capsule; Commonly known as: Imodium   metroNIDAZOLE 250 mg tablet; Commonly known as: Flagyl   ondansetron 4 mg tablet; Commonly known as: Zofran   potassium chloride CR 20 mEq ER tablet; Commonly known as: Klor-Con M20     ASK your doctor about these medications     * morphine CR 15 mg 12 hr tablet; Commonly known as: MS Contin; Take 1   tablet (15 mg) by mouth once daily in the morning AND 2 tablets (30 mg)   once daily at bedtime. Do not crush, chew, or split.; Ask about: Should I   take this medication?  * This list has 1 medication(s) that are the same as other medications   prescribed for you. Read the directions carefully, and ask your doctor or   other care provider to review them with you.       Outpatient Follow-Up  Future Appointments   Date Time Provider Department Center   4/14/2025  1:30 PM Comfort Elkins APRN-CNP [id number] [facility]   4/15/2025  2:00 PM Javier Vasquez MD [id number] [facility]   4/16/2025 11:00 AM Baron Erazo MD [id number]   7/28/2025  2:10 PM Shaheen Kiser MD [id number]       Marleen Jackson APRN-CNP

## 2025-04-07 NOTE — PROGRESS NOTES
Occupational Therapy    OT Treatment    Patient Name: Roma Corral  MRN: [id number]  Department: [facility]  Room: [address] [place] [address]  Today's Date: 4/7/2025  Time Calculation  Start Time: 1246  Stop Time: 1301  Time Calculation (min): 15 min        Assessment:  Barriers to Discharge Home: Physical needs  Physical Needs: 24hr mobility assistance needed, Intermittent ADL assistance needed, High falls risk due to function or environment  Evaluation/Treatment Tolerance: Patient tolerated treatment well  Medical Staff Made Aware: Yes  End of Session Communication: Care Coordinator  End of Session Patient Position: Up in chair, Alarm on  Evaluation/Treatment Tolerance: Patient tolerated treatment well  Medical Staff Made Aware: Yes  Plan:  Treatment Interventions: ADL retraining, Functional transfer training, Endurance training, Patient/family training, Equipment evaluation/education, Compensatory technique education  OT Frequency: 3 times per week  OT Discharge Recommendations: Moderate intensity level of continued care  Equipment Recommended upon Discharge:  (tbd)  OT Recommended Transfer Status: Assist of 1  OT - OK to Discharge: Yes  Treatment Interventions: ADL retraining, Functional transfer training, Endurance training, Patient/family training, Equipment evaluation/education, Compensatory technique education    Subjective   Previous Visit Info:  OT Last Visit  OT Received On: 04/07/25  General:  General  Prior to Session Communication: Care Coordinator  Patient Position Received: Bed, 3 rail up, Alarm on  General Comment: Pt agreeable to OT with mild encouragement. Increased time to allow for maximal independence.  Precautions:  Medical Precautions: Fall precautions  Post-Surgical Precautions: Abdominal surgery precautions     Date/Time Vitals Session Patient Position Pulse Resp SpO2 BP MAP (mmHg)    04/07/25 1300 --  --  105  18  100 %  117/62  80                 Pain:  Pain Assessment  Pain Assessment: 0-10  0-10 (Numeric) Pain  "Score:  (did not rate)  Pain Type: Surgical pain  Pain Location: Abdomen  Pain Orientation: Mid    Objective    Cognition:  Cognition  Orientation Level: Oriented X4 (grossly assessed)  Following Commands: Follows all commands and directions without difficulty  Insight: Mild  Coordination:     Activities of Daily Living: UE Dressing  UE Dressing Level of Assistance: Close supervision  UE Dressing Where Assessed: Edge of bed  UE Dressing Comments: don gown posteriorly as \"jacket\"    LE Dressing  LE Dressing: Yes  Sock Level of Assistance: Contact guard  LE Dressing Where Assessed: Bed level  LE Dressing Comments: modified figure-4 for abdominal protection when managing socks  Functional Standing Tolerance:     Bed Mobility/Transfers: Bed Mobility  Bed Mobility: Yes  Bed Mobility 1  Bed Mobility 1: Supine to sitting  Level of Assistance 1: Close supervision  Bed Mobility Comments 1: HOB elevated with supervision for safety    Transfers  Transfer: Yes  Transfer 1  Transfer From 1: Bed to, Sit to  Transfer to 1: Stand  Technique 1: Sit to stand  Transfer Device 1: Walker  Transfer Level of Assistance 1: Contact guard, Minimal verbal cues  Trials/Comments 1: cues for safe hand placement  Transfers 2  Transfer From 2: Bed to  Transfer to 2: Chair with arms  Technique 2: Stand pivot  Transfer Device 2: Walker  Transfer Level of Assistance 2: Contact guard, Minimal verbal cues    RUE   RUE : Within Functional Limits and LUE   LUE: Within Functional Limits    Outcome Measures:[facility] Daily Activity  Putting on and taking off regular lower body clothing: A little  Bathing (including washing, rinsing, drying): A lot  Putting on and taking off regular upper body clothing: A little  Toileting, which includes using toilet, bedpan or urinal: A lot  Taking care of personal grooming such as brushing teeth: A little  Eating Meals: None  Daily Activity - Total Score: 17        Education Documentation  Body Mechanics, taught by Chelsea SUÁREZ" Ken OT at 4/7/2025  1:23 PM.  Learner: Patient  Readiness: Acceptance  Method: Explanation  Response: Verbalizes Understanding    Precautions, taught by Chelsea Rogers OT at 4/7/2025  1:23 PM.  Learner: Patient  Readiness: Acceptance  Method: Explanation  Response: Verbalizes Understanding    ADL Training, taught by Chelsea Rogers OT at 4/7/2025  1:23 PM.  Learner: Patient  Readiness: Acceptance  Method: Explanation  Response: Verbalizes Understanding    Education Comments  No comments found.        Goals:  Encounter Problems       Encounter Problems (Active)       ADLs       Patient will perform UB and LB bathing with minimal assist  level of assistance and PRN AE. (Progressing)       Start:  03/19/25    Expected End:  04/09/25            Patient with complete lower body dressing with minimal assist  level of assistance donning and doffing all LE clothes  with PRN adaptive equipment while edge of bed  (Progressing)       Start:  03/19/25    Expected End:  04/09/25            Patient will complete toileting including hygiene clothing management/hygiene with minimal assist  level of assistance and PRN AE. (Progressing)       Start:  03/19/25    Expected End:  04/09/25               BALANCE       Pt will maintain dynamic standing balance during ADL task with minimal assist  level of assistance in order to demonstrate decreased risk of falling and improved postural control. (Progressing)       Start:  03/19/25    Expected End:  04/09/25               COGNITION/SAFETY       Patient will recall and adhere to abdominal precautions during all functional mobility/ADL tasks in order to demonstrate improved understanding and promote healing post op (Progressing)       Start:  03/19/25    Expected End:  04/09/25               MOBILITY       Patient will perform Functional mobility Household distances/Community Distances with supervision level of assistance and least restrictive device in order to improve safety  and functional mobility. (Progressing)       Start:  04/07/25    Expected End:  04/23/25                  Encounter Problems (Resolved)       TRANSFERS       Patient will perform bed mobility minimal assist  level of assistance and bed rails in order to improve safety and independence with mobility (Met)       Start:  03/19/25    Expected End:  04/09/25    Resolved:  04/07/25         Patient will complete sit to stand transfer with minimal assist  level of assistance and least restrictive device in order to improve safety and prepare for out of bed mobility. (Met)       Start:  03/19/25    Expected End:  04/09/25    Resolved:  04/07/25                  PAYTON MARTINEZ/SWAPNA (PRN)

## 2025-04-07 NOTE — PROGRESS NOTES
04/07/25 1200   Discharge Planning   Living Arrangements Spouse/significant other   Support Systems Spouse/significant other   Type of Residence Private residence   Number of Stairs to Enter Residence 2   Number of Stairs Within Residence 10   Do you have animals or pets at home? Yes   Type of Animals or Pets one dog   Home or Post Acute Services Post acute facilities (Rehab/SNF/etc)   Type of Post Acute Facility Services Skilled nursing   Expected Discharge Disposition SNF  ([facility])   Financial Resource Strain   How hard is it for you to pay for the very basics like food, housing, medical care, and heating? Not hard   Housing Stability   In the last 12 months, was there a time when you were not able to pay the mortgage or rent on time? N   In the past 12 months, how many times have you moved where you were living? 0   At any time in the past 12 months, were you homeless or living in a shelter (including now)? N   Transportation Needs   In the past 12 months, has lack of transportation kept you from medical appointments or from getting medications? no   In the past 12 months, has lack of transportation kept you from meetings, work, or from getting things needed for daily living? No     Patient will be discharged today at 5:00pm today to [facility] and [facility] will transport. Patient is aware and in agreement with the plan and he shared that he will update his wife. RN given the number for report.  KHUSHI Dozier  4/7/25@1;00pm

## 2025-04-07 NOTE — NURSING NOTE
Roma Corral discharged at 5:37 PM  and 04/07/25   Patient discharged to [facility] via ambulance .  SNF report called  Discharge education and teaching completed with Roma Corral. PICC in place, pt belongings sent with patient.   RN signature: Keely Smith RN

## 2025-04-07 NOTE — PROGRESS NOTES
Surgical Oncology Progress Note      04/07/25      Subjective:  NAEO. No output in ostomy. Drain serous output 1425. Reports some mild abdominal pain    Review of Systems:    A 12-point review of systems was performed, and was negative except as above.       Objective    Vital signs:   Temp:  [36.5 °C (97.7 °F)-37.7 °C (99.9 °F)] 36.6 °C (97.8 °F)  Heart Rate:  [67-89] 70  Resp:  [16-17] 17  BP: ()/(54-66) 98/55    Physical Exam:  GEN: No acute distress. Alert, awake and conversant.  HEENT: Sclera anicteric. Moist mucous membranes.  RESP: Breathing non-labored, equal chest rise.   CV: RRR  GI: Abdomen soft, nondistended,  mildly tender to palpation. Drain serous  MSK: No gross deformities. Moves all extremities spontaneously.  NEURO: Alert and oriented x3. No focal deficits.  PSYCH: Appropriate mood and affect.      I/O last 2 completed shifts:  In: 120 (1.8 mL/kg) [P.O.:120]  Out: 1675 (25 mL/kg) [Urine:700 (0.4 mL/kg/hr); Drains:950; Stool:25]  Weight: 66.9 kg      Labs:  Results from last 7 days   Lab Units 04/07/25 0438 04/06/25  0358 04/05/25  0444   WBC AUTO x10*3/uL 4.0* 4.4 4.9   HEMOGLOBIN g/dL 8.0* 8.0* 7.9*   PLATELETS AUTO x10*3/uL 110* 103* 95*      Results from last 7 days   Lab Units 04/07/25  0438 04/06/25  0358 04/05/25  0444   SODIUM mmol/L 135* 135* 134*   POTASSIUM mmol/L 5.0 4.5 4.5   CHLORIDE mmol/L 105 104 105   CO2 mmol/L 25 25 25   BUN mg/dL 27* 27* 28*   CREATININE mg/dL 0.89 0.75 0.89   GLUCOSE mg/dL 115* 90 131*   MAGNESIUM mg/dL 2.34 2.22 2.20                 Assessment/Plan    Roma Corral is a 70 y.o. male with history of rectal cancer with mets to the liver, CAD (2021 NSTEMI), DM2, HTN who is s/p robotic LAR with end colostomy, ilecocecetomy with Dr. Vasquez, and laparoscopic microwave liver ablation with Dr. Erazo on 3/17/25, postop course notable for persistent chyle leak.Patient underwent R portal vein embo via splenic vein and R hepatic vein embo via R internal jugular  with IR on 4/2/25. Postop had chest pain with negative EKG and CXR. Downtrending Hgb s/p 1u pRBCs on 4/3 and another 1u pRBC on 4/4 with incrementing appropriately.      PLAN:  - cont octreotide and midodrine  - follow up drain triglycerides  - cont drain to gravity   - tentatively plan for volumetric scans mid May, follow up appt 4/30, OR early June    Discussed patient and plan with Dr. Kacey Johnson MD- PGY1  Surgical Oncology   [facility] Team [id number]

## 2025-04-07 NOTE — CARE PLAN
The patient's goals for the shift include      The clinical goals for the shift include pt will remain HDS      Problem: Safety - Adult  Goal: Free from fall injury  Outcome: Progressing     Problem: Discharge Planning  Goal: Discharge to home or other facility with appropriate resources  Outcome: Progressing     Problem: Chronic Conditions and Co-morbidities  Goal: Patient's chronic conditions and co-morbidity symptoms are monitored and maintained or improved  Outcome: Progressing     Problem: Fall/Injury  Goal: Not fall by end of shift  Outcome: Progressing  Goal: Be free from injury by end of the shift  Outcome: Progressing  Goal: Verbalize understanding of personal risk factors for fall in the hospital  Outcome: Progressing  Goal: Verbalize understanding of risk factor reduction measures to prevent injury from fall in the home  Outcome: Progressing  Goal: Use assistive devices by end of the shift  Outcome: Progressing  Goal: Pace activities to prevent fatigue by end of the shift  Outcome: Progressing     Problem: Skin  Goal: Decreased wound size/increased tissue granulation at next dressing change  Outcome: Progressing  Flowsheets (Taken 4/7/2025 1101)  Decreased wound size/increased tissue granulation at next dressing change: Promote sleep for wound healing  Goal: Participates in plan/prevention/treatment measures  Outcome: Progressing  Flowsheets (Taken 4/7/2025 1101)  Participates in plan/prevention/treatment measures: Increase activity/out of bed for meals  Goal: Prevent/manage excess moisture  Outcome: Progressing  Flowsheets (Taken 4/7/2025 1101)  Prevent/manage excess moisture: Monitor for/manage infection if present  Goal: Prevent/minimize sheer/friction injuries  Outcome: Progressing  Flowsheets (Taken 4/7/2025 1101)  Prevent/minimize sheer/friction injuries: Use pull sheet  Goal: Promote/optimize nutrition  Outcome: Progressing  Flowsheets (Taken 4/7/2025 1101)  Promote/optimize nutrition: Offer  water/supplements/favorite foods  Goal: Promote skin healing  Outcome: Progressing  Flowsheets (Taken 4/7/2025 1101)  Promote skin healing: Assess skin/pad under line(s)/device(s)     Problem: Diabetes  Goal: Achieve decreasing blood glucose levels by end of shift  Outcome: Progressing  Goal: Increase stability of blood glucose readings by end of shift  Outcome: Progressing  Goal: Decrease in ketones present in urine by end of shift  Outcome: Progressing  Goal: Maintain electrolyte levels within acceptable range throughout shift  Outcome: Progressing  Goal: Maintain glucose levels >70mg/dl to <250mg/dl throughout shift  Outcome: Progressing  Goal: No changes in neurological exam by end of shift  Outcome: Progressing  Goal: Learn about and adhere to nutrition recommendations by end of shift  Outcome: Progressing  Goal: Vital signs within normal range for age by end of shift  Outcome: Progressing  Goal: Increase self care and/or family involovement by end of shift  Outcome: Progressing  Goal: Receive DSME education by end of shift  Outcome: Progressing     Problem: Pain  Goal: Takes deep breaths with improved pain control throughout the shift  Outcome: Progressing  Goal: Turns in bed with improved pain control throughout the shift  Outcome: Progressing  Goal: Walks with improved pain control throughout the shift  Outcome: Progressing  Goal: Performs ADL's with improved pain control throughout shift  Outcome: Progressing  Goal: Participates in PT with improved pain control throughout the shift  Outcome: Progressing  Goal: Free from opioid side effects throughout the shift  Outcome: Progressing  Goal: Free from acute confusion related to pain meds throughout the shift  Outcome: Progressing

## 2025-04-07 NOTE — PROGRESS NOTES
Physical Therapy    Physical Therapy Treatment    Patient Name: Roma Corral  MRN: [id number]  Department: [facility]  Room: [address] [place] [address]  Today's Date: 4/7/2025  Time Calculation  Start Time: 1250  Stop Time: 1301  Time Calculation (min): 11 min         Assessment/Plan   PT Assessment  PT Assessment Results: Decreased strength, Decreased range of motion, Decreased endurance, Impaired balance, Decreased mobility  Rehab Prognosis: Good  Barriers to Discharge Home: Caregiver assistance, Physical needs  Caregiver Assistance: Caregiver assistance needed per identified barriers - however, level of patient's required assistance exceeds assistance available at home  Physical Needs: Ambulating household distances limited by function/safety, 24hr mobility assistance needed, High falls risk due to function or environment, Stair navigation into home limited by function/safety  Evaluation/Treatment Tolerance: Patient tolerated treatment well, Patient limited by fatigue  End of Session Communication: Care Coordinator  Assessment Comment: Pt continues to demonstrate impaired strength and function to that of baseline and remains appropriate for continued PT in house and after discharge at MOD intensity.  End of Session Patient Position: Up in chair, Alarm on  PT Plan  Inpatient/Swing Bed or Outpatient: Inpatient  PT Plan  Treatment/Interventions: Bed mobility, Transfer training, Gait training, Balance training, Strengthening, Therapeutic exercise, Therapeutic activity  PT Plan: Ongoing PT  PT Frequency: 3 times per week  PT Discharge Recommendations: Moderate intensity level of continued care  PT Recommended Transfer Status: Assist x1  PT - OK to Discharge: Yes    General Visit Information:   PT  Visit  PT Received On: 04/07/25  General  Family/Caregiver Present: Yes  Caregiver Feedback: OT present upon PT arrival.  Co-Treatment: OT  Co-Treatment Reason: Pt requiring updated PT/OT notes asap for DC.  Prior to Session Communication: Bedside  "nurse  Patient Position Received: Bed, 3 rail up, Alarm on  Preferred Learning Style: verbal, auditory  General Comment: Pt willing to work with therapies and attempt mobility. Pt is eager for anticipated discharge. \"I'm one step closer to getting back home and to see my puppy!\"    Subjective   Precautions:  Precautions  Medical Precautions: Fall precautions  Post-Surgical Precautions: Abdominal surgery precautions    Objective   Pain:  Pain Assessment  Pain Assessment:  (Pt did not objectively measure pain. Appearing in no acute distress though requires increased time with mobility.)  Cognition:  Cognition  Orientation Level: Oriented X4  Impulsive: Mildly  Coordination:  Movements are Fluid and Coordinated: Yes  Postural Control:  Static Sitting Balance  Static Sitting-Balance Support: Feet supported  Static Sitting-Level of Assistance: Close supervision  Static Standing Balance  Static Standing-Balance Support: Bilateral upper extremity supported (on a wheeled walker)  Static Standing-Level of Assistance: Contact guard    Activity Tolerance:  Activity Tolerance  Endurance: Tolerates 10 - 20 min exercise with multiple rests  Treatments:     Therapeutic Activity  Therapeutic Activity Performed: Yes  Therapeutic Activity 1: bed mobility, transfers, gait    Bed Mobility 1  Bed Mobility 1: Supine to sitting  Level of Assistance 1: Close supervision, Contact guard    Ambulation/Gait Training  Ambulation/Gait Training Performed: Yes  Ambulation/Gait Training 1  Surface 1: Level tile  Device 1: Rolling walker  Assistance 1: Contact guard, Minimal verbal cues  Comments/Distance (ft) 1: 4-5ft  Transfers  Transfer: Yes  Transfer 1  Transfer From 1: Sit to, Stand to  Transfer to 1: Stand, Sit  Transfer Device 1: Walker  Transfer Level of Assistance 1: Contact guard, Minimal verbal cues    Outcome Measures:  [facility] Basic Mobility  Turning from your back to your side while in a flat bed without using bedrails: A little  Moving " from lying on your back to sitting on the side of a flat bed without using bedrails: A little  Moving to and from bed to chair (including a wheelchair): A little  Standing up from a chair using your arms (e.g. wheelchair or bedside chair): A little  To walk in hospital room: A lot  Climbing 3-5 steps with railing: A lot  Basic Mobility - Total Score: 16    Education Documentation  Precautions, taught by Keon Amaro PT at 4/7/2025  1:28 PM.  Learner: Patient  Readiness: Acceptance  Method: Explanation  Response: Verbalizes Understanding    Mobility Training, taught by Keon Amaro, PT at 4/7/2025  1:28 PM.  Learner: Patient  Readiness: Acceptance  Method: Explanation  Response: Verbalizes Understanding    Education Comments  No comments found.      Encounter Problems       Encounter Problems (Active)       Mobility       STG - Patient will ambulate >25ft, wheeled walker, min assist (Progressing)       Start:  03/19/25    Expected End:  04/15/25               PT Transfers       STG - Patient to transfer to and from sit to supine CGA (Progressing)       Start:  03/19/25    Expected End:  04/15/25            STG - Patient will transfer sit to and from stand min assist (Progressing)       Start:  03/19/25    Expected End:  04/15/25

## 2025-04-07 NOTE — PROGRESS NOTES
Colorectal Surgery Progress Note      HPI: Roma Corral is a 70 y.o. 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 Vasquez, and laparoscopic MWA x3 with liver biopsy by Dr. Erazo on 3/18/25 c/b post-operative lymphatic leak and high drain output s/p IR embolization x2 and NPO/TPN. Now s/p right portal vein embolization via splenic vein access and right hepatic vein embolization via right internal jugular vein access on 4/2/25.    Subjective  No acute events overnight.   Received gastrografin yesterday with significant colostomy output since then. Drain remains serous. Was able to have a few bites of tomato soup. Did not finish it and remains on TPN. No nausea/vomiting. No abdominal pain.     Objective  Physical Exam:  Gen: in no physical distress and alert, deconditioned and weak appearing, cachectic, resting comfortably in bed  HENT: Head normocephalic, atraumatic.  Mucous membranes moist.    Neuro: Moves all extremities spontaneously x4.  CV: Normal rate, normotensive per chart review.  No BLE edema.  Resp: No acute respiratory distress.  Normal effort on room air. Chest expansion symmetrical.   GI: Abdomen is soft, nontender, and nondistended. Laparoscopic incisions are clean, dry and intact with glue.  Stoma is pink and healthy, well pouched, with thickened brown stool in the bag. Abdominal drain to bile bag with clear yellow serous fluid  Skin: Warm and dry, pale, without rashes or lesions.     Visit Vitals  /54 (BP Location: Right arm, Patient Position: Lying)   Pulse 67   Temp 36.9 °C (98.4 °F) (Temporal)   Resp 16        I/O last 3 completed shifts:  In: 480 (7.2  mL/kg) [P.O.:480]  Out: 3375 (50.4 mL/kg) [Urine:1500 (0.6 mL/kg/hr); Drains:1875]  Weight: 66.9 kg   I/O this shift:  In: -   Out: 800 [Urine:275; Drains:500; Stool:25]         Data Review:            8.0     4.0>-----<110              25.7   135  105  27                  ----------------<115     5.0  25  0.89          Ca 7.0 Phos 3.6 Mg 2.34       ALT 45 AST 92 AlkPhos 716 tBili 1.0         Imaging:  No new imaging to review.    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 Vasquez, and laparoscopic MWA x3 with liver biopsy by Dr. Erazo on 3/18/25. Post-op course c/b high pelvic drain output 2/2 persistent chyle leak s/p low fat diet & IR lymphoscintigraphy 3/25 & 3/28 with down trend in drainage (~700ml/day) after made NPO with TPN. Received 1 unit RBCs yesterday 4/2 for anemia and incremented appropriately. S/p IR portal/hepatic vein embolization 4/2 now with large volume, serosanguinous output from abdominal drain. Received additional unit RBCs 4/4 for anemia, again with appropriate incrementation thought drain output remains high at >1L per 24 hours for past 2-3 days.     Plan 04/07/25:  -Continue NPO and TPN with liquids for comfort only  -OOB during day/PT  -Coccyx/sacral wound care  -Resume daily miralax  -Possible discharge to SNF today    Neuro: Post-op pain controlled, history of anxiety  -Continue current pain regimen with robaxin,prn tylenol, prn dilaudid  -Continue home MS Contin, hold home ativan  -Sleep hygiene/OOB during day/PT  -Pet/music/art therapy  -Melatonin HS    CV: mild intermittent hypotension, new chest pain/sob this AM; hx of CAD (NSTEMI 2021), HTN, HLD  -Q4 vital signs  -Continuous tele/pulse ox monitoring  -EKG prn for ACS symptoms  -Continue  home metoprolol with hold parameters  -Hold home ASA 81, lipitor in setting of elevated LFTs/thrombocytopenia    Resp: on RA  -ICS 10x every hour  -OOB/ambulation    GI: s/p robotic LAR/loop to end colostomy, ileocecectomy & MWA c/b persistent lymph leak s/p IR embol x2 & s/p portal/hepatic vein embol 4/2 with dark serosang abd drain output; hx of rectal CA with mets to liver  -NPO, cycled TPN (running during day 9a-9p), ok for sips and small snacks for comfort  -Thiamine daily  -CT liver per surg onc; Octreotide & Midodrine TID for lymph leak; appreciate surg onc support & expertise  -PRN repletion for drain output  -Zofran prn for nausea  -continue assessment of stoma and output, stoma not new for patient.  WOCN for supplies & support. Continue to monitor drain output/replace fluid prn  -Daily CMP to monitor LFTs    : adequate urine output; no significant urological hx  -Continue flomax, needs to stand when voiding to completely empty.   -strict intake and output, monitoring fluid & electrolyte balance closely in setting of high output drain  -Daily RFP/replace electrolytes as needed    Heme: H&H down trend s/p 1u RBCs 4/2 and 4/4-->now stable, history of pancytopenia with platelets similar to baseline  -Daily CBC    ID: afebrile, no leukocytosis  -Q4 temp  -monitor for s/s of infection    Endo: no acute issues, no significant endocrine history   -hypoglycemia protocol  -Q6 BG on TPN  -SSI #1 while on TPN    DVT Prophy: SCDs, Lovenox     Dispo:   -Continue care on RNF  -Possible discharge to SNF today    Seen with colorectal team.    Vivian Ballard MD  Colorectal Surgery Fellow  [facility] Service Pager #[phone]

## 2025-04-08 ENCOUNTER — HOSPITAL ENCOUNTER (INPATIENT)
Facility: HOSPITAL | Age: 70
Discharge: HOME | DRG: 391 | End: 2025-04-08
Attending: INTERNAL MEDICINE | Admitting: INTERNAL MEDICINE
Payer: MEDICARE

## 2025-04-08 DIAGNOSIS — C20 RECTAL CANCER (MULTI): ICD-10-CM

## 2025-04-08 DIAGNOSIS — E43 PROTEIN-CALORIE MALNUTRITION, SEVERE (MULTI): ICD-10-CM

## 2025-04-08 DIAGNOSIS — R26.81 UNSTEADY GAIT: ICD-10-CM

## 2025-04-08 DIAGNOSIS — R13.10 DYSPHAGIA: Primary | ICD-10-CM

## 2025-04-08 PROBLEM — K63.2: Status: ACTIVE | Noted: 2025-04-08

## 2025-04-08 PROCEDURE — 99222 1ST HOSP IP/OBS MODERATE 55: CPT | Performed by: INTERNAL MEDICINE

## 2025-04-08 PROCEDURE — 2500000001 HC RX 250 WO HCPCS SELF ADMINISTERED DRUGS (ALT 637 FOR MEDICARE OP): Performed by: INTERNAL MEDICINE

## 2025-04-08 PROCEDURE — 2500000004 HC RX 250 GENERAL PHARMACY W/ HCPCS (ALT 636 FOR OP/ED): Performed by: INTERNAL MEDICINE

## 2025-04-08 PROCEDURE — 3E0436Z INTRODUCTION OF NUTRITIONAL SUBSTANCE INTO CENTRAL VEIN, PERCUTANEOUS APPROACH: ICD-10-PCS | Performed by: INTERNAL MEDICINE

## 2025-04-08 PROCEDURE — 1200000002 HC GENERAL ROOM WITH TELEMETRY DAILY

## 2025-04-08 RX ORDER — PROCHLORPERAZINE MALEATE 10 MG
10 TABLET ORAL EVERY 6 HOURS PRN
Status: DISCONTINUED | OUTPATIENT
Start: 2025-04-08 | End: 2025-04-15 | Stop reason: HOSPADM

## 2025-04-08 RX ORDER — METOPROLOL SUCCINATE 100 MG/1
100 TABLET, EXTENDED RELEASE ORAL DAILY
Status: DISCONTINUED | OUTPATIENT
Start: 2025-04-09 | End: 2025-04-15 | Stop reason: HOSPADM

## 2025-04-08 RX ORDER — GUAIFENESIN/DEXTROMETHORPHAN 100-10MG/5
5 SYRUP ORAL EVERY 4 HOURS PRN
Status: DISCONTINUED | OUTPATIENT
Start: 2025-04-08 | End: 2025-04-15 | Stop reason: HOSPADM

## 2025-04-08 RX ORDER — PANTOPRAZOLE SODIUM 40 MG/1
40 TABLET, DELAYED RELEASE ORAL
Status: DISCONTINUED | OUTPATIENT
Start: 2025-04-09 | End: 2025-04-15 | Stop reason: HOSPADM

## 2025-04-08 RX ORDER — ACETAMINOPHEN 160 MG/5ML
650 SOLUTION ORAL EVERY 4 HOURS PRN
Status: DISCONTINUED | OUTPATIENT
Start: 2025-04-08 | End: 2025-04-15 | Stop reason: HOSPADM

## 2025-04-08 RX ORDER — ONDANSETRON HYDROCHLORIDE 2 MG/ML
4 INJECTION, SOLUTION INTRAVENOUS EVERY 8 HOURS PRN
Status: DISCONTINUED | OUTPATIENT
Start: 2025-04-08 | End: 2025-04-15 | Stop reason: HOSPADM

## 2025-04-08 RX ORDER — ACETAMINOPHEN 650 MG/1
650 SUPPOSITORY RECTAL EVERY 4 HOURS PRN
Status: DISCONTINUED | OUTPATIENT
Start: 2025-04-08 | End: 2025-04-15 | Stop reason: HOSPADM

## 2025-04-08 RX ORDER — TAMSULOSIN HYDROCHLORIDE 0.4 MG/1
0.4 CAPSULE ORAL DAILY
Status: DISCONTINUED | OUTPATIENT
Start: 2025-04-09 | End: 2025-04-15 | Stop reason: HOSPADM

## 2025-04-08 RX ORDER — ENOXAPARIN SODIUM 100 MG/ML
40 INJECTION SUBCUTANEOUS EVERY 24 HOURS
Status: DISCONTINUED | OUTPATIENT
Start: 2025-04-08 | End: 2025-04-08 | Stop reason: SDUPTHER

## 2025-04-08 RX ORDER — OCTREOTIDE ACETATE 500 UG/ML
200 INJECTION, SOLUTION INTRAVENOUS; SUBCUTANEOUS 3 TIMES DAILY
Status: DISCONTINUED | OUTPATIENT
Start: 2025-04-09 | End: 2025-04-15 | Stop reason: HOSPADM

## 2025-04-08 RX ORDER — THIAMINE HYDROCHLORIDE 100 MG/ML
100 INJECTION, SOLUTION INTRAMUSCULAR; INTRAVENOUS DAILY
Status: DISCONTINUED | OUTPATIENT
Start: 2025-04-09 | End: 2025-04-15 | Stop reason: HOSPADM

## 2025-04-08 RX ORDER — ONDANSETRON 4 MG/1
4 TABLET, FILM COATED ORAL EVERY 8 HOURS PRN
Status: DISCONTINUED | OUTPATIENT
Start: 2025-04-08 | End: 2025-04-15 | Stop reason: HOSPADM

## 2025-04-08 RX ORDER — POLYETHYLENE GLYCOL 3350 17 G/17G
17 POWDER, FOR SOLUTION ORAL DAILY
Status: DISCONTINUED | OUTPATIENT
Start: 2025-04-09 | End: 2025-04-15 | Stop reason: HOSPADM

## 2025-04-08 RX ORDER — TALC
3 POWDER (GRAM) TOPICAL NIGHTLY PRN
Status: DISCONTINUED | OUTPATIENT
Start: 2025-04-08 | End: 2025-04-15 | Stop reason: HOSPADM

## 2025-04-08 RX ORDER — DOCUSATE SODIUM 100 MG/1
100 CAPSULE, LIQUID FILLED ORAL 2 TIMES DAILY
Status: DISCONTINUED | OUTPATIENT
Start: 2025-04-08 | End: 2025-04-15 | Stop reason: HOSPADM

## 2025-04-08 RX ORDER — DEXTROSE MONOHYDRATE, SODIUM CHLORIDE, AND POTASSIUM CHLORIDE 50; 1.49; 4.5 G/1000ML; G/1000ML; G/1000ML
100 INJECTION, SOLUTION INTRAVENOUS CONTINUOUS
Status: DISCONTINUED | OUTPATIENT
Start: 2025-04-09 | End: 2025-04-15 | Stop reason: HOSPADM

## 2025-04-08 RX ORDER — NAPROXEN SODIUM 220 MG/1
81 TABLET, FILM COATED ORAL DAILY
Status: DISCONTINUED | OUTPATIENT
Start: 2025-04-09 | End: 2025-04-15 | Stop reason: HOSPADM

## 2025-04-08 RX ORDER — LORAZEPAM 0.5 MG/1
0.5 TABLET ORAL 2 TIMES DAILY PRN
Status: DISCONTINUED | OUTPATIENT
Start: 2025-04-08 | End: 2025-04-15 | Stop reason: HOSPADM

## 2025-04-08 RX ORDER — MIDODRINE HYDROCHLORIDE 2.5 MG/1
2.5 TABLET ORAL
Status: DISCONTINUED | OUTPATIENT
Start: 2025-04-09 | End: 2025-04-15 | Stop reason: HOSPADM

## 2025-04-08 RX ORDER — PANTOPRAZOLE SODIUM 40 MG/10ML
40 INJECTION, POWDER, LYOPHILIZED, FOR SOLUTION INTRAVENOUS
Status: DISCONTINUED | OUTPATIENT
Start: 2025-04-09 | End: 2025-04-15 | Stop reason: HOSPADM

## 2025-04-08 RX ORDER — ENOXAPARIN SODIUM 100 MG/ML
40 INJECTION SUBCUTANEOUS NIGHTLY
Status: DISCONTINUED | OUTPATIENT
Start: 2025-04-08 | End: 2025-04-15 | Stop reason: HOSPADM

## 2025-04-08 RX ORDER — ACETAMINOPHEN 325 MG/1
650 TABLET ORAL EVERY 4 HOURS PRN
Status: DISCONTINUED | OUTPATIENT
Start: 2025-04-08 | End: 2025-04-15 | Stop reason: HOSPADM

## 2025-04-08 RX ORDER — MORPHINE SULFATE 30 MG/1
30 TABLET, FILM COATED, EXTENDED RELEASE ORAL EVERY 12 HOURS SCHEDULED
Status: DISCONTINUED | OUTPATIENT
Start: 2025-04-08 | End: 2025-04-15 | Stop reason: HOSPADM

## 2025-04-08 RX ORDER — POLYETHYLENE GLYCOL 3350 17 G/17G
17 POWDER, FOR SOLUTION ORAL EVERY OTHER DAY
Status: DISCONTINUED | OUTPATIENT
Start: 2025-04-09 | End: 2025-04-08 | Stop reason: SDUPTHER

## 2025-04-08 RX ORDER — ATORVASTATIN CALCIUM 40 MG/1
40 TABLET, FILM COATED ORAL NIGHTLY
Status: DISCONTINUED | OUTPATIENT
Start: 2025-04-08 | End: 2025-04-15 | Stop reason: HOSPADM

## 2025-04-08 RX ORDER — GUAIFENESIN 600 MG/1
600 TABLET, EXTENDED RELEASE ORAL EVERY 12 HOURS PRN
Status: DISCONTINUED | OUTPATIENT
Start: 2025-04-08 | End: 2025-04-15 | Stop reason: HOSPADM

## 2025-04-08 RX ADMIN — ATORVASTATIN CALCIUM 40 MG: 40 TABLET, FILM COATED ORAL at 23:33

## 2025-04-08 RX ADMIN — ENOXAPARIN SODIUM 40 MG: 40 INJECTION SUBCUTANEOUS at 23:33

## 2025-04-08 ASSESSMENT — ENCOUNTER SYMPTOMS
ABDOMINAL DISTENTION: 0
DYSURIA: 0
VOICE CHANGE: 0
TROUBLE SWALLOWING: 0
DYSPHORIC MOOD: 0
SLEEP DISTURBANCE: 0
FEVER: 0
CONSTIPATION: 0
POLYDIPSIA: 0
POLYPHAGIA: 0
TREMORS: 0
ABDOMINAL PAIN: 1
ARTHRALGIAS: 0
FATIGUE: 0
SINUS PRESSURE: 0
NECK PAIN: 0
WOUND: 0
HALLUCINATIONS: 0
FLANK PAIN: 0
RECTAL PAIN: 0
SINUS PAIN: 0
UNEXPECTED WEIGHT CHANGE: 0
SPEECH DIFFICULTY: 0
COUGH: 0
SORE THROAT: 0
PALPITATIONS: 0
HEMATURIA: 0
JOINT SWELLING: 0
HYPERACTIVE: 0
EYE PAIN: 0
BLOOD IN STOOL: 0
DIZZINESS: 0
WEAKNESS: 0
ADENOPATHY: 0
EYE REDNESS: 0
EYE ITCHING: 0
BRUISES/BLEEDS EASILY: 0
BACK PAIN: 0
DIFFICULTY URINATING: 0
VOMITING: 0
APPETITE CHANGE: 0
SEIZURES: 0
APNEA: 0
FREQUENCY: 0
LIGHT-HEADEDNESS: 0
PHOTOPHOBIA: 0
ANAL BLEEDING: 0
FACIAL ASYMMETRY: 0
CHEST TIGHTNESS: 0
NERVOUS/ANXIOUS: 0
NECK STIFFNESS: 0
CONFUSION: 0
HEADACHES: 0
NAUSEA: 0
CHOKING: 0
RHINORRHEA: 0
ACTIVITY CHANGE: 0
EYE DISCHARGE: 0
STRIDOR: 0
MYALGIAS: 0
DECREASED CONCENTRATION: 0
FACIAL SWELLING: 0
DIAPHORESIS: 0
AGITATION: 0
CHILLS: 0
WHEEZING: 0
COLOR CHANGE: 0
DIARRHEA: 0
SHORTNESS OF BREATH: 0
NUMBNESS: 0

## 2025-04-08 NOTE — HH CARE COORDINATION
Home Care received a Referral for Nursing. We have processed the referral for a Start of Care on 7/6-7/7.     If you have any questions or concerns, please feel free to contact us at [phone]. Follow the prompts, enter your five digit zip code, and you will be directed to your care team on [facility].      

## 2025-04-09 ENCOUNTER — APPOINTMENT (OUTPATIENT)
Dept: CARDIOLOGY | Facility: HOSPITAL | Age: 70
DRG: 391 | End: 2025-04-09
Payer: MEDICARE

## 2025-04-09 LAB
ALBUMIN SERPL BCP-MCNC: 1.9 G/DL (ref 3.4–5)
ALP SERPL-CCNC: 689 U/L (ref 33–136)
ALT SERPL W P-5'-P-CCNC: 27 U/L (ref 10–52)
ANION GAP SERPL CALCULATED.3IONS-SCNC: 9 MMOL/L (ref 10–20)
AST SERPL W P-5'-P-CCNC: 42 U/L (ref 9–39)
BILIRUB SERPL-MCNC: 0.6 MG/DL (ref 0–1.2)
BUN SERPL-MCNC: 24 MG/DL (ref 6–23)
CALCIUM SERPL-MCNC: 7 MG/DL (ref 8.6–10.3)
CHLORIDE SERPL-SCNC: 107 MMOL/L (ref 98–107)
CO2 SERPL-SCNC: 24 MMOL/L (ref 21–32)
CREAT SERPL-MCNC: 0.95 MG/DL (ref 0.5–1.3)
EGFRCR SERPLBLD CKD-EPI 2021: 86 ML/MIN/1.73M*2
ERYTHROCYTE [DISTWIDTH] IN BLOOD BY AUTOMATED COUNT: 19.9 % (ref 11.5–14.5)
GLUCOSE SERPL-MCNC: 136 MG/DL (ref 74–99)
HCT VFR BLD AUTO: 23.9 % (ref 41–52)
HGB BLD-MCNC: 7.5 G/DL (ref 13.5–17.5)
MCH RBC QN AUTO: 28.3 PG (ref 26–34)
MCHC RBC AUTO-ENTMCNC: 31.4 G/DL (ref 32–36)
MCV RBC AUTO: 90 FL (ref 80–100)
NRBC BLD-RTO: 0 /100 WBCS (ref 0–0)
PLATELET # BLD AUTO: 99 X10*3/UL (ref 150–450)
POTASSIUM SERPL-SCNC: 4.6 MMOL/L (ref 3.5–5.3)
PROT SERPL-MCNC: 5.2 G/DL (ref 6.4–8.2)
RBC # BLD AUTO: 2.65 X10*6/UL (ref 4.5–5.9)
SODIUM SERPL-SCNC: 135 MMOL/L (ref 136–145)
WBC # BLD AUTO: 3.1 X10*3/UL (ref 4.4–11.3)

## 2025-04-09 PROCEDURE — 99233 SBSQ HOSP IP/OBS HIGH 50: CPT | Performed by: INTERNAL MEDICINE

## 2025-04-09 PROCEDURE — 97161 PT EVAL LOW COMPLEX 20 MIN: CPT | Mod: GP

## 2025-04-09 PROCEDURE — 2500000005 HC RX 250 GENERAL PHARMACY W/O HCPCS: Performed by: INTERNAL MEDICINE

## 2025-04-09 PROCEDURE — 85027 COMPLETE CBC AUTOMATED: CPT | Performed by: INTERNAL MEDICINE

## 2025-04-09 PROCEDURE — 2500000004 HC RX 250 GENERAL PHARMACY W/ HCPCS (ALT 636 FOR OP/ED): Performed by: INTERNAL MEDICINE

## 2025-04-09 PROCEDURE — 97166 OT EVAL MOD COMPLEX 45 MIN: CPT | Mod: GO

## 2025-04-09 PROCEDURE — 2500000002 HC RX 250 W HCPCS SELF ADMINISTERED DRUGS (ALT 637 FOR MEDICARE OP, ALT 636 FOR OP/ED): Performed by: INTERNAL MEDICINE

## 2025-04-09 PROCEDURE — 1200000002 HC GENERAL ROOM WITH TELEMETRY DAILY

## 2025-04-09 PROCEDURE — 80053 COMPREHEN METABOLIC PANEL: CPT | Performed by: INTERNAL MEDICINE

## 2025-04-09 PROCEDURE — 93005 ELECTROCARDIOGRAM TRACING: CPT

## 2025-04-09 PROCEDURE — 2500000001 HC RX 250 WO HCPCS SELF ADMINISTERED DRUGS (ALT 637 FOR MEDICARE OP): Performed by: INTERNAL MEDICINE

## 2025-04-09 RX ADMIN — OCTREOTIDE ACETATE 200 MCG: 500 INJECTION, SOLUTION INTRAVENOUS; SUBCUTANEOUS at 00:33

## 2025-04-09 RX ADMIN — ENOXAPARIN SODIUM 40 MG: 40 INJECTION SUBCUTANEOUS at 22:23

## 2025-04-09 RX ADMIN — OCTREOTIDE ACETATE 200 MCG: 500 INJECTION, SOLUTION INTRAVENOUS; SUBCUTANEOUS at 22:23

## 2025-04-09 RX ADMIN — PANTOPRAZOLE SODIUM 40 MG: 40 INJECTION, POWDER, FOR SOLUTION INTRAVENOUS at 06:31

## 2025-04-09 RX ADMIN — OCTREOTIDE ACETATE 200 MCG: 500 INJECTION, SOLUTION INTRAVENOUS; SUBCUTANEOUS at 16:29

## 2025-04-09 RX ADMIN — OCTREOTIDE ACETATE 200 MCG: 500 INJECTION, SOLUTION INTRAVENOUS; SUBCUTANEOUS at 09:07

## 2025-04-09 RX ADMIN — THIAMINE HYDROCHLORIDE 100 MG: 100 INJECTION, SOLUTION INTRAMUSCULAR; INTRAVENOUS at 09:08

## 2025-04-09 RX ADMIN — ASPIRIN 81 MG: 81 TABLET, CHEWABLE ORAL at 09:07

## 2025-04-09 RX ADMIN — MORPHINE SULFATE 30 MG: 30 TABLET, FILM COATED, EXTENDED RELEASE ORAL at 00:11

## 2025-04-09 RX ADMIN — ATORVASTATIN CALCIUM 40 MG: 40 TABLET, FILM COATED ORAL at 22:23

## 2025-04-09 RX ADMIN — MIDODRINE HYDROCHLORIDE 2.5 MG: 2.5 TABLET ORAL at 18:06

## 2025-04-09 RX ADMIN — DOCUSATE SODIUM 100 MG: 100 CAPSULE, LIQUID FILLED ORAL at 22:23

## 2025-04-09 RX ADMIN — POLYETHYLENE GLYCOL 3350 17 G: 17 POWDER, FOR SOLUTION ORAL at 09:07

## 2025-04-09 RX ADMIN — Medication 3 MG: at 22:23

## 2025-04-09 RX ADMIN — MORPHINE SULFATE 30 MG: 30 TABLET, FILM COATED, EXTENDED RELEASE ORAL at 09:07

## 2025-04-09 RX ADMIN — TAMSULOSIN HYDROCHLORIDE 0.4 MG: 0.4 CAPSULE ORAL at 09:08

## 2025-04-09 RX ADMIN — MORPHINE SULFATE 30 MG: 30 TABLET, FILM COATED, EXTENDED RELEASE ORAL at 22:23

## 2025-04-09 RX ADMIN — DEXTROSE MONOHYDRATE, SODIUM CHLORIDE, AND POTASSIUM CHLORIDE 100 ML/HR: 50; 4.5; 1.49 INJECTION, SOLUTION INTRAVENOUS at 00:08

## 2025-04-09 RX ADMIN — DOCUSATE SODIUM 100 MG: 100 CAPSULE, LIQUID FILLED ORAL at 09:08

## 2025-04-09 RX ADMIN — METOPROLOL SUCCINATE 100 MG: 100 TABLET, EXTENDED RELEASE ORAL at 09:08

## 2025-04-09 RX ADMIN — MIDODRINE HYDROCHLORIDE 2.5 MG: 2.5 TABLET ORAL at 09:07

## 2025-04-09 RX ADMIN — MIDODRINE HYDROCHLORIDE 2.5 MG: 2.5 TABLET ORAL at 15:21

## 2025-04-09 SDOH — HEALTH STABILITY: MENTAL HEALTH: HOW MANY DRINKS CONTAINING ALCOHOL DO YOU HAVE ON A TYPICAL DAY WHEN YOU ARE DRINKING?: PATIENT DOES NOT DRINK

## 2025-04-09 SDOH — ECONOMIC STABILITY: INCOME INSECURITY: IN THE PAST 12 MONTHS HAS THE ELECTRIC, GAS, OIL, OR WATER COMPANY THREATENED TO SHUT OFF SERVICES IN YOUR HOME?: NO

## 2025-04-09 SDOH — HEALTH STABILITY: MENTAL HEALTH
DO YOU FEEL STRESS - TENSE, RESTLESS, NERVOUS, OR ANXIOUS, OR UNABLE TO SLEEP AT NIGHT BECAUSE YOUR MIND IS TROUBLED ALL THE TIME - THESE DAYS?: ONLY A LITTLE

## 2025-04-09 SDOH — ECONOMIC STABILITY: FOOD INSECURITY: WITHIN THE PAST 12 MONTHS, THE FOOD YOU BOUGHT JUST DIDN'T LAST AND YOU DIDN'T HAVE MONEY TO GET MORE.: NEVER TRUE

## 2025-04-09 SDOH — SOCIAL STABILITY: SOCIAL INSECURITY: ARE YOU MARRIED, WIDOWED, DIVORCED, SEPARATED, NEVER MARRIED, OR LIVING WITH A PARTNER?: MARRIED

## 2025-04-09 SDOH — SOCIAL STABILITY: SOCIAL NETWORK: HOW OFTEN DO YOU ATTEND CHURCH OR RELIGIOUS SERVICES?: 1 TO 4 TIMES PER YEAR

## 2025-04-09 SDOH — SOCIAL STABILITY: SOCIAL NETWORK: HOW OFTEN DO YOU GET TOGETHER WITH FRIENDS OR RELATIVES?: MORE THAN THREE TIMES A WEEK

## 2025-04-09 SDOH — ECONOMIC STABILITY: FOOD INSECURITY: WITHIN THE PAST 12 MONTHS, YOU WORRIED THAT YOUR FOOD WOULD RUN OUT BEFORE YOU GOT THE MONEY TO BUY MORE.: NEVER TRUE

## 2025-04-09 SDOH — HEALTH STABILITY: PHYSICAL HEALTH: ON AVERAGE, HOW MANY DAYS PER WEEK DO YOU ENGAGE IN MODERATE TO STRENUOUS EXERCISE (LIKE A BRISK WALK)?: 3 DAYS

## 2025-04-09 SDOH — SOCIAL STABILITY: SOCIAL INSECURITY: WITHIN THE LAST YEAR, HAVE YOU BEEN HUMILIATED OR EMOTIONALLY ABUSED IN OTHER WAYS BY YOUR PARTNER OR EX-PARTNER?: NO

## 2025-04-09 SDOH — SOCIAL STABILITY: SOCIAL INSECURITY: ABUSE: ADULT

## 2025-04-09 SDOH — SOCIAL STABILITY: SOCIAL INSECURITY: WITHIN THE LAST YEAR, HAVE YOU BEEN AFRAID OF YOUR PARTNER OR EX-PARTNER?: NO

## 2025-04-09 SDOH — HEALTH STABILITY: PHYSICAL HEALTH: ON AVERAGE, HOW MANY MINUTES DO YOU ENGAGE IN EXERCISE AT THIS LEVEL?: 10 MIN

## 2025-04-09 SDOH — HEALTH STABILITY: MENTAL HEALTH: HOW OFTEN DO YOU HAVE A DRINK CONTAINING ALCOHOL?: NEVER

## 2025-04-09 SDOH — SOCIAL STABILITY: SOCIAL NETWORK: HOW OFTEN DO YOU ATTEND MEETINGS OF THE CLUBS OR ORGANIZATIONS YOU BELONG TO?: NEVER

## 2025-04-09 SDOH — ECONOMIC STABILITY: HOUSING INSECURITY: IN THE LAST 12 MONTHS, WAS THERE A TIME WHEN YOU WERE NOT ABLE TO PAY THE MORTGAGE OR RENT ON TIME?: NO

## 2025-04-09 SDOH — SOCIAL STABILITY: SOCIAL NETWORK: HOW OFTEN DO YOU ATTEND CHURCH OR RELIGIOUS SERVICES?: NEVER

## 2025-04-09 SDOH — HEALTH STABILITY: MENTAL HEALTH
DO YOU FEEL STRESS - TENSE, RESTLESS, NERVOUS, OR ANXIOUS, OR UNABLE TO SLEEP AT NIGHT BECAUSE YOUR MIND IS TROUBLED ALL THE TIME - THESE DAYS?: TO SOME EXTENT

## 2025-04-09 SDOH — SOCIAL STABILITY: SOCIAL NETWORK
DO YOU BELONG TO ANY CLUBS OR ORGANIZATIONS SUCH AS CHURCH GROUPS, UNIONS, FRATERNAL OR ATHLETIC GROUPS, OR SCHOOL GROUPS?: NO

## 2025-04-09 SDOH — SOCIAL STABILITY: SOCIAL INSECURITY: DO YOU FEEL ANYONE HAS EXPLOITED OR TAKEN ADVANTAGE OF YOU FINANCIALLY OR OF YOUR PERSONAL PROPERTY?: NO

## 2025-04-09 SDOH — SOCIAL STABILITY: SOCIAL INSECURITY: HAVE YOU HAD ANY THOUGHTS OF HARMING ANYONE ELSE?: NO

## 2025-04-09 SDOH — SOCIAL STABILITY: SOCIAL NETWORK: IN A TYPICAL WEEK, HOW MANY TIMES DO YOU TALK ON THE PHONE WITH FAMILY, FRIENDS, OR NEIGHBORS?: TWICE A WEEK

## 2025-04-09 SDOH — HEALTH STABILITY: MENTAL HEALTH: HOW OFTEN DO YOU HAVE SIX OR MORE DRINKS ON ONE OCCASION?: NEVER

## 2025-04-09 SDOH — SOCIAL STABILITY: SOCIAL INSECURITY: DOES ANYONE TRY TO KEEP YOU FROM HAVING/CONTACTING OTHER FRIENDS OR DOING THINGS OUTSIDE YOUR HOME?: NO

## 2025-04-09 SDOH — ECONOMIC STABILITY: TRANSPORTATION INSECURITY: IN THE PAST 12 MONTHS, HAS LACK OF TRANSPORTATION KEPT YOU FROM MEDICAL APPOINTMENTS OR FROM GETTING MEDICATIONS?: NO

## 2025-04-09 SDOH — HEALTH STABILITY: PHYSICAL HEALTH
HOW OFTEN DO YOU NEED TO HAVE SOMEONE HELP YOU WHEN YOU READ INSTRUCTIONS, PAMPHLETS, OR OTHER WRITTEN MATERIAL FROM YOUR DOCTOR OR PHARMACY?: RARELY

## 2025-04-09 SDOH — ECONOMIC STABILITY: FOOD INSECURITY: HOW HARD IS IT FOR YOU TO PAY FOR THE VERY BASICS LIKE FOOD, HOUSING, MEDICAL CARE, AND HEATING?: NOT HARD AT ALL

## 2025-04-09 SDOH — ECONOMIC STABILITY: HOUSING INSECURITY: AT ANY TIME IN THE PAST 12 MONTHS, WERE YOU HOMELESS OR LIVING IN A SHELTER (INCLUDING NOW)?: NO

## 2025-04-09 SDOH — HEALTH STABILITY: PHYSICAL HEALTH: ON AVERAGE, HOW MANY DAYS PER WEEK DO YOU ENGAGE IN MODERATE TO STRENUOUS EXERCISE (LIKE A BRISK WALK)?: 2 DAYS

## 2025-04-09 SDOH — SOCIAL STABILITY: SOCIAL NETWORK
IN A TYPICAL WEEK, HOW MANY TIMES DO YOU TALK ON THE PHONE WITH FAMILY, FRIENDS, OR NEIGHBORS?: MORE THAN THREE TIMES A WEEK

## 2025-04-09 SDOH — SOCIAL STABILITY: SOCIAL INSECURITY: HAVE YOU HAD THOUGHTS OF HARMING ANYONE ELSE?: NO

## 2025-04-09 SDOH — SOCIAL STABILITY: SOCIAL NETWORK: HOW OFTEN DO YOU GET TOGETHER WITH FRIENDS OR RELATIVES?: ONCE A WEEK

## 2025-04-09 SDOH — SOCIAL STABILITY: SOCIAL INSECURITY: ARE THERE ANY APPARENT SIGNS OF INJURIES/BEHAVIORS THAT COULD BE RELATED TO ABUSE/NEGLECT?: NO

## 2025-04-09 SDOH — SOCIAL STABILITY: SOCIAL INSECURITY: HAS ANYONE EVER THREATENED TO HURT YOUR FAMILY OR YOUR PETS?: NO

## 2025-04-09 SDOH — ECONOMIC STABILITY: HOUSING INSECURITY: IN THE PAST 12 MONTHS, HOW MANY TIMES HAVE YOU MOVED WHERE YOU WERE LIVING?: 0

## 2025-04-09 SDOH — SOCIAL STABILITY: SOCIAL INSECURITY: ARE YOU OR HAVE YOU BEEN THREATENED OR ABUSED PHYSICALLY, EMOTIONALLY, OR SEXUALLY BY ANYONE?: NO

## 2025-04-09 SDOH — SOCIAL STABILITY: SOCIAL INSECURITY: DO YOU FEEL UNSAFE GOING BACK TO THE PLACE WHERE YOU ARE LIVING?: NO

## 2025-04-09 ASSESSMENT — COGNITIVE AND FUNCTIONAL STATUS - GENERAL
MOBILITY SCORE: 17
CLIMB 3 TO 5 STEPS WITH RAILING: A LITTLE
PATIENT BASELINE BEDBOUND: NO
MOVING TO AND FROM BED TO CHAIR: A LITTLE
HELP NEEDED FOR BATHING: A LITTLE
MOVING FROM LYING ON BACK TO SITTING ON SIDE OF FLAT BED WITH BEDRAILS: A LITTLE
DRESSING REGULAR UPPER BODY CLOTHING: A LITTLE
MOBILITY SCORE: 21
WALKING IN HOSPITAL ROOM: A LITTLE
PERSONAL GROOMING: A LITTLE
STANDING UP FROM CHAIR USING ARMS: A LITTLE
DAILY ACTIVITIY SCORE: 18
STANDING UP FROM CHAIR USING ARMS: A LITTLE
DRESSING REGULAR LOWER BODY CLOTHING: A LITTLE
WALKING IN HOSPITAL ROOM: A LITTLE
TURNING FROM BACK TO SIDE WHILE IN FLAT BAD: A LITTLE
DRESSING REGULAR LOWER BODY CLOTHING: A LOT
MOBILITY SCORE: 24
CLIMB 3 TO 5 STEPS WITH RAILING: A LOT
DAILY ACTIVITIY SCORE: 24
TOILETING: A LITTLE

## 2025-04-09 ASSESSMENT — PAIN - FUNCTIONAL ASSESSMENT
PAIN_FUNCTIONAL_ASSESSMENT: 0-10

## 2025-04-09 ASSESSMENT — ACTIVITIES OF DAILY LIVING (ADL)
HEARING - RIGHT EAR: FUNCTIONAL
LACK_OF_TRANSPORTATION: NO
JUDGMENT_ADEQUATE_SAFELY_COMPLETE_DAILY_ACTIVITIES: YES
ADEQUATE_TO_COMPLETE_ADL: YES
WALKS IN HOME: NEEDS ASSISTANCE
LACK_OF_TRANSPORTATION: NO
DRESSING YOURSELF: INDEPENDENT
FEEDING YOURSELF: INDEPENDENT
HEARING - LEFT EAR: FUNCTIONAL
HEARING - LEFT EAR: FUNCTIONAL
GROOMING: INDEPENDENT
GROOMING: INDEPENDENT
BATHING: INDEPENDENT
ADL_ASSISTANCE: INDEPENDENT
HEARING - RIGHT EAR: FUNCTIONAL
LACK_OF_TRANSPORTATION: NO
BATHING_ASSISTANCE: MINIMAL
ADL_ASSISTANCE: INDEPENDENT
WALKS IN HOME: NEEDS ASSISTANCE
TOILETING: INDEPENDENT
ADEQUATE_TO_COMPLETE_ADL: YES
FEEDING YOURSELF: INDEPENDENT
PATIENT'S MEMORY ADEQUATE TO SAFELY COMPLETE DAILY ACTIVITIES?: YES
BATHING: INDEPENDENT
PATIENT'S MEMORY ADEQUATE TO SAFELY COMPLETE DAILY ACTIVITIES?: YES
JUDGMENT_ADEQUATE_SAFELY_COMPLETE_DAILY_ACTIVITIES: YES
DRESSING YOURSELF: INDEPENDENT

## 2025-04-09 ASSESSMENT — LIFESTYLE VARIABLES
HOW OFTEN DO YOU HAVE A DRINK CONTAINING ALCOHOL: NEVER
SKIP TO QUESTIONS 9-10: 1
AUDIT-C TOTAL SCORE: 0
HOW OFTEN DO YOU HAVE 6 OR MORE DRINKS ON ONE OCCASION: NEVER
HOW MANY STANDARD DRINKS CONTAINING ALCOHOL DO YOU HAVE ON A TYPICAL DAY: PATIENT DOES NOT DRINK
AUDIT-C TOTAL SCORE: 0
AUDIT-C TOTAL SCORE: 0
SKIP TO QUESTIONS 9-10: 1

## 2025-04-09 ASSESSMENT — PATIENT HEALTH QUESTIONNAIRE - PHQ9
SUM OF ALL RESPONSES TO PHQ9 QUESTIONS 1 & 2: 0
1. LITTLE INTEREST OR PLEASURE IN DOING THINGS: NOT AT ALL
2. FEELING DOWN, DEPRESSED OR HOPELESS: NOT AT ALL

## 2025-04-09 ASSESSMENT — PAIN SCALES - GENERAL
PAINLEVEL_OUTOF10: 0 - NO PAIN
PAINLEVEL_OUTOF10: 3
PAINLEVEL_OUTOF10: 3
PAINLEVEL_OUTOF10: 0 - NO PAIN
PAINLEVEL_OUTOF10: 0 - NO PAIN
PAINLEVEL_OUTOF10: 3

## 2025-04-09 ASSESSMENT — PAIN SCALES - WONG BAKER
WONGBAKER_NUMERICALRESPONSE: NO HURT
WONGBAKER_NUMERICALRESPONSE: NO HURT

## 2025-04-09 ASSESSMENT — PAIN DESCRIPTION - LOCATION: LOCATION: ABDOMEN

## 2025-04-09 NOTE — NURSING NOTE
Assumed care of patient. Patient is resting in bed with no needs at this time. He did state that he was having stools for his rectum. I let him know that I would let the doctors know. Call light is in place will continue to monitor.

## 2025-04-09 NOTE — CONSULTS
Nutrition Assessement Note    Nutrition Assessment         Reason for Hospital Admission:  Roma Corral is a 70 y.o. male who is admitted for TPN administration. 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 Vasquez, and laparoscopic microwave ablation x3 with liver biopsy by Dr. Erazo on 3/18/25.  Postop patient was found to have high drain output in which chyle leak was found.  Patient was started on octreotide.  IR performed lymphoscintigraphy without decreasing drain output.  Patient was made n.p.o. and started on TPN with successful decrease in drain output.  Per patient the drain output is getting lighter in color. patient was started on TPN. Patient now with oral diet. Consumed 1/2 omelet and 1 1/2 pieces of yoo for breakfast. States he is slowly building up po intake. Requests nutritional supplements TID for added calories. Patient states goal is to discharge home on oral diet only. Recommend TPN to provide 66% of calories and will recommend to taper down if po intake is adequate.    Malnutrition Screening Tool (MST)  Have you recently lost weight without trying?: Unsure  Weight Loss Score: 2  Have you been eating poorly because of a decreased appetite?: No  Malnutrition Score: 2  Nutrition Screen  Stage 3 or 4 Pressure Injury or Multiple Non-Healing Wounds: No  Home Tube Feeding or Total Parenteral Nutrition (TPN): No (Did not recieve at SNF)  Dietitian Consult Needed: No    Past Medical History:   Diagnosis Date    Abdominal pain     Acute non-ST elevation myocardial infarction (NSTEMI) (Multi)     Anemia     6/6/24 HGB 8.6; HCT 31.5    Anxiety     CAD (coronary artery disease)     Cardiology follow-up encounter 12/11/2023    Sharif Lau MD    Gout     H/O cardiac catheterization  "06/01/2021    H/O cardiovascular stress test 09/03/2021    IMPRESSION: 1. No evidence of ischemia. 2. Suspicion of an infarct along the mid anterior wall. 3. Left ventricular dilatation is noted. 4. Left ventricular EF was calculated to be 50%. Summary:  1. No clinical or electrocardiographic evidence for ischemia at a submaximal workload. 3. The blunted heart rate diminshes the sensitivity of this test.  4. The submaximal level of stress was achieved.    H/O echocardiogram 06/02/2021    Mild concentric Left ventricle hypertrophy.  Global left ventricular wall motion and contractility are within normal limits.  There is normal left ventricular systolic function.  Estimated ejection fraction is 60-64%.  The left atrial size is mildly dilated.  Mild aortic regurgitation.  A trace of mitral regurgitation.  Trivial to mild tricuspid regurgitation.  There is no pulmonary hypertension.    High cholesterol     Hypertension     Overweight     Rectal cancer (Multi)     Type 2 diabetes mellitus     4/18/2024 A1C 7.5%      Past Surgical History:   Procedure Laterality Date    COLONOSCOPY  06/17/2024    HEMICOLECTOMY W/ OSTOMY      LEG SURGERY Left     rodrigo placed       Nutrition History:  Energy Intake: Fair 50-75 %  Food and Nutrient History: patient reports \"easing\" back in to solid foods     Anthropometrics:  Ht: 180.5 cm (5' 11.06\"), Wt: 66 kg (145 lb 8.1 oz), BMI: 20.26  IBW/kg (Dietitian Calculated): 78.18 kg  Percent of IBW: 84 %     Weight Change:  Daily Weight  04/09/25 : 66 kg (145 lb 8.1 oz)  04/06/25 : 66.9 kg (147 lb 7.8 oz)  03/12/25 : 70.8 kg (156 lb)  02/18/25 : 67.1 kg (148 lb)  02/12/25 : 69.5 kg (153 lb 3.5 oz)  02/07/25 : 72.8 kg (160 lb 7.9 oz)  02/05/25 : 72.9 kg (160 lb 11.5 oz)  02/04/25 : 74.3 kg (163 lb 12.8 oz)  01/24/25 : 74.5 kg (164 lb 3.9 oz)  01/17/25 : 74.4 kg (164 lb)    Weight History / % Weight Change: Patient endorses 20# (12.2%) weight loss in past 3 months  Significant Weight Loss: " Yes  Interpretation of Weight Loss: >7.5% in 3 months     Nutrition Focused Physical Exam Findings:   Subcutaneous Fat Loss  Orbital Fat Pads: Severe (dark circles, hollowing and loose skin)  Buccal Fat Pads: Severe (hollow, sunken and narrow face)  Triceps: Severe (negligible fat tissue)    Muscle Wasting  Temporalis: Severe (hollowed scooping depression)  Pectoralis (Clavicular Region): Severe (protruding prominent clavicle)  Deltoid/Trapezius: Severe (squared shoulders, acromion process prominent)    Nutrition Significant Labs:  Lab Results   Component Value Date    WBC 3.1 (L) 04/09/2025    HGB 7.5 (L) 04/09/2025    HCT 23.9 (L) 04/09/2025    PLT 99 (L) 04/09/2025    CHOL 154 06/06/2024    TRIG 109 04/18/2024    HDL 27.1 06/06/2024    ALT 27 04/09/2025    AST 42 (H) 04/09/2025     (L) 04/09/2025    K 4.6 04/09/2025     04/09/2025    CREATININE 0.95 04/09/2025    BUN 24 (H) 04/09/2025    CO2 24 04/09/2025    TSH 2.17 06/06/2024    INR 1.3 (H) 03/24/2025    HGBA1C 5.3 01/17/2025     Nutrition Specific Medications:  aspirin, 81 mg, oral, Daily  atorvastatin, 40 mg, oral, Nightly  docusate sodium, 100 mg, oral, BID  enoxaparin, 40 mg, subcutaneous, Nightly  metoprolol succinate XL, 100 mg, oral, Daily  midodrine, 2.5 mg, oral, TID  morphine CR, 30 mg, oral, q12h ALEX  octreotide, 200 mcg, subcutaneous, TID  pantoprazole, 40 mg, oral, Daily before breakfast   Or  pantoprazole, 40 mg, intravenous, Daily before breakfast  polyethylene glycol, 17 g, oral, Daily  tamsulosin, 0.4 mg, oral, Daily  thiamine, 100 mg, intravenous, Daily      potassium iscanos-L3-5.45%NaCl, 100 mL/hr, Last Rate: 100 mL/hr (04/09/25 0530)      Dietary Orders (From admission, onward)       Start     Ordered    04/09/25 1021  Oral nutritional supplements  Until discontinued        Comments: Chocolate or strawberry   Question Answer Comment   Deliver with All meals    Select supplement: Ensure Plus High Protein        04/09/25 1021     04/09/25 0227  May Participate in Room Service  ( ROOM SERVICE MAY PARTICIPATE)  Once        Question:  .  Answer:  Yes    04/09/25 0226 04/08/25 2311  Adult diet Regular  Diet effective now        Question:  Diet type  Answer:  Regular    04/08/25 2311                   Estimated Needs:   Estimated Energy Needs  Total Energy Estimated Needs in 24 hours (kCal):  (0628-7549)  Energy Estimated Needs per kg Body Weight in 24 hours (kCal/kg):  (30-35)  Method for Estimating Needs: Actual Wt    Estimated Protein Needs  Total Protein Estimated Needs in 24 Hours (g):  (79-99)  Protein Estimated Needs per kg Body Weight in 24 Hours (g/kg):  (1.2-1.5)  Method for Estimating 24 Hour Protein Needs: Actual Wt    Estimated Fluid Needs  Total Fluid Estimated Needs in 24 Hours (mL): 1980 mL  Method for Estimating 24 Hour Fluid Needs: 1 mL/kcal      Nutrition Diagnosis   Nutrition Diagnosis:  Malnutrition Diagnosis  Patient has Malnutrition Diagnosis: Yes  Diagnosis Status: New  Malnutrition Diagnosis: Severe malnutrition related to chronic disease or condition  Related to: decreased ability to consume sufficient energy  As Evidenced by: 20# (12.2%) weight loss in past 3 months, severe subcutaneous fat and muscle deficits,    Nutrition Diagnosis  Patient has Nutrition Diagnosis: Yes  Diagnosis Status (1): New  Nutrition Diagnosis 1: Inadequate oral intake  Related to (1): decreased ability to consume sufficient energy  As Evidenced by (1): low po intake     Nutrition Interventions/Recommendations   Nutrition Interventions and Recommendations:  Nutrition Prescription: Nutrition prescription for oral nutrition, Nutrition prescription for parenteral nutrition    Nutrition Recommendations:  Individualized Nutrition Prescription Provided for : 1980-2310 calories, 79-99 gm protein to be provided via oral and parenteral nutrition    Nutrition Interventions/Goals:   Food and/or Nutrient Delivery Interventions  Interventions: Meals and  snacks, Parenteral nutrition, Medical food supplement  Meals and Snacks: General healthful diet  Goal: provide as tolerated  Parenteral Nutrition: Management of composition of parenteral nutrition  Goal: recommend:  Formula: TPN standard - AA 5%, dextrose 20%  Cycled Rate (mL/hr):  (start rate at 68 ml/hr for 1 hour. increase rate to 136 ml/hr for 10 hours. decrease rate to 68 ml/hr for 1 hour, then stop.)  Total Volume (mL/day): 1500 mL/day  Total Parenteral Kcal (kcal/day): 1320 kcal/day  Total Protein (g/day): 75 g/day  Weight Frequency: Daily  Labs: Renal panel and magnesium daily, Repeat electrolytes as needed, LFTs: now and each week  Medical Food Supplement: Commercial beverage medical food supplement therapy  Goal: ensure plus high protein TID to provide 350 kcals and 20g protein each    Education Documentation  No documentation found.         Nutrition Monitoring and Evaluation   Monitoring/Evaluation:   Food/Nutrient Related History Monitoring  Monitoring and Evaluation Plan: Enteral and parenteral nutrition intake determination, Estimated Energy Intake  Estimated Energy Intake: Other criteria  Criteria: PO as tolerated  Enteral and Parenteral Nutrition Intake Determination: Parenteral nutrition intake - Tolerate TPN at goal rate    Anthropometric Measurements  Monitoring and Evaluation Plan: Body weight  Body Weight: Body weight - Promote weight restoration    Biochemical Data, Medical Tests and Procedures  Monitoring and Evaluation Plan: Electrolyte/renal panel  Electrolyte and Renal Panel: Electrolytes within normal limits  Glucose/Endocrine Profile: Glucose within normal limits ( mg/dL)    Goal Status: New goal(s) identified    Follow Up  Time Spent (min): 45 minutes  Last Date of Nutrition Visit: 04/09/25  Nutrition Follow-Up Needed?: 3-5 days  Follow up Comment: 4/10/25

## 2025-04-09 NOTE — NURSING NOTE
Vascular access note    Patient with Lt arm dual lumen picc, placed at [facility] on 3/29/25, current dressing D&I with CHG disc, dated 4/5, red lumen flushes easily and with positive blood return, clamped and curos cap applied, other lumen infusing without difficulty. Patient with picc for TPN infusion. Patient also had Rt chest mediport that is not accessed and patient states is only used for Chemotherapy.

## 2025-04-09 NOTE — PROGRESS NOTES
Pt with very recent stay at [facility], discharged to [facility] on 4/7 for what appears to be initiation of TPN[facility], she confirmed this information, states she does not want the pt to return to [facility] and states they are both hopeful he can return home. PT/OT sigridal on this day reveals low intensity needs. DC Home will be dependent on what the pts needs are related to TPN. If plan for home with home health an order for the TPN would be needed and referral sent to [facility] along with internal referral for homecare.      04/09/25 8341   Discharge Planning   Living Arrangements Spouse/significant other   Support Systems Spouse/significant other;Children   Number of Stairs to Enter Residence 2   Number of Stairs Within Residence 6   Do you have animals or pets at home? Yes   Type of Animals or Pets Dog   Who is requesting discharge planning? Provider   Expected Discharge Disposition Othe   Does the patient need discharge transport arranged? Yes   RoundTrip coordination needed? Yes   Has discharge transport been arranged? No   Financial Resource Strain   How hard is it for you to pay for the very basics like food, housing, medical care, and heating? Not hard   Housing Stability   In the last 12 months, was there a time when you were not able to pay the mortgage or rent on time? N   At any time in the past 12 months, were you homeless or living in a shelter (including now)? N   Transportation Needs   In the past 12 months, has lack of transportation kept you from medical appointments or from getting medications? no   In the past 12 months, has lack of transportation kept you from meetings, work, or from getting things needed for daily living? No

## 2025-04-09 NOTE — H&P
History Of Present Illness  Roma Corral is a 70 y.o. male presenting with need to start TPN.  Patient was just discharged to the rehab facility yesterday.  Family got upset as patient did not receive the call Dr. colvin who recommended patient to be directly admitted to get the TPN going.  Patient was directly admitted by me for reinitiation of TPN orders.  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 Vasquez, and laparoscopic microwave ablation x3 with liver biopsy by Dr. Erazo on 3/18/25.  Postop patient was found to have high drain output in which chyle leak was found.  Patient was started on octreotide.  IR performed lymphoscintigraphy without decreasing drain output.  Patient was made n.p.o. and started on TPN with successful decrease in drain output.  Per patient the drain output is getting lighter in color. patient was started on TPN     Past Medical History  Past Medical History:   Diagnosis Date    Abdominal pain     Acute non-ST elevation myocardial infarction (NSTEMI) (Multi)     Anemia     6/6/24 HGB 8.6; HCT 31.5    Anxiety     CAD (coronary artery disease)     Cardiology follow-up encounter 12/11/2023    Sharif Lau MD    Gout     H/O cardiac catheterization 06/01/2021    H/O cardiovascular stress test 09/03/2021    IMPRESSION: 1. No evidence of ischemia. 2. Suspicion of an infarct along the mid anterior wall. 3. Left ventricular dilatation is noted. 4. Left ventricular EF was calculated to be 50%. Summary:  1. No clinical or electrocardiographic evidence for ischemia at a submaximal workload. 3. The blunted heart rate diminshes the sensitivity of this test.  4. The  submaximal level of stress was achieved.    H/O echocardiogram 06/02/2021    Mild concentric Left ventricle hypertrophy.  Global left ventricular wall motion and contractility are within normal limits.  There is normal left ventricular systolic function.  Estimated ejection fraction is 60-64%.  The left atrial size is mildly dilated.  Mild aortic regurgitation.  A trace of mitral regurgitation.  Trivial to mild tricuspid regurgitation.  There is no pulmonary hypertension.    High cholesterol     Hypertension     Overweight     Rectal cancer (Multi)     Type 2 diabetes mellitus     4/18/2024 A1C 7.5%       Surgical History  Past Surgical History:   Procedure Laterality Date    COLONOSCOPY  06/17/2024    HEMICOLECTOMY W/ OSTOMY      LEG SURGERY Left     rodrigo placed        Social History  He reports that he has never smoked. He has never used smokeless tobacco. He reports that he does not currently use alcohol. He reports that he does not use drugs.    Family History  Family History   Problem Relation Name Age of Onset    No Known Problems Mother      No Known Problems Father      No Known Problems Sister      Leukemia Brother      Stroke Maternal Grandfather          Allergies  Patient has no known allergies.    Review of Systems   Constitutional:  Negative for activity change, appetite change, chills, diaphoresis, fatigue, fever and unexpected weight change.   HENT:  Negative for congestion, dental problem, drooling, ear discharge, ear pain, facial swelling, hearing loss, mouth sores, nosebleeds, postnasal drip, rhinorrhea, sinus pressure, sinus pain, sneezing, sore throat, tinnitus, trouble swallowing and voice change.    Eyes:  Negative for photophobia, pain, discharge, redness, itching and visual disturbance.   Respiratory:  Negative for apnea, cough, choking, chest tightness, shortness of breath, wheezing and stridor.    Cardiovascular:  Negative for chest pain, palpitations and leg swelling.   Gastrointestinal:   Positive for abdominal pain. Negative for abdominal distention, anal bleeding, blood in stool, constipation, diarrhea, nausea, rectal pain and vomiting.   Endocrine: Negative for cold intolerance, heat intolerance, polydipsia, polyphagia and polyuria.   Genitourinary:  Negative for decreased urine volume, difficulty urinating, dysuria, enuresis, flank pain, frequency, genital sores, hematuria and urgency.   Musculoskeletal:  Negative for arthralgias, back pain, gait problem, joint swelling, myalgias, neck pain and neck stiffness.   Skin:  Negative for color change, pallor, rash and wound.   Allergic/Immunologic: Negative for environmental allergies, food allergies and immunocompromised state.   Neurological:  Negative for dizziness, tremors, seizures, syncope, facial asymmetry, speech difficulty, weakness, light-headedness, numbness and headaches.   Hematological:  Negative for adenopathy. Does not bruise/bleed easily.   Psychiatric/Behavioral:  Negative for agitation, behavioral problems, confusion, decreased concentration, dysphoric mood, hallucinations, self-injury, sleep disturbance and suicidal ideas. The patient is not nervous/anxious and is not hyperactive.         Physical Exam  Vitals reviewed.   Constitutional:       Appearance: Normal appearance.   HENT:      Head: Normocephalic and atraumatic.      Right Ear: Tympanic membrane, ear canal and external ear normal.      Left Ear: Tympanic membrane, ear canal and external ear normal.      Nose: Nose normal.      Mouth/Throat:      Pharynx: Oropharynx is clear.   Eyes:      Extraocular Movements: Extraocular movements intact.      Conjunctiva/sclera: Conjunctivae normal.      Pupils: Pupils are equal, round, and reactive to light.   Cardiovascular:      Rate and Rhythm: Normal rate and regular rhythm.      Pulses: Normal pulses.      Heart sounds: Normal heart sounds.   Pulmonary:      Effort: Pulmonary effort is normal.      Breath sounds: Normal breath  sounds.   Abdominal:      General: Abdomen is flat. Bowel sounds are normal.      Palpations: Abdomen is soft.      Comments: Colostomy bag on the left side, drain in the right upper quadrant   Musculoskeletal:      Cervical back: Normal range of motion and neck supple.   Skin:     General: Skin is warm and dry.      Comments: PICC line in left arm   Neurological:      General: No focal deficit present.      Mental Status: He is alert and oriented to person, place, and time.   Psychiatric:         Mood and Affect: Mood normal.          Last Recorded Vitals  There were no vitals taken for this visit.    Relevant Results        Scheduled medications  [START ON 4/9/2025] aspirin, 81 mg, oral, Daily  atorvastatin, 40 mg, oral, Nightly  docusate sodium, 100 mg, oral, BID  enoxaparin, 40 mg, subcutaneous, Nightly  [START ON 4/9/2025] metoprolol succinate XL, 100 mg, oral, Daily  [START ON 4/9/2025] midodrine, 2.5 mg, oral, TID  morphine CR, 30 mg, oral, q12h ALEX  octreotide, 200 mcg, subcutaneous, TID  [START ON 4/9/2025] pantoprazole, 40 mg, oral, Daily before breakfast   Or  [START ON 4/9/2025] pantoprazole, 40 mg, intravenous, Daily before breakfast  [START ON 4/9/2025] polyethylene glycol, 17 g, oral, Every other day  [START ON 4/9/2025] polyethylene glycol, 17 g, oral, Daily  [START ON 4/9/2025] tamsulosin, 0.4 mg, oral, Daily  [START ON 4/9/2025] thiamine, 100 mg, intravenous, Daily      Continuous medications  potassium aqrnaqe-O6-5.45%NaCl, 100 mL/hr      PRN medications  PRN medications: acetaminophen **OR** acetaminophen **OR** acetaminophen, alteplase, benzocaine-menthol, dextromethorphan-guaifenesin, guaiFENesin, LORazepam, melatonin, ondansetron **OR** ondansetron, prochlorperazine  No results found. However, due to the size of the patient record, not all encounters were searched. Please check Results Review for a complete set of results.  No results found.       Assessment/Plan   Assessment &  Plan  Dysphagia    Anxiety    CAD (coronary artery disease)    Colostomy care    History of rectal cancer    Hyperlipidemia    Hypertension    Rectal cancer (Multi)    Type 2 diabetes mellitus    Severe protein-calorie malnutrition (Multi)    Ileorectal fistula      Start TPN  Continue home medications  PT OT  Social service consult for discharge planning  See orders for details       I spent  minutes in the professional and overall care of this patient.      Roxanne Buckner MD

## 2025-04-09 NOTE — CARE PLAN
The patient's goals for the shift include rest    The clinical goals for the shift include rest

## 2025-04-09 NOTE — PROGRESS NOTES
Physical Therapy    Physical Therapy Evaluation    Patient Name: Roma Corral  MRN: [id number]  Department: [address]  Room: [address] [place] [address]  Today's Date: 4/9/2025   Time Calculation  Start Time: 0755  Stop Time: 0825  Time Calculation (min): 30 min    Assessment/Plan   PT Assessment  PT Assessment Results: Decreased strength, Decreased endurance, Impaired balance, Decreased coordination, Decreased safety awareness, Pain  Rehab Prognosis: Good  Barriers to Discharge Home: No anticipated barriers  Evaluation/Treatment Tolerance: Patient limited by fatigue  Medical Staff Made Aware: Yes  Strengths: Ability to acquire knowledge, Premorbid level of function  Barriers to Participation: Comorbidities  End of Session Communication: Bedside nurse  Assessment Comment: pt required close supervision to contact guard of  for the above mobility; pt demonstrates decreased strength, balance, endurance, mobility tolerance/safety as comapred to baseline HOWEVER PATIENT SHOULD PROGRESS WELL ENOUGH FOR SAFE D/C HOME WITH LIKELY USE OF FWW AT THIS TIME, ASSIST FROM SPOUSE AS NEEDED AND LOW INT REHAB.  patient will benefit from continued skilled PT services while in acute care. Will look into if pt able to receive TPN services at home.  End of Session Patient Position: Up in chair, Alarm on (call button in reach)  IP OR SWING BED PT PLAN  Inpatient or Swing Bed: Inpatient  PT Plan  Treatment/Interventions: Bed mobility, Transfer training, Gait training, Stair training, Balance training, Strengthening, Endurance training, Therapeutic exercise, Therapeutic activity  PT Plan: Ongoing PT  PT Frequency: 4 times per week  PT Discharge Recommendations: Low intensity level of continued care  Equipment Recommended upon Discharge: Wheeled walker  PT Recommended Transfer Status: Contact guard, Assistive device (FWW)  PT - OK to Discharge: Yes    Subjective   General Visit Information:  General  Reason for Referral: impaired mobility' + dysphagia  Referred  By: Dr Roxanne Buckner  Past Medical History Relevant to Rehab: CAD, NSTEMI, DM, HTN, gout, anxiety, metastatic rectal CA ( mets to liver) with colostomy  Family/Caregiver Present: No  Co-Treatment: OT  Co-Treatment Reason: to maximize pt safety; limited mobility tolerance  Prior to Session Communication: Bedside nurse  Patient Position Received: Bed, 3 rail up, Alarm off, not on at start of session  Preferred Learning Style: verbal, visual  General Comment: 71 yo WM admitted to [facility] from SNF on 4/8/25---SNF unable to care for pt needing TPN ; Was just d/c'ed [facility] ( [facility] 4/7/25. Cleared by nurse for therapy; pt agreeable to therapy; + telemetry, IV, colostomy  Home Living:  Home Living  Type of Home: House  Lives With: Spouse  Home Adaptive Equipment: None  Home Layout: Two level, Able to live on main level with bedroom/bathroom, Laundry in basement, Stairs to alternate level with rails, Stairs to alternate level without rails  Alternate Level Stairs-Rails:  (unilateral)  Alternate Level Stairs-Number of Steps: 7 steps down to laundry; does hAVE BEDROOM/WALK-IN SHOWER UPSTAIRS as well as on main floor  Home Access: Stairs to enter without rails  Entrance Stairs-Rails: None  Entrance Stairs-Number of Steps: 1  Bathroom Shower/Tub: Tub/shower unit, Walk-in shower  Bathroom Toilet: Standard  Bathroom Equipment: Grab bars in shower  Bathroom Accessibility: walk-in and tube on main floor; walk-in also on 2nd floor  Prior Level of Function:  Prior Function Per Pt/Caregiver Report  Level of [place]: Independent with ADLs and functional transfers, Independent with homemaking with ambulation (prior to 3/25 abdominal sx)  Receives Help From: Family (spouse)  ADL Assistance: Independent  Homemaking Assistance: Needs assistance (spouse able to manage all chores at this time)  Driving/Transportation:  (spouse and pt both drive)  Ambulatory Assistance: Independent (prior to 3/25 abd sx; currently using FWW with  "contact guard of 1)  Vocational: Retired ()  Hand Dominance: Left  Prior Function Comments: pt denies falls; independently was managing his meds prior to 3/25 abd sx  Precautions:  Precautions  Hearing/Visual Limitations: reading glasses  Medical Precautions: Fall precautions, Abdominal precautions  Post-Surgical Precautions: Abdominal surgery precautions  Precautions Comment: + accordian drain RUQ; PT educated pt in post-op abdominal precautions      Date/Time Vitals Session Patient Position Pulse Resp SpO2 BP MAP (mmHg)    04/09/25 0755 During PT  --  --  --  --  --  --     04/09/25 0820 --  --  57  17  100 %  104/66  76           Vital Signs Comment: seated o2 sat 100% with HR 57 bpm, /66----room air     Objective   Pain:  Pain Assessment  Pain Assessment: 0-10  0-10 (Numeric) Pain Score: 3  Pain Type: Surgical pain  Pain Location: Abdomen  Pain Orientation: Mid  Pain Interventions: Repositioned  Response to Interventions: No change in pain  Cognition:  Cognition  Overall Cognitive Status: Within Functional Limits  Orientation Level: Oriented X4  Following Commands: Follows all commands and directions without difficulty  Cognition Comments: easily distracted; appears slightly confused  Safety/Judgement:  (intermittant decreased safety insight during functional mobility)  Insight: Mild  Impulsive: Mildly    General Assessments:  General Observation  General Observation: pleasant, cooperative, + colostomy, RUQ accordian drain               Activity Tolerance  Endurance: Decreased tolerance for upright activites  Activity Tolerance Comments: fair due to c/o \" mild lightheadedness\" during amb; + fatigue    Sensation  Light Touch: No apparent deficits    Strength  Strength Comments: henri hips 3+/5; knees 4/5; ankles 4-/5  Coordination  Movements are Fluid and Coordinated: Yes  Lower Body Coordination: WFL's    Postural Control  Posture Comment: flexed posture with rounded shoulders    Static " Sitting Balance  Static Sitting-Balance Support: Feet supported  Static Sitting-Level of Assistance: Close supervision  Static Sitting-Comment/Number of Minutes: 3-4 min---good  Dynamic Sitting Balance  Dynamic Sitting-Balance Support: Feet supported  Dynamic Sitting-Level of Assistance: Close supervision  Dynamic Sitting-Comments: good -    Static Standing Balance  Static Standing-Balance Support: Bilateral upper extremity supported  Static Standing-Level of Assistance: Contact guard  Static Standing-Comment/Number of Minutes: FWW, 1-2 min; good -  Dynamic Standing Balance  Dynamic Standing-Balance Support: Bilateral upper extremity supported  Dynamic Standing-Level of Assistance: Contact guard  Dynamic Standing-Comments: FWW, fair +  Functional Assessments:  Bed Mobility  Bed Mobility: Yes  Bed Mobility 1  Bed Mobility 1: Supine to sitting, Log roll  Level of Assistance 1: Close supervision  Bed Mobility Comments 1: head of bed flat; use of 1 bedrail    Transfers  Transfer: Yes  Transfer 1  Transfer From 1: Bed to  Transfer to 1: Stand  Technique 1: Sit to stand  Transfer Device 1: Walker  Transfer Level of Assistance 1: Contact guard, Minimal verbal cues  Trials/Comments 1: verbal cues for proper henri hand placement on bed  Transfers 2  Transfer From 2: Stand to  Transfer to 2: Chair with arms  Technique 2: Stand to sit  Transfer Device 2: Walker  Transfer Level of Assistance 2: Contact guard, Minimal verbal cues  Trials/Comments 2: verbal cues for reaching back with both hands for arms of chair    Ambulation/Gait Training  Ambulation/Gait Training Performed: Yes  Ambulation/Gait Training 1  Surface 1: Level tile  Device 1: Rolling walker  Assistance 1: Contact guard, Minimal verbal cues  Quality of Gait 1: Forward flexed posture  Comments/Distance (ft) 1: 30 ft x 2, + turns, verbal cues for erect posture    Stairs  Stairs: No  Extremity/Trunk Assessments:  Cervical Spine   Cervical Spine:  (mild forward  head)  RLE   RLE :  (see above strength comments)  LLE   LLE :  (see above strength comments)  Outcome Measures:  [facility] Basic Mobility  Turning from your back to your side while in a flat bed without using bedrails: A little  Moving from lying on your back to sitting on the side of a flat bed without using bedrails: A little  Moving to and from bed to chair (including a wheelchair): A little  Standing up from a chair using your arms (e.g. wheelchair or bedside chair): A little  To walk in hospital room: A little  Climbing 3-5 steps with railing: A lot  Basic Mobility - Total Score: 17    Encounter Problems       Encounter Problems (Active)       Balance       STG - Maintains dynamic standing balance with upper extremity support (Progressing)       Start:  04/09/25    Expected End:  05/07/25       INTERVENTIONS:1. Practice standing with minimal support.2. Educate patient about standing tolerance.3. Educate patient about independence with gait, transfers, and ADL's.4. Educate patient about use of assistive device.5. Educate patient about self-directed care.            Mobility       STG - Patient will ambulate 300 ft, FWW, + turns, mod ind (Progressing)       Start:  04/09/25    Expected End:  05/07/25            pt ascends/descends 1 step without rail, contact guard of 1 (Progressing)       Start:  04/09/25    Expected End:  05/07/25               PT Transfers       STG - Patient to transfer to and from sit to supine mod ind via logroll (Progressing)       Start:  04/09/25    Expected End:  05/07/25            STG - Patient will transfer sit to and from stand mod ind (Progressing)       Start:  04/09/25    Expected End:  05/07/25               Pain       pt will verbalize no > 2/10 pain during functional mobility (Progressing)       Start:  04/09/25    Expected End:  05/07/25               Pain - Adult          Safety       Goal 1 (Progressing)       Start:  04/09/25                   Education  Documentation  Precautions, taught by Agnes Mccann PT at 4/9/2025  8:52 AM.  Learner: Patient  Readiness: Acceptance  Method: Explanation  Response: Verbalizes Understanding, Needs Reinforcement  Comment: PT educated pt in post-op abdominal precautions and safe transfer technique    Mobility Training, taught by Agnes Mccann, PT at 4/9/2025  8:52 AM.  Learner: Patient  Readiness: Acceptance  Method: Explanation  Response: Verbalizes Understanding, Needs Reinforcement  Comment: PT educated pt in post-op abdominal precautions and safe transfer technique    Education Comments  No comments found.

## 2025-04-09 NOTE — PROGRESS NOTES
Occupational Therapy    Evaluation    Patient Name: Roma Corral  MRN: [id number]  Department: [facility]  Room: [address] [place] [address]  Today's Date: 4/9/2025  Time Calculation  Start Time: 0801  Stop Time: 0821  Time Calculation (min): 20 min        Assessment:  OT Assessment: Pt presents on eval with generalized weakness, abdominal precautions in place, decreased activity tolerance, and impaired standing balance affecting self-care and functional transfers/mobility. Pt will benefit from continued skilled OT to address these deficits and facilitate returning to functional baseline.  Prognosis: Good  Barriers to Discharge Home: Physical needs  Physical Needs: Intermittent mobility assistance needed, Intermittent ADL assistance needed  Evaluation/Treatment Tolerance: Patient limited by fatigue, Treatment limited secondary to medical complications (Comment) (Dizziness)  End of Session Communication: Bedside nurse  End of Session Patient Position: Up in chair, Alarm on (Needs in reach.)  OT Assessment Results: Decreased ADL status, Decreased endurance, Decreased functional mobility, Decreased IADLs  Strengths: Premorbid level of function  Barriers to Participation: Comorbidities    Plan:  Treatment Interventions: ADL retraining, Functional transfer training, Endurance training, Patient/family training, Equipment evaluation/education, Neuromuscular reeducation, Compensatory technique education  OT Frequency: 4 times per week  OT Discharge Recommendations: Low intensity level of continued care  Equipment Recommended upon Discharge: Wheeled walker  OT Recommended Transfer Status:  (CGA)  OT - OK to Discharge: Yes      Subjective     General:  General  Reason for Referral: impaired ADL's/mobility + dysphagia  Referred By: Roxanne Buckner MD  Past Medical History Relevant to Rehab: CAD, NSTEMI, DM, HTN, gout, anxiety, metastatic rectal CA ( mets to liver) with colostomy  Family/Caregiver Present: No  Co-Treatment: PT  Co-Treatment Reason: to  maximize pt safety; limited mobility tolerance  Prior to Session Communication: Bedside nurse  Patient Position Received: Bed, 3 rail up, Alarm off, not on at start of session  General Comment: Pt is a 71 yo male admitted to [facility] from [facility] on 4/8/25---SNF unable to care for pt needing TPN ; Was just d/c'ed to [facility] ( [facility] ) 4/7/25. Cleared by nurse for therapy; pt agreeable to therapy; + telemetry, IV, colostomy.    Precautions:  Hearing/Visual Limitations: reading glasses  Medical Precautions: Fall precautions  Post-Surgical Precautions: Abdominal surgery precautions  Precautions Comment: + accordian drain RUQ    Vital Signs Comment: seated o2 sat 100% with HR 57 bpm, /66----room air     Pain:  Pain Assessment  Pain Assessment: 0-10  0-10 (Numeric) Pain Score: 3  Pain Type: Surgical pain, Acute pain  Pain Location: Abdomen  Pain Orientation: Mid  Pain Interventions: Repositioned    Objective     Cognition:  Overall Cognitive Status: Within Functional Limits  Orientation Level: Oriented X4  Cognition Comments: Pt appears intermittently confused during conversation and tends to use humor often.  Insight: Mild  Impulsive: Mildly    Home Living:  Type of Home: House  Lives With: Spouse  Home Adaptive Equipment: None  Home Layout: Two level, Able to live on main level with bedroom/bathroom, Laundry in basement, Stairs to alternate level with rails, Stairs to alternate level without rails  Alternate Level Stairs-Rails:  (unilateral)  Alternate Level Stairs-Number of Steps: 7 steps down to laundry; does have bedroom walk-in shower upstairs as well as on main floor  Home Access: Stairs to enter without rails  Entrance Stairs-Rails: None  Entrance Stairs-Number of Steps: 1  Bathroom Shower/Tub: Tub/shower unit, Walk-in shower  Bathroom Toilet: Standard  Bathroom Equipment: Grab bars in shower  Bathroom Accessibility: walk-in and tube on main floor; walk-in also on 2nd floor    Prior Function:  Level of  [place]: Independent with ADLs and functional transfers, Independent with homemaking with ambulation (prior to 3/25 abdominal sx)  Receives Help From: Family (spouse)  ADL Assistance: Independent  Homemaking Assistance: Needs assistance (spouse able to manage all chores at this time)  Driving/Transportation:  (spouse and pt both drive)  Ambulatory Assistance: Independent (prior to 3/25 abd sx; currently using FWW with contact guard of 1)  Vocational: Retired ()  Hand Dominance: Left  Prior Function Comments: pt denies falls; independently was managing his meds prior to 3/25 abd sx    ADL:  Eating Assistance: Independent  Grooming Assistance: Stand by  Grooming Deficit: Supervision/safety, Standing with assistive device  Bathing Assistance: Minimal  UE Dressing Assistance: Stand by  UE Dressing Deficit: Setup  LE Dressing Assistance: Moderate  Toileting Assistance with Device: Minimal    Activity Tolerance:  Activity Tolerance Comments: Fair, but limited by persistent mild dizziness during functional mobility    Bed Mobility/Transfers: Bed Mobility  Bed Mobility:  (Pt completed supine to sit in bed with close S for safety.)    Transfers  Transfer:  (Pt completed sit<>stand with CGA for balance/safety and verbal cues for hand placement.)    Functional Mobility:  Functional Mobility  Functional Mobility Performed:  (Pt tolerated functional mobility for a short household distance using a RW with CGA for balance/safety and pt reporting persistent mild dizziness throughout performance.)    Sitting Balance:  Static Sitting Balance  Static Sitting-Balance Support: Bilateral upper extremity supported, Feet supported  Static Sitting-Level of Assistance: Close supervision    Standing Balance:  Static Standing Balance  Static Standing-Balance Support: Bilateral upper extremity supported  Static Standing-Level of Assistance: Contact guard     Sensation:  Sensation Comment: pt denies  paresthesias    Strength:  Strength Comments: HARSHAD's WFL    Coordination:  Coordination Comment: HARSHAD's WFL grossly     Hand Function:  Gross Grasp: Functional  Coordination: Functional      Outcome Measures:[facility] Daily Activity  Putting on and taking off regular lower body clothing: A lot  Bathing (including washing, rinsing, drying): A little  Putting on and taking off regular upper body clothing: A little  Toileting, which includes using toilet, bedpan or urinal: A little  Taking care of personal grooming such as brushing teeth: A little  Eating Meals: None  Daily Activity - Total Score: 18    Education Documentation  Precautions, taught by Agustin Holland Jr., OT at 4/9/2025  9:49 AM.  Learner: Patient  Readiness: Acceptance  Method: Explanation  Response: Verbalizes Understanding, Needs Reinforcement  Comment: Education and verbal cues provided throughout eval.    ADL Training, taught by Agustin Holland Jr., OT at 4/9/2025  9:49 AM.  Learner: Patient  Readiness: Acceptance  Method: Explanation  Response: Verbalizes Understanding, Needs Reinforcement  Comment: Education and verbal cues provided throughout eval.    Education Comments  No comments found.    Goals:  Encounter Problems       Encounter Problems (Active)       OT Goals       Pt will complete all ADL's with mod indep/indep using adaptive equipment as needed. (Progressing)       Start:  04/09/25    Expected End:  05/09/25            Pt will complete all functional transfers and mobility with mod indep using a RW. (Progressing)       Start:  04/09/25    Expected End:  05/09/25            Pt will tolerate functional mobility for a household distance using a RW with mod indep. (Progressing)       Start:  04/09/25    Expected End:  05/09/25

## 2025-04-09 NOTE — PROGRESS NOTES
"Roma Corral is a 70 y.o. male on day 1 of admission presenting with Dysphagia.    Subjective   Doing ok . Carmen some oral        Objective     Physical Exam  Vitals reviewed.   Constitutional:       Appearance: Normal appearance.   HENT:      Head: Normocephalic and atraumatic.      Right Ear: Tympanic membrane, ear canal and external ear normal.      Left Ear: Tympanic membrane, ear canal and external ear normal.      Nose: Nose normal.      Mouth/Throat:      Pharynx: Oropharynx is clear.   Eyes:      Extraocular Movements: Extraocular movements intact.      Conjunctiva/sclera: Conjunctivae normal.      Pupils: Pupils are equal, round, and reactive to light.   Cardiovascular:      Rate and Rhythm: Normal rate and regular rhythm.      Pulses: Normal pulses.      Heart sounds: Normal heart sounds.   Pulmonary:      Effort: Pulmonary effort is normal.      Breath sounds: Normal breath sounds.   Abdominal:      General: Abdomen is flat. Bowel sounds are normal.      Palpations: Abdomen is soft.      Comments: Drain in RUQ   Colostomy bag in LLQ   Musculoskeletal:      Cervical back: Normal range of motion and neck supple.   Skin:     General: Skin is warm and dry.   Neurological:      General: No focal deficit present.      Mental Status: He is alert and oriented to person, place, and time.   Psychiatric:         Mood and Affect: Mood normal.         Last Recorded Vitals  Blood pressure 96/56, pulse 66, temperature 36.5 °C (97.7 °F), temperature source Oral, resp. rate 16, height 1.805 m (5' 11.06\"), weight 66 kg (145 lb 8.1 oz), SpO2 100%.  Intake/Output last 3 Shifts:  I/O last 3 completed shifts:  In: 536.7 (8.1 mL/kg) [IV Piggyback:536.7]  Out: 450 (6.8 mL/kg) [Urine:350 (0.1 mL/kg/hr); Drains:50; Stool:50]  Weight: 66 kg     Relevant Results               This patient has a central line   Reason for the central line remaining today? Parenteral medication    Scheduled medications  aspirin, 81 mg, oral, " Daily  atorvastatin, 40 mg, oral, Nightly  docusate sodium, 100 mg, oral, BID  enoxaparin, 40 mg, subcutaneous, Nightly  metoprolol succinate XL, 100 mg, oral, Daily  midodrine, 2.5 mg, oral, TID  morphine CR, 30 mg, oral, q12h ALEX  octreotide, 200 mcg, subcutaneous, TID  pantoprazole, 40 mg, oral, Daily before breakfast   Or  pantoprazole, 40 mg, intravenous, Daily before breakfast  polyethylene glycol, 17 g, oral, Daily  tamsulosin, 0.4 mg, oral, Daily  thiamine, 100 mg, intravenous, Daily      Continuous medications  potassium nwypsey-W3-4.45%NaCl, 100 mL/hr, Last Rate: 100 mL/hr (04/09/25 0530)      PRN medications  PRN medications: acetaminophen **OR** acetaminophen **OR** acetaminophen, alteplase, benzocaine-menthol, dextromethorphan-guaifenesin, guaiFENesin, LORazepam, melatonin, ondansetron **OR** ondansetron, prochlorperazine  Results for orders placed or performed during the hospital encounter of 04/08/25 (from the past 24 hours)   CBC   Result Value Ref Range    WBC 3.1 (L) 4.4 - 11.3 x10*3/uL    nRBC 0.0 0.0 - 0.0 /100 WBCs    RBC 2.65 (L) 4.50 - 5.90 x10*6/uL    Hemoglobin 7.5 (L) 13.5 - 17.5 g/dL    Hematocrit 23.9 (L) 41.0 - 52.0 %    MCV 90 80 - 100 fL    MCH 28.3 26.0 - 34.0 pg    MCHC 31.4 (L) 32.0 - 36.0 g/dL    RDW 19.9 (H) 11.5 - 14.5 %    Platelets 99 (L) 150 - 450 x10*3/uL   Comprehensive metabolic panel   Result Value Ref Range    Glucose 136 (H) 74 - 99 mg/dL    Sodium 135 (L) 136 - 145 mmol/L    Potassium 4.6 3.5 - 5.3 mmol/L    Chloride 107 98 - 107 mmol/L    Bicarbonate 24 21 - 32 mmol/L    Anion Gap 9 (L) 10 - 20 mmol/L    Urea Nitrogen 24 (H) 6 - 23 mg/dL    Creatinine 0.95 0.50 - 1.30 mg/dL    eGFR 86 >60 mL/min/1.73m*2    Calcium 7.0 (L) 8.6 - 10.3 mg/dL    Albumin 1.9 (L) 3.4 - 5.0 g/dL    Alkaline Phosphatase 689 (H) 33 - 136 U/L    Total Protein 5.2 (L) 6.4 - 8.2 g/dL    AST 42 (H) 9 - 39 U/L    Bilirubin, Total 0.6 0.0 - 1.2 mg/dL    ALT 27 10 - 52 U/L     *Note: Due to a large  number of results and/or encounters for the requested time period, some results have not been displayed. A complete set of results can be found in Results Review.     No results found.          Malnutrition Diagnosis Status: New  Malnutrition Diagnosis: Severe malnutrition related to chronic disease or condition  Related to: decreased ability to consume sufficient energy  As Evidenced by: 20# (12.2%) weight loss in past 3 months, severe subcutaneous fat and muscle deficits,  I agree with the dietitian's malnutrition diagnosis.      Assessment/Plan   Assessment & Plan  Dysphagia    Anxiety    CAD (coronary artery disease)    Colostomy care    History of rectal cancer    Hyperlipidemia    Hypertension    Rectal cancer (Multi)    Type 2 diabetes mellitus    Severe protein-calorie malnutrition (Multi)    Ileorectal fistula    Will consult GI for TPN orders  Discussed with the dietitian  Patient is allowed to take oral  Will check with GI patient diet can be advanced with the drain in place  Continue IV fluids meanwhile  Blood sugars okay  Blood pressure monitor as it is on the lower side.  Patient is not having any symptoms       I spent  minutes in the professional and overall care of this patient.      Roxanne Buckner MD

## 2025-04-10 LAB
ANION GAP SERPL CALCULATED.3IONS-SCNC: 9 MMOL/L (ref 10–20)
BUN SERPL-MCNC: 22 MG/DL (ref 6–23)
CALCIUM SERPL-MCNC: 7 MG/DL (ref 8.6–10.3)
CHLORIDE SERPL-SCNC: 108 MMOL/L (ref 98–107)
CO2 SERPL-SCNC: 24 MMOL/L (ref 21–32)
CREAT FLD-MCNC: 0.91 MG/DL
CREAT SERPL-MCNC: 0.9 MG/DL (ref 0.5–1.3)
EGFRCR SERPLBLD CKD-EPI 2021: >90 ML/MIN/1.73M*2
ERYTHROCYTE [DISTWIDTH] IN BLOOD BY AUTOMATED COUNT: 19.9 % (ref 11.5–14.5)
GLUCOSE SERPL-MCNC: 105 MG/DL (ref 74–99)
HCT VFR BLD AUTO: 25.6 % (ref 41–52)
HGB BLD-MCNC: 7.9 G/DL (ref 13.5–17.5)
HOLD SPECIMEN: NORMAL
MCH RBC QN AUTO: 28.5 PG (ref 26–34)
MCHC RBC AUTO-ENTMCNC: 30.9 G/DL (ref 32–36)
MCV RBC AUTO: 92 FL (ref 80–100)
NRBC BLD-RTO: 0 /100 WBCS (ref 0–0)
PLATELET # BLD AUTO: 80 X10*3/UL (ref 150–450)
POTASSIUM SERPL-SCNC: 4.8 MMOL/L (ref 3.5–5.3)
RBC # BLD AUTO: 2.77 X10*6/UL (ref 4.5–5.9)
SODIUM SERPL-SCNC: 136 MMOL/L (ref 136–145)
TRIGL FLD-MCNC: 35 MG/DL
TRIGL SERPL-MCNC: 105 MG/DL (ref 0–149)
WBC # BLD AUTO: 3 X10*3/UL (ref 4.4–11.3)

## 2025-04-10 PROCEDURE — 97530 THERAPEUTIC ACTIVITIES: CPT | Mod: GP,CQ

## 2025-04-10 PROCEDURE — 99233 SBSQ HOSP IP/OBS HIGH 50: CPT | Performed by: INTERNAL MEDICINE

## 2025-04-10 PROCEDURE — 84478 ASSAY OF TRIGLYCERIDES: CPT | Mod: WESLAB | Performed by: SURGERY

## 2025-04-10 PROCEDURE — 97535 SELF CARE MNGMENT TRAINING: CPT | Mod: GO,CO

## 2025-04-10 PROCEDURE — 2500000005 HC RX 250 GENERAL PHARMACY W/O HCPCS

## 2025-04-10 PROCEDURE — 1200000002 HC GENERAL ROOM WITH TELEMETRY DAILY

## 2025-04-10 PROCEDURE — 80048 BASIC METABOLIC PNL TOTAL CA: CPT | Performed by: INTERNAL MEDICINE

## 2025-04-10 PROCEDURE — 84478 ASSAY OF TRIGLYCERIDES: CPT | Performed by: SURGERY

## 2025-04-10 PROCEDURE — 97110 THERAPEUTIC EXERCISES: CPT | Mod: GP,CQ

## 2025-04-10 PROCEDURE — 97116 GAIT TRAINING THERAPY: CPT | Mod: GP,CQ

## 2025-04-10 PROCEDURE — 2500000002 HC RX 250 W HCPCS SELF ADMINISTERED DRUGS (ALT 637 FOR MEDICARE OP, ALT 636 FOR OP/ED): Performed by: INTERNAL MEDICINE

## 2025-04-10 PROCEDURE — 99024 POSTOP FOLLOW-UP VISIT: CPT | Performed by: SURGERY

## 2025-04-10 PROCEDURE — 85027 COMPLETE CBC AUTOMATED: CPT | Performed by: INTERNAL MEDICINE

## 2025-04-10 PROCEDURE — 2500000004 HC RX 250 GENERAL PHARMACY W/ HCPCS (ALT 636 FOR OP/ED): Performed by: INTERNAL MEDICINE

## 2025-04-10 PROCEDURE — 2500000001 HC RX 250 WO HCPCS SELF ADMINISTERED DRUGS (ALT 637 FOR MEDICARE OP): Performed by: INTERNAL MEDICINE

## 2025-04-10 PROCEDURE — 82570 ASSAY OF URINE CREATININE: CPT | Mod: WESLAB | Performed by: SURGERY

## 2025-04-10 RX ORDER — SODIUM CHLORIDE 0.9 % (FLUSH) 0.9 %
10 SYRINGE (ML) INJECTION AS NEEDED
Status: CANCELLED | OUTPATIENT
Start: 2025-04-10

## 2025-04-10 RX ORDER — THIAMINE HYDROCHLORIDE 100 MG/ML
100 INJECTION, SOLUTION INTRAMUSCULAR; INTRAVENOUS DAILY
Qty: 25 ML | Refills: 0 | Status: SHIPPED | OUTPATIENT
Start: 2025-04-10 | End: 2025-04-18

## 2025-04-10 RX ORDER — DEXTROSE MONOHYDRATE 100 MG/ML
INJECTION, SOLUTION INTRAVENOUS CONTINUOUS
Status: CANCELLED | OUTPATIENT
Start: 2025-04-10 | End: 2025-04-11

## 2025-04-10 RX ADMIN — MIDODRINE HYDROCHLORIDE 2.5 MG: 2.5 TABLET ORAL at 18:00

## 2025-04-10 RX ADMIN — ASCORBIC ACID, VITAMIN A PALMITATE, CHOLECALCIFEROL, THIAMINE HYDROCHLORIDE, RIBOFLAVIN-5 PHOSPHATE SODIUM, PYRIDOXINE HYDROCHLORIDE, NIACINAMIDE, DEXPANTHENOL, ALPHA-TOCOPHEROL ACETATE, VITAMIN K1, FOLIC ACID, BIOTIN, CYANOCOBALAMIN: 200; 3300; 200; 6; 3.6; 6; 40; 15; 10; 150; 600; 60; 5 INJECTION, SOLUTION INTRAVENOUS at 22:21

## 2025-04-10 RX ADMIN — OCTREOTIDE ACETATE 200 MCG: 500 INJECTION, SOLUTION INTRAVENOUS; SUBCUTANEOUS at 17:59

## 2025-04-10 RX ADMIN — OCTREOTIDE ACETATE 200 MCG: 500 INJECTION, SOLUTION INTRAVENOUS; SUBCUTANEOUS at 10:27

## 2025-04-10 RX ADMIN — MORPHINE SULFATE 30 MG: 30 TABLET, FILM COATED, EXTENDED RELEASE ORAL at 08:50

## 2025-04-10 RX ADMIN — DOCUSATE SODIUM 100 MG: 100 CAPSULE, LIQUID FILLED ORAL at 08:50

## 2025-04-10 RX ADMIN — THIAMINE HYDROCHLORIDE 100 MG: 100 INJECTION, SOLUTION INTRAMUSCULAR; INTRAVENOUS at 08:59

## 2025-04-10 RX ADMIN — MIDODRINE HYDROCHLORIDE 2.5 MG: 2.5 TABLET ORAL at 15:00

## 2025-04-10 RX ADMIN — OCTREOTIDE ACETATE 200 MCG: 500 INJECTION, SOLUTION INTRAVENOUS; SUBCUTANEOUS at 20:34

## 2025-04-10 RX ADMIN — ENOXAPARIN SODIUM 40 MG: 40 INJECTION SUBCUTANEOUS at 20:35

## 2025-04-10 RX ADMIN — MORPHINE SULFATE 30 MG: 30 TABLET, FILM COATED, EXTENDED RELEASE ORAL at 20:34

## 2025-04-10 RX ADMIN — POLYETHYLENE GLYCOL 3350 17 G: 17 POWDER, FOR SOLUTION ORAL at 08:50

## 2025-04-10 RX ADMIN — PANTOPRAZOLE SODIUM 40 MG: 40 INJECTION, POWDER, FOR SOLUTION INTRAVENOUS at 06:09

## 2025-04-10 RX ADMIN — METOPROLOL SUCCINATE 100 MG: 100 TABLET, EXTENDED RELEASE ORAL at 08:50

## 2025-04-10 RX ADMIN — ASPIRIN 81 MG: 81 TABLET, CHEWABLE ORAL at 08:50

## 2025-04-10 RX ADMIN — DOCUSATE SODIUM 100 MG: 100 CAPSULE, LIQUID FILLED ORAL at 20:34

## 2025-04-10 RX ADMIN — TAMSULOSIN HYDROCHLORIDE 0.4 MG: 0.4 CAPSULE ORAL at 08:50

## 2025-04-10 RX ADMIN — ATORVASTATIN CALCIUM 40 MG: 40 TABLET, FILM COATED ORAL at 20:34

## 2025-04-10 RX ADMIN — MIDODRINE HYDROCHLORIDE 2.5 MG: 2.5 TABLET ORAL at 08:54

## 2025-04-10 ASSESSMENT — COGNITIVE AND FUNCTIONAL STATUS - GENERAL
CLIMB 3 TO 5 STEPS WITH RAILING: A LOT
MOVING TO AND FROM BED TO CHAIR: A LITTLE
WALKING IN HOSPITAL ROOM: A LITTLE
TOILETING: A LITTLE
STANDING UP FROM CHAIR USING ARMS: A LITTLE
MOVING TO AND FROM BED TO CHAIR: A LITTLE
WALKING IN HOSPITAL ROOM: A LITTLE
DRESSING REGULAR LOWER BODY CLOTHING: A LITTLE
MOBILITY SCORE: 20
DAILY ACTIVITIY SCORE: 23
DRESSING REGULAR LOWER BODY CLOTHING: A LITTLE
CLIMB 3 TO 5 STEPS WITH RAILING: A LITTLE
HELP NEEDED FOR BATHING: A LITTLE
MOBILITY SCORE: 19
STANDING UP FROM CHAIR USING ARMS: A LITTLE
DRESSING REGULAR UPPER BODY CLOTHING: A LITTLE
DAILY ACTIVITIY SCORE: 20

## 2025-04-10 ASSESSMENT — PAIN SCALES - GENERAL
PAINLEVEL_OUTOF10: 0 - NO PAIN
PAINLEVEL_OUTOF10: 0 - NO PAIN
PAINLEVEL_OUTOF10: 2
PAINLEVEL_OUTOF10: 2

## 2025-04-10 ASSESSMENT — PAIN - FUNCTIONAL ASSESSMENT
PAIN_FUNCTIONAL_ASSESSMENT: 0-10

## 2025-04-10 ASSESSMENT — PAIN SCALES - WONG BAKER: WONGBAKER_NUMERICALRESPONSE: NO HURT

## 2025-04-10 ASSESSMENT — ENCOUNTER SYMPTOMS
CONSTITUTIONAL NEGATIVE: 1
EYES NEGATIVE: 1
GASTROINTESTINAL NEGATIVE: 1
CARDIOVASCULAR NEGATIVE: 1
RESPIRATORY NEGATIVE: 1

## 2025-04-10 ASSESSMENT — PAIN DESCRIPTION - LOCATION: LOCATION: ABDOMEN

## 2025-04-10 ASSESSMENT — ACTIVITIES OF DAILY LIVING (ADL): HOME_MANAGEMENT_TIME_ENTRY: 29

## 2025-04-10 NOTE — CONSULTS
Reason For Consult  Chyle leak, TPN    History Of Present Illness  Roma Corral is a 70 y.o. male presenting with hx of rectal cancer metastatic to liver with ileorectal fistula and colostomy. He recently underwent embolization of portal vein as well as IR embolization. His CIRILO drain has intermittently had high output of chyle but seems to have slowed down. He was supposed to start TPN at the nursing facility he was at but never did.   He denies abd pain, rectal bleeding, fever/chills, n/v. His appeite is slightly down and has lost at least 10-20 lbs in the last 3-6 months.    He denies ever having colonscopy or EGD.     Past Medical History  He has a past medical history of Abdominal pain, Acute non-ST elevation myocardial infarction (NSTEMI) (Multi), Anemia, Anxiety, CAD (coronary artery disease), Cardiology follow-up encounter (12/11/2023), Gout, H/O cardiac catheterization (06/01/2021), H/O cardiovascular stress test (09/03/2021), H/O echocardiogram (06/02/2021), High cholesterol, Hypertension, Overweight, Rectal cancer (Multi), and Type 2 diabetes mellitus.    Surgical History  He has a past surgical history that includes Leg Surgery (Left); Colonoscopy (06/17/2024); and Hemicolectomy w/ ostomy.     Social History  He reports that he has never smoked. He has never used smokeless tobacco. He reports that he does not currently use alcohol. He reports that he does not use drugs.    Family History  Family History   Problem Relation Name Age of Onset    No Known Problems Mother      No Known Problems Father      No Known Problems Sister      Leukemia Brother      Stroke Maternal Grandfather          Allergies  Patient has no known allergies.    Review of Systems  See HPI     Physical Exam  Neuro: AOX3, appropriate affect, symmetric facial movements  Cardiac: S1/S2 equal, RRR, no clicks/gallops/murmurs  Resp: Lung sounds clear, no adventigous breath sounds auscultated  GI: abdomen is soft, nontender, no guarding or  "rebound tenderness.  Evidence of colostomy with healthy appearing pink stoma and brown stool.  Also has a drain on the right abdomen with approximately 100 to 200 mL of yellow tinted drainage.  : no flank pain, no hematuria, or dysuria  M/S: normal strength, active range of motion  Skin: normal skin tone and color, no ertyhema       Last Recorded Vitals  Blood pressure 91/51, pulse 59, temperature 36.5 °C (97.7 °F), temperature source Oral, resp. rate 17, height 1.805 m (5' 11.06\"), weight 60.2 kg (132 lb 11.5 oz), SpO2 97%.    Relevant Results  Results for orders placed or performed during the hospital encounter of 04/08/25 (from the past 24 hours)   CBC   Result Value Ref Range    WBC 3.0 (L) 4.4 - 11.3 x10*3/uL    nRBC 0.0 0.0 - 0.0 /100 WBCs    RBC 2.77 (L) 4.50 - 5.90 x10*6/uL    Hemoglobin 7.9 (L) 13.5 - 17.5 g/dL    Hematocrit 25.6 (L) 41.0 - 52.0 %    MCV 92 80 - 100 fL    MCH 28.5 26.0 - 34.0 pg    MCHC 30.9 (L) 32.0 - 36.0 g/dL    RDW 19.9 (H) 11.5 - 14.5 %    Platelets 80 (L) 150 - 450 x10*3/uL   Basic Metabolic Panel   Result Value Ref Range    Glucose 105 (H) 74 - 99 mg/dL    Sodium 136 136 - 145 mmol/L    Potassium 4.8 3.5 - 5.3 mmol/L    Chloride 108 (H) 98 - 107 mmol/L    Bicarbonate 24 21 - 32 mmol/L    Anion Gap 9 (L) 10 - 20 mmol/L    Urea Nitrogen 22 6 - 23 mg/dL    Creatinine 0.90 0.50 - 1.30 mg/dL    eGFR >90 >60 mL/min/1.73m*2    Calcium 7.0 (L) 8.6 - 10.3 mg/dL   Triglycerides   Result Value Ref Range    Triglycerides 105 0 - 149 mg/dL     *Note: Due to a large number of results and/or encounters for the requested time period, some results have not been displayed. A complete set of results can be found in Results Review.         Assessment/Plan     Weight loss  - Will start TPN tonight at recommendations of nutrition. 68 mL for 1 hr, increase to 136 mL/hr x10 hrs, and then 68 mL/hr x 1 hr.   -May benefit from outpatient EGD if symptoms worsen, may need consideration for PEG if further " weight loss.   Chyle leak  - Would defer management of this to general surgery. If drainage worsens may need tertiary center.    MELISSA Felder-CNP

## 2025-04-10 NOTE — NURSING NOTE
Dr. Modi in top see patient. He stated that he is going to test the fluids from his drain. He would like the patient to have TPN night time cycle. He was made aware that his potassium was elevated at 4.8 which is on the high side of normal. Computer alerted us regarding continuing the potassium infusion. It was stopped so that we could ask docs. about levels. New orders to follow.

## 2025-04-10 NOTE — PROGRESS NOTES
[facility] received a call from Tiffany from [facility], called her back at [phone], VM received with message left.     Update: Return call from Tiffany who states pharmacy will need a DC Summary that states dc date, update on if pt going home on IV abx, signed resumption orders for TPN, chart notes, labs and TPN formulations and/or MAR. [facility] will fax what is available. Tiffany in the meantime will attempt to figure out who was writing for TPN orders prior to this admission.      04/10/25 1005   Discharge Planning   Expected Discharge Disposition Othe

## 2025-04-10 NOTE — NURSING NOTE
Pt noted to be bradycardic.Not sustaining. Pt is asleep and easily arousable. Will continue to monitor. Call light, bed alarm in place.

## 2025-04-10 NOTE — NURSING NOTE
Pt has a dual lumen picc line to L upper arm, drsg dry and intact (dated 4/5) without any redness, swelling or drainage, both lumens have brisk blood return and flush easily with NS, curos caps applied.

## 2025-04-10 NOTE — NURSING NOTE
MELISSA Clayton in to see patient. He is going to talk with Dr. Delaney to see what the plan is for TPN. He is coming back to see patient again.

## 2025-04-10 NOTE — NURSING NOTE
Assumed care of patient. Patient is resting in bed with no needs at this time. His appliances look good and have good output. Call light is within reach will continue to monitor.

## 2025-04-10 NOTE — CARE PLAN
Sent a secure chat to [facility]'s Patricia about this consult not being done yet.  Missy Bui helped with  putting important information in message.

## 2025-04-10 NOTE — PROGRESS NOTES
Physical Therapy    Physical Therapy Treatment    Patient Name: Roma Corral  MRN: [id number]  Department: [address]  Room: [address] [place] [address]  Today's Date: 4/10/2025  Time Calculation  Start Time: 0832  Stop Time: 0932  Time Calculation (min): 60 min         Assessment/Plan   PT Assessment  Rehab Prognosis: Good  Barriers to Discharge Home: No anticipated barriers  End of Session Communication: Bedside nurse  Assessment Comment: Pt progressing steadily towards stated goals. Pt demos decreased activity tolerance and LE strength, pt would continue to benefit from skilled therapy services to address functional deficits for return to [facility].  End of Session Patient Position: Up in chair, Alarm on  PT Plan  Inpatient/Swing Bed or Outpatient: Inpatient  PT Plan  Treatment/Interventions: Bed mobility, Transfer training, Gait training, Stair training, Balance training, Strengthening, Endurance training, Therapeutic exercise, Therapeutic activity  PT Plan: Ongoing PT  PT Frequency: 4 times per week  PT Discharge Recommendations: Low intensity level of continued care  Equipment Recommended upon Discharge: Wheeled walker  PT Recommended Transfer Status: Contact guard, Assistive device (FWW)  PT - OK to Discharge: Yes      General Visit Information:   PT  Visit  PT Received On: 04/10/25  General  Reason for Referral: impaired ADL's/mobility + dysphagia  Referred By: Roxanne Buckner MD  Past Medical History Relevant to Rehab: CAD, NSTEMI, DM, HTN, gout, anxiety, metastatic rectal CA ( mets to liver) with colostomy  Family/Caregiver Present: No  Prior to Session Communication: Bedside nurse  Patient Position Received: Bed, 2 rail up, Alarm off, caregiver present  General Comment: Pt cleared for PT by nursing. Pt agreeable to PT services.    Subjective   Precautions:  Precautions  Medical Precautions: Fall precautions  Post-Surgical Precautions: Abdominal surgery precautions  Precautions Comment: + accordian drain RUQ, + colostomy, + tele       Objective   Pain:  Pain Assessment  Pain Assessment: 0-10  0-10 (Numeric) Pain Score: 0 - No pain  Cognition:  Cognition  Overall Cognitive Status: Within Functional Limits  Orientation Level: Oriented X4    Postural Control:  Static Sitting Balance  Static Sitting-Balance Support: Feet supported  Static Sitting-Level of Assistance: Modified independent  Static Sitting-Comment/Number of Minutes: good seated static balance  Static Standing Balance  Static Standing-Balance Support: Bilateral upper extremity supported  Static Standing-Level of Assistance: Distant supervision  Static Standing-Comment/Number of Minutes: good static stand balance    Treatments:  Therapeutic Exercise  Therapeutic Exercise Performed: Yes  Therapeutic Exercise Activity 1: Reviewed HEP that pt has been completing independently. Pt verbalizing understanding on purpose and frequency.    Therapeutic Activity  Therapeutic Activity Performed: Yes  Therapeutic Activity 1: Pt sat EOB for 25 mins while reviewing abd precautions, which pt had to be educated on 3/3, but demos good carryover. Also reviewed HEP at this time. Pt has never had to have PT/OT. Extensive edu on POC, what each discipline focuses on, and why we were consulted.    Bed Mobility  Bed Mobility: Yes  Bed Mobility 1  Bed Mobility 1: Supine to sitting  Level of Assistance 1: Modified independent  Bed Mobility Comments 1: HOB elevated, use of bed rail. Increased time and effort to complete, but able to complete independently.    Ambulation/Gait Training  Ambulation/Gait Training Performed: Yes  Ambulation/Gait Training 1  Surface 1: Level tile  Device 1: Rolling walker  Assistance 1: Distant supervision  Quality of Gait 1: Narrow base of support, Decreased step length  Comments/Distance (ft) 1: 30 feet x2, 10 feet. Slow naila, reciprocal gait. Pt able to navigate directional turns and avoid obstacles with no LOB.  Transfers  Transfer: Yes  Transfer 1  Transfer From 1: Sit to, Stand  to  Transfer to 1: Stand, Sit  Technique 1: Stand to sit, Sit to stand  Transfer Device 1: Walker  Transfer Level of Assistance 1: Distant supervision  Trials/Comments 1: Cues for safe hand placement and eccentric control. Pt demos good carryover and understanding.    Stairs  Stairs: Yes  Stairs  Rails 1: Right  Curb Step 1: No  Device 1: Railing  Assistance 1: Distant supervision  Comment/Number of Steps 1: 4 stairs up/down with R hand rail. Pt demos reciprocal gait and good control throughout. No instances of buckling/LOB.    Outcome Measures:  [facility] Basic Mobility  Turning from your back to your side while in a flat bed without using bedrails: None  Moving from lying on your back to sitting on the side of a flat bed without using bedrails: None  Moving to and from bed to chair (including a wheelchair): A little  Standing up from a chair using your arms (e.g. wheelchair or bedside chair): A little  To walk in hospital room: A little  Climbing 3-5 steps with railing: A little  Basic Mobility - Total Score: 20    Education Documentation  Precautions, taught by Kenyatta Scruggs PTA at 4/10/2025  1:01 PM.  Learner: Patient  Readiness: Eager  Method: Explanation, Demonstration  Response: Verbalizes Understanding, Demonstrated Understanding  Comment: POC, purpose of PT, abd precautions, importance of mobility    Mobility Training, taught by Kenyatta Scruggs PTA at 4/10/2025  1:01 PM.  Learner: Patient  Readiness: Eager  Method: Explanation, Demonstration  Response: Verbalizes Understanding, Demonstrated Understanding  Comment: POC, purpose of PT, abd precautions, importance of mobility    Education Comments  No comments found.        Encounter Problems       Encounter Problems (Active)       Balance       STG - Maintains dynamic standing balance with upper extremity support (Progressing)       Start:  04/09/25    Expected End:  05/07/25       INTERVENTIONS:1. Practice standing with minimal support.2. Educate patient about  standing tolerance.3. Educate patient about independence with gait, transfers, and ADL's.4. Educate patient about use of assistive device.5. Educate patient about self-directed care.            Mobility       STG - Patient will ambulate 300 ft, FWW, + turns, mod ind (Progressing)       Start:  04/09/25    Expected End:  05/07/25            pt ascends/descends 1 step without rail, contact guard of 1 (Progressing)       Start:  04/09/25    Expected End:  05/07/25               PT Transfers       STG - Patient to transfer to and from sit to supine mod ind via logroll (Progressing)       Start:  04/09/25    Expected End:  05/07/25            STG - Patient will transfer sit to and from stand mod ind (Progressing)       Start:  04/09/25    Expected End:  05/07/25               Pain       pt will verbalize no > 2/10 pain during functional mobility (Progressing)       Start:  04/09/25    Expected End:  05/07/25               Pain - Adult          Safety       Goal 1 (Progressing)       Start:  04/09/25

## 2025-04-10 NOTE — PROGRESS NOTES
Occupational Therapy    OT Treatment    Patient Name: Roma Corral  MRN: [id number]  Department: [facility]  Room: [address] [place] [address]  Today's Date: 4/10/2025  Time Calculation  Start Time: 0958  Stop Time: 1027  Time Calculation (min): 29 min        Assessment:  OT Assessment: Fair progress made towards OT goals. Continue with current OT POC to increase strength, balance and functional tolerance to maximize safety and independence during ADLs.  Barriers to Discharge Home: No anticipated barriers  Evaluation/Treatment Tolerance: Patient tolerated treatment well  End of Session Communication: Bedside nurse  End of Session Patient Position: Up in chair, Alarm off, not on at start of session (all needs in reach, RN present in room)  OT Assessment Results: Decreased ADL status, Decreased endurance, Decreased functional mobility, Decreased IADLs  Barriers to Discharge: None  Evaluation/Treatment Tolerance: Patient tolerated treatment well  Plan:  Treatment Interventions: ADL retraining, Functional transfer training, Endurance training, Patient/family training, Equipment evaluation/education, Neuromuscular reeducation, Compensatory technique education  OT Frequency: 4 times per week  OT Discharge Recommendations: Low intensity level of continued care  Equipment Recommended upon Discharge: Wheeled walker  OT Recommended Transfer Status:  (CGA)  OT - OK to Discharge: Yes  Treatment Interventions: ADL retraining, Functional transfer training, Endurance training, Patient/family training, Equipment evaluation/education, Neuromuscular reeducation, Compensatory technique education    Subjective   Previous Visit Info:  OT Last Visit  OT Received On: 04/10/25  General:  General  Reason for Referral: impaired ADL's/mobility + dysphagia, recent colostomy  Past Medical History Relevant to Rehab: CAD, NSTEMI, DM, HTN, gout, anxiety, metastatic rectal CA ( mets to liver) with colostomy  Prior to Session Communication: Bedside nurse  Patient Position  Received: Up in chair, Alarm off, not on at start of session  General Comment: Pt cleared for OT session per nursing, pleasant and cooperative this date.  Precautions:  Hearing/Visual Limitations: reading glasses  Medical Precautions: Fall precautions  Post-Surgical Precautions: Abdominal surgery precautions  Precautions Comment: + accordian drain RUQ            Pain:  Pain Assessment  Pain Assessment: 0-10  0-10 (Numeric) Pain Score: 2  Pain Type: Surgical pain  Pain Location: Abdomen  Clinical Progression: Not changed  Pain Interventions: Repositioned, Ambulation/increased activity  Response to Interventions: No change in pain    Objective    Cognition:  Cognition  Overall Cognitive Status: Within Functional Limits  Orientation Level: Oriented X4  Following Commands: Follows all commands and directions without difficulty  Safety Judgment: Decreased awareness of need for assistance  Safety/Judgement: Within Functional Limits  Insight: Mild  Impulsive: Within functional limits  Processing Speed: Delayed  Coordination:  Movements are Fluid and Coordinated: Yes  Activities of Daily Living: Grooming  Grooming Comments: pt declined participation in ADLs d/t reports of task completion prior to OT session.    LE Dressing  LE Dressing Comments: pt able to don pants from seated position using figure four technique with distant supervision. Increased time required to complete.    Toileting  Toileting Comments: pt reports managing colostomy bag independently  Functional Standing Tolerance:     Bed Mobility/Transfers: Bed Mobility  Bed Mobility: No    Transfers  Transfer: Yes  Transfer 1  Trials/Comments 1: sit<>stands completed from standard chair height with distant supervision    Other Activity:  Other Activity Performed: Yes  Other Activity 1: EC/WS education provided and reviewed. Questions, comments and concerns addressed regarding OT.      Outcome Measures:[facility] Daily Activity  Putting on and taking off regular lower  body clothing: A little  Bathing (including washing, rinsing, drying): A little  Putting on and taking off regular upper body clothing: A little  Toileting, which includes using toilet, bedpan or urinal: A little  Taking care of personal grooming such as brushing teeth: None  Eating Meals: None  Daily Activity - Total Score: 20        Education Documentation  Precautions, taught by KWABENA Boyle at 4/10/2025 11:34 AM.  Learner: Patient  Readiness: Acceptance  Method: Explanation, Demonstration  Response: Verbalizes Understanding, Demonstrated Understanding  Comment: safety awareness throughout functional tasks/ transfers    ADL Training, taught by KWABENA Boyle at 4/10/2025 11:34 AM.  Learner: Patient  Readiness: Acceptance  Method: Explanation, Demonstration  Response: Verbalizes Understanding, Demonstrated Understanding  Comment: safety awareness throughout functional tasks/ transfers    Education Comments  Education provided on role of OT/POC, safety awareness throughout functional tasks/transfers, importance of activity/ rest routine, EC/WS techniques, and use of call light for assistance. Questions, comments and concerns addressed regarding OT.      Goals:  Encounter Problems       Encounter Problems (Active)       OT Goals       Pt will complete all ADL's with mod indep/indep using adaptive equipment as needed. (Progressing)       Start:  04/09/25    Expected End:  05/09/25            Pt will complete all functional transfers and mobility with mod indep using a RW. (Progressing)       Start:  04/09/25    Expected End:  05/09/25            Pt will tolerate functional mobility for a household distance using a RW with mod indep. (Progressing)       Start:  04/09/25    Expected End:  05/09/25

## 2025-04-10 NOTE — PROGRESS NOTES
04/10/25 1349   Discharge Planning   Expected Discharge Disposition Home H     Spoke with Tiffany from [facility], called her back at [phone] ext [phone]     Tiffany confirmed that orders were received prior to admission for new TPN infusions for home.       Tiffany confirmed she needs the following within the next hour in order to arrange delivery for tomorrow.     MAR  DC SUMMARY  CURRENT LABS  CHART NOTES  TPN ORDERS, SIGNED BY PROVIDER, TO CONTINUE AT HOME UPON DISCHARGE.     Message sent to both Dr. Buckner and Forrest Díaz APRN CNP to update, awaiting these orders at this time.     Copy of Facesheet, Labs, HP, Clinical notes faxed at this time.  Awaiting TPN orders and discharge orders to facilitate home TPN arrangements.    Tiffany requesting to speak with patient for intake assessment, connected via hospital room phone.    UPDATE 1430: Inpatient TPN order received, faxed copy of order to Tiffany at [facility]

## 2025-04-10 NOTE — PROGRESS NOTES
"Roma Corral is a 70 y.o. male on day 2 of admission presenting with Dysphagia.    Subjective   Doing ok . Carmen some oral        Objective     Physical Exam  Vitals reviewed.   Constitutional:       Appearance: Normal appearance.   HENT:      Head: Normocephalic and atraumatic.      Right Ear: Tympanic membrane, ear canal and external ear normal.      Left Ear: Tympanic membrane, ear canal and external ear normal.      Nose: Nose normal.      Mouth/Throat:      Pharynx: Oropharynx is clear.   Eyes:      Extraocular Movements: Extraocular movements intact.      Conjunctiva/sclera: Conjunctivae normal.      Pupils: Pupils are equal, round, and reactive to light.   Cardiovascular:      Rate and Rhythm: Normal rate and regular rhythm.      Pulses: Normal pulses.      Heart sounds: Normal heart sounds.   Pulmonary:      Effort: Pulmonary effort is normal.      Breath sounds: Normal breath sounds.   Abdominal:      General: Abdomen is flat. Bowel sounds are normal.      Palpations: Abdomen is soft.      Comments: Drain in RUQ   Colostomy bag in LLQ   Musculoskeletal:      Cervical back: Normal range of motion and neck supple.   Skin:     General: Skin is warm and dry.   Neurological:      General: No focal deficit present.      Mental Status: He is alert and oriented to person, place, and time.   Psychiatric:         Mood and Affect: Mood normal.         Last Recorded Vitals  Blood pressure (!) 89/43, pulse (!) 44, temperature 36.4 °C (97.5 °F), temperature source Oral, resp. rate 17, height 1.805 m (5' 11.06\"), weight 60.2 kg (132 lb 11.5 oz), SpO2 99%.  Intake/Output last 3 Shifts:  I/O last 3 completed shifts:  In: 1486.7 (24.7 mL/kg) [P.O.:950; IV Piggyback:536.7]  Out: 1445 (24 mL/kg) [Urine:950 (0.4 mL/kg/hr); Emesis/NG output:45; Drains:300; Stool:150]  Weight: 60.2 kg     Relevant Results               This patient has a central line   Reason for the central line remaining today? Parenteral medication    Scheduled " medications  aspirin, 81 mg, oral, Daily  atorvastatin, 40 mg, oral, Nightly  docusate sodium, 100 mg, oral, BID  enoxaparin, 40 mg, subcutaneous, Nightly  metoprolol succinate XL, 100 mg, oral, Daily  midodrine, 2.5 mg, oral, TID  morphine CR, 30 mg, oral, q12h ALEX  octreotide, 200 mcg, subcutaneous, TID  pantoprazole, 40 mg, oral, Daily before breakfast   Or  pantoprazole, 40 mg, intravenous, Daily before breakfast  polyethylene glycol, 17 g, oral, Daily  tamsulosin, 0.4 mg, oral, Daily  thiamine, 100 mg, intravenous, Daily      Continuous medications  Adult Clinimix TPN Cyclic,   potassium [id number]%NaCl, 100 mL/hr, Last Rate: 100 mL/hr (04/09/25 0530)      PRN medications  PRN medications: acetaminophen **OR** acetaminophen **OR** acetaminophen, alteplase, benzocaine-menthol, dextromethorphan-guaifenesin, guaiFENesin, LORazepam, melatonin, ondansetron **OR** ondansetron, prochlorperazine  Results for orders placed or performed during the hospital encounter of 04/08/25 (from the past 24 hours)   CBC   Result Value Ref Range    WBC 3.0 (L) 4.4 - 11.3 x10*3/uL    nRBC 0.0 0.0 - 0.0 /100 WBCs    RBC 2.77 (L) 4.50 - 5.90 x10*6/uL    Hemoglobin 7.9 (L) 13.5 - 17.5 g/dL    Hematocrit 25.6 (L) 41.0 - 52.0 %    MCV 92 80 - 100 fL    MCH 28.5 26.0 - 34.0 pg    MCHC 30.9 (L) 32.0 - 36.0 g/dL    RDW 19.9 (H) 11.5 - 14.5 %    Platelets 80 (L) 150 - 450 x10*3/uL   Basic Metabolic Panel   Result Value Ref Range    Glucose 105 (H) 74 - 99 mg/dL    Sodium 136 136 - 145 mmol/L    Potassium 4.8 3.5 - 5.3 mmol/L    Chloride 108 (H) 98 - 107 mmol/L    Bicarbonate 24 21 - 32 mmol/L    Anion Gap 9 (L) 10 - 20 mmol/L    Urea Nitrogen 22 6 - 23 mg/dL    Creatinine 0.90 0.50 - 1.30 mg/dL    eGFR >90 >60 mL/min/1.73m*2    Calcium 7.0 (L) 8.6 - 10.3 mg/dL   Triglycerides   Result Value Ref Range    Triglycerides 105 0 - 149 mg/dL   Sterile Cup   Result Value Ref Range    Extra Tube Hold for add-ons.      *Note: Due to a large number  of results and/or encounters for the requested time period, some results have not been displayed. A complete set of results can be found in Results Review.     No results found.           Malnutrition Diagnosis Status: New  Malnutrition Diagnosis: Severe malnutrition related to chronic disease or condition  Related to: decreased ability to consume sufficient energy  As Evidenced by: 20# (12.2%) weight loss in past 3 months, severe subcutaneous fat and muscle deficits,  I agree with the dietitian's malnutrition diagnosis.      Assessment/Plan   Assessment & Plan  Dysphagia    Anxiety    CAD (coronary artery disease)    Colostomy care    History of rectal cancer    Hyperlipidemia    Hypertension    Rectal cancer (Multi)    Type 2 diabetes mellitus    Severe protein-calorie malnutrition (Multi)    Ileorectal fistula    Will consult GI for TPN orders  Discussed with the dietitian  Patient is allowed to take oral but very minimal per Dr. Modi  Blood sugars okay  Blood pressure monitor as it is on the lower side.  Patient is not having any symptoms  GI working on TPN orders for discharge   working on discharge home with TPN       I spent  minutes in the professional and overall care of this patient.      Roxanne Buckner MD

## 2025-04-10 NOTE — CONSULTS
Reason For Consult  Consults       History Of Present Illness  Roma Corral is a 70 y.o. male presenting with rectal cancer metastatic to liver with ileorectal fistula. Now status post ileocecal resection with anastomosis, low anterior resection with end colostomy, liver ablation. Post op course complicated by high output from CIRILO showing elevated triglycerides consistent with lymph leak. Underwent 2 IR embolizations without improvement. Also underwent IR right portal vein embolization for eventual additional liver surgery.  Eventually started on TPN to minimize enteral intake with goal of resolving chyle leak. Also underwent  Was discharged to rehab after prolonged stay which he felt were unsafe conditions and had not received TPN for 3 days. Readmitted to hospital.      Past Medical History  He has a past medical history of Abdominal pain, Acute non-ST elevation myocardial infarction (NSTEMI) (Multi), Anemia, Anxiety, CAD (coronary artery disease), Cardiology follow-up encounter (12/11/2023), Gout, H/O cardiac catheterization (06/01/2021), H/O cardiovascular stress test (09/03/2021), H/O echocardiogram (06/02/2021), High cholesterol, Hypertension, Overweight, Rectal cancer (Multi), and Type 2 diabetes mellitus.    Surgical History  He has a past surgical history that includes Leg Surgery (Left); Colonoscopy (06/17/2024); and Hemicolectomy w/ ostomy.     Social History  He reports that he has never smoked. He has never used smokeless tobacco. He reports that he does not currently use alcohol. He reports that he does not use drugs.    Family History  Family History   Problem Relation Name Age of Onset    No Known Problems Mother      No Known Problems Father      No Known Problems Sister      Leukemia Brother      Stroke Maternal Grandfather          Allergies  Patient has no known allergies.    Review of Systems  Review of Systems   Constitutional: Negative.    HENT: Negative.     Eyes: Negative.    Respiratory:  "Negative.     Cardiovascular: Negative.    Gastrointestinal: Negative.    Genitourinary: Negative.    Skin: Negative.    All other systems reviewed and are negative.        Physical Exam  Physical Exam  Constitutional:       Appearance: Normal appearance.   HENT:      Head: Normocephalic.   Eyes:      Pupils: Pupils are equal, round, and reactive to light.   Cardiovascular:      Rate and Rhythm: Normal rate.   Pulmonary:      Effort: Pulmonary effort is normal.   Abdominal:      General: Abdomen is flat. Bowel sounds are normal.      Palpations: Abdomen is soft.      Comments: CIRILO with serous output, colostomy pink and patent.    Skin:     General: Skin is warm.   Neurological:      General: No focal deficit present.      Mental Status: He is alert.           Last Recorded Vitals  Blood pressure 91/51, pulse 59, temperature 36.5 °C (97.7 °F), temperature source Oral, resp. rate 17, height 1.805 m (5' 11.06\"), weight 60.2 kg (132 lb 11.5 oz), SpO2 97%.    Relevant Results  Imaging  No results found.    Cardiology, Vascular, and Other Imaging  No other imaging results found for the past 2 days        Assessment/Plan   Problem List Items Addressed This Visit          Gastrointestinal and Abdominal    * (Principal) Dysphagia - Primary      Need accurate CIRILO drain outputs over 24 hour periods  Resend CIRILO fluid for creatinine and triglycerides given serous nature.   PT/OT recommendations    Continue TPN and minimize PO except for comfort with goal of sealing post operative lymphatic leak. Likely multiple sources of leak given degree of response to radiation causing ileorectal fistula and abnormally inflamed operative field.     Ultimately would like to cycle TPN and plan for discharge to new rehab or possibly home with TPN, if cleared from a therapy and logistical standpoint.     I spent 60 minutes in the professional and overall care of this patient.      Javier Vasquez MD    "

## 2025-04-10 NOTE — CARE PLAN
[facility] Department Cardiology [facility] / [facility] Progress Note    Patient: Shane Mendoza   Date of Service: 4/14/2022  Medical record number: [id number]  PCP: Cheng Hyman MD    Chief Complaint:    Aortic stenosis    Interval History/Subjective:  Patient was seen and examined by me on 04/14/22.    Overnight, he did not do well with hemodialysis.  His heart rate accelerated to near 200.    Hemodialysis was stopped.  CRRT was reinitiated.    This morning he is sedated.  CRRT is ongoing.    Physical Examination and vital signs:   Temp:  [97.3 °F (36.3 °C)-99.3 °F (37.4 °C)] 99.3 °F (37.4 °C)  Heart Rate:  [] 76  Resp:  [21-36] 27  Arterial Line BP: ()/(33-68) 108/41  Body mass index is 28.48 kg/m².      PHYSICAL EXAM:    GENERAL: well developed; well nourished.   HEAD / FACE: normocephalic, atraumatic.   EYES: PERRLA.   ENT: mucous pink and moist.   NECK: no jugular venous distension; no thyromegaly; no masses.   RESPIRATORY: respirations with normal rate and rhythm; clear to auscultation.   CHEST / BREASTS: Healed sternotomy  : deferred.   LYMPHATIC: no lymphedema.   GI: normal bowel sounds; no organomegaly; soft and non-tender.   MS: Inadequate gait for exercise testing.   SKIN: no rashes, lesions, ulcers.   NEURO / PSYCH: alert; oriented to time, place and person; normal affect.   EXT: no edema.     CARDIOVASCULAR EXAM:   PALP: no lifts, thrills, or rubs.   PMI: not displaced.   AUSC: regular rhythm, normal S1, S2 without S3; no pathologic murmurs. S4 present   CAROTIDS: carotid pulses normal.   ABD AORTA: abdominal aorta not enlarged. no pulsations.   FEM: femoral pulses intact.   PEDAL: pedal pulses intact.   EXT: no peripheral edema.    Intake and output:    Intake/Output Summary (Last 24 hours) at 4/14/2022 0924  Last data filed at 4/14/2022 0900  Gross per 24 hour   Intake 467.8 ml   Output 1046 ml   Net -578.2 ml       Radiology:  LAST EKG:  Encounter Date: 04/04/22   Electrocardiogram 12-Lead   Result  The patient's goals for the shift include rest    The clinical goals for the shift include rest         Value    Ventricular Rate EKG/Min (BPM) 151    Atrial Rate (BPM) 163    QRS-Interval (MSEC) 136    QT-Interval (MSEC) 332    QTc 526    R Axis (Degrees) -177    T Axis (Degrees) 8    REPORT TEXT      Atrial fibrillation  with rapid ventricular response  Right bundle branch block  Inferior infarct  , age undetermined  Anterolateral infarct  , age undetermined  Abnormal ECG  When compared with ECG of  13-APR-2022 20:18,  Atrial fibrillation  has replaced  Wide QRS tachycardia         Laboratory tests has been reviewed by me:  Recent Labs   Lab 04/14/22 0425 04/13/22  0357 04/12/22  0407   WBC 32.6* 37.0* 32.7*   HGB 9.0* 9.4* 9.4*   HCT 27.7* 29.5* 29.7*    221 196   MCV 97.9 99.7 101.0*     Recent Labs   Lab 04/14/22 0425 04/13/22  0357 04/12/22  1546 04/12/22  0407   MG 3.2* 3.5*  --  3.2*   PHOS 4.2 6.8*  --  4.8*   CO2 23 20* 22 24   BUN 60* 75* 62* 51*   CREATININE 4.53* 5.88* 4.90* 3.78*   GLUCOSE 115* 227* 260* 178*   CALCIUM 8.4 9.0 9.2 9.1   AST 79* 111*  --  51*   ALBUMIN 2.2* 2.2*  --  2.4*        Medications:  Scheduled Meds:  • B complex-vitamin C-folic acid  1 tablet Oral Daily   • thiamine  100 mg Oral Daily   • insulin glargine  45 Units Subcutaneous 2 times per day   • sevelamer carbonate  800 mg Oral TID WC   • insulin lispro   Subcutaneous 4x Daily AC & HS   • sodium chloride (PF)  10 mL Injection 2 times per day   • [Held by provider] docusate sodium  100 mg OG Tube Daily   • [Held by provider] sennosides  5 mL OG Tube Nightly   • atorvastatin  80 mg OG Tube Nightly   • chlorhexidine gluconate  15 mL Swish & Spit 2 times per day   • sodium chloride (PF)  10 mL Injection 2 times per day   • clopidogrel  75 mg Per NG tube Daily   • midodrine  10 mg Per NG tube TID AC   • heparin (porcine)  5,000 Units Subcutaneous 3 times per day       Assessment/Plan:  Principal Problem:    Cardiac arrest (CMS/HCC)      From a cardiac perspective his hemodynamics continue to be unstable mostly on the basis  of elevated heart rates and hypotension.    I did discuss his case in detail with our TAVR team.  It is possible that he would be a candidate for balloon aortic valvuloplasty however he needs to be much more hemodynamically stable before this type of procedure can be completed.    We will continue to watch him closely.    He is tentatively scheduled for next week however the schedule of his fluid he can be changed to accommodate his clinical needs.    All questions were answered.    DVT prophylaxis: Heparin    CRITICAL CARE:    Time spent in critical care for this patient was greater than 35 minutes with greater than 50% of the time spent counseling. The time spent with the patient was in examination, coordination of care, review of studies, and creation of plan.    Signed:  Chidi Palacios MD   4/14/2022

## 2025-04-11 DIAGNOSIS — Z78.9 ON TOTAL PARENTERAL NUTRITION: ICD-10-CM

## 2025-04-11 PROCEDURE — 97116 GAIT TRAINING THERAPY: CPT | Mod: GP,CQ

## 2025-04-11 PROCEDURE — 1200000002 HC GENERAL ROOM WITH TELEMETRY DAILY

## 2025-04-11 PROCEDURE — 99233 SBSQ HOSP IP/OBS HIGH 50: CPT | Performed by: INTERNAL MEDICINE

## 2025-04-11 PROCEDURE — 2500000005 HC RX 250 GENERAL PHARMACY W/O HCPCS

## 2025-04-11 PROCEDURE — 2500000004 HC RX 250 GENERAL PHARMACY W/ HCPCS (ALT 636 FOR OP/ED): Performed by: INTERNAL MEDICINE

## 2025-04-11 PROCEDURE — 2500000001 HC RX 250 WO HCPCS SELF ADMINISTERED DRUGS (ALT 637 FOR MEDICARE OP): Performed by: INTERNAL MEDICINE

## 2025-04-11 PROCEDURE — 2500000002 HC RX 250 W HCPCS SELF ADMINISTERED DRUGS (ALT 637 FOR MEDICARE OP, ALT 636 FOR OP/ED): Performed by: INTERNAL MEDICINE

## 2025-04-11 PROCEDURE — 97530 THERAPEUTIC ACTIVITIES: CPT | Mod: GP,CQ

## 2025-04-11 RX ADMIN — MIDODRINE HYDROCHLORIDE 2.5 MG: 2.5 TABLET ORAL at 12:29

## 2025-04-11 RX ADMIN — TAMSULOSIN HYDROCHLORIDE 0.4 MG: 0.4 CAPSULE ORAL at 09:00

## 2025-04-11 RX ADMIN — MORPHINE SULFATE 30 MG: 30 TABLET, FILM COATED, EXTENDED RELEASE ORAL at 08:59

## 2025-04-11 RX ADMIN — DOCUSATE SODIUM 100 MG: 100 CAPSULE, LIQUID FILLED ORAL at 09:00

## 2025-04-11 RX ADMIN — POLYETHYLENE GLYCOL 3350 17 G: 17 POWDER, FOR SOLUTION ORAL at 09:01

## 2025-04-11 RX ADMIN — MORPHINE SULFATE 30 MG: 30 TABLET, FILM COATED, EXTENDED RELEASE ORAL at 21:41

## 2025-04-11 RX ADMIN — OCTREOTIDE ACETATE 200 MCG: 500 INJECTION, SOLUTION INTRAVENOUS; SUBCUTANEOUS at 18:43

## 2025-04-11 RX ADMIN — ASPIRIN 81 MG: 81 TABLET, CHEWABLE ORAL at 09:00

## 2025-04-11 RX ADMIN — ATORVASTATIN CALCIUM 40 MG: 40 TABLET, FILM COATED ORAL at 21:41

## 2025-04-11 RX ADMIN — MIDODRINE HYDROCHLORIDE 2.5 MG: 2.5 TABLET ORAL at 18:43

## 2025-04-11 RX ADMIN — ASCORBIC ACID, VITAMIN A PALMITATE, CHOLECALCIFEROL, THIAMINE HYDROCHLORIDE, RIBOFLAVIN-5 PHOSPHATE SODIUM, PYRIDOXINE HYDROCHLORIDE, NIACINAMIDE, DEXPANTHENOL, ALPHA-TOCOPHEROL ACETATE, VITAMIN K1, FOLIC ACID, BIOTIN, CYANOCOBALAMIN: 200; 3300; 200; 6; 3.6; 6; 40; 15; 10; 150; 600; 60; 5 INJECTION, SOLUTION INTRAVENOUS at 21:44

## 2025-04-11 RX ADMIN — MIDODRINE HYDROCHLORIDE 2.5 MG: 2.5 TABLET ORAL at 09:00

## 2025-04-11 RX ADMIN — DOCUSATE SODIUM 100 MG: 100 CAPSULE, LIQUID FILLED ORAL at 21:41

## 2025-04-11 RX ADMIN — PANTOPRAZOLE SODIUM 40 MG: 40 INJECTION, POWDER, FOR SOLUTION INTRAVENOUS at 06:06

## 2025-04-11 RX ADMIN — METOPROLOL SUCCINATE 100 MG: 100 TABLET, EXTENDED RELEASE ORAL at 09:00

## 2025-04-11 RX ADMIN — ENOXAPARIN SODIUM 40 MG: 40 INJECTION SUBCUTANEOUS at 21:41

## 2025-04-11 RX ADMIN — OCTREOTIDE ACETATE 200 MCG: 500 INJECTION, SOLUTION INTRAVENOUS; SUBCUTANEOUS at 12:26

## 2025-04-11 RX ADMIN — OCTREOTIDE ACETATE 200 MCG: 500 INJECTION, SOLUTION INTRAVENOUS; SUBCUTANEOUS at 21:42

## 2025-04-11 RX ADMIN — THIAMINE HYDROCHLORIDE 100 MG: 100 INJECTION, SOLUTION INTRAMUSCULAR; INTRAVENOUS at 09:00

## 2025-04-11 ASSESSMENT — COGNITIVE AND FUNCTIONAL STATUS - GENERAL
MOBILITY SCORE: 23
DRESSING REGULAR LOWER BODY CLOTHING: A LITTLE
CLIMB 3 TO 5 STEPS WITH RAILING: A LITTLE
WALKING IN HOSPITAL ROOM: A LITTLE
DAILY ACTIVITIY SCORE: 23
MOBILITY SCORE: 22
CLIMB 3 TO 5 STEPS WITH RAILING: A LITTLE

## 2025-04-11 ASSESSMENT — PAIN SCALES - PAIN ASSESSMENT IN ADVANCED DEMENTIA (PAINAD): TOTALSCORE: MEDICATION (SEE MAR)

## 2025-04-11 ASSESSMENT — PAIN SCALES - GENERAL
PAINLEVEL_OUTOF10: 0 - NO PAIN
PAINLEVEL_OUTOF10: 1

## 2025-04-11 ASSESSMENT — PAIN - FUNCTIONAL ASSESSMENT
PAIN_FUNCTIONAL_ASSESSMENT: 0-10
PAIN_FUNCTIONAL_ASSESSMENT: 0-10

## 2025-04-11 NOTE — PROGRESS NOTES
"   04/11/25 1051   Discharge Planning   Expected Discharge Disposition Home H     Final discharge orders received.       DC Summary, AVS, and home care referral sent to Tiffany at [facility], [phone].     Voicemail left for Tiffany requesting a call back to confirm receipt of discharge documents.      Awaiting confirmation of TPN processing and scheduled delivery.      UPDATE 1430:  TPN is still not confirmed.  Spoke with [facility] supervisor who stated they need \"more in depth note stating enteral feeding was tried and failed, or if it was ruled out as an option\"    Discharge plan NOT secure  DO NOT DC without speaking to care coordination   "

## 2025-04-11 NOTE — NURSING NOTE
Colostomy appliance changed per pts request. One piece appliance used. Stoma pink and moist. Peristomal skin C/D/I and free of redness/irritation/excoriation. Skin prep applied w/peristomal powder and appliance put in place achieving complete adhesion and seal. Pt tolerated well and was able to return educate this RN about how he self cares for his colostomy at home. Pt resting comfortably. Call light in reach. All outputs and intake recorded.

## 2025-04-11 NOTE — CARE PLAN
The patient's goals for the shift include rest    The clinical goals for the shift include rest      Problem: Pain - Adult  Goal: Verbalizes/displays adequate comfort level or baseline comfort level  Outcome: Progressing     Problem: Safety - Adult  Goal: Free from fall injury  Outcome: Progressing     Problem: Discharge Planning  Goal: Discharge to home or other facility with appropriate resources  Outcome: Progressing     Problem: Chronic Conditions and Co-morbidities  Goal: Patient's chronic conditions and co-morbidity symptoms are monitored and maintained or improved  Outcome: Progressing

## 2025-04-11 NOTE — CARE PLAN
The patient's goals for the shift include rest    The clinical goals for the shift include rest

## 2025-04-11 NOTE — PROGRESS NOTES
"Nutrition Follow up Note    Nutrition Assessment      Patient making solid effort for oral intake. Reports consuming 100% of a chicken quesadilla and 100% of a flat bread pizza along with 2.5 cartons of Ensure Plus High Protein yesterday. Breakfast tray bedside with 2 pancakes, an english muffin and corn bread bedside, just delivered. TPN initiated this morning, tolerating well at this time. Active discharge ordered pending arrangements for home TPN.    Nutrition History:  Energy Intake: Fair 50-75 %     Anthropometrics:  Ht: 180.5 cm (5' 11.06\"), Wt: 71.3 kg (157 lb 3 oz) (pt emptying ostomy bag and in an upright position), BMI: 21.88  IBW/kg (Dietitian Calculated): 78.18 kg  Percent of IBW: 84 %     Weight Change:  Daily Weight  04/11/25 : 71.3 kg (157 lb 3 oz)  04/06/25 : 66.9 kg (147 lb 7.8 oz)  03/12/25 : 70.8 kg (156 lb)  02/18/25 : 67.1 kg (148 lb)  02/12/25 : 69.5 kg (153 lb 3.5 oz)  02/07/25 : 72.8 kg (160 lb 7.9 oz)  02/05/25 : 72.9 kg (160 lb 11.5 oz)  02/04/25 : 74.3 kg (163 lb 12.8 oz)  01/24/25 : 74.5 kg (164 lb 3.9 oz)  01/17/25 : 74.4 kg (164 lb)     Nutrition Focused Physical Exam Findings:   Subcutaneous Fat Loss  Orbital Fat Pads: Severe (dark circles, hollowing and loose skin)  Buccal Fat Pads: Severe (hollow, sunken and narrow face)  Triceps: Severe (negligible fat tissue)    Muscle Wasting  Temporalis: Severe (hollowed scooping depression)  Pectoralis (Clavicular Region): Severe (protruding prominent clavicle)  Deltoid/Trapezius: Severe (squared shoulders, acromion process prominent)    Nutrition Significant Labs:  Lab Results   Component Value Date    WBC 3.0 (L) 04/10/2025    HGB 7.9 (L) 04/10/2025    HCT 25.6 (L) 04/10/2025    PLT 80 (L) 04/10/2025    CHOL 154 06/06/2024    TRIG 105 04/10/2025    HDL 27.1 06/06/2024    ALT 27 04/09/2025    AST 42 (H) 04/09/2025     04/10/2025    K 4.8 04/10/2025     (H) 04/10/2025    CREATININE 0.90 04/10/2025    BUN 22 04/10/2025    CO2 24 " 04/10/2025    TSH 2.17 06/06/2024    INR 1.3 (H) 03/24/2025    HGBA1C 5.3 01/17/2025     Nutrition Specific Medications:  aspirin, 81 mg, oral, Daily  atorvastatin, 40 mg, oral, Nightly  docusate sodium, 100 mg, oral, BID  enoxaparin, 40 mg, subcutaneous, Nightly  metoprolol succinate XL, 100 mg, oral, Daily  midodrine, 2.5 mg, oral, TID  morphine CR, 30 mg, oral, q12h ALEX  octreotide, 200 mcg, subcutaneous, TID  pantoprazole, 40 mg, oral, Daily before breakfast   Or  pantoprazole, 40 mg, intravenous, Daily before breakfast  polyethylene glycol, 17 g, oral, Daily  tamsulosin, 0.4 mg, oral, Daily  thiamine, 100 mg, intravenous, Daily      Adult Clinimix TPN Cyclic, , Last Rate: 136 mL/hr at 04/11/25 0647  potassium [id number]-N7-5.45%NaCl, 100 mL/hr, Last Rate: 100 mL/hr (04/11/25 0353)      Dietary Orders (From admission, onward)       Start     Ordered    04/09/25 1021  Oral nutritional supplements  Until discontinued        Comments: Chocolate or strawberry   Question Answer Comment   Deliver with All meals    Select supplement: Ensure Plus High Protein        04/09/25 1021    04/09/25 0227  May Participate in Room Service  ( ROOM SERVICE MAY PARTICIPATE)  Once        Question:  .  Answer:  Yes    04/09/25 0226    04/08/25 2311  Adult diet Regular  Diet effective now        Question:  Diet type  Answer:  Regular    04/08/25 2311                  Nutrition Support Intake provides: Clinimix 5/20 @ (start rate at 68 ml/hr for 1 hour. increase rate to 136 ml/hr for 10 hours. decrease rate to 68 ml/hr for 1 hour, then stop) provides 1320 calories, 75 gm protein in 1500 to volume/d.     Estimated Needs:   Estimated Energy Needs  Total Energy Estimated Needs in 24 hours (kCal):  (9845-9248)  Energy Estimated Needs per kg Body Weight in 24 hours (kCal/kg):  (30-35)  Method for Estimating Needs: Actual Wt    Estimated Protein Needs  Total Protein Estimated Needs in 24 Hours (g):  (79-99)  Protein Estimated Needs per  kg Body Weight in 24 Hours (g/kg):  (1.2-1.5)  Method for Estimating 24 Hour Protein Needs: Actual Wt    Estimated Fluid Needs  Total Fluid Estimated Needs in 24 Hours (mL): 1980 mL  Method for Estimating 24 Hour Fluid Needs: 1 mL/kcal      Nutrition Diagnosis   Nutrition Diagnosis:  Malnutrition Diagnosis  Patient has Malnutrition Diagnosis: Yes  Diagnosis Status: Active  Malnutrition Diagnosis: Severe malnutrition related to chronic disease or condition  Related to: decreased ability to consume sufficient energy  As Evidenced by: 20# (12.2%) weight loss in past 3 months, severe subcutaneous fat and muscle deficits,    Nutrition Diagnosis  Patient has Nutrition Diagnosis: Yes  Diagnosis Status (1): Active  Nutrition Diagnosis 1: Inadequate oral intake  Related to (1): decreased ability to consume sufficient energy  As Evidenced by (1): low po intake     Nutrition Interventions/Recommendations   Nutrition Interventions and Recommendations:  Nutrition Prescription: Nutrition prescription for oral nutrition, Nutrition prescription for parenteral nutrition    Nutrition Recommendations:  Individualized Nutrition Prescription Provided for : 5354-4047 calories, 79-99 gm protein to be provided via oral and parenteral nutrition    Nutrition Interventions/Goals:   Food and/or Nutrient Delivery Interventions  Interventions: Meals and snacks, Parenteral nutrition, Medical food supplement  Meals and Snacks: General healthful diet  Goal: provide as tolerated    Education Documentation  No documentation found.         Nutrition Monitoring and Evaluation   Monitoring/Evaluation:   Food/Nutrient Related History Monitoring  Monitoring and Evaluation Plan: Enteral and parenteral nutrition intake determination, Estimated Energy Intake  Estimated Energy Intake: Other criteria  Criteria: PO as tolerated  Enteral and Parenteral Nutrition Intake Determination: Parenteral nutrition intake - Tolerate TPN at goal rate    Anthropometric  Measurements  Monitoring and Evaluation Plan: Body weight  Body Weight: Body weight - Promote weight restoration    Biochemical Data, Medical Tests and Procedures  Monitoring and Evaluation Plan: Electrolyte/renal panel  Electrolyte and Renal Panel: Electrolytes within normal limits  Glucose/Endocrine Profile: Glucose within normal limits ( mg/dL)    Goal Status: Some progress toward goal(s)    Follow Up  Time Spent (min): 30 minutes  Last Date of Nutrition Visit: 04/11/25  Nutrition Follow-Up Needed?: 3-5 days  Follow up Comment: 4/14/25

## 2025-04-11 NOTE — DISCHARGE SUMMARY
Discharge Diagnosis  Dysphagia    Issues Requiring Follow-Up  Chyle leak  Met rectal cancer  Test Results Pending At Discharge  Pending Labs       No current pending labs.            Hospital Course   Roma Corral is a 70 y.o. male presenting with need to start TPN.  Patient was just discharged to the rehab facility yesterday.  Family got upset as patient did not receive the call Dr. colvin who recommended patient to be directly admitted to get the TPN going.  Patient was directly admitted by me for reinitiation of TPN orders.  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 Núñez, and laparoscopic microwave ablation x3 with liver biopsy by Dr. Erazo on 3/18/25.  Postop patient was found to have high drain output in which chyle leak was found.  Patient was started on octreotide.  IR performed lymphoscintigraphy without decreasing drain output.  Patient was made n.p.o. and started on TPN with successful decrease in drain output.  Per patient the drain output is getting lighter in color. patient was started on TPN   Tpn arranged again . Pleasure feeding   Dc home with TPN. Plan dr Núñez to follow TPN     Pertinent Physical Exam At Time of Discharge  Physical Exam  Vitals reviewed.   Constitutional:       Appearance: Normal appearance.   HENT:      Head: Normocephalic and atraumatic.      Right Ear: Tympanic membrane, ear canal and external ear normal.      Left Ear: Tympanic membrane, ear canal and external ear normal.      Nose: Nose normal.      Mouth/Throat:      Pharynx: Oropharynx is clear.   Eyes:      Extraocular Movements: Extraocular movements intact.      Conjunctiva/sclera:  Conjunctivae normal.      Pupils: Pupils are equal, round, and reactive to light.   Cardiovascular:      Rate and Rhythm: Normal rate and regular rhythm.      Pulses: Normal pulses.      Heart sounds: Normal heart sounds.   Pulmonary:      Effort: Pulmonary effort is normal.      Breath sounds: Normal breath sounds.   Abdominal:      General: Abdomen is flat. Bowel sounds are normal.      Palpations: Abdomen is soft.   Musculoskeletal:      Cervical back: Normal range of motion and neck supple.   Skin:     General: Skin is warm and dry.   Neurological:      General: No focal deficit present.      Mental Status: He is alert and oriented to person, place, and time.   Psychiatric:         Mood and Affect: Mood normal.         Home Medications     Medication List      START taking these medications     Continue current TPN formula; See AVS for most recent TPN formula.   Continue current TPN formula; TPN 5/20 68 mL for 1 hr, increase to 136   mL/hr x10 hrs, and then 68 mL/hr x 1 hr. VS for most recent TPN formula.     CONTINUE taking these medications     acetaminophen 325 mg tablet; Commonly known as: Tylenol; Take 2 tablets   (650 mg) by mouth every 6 hours if needed for mild pain (1 - 3).   alteplase 2 mg injection; Commonly known as: Cathflo Activase; 2 mL (2   mg) by intra-catheter route if needed (Use as needed for occluded PICC   Line).   aspirin 81 mg chewable tablet   atorvastatin 40 mg tablet; Commonly known as: Lipitor; Take 1 tablet (40   mg) by mouth once daily at bedtime.   enoxaparin 40 mg/0.4 mL syringe; Commonly known as: Lovenox; Inject 0.4   mL (40 mg) under the skin once every 24 hours for 12 days.   metoprolol succinate  mg 24 hr tablet; Commonly known as:   Toprol-XL; Take 1 tablet (100 mg) by mouth once daily.   midodrine 2.5 mg tablet; Commonly known as: Proamatine; Take 1 tablet   (2.5 mg) by mouth 3 times daily (morning, midday, late afternoon).   morphine CR 30 mg 12 hr tablet; Commonly  known as: MS Contin; Take 1   tablet (30 mg) by mouth every 12 hours. Do not crush, chew, or split.   octreotide 500 mcg/mL injection; Commonly known as: SandoSTATIN; Inject   0.4 mL (200 mcg) under the skin 3 times a day.   polyethylene glycol 17 gram packet; Commonly known as: Glycolax,   Miralax; Take 17 g by mouth every other day. Skip the dose for the day if   watery/loose colostomy output Do not fill before April 3, 2025.   prochlorperazine 10 mg tablet; Commonly known as: Compazine; Take 1   tablet (10 mg) by mouth every 6 hours if needed for nausea or vomiting.   tamsulosin 0.4 mg 24 hr capsule; Commonly known as: Flomax; Take 1   capsule (0.4 mg) by mouth once daily.   thiamine 100 mg/mL injection; Commonly known as: Vitamin B1; Infuse 1 mL   (100 mg) into a venous catheter once daily for 8 doses.     STOP taking these medications     Adult Clinimix Parenteral Nutrition Cyclic   LORazepam 0.5 mg tablet; Commonly known as: Ativan       Outpatient Follow-Up  Future Appointments   Date Time Provider Department Center   4/14/2025  1:30 PM Comfort Elkins APRN-CNP [id number] [facility]   4/15/2025  2:00 PM Javier Vasquez MD [id number] [facility]   4/16/2025 11:00 AM Baron Erazo MD [id number]   7/28/2025  2:10 PM Shaheen Kiser MD [id number] [facility]       Roxanne Buckner MD

## 2025-04-11 NOTE — PROGRESS NOTES
Physical Therapy    Physical Therapy Treatment    Patient Name: Roma Corral  MRN: [id number]  Department: [address]  Room: [address] [place] [address]  Today's Date: 4/11/2025  Time Calculation  Start Time: 1035  Stop Time: 1115  Time Calculation (min): 40 min         Assessment/Plan   PT Assessment  Rehab Prognosis: Good  Barriers to Discharge Home: No anticipated barriers  End of Session Communication: Bedside nurse  Assessment Comment: Pt feels comfortable and ready for DC. Reviewed any concerns with pt verbalizing understanding and happy to be issued FWW for homegoing.  End of Session Patient Position: Up in chair, Alarm off, not on at start of session  PT Plan  Inpatient/Swing Bed or Outpatient: Inpatient  PT Plan  Treatment/Interventions: Bed mobility, Transfer training, Gait training, Stair training, Balance training, Strengthening, Endurance training, Therapeutic exercise, Therapeutic activity  PT Plan: Ongoing PT  PT Frequency: 4 times per week  PT Discharge Recommendations: Low intensity level of continued care  Equipment Recommended upon Discharge: Wheeled walker  PT Recommended Transfer Status: Contact guard, Assistive device (FWW)  PT - OK to Discharge: Yes      General Visit Information:   PT  Visit  PT Received On: 04/11/25  General  Reason for Referral: impaired ADL's/mobility + dysphagia, recent colostomy  Referred By: Roxanne Buckner MD  Past Medical History Relevant to Rehab: CAD, NSTEMI, DM, HTN, gout, anxiety, metastatic rectal CA ( mets to liver) with colostomy  Family/Caregiver Present: No  Prior to Session Communication: Bedside nurse  Patient Position Received: Up in chair, Alarm off, not on at start of session  General Comment: Pt cleared for PT by nursing. Pt agreeable to PT services.    Subjective   Precautions:  Precautions  Post-Surgical Precautions: Abdominal surgery precautions  Precautions Comment: + accordian drain RUQ, pt able to state 3/3 abd precautions.      Objective   Pain:  Pain Assessment  Pain  Assessment: 0-10  0-10 (Numeric) Pain Score: 0 - No pain  Cognition:  Cognition  Overall Cognitive Status: Within Functional Limits  Orientation Level: Oriented X4     Postural Control:  Static Sitting Balance  Static Sitting-Balance Support: Feet supported  Static Sitting-Level of Assistance: Modified independent  Static Sitting-Comment/Number of Minutes: good seated static balance  Static Standing Balance  Static Standing-Balance Support: Bilateral upper extremity supported  Static Standing-Level of Assistance: Modified independent  Static Standing-Comment/Number of Minutes: good static stand balance    Treatments:  Therapeutic Activity  Therapeutic Activity Performed: Yes  Therapeutic Activity 1: Reviewed slowly building activity tolerance upon DCing home. Pt inquiring about senior center/gyms. Assisted pt in researching options.  Therapeutic Activity 2: Issued/fitted FWW for homegoing.    Ambulation/Gait Training  Ambulation/Gait Training Performed: Yes  Ambulation/Gait Training 1  Surface 1: Level tile  Device 1: Rolling walker  Assistance 1: Independent  Quality of Gait 1: Decreased step length  Comments/Distance (ft) 1: 5 feet. Pt demos good safety awareness and technique to manage small/cluttered area with no LOB.  Transfers  Transfer: Yes  Transfer 1  Transfer From 1: Sit to, Stand to  Transfer to 1: Sit, Stand  Technique 1: Sit to stand, Stand to sit  Transfer Device 1: Walker  Transfer Level of Assistance 1: Modified independent  Trials/Comments 1: Pt demos good safety awareness of lines/tubes with transfer. Good eccentric control.    Outcome Measures:  [facility] Basic Mobility  Turning from your back to your side while in a flat bed without using bedrails: None  Moving from lying on your back to sitting on the side of a flat bed without using bedrails: None  Moving to and from bed to chair (including a wheelchair): None  Standing up from a chair using your arms (e.g. wheelchair or bedside chair): None  To  walk in hospital room: None  Climbing 3-5 steps with railing: A little  Basic Mobility - Total Score: 23    Education Documentation  Precautions, taught by Kenyatta Scruggs PTA at 4/11/2025  1:32 PM.  Learner: Patient  Readiness: Acceptance  Method: Explanation, Demonstration  Response: Verbalizes Understanding, Demonstrated Understanding  Comment: POC, abd precautions, AD use    Mobility Training, taught by Kenyatta Scruggs PTA at 4/11/2025  1:32 PM.  Learner: Patient  Readiness: Acceptance  Method: Explanation, Demonstration  Response: Verbalizes Understanding, Demonstrated Understanding  Comment: POC, abd precautions, AD use    Education Comments  No comments found.      Encounter Problems       Encounter Problems (Active)       Balance       STG - Maintains dynamic standing balance with upper extremity support (Progressing)       Start:  04/09/25    Expected End:  05/07/25       INTERVENTIONS:1. Practice standing with minimal support.2. Educate patient about standing tolerance.3. Educate patient about independence with gait, transfers, and ADL's.4. Educate patient about use of assistive device.5. Educate patient about self-directed care.            Mobility       STG - Patient will ambulate 300 ft, FWW, + turns, mod ind (Progressing)       Start:  04/09/25    Expected End:  05/07/25            pt ascends/descends 1 step without rail, contact guard of 1 (Progressing)       Start:  04/09/25    Expected End:  05/07/25               PT Transfers       STG - Patient to transfer to and from sit to supine mod ind via logroll (Progressing)       Start:  04/09/25    Expected End:  05/07/25            STG - Patient will transfer sit to and from stand mod ind (Progressing)       Start:  04/09/25    Expected End:  05/07/25               Pain       pt will verbalize no > 2/10 pain during functional mobility (Progressing)       Start:  04/09/25    Expected End:  05/07/25               Pain - Adult          Safety       Goal 1  (Progressing)       Start:  04/09/25

## 2025-04-12 LAB
ALBUMIN SERPL BCP-MCNC: 2 G/DL (ref 3.4–5)
ALP SERPL-CCNC: 628 U/L (ref 33–136)
ALT SERPL W P-5'-P-CCNC: 19 U/L (ref 10–52)
ANION GAP SERPL CALCULATED.3IONS-SCNC: 7 MMOL/L (ref 10–20)
AST SERPL W P-5'-P-CCNC: 30 U/L (ref 9–39)
BILIRUB SERPL-MCNC: 0.5 MG/DL (ref 0–1.2)
BUN SERPL-MCNC: 23 MG/DL (ref 6–23)
CALCIUM SERPL-MCNC: 7.1 MG/DL (ref 8.6–10.3)
CHLORIDE SERPL-SCNC: 108 MMOL/L (ref 98–107)
CO2 SERPL-SCNC: 25 MMOL/L (ref 21–32)
CREAT SERPL-MCNC: 0.79 MG/DL (ref 0.5–1.3)
EGFRCR SERPLBLD CKD-EPI 2021: >90 ML/MIN/1.73M*2
ERYTHROCYTE [DISTWIDTH] IN BLOOD BY AUTOMATED COUNT: 19.8 % (ref 11.5–14.5)
GLUCOSE SERPL-MCNC: 170 MG/DL (ref 74–99)
HCT VFR BLD AUTO: 25 % (ref 41–52)
HGB BLD-MCNC: 7.8 G/DL (ref 13.5–17.5)
MCH RBC QN AUTO: 28.6 PG (ref 26–34)
MCHC RBC AUTO-ENTMCNC: 31.2 G/DL (ref 32–36)
MCV RBC AUTO: 92 FL (ref 80–100)
NRBC BLD-RTO: 0 /100 WBCS (ref 0–0)
PLATELET # BLD AUTO: 65 X10*3/UL (ref 150–450)
POTASSIUM SERPL-SCNC: 4.2 MMOL/L (ref 3.5–5.3)
PROT SERPL-MCNC: 5.3 G/DL (ref 6.4–8.2)
RBC # BLD AUTO: 2.73 X10*6/UL (ref 4.5–5.9)
SODIUM SERPL-SCNC: 136 MMOL/L (ref 136–145)
WBC # BLD AUTO: 3 X10*3/UL (ref 4.4–11.3)

## 2025-04-12 PROCEDURE — 84075 ASSAY ALKALINE PHOSPHATASE: CPT | Performed by: INTERNAL MEDICINE

## 2025-04-12 PROCEDURE — 85027 COMPLETE CBC AUTOMATED: CPT | Performed by: INTERNAL MEDICINE

## 2025-04-12 PROCEDURE — 2500000004 HC RX 250 GENERAL PHARMACY W/ HCPCS (ALT 636 FOR OP/ED): Performed by: INTERNAL MEDICINE

## 2025-04-12 PROCEDURE — 2500000001 HC RX 250 WO HCPCS SELF ADMINISTERED DRUGS (ALT 637 FOR MEDICARE OP): Performed by: INTERNAL MEDICINE

## 2025-04-12 PROCEDURE — 2500000005 HC RX 250 GENERAL PHARMACY W/O HCPCS

## 2025-04-12 PROCEDURE — 1100000001 HC PRIVATE ROOM DAILY

## 2025-04-12 PROCEDURE — 99232 SBSQ HOSP IP/OBS MODERATE 35: CPT | Performed by: INTERNAL MEDICINE

## 2025-04-12 PROCEDURE — 2500000002 HC RX 250 W HCPCS SELF ADMINISTERED DRUGS (ALT 637 FOR MEDICARE OP, ALT 636 FOR OP/ED): Performed by: INTERNAL MEDICINE

## 2025-04-12 RX ADMIN — MIDODRINE HYDROCHLORIDE 2.5 MG: 2.5 TABLET ORAL at 08:38

## 2025-04-12 RX ADMIN — OCTREOTIDE ACETATE 200 MCG: 500 INJECTION, SOLUTION INTRAVENOUS; SUBCUTANEOUS at 21:52

## 2025-04-12 RX ADMIN — ENOXAPARIN SODIUM 40 MG: 40 INJECTION SUBCUTANEOUS at 20:46

## 2025-04-12 RX ADMIN — DOCUSATE SODIUM 100 MG: 100 CAPSULE, LIQUID FILLED ORAL at 08:38

## 2025-04-12 RX ADMIN — ATORVASTATIN CALCIUM 40 MG: 40 TABLET, FILM COATED ORAL at 20:47

## 2025-04-12 RX ADMIN — POLYETHYLENE GLYCOL 3350 17 G: 17 POWDER, FOR SOLUTION ORAL at 08:38

## 2025-04-12 RX ADMIN — MIDODRINE HYDROCHLORIDE 2.5 MG: 2.5 TABLET ORAL at 12:59

## 2025-04-12 RX ADMIN — TAMSULOSIN HYDROCHLORIDE 0.4 MG: 0.4 CAPSULE ORAL at 08:37

## 2025-04-12 RX ADMIN — PANTOPRAZOLE SODIUM 40 MG: 40 INJECTION, POWDER, FOR SOLUTION INTRAVENOUS at 06:09

## 2025-04-12 RX ADMIN — MORPHINE SULFATE 30 MG: 30 TABLET, FILM COATED, EXTENDED RELEASE ORAL at 08:38

## 2025-04-12 RX ADMIN — METOPROLOL SUCCINATE 100 MG: 100 TABLET, EXTENDED RELEASE ORAL at 08:38

## 2025-04-12 RX ADMIN — ASPIRIN 81 MG: 81 TABLET, CHEWABLE ORAL at 08:38

## 2025-04-12 RX ADMIN — DOCUSATE SODIUM 100 MG: 100 CAPSULE, LIQUID FILLED ORAL at 20:47

## 2025-04-12 RX ADMIN — ASCORBIC ACID, VITAMIN A PALMITATE, CHOLECALCIFEROL, THIAMINE HYDROCHLORIDE, RIBOFLAVIN-5 PHOSPHATE SODIUM, PYRIDOXINE HYDROCHLORIDE, NIACINAMIDE, DEXPANTHENOL, ALPHA-TOCOPHEROL ACETATE, VITAMIN K1, FOLIC ACID, BIOTIN, CYANOCOBALAMIN: 200; 3300; 200; 6; 3.6; 6; 40; 15; 10; 150; 600; 60; 5 INJECTION, SOLUTION INTRAVENOUS at 20:33

## 2025-04-12 RX ADMIN — OCTREOTIDE ACETATE 200 MCG: 500 INJECTION, SOLUTION INTRAVENOUS; SUBCUTANEOUS at 16:14

## 2025-04-12 RX ADMIN — MORPHINE SULFATE 30 MG: 30 TABLET, FILM COATED, EXTENDED RELEASE ORAL at 20:47

## 2025-04-12 RX ADMIN — OCTREOTIDE ACETATE 200 MCG: 500 INJECTION, SOLUTION INTRAVENOUS; SUBCUTANEOUS at 11:16

## 2025-04-12 RX ADMIN — THIAMINE HYDROCHLORIDE 100 MG: 100 INJECTION, SOLUTION INTRAMUSCULAR; INTRAVENOUS at 08:38

## 2025-04-12 RX ADMIN — MIDODRINE HYDROCHLORIDE 2.5 MG: 2.5 TABLET ORAL at 16:14

## 2025-04-12 ASSESSMENT — PAIN SCALES - GENERAL
PAINLEVEL_OUTOF10: 0 - NO PAIN
PAINLEVEL_OUTOF10: 0 - NO PAIN

## 2025-04-12 ASSESSMENT — COGNITIVE AND FUNCTIONAL STATUS - GENERAL
DAILY ACTIVITIY SCORE: 24
MOBILITY SCORE: 24

## 2025-04-12 ASSESSMENT — PAIN - FUNCTIONAL ASSESSMENT: PAIN_FUNCTIONAL_ASSESSMENT: 0-10

## 2025-04-12 ASSESSMENT — PAIN SCALES - WONG BAKER: WONGBAKER_NUMERICALRESPONSE: NO HURT

## 2025-04-12 NOTE — PROGRESS NOTES
"Roma Corral is a 70 y.o. male on day 3 of admission presenting with Dysphagia.    Subjective   Doing ok . Carmen some oral        Objective     Physical Exam  Vitals reviewed.   Constitutional:       Appearance: Normal appearance.   HENT:      Head: Normocephalic and atraumatic.      Right Ear: Tympanic membrane, ear canal and external ear normal.      Left Ear: Tympanic membrane, ear canal and external ear normal.      Nose: Nose normal.      Mouth/Throat:      Pharynx: Oropharynx is clear.   Eyes:      Extraocular Movements: Extraocular movements intact.      Conjunctiva/sclera: Conjunctivae normal.      Pupils: Pupils are equal, round, and reactive to light.   Cardiovascular:      Rate and Rhythm: Normal rate and regular rhythm.      Pulses: Normal pulses.      Heart sounds: Normal heart sounds.   Pulmonary:      Effort: Pulmonary effort is normal.      Breath sounds: Normal breath sounds.   Abdominal:      General: Abdomen is flat. Bowel sounds are normal.      Palpations: Abdomen is soft.      Comments: Drain in RUQ   Colostomy bag in LLQ   Musculoskeletal:      Cervical back: Normal range of motion and neck supple.   Skin:     General: Skin is warm and dry.   Neurological:      General: No focal deficit present.      Mental Status: He is alert and oriented to person, place, and time.   Psychiatric:         Mood and Affect: Mood normal.         Last Recorded Vitals  Blood pressure 85/59, pulse 54, temperature 36.7 °C (98.1 °F), temperature source Oral, resp. rate 18, height 1.805 m (5' 11.06\"), weight 71.3 kg (157 lb 3 oz), SpO2 100%.  Intake/Output last 3 Shifts:  I/O last 3 completed shifts:  In: 2450.9 (34.4 mL/kg) [P.O.:1380]  Out: 1525 (21.4 mL/kg) [Urine:825 (0.3 mL/kg/hr); Drains:650; Stool:50]  Weight: 71.3 kg     Relevant Results               This patient has a central line   Reason for the central line remaining today? Parenteral medication    Scheduled medications  aspirin, 81 mg, oral, " Daily  atorvastatin, 40 mg, oral, Nightly  docusate sodium, 100 mg, oral, BID  enoxaparin, 40 mg, subcutaneous, Nightly  metoprolol succinate XL, 100 mg, oral, Daily  midodrine, 2.5 mg, oral, TID  morphine CR, 30 mg, oral, q12h ALEX  octreotide, 200 mcg, subcutaneous, TID  pantoprazole, 40 mg, oral, Daily before breakfast   Or  pantoprazole, 40 mg, intravenous, Daily before breakfast  polyethylene glycol, 17 g, oral, Daily  tamsulosin, 0.4 mg, oral, Daily  thiamine, 100 mg, intravenous, Daily      Continuous medications  Adult Clinimix TPN Cyclic, , Last Rate: 68 mL/hr at 04/11/25 2144  potassium tpndvnb-A3-3.45%NaCl, 100 mL/hr, Last Rate: 100 mL/hr (04/11/25 0353)      PRN medications  PRN medications: acetaminophen **OR** acetaminophen **OR** acetaminophen, alteplase, benzocaine-menthol, dextromethorphan-guaifenesin, guaiFENesin, LORazepam, melatonin, ondansetron **OR** ondansetron, prochlorperazine  No results found. However, due to the size of the patient record, not all encounters were searched. Please check Results Review for a complete set of results.    No results found.           Malnutrition Diagnosis Status: Active  Malnutrition Diagnosis: Severe malnutrition related to chronic disease or condition  Related to: decreased ability to consume sufficient energy  As Evidenced by: 20# (12.2%) weight loss in past 3 months, severe subcutaneous fat and muscle deficits,  I agree with the dietitian's malnutrition diagnosis.  No results found. However, due to the size of the patient record, not all encounters were searched. Please check Results Review for a complete set of results.  No results found.      Assessment/Plan   Assessment & Plan  Dysphagia    Anxiety    CAD (coronary artery disease)    Colostomy care    History of rectal cancer    Hyperlipidemia    Hypertension    Rectal cancer (Multi)    Type 2 diabetes mellitus    Severe protein-calorie malnutrition (Multi)    Ileorectal fistula    Patient is getting  TPN  Dr. Anna input noted  He wants patient to have TPN for 3 months  Patient is allowed to take oral but very minimal per Dr. Vasquez  Minimize p.o. except for comfort with goal of ceiling postoperative lymphatic leak.  Likely multiple source of leak given degree of response to radiation causing ileorectal fistula and abnormal inflamed operative field.  For this reason patient is also not a candidate for enteral feeding.   Plan is to minimize enteral intake with goal of resolving chyle leak.  Blood pressure and blood sugar okay  Monitor electrolytes   working on discharge home with TPN  All orders updated.  Discharge orders also placed       I spent  minutes in the professional and overall care of this patient.      Roxanne Buckner MD

## 2025-04-12 NOTE — CARE PLAN
The patient's goals for the shift include rest    The clinical goals for the shift include pain management, safety, TPN and discharge

## 2025-04-12 NOTE — PROGRESS NOTES
"Roma Corral is a 70 y.o. male on day 4 of admission presenting with Dysphagia.    Subjective   Doing ok . Carmen some oral.  Tolerating TPN       Objective     Physical Exam  Vitals reviewed.   Constitutional:       Appearance: Normal appearance.   HENT:      Head: Normocephalic and atraumatic.      Right Ear: Tympanic membrane, ear canal and external ear normal.      Left Ear: Tympanic membrane, ear canal and external ear normal.      Nose: Nose normal.      Mouth/Throat:      Pharynx: Oropharynx is clear.   Eyes:      Extraocular Movements: Extraocular movements intact.      Conjunctiva/sclera: Conjunctivae normal.      Pupils: Pupils are equal, round, and reactive to light.   Cardiovascular:      Rate and Rhythm: Normal rate and regular rhythm.      Pulses: Normal pulses.      Heart sounds: Normal heart sounds.   Pulmonary:      Effort: Pulmonary effort is normal.      Breath sounds: Normal breath sounds.   Abdominal:      General: Abdomen is flat. Bowel sounds are normal.      Palpations: Abdomen is soft.      Comments: Drain in RUQ   Colostomy bag in LLQ   Musculoskeletal:      Cervical back: Normal range of motion and neck supple.   Skin:     General: Skin is warm and dry.   Neurological:      General: No focal deficit present.      Mental Status: He is alert and oriented to person, place, and time.   Psychiatric:         Mood and Affect: Mood normal.         Last Recorded Vitals  Blood pressure 116/56, pulse 50, temperature 36.4 °C (97.5 °F), temperature source Oral, resp. rate 18, height 1.805 m (5' 11.06\"), weight 71.3 kg (157 lb 3 oz), SpO2 100%.  Intake/Output last 3 Shifts:  I/O last 3 completed shifts:  In: 3973.6 (55.7 mL/kg) [P.O.:1010]  Out: 1845 (25.9 mL/kg) [Urine:1175 (0.5 mL/kg/hr); Drains:600; Stool:70]  Weight: 71.3 kg     Relevant Results               This patient has a central line   Reason for the central line remaining today? Parenteral medication    Scheduled medications  aspirin, 81 mg, " oral, Daily  atorvastatin, 40 mg, oral, Nightly  docusate sodium, 100 mg, oral, BID  enoxaparin, 40 mg, subcutaneous, Nightly  metoprolol succinate XL, 100 mg, oral, Daily  midodrine, 2.5 mg, oral, TID  morphine CR, 30 mg, oral, q12h ALEX  octreotide, 200 mcg, subcutaneous, TID  pantoprazole, 40 mg, oral, Daily before breakfast   Or  pantoprazole, 40 mg, intravenous, Daily before breakfast  polyethylene glycol, 17 g, oral, Daily  tamsulosin, 0.4 mg, oral, Daily  thiamine, 100 mg, intravenous, Daily      Continuous medications  Adult Clinimix TPN Cyclic, , Last Rate: Stopped (04/12/25 0944)  potassium fgtqddc-I3-6.45%NaCl, 100 mL/hr, Last Rate: 100 mL/hr (04/12/25 0647)      PRN medications  PRN medications: acetaminophen **OR** acetaminophen **OR** acetaminophen, alteplase, benzocaine-menthol, dextromethorphan-guaifenesin, guaiFENesin, LORazepam, melatonin, ondansetron **OR** ondansetron, prochlorperazine  Results for orders placed or performed during the hospital encounter of 04/08/25 (from the past 24 hours)   CBC   Result Value Ref Range    WBC 3.0 (L) 4.4 - 11.3 x10*3/uL    nRBC 0.0 0.0 - 0.0 /100 WBCs    RBC 2.73 (L) 4.50 - 5.90 x10*6/uL    Hemoglobin 7.8 (L) 13.5 - 17.5 g/dL    Hematocrit 25.0 (L) 41.0 - 52.0 %    MCV 92 80 - 100 fL    MCH 28.6 26.0 - 34.0 pg    MCHC 31.2 (L) 32.0 - 36.0 g/dL    RDW 19.8 (H) 11.5 - 14.5 %    Platelets 65 (L) 150 - 450 x10*3/uL   Comprehensive Metabolic Panel   Result Value Ref Range    Glucose 170 (H) 74 - 99 mg/dL    Sodium 136 136 - 145 mmol/L    Potassium 4.2 3.5 - 5.3 mmol/L    Chloride 108 (H) 98 - 107 mmol/L    Bicarbonate 25 21 - 32 mmol/L    Anion Gap 7 (L) 10 - 20 mmol/L    Urea Nitrogen 23 6 - 23 mg/dL    Creatinine 0.79 0.50 - 1.30 mg/dL    eGFR >90 >60 mL/min/1.73m*2    Calcium 7.1 (L) 8.6 - 10.3 mg/dL    Albumin 2.0 (L) 3.4 - 5.0 g/dL    Alkaline Phosphatase 628 (H) 33 - 136 U/L    Total Protein 5.3 (L) 6.4 - 8.2 g/dL    AST 30 9 - 39 U/L    Bilirubin, Total 0.5  0.0 - 1.2 mg/dL    ALT 19 10 - 52 U/L     *Note: Due to a large number of results and/or encounters for the requested time period, some results have not been displayed. A complete set of results can be found in Results Review.       No results found.           Malnutrition Diagnosis Status: Active  Malnutrition Diagnosis: Severe malnutrition related to chronic disease or condition  Related to: decreased ability to consume sufficient energy  As Evidenced by: 20# (12.2%) weight loss in past 3 months, severe subcutaneous fat and muscle deficits,  I agree with the dietitian's malnutrition diagnosis.  Results for orders placed or performed during the hospital encounter of 04/08/25 (from the past 24 hours)   CBC   Result Value Ref Range    WBC 3.0 (L) 4.4 - 11.3 x10*3/uL    nRBC 0.0 0.0 - 0.0 /100 WBCs    RBC 2.73 (L) 4.50 - 5.90 x10*6/uL    Hemoglobin 7.8 (L) 13.5 - 17.5 g/dL    Hematocrit 25.0 (L) 41.0 - 52.0 %    MCV 92 80 - 100 fL    MCH 28.6 26.0 - 34.0 pg    MCHC 31.2 (L) 32.0 - 36.0 g/dL    RDW 19.8 (H) 11.5 - 14.5 %    Platelets 65 (L) 150 - 450 x10*3/uL   Comprehensive Metabolic Panel   Result Value Ref Range    Glucose 170 (H) 74 - 99 mg/dL    Sodium 136 136 - 145 mmol/L    Potassium 4.2 3.5 - 5.3 mmol/L    Chloride 108 (H) 98 - 107 mmol/L    Bicarbonate 25 21 - 32 mmol/L    Anion Gap 7 (L) 10 - 20 mmol/L    Urea Nitrogen 23 6 - 23 mg/dL    Creatinine 0.79 0.50 - 1.30 mg/dL    eGFR >90 >60 mL/min/1.73m*2    Calcium 7.1 (L) 8.6 - 10.3 mg/dL    Albumin 2.0 (L) 3.4 - 5.0 g/dL    Alkaline Phosphatase 628 (H) 33 - 136 U/L    Total Protein 5.3 (L) 6.4 - 8.2 g/dL    AST 30 9 - 39 U/L    Bilirubin, Total 0.5 0.0 - 1.2 mg/dL    ALT 19 10 - 52 U/L     *Note: Due to a large number of results and/or encounters for the requested time period, some results have not been displayed. A complete set of results can be found in Results Review.     No results found.      Assessment/Plan   Assessment &  Plan  Dysphagia    Anxiety    CAD (coronary artery disease)    Colostomy care    History of rectal cancer    Hyperlipidemia    Hypertension    Rectal cancer (Multi)    Type 2 diabetes mellitus    Severe protein-calorie malnutrition (Multi)    Ileorectal fistula    Patient is getting TPN  Dr. Anna input noted  He wants patient to have TPN for 3 months  Patient is allowed to take oral but very minimal per Dr. Vasquez  Minimize p.o. except for comfort with goal of ceiling postoperative lymphatic leak.  Likely multiple source of leak given degree of response to radiation causing ileorectal fistula and abnormal inflamed operative field.  For this reason patient is also not a candidate for enteral feeding.   Plan is to minimize enteral intake with goal of resolving chyle leak.  Blood pressure and blood sugar okay  Monitor electrolytes   working on discharge home with TPN  All orders updated.  Discharge orders also placed       I spent  minutes in the professional and overall care of this patient.      Roxanne Buckner MD

## 2025-04-12 NOTE — NURSING NOTE
Pt A&O x4 currenlty resting in bed. No complaints or concerns. Call light within reach.  Pt continues on TPN per order.  Drain in place to RLQ per order.  Colostomy in place per order.

## 2025-04-12 NOTE — NURSING NOTE
Pt resting in bed. No complaints or concerns. Call light within reach. Colostomy and drain in place per order.  Pt continues on TPN per order.

## 2025-04-12 NOTE — CARE PLAN
The patient's goals for the shift include rest, see Drs.     The clinical goals for the shift include TPN, safety, and pain control

## 2025-04-13 VITALS
HEART RATE: 76 BPM | TEMPERATURE: 97.5 F | SYSTOLIC BLOOD PRESSURE: 118 MMHG | OXYGEN SATURATION: 100 % | DIASTOLIC BLOOD PRESSURE: 75 MMHG | HEIGHT: 71 IN | RESPIRATION RATE: 16 BRPM | WEIGHT: 155.2 LBS | BODY MASS INDEX: 21.73 KG/M2

## 2025-04-13 LAB
ALBUMIN SERPL BCP-MCNC: 1.9 G/DL (ref 3.4–5)
ALP SERPL-CCNC: 557 U/L (ref 33–136)
ALT SERPL W P-5'-P-CCNC: 17 U/L (ref 10–52)
ANION GAP SERPL CALCULATED.3IONS-SCNC: 7 MMOL/L (ref 10–20)
AST SERPL W P-5'-P-CCNC: 27 U/L (ref 9–39)
BILIRUB SERPL-MCNC: 0.4 MG/DL (ref 0–1.2)
BUN SERPL-MCNC: 25 MG/DL (ref 6–23)
CALCIUM SERPL-MCNC: 6.9 MG/DL (ref 8.6–10.3)
CHLORIDE SERPL-SCNC: 107 MMOL/L (ref 98–107)
CO2 SERPL-SCNC: 25 MMOL/L (ref 21–32)
CREAT SERPL-MCNC: 0.77 MG/DL (ref 0.5–1.3)
EGFRCR SERPLBLD CKD-EPI 2021: >90 ML/MIN/1.73M*2
ERYTHROCYTE [DISTWIDTH] IN BLOOD BY AUTOMATED COUNT: 19.9 % (ref 11.5–14.5)
GLUCOSE SERPL-MCNC: 188 MG/DL (ref 74–99)
HCT VFR BLD AUTO: 25.3 % (ref 41–52)
HGB BLD-MCNC: 7.7 G/DL (ref 13.5–17.5)
MCH RBC QN AUTO: 28.1 PG (ref 26–34)
MCHC RBC AUTO-ENTMCNC: 30.4 G/DL (ref 32–36)
MCV RBC AUTO: 92 FL (ref 80–100)
NRBC BLD-RTO: 0 /100 WBCS (ref 0–0)
PLATELET # BLD AUTO: 77 X10*3/UL (ref 150–450)
POTASSIUM SERPL-SCNC: 4.4 MMOL/L (ref 3.5–5.3)
PROT SERPL-MCNC: 5.1 G/DL (ref 6.4–8.2)
RBC # BLD AUTO: 2.74 X10*6/UL (ref 4.5–5.9)
SODIUM SERPL-SCNC: 135 MMOL/L (ref 136–145)
WBC # BLD AUTO: 3.2 X10*3/UL (ref 4.4–11.3)

## 2025-04-13 PROCEDURE — 2500000002 HC RX 250 W HCPCS SELF ADMINISTERED DRUGS (ALT 637 FOR MEDICARE OP, ALT 636 FOR OP/ED): Performed by: INTERNAL MEDICINE

## 2025-04-13 PROCEDURE — 84075 ASSAY ALKALINE PHOSPHATASE: CPT | Performed by: INTERNAL MEDICINE

## 2025-04-13 PROCEDURE — 2500000005 HC RX 250 GENERAL PHARMACY W/O HCPCS

## 2025-04-13 PROCEDURE — 85027 COMPLETE CBC AUTOMATED: CPT | Performed by: INTERNAL MEDICINE

## 2025-04-13 PROCEDURE — 2500000004 HC RX 250 GENERAL PHARMACY W/ HCPCS (ALT 636 FOR OP/ED): Performed by: INTERNAL MEDICINE

## 2025-04-13 PROCEDURE — 2500000001 HC RX 250 WO HCPCS SELF ADMINISTERED DRUGS (ALT 637 FOR MEDICARE OP): Performed by: INTERNAL MEDICINE

## 2025-04-13 PROCEDURE — 1100000001 HC PRIVATE ROOM DAILY

## 2025-04-13 PROCEDURE — 99232 SBSQ HOSP IP/OBS MODERATE 35: CPT | Performed by: INTERNAL MEDICINE

## 2025-04-13 RX ADMIN — POLYETHYLENE GLYCOL 3350 17 G: 17 POWDER, FOR SOLUTION ORAL at 08:30

## 2025-04-13 RX ADMIN — ATORVASTATIN CALCIUM 40 MG: 40 TABLET, FILM COATED ORAL at 22:02

## 2025-04-13 RX ADMIN — DOCUSATE SODIUM 100 MG: 100 CAPSULE, LIQUID FILLED ORAL at 08:31

## 2025-04-13 RX ADMIN — OCTREOTIDE ACETATE 200 MCG: 500 INJECTION, SOLUTION INTRAVENOUS; SUBCUTANEOUS at 22:10

## 2025-04-13 RX ADMIN — MORPHINE SULFATE 30 MG: 30 TABLET, FILM COATED, EXTENDED RELEASE ORAL at 22:02

## 2025-04-13 RX ADMIN — THIAMINE HYDROCHLORIDE 100 MG: 100 INJECTION, SOLUTION INTRAMUSCULAR; INTRAVENOUS at 08:31

## 2025-04-13 RX ADMIN — MORPHINE SULFATE 30 MG: 30 TABLET, FILM COATED, EXTENDED RELEASE ORAL at 08:31

## 2025-04-13 RX ADMIN — ENOXAPARIN SODIUM 40 MG: 40 INJECTION SUBCUTANEOUS at 22:10

## 2025-04-13 RX ADMIN — DOCUSATE SODIUM 100 MG: 100 CAPSULE, LIQUID FILLED ORAL at 22:02

## 2025-04-13 RX ADMIN — MIDODRINE HYDROCHLORIDE 2.5 MG: 2.5 TABLET ORAL at 08:31

## 2025-04-13 RX ADMIN — ASPIRIN 81 MG: 81 TABLET, CHEWABLE ORAL at 08:31

## 2025-04-13 RX ADMIN — OCTREOTIDE ACETATE 200 MCG: 500 INJECTION, SOLUTION INTRAVENOUS; SUBCUTANEOUS at 15:29

## 2025-04-13 RX ADMIN — TAMSULOSIN HYDROCHLORIDE 0.4 MG: 0.4 CAPSULE ORAL at 08:31

## 2025-04-13 RX ADMIN — ASCORBIC ACID, VITAMIN A PALMITATE, CHOLECALCIFEROL, THIAMINE HYDROCHLORIDE, RIBOFLAVIN-5 PHOSPHATE SODIUM, PYRIDOXINE HYDROCHLORIDE, NIACINAMIDE, DEXPANTHENOL, ALPHA-TOCOPHEROL ACETATE, VITAMIN K1, FOLIC ACID, BIOTIN, CYANOCOBALAMIN: 200; 3300; 200; 6; 3.6; 6; 40; 15; 10; 150; 600; 60; 5 INJECTION, SOLUTION INTRAVENOUS at 21:59

## 2025-04-13 RX ADMIN — MIDODRINE HYDROCHLORIDE 2.5 MG: 2.5 TABLET ORAL at 16:33

## 2025-04-13 RX ADMIN — PANTOPRAZOLE SODIUM 40 MG: 40 INJECTION, POWDER, FOR SOLUTION INTRAVENOUS at 06:05

## 2025-04-13 RX ADMIN — OCTREOTIDE ACETATE 200 MCG: 500 INJECTION, SOLUTION INTRAVENOUS; SUBCUTANEOUS at 08:31

## 2025-04-13 RX ADMIN — MIDODRINE HYDROCHLORIDE 2.5 MG: 2.5 TABLET ORAL at 12:36

## 2025-04-13 ASSESSMENT — COGNITIVE AND FUNCTIONAL STATUS - GENERAL
MOBILITY SCORE: 24
MOBILITY SCORE: 23
DAILY ACTIVITIY SCORE: 24
DAILY ACTIVITIY SCORE: 24
CLIMB 3 TO 5 STEPS WITH RAILING: A LITTLE

## 2025-04-13 ASSESSMENT — PAIN - FUNCTIONAL ASSESSMENT: PAIN_FUNCTIONAL_ASSESSMENT: FLACC (FACE, LEGS, ACTIVITY, CRY, CONSOLABILITY)

## 2025-04-13 ASSESSMENT — PAIN SCALES - GENERAL
PAINLEVEL_OUTOF10: 0 - NO PAIN
PAINLEVEL_OUTOF10: 0 - NO PAIN

## 2025-04-13 ASSESSMENT — PAIN SCALES - WONG BAKER: WONGBAKER_NUMERICALRESPONSE: NO HURT

## 2025-04-13 NOTE — CARE PLAN
The patient's goals for the shift include rest    The clinical goals for the shift include TPN infusion, monitor drain output and colostomy output

## 2025-04-13 NOTE — CARE PLAN
The patient's goals for the shift include rest    The clinical goals for the shift include TPN and safety

## 2025-04-13 NOTE — PROGRESS NOTES
"Roma Corral is a 70 y.o. male on day 5 of admission presenting with Dysphagia.    Subjective   Doing ok . Carmen some oral.  Tolerating TPN       Objective     Physical Exam  Vitals reviewed.   Constitutional:       Appearance: Normal appearance.   HENT:      Head: Normocephalic and atraumatic.      Right Ear: Tympanic membrane, ear canal and external ear normal.      Left Ear: Tympanic membrane, ear canal and external ear normal.      Nose: Nose normal.      Mouth/Throat:      Pharynx: Oropharynx is clear.   Eyes:      Extraocular Movements: Extraocular movements intact.      Conjunctiva/sclera: Conjunctivae normal.      Pupils: Pupils are equal, round, and reactive to light.   Cardiovascular:      Rate and Rhythm: Normal rate and regular rhythm.      Pulses: Normal pulses.      Heart sounds: Normal heart sounds.   Pulmonary:      Effort: Pulmonary effort is normal.      Breath sounds: Normal breath sounds.   Abdominal:      General: Abdomen is flat. Bowel sounds are normal.      Palpations: Abdomen is soft.      Comments: Drain in RUQ   Colostomy bag in LLQ   Musculoskeletal:      Cervical back: Normal range of motion and neck supple.   Skin:     General: Skin is warm and dry.   Neurological:      General: No focal deficit present.      Mental Status: He is alert and oriented to person, place, and time.   Psychiatric:         Mood and Affect: Mood normal.         Last Recorded Vitals  Blood pressure 118/75, pulse 76, temperature 36.4 °C (97.5 °F), temperature source Oral, resp. rate 16, height 1.805 m (5' 11.06\"), weight 70.4 kg (155 lb 3.3 oz), SpO2 100%.  Intake/Output last 3 Shifts:  I/O last 3 completed shifts:  In: 2707.1 (38.5 mL/kg) [P.O.:760]  Out: 2325 (33 mL/kg) [Urine:1825 (0.7 mL/kg/hr); Drains:500]  Weight: 70.4 kg     Relevant Results               This patient has a central line   Reason for the central line remaining today? Parenteral medication    Scheduled medications  aspirin, 81 mg, oral, " Daily  atorvastatin, 40 mg, oral, Nightly  docusate sodium, 100 mg, oral, BID  enoxaparin, 40 mg, subcutaneous, Nightly  metoprolol succinate XL, 100 mg, oral, Daily  midodrine, 2.5 mg, oral, TID  morphine CR, 30 mg, oral, q12h ALEX  octreotide, 200 mcg, subcutaneous, TID  pantoprazole, 40 mg, oral, Daily before breakfast   Or  pantoprazole, 40 mg, intravenous, Daily before breakfast  polyethylene glycol, 17 g, oral, Daily  tamsulosin, 0.4 mg, oral, Daily  thiamine, 100 mg, intravenous, Daily      Continuous medications  Adult Clinimix TPN Cyclic, , Last Rate: Stopped (04/13/25 0933)  potassium xjjspxs-P9-0.45%NaCl, 100 mL/hr, Last Rate: 100 mL/hr (04/12/25 0647)      PRN medications  PRN medications: acetaminophen **OR** acetaminophen **OR** acetaminophen, alteplase, benzocaine-menthol, dextromethorphan-guaifenesin, guaiFENesin, LORazepam, melatonin, ondansetron **OR** ondansetron, prochlorperazine  Results for orders placed or performed during the hospital encounter of 04/08/25 (from the past 24 hours)   CBC   Result Value Ref Range    WBC 3.2 (L) 4.4 - 11.3 x10*3/uL    nRBC 0.0 0.0 - 0.0 /100 WBCs    RBC 2.74 (L) 4.50 - 5.90 x10*6/uL    Hemoglobin 7.7 (L) 13.5 - 17.5 g/dL    Hematocrit 25.3 (L) 41.0 - 52.0 %    MCV 92 80 - 100 fL    MCH 28.1 26.0 - 34.0 pg    MCHC 30.4 (L) 32.0 - 36.0 g/dL    RDW 19.9 (H) 11.5 - 14.5 %    Platelets 77 (L) 150 - 450 x10*3/uL   Comprehensive Metabolic Panel   Result Value Ref Range    Glucose 188 (H) 74 - 99 mg/dL    Sodium 135 (L) 136 - 145 mmol/L    Potassium 4.4 3.5 - 5.3 mmol/L    Chloride 107 98 - 107 mmol/L    Bicarbonate 25 21 - 32 mmol/L    Anion Gap 7 (L) 10 - 20 mmol/L    Urea Nitrogen 25 (H) 6 - 23 mg/dL    Creatinine 0.77 0.50 - 1.30 mg/dL    eGFR >90 >60 mL/min/1.73m*2    Calcium 6.9 (L) 8.6 - 10.3 mg/dL    Albumin 1.9 (L) 3.4 - 5.0 g/dL    Alkaline Phosphatase 557 (H) 33 - 136 U/L    Total Protein 5.1 (L) 6.4 - 8.2 g/dL    AST 27 9 - 39 U/L    Bilirubin, Total 0.4 0.0  - 1.2 mg/dL    ALT 17 10 - 52 U/L     *Note: Due to a large number of results and/or encounters for the requested time period, some results have not been displayed. A complete set of results can be found in Results Review.       No results found.           Malnutrition Diagnosis Status: Active  Malnutrition Diagnosis: Severe malnutrition related to chronic disease or condition  Related to: decreased ability to consume sufficient energy  As Evidenced by: 20# (12.2%) weight loss in past 3 months, severe subcutaneous fat and muscle deficits,  I agree with the dietitian's malnutrition diagnosis.  Results for orders placed or performed during the hospital encounter of 04/08/25 (from the past 24 hours)   CBC   Result Value Ref Range    WBC 3.2 (L) 4.4 - 11.3 x10*3/uL    nRBC 0.0 0.0 - 0.0 /100 WBCs    RBC 2.74 (L) 4.50 - 5.90 x10*6/uL    Hemoglobin 7.7 (L) 13.5 - 17.5 g/dL    Hematocrit 25.3 (L) 41.0 - 52.0 %    MCV 92 80 - 100 fL    MCH 28.1 26.0 - 34.0 pg    MCHC 30.4 (L) 32.0 - 36.0 g/dL    RDW 19.9 (H) 11.5 - 14.5 %    Platelets 77 (L) 150 - 450 x10*3/uL   Comprehensive Metabolic Panel   Result Value Ref Range    Glucose 188 (H) 74 - 99 mg/dL    Sodium 135 (L) 136 - 145 mmol/L    Potassium 4.4 3.5 - 5.3 mmol/L    Chloride 107 98 - 107 mmol/L    Bicarbonate 25 21 - 32 mmol/L    Anion Gap 7 (L) 10 - 20 mmol/L    Urea Nitrogen 25 (H) 6 - 23 mg/dL    Creatinine 0.77 0.50 - 1.30 mg/dL    eGFR >90 >60 mL/min/1.73m*2    Calcium 6.9 (L) 8.6 - 10.3 mg/dL    Albumin 1.9 (L) 3.4 - 5.0 g/dL    Alkaline Phosphatase 557 (H) 33 - 136 U/L    Total Protein 5.1 (L) 6.4 - 8.2 g/dL    AST 27 9 - 39 U/L    Bilirubin, Total 0.4 0.0 - 1.2 mg/dL    ALT 17 10 - 52 U/L     No results found.      Assessment/Plan   Assessment & Plan  Dysphagia    Anxiety    CAD (coronary artery disease)    Colostomy care    History of rectal cancer    Hyperlipidemia    Hypertension    Rectal cancer (Multi)    Type 2 diabetes mellitus    Severe protein-calorie  malnutrition (Multi)    Ileorectal fistula    Patient is getting TPN  Dr. Anna input noted  He wants patient to have TPN for 3 months  Patient is allowed to take oral but very minimal per Dr. Vasquez  Minimize p.o. except for comfort with goal of ceiling postoperative lymphatic leak.  Likely multiple source of leak given degree of response to radiation causing ileorectal fistula and abnormal inflamed operative field.  For this reason patient is also not a candidate for enteral feeding.   Plan is to minimize enteral intake with goal of resolving chyle leak.  Blood pressure and blood sugar okay  Monitor electrolytes   working on discharge home with TPN  All orders updated.  Discharge orders also placed       I spent  minutes in the professional and overall care of this patient.      Roxanne Buckner MD

## 2025-04-13 NOTE — NURSING NOTE
Pt resting in bed. No complaints or concerns. Call light within reach.  Drain and colostomy in place per order.  Pt continues on TPN per order.

## 2025-04-13 NOTE — NURSING NOTE
Pt A&O x4 currently resting in bed. No complaints or concerns. Call light within reach.  Colostomy and drain in place per order.  Pt continues on TPN per order.

## 2025-04-14 ENCOUNTER — APPOINTMENT (OUTPATIENT)
Dept: PALLIATIVE MEDICINE | Facility: CLINIC | Age: 70
End: 2025-04-14
Payer: MEDICARE

## 2025-04-14 PROCEDURE — 1100000001 HC PRIVATE ROOM DAILY

## 2025-04-14 PROCEDURE — 97116 GAIT TRAINING THERAPY: CPT | Mod: GP,CQ

## 2025-04-14 PROCEDURE — 2500000001 HC RX 250 WO HCPCS SELF ADMINISTERED DRUGS (ALT 637 FOR MEDICARE OP): Performed by: INTERNAL MEDICINE

## 2025-04-14 PROCEDURE — 99232 SBSQ HOSP IP/OBS MODERATE 35: CPT | Performed by: INTERNAL MEDICINE

## 2025-04-14 PROCEDURE — 2500000005 HC RX 250 GENERAL PHARMACY W/O HCPCS: Performed by: PHYSICIAN ASSISTANT

## 2025-04-14 PROCEDURE — 2500000002 HC RX 250 W HCPCS SELF ADMINISTERED DRUGS (ALT 637 FOR MEDICARE OP, ALT 636 FOR OP/ED): Performed by: INTERNAL MEDICINE

## 2025-04-14 PROCEDURE — 97530 THERAPEUTIC ACTIVITIES: CPT | Mod: GP,CQ

## 2025-04-14 PROCEDURE — 2500000004 HC RX 250 GENERAL PHARMACY W/ HCPCS (ALT 636 FOR OP/ED): Performed by: INTERNAL MEDICINE

## 2025-04-14 RX ADMIN — MIDODRINE HYDROCHLORIDE 2.5 MG: 2.5 TABLET ORAL at 12:36

## 2025-04-14 RX ADMIN — MORPHINE SULFATE 30 MG: 30 TABLET, FILM COATED, EXTENDED RELEASE ORAL at 08:34

## 2025-04-14 RX ADMIN — MORPHINE SULFATE 30 MG: 30 TABLET, FILM COATED, EXTENDED RELEASE ORAL at 20:20

## 2025-04-14 RX ADMIN — OCTREOTIDE ACETATE 200 MCG: 500 INJECTION, SOLUTION INTRAVENOUS; SUBCUTANEOUS at 14:40

## 2025-04-14 RX ADMIN — ASPIRIN 81 MG: 81 TABLET, CHEWABLE ORAL at 08:34

## 2025-04-14 RX ADMIN — OCTREOTIDE ACETATE 200 MCG: 500 INJECTION, SOLUTION INTRAVENOUS; SUBCUTANEOUS at 20:23

## 2025-04-14 RX ADMIN — METOPROLOL SUCCINATE 100 MG: 100 TABLET, EXTENDED RELEASE ORAL at 08:34

## 2025-04-14 RX ADMIN — DOCUSATE SODIUM 100 MG: 100 CAPSULE, LIQUID FILLED ORAL at 08:34

## 2025-04-14 RX ADMIN — ATORVASTATIN CALCIUM 40 MG: 40 TABLET, FILM COATED ORAL at 20:20

## 2025-04-14 RX ADMIN — THIAMINE HYDROCHLORIDE 100 MG: 100 INJECTION, SOLUTION INTRAMUSCULAR; INTRAVENOUS at 08:33

## 2025-04-14 RX ADMIN — POLYETHYLENE GLYCOL 3350 17 G: 17 POWDER, FOR SOLUTION ORAL at 08:33

## 2025-04-14 RX ADMIN — PANTOPRAZOLE SODIUM 40 MG: 40 INJECTION, POWDER, FOR SOLUTION INTRAVENOUS at 06:01

## 2025-04-14 RX ADMIN — DOCUSATE SODIUM 100 MG: 100 CAPSULE, LIQUID FILLED ORAL at 20:20

## 2025-04-14 RX ADMIN — TAMSULOSIN HYDROCHLORIDE 0.4 MG: 0.4 CAPSULE ORAL at 08:33

## 2025-04-14 RX ADMIN — OCTREOTIDE ACETATE 200 MCG: 500 INJECTION, SOLUTION INTRAVENOUS; SUBCUTANEOUS at 08:34

## 2025-04-14 RX ADMIN — MIDODRINE HYDROCHLORIDE 2.5 MG: 2.5 TABLET ORAL at 17:37

## 2025-04-14 RX ADMIN — MIDODRINE HYDROCHLORIDE 2.5 MG: 2.5 TABLET ORAL at 08:33

## 2025-04-14 RX ADMIN — ENOXAPARIN SODIUM 40 MG: 40 INJECTION SUBCUTANEOUS at 20:20

## 2025-04-14 RX ADMIN — ASCORBIC ACID, VITAMIN A PALMITATE, CHOLECALCIFEROL, THIAMINE HYDROCHLORIDE, RIBOFLAVIN-5 PHOSPHATE SODIUM, PYRIDOXINE HYDROCHLORIDE, NIACINAMIDE, DEXPANTHENOL, ALPHA-TOCOPHEROL ACETATE, VITAMIN K1, FOLIC ACID, BIOTIN, CYANOCOBALAMIN: 200; 3300; 200; 6; 3.6; 6; 40; 15; 10; 150; 600; 60; 5 INJECTION, SOLUTION INTRAVENOUS at 20:16

## 2025-04-14 ASSESSMENT — COGNITIVE AND FUNCTIONAL STATUS - GENERAL
DAILY ACTIVITIY SCORE: 24
DAILY ACTIVITIY SCORE: 24
MOBILITY SCORE: 23
MOBILITY SCORE: 23
CLIMB 3 TO 5 STEPS WITH RAILING: A LITTLE
MOBILITY SCORE: 23
CLIMB 3 TO 5 STEPS WITH RAILING: A LITTLE
CLIMB 3 TO 5 STEPS WITH RAILING: A LITTLE

## 2025-04-14 ASSESSMENT — PAIN - FUNCTIONAL ASSESSMENT
PAIN_FUNCTIONAL_ASSESSMENT: 0-10

## 2025-04-14 ASSESSMENT — PAIN DESCRIPTION - LOCATION: LOCATION: ABDOMEN

## 2025-04-14 ASSESSMENT — PAIN SCALES - GENERAL
PAINLEVEL_OUTOF10: 0 - NO PAIN
PAINLEVEL_OUTOF10: 2
PAINLEVEL_OUTOF10: 0 - NO PAIN
PAINLEVEL_OUTOF10: 0 - NO PAIN

## 2025-04-14 NOTE — PROGRESS NOTES
Note from attending Dr hope to [facility] pharmacy at [phone], it is believed that the note has the information the pharmacy and their dietician is requesting. Call also placed to Tiffany from [facility] [phone] with request for follow up on whether they received the fax in a timely manner.     Update: [facility] sent TPN order requesting signature to Dr Elizabeth office, this was signed and sent back, Tiffany confirmed receiving and states they will deliver TPN to pt tomorrow morning. Tiffany reaching out to pt directly to confirm all of this. Deyanira made aware. [facility] has made arrangements for nursing to follow the pt. Pt will dc on 4/15.      04/14/25 1018   Discharge Planning   Expected Discharge Disposition Home

## 2025-04-14 NOTE — CARE PLAN
The patient's goals for the shift include rest    The clinical goals for the shift include pain control, monitor outputs, TPN

## 2025-04-14 NOTE — PROGRESS NOTES
Occupational Therapy                 Therapy Communication Note    Patient Name: Roma Corral  MRN: [id number]  Department: [address]  Room: [address] [place] [address]  Today's Date: 4/14/2025     Discipline: Occupational Therapy    Missed Visit Reason: Missed Visit Reason: Other (Comment) (Pt observed up ad joseph in room, completing various ADL tasks. Independently participating in functional mobility with PT in [place] with pt expressing no concerns for homegoing. Pt requiring no acute OT needs at this time, will discontinue orders.)    Missed Time: Attempt

## 2025-04-14 NOTE — PROGRESS NOTES
"Nutrition Follow up Note    Nutrition Assessment      Appears chyle leak is still active. Will change diet order to fat restricted diet and change nutritional supplement to low fat option. Patient told to eat for pleasure only. Will increase TPN to meet 80% of estimated energy needs.    Nutrition History:  Energy Intake: Poor < 50 %     Anthropometrics:  Ht: 180.5 cm (5' 11.06\"), Wt: 70.4 kg (155 lb 3.3 oz), BMI: 21.61  IBW/kg (Dietitian Calculated): 78.18 kg  Percent of IBW: 84 %     Weight Change:  Daily Weight  04/13/25 : 70.4 kg (155 lb 3.3 oz)  04/06/25 : 66.9 kg (147 lb 7.8 oz)  03/12/25 : 70.8 kg (156 lb)  02/18/25 : 67.1 kg (148 lb)  02/12/25 : 69.5 kg (153 lb 3.5 oz)  02/07/25 : 72.8 kg (160 lb 7.9 oz)  02/05/25 : 72.9 kg (160 lb 11.5 oz)  02/04/25 : 74.3 kg (163 lb 12.8 oz)  01/24/25 : 74.5 kg (164 lb 3.9 oz)  01/17/25 : 74.4 kg (164 lb)     Nutrition Focused Physical Exam Findings:   Subcutaneous Fat Loss  Orbital Fat Pads: Severe (dark circles, hollowing and loose skin)  Buccal Fat Pads: Severe (hollow, sunken and narrow face)  Triceps: Severe (negligible fat tissue)    Muscle Wasting  Temporalis: Severe (hollowed scooping depression)  Pectoralis (Clavicular Region): Severe (protruding prominent clavicle)  Deltoid/Trapezius: Severe (squared shoulders, acromion process prominent)    Nutrition Significant Labs:  Lab Results   Component Value Date    WBC 3.2 (L) 04/13/2025    HGB 7.7 (L) 04/13/2025    HCT 25.3 (L) 04/13/2025    PLT 77 (L) 04/13/2025    CHOL 154 06/06/2024    TRIG 105 04/10/2025    HDL 27.1 06/06/2024    ALT 17 04/13/2025    AST 27 04/13/2025     (L) 04/13/2025    K 4.4 04/13/2025     04/13/2025    CREATININE 0.77 04/13/2025    BUN 25 (H) 04/13/2025    CO2 25 04/13/2025    TSH 2.17 06/06/2024    INR 1.3 (H) 03/24/2025    HGBA1C 5.3 01/17/2025     Nutrition Specific Medications:  aspirin, 81 mg, oral, Daily  atorvastatin, 40 mg, oral, Nightly  docusate sodium, 100 mg, oral, " BID  enoxaparin, 40 mg, subcutaneous, Nightly  metoprolol succinate XL, 100 mg, oral, Daily  midodrine, 2.5 mg, oral, TID  morphine CR, 30 mg, oral, q12h ALEX  octreotide, 200 mcg, subcutaneous, TID  pantoprazole, 40 mg, oral, Daily before breakfast   Or  pantoprazole, 40 mg, intravenous, Daily before breakfast  polyethylene glycol, 17 g, oral, Daily  tamsulosin, 0.4 mg, oral, Daily  thiamine, 100 mg, intravenous, Daily      Adult Clinimix TPN Cyclic, , Last Rate: Stopped (04/14/25 1002)  potassium fdehtrf-N6-0.45%NaCl, 100 mL/hr, Last Rate: 100 mL/hr (04/12/25 0647)      Dietary Orders (From admission, onward)       Start     Ordered    04/14/25 0959  Adult diet Fat restricted 40 gm  Diet effective now        Question:  Diet type  Answer:  Fat restricted 40 gm    04/14/25 0959 04/14/25 0920  Oral nutritional supplements  Until discontinued        Comments: Chocolate   Question Answer Comment   Deliver with All meals    Select supplement: Ensure High Protein        04/14/25 0920 04/09/25 0227  May Participate in Room Service  ( ROOM SERVICE MAY PARTICIPATE)  Once        Question:  .  Answer:  Yes    04/09/25 0226                  Nutrition Support Intake provides: Clinimix 5/20 @ (start rate at 68 ml/hr for 1 hour. increase rate to 136 ml/hr for 10 hours. decrease rate to 68 ml/hr for 1 hour, then stop) provides 1320 calories, 75 gm protein in 1500 to volume/d.       Estimated Needs:   Estimated Energy Needs  Total Energy Estimated Needs in 24 hours (kCal):  (8676-6258)  Energy Estimated Needs per kg Body Weight in 24 hours (kCal/kg):  (30-35)  Method for Estimating Needs: Actual Wt    Estimated Protein Needs  Total Protein Estimated Needs in 24 Hours (g):  (79-99)  Protein Estimated Needs per kg Body Weight in 24 Hours (g/kg):  (1.2-1.5)  Method for Estimating 24 Hour Protein Needs: Actual Wt    Estimated Fluid Needs  Total Fluid Estimated Needs in 24 Hours (mL): 1980 mL  Method for Estimating 24 Hour  Fluid Needs: 1 mL/kcal      Nutrition Diagnosis   Nutrition Diagnosis:  Malnutrition Diagnosis  Patient has Malnutrition Diagnosis: Yes  Diagnosis Status: Active  Malnutrition Diagnosis: Severe malnutrition related to chronic disease or condition  Related to: decreased ability to consume sufficient energy  As Evidenced by: 20# (12.2%) weight loss in past 3 months, severe subcutaneous fat and muscle deficits,    Nutrition Diagnosis  Patient has Nutrition Diagnosis: Yes  Diagnosis Status (1): Active  Nutrition Diagnosis 1: Inadequate oral intake  Related to (1): decreased ability to consume sufficient energy  As Evidenced by (1): low po intake     Nutrition Interventions/Recommendations   Nutrition Interventions and Recommendations:  Nutrition Prescription: Nutrition prescription for oral nutrition, Nutrition prescription for parenteral nutrition    Nutrition Recommendations:  Individualized Nutrition Prescription Provided for : 8337-0718 calories, 79-99 gm protein to be provided via oral and parenteral nutrition    Nutrition Interventions/Goals:   Food and/or Nutrient Delivery Interventions  Interventions: Meals and snacks, Parenteral nutrition, Medical food supplement  Meals and Snacks: General healthful diet  Goal: provide as tolerated  Parenteral Nutrition: Management of composition of parenteral nutrition  Goal: Change to:  Formula: TPN standard - AA 5%, dextrose 20%  Cycled Rate (mL/hr):   Total Volume (mL/day): 1800 mL/day  Total Parenteral Kcal (kcal/day): 1584 kcal/day  Total Protein (g/day): 90 g/day  Weight Frequency: Daily  Labs: Renal panel and magnesium daily  Medical Food Supplement: Commercial beverage medical food supplement therapy  Goal: ensure high protein TID to provide 160 kcals and 16g protein each    Education Documentation  No documentation found.         Nutrition Monitoring and Evaluation   Monitoring/Evaluation:   Food/Nutrient Related History Monitoring  Monitoring and Evaluation  Plan: Enteral and parenteral nutrition intake determination, Estimated Energy Intake  Estimated Energy Intake: Other criteria  Criteria: PO as tolerated  Enteral and Parenteral Nutrition Intake Determination: Parenteral nutrition intake - Tolerate TPN at goal rate    Anthropometric Measurements  Monitoring and Evaluation Plan: Body weight  Body Weight: Body weight - Promote weight restoration    Biochemical Data, Medical Tests and Procedures  Monitoring and Evaluation Plan: Electrolyte/renal panel  Electrolyte and Renal Panel: Electrolytes within normal limits  Glucose/Endocrine Profile: Glucose within normal limits ( mg/dL)    Goal Status: Some progress toward goal(s)    Follow Up  Time Spent (min): 30 minutes  Last Date of Nutrition Visit: 04/14/25  Nutrition Follow-Up Needed?: 3-5 days  Follow up Comment: 4/16/25

## 2025-04-14 NOTE — NURSING NOTE
Vascular access note    Patient with Lt arm dual lumen picc, dressing change done using sterile technique, ext catheter at 0 cm, no redness, drainage or swelling, both blue caps changed, flushes easily and with positive blood return, clamped and curos caps applied.

## 2025-04-14 NOTE — CARE PLAN
The patient's goals for the shift include rest    The clinical goals for the shift include pain control, monitor outputs, TPN      Problem: Pain - Adult  Goal: Verbalizes/displays adequate comfort level or baseline comfort level  Outcome: Progressing     Problem: Safety - Adult  Goal: Free from fall injury  Outcome: Progressing     Problem: Discharge Planning  Goal: Discharge to home or other facility with appropriate resources  Outcome: Progressing     Problem: Chronic Conditions and Co-morbidities  Goal: Patient's chronic conditions and co-morbidity symptoms are monitored and maintained or improved  Outcome: Progressing     Problem: Nutrition  Goal: Nutrient intake appropriate for maintaining nutritional needs  Outcome: Progressing     Problem: Skin  Goal: Decreased wound size/increased tissue granulation at next dressing change  Outcome: Progressing  Flowsheets (Taken 4/14/2025 1644)  Decreased wound size/increased tissue granulation at next dressing change:   Promote sleep for wound healing   Protective dressings over bony prominences   Utilize specialty bed per algorithm  Goal: Participates in plan/prevention/treatment measures  Outcome: Progressing  Flowsheets (Taken 4/14/2025 1644)  Participates in plan/prevention/treatment measures:   Discuss with provider PT/OT consult   Elevate heels   Increase activity/out of bed for meals  Goal: Prevent/manage excess moisture  Outcome: Progressing  Flowsheets (Taken 4/14/2025 1644)  Prevent/manage excess moisture:   Cleanse incontinence/protect with barrier cream   Monitor for/manage infection if present   Use wicking fabric (obtain order)   Moisturize dry skin   Follow provider orders for dressing changes  Goal: Prevent/minimize sheer/friction injuries  Outcome: Progressing  Flowsheets (Taken 4/14/2025 1644)  Prevent/minimize sheer/friction injuries:   Complete micro-shifts as needed if patient unable. Adjust patient position to relieve pressure points, not a full  turn   Increase activity/out of bed for meals   Use pull sheet   HOB 30 degrees or less   Turn/reposition every 2 hours/use positioning/transfer devices   Utilize specialty bed per algorithm  Goal: Promote/optimize nutrition  Outcome: Progressing  Flowsheets (Taken 4/14/2025 1644)  Promote/optimize nutrition:   Monitor/record intake including meals   Consume > 50% meals/supplements   Offer water/supplements/favorite foods  Goal: Promote skin healing  Outcome: Progressing  Flowsheets (Taken 4/14/2025 1644)  Promote skin healing:   Assess skin/pad under line(s)/device(s)   Protective dressings over bony prominences   Turn/reposition every 2 hours/use positioning/transfer devices   Ensure correct size (line/device) and apply per  instructions   Rotate device position/do not position patient on device     Problem: Pain  Goal: Takes deep breaths with improved pain control throughout the shift  Outcome: Progressing  Goal: Turns in bed with improved pain control throughout the shift  Outcome: Progressing  Goal: Walks with improved pain control throughout the shift  Outcome: Progressing  Goal: Performs ADL's with improved pain control throughout shift  Outcome: Progressing  Goal: Participates in PT with improved pain control throughout the shift  Outcome: Progressing  Goal: Free from opioid side effects throughout the shift  Outcome: Progressing  Goal: Free from acute confusion related to pain meds throughout the shift  Outcome: Progressing     Problem: Fall/Injury  Goal: Not fall by end of shift  Outcome: Progressing  Goal: Be free from injury by end of the shift  Outcome: Progressing  Goal: Verbalize understanding of personal risk factors for fall in the hospital  Outcome: Progressing  Goal: Verbalize understanding of risk factor reduction measures to prevent injury from fall in the home  Outcome: Progressing  Goal: Use assistive devices by end of the shift  Outcome: Progressing  Goal: Pace activities to  prevent fatigue by end of the shift  Outcome: Progressing

## 2025-04-14 NOTE — PROGRESS NOTES
Physical Therapy    Physical Therapy Treatment    Patient Name: Roma Corral  MRN: [id number]  Department: [address]  Room: [address] [place] [address]  Today's Date: 4/14/2025  Time Calculation  Start Time: 1130  Stop Time: 1158  Time Calculation (min): 28 min         Assessment/Plan   PT Assessment  Barriers to Discharge Home: No anticipated barriers  End of Session Communication: Bedside nurse  Assessment Comment: Pt up ad joseph in room and hallways. No further PT needs at this time.  End of Session Patient Position: Bed, 3 rail up, Alarm off, not on at start of session  PT Plan  Inpatient/Swing Bed or Outpatient: Inpatient  PT Plan  Treatment/Interventions: Bed mobility, Transfer training, Gait training, Stair training, Balance training, Strengthening, Endurance training, Therapeutic exercise, Therapeutic activity  PT Plan: Ongoing PT  PT Frequency: Other (Comment) (0)  PT Discharge Recommendations: No further acute PT  Equipment Recommended upon Discharge: Wheeled walker  PT Recommended Transfer Status: Contact guard, Assistive device (FWW)  PT - OK to Discharge: Yes      General Visit Information:   PT  Visit  PT Received On: 04/14/25  General  Prior to Session Communication: Bedside nurse  Patient Position Received: Bed, 3 rail up, Alarm off, not on at start of session  General Comment: Cleared by nursing to be seen for therapy, pt agreeable with tx, supine in bed upon arrival.    Subjective   Precautions:  Precautions  Medical Precautions: Fall precautions  Post-Surgical Precautions: Abdominal surgery precautions    Objective   Pain:  Pain Assessment  Pain Assessment: 0-10  0-10 (Numeric) Pain Score: 0 - No pain    Cognition:  Cognition  Overall Cognitive Status: Within Functional Limits  Orientation Level: Oriented X4     Postural Control:  Static Sitting Balance  Static Sitting-Balance Support: Feet supported  Static Sitting-Level of Assistance: Independent  Static Sitting-Comment/Number of Minutes: Good seated static  balance.  Static Standing Balance  Static Standing-Balance Support: No upper extremity supported  Static Standing-Level of Assistance: Independent  Static Standing-Comment/Number of Minutes: Good static standing balance without UE support.    Treatments:  Bed Mobility  Bed Mobility: Yes  Bed Mobility 1  Bed Mobility 1: Supine to sitting  Level of Assistance 1: Modified independent  Bed Mobility Comments 1: HOB slightly elevated    Ambulation/Gait Training  Ambulation/Gait Training Performed: Yes  Ambulation/Gait Training 1  Surface 1: Level tile  Device 1: No device  Assistance 1: Independent  Comments/Distance (ft) 1: 120' without AD, no LOB/gait abnormlaities noted.    Transfers  Transfer: Yes  Transfer 1  Transfer From 1: Sit to  Transfer to 1: Stand  Technique 1: Sit to stand, Stand to sit  Transfer Level of Assistance 1: Independent  Trials/Comments 1: Good overall mobility.    Stairs  Stairs: Yes  Stairs  Rails 1: Right  Curb Step 1: No  Device 1: Railing  Assistance 1: Modified independent  Comment/Number of Steps 1: Ascend/Descen 4 steps, 1 rail, reciprocating pattern    Outcome Measures:  [facility] Basic Mobility  Turning from your back to your side while in a flat bed without using bedrails: None  Moving from lying on your back to sitting on the side of a flat bed without using bedrails: None  Moving to and from bed to chair (including a wheelchair): None  Standing up from a chair using your arms (e.g. wheelchair or bedside chair): None  To walk in hospital room: None  Climbing 3-5 steps with railing: A little  Basic Mobility - Total Score: 23    Education Documentation  Precautions, taught by Shirley Hinojosa PTA at 4/14/2025 12:54 PM.  Learner: Patient  Readiness: Acceptance  Method: Explanation  Response: Verbalizes Understanding    Mobility Training, taught by Shirley Hinojosa PTA at 4/14/2025 12:54 PM.  Learner: Patient  Readiness: Acceptance  Method: Explanation  Response: Verbalizes  Understanding    Education Comments  04/14/25 [id number] Shirley Hinojosa PTA  Patient educated on the importance of mobility and participation with therapy. Educated on safety and use of call light for assistance.         Encounter Problems       Encounter Problems (Active)       Balance       STG - Maintains dynamic standing balance with upper extremity support (Progressing)       Start:  04/09/25    Expected End:  05/07/25       INTERVENTIONS:1. Practice standing with minimal support.2. Educate patient about standing tolerance.3. Educate patient about independence with gait, transfers, and ADL's.4. Educate patient about use of assistive device.5. Educate patient about self-directed care.            Mobility       STG - Patient will ambulate 300 ft, FWW, + turns, mod ind (Progressing)       Start:  04/09/25    Expected End:  05/07/25            pt ascends/descends 1 step without rail, contact guard of 1 (Met)       Start:  04/09/25    Expected End:  05/07/25    Resolved:  04/14/25               Encounter Problems (Resolved)       PT Transfers       STG - Patient to transfer to and from sit to supine mod ind via logroll (Met)       Start:  04/09/25    Expected End:  05/07/25    Resolved:  04/14/25         STG - Patient will transfer sit to and from stand mod ind (Met)       Start:  04/09/25    Expected End:  05/07/25    Resolved:  04/14/25            Pain       pt will verbalize no > 2/10 pain during functional mobility (Met)       Start:  04/09/25    Expected End:  05/07/25    Resolved:  04/14/25

## 2025-04-14 NOTE — NURSING NOTE
Per Karly, dietary, patient should be on a low fat diet due to his chyle leak. New orders to follow for 40gm fat restricted diet.

## 2025-04-14 NOTE — NURSING NOTE
Assumed care of patient. Patient is resting in bed with no needs at this time. He currently has TPN running at 136ml/h. Call light is in place will continue to monitor.

## 2025-04-15 ENCOUNTER — APPOINTMENT (OUTPATIENT)
Dept: SURGERY | Facility: CLINIC | Age: 70
End: 2025-04-15
Payer: MEDICARE

## 2025-04-15 VITALS
OXYGEN SATURATION: 100 % | WEIGHT: 157.63 LBS | DIASTOLIC BLOOD PRESSURE: 59 MMHG | TEMPERATURE: 98.1 F | HEIGHT: 71 IN | HEART RATE: 57 BPM | SYSTOLIC BLOOD PRESSURE: 96 MMHG | BODY MASS INDEX: 22.07 KG/M2 | RESPIRATION RATE: 16 BRPM

## 2025-04-15 LAB
ANION GAP SERPL CALCULATED.3IONS-SCNC: 8 MMOL/L (ref 10–20)
BUN SERPL-MCNC: 29 MG/DL (ref 6–23)
CALCIUM SERPL-MCNC: 7.2 MG/DL (ref 8.6–10.3)
CHLORIDE SERPL-SCNC: 107 MMOL/L (ref 98–107)
CO2 SERPL-SCNC: 25 MMOL/L (ref 21–32)
CREAT SERPL-MCNC: 0.78 MG/DL (ref 0.5–1.3)
EGFRCR SERPLBLD CKD-EPI 2021: >90 ML/MIN/1.73M*2
ERYTHROCYTE [DISTWIDTH] IN BLOOD BY AUTOMATED COUNT: 20.2 % (ref 11.5–14.5)
GLUCOSE SERPL-MCNC: 137 MG/DL (ref 74–99)
HCT VFR BLD AUTO: 24.5 % (ref 41–52)
HGB BLD-MCNC: 7.5 G/DL (ref 13.5–17.5)
MAGNESIUM SERPL-MCNC: 2.05 MG/DL (ref 1.6–2.4)
MCH RBC QN AUTO: 28.6 PG (ref 26–34)
MCHC RBC AUTO-ENTMCNC: 30.6 G/DL (ref 32–36)
MCV RBC AUTO: 94 FL (ref 80–100)
NRBC BLD-RTO: 0 /100 WBCS (ref 0–0)
PLATELET # BLD AUTO: ABNORMAL 10*3/UL
POTASSIUM SERPL-SCNC: 4.5 MMOL/L (ref 3.5–5.3)
RBC # BLD AUTO: 2.62 X10*6/UL (ref 4.5–5.9)
SODIUM SERPL-SCNC: 135 MMOL/L (ref 136–145)
WBC # BLD AUTO: 2.9 X10*3/UL (ref 4.4–11.3)

## 2025-04-15 PROCEDURE — 2500000001 HC RX 250 WO HCPCS SELF ADMINISTERED DRUGS (ALT 637 FOR MEDICARE OP): Performed by: INTERNAL MEDICINE

## 2025-04-15 PROCEDURE — 99239 HOSP IP/OBS DSCHRG MGMT >30: CPT | Performed by: INTERNAL MEDICINE

## 2025-04-15 PROCEDURE — 2500000002 HC RX 250 W HCPCS SELF ADMINISTERED DRUGS (ALT 637 FOR MEDICARE OP, ALT 636 FOR OP/ED): Performed by: INTERNAL MEDICINE

## 2025-04-15 PROCEDURE — 80048 BASIC METABOLIC PNL TOTAL CA: CPT | Performed by: INTERNAL MEDICINE

## 2025-04-15 PROCEDURE — 2500000004 HC RX 250 GENERAL PHARMACY W/ HCPCS (ALT 636 FOR OP/ED): Performed by: INTERNAL MEDICINE

## 2025-04-15 PROCEDURE — 85027 COMPLETE CBC AUTOMATED: CPT | Performed by: INTERNAL MEDICINE

## 2025-04-15 PROCEDURE — 83735 ASSAY OF MAGNESIUM: CPT | Performed by: INTERNAL MEDICINE

## 2025-04-15 RX ADMIN — ASPIRIN 81 MG: 81 TABLET, CHEWABLE ORAL at 09:06

## 2025-04-15 RX ADMIN — POLYETHYLENE GLYCOL 3350 17 G: 17 POWDER, FOR SOLUTION ORAL at 09:07

## 2025-04-15 RX ADMIN — TAMSULOSIN HYDROCHLORIDE 0.4 MG: 0.4 CAPSULE ORAL at 09:07

## 2025-04-15 RX ADMIN — PANTOPRAZOLE SODIUM 40 MG: 40 INJECTION, POWDER, FOR SOLUTION INTRAVENOUS at 06:01

## 2025-04-15 RX ADMIN — OCTREOTIDE ACETATE 200 MCG: 500 INJECTION, SOLUTION INTRAVENOUS; SUBCUTANEOUS at 09:07

## 2025-04-15 RX ADMIN — MIDODRINE HYDROCHLORIDE 2.5 MG: 2.5 TABLET ORAL at 09:07

## 2025-04-15 RX ADMIN — THIAMINE HYDROCHLORIDE 100 MG: 100 INJECTION, SOLUTION INTRAMUSCULAR; INTRAVENOUS at 09:06

## 2025-04-15 RX ADMIN — DOCUSATE SODIUM 100 MG: 100 CAPSULE, LIQUID FILLED ORAL at 09:06

## 2025-04-15 RX ADMIN — MIDODRINE HYDROCHLORIDE 2.5 MG: 2.5 TABLET ORAL at 12:17

## 2025-04-15 RX ADMIN — MORPHINE SULFATE 30 MG: 30 TABLET, FILM COATED, EXTENDED RELEASE ORAL at 09:06

## 2025-04-15 ASSESSMENT — PAIN SCALES - GENERAL
PAINLEVEL_OUTOF10: 0 - NO PAIN
PAINLEVEL_OUTOF10: 0 - NO PAIN

## 2025-04-15 ASSESSMENT — COGNITIVE AND FUNCTIONAL STATUS - GENERAL
MOBILITY SCORE: 24
DAILY ACTIVITIY SCORE: 24
MOBILITY SCORE: 24
DAILY ACTIVITIY SCORE: 24

## 2025-04-15 ASSESSMENT — PAIN SCALES - WONG BAKER: WONGBAKER_NUMERICALRESPONSE: NO HURT

## 2025-04-15 ASSESSMENT — PAIN - FUNCTIONAL ASSESSMENT: PAIN_FUNCTIONAL_ASSESSMENT: 0-10

## 2025-04-15 NOTE — CARE PLAN
The patient's goals for the shift include dc planning    The clinical goals for the shift include safety

## 2025-04-15 NOTE — NURSING NOTE
Pt has a dual lumen picc line to L upper arm, drsg dry and intact (dated 4/14) without any redness, swelling or drainage, both lumens have brisk blood return and flush easily with NS, both clamped and curos caps applied.

## 2025-04-15 NOTE — NURSING NOTE
Colostomy appliance changed. Dsg to insertion site of drain to Rt abd changed. Sacral border changed.

## 2025-04-15 NOTE — PROGRESS NOTES
"Roma Corral is a 70 y.o. male on day 6 of admission presenting with Dysphagia.    Subjective   Doing ok . Carmen some oral.  Tolerating TPN       Objective     Physical Exam  Vitals reviewed.   Constitutional:       Appearance: Normal appearance.   HENT:      Head: Normocephalic and atraumatic.      Right Ear: Tympanic membrane, ear canal and external ear normal.      Left Ear: Tympanic membrane, ear canal and external ear normal.      Nose: Nose normal.      Mouth/Throat:      Pharynx: Oropharynx is clear.   Eyes:      Extraocular Movements: Extraocular movements intact.      Conjunctiva/sclera: Conjunctivae normal.      Pupils: Pupils are equal, round, and reactive to light.   Cardiovascular:      Rate and Rhythm: Normal rate and regular rhythm.      Pulses: Normal pulses.      Heart sounds: Normal heart sounds.   Pulmonary:      Effort: Pulmonary effort is normal.      Breath sounds: Normal breath sounds.   Abdominal:      General: Abdomen is flat. Bowel sounds are normal.      Palpations: Abdomen is soft.      Comments: Drain in RUQ   Colostomy bag in LLQ   Musculoskeletal:      Cervical back: Normal range of motion and neck supple.   Skin:     General: Skin is warm and dry.   Neurological:      General: No focal deficit present.      Mental Status: He is alert and oriented to person, place, and time.   Psychiatric:         Mood and Affect: Mood normal.         Last Recorded Vitals  Blood pressure 94/65, pulse 71, temperature 36.7 °C (98 °F), temperature source Oral, resp. rate 16, height 1.805 m (5' 11.06\"), weight 70.4 kg (155 lb 3.3 oz), SpO2 100%.  Intake/Output last 3 Shifts:  I/O last 3 completed shifts:  In: 2467.4 (35 mL/kg) [P.O.:560]  Out: 2035 (28.9 mL/kg) [Urine:1075 (0.4 mL/kg/hr); Drains:960]  Weight: 70.4 kg     Relevant Results               This patient has a central line   Reason for the central line remaining today? Parenteral medication    Scheduled medications  aspirin, 81 mg, oral, " Daily  atorvastatin, 40 mg, oral, Nightly  docusate sodium, 100 mg, oral, BID  enoxaparin, 40 mg, subcutaneous, Nightly  metoprolol succinate XL, 100 mg, oral, Daily  midodrine, 2.5 mg, oral, TID  morphine CR, 30 mg, oral, q12h ALEX  octreotide, 200 mcg, subcutaneous, TID  pantoprazole, 40 mg, oral, Daily before breakfast   Or  pantoprazole, 40 mg, intravenous, Daily before breakfast  polyethylene glycol, 17 g, oral, Daily  tamsulosin, 0.4 mg, oral, Daily  thiamine, 100 mg, intravenous, Daily      Continuous medications  Adult Clinimix TPN Cyclic, , Last Rate: 82 mL/hr at 04/14/25 2016  potassium [id number]-A4-5.45%NaCl, 100 mL/hr, Last Rate: 100 mL/hr (04/12/25 0647)      PRN medications  PRN medications: acetaminophen **OR** acetaminophen **OR** acetaminophen, alteplase, benzocaine-menthol, dextromethorphan-guaifenesin, guaiFENesin, LORazepam, melatonin, ondansetron **OR** ondansetron, prochlorperazine  No results found. However, due to the size of the patient record, not all encounters were searched. Please check Results Review for a complete set of results.      No results found.           Malnutrition Diagnosis Status: Active  Malnutrition Diagnosis: Severe malnutrition related to chronic disease or condition  Related to: decreased ability to consume sufficient energy  As Evidenced by: 20# (12.2%) weight loss in past 3 months, severe subcutaneous fat and muscle deficits,  I agree with the dietitian's malnutrition diagnosis.  No results found. However, due to the size of the patient record, not all encounters were searched. Please check Results Review for a complete set of results.    No results found.      Assessment/Plan   Assessment & Plan  Dysphagia    Anxiety    CAD (coronary artery disease)    Colostomy care    History of rectal cancer    Hyperlipidemia    Hypertension    Rectal cancer (Multi)    Type 2 diabetes mellitus    Severe protein-calorie malnutrition (Multi)    Ileorectal fistula    Patient is getting  TPN  Dr. Anna input noted  He wants patient to have TPN for 3 months  Patient is allowed to take oral but very minimal per Dr. Vasquez  Minimize p.o. except for comfort with goal of sealing postoperative lymphatic leak.  Likely multiple source of leak given degree of response to radiation causing ileorectal fistula and abnormal inflamed operative field.  For this reason patient is also not a candidate for enteral feeding.   Plan is to minimize enteral intake with goal of resolving chyle leak.  Blood pressure and blood sugar okay  Monitor electrolytes   working on discharge home with TPN  All orders updated.  Discharge orders also placed       I spent  minutes in the professional and overall care of this patient.      Roxanne Buckner MD

## 2025-04-15 NOTE — PROGRESS NOTES
[facility] received a call from Keely with [facility], she states [facility] has referred this pt to them, [facility] sending clinicals to [facility] along with [facility] order. Keely states that the nurse is planning on making the first home visit today between 2-3. [facility] me with the pt who verifies TPN has already arrived to his home, also spoke with pts wife Betsy over the phone while in pts room who verifies a nurse name Katarzyna from [facility] reached out to her yesterday. Pt and wife updated regarding of timing of nursing visit today, family to provide transport by at least 1 pm.      04/15/25 1118   Discharge Planning   Expected Discharge Disposition Home H  ([facility])

## 2025-04-15 NOTE — CARE PLAN
The patient's goals for the shift include rest    The clinical goals for the shift include pain management, safety, TPN,

## 2025-04-15 NOTE — NURSING NOTE
Pt A&O x3 currently resting in bed. No complaints or concerns. Call light within reach.  Pt continues on TPN per order.  Colostomy and drain in place per order.

## 2025-04-16 ENCOUNTER — APPOINTMENT (OUTPATIENT)
Dept: SURGICAL ONCOLOGY | Facility: HOSPITAL | Age: 70
End: 2025-04-16
Payer: MEDICARE

## 2025-04-16 NOTE — PROGRESS NOTES
"Roma Corral is a 70 y.o. male on day 7 of admission presenting with Dysphagia.    Subjective   Doing ok . Carmen some oral.  Tolerating TPN       Objective     Physical Exam  Vitals reviewed.   Constitutional:       Appearance: Normal appearance.   HENT:      Head: Normocephalic and atraumatic.      Right Ear: Tympanic membrane, ear canal and external ear normal.      Left Ear: Tympanic membrane, ear canal and external ear normal.      Nose: Nose normal.      Mouth/Throat:      Pharynx: Oropharynx is clear.   Eyes:      Extraocular Movements: Extraocular movements intact.      Conjunctiva/sclera: Conjunctivae normal.      Pupils: Pupils are equal, round, and reactive to light.   Cardiovascular:      Rate and Rhythm: Normal rate and regular rhythm.      Pulses: Normal pulses.      Heart sounds: Normal heart sounds.   Pulmonary:      Effort: Pulmonary effort is normal.      Breath sounds: Normal breath sounds.   Abdominal:      General: Abdomen is flat. Bowel sounds are normal.      Palpations: Abdomen is soft.      Comments: Drain in RUQ   Colostomy bag in LLQ   Musculoskeletal:      Cervical back: Normal range of motion and neck supple.   Skin:     General: Skin is warm and dry.   Neurological:      General: No focal deficit present.      Mental Status: He is alert and oriented to person, place, and time.   Psychiatric:         Mood and Affect: Mood normal.         Last Recorded Vitals  Blood pressure 96/59, pulse 57, temperature 36.7 °C (98.1 °F), temperature source Oral, resp. rate 16, height 1.805 m (5' 11.06\"), weight 71.5 kg (157 lb 10.1 oz), SpO2 100%.  Intake/Output last 3 Shifts:  I/O last 3 completed shifts:  In: 4425.4 (61.9 mL/kg) [P.O.:680; IV Piggyback:510]  Out: 1965 (27.5 mL/kg) [Urine:1425 (0.6 mL/kg/hr); Drains:510; Stool:30]  Weight: 71.5 kg     Relevant Results               This patient has a central line   Reason for the central line remaining today? Parenteral medication    Scheduled " medications      Continuous medications      PRN medications    Results for orders placed or performed during the hospital encounter of 04/08/25 (from the past 24 hours)   CBC   Result Value Ref Range    WBC 2.9 (L) 4.4 - 11.3 x10*3/uL    nRBC 0.0 0.0 - 0.0 /100 WBCs    RBC 2.62 (L) 4.50 - 5.90 x10*6/uL    Hemoglobin 7.5 (L) 13.5 - 17.5 g/dL    Hematocrit 24.5 (L) 41.0 - 52.0 %    MCV 94 80 - 100 fL    MCH 28.6 26.0 - 34.0 pg    MCHC 30.6 (L) 32.0 - 36.0 g/dL    RDW 20.2 (H) 11.5 - 14.5 %    Platelets     Basic Metabolic Panel   Result Value Ref Range    Glucose 137 (H) 74 - 99 mg/dL    Sodium 135 (L) 136 - 145 mmol/L    Potassium 4.5 3.5 - 5.3 mmol/L    Chloride 107 98 - 107 mmol/L    Bicarbonate 25 21 - 32 mmol/L    Anion Gap 8 (L) 10 - 20 mmol/L    Urea Nitrogen 29 (H) 6 - 23 mg/dL    Creatinine 0.78 0.50 - 1.30 mg/dL    eGFR >90 >60 mL/min/1.73m*2    Calcium 7.2 (L) 8.6 - 10.3 mg/dL   Magnesium   Result Value Ref Range    Magnesium 2.05 1.60 - 2.40 mg/dL     *Note: Due to a large number of results and/or encounters for the requested time period, some results have not been displayed. A complete set of results can be found in Results Review.         No results found.           Malnutrition Diagnosis Status: Active  Malnutrition Diagnosis: Severe malnutrition related to chronic disease or condition  Related to: decreased ability to consume sufficient energy  As Evidenced by: 20# (12.2%) weight loss in past 3 months, severe subcutaneous fat and muscle deficits,  I agree with the dietitian's malnutrition diagnosis.  Results for orders placed or performed during the hospital encounter of 04/08/25 (from the past 24 hours)   CBC   Result Value Ref Range    WBC 2.9 (L) 4.4 - 11.3 x10*3/uL    nRBC 0.0 0.0 - 0.0 /100 WBCs    RBC 2.62 (L) 4.50 - 5.90 x10*6/uL    Hemoglobin 7.5 (L) 13.5 - 17.5 g/dL    Hematocrit 24.5 (L) 41.0 - 52.0 %    MCV 94 80 - 100 fL    MCH 28.6 26.0 - 34.0 pg    MCHC 30.6 (L) 32.0 - 36.0 g/dL    RDW  20.2 (H) 11.5 - 14.5 %    Platelets     Basic Metabolic Panel   Result Value Ref Range    Glucose 137 (H) 74 - 99 mg/dL    Sodium 135 (L) 136 - 145 mmol/L    Potassium 4.5 3.5 - 5.3 mmol/L    Chloride 107 98 - 107 mmol/L    Bicarbonate 25 21 - 32 mmol/L    Anion Gap 8 (L) 10 - 20 mmol/L    Urea Nitrogen 29 (H) 6 - 23 mg/dL    Creatinine 0.78 0.50 - 1.30 mg/dL    eGFR >90 >60 mL/min/1.73m*2    Calcium 7.2 (L) 8.6 - 10.3 mg/dL   Magnesium   Result Value Ref Range    Magnesium 2.05 1.60 - 2.40 mg/dL     *Note: Due to a large number of results and/or encounters for the requested time period, some results have not been displayed. A complete set of results can be found in Results Review.       No results found.      Assessment/Plan   Assessment & Plan  Dysphagia    Anxiety    CAD (coronary artery disease)    Colostomy care    History of rectal cancer    Hyperlipidemia    Hypertension    Rectal cancer (Multi)    Type 2 diabetes mellitus    Severe protein-calorie malnutrition (Multi)    Ileorectal fistula    Patient is getting TPN  Dr. Anna input noted  He wants patient to have TPN for 3 months  Patient is allowed to take oral but very minimal per Dr. Vasquez  Minimize p.o. except for comfort with goal of sealing postoperative lymphatic leak.  Likely multiple source of leak given degree of response to radiation causing ileorectal fistula and abnormal inflamed operative field.  For this reason patient is also not a candidate for enteral feeding.   Plan is to minimize enteral intake with goal of resolving chyle leak.  Blood pressure and blood sugar okay  Monitor electrolytes   working on discharge home with TPN  All orders updated.  Discharge orders also placed    Home care arranged.  TPN arranged for home use  Patient be discharged home today.  Discharge papers done       I spent  minutes in the professional and overall care of this patient.      Roxanne Buckner MD

## 2025-04-18 DIAGNOSIS — Z78.9 ON TOTAL PARENTERAL NUTRITION: ICD-10-CM

## 2025-04-22 PROCEDURE — RXMED WILLOW AMBULATORY MEDICATION CHARGE

## 2025-04-25 ENCOUNTER — PHARMACY VISIT (OUTPATIENT)
Dept: PHARMACY | Facility: CLINIC | Age: 70
End: 2025-04-25
Payer: MEDICARE

## 2025-04-25 DIAGNOSIS — Z78.9 ON TOTAL PARENTERAL NUTRITION: ICD-10-CM

## 2025-05-02 ENCOUNTER — TELEPHONE (OUTPATIENT)
Dept: HEMATOLOGY/ONCOLOGY | Facility: HOSPITAL | Age: 70
End: 2025-05-02
Payer: MEDICARE

## 2025-05-02 ENCOUNTER — TELEPHONE (OUTPATIENT)
Dept: ADMISSION | Facility: HOSPITAL | Age: 70
End: 2025-05-02
Payer: MEDICARE

## 2025-05-02 DIAGNOSIS — G89.3 CANCER RELATED PAIN: ICD-10-CM

## 2025-05-02 DIAGNOSIS — Z78.9 ON TOTAL PARENTERAL NUTRITION: ICD-10-CM

## 2025-05-02 DIAGNOSIS — G89.18 POST-OPERATIVE PAIN: ICD-10-CM

## 2025-05-02 PROCEDURE — RXMED WILLOW AMBULATORY MEDICATION CHARGE

## 2025-05-02 RX ORDER — MORPHINE SULFATE 30 MG/1
30 TABLET, FILM COATED, EXTENDED RELEASE ORAL EVERY 12 HOURS SCHEDULED
Qty: 60 TABLET | Refills: 0 | Status: SHIPPED | OUTPATIENT
Start: 2025-05-02

## 2025-05-02 NOTE — TELEPHONE ENCOUNTER
Reason for Conversation  Medication Question    Background   Roma called to speak with team urgently.  I asked multiple times what the call was regarding and he states he has questions regarding his medications and refused to go into any more detail.  Would not give any more information.      Does not have FUV scheduled.     Disposition   No disposition on file.

## 2025-05-02 NOTE — TELEPHONE ENCOUNTER
Reason for Conversation  Patient calling to speak with a nurse from Dr. Nieto's clinic    Background   I called and spoke with Roma. Patient asking for a refill of Morphine 15 mg extended release 12 hour tablet from Dr. Nieto or [facility]. I let him know that I would reach out to the providers to see if they would be able to refill this medication for him. I also noted that in his med list the last prescription for Morphine listed is a Morphine 30 mg CR extended release tablet. The patient states he had been prescribed this previously but the last refill he had was from [facility] and it was a 15 mg dose. The patient states his preferred pharmacy is [facility] and I confirmed the pharmacy's number with the patient. I told the patient that I would give him a call back after hearing form one of the providers. Patient expresses understanding and had no further questions at this time.     I called the patient back to confirm whether he was at home or a SNF. This information was requested by his provider. The patient states he was discharged about 2 weeks ago and is back home. I told Roma I would let [facility] know and that I would give him a call back once I heard from her.    Returned call to patient to make sure he didn't need anything else. [facility] called and spoke with the patient. The patient states [facility] sent in his prescription and that he didn't have any additional needs at this time.   Disposition   No disposition on file.

## 2025-05-02 NOTE — TELEPHONE ENCOUNTER
Reason for Conversation  Medication Question    Background   Pt called refill line and advised that he needs an urgent refill   Morphine CR 30mg. Pt is taking 1 tablet in AM, 2 tablets at HS  Last FUV 2/4, no FUV scheduled.   Pharmacy: [facility]

## 2025-05-03 ENCOUNTER — PHARMACY VISIT (OUTPATIENT)
Dept: PHARMACY | Facility: CLINIC | Age: 70
End: 2025-05-03
Payer: COMMERCIAL

## 2025-05-03 PROCEDURE — RXMED WILLOW AMBULATORY MEDICATION CHARGE

## 2025-05-09 DIAGNOSIS — Z78.9 ON TOTAL PARENTERAL NUTRITION: ICD-10-CM

## 2025-05-12 ENCOUNTER — APPOINTMENT (OUTPATIENT)
Dept: PRIMARY CARE | Facility: CLINIC | Age: 70
End: 2025-05-12
Payer: MEDICARE

## 2025-05-12 VITALS
BODY MASS INDEX: 23.69 KG/M2 | WEIGHT: 169.2 LBS | DIASTOLIC BLOOD PRESSURE: 60 MMHG | HEIGHT: 71 IN | SYSTOLIC BLOOD PRESSURE: 100 MMHG

## 2025-05-12 DIAGNOSIS — C18.9 MALIGNANT NEOPLASM OF COLON, UNSPECIFIED PART OF COLON (MULTI): ICD-10-CM

## 2025-05-12 DIAGNOSIS — R60.0 LOCALIZED EDEMA: Primary | ICD-10-CM

## 2025-05-12 DIAGNOSIS — E78.5 HYPERLIPIDEMIA, UNSPECIFIED HYPERLIPIDEMIA TYPE: ICD-10-CM

## 2025-05-12 DIAGNOSIS — E55.9 VITAMIN D DEFICIENCY DISEASE: ICD-10-CM

## 2025-05-12 DIAGNOSIS — K59.00 CONSTIPATION, UNSPECIFIED CONSTIPATION TYPE: ICD-10-CM

## 2025-05-12 DIAGNOSIS — R79.89 LOW VITAMIN B12 LEVEL: ICD-10-CM

## 2025-05-12 DIAGNOSIS — I10 PRIMARY HYPERTENSION: ICD-10-CM

## 2025-05-12 PROCEDURE — 3074F SYST BP LT 130 MM HG: CPT | Performed by: INTERNAL MEDICINE

## 2025-05-12 PROCEDURE — 1159F MED LIST DOCD IN RCRD: CPT | Performed by: INTERNAL MEDICINE

## 2025-05-12 PROCEDURE — 3078F DIAST BP <80 MM HG: CPT | Performed by: INTERNAL MEDICINE

## 2025-05-12 PROCEDURE — 3044F HG A1C LEVEL LT 7.0%: CPT | Performed by: INTERNAL MEDICINE

## 2025-05-12 PROCEDURE — RXMED WILLOW AMBULATORY MEDICATION CHARGE

## 2025-05-12 PROCEDURE — 3008F BODY MASS INDEX DOCD: CPT | Performed by: INTERNAL MEDICINE

## 2025-05-12 PROCEDURE — 99214 OFFICE O/P EST MOD 30 MIN: CPT | Performed by: INTERNAL MEDICINE

## 2025-05-12 PROCEDURE — 1111F DSCHRG MED/CURRENT MED MERGE: CPT | Performed by: INTERNAL MEDICINE

## 2025-05-12 RX ORDER — FUROSEMIDE 40 MG/1
40 TABLET ORAL DAILY
Qty: 30 TABLET | Refills: 11 | Status: SHIPPED | OUTPATIENT
Start: 2025-05-12 | End: 2026-05-12

## 2025-05-12 NOTE — PROGRESS NOTES
"Subjective   Patient ID: Roma Corral is a 70 y.o. male who presents for Leg Swelling.    This gentleman today came here for multiple medical issues.  1. Dependent leg edema in both the legs.  He is trying to keep legs up, it is not better.  2. He is under cancer treatment.  He is doing okay.  No problem.  He came for follow-up.  He has not done blood work for a while.  Appetite and weight are okay.  No problem.    I have personally reviewed the patient's Past Medical History, Medications, Allergies, Social History, and Family History in the EMR.    Review of Systems   All other systems reviewed and are negative.    Objective   /60   Ht 1.803 m (5' 11\")   Wt 76.7 kg (169 lb 3.2 oz)   BMI 23.60 kg/m²     Physical Exam  Vitals reviewed.   Cardiovascular:      Heart sounds: Normal heart sounds, S1 normal and S2 normal. No murmur heard.     No friction rub.   Pulmonary:      Effort: Pulmonary effort is normal.      Breath sounds: Normal breath sounds and air entry.   Abdominal:      Palpations: There is no hepatomegaly, splenomegaly or mass.   Musculoskeletal:      Right lower le+ Edema present.      Left lower le+ Edema present.   Lymphadenopathy:      Lower Body: No right inguinal adenopathy. No left inguinal adenopathy.   Neurological:      Cranial Nerves: Cranial nerves 2-12 are intact.      Sensory: No sensory deficit.      Motor: Motor function is intact.      Deep Tendon Reflexes: Reflexes are normal and symmetric.     LAB WORK: Laboratory testing discussed.    Assessment/Plan   Problem List Items Addressed This Visit           ICD-10-CM    Hyperlipidemia E78.5    Hypertension I10     Other Visit Diagnoses         Codes      Localized edema    -  Primary R60.0    Relevant Medications    furosemide (Lasix) 40 mg tablet    Other Relevant Orders    CBC and Auto Differential    Comprehensive Metabolic Panel    Thyroid Stimulating Hormone    Vitamin D 25-Hydroxy,Total (for eval of Vitamin D levels)    " Vitamin B12    Transthoracic Echo Complete      Low vitamin B12 level     R79.89    Relevant Orders    Vitamin B12      Vitamin D deficiency disease     E55.9    Relevant Orders    Vitamin D 25-Hydroxy,Total (for eval of Vitamin D levels)      Malignant neoplasm of colon, unspecified part of colon (Multi)     C18.9      Constipation, unspecified constipation type     K59.00        1. Leg edema.  Etiology not known.  I ordered echocardiogram and blood work.  I gave him Lasix, potassium.  2. Hypertension, okay.  3. Cholesterol.  Monitor.  4. Cancer, under treatment.  5. Constipation, okay.  6. I urged him to see me in a week after blood tests, echo, bring all his medications for polypharmacy review.    Scribe Attestation  By signing my name below, I, Marnie Rowell attest that this documentation has been prepared under the direction and in the presence of Santiago Buckner MD.     All medical record entries made by the scribe were personally dictated by me I have reviewed the chart and agree the record accurately reflects my personal performance of his history physical examination and management

## 2025-05-13 ENCOUNTER — PHARMACY VISIT (OUTPATIENT)
Dept: PHARMACY | Facility: CLINIC | Age: 70
End: 2025-05-13
Payer: COMMERCIAL

## 2025-05-13 ENCOUNTER — HOSPITAL ENCOUNTER (OUTPATIENT)
Dept: CARDIOLOGY | Facility: HOSPITAL | Age: 70
Discharge: HOME | End: 2025-05-13
Payer: MEDICARE

## 2025-05-13 DIAGNOSIS — R60.0 LOCALIZED EDEMA: ICD-10-CM

## 2025-05-13 LAB
AORTIC VALVE MEAN GRADIENT: 4 MMHG
AORTIC VALVE PEAK VELOCITY: 1.43 M/S
AV PEAK GRADIENT: 8 MMHG
AVA (PEAK VEL): 2.9 CM2
AVA (VTI): 2.63 CM2
EJECTION FRACTION APICAL 4 CHAMBER: 44.9
EJECTION FRACTION: 48 %
LEFT ATRIUM VOLUME AREA LENGTH INDEX BSA: 50.9 ML/M2
LEFT VENTRICLE INTERNAL DIMENSION DIASTOLE: 5.1 CM (ref 3.5–6)
LEFT VENTRICULAR OUTFLOW TRACT DIAMETER: 2.3 CM
LV EJECTION FRACTION BIPLANE: 47 %
MITRAL VALVE E/A RATIO: 1.24
RIGHT VENTRICLE FREE WALL PEAK S': 15.1 CM/S
RIGHT VENTRICLE PEAK SYSTOLIC PRESSURE: 33.7 MMHG
TRICUSPID ANNULAR PLANE SYSTOLIC EXCURSION: 3.3 CM

## 2025-05-13 PROCEDURE — 93306 TTE W/DOPPLER COMPLETE: CPT

## 2025-05-13 PROCEDURE — 93306 TTE W/DOPPLER COMPLETE: CPT | Performed by: INTERNAL MEDICINE

## 2025-05-14 ENCOUNTER — OFFICE VISIT (OUTPATIENT)
Dept: SURGICAL ONCOLOGY | Facility: HOSPITAL | Age: 70
End: 2025-05-14
Payer: MEDICARE

## 2025-05-14 ENCOUNTER — LAB (OUTPATIENT)
Dept: LAB | Facility: HOSPITAL | Age: 70
End: 2025-05-14
Payer: MEDICARE

## 2025-05-14 VITALS
TEMPERATURE: 97.7 F | HEART RATE: 68 BPM | OXYGEN SATURATION: 100 % | DIASTOLIC BLOOD PRESSURE: 72 MMHG | SYSTOLIC BLOOD PRESSURE: 124 MMHG | BODY MASS INDEX: 23.65 KG/M2 | WEIGHT: 169.6 LBS | RESPIRATION RATE: 14 BRPM

## 2025-05-14 DIAGNOSIS — R93.3 ABNORMAL FINDINGS ON DIAGNOSTIC IMAGING OF OTHER PARTS OF DIGESTIVE TRACT: ICD-10-CM

## 2025-05-14 DIAGNOSIS — C18.9 MALIGNANT NEOPLASM OF COLON, UNSPECIFIED PART OF COLON (MULTI): Primary | ICD-10-CM

## 2025-05-14 LAB
25(OH)D3 SERPL-MCNC: 26 NG/ML (ref 30–100)
ALBUMIN SERPL BCP-MCNC: 2.9 G/DL (ref 3.4–5)
ALP SERPL-CCNC: 642 U/L (ref 33–136)
ALT SERPL W P-5'-P-CCNC: 39 U/L (ref 10–52)
ANION GAP SERPL CALC-SCNC: 12 MMOL/L (ref 10–20)
AST SERPL W P-5'-P-CCNC: 61 U/L (ref 9–39)
BASOPHILS # BLD AUTO: 0.01 X10*3/UL (ref 0–0.1)
BASOPHILS NFR BLD AUTO: 0.3 %
BILIRUB SERPL-MCNC: 0.6 MG/DL (ref 0–1.2)
BUN SERPL-MCNC: 39 MG/DL (ref 6–23)
CALCIUM SERPL-MCNC: 8.2 MG/DL (ref 8.6–10.3)
CHLORIDE SERPL-SCNC: 106 MMOL/L (ref 98–107)
CO2 SERPL-SCNC: 26 MMOL/L (ref 21–32)
CREAT SERPL-MCNC: 0.93 MG/DL (ref 0.5–1.3)
EGFRCR SERPLBLD CKD-EPI 2021: 88 ML/MIN/1.73M*2
EOSINOPHIL # BLD AUTO: 0.07 X10*3/UL (ref 0–0.7)
EOSINOPHIL NFR BLD AUTO: 2.3 %
ERYTHROCYTE [DISTWIDTH] IN BLOOD BY AUTOMATED COUNT: 20 % (ref 11.5–14.5)
GLUCOSE SERPL-MCNC: 109 MG/DL (ref 74–99)
HCT VFR BLD AUTO: 27.4 % (ref 41–52)
HGB BLD-MCNC: 8.2 G/DL (ref 13.5–17.5)
IMM GRANULOCYTES # BLD AUTO: 0.01 X10*3/UL (ref 0–0.7)
IMM GRANULOCYTES NFR BLD AUTO: 0.3 % (ref 0–0.9)
LYMPHOCYTES # BLD AUTO: 0.45 X10*3/UL (ref 1.2–4.8)
LYMPHOCYTES NFR BLD AUTO: 15.1 %
MAGNESIUM SERPL-MCNC: 2.02 MG/DL (ref 1.6–2.4)
MCH RBC QN AUTO: 28.4 PG (ref 26–34)
MCHC RBC AUTO-ENTMCNC: 29.9 G/DL (ref 32–36)
MCV RBC AUTO: 95 FL (ref 80–100)
MONOCYTES # BLD AUTO: 0.3 X10*3/UL (ref 0.1–1)
MONOCYTES NFR BLD AUTO: 10 %
NEUTROPHILS # BLD AUTO: 2.15 X10*3/UL (ref 1.2–7.7)
NEUTROPHILS NFR BLD AUTO: 72 %
NRBC BLD-RTO: 0 /100 WBCS (ref 0–0)
PLATELET # BLD AUTO: 103 X10*3/UL (ref 150–450)
POTASSIUM SERPL-SCNC: 4.8 MMOL/L (ref 3.5–5.3)
PROT SERPL-MCNC: 7 G/DL (ref 6.4–8.2)
RBC # BLD AUTO: 2.89 X10*6/UL (ref 4.5–5.9)
SODIUM SERPL-SCNC: 139 MMOL/L (ref 136–145)
TRIGL FLD-MCNC: 25 MG/DL
TSH SERPL-ACNC: 3.11 MIU/L (ref 0.44–3.98)
VIT B12 SERPL-MCNC: 568 PG/ML (ref 211–911)
WBC # BLD AUTO: 3 X10*3/UL (ref 4.4–11.3)

## 2025-05-14 PROCEDURE — 3044F HG A1C LEVEL LT 7.0%: CPT | Performed by: SURGERY

## 2025-05-14 PROCEDURE — 3074F SYST BP LT 130 MM HG: CPT | Performed by: SURGERY

## 2025-05-14 PROCEDURE — 82306 VITAMIN D 25 HYDROXY: CPT | Performed by: INTERNAL MEDICINE

## 2025-05-14 PROCEDURE — 3078F DIAST BP <80 MM HG: CPT | Performed by: SURGERY

## 2025-05-14 PROCEDURE — 83735 ASSAY OF MAGNESIUM: CPT | Performed by: INTERNAL MEDICINE

## 2025-05-14 PROCEDURE — 99211 OFF/OP EST MAY X REQ PHY/QHP: CPT | Performed by: SURGERY

## 2025-05-14 PROCEDURE — 80053 COMPREHEN METABOLIC PANEL: CPT | Performed by: INTERNAL MEDICINE

## 2025-05-14 PROCEDURE — 1111F DSCHRG MED/CURRENT MED MERGE: CPT | Performed by: SURGERY

## 2025-05-14 PROCEDURE — 82607 VITAMIN B-12: CPT | Performed by: INTERNAL MEDICINE

## 2025-05-14 PROCEDURE — 84478 ASSAY OF TRIGLYCERIDES: CPT | Performed by: SURGERY

## 2025-05-14 PROCEDURE — 85025 COMPLETE CBC W/AUTO DIFF WBC: CPT | Performed by: INTERNAL MEDICINE

## 2025-05-14 PROCEDURE — 1125F AMNT PAIN NOTED PAIN PRSNT: CPT | Performed by: SURGERY

## 2025-05-14 PROCEDURE — 84443 ASSAY THYROID STIM HORMONE: CPT | Performed by: INTERNAL MEDICINE

## 2025-05-14 ASSESSMENT — PAIN SCALES - GENERAL: PAINLEVEL_OUTOF10: 6

## 2025-05-14 NOTE — PROGRESS NOTES
Subjective     HPI  Roma Corral is a 70 y.o. male with metastatic rectal cancer here in postoperative followup after undergoing Laparoscopic microwave ablation of left-sided liver tumors as well as a segment 4B/5 liver tumor in addition to ileocecal resection with anastomosis and low anterior resection with end colostomy on 3/17/2025..  He also underwent right portal vein embolization in the postoperative setting in order to allow for left-sided liver hypertrophy with a plan for two-stage approach with the second stage being right hepatectomy.  Postoperative course was complicated by chyle leak for which he is currently on TPN and has a pelvic drain still in place since surgery.  He notes that he is improving overall.  Has nighttime TPN.  He is able to eat 3 small meals throughout the day.  Drain output is clear, but he has not been recording the amounts.  He notes that he feels approximately 4 of 5 drainage bags per day.    Past medical history significant for numerous comorbidities such as diabetes, coronary artery disease status post NSTEMI in 2021, hypertension, hyperlipidemia, metastatic rectal cancer    Objective     Vitals:    05/14/25 1156   BP: 124/72   Pulse: 68   Resp: 14   Temp: 36.5 °C (97.7 °F)   SpO2: 100%          NAD  Abdomen soft with well healed incision. No infection or drainage.  Left-sided colostomy appears healthy with stool in the bag.  Right sided drain with clear yellow fluid      Assessment/Plan     70-year-old man with rectal cancer metastatic to liver.  He has struggled a bit after the first stage of a two-stage surgery approach.  For now, I will keep his drain in place and asked him to measure the drain output daily.  He will remain on TPN as he notes he is not eating enough to come off of this although he is improving.  He is due for CT liver with volumetrics in about a week or so.  I will plan to see him in 2 weeks to assess the situation with regards to if moving ahead with second  stage right hepatectomy is a possibility and also to determine whether I can remove his drain and whether he can come off TPN.    Baron Erazo MD

## 2025-05-15 NOTE — PROGRESS NOTES
Roma Corral is a 70 year old male with metastatic rectal cancer here in postoperative followup after undergoing 3/17/2025 Laparoscopic ileocecal resection with ileocolic anastomosis  Robotic low anterior resection with end colostomy, Laparoscopic microwave ablation of left-sided liver tumors as well as a segment 4B/5 liver tumor.    Pathology:  A. Segment of small bowel, appendix and cecum, ileocecectomy:  - Segment of terminal ileum with deep ulceration and extensive serosal fibroinflammatory reaction, consistent with clinical history of fistula.  - Appendix with fibrous obliteration of the tip.  - Cecum with serosal fibroinflammatory reaction.  - Seven lymph nodes, negative for malignancy (0/7).    B. Sigmoid colon and rectum, low anterior resection:  - Residual, microscopic foci of invasive adenocarcinoma with treatment effect.  - Thirty-one lymph nodes, negative for malignancy ((0/31).  - Adjacent portion of rectum with transmural ulceration and extensive adhesions.  - Inflamed colocutaneous tissue consistent with stoma site changes.  - T2N0     C.  Hernia sac, excision:  - Mesothelial-lined vascularized fibroadipose tissue, consistent with hernia sac.    Past Medical History  Metastatic Rectal cancer to liver  MI  Anemia  CAD  Gout  HTN  HLD  DM Type 2     Surgical History  LX Colostomy creation  Cardiac Cath  Leg surgery     Review of Systems  Constitutional: Negative for fever, chills, anorexia, weight loss, malaise             ENMT: Negative for nasal discharge, congestion, ear pain, mouth pain, throat pain                 Respiratory: Negative for cough, hemoptysis, wheezing, shortness of breath                Cardiac: Negative for chest pain, dyspnea on exertion, orthopnea, palpitations, syncope , (+)CAD, (+)MI , (+)HTN, (+)HLD     Gastrointestinal: Negative for nausea, vomiting, diarrhea, constipation, abdominal pain, (+)RECTAL CANCER     Genitourinary: Negative for discharge, dysuria, flank pain,  frequency, hematuria          Musculoskeletal: Negative for decreased ROM, pain, swelling, weakness          Neurological: Negative for dizziness, confusion, headache, seizures, syncope               Psychiatric: Negative for mood changes, anxiety, hallucinations, sleep changes, suicidal ideas        Skin: Negative for mass, pain, itching, rash, ulcer            Endocrine: Negative for heat intolerance, cold intolerance, excessive sweating, polyuria, excess thirst , (+)DM        Hematologic/Lymph: Negative for anemia, bruising, easy bleeding, night sweats, petechiae, history of DVT/PE or cancer               Allergic/Immunologic: Negative for anaphylaxis, itchy/ teary eyes, itching, sneezing, swelling     Physical Exam  Constitutional: Thin frail and cachectic appearing, awake/alert/oriented x3, no distress, alert and cooperative             Eyes: Sclera anicteric, no conjunctival inflammation, conjugate gaze    ENMT: mucous membranes moist, no apparent injury,            Head/Neck: Neck supple, no apparent injury, No JVD, trachea midline, no bruits              Respiratory/Thorax: Patent airways, CTAB, normal breath sounds with good chest expansion, thorax symmetric         Cardiovascular: Regular, rate and rhythm, no murmurs, normal S1 and S2         Gastrointestinal: Loop colostomy left lower quadrant somewhat retracted, proximal limb is still slightly above the skin, distal limb is below.  Nondistended, soft, non-tender, no rebound tenderness or guarding, no masses palpable, no organomegaly, +BS, no bruits               Extremities: normal extremities, no cyanosis edema, contusions or wounds, 2+ femoral pulses B/L              Neurological: alert and oriented x3, normal strength, Normal gait          Lymphatic: No palpable inguinal lymphadenopathy   Psychological: Appropriate mood and behavior         Skin: Warm and dry, no lesions, no rashes                Anorectal:      Impression:      Plan:

## 2025-05-16 ENCOUNTER — OFFICE VISIT (OUTPATIENT)
Dept: PRIMARY CARE | Facility: CLINIC | Age: 70
End: 2025-05-16
Payer: MEDICARE

## 2025-05-16 VITALS
DIASTOLIC BLOOD PRESSURE: 68 MMHG | HEIGHT: 71 IN | WEIGHT: 167 LBS | SYSTOLIC BLOOD PRESSURE: 128 MMHG | BODY MASS INDEX: 23.38 KG/M2

## 2025-05-16 DIAGNOSIS — N40.0 BENIGN PROSTATIC HYPERPLASIA, UNSPECIFIED WHETHER LOWER URINARY TRACT SYMPTOMS PRESENT: ICD-10-CM

## 2025-05-16 DIAGNOSIS — R21 RASH: Primary | ICD-10-CM

## 2025-05-16 DIAGNOSIS — K83.1 BILIARY STASIS: ICD-10-CM

## 2025-05-16 DIAGNOSIS — I10 PRIMARY HYPERTENSION: ICD-10-CM

## 2025-05-16 DIAGNOSIS — Z78.9 ON TOTAL PARENTERAL NUTRITION: ICD-10-CM

## 2025-05-16 DIAGNOSIS — C18.9 MALIGNANT NEOPLASM OF COLON, UNSPECIFIED PART OF COLON (MULTI): ICD-10-CM

## 2025-05-16 DIAGNOSIS — R74.8 ABNORMAL LIVER ENZYMES: ICD-10-CM

## 2025-05-16 PROCEDURE — 3044F HG A1C LEVEL LT 7.0%: CPT | Performed by: INTERNAL MEDICINE

## 2025-05-16 PROCEDURE — 3008F BODY MASS INDEX DOCD: CPT | Performed by: INTERNAL MEDICINE

## 2025-05-16 PROCEDURE — 1159F MED LIST DOCD IN RCRD: CPT | Performed by: INTERNAL MEDICINE

## 2025-05-16 PROCEDURE — 3074F SYST BP LT 130 MM HG: CPT | Performed by: INTERNAL MEDICINE

## 2025-05-16 PROCEDURE — 3078F DIAST BP <80 MM HG: CPT | Performed by: INTERNAL MEDICINE

## 2025-05-16 PROCEDURE — 99214 OFFICE O/P EST MOD 30 MIN: CPT | Performed by: INTERNAL MEDICINE

## 2025-05-16 NOTE — PROGRESS NOTES
"Subjective   Patient ID: Roma Corral is a 70 y.o. male who presents for Follow-up (on various conditions).    This gentleman, Mr. Corral today came here for multiple medical issues.  He is feeling slightly improved.  Other than that he saw cancer team.  He came here for follow-up on various conditions.  He got overwhelmed.    I have personally reviewed the patient's Past Medical History, Medications, Allergies, Social History, and Family History in the EMR.    Review of Systems   All other systems reviewed and are negative.    Objective   /68   Ht 1.803 m (5' 11\")   Wt 75.8 kg (167 lb)   BMI 23.29 kg/m²     Physical Exam  Vitals reviewed.   Cardiovascular:      Heart sounds: Normal heart sounds, S1 normal and S2 normal. No murmur heard.     No friction rub.   Pulmonary:      Effort: Pulmonary effort is normal.      Breath sounds: Normal breath sounds and air entry.   Abdominal:      Palpations: There is no hepatomegaly, splenomegaly or mass.   Musculoskeletal:      Right lower leg: No edema.      Left lower leg: No edema.   Lymphadenopathy:      Lower Body: No right inguinal adenopathy. No left inguinal adenopathy.   Neurological:      Cranial Nerves: Cranial nerves 2-12 are intact.      Sensory: No sensory deficit.      Motor: Motor function is intact.      Deep Tendon Reflexes: Reflexes are normal and symmetric.     LAB WORK:  Laboratory testing discussed.    Assessment/Plan   Problem List Items Addressed This Visit           ICD-10-CM    Hypertension I10     Other Visit Diagnoses         Codes      Rash    -  Primary R21    Relevant Orders    US abdomen limited liver      Abnormal liver enzymes     R74.8    Relevant Orders    US abdomen limited liver      Biliary stasis (CMS-HCC)     K83.1      Benign prostatic hyperplasia, unspecified whether lower urinary tract symptoms present     N40.0      Malignant neoplasm of colon, unspecified part of colon (Multi)     C18.9        1. High liver enzymes.  Alkaline " phosphatase is very high.  I decided to do ultrasound.  His CT scan is pending.  2. Biliary stasis.  I will monitor.  He is symptomatic.  3. BPH, on medication.  4. Cancer colon with secondaries.  I will monitor.  5. Hypertension, okay.  6. Immediate ultrasound and probably expedite appointment for further care with cancer team.  7. I will be the first assistant to the cancer team.    Shakira Attestation  By signing my name below, I, Shakira Rowell attest that this documentation has been prepared under the direction and in the presence of Santiago Buckner MD.     All medical record entries made by the shakira were personally dictated by me I have reviewed the chart and agree the record accurately reflects my personal performance of his history physical examination and management

## 2025-05-19 LAB — HOLD SPECIMEN: 293

## 2025-05-20 ENCOUNTER — HOSPITAL ENCOUNTER (OUTPATIENT)
Dept: RADIOLOGY | Facility: CLINIC | Age: 70
Discharge: HOME | End: 2025-05-20
Payer: MEDICARE

## 2025-05-20 ENCOUNTER — APPOINTMENT (OUTPATIENT)
Dept: SURGERY | Facility: CLINIC | Age: 70
End: 2025-05-20
Payer: MEDICARE

## 2025-05-20 DIAGNOSIS — R21 RASH: ICD-10-CM

## 2025-05-20 DIAGNOSIS — R74.8 ABNORMAL LIVER ENZYMES: ICD-10-CM

## 2025-05-20 PROCEDURE — 76705 ECHO EXAM OF ABDOMEN: CPT | Performed by: RADIOLOGY

## 2025-05-20 PROCEDURE — 76705 ECHO EXAM OF ABDOMEN: CPT

## 2025-05-22 NOTE — PROGRESS NOTES
"Roma Corarl is a 70 year old male with metastatic rectal cancer here in postoperative followup after undergoing 3/17/2025 Laparoscopic ileocecal resection with ileocolic anastomosis, Robotic low anterior resection with end colostomy, Laparoscopic microwave ablation of left-sided liver tumors as well as a segment 4B/5 liver tumor.    Pathology:  A. Segment of small bowel, appendix and cecum, ileocecectomy:  - Segment of terminal ileum with deep ulceration and extensive serosal fibroinflammatory reaction, consistent with clinical history of fistula.  - Appendix with fibrous obliteration of the tip.  - Cecum with serosal fibroinflammatory reaction.  - Seven lymph nodes, negative for malignancy (0/7).    B. Sigmoid colon and rectum, low anterior resection:  - Residual, microscopic foci of invasive adenocarcinoma with treatment effect.  - Thirty-one lymph nodes, negative for malignancy ((0/31).  - Adjacent portion of rectum with transmural ulceration and extensive adhesions.  - Inflamed colocutaneous tissue consistent with stoma site changes.  - T2N0     C.  Hernia sac, excision:  - Mesothelial-lined vascularized fibroadipose tissue, consistent with hernia sac.    Postop patient was found to have high drain output in which chyle leak was found. Patient was started on octreotide. IR performed lymphoscintigraphy without decreasing drain output. Patient was made n.p.o. and started on TPN with successful decrease in drain output.     Emptying the drainage bag about four times per day. He was advised to please measure the outputs but has not yet.  He does note that the volume is significantly less. About 25 ml per day.  He is eating a small amount of food daily and is on TPN.  He is passing some small nuggets from the colostomy every day.    Visit Vitals  /73   Pulse 67   Temp 36.2 °C (97.2 °F)   Ht 1.803 m (5' 11\")   Wt 72.7 kg (160 lb 3.2 oz)   SpO2 99%   BMI 22.34 kg/m²   Smoking Status Never   BSA 1.91 m²     LAAO " Clinical Presentation,;,l  Llll,l,  Past Medical History  Metastatic Rectal cancer to liver  MI  Anemia  CAD  Gout  HTN  HLD  DM Type 2     Surgical History  LX Colostomy creation  Cardiac Cath  Leg surgery     Review of Systems  Constitutional: Negative for fever, chills, anorexia, weight loss, malaise             ENMT: Negative for nasal discharge, congestion, ear pain, mouth pain, throat pain                 Respiratory: Negative for cough, hemoptysis, wheezing, shortness of breath                Cardiac: Negative for chest pain, dyspnea on exertion, orthopnea, palpitations, syncope , (+)CAD, (+)MI , (+)HTN, (+)HLD     Gastrointestinal: Negative for nausea, vomiting, diarrhea, constipation, abdominal pain, (+)RECTAL CANCER     Genitourinary: Negative for discharge, dysuria, flank pain, frequency, hematuria          Musculoskeletal: Negative for decreased ROM, pain, swelling, weakness          Neurological: Negative for dizziness, confusion, headache, seizures, syncope               Psychiatric: Negative for mood changes, anxiety, hallucinations, sleep changes, suicidal ideas        Skin: Negative for mass, pain, itching, rash, ulcer            Endocrine: Negative for heat intolerance, cold intolerance, excessive sweating, polyuria, excess thirst , (+)DM        Hematologic/Lymph: Negative for anemia, bruising, easy bleeding, night sweats, petechiae, history of DVT/PE or cancer               Allergic/Immunologic: Negative for anaphylaxis, itchy/ teary eyes, itching, sneezing, swelling     Physical Exam  Constitutional: Thin frail and cachectic appearing, awake/alert/oriented x3, no distress, alert and cooperative             Eyes: Sclera anicteric, no conjunctival inflammation, conjugate gaze    ENMT: mucous membranes moist, no apparent injury,            Head/Neck: Neck supple, no apparent injury, No JVD, trachea midline, no bruits              Respiratory/Thorax: Patent airways, CTAB, normal breath sounds with  good chest expansion, thorax symmetric         Cardiovascular: Regular, rate and rhythm, no murmurs, normal S1 and S2         Gastrointestinal: end colostomy left lower quadrant small amount of irritation with blood at the mucocutaneous junction likely from bag placement.  IR drain serosanguineous nondistended, soft, non-tender, no rebound tenderness or guarding, no masses palpable, no organomegaly, +BS, no bruits               Extremities: normal extremities, no cyanosis edema, contusions or wounds, 2+ femoral pulses B/L              Neurological: alert and oriented x3, normal strength, Normal gait          Lymphatic: No palpable inguinal lymphadenopathy   Psychological: Appropriate mood and behavior         Skin: Warm and dry, no lesions, no rashes                Anorectal:      Impression:  Metastatic rectal cancer status post resection complicated by chyle leak.  Need for second stage liver intervention when able.    Plan:  Per report abdominal ascites has significantly decreased but has not been consistently recorded.  If this is less than 100 cc a day I feel comfortable removing his abdominal drain and hopefully stopping TPN once diet ramps up to appropriate portions.  Has scheduled follow-up with Dr. Erazo for consideration and timing of second stage liver surgery

## 2025-05-23 ENCOUNTER — HOSPITAL ENCOUNTER (OUTPATIENT)
Dept: RADIOLOGY | Facility: HOSPITAL | Age: 70
Discharge: HOME | End: 2025-05-23
Payer: MEDICARE

## 2025-05-23 DIAGNOSIS — C78.7 ADENOCARCINOMA OF RECTUM METASTATIC TO LIVER (MULTI): ICD-10-CM

## 2025-05-23 DIAGNOSIS — Z78.9 ON TOTAL PARENTERAL NUTRITION: ICD-10-CM

## 2025-05-23 DIAGNOSIS — C20 ADENOCARCINOMA OF RECTUM METASTATIC TO LIVER (MULTI): ICD-10-CM

## 2025-05-23 PROCEDURE — 2550000001 HC RX 255 CONTRASTS: Performed by: NURSE PRACTITIONER

## 2025-05-23 PROCEDURE — 74170 CT ABD WO CNTRST FLWD CNTRST: CPT

## 2025-05-23 RX ADMIN — IOHEXOL 75 ML: 350 INJECTION, SOLUTION INTRAVENOUS at 12:56

## 2025-05-27 ENCOUNTER — OFFICE VISIT (OUTPATIENT)
Dept: SURGERY | Facility: CLINIC | Age: 70
End: 2025-05-27
Payer: MEDICARE

## 2025-05-27 ENCOUNTER — APPOINTMENT (OUTPATIENT)
Dept: PRIMARY CARE | Facility: CLINIC | Age: 70
End: 2025-05-27
Payer: MEDICARE

## 2025-05-27 VITALS
WEIGHT: 160.2 LBS | HEIGHT: 71 IN | BODY MASS INDEX: 22.43 KG/M2 | SYSTOLIC BLOOD PRESSURE: 117 MMHG | HEART RATE: 67 BPM | DIASTOLIC BLOOD PRESSURE: 73 MMHG | TEMPERATURE: 97.2 F | OXYGEN SATURATION: 99 %

## 2025-05-27 VITALS
WEIGHT: 161 LBS | DIASTOLIC BLOOD PRESSURE: 62 MMHG | BODY MASS INDEX: 22.54 KG/M2 | HEIGHT: 71 IN | SYSTOLIC BLOOD PRESSURE: 110 MMHG

## 2025-05-27 DIAGNOSIS — D64.9 ANEMIA, UNSPECIFIED TYPE: Primary | ICD-10-CM

## 2025-05-27 DIAGNOSIS — C20 RECTAL CANCER (MULTI): Primary | ICD-10-CM

## 2025-05-27 DIAGNOSIS — R53.83 OTHER FATIGUE: ICD-10-CM

## 2025-05-27 DIAGNOSIS — R60.0 LOCALIZED EDEMA: ICD-10-CM

## 2025-05-27 DIAGNOSIS — E78.5 HYPERLIPIDEMIA, UNSPECIFIED HYPERLIPIDEMIA TYPE: ICD-10-CM

## 2025-05-27 DIAGNOSIS — R53.1 WEAKNESS: ICD-10-CM

## 2025-05-27 DIAGNOSIS — I10 PRIMARY HYPERTENSION: ICD-10-CM

## 2025-05-27 DIAGNOSIS — C78.7 SECONDARY MALIGNANT NEOPLASM OF LIVER AND INTRAHEPATIC BILE DUCT (MULTI): ICD-10-CM

## 2025-05-27 PROCEDURE — 99214 OFFICE O/P EST MOD 30 MIN: CPT | Performed by: SURGERY

## 2025-05-27 PROCEDURE — 99214 OFFICE O/P EST MOD 30 MIN: CPT | Performed by: INTERNAL MEDICINE

## 2025-05-27 PROCEDURE — 3044F HG A1C LEVEL LT 7.0%: CPT | Performed by: INTERNAL MEDICINE

## 2025-05-27 PROCEDURE — 3074F SYST BP LT 130 MM HG: CPT | Performed by: INTERNAL MEDICINE

## 2025-05-27 PROCEDURE — 3078F DIAST BP <80 MM HG: CPT | Performed by: INTERNAL MEDICINE

## 2025-05-27 PROCEDURE — 3008F BODY MASS INDEX DOCD: CPT | Performed by: INTERNAL MEDICINE

## 2025-05-27 PROCEDURE — 1159F MED LIST DOCD IN RCRD: CPT | Performed by: INTERNAL MEDICINE

## 2025-05-27 PROCEDURE — 1036F TOBACCO NON-USER: CPT | Performed by: INTERNAL MEDICINE

## 2025-05-27 RX ORDER — SILVER NITRATE 38.21; 12.74 MG/1; MG/1
STICK TOPICAL ONCE
Status: SHIPPED | OUTPATIENT
Start: 2025-05-27

## 2025-05-27 ASSESSMENT — PAIN SCALES - GENERAL: PAINLEVEL_OUTOF10: 0-NO PAIN

## 2025-05-27 NOTE — NURSING NOTE
"Ostomy/Wound Care Consult     Visit Date: 5/27/2025      Patient Name: Roma Corral         MRN: [id number]             WO nursing visit outcome: Pt here for follow up with Dr. Vasquez.  Doing well with stoma and care, changes pouch twice weekly.  He prefers a 1-piece style pouch.     [facility] next scheduled visit/plan: TBD    Stoma Type: Colostomy  Diameter: 1\" x 1 3/8\"  Location: LLQ  Protrusion: Protrudes slightly  Mucosal Condition and Color: Moist, Red  Mucocutaneous Junction: Intact  Peristomal Skin: Clear, intact  Location of Skin Impairment: n/a  Peristomal Contour: Creases at distal to stoma  Supportive Tissue: Very soft  Character of Output: Brown and Formed Stool  Emptying Frequency: varies, doesn't eat much PO due to poor appetite (on TPN) so not much output per stoma  Removed/Current Pouching System: Karrie Premier with Soft flex barrier flat and stoma paste.    Current Wearing Time: 3-4 Days    Recommendations:   Skin Care: n/a  Pouching System: Karrie Premier soft convex (8578-cuts to 1 1/2\") with stoma paste   Wear Time: 3-4 Days    Time Increment: 45 minutes    Dalia MYRICKN, RN, CWOCN  5/27/2025  3:47 PM        "

## 2025-05-27 NOTE — PROGRESS NOTES
"Subjective   Patient ID: Roma Corral is a 70 y.o. male who presents for Follow-up (Multiple medical issues).    This gentleman, Mr. Corral, today came here for multiple medical issues.  He is feeling okay, not worse.  He saw the cancer doctor, Dr. Erazo for possible second surgery.  He is feeling weak.  He states that food smells good, but when he eats he has no taste, so he does not want to eat.   Feeling weak, tired, fatigued, and exhausted.  He came here for follow-up on various conditions.    I have personally reviewed the patient's Past Medical History, Medications, Allergies, Social History, and Family History in the EMR.    Review of Systems   All other systems reviewed and are negative.    Objective   /62   Ht 1.803 m (5' 11\")   Wt 73 kg (161 lb)   BMI 22.45 kg/m²     Physical Exam  Vitals reviewed.   Cardiovascular:      Heart sounds: Normal heart sounds, S1 normal and S2 normal. No murmur heard.     No friction rub.   Pulmonary:      Effort: Pulmonary effort is normal.      Breath sounds: Normal breath sounds and air entry.   Abdominal:      Palpations: There is no hepatomegaly, splenomegaly or mass.   Musculoskeletal:      Right lower leg: No edema.      Left lower leg: No edema.   Lymphadenopathy:      Lower Body: No right inguinal adenopathy. No left inguinal adenopathy.   Neurological:      Cranial Nerves: Cranial nerves 2-12 are intact.      Sensory: No sensory deficit.      Motor: Motor function is intact.      Deep Tendon Reflexes: Reflexes are normal and symmetric.     LAB WORK: Laboratory testing discussed.    Assessment/Plan   Problem List Items Addressed This Visit           ICD-10-CM    Hyperlipidemia E78.5    Relevant Orders    Comprehensive Metabolic Panel    Hypertension I10    Relevant Orders    CBC    Urinalysis with Reflex Culture and Microscopic    Ammonia     Other Visit Diagnoses         Codes      Anemia, unspecified type    -  Primary D64.9      Localized edema     R60.0    " Relevant Orders    Magnesium      Other fatigue     R53.83    Relevant Orders    Urinalysis with Reflex Culture and Microscopic      Weakness     R53.1        1. No appetite.  I am going to check ammonia level.  2. Mass in the liver.  Monitor.  He might need next surgery.  We will monitor.  3. Anemia.  Monitor.  4. Weakness, PT/OT.  5. Generalized weakness and lethargy.  6. Follow up on blood work.  7. Blood test reports are pending.  I will keep an eye.  8. I shall see him back in two to three weeks.  If situation changes I will see him right away.    Scribe Attestation  By signing my name below, ILuz Scribe attest that this documentation has been prepared under the direction and in the presence of Santiago Buckner MD.     All medical record entries made by the scribe were personally dictated by me I have reviewed the chart and agree the record accurately reflects my personal performance of his history physical examination and management

## 2025-05-28 ENCOUNTER — OFFICE VISIT (OUTPATIENT)
Dept: SURGICAL ONCOLOGY | Facility: HOSPITAL | Age: 70
End: 2025-05-28
Payer: MEDICARE

## 2025-05-28 VITALS
BODY MASS INDEX: 22.78 KG/M2 | WEIGHT: 163.3 LBS | TEMPERATURE: 95.7 F | DIASTOLIC BLOOD PRESSURE: 64 MMHG | SYSTOLIC BLOOD PRESSURE: 117 MMHG | OXYGEN SATURATION: 100 % | HEART RATE: 65 BPM | RESPIRATION RATE: 14 BRPM

## 2025-05-28 DIAGNOSIS — C78.7 SECONDARY MALIGNANT NEOPLASM OF LIVER AND INTRAHEPATIC BILE DUCT (MULTI): Primary | ICD-10-CM

## 2025-05-28 DIAGNOSIS — C20 RECTAL CANCER (MULTI): ICD-10-CM

## 2025-05-28 PROCEDURE — 99211 OFF/OP EST MAY X REQ PHY/QHP: CPT | Performed by: SURGERY

## 2025-05-28 PROCEDURE — 3044F HG A1C LEVEL LT 7.0%: CPT | Performed by: SURGERY

## 2025-05-28 PROCEDURE — 1036F TOBACCO NON-USER: CPT | Performed by: SURGERY

## 2025-05-28 PROCEDURE — 1126F AMNT PAIN NOTED NONE PRSNT: CPT | Performed by: SURGERY

## 2025-05-28 PROCEDURE — 3078F DIAST BP <80 MM HG: CPT | Performed by: SURGERY

## 2025-05-28 PROCEDURE — 3074F SYST BP LT 130 MM HG: CPT | Performed by: SURGERY

## 2025-05-28 ASSESSMENT — PAIN SCALES - GENERAL: PAINLEVEL_OUTOF10: 0-NO PAIN

## 2025-05-28 NOTE — PROGRESS NOTES
Subjective     HPI  Roma Corral is a 70 y.o. male with metastatic rectal cancer here in postoperative followup after undergoing Laparoscopic microwave ablation of left-sided liver tumors as well as a segment 4B/5 liver tumor in addition to ileocecal resection with anastomosis and low anterior resection with end colostomy on 3/17/2025..  He also underwent right portal vein embolization in the postoperative setting in order to allow for left-sided liver hypertrophy with a plan for two-stage approach with the second stage being right hepatectomy.  Postoperative course was complicated by chyle leak for which he is currently on TPN and has a pelvic drain still in place since surgery.  He notes that he is improving overall.  Has nighttime TPN.  He is able to eat 3 small meals throughout the day.  Drain output is clear, output has been in the 100 to 150 cc range daily.  He underwent CT liver with volumetrics which I have reviewed.  There is a lot of artifact due to the portal vein embolization.  I note that volumetrics measurements.  I also visually note that the left liver has had some nice hypertrophy since the portal vein embolization.  My sense based on the review of the imaging is that the volumetrics are an underestimate of the left liver, particularly of the function of liver remnant as much of the right side is replaced by tumor.    Past medical history significant for numerous comorbidities such as diabetes, coronary artery disease status post NSTEMI in 2021, hypertension, hyperlipidemia, metastatic rectal cancer    Objective     Vitals:    05/28/25 1359   BP: 117/64   Pulse: 65   Resp: 14   Temp: 35.4 °C (95.7 °F)   SpO2: 100%          NAD  Abdomen soft with well healed incision. No infection or drainage.  Left-sided colostomy appears healthy with stool in the bag.  Right sided drain with clear yellow fluid      Assessment/Plan     70-year-old man with rectal cancer metastatic to liver.  He has struggled a bit  after the first stage of a two-stage surgery approach.  I removed his drain in the office today.  I discussed right hepatectomy.  His wife could not make it today as she had another appointment for medical issues and I will plan to call him and his wife Betsy later this week to discuss surgery.  I believe he is a candidate for right hepatectomy given the hypertrophy of the left liver and subsequent atrophy of the right liver with much of the right liver replaced tumor.    Baron Erazo MD

## 2025-05-29 LAB
ALBUMIN SERPL-MCNC: 2.9 G/DL (ref 3.6–5.1)
ALP SERPL-CCNC: 651 U/L (ref 35–144)
ALT SERPL-CCNC: 34 U/L (ref 9–46)
AMMONIA PLAS-SCNC: 20 UMOL/L
ANION GAP SERPL CALCULATED.4IONS-SCNC: 8 MMOL/L (CALC) (ref 7–17)
APPEARANCE UR: CLEAR
AST SERPL-CCNC: 59 U/L (ref 10–35)
BACTERIA #/AREA URNS HPF: NORMAL /HPF
BACTERIA UR CULT: NORMAL
BILIRUB SERPL-MCNC: 0.6 MG/DL (ref 0.2–1.2)
BILIRUB UR QL STRIP: NEGATIVE
BUN SERPL-MCNC: 48 MG/DL (ref 7–25)
CALCIUM SERPL-MCNC: 7.8 MG/DL (ref 8.6–10.3)
CHLORIDE SERPL-SCNC: 101 MMOL/L (ref 98–110)
CO2 SERPL-SCNC: 28 MMOL/L (ref 20–32)
COLOR UR: YELLOW
CREAT SERPL-MCNC: 0.96 MG/DL (ref 0.7–1.28)
EGFRCR SERPLBLD CKD-EPI 2021: 85 ML/MIN/1.73M2
ERYTHROCYTE [DISTWIDTH] IN BLOOD BY AUTOMATED COUNT: 17.5 % (ref 11–15)
GLUCOSE SERPL-MCNC: 117 MG/DL (ref 65–99)
GLUCOSE UR QL STRIP: NEGATIVE
HCT VFR BLD AUTO: 26.3 % (ref 38.5–50)
HGB BLD-MCNC: 7.8 G/DL (ref 13.2–17.1)
HGB UR QL STRIP: NEGATIVE
HYALINE CASTS #/AREA URNS LPF: NORMAL /LPF
KETONES UR QL STRIP: NEGATIVE
LEUKOCYTE ESTERASE UR QL STRIP: NEGATIVE
MAGNESIUM SERPL-MCNC: 2.2 MG/DL (ref 1.5–2.5)
MCH RBC QN AUTO: 27.6 PG (ref 27–33)
MCHC RBC AUTO-ENTMCNC: 29.7 G/DL (ref 32–36)
MCV RBC AUTO: 92.9 FL (ref 80–100)
NITRITE UR QL STRIP: NEGATIVE
PH UR STRIP: 5.5 [PH] (ref 5–8)
PLATELET # BLD AUTO: 133 THOUSAND/UL (ref 140–400)
PMV BLD REES-ECKER: 11 FL (ref 7.5–12.5)
POTASSIUM SERPL-SCNC: 4.2 MMOL/L (ref 3.5–5.3)
PROT SERPL-MCNC: 6.8 G/DL (ref 6.1–8.1)
PROT UR QL STRIP: NEGATIVE
RBC # BLD AUTO: 2.83 MILLION/UL (ref 4.2–5.8)
RBC #/AREA URNS HPF: NORMAL /HPF
SERVICE CMNT-IMP: NORMAL
SODIUM SERPL-SCNC: 137 MMOL/L (ref 135–146)
SP GR UR STRIP: 1.01 (ref 1–1.03)
SQUAMOUS #/AREA URNS HPF: NORMAL /HPF
TRIGL PRT-MCNC: 15 MG/DL
WBC # BLD AUTO: 3.3 THOUSAND/UL (ref 3.8–10.8)
WBC #/AREA URNS HPF: NORMAL /HPF

## 2025-05-30 DIAGNOSIS — Z78.9 ON TOTAL PARENTERAL NUTRITION: ICD-10-CM

## 2025-05-31 ENCOUNTER — PHARMACY VISIT (OUTPATIENT)
Dept: PHARMACY | Facility: CLINIC | Age: 70
End: 2025-05-31
Payer: MEDICARE

## 2025-05-31 PROCEDURE — RXMED WILLOW AMBULATORY MEDICATION CHARGE

## 2025-06-02 ENCOUNTER — TELEPHONE (OUTPATIENT)
Dept: SURGERY | Facility: CLINIC | Age: 70
End: 2025-06-02
Payer: MEDICARE

## 2025-06-02 DIAGNOSIS — G89.3 CANCER RELATED PAIN: ICD-10-CM

## 2025-06-02 DIAGNOSIS — G89.18 POST-OPERATIVE PAIN: ICD-10-CM

## 2025-06-02 PROCEDURE — RXMED WILLOW AMBULATORY MEDICATION CHARGE

## 2025-06-02 RX ORDER — MORPHINE SULFATE 30 MG/1
30 TABLET, FILM COATED, EXTENDED RELEASE ORAL EVERY 12 HOURS SCHEDULED
Qty: 60 TABLET | Refills: 0 | Status: SHIPPED | OUTPATIENT
Start: 2025-06-02

## 2025-06-02 NOTE — TELEPHONE ENCOUNTER
Pt called requesting a refill of MS Contin 30mg q12, #60.  Preferred pharmacy is [facility].

## 2025-06-02 NOTE — TELEPHONE ENCOUNTER
From: Renan Lee [email or web address]  Sent: Monday, June 2, 2025 11:38 AM  To: Emily Canales [email or web address]>  Subject: Roma Corral [id number]     [facility]icemail asking about the status of pts drain and home TPN.    Ava Phone [phone]      I spoke with Ava and advised that the drain has been removed.  Goal is to increase diet and ensure he is taking in adequate oral diet then wean off TPN.  I will contact Roma and give him your contact info so he can be taught how to tract his oral diet.  Will need to fax [phone] order to wean TPN once the patient is taking in adequate calories.  Emily Canales RN

## 2025-06-02 NOTE — TELEPHONE ENCOUNTER
Attempted to reach patient.    Message left that I see that Dr. Erazo pulled your drain.  We now need to discuss weaning you off of TPN.  I invited Roma to reach me at [phone].  Emily Canales RN

## 2025-06-03 ENCOUNTER — PHARMACY VISIT (OUTPATIENT)
Dept: PHARMACY | Facility: CLINIC | Age: 70
End: 2025-06-03
Payer: COMMERCIAL

## 2025-06-05 ENCOUNTER — TELEPHONE (OUTPATIENT)
Dept: SURGERY | Facility: CLINIC | Age: 70
End: 2025-06-05
Payer: MEDICARE

## 2025-06-05 NOTE — TELEPHONE ENCOUNTER
Attempted to reach patient.    Message left that I see that Dr. Erazo pulled your drain.  We now need to discuss weaning you off of TPN.  I invited Roma to reach me at [phone].  Emily Canales RN

## 2025-06-06 DIAGNOSIS — Z78.9 ON TOTAL PARENTERAL NUTRITION: ICD-10-CM

## 2025-06-07 ENCOUNTER — PHARMACY VISIT (OUTPATIENT)
Dept: PHARMACY | Facility: CLINIC | Age: 70
End: 2025-06-07
Payer: COMMERCIAL

## 2025-06-07 PROCEDURE — RXMED WILLOW AMBULATORY MEDICATION CHARGE

## 2025-06-07 RX ORDER — PENICILLIN V POTASSIUM 500 MG/1
500 TABLET, FILM COATED ORAL 4 TIMES DAILY
Qty: 40 TABLET | Refills: 0 | OUTPATIENT
Start: 2025-06-07

## 2025-06-07 RX ORDER — METHYLPREDNISOLONE 4 MG/1
TABLET ORAL
Qty: 21 EACH | Refills: 0 | OUTPATIENT
Start: 2025-06-07

## 2025-06-12 ENCOUNTER — TELEPHONE (OUTPATIENT)
Dept: SURGICAL ONCOLOGY | Facility: HOSPITAL | Age: 70
End: 2025-06-12
Payer: MEDICARE

## 2025-06-12 NOTE — TELEPHONE ENCOUNTER
Called patient on behalf of Dr. Erazo re: setting up virtual appointment to discuss treatment going forward. Left message asking for call back to se that appointment up. Dr. Erazo has tried a few times to reach patient as well.

## 2025-06-13 DIAGNOSIS — Z78.9 ON TOTAL PARENTERAL NUTRITION: ICD-10-CM

## 2025-06-16 ENCOUNTER — TELEPHONE (OUTPATIENT)
Dept: SURGERY | Facility: CLINIC | Age: 70
End: 2025-06-16
Payer: MEDICARE

## 2025-06-16 NOTE — TELEPHONE ENCOUNTER
From: Renan Lee [email or web address]  Sent: Friday, June 13, 2025 2:32 PM  To: Emily Canales [email or web address]>  Subject: Roma Corral [id number]     Jerzy from [facility] called for the pt, she left a voicemail and said she has not heard back from the pt this week so she does not have an update for you regarding his oral intake.    Jerzy [phone]    I returned call to Jerzy.  Message left for her that I have tried to reach Roma Corral multiple times including today.  I have left him multiple messages that we need to speak with him about weaning off of TPN.  He will not return my call.  I invited Jerzy to reach me at [phone].  Emily Canales RN

## 2025-06-18 ENCOUNTER — TELEPHONE (OUTPATIENT)
Dept: SURGICAL ONCOLOGY | Facility: HOSPITAL | Age: 70
End: 2025-06-18
Payer: MEDICARE

## 2025-06-18 NOTE — TELEPHONE ENCOUNTER
Had a phone conversation with the patient and his wife Betsy today as the patient was a bit overwhelmed at the last visit when we discussed moving for surgery and wanted his wife to be involved.  Additionally the patient is still both physically and emotionally recovering from his prior surgery in which she had some complications and a long recovery.    We discussed that the final step in his cancer care would be to proceed with right hepatectomy.  Discussed the procedure itself as well as the expected recovery.  He mentioned that he is completing some dental work.  He does not quite feel physically or emotionally ready at this point, noting he is about 50% back from his baseline.  He does recognize that he has a lot of missed the window of opportunity for cancer clearance and asked about possible surgery in July.  I gave him for possible dates towards the later part of July and mentioned that we would get a repeat imaging prior to the surgery.    He will discuss with his wife with regards to scheduling a surgery date and get back to my office.  I will get a CT scan chest, abdomen, and pelvis with triple phase liver prior to the surgery to have the most up-to-date imaging for surgical planning.

## 2025-06-19 ENCOUNTER — OFFICE VISIT (OUTPATIENT)
Dept: CARDIOLOGY | Facility: CLINIC | Age: 70
End: 2025-06-19
Payer: MEDICARE

## 2025-06-19 VITALS
OXYGEN SATURATION: 99 % | WEIGHT: 176 LBS | DIASTOLIC BLOOD PRESSURE: 52 MMHG | SYSTOLIC BLOOD PRESSURE: 108 MMHG | HEIGHT: 71 IN | BODY MASS INDEX: 24.64 KG/M2 | HEART RATE: 60 BPM

## 2025-06-19 DIAGNOSIS — Z01.810 PREOP CARDIOVASCULAR EXAM: ICD-10-CM

## 2025-06-19 PROCEDURE — 99214 OFFICE O/P EST MOD 30 MIN: CPT | Performed by: INTERNAL MEDICINE

## 2025-06-19 PROCEDURE — 3078F DIAST BP <80 MM HG: CPT | Performed by: INTERNAL MEDICINE

## 2025-06-19 PROCEDURE — G2211 COMPLEX E/M VISIT ADD ON: HCPCS | Performed by: INTERNAL MEDICINE

## 2025-06-19 PROCEDURE — 3044F HG A1C LEVEL LT 7.0%: CPT | Performed by: INTERNAL MEDICINE

## 2025-06-19 PROCEDURE — 1160F RVW MEDS BY RX/DR IN RCRD: CPT | Performed by: INTERNAL MEDICINE

## 2025-06-19 PROCEDURE — 1159F MED LIST DOCD IN RCRD: CPT | Performed by: INTERNAL MEDICINE

## 2025-06-19 PROCEDURE — 3008F BODY MASS INDEX DOCD: CPT | Performed by: INTERNAL MEDICINE

## 2025-06-19 PROCEDURE — 3074F SYST BP LT 130 MM HG: CPT | Performed by: INTERNAL MEDICINE

## 2025-06-19 PROCEDURE — 99212 OFFICE O/P EST SF 10 MIN: CPT

## 2025-06-19 RX ORDER — REGADENOSON 0.08 MG/ML
0.4 INJECTION, SOLUTION INTRAVENOUS
Status: CANCELLED | OUTPATIENT
Start: 2025-06-19

## 2025-06-19 ASSESSMENT — ENCOUNTER SYMPTOMS: DEPRESSION: 0

## 2025-06-19 NOTE — PROGRESS NOTES
Subjective   Indu Corral is a 68 y.o. male.    Chief Complaint:  INDU CORRAL is being seen for a six month follow-up of coronary artery disease, dyslipidemia, dyspnea, hypertension, a prior myocardial infarction and a routine medication evaluation1   HPI  This is a 69 y/o male here today for a six month Cardiology follow up visit. He complains of increased heart palpitations over the past three weeks. He denies chest pain or sob. Reviewed lab work results and medications. A NM Stress test was done [id number]- see report. A Cardiac cath and Echo were done in June 2021- see reports.     ROS    Objective   Physical Exam  Patient is an obese-appearing 69 y/o male in no distress.   JVP not elevated. Carotid impulses are 2+ without overlying bruit.   Chest exhibits fair to good air movement with completely clear breath sounds.   The cardiac rhythm is regular with no premature beats.   Normal S1 and S2. No gallop, murmur or rub, or click.   Abdomen is soft and benign without focal tenderness.   No peripheral edema. The pedal pulses are intact.  All other systems have been reviewed and are negative for complaints.    Lab Review:   No visits with results within 6 Month(s) from this visit.   Latest known visit with results is:   Legacy Encounter on 12/30/2022   Component Date Value    Flu A By PCR 12/30/2022 NEGATIVE     Flu B By PCR 12/30/2022 NEGATIVE     Sars-Cov-2 By PCR 12/30/2022 Positive for COVID-19 (A)     Disclaimer 12/30/2022                      Value:Influenza A and Influenza B negative results should be considered   presumptive in samples that have a positive SARS-CoV-2 result.       This test has been authorized by FDA under an EUA for use by CLIA   Certified Moderate and High-Complexity laboratories and Point of Care   (POC), i.e., in patient care settings operating under a CLIA   Certificate of Waiver, Certificate of Compliance, or Certificate of   Accreditation.       This test has been authorized only for the  simultaneous qualitative   detection and differentiation of nucleic acid from SARS-CoV-2,   influenza A virus, and influenza B virus and not for any other virues   or pathogens.       This test is only authorized for the duration of the declaration that   circumstances exist justifying the authorization of emergency use of   in vitro diagnostic tests for the detection and/or diagnosis of   COVID-19, unless the authorization is terminated or revoked sooner.  Performed at [facility], [address] [place] [id number]         Assessment/Plan   1. CAD. Patient was seen at [facility] 5/30/2021 with a 36 to 48-hour episode of vague upper abdominal and chest discomfort extending into his back. High-sensitivity troponin positive for non-STEMI with EKG demonstrating no ST segment abnormalities in the emergency room. He had diagnostic coronary angiography. It demonstrated mild calcification and diffuse mild to moderate irregularities throughout the proximal third of the LAD with mild luminal irregularities of the mid and distal vessel. There is a severe lesion in a very tiny and diagonal branch. The large dominant left circumflex has scattered mild luminal irregularities. A relatively small caliber nondominant right coronary artery supplying the mainly right ventricular myocardium is a moderate 50% mid vessel lesion. The left ventricular end-diastolic volume is in upper range of normal and there is a low normal left ventricular cyst solid contractility. Study reviewed by interventional cardiology and will recommend medical therapy. Brilinta was continued, but at this point may be discontinued. Patient is in cardiac rehab. Patient continues to have anginal symptoms with shortness of breath and palpitations. Will have patient complete exercise nuclear stress testing. Echocardiogram done at the time showed mild concentric LVH, EF 60 to 64%, left atrial size mildly dilated, mild AI, trace MR, trivial to mild TR. Had exercise  nuclear medicine stress test done 09/2021 that showed suspicion of infarct along the mid anterior wall, but ecg showed no ischemia at submax workload.  The patient is doing well at present.  His only symptom has been rare palpitations over the past 3 weeks but the duration is very brief less than 5 seconds.     2. Hyperlipidemia. Well-controlled on atorvastatin 40 mg daily.  Recent lipid panel from 12/2/2022 was excellent cholesterol 99 LDL 39 HDL 26 triglyceride 167.  SMA panel normal except glucose 142.  Hematocrit 38.5.  He will require repeat CBC CMP lipid panel A1c prior to next visit.     3. History of COVID-19 vaccine #1 and #2.     4. Hypertension. Adequately controlled on current medications.  Patient's blood pressure is currently well within normal range and given his weight loss will reduce the amlodipine from 10 to 5 mg daily.  At time of office visit 10/28/2024 blood pressure now in lower range of normal and patient going through chemotherapy.  Will discontinue amlodipine increased Cater from 20 mEq daily to twice daily.    5.  Rectal carcinoma.  This patient is lost approximately 15 to 20 pounds over the past several months.  He had an abdominal and pelvic CT on 6/10/2024 is showing circumferential narrowing and thickening of the distal sigmoid colon concerning for carcinoma along with multiple right greater than left hepatic lesions.  Colonoscopy on 6/17/2024 confirmed the presence of the carcinoma.  MRI of the liver 6/18/2024 showed multiple hepatic metastases.  MRI of the rectum 6/19/2024 showed circumferential narrowing of the rectosigmoid junction.  Patient is scheduled for colostomy on 7/2/2024 at [facility].  Preadmission testing yesterday shows sinus rhythm with first-degree AV block.  Patient's blood pressure is dropped slightly with his weight loss currently acceptable range.  Will reduce amlodipine from 10 to 5 mg daily.  Patient ultimately underwent a diverting colostomy on  7/2/2024 and [facility] for an obstructing rectal mass with liver metastases.  His postoperative course was uncomplicated discharged home 7/5/2024.  He had a Mediport placed on 7/23/2024.  He developed an episode of epididymitis orchitis treated with Cipro.  He has been seen at the emergency room 3 times for abdominal pain for which his chemotherapy was held and he was started on antibiotics.  He is having somewhat constant abdominal pain.  As noted he has been seen in the emergency room 3 times on 8/17/2024 8/20/2024 9/26/2024.  Chemotherapy has been temporarily suspended.  Patient needs to force feed himself due to metallic taste in his mouth.  He has had some minor rectal bleeding into his colostomy bag and sometimes bright red blood but this has stopped.  Lab work 10/28/2024 includes CBC WBC of 7700 hemoglobin 8.6 hematocrit 28.2.  SMA panel includes creatinine 0.83 potassium 3.2.  Patient has oncology follow-up 10/29/2024.  His chemotherapy has been restarted currently seventh course.   insufficiency.  He will be scheduled for a pharmacological nuclear stress test.

## 2025-06-24 ENCOUNTER — TELEPHONE (OUTPATIENT)
Dept: CARDIOLOGY | Facility: CLINIC | Age: 70
End: 2025-06-24
Payer: MEDICARE

## 2025-06-26 ENCOUNTER — TELEPHONE (OUTPATIENT)
Dept: SURGERY | Facility: CLINIC | Age: 70
End: 2025-06-26
Payer: MEDICARE

## 2025-06-26 NOTE — TELEPHONE ENCOUNTER
Attempted to reach Ava at [phone], Ext [phone] the dietician providing TPN.  Left message for Ava that I spoke with Dr. Vasquez.  The patient has ignored all calls placed by our office and yours about weaning off of TPN.  Dr. Vasquez advised that TPN should be stopped.    The patient should be able to eat adequately as the TPN was given to cause less gi activity and less lymph leak.  I invited Ava to reach me at [phone].  Emily Canales RN

## 2025-07-01 ENCOUNTER — HOSPITAL ENCOUNTER (OUTPATIENT)
Dept: RADIOLOGY | Facility: HOSPITAL | Age: 70
Discharge: HOME | End: 2025-07-01
Payer: MEDICARE

## 2025-07-01 ENCOUNTER — HOSPITAL ENCOUNTER (OUTPATIENT)
Dept: CARDIOLOGY | Facility: HOSPITAL | Age: 70
Discharge: HOME | End: 2025-07-01
Payer: MEDICARE

## 2025-07-01 ENCOUNTER — TELEPHONE (OUTPATIENT)
Dept: SURGERY | Facility: CLINIC | Age: 70
End: 2025-07-01
Payer: MEDICARE

## 2025-07-01 DIAGNOSIS — Z01.810 PREOP CARDIOVASCULAR EXAM: ICD-10-CM

## 2025-07-01 PROCEDURE — 93018 CV STRESS TEST I&R ONLY: CPT | Performed by: INTERNAL MEDICINE

## 2025-07-01 PROCEDURE — 78452 HT MUSCLE IMAGE SPECT MULT: CPT

## 2025-07-01 PROCEDURE — A9502 TC99M TETROFOSMIN: HCPCS | Performed by: INTERNAL MEDICINE

## 2025-07-01 PROCEDURE — 3430000001 HC RX 343 DIAGNOSTIC RADIOPHARMACEUTICALS: Performed by: INTERNAL MEDICINE

## 2025-07-01 PROCEDURE — 2500000004 HC RX 250 GENERAL PHARMACY W/ HCPCS (ALT 636 FOR OP/ED): Performed by: INTERNAL MEDICINE

## 2025-07-01 PROCEDURE — 78452 HT MUSCLE IMAGE SPECT MULT: CPT | Performed by: STUDENT IN AN ORGANIZED HEALTH CARE EDUCATION/TRAINING PROGRAM

## 2025-07-01 PROCEDURE — 93017 CV STRESS TEST TRACING ONLY: CPT

## 2025-07-01 PROCEDURE — 93016 CV STRESS TEST SUPVJ ONLY: CPT | Performed by: INTERNAL MEDICINE

## 2025-07-01 RX ORDER — REGADENOSON 0.08 MG/ML
0.4 INJECTION, SOLUTION INTRAVENOUS
Status: COMPLETED | OUTPATIENT
Start: 2025-07-01 | End: 2025-07-01

## 2025-07-01 RX ADMIN — REGADENOSON 0.4 MG: 0.08 INJECTION, SOLUTION INTRAVENOUS at 13:26

## 2025-07-01 RX ADMIN — TETROFOSMIN 35.5 MILLICURIE: 0.23 INJECTION, POWDER, LYOPHILIZED, FOR SOLUTION INTRAVENOUS at 13:16

## 2025-07-01 RX ADMIN — TETROFOSMIN 11 MILLICURIE: 0.23 INJECTION, POWDER, LYOPHILIZED, FOR SOLUTION INTRAVENOUS at 12:16

## 2025-07-01 NOTE — TELEPHONE ENCOUNTER
Ava phone [phone]   Patient is still on TPN despite Dr. Vasquez's order to stop TPN last week.  Order faxed to [phone] to discontinue TPN and IV access if not using a medport.  Emily Canales RN

## 2025-07-02 ENCOUNTER — PREP FOR PROCEDURE (OUTPATIENT)
Dept: SURGICAL ONCOLOGY | Facility: HOSPITAL | Age: 70
End: 2025-07-02
Payer: MEDICARE

## 2025-07-02 ENCOUNTER — TELEPHONE (OUTPATIENT)
Dept: SURGICAL ONCOLOGY | Facility: HOSPITAL | Age: 70
End: 2025-07-02
Payer: MEDICARE

## 2025-07-02 DIAGNOSIS — C18.9 COLON CARCINOMA METASTATIC TO LIVER: ICD-10-CM

## 2025-07-02 DIAGNOSIS — C18.9 CARCINOMA OF COLON METASTATIC TO LIVER: Primary | ICD-10-CM

## 2025-07-02 DIAGNOSIS — C78.7 CARCINOMA OF COLON METASTATIC TO LIVER: Primary | ICD-10-CM

## 2025-07-02 DIAGNOSIS — C78.7 COLON CARCINOMA METASTATIC TO LIVER: ICD-10-CM

## 2025-07-02 RX ORDER — HEPARIN SODIUM 5000 [USP'U]/ML
5000 INJECTION, SOLUTION INTRAVENOUS; SUBCUTANEOUS ONCE
OUTPATIENT
Start: 2025-07-02 | End: 2025-07-02

## 2025-07-02 NOTE — TELEPHONE ENCOUNTER
Patient called wanting to know if 7/28 was still available for surgery but that date has been filled. He wanted a few more options for surgery and I told him I would follow up with you and 7/29 might be an option as well.

## 2025-07-02 NOTE — H&P (VIEW-ONLY)
Patient notified my office that he would like to proceed with right hepatectomy. He will require repeat imaging, and I will order CT c/a/p to be done 7-10 days prior to planned surgery on 7/29/25. Of note, he had recent clean cardiac stress test

## 2025-07-03 ENCOUNTER — TELEPHONE (OUTPATIENT)
Dept: SURGERY | Facility: CLINIC | Age: 70
End: 2025-07-03
Payer: MEDICARE

## 2025-07-03 NOTE — TELEPHONE ENCOUNTER
Attempted to return call to the patient.  Message left inviting him to reach me at [phone].  Emily Canales RN

## 2025-07-07 ENCOUNTER — TELEPHONE (OUTPATIENT)
Dept: SURGERY | Facility: CLINIC | Age: 70
End: 2025-07-07
Payer: MEDICARE

## 2025-07-08 DIAGNOSIS — C20 ADENOCARCINOMA OF RECTUM METASTATIC TO LIVER (MULTI): Primary | ICD-10-CM

## 2025-07-08 DIAGNOSIS — C78.7 ADENOCARCINOMA OF RECTUM METASTATIC TO LIVER (MULTI): Primary | ICD-10-CM

## 2025-07-11 ENCOUNTER — TELEPHONE (OUTPATIENT)
Dept: HEMATOLOGY/ONCOLOGY | Facility: HOSPITAL | Age: 70
End: 2025-07-11
Payer: MEDICARE

## 2025-07-11 DIAGNOSIS — E87.6 HYPOKALEMIA: ICD-10-CM

## 2025-07-11 DIAGNOSIS — G89.18 POST-OPERATIVE PAIN: ICD-10-CM

## 2025-07-11 DIAGNOSIS — G89.3 CANCER RELATED PAIN: ICD-10-CM

## 2025-07-11 DIAGNOSIS — C78.7 COLON CARCINOMA METASTATIC TO LIVER: Primary | ICD-10-CM

## 2025-07-11 DIAGNOSIS — C18.9 COLON CARCINOMA METASTATIC TO LIVER: Primary | ICD-10-CM

## 2025-07-11 PROCEDURE — RXMED WILLOW AMBULATORY MEDICATION CHARGE

## 2025-07-11 RX ORDER — MORPHINE SULFATE 30 MG/1
30 TABLET, FILM COATED, EXTENDED RELEASE ORAL EVERY 12 HOURS SCHEDULED
Qty: 60 TABLET | Refills: 0 | Status: SHIPPED | OUTPATIENT
Start: 2025-07-11

## 2025-07-11 RX ORDER — POTASSIUM CHLORIDE 20 MEQ/1
20 TABLET, EXTENDED RELEASE ORAL 2 TIMES DAILY
Qty: 180 TABLET | Refills: 3 | Status: SHIPPED | OUTPATIENT
Start: 2025-07-11 | End: 2026-07-11

## 2025-07-11 NOTE — TELEPHONE ENCOUNTER
Refill request received for Morhine 30mg CR.  Preferred pharmacy is [facility].  Message sent to team to refill if appropriate.

## 2025-07-12 ENCOUNTER — PHARMACY VISIT (OUTPATIENT)
Dept: PHARMACY | Facility: CLINIC | Age: 70
End: 2025-07-12
Payer: COMMERCIAL

## 2025-07-16 ENCOUNTER — HOSPITAL ENCOUNTER (OUTPATIENT)
Dept: RADIOLOGY | Facility: HOSPITAL | Age: 70
Discharge: HOME | End: 2025-07-16
Payer: MEDICARE

## 2025-07-16 ENCOUNTER — PRE-ADMISSION TESTING (OUTPATIENT)
Dept: PREADMISSION TESTING | Facility: HOSPITAL | Age: 70
End: 2025-07-16
Payer: MEDICARE

## 2025-07-16 VITALS
HEART RATE: 57 BPM | SYSTOLIC BLOOD PRESSURE: 120 MMHG | TEMPERATURE: 97.9 F | WEIGHT: 166.5 LBS | HEIGHT: 71 IN | BODY MASS INDEX: 23.31 KG/M2 | DIASTOLIC BLOOD PRESSURE: 68 MMHG | OXYGEN SATURATION: 100 %

## 2025-07-16 DIAGNOSIS — Z01.818 PREOPERATIVE TESTING: ICD-10-CM

## 2025-07-16 DIAGNOSIS — C20 ADENOCARCINOMA OF RECTUM METASTATIC TO LIVER (MULTI): ICD-10-CM

## 2025-07-16 DIAGNOSIS — C78.7 ADENOCARCINOMA OF RECTUM METASTATIC TO LIVER (MULTI): ICD-10-CM

## 2025-07-16 DIAGNOSIS — C18.9 COLON CARCINOMA METASTATIC TO LIVER: Primary | ICD-10-CM

## 2025-07-16 DIAGNOSIS — C78.7 COLON CARCINOMA METASTATIC TO LIVER: Primary | ICD-10-CM

## 2025-07-16 LAB
ALBUMIN SERPL BCP-MCNC: 3.1 G/DL (ref 3.4–5)
ALP SERPL-CCNC: 604 U/L (ref 33–136)
ALT SERPL W P-5'-P-CCNC: 27 U/L (ref 10–52)
ANION GAP SERPL CALC-SCNC: 16 MMOL/L (ref 10–20)
APTT PPP: 30 SECONDS (ref 26–36)
AST SERPL W P-5'-P-CCNC: 56 U/L (ref 9–39)
BILIRUB SERPL-MCNC: 1 MG/DL (ref 0–1.2)
BUN SERPL-MCNC: 25 MG/DL (ref 6–23)
CALCIUM SERPL-MCNC: 8.3 MG/DL (ref 8.6–10.6)
CHLORIDE SERPL-SCNC: 101 MMOL/L (ref 98–107)
CO2 SERPL-SCNC: 26 MMOL/L (ref 21–32)
CREAT SERPL-MCNC: 0.53 MG/DL (ref 0.6–1.3)
CREAT SERPL-MCNC: 1.14 MG/DL (ref 0.5–1.3)
EGFRCR SERPLBLD CKD-EPI 2021: 69 ML/MIN/1.73M*2
GFR SERPL CREATININE-BSD FRML MDRD: ABNORMAL ML/MIN/{1.73_M2}
GLUCOSE SERPL-MCNC: 79 MG/DL (ref 74–99)
INR PPP: 1.3 (ref 0.9–1.1)
POTASSIUM SERPL-SCNC: 4.1 MMOL/L (ref 3.5–5.3)
PROT SERPL-MCNC: 7.3 G/DL (ref 6.4–8.2)
PROTHROMBIN TIME: 14.4 SECONDS (ref 9.8–12.4)
SODIUM SERPL-SCNC: 139 MMOL/L (ref 136–145)

## 2025-07-16 PROCEDURE — 82565 ASSAY OF CREATININE: CPT

## 2025-07-16 PROCEDURE — 76377 3D RENDER W/INTRP POSTPROCES: CPT

## 2025-07-16 PROCEDURE — 74160 CT ABDOMEN W/CONTRAST: CPT | Performed by: RADIOLOGY

## 2025-07-16 PROCEDURE — 99204 OFFICE O/P NEW MOD 45 MIN: CPT

## 2025-07-16 PROCEDURE — 71250 CT THORAX DX C-: CPT

## 2025-07-16 PROCEDURE — 85610 PROTHROMBIN TIME: CPT

## 2025-07-16 PROCEDURE — 76377 3D RENDER W/INTRP POSTPROCES: CPT | Performed by: RADIOLOGY

## 2025-07-16 PROCEDURE — 82565 ASSAY OF CREATININE: CPT | Mod: 59

## 2025-07-16 PROCEDURE — 86901 BLOOD TYPING SEROLOGIC RH(D): CPT

## 2025-07-16 PROCEDURE — 36415 COLL VENOUS BLD VENIPUNCTURE: CPT

## 2025-07-16 PROCEDURE — 87081 CULTURE SCREEN ONLY: CPT

## 2025-07-16 PROCEDURE — 71250 CT THORAX DX C-: CPT | Performed by: RADIOLOGY

## 2025-07-16 PROCEDURE — 2550000001 HC RX 255 CONTRASTS: Performed by: SURGERY

## 2025-07-16 PROCEDURE — 74160 CT ABDOMEN W/CONTRAST: CPT

## 2025-07-16 RX ORDER — CHLORHEXIDINE GLUCONATE 40 MG/ML
SOLUTION TOPICAL
Qty: 473 ML | Refills: 0 | Status: SHIPPED | OUTPATIENT
Start: 2025-07-16

## 2025-07-16 RX ORDER — CHLORHEXIDINE GLUCONATE ORAL RINSE 1.2 MG/ML
SOLUTION DENTAL
Qty: 473 ML | Refills: 0 | Status: SHIPPED | OUTPATIENT
Start: 2025-07-16

## 2025-07-16 RX ADMIN — IOHEXOL 75 ML: 350 INJECTION, SOLUTION INTRAVENOUS at 14:48

## 2025-07-16 ASSESSMENT — ENCOUNTER SYMPTOMS
EYES NEGATIVE: 1
CONSTITUTIONAL NEGATIVE: 1
MUSCULOSKELETAL NEGATIVE: 1
ROS GI COMMENTS: LEFT COLOSTOMY
NEUROLOGICAL NEGATIVE: 1
ENDOCRINE NEGATIVE: 1
BLOOD IN STOOL: 1
NECK NEGATIVE: 1
RESPIRATORY NEGATIVE: 1
CARDIOVASCULAR NEGATIVE: 1
BRUISES/BLEEDS EASILY: 1

## 2025-07-16 ASSESSMENT — DUKE ACTIVITY SCORE INDEX (DASI)
CAN YOU WALK A BLOCK OR TWO ON LEVEL GROUND: YES
CAN YOU WALK INDOORS, SUCH AS AROUND YOUR HOUSE: YES
CAN YOU HAVE SEXUAL RELATIONS: YES
CAN YOU DO YARD WORK LIKE RAKING LEAVES, WEEDING OR PUSHING A MOWER: NO
CAN YOU DO LIGHT WORK AROUND THE HOUSE LIKE DUSTING OR WASHING DISHES: YES
CAN YOU RUN A SHORT DISTANCE: YES
CAN YOU DO MODERATE WORK AROUND THE HOUSE LIKE VACUUMING, SWEEPING FLOORS OR CARRYING GROCERIES: YES
CAN YOU CLIMB A FLIGHT OF STAIRS OR WALK UP A HILL: YES
CAN YOU TAKE CARE OF YOURSELF (EAT, DRESS, BATHE, OR USE TOILET): YES
DASI METS SCORE: 8.4
TOTAL_SCORE: 46.2
CAN YOU PARTICIPATE IN STRENOUS SPORTS LIKE SWIMMING, SINGLES TENNIS, FOOTBALL, BASKETBALL, OR SKIING: NO
CAN YOU PARTICIPATE IN MODERATE RECREATIONAL ACTIVITIES LIKE GOLF, BOWLING, DANCING, DOUBLES TENNIS OR THROWING A BASEBALL OR FOOTBALL: YES
CAN YOU DO HEAVY WORK AROUND THE HOUSE LIKE SCRUBBING FLOORS OR LIFTING AND MOVING HEAVY FURNITURE: YES

## 2025-07-16 ASSESSMENT — LIFESTYLE VARIABLES: SMOKING_STATUS: NONSMOKER

## 2025-07-16 NOTE — H&P (VIEW-ONLY)
CPM/PAT Evaluation       Name: Roma Corral (Roma Corral)  /Age: 1955/70 y.o.     Visit Type:   In-Person       Chief Complaint: Perioperative visit    HPI 71 y/o male scheduled for Open Right Hepatectomy (CUSA, Ligasure Impact, Aquamantys, Argon) & Liver Ultrasound on 25 secondary to Colon carcinoma metastatic to liver with Dr. Baron Erazo who referred to CPM.  Presents to CPM today for perioperative risk stratification and optimization. PMHX includes NSTEMI, CAD, anemia, gout, abdominal pain, HTN, rectal CA and DM2.    PCP: Dr. Buckner      Medical History[1]    Surgical History[2]    Patient Sexual activity questions deferred to the physician.    Family History[3]    Allergies[4]    Prior to Admission medications   Medication Sig Start Date End Date Taking? Authorizing Provider   acetaminophen (Tylenol) 325 mg tablet Take 2 tablets (650 mg) by mouth every 6 hours if needed for mild pain (1 - 3). 3/31/25   KEV Ramirez   alteplase (Cathflo Activase) 2 mg injection 2 mL (2 mg) by intra-catheter route if needed (Use as needed for occluded PICC Line). 3/31/25   KEV Ramirez   aspirin 81 mg chewable tablet Chew 1 tablet (81 mg) once daily.    Historical Provider, MD   atorvastatin (Lipitor) 40 mg tablet Take 1 tablet (40 mg) by mouth once daily at bedtime. 24  Sharif Lau MD   Continue current TPN formula See AVS for most recent TPN formula. 4/10/25   KEV Felder   Continue current TPN formula TPN   68 mL for 1 hr, increase to 136 mL/hr x10 hrs, and then 68 mL/hr x 1 hr. VS for most recent TPN formula. 4/10/25   KEV Felder   furosemide (Lasix) 40 mg tablet Take 1 tablet (40 mg) by mouth once daily. 25  Santiago Buckner MD   methylPREDNISolone (Medrol Dospak) 4 mg tablets Take as directed 25      metoprolol succinate XL (Toprol-XL) 100 mg 24 hr tablet Take 1 tablet (100 mg) by mouth once daily. 24   Sharif Lau MD   midodrine (Proamatine) 2.5 mg tablet Take 1 tablet (2.5 mg) by mouth 3 times daily (morning, midday, late afternoon). 3/31/25   KEV Ramirez   morphine CR (MS Contin) 30 mg 12 hr tablet Take 1 tablet (30 mg) by mouth every 12 hours. Do not crush, chew, or split. 7/11/25   Dann Nieto MD   octreotide (SandoSTATIN) 500 mcg/mL injection Inject 0.4 mL (200 mcg) under the skin 3 times a day. 3/31/25   KEV Ramirez   penicillin v potassium (Veetid) 500 mg tablet Take 1 tablet (500 mg) by mouth 4 times a day until gone.  Patient not taking: Reported on 6/19/2025 6/7/25      polyethylene glycol (Glycolax, Miralax) 17 gram packet Take 17 g by mouth every other day. Skip the dose for the day if watery/loose colostomy output Do not fill before April 3, 2025. 4/3/25   KEV Ramirez   potassium chloride CR (Klor-Con M20) 20 mEq ER tablet Take 1 tablet (20 mEq) by mouth 2 times a day. Do not crush or chew. 7/11/25 7/11/26  Sharif Lau MD   prochlorperazine (Compazine) 10 mg tablet Take 1 tablet (10 mg) by mouth every 6 hours if needed for nausea or vomiting. 7/11/24   Dann Nieto MD   tamsulosin (Flomax) 0.4 mg 24 hr capsule Take 1 capsule (0.4 mg) by mouth once daily. 3/31/25   KEV Ramirez   morphine CR (MS Contin) 30 mg 12 hr tablet Take 1 tablet (30 mg) by mouth every 12 hours. Do not crush, chew, or split. 6/2/25 7/11/25  Dann Nieto MD        PAT ROS:   Constitutional:   neg    Neuro/Psych:   neg    Eyes:   neg    Ears:   neg    Nose:   neg    Mouth:   neg    Throat:   neg    Neck:   neg    Cardio:   neg    Respiratory:   neg    Endocrine:   neg    GI:    Left colostomy    blood in stool (notices some blood every now and then, Dr. Teetor aware states thats normal)  :   neg    Musculoskeletal:   neg    Hematologic:    bruises/bleeds easily   history of blood transfusion  Skin:  neg        Physical Exam  Vitals reviewed.  "  Constitutional:       Appearance: Normal appearance. He is normal weight.   HENT:      Head: Normocephalic.      Nose: Nose normal.      Mouth/Throat:      Pharynx: Oropharynx is clear.     Eyes:      Pupils: Pupils are equal, round, and reactive to light.       Cardiovascular:      Rate and Rhythm: Normal rate.      Pulses: Normal pulses.      Heart sounds: Normal heart sounds.   Pulmonary:      Effort: Pulmonary effort is normal.      Breath sounds: Normal breath sounds.   Genitourinary:     Comments: Left abdomen colostomy     Musculoskeletal:         General: Normal range of motion.      Cervical back: Normal range of motion.      Right lower leg: Edema (trace) present.      Left lower leg: Edema (trace) present.     Skin:     General: Skin is warm and dry.      Coloration: Skin is pale.     Neurological:      General: No focal deficit present.      Mental Status: He is alert and oriented to person, place, and time.     Psychiatric:         Mood and Affect: Mood normal.         Behavior: Behavior normal.         Thought Content: Thought content normal.         Judgment: Judgment normal.          PAT AIRWAY:   Airway:     Mallampati::  III    TM distance::  >3 FB    Neck ROM::  Full  normal        Visit Vitals  /68   Pulse 57   Temp 36.6 °C (97.9 °F)   Ht 1.803 m (5' 11\")   Wt 75.5 kg (166 lb 8 oz)   SpO2 100%   BMI 23.22 kg/m²   Smoking Status Never   BSA 1.94 m²       DASI Risk Score      Flowsheet Row Pre-Admission Testing from 7/16/2025 in [facility] Pre-Admission Testing from 3/12/2025 in [facility]   Can you take care of yourself (eat, dress, bathe, or use toilet)?  2.75 filed at 07/16/2025 1515 2.75 filed at 03/12/2025 1506   Can you walk indoors, such as around your house? 1.75 filed at 07/16/2025 1515 1.75 filed at 03/12/2025 1506   Can you walk a block or two on level ground?  2.75 filed at 07/16/2025 1515 2.75 filed at 03/12/2025 1506   Can you climb a flight " of stairs or walk up a hill? 5.5 filed at 07/16/2025 1515 5.5 filed at 03/12/2025 1506   Can you run a short distance? 8 filed at 07/16/2025 1515 0 filed at 03/12/2025 1506   Can you do light work around the house like dusting or washing dishes? 2.7 filed at 07/16/2025 1515 2.7 filed at 03/12/2025 1506   Can you do moderate work around the house like vacuuming, sweeping floors or carrying groceries? 3.5 filed at 07/16/2025 1515 0 filed at 03/12/2025 1506   Can you do heavy work around the house like scrubbing floors or lifting and moving heavy furniture?  8 filed at 07/16/2025 1515 0 filed at 03/12/2025 1506   Can you do yard work like raking leaves, weeding or pushing a mower? 0 filed at 07/16/2025 1515 0 filed at 03/12/2025 1506   Can you have sexual relations? 5.25 filed at 07/16/2025 1515 0 filed at 03/12/2025 1506   Can you participate in moderate recreational activities like golf, bowling, dancing, doubles tennis or throwing a baseball or football? 6 filed at 07/16/2025 1515 0 filed at 03/12/2025 1506   Can you participate in strenous sports like swimming, singles tennis, football, basketball, or skiing? 0 filed at 07/16/2025 1515 0 filed at 03/12/2025 1506   DASI SCORE 46.2 filed at 07/16/2025 1515 15.45 filed at 03/12/2025 1506   METS Score (Will be calculated only when all the questions are answered) 8.4 filed at 07/16/2025 1515 4.6 filed at 03/12/2025 1506     Capjusticei DVT Assessment      Flowsheet Row Pre-Admission Testing from 7/16/2025 in [facility] Admission (Discharged) from 4/8/2025 in [facility] with Roxanne Buckner MD   DVT Score (IF A SCORE IS NOT CALCULATING, MUST SELECT A BMI TO COMPLETE) 11 filed at 07/16/2025 1544 4 filed at 04/08/2025 2257   Medical Factors Present cancer, chemotherapy, or previous malignancy, Swollen legs filed at 07/16/2025 1544 Medical patient at bedrest filed at 04/08/2025 2259   Surgical Factors Major surgery planned, lasting over 3  hours filed at 07/16/2025 1544 --   BMI (BMI MUST BE CHOSEN) 30 or less filed at 07/16/2025 1544 30 or less filed at 04/08/2025 2257     Modified Frailty Index      Flowsheet Row Pre-Admission Testing from 7/16/2025 in [facility] Pre-Admission Testing from 3/12/2025 in [facility]   Non-independent functional status (problems with dressing, bathing, personal grooming, or cooking) 0 filed at 07/16/2025 1546 0 filed at 03/12/2025 1607   History of diabetes mellitus  0 filed at 07/16/2025 1546 0 filed at 03/12/2025 1607   History of COPD 0 filed at 07/16/2025 1546 0 filed at 03/12/2025 1607   History of CHF No filed at 07/16/2025 1546 No filed at 03/12/2025 1607   History of MI 0.0909 filed at 07/16/2025 1546 0.0909 filed at 03/12/2025 1607   History of Percutaneous Coronary Intervention, Cardiac Surgery, or Angina No filed at 07/16/2025 1546 No filed at 03/12/2025 1607   Hypertension requiring the use of medication  0.0909 filed at 07/16/2025 1546 0.0909 filed at 03/12/2025 1607   Peripheral vascular disease 0 filed at 07/16/2025 1546 0 filed at 03/12/2025 1607   Impaired sensorium (cognitive impairement or loss, clouding, or delirium) 0 filed at 07/16/2025 1546 0 filed at 03/12/2025 1607   TIA or CVA withouy residual deficit 0 filed at 07/16/2025 1546 0 filed at 03/12/2025 1607   Cerebrovascular accident with deficit 0 filed at 07/16/2025 1546 0 filed at 03/12/2025 1607   Modified Frailty Index Calculator .1818 filed at 07/16/2025 1546 .1818 filed at 03/12/2025 1607     [id number] Stroke Risk Points  Current as of just now        N/A 0 to 9 Points:      Last Change: N/A          The [id number] risk score (Lip GH, et al. 2009. © 2010 [employer]) quantifies the risk of stroke for a patient with atrial fibrillation. For patients without atrial fibrillation or under the age of 18 this score appears as N/A. Higher score values generally indicate higher risk of  stroke.        This score is not applicable to this patient. Components are not calculated.          Revised Cardiac Risk Index      Flowsheet Row Pre-Admission Testing from 7/16/2025 in [facility] Pre-Admission Testing from 3/12/2025 in [facility]   High-Risk Surgery (Intraperitoneal, Intrathoracic,Suprainguinal vascular) 0 filed at 07/16/2025 1546 1 filed at 03/12/2025 1605   History of ischemic heart disease (History of MI, History of positive exercuse test, Current chest paint considered due to myocardial ischemia, Use of nitrate therapy, ECG with pathological Q Waves) 1 filed at 07/16/2025 1546 1 filed at 03/12/2025 1605   History of congestive heart failure (pulmonary edemia, bilateral rales or S3 gallop, Paroxysmal nocturnal dyspnea, CXR showing pulmonary vascular redistribution) 0 filed at 07/16/2025 1546 0 filed at 03/12/2025 1605   History of cerebrovascular disease (Prior TIA or stroke) 0 filed at 07/16/2025 1546 0 filed at 03/12/2025 1605   Pre-operative insulin treatment 0 filed at 07/16/2025 1546 0 filed at 03/12/2025 1605   Pre-operative creatinine>2 mg/dl 0 filed at 07/16/2025 1546 0 filed at 03/12/2025 1605   Revised Cardiac Risk Calculator 1 filed at 07/16/2025 1546 2 filed at 03/12/2025 1605     Apfel Simplified Score      Flowsheet Row Pre-Admission Testing from 7/16/2025 in [facility] Pre-Admission Testing from 3/12/2025 in [facility]   Smoking status 1 filed at 07/16/2025 1546 1 filed at 03/12/2025 1607   History of motion sickness or PONV  0 filed at 07/16/2025 1546 0 filed at 03/12/2025 1607   Use of postoperative opioids 1 filed at 07/16/2025 1546 1 filed at 03/12/2025 1607   Gender - Female 0=No filed at 07/16/2025 1546 0=No filed at 03/12/2025 1607   Apfel Simplified Score Calculator 2 filed at 07/16/2025 1546 2 filed at 03/12/2025 1607     Risk Analysis Index Results This Encounter    No data found in the last 10  encounters.       Stop Bang Score      Flowsheet Row Pre-Admission Testing from 7/16/2025 in [facility] Pre-Admission Testing from 3/12/2025 in [facility]   Do you snore loudly? 0 filed at 07/16/2025 1516 0 filed at 03/12/2025 1506   Do you often feel tired or fatigued after your sleep? 1 filed at 07/16/2025 1516 1 filed at 03/12/2025 1506   Has anyone ever observed you stop breathing in your sleep? 0 filed at 07/16/2025 1516 0 filed at 03/12/2025 1506   Do you have or are you being treated for high blood pressure? 1 filed at 07/16/2025 1516 1 filed at 03/12/2025 1506   Recent BMI (Calculated) 24.6 filed at 07/16/2025 1516 22.2 filed at 03/12/2025 1506   Is BMI greater than 35 kg/m2? 0=No filed at 07/16/2025 1516 0=No filed at 03/12/2025 1506   Age older than 50 years old? 1=Yes filed at 07/16/2025 1516 1=Yes filed at 03/12/2025 1506   Is your neck circumference greater than 17 inches (Male) or 16 inches (Female)? 0 filed at 07/16/2025 1516 0 filed at 03/12/2025 1506   Gender - Male 1=Yes filed at 07/16/2025 1516 1=Yes filed at 03/12/2025 1506   STOP-BANG Total Score 4 filed at 07/16/2025 1516 4 filed at 03/12/2025 1506     Prodigy: High Risk  Total Score: 23              Prodigy Age Score      Prodigy Gender Score     Prodigy Previous Opioid Use Score           ARISCAT Score for Postoperative Pulmonary Complications      Flowsheet Row Pre-Admission Testing from 7/16/2025 in [facility] Pre-Admission Testing from 3/12/2025 in [facility]   Age Calculated Score 3 filed at 07/16/2025 1547 3 filed at 03/12/2025 1606   Preoperative SpO2 0 filed at 07/16/2025 1547 0 filed at 03/12/2025 1606   Respiratory infection in the last month Either upper or lower (i.e., URI, bronchitis, pneumonia), with fever and antibiotic treatment 0 filed at 07/16/2025 1547 0 filed at 03/12/2025 1606   Preoperative anemia (Hgb less than 10 g/dl) 11 filed at 07/16/2025 1547 0  "filed at 03/12/2025 1606   Surgical incision  15 filed at 07/16/2025 1547 15 filed at 03/12/2025 1606   Duration of surgery  23 filed at 07/16/2025 1547 23 filed at 03/12/2025 1606   Emergency Procedure  0 filed at 07/16/2025 1547 0 filed at 03/12/2025 1606   ARISCAT Total Score  52 filed at 07/16/2025 1547 41 filed at 03/12/2025 1606     Leona Perioperative Risk for Myocardial Infarction or Cardiac Arrest (FITO)    No data to display      Assessment and Plan:     Anesthesia  The patient denies problems with anesthesia in the past such as PONV, prolonged sedation, awareness, dental damage, aspiration, cardiac arrest, difficult intubation, or unexpected hospital admissions.     Airway  No documented or reported history of airway difficulty.     Neurology  The patient has no neurological diagnoses or significant findings on chart review, clinical presentation, and evaluation. No grossly apparent neurological perioperative risk. The patient is at increased risk for postoperative delirium secondary to age 65 or older. The patient is at increased risk for perioperative stroke secondary to cardiac disease, hypertension , increased age, hyperlipidemia, general anesthesia, operative time >2.5 hours.    HEENT  No diagnoses or significant findings on chart review or clinical presentation and evaluation.    Cardiovascular  #CAD (NSTEMI 2021) on ASA, HLD, HTN  - Medicated on atorvastatin (continue) and metoprolol (continue)     #Bilateral leg swelling  - Medicated on furosemide (hold day of surgery)    Cardiology Evaluation  The patient follows with cardiology, Dr. Sharif Lau. Patient was last seen 6/19/25.     - I reached out to Dr. Lau who sent over cardiology preoperative evaluation stating, \"patient is at low risk for upcoming procedure\" I will upload clearance into the media tab.     BP: 120/68  EKG 4/3/25:  Normal sinus rhythm  Left axis deviation  Pulmonary disease pattern  Abnormal ECG    Nuclear stress test " 7/1/25:  1. No evidence of inducible myocardial ischemia. Predominantly fixed   medium-sized moderate to severe perfusion defects involving apex,   apical to mid anterior, apical inferior, as well as apical lateral   wall, similar to prior exam, likely representing prior infarction.   2. Moderate dilatation of left ventricle.   3. Normal LV wall motion with a post-stress LV EF estimated at 64%.     Echo 5/13/25:   1. The left ventricular systolic function is mildly decreased, with a visually estimated ejection fraction of 45-50%.   2. There is global hypokinesis of the left ventricle with minor regional variations.   3. Mild to moderate mitral valve regurgitation.   4. Mild tricuspid regurgitation is visualized.   5. Slightly elevated right ventricular systolic pressure.   6. Mild aortic valve regurgitation.    METS  The patient's functional capacity is greater than 4 METS.  RCRI  The patient meets 1 RCRI criteria and therefore has a 6% risk of major adverse cardiac complications.  FITO score which indicates a 1.2% risk of intraoperative or 30-day postoperative MACE (major adverse cardiac event).    Patient is scheduled for non-cardiac surgery associated with elevated risk. The patient has no major cardiac contraindications to non- cardiac surgery.    Pulmonary  No significant findings on chart review or clinical presentation and evaluation. The patient is at increased risk of perioperative pulmonary complications secondary to upper abdominal surgery, advanced age greater than 60. ERAS patient with incentive spirometry given preop. Patient instruction sheet for incentive spirometry given.      STOP BANG 4, which places patient at intermediate risk for having BOB.  ARISCAT 52, High, 42.1% risk of in-hospital postoperative pulmonary complications  PRODIGY 20, high risk of respiratory depression episode.     Patient given PI sheet for preoperative deep breathing exercises.  Encourage  incentive spirometry in the  postoperative period as deemed necessary.    Endocrine  #H/o DM2 (patient denies), not currently requiring the use of medications - last A1C 5.3 on 1/18/25    Gastrointestinal  #Rectal cancer s/p loop colostomy 07/2024 & FAWN c/b rectal stricture who underwent a robotic low anterior resection with transition from loop to end colostomy & 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 Vasquez, and laparoscopic microwave ablation x3 with liver biopsy by Dr. Erazo on 3/18/25   - Follows with colon and rectal surgery, Dr. Vasquez    Eat 10- 0,  self-perceived oropharyngeal dysphagia scale (0-40)     Genitourinary  No diagnoses or significant findings on chart review or clinical presentation and evaluation.    Renal  No renal diagnoses or significant findings on chart review or clinical presentation and evaluation. The patient has specific risk factors associated with increased risk of perioperative renal complications related to age greater than 55, male gender, hypertension.    Hematology  #Anemia - blood transfusion in 01/2025     #Rectal CA metastatic to liver   - S/p laparoscopic microwave ablation of left-sided liver tumors as well as a segment 4B/5 liver tumor in addition to ileocecal resection with anastomosis and low anterior resection with end colostomy on 3/17/2025   - Follows with surgical oncology, remains on TPN  - Patient last saw Dr. Erazo on 5/28/25, who is planning second stage surgery open right hepatectomy on 7/29    Caprini score 11, high risk of perioperative VTE.     Patient instructed to ambulate as soon as possible postoperatively to decrease thromboembolic risk. Initiate mechanical DVT prophylaxis as soon as possible and initiate chemical prophylaxis when deemed safe from a bleeding standpoint post surgery.     Transfusion Evaluation  A type and screen was obtained given the likelihood for perioperative transfusion of blood or blood products.    Musculoskeletal  No diagnoses or  significant findings on chart review or clinical presentation and evaluation.    ID  No diagnoses or significant findings on chart review or clinical presentation and evaluation. MRSA screening obtained. Prescriptions and instructions given for Hibiclens and Peridex.    Diagnostic Results      Recent Results (from the past 4 weeks)   POCT CREATININE AND GFR    Collection Time: 07/16/25  2:16 PM   Result Value Ref Range    POCT Creatinine 0.53 (L) 0.60 - 1.30 mg/dL    POCT eGFR     Type And Screen Is this order related to pregnancy or an upcoming surgery? Yes; Where will this surgery/delivery be performed? [facility]; What is the date of the surgery? 7/29/2025; Has this patient ever had a transfusion? Yes; Date...    Collection Time: 07/16/25  3:44 PM   Result Value Ref Range    ABO TYPE AB     Rh TYPE NEG     ANTIBODY SCREEN NEG    Comprehensive Metabolic Panel    Collection Time: 07/16/25  3:44 PM   Result Value Ref Range    Glucose 79 74 - 99 mg/dL    Sodium 139 136 - 145 mmol/L    Potassium 4.1 3.5 - 5.3 mmol/L    Chloride 101 98 - 107 mmol/L    Bicarbonate 26 21 - 32 mmol/L    Anion Gap 16 10 - 20 mmol/L    Urea Nitrogen 25 (H) 6 - 23 mg/dL    Creatinine 1.14 0.50 - 1.30 mg/dL    eGFR 69 >60 mL/min/1.73m*2    Calcium 8.3 (L) 8.6 - 10.6 mg/dL    Albumin 3.1 (L) 3.4 - 5.0 g/dL    Alkaline Phosphatase 604 (H) 33 - 136 U/L    Total Protein 7.3 6.4 - 8.2 g/dL    AST 56 (H) 9 - 39 U/L    Bilirubin, Total 1.0 0.0 - 1.2 mg/dL    ALT 27 10 - 52 U/L   Staphylococcus aureus/MRSA colonization, Culture    Collection Time: 07/16/25  3:44 PM    Specimen: Nares/Axilla/Groin; Swab   Result Value Ref Range    Staph/MRSA Screen Culture No Staphylococcus aureus isolated    Coagulation Screen    Collection Time: 07/16/25  3:44 PM   Result Value Ref Range    Protime 14.4 (H) 9.8 - 12.4 seconds    INR 1.3 (H) 0.9 - 1.1    aPTT 30 26 - 36 seconds      - Labs collected today, CBC, CMP, Coag, T/s and MRSA  - Labs and  diagnostic testing reviewed on 7/16/25    -Preoperative medication instructions were provided and reviewed with the patient.  Any additional testing or evaluation was explained to the patient.  NPO Instructions were discussed, and the patient's questions were answered prior to conclusion of this encounter. Patient verbalized understanding of preoperative instructions. After Visit Summary given.               [1]   Past Medical History:  Diagnosis Date    Abdominal pain     Acute non-ST elevation myocardial infarction (NSTEMI) (Multi)     Anemia     6/6/24 HGB 8.6; HCT 31.5    CAD (coronary artery disease)     Cardiology follow-up encounter 12/11/2023    Sharif Lau MD    Chronic pain disorder     Colorectal cancer (Multi)     Gout     H/O cardiac catheterization 06/01/2021    H/O cardiovascular stress test 09/03/2021    IMPRESSION: 1. No evidence of ischemia. 2. Suspicion of an infarct along the mid anterior wall. 3. Left ventricular dilatation is noted. 4. Left ventricular EF was calculated to be 50%. Summary:  1. No clinical or electrocardiographic evidence for ischemia at a submaximal workload. 3. The blunted heart rate diminshes the sensitivity of this test.  4. The submaximal level of stress was achieved.    H/O echocardiogram 06/02/2021    Mild concentric Left ventricle hypertrophy.  Global left ventricular wall motion and contractility are within normal limits.  There is normal left ventricular systolic function.  Estimated ejection fraction is 60-64%.  The left atrial size is mildly dilated.  Mild aortic regurgitation.  A trace of mitral regurgitation.  Trivial to mild tricuspid regurgitation.  There is no pulmonary hypertension.    High cholesterol     Hypertension     Overweight     Rectal cancer (Multi)     Type 2 diabetes mellitus     4/18/2024 A1C 7.5%   [2]   Past Surgical History:  Procedure Laterality Date    COLONOSCOPY  06/17/2024    HEMICOLECTOMY W/ OSTOMY      LEG SURGERY Left     rodrigo placed    [3]   Family History  Problem Relation Name Age of Onset    No Known Problems Mother      No Known Problems Father      No Known Problems Sister      Leukemia Brother      Stroke Maternal Grandfather     [4] No Known Allergies

## 2025-07-16 NOTE — PREPROCEDURE INSTRUCTIONS
Thank you for visiting [facility] ([facility]) today for your pre-procedure evaluation, you were seen by     KEV Aguilar  Department of Anesthesiology and Perioperative Medicine  Main phone [phone]  Fax [phone]    This summary includes instructions and information to aid you during your perioperative period.  Please read carefully. If you have any questions about your visit today, please call the number listed above.  If you become ill or have any changes to your health before your surgery, please contact your primary care provider and alert your surgeon.    General Medications Instructions (see back for further medication instructions)  Hold Vit D supplementation day of surgery  Hold all other vitamins and supplements 7 days prior to surgery  Tylenol okay to continue, please hold Aleve/naproxen/ibuprofen (NSAIDs) for 7 days prior to surgery  No lotion/moisturizers or Deoderant after last shower prior to surgery    You will be called business day prior to surgery to confirm arrival time.     Preparing for Surgery       Preoperative Fasting Guidelines  Why must I stop eating and drinking near surgery time?  With sedation, food or liquid in your stomach can enter your lungs causing serious complications  Food can increase nausea and vomiting  When do I need to stop eating and drinking before my surgery?  Do not eat any food after midnight the night before your surgery/procedure.  You may have up to 13.5 ounces of clear liquid until TWO hours before your instructed arrival time to the hospital.  This includes water, black tea/coffee, (no milk or cream) apple juice, and electrolyte drinks (Gatorade)  You may chew gum until TWO hours before your surgery/procedure       Tobacco and Alcohol;  Do not drink alcohol or smoke within 24 hours of surgery.  It is best to quit smoking for as long as possible before any surgery or procedure.       Other Instructions  Why did I have my  "nose, under my arms, and groin swabbed? The purpose of the swab is to identify Staphylococcus aureus inside your nose or on your skin.  The swab was sent to the laboratory for culture.  A positive swab/culture for Staphylococcus aureus is called colonization or carriage.   What is Staphylococcus aureus? Staphylococcus aureus, also known as \"staph\", is a germ found on the skin or in the nose of healthy people.  Sometimes Staphylococcus aureus can get into the body and cause an infection.  This can be minor (such as pimples, boils, or other skin problems).  It might also be serious (such as a blood infection, pneumonia, or a surgical site infection). What is Staphylococcus aureus colonization or carriage? Colonization or carriage means that a person has the germ but is not sick from it.  These bacteria can be spread on the hands or when breathing or sneezing. How is Staphylococcus aureus spread? It is most often spread by close contact with a person or item that carries it. What happens if my culture is positive for Staphylococcus aureus? Your doctor/medical team will use this information to guide any antibiotic treatment which may be necessary.  Regardless of the culture results, we will clean the inside of your nose with a betadine swab just before you have your surgery. Will I get an infection if I have Staphylococcus aureus in my nose or on my skin? Anyone can get an infection with Staphylococcus aureus.  However, the best way to reduce your risk of infection is to follow the instructions provided to you for the use of your CHG soap and dental rinse.  , Body Wash; What is a home preoperative antibacterial shower? This shower is a way of cleaning the skin with a germ-killing solution before surgery.  The solution contains chlorhexidine, commonly known as CHG.  CHG is a skin cleanser with germ-killing ability.  Let your doctor know if you are allergic to chlorhexidine. Why do I need to take a preoperative " antibacterial shower? Skin is not sterile.  It is best to try to make your skin as free of germs as possible before surgery.  Proper cleansing with a germ-killing soap before surgery can lower the number of germs on your skin.  This helps to reduce the risk of infection at the surgical site.  Following the instructions listed below will help you prepare your skin for surgery.   How do I use the solution? Steps:  Begin using your CHG soap 5 days before your scheduled surgery on ___________.   First, wash and rinse your hair using the CHG soap. Keep CHG soap away from ear canals and eyes.  Rinse completely, do not condition.  Hair extensions should be removed. , Oral/Dental Rinse: What is oral/dental rinse?  It is a mouthwash. It is a way of cleaning the mouth with a germ-killing solution before your surgery.  The solution contains chlorhexidine, commonly known as CHG. It is used inside the mouth to kill a bacteria known as Staphylococcus aureus.  Let your doctor know if you are allergic to Chlorhexidine. Why do I need to use CHG oral/dental rinse? The CHG oral/dental rinse helps to kill a bacteria in your mouth known as Staphylococcus aureus.  This reduces the risk of infection at the surgical site.  Using your CHG oral/dental rinse STEPS: Use your CHG oral/dental rinse after you brush your teeth the night before (at bedtime) and the morning of your surgery.  Follow all directions on your prescription label.  Use the cap on the container to measure 15 ml.  Swish (gargle if you can) the mouthwash in your mouth for at least 30 seconds, (do not swallow) and spit out.  After you use your CHG rinse, do not rinse your mouth with water, drink or eat.  Please refer to the prescription label for the appropriate time to resume oral intake What side effects might I have using the CHG oral/dental rinse? CHG rinse will stick to plaque on the teeth.  Brush and floss just before use.  Teeth brushing will help avoid staining of  plaque during use.     Ensure Surgery Immuno-Nutrition Shake; This shake provides specialized nutrients to help prepare your body for surgery. Your surgeon's office may send it to your home, or you may receive it from [facility] if you are scheduled for an appointment.  If your surgeon has instructed you to begin the shakes and you have not received them at least 7 days before your scheduled surgery, please contact your surgeon's office. You should begin drinking your shakes 6 days before your surgery date, start on _______.  You should drink 1 carton three times a day, you can have one carton with breakfast, lunch, and dinner.  If your surgeon has given you instructions to drink clear liquids the day before surgery, you can continue to drink your Ensure Surgery immune-nutrition shakes.     The Week before Surgery        Seven days before Surgery  Check your [facility] medication instructions  Do the exercises provided to you by [facility]   Arrange for a responsible, adult licensed  to take you home after surgery and stay with you for 24 hours.  You will not be permitted to drive yourself home if you have received any anesthetic/sedation  Five days before surgery  Check your [facility] medication instructions  Do the exercises provided to you by [facility]   Start using Chlorhexidene (CHG) body wash if prescribed (Continue till day of surgery)      The Day before Surgery       Check your [facility] medication and all other [facility] instructions including when to stop eating and drinking  You will be called with your arrival time for surgery in the late afternoon.  If you do not receive a call please reach out to your surgeon's office.  Do not smoke or drink 24 hours before surgery  Prepare items to bring with you to the hospital  Shower with your chlorhexidine wash if prescribed  Brush your teeth and use your chlorhexidine dental rinse if prescribed    The Day of Surgery       Check your [facility] medication  instructions  Shower, if prescribed use CHG.  Do not apply any lotions, creams, moisturizers, perfume or deodorant  Brush your teeth and use your CHG dental rinse if prescribed  Wear loose comfortable clothing  Avoid make-up  Remove  jewelry and piercings, consider professional piercing removal with a plastic spacer if needed  Bring photo ID and Insurance card  Bring an accurate medication list that includes medication dose, frequency and allergies  Bring a copy of your advanced directives (will, health care power of )  Bring any devices and controllers as well as medical devices you have been provided with for surgery (CPAP, slings, braces, etc.)  Dentures, eyeglasses, and contacts will be removed before surgery, please bring cases for contacts or glasses    Preoperative Deep Breathing Exercises    Why it is important to do deep breathing exercises before my surgery?  Deep breathing exercises strengthen your breathing muscles.  This helps you to recover after your surgery and decreases the chance of breathing complications.    How are the deep breathing exercises done?  Sit straight with your back supported.  Breathe in deeply and slowly through your nose. Your lower rib cage should expand and your abdomen may move forward.  Hold that breath for 3 to 5 seconds.  Breathe out through pursed lips, slowly and completely.  Rest and repeat 10 times every hour while awake.  Rest longer if you become dizzy or lightheaded.    Incentive Spirometry Incentive Spirometer   You were provided with an incentive spirometer in [facility], please follow the below instructions.  What is an incentive spirometer?  An incentive spirometer is a device used before and after surgery to “exercise” your lungs.  It helps you to take deeper breaths to expand your lungs.  Below is an example of a basic incentive spirometer.  The device you receive may differ slightly but they all function the same.    Why do I need to use an incentive  spirometer?  Using your incentive spirometer prepares your lungs for surgery and helps prevent lung problems after surgery.  How do I use my incentive spirometer?  When you're using your incentive spirometer, make sure to breathe through your mouth. If you breathe through your nose, the incentive spirometer won't work properly. You can hold your nose if you have trouble.  If you feel dizzy at any time, stop and rest. Try again at a later time.  Follow the steps below:  Set up your incentive spirometer, expand the flexible tubing and connect to the outlet.  Sit upright in a chair or bed. Hold the incentive spirometer at eye level.   Put the mouthpiece in your mouth and close your lips tightly around it. Slowly breathe out (exhale) completely.  Breathe in (inhale) slowly through your mouth as deeply as you can. As you take a breath, you will see the piston rise inside the large column. While the piston rises, the indicator should move upwards. It should stay in between the 2 arrows (see Figure).  Try to get the piston as high as you can, while keeping the indicator between the arrows.   If the indicator doesn't stay between the arrows, you're breathing either too fast or too slow.  When you get it as high as you can, hold your breath for 10 seconds, or as long as possible. While you're holding your breath, the piston will slowly fall to the base of the spirometer.  Once the piston reaches the bottom of the spirometer, breathe out slowly through your mouth. Rest for a few seconds.  Repeat 10 times. Try to get the piston to the same level with each breath.  Repeat every hour while awake  You can carefully clean the outside of the mouthpiece with an alcohol wipe or soap and water.       Preoperative Brain Exercises    What are brain exercises?  A brain exercise is any activity that engages your thinking (cognitive) skills.    What types of activities are considered brain exercises?  Jigsaw puzzles, crossword puzzles,  word Kyoger, memory games, word search, and many more.  Many can be found free online or on your phone via a mobile jody.    Why should I do brain exercises before my surgery?  More recent research has shown brain exercise before surgery can lower the risk of postoperative delirium (confusion) which can be especially important for older adults.  Patients who did brain exercises for 5 to 10 hours the days before surgery, cut their risk of postoperative delirium in half up to 1 week after surgery.    Sit-to-Stand Exercise    What is the sit-to-stand exercise?  The sit-to-stand exercise strengthens the muscles of your lower body and muscles in the center of your body (core muscles for stability) helping to maintain and improve your strength and mobility.  How do I do the sit-to-stand exercise?  The goal is to do this exercise without using your arms or hands.  If this is too difficult, use your arms and hands or a chair with armrests to help slowly push yourself to the standing position and lower yourself back to the sitting position. As the movement becomes easier use your arms and hands less.    Steps to the sit-to-stand exercise  Sit up tall in a sturdy chair, knees bent, feet flat on the floor shoulder-width apart.  Shift your hips/pelvis forward in the chair to correctly position yourself for the next movement.  Lean forward at your hips.  Stand up straight putting equal weight on both feet.  Check to be sure you are properly aligned with the chair, in a slow controlled movement sit back down.  Repeat this exercise 10-15 times.  If needed you can do it fewer times until your strength improves.  Rest for 1 minute.  Do another 10-15 sit-to-stand exercises.  Try to do this in the morning and evening.       Simple things you can do to help prevent blood clots     Blood clots are blockages that can form in the body's veins. When a blood clot forms in your deep veins, it may be called a deep vein thrombosis, or DVT for  short. Blood clots can happen in any part of the body where blood flows, but they are most common in the arms and legs. If a piece of a blood clot breaks free and travels to the lungs, it is called a pulmonary embolus (PE). A PE can be a very serious problem.      Being in the hospital or having surgery can raise your chances of getting a blood clot because you may not be well enough to move around as much as you normally do.         Ways you can help prevent blood clots in the hospital       Wearing SCDs  SCDs stands for Sequential Compression Devices.   SCDs are special sleeves that wrap around your legs. They attach to a pump that fills them with air to gently squeeze your legs every few minutes.  This helps return the blood in your legs to your heart.   SCDs should only be taken off when walking or bathing. SCDs may not be comfortable, but they can help save your life.              Pump SCD leg sleeves  Wearing compression stockings - if your doctor orders them. These special snug-fitting stockings gently squeeze your legs to help blood flow.       Walking. Walking helps move the blood in your legs.   If your doctor says it is ok, try walking the halls at least   5 times a day. Ask us to help you get up, so you don't fall.      Taking any blood-thinning medicines your doctor orders.              Ways you can help prevent blood clots at home         Wearing compression stockings - if your doctor orders them.   Walking - to help move the blood in your legs.    Taking any blood-thinning medicines your doctor orders.      Signs of a blood clot or PE    Tell your doctor or nurse right away if you have any of the problems listed below.         If you are at home, seek medical care right away. Call 911 for chest pain or problems breathing.            Signs of a blood clot (DVT) - such as pain, swelling, redness, or warmth in your arm or legs.  Signs of a pulmonary embolism (PE) - such as chest pain or feeling short of  breath

## 2025-07-16 NOTE — NURSING NOTE
Patient seen in PAT. Orders reviewed with PAT Provider.     Following labs obtained by documenting RN: Coags, CMP, CBC, T/S, MRSA    EKG: Previously done 4/25    Patient discharged with: Pre-procedure instructions/AVS, Incentive spirometer, and ERAS Kit.        Sowmya MYRICKN, RN  [facility]   [facility]

## 2025-07-16 NOTE — CPM/PAT H&P
CPM/PAT Evaluation       Name: Roma Corral (Roma Corral)  /Age: 1955/70 y.o.     Visit Type:   In-Person       Chief Complaint: Perioperative visit    HPI 71 y/o male scheduled for Open Right Hepatectomy (CUSA, Ligasure Impact, Aquamantys, Argon) & Liver Ultrasound on 25 secondary to Colon carcinoma metastatic to liver with Dr. Baron Erazo who referred to CPM.  Presents to CPM today for perioperative risk stratification and optimization. PMHX includes NSTEMI, CAD, anemia, gout, abdominal pain, HTN, rectal CA and DM2.    PCP: Dr. Buckner      Medical History[1]    Surgical History[2]    Patient Sexual activity questions deferred to the physician.    Family History[3]    Allergies[4]    Prior to Admission medications   Medication Sig Start Date End Date Taking? Authorizing Provider   acetaminophen (Tylenol) 325 mg tablet Take 2 tablets (650 mg) by mouth every 6 hours if needed for mild pain (1 - 3). 3/31/25   KEV Ramirez   alteplase (Cathflo Activase) 2 mg injection 2 mL (2 mg) by intra-catheter route if needed (Use as needed for occluded PICC Line). 3/31/25   KEV Ramirez   aspirin 81 mg chewable tablet Chew 1 tablet (81 mg) once daily.    Historical Provider, MD   atorvastatin (Lipitor) 40 mg tablet Take 1 tablet (40 mg) by mouth once daily at bedtime. 24  Sharif Lau MD   Continue current TPN formula See AVS for most recent TPN formula. 4/10/25   KEV Felder   Continue current TPN formula TPN   68 mL for 1 hr, increase to 136 mL/hr x10 hrs, and then 68 mL/hr x 1 hr. VS for most recent TPN formula. 4/10/25   KEV Felder   furosemide (Lasix) 40 mg tablet Take 1 tablet (40 mg) by mouth once daily. 25  Santiago Buckner MD   methylPREDNISolone (Medrol Dospak) 4 mg tablets Take as directed 25      metoprolol succinate XL (Toprol-XL) 100 mg 24 hr tablet Take 1 tablet (100 mg) by mouth once daily. 24   Sharif Lau MD   midodrine (Proamatine) 2.5 mg tablet Take 1 tablet (2.5 mg) by mouth 3 times daily (morning, midday, late afternoon). 3/31/25   KEV Ramirez   morphine CR (MS Contin) 30 mg 12 hr tablet Take 1 tablet (30 mg) by mouth every 12 hours. Do not crush, chew, or split. 7/11/25   Dann Nieto MD   octreotide (SandoSTATIN) 500 mcg/mL injection Inject 0.4 mL (200 mcg) under the skin 3 times a day. 3/31/25   KEV Ramriez   penicillin v potassium (Veetid) 500 mg tablet Take 1 tablet (500 mg) by mouth 4 times a day until gone.  Patient not taking: Reported on 6/19/2025 6/7/25      polyethylene glycol (Glycolax, Miralax) 17 gram packet Take 17 g by mouth every other day. Skip the dose for the day if watery/loose colostomy output Do not fill before April 3, 2025. 4/3/25   KEV Ramirez   potassium chloride CR (Klor-Con M20) 20 mEq ER tablet Take 1 tablet (20 mEq) by mouth 2 times a day. Do not crush or chew. 7/11/25 7/11/26  Sharif Lau MD   prochlorperazine (Compazine) 10 mg tablet Take 1 tablet (10 mg) by mouth every 6 hours if needed for nausea or vomiting. 7/11/24   Dann Nieto MD   tamsulosin (Flomax) 0.4 mg 24 hr capsule Take 1 capsule (0.4 mg) by mouth once daily. 3/31/25   KEV Ramirez   morphine CR (MS Contin) 30 mg 12 hr tablet Take 1 tablet (30 mg) by mouth every 12 hours. Do not crush, chew, or split. 6/2/25 7/11/25  Dann Nieto MD        PAT ROS:   Constitutional:   neg    Neuro/Psych:   neg    Eyes:   neg    Ears:   neg    Nose:   neg    Mouth:   neg    Throat:   neg    Neck:   neg    Cardio:   neg    Respiratory:   neg    Endocrine:   neg    GI:    Left colostomy    blood in stool (notices some blood every now and then, Dr. Teetor aware states thats normal)  :   neg    Musculoskeletal:   neg    Hematologic:    bruises/bleeds easily   history of blood transfusion  Skin:  neg        Physical Exam  Vitals reviewed.  "  Constitutional:       Appearance: Normal appearance. He is normal weight.   HENT:      Head: Normocephalic.      Nose: Nose normal.      Mouth/Throat:      Pharynx: Oropharynx is clear.     Eyes:      Pupils: Pupils are equal, round, and reactive to light.       Cardiovascular:      Rate and Rhythm: Normal rate.      Pulses: Normal pulses.      Heart sounds: Normal heart sounds.   Pulmonary:      Effort: Pulmonary effort is normal.      Breath sounds: Normal breath sounds.   Genitourinary:     Comments: Left abdomen colostomy     Musculoskeletal:         General: Normal range of motion.      Cervical back: Normal range of motion.      Right lower leg: Edema (trace) present.      Left lower leg: Edema (trace) present.     Skin:     General: Skin is warm and dry.      Coloration: Skin is pale.     Neurological:      General: No focal deficit present.      Mental Status: He is alert and oriented to person, place, and time.     Psychiatric:         Mood and Affect: Mood normal.         Behavior: Behavior normal.         Thought Content: Thought content normal.         Judgment: Judgment normal.          PAT AIRWAY:   Airway:     Mallampati::  III    TM distance::  >3 FB    Neck ROM::  Full  normal          Visit Vitals  /68   Pulse 57   Temp 36.6 °C (97.9 °F)   Ht 1.803 m (5' 11\")   Wt 75.5 kg (166 lb 8 oz)   SpO2 100%   BMI 23.22 kg/m²   Smoking Status Never   BSA 1.94 m²       DASI Risk Score      Flowsheet Row Pre-Admission Testing from 7/16/2025 in [facility] Pre-Admission Testing from 3/12/2025 in [facility]   Can you take care of yourself (eat, dress, bathe, or use toilet)?  2.75 filed at 07/16/2025 1515 2.75 filed at 03/12/2025 1506   Can you walk indoors, such as around your house? 1.75 filed at 07/16/2025 1515 1.75 filed at 03/12/2025 1506   Can you walk a block or two on level ground?  2.75 filed at 07/16/2025 1515 2.75 filed at 03/12/2025 1506   Can you climb a flight " of stairs or walk up a hill? 5.5 filed at 07/16/2025 1515 5.5 filed at 03/12/2025 1506   Can you run a short distance? 8 filed at 07/16/2025 1515 0 filed at 03/12/2025 1506   Can you do light work around the house like dusting or washing dishes? 2.7 filed at 07/16/2025 1515 2.7 filed at 03/12/2025 1506   Can you do moderate work around the house like vacuuming, sweeping floors or carrying groceries? 3.5 filed at 07/16/2025 1515 0 filed at 03/12/2025 1506   Can you do heavy work around the house like scrubbing floors or lifting and moving heavy furniture?  8 filed at 07/16/2025 1515 0 filed at 03/12/2025 1506   Can you do yard work like raking leaves, weeding or pushing a mower? 0 filed at 07/16/2025 1515 0 filed at 03/12/2025 1506   Can you have sexual relations? 5.25 filed at 07/16/2025 1515 0 filed at 03/12/2025 1506   Can you participate in moderate recreational activities like golf, bowling, dancing, doubles tennis or throwing a baseball or football? 6 filed at 07/16/2025 1515 0 filed at 03/12/2025 1506   Can you participate in strenous sports like swimming, singles tennis, football, basketball, or skiing? 0 filed at 07/16/2025 1515 0 filed at 03/12/2025 1506   DASI SCORE 46.2 filed at 07/16/2025 1515 15.45 filed at 03/12/2025 1506   METS Score (Will be calculated only when all the questions are answered) 8.4 filed at 07/16/2025 1515 4.6 filed at 03/12/2025 1506     Capjusticei DVT Assessment      Flowsheet Row Pre-Admission Testing from 7/16/2025 in [facility] Admission (Discharged) from 4/8/2025 in [facility] with Roxanne Buckner MD   DVT Score (IF A SCORE IS NOT CALCULATING, MUST SELECT A BMI TO COMPLETE) 11 filed at 07/16/2025 1544 4 filed at 04/08/2025 2257   Medical Factors Present cancer, chemotherapy, or previous malignancy, Swollen legs filed at 07/16/2025 1544 Medical patient at bedrest filed at 04/08/2025 2256   Surgical Factors Major surgery planned, lasting over 3  hours filed at 07/16/2025 1544 --   BMI (BMI MUST BE CHOSEN) 30 or less filed at 07/16/2025 1544 30 or less filed at 04/08/2025 2257     Modified Frailty Index      Flowsheet Row Pre-Admission Testing from 7/16/2025 in [facility] Pre-Admission Testing from 3/12/2025 in [facility]   Non-independent functional status (problems with dressing, bathing, personal grooming, or cooking) 0 filed at 07/16/2025 1546 0 filed at 03/12/2025 1607   History of diabetes mellitus  0 filed at 07/16/2025 1546 0 filed at 03/12/2025 1607   History of COPD 0 filed at 07/16/2025 1546 0 filed at 03/12/2025 1607   History of CHF No filed at 07/16/2025 1546 No filed at 03/12/2025 1607   History of MI 0.0909 filed at 07/16/2025 1546 0.0909 filed at 03/12/2025 1607   History of Percutaneous Coronary Intervention, Cardiac Surgery, or Angina No filed at 07/16/2025 1546 No filed at 03/12/2025 1607   Hypertension requiring the use of medication  0.0909 filed at 07/16/2025 1546 0.0909 filed at 03/12/2025 1607   Peripheral vascular disease 0 filed at 07/16/2025 1546 0 filed at 03/12/2025 1607   Impaired sensorium (cognitive impairement or loss, clouding, or delirium) 0 filed at 07/16/2025 1546 0 filed at 03/12/2025 1607   TIA or CVA withouy residual deficit 0 filed at 07/16/2025 1546 0 filed at 03/12/2025 1607   Cerebrovascular accident with deficit 0 filed at 07/16/2025 1546 0 filed at 03/12/2025 1607   Modified Frailty Index Calculator .1818 filed at 07/16/2025 1546 .1818 filed at 03/12/2025 1607     [id number] Stroke Risk Points  Current as of just now        N/A 0 to 9 Points:      Last Change: N/A          The [id number] risk score (Lip GH, et al. 2009. © 2010 [employer]) quantifies the risk of stroke for a patient with atrial fibrillation. For patients without atrial fibrillation or under the age of 18 this score appears as N/A. Higher score values generally indicate higher risk of  stroke.        This score is not applicable to this patient. Components are not calculated.          Revised Cardiac Risk Index      Flowsheet Row Pre-Admission Testing from 7/16/2025 in [facility] Pre-Admission Testing from 3/12/2025 in [facility]   High-Risk Surgery (Intraperitoneal, Intrathoracic,Suprainguinal vascular) 0 filed at 07/16/2025 1546 1 filed at 03/12/2025 1605   History of ischemic heart disease (History of MI, History of positive exercuse test, Current chest paint considered due to myocardial ischemia, Use of nitrate therapy, ECG with pathological Q Waves) 1 filed at 07/16/2025 1546 1 filed at 03/12/2025 1605   History of congestive heart failure (pulmonary edemia, bilateral rales or S3 gallop, Paroxysmal nocturnal dyspnea, CXR showing pulmonary vascular redistribution) 0 filed at 07/16/2025 1546 0 filed at 03/12/2025 1605   History of cerebrovascular disease (Prior TIA or stroke) 0 filed at 07/16/2025 1546 0 filed at 03/12/2025 1605   Pre-operative insulin treatment 0 filed at 07/16/2025 1546 0 filed at 03/12/2025 1605   Pre-operative creatinine>2 mg/dl 0 filed at 07/16/2025 1546 0 filed at 03/12/2025 1605   Revised Cardiac Risk Calculator 1 filed at 07/16/2025 1546 2 filed at 03/12/2025 1605     Apfel Simplified Score      Flowsheet Row Pre-Admission Testing from 7/16/2025 in [facility] Pre-Admission Testing from 3/12/2025 in [facility]   Smoking status 1 filed at 07/16/2025 1546 1 filed at 03/12/2025 1607   History of motion sickness or PONV  0 filed at 07/16/2025 1546 0 filed at 03/12/2025 1607   Use of postoperative opioids 1 filed at 07/16/2025 1546 1 filed at 03/12/2025 1607   Gender - Female 0=No filed at 07/16/2025 1546 0=No filed at 03/12/2025 1607   Apfel Simplified Score Calculator 2 filed at 07/16/2025 1546 2 filed at 03/12/2025 1607     Risk Analysis Index Results This Encounter    No data found in the last 10  encounters.       Stop Bang Score      Flowsheet Row Pre-Admission Testing from 7/16/2025 in [facility] Pre-Admission Testing from 3/12/2025 in [facility]   Do you snore loudly? 0 filed at 07/16/2025 1516 0 filed at 03/12/2025 1506   Do you often feel tired or fatigued after your sleep? 1 filed at 07/16/2025 1516 1 filed at 03/12/2025 1506   Has anyone ever observed you stop breathing in your sleep? 0 filed at 07/16/2025 1516 0 filed at 03/12/2025 1506   Do you have or are you being treated for high blood pressure? 1 filed at 07/16/2025 1516 1 filed at 03/12/2025 1506   Recent BMI (Calculated) 24.6 filed at 07/16/2025 1516 22.2 filed at 03/12/2025 1506   Is BMI greater than 35 kg/m2? 0=No filed at 07/16/2025 1516 0=No filed at 03/12/2025 1506   Age older than 50 years old? 1=Yes filed at 07/16/2025 1516 1=Yes filed at 03/12/2025 1506   Is your neck circumference greater than 17 inches (Male) or 16 inches (Female)? 0 filed at 07/16/2025 1516 0 filed at 03/12/2025 1506   Gender - Male 1=Yes filed at 07/16/2025 1516 1=Yes filed at 03/12/2025 1506   STOP-BANG Total Score 4 filed at 07/16/2025 1516 4 filed at 03/12/2025 1506     Prodigy: High Risk  Total Score: 23              Prodigy Age Score      Prodigy Gender Score     Prodigy Previous Opioid Use Score           ARISCAT Score for Postoperative Pulmonary Complications      Flowsheet Row Pre-Admission Testing from 7/16/2025 in [facility] Pre-Admission Testing from 3/12/2025 in [facility]   Age Calculated Score 3 filed at 07/16/2025 1547 3 filed at 03/12/2025 1606   Preoperative SpO2 0 filed at 07/16/2025 1547 0 filed at 03/12/2025 1606   Respiratory infection in the last month Either upper or lower (i.e., URI, bronchitis, pneumonia), with fever and antibiotic treatment 0 filed at 07/16/2025 1547 0 filed at 03/12/2025 1606   Preoperative anemia (Hgb less than 10 g/dl) 11 filed at 07/16/2025 1547 0  filed at 03/12/2025 1606   Surgical incision  15 filed at 07/16/2025 1547 15 filed at 03/12/2025 1606   Duration of surgery  23 filed at 07/16/2025 1547 23 filed at 03/12/2025 1606   Emergency Procedure  0 filed at 07/16/2025 1547 0 filed at 03/12/2025 1606   ARISCAT Total Score  52 filed at 07/16/2025 1547 41 filed at 03/12/2025 1606     Leona Perioperative Risk for Myocardial Infarction or Cardiac Arrest (FITO)    No data to display      Assessment and Plan:     Anesthesia  The patient denies problems with anesthesia in the past such as PONV, prolonged sedation, awareness, dental damage, aspiration, cardiac arrest, difficult intubation, or unexpected hospital admissions.     Airway  No documented or reported history of airway difficulty.     Neurology  The patient has no neurological diagnoses or significant findings on chart review, clinical presentation, and evaluation. No grossly apparent neurological perioperative risk. The patient is at increased risk for postoperative delirium secondary to age 65 or older. The patient is at increased risk for perioperative stroke secondary to cardiac disease, hypertension , increased age, hyperlipidemia, general anesthesia, operative time >2.5 hours.    HEENT  No diagnoses or significant findings on chart review or clinical presentation and evaluation.    Cardiovascular  #CAD (NSTEMI 2021) on ASA, HLD, HTN  - Medicated on atorvastatin (continue) and metoprolol (continue)     #Bilateral leg swelling  - Medicated on furosemide (hold day of surgery)    Cardiology Evaluation  The patient follows with cardiology, Dr. Sharif Lau. Patient was last seen 6/19/25.     BP: 120/68  EKG 4/3/25:  Normal sinus rhythm  Left axis deviation  Pulmonary disease pattern  Abnormal ECG    Nuclear stress test 7/1/25:  1. No evidence of inducible myocardial ischemia. Predominantly fixed   medium-sized moderate to severe perfusion defects involving apex,   apical to mid anterior, apical inferior,  as well as apical lateral   wall, similar to prior exam, likely representing prior infarction.   2. Moderate dilatation of left ventricle.   3. Normal LV wall motion with a post-stress LV EF estimated at 64%.     Echo 5/13/25:   1. The left ventricular systolic function is mildly decreased, with a visually estimated ejection fraction of 45-50%.   2. There is global hypokinesis of the left ventricle with minor regional variations.   3. Mild to moderate mitral valve regurgitation.   4. Mild tricuspid regurgitation is visualized.   5. Slightly elevated right ventricular systolic pressure.   6. Mild aortic valve regurgitation.    METS  The patient's functional capacity is greater than 4 METS.  RCRI  The patient meets 1 RCRI criteria and therefore has a 6% risk of major adverse cardiac complications.  FITO score which indicates a 1.2% risk of intraoperative or 30-day postoperative MACE (major adverse cardiac event).    Patient is scheduled for non-cardiac surgery associated with elevated risk. The patient has no major cardiac contraindications to non- cardiac surgery.    Pulmonary  No significant findings on chart review or clinical presentation and evaluation. The patient is at increased risk of perioperative pulmonary complications secondary to upper abdominal surgery, advanced age greater than 60. ERAS patient with incentive spirometry given preop. Patient instruction sheet for incentive spirometry given.      STOP BANG 4, which places patient at intermediate risk for having BOB.  ARISCAT 52, High, 42.1% risk of in-hospital postoperative pulmonary complications  PRODIGY 20, high risk of respiratory depression episode.     Patient given PI sheet for preoperative deep breathing exercises.  Encourage  incentive spirometry in the postoperative period as deemed necessary.    Endocrine  #H/o DM2 (patient denies), not currently requiring the use of medications - last A1C 5.3 on 1/18/25    Gastrointestinal  #Rectal cancer s/p  loop colostomy 07/2024 & FAWN c/b rectal stricture who underwent a robotic low anterior resection with transition from loop to end colostomy & 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 Vasquez, and laparoscopic microwave ablation x3 with liver biopsy by Dr. Erazo on 3/18/25   - Follows with colon and rectal surgery, Dr. Vasquez    Eat 10- 0,  self-perceived oropharyngeal dysphagia scale (0-40)     Genitourinary  No diagnoses or significant findings on chart review or clinical presentation and evaluation.    Renal  No renal diagnoses or significant findings on chart review or clinical presentation and evaluation. The patient has specific risk factors associated with increased risk of perioperative renal complications related to age greater than 55, male gender, hypertension.    Hematology  #Anemia - blood transfusion in 01/2025     #Rectal CA metastatic to liver   - S/p laparoscopic microwave ablation of left-sided liver tumors as well as a segment 4B/5 liver tumor in addition to ileocecal resection with anastomosis and low anterior resection with end colostomy on 3/17/2025   - Follows with surgical oncology, remains on TPN  - Patient last saw Dr. Erazo on 5/28/25, who is planning second stage surgery open right hepatectomy on 7/29    Caprini score 11, high risk of perioperative VTE.     Patient instructed to ambulate as soon as possible postoperatively to decrease thromboembolic risk. Initiate mechanical DVT prophylaxis as soon as possible and initiate chemical prophylaxis when deemed safe from a bleeding standpoint post surgery.     Transfusion Evaluation  A type and screen was obtained given the likelihood for perioperative transfusion of blood or blood products.    Musculoskeletal  No diagnoses or significant findings on chart review or clinical presentation and evaluation.    ID  No diagnoses or significant findings on chart review or clinical presentation and evaluation. MRSA screening obtained.  Prescriptions and instructions given for Hibiclens and Peridex.    Diagnostic Results      Recent Results (from the past 4 weeks)   POCT CREATININE AND GFR    Collection Time: 07/16/25  2:16 PM   Result Value Ref Range    POCT Creatinine 0.53 (L) 0.60 - 1.30 mg/dL    POCT eGFR     Type And Screen Is this order related to pregnancy or an upcoming surgery? Yes; Where will this surgery/delivery be performed? [facility]; What is the date of the surgery? 7/29/2025; Has this patient ever had a transfusion? Yes; Date...    Collection Time: 07/16/25  3:44 PM   Result Value Ref Range    ABO TYPE AB     Rh TYPE NEG     ANTIBODY SCREEN NEG    Comprehensive Metabolic Panel    Collection Time: 07/16/25  3:44 PM   Result Value Ref Range    Glucose 79 74 - 99 mg/dL    Sodium 139 136 - 145 mmol/L    Potassium 4.1 3.5 - 5.3 mmol/L    Chloride 101 98 - 107 mmol/L    Bicarbonate 26 21 - 32 mmol/L    Anion Gap 16 10 - 20 mmol/L    Urea Nitrogen 25 (H) 6 - 23 mg/dL    Creatinine 1.14 0.50 - 1.30 mg/dL    eGFR 69 >60 mL/min/1.73m*2    Calcium 8.3 (L) 8.6 - 10.6 mg/dL    Albumin 3.1 (L) 3.4 - 5.0 g/dL    Alkaline Phosphatase 604 (H) 33 - 136 U/L    Total Protein 7.3 6.4 - 8.2 g/dL    AST 56 (H) 9 - 39 U/L    Bilirubin, Total 1.0 0.0 - 1.2 mg/dL    ALT 27 10 - 52 U/L   Staphylococcus aureus/MRSA colonization, Culture    Collection Time: 07/16/25  3:44 PM    Specimen: Nares/Axilla/Groin; Swab   Result Value Ref Range    Staph/MRSA Screen Culture No Staphylococcus aureus isolated    Coagulation Screen    Collection Time: 07/16/25  3:44 PM   Result Value Ref Range    Protime 14.4 (H) 9.8 - 12.4 seconds    INR 1.3 (H) 0.9 - 1.1    aPTT 30 26 - 36 seconds      - Labs collected today, CBC, CMP, Coag, T/s and MRSA    -Preoperative medication instructions were provided and reviewed with the patient.  Any additional testing or evaluation was explained to the patient.  NPO Instructions were discussed, and the patient's questions were  answered prior to conclusion of this encounter. Patient verbalized understanding of preoperative instructions. After Visit Summary given.               [1]   Past Medical History:  Diagnosis Date    Abdominal pain     Acute non-ST elevation myocardial infarction (NSTEMI) (Multi)     Anemia     6/6/24 HGB 8.6; HCT 31.5    CAD (coronary artery disease)     Cardiology follow-up encounter 12/11/2023    Sharif Lau MD    Chronic pain disorder     Colorectal cancer (Multi)     Gout     H/O cardiac catheterization 06/01/2021    H/O cardiovascular stress test 09/03/2021    IMPRESSION: 1. No evidence of ischemia. 2. Suspicion of an infarct along the mid anterior wall. 3. Left ventricular dilatation is noted. 4. Left ventricular EF was calculated to be 50%. Summary:  1. No clinical or electrocardiographic evidence for ischemia at a submaximal workload. 3. The blunted heart rate diminshes the sensitivity of this test.  4. The submaximal level of stress was achieved.    H/O echocardiogram 06/02/2021    Mild concentric Left ventricle hypertrophy.  Global left ventricular wall motion and contractility are within normal limits.  There is normal left ventricular systolic function.  Estimated ejection fraction is 60-64%.  The left atrial size is mildly dilated.  Mild aortic regurgitation.  A trace of mitral regurgitation.  Trivial to mild tricuspid regurgitation.  There is no pulmonary hypertension.    High cholesterol     Hypertension     Overweight     Rectal cancer (Multi)     Type 2 diabetes mellitus     4/18/2024 A1C 7.5%   [2]   Past Surgical History:  Procedure Laterality Date    COLONOSCOPY  06/17/2024    HEMICOLECTOMY W/ OSTOMY      LEG SURGERY Left     rodrigo placed   [3]   Family History  Problem Relation Name Age of Onset    No Known Problems Mother      No Known Problems Father      No Known Problems Sister      Leukemia Brother      Stroke Maternal Grandfather     [4] No Known Allergies       [3]   Family History  Problem Relation Name Age of Onset    No Known Problems Mother      No Known Problems Father      No Known Problems Sister      Leukemia Brother      Stroke Maternal Grandfather     [4] No Known Allergies

## 2025-07-17 LAB
ABO GROUP (TYPE) IN BLOOD: NORMAL
ANTIBODY SCREEN: NORMAL
RH FACTOR (ANTIGEN D): NORMAL
STAPHYLOCOCCUS SPEC CULT: NORMAL

## 2025-07-25 PROCEDURE — RXMED WILLOW AMBULATORY MEDICATION CHARGE

## 2025-07-26 ENCOUNTER — PHARMACY VISIT (OUTPATIENT)
Dept: PHARMACY | Facility: CLINIC | Age: 70
End: 2025-07-26
Payer: COMMERCIAL

## 2025-07-26 PROCEDURE — RXMED WILLOW AMBULATORY MEDICATION CHARGE

## 2025-07-28 ENCOUNTER — APPOINTMENT (OUTPATIENT)
Dept: UROLOGY | Facility: CLINIC | Age: 70
End: 2025-07-28
Payer: MEDICARE

## 2025-07-28 ENCOUNTER — TELEPHONE (OUTPATIENT)
Dept: SURGICAL ONCOLOGY | Facility: HOSPITAL | Age: 70
End: 2025-07-28

## 2025-07-28 ENCOUNTER — ANESTHESIA EVENT (OUTPATIENT)
Dept: OPERATING ROOM | Facility: HOSPITAL | Age: 70
End: 2025-07-28
Payer: MEDICARE

## 2025-07-28 NOTE — TELEPHONE ENCOUNTER
Left message for patient to call me back to discuss his arrival instructions for his surgery tomorrow. Left office number [phone] for a return call.

## 2025-07-28 NOTE — PROGRESS NOTES
"Pharmacy Medication History Review Attempted    Roma Corral is a 70 y.o. male who is planned to be admitted for Colon carcinoma metastatic to liver. Pharmacy attempted to call the patient prior to their scheduled procedure, but the patient was unable to be reached after multiple attempts.    Unable to confirm home medications with patient at this time. For a medication history on the day of admission, please contact the [facility] pharmacy by calling [phone] or [facility]".    Preferred pharmacy and allergies to be confirmed with patient by nursing the day of procedure.     Maida Gordon  CPhT  "

## 2025-07-29 ENCOUNTER — ANESTHESIA (OUTPATIENT)
Dept: OPERATING ROOM | Facility: HOSPITAL | Age: 70
End: 2025-07-29
Payer: MEDICARE

## 2025-07-29 ENCOUNTER — HOSPITAL ENCOUNTER (INPATIENT)
Facility: HOSPITAL | Age: 70
End: 2025-07-29
Attending: SURGERY | Admitting: SURGERY
Payer: MEDICARE

## 2025-07-29 PROBLEM — C78.7 CARCINOMA OF COLON METASTATIC TO LIVER: Status: ACTIVE | Noted: 2025-07-29

## 2025-07-29 PROBLEM — G89.18 ACUTE POST-OPERATIVE PAIN: Status: ACTIVE | Noted: 2025-07-29

## 2025-07-29 PROBLEM — C18.9 CARCINOMA OF COLON METASTATIC TO LIVER: Status: ACTIVE | Noted: 2025-07-29

## 2025-07-29 PROCEDURE — 36556 INSERT NON-TUNNEL CV CATH: CPT

## 2025-07-29 PROCEDURE — 36430 TRANSFUSION BLD/BLD COMPNT: CPT | Mod: GC

## 2025-07-29 PROCEDURE — P9045 ALBUMIN (HUMAN), 5%, 250 ML: HCPCS | Mod: JZ,TB

## 2025-07-29 PROCEDURE — 2500000004 HC RX 250 GENERAL PHARMACY W/ HCPCS (ALT 636 FOR OP/ED)

## 2025-07-29 PROCEDURE — 2500000005 HC RX 250 GENERAL PHARMACY W/O HCPCS

## 2025-07-29 PROCEDURE — 36620 INSERTION CATHETER ARTERY: CPT

## 2025-07-29 RX ORDER — CEFAZOLIN 1 G/1
INJECTION, POWDER, FOR SOLUTION INTRAVENOUS AS NEEDED
Status: DISCONTINUED | OUTPATIENT
Start: 2025-07-29 | End: 2025-07-29

## 2025-07-29 RX ORDER — NOREPINEPHRINE BITARTRATE/D5W 8 MG/250ML
PLASTIC BAG, INJECTION (ML) INTRAVENOUS CONTINUOUS PRN
Status: DISCONTINUED | OUTPATIENT
Start: 2025-07-29 | End: 2025-07-29

## 2025-07-29 RX ORDER — ALBUMIN HUMAN 50 G/1000ML
SOLUTION INTRAVENOUS AS NEEDED
Status: DISCONTINUED | OUTPATIENT
Start: 2025-07-29 | End: 2025-07-29

## 2025-07-29 RX ORDER — PHYTONADIONE 10 MG/ML
INJECTION, EMULSION INTRAMUSCULAR; INTRAVENOUS; SUBCUTANEOUS AS NEEDED
Status: DISCONTINUED | OUTPATIENT
Start: 2025-07-29 | End: 2025-07-29

## 2025-07-29 RX ORDER — FENTANYL CITRATE 50 UG/ML
INJECTION, SOLUTION INTRAMUSCULAR; INTRAVENOUS AS NEEDED
Status: DISCONTINUED | OUTPATIENT
Start: 2025-07-29 | End: 2025-07-29

## 2025-07-29 RX ORDER — ROCURONIUM BROMIDE 10 MG/ML
INJECTION, SOLUTION INTRAVENOUS AS NEEDED
Status: DISCONTINUED | OUTPATIENT
Start: 2025-07-29 | End: 2025-07-29

## 2025-07-29 RX ORDER — MIDAZOLAM HYDROCHLORIDE 2 MG/2ML
INJECTION, SOLUTION INTRAMUSCULAR; INTRAVENOUS AS NEEDED
Status: DISCONTINUED | OUTPATIENT
Start: 2025-07-29 | End: 2025-07-29

## 2025-07-29 RX ORDER — SODIUM CHLORIDE 9 MG/ML
INJECTION, SOLUTION INTRAVENOUS CONTINUOUS PRN
Status: DISCONTINUED | OUTPATIENT
Start: 2025-07-29 | End: 2025-07-29

## 2025-07-29 RX ORDER — GLYCOPYRROLATE 0.2 MG/ML
INJECTION INTRAMUSCULAR; INTRAVENOUS AS NEEDED
Status: DISCONTINUED | OUTPATIENT
Start: 2025-07-29 | End: 2025-07-29

## 2025-07-29 RX ORDER — METHADONE IN SOD CHLOR,ISO-OSM 10 MG/ML
SYRINGE (ML) INTRAVENOUS AS NEEDED
Status: DISCONTINUED | OUTPATIENT
Start: 2025-07-29 | End: 2025-07-29

## 2025-07-29 RX ORDER — LIDOCAINE HYDROCHLORIDE 20 MG/ML
INJECTION, SOLUTION INFILTRATION; PERINEURAL AS NEEDED
Status: DISCONTINUED | OUTPATIENT
Start: 2025-07-29 | End: 2025-07-29

## 2025-07-29 RX ORDER — SODIUM CHLORIDE, SODIUM LACTATE, POTASSIUM CHLORIDE, CALCIUM CHLORIDE 600; 310; 30; 20 MG/100ML; MG/100ML; MG/100ML; MG/100ML
INJECTION, SOLUTION INTRAVENOUS CONTINUOUS PRN
Status: DISCONTINUED | OUTPATIENT
Start: 2025-07-29 | End: 2025-07-29

## 2025-07-29 RX ORDER — CALCIUM CHLORIDE INJECTION 100 MG/ML
INJECTION, SOLUTION INTRAVENOUS AS NEEDED
Status: DISCONTINUED | OUTPATIENT
Start: 2025-07-29 | End: 2025-07-29

## 2025-07-29 RX ORDER — PROPOFOL 10 MG/ML
INJECTION, EMULSION INTRAVENOUS AS NEEDED
Status: DISCONTINUED | OUTPATIENT
Start: 2025-07-29 | End: 2025-07-29

## 2025-07-29 RX ORDER — DEXMEDETOMIDINE HYDROCHLORIDE 4 UG/ML
INJECTION, SOLUTION INTRAVENOUS CONTINUOUS PRN
Status: DISCONTINUED | OUTPATIENT
Start: 2025-07-29 | End: 2025-07-29

## 2025-07-29 RX ORDER — SODIUM CHLORIDE, SODIUM GLUCONATE, SODIUM ACETATE, POTASSIUM CHLORIDE AND MAGNESIUM CHLORIDE 30; 37; 368; 526; 502 MG/100ML; MG/100ML; MG/100ML; MG/100ML; MG/100ML
INJECTION, SOLUTION INTRAVENOUS CONTINUOUS PRN
Status: DISCONTINUED | OUTPATIENT
Start: 2025-07-29 | End: 2025-07-29

## 2025-07-29 RX ORDER — METOPROLOL TARTRATE 1 MG/ML
INJECTION, SOLUTION INTRAVENOUS AS NEEDED
Status: DISCONTINUED | OUTPATIENT
Start: 2025-07-29 | End: 2025-07-29

## 2025-07-29 RX ORDER — FUROSEMIDE 10 MG/ML
INJECTION INTRAMUSCULAR; INTRAVENOUS AS NEEDED
Status: DISCONTINUED | OUTPATIENT
Start: 2025-07-29 | End: 2025-07-29

## 2025-07-29 RX ADMIN — NOREPINEPHRINE BITARTRATE 16 MCG: 1 INJECTION, SOLUTION, CONCENTRATE INTRAVENOUS at 16:56

## 2025-07-29 RX ADMIN — CEFAZOLIN 2 G: 1 INJECTION, POWDER, FOR SOLUTION INTRAMUSCULAR; INTRAVENOUS at 10:22

## 2025-07-29 RX ADMIN — VASOPRESSIN 1 UNITS: 20 INJECTION INTRAVENOUS at 16:28

## 2025-07-29 RX ADMIN — SODIUM CHLORIDE, SODIUM LACTATE, POTASSIUM CHLORIDE, AND CALCIUM CHLORIDE: 600; 310; 30; 20 INJECTION, SOLUTION INTRAVENOUS at 09:46

## 2025-07-29 RX ADMIN — VASOPRESSIN 1 UNITS: 20 INJECTION INTRAVENOUS at 17:18

## 2025-07-29 RX ADMIN — ALBUMIN HUMAN 250 ML: 0.05 INJECTION, SOLUTION INTRAVENOUS at 15:35

## 2025-07-29 RX ADMIN — FENTANYL CITRATE 50 MCG: 50 INJECTION, SOLUTION INTRAMUSCULAR; INTRAVENOUS at 10:49

## 2025-07-29 RX ADMIN — SODIUM CHLORIDE, SODIUM LACTATE, POTASSIUM CHLORIDE, AND CALCIUM CHLORIDE: 600; 310; 30; 20 INJECTION, SOLUTION INTRAVENOUS at 17:13

## 2025-07-29 RX ADMIN — Medication 0.03 MCG/KG/MIN: at 11:13

## 2025-07-29 RX ADMIN — PROPOFOL 30 MCG/KG/MIN: 10 INJECTION, EMULSION INTRAVENOUS at 18:06

## 2025-07-29 RX ADMIN — SODIUM CHLORIDE, SODIUM GLUCONATE, SODIUM ACETATE, POTASSIUM CHLORIDE AND MAGNESIUM CHLORIDE: 526; 502; 368; 37; 30 INJECTION, SOLUTION INTRAVENOUS at 17:13

## 2025-07-29 RX ADMIN — Medication 10 MG: at 10:50

## 2025-07-29 RX ADMIN — MIDAZOLAM HYDROCHLORIDE 1 MG: 2 INJECTION, SOLUTION INTRAMUSCULAR; INTRAVENOUS at 09:47

## 2025-07-29 RX ADMIN — VASOPRESSIN 1 UNITS: 20 INJECTION INTRAVENOUS at 15:24

## 2025-07-29 RX ADMIN — VASOPRESSIN 1 UNITS: 20 INJECTION INTRAVENOUS at 14:09

## 2025-07-29 RX ADMIN — LIDOCAINE HYDROCHLORIDE 100 MG: 20 INJECTION, SOLUTION INFILTRATION; PERINEURAL at 19:11

## 2025-07-29 RX ADMIN — VASOPRESSIN 1 UNITS: 20 INJECTION INTRAVENOUS at 15:09

## 2025-07-29 RX ADMIN — CEFAZOLIN 2 G: 1 INJECTION, POWDER, FOR SOLUTION INTRAMUSCULAR; INTRAVENOUS at 14:20

## 2025-07-29 RX ADMIN — ALBUMIN HUMAN 250 ML: 0.05 INJECTION, SOLUTION INTRAVENOUS at 12:03

## 2025-07-29 RX ADMIN — FUROSEMIDE 20 MG: 10 INJECTION, SOLUTION INTRAMUSCULAR; INTRAVENOUS at 11:13

## 2025-07-29 RX ADMIN — VASOPRESSIN 1 UNITS: 20 INJECTION INTRAVENOUS at 13:19

## 2025-07-29 RX ADMIN — NOREPINEPHRINE BITARTRATE 8 MCG: 1 INJECTION, SOLUTION, CONCENTRATE INTRAVENOUS at 11:49

## 2025-07-29 RX ADMIN — NOREPINEPHRINE BITARTRATE 8 MCG: 1 INJECTION, SOLUTION, CONCENTRATE INTRAVENOUS at 17:23

## 2025-07-29 RX ADMIN — VASOPRESSIN 1 UNITS: 20 INJECTION INTRAVENOUS at 16:41

## 2025-07-29 RX ADMIN — VASOPRESSIN 1 UNITS: 20 INJECTION INTRAVENOUS at 16:08

## 2025-07-29 RX ADMIN — NOREPINEPHRINE BITARTRATE 8 MCG: 1 INJECTION, SOLUTION, CONCENTRATE INTRAVENOUS at 15:20

## 2025-07-29 RX ADMIN — PROPOFOL 80 MG: 10 INJECTION, EMULSION INTRAVENOUS at 09:47

## 2025-07-29 RX ADMIN — CALCIUM CHLORIDE 0.5 G: 100 INJECTION, SOLUTION INTRAVENOUS at 15:54

## 2025-07-29 RX ADMIN — VASOPRESSIN 0.03 UNITS/MIN: 20 INJECTION, SOLUTION INTRAMUSCULAR; SUBCUTANEOUS at 17:18

## 2025-07-29 RX ADMIN — NOREPINEPHRINE BITARTRATE 8 MCG: 1 INJECTION, SOLUTION, CONCENTRATE INTRAVENOUS at 12:51

## 2025-07-29 RX ADMIN — DEXAMETHASONE SODIUM PHOSPHATE 8 MG: 4 INJECTION INTRA-ARTICULAR; INTRALESIONAL; INTRAMUSCULAR; INTRAVENOUS; SOFT TISSUE at 10:22

## 2025-07-29 RX ADMIN — NOREPINEPHRINE BITARTRATE 8 MCG: 1 INJECTION, SOLUTION, CONCENTRATE INTRAVENOUS at 13:40

## 2025-07-29 RX ADMIN — ALBUMIN HUMAN 250 ML: 0.05 INJECTION, SOLUTION INTRAVENOUS at 16:44

## 2025-07-29 RX ADMIN — NOREPINEPHRINE BITARTRATE 32 MCG: 1 INJECTION, SOLUTION, CONCENTRATE INTRAVENOUS at 16:58

## 2025-07-29 RX ADMIN — LIDOCAINE HYDROCHLORIDE 80 MG: 20 INJECTION, SOLUTION INFILTRATION; PERINEURAL at 09:47

## 2025-07-29 RX ADMIN — NOREPINEPHRINE BITARTRATE 4 MCG: 1 INJECTION, SOLUTION, CONCENTRATE INTRAVENOUS at 12:02

## 2025-07-29 RX ADMIN — SODIUM CHLORIDE, POTASSIUM CHLORIDE, SODIUM LACTATE AND CALCIUM CHLORIDE: 600; 310; 30; 20 INJECTION, SOLUTION INTRAVENOUS at 09:46

## 2025-07-29 RX ADMIN — CALCIUM CHLORIDE 1 G: 100 INJECTION, SOLUTION INTRAVENOUS at 17:38

## 2025-07-29 RX ADMIN — ALBUMIN HUMAN 250 ML: 0.05 INJECTION, SOLUTION INTRAVENOUS at 15:30

## 2025-07-29 RX ADMIN — SODIUM CHLORIDE, SODIUM GLUCONATE, SODIUM ACETATE, POTASSIUM CHLORIDE AND MAGNESIUM CHLORIDE: 526; 502; 368; 37; 30 INJECTION, SOLUTION INTRAVENOUS at 10:21

## 2025-07-29 RX ADMIN — NOREPINEPHRINE BITARTRATE 8 MCG: 1 INJECTION, SOLUTION, CONCENTRATE INTRAVENOUS at 13:01

## 2025-07-29 RX ADMIN — ROCURONIUM BROMIDE 30 MG: 10 INJECTION INTRAVENOUS at 11:21

## 2025-07-29 RX ADMIN — ROCURONIUM BROMIDE 20 MG: 10 INJECTION INTRAVENOUS at 15:34

## 2025-07-29 RX ADMIN — VASOPRESSIN 1 UNITS: 20 INJECTION INTRAVENOUS at 13:08

## 2025-07-29 RX ADMIN — VASOPRESSIN 1 UNITS: 20 INJECTION INTRAVENOUS at 16:58

## 2025-07-29 RX ADMIN — METOPROLOL TARTRATE 5 MG: 5 INJECTION, SOLUTION INTRAVENOUS at 09:47

## 2025-07-29 RX ADMIN — VASOPRESSIN 1 UNITS: 20 INJECTION INTRAVENOUS at 16:55

## 2025-07-29 RX ADMIN — NOREPINEPHRINE BITARTRATE 8 MCG: 1 INJECTION, SOLUTION, CONCENTRATE INTRAVENOUS at 11:18

## 2025-07-29 RX ADMIN — PHYTONADIONE 10 MG: 10 INJECTION, EMULSION INTRAMUSCULAR; INTRAVENOUS; SUBCUTANEOUS at 17:07

## 2025-07-29 RX ADMIN — CEFAZOLIN 2 G: 1 INJECTION, POWDER, FOR SOLUTION INTRAMUSCULAR; INTRAVENOUS at 18:20

## 2025-07-29 RX ADMIN — GLYCOPYRROLATE 0.2 MG: 0.2 SOLUTION INTRAMUSCULAR; INTRAVENOUS at 09:00

## 2025-07-29 RX ADMIN — DEXMEDETOMIDINE HYDROCHLORIDE 0.4 MCG/KG/HR: 4 INJECTION, SOLUTION INTRAVENOUS at 10:29

## 2025-07-29 RX ADMIN — NOREPINEPHRINE BITARTRATE 16 MCG: 1 INJECTION, SOLUTION, CONCENTRATE INTRAVENOUS at 15:30

## 2025-07-29 RX ADMIN — ROCURONIUM BROMIDE 100 MG: 10 INJECTION INTRAVENOUS at 09:47

## 2025-07-29 RX ADMIN — VASOPRESSIN 1 UNITS: 20 INJECTION INTRAVENOUS at 13:49

## 2025-07-29 SDOH — SOCIAL STABILITY: SOCIAL INSECURITY
WITHIN THE LAST YEAR, HAVE YOU BEEN HUMILIATED OR EMOTIONALLY ABUSED IN OTHER WAYS BY YOUR PARTNER OR EX-PARTNER?: PATIENT UNABLE TO ANSWER

## 2025-07-29 SDOH — SOCIAL STABILITY: SOCIAL INSECURITY: HAVE YOU HAD THOUGHTS OF HARMING ANYONE ELSE?: NO

## 2025-07-29 SDOH — ECONOMIC STABILITY: HOUSING INSECURITY: AT ANY TIME IN THE PAST 12 MONTHS, WERE YOU HOMELESS OR LIVING IN A SHELTER (INCLUDING NOW)?: PATIENT UNABLE TO ANSWER

## 2025-07-29 SDOH — ECONOMIC STABILITY: FOOD INSECURITY
HOW HARD IS IT FOR YOU TO PAY FOR THE VERY BASICS LIKE FOOD, HOUSING, MEDICAL CARE, AND HEATING?: PATIENT UNABLE TO ANSWER

## 2025-07-29 SDOH — SOCIAL STABILITY: SOCIAL INSECURITY
WITHIN THE LAST YEAR, HAVE YOU BEEN KICKED, HIT, SLAPPED, OR OTHERWISE PHYSICALLY HURT BY YOUR PARTNER OR EX-PARTNER?: PATIENT UNABLE TO ANSWER

## 2025-07-29 SDOH — SOCIAL STABILITY: SOCIAL INSECURITY
WITHIN THE LAST YEAR, HAVE YOU BEEN RAPED OR FORCED TO HAVE ANY KIND OF SEXUAL ACTIVITY BY YOUR PARTNER OR EX-PARTNER?: PATIENT UNABLE TO ANSWER

## 2025-07-29 SDOH — SOCIAL STABILITY: SOCIAL INSECURITY: DO YOU FEEL UNSAFE GOING BACK TO THE PLACE WHERE YOU ARE LIVING?: UNABLE TO ASSESS

## 2025-07-29 SDOH — ECONOMIC STABILITY: HOUSING INSECURITY
IN THE LAST 12 MONTHS, WAS THERE A TIME WHEN YOU WERE NOT ABLE TO PAY THE MORTGAGE OR RENT ON TIME?: PATIENT UNABLE TO ANSWER

## 2025-07-29 SDOH — ECONOMIC STABILITY: INCOME INSECURITY
IN THE PAST 12 MONTHS HAS THE ELECTRIC, GAS, OIL, OR WATER COMPANY THREATENED TO SHUT OFF SERVICES IN YOUR HOME?: PATIENT UNABLE TO ANSWER

## 2025-07-29 SDOH — SOCIAL STABILITY: SOCIAL INSECURITY: ABUSE: ADULT

## 2025-07-29 SDOH — SOCIAL STABILITY: SOCIAL INSECURITY: ARE THERE ANY APPARENT SIGNS OF INJURIES/BEHAVIORS THAT COULD BE RELATED TO ABUSE/NEGLECT?: UNABLE TO ASSESS

## 2025-07-29 SDOH — SOCIAL STABILITY: SOCIAL INSECURITY: HAS ANYONE EVER THREATENED TO HURT YOUR FAMILY OR YOUR PETS?: UNABLE TO ASSESS

## 2025-07-29 SDOH — ECONOMIC STABILITY: FOOD INSECURITY
WITHIN THE PAST 12 MONTHS, THE FOOD YOU BOUGHT JUST DIDN'T LAST AND YOU DIDN'T HAVE MONEY TO GET MORE.: PATIENT UNABLE TO ANSWER

## 2025-07-29 SDOH — ECONOMIC STABILITY: FOOD INSECURITY
WITHIN THE PAST 12 MONTHS, YOU WORRIED THAT YOUR FOOD WOULD RUN OUT BEFORE YOU GOT THE MONEY TO BUY MORE.: PATIENT UNABLE TO ANSWER

## 2025-07-29 SDOH — SOCIAL STABILITY: SOCIAL INSECURITY: WITHIN THE LAST YEAR, HAVE YOU BEEN AFRAID OF YOUR PARTNER OR EX-PARTNER?: PATIENT UNABLE TO ANSWER

## 2025-07-29 SDOH — ECONOMIC STABILITY: HOUSING INSECURITY: IN THE PAST 12 MONTHS, HOW MANY TIMES HAVE YOU MOVED WHERE YOU WERE LIVING?: 0

## 2025-07-29 SDOH — SOCIAL STABILITY: SOCIAL INSECURITY: DOES ANYONE TRY TO KEEP YOU FROM HAVING/CONTACTING OTHER FRIENDS OR DOING THINGS OUTSIDE YOUR HOME?: UNABLE TO ASSESS

## 2025-07-29 SDOH — SOCIAL STABILITY: SOCIAL INSECURITY: ARE YOU OR HAVE YOU BEEN THREATENED OR ABUSED PHYSICALLY, EMOTIONALLY, OR SEXUALLY BY ANYONE?: UNABLE TO ASSESS

## 2025-07-29 SDOH — SOCIAL STABILITY: SOCIAL INSECURITY: WERE YOU ABLE TO COMPLETE ALL THE BEHAVIORAL HEALTH SCREENINGS?: NO

## 2025-07-29 SDOH — SOCIAL STABILITY: SOCIAL INSECURITY: DO YOU FEEL ANYONE HAS EXPLOITED OR TAKEN ADVANTAGE OF YOU FINANCIALLY OR OF YOUR PERSONAL PROPERTY?: UNABLE TO ASSESS

## 2025-07-29 SDOH — ECONOMIC STABILITY: TRANSPORTATION INSECURITY
IN THE PAST 12 MONTHS, HAS LACK OF TRANSPORTATION KEPT YOU FROM MEDICAL APPOINTMENTS OR FROM GETTING MEDICATIONS?: PATIENT UNABLE TO ANSWER

## 2025-07-29 SDOH — SOCIAL STABILITY: SOCIAL INSECURITY: HAVE YOU HAD ANY THOUGHTS OF HARMING ANYONE ELSE?: UNABLE TO ASSESS

## 2025-07-29 ASSESSMENT — ACTIVITIES OF DAILY LIVING (ADL)
HEARING - RIGHT EAR: UNABLE TO ASSESS
PATIENT'S MEMORY ADEQUATE TO SAFELY COMPLETE DAILY ACTIVITIES?: UNABLE TO ASSESS
FEEDING YOURSELF: UNABLE TO ASSESS
DRESSING YOURSELF: UNABLE TO ASSESS
TOILETING: UNABLE TO ASSESS
HEARING - LEFT EAR: UNABLE TO ASSESS
JUDGMENT_ADEQUATE_SAFELY_COMPLETE_DAILY_ACTIVITIES: UNABLE TO ASSESS
WALKS IN HOME: UNABLE TO ASSESS
LACK_OF_TRANSPORTATION: PATIENT UNABLE TO ANSWER
LACK_OF_TRANSPORTATION: PATIENT UNABLE TO ANSWER
GROOMING: UNABLE TO ASSESS
BATHING: UNABLE TO ASSESS
ADEQUATE_TO_COMPLETE_ADL: UNABLE TO ASSESS

## 2025-07-29 ASSESSMENT — COLUMBIA-SUICIDE SEVERITY RATING SCALE - C-SSRS
2. HAVE YOU ACTUALLY HAD ANY THOUGHTS OF KILLING YOURSELF?: NO
1. IN THE PAST MONTH, HAVE YOU WISHED YOU WERE DEAD OR WISHED YOU COULD GO TO SLEEP AND NOT WAKE UP?: NO
6. HAVE YOU EVER DONE ANYTHING, STARTED TO DO ANYTHING, OR PREPARED TO DO ANYTHING TO END YOUR LIFE?: NO

## 2025-07-29 ASSESSMENT — COGNITIVE AND FUNCTIONAL STATUS - GENERAL
DAILY ACTIVITIY SCORE: 18
DRESSING REGULAR UPPER BODY CLOTHING: A LITTLE
STANDING UP FROM CHAIR USING ARMS: A LITTLE
EATING MEALS: A LITTLE
CLIMB 3 TO 5 STEPS WITH RAILING: A LITTLE
TURNING FROM BACK TO SIDE WHILE IN FLAT BAD: A LITTLE
HELP NEEDED FOR BATHING: A LITTLE
WALKING IN HOSPITAL ROOM: A LITTLE
MOBILITY SCORE: 18
DRESSING REGULAR LOWER BODY CLOTHING: A LITTLE
MOVING TO AND FROM BED TO CHAIR: A LITTLE
MOVING FROM LYING ON BACK TO SITTING ON SIDE OF FLAT BED WITH BEDRAILS: A LITTLE
PATIENT BASELINE BEDBOUND: NO
TOILETING: A LITTLE
PERSONAL GROOMING: A LITTLE

## 2025-07-29 ASSESSMENT — LIFESTYLE VARIABLES
HOW OFTEN DO YOU HAVE A DRINK CONTAINING ALCOHOL: PATIENT UNABLE TO ANSWER
AUDIT-C TOTAL SCORE: -1
AUDIT-C TOTAL SCORE: -1
SKIP TO QUESTIONS 9-10: 0
HOW OFTEN DO YOU HAVE 6 OR MORE DRINKS ON ONE OCCASION: PATIENT UNABLE TO ANSWER
HOW MANY STANDARD DRINKS CONTAINING ALCOHOL DO YOU HAVE ON A TYPICAL DAY: PATIENT UNABLE TO ANSWER

## 2025-07-29 ASSESSMENT — PATIENT HEALTH QUESTIONNAIRE - PHQ9
2. FEELING DOWN, DEPRESSED OR HOPELESS: NOT AT ALL
SUM OF ALL RESPONSES TO PHQ9 QUESTIONS 1 & 2: 0
1. LITTLE INTEREST OR PLEASURE IN DOING THINGS: NOT AT ALL

## 2025-07-29 ASSESSMENT — PAIN - FUNCTIONAL ASSESSMENT
PAIN_FUNCTIONAL_ASSESSMENT: CPOT (CRITICAL CARE PAIN OBSERVATION TOOL)
PAIN_FUNCTIONAL_ASSESSMENT: 0-10

## 2025-07-29 NOTE — ANESTHESIA PREPROCEDURE EVALUATION
Patient: Roma Corral    Procedure Information       Date/Time: 07/29/25 0850    Procedures:       Open Right Hepatectomy (CUSA, Ligasure Impact, Aquamantys, Argon)      Liver Ultrasound    Location: [facility] OR 17 / [facility] OR    Surgeons: Baron Erazo MD            Relevant Problems   Cardiac   (+) Acute non-ST elevation myocardial infarction (NSTEMI) (Multi)   (+) CAD (coronary artery disease)   (+) Hyperlipidemia   (+) Hypertension      Pulmonary   (+) SOB (shortness of breath) on exertion      Neuro   (+) Anxiety      GI   (+) Dysphagia   (+) Rectal cancer (Multi)      /Renal   (+) Kidney stones      Liver   (+) Colon carcinoma metastatic to liver   (+) Rectal cancer (Multi)   (+) 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  Trial extubation is planned.  Anesthetic plan and risks discussed with patient.  Use of blood products discussed with patient who.    Plan discussed with resident.

## 2025-07-29 NOTE — ANESTHESIA PROCEDURE NOTES
Arterial Line:    Date/Time: 7/29/2025 10:00 AM    Staffing  Performed: resident   Authorized by: Dann Lewis MD    Performed by: Valentin Ruelas MD    An arterial line was placed. Procedure performed using surface landmarks.in the OR for the following indication(s): continuous blood pressure monitoring and blood sampling needed.    A 20 gauge (size), 1 and 3/4 inch (length), Angiocath (type) catheter was placed into the Right radial artery, secured by Tegaderm,   Seldinger technique used.  Events:  patient tolerated procedure well with no complications.

## 2025-07-29 NOTE — ANESTHESIA PROCEDURE NOTES
Airway  Date/Time: 7/29/2025 9:50 AM  Reason: elective    Airway not difficult    Staffing  Performed: resident   Authorized by: Dann Lewis MD    Performed by: Valentin Ruelas MD  Patient location during procedure: OR    Patient Condition  Indications for airway management: anesthesia  Patient position: sniffing  Planned trial extubation  Sedation level: deep     Final Airway Details   Preoxygenated: yes  Final airway type: endotracheal airway  Successful airway: ETT  Cuffed: yes   Successful intubation technique: direct laryngoscopy  Adjuncts used in placement: intubating stylet  Endotracheal tube insertion site: oral  Blade: Matthew  Blade size: #4  ETT size (mm): 7.5  Cormack-Lehane Classification: grade I - full view of glottis  Placement verified by: chest auscultation and capnometry   Measured from: lips  ETT to lips (cm): 23  Number of attempts at approach: 1

## 2025-07-29 NOTE — CONSULTS
Roma Corral is a 70 y.o. year old male patient who presents for Procedure(s):  Open Right Hepatectomy (CUSA, Ligasure Impact, Aquamantys, Argon)  Liver Ultrasound with Baron Erazo MD on 7/29/2025.  Acute Pain consulted for assistance with pain control.     Anticipated Postop Pain Issues -   Palliative: typically relieved with IV analgesics and regional local anesthetics  Provocative: typically with movement  Quality: typically burning and aching  Radiation: typically none  Severity: typically severe 8-10/10  Timing: typically constant    Medical History[1]     Surgical History[2]     Family History[3]     Social History     Socioeconomic History    Marital status:      Spouse name: Not on file    Number of children: 1    Years of education: Not on file    Highest education level: Not on file   Occupational History    Occupation: retired   Tobacco Use    Smoking status: Never    Smokeless tobacco: Never   Vaping Use    Vaping status: Never Used   Substance and Sexual Activity    Alcohol use: Never    Drug use: Never    Sexual activity: Defer   Other Topics Concern    Not on file   Social History Narrative    Not on file     Social Drivers of Health     Financial Resource Strain: Low Risk  (4/9/2025)    Overall Financial Resource Strain (CARDIA)     Difficulty of Paying Living Expenses: Not hard at all   Food Insecurity: No Food Insecurity (4/9/2025)    Hunger Vital Sign     Worried About Running Out of Food in the Last Year: Never true     Ran Out of Food in the Last Year: Never true   Transportation Needs: No Transportation Needs (4/9/2025)    PRAPARE - Transportation     Lack of Transportation (Medical): No     Lack of Transportation (Non-Medical): No   Physical Activity: Insufficiently Active (4/9/2025)    Exercise Vital Sign     Days of Exercise per Week: 2 days     Minutes of Exercise per Session: 10 min   Stress: Stress Concern Present (4/9/2025)    [employer] [place] [employer] -  Occupational Stress Questionnaire     Feeling of Stress : To some extent   Social Connections: Moderately Integrated (4/9/2025)    Social Connection and Isolation Panel     Frequency of Communication with Friends and Family: More than three times a week     Frequency of Social Gatherings with Friends and Family: More than three times a week     Attends Worship Services: 1 to 4 times per year     Active Member of Clubs or Organizations: No     Attends Club or Organization Meetings: Never     Marital Status:    Recent Concern: Social Connections - Moderately Isolated (4/9/2025)    Social Connection and Isolation Panel     Frequency of Communication with Friends and Family: Twice a week     Frequency of Social Gatherings with Friends and Family: Once a week     Attends Worship Services: Never     Active Member of Clubs or Organizations: No     Attends Club or Organization Meetings: Never     Marital Status:    Intimate Partner Violence: Not At Risk (4/9/2025)    Humiliation, Afraid, Rape, and Kick questionnaire     Fear of Current or Ex-Partner: No     Emotionally Abused: No     Physically Abused: No     Sexually Abused: No   Housing Stability: Low Risk  (4/9/2025)    Housing Stability Vital Sign     Unable to Pay for Housing in the Last Year: No     Number of Times Moved in the Last Year: 0     Homeless in the Last Year: No        RX Allergies[4]      Review of Systems  Gen: No fatigue, anorexia, insomnia, fever.   Eyes: No vision loss, double vision, drainage, eye pain.   ENT: No pharyngitis, dry mouth, no hearing changes or ear discharge  Cardiac: No chest pain, palpitations, syncope, near syncope.   Pulmonary: No shortness of breath, cough, hemoptysis.   Heme/lymph: No swollen glands, fever, bleeding.   GI: No abdominal pain, change in bowel habits, melena, hematemesis, hematochezia, nausea, vomiting, diarrhea.   : No discharge, dysuria, frequency, urgency, hematuria.  Endo: No polyuria or  weight loss.   Musculoskeletal: Negative for any pain or loss of ROM/weakness  Skin: No rashes or lesions  Neuro: Normal speech, no numbness or weakness. No gait difficulties  Review of systems is otherwise negative unless stated above or in history of present illness.    Physical Exam:  Constitutional:  no distress, alert and cooperative  Eyes: clear sclera  Head/Neck: No apparent injury, trachea midline  Respiratory/Thorax: Patent airways, thorax symmetric, breathing comfortably  Cardiovascular: no pitting edema  Gastrointestinal: Nondistended  Musculoskeletal: ROM intact  Extremities: no clubbing  Neurological: alert, mejia x4  Psychological: Appropriate affect    No results found. However, due to the size of the patient record, not all encounters were searched. Please check Results Review for a complete set of results.     Roma Corral is a 70 y.o. year old male patient who presents for Procedure(s):  Open Right Hepatectomy (CUSA, Ligasure Impact, Aquamantys, Argon)  Liver Ultrasound with Baron Erazo MD on 7/29/2025. Acute Pain consulted for assistance with pain control.     Plan:    - Bilateral erector spinae blocks with catheters performed pre-operatively on 7/29/2025  - Ambit ball with Ropivacaine 0.2%/NaCl 0.9% 500mL, Rate 7 cc/hr bilaterally  - Ambit medication will not interfere with pain medication prescribed by the primary team.   - Please be aware of local anesthetic toxic dose and absorption variability before considering lidocaine patches  - Acute pain service will follow while catheters in place  - Rest of pain management per primary team    Acute Pain Resident  pg [phone] ph [phone]        [1]   Past Medical History:  Diagnosis Date    Abdominal pain     Acute non-ST elevation myocardial infarction (NSTEMI) (Multi)     Anemia     6/6/24 HGB 8.6; HCT 31.5    CAD (coronary artery disease)     Cardiology follow-up encounter 12/11/2023    Sharif Lau MD    Chronic pain disorder     Colorectal cancer  (Multi)     Gout     H/O cardiac catheterization 06/01/2021    H/O cardiovascular stress test 09/03/2021    IMPRESSION: 1. No evidence of ischemia. 2. Suspicion of an infarct along the mid anterior wall. 3. Left ventricular dilatation is noted. 4. Left ventricular EF was calculated to be 50%. Summary:  1. No clinical or electrocardiographic evidence for ischemia at a submaximal workload. 3. The blunted heart rate diminshes the sensitivity of this test.  4. The submaximal level of stress was achieved.    H/O echocardiogram 06/02/2021    Mild concentric Left ventricle hypertrophy.  Global left ventricular wall motion and contractility are within normal limits.  There is normal left ventricular systolic function.  Estimated ejection fraction is 60-64%.  The left atrial size is mildly dilated.  Mild aortic regurgitation.  A trace of mitral regurgitation.  Trivial to mild tricuspid regurgitation.  There is no pulmonary hypertension.    High cholesterol     Hypertension     Overweight     Rectal cancer (Multi)     Type 2 diabetes mellitus     4/18/2024 A1C 7.5%   [2]   Past Surgical History:  Procedure Laterality Date    COLONOSCOPY  06/17/2024    HEMICOLECTOMY W/ OSTOMY      LEG SURGERY Left     rodrigo placed   [3]   Family History  Problem Relation Name Age of Onset    No Known Problems Mother      No Known Problems Father      No Known Problems Sister      Leukemia Brother      Stroke Maternal Grandfather     [4] No Known Allergies

## 2025-07-29 NOTE — H&P
SICU History & Physical    Subjective   HPI:  Roma is a 70 y.o. male with PMHX includes NSTEMI, CAD, anemia, gout, abdominal pain, HTN, DM2 and colon carcinoma metastatic to liver presents to ICU after open right hepatectomy on 7/29.     OR Course:   EBL: 1.5 L   UOP: 1535 ml  Crystalloid: 2L LR  Colloid: 1L albumin   Products: 3units pRBC, 1 FFP, 1 Cryo  Lines/Access: Left subclavian double lumen, R Radial A line     Medical History[1]  Surgical History[2]  Prescriptions Prior to Admission[3]  Patient has no known allergies.  Social History[4]  Family History[5]    Review of Systems:  Review of Systems   Reason unable to perform ROS: pt sedated and intubated.       Scheduled Medications:   Scheduled Medications[6]     Continuous Medications:   Continuous Medications[7]     PRN Medications:   PRN Medications[8]    Objective   Vitals:  Most Recent:  Vitals:    07/29/25 0829   BP: 155/67   Pulse: 60   Resp: 16   Temp: 36.2 °C (97.2 °F)   SpO2: 100%       24hr Min/Max:  Temp  Min: 36.2 °C (97.2 °F)  Max: 36.2 °C (97.2 °F)  Pulse  Min: 60  Max: 60  BP  Min: 155/67  Max: 155/67  Resp  Min: 16  Max: 16  SpO2  Min: 100 %  Max: 100 %    I/O:  I/O last 2 completed shifts:  In: 4100 (56.1 mL/kg) [Blood:1600; IV Piggyback:2500]  Out: 1535 (21 mL/kg) [Urine:1535 (0.9 mL/kg/hr)]  Weight: 73.1 kg     Hemodynamic parameters for last 24 hours:         Vent settings:       LDA:  CVC 07/29/25 Double lumen Left Subclavian (Active)   Placement Date/Time: 07/29/25 (c) 1020   Hand Hygiene Performed Prior to CVC Insertion: Yes  Site Prep: Chlorhexidine   Site Prep Agent has Completely Dried Before Insertion: Yes  All 5 Sterile Barriers Used (Gloves, Gown, Cap, Mask, Large Sterile Rebecca...   Number of days: 0       Arterial Line 07/29/25 Right Radial (Active)   Placement Date/Time: 07/29/25 (c) 1000   Size: 20 G  Orientation: Right  Location: Radial  Securement Method: Transparent dressing  Patient Tolerance: Tolerated well   Number of days:  0       ETT  7.5 mm (Active)   Placement Date/Time: 07/29/25 (c) 0950   Mask Ventilation: Vent by mask  Technique: Direct laryngoscopy  ETT Type: ETT - single  Single Lumen Tube Size: 7.5 mm  Cuffed: Yes  Laryngoscope: Matthew  Blade Size: 4  Location: Oral  Grade View: Full view...   Number of days: 0       Urethral Catheter 16 Fr. (Active)   Placement Date/Time: 07/29/25 1022   Placed by: Marlene Toure  Hand Hygiene Completed: Yes  Tube Size (Fr.): 16 Fr.  Catheter Balloon Size: 10 mL  Urine Returned: Yes   Number of days: 0       Closed/Suction Drain 1 Right Abdomen Bulb 19 Fr. (Active)   Placement Date/Time: 03/17/25 1515   Placed by: MD GUERRA  Hand Hygiene Completed: Yes  Tube Number: 1  Orientation: Right  Location: Abdomen  Drain Tube Type: Bulb  Size (Fr.): 19 Fr.  Drain Reservoir Size (mL): 100 mL   Number of days: 134       Colostomy Loop LUQ (Active)   Placement Date/Time: 07/02/24 1416   Hand Hygiene Completed: Yes  Colostomy Type: Loop  Location: LUQ   Number of days: 392         Physical Exam:   Physical Exam  Vitals reviewed.   Constitutional:       Comments: Intubated/sedated   HENT:      Head: Normocephalic.     Cardiovascular:      Rate and Rhythm: Normal rate and regular rhythm.   Pulmonary:      Comments: intubated  Abdominal:      Comments: Drain with serosanguinous fluid   Genitourinary:     Comments: Arriaga in place    Musculoskeletal:      Comments: Unable to assess     Neurological:      Comments: Unable to assess       Lab/Radiology/Diagnostic Review:  Results for orders placed or performed during the hospital encounter of 07/29/25 (from the past 24 hours)   Type And Screen   Result Value Ref Range    ABO TYPE AB     Rh TYPE NEG     ANTIBODY SCREEN NEG    Prepare RBC: 4 Units   Result Value Ref Range    PRODUCT CODE Z1975I87     Unit Number [id number]     Unit ABO B     Unit RH NEG     XM INTEP COMP     Dispense Status TR     Blood Expiration Date 8/26/2025 11:59:00 PM EDT     PRODUCT  BLOOD TYPE 1700     UNIT VOLUME 350     PRODUCT CODE P8125I08     Unit Number [id number]     Unit ABO B     Unit RH NEG     XM INTEP COMP     Dispense Status TR     Blood Expiration Date 8/26/2025 11:59:00 PM EDT     PRODUCT BLOOD TYPE 1700     UNIT VOLUME 276     PRODUCT CODE S5850Q30     Unit Number [id number]     Unit ABO B     Unit RH NEG     XM INTEP COMP     Dispense Status IS     Blood Expiration Date 8/26/2025 11:59:00 PM EDT     PRODUCT BLOOD TYPE 1700     UNIT VOLUME 295     PRODUCT CODE F7737K79     Unit Number [id number]     Unit ABO B     Unit RH NEG     XM INTEP COMP     Dispense Status TR     Blood Expiration Date 8/26/2025 11:59:00 PM EDT     PRODUCT BLOOD TYPE 1700     UNIT VOLUME 313    Prepare Platelets: 2 Units   Result Value Ref Range    PRODUCT CODE V7495S00     Unit Number [id number]     Unit ABO AB     Unit RH POS     Dispense Status RE     Blood Expiration Date 7/29/2025 11:59:00 PM EDT     PRODUCT BLOOD TYPE 8400     UNIT VOLUME 180     PRODUCT CODE W5719V42     Unit Number [id number]     Unit ABO AB     Unit RH POS     Dispense Status IS     Blood Expiration Date 7/29/2025 11:59:00 PM EDT     PRODUCT BLOOD TYPE 8400     UNIT VOLUME 180    Prepare Plasma: 4 Units   Result Value Ref Range    PRODUCT CODE T1627P00     Unit Number [id number]     Unit ABO AB     Unit RH POS     Dispense Status TR     Blood Expiration Date 8/3/2025 10:36:00 AM EDT     PRODUCT BLOOD TYPE 8400     UNIT VOLUME 346     PRODUCT CODE A0910I20     Unit Number [id number]     Unit ABO AB     Unit RH POS     Dispense Status IS     Blood Expiration Date 8/3/2025 10:36:00 AM EDT     PRODUCT BLOOD TYPE 8400     UNIT VOLUME 299     PRODUCT CODE C7317N97     Unit Number [id number]     Unit ABO AB     Unit RH POS     Dispense Status IS     Blood Expiration Date 8/3/2025 10:36:00 AM EDT     PRODUCT BLOOD TYPE 8400     UNIT VOLUME 304     PRODUCT CODE Y9064N46     Unit Number  [id number]     Unit ABO AB     Unit RH POS     Dispense Status IS     Blood Expiration Date 8/3/2025 10:36:00 AM EDT     PRODUCT BLOOD TYPE 8400     UNIT VOLUME 348    Prepare Platelets   Result Value Ref Range    PRODUCT CODE W9244C83     Unit Number [id number]     Unit ABO A     Unit RH POS     Dispense Status TR     Blood Expiration Date 7/30/2025 11:59:00 PM EDT     PRODUCT BLOOD TYPE 6200     UNIT VOLUME 286    Blood Gas Arterial Full Panel   Result Value Ref Range    POCT pH, Arterial 7.46 (H) 7.38 - 7.42 pH    POCT pCO2, Arterial 36 (L) 38 - 42 mm Hg    POCT pO2, Arterial 170 (H) 85 - 95 mm Hg    POCT SO2, Arterial 100 94 - 100 %    POCT Oxy Hemoglobin, Arterial 97.7 94.0 - 98.0 %    POCT Hematocrit Calculated, Arterial 28.0 (L) 41.0 - 52.0 %    POCT Sodium, Arterial 138 136 - 145 mmol/L    POCT Potassium, Arterial 3.9 3.5 - 5.3 mmol/L    POCT Chloride, Arterial 107 98 - 107 mmol/L    POCT Ionized Calcium, Arterial 1.14 1.10 - 1.33 mmol/L    POCT Glucose, Arterial 110 (H) 74 - 99 mg/dL    POCT Lactate, Arterial 1.5 0.4 - 2.0 mmol/L    POCT Base Excess, Arterial 1.8 -2.0 - 3.0 mmol/L    POCT HCO3 Calculated, Arterial 25.6 22.0 - 26.0 mmol/L    POCT Hemoglobin, Arterial 9.3 (L) 13.5 - 17.5 g/dL    POCT Anion Gap, Arterial 9 (L) 10 - 25 mmo/L    Patient Temperature 37.0 degrees Celsius    FiO2 40 %   Blood Gas Arterial Full Panel   Result Value Ref Range    POCT pH, Arterial 7.41 7.38 - 7.42 pH    POCT pCO2, Arterial 38 38 - 42 mm Hg    POCT pO2, Arterial 169 (H) 85 - 95 mm Hg    POCT SO2, Arterial 100 94 - 100 %    POCT Oxy Hemoglobin, Arterial 97.3 94.0 - 98.0 %    POCT Hematocrit Calculated, Arterial 32.0 (L) 41.0 - 52.0 %    POCT Sodium, Arterial 134 (L) 136 - 145 mmol/L    POCT Potassium, Arterial 4.7 3.5 - 5.3 mmol/L    POCT Chloride, Arterial 105 98 - 107 mmol/L    POCT Ionized Calcium, Arterial 1.07 (L) 1.10 - 1.33 mmol/L    POCT Glucose, Arterial 138 (H) 74 - 99 mg/dL    POCT Lactate, Arterial  2.9 (H) 0.4 - 2.0 mmol/L    POCT Base Excess, Arterial -0.4 -2.0 - 3.0 mmol/L    POCT HCO3 Calculated, Arterial 24.1 22.0 - 26.0 mmol/L    POCT Hemoglobin, Arterial 10.6 (L) 13.5 - 17.5 g/dL    POCT Anion Gap, Arterial 10 10 - 25 mmo/L    Patient Temperature 37.0 degrees Celsius    FiO2 40 %   Blood Gas Arterial Full Panel Unsolicited   Result Value Ref Range    POCT pH, Arterial 7.41 7.38 - 7.42 pH    POCT pCO2, Arterial 38 38 - 42 mm Hg    POCT pO2, Arterial 155 (H) 85 - 95 mm Hg    POCT SO2, Arterial 99 94 - 100 %    POCT Oxy Hemoglobin, Arterial 96.0 94.0 - 98.0 %    POCT Hematocrit Calculated, Arterial 17.0 (L) 41.0 - 52.0 %    POCT Sodium, Arterial 135 (L) 136 - 145 mmol/L    POCT Potassium, Arterial 5.1 3.5 - 5.3 mmol/L    POCT Chloride, Arterial 108 (H) 98 - 107 mmol/L    POCT Ionized Calcium, Arterial 1.02 (L) 1.10 - 1.33 mmol/L    POCT Glucose, Arterial 123 (H) 74 - 99 mg/dL    POCT Lactate, Arterial 4.5 (HH) 0.4 - 2.0 mmol/L    POCT Base Excess, Arterial -0.5 -2.0 - 3.0 mmol/L    POCT HCO3 Calculated, Arterial 24.1 22.0 - 26.0 mmol/L    POCT Hemoglobin, Arterial 5.7 (LL) 13.5 - 17.5 g/dL    POCT Anion Gap, Arterial 8 (L) 10 - 25 mmo/L    Patient Temperature 37.0 degrees Celsius   Coox Panel, Arterial Unsolicited   Result Value Ref Range    POCT Hemoglobin, Arterial 5.7 (LL) 13.5 - 17.5 g/dL    POCT Oxy Hemoglobin, Arterial 96.0 94.0 - 98.0 %    POCT Carboxyhemoglobin, Arterial 2.2 %    POCT Methemoglobin, Arterial 1.1 0.0 - 1.5 %    POCT Deoxy Hemoglobin, Arterial 0.7 0.0 - 5.0 %   Blood Gas Arterial Full Panel Unsolicited   Result Value Ref Range    POCT pH, Arterial 7.37 (L) 7.38 - 7.42 pH    POCT pCO2, Arterial 41 38 - 42 mm Hg    POCT pO2, Arterial 170 (H) 85 - 95 mm Hg    POCT SO2, Arterial 99 94 - 100 %    POCT Oxy Hemoglobin, Arterial 97.3 94.0 - 98.0 %    POCT Hematocrit Calculated, Arterial 25.0 (L) 41.0 - 52.0 %    POCT Sodium, Arterial 136 136 - 145 mmol/L    POCT Potassium, Arterial 5.5 (H)  3.5 - 5.3 mmol/L    POCT Chloride, Arterial 106 98 - 107 mmol/L    POCT Ionized Calcium, Arterial 1.32 1.10 - 1.33 mmol/L    POCT Glucose, Arterial 141 (H) 74 - 99 mg/dL    POCT Lactate, Arterial 5.4 (HH) 0.4 - 2.0 mmol/L    POCT Base Excess, Arterial -1.5 -2.0 - 3.0 mmol/L    POCT HCO3 Calculated, Arterial 23.7 22.0 - 26.0 mmol/L    POCT Hemoglobin, Arterial 8.3 (L) 13.5 - 17.5 g/dL    POCT Anion Gap, Arterial 12 10 - 25 mmo/L    Patient Temperature 37.0 degrees Celsius    FiO2 40 %   Coox Panel, Arterial Unsolicited   Result Value Ref Range    POCT Hemoglobin, Arterial 8.3 (L) 13.5 - 17.5 g/dL    POCT Oxy Hemoglobin, Arterial 97.3 94.0 - 98.0 %    POCT Carboxyhemoglobin, Arterial 1.2 %    POCT Methemoglobin, Arterial 0.4 0.0 - 1.5 %    POCT Deoxy Hemoglobin, Arterial 1.0 0.0 - 5.0 %     *Note: Due to a large number of results and/or encounters for the requested time period, some results have not been displayed. A complete set of results can be found in Results Review.     Imaging  No results found.    Cardiology, Vascular, and Other Imaging  No other imaging results found for the past 7 days        Assessment/Plan      Assessment:  Roma Corral is a 70 y.o.  male with a history of NSTEMI, CAD, anemia, gout, abdominal pain, HTN, DM2 and colon carcinoma metastatic to liver presenting to SICU from OR s/p open right hepatectomy on 7/29 with Dr. Erazo.     Plan:  NEURO: Acute post-op pain. Bilateral erector spinae blocks with catheters performed pre-operatively on 7/29/2025  by acute pain team. Currently on prop gtt for sedation.   - ongoing neuro and pain assessments  - PRN hydromorphone for pain control  - lidoderm patches surrounding surgical incision  - PT/OT consult  - Home meds: morphine 30 mg Q12H      CV: History of CAD (NSTEMI 2021) on ASA, HLD, HTN . Baseline /68. Echo 5/13/25: EF of 45-50%.Nuclear stress test 7/1/25: No evidence of inducible myocardial ischemia. with EF 64%, . Arrived to SICU on Levo at  0.02. Lactate 5.4.   - continuous EKG/abp monitoring  - Goal map range 65-90  - Home meds: Metoprolol and Atorvastatin, ASA     PULM: No pulmonary history. Arrived to SICU intubated.   - Wean FiO2 as tolerated.    - Q1h incentive spirometer while awake  - additional pulm toilet prn.    - F/U post op CXR     GI: Rectal cancer s/p loop colostomy 07/2024 & [facility]WN c/b rectal stricture who underwent a robotic low anterior resection with transition from loop to end colostomy & 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 Vasquez, and laparoscopic microwave ablation x3 with liver biopsy by Dr. Erazo on 3/18/25.   - NPO   - NG to LIWS  - Advance diet per surgical service  - PPI for GI prophylaxis     : No history of renal disease. Baseline creatinine 0.96.  - Maintenance IVF  - Volume resuscitation as needed  - Maintain U/O >0.5ml/kg/hr  - Check renal function panel post op and daily  - Replete electrolytes to goal K>4, Mg>2, Phos>2.5, ionized Ca>1.10.     HEME: Acute blood loss anemia. Baseline Hgb 7.8 (5/28). OR EBL 1.5 L. Intra-op received 3units pRBC, 1 FFP, 1 plt.  - Check CBC and coags post op and daily  - SCDs for DVT prophylaxis  - holding SC heparin for now  - ongoing monitoring for s/s bleeding     ENDO: No history of  thyroid disease. H/o DM2 (patient denies), not currently requiring the use of medications - last A1C 5.3 on 1/18/25  - Q4h BG   - SSI Lispro per ICU protocol.     ID: Afebrile. Awaiting post op WBC.  - temp q4h, wbc daily  - obdulio-op cefazolin for 24hrs  - ongoing monitoring for s/s infection     Lines:  - R Radial arterial line  - L Subclavian central line + mini MAC  - PIV x16g     Dispo: Admit to ICU. Patient seen and discussed with ICU attending Dr. Denson.  SICU phone [phone]          [1]   Past Medical History:  Diagnosis Date    Abdominal pain     Acute non-ST elevation myocardial infarction (NSTEMI) (Multi)     Anemia     6/6/24 HGB 8.6; HCT 31.5    CAD (coronary artery disease)      Cardiology follow-up encounter 12/11/2023    Sharif Lau MD    Chronic pain disorder     Colorectal cancer (Multi)     Gout     H/O cardiac catheterization 06/01/2021    H/O cardiovascular stress test 09/03/2021    IMPRESSION: 1. No evidence of ischemia. 2. Suspicion of an infarct along the mid anterior wall. 3. Left ventricular dilatation is noted. 4. Left ventricular EF was calculated to be 50%. Summary:  1. No clinical or electrocardiographic evidence for ischemia at a submaximal workload. 3. The blunted heart rate diminshes the sensitivity of this test.  4. The submaximal level of stress was achieved.    H/O echocardiogram 06/02/2021    Mild concentric Left ventricle hypertrophy.  Global left ventricular wall motion and contractility are within normal limits.  There is normal left ventricular systolic function.  Estimated ejection fraction is 60-64%.  The left atrial size is mildly dilated.  Mild aortic regurgitation.  A trace of mitral regurgitation.  Trivial to mild tricuspid regurgitation.  There is no pulmonary hypertension.    High cholesterol     Hypertension     Overweight     Rectal cancer (Multi)     Type 2 diabetes mellitus     4/18/2024 A1C 7.5%   [2]   Past Surgical History:  Procedure Laterality Date    COLONOSCOPY  06/17/2024    HEMICOLECTOMY W/ OSTOMY      LEG SURGERY Left     rodrigo placed   [3]   Facility-Administered Medications Prior to Admission   Medication Dose Route Frequency Provider Last Rate Last Admin    silver nitrate applicators applicator   Topical Once Javier MAYRA Vasquez MD         Medications Prior to Admission   Medication Sig Dispense Refill Last Dose/Taking    acetaminophen (Tylenol) 325 mg tablet Take 2 tablets (650 mg) by mouth every 6 hours if needed for mild pain (1 - 3).   7/28/2025    aspirin 81 mg chewable tablet Chew and swallow 1 tablet (81 mg) once daily.   7/28/2025    chlorhexidine (Hibiclens) 4 % external liquid Use as directed daily perioperatively 473 mL 0  7/28/2025    chlorhexidine (Peridex) 0.12 % solution Swish and spit with 15ml of solution the night before and morning of surgery. Do not swallow. 473 mL 0 7/29/2025 Morning    metoprolol succinate XL (Toprol-XL) 100 mg 24 hr tablet Take 1 tablet (100 mg) by mouth once daily. 90 tablet 3 7/29/2025 Morning    morphine CR (MS Contin) 30 mg 12 hr tablet Take 1 tablet (30 mg) by mouth every 12 hours. Do not crush, chew, or split. 60 tablet 0 7/28/2025    potassium chloride CR (Klor-Con M20) 20 mEq ER tablet Take 1 tablet (20 mEq) by mouth 2 times a day. Do not crush or chew. 180 tablet 3 7/28/2025    prochlorperazine (Compazine) 10 mg tablet Take 1 tablet (10 mg) by mouth every 6 hours if needed for nausea or vomiting. 30 tablet 5 More than a month    alteplase (Cathflo Activase) 2 mg injection 2 mL (2 mg) by intra-catheter route if needed (Use as needed for occluded PICC Line). (Patient not taking: Reported on 7/16/2025)       atorvastatin (Lipitor) 40 mg tablet Take 1 tablet (40 mg) by mouth once daily at bedtime. 90 tablet 3 Unknown    Continue current TPN formula See AVS for most recent TPN formula. (Patient not taking: Reported on 7/16/2025) 1 Package 0     Continue current TPN formula TPN 5/20  68 mL for 1 hr, increase to 136 mL/hr x10 hrs, and then 68 mL/hr x 1 hr. VS for most recent TPN formula. (Patient not taking: Reported on 7/16/2025) 1 Package 0     furosemide (Lasix) 40 mg tablet Take 1 tablet (40 mg) by mouth once daily. (Patient not taking: Reported on 7/16/2025) 30 tablet 11     methylPREDNISolone (Medrol Dospak) 4 mg tablets Take as directed (Patient not taking: Reported on 7/16/2025) 21 each 0     midodrine (Proamatine) 2.5 mg tablet Take 1 tablet (2.5 mg) by mouth 3 times daily (morning, midday, late afternoon). (Patient not taking: Reported on 7/16/2025)       octreotide (SandoSTATIN) 500 mcg/mL injection Inject 0.4 mL (200 mcg) under the skin 3 times a day. (Patient not taking: Reported on  7/16/2025)       penicillin v potassium (Veetid) 500 mg tablet Take 1 tablet (500 mg) by mouth 4 times a day until gone. (Patient not taking: Reported on 7/16/2025) 40 tablet 0     polyethylene glycol (Glycolax, Miralax) 17 gram packet Take 17 g by mouth every other day. Skip the dose for the day if watery/loose colostomy output Do not fill before April 3, 2025. (Patient not taking: Reported on 7/16/2025)       tamsulosin (Flomax) 0.4 mg 24 hr capsule Take 1 capsule (0.4 mg) by mouth once daily. (Patient not taking: Reported on 7/16/2025)      [4]   Social History  Tobacco Use    Smoking status: Never    Smokeless tobacco: Never   Vaping Use    Vaping status: Never Used   Substance Use Topics    Alcohol use: Never    Drug use: Never   [5]   Family History  Problem Relation Name Age of Onset    No Known Problems Mother      No Known Problems Father      No Known Problems Sister      Leukemia Brother      Stroke Maternal Grandfather     [6] propofol, , ,   [Transfer Hold] insulin regular, 0-5 Units, subcutaneous, q4h  [Transfer Hold] pantoprazole, 40 mg, oral, Daily before breakfast   Or  [Transfer Hold] pantoprazole, 40 mg, intravenous, Daily before breakfast  [7] propofol, 5-50 mcg/kg/min  ropivacaine (PF) in NS cmpd, 14 mL/hr  [8] PRN medications: propofol, [Transfer Hold] alteplase, [Transfer Hold] calcium chloride, [Transfer Hold] calcium chloride, [Transfer Hold] dextrose, [Transfer Hold] dextrose, [Transfer Hold] glucagon, [Transfer Hold] glucagon, [Transfer Hold] HYDROmorphone, [Transfer Hold] magnesium sulfate, [Transfer Hold] magnesium sulfate, oxygen, [Transfer Hold] potassium chloride

## 2025-07-29 NOTE — ANESTHESIA POSTPROCEDURE EVALUATION
Patient: Roma Corral    Procedure Summary       Date: 07/29/25 Room / Location: [facility] OR 17 / [facility] OR    Anesthesia Start: 0946 Anesthesia Stop: 1924    Procedures:       Open Right Hepatectomy (CUSA, Ligasure Impact, Aquamantys, Argon) (Abdomen)      Liver Ultrasound (Abdomen) Diagnosis:       Colon carcinoma metastatic to liver      (Colon carcinoma metastatic to liver [C18.9, C78.7])    Surgeons: Baron Erazo MD Responsible Provider: Chirag Chandler MD    Anesthesia Type: general ASA Status: 3            Anesthesia Type: general    Vitals Value Taken Time   /70 07/29/25 19:27   Temp 37 07/29/25 19:27   Pulse 57 07/29/25 19:27   Resp 16 07/29/25 19:27   SpO2 100 % 07/29/25 19:27   Vitals shown include unfiled device data.    Anesthesia Post Evaluation    Patient location during evaluation: ICU  Patient participation: complete - patient cannot participate  Level of consciousness: sedated  Pain management: adequate  Airway patency: patent  Cardiovascular status: acceptable  Respiratory status: acceptable, ETT and ventilator  Hydration status: acceptable  Postoperative Nausea and Vomiting: none        No notable events documented.

## 2025-07-29 NOTE — ANESTHESIA PROCEDURE NOTES
Central Venous Line:    Date/Time: 7/29/2025 10:20 AM    A central venous line was placed in the OR for the following indication(s): central venous access.  Staffing  Performed: resident   Authorized by: Dann Lewis MD    Performed by: Valentin Ruelas MD    Sterility preparation included the following: provider hand hygiene performed prior to central venous catheter insertion, all 5 sterile barriers used (gloves, gown, cap, mask, large sterile drape) during central venous catheter insertion, antiseptic used during central venous catheter insertion and skin prep agent completely dried prior to procedure.  The patient was placed in Trendelenburg position.    Left subclavian vein was prepped.    The site was prepped with Chlorhexidine.  Size: 9 Fr   Length: 11.5 (16 cm)  Catheter type: introducer   Number of Lumens: double lumen      During the procedure, the following specific steps were taken: target vein identified, needle advanced into vein and blood aspirated and guidewire advanced into vein.  Seldinger technique used.  Procedure performed using surface landmarks.    Intravenous verification was obtained by venous blood return and manometry.      Post insertion care included: all ports aspirated, all ports flushed easily, guidewire removed intact, Biopatch applied, line sutured in place and dressing applied.    During the procedure the patient experienced: patient tolerated procedure well with no complications.

## 2025-07-29 NOTE — ANESTHESIA PROCEDURE NOTES
Peripheral IV  Date/Time: 7/29/2025 9:55 AM      Placement  Needle size: 16 G  Laterality: left  Location: forearm  Local anesthetic: none  Site prep: alcohol  Technique: anatomical landmarks  Attempts: 1

## 2025-07-29 NOTE — BRIEF OP NOTE
Date: 2025  OR Location: [facility] OR    Name: Roma Corral, : 1955, Age: 70 y.o., MRN: [id number], Sex: male    Diagnosis  Pre-op Diagnosis      * Colon carcinoma metastatic to liver [C18.9, C78.7] Post-op Diagnosis     * Colon carcinoma metastatic to liver [C18.9, C78.7]     Procedures  Open Right Hepatectomy (CUSA, Ligasure Impact, Aquamantys, Argon)  46316 - MD HEPATECTOMY RESCJ TOTAL RIGHT LOBECTOMY    Liver Ultrasound  26204 - CHG ULTRASONIC GUIDANCE INTRAOPERATIVE      Surgeons      * Baron Erazo - Primary    Resident/Fellow/Other Assistant:  Surgeons and Role:     * Cookie Pereira MD - Resident - Assisting    Staff:   Circulator: Pelon  Circulator: Kristine  Scrub Person: Arti  Scrub Person: Capri Puentes Scrub: Liam  Relief Circulator: Ashley  Scrub Person: Ronan  Circulator: Karine    Anesthesia Staff: Anesthesiologist: Dann Lewis MD; Tani Plaza MD; Maikel Coffman MD; Chirag Chandler MD  C-AA: KEV Ocampo; KEV Castaneda  Anesthesia Resident: Lisbet Vasquez MD; Nataliya Elizabeth MD; Valentin Ruelas MD; Sachin Ruelas MD    Procedure Summary  Anesthesia: General  ASA: III  Estimated Blood Loss: 1500mL  Intra-op Medications:   Administrations occurring from 0850 to 1515 on 25:   Medication Name Total Dose   sodium chloride 0.9 % irrigation solution 1,000 mL   albumin human 5 % 250 mL   ceFAZolin (Ancef) vial 1 g 4 g   dexAMETHasone (Decadron) 4 mg/mL IV Syringe 2 mL 8 mg   dexmedeTOMIDine 4 mcg/mL in 100 mL NS infusion 134.26 mcg   electrolyte-A (Plasmalyte-A) Cannot be calculated   fentaNYL (Sublimaze) injection 50 mcg/mL 50 mcg   furosemide (Lasix) 10 mg/mL 20 mg   glycopyrrolate (Robinul) injection 0.2 mg   LR bolus Cannot be calculated   LR infusion 274.17 mL   lidocaine (Xylocaine) injection 2 % 80 mg   methadone (Dolophine) injection 10 mg   metoprolol 5 mg/5 mL 5 mg   midazolam PF (Versed) injection 1 mg/mL 1 mg   norepinephrine (Levophed) 8  mg/250 mL D5W (premix) 0.8 mg   norepinephrine (Levophed) 16 mcg/mL IV bolus 44 mcg   propofol (Diprivan) injection 10 mg/mL 80 mg   rocuronium (ZeMuron) 50 mg/5 mL injection 130 mg   vasopressin (Vasostrict) 1 unit/mL in NS IV bolus 5 Units   glycopyrrolate (Robinul) 0.6 mg/3 mL (0.2 mg/mL) injection syringe 0.2 mg 0.2 mg   heparin (porcine) injection 5,000 Units 5,000 Units   ropivacaine (Naropin) 5 mg/mL (0.5 %) injection 30 mL 30 mL              Anesthesia Record               Intraprocedure I/O Totals          Intake    PLASMA 300.00 mL    PLATELETS 250.00 mL    PRBC 1050.00 mL    Dexmedetomidine 0.00 mL    The total shown is the total volume documented since Anesthesia Start was filed.    LR bolus 1500.00 mL    electrolyte-A (Plasmalyte-A) 1500.00 mL    Norepinephrine Drip 0.00 mL    The total shown is the total volume documented since Anesthesia Start was filed.    LR infusion 824.17 mL    Vasopressin Drip 0.00 mL    The total shown is the total volume documented since Anesthesia Start was filed.    Total Intake 5424.17 mL       Output    Urine 1535 mL    Total Output 1535 mL       Net    Net Volume 3889.17 mL          Specimen:   ID Type Source Tests Collected by Time   1 : GALLBLADDER Tissue GALLBLADDER CHOLECYSTECTOMY SURGICAL PATHOLOGY EXAM Baron Erazo MD 7/29/2025 1357   2 : PORTAL TISSUE Tissue SOFT TISSUE RESECTION SURGICAL PATHOLOGY EXAM Baron Erazo MD 7/29/2025 1415   3 : ADDITIONAL CYSTIC DUCT Tissue SOFT TISSUE RESECTION SURGICAL PATHOLOGY EXAM Baron Erazo MD 7/29/2025 1437   4 : RIGHT HEPATECTOMY WITH PORTION SEGMENT 4 Tissue LIVER PARTIAL HEPATECTOMY SURGICAL PATHOLOGY EXAM Baron Erazo MD 7/29/2025 1720   5 : SEGMENT 4 NODULE Tissue LIVER PARTIAL HEPATECTOMY SURGICAL PATHOLOGY EXAM Baron Erazo MD 7/29/2025 1722   6 : THICKEND TISSUE RIGHT ABDOMINAL WALL Tissue SOFT TISSUE RESECTION SURGICAL PATHOLOGY EXAM Baron Erazo MD 7/29/2025 1747                  Findings: Lesions found in  segment IV, extended right hepatectomy + cholecystectomy performed. X1 CIRILO drain left in RUQ.     Complications:  None; patient tolerated the procedure well.     Disposition: ICU - intubated and hemodynamically stable.  Condition: stable  Specimens Collected:   ID Type Source Tests Collected by Time   1 : GALLBLADDER Tissue GALLBLADDER CHOLECYSTECTOMY SURGICAL PATHOLOGY EXAM Baron Erazo MD 7/29/2025 1357   2 : PORTAL TISSUE Tissue SOFT TISSUE RESECTION SURGICAL PATHOLOGY EXAM Baron Erazo MD 7/29/2025 1415   3 : ADDITIONAL CYSTIC DUCT Tissue SOFT TISSUE RESECTION SURGICAL PATHOLOGY EXAM Baron Erazo MD 7/29/2025 1437   4 : RIGHT HEPATECTOMY WITH PORTION SEGMENT 4 Tissue LIVER PARTIAL HEPATECTOMY SURGICAL PATHOLOGY EXAM Baron Erazo MD 7/29/2025 1720   5 : SEGMENT 4 NODULE Tissue LIVER PARTIAL HEPATECTOMY SURGICAL PATHOLOGY EXAM Baron Erazo MD 7/29/2025 1722   6 : THICKEND TISSUE RIGHT ABDOMINAL WALL Tissue SOFT TISSUE RESECTION SURGICAL PATHOLOGY EXAM Baron Erazo MD 7/29/2025 1746     Attending Attestation:     Baron Erazo  Phone Number: [phone]

## 2025-07-29 NOTE — PROCEDURES
Peripheral Block    Patient location during procedure: pre-op  Medication administered at: 7/29/2025 8:58 AM  End time: 7/29/2025 9:08 AM  Reason for block: at surgeon's request, post-op pain management and acute pain service  Staffing  Performed: resident and attending   Authorized by: Germain Barrera DO    Performed by: Maciej Estrada DO  Preanesthetic Checklist  Completed: patient identified, IV checked, site marked, risks and benefits discussed, surgical consent, monitors and equipment checked, pre-op evaluation and timeout performed   Timeout performed at: 7/29/2025 8:58 AM  Peripheral Block  Patient position: sitting  Prep: ChloraPrep  Patient monitoring: heart rate and continuous pulse ox  Block type: GLEN  Laterality: B/L  Injection technique: catheter  Guidance: ultrasound guided  Local infiltration: lidocaine  Infiltration strength: 1 %  Dose: 3 mL  Needle  Needle type: Tuohy   Needle gauge: 22 G  Needle length: 8 cm  Needle localization: ultrasound guidance       image stored in chart  Assessment  Injection assessment: negative aspiration for heme, no paresthesia on injection, incremental injection and local visualized surrounding nerve on ultrasound  Additional Notes  Erector spinae plane block:     Prior to procedure: Following a focused history, procedure-related and patient-specific complications were discussed. Risks, benefits, and alternatives were explained. Informed, written consent was provided by the patient and/or surrogate decision maker for the block. Anticoagulation (if any) was held per PENNY guidelines. ASA monitors were applied. Patient was positioned, prepped with chlorhexidine, and draped with sterile towels.     Ultrasound guidance was used to visualize the erector spinae muscle above the TP/costal junction at T7. Skin was numbed with 1% lidocaine. Needle was inserted and advanced towards target with visualization of the needle throughout duration of the procedure. A total of 30 cc of  0.5% ropivacaine, was divided and injected bilaterally. Catheters threaded and secured. Patient tolerated procedure well.    Timeout by PACU RN    Medications Administered  0.2 mg glycopyrrolate 0.6 mg/3 mL (0.2 mg/mL); 30 mL ropivacaine 5 mg/mL (0.5 %)

## 2025-07-30 ENCOUNTER — APPOINTMENT (OUTPATIENT)
Dept: RADIOLOGY | Facility: HOSPITAL | Age: 70
End: 2025-07-30
Payer: MEDICARE

## 2025-07-30 PROCEDURE — 71045 X-RAY EXAM CHEST 1 VIEW: CPT

## 2025-07-30 SDOH — HEALTH STABILITY: MENTAL HEALTH: HOW OFTEN DO YOU HAVE A DRINK CONTAINING ALCOHOL?: NEVER

## 2025-07-30 SDOH — ECONOMIC STABILITY: FOOD INSECURITY: WITHIN THE PAST 12 MONTHS, THE FOOD YOU BOUGHT JUST DIDN'T LAST AND YOU DIDN'T HAVE MONEY TO GET MORE.: NEVER TRUE

## 2025-07-30 SDOH — ECONOMIC STABILITY: HOUSING INSECURITY: AT ANY TIME IN THE PAST 12 MONTHS, WERE YOU HOMELESS OR LIVING IN A SHELTER (INCLUDING NOW)?: NO

## 2025-07-30 SDOH — SOCIAL STABILITY: SOCIAL NETWORK: HOW OFTEN DO YOU ATTEND CHURCH OR RELIGIOUS SERVICES?: NEVER

## 2025-07-30 SDOH — SOCIAL STABILITY: SOCIAL INSECURITY: WITHIN THE LAST YEAR, HAVE YOU BEEN HUMILIATED OR EMOTIONALLY ABUSED IN OTHER WAYS BY YOUR PARTNER OR EX-PARTNER?: NO

## 2025-07-30 SDOH — SOCIAL STABILITY: SOCIAL NETWORK: HOW OFTEN DO YOU ATTEND MEETINGS OF THE CLUBS OR ORGANIZATIONS YOU BELONG TO?: NEVER

## 2025-07-30 SDOH — ECONOMIC STABILITY: INCOME INSECURITY: IN THE PAST 12 MONTHS HAS THE ELECTRIC, GAS, OIL, OR WATER COMPANY THREATENED TO SHUT OFF SERVICES IN YOUR HOME?: NO

## 2025-07-30 SDOH — SOCIAL STABILITY: SOCIAL NETWORK: HOW OFTEN DO YOU GET TOGETHER WITH FRIENDS OR RELATIVES?: ONCE A WEEK

## 2025-07-30 SDOH — HEALTH STABILITY: MENTAL HEALTH: HOW MANY DRINKS CONTAINING ALCOHOL DO YOU HAVE ON A TYPICAL DAY WHEN YOU ARE DRINKING?: PATIENT DOES NOT DRINK

## 2025-07-30 SDOH — HEALTH STABILITY: PHYSICAL HEALTH
HOW OFTEN DO YOU NEED TO HAVE SOMEONE HELP YOU WHEN YOU READ INSTRUCTIONS, PAMPHLETS, OR OTHER WRITTEN MATERIAL FROM YOUR DOCTOR OR PHARMACY?: NEVER

## 2025-07-30 SDOH — ECONOMIC STABILITY: FOOD INSECURITY: HOW HARD IS IT FOR YOU TO PAY FOR THE VERY BASICS LIKE FOOD, HOUSING, MEDICAL CARE, AND HEATING?: SOMEWHAT HARD

## 2025-07-30 SDOH — ECONOMIC STABILITY: TRANSPORTATION INSECURITY: IN THE PAST 12 MONTHS, HAS LACK OF TRANSPORTATION KEPT YOU FROM MEDICAL APPOINTMENTS OR FROM GETTING MEDICATIONS?: NO

## 2025-07-30 SDOH — ECONOMIC STABILITY: FOOD INSECURITY: WITHIN THE PAST 12 MONTHS, YOU WORRIED THAT YOUR FOOD WOULD RUN OUT BEFORE YOU GOT THE MONEY TO BUY MORE.: NEVER TRUE

## 2025-07-30 SDOH — HEALTH STABILITY: MENTAL HEALTH
DO YOU FEEL STRESS - TENSE, RESTLESS, NERVOUS, OR ANXIOUS, OR UNABLE TO SLEEP AT NIGHT BECAUSE YOUR MIND IS TROUBLED ALL THE TIME - THESE DAYS?: NOT AT ALL

## 2025-07-30 SDOH — ECONOMIC STABILITY: HOUSING INSECURITY: IN THE LAST 12 MONTHS, WAS THERE A TIME WHEN YOU WERE NOT ABLE TO PAY THE MORTGAGE OR RENT ON TIME?: NO

## 2025-07-30 SDOH — SOCIAL STABILITY: SOCIAL INSECURITY: ARE YOU MARRIED, WIDOWED, DIVORCED, SEPARATED, NEVER MARRIED, OR LIVING WITH A PARTNER?: MARRIED

## 2025-07-30 SDOH — HEALTH STABILITY: MENTAL HEALTH: HOW OFTEN DO YOU HAVE SIX OR MORE DRINKS ON ONE OCCASION?: NEVER

## 2025-07-30 SDOH — SOCIAL STABILITY: SOCIAL NETWORK
DO YOU BELONG TO ANY CLUBS OR ORGANIZATIONS SUCH AS CHURCH GROUPS, UNIONS, FRATERNAL OR ATHLETIC GROUPS, OR SCHOOL GROUPS?: YES

## 2025-07-30 SDOH — HEALTH STABILITY: PHYSICAL HEALTH: ON AVERAGE, HOW MANY DAYS PER WEEK DO YOU ENGAGE IN MODERATE TO STRENUOUS EXERCISE (LIKE A BRISK WALK)?: 5 DAYS

## 2025-07-30 SDOH — SOCIAL STABILITY: SOCIAL INSECURITY: WITHIN THE LAST YEAR, HAVE YOU BEEN AFRAID OF YOUR PARTNER OR EX-PARTNER?: NO

## 2025-07-30 SDOH — HEALTH STABILITY: PHYSICAL HEALTH: ON AVERAGE, HOW MANY MINUTES DO YOU ENGAGE IN EXERCISE AT THIS LEVEL?: 60 MIN

## 2025-07-30 ASSESSMENT — LIFESTYLE VARIABLES
SKIP TO QUESTIONS 9-10: 1
AUDIT-C TOTAL SCORE: 0

## 2025-07-30 ASSESSMENT — PAIN - FUNCTIONAL ASSESSMENT
PAIN_FUNCTIONAL_ASSESSMENT: CPOT (CRITICAL CARE PAIN OBSERVATION TOOL)
PAIN_FUNCTIONAL_ASSESSMENT: 0-10
PAIN_FUNCTIONAL_ASSESSMENT: CPOT (CRITICAL CARE PAIN OBSERVATION TOOL)

## 2025-07-30 ASSESSMENT — PAIN SCALES - GENERAL
PAINLEVEL_OUTOF10: 6
PAINLEVEL_OUTOF10: 0 - NO PAIN
PAINLEVEL_OUTOF10: 7
PAINLEVEL_OUTOF10: 6
PAINLEVEL_OUTOF10: 3

## 2025-07-30 ASSESSMENT — PAIN DESCRIPTION - LOCATION
LOCATION: ABDOMEN

## 2025-07-30 ASSESSMENT — PAIN DESCRIPTION - DESCRIPTORS
DESCRIPTORS: ACHING;DISCOMFORT
DESCRIPTORS: ACHING;DISCOMFORT

## 2025-07-30 ASSESSMENT — ACTIVITIES OF DAILY LIVING (ADL)
LACK_OF_TRANSPORTATION: NO
LACK_OF_TRANSPORTATION: NO

## 2025-07-30 ASSESSMENT — PAIN DESCRIPTION - ORIENTATION: ORIENTATION: MID

## 2025-07-30 NOTE — PROGRESS NOTES
Postop Pain HPI -   Palliative: relieved with IV analgesics and regional local anesthetics  Provocative: movement  Quality:  burning and aching  Radiation:  none  Severity:  5/10  Timing: constant    24-HOUR OPIOID CONSUMPTION:  NONE    Scheduled medications  Scheduled Medications[1]  Continuous medications  Continuous Medications[2]  PRN medications  PRN Medications[3]     Physical Exam:  Constitutional:  no distress, alert and cooperative  Eyes: clear sclera  Head/Neck: No apparent injury, trachea midline  Respiratory/Thorax: Patent airways, thorax symmetric, breathing comfortably  Cardiovascular: no pitting edema  Gastrointestinal: Nondistended  Musculoskeletal: ROM intact  Extremities: no clubbing  Neurological: alert, mejia x4  Psychological: Appropriate affect    Results for orders placed or performed during the hospital encounter of 07/29/25 (from the past 24 hours)   Type And Screen   Result Value Ref Range    ABO TYPE AB     Rh TYPE NEG     ANTIBODY SCREEN NEG    Prepare RBC: 4 Units   Result Value Ref Range    PRODUCT CODE N6480Y46     Unit Number [id number]     Unit ABO B     Unit RH NEG     XM INTEP COMP     Dispense Status TR     Blood Expiration Date 8/26/2025 11:59:00 PM EDT     PRODUCT BLOOD TYPE 1700     UNIT VOLUME 350     PRODUCT CODE B3045B26     Unit Number [id number]     Unit ABO B     Unit RH NEG     XM INTEP COMP     Dispense Status TR     Blood Expiration Date 8/26/2025 11:59:00 PM EDT     PRODUCT BLOOD TYPE 1700     UNIT VOLUME 276     PRODUCT CODE O4901H82     Unit Number [id number]     Unit ABO B     Unit RH NEG     XM INTEP COMP     Dispense Status TR     Blood Expiration Date 8/26/2025 11:59:00 PM EDT     PRODUCT BLOOD TYPE 1700     UNIT VOLUME 295     PRODUCT CODE V0036U56     Unit Number [id number]     Unit ABO B     Unit RH NEG     XM INTEP COMP     Dispense Status TR     Blood Expiration Date 8/26/2025 11:59:00 PM EDT     PRODUCT BLOOD TYPE 1700     UNIT VOLUME 313     Prepare Platelets: 2 Units   Result Value Ref Range    PRODUCT CODE V4803Z46     Unit Number [id number]     Unit ABO AB     Unit RH POS     Dispense Status RE     Blood Expiration Date 7/29/2025 11:59:00 PM EDT     PRODUCT BLOOD TYPE 8400     UNIT VOLUME 180     PRODUCT CODE M2395G23     Unit Number [id number]     Unit ABO AB     Unit RH POS     Dispense Status RE     Blood Expiration Date 7/29/2025 11:59:00 PM EDT     PRODUCT BLOOD TYPE 8400     UNIT VOLUME 180    Prepare Plasma: 4 Units   Result Value Ref Range    PRODUCT CODE S2767Y82     Unit Number [id number]     Unit ABO AB     Unit RH POS     Dispense Status TR     Blood Expiration Date 8/3/2025 10:36:00 AM EDT     PRODUCT BLOOD TYPE 8400     UNIT VOLUME 346     PRODUCT CODE C1327P89     Unit Number [id number]     Unit ABO AB     Unit RH POS     Dispense Status RE     Blood Expiration Date 8/3/2025 10:36:00 AM EDT     PRODUCT BLOOD TYPE 8400     UNIT VOLUME 299     PRODUCT CODE C3340X12     Unit Number [id number]     Unit ABO AB     Unit RH POS     Dispense Status TR     Blood Expiration Date 8/3/2025 10:36:00 AM EDT     PRODUCT BLOOD TYPE 8400     UNIT VOLUME 304     PRODUCT CODE I0133K25     Unit Number [id number]     Unit ABO AB     Unit RH POS     Dispense Status RE     Blood Expiration Date 8/3/2025 10:36:00 AM EDT     PRODUCT BLOOD TYPE 8400     UNIT VOLUME 348    Prepare Platelets   Result Value Ref Range    PRODUCT CODE H9803S96     Unit Number [id number]     Unit ABO A     Unit RH POS     Dispense Status TR     Blood Expiration Date 7/30/2025 11:59:00 PM EDT     PRODUCT BLOOD TYPE 6200     UNIT VOLUME 286    Blood Gas Arterial Full Panel   Result Value Ref Range    POCT pH, Arterial 7.46 (H) 7.38 - 7.42 pH    POCT pCO2, Arterial 36 (L) 38 - 42 mm Hg    POCT pO2, Arterial 170 (H) 85 - 95 mm Hg    POCT SO2, Arterial 100 94 - 100 %    POCT Oxy Hemoglobin, Arterial 97.7 94.0 - 98.0 %    POCT Hematocrit Calculated,  Arterial 28.0 (L) 41.0 - 52.0 %    POCT Sodium, Arterial 138 136 - 145 mmol/L    POCT Potassium, Arterial 3.9 3.5 - 5.3 mmol/L    POCT Chloride, Arterial 107 98 - 107 mmol/L    POCT Ionized Calcium, Arterial 1.14 1.10 - 1.33 mmol/L    POCT Glucose, Arterial 110 (H) 74 - 99 mg/dL    POCT Lactate, Arterial 1.5 0.4 - 2.0 mmol/L    POCT Base Excess, Arterial 1.8 -2.0 - 3.0 mmol/L    POCT HCO3 Calculated, Arterial 25.6 22.0 - 26.0 mmol/L    POCT Hemoglobin, Arterial 9.3 (L) 13.5 - 17.5 g/dL    POCT Anion Gap, Arterial 9 (L) 10 - 25 mmo/L    Patient Temperature 37.0 degrees Celsius    FiO2 40 %   Blood Gas Arterial Full Panel   Result Value Ref Range    POCT pH, Arterial 7.41 7.38 - 7.42 pH    POCT pCO2, Arterial 38 38 - 42 mm Hg    POCT pO2, Arterial 169 (H) 85 - 95 mm Hg    POCT SO2, Arterial 100 94 - 100 %    POCT Oxy Hemoglobin, Arterial 97.3 94.0 - 98.0 %    POCT Hematocrit Calculated, Arterial 32.0 (L) 41.0 - 52.0 %    POCT Sodium, Arterial 134 (L) 136 - 145 mmol/L    POCT Potassium, Arterial 4.7 3.5 - 5.3 mmol/L    POCT Chloride, Arterial 105 98 - 107 mmol/L    POCT Ionized Calcium, Arterial 1.07 (L) 1.10 - 1.33 mmol/L    POCT Glucose, Arterial 138 (H) 74 - 99 mg/dL    POCT Lactate, Arterial 2.9 (H) 0.4 - 2.0 mmol/L    POCT Base Excess, Arterial -0.4 -2.0 - 3.0 mmol/L    POCT HCO3 Calculated, Arterial 24.1 22.0 - 26.0 mmol/L    POCT Hemoglobin, Arterial 10.6 (L) 13.5 - 17.5 g/dL    POCT Anion Gap, Arterial 10 10 - 25 mmo/L    Patient Temperature 37.0 degrees Celsius    FiO2 40 %   Blood Gas Arterial Full Panel Unsolicited   Result Value Ref Range    POCT pH, Arterial 7.41 7.38 - 7.42 pH    POCT pCO2, Arterial 38 38 - 42 mm Hg    POCT pO2, Arterial 155 (H) 85 - 95 mm Hg    POCT SO2, Arterial 99 94 - 100 %    POCT Oxy Hemoglobin, Arterial 96.0 94.0 - 98.0 %    POCT Hematocrit Calculated, Arterial 17.0 (L) 41.0 - 52.0 %    POCT Sodium, Arterial 135 (L) 136 - 145 mmol/L    POCT Potassium, Arterial 5.1 3.5 - 5.3 mmol/L     POCT Chloride, Arterial 108 (H) 98 - 107 mmol/L    POCT Ionized Calcium, Arterial 1.02 (L) 1.10 - 1.33 mmol/L    POCT Glucose, Arterial 123 (H) 74 - 99 mg/dL    POCT Lactate, Arterial 4.5 (HH) 0.4 - 2.0 mmol/L    POCT Base Excess, Arterial -0.5 -2.0 - 3.0 mmol/L    POCT HCO3 Calculated, Arterial 24.1 22.0 - 26.0 mmol/L    POCT Hemoglobin, Arterial 5.7 (LL) 13.5 - 17.5 g/dL    POCT Anion Gap, Arterial 8 (L) 10 - 25 mmo/L    Patient Temperature 37.0 degrees Celsius   Coox Panel, Arterial Unsolicited   Result Value Ref Range    POCT Hemoglobin, Arterial 5.7 (LL) 13.5 - 17.5 g/dL    POCT Oxy Hemoglobin, Arterial 96.0 94.0 - 98.0 %    POCT Carboxyhemoglobin, Arterial 2.2 %    POCT Methemoglobin, Arterial 1.1 0.0 - 1.5 %    POCT Deoxy Hemoglobin, Arterial 0.7 0.0 - 5.0 %   Blood Gas Arterial Full Panel Unsolicited   Result Value Ref Range    POCT pH, Arterial 7.37 (L) 7.38 - 7.42 pH    POCT pCO2, Arterial 41 38 - 42 mm Hg    POCT pO2, Arterial 170 (H) 85 - 95 mm Hg    POCT SO2, Arterial 99 94 - 100 %    POCT Oxy Hemoglobin, Arterial 97.3 94.0 - 98.0 %    POCT Hematocrit Calculated, Arterial 25.0 (L) 41.0 - 52.0 %    POCT Sodium, Arterial 136 136 - 145 mmol/L    POCT Potassium, Arterial 5.5 (H) 3.5 - 5.3 mmol/L    POCT Chloride, Arterial 106 98 - 107 mmol/L    POCT Ionized Calcium, Arterial 1.32 1.10 - 1.33 mmol/L    POCT Glucose, Arterial 141 (H) 74 - 99 mg/dL    POCT Lactate, Arterial 5.4 (HH) 0.4 - 2.0 mmol/L    POCT Base Excess, Arterial -1.5 -2.0 - 3.0 mmol/L    POCT HCO3 Calculated, Arterial 23.7 22.0 - 26.0 mmol/L    POCT Hemoglobin, Arterial 8.3 (L) 13.5 - 17.5 g/dL    POCT Anion Gap, Arterial 12 10 - 25 mmo/L    Patient Temperature 37.0 degrees Celsius    FiO2 40 %   Coox Panel, Arterial Unsolicited   Result Value Ref Range    POCT Hemoglobin, Arterial 8.3 (L) 13.5 - 17.5 g/dL    POCT Oxy Hemoglobin, Arterial 97.3 94.0 - 98.0 %    POCT Carboxyhemoglobin, Arterial 1.2 %    POCT Methemoglobin, Arterial 0.4 0.0 -  1.5 %    POCT Deoxy Hemoglobin, Arterial 1.0 0.0 - 5.0 %   Blood Gas Arterial Full Panel   Result Value Ref Range    POCT pH, Arterial 7.45 (H) 7.38 - 7.42 pH    POCT pCO2, Arterial 31 (L) 38 - 42 mm Hg    POCT pO2, Arterial 239 (H) 85 - 95 mm Hg    POCT SO2, Arterial 100 94 - 100 %    POCT Oxy Hemoglobin, Arterial 96.7 94.0 - 98.0 %    POCT Hematocrit Calculated, Arterial 22.0 (L) 41.0 - 52.0 %    POCT Sodium, Arterial 135 (L) 136 - 145 mmol/L    POCT Potassium, Arterial 4.8 3.5 - 5.3 mmol/L    POCT Chloride, Arterial 105 98 - 107 mmol/L    POCT Ionized Calcium, Arterial 1.15 1.10 - 1.33 mmol/L    POCT Glucose, Arterial 123 (H) 74 - 99 mg/dL    POCT Lactate, Arterial 5.7 (HH) 0.4 - 2.0 mmol/L    POCT Base Excess, Arterial -2.2 (L) -2.0 - 3.0 mmol/L    POCT HCO3 Calculated, Arterial 21.5 (L) 22.0 - 26.0 mmol/L    POCT Hemoglobin, Arterial 7.2 (L) 13.5 - 17.5 g/dL    POCT Anion Gap, Arterial 13 10 - 25 mmo/L    Patient Temperature 37.0 degrees Celsius    FiO2 50 %   Magnesium   Result Value Ref Range    Magnesium 1.85 1.60 - 2.40 mg/dL   Coagulation Screen   Result Value Ref Range    Protime 17.1 (H) 9.8 - 12.4 seconds    INR 1.5 (H) 0.9 - 1.1    aPTT 28 26 - 36 seconds   CBC   Result Value Ref Range    WBC 5.0 4.4 - 11.3 x10*3/uL    nRBC 0.0 0.0 - 0.0 /100 WBCs    RBC 2.57 (L) 4.50 - 5.90 x10*6/uL    Hemoglobin 6.8 (L) 13.5 - 17.5 g/dL    Hematocrit 21.6 (L) 41.0 - 52.0 %    MCV 84 80 - 100 fL    MCH 26.5 26.0 - 34.0 pg    MCHC 31.5 (L) 32.0 - 36.0 g/dL    RDW 17.6 (H) 11.5 - 14.5 %    Platelets 99 (L) 150 - 450 x10*3/uL   Calcium, Ionized   Result Value Ref Range    POCT Calcium, Ionized 1.15 1.1 - 1.33 mmol/L   Hepatic function panel   Result Value Ref Range    Albumin 2.8 (L) 3.4 - 5.0 g/dL    Bilirubin, Total 2.2 (H) 0.0 - 1.2 mg/dL    Bilirubin, Direct 0.9 (H) 0.0 - 0.3 mg/dL    Alkaline Phosphatase 217 (H) 33 - 136 U/L     (H) 10 - 52 U/L     (H) 9 - 39 U/L    Total Protein 5.2 (L) 6.4 - 8.2 g/dL    TEG Clot Global Profile   Result Value Ref Range    R (Reaction Time) K 6.2 4.6 - 9.1 min    K (Clot Kinetics) 1.0 0.8 - 2.1 min    ANGLE 75.4 63.0 - 78.0 deg    MA (Max Amplitude) K 63.5 52.0 - 69.0 mm    R (Reaction Time) KH 5.7 4.3 - 8.3 min    MA (Max Amplitude) RT 63.4 52.0 - 70.0 mm    MA ( Gino Amplitude) FF 27.7 15.0 - 32.0 mm    FLEV 506 278 - 581 mg/dL   Basic Metabolic Panel   Result Value Ref Range    Glucose 121 (H) 74 - 99 mg/dL    Sodium 141 136 - 145 mmol/L    Potassium 4.7 3.5 - 5.3 mmol/L    Chloride 101 98 - 107 mmol/L    Bicarbonate 25 21 - 32 mmol/L    Anion Gap 20 10 - 20 mmol/L    Urea Nitrogen 27 (H) 6 - 23 mg/dL    Creatinine 1.18 0.50 - 1.30 mg/dL    eGFR 66 >60 mL/min/1.73m*2    Calcium 8.8 8.6 - 10.6 mg/dL   Phosphorus   Result Value Ref Range    Phosphorus 5.4 (H) 2.5 - 4.9 mg/dL   Prepare RBC: 2 Units   Result Value Ref Range    PRODUCT CODE M5045Y85     Unit Number [id number]     Unit ABO B     Unit RH NEG     XM INTEP COMP     Dispense Status IS     Blood Expiration Date 8/26/2025 11:59:00 PM EDT     PRODUCT BLOOD TYPE 1700     UNIT VOLUME 295     PRODUCT CODE O8005G95     Unit Number [id number]     Unit ABO O     Unit RH NEG     XM INTEP COMP     Dispense Status IS     Blood Expiration Date 8/17/2025 11:59:00 PM EDT     PRODUCT BLOOD TYPE 9500     UNIT VOLUME 272    Prepare Plasma: 2 Units   Result Value Ref Range    PRODUCT CODE C5911L67     Unit Number [id number]     Unit ABO AB     Unit RH POS     Dispense Status IS     Blood Expiration Date 8/3/2025 10:36:00 AM EDT     PRODUCT BLOOD TYPE 8400     UNIT VOLUME 304    Prepare RBC: 4 Units   Result Value Ref Range    PRODUCT CODE I0900I98     Unit Number [id number]     Unit ABO AB     Unit RH NEG     XM INTEP COMP     Dispense Status RE     Blood Expiration Date 8/4/2025 11:59:00 PM EDT     PRODUCT BLOOD TYPE 2800     UNIT VOLUME 350     PRODUCT CODE J8499I32     Unit Number [id number]     Unit ABO AB      Unit RH NEG     XM INTEP COMP     Dispense Status RE     Blood Expiration Date 8/18/2025 11:59:00 PM EDT     PRODUCT BLOOD TYPE 2800     UNIT VOLUME 350     PRODUCT CODE C4836P82     Unit Number [id number]     Unit ABO AB     Unit RH NEG     XM INTEP COMP     Dispense Status RE     Blood Expiration Date 8/19/2025 11:59:00 PM EDT     PRODUCT BLOOD TYPE 2800     UNIT VOLUME 350     PRODUCT CODE E0657D42     Unit Number [id number]     Unit ABO O     Unit RH NEG     XM INTEP COMP     Dispense Status XM     Blood Expiration Date 8/18/2025 11:59:00 PM EDT     PRODUCT BLOOD TYPE 9500     UNIT VOLUME 284    Blood Gas Arterial Full Panel   Result Value Ref Range    POCT pH, Arterial 7.45 (H) 7.38 - 7.42 pH    POCT pCO2, Arterial 32 (L) 38 - 42 mm Hg    POCT pO2, Arterial 241 (H) 85 - 95 mm Hg    POCT SO2, Arterial 100 94 - 100 %    POCT Oxy Hemoglobin, Arterial 97.6 94.0 - 98.0 %    POCT Hematocrit Calculated, Arterial 22.0 (L) 41.0 - 52.0 %    POCT Sodium, Arterial 137 136 - 145 mmol/L    POCT Potassium, Arterial 4.3 3.5 - 5.3 mmol/L    POCT Chloride, Arterial 106 98 - 107 mmol/L    POCT Ionized Calcium, Arterial 1.11 1.10 - 1.33 mmol/L    POCT Glucose, Arterial 123 (H) 74 - 99 mg/dL    POCT Lactate, Arterial 5.0 (HH) 0.4 - 2.0 mmol/L    POCT Base Excess, Arterial -1.5 -2.0 - 3.0 mmol/L    POCT HCO3 Calculated, Arterial 22.2 22.0 - 26.0 mmol/L    POCT Hemoglobin, Arterial 7.2 (L) 13.5 - 17.5 g/dL    POCT Anion Gap, Arterial 13 10 - 25 mmo/L    Patient Temperature 37.0 degrees Celsius    FiO2 50 %   POCT GLUCOSE   Result Value Ref Range    POCT Glucose 132 (H) 74 - 99 mg/dL   Type and screen   Result Value Ref Range    ABO TYPE AB     Rh TYPE NEG     ANTIBODY SCREEN NEG    Hepatic function panel   Result Value Ref Range    Albumin 3.4 3.4 - 5.0 g/dL    Bilirubin, Total 3.3 (H) 0.0 - 1.2 mg/dL    Bilirubin, Direct 1.3 (H) 0.0 - 0.3 mg/dL    Alkaline Phosphatase 169 (H) 33 - 136 U/L     (H) 10 - 52 U/L    AST  414 (H) 9 - 39 U/L    Total Protein 5.4 (L) 6.4 - 8.2 g/dL   Magnesium   Result Value Ref Range    Magnesium 1.79 1.60 - 2.40 mg/dL   Coagulation Screen   Result Value Ref Range    Protime 18.4 (H) 9.8 - 12.4 seconds    INR 1.7 (H) 0.9 - 1.1    aPTT 27 26 - 36 seconds   CBC   Result Value Ref Range    WBC 2.7 (L) 4.4 - 11.3 x10*3/uL    nRBC 0.0 0.0 - 0.0 /100 WBCs    RBC 2.70 (L) 4.50 - 5.90 x10*6/uL    Hemoglobin 7.2 (L) 13.5 - 17.5 g/dL    Hematocrit 23.2 (L) 41.0 - 52.0 %    MCV 86 80 - 100 fL    MCH 26.7 26.0 - 34.0 pg    MCHC 31.0 (L) 32.0 - 36.0 g/dL    RDW 17.5 (H) 11.5 - 14.5 %    Platelets 59 (L) 150 - 450 x10*3/uL   Calcium, Ionized   Result Value Ref Range    POCT Calcium, Ionized 1.09 (L) 1.1 - 1.33 mmol/L   Basic Metabolic Panel   Result Value Ref Range    Glucose 153 (H) 74 - 99 mg/dL    Sodium 141 136 - 145 mmol/L    Potassium 4.3 3.5 - 5.3 mmol/L    Chloride 102 98 - 107 mmol/L    Bicarbonate 25 21 - 32 mmol/L    Anion Gap 18 10 - 20 mmol/L    Urea Nitrogen 27 (H) 6 - 23 mg/dL    Creatinine 1.26 0.50 - 1.30 mg/dL    eGFR 61 >60 mL/min/1.73m*2    Calcium 8.6 8.6 - 10.6 mg/dL   Phosphorus   Result Value Ref Range    Phosphorus 5.8 (H) 2.5 - 4.9 mg/dL   POCT GLUCOSE   Result Value Ref Range    POCT Glucose 149 (H) 74 - 99 mg/dL   Blood Gas Arterial Full Panel   Result Value Ref Range    POCT pH, Arterial 7.48 (H) 7.38 - 7.42 pH    POCT pCO2, Arterial 30 (L) 38 - 42 mm Hg    POCT pO2, Arterial 195 (H) 85 - 95 mm Hg    POCT SO2, Arterial 100 94 - 100 %    POCT Oxy Hemoglobin, Arterial 98.1 (H) 94.0 - 98.0 %    POCT Hematocrit Calculated, Arterial 24.0 (L) 41.0 - 52.0 %    POCT Sodium, Arterial 139 136 - 145 mmol/L    POCT Potassium, Arterial 4.5 3.5 - 5.3 mmol/L    POCT Chloride, Arterial 105 98 - 107 mmol/L    POCT Ionized Calcium, Arterial 1.12 1.10 - 1.33 mmol/L    POCT Glucose, Arterial 137 (H) 74 - 99 mg/dL    POCT Lactate, Arterial 4.8 (HH) 0.4 - 2.0 mmol/L    POCT Base Excess, Arterial -0.9 -2.0 -  3.0 mmol/L    POCT HCO3 Calculated, Arterial 22.3 22.0 - 26.0 mmol/L    POCT Hemoglobin, Arterial 8.0 (L) 13.5 - 17.5 g/dL    POCT Anion Gap, Arterial 16 10 - 25 mmo/L    Patient Temperature 37.0 degrees Celsius    FiO2 40 %   POCT GLUCOSE   Result Value Ref Range    POCT Glucose 126 (H) 74 - 99 mg/dL   Blood Gas Arterial Full Panel   Result Value Ref Range    POCT pH, Arterial 7.50 (H) 7.38 - 7.42 pH    POCT pCO2, Arterial 28 (L) 38 - 42 mm Hg    POCT pO2, Arterial 189 (H) 85 - 95 mm Hg    POCT SO2, Arterial 100 94 - 100 %    POCT Oxy Hemoglobin, Arterial 98.0 94.0 - 98.0 %    POCT Hematocrit Calculated, Arterial 30.0 (L) 41.0 - 52.0 %    POCT Sodium, Arterial 136 136 - 145 mmol/L    POCT Potassium, Arterial 4.2 3.5 - 5.3 mmol/L    POCT Chloride, Arterial 107 98 - 107 mmol/L    POCT Ionized Calcium, Arterial 1.04 (L) 1.10 - 1.33 mmol/L    POCT Glucose, Arterial 124 (H) 74 - 99 mg/dL    POCT Lactate, Arterial 4.9 (HH) 0.4 - 2.0 mmol/L    POCT Base Excess, Arterial -0.7 -2.0 - 3.0 mmol/L    POCT HCO3 Calculated, Arterial 21.8 (L) 22.0 - 26.0 mmol/L    POCT Hemoglobin, Arterial 9.9 (L) 13.5 - 17.5 g/dL    POCT Anion Gap, Arterial 11 10 - 25 mmo/L    Patient Temperature 37.0 degrees Celsius    FiO2 40 %   Calcium, Ionized   Result Value Ref Range    POCT Calcium, Ionized 1.13 1.1 - 1.33 mmol/L        Plan:  - Bilateral GLEN blocks with catheters performed preoperatively on 7/29  - Ambit ball with Ropivacaine 0.2%/NaCl 0.9% 500mL, Rate 7 cc/hr bilaterally  - Refill ambit today  - Please notify Acute Pain service before initiation of anticoagulation and/or dual antiplatelet therapy if patient has indwelling nerve block catheter(s) in place  - Ambit medication will not interfere with pain medication prescribed by the primary team.   - Please be aware of local anesthetic toxic dose and absorption variability before considering lidocaine patches  - Acute pain service will follow while catheters in place  - Rest of pain  management per primary team    Acute Pain Resident  pg [phone] ph [phone]       [1] ceFAZolin, 2 g, intravenous, q8h  [Transfer Hold] insulin regular, 0-5 Units, subcutaneous, q4h  [Transfer Hold] pantoprazole, 40 mg, oral, Daily before breakfast   Or  [Transfer Hold] pantoprazole, 40 mg, intravenous, Daily before breakfast  pantoprazole, 40 mg, oral, Daily before breakfast   Or  pantoprazole, 40 mg, intravenous, Daily before breakfast  [2] lactated Ringer's, 50 mL/hr, Last Rate: 50 mL/hr (07/30/25 0400)  norepinephrine, 0-0.5 mcg/kg/min, Last Rate: Stopped (07/30/25 0000)  propofol, 5-50 mcg/kg/min, Last Rate: 10 mcg/kg/min (07/30/25 0810)  ropivacaine (PF) in NS cmpd, 14 mL/hr, Last Rate: 14 mL/hr (07/29/25 2023)  vasopressin, 0-0.03 Units/min, Last Rate: Stopped (07/29/25 2010)  [3] PRN medications: [Transfer Hold] alteplase, [Transfer Hold] calcium chloride, [Transfer Hold] calcium chloride, calcium gluconate, [Transfer Hold] dextrose, [Transfer Hold] dextrose, [Transfer Hold] glucagon, [Transfer Hold] glucagon, [Transfer Hold] HYDROmorphone, magnesium sulfate, oxygen, oxygen, potassium chloride CR **OR** potassium chloride, potassium chloride CR **OR** potassium chloride, [Transfer Hold] potassium chloride

## 2025-07-30 NOTE — PROGRESS NOTES
"    Surgical Oncology Progress Note    Subjective   Overnight there were no acute events, patient is resting comfortably with plans for extubation in ICU today.     Objective   Physical Exam:   Constitutional: well appearing, resting comfortably  Eyes: EOMI  Head/Neck: NCAT  Respiratory: nonlabored breathing on room air  Cardiovascular: regular rate  Abdominal: soft, nontender, nondistended, no rebound or guarding  MSK: moves all extremities  Extremities: no lower extremity edema  Skin: warm and well perfused, no rashes  Psych: appropriate mood and behavior     Last Recorded Vitals  Heart Rate:  [51-73]   Temp:  [35.4 °C (95.7 °F)-36 °C (96.8 °F)]   Resp:  [14-17]   BP: ()/(39-70)   Height:  [180.3 cm (5' 10.98\")]   Weight:  [75.9 kg (167 lb 5.3 oz)]   SpO2:  [97 %-100 %]      Intake/Output Last 24 Hours:      Intake/Output Summary (Last 24 hours) at 7/30/2025 1022  Last data filed at 7/30/2025 0800  Gross per 24 hour   Intake 6655.15 ml   Output 2520 ml   Net 4135.15 ml        Relevant Results  Lab Date: 07/30/25     7.9    3.7>-----<70         23.7   141  103  30                  ----------------<136     4.4  24  1.24          Ca 8.5 Phos 5.4 Mg 1.90         AlkPhos 182 tBili 3.5           Assessment:  Roma Corral is a 70M who is recovering postoperatively from an open right hepatectomy for colon carcinoma with liver mets.     Plan:   - Extubation today in ICU   - Keep NG for now   - Keep evans for now   - Restart lovenox   Prophy - SCDs, Heparin/Lovenox, Incentive Spirometer  Dispo - Continue current management     Discussed with Dr. Kacey Cho MD - PGY1  Surgical Oncology   [facility] [id number]    "

## 2025-07-30 NOTE — PROGRESS NOTES
"Roma Corral is a 70 y.o. male on day 1 of admission presenting with Colon carcinoma metastatic to liver.    Subjective   Episode of bradycardia requiring atropine and received 2 units pRBC and 1 FFP.      Objective     Physical Exam  Vitals reviewed.   Constitutional:       General: He is awake.   HENT:      Head: Normocephalic and atraumatic.      Comments: Intubated with NG in place    Cardiovascular:      Rate and Rhythm: Regular rhythm. Bradycardia present.      Heart sounds: Normal heart sounds.   Pulmonary:      Effort: Pulmonary effort is normal.      Breath sounds: Normal breath sounds.   Abdominal:      General: Bowel sounds are normal.      Palpations: Abdomen is soft.     Skin:     General: Skin is warm.     Neurological:      Mental Status: He is alert.     Psychiatric:         Behavior: Behavior is cooperative.         Last Recorded Vitals  Blood pressure 109/54, pulse 54, temperature 36 °C (96.8 °F), temperature source Temporal, resp. rate 17, height 1.803 m (5' 10.98\"), weight 75.9 kg (167 lb 5.3 oz), SpO2 99%.  Intake/Output last 3 Shifts:  I/O last 3 completed shifts:  In: 6597.4 (86.9 mL/kg) [I.V.:1267.4 (16.7 mL/kg); Blood:2300; NG/GT:30; IV Piggyback:3000]  Out: 2330 (30.7 mL/kg) [Urine:2065 (0.8 mL/kg/hr); Drains:265]  Weight: 75.9 kg     Relevant Results  Results for orders placed or performed during the hospital encounter of 07/29/25 (from the past 24 hours)   Type And Screen   Result Value Ref Range    ABO TYPE AB     Rh TYPE NEG     ANTIBODY SCREEN NEG    Prepare RBC: 4 Units   Result Value Ref Range    PRODUCT CODE W7914F72     Unit Number [id number]     Unit ABO B     Unit RH NEG     XM INTEP COMP     Dispense Status TR     Blood Expiration Date 8/26/2025 11:59:00 PM EDT     PRODUCT BLOOD TYPE 1700     UNIT VOLUME 350     PRODUCT CODE U1909H73     Unit Number [id number]     Unit ABO B     Unit RH NEG     XM INTEP COMP     Dispense Status TR     Blood Expiration Date 8/26/2025 " [facility])  Wound Care    Patient Name:  Robert Mejía   MRN:  [id number]  Date: 2023  Diagnosis: Single liveborn, born in hospital, delivered by  delivery    History:     Past Medical History:   Diagnosis Date    Respiratory distress in  2023             Precautions:     Allergies as of 2023    (No Known Allergies)       WO Assessment Details/Treatment     Follow up on perineal dermatitis  Vastly improved with only minimal redness remaining. Skin is intact and will continue Critic Aid antifungal until the 2 week course has ended on 2023.      23 1030        Altered Skin Integrity 04/10/23 1020 Perineum Incontinence associated dermatitis   Date First Assessed/Time First Assessed: 04/10/23 1020   Altered Skin Integrity Present on Admission - Did Patient arrive to the hospital with altered skin?: suspected hospital acquired  Location: Perineum  Is this injury device related?: No  Primary ...   Wound Image    Dressing Appearance No dressing   Drainage Amount None   Appearance Intact;Red  (mild redness, vastly improved)   Periwound Area Redness;Intact   Care Cleansed with:;Wound cleanser;Applied:;Skin Barrier  (Critic aid antifungal ointment)         2023     11:59:00 PM EDT     PRODUCT BLOOD TYPE 1700     UNIT VOLUME 276     PRODUCT CODE Z5698R86     Unit Number [id number]     Unit ABO B     Unit RH NEG     XM INTEP COMP     Dispense Status TR     Blood Expiration Date 8/26/2025 11:59:00 PM EDT     PRODUCT BLOOD TYPE 1700     UNIT VOLUME 295     PRODUCT CODE K5444W37     Unit Number [id number]     Unit ABO B     Unit RH NEG     XM INTEP COMP     Dispense Status TR     Blood Expiration Date 8/26/2025 11:59:00 PM EDT     PRODUCT BLOOD TYPE 1700     UNIT VOLUME 313    Prepare Platelets: 2 Units   Result Value Ref Range    PRODUCT CODE B4932W96     Unit Number [id number]     Unit ABO AB     Unit RH POS     Dispense Status RE     Blood Expiration Date 7/29/2025 11:59:00 PM EDT     PRODUCT BLOOD TYPE 8400     UNIT VOLUME 180     PRODUCT CODE Z0078T18     Unit Number [id number]     Unit ABO AB     Unit RH POS     Dispense Status RE     Blood Expiration Date 7/29/2025 11:59:00 PM EDT     PRODUCT BLOOD TYPE 8400     UNIT VOLUME 180    Prepare Plasma: 4 Units   Result Value Ref Range    PRODUCT CODE H6772J92     Unit Number [id number]     Unit ABO AB     Unit RH POS     Dispense Status TR     Blood Expiration Date 8/3/2025 10:36:00 AM EDT     PRODUCT BLOOD TYPE 8400     UNIT VOLUME 346     PRODUCT CODE I6130J01     Unit Number [id number]     Unit ABO AB     Unit RH POS     Dispense Status RE     Blood Expiration Date 8/3/2025 10:36:00 AM EDT     PRODUCT BLOOD TYPE 8400     UNIT VOLUME 299     PRODUCT CODE S5848Q80     Unit Number [id number]     Unit ABO AB     Unit RH POS     Dispense Status TR     Blood Expiration Date 8/3/2025 10:36:00 AM EDT     PRODUCT BLOOD TYPE 8400     UNIT VOLUME 304     PRODUCT CODE R8799D94     Unit Number [id number]     Unit ABO AB     Unit RH POS     Dispense Status RE     Blood Expiration Date 8/3/2025 10:36:00 AM EDT     PRODUCT BLOOD TYPE 8400     UNIT VOLUME 348    Prepare Platelets   Result Value Ref Range     PRODUCT CODE I6959D88     Unit Number [id number]     Unit ABO A     Unit RH POS     Dispense Status TR     Blood Expiration Date 7/30/2025 11:59:00 PM EDT     PRODUCT BLOOD TYPE 6200     UNIT VOLUME 286    Blood Gas Arterial Full Panel   Result Value Ref Range    POCT pH, Arterial 7.46 (H) 7.38 - 7.42 pH    POCT pCO2, Arterial 36 (L) 38 - 42 mm Hg    POCT pO2, Arterial 170 (H) 85 - 95 mm Hg    POCT SO2, Arterial 100 94 - 100 %    POCT Oxy Hemoglobin, Arterial 97.7 94.0 - 98.0 %    POCT Hematocrit Calculated, Arterial 28.0 (L) 41.0 - 52.0 %    POCT Sodium, Arterial 138 136 - 145 mmol/L    POCT Potassium, Arterial 3.9 3.5 - 5.3 mmol/L    POCT Chloride, Arterial 107 98 - 107 mmol/L    POCT Ionized Calcium, Arterial 1.14 1.10 - 1.33 mmol/L    POCT Glucose, Arterial 110 (H) 74 - 99 mg/dL    POCT Lactate, Arterial 1.5 0.4 - 2.0 mmol/L    POCT Base Excess, Arterial 1.8 -2.0 - 3.0 mmol/L    POCT HCO3 Calculated, Arterial 25.6 22.0 - 26.0 mmol/L    POCT Hemoglobin, Arterial 9.3 (L) 13.5 - 17.5 g/dL    POCT Anion Gap, Arterial 9 (L) 10 - 25 mmo/L    Patient Temperature 37.0 degrees Celsius    FiO2 40 %   Blood Gas Arterial Full Panel   Result Value Ref Range    POCT pH, Arterial 7.41 7.38 - 7.42 pH    POCT pCO2, Arterial 38 38 - 42 mm Hg    POCT pO2, Arterial 169 (H) 85 - 95 mm Hg    POCT SO2, Arterial 100 94 - 100 %    POCT Oxy Hemoglobin, Arterial 97.3 94.0 - 98.0 %    POCT Hematocrit Calculated, Arterial 32.0 (L) 41.0 - 52.0 %    POCT Sodium, Arterial 134 (L) 136 - 145 mmol/L    POCT Potassium, Arterial 4.7 3.5 - 5.3 mmol/L    POCT Chloride, Arterial 105 98 - 107 mmol/L    POCT Ionized Calcium, Arterial 1.07 (L) 1.10 - 1.33 mmol/L    POCT Glucose, Arterial 138 (H) 74 - 99 mg/dL    POCT Lactate, Arterial 2.9 (H) 0.4 - 2.0 mmol/L    POCT Base Excess, Arterial -0.4 -2.0 - 3.0 mmol/L    POCT HCO3 Calculated, Arterial 24.1 22.0 - 26.0 mmol/L    POCT Hemoglobin, Arterial 10.6 (L) 13.5 - 17.5 g/dL    POCT Anion Gap,  Arterial 10 10 - 25 mmo/L    Patient Temperature 37.0 degrees Celsius    FiO2 40 %   Blood Gas Arterial Full Panel Unsolicited   Result Value Ref Range    POCT pH, Arterial 7.41 7.38 - 7.42 pH    POCT pCO2, Arterial 38 38 - 42 mm Hg    POCT pO2, Arterial 155 (H) 85 - 95 mm Hg    POCT SO2, Arterial 99 94 - 100 %    POCT Oxy Hemoglobin, Arterial 96.0 94.0 - 98.0 %    POCT Hematocrit Calculated, Arterial 17.0 (L) 41.0 - 52.0 %    POCT Sodium, Arterial 135 (L) 136 - 145 mmol/L    POCT Potassium, Arterial 5.1 3.5 - 5.3 mmol/L    POCT Chloride, Arterial 108 (H) 98 - 107 mmol/L    POCT Ionized Calcium, Arterial 1.02 (L) 1.10 - 1.33 mmol/L    POCT Glucose, Arterial 123 (H) 74 - 99 mg/dL    POCT Lactate, Arterial 4.5 (HH) 0.4 - 2.0 mmol/L    POCT Base Excess, Arterial -0.5 -2.0 - 3.0 mmol/L    POCT HCO3 Calculated, Arterial 24.1 22.0 - 26.0 mmol/L    POCT Hemoglobin, Arterial 5.7 (LL) 13.5 - 17.5 g/dL    POCT Anion Gap, Arterial 8 (L) 10 - 25 mmo/L    Patient Temperature 37.0 degrees Celsius   Coox Panel, Arterial Unsolicited   Result Value Ref Range    POCT Hemoglobin, Arterial 5.7 (LL) 13.5 - 17.5 g/dL    POCT Oxy Hemoglobin, Arterial 96.0 94.0 - 98.0 %    POCT Carboxyhemoglobin, Arterial 2.2 %    POCT Methemoglobin, Arterial 1.1 0.0 - 1.5 %    POCT Deoxy Hemoglobin, Arterial 0.7 0.0 - 5.0 %   Blood Gas Arterial Full Panel Unsolicited   Result Value Ref Range    POCT pH, Arterial 7.37 (L) 7.38 - 7.42 pH    POCT pCO2, Arterial 41 38 - 42 mm Hg    POCT pO2, Arterial 170 (H) 85 - 95 mm Hg    POCT SO2, Arterial 99 94 - 100 %    POCT Oxy Hemoglobin, Arterial 97.3 94.0 - 98.0 %    POCT Hematocrit Calculated, Arterial 25.0 (L) 41.0 - 52.0 %    POCT Sodium, Arterial 136 136 - 145 mmol/L    POCT Potassium, Arterial 5.5 (H) 3.5 - 5.3 mmol/L    POCT Chloride, Arterial 106 98 - 107 mmol/L    POCT Ionized Calcium, Arterial 1.32 1.10 - 1.33 mmol/L    POCT Glucose, Arterial 141 (H) 74 - 99 mg/dL    POCT Lactate, Arterial 5.4 (HH) 0.4 -  2.0 mmol/L    POCT Base Excess, Arterial -1.5 -2.0 - 3.0 mmol/L    POCT HCO3 Calculated, Arterial 23.7 22.0 - 26.0 mmol/L    POCT Hemoglobin, Arterial 8.3 (L) 13.5 - 17.5 g/dL    POCT Anion Gap, Arterial 12 10 - 25 mmo/L    Patient Temperature 37.0 degrees Celsius    FiO2 40 %   Coox Panel, Arterial Unsolicited   Result Value Ref Range    POCT Hemoglobin, Arterial 8.3 (L) 13.5 - 17.5 g/dL    POCT Oxy Hemoglobin, Arterial 97.3 94.0 - 98.0 %    POCT Carboxyhemoglobin, Arterial 1.2 %    POCT Methemoglobin, Arterial 0.4 0.0 - 1.5 %    POCT Deoxy Hemoglobin, Arterial 1.0 0.0 - 5.0 %   Blood Gas Arterial Full Panel   Result Value Ref Range    POCT pH, Arterial 7.45 (H) 7.38 - 7.42 pH    POCT pCO2, Arterial 31 (L) 38 - 42 mm Hg    POCT pO2, Arterial 239 (H) 85 - 95 mm Hg    POCT SO2, Arterial 100 94 - 100 %    POCT Oxy Hemoglobin, Arterial 96.7 94.0 - 98.0 %    POCT Hematocrit Calculated, Arterial 22.0 (L) 41.0 - 52.0 %    POCT Sodium, Arterial 135 (L) 136 - 145 mmol/L    POCT Potassium, Arterial 4.8 3.5 - 5.3 mmol/L    POCT Chloride, Arterial 105 98 - 107 mmol/L    POCT Ionized Calcium, Arterial 1.15 1.10 - 1.33 mmol/L    POCT Glucose, Arterial 123 (H) 74 - 99 mg/dL    POCT Lactate, Arterial 5.7 (HH) 0.4 - 2.0 mmol/L    POCT Base Excess, Arterial -2.2 (L) -2.0 - 3.0 mmol/L    POCT HCO3 Calculated, Arterial 21.5 (L) 22.0 - 26.0 mmol/L    POCT Hemoglobin, Arterial 7.2 (L) 13.5 - 17.5 g/dL    POCT Anion Gap, Arterial 13 10 - 25 mmo/L    Patient Temperature 37.0 degrees Celsius    FiO2 50 %   Magnesium   Result Value Ref Range    Magnesium 1.85 1.60 - 2.40 mg/dL   Coagulation Screen   Result Value Ref Range    Protime 17.1 (H) 9.8 - 12.4 seconds    INR 1.5 (H) 0.9 - 1.1    aPTT 28 26 - 36 seconds   CBC   Result Value Ref Range    WBC 5.0 4.4 - 11.3 x10*3/uL    nRBC 0.0 0.0 - 0.0 /100 WBCs    RBC 2.57 (L) 4.50 - 5.90 x10*6/uL    Hemoglobin 6.8 (L) 13.5 - 17.5 g/dL    Hematocrit 21.6 (L) 41.0 - 52.0 %    MCV 84 80 - 100 fL     MCH 26.5 26.0 - 34.0 pg    MCHC 31.5 (L) 32.0 - 36.0 g/dL    RDW 17.6 (H) 11.5 - 14.5 %    Platelets 99 (L) 150 - 450 x10*3/uL   Calcium, Ionized   Result Value Ref Range    POCT Calcium, Ionized 1.15 1.1 - 1.33 mmol/L   Hepatic function panel   Result Value Ref Range    Albumin 2.8 (L) 3.4 - 5.0 g/dL    Bilirubin, Total 2.2 (H) 0.0 - 1.2 mg/dL    Bilirubin, Direct 0.9 (H) 0.0 - 0.3 mg/dL    Alkaline Phosphatase 217 (H) 33 - 136 U/L     (H) 10 - 52 U/L     (H) 9 - 39 U/L    Total Protein 5.2 (L) 6.4 - 8.2 g/dL   TEG Clot Global Profile   Result Value Ref Range    R (Reaction Time) K 6.2 4.6 - 9.1 min    K (Clot Kinetics) 1.0 0.8 - 2.1 min    ANGLE 75.4 63.0 - 78.0 deg    MA (Max Amplitude) K 63.5 52.0 - 69.0 mm    R (Reaction Time) KH 5.7 4.3 - 8.3 min    MA (Max Amplitude) RT 63.4 52.0 - 70.0 mm    MA ( Gino Amplitude) FF 27.7 15.0 - 32.0 mm    FLEV 506 278 - 581 mg/dL   Basic Metabolic Panel   Result Value Ref Range    Glucose 121 (H) 74 - 99 mg/dL    Sodium 141 136 - 145 mmol/L    Potassium 4.7 3.5 - 5.3 mmol/L    Chloride 101 98 - 107 mmol/L    Bicarbonate 25 21 - 32 mmol/L    Anion Gap 20 10 - 20 mmol/L    Urea Nitrogen 27 (H) 6 - 23 mg/dL    Creatinine 1.18 0.50 - 1.30 mg/dL    eGFR 66 >60 mL/min/1.73m*2    Calcium 8.8 8.6 - 10.6 mg/dL   Phosphorus   Result Value Ref Range    Phosphorus 5.4 (H) 2.5 - 4.9 mg/dL   Prepare RBC: 2 Units   Result Value Ref Range    PRODUCT CODE S9601A87     Unit Number [id number]     Unit ABO B     Unit RH NEG     XM INTEP COMP     Dispense Status IS     Blood Expiration Date 8/26/2025 11:59:00 PM EDT     PRODUCT BLOOD TYPE 1700     UNIT VOLUME 295     PRODUCT CODE I2564C36     Unit Number [id number]     Unit ABO O     Unit RH NEG     XM INTEP COMP     Dispense Status IS     Blood Expiration Date 8/17/2025 11:59:00 PM EDT     PRODUCT BLOOD TYPE 9500     UNIT VOLUME 272    Prepare Plasma: 2 Units   Result Value Ref Range    PRODUCT CODE X9014U94     Unit  Number [id number]     Unit ABO AB     Unit RH POS     Dispense Status IS     Blood Expiration Date 8/3/2025 10:36:00 AM EDT     PRODUCT BLOOD TYPE 8400     UNIT VOLUME 304    Prepare RBC: 4 Units   Result Value Ref Range    PRODUCT CODE K5814U39     Unit Number [id number]     Unit ABO AB     Unit RH NEG     XM INTEP COMP     Dispense Status RE     Blood Expiration Date 8/4/2025 11:59:00 PM EDT     PRODUCT BLOOD TYPE 2800     UNIT VOLUME 350     PRODUCT CODE U9285L61     Unit Number [id number]     Unit ABO AB     Unit RH NEG     XM INTEP COMP     Dispense Status RE     Blood Expiration Date 8/18/2025 11:59:00 PM EDT     PRODUCT BLOOD TYPE 2800     UNIT VOLUME 350     PRODUCT CODE S7807Q21     Unit Number [id number]     Unit ABO AB     Unit RH NEG     XM INTEP COMP     Dispense Status RE     Blood Expiration Date 8/19/2025 11:59:00 PM EDT     PRODUCT BLOOD TYPE 2800     UNIT VOLUME 350     PRODUCT CODE D1197E99     Unit Number [id number]     Unit ABO O     Unit RH NEG     XM INTEP COMP     Dispense Status XM     Blood Expiration Date 8/18/2025 11:59:00 PM EDT     PRODUCT BLOOD TYPE 9500     UNIT VOLUME 284    Blood Gas Arterial Full Panel   Result Value Ref Range    POCT pH, Arterial 7.45 (H) 7.38 - 7.42 pH    POCT pCO2, Arterial 32 (L) 38 - 42 mm Hg    POCT pO2, Arterial 241 (H) 85 - 95 mm Hg    POCT SO2, Arterial 100 94 - 100 %    POCT Oxy Hemoglobin, Arterial 97.6 94.0 - 98.0 %    POCT Hematocrit Calculated, Arterial 22.0 (L) 41.0 - 52.0 %    POCT Sodium, Arterial 137 136 - 145 mmol/L    POCT Potassium, Arterial 4.3 3.5 - 5.3 mmol/L    POCT Chloride, Arterial 106 98 - 107 mmol/L    POCT Ionized Calcium, Arterial 1.11 1.10 - 1.33 mmol/L    POCT Glucose, Arterial 123 (H) 74 - 99 mg/dL    POCT Lactate, Arterial 5.0 (HH) 0.4 - 2.0 mmol/L    POCT Base Excess, Arterial -1.5 -2.0 - 3.0 mmol/L    POCT HCO3 Calculated, Arterial 22.2 22.0 - 26.0 mmol/L    POCT Hemoglobin, Arterial 7.2 (L) 13.5 - 17.5  g/dL    POCT Anion Gap, Arterial 13 10 - 25 mmo/L    Patient Temperature 37.0 degrees Celsius    FiO2 50 %   POCT GLUCOSE   Result Value Ref Range    POCT Glucose 132 (H) 74 - 99 mg/dL   Type and screen   Result Value Ref Range    ABO TYPE AB     Rh TYPE NEG     ANTIBODY SCREEN NEG    Hepatic function panel   Result Value Ref Range    Albumin 3.4 3.4 - 5.0 g/dL    Bilirubin, Total 3.3 (H) 0.0 - 1.2 mg/dL    Bilirubin, Direct 1.3 (H) 0.0 - 0.3 mg/dL    Alkaline Phosphatase 169 (H) 33 - 136 U/L     (H) 10 - 52 U/L     (H) 9 - 39 U/L    Total Protein 5.4 (L) 6.4 - 8.2 g/dL   Magnesium   Result Value Ref Range    Magnesium 1.79 1.60 - 2.40 mg/dL   Coagulation Screen   Result Value Ref Range    Protime 18.4 (H) 9.8 - 12.4 seconds    INR 1.7 (H) 0.9 - 1.1    aPTT 27 26 - 36 seconds   CBC   Result Value Ref Range    WBC 2.7 (L) 4.4 - 11.3 x10*3/uL    nRBC 0.0 0.0 - 0.0 /100 WBCs    RBC 2.70 (L) 4.50 - 5.90 x10*6/uL    Hemoglobin 7.2 (L) 13.5 - 17.5 g/dL    Hematocrit 23.2 (L) 41.0 - 52.0 %    MCV 86 80 - 100 fL    MCH 26.7 26.0 - 34.0 pg    MCHC 31.0 (L) 32.0 - 36.0 g/dL    RDW 17.5 (H) 11.5 - 14.5 %    Platelets 59 (L) 150 - 450 x10*3/uL   Calcium, Ionized   Result Value Ref Range    POCT Calcium, Ionized 1.09 (L) 1.1 - 1.33 mmol/L   Basic Metabolic Panel   Result Value Ref Range    Glucose 153 (H) 74 - 99 mg/dL    Sodium 141 136 - 145 mmol/L    Potassium 4.3 3.5 - 5.3 mmol/L    Chloride 102 98 - 107 mmol/L    Bicarbonate 25 21 - 32 mmol/L    Anion Gap 18 10 - 20 mmol/L    Urea Nitrogen 27 (H) 6 - 23 mg/dL    Creatinine 1.26 0.50 - 1.30 mg/dL    eGFR 61 >60 mL/min/1.73m*2    Calcium 8.6 8.6 - 10.6 mg/dL   Phosphorus   Result Value Ref Range    Phosphorus 5.8 (H) 2.5 - 4.9 mg/dL   POCT GLUCOSE   Result Value Ref Range    POCT Glucose 149 (H) 74 - 99 mg/dL   Blood Gas Arterial Full Panel   Result Value Ref Range    POCT pH, Arterial 7.48 (H) 7.38 - 7.42 pH    POCT pCO2, Arterial 30 (L) 38 - 42 mm Hg    POCT  pO2, Arterial 195 (H) 85 - 95 mm Hg    POCT SO2, Arterial 100 94 - 100 %    POCT Oxy Hemoglobin, Arterial 98.1 (H) 94.0 - 98.0 %    POCT Hematocrit Calculated, Arterial 24.0 (L) 41.0 - 52.0 %    POCT Sodium, Arterial 139 136 - 145 mmol/L    POCT Potassium, Arterial 4.5 3.5 - 5.3 mmol/L    POCT Chloride, Arterial 105 98 - 107 mmol/L    POCT Ionized Calcium, Arterial 1.12 1.10 - 1.33 mmol/L    POCT Glucose, Arterial 137 (H) 74 - 99 mg/dL    POCT Lactate, Arterial 4.8 (HH) 0.4 - 2.0 mmol/L    POCT Base Excess, Arterial -0.9 -2.0 - 3.0 mmol/L    POCT HCO3 Calculated, Arterial 22.3 22.0 - 26.0 mmol/L    POCT Hemoglobin, Arterial 8.0 (L) 13.5 - 17.5 g/dL    POCT Anion Gap, Arterial 16 10 - 25 mmo/L    Patient Temperature 37.0 degrees Celsius    FiO2 40 %   POCT GLUCOSE   Result Value Ref Range    POCT Glucose 126 (H) 74 - 99 mg/dL   Blood Gas Arterial Full Panel   Result Value Ref Range    POCT pH, Arterial 7.50 (H) 7.38 - 7.42 pH    POCT pCO2, Arterial 28 (L) 38 - 42 mm Hg    POCT pO2, Arterial 189 (H) 85 - 95 mm Hg    POCT SO2, Arterial 100 94 - 100 %    POCT Oxy Hemoglobin, Arterial 98.0 94.0 - 98.0 %    POCT Hematocrit Calculated, Arterial 30.0 (L) 41.0 - 52.0 %    POCT Sodium, Arterial 136 136 - 145 mmol/L    POCT Potassium, Arterial 4.2 3.5 - 5.3 mmol/L    POCT Chloride, Arterial 107 98 - 107 mmol/L    POCT Ionized Calcium, Arterial 1.04 (L) 1.10 - 1.33 mmol/L    POCT Glucose, Arterial 124 (H) 74 - 99 mg/dL    POCT Lactate, Arterial 4.9 (HH) 0.4 - 2.0 mmol/L    POCT Base Excess, Arterial -0.7 -2.0 - 3.0 mmol/L    POCT HCO3 Calculated, Arterial 21.8 (L) 22.0 - 26.0 mmol/L    POCT Hemoglobin, Arterial 9.9 (L) 13.5 - 17.5 g/dL    POCT Anion Gap, Arterial 11 10 - 25 mmo/L    Patient Temperature 37.0 degrees Celsius    FiO2 40 %   Calcium, Ionized   Result Value Ref Range    POCT Calcium, Ionized 1.13 1.1 - 1.33 mmol/L   Coagulation Screen   Result Value Ref Range    Protime 20.6 (H) 9.8 - 12.4 seconds    INR 1.9 (H) 0.9  - 1.1    aPTT 30 26 - 36 seconds     *Note: Due to a large number of results and/or encounters for the requested time period, some results have not been displayed. A complete set of results can be found in Results Review.          This patient has a central line   Reason for the central line remaining today? Hemodynamic monitoring    This patient has a urinary catheter   Reason for the urinary catheter remaining today? critically ill patient who need accurate urinary output measurements    This patient is intubated   Reason for patient to remain intubated today? they are planned for extubation trial later today/tonight       Assessment & Plan  Colon carcinoma metastatic to liver    Carcinoma of colon metastatic to liver    Acute post-operative pain    Assessment:  Roma Corral is a 70 y.o.  male with a history of NSTEMI, CAD, anemia, gout, abdominal pain, HTN, DM2 and colon carcinoma metastatic to liver presenting to SICU from OR s/p open right hepatectomy on 7/29 with Dr. Erazo.     Plan:  NEURO: Acute post-op pain. Bilateral erector spinae blocks with catheters performed pre-operatively on 7/29/2025  by acute pain team. Currently on prop gtt for sedation.   - ongoing neuro and pain assessments  - PRN hydromorphone for pain control  - lidoderm patches surrounding surgical incision  - PT/OT consult  - Morphine IV while NPO as patient is on morphine at home daily  - Home meds: morphine 30 mg PO Q12H      CV: History of CAD (NSTEMI 2021) on ASA, HLD, HTN . Baseline /68. Echo 5/13/25: EF of 45-50%.Nuclear stress test 7/1/25: No evidence of inducible myocardial ischemia. with EF 64%. Arrived to SICU on Levo at 0.02. Lactate 5.4. Now off vasoactives and lactate resolves.  - continuous EKG/abp monitoring  - Goal map range 65-90  - Home meds: Metoprolol and Atorvastatin, ASA     PULM: No pulmonary history. Arrived to SICU intubated.   - Wean FiO2 as tolerated.    - Q1h incentive spirometer while awake  - additional  pulm tomatthew prn.       GI: Rectal cancer s/p loop colostomy 07/2024 & [facility] c/b rectal stricture who underwent a robotic low anterior resection with transition from loop to end colostomy & 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 Vaqsuez, and laparoscopic microwave ablation x3 with liver biopsy by Dr. Erazo on 3/18/25.   - NPO   - NG to LIWS  - Advance diet per surgical service  - PPI for GI prophylaxis     : No history of renal disease. Baseline creatinine 0.96.  - Maintenance IVF  - Volume resuscitation as needed  - Maintain U/O >0.5ml/kg/hr  - Check renal function panel post op and daily  - Replete electrolytes to goal K>4, Mg>2, Phos>2.5, ionized Ca>1.10.     HEME: Acute blood loss anemia. Baseline Hgb 7.8 (5/28). OR EBL 1.5 L. Intra-op received 3units pRBC, 1 FFP, 1 plt. Received an additional 2 units of pRBC and 1 FFP once on the unit. INR of 1.9.  - Vitamin K 10 mg X 3 days.  - Check CBC and coags post op and daily  - SCDs for DVT prophylaxis  - holding SC heparin for now  - ongoing monitoring for s/s bleeding     ENDO: No history of  thyroid disease. H/o DM2 (patient denies), not currently requiring the use of medications - last A1C 5.3 on 1/18/25  - Q4h BG   - SSI Lispro per ICU protocol.     ID: Afebrile. No s/s of infection at this time.  - temp q4h, wbc daily  - obdulio-op cefazolin for 24hrs  - ongoing monitoring for s/s infection     Lines:  - R Radial arterial line  - L Subclavian central line + mini MAC  - PIV x16g     Dispo: Continue to ICU care. Patient seen and discussed with ICU attending Dr. Moe SMITH phone [phone]   Dominic Schultz MD

## 2025-07-30 NOTE — SIGNIFICANT EVENT
Postoperative Check Note    Subjective  Roma Corral is a 70 y.o. male who is now POD0 s/p R hepatectomy for colon cancer metastasis. Patient is sedated and intubated. Post-op had hgb of 6.8, received 2u PRBC and 1 FFP, concentrated albumin x2. Pt weaned to 0.01 levophed and on minimal vent settings.    Objective  Vitals:  Visit Vitals  BP (!) 94/44   Pulse 55   Temp 35.6 °C (96.1 °F) (Temporal)   Resp 16       Physical Exam:  GEN: Sedated on propofol laying in bed  HEENT: Sclera anicteric. Moist mucous membranes.  RESP: Breathing non-labored, equal chest rise. Intubated  CV: Bradycardic to 56, hypotensive to 96/51  GI: Abdomen soft, nondistended,  abdominal incision well approximated, drain with serosanguinous output  : Evans in place with clear urine.  MSK: No gross deformities. Moves all extremities spontaneously.  NEURO: Alert and oriented x3. No focal deficits.  PSYCH: Appropriate mood and affect.  SKIN: No rashes or lesions.    Most recent labs:            6.8     5.0>-----<99              21.6     141  101  27                  ----------------<121     4.7  25  1.18            Mg 1.85         Assessment  Roma Corral is a 70 y.o. male who is now POD0 s/p partial hepatectomy.  Patient is in critical condition, appropriate for postoperative course. The plan is as follows:    - q6hr CBC, coag, CMP  - transfuse to keep hgb > 8  - mIVF  - maintain evans for strict I/Os  - monitor drain output  - hold DVT ppx, ASA  - continue ICU level care    Matheus Torres MD  PGY-1 General Surgery

## 2025-07-30 NOTE — HOSPITAL COURSE
70 yr old male with colon cancer metastatic to the liver who underwent open right hepatectomy on 7/29 with Dr Erazo.  Post-op course significant for acute blood loss anemia requiring PRBC and FFP transfusions.  Extubated POD 1.  Arriaga removed POD # and passed TOV.  Diet advanced as tolerated after NGT removal #.

## 2025-07-30 NOTE — OP NOTE
Open Right Hepatectomy (CUSA, Ligasure Impact, Aquamantys, Argon), Liver Ultrasound Operative Note     Date: 2025  OR Location: [facility] OR    Name: Roma Corral YOB: 1955, Age: 70 y.o., MRN: [id number], Sex: male    Diagnosis  Pre-op Diagnosis      * Colon carcinoma metastatic to liver [C18.9, C78.7] Post-op Diagnosis     * Colon carcinoma metastatic to liver [C18.9, C78.7]     Procedures  Open Extended Right Hepatectomy   16571    Liver Ultrasound  76568 - CHG ULTRASONIC GUIDANCE INTRAOPERATIVE      Surgeons      * Baron Erazo - Primary    Resident/Fellow/Other Assistant:  Surgeons and Role:     * Cookie Pereira MD - Resident - Assisting    Staff:   Circulator: Pelon  Circulator: Kristine  Scrub Person: Arti  Scrub Person: Capri Puentes Scrub: Liam Puentes Circulator: Ashley  Scrub Person: Ronan  Circulator: Karine    Anesthesia Staff: Anesthesiologist: Dann Lewis MD; Tani Plaza MD; Maikel Coffman MD; Chirag Chandler MD  C-AA: KEV Ocampo; KEV Castaneda  Anesthesia Resident: Lisbet Vasquez MD; Nataliya Elizabeth MD; Valentin Ruelas MD; Sachin Ruelas MD    Procedure Summary  Anesthesia: General  ASA: III  Estimated Blood Loss: 1500mL  Intra-op Medications:   Administrations occurring from 0850 to 1515 on 25:   Medication Name Total Dose   sodium chloride 0.9 % irrigation solution 1,000 mL   glycopyrrolate (Robinul) 0.6 mg/3 mL (0.2 mg/mL) injection syringe 0.2 mg 0.2 mg   heparin (porcine) injection 5,000 Units 5,000 Units   ropivacaine (Naropin) 5 mg/mL (0.5 %) injection 30 mL 30 mL   albumin human 5 % 250 mL   ceFAZolin (Ancef) vial 1 g 4 g   dexAMETHasone (Decadron) 4 mg/mL IV Syringe 2 mL 8 mg   dexmedeTOMIDine 4 mcg/mL in 100 mL NS infusion 134.26 mcg   electrolyte-A (Plasmalyte-A) Cannot be calculated   fentaNYL (Sublimaze) injection 50 mcg/mL 50 mcg   furosemide (Lasix) 10 mg/mL 20 mg   glycopyrrolate (Robinul) injection 0.2 mg   LR bolus Cannot be  calculated   LR infusion 274.17 mL   lidocaine (Xylocaine) injection 2 % 80 mg   methadone (Dolophine) injection 10 mg   metoprolol 5 mg/5 mL 5 mg   midazolam PF (Versed) injection 1 mg/mL 1 mg   norepinephrine (Levophed) 8 mg/250 mL D5W (premix) 0.8 mg   norepinephrine (Levophed) 16 mcg/mL IV bolus 44 mcg   propofol (Diprivan) injection 10 mg/mL 80 mg   rocuronium (ZeMuron) 50 mg/5 mL injection 130 mg   vasopressin (Vasostrict) 1 unit/mL in NS IV bolus 5 Units              Anesthesia Record               Intraprocedure I/O Totals          Intake    PLASMA 300.00 mL    PLATELETS 250.00 mL    PRBC 1050.00 mL    Dexmedetomidine 0.00 mL    The total shown is the total volume documented since Anesthesia Start was filed.    LR bolus 1500.00 mL    electrolyte-A (Plasmalyte-A) 1500.00 mL    Norepinephrine Drip 0.00 mL    The total shown is the total volume documented since Anesthesia Start was filed.    LR infusion 824.17 mL    Vasopressin Drip 0.00 mL    The total shown is the total volume documented since Anesthesia Start was filed.    Total Intake 5424.17 mL       Output    Urine 1535 mL    Total Output 1535 mL       Net    Net Volume 3889.17 mL          Specimen:   ID Type Source Tests Collected by Time   1 :  Tissue GALLBLADDER CHOLECYSTECTOMY SURGICAL PATHOLOGY EXAM Baron Erazo MD 7/29/2025 1357   2 : PORTAL TISSUE Tissue SOFT TISSUE RESECTION SURGICAL PATHOLOGY EXAM Baron Erazo MD 7/29/2025 1415   3 : ADDITIONAL CYSTIC DUCT Tissue SOFT TISSUE RESECTION SURGICAL PATHOLOGY EXAM Baron Erazo MD 7/29/2025 1437   4 : RIGHT HEPATECTOMY WITH PORTION SEGMENT 4 Tissue LIVER PARTIAL HEPATECTOMY SURGICAL PATHOLOGY EXAM Baron Erazo MD 7/29/2025 1720   5 : SEGMENT 4 NODULE Tissue LIVER PARTIAL HEPATECTOMY SURGICAL PATHOLOGY EXAM Baron Erazo MD 7/29/2025 1722   6 : THICKEND TISSUE RIGHT ABDOMINAL WALL Tissue SOFT TISSUE RESECTION SURGICAL PATHOLOGY EXAM Baron Erazo MD 7/29/2025 1747                 Drains and/or  Catheters:   Closed/Suction Drain 1 Right Abdomen Bulb 19 Fr. (Active)   Present on Admission to Healthcare Facility Y 07/29/25 2000   Site Description Unable to view 07/30/25 1600   Dressing Status Clean;Dry 07/30/25 1600   Drainage Appearance Serosanguineous;Dark red 07/30/25 1600   Status Open to gravity drainage 07/30/25 1600   Output (mL) 300 mL 07/30/25 1600       NG/OG/Feeding Tube Nasogastric 16 Fr Right nostril (Active)   Tube Status Unclamped;Low intermittent suction 07/30/25 1600   Placement Verification Measurements 07/30/25 1600   Distal Tube Measurement (cm) 55 cm 07/30/25 1600   Site Assessment Clean;Dry;Intact 07/30/25 1600   Drainage Appearance Bile 07/30/25 1600   NG/OG Interventions Air injected into blue air vent port 07/30/25 0800   Irrigant Sterile water 07/30/25 0400   Response To Intervention No resistance met 07/30/25 0800   Tube Securement Taped to nostril center 07/30/25 0800   Intake (mL) 0 mL 07/30/25 0800   Intake - Flush (mL) 0 mL 07/30/25 0400   Output (mL) 50 mL 07/30/25 1600       Colostomy Loop LUQ (Active)   Present on Admission to Healthcare Facility Y 07/29/25 2121   Stomal Appliance 2 piece;Clean;Dry;Intact 07/30/25 1600   Site/Stoma Assessment Clean;Intact;Bray 07/30/25 1600   Peristomal Assessment Clean;Intact 07/30/25 1600   Treatment Placement checked 07/30/25 0800   Output (mL) 0 mL 07/30/25 1600   Intake (ml) 0 ml 07/30/25 0800       Urethral Catheter 16 Fr. (Active)   Site Assessment Clean;Skin intact 07/30/25 1600   Collection Container Urometer 07/30/25 1600   Securement Method Securing device (Describe) 07/30/25 1600   Reason for Continuing Urinary Catheterization accurate hourly measurement of urine volume in a critically ill patient that cannot be assessed by other volumes and urine collection strategies 07/30/25 0800   Output (mL) 25 mL 07/30/25 1400       Findings: Extensive right-sided tumors as well as tumor extending into segment 4, also a solitary tumor in  segment 4A between the middle and left hepatic veins.  What appeared to be ablated lesion in segment 4B which also was resected.  Ultimately a formal extended hepatectomy was performed including all of segment 4, 5, 6, 7, and 8    Indications: Roma Corral is an 70 y.o. male who is having surgery for Colon carcinoma metastatic to liver [C18.9, C78.7].  He is here today for the second stage of a two-stage hepatectomy approach after undergoing resection of his primary and ablation of left sided lesions as well as right portal vein embolization.    The patient was seen in the preoperative area. The risks, benefits, complications, treatment options, non-operative alternatives, expected recovery and outcomes were discussed with the patient. The possibilities of reaction to medication, pulmonary aspiration, injury to surrounding structures, bleeding, recurrent infection, the need for additional procedures, failure to diagnose a condition, and creating a complication requiring transfusion or operation were discussed with the patient. The patient concurred with the proposed plan, giving informed consent.  The site of surgery was properly noted/marked if necessary per policy. The patient has been actively warmed in preoperative area. Preoperative antibiotics have been ordered and given within 1 hours of incision. Venous thrombosis prophylaxis have been ordered including bilateral sequential compression devices and chemical prophylaxis    Procedure Details:   The patient was brought to the operating room and placed supine on the table. Preoperative heparin was given. Vitamin K 10 mg was given during surgery. Sequential compression devices were applied. General anesthesia was smoothly induced. The abdomen was prepped and draped in the usual fashion. Timeout was performed.    Makucci incision was performed.  There were some adhesions related to prior surgery which were taken down and also noted to be a fair amount of adhesions in  the perihepatic region which were taken down and noted to be able to place a retractor.  Manual abdominal exploration was performed to rule out peritoneal disease.  There was no evidence of extrahepatic metastatic disease.  I now mobilized the left and right liver taking down the triangular ligaments and the falciform ligament to the anterior surface of the hepatic veins, and the retroperitoneal attachments on the right side.  I mobilized the liver off the anterior surface of the vena cava, sequentially taking the caudate vein branches with either clips were suture as indicated.  There was a somewhat thickened IVC ligament which was ultimately taken down using sequential clips. The right hepatic vein was encircled, and a vessel loop was placed.  Pars flaccida was entered.  The bam hepatis was quite inflamed and there were adhesions likely related to the prior portal vein embolization.  I performed a top-down cholecystectomy.  I began the identification of the portal structures by first identifying the hepatic artery and following at the bifurcation and identifying the right hepatic artery which was encircled with a vessel loop.  There appeared to be some aberrant biliary anatomy in which there appeared to be 2 bile ducts within the bam hepatis.  The takeoff of the cystic duct was identified off of 1 of these structures.  It was unclear to me whether this was a very low bifurcation or some other aberration.  I now elevated anteriorly these structures with Vesseloops and identified the pulmonary vein dissecting to the portal vein bifurcation.  The right portal vein was ultimately encircled.  I now divided the right hepatic artery after ligating it with silk ties, 4-0 Prolene suture, and clip.  The right portal vein was divided using an endovascular stapler.  The right hepatic vein was divided using endovascular stapler.  The liver parenchyma demarcated itself.  I now performed ultrasound of the liver.  In the left  side of the liver there is an active tumor noted superiorly in segment 4A which appeared to be active tumor between the left and middle hepatic veins.  There appeared to be a previously ablated tumor in segment 4B based on my prior surgery.  At this point it appeared that I would need to do an extended right hepatectomy to clear the tumor.  I was somewhat worried about the proximity of the lesion in segment 4A with regards to potentially either having a positive margin or if causing a left hepatic vein injury.  Therefore, I opted to ablate this tumor with microwave ablation so that I could more safely do my resection close to this tumor and not worry about the margin.  I performed parenchymal transection using a combination of CUSA, Aquamantys device, LigaSure device, clips and endovascular staplers as appropriate.  The transection was completed.  I had dissected through the ablated lesion in segment 4B in order to avoid the left-sided portal structures.  At this point I went back and enucleated at this lesion using CUSA  device and sent this as a separate specimen to pathology.  Slade maneuver was not performed during the resection.  Pressure was held for hemostasis. Additional hemostasis was obtained using sutures, Argon Beam Coagulation and Aquamantys device as appropriate. Hemostasis was assured. I now irrigated the upper abdomen copiously. Tisseel was sprayed on the liver resection site.  Surgicel fibrillar was also placed on the cut edge of the liver and at the IVC as well.   A 15-round CIRILO drain was placed in the right upper quadrant through a separate stab incision and secured to the skin with sutures.  I reconstructed the falciform ligament in order to suspend the liver without twisting at the vena cava using a 2-0 Vicryl suture.  The abdominal fascia was now closed using running #1 PDS suture running in either direction in 2 layers for the right subcostal portion and two running  #1 PDS for the midline  extension.  The skin was closed using observable sutures.  Surgical glue applied as a dressing as well as a drain sponge.      The patient tolerated the procedure well without any apparent intraoperative complications. All sponge, needle, and instrument counts were correct. As the attending surgeon I was scrubbed for all key portions of the procedure.      Evidence of Infection: No   Complications:  None; patient tolerated the procedure well.    Disposition: ICU - intubated and critically ill.  Condition: stable     Additional Details: This was an unusually difficult procedure for several reasons, due to extensive adhesions related to the prior surgery as well as the prior portal vein embolization, as well as stiff nature of the liver due to the multiple.  Tumors.    Attending Attestation: I was present and scrubbed for the entire procedure.    Baron Erazo  Phone Number: [phone]

## 2025-07-30 NOTE — PROGRESS NOTES
Physical Therapy                 Therapy Communication Note    Patient Name: Roma Corral  MRN: [id number]  Department: [facility] TSICU  Room: 04/04-A  Today's Date: 7/30/2025     Discipline: Physical Therapy    Missed Visit: PT Missed Visit: Yes      Missed Visit Reason:  (0903: Pt discussed in ID rounds. Team working to wean vent and pressors. Will hold PT at this time and re-attempt as able/appropriate.)    Missed Time: Attempt           Pt having dry cough, scratchy throat and PDN since yesterday.  Vaccinated x 3.    Spoke to triage to ask if she can visit her grandchildren.    Triage provided CDC quarantine recommendations.    LOV 10/8/21  Next OV not scheduled.    Please advise if any other recommendation.

## 2025-07-30 NOTE — SIGNIFICANT EVENT
Plan of care    - Postop labs, CBC, coags, CMP,   - repeat 6hrs  - Transfuse to keep hgb >8  - mIVF  - maintain evans  - extubate per SICU  - Monitor drain output  - Hold DVT ppx, ASA until POD 1 labs reviewed    Appreciate care in ICU

## 2025-07-30 NOTE — PROGRESS NOTES
07/30/25 1346   Discharge Planning   Living Arrangements Spouse/significant other   Support Systems Spouse/significant other;Children;Family members   Assistance Needed Patient is independent with all ADLs at baseline   Type of Residence Private residence   Number of Stairs to Enter Residence 3   Number of Stairs Within Residence 14   Do you have animals or pets at home? Yes   Type of Animals or Pets One cat   Who is requesting discharge planning? Provider   Home or Post Acute Services None   Expected Discharge Disposition Home   Does the patient need discharge transport arranged? No   Financial Resource Strain   How hard is it for you to pay for the very basics like food, housing, medical care, and heating? Somewhat  (Patient is politely refusing resources)   Housing Stability   In the last 12 months, was there a time when you were not able to pay the mortgage or rent on time? N   At any time in the past 12 months, were you homeless or living in a shelter (including now)? N   Transportation Needs   In the past 12 months, has lack of transportation kept you from medical appointments or from getting medications? no   In the past 12 months, has lack of transportation kept you from meetings, work, or from getting things needed for daily living? No   Patient Choice   Provider Choice list and CMS website (https://medicare.gov/care-compare#search) for post-acute Quality and Resource Measure Data were provided and reviewed with:   (NA)   Patient / Family choosing to utilize agency / facility established prior to hospitalization No   Stroke Family Assessment   Stroke Family Assessment Needed No   Intensity of Service   Intensity of Service 0-30 min       NOK: spouse; Betsy Corral [phone]  DME: none   O2: none   Home Care: no   HD: no   PCP: Dr. Buckner  Insurance: Traditional Medicare   Number of (past falls): 0  Patient address is confirmed as [address] [place] [id number]     SW met with patient at the  bedside to complete assessments. Patient was extubated today, NGT in place and patient is also on O2. Patient AxOx3 and is also independent with all ADLs at baseline. Patients denies hx or concern for depression, yet he was very tearful during our encounter. Patient is otherwise, very pleasant. He did voice that he is experiencing financial difficulty, but politely declined resources at this time. No other social needs identified. Patient is pending transfer to a [facility]. SW will continue to follow to assist with the discharge plan.    Alicia Baum, MSW, LSW

## 2025-07-30 NOTE — SIGNIFICANT EVENT
Patient has received 2 unites of PRBCs and 1 unit of FFP overnight, Am CBC showing Hgb of 7.2 unchanged from previous values. Remained off of pressors and HD stable. Drain with serosanguinous fluid, 140 ml during last shift. Surgery team is made aware, no urgent intervention is planned.

## 2025-07-30 NOTE — CARE PLAN
Problem: Pain - Adult  Goal: Verbalizes/displays adequate comfort level or baseline comfort level  Outcome: Progressing     Problem: Safety - Adult  Goal: Free from fall injury  Outcome: Progressing     Problem: Discharge Planning  Goal: Discharge to home or other facility with appropriate resources  Outcome: Progressing     Problem: Chronic Conditions and Co-morbidities  Goal: Patient's chronic conditions and co-morbidity symptoms are monitored and maintained or improved  Outcome: Progressing     Problem: Nutrition  Goal: Nutrient intake appropriate for maintaining nutritional needs  Outcome: Progressing     Problem: Safety - Medical Restraint  Goal: Remains free of injury from restraints (Restraint for Interference with Medical Device)  Outcome: Progressing  Flowsheets (Taken 7/29/2025 2309)  Remains free of injury from restraints (restraint for interference with medical device):   Determine that other, less restrictive measures have been tried or would not be effective before applying the restraint   Evaluate the patient's condition at the time of restraint application   Inform patient/family regarding the reason for restraint   Every 2 hours: Monitor safety, psychosocial status, comfort, nutrition and hydration  Goal: Free from restraint(s) (Restraint for Interference with Medical Device)  Outcome: Progressing  Flowsheets (Taken 7/29/2025 2309)  Free from restraint(s) (restraint for interference with medical device):   ONCE/SHIFT or MINIMUM Every 12 hours: Assess and document the continuing need for restraints   Every 24 hours: Continued use of restraint requires Licensed Independent Practitioner to perform face to face examination and written order   Identify and implement measures to help patient regain control     Problem: Skin  Goal: Decreased wound size/increased tissue granulation at next dressing change  Outcome: Progressing  Flowsheets (Taken 7/29/2025 2309)  Decreased wound size/increased tissue  granulation at next dressing change: Protective dressings over bony prominences  Goal: Participates in plan/prevention/treatment measures  Outcome: Progressing  Flowsheets (Taken 7/29/2025 2309)  Participates in plan/prevention/treatment measures: Discuss with provider PT/OT consult  Goal: Prevent/manage excess moisture  Outcome: Progressing  Flowsheets (Taken 7/29/2025 2309)  Prevent/manage excess moisture: Cleanse incontinence/protect with barrier cream  Goal: Prevent/minimize sheer/friction injuries  Outcome: Progressing  Flowsheets (Taken 7/29/2025 2309)  Prevent/minimize sheer/friction injuries:   Complete micro-shifts as needed if patient unable. Adjust patient position to relieve pressure points, not a full turn   HOB 30 degrees or less   Turn/reposition every 2 hours/use positioning/transfer devices   Use pull sheet  Goal: Promote/optimize nutrition  Outcome: Progressing  Flowsheets (Taken 7/29/2025 2309)  Promote/optimize nutrition: Discuss with provider if NPO > 2 days  Goal: Promote skin healing  Outcome: Progressing  Flowsheets (Taken 7/29/2025 2309)  Promote skin healing:   Assess skin/pad under line(s)/device(s)   Protective dressings over bony prominences   Turn/reposition every 2 hours/use positioning/transfer devices

## 2025-07-30 NOTE — PROGRESS NOTES
Pharmacy Medication History Review    Roma Corral is a 70 y.o. male admitted for Colon carcinoma metastatic to liver. Pharmacy reviewed the patient's [id number] medications and allergies for accuracy.    Medications ADDED:  Zofran ODT  MV  Medications CHANGED:  None  Medications REMOVED:   None     The list below reflects the updated PTA list.   Prior to Admission Medications   Prescriptions Last Dose Informant   Continue current TPN formula  Self   Sig: See AVS for most recent TPN formula.   Patient not taking: Reported on 2025   Continue current TPN formula  Self   Sig: TPN   68 mL for 1 hr, increase to 136 mL/hr x10 hrs, and then 68 mL/hr x 1 hr. VS for most recent TPN formula.   Patient not taking: Reported on 2025   acetaminophen (Tylenol) 325 mg tablet 2025 Self   Sig: Take 2 tablets (650 mg) by mouth every 6 hours if needed for mild pain (1 - 3).   alteplase (Cathflo Activase) 2 mg injection  Self   Si mL (2 mg) by intra-catheter route if needed (Use as needed for occluded PICC Line).   Patient not taking: Reported on 2025   aspirin 81 mg chewable tablet 2025 Self   Sig: Chew and swallow 1 tablet (81 mg) once daily.   atorvastatin (Lipitor) 40 mg tablet Unknown Self   Sig: Take 1 tablet (40 mg) by mouth once daily at bedtime.   furosemide (Lasix) 40 mg tablet  Self   Sig: Take 1 tablet (40 mg) by mouth once daily.   methylPREDNISolone (Medrol Dospak) 4 mg tablets  Self   Sig: Take as directed   Patient not taking: Reported on 2025   metoprolol succinate XL (Toprol-XL) 100 mg 24 hr tablet 2025 Morning Self   Sig: Take 1 tablet (100 mg) by mouth once daily.   midodrine (Proamatine) 2.5 mg tablet  Self   Sig: Take 1 tablet (2.5 mg) by mouth 3 times daily (morning, midday, late afternoon).   Patient not taking: Reported on 2025   morphine CR (MS Contin) 30 mg 12 hr tablet 2025 Self   Sig: Take 1 tablet (30 mg) by mouth every 12 hours. Do not crush, chew,  or split.   octreotide (SandoSTATIN) 500 mcg/mL injection  Self   Sig: Inject 0.4 mL (200 mcg) under the skin 3 times a day.   Patient not taking: Reported on 7/16/2025   ondansetron ODT (Zofran-ODT) 4 mg disintegrating tablet  Self   Sig: Dissolve 1 tablet (4 mg) in the mouth every 8 hours if needed for nausea or vomiting.   penicillin v potassium (Veetid) 500 mg tablet  Self   Sig: Take 1 tablet (500 mg) by mouth 4 times a day until gone.   Patient not taking: Reported on 7/16/2025   polyethylene glycol (Glycolax, Miralax) 17 gram packet  Self   Sig: Take 17 g by mouth every other day. Skip the dose for the day if watery/loose colostomy output Do not fill before April 3, 2025.Patient takes as needed   potassium chloride CR (Klor-Con M20) 20 mEq ER tablet 7/28/2025 Self   Sig: Take 1 tablet (20 mEq) by mouth 2 times a day. Do not crush or chew.   prochlorperazine (Compazine) 10 mg tablet Not Taking Self   Sig: Take 1 tablet (10 mg) by mouth every 6 hours if needed for nausea or vomiting.   Patient not taking: Reported on 7/16/2025   tamsulosin (Flomax) 0.4 mg 24 hr capsule  Self   Sig: Take 1 capsule (0.4 mg) by mouth once daily.   Patient not taking: Reported on 7/16/2025      Facility-Administered Medications Last Administration Doses Remaining   silver nitrate applicators applicator None recorded 1           The list below reflects the updated allergy list. Please review each documented allergy for additional clarification and justification.  Allergies  Reviewed by Bri Machuca, PharmD on 7/30/2025   No Known Allergies         Patient declines M2B at discharge.     Sources:   [facility]  Pharmacy dispense history  Patient Interview --Moderate historian  Able to confirm/deny medications and provide some medication hx with minimal prompting  Chart Review  Cardio note from 6/19  Care Everywhere    Additional Comments:  No recent dispense history per [facility] report  Please review additional comments above within med  "list       Bri Machuca, PharmD  Transitions of Care Pharmacist  07/30/25     Secure Chat preferred   If no response call [phone] or Vocera \"Med Rec\"      "

## 2025-07-31 ENCOUNTER — APPOINTMENT (OUTPATIENT)
Dept: RADIOLOGY | Facility: HOSPITAL | Age: 70
End: 2025-07-31
Payer: MEDICARE

## 2025-07-31 PROCEDURE — 71045 X-RAY EXAM CHEST 1 VIEW: CPT

## 2025-07-31 ASSESSMENT — PAIN DESCRIPTION - ORIENTATION
ORIENTATION: LEFT;LOWER
ORIENTATION: LEFT;LOWER

## 2025-07-31 ASSESSMENT — COGNITIVE AND FUNCTIONAL STATUS - GENERAL
HELP NEEDED FOR BATHING: A LITTLE
MOVING TO AND FROM BED TO CHAIR: A LOT
WALKING IN HOSPITAL ROOM: A LITTLE
TURNING FROM BACK TO SIDE WHILE IN FLAT BAD: A LOT
PERSONAL GROOMING: A LITTLE
CLIMB 3 TO 5 STEPS WITH RAILING: A LITTLE
WALKING IN HOSPITAL ROOM: A LOT
DAILY ACTIVITIY SCORE: 18
STANDING UP FROM CHAIR USING ARMS: A LOT
TURNING FROM BACK TO SIDE WHILE IN FLAT BAD: A LITTLE
DRESSING REGULAR LOWER BODY CLOTHING: A LITTLE
CLIMB 3 TO 5 STEPS WITH RAILING: TOTAL
MOVING TO AND FROM BED TO CHAIR: A LITTLE
MOVING FROM LYING ON BACK TO SITTING ON SIDE OF FLAT BED WITH BEDRAILS: A LITTLE
MOBILITY SCORE: 18
DRESSING REGULAR UPPER BODY CLOTHING: A LITTLE
MOBILITY SCORE: 12
STANDING UP FROM CHAIR USING ARMS: A LITTLE
EATING MEALS: A LITTLE
TOILETING: A LITTLE
MOVING FROM LYING ON BACK TO SITTING ON SIDE OF FLAT BED WITH BEDRAILS: A LITTLE

## 2025-07-31 ASSESSMENT — PAIN SCALES - GENERAL
PAINLEVEL_OUTOF10: 6
PAINLEVEL_OUTOF10: 3
PAINLEVEL_OUTOF10: 8
PAINLEVEL_OUTOF10: 7
PAINLEVEL_OUTOF10: 5 - MODERATE PAIN
PAINLEVEL_OUTOF10: 8

## 2025-07-31 ASSESSMENT — PAIN - FUNCTIONAL ASSESSMENT
PAIN_FUNCTIONAL_ASSESSMENT: 0-10

## 2025-07-31 ASSESSMENT — PAIN DESCRIPTION - DESCRIPTORS
DESCRIPTORS: ACHING;DISCOMFORT

## 2025-07-31 ASSESSMENT — PAIN DESCRIPTION - LOCATION
LOCATION: ABDOMEN

## 2025-07-31 ASSESSMENT — ACTIVITIES OF DAILY LIVING (ADL): ADL_ASSISTANCE: INDEPENDENT

## 2025-07-31 NOTE — CARE PLAN
Problem: Pain - Adult  Goal: Verbalizes/displays adequate comfort level or baseline comfort level  Outcome: Progressing     Problem: Safety - Adult  Goal: Free from fall injury  Outcome: Progressing     Problem: Discharge Planning  Goal: Discharge to home or other facility with appropriate resources  Outcome: Progressing     Problem: Chronic Conditions and Co-morbidities  Goal: Patient's chronic conditions and co-morbidity symptoms are monitored and maintained or improved  Outcome: Progressing     Problem: Nutrition  Goal: Nutrient intake appropriate for maintaining nutritional needs  Outcome: Progressing     Problem: Skin  Goal: Decreased wound size/increased tissue granulation at next dressing change  Outcome: Progressing  Flowsheets (Taken 7/29/2025 2309)  Decreased wound size/increased tissue granulation at next dressing change: Protective dressings over bony prominences  Goal: Participates in plan/prevention/treatment measures  Outcome: Progressing  Flowsheets (Taken 7/29/2025 2309)  Participates in plan/prevention/treatment measures: Discuss with provider PT/OT consult  Goal: Prevent/manage excess moisture  Outcome: Progressing  Flowsheets (Taken 7/29/2025 2309)  Prevent/manage excess moisture: Cleanse incontinence/protect with barrier cream  Goal: Prevent/minimize sheer/friction injuries  Outcome: Progressing  Flowsheets (Taken 7/29/2025 2309)  Prevent/minimize sheer/friction injuries:   Complete micro-shifts as needed if patient unable. Adjust patient position to relieve pressure points, not a full turn   HOB 30 degrees or less   Turn/reposition every 2 hours/use positioning/transfer devices   Use pull sheet  Goal: Promote/optimize nutrition  Outcome: Progressing  Flowsheets (Taken 7/29/2025 2309)  Promote/optimize nutrition: Discuss with provider if NPO > 2 days  Goal: Promote skin healing  Outcome: Progressing

## 2025-07-31 NOTE — PROGRESS NOTES
"Roma Corral is a 70 y.o. male on day 2 of admission presenting with Colon carcinoma metastatic to liver.    Subjective   No transfusions overnight. Patient received a 1/2 amp D50 for hypoglycemia and mIVFs were switched to D5LR, otherwise no acute events. Patient reporting appropriate surgical pain.     Objective     Physical Exam  Vitals reviewed.   Constitutional:       General: He is awake.      Appearance: Normal appearance.   HENT:      Head: Normocephalic and atraumatic.      Nose:      Comments: NG tube in place to LIWS.     Mouth/Throat:      Mouth: Mucous membranes are dry.     Eyes:      Extraocular Movements: Extraocular movements intact.      Pupils: Pupils are equal, round, and reactive to light.       Cardiovascular:      Rate and Rhythm: Normal rate and regular rhythm.      Pulses: Normal pulses.      Heart sounds: Normal heart sounds.   Pulmonary:      Effort: Pulmonary effort is normal.      Breath sounds: Normal breath sounds.   Abdominal:      General: Abdomen is flat. Bowel sounds are normal.      Palpations: Abdomen is soft.      Tenderness: There is abdominal tenderness (surgical).      Comments: LUQ hockey stick incision KWABENA, dermabond intact.   Genitourinary:     Comments: Arriaga in place with clear yellow UOP.     Musculoskeletal:      Cervical back: Neck supple.     Skin:     General: Skin is warm and dry.     Neurological:      General: No focal deficit present.      Mental Status: He is alert and oriented to person, place, and time.     Psychiatric:         Mood and Affect: Mood normal.         Behavior: Behavior normal. Behavior is cooperative.       Last Recorded Vitals  Blood pressure 108/63, pulse 73, temperature 36.6 °C (97.9 °F), temperature source Temporal, resp. rate 13, height 1.803 m (5' 10.98\"), weight 80.2 kg (176 lb 12.9 oz), SpO2 96%.  Intake/Output last 3 Shifts:  I/O last 3 completed shifts:  In: 4584.5 (57.2 mL/kg) [I.V.:2534.5 (31.6 mL/kg); Blood:1200; NG/GT:50; IV " Piggyback:800]  Out: 3212 (40.1 mL/kg) [Urine:1457 (0.5 mL/kg/hr); Emesis/NG output:90; Drains:1665]  Weight: 80.2 kg     Relevant Results  Scheduled medications  Scheduled Medications[1]  Continuous medications  Continuous Medications[2]  PRN medications  PRN Medications[3]    Results for orders placed or performed during the hospital encounter of 07/29/25 (from the past 24 hours)   Calcium, Ionized   Result Value Ref Range    POCT Calcium, Ionized 1.10 1.1 - 1.33 mmol/L   CBC   Result Value Ref Range    WBC 5.5 4.4 - 11.3 x10*3/uL    nRBC 0.0 0.0 - 0.0 /100 WBCs    RBC 2.93 (L) 4.50 - 5.90 x10*6/uL    Hemoglobin 7.6 (L) 13.5 - 17.5 g/dL    Hematocrit 23.3 (L) 41.0 - 52.0 %    MCV 80 80 - 100 fL    MCH 25.9 (L) 26.0 - 34.0 pg    MCHC 32.6 32.0 - 36.0 g/dL    RDW 18.3 (H) 11.5 - 14.5 %    Platelets 69 (L) 150 - 450 x10*3/uL   Coagulation Screen   Result Value Ref Range    Protime 20.9 (H) 9.8 - 12.4 seconds    INR 1.9 (H) 0.9 - 1.1    aPTT 32 26 - 36 seconds   Magnesium   Result Value Ref Range    Magnesium 2.40 1.60 - 2.40 mg/dL   Renal Function Panel   Result Value Ref Range    Glucose 121 (H) 74 - 99 mg/dL    Sodium 139 136 - 145 mmol/L    Potassium 4.3 3.5 - 5.3 mmol/L    Chloride 102 98 - 107 mmol/L    Bicarbonate 26 21 - 32 mmol/L    Anion Gap 15 10 - 20 mmol/L    Urea Nitrogen 30 (H) 6 - 23 mg/dL    Creatinine 1.31 (H) 0.50 - 1.30 mg/dL    eGFR 59 (L) >60 mL/min/1.73m*2    Calcium 8.4 (L) 8.6 - 10.6 mg/dL    Phosphorus 4.6 2.5 - 4.9 mg/dL    Albumin 3.5 3.4 - 5.0 g/dL   Hepatic function panel   Result Value Ref Range    Albumin 3.5 3.4 - 5.0 g/dL    Bilirubin, Total 3.8 (H) 0.0 - 1.2 mg/dL    Bilirubin, Direct 1.6 (H) 0.0 - 0.3 mg/dL    Alkaline Phosphatase 174 (H) 33 - 136 U/L    ALT 93 (H) 10 - 52 U/L     (H) 9 - 39 U/L    Total Protein 5.5 (L) 6.4 - 8.2 g/dL   POCT GLUCOSE   Result Value Ref Range    POCT Glucose 113 (H) 74 - 99 mg/dL   POCT GLUCOSE   Result Value Ref Range    POCT Glucose 96 74 -  99 mg/dL   Blood Gas Arterial Full Panel   Result Value Ref Range    POCT pH, Arterial 7.49 (H) 7.38 - 7.42 pH    POCT pCO2, Arterial 37 (L) 38 - 42 mm Hg    POCT pO2, Arterial 99 (H) 85 - 95 mm Hg    POCT SO2, Arterial 99 94 - 100 %    POCT Oxy Hemoglobin, Arterial 96.9 94.0 - 98.0 %    POCT Hematocrit Calculated, Arterial 26.0 (L) 41.0 - 52.0 %    POCT Sodium, Arterial 138 136 - 145 mmol/L    POCT Potassium, Arterial 4.1 3.5 - 5.3 mmol/L    POCT Chloride, Arterial 106 98 - 107 mmol/L    POCT Ionized Calcium, Arterial 1.12 1.10 - 1.33 mmol/L    POCT Glucose, Arterial 86 74 - 99 mg/dL    POCT Lactate, Arterial 2.6 (H) 0.4 - 2.0 mmol/L    POCT Base Excess, Arterial 4.6 (H) -2.0 - 3.0 mmol/L    POCT HCO3 Calculated, Arterial 28.2 (H) 22.0 - 26.0 mmol/L    POCT Hemoglobin, Arterial 8.8 (L) 13.5 - 17.5 g/dL    POCT Anion Gap, Arterial 8 (L) 10 - 25 mmo/L    Patient Temperature 37.0 degrees Celsius    FiO2 21 %   POCT GLUCOSE   Result Value Ref Range    POCT Glucose 86 74 - 99 mg/dL   Hepatic function panel   Result Value Ref Range    Albumin 3.2 (L) 3.4 - 5.0 g/dL    Bilirubin, Total 3.7 (H) 0.0 - 1.2 mg/dL    Bilirubin, Direct 1.7 (H) 0.0 - 0.3 mg/dL    Alkaline Phosphatase 179 (H) 33 - 136 U/L    ALT 63 (H) 10 - 52 U/L     (H) 9 - 39 U/L    Total Protein 5.3 (L) 6.4 - 8.2 g/dL   Magnesium   Result Value Ref Range    Magnesium 2.39 1.60 - 2.40 mg/dL   Comprehensive metabolic panel   Result Value Ref Range    Glucose 80 74 - 99 mg/dL    Sodium 142 136 - 145 mmol/L    Potassium 4.1 3.5 - 5.3 mmol/L    Chloride 103 98 - 107 mmol/L    Bicarbonate 29 21 - 32 mmol/L    Anion Gap 14 10 - 20 mmol/L    Urea Nitrogen 33 (H) 6 - 23 mg/dL    Creatinine 1.22 0.50 - 1.30 mg/dL    eGFR 64 >60 mL/min/1.73m*2    Calcium 9.3 8.6 - 10.6 mg/dL    Albumin 3.2 (L) 3.4 - 5.0 g/dL    Alkaline Phosphatase 179 (H) 33 - 136 U/L    Total Protein 5.3 (L) 6.4 - 8.2 g/dL     (H) 9 - 39 U/L    Bilirubin, Total 3.7 (H) 0.0 - 1.2 mg/dL     ALT 63 (H) 10 - 52 U/L   Coagulation Screen   Result Value Ref Range    Protime 20.8 (H) 9.8 - 12.4 seconds    INR 1.9 (H) 0.9 - 1.1    aPTT 24 (L) 26 - 36 seconds   CBC   Result Value Ref Range    WBC 6.5 4.4 - 11.3 x10*3/uL    nRBC 0.0 0.0 - 0.0 /100 WBCs    RBC 3.16 (L) 4.50 - 5.90 x10*6/uL    Hemoglobin 8.4 (L) 13.5 - 17.5 g/dL    Hematocrit 25.1 (L) 41.0 - 52.0 %    MCV 79 (L) 80 - 100 fL    MCH 26.6 26.0 - 34.0 pg    MCHC 33.5 32.0 - 36.0 g/dL    RDW 18.9 (H) 11.5 - 14.5 %    Platelets 88 (L) 150 - 450 x10*3/uL   Calcium, Ionized   Result Value Ref Range    POCT Calcium, Ionized 1.27 1.1 - 1.33 mmol/L   Phosphorus   Result Value Ref Range    Phosphorus 3.9 2.5 - 4.9 mg/dL   POCT GLUCOSE   Result Value Ref Range    POCT Glucose 63 (L) 74 - 99 mg/dL   POCT GLUCOSE   Result Value Ref Range    POCT Glucose 65 (L) 74 - 99 mg/dL   POCT GLUCOSE   Result Value Ref Range    POCT Glucose 107 (H) 74 - 99 mg/dL   Hepatic function panel   Result Value Ref Range    Albumin 3.1 (L) 3.4 - 5.0 g/dL    Bilirubin, Total 3.7 (H) 0.0 - 1.2 mg/dL    Bilirubin, Direct 1.8 (H) 0.0 - 0.3 mg/dL    Alkaline Phosphatase 171 (H) 33 - 136 U/L    ALT 51 10 - 52 U/L     (H) 9 - 39 U/L    Total Protein 5.3 (L) 6.4 - 8.2 g/dL   CBC   Result Value Ref Range    WBC 6.0 4.4 - 11.3 x10*3/uL    nRBC 0.0 0.0 - 0.0 /100 WBCs    RBC 3.15 (L) 4.50 - 5.90 x10*6/uL    Hemoglobin 8.2 (L) 13.5 - 17.5 g/dL    Hematocrit 25.0 (L) 41.0 - 52.0 %    MCV 79 (L) 80 - 100 fL    MCH 26.0 26.0 - 34.0 pg    MCHC 32.8 32.0 - 36.0 g/dL    RDW 18.9 (H) 11.5 - 14.5 %    Platelets 87 (L) 150 - 450 x10*3/uL   Renal function panel   Result Value Ref Range    Glucose 80 74 - 99 mg/dL    Sodium 142 136 - 145 mmol/L    Potassium 4.1 3.5 - 5.3 mmol/L    Chloride 103 98 - 107 mmol/L    Bicarbonate 29 21 - 32 mmol/L    Anion Gap 14 10 - 20 mmol/L    Urea Nitrogen 31 (H) 6 - 23 mg/dL    Creatinine 1.22 0.50 - 1.30 mg/dL    eGFR 64 >60 mL/min/1.73m*2    Calcium 9.3 8.6 -  10.3 mg/dL    Phosphorus 3.9 2.5 - 4.9 mg/dL    Albumin 3.1 (L) 3.4 - 5.0 g/dL   POCT GLUCOSE   Result Value Ref Range    POCT Glucose 91 74 - 99 mg/dL   Blood Gas Arterial Full Panel   Result Value Ref Range    POCT pH, Arterial 7.45 (H) 7.38 - 7.42 pH    POCT pCO2, Arterial 38 38 - 42 mm Hg    POCT pO2, Arterial 102 (H) 85 - 95 mm Hg    POCT SO2, Arterial 99 94 - 100 %    POCT Oxy Hemoglobin, Arterial 97.1 94.0 - 98.0 %    POCT Hematocrit Calculated, Arterial 27.0 (L) 41.0 - 52.0 %    POCT Sodium, Arterial 140 136 - 145 mmol/L    POCT Potassium, Arterial 3.6 3.5 - 5.3 mmol/L    POCT Chloride, Arterial 105 98 - 107 mmol/L    POCT Ionized Calcium, Arterial 0.41 (LL) 1.10 - 1.33 mmol/L    POCT Glucose, Arterial 99 74 - 99 mg/dL    POCT Lactate, Arterial 2.4 (H) 0.4 - 2.0 mmol/L    POCT Base Excess, Arterial 2.3 -2.0 - 3.0 mmol/L    POCT HCO3 Calculated, Arterial 26.4 (H) 22.0 - 26.0 mmol/L    POCT Hemoglobin, Arterial 9.0 (L) 13.5 - 17.5 g/dL    POCT Anion Gap, Arterial 12 10 - 25 mmo/L    Patient Temperature 37.0 degrees Celsius    FiO2 21 %   Hepatic function panel   Result Value Ref Range    Albumin 3.2 (L) 3.4 - 5.0 g/dL    Bilirubin, Total 4.2 (H) 0.0 - 1.2 mg/dL    Bilirubin, Direct 2.0 (H) 0.0 - 0.3 mg/dL    Alkaline Phosphatase 186 (H) 33 - 136 U/L    ALT 49 10 - 52 U/L     (H) 9 - 39 U/L    Total Protein 5.4 (L) 6.4 - 8.2 g/dL   Coagulation Screen   Result Value Ref Range    Protime 20.2 (H) 9.8 - 12.4 seconds    INR 1.8 (H) 0.9 - 1.1    aPTT 32 26 - 36 seconds   Electrocardiogram, 12-lead PRN ACS symptoms   Result Value Ref Range    Ventricular Rate 68 BPM    Atrial Rate 68 BPM    QRS Duration 90 ms    QT Interval 470 ms    QTC Calculation(Bazett) 499 ms    R Axis -29 degrees    T Axis 43 degrees    QRS Count 11 beats    Q Onset 228 ms    T Offset 463 ms    QTC Fredericia 490 ms     *Note: Due to a large number of results and/or encounters for the requested time period, some results have not been  displayed. A complete set of results can be found in Results Review.      Assessment & Plan  Colon carcinoma metastatic to liver    Carcinoma of colon metastatic to liver    Acute post-operative pain    Assessment:  Roma Corral is a 70 y.o. male with a history of NSTEMI, CAD, anemia, gout, abdominal pain, HTN, DM2, and colon carcinoma metastatic to liver presenting to SICU from OR s/p open right hepatectomy on 7/29 with Dr. Erazo.    Plan:  NEURO: Hx of chronic pain on PO Morphine at home. Acute post-op pain. Bilateral erector spinae blocks with catheters performed pre-operatively on 7/29/2025.  - ongoing neuro and pain assessments  - continue scheduled morphine 4mg IV q4h for pain control  - additional morphine 2mg IV q4h PRN for breakthrough pain  - Acute Pain following for GLEN block catheters, ropivacaine infusing  - PT/OT consult  - Home meds: morphine 30 mg PO Q12H      CV: History of CAD (NSTEMI 2021) on ASA, HLD, HTN. Baseline /68. Echo 5/13/25: EF of 45-50%. Nuclear stress test 7/1/25: No evidence of inducible myocardial ischemia, with EF 64%. Arrived to SICU on Levo, now weaned off. Lactate downtrending.  - continuous EKG/abp monitoring  - Goal map range 65-90  - trend lactate  - Holding home meds: Metoprolol, lasix, Atorvastatin, ASA     PULM: No pulmonary history. Arrived to SICU intubated. Extubated 7/30. On RA.  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 Vasquez, and laparoscopic microwave ablation x3 with liver biopsy by Dr. Erazo on 3/18/25. Now s/p open right hepatectomy on 7/29.  - NPO   - NG to LIWS  - PRN Zofran  - monitor drain output  - trend LFTs     : No history of renal disease. Baseline creatinine  0.96.  - Check renal function panel daily and PRN  - Maintenance IVF -> D5LR at 50ml/hr  - Volume resuscitation as needed  - Maintain U/O >0.5ml/kg/hr  - remove evans  - Replete electrolytes to goal K>4, Mg>2, Phos>2.5, ionized Ca>1.10.     HEME: Acute blood loss anemia. Baseline Hgb 7.8 (5/28). OR EBL 1.5 L. Intra-op received 3units pRBC, 1 FFP, 1 plt. Received an additional 2 units of pRBC and 1 FFP in the ICU. Coagulopathy, INR of 1.9, receiving Vitamin K.  - Vitamin K 10 mg IV x3 days.  - Check CBC and coags daily and PRN  - SCDs for DVT prophylaxis  - ok for SC lovenox per Surg Onc  - ongoing monitoring for s/s bleeding  - maintain active T&S (7/30)      ENDO: No history of  thyroid disease. H/o DM2 (patient denies), not currently requiring the use of medications - last A1C 5.3 on 1/18/25. Hypoglycemia overnight, received a 1/2 amp D50 for hypoglycemia and mIVFs were switched to D5LR, BG improved.  - Q4h BG   - holding SSI     ID: Afebrile. No leukocytosis. Completed obdulio-op cefazolin.  - temp q4h, wbc daily  - ongoing monitoring for s/s infection     Lines:  - R Radial arterial line -> remove  - L Subclavian MAC w/ mini MAC -> remove  - PIV x2 (16g, 20g)     Dispo: Transfer to floor. Patient seen and discussed with ICU attending Dr. Rosa.      Umair Glover, APRN-CNP  SICU phone [phone]     I spent 45 minutes in the professional and overall care of this patient.        [1] morphine, 4 mg, intravenous, q4h  phytonadione, 10 mg, intravenous, Daily  [2] dextrose 5 % and lactated Ringer's, 50 mL/hr, Last Rate: 50 mL/hr (07/31/25 1100)  ropivacaine (PF) in NS cmpd, 14 mL/hr, Last Rate: 14 mL/hr (07/29/25 2023)  [3] PRN medications: calcium gluconate, calcium gluconate, dextrose, dextrose, glucagon, glucagon, magnesium sulfate, magnesium sulfate, morphine, ondansetron, oxygen, phenoL, potassium chloride, potassium chloride

## 2025-07-31 NOTE — PROGRESS NOTES
Postop Pain HPI -   Palliative: relieved with IV analgesics and regional local anesthetics  Provocative: movement  Quality:  burning and aching  Radiation:  none  Severity:  7/10  Timing: constant    24-HOUR OPIOID CONSUMPTION:  20mg morphine    Scheduled medications  Scheduled Medications[1]  Continuous medications  Continuous Medications[2]  PRN medications  PRN Medications[3]     Physical Exam:  Constitutional:  no distress, alert and cooperative  Eyes: clear sclera  Head/Neck: No apparent injury, trachea midline  Respiratory/Thorax: Patent airways, thorax symmetric, breathing comfortably  Cardiovascular: no pitting edema  Gastrointestinal: Nondistended  Musculoskeletal: ROM intact  Extremities: no clubbing  Neurological: alert, mejia x4  Psychological: Appropriate affect    Results for orders placed or performed during the hospital encounter of 07/29/25 (from the past 24 hours)   POCT GLUCOSE   Result Value Ref Range    POCT Glucose 124 (H) 74 - 99 mg/dL   Calcium, Ionized   Result Value Ref Range    POCT Calcium, Ionized 1.10 1.1 - 1.33 mmol/L   CBC   Result Value Ref Range    WBC 5.5 4.4 - 11.3 x10*3/uL    nRBC 0.0 0.0 - 0.0 /100 WBCs    RBC 2.93 (L) 4.50 - 5.90 x10*6/uL    Hemoglobin 7.6 (L) 13.5 - 17.5 g/dL    Hematocrit 23.3 (L) 41.0 - 52.0 %    MCV 80 80 - 100 fL    MCH 25.9 (L) 26.0 - 34.0 pg    MCHC 32.6 32.0 - 36.0 g/dL    RDW 18.3 (H) 11.5 - 14.5 %    Platelets 69 (L) 150 - 450 x10*3/uL   Coagulation Screen   Result Value Ref Range    Protime 20.9 (H) 9.8 - 12.4 seconds    INR 1.9 (H) 0.9 - 1.1    aPTT 32 26 - 36 seconds   Magnesium   Result Value Ref Range    Magnesium 2.40 1.60 - 2.40 mg/dL   Renal Function Panel   Result Value Ref Range    Glucose 121 (H) 74 - 99 mg/dL    Sodium 139 136 - 145 mmol/L    Potassium 4.3 3.5 - 5.3 mmol/L    Chloride 102 98 - 107 mmol/L    Bicarbonate 26 21 - 32 mmol/L    Anion Gap 15 10 - 20 mmol/L    Urea Nitrogen 30 (H) 6 - 23 mg/dL    Creatinine 1.31 (H) 0.50 - 1.30  mg/dL    eGFR 59 (L) >60 mL/min/1.73m*2    Calcium 8.4 (L) 8.6 - 10.6 mg/dL    Phosphorus 4.6 2.5 - 4.9 mg/dL    Albumin 3.5 3.4 - 5.0 g/dL   Hepatic function panel   Result Value Ref Range    Albumin 3.5 3.4 - 5.0 g/dL    Bilirubin, Total 3.8 (H) 0.0 - 1.2 mg/dL    Bilirubin, Direct 1.6 (H) 0.0 - 0.3 mg/dL    Alkaline Phosphatase 174 (H) 33 - 136 U/L    ALT 93 (H) 10 - 52 U/L     (H) 9 - 39 U/L    Total Protein 5.5 (L) 6.4 - 8.2 g/dL   POCT GLUCOSE   Result Value Ref Range    POCT Glucose 113 (H) 74 - 99 mg/dL   POCT GLUCOSE   Result Value Ref Range    POCT Glucose 96 74 - 99 mg/dL   Blood Gas Arterial Full Panel   Result Value Ref Range    POCT pH, Arterial 7.49 (H) 7.38 - 7.42 pH    POCT pCO2, Arterial 37 (L) 38 - 42 mm Hg    POCT pO2, Arterial 99 (H) 85 - 95 mm Hg    POCT SO2, Arterial 99 94 - 100 %    POCT Oxy Hemoglobin, Arterial 96.9 94.0 - 98.0 %    POCT Hematocrit Calculated, Arterial 26.0 (L) 41.0 - 52.0 %    POCT Sodium, Arterial 138 136 - 145 mmol/L    POCT Potassium, Arterial 4.1 3.5 - 5.3 mmol/L    POCT Chloride, Arterial 106 98 - 107 mmol/L    POCT Ionized Calcium, Arterial 1.12 1.10 - 1.33 mmol/L    POCT Glucose, Arterial 86 74 - 99 mg/dL    POCT Lactate, Arterial 2.6 (H) 0.4 - 2.0 mmol/L    POCT Base Excess, Arterial 4.6 (H) -2.0 - 3.0 mmol/L    POCT HCO3 Calculated, Arterial 28.2 (H) 22.0 - 26.0 mmol/L    POCT Hemoglobin, Arterial 8.8 (L) 13.5 - 17.5 g/dL    POCT Anion Gap, Arterial 8 (L) 10 - 25 mmo/L    Patient Temperature 37.0 degrees Celsius    FiO2 21 %   POCT GLUCOSE   Result Value Ref Range    POCT Glucose 86 74 - 99 mg/dL   Hepatic function panel   Result Value Ref Range    Albumin 3.2 (L) 3.4 - 5.0 g/dL    Bilirubin, Total 3.7 (H) 0.0 - 1.2 mg/dL    Bilirubin, Direct 1.7 (H) 0.0 - 0.3 mg/dL    Alkaline Phosphatase 179 (H) 33 - 136 U/L    ALT 63 (H) 10 - 52 U/L     (H) 9 - 39 U/L    Total Protein 5.3 (L) 6.4 - 8.2 g/dL   Magnesium   Result Value Ref Range    Magnesium 2.39  1.60 - 2.40 mg/dL   Comprehensive metabolic panel   Result Value Ref Range    Glucose 80 74 - 99 mg/dL    Sodium 142 136 - 145 mmol/L    Potassium 4.1 3.5 - 5.3 mmol/L    Chloride 103 98 - 107 mmol/L    Bicarbonate 29 21 - 32 mmol/L    Anion Gap 14 10 - 20 mmol/L    Urea Nitrogen 33 (H) 6 - 23 mg/dL    Creatinine 1.22 0.50 - 1.30 mg/dL    eGFR 64 >60 mL/min/1.73m*2    Calcium 9.3 8.6 - 10.6 mg/dL    Albumin 3.2 (L) 3.4 - 5.0 g/dL    Alkaline Phosphatase 179 (H) 33 - 136 U/L    Total Protein 5.3 (L) 6.4 - 8.2 g/dL     (H) 9 - 39 U/L    Bilirubin, Total 3.7 (H) 0.0 - 1.2 mg/dL    ALT 63 (H) 10 - 52 U/L   Coagulation Screen   Result Value Ref Range    Protime 20.8 (H) 9.8 - 12.4 seconds    INR 1.9 (H) 0.9 - 1.1    aPTT 24 (L) 26 - 36 seconds   CBC   Result Value Ref Range    WBC 6.5 4.4 - 11.3 x10*3/uL    nRBC 0.0 0.0 - 0.0 /100 WBCs    RBC 3.16 (L) 4.50 - 5.90 x10*6/uL    Hemoglobin 8.4 (L) 13.5 - 17.5 g/dL    Hematocrit 25.1 (L) 41.0 - 52.0 %    MCV 79 (L) 80 - 100 fL    MCH 26.6 26.0 - 34.0 pg    MCHC 33.5 32.0 - 36.0 g/dL    RDW 18.9 (H) 11.5 - 14.5 %    Platelets 88 (L) 150 - 450 x10*3/uL   Calcium, Ionized   Result Value Ref Range    POCT Calcium, Ionized 1.27 1.1 - 1.33 mmol/L   Phosphorus   Result Value Ref Range    Phosphorus 3.9 2.5 - 4.9 mg/dL   POCT GLUCOSE   Result Value Ref Range    POCT Glucose 63 (L) 74 - 99 mg/dL   POCT GLUCOSE   Result Value Ref Range    POCT Glucose 65 (L) 74 - 99 mg/dL   POCT GLUCOSE   Result Value Ref Range    POCT Glucose 107 (H) 74 - 99 mg/dL   Hepatic function panel   Result Value Ref Range    Albumin 3.1 (L) 3.4 - 5.0 g/dL    Bilirubin, Total 3.7 (H) 0.0 - 1.2 mg/dL    Bilirubin, Direct 1.8 (H) 0.0 - 0.3 mg/dL    Alkaline Phosphatase 171 (H) 33 - 136 U/L    ALT 51 10 - 52 U/L     (H) 9 - 39 U/L    Total Protein 5.3 (L) 6.4 - 8.2 g/dL   CBC   Result Value Ref Range    WBC 6.0 4.4 - 11.3 x10*3/uL    nRBC 0.0 0.0 - 0.0 /100 WBCs    RBC 3.15 (L) 4.50 - 5.90 x10*6/uL     Hemoglobin 8.2 (L) 13.5 - 17.5 g/dL    Hematocrit 25.0 (L) 41.0 - 52.0 %    MCV 79 (L) 80 - 100 fL    MCH 26.0 26.0 - 34.0 pg    MCHC 32.8 32.0 - 36.0 g/dL    RDW 18.9 (H) 11.5 - 14.5 %    Platelets 87 (L) 150 - 450 x10*3/uL   Renal function panel   Result Value Ref Range    Glucose 80 74 - 99 mg/dL    Sodium 142 136 - 145 mmol/L    Potassium 4.1 3.5 - 5.3 mmol/L    Chloride 103 98 - 107 mmol/L    Bicarbonate 29 21 - 32 mmol/L    Anion Gap 14 10 - 20 mmol/L    Urea Nitrogen 31 (H) 6 - 23 mg/dL    Creatinine 1.22 0.50 - 1.30 mg/dL    eGFR 64 >60 mL/min/1.73m*2    Calcium 9.3 8.6 - 10.3 mg/dL    Phosphorus 3.9 2.5 - 4.9 mg/dL    Albumin 3.1 (L) 3.4 - 5.0 g/dL   POCT GLUCOSE   Result Value Ref Range    POCT Glucose 91 74 - 99 mg/dL   Blood Gas Arterial Full Panel   Result Value Ref Range    POCT pH, Arterial 7.45 (H) 7.38 - 7.42 pH    POCT pCO2, Arterial 38 38 - 42 mm Hg    POCT pO2, Arterial 102 (H) 85 - 95 mm Hg    POCT SO2, Arterial 99 94 - 100 %    POCT Oxy Hemoglobin, Arterial 97.1 94.0 - 98.0 %    POCT Hematocrit Calculated, Arterial 27.0 (L) 41.0 - 52.0 %    POCT Sodium, Arterial 140 136 - 145 mmol/L    POCT Potassium, Arterial 3.6 3.5 - 5.3 mmol/L    POCT Chloride, Arterial 105 98 - 107 mmol/L    POCT Ionized Calcium, Arterial 0.41 (LL) 1.10 - 1.33 mmol/L    POCT Glucose, Arterial 99 74 - 99 mg/dL    POCT Lactate, Arterial 2.4 (H) 0.4 - 2.0 mmol/L    POCT Base Excess, Arterial 2.3 -2.0 - 3.0 mmol/L    POCT HCO3 Calculated, Arterial 26.4 (H) 22.0 - 26.0 mmol/L    POCT Hemoglobin, Arterial 9.0 (L) 13.5 - 17.5 g/dL    POCT Anion Gap, Arterial 12 10 - 25 mmo/L    Patient Temperature 37.0 degrees Celsius    FiO2 21 %   Hepatic function panel   Result Value Ref Range    Albumin 3.2 (L) 3.4 - 5.0 g/dL    Bilirubin, Total 4.2 (H) 0.0 - 1.2 mg/dL    Bilirubin, Direct 2.0 (H) 0.0 - 0.3 mg/dL    Alkaline Phosphatase 186 (H) 33 - 136 U/L    ALT 49 10 - 52 U/L     (H) 9 - 39 U/L    Total Protein 5.4 (L) 6.4 - 8.2  g/dL   Coagulation Screen   Result Value Ref Range    Protime 20.2 (H) 9.8 - 12.4 seconds    INR 1.8 (H) 0.9 - 1.1    aPTT 32 26 - 36 seconds     *Note: Due to a large number of results and/or encounters for the requested time period, some results have not been displayed. A complete set of results can be found in Results Review.      Plan:  - Bilateral GLEN blocks with catheters performed preoperatively on 7/29  - Ambit ball with Ropivacaine 0.2%/NaCl 0.9% 500mL, Rate 7 cc/hr bilaterally  - Bolus 10cc 0.5% ropivacaine  - Refill ambit today  - Please obtain repeat INR tomorrow AM 8/1  - Please notify Acute Pain service before initiation of anticoagulation and/or dual antiplatelet therapy if patient has indwelling nerve block catheter(s) in place  - Ambit medication will not interfere with pain medication prescribed by the primary team.   - Please be aware of local anesthetic toxic dose and absorption variability before considering lidocaine patches  - Acute pain service will follow while catheters in place  - Rest of pain management per primary team     Acute Pain Resident  pg [phone] ph [phone]       [1] morphine, 4 mg, intravenous, q4h  phytonadione, 10 mg, intravenous, Daily  [2] dextrose 5 % and lactated Ringer's, 50 mL/hr, Last Rate: 50 mL/hr (07/31/25 0434)  ropivacaine (PF) in NS cmpd, 14 mL/hr, Last Rate: 14 mL/hr (07/29/25 2023)  [3] PRN medications: calcium gluconate, calcium gluconate, dextrose, dextrose, glucagon, glucagon, magnesium sulfate, magnesium sulfate, morphine, ondansetron, oxygen, phenoL, potassium chloride, potassium chloride

## 2025-07-31 NOTE — PROGRESS NOTES
Physical Therapy    Physical Therapy Evaluation    Patient Name: Roma Corral  MRN: [id number]  [place] [facility]  Room: 04/04-A  Today's Date: 7/31/2025   Time Calculation  Start Time: 1113  Stop Time: 1245  Time Calculation (min): 92 min    Assessment/Plan   PT Assessment  PT Assessment Results: Decreased strength, Decreased endurance, Impaired balance, Decreased mobility, Pain  Rehab Prognosis: Excellent  Barriers to Discharge Home: Physical needs  Physical Needs: 24hr mobility assistance needed, 24hr ADL assistance needed, High falls risk due to function or environment, Ambulating household distances limited by function/safety  Evaluation/Treatment Tolerance: Patient tolerated treatment well  Medical Staff Made Aware: Yes  End of Session Communication: Bedside nurse  Assessment Comment: Pt s/p R hepatectomy. Upon PT eval, pt presents with the stated deficits. Skilled PT indicated to progress safety and independence toward baseline.  End of Session Patient Position: Up in chair, Alarm on  IP OR SWING BED PT PLAN  Inpatient or Swing Bed: Inpatient  PT Plan  Treatment/Interventions: Bed mobility, Transfer training, Gait training, Stair training, Balance training, Neuromuscular re-education, Strengthening, Therapeutic exercise, Therapeutic activity, Endurance training, Home exercise program  PT Plan: Ongoing PT  PT Frequency: 5 times per week  PT Discharge Recommendations: High intensity level of continued care  PT Recommended Transfer Status: Assist x2  PT - OK to Discharge: Yes    Subjective     PT Visit Info:  PT Received On: 07/31/25  General Visit Information:  General  Reason for Referral: open right hepatectomy for colon carcinoma with liver mets on 7/29.  Past Medical History Relevant to Rehab: NSTEMI, CAD, anemia, gout, abdominal pain, HTN, DM2 and colon carcinoma metastatic to liver  Prior to Session Communication: Bedside nurse  Patient Position Received: Bed, 3 rail up  General Comment: Pt supine in bed  start of session, alert and agreeable but in pain. RN providing pain meds mid session and postponed further mobility until pain meds in and some lines removed to assist with mobility. Therefore, split session with PT in room from 3331-2327 and [id number] for mobility  Home Living:  Home Living  Type of Home: House  Lives With: Spouse  Home Adaptive Equipment: None  Home Layout: Multi-level (7 steps upstairs + 7 steps downstairs. Bedroom, bathroom, kitchen on same level so most steps pt has to do in one bout is 7.)  Home Access:  (1 CHRISTAL)  Bathroom Shower/Tub:  (Has tub and WIS)  Bathroom Equipment: Grab bars in shower  Prior Level of Function:  Prior Function Per Pt/Caregiver Report  Level of [place]: Independent with ADLs and functional transfers, Independent with homemaking with ambulation  ADL Assistance: Independent  Homemaking Assistance: Independent  Ambulatory Assistance: Independent  Vocational: Retired  Prior Function Comments: + driving, denies h/o falls or AD use  Precautions:  Precautions  Medical Precautions: Fall precautions  Post-Surgical Precautions: Abdominal surgery precautions     07/31/25 1113   Vital Signs   Vitals Session Pre PT   Heart Rate 73   Resp 14   SpO2 95 %   /72       Vital Signs Comment: VSS throughout mobility     Objective   Pain:  Pain Assessment  Pain Assessment: 0-10  Pain Type: Acute pain  Pain Location: Abdomen  Pain Interventions: Repositioned (RN providing pain meds)  Cognition:  Cognition  Overall Cognitive Status: Within Functional Limits  Orientation Level: Oriented X4    General Assessments:  Activity Tolerance  Endurance: Tolerates 10 - 20 min exercise with multiple rests  Early Mobility/Exercise Safety Screen: Proceed with mobilization - No exclusion criteria met  Activity Tolerance Comments: Pain limited    Sensation  Light Touch: No apparent deficits    Coordination  Movements are Fluid and Coordinated: Yes    Postural Control  Postural Control: Within  Functional Limits  Head Control: WFL  Trunk Control: Fwd flexed posture with mobility    Static Sitting Balance  Static Sitting-Balance Support: Feet supported  Static Sitting-Level of Assistance: Close supervision  Dynamic Sitting Balance  Dynamic Sitting-Balance Support: Feet supported  Dynamic Sitting-Level of Assistance: Close supervision, Contact guard    Static Standing Balance  Static Standing-Balance Support: Bilateral upper extremity supported  Static Standing-Level of Assistance: Minimum assistance  Dynamic Standing Balance  Dynamic Standing-Balance Support: Bilateral upper extremity supported  Dynamic Standing-Level of Assistance: Moderate assistance  Functional Assessments:     Bed Mobility  Bed Mobility: Yes  Bed Mobility 1  Bed Mobility 1: Supine to sitting  Level of Assistance 1: Moderate assistance  Bed Mobility Comments 1: HOB elevated + draw sheet    Transfers  Transfer: Yes  Transfer 1  Technique 1: Sit to stand  Transfer Device 1: Walker  Transfer Level of Assistance 1: Moderate assistance  Trials/Comments 1: Cues provided for hand setup with FWW    Ambulation/Gait Training  Ambulation/Gait Training Performed: Yes  Ambulation/Gait Training 1  Surface 1: Level tile  Device 1: Rolling walker  Assistance 1: Moderate assistance  Quality of Gait 1: Antalgic  Comments/Distance (ft) 1: x3 ft bed to chair    Extremity/Trunk Assessments:    RUE   RUE : Within Functional Limits  LUE   LUE:  (L wrist/hand weakness noted which pt states started post op. Per MDs possibly related to positioning on table. Sensation intact and AROM intact with increased effort.)  RLE   RLE : Within Functional Limits  LLE   LLE : Within Functional Limits  Outcome Measures:  AMPAC Basic Mobility  Turning from your back to your side while in a flat bed without using bedrails: A little  Moving from lying on your back to sitting on the side of a flat bed without using bedrails: A lot  Moving to and from bed to chair (including a  wheelchair): A lot  Standing up from a chair using your arms (e.g. wheelchair or bedside chair): A lot  To walk in hospital room: A lot  Climbing 3-5 steps with railing: Total  Basic Mobility - Total Score: 12    FSS-ICU  Ambulation: Walks <50 feet with any assistance x1 or walks any distance with assistance x2 people  Rolling: Minimal assistance (performs 75% or more of task)  Sitting: Minimal assistance (performs 75% or more of task)  Transfer Sit-to-Stand: Moderate assistance (performs 50 - 74% of task)  Transfer Supine-to-Sit: Moderate assistance (performs 50 - 74% of task)  Total Score: 15    Early Mobility/Exercise Safety Screen: Proceed with mobilization - No exclusion criteria met  ICU Mobility Scale: Transferring bed to chair [5]    Encounter Problems       Encounter Problems (Active)       Balance       STG - Maintains dynamic standing balance with upper extremity support on LRAD with Mod I        Start:  07/31/25    Expected End:  08/21/25               Mobility       STG - Patient will ambulate x200 ft with Mod I using LRAD       Start:  07/31/25    Expected End:  08/21/25            STG - Patient will ascend and descend 7 stairs with Mod I using LRAD       Start:  07/31/25    Expected End:  08/21/25               PT Transfers       STG - Patient will perform bed mobility independently        Start:  07/31/25    Expected End:  08/21/25            STG - Patient will transfer sit to and from stand with Mod I using LRAD       Start:  07/31/25    Expected End:  08/21/25                   Education Documentation  Body Mechanics, taught by Zofia Bravo, PT at 7/31/2025  3:36 PM.  Learner: Patient  Readiness: Acceptance  Method: Explanation  Response: Verbalizes Understanding, Needs Reinforcement    Mobility Training, taught by Zofia Bravo, PT at 7/31/2025  3:36 PM.  Learner: Patient  Readiness: Acceptance  Method: Explanation  Response: Verbalizes Understanding, Needs Reinforcement    Education  Comments  No comments found.    Zofia Bravo PT, DPT

## 2025-07-31 NOTE — CARE PLAN
Problem: Pain - Adult  Goal: Verbalizes/displays adequate comfort level or baseline comfort level  Outcome: Progressing     Problem: Safety - Adult  Goal: Free from fall injury  Outcome: Progressing     Problem: Discharge Planning  Goal: Discharge to home or other facility with appropriate resources  Outcome: Progressing     Problem: Nutrition  Goal: Nutrient intake appropriate for maintaining nutritional needs  Outcome: Progressing     Problem: Skin  Goal: Decreased wound size/increased tissue granulation at next dressing change  Outcome: Progressing

## 2025-07-31 NOTE — PROGRESS NOTES
Postop Pain HPI -   Palliative: relieved with IV analgesics and regional local anesthetics  Provocative: movement  Quality:  burning and aching  Radiation:  none  Severity:  ***/10  Timing: constant    24-HOUR OPIOID CONSUMPTION:  Morphine 20mg    Scheduled medications  Scheduled Medications[1]  Continuous medications  Continuous Medications[2]  PRN medications  PRN Medications[3]     Physical Exam:  Constitutional:  no distress, alert and cooperative  Eyes: clear sclera  Head/Neck: No apparent injury, trachea midline  Respiratory/Thorax: Patent airways, thorax symmetric, breathing comfortably  Cardiovascular: no pitting edema  Gastrointestinal: Nondistended  Musculoskeletal: ROM intact  Extremities: no clubbing  Neurological: alert, mejia x4  Psychological: Appropriate affect    Results for orders placed or performed during the hospital encounter of 07/29/25 (from the past 24 hours)   POCT GLUCOSE   Result Value Ref Range    POCT Glucose 126 (H) 74 - 99 mg/dL   Blood Gas Arterial Full Panel   Result Value Ref Range    POCT pH, Arterial 7.50 (H) 7.38 - 7.42 pH    POCT pCO2, Arterial 28 (L) 38 - 42 mm Hg    POCT pO2, Arterial 189 (H) 85 - 95 mm Hg    POCT SO2, Arterial 100 94 - 100 %    POCT Oxy Hemoglobin, Arterial 98.0 94.0 - 98.0 %    POCT Hematocrit Calculated, Arterial 30.0 (L) 41.0 - 52.0 %    POCT Sodium, Arterial 136 136 - 145 mmol/L    POCT Potassium, Arterial 4.2 3.5 - 5.3 mmol/L    POCT Chloride, Arterial 107 98 - 107 mmol/L    POCT Ionized Calcium, Arterial 1.04 (L) 1.10 - 1.33 mmol/L    POCT Glucose, Arterial 124 (H) 74 - 99 mg/dL    POCT Lactate, Arterial 4.9 (HH) 0.4 - 2.0 mmol/L    POCT Base Excess, Arterial -0.7 -2.0 - 3.0 mmol/L    POCT HCO3 Calculated, Arterial 21.8 (L) 22.0 - 26.0 mmol/L    POCT Hemoglobin, Arterial 9.9 (L) 13.5 - 17.5 g/dL    POCT Anion Gap, Arterial 11 10 - 25 mmo/L    Patient Temperature 37.0 degrees Celsius    FiO2 40 %   Calcium, Ionized   Result Value Ref Range    POCT  Calcium, Ionized 1.13 1.1 - 1.33 mmol/L   CBC   Result Value Ref Range    WBC 3.7 (L) 4.4 - 11.3 x10*3/uL    nRBC 0.0 0.0 - 0.0 /100 WBCs    RBC 2.96 (L) 4.50 - 5.90 x10*6/uL    Hemoglobin 7.9 (L) 13.5 - 17.5 g/dL    Hematocrit 23.7 (L) 41.0 - 52.0 %    MCV 80 80 - 100 fL    MCH 26.7 26.0 - 34.0 pg    MCHC 33.3 32.0 - 36.0 g/dL    RDW 17.8 (H) 11.5 - 14.5 %    Platelets 70 (L) 150 - 450 x10*3/uL   Coagulation Screen   Result Value Ref Range    Protime 20.6 (H) 9.8 - 12.4 seconds    INR 1.9 (H) 0.9 - 1.1    aPTT 30 26 - 36 seconds   Magnesium   Result Value Ref Range    Magnesium 1.90 1.60 - 2.40 mg/dL   Renal Function Panel   Result Value Ref Range    Glucose 136 (H) 74 - 99 mg/dL    Sodium 141 136 - 145 mmol/L    Potassium 4.4 3.5 - 5.3 mmol/L    Chloride 103 98 - 107 mmol/L    Bicarbonate 24 21 - 32 mmol/L    Anion Gap 18 10 - 20 mmol/L    Urea Nitrogen 30 (H) 6 - 23 mg/dL    Creatinine 1.24 0.50 - 1.30 mg/dL    eGFR 63 >60 mL/min/1.73m*2    Calcium 8.5 (L) 8.6 - 10.6 mg/dL    Phosphorus 5.4 (H) 2.5 - 4.9 mg/dL    Albumin 3.3 (L) 3.4 - 5.0 g/dL   Hepatic function panel   Result Value Ref Range    Albumin 3.3 (L) 3.4 - 5.0 g/dL    Bilirubin, Total 3.5 (H) 0.0 - 1.2 mg/dL    Bilirubin, Direct 1.4 (H) 0.0 - 0.3 mg/dL    Alkaline Phosphatase 182 (H) 33 - 136 U/L     (H) 10 - 52 U/L     (H) 9 - 39 U/L    Total Protein 5.4 (L) 6.4 - 8.2 g/dL   POCT GLUCOSE   Result Value Ref Range    POCT Glucose 124 (H) 74 - 99 mg/dL   Calcium, Ionized   Result Value Ref Range    POCT Calcium, Ionized 1.10 1.1 - 1.33 mmol/L   CBC   Result Value Ref Range    WBC 5.5 4.4 - 11.3 x10*3/uL    nRBC 0.0 0.0 - 0.0 /100 WBCs    RBC 2.93 (L) 4.50 - 5.90 x10*6/uL    Hemoglobin 7.6 (L) 13.5 - 17.5 g/dL    Hematocrit 23.3 (L) 41.0 - 52.0 %    MCV 80 80 - 100 fL    MCH 25.9 (L) 26.0 - 34.0 pg    MCHC 32.6 32.0 - 36.0 g/dL    RDW 18.3 (H) 11.5 - 14.5 %    Platelets 69 (L) 150 - 450 x10*3/uL   Coagulation Screen   Result Value Ref Range     Protime 20.9 (H) 9.8 - 12.4 seconds    INR 1.9 (H) 0.9 - 1.1    aPTT 32 26 - 36 seconds   Magnesium   Result Value Ref Range    Magnesium 2.40 1.60 - 2.40 mg/dL   Renal Function Panel   Result Value Ref Range    Glucose 121 (H) 74 - 99 mg/dL    Sodium 139 136 - 145 mmol/L    Potassium 4.3 3.5 - 5.3 mmol/L    Chloride 102 98 - 107 mmol/L    Bicarbonate 26 21 - 32 mmol/L    Anion Gap 15 10 - 20 mmol/L    Urea Nitrogen 30 (H) 6 - 23 mg/dL    Creatinine 1.31 (H) 0.50 - 1.30 mg/dL    eGFR 59 (L) >60 mL/min/1.73m*2    Calcium 8.4 (L) 8.6 - 10.6 mg/dL    Phosphorus 4.6 2.5 - 4.9 mg/dL    Albumin 3.5 3.4 - 5.0 g/dL   Hepatic function panel   Result Value Ref Range    Albumin 3.5 3.4 - 5.0 g/dL    Bilirubin, Total 3.8 (H) 0.0 - 1.2 mg/dL    Bilirubin, Direct 1.6 (H) 0.0 - 0.3 mg/dL    Alkaline Phosphatase 174 (H) 33 - 136 U/L    ALT 93 (H) 10 - 52 U/L     (H) 9 - 39 U/L    Total Protein 5.5 (L) 6.4 - 8.2 g/dL   POCT GLUCOSE   Result Value Ref Range    POCT Glucose 113 (H) 74 - 99 mg/dL   POCT GLUCOSE   Result Value Ref Range    POCT Glucose 96 74 - 99 mg/dL   Blood Gas Arterial Full Panel   Result Value Ref Range    POCT pH, Arterial 7.49 (H) 7.38 - 7.42 pH    POCT pCO2, Arterial 37 (L) 38 - 42 mm Hg    POCT pO2, Arterial 99 (H) 85 - 95 mm Hg    POCT SO2, Arterial 99 94 - 100 %    POCT Oxy Hemoglobin, Arterial 96.9 94.0 - 98.0 %    POCT Hematocrit Calculated, Arterial 26.0 (L) 41.0 - 52.0 %    POCT Sodium, Arterial 138 136 - 145 mmol/L    POCT Potassium, Arterial 4.1 3.5 - 5.3 mmol/L    POCT Chloride, Arterial 106 98 - 107 mmol/L    POCT Ionized Calcium, Arterial 1.12 1.10 - 1.33 mmol/L    POCT Glucose, Arterial 86 74 - 99 mg/dL    POCT Lactate, Arterial 2.6 (H) 0.4 - 2.0 mmol/L    POCT Base Excess, Arterial 4.6 (H) -2.0 - 3.0 mmol/L    POCT HCO3 Calculated, Arterial 28.2 (H) 22.0 - 26.0 mmol/L    POCT Hemoglobin, Arterial 8.8 (L) 13.5 - 17.5 g/dL    POCT Anion Gap, Arterial 8 (L) 10 - 25 mmo/L    Patient  Temperature 37.0 degrees Celsius    FiO2 21 %   POCT GLUCOSE   Result Value Ref Range    POCT Glucose 86 74 - 99 mg/dL   Hepatic function panel   Result Value Ref Range    Albumin 3.2 (L) 3.4 - 5.0 g/dL    Bilirubin, Total 3.7 (H) 0.0 - 1.2 mg/dL    Bilirubin, Direct 1.7 (H) 0.0 - 0.3 mg/dL    Alkaline Phosphatase 179 (H) 33 - 136 U/L    ALT 63 (H) 10 - 52 U/L     (H) 9 - 39 U/L    Total Protein 5.3 (L) 6.4 - 8.2 g/dL   Magnesium   Result Value Ref Range    Magnesium 2.39 1.60 - 2.40 mg/dL   Comprehensive metabolic panel   Result Value Ref Range    Glucose 80 74 - 99 mg/dL    Sodium 142 136 - 145 mmol/L    Potassium 4.1 3.5 - 5.3 mmol/L    Chloride 103 98 - 107 mmol/L    Bicarbonate 29 21 - 32 mmol/L    Anion Gap 14 10 - 20 mmol/L    Urea Nitrogen 33 (H) 6 - 23 mg/dL    Creatinine 1.22 0.50 - 1.30 mg/dL    eGFR 64 >60 mL/min/1.73m*2    Calcium 9.3 8.6 - 10.6 mg/dL    Albumin 3.2 (L) 3.4 - 5.0 g/dL    Alkaline Phosphatase 179 (H) 33 - 136 U/L    Total Protein 5.3 (L) 6.4 - 8.2 g/dL     (H) 9 - 39 U/L    Bilirubin, Total 3.7 (H) 0.0 - 1.2 mg/dL    ALT 63 (H) 10 - 52 U/L   Coagulation Screen   Result Value Ref Range    Protime 20.8 (H) 9.8 - 12.4 seconds    INR 1.9 (H) 0.9 - 1.1    aPTT 24 (L) 26 - 36 seconds   CBC   Result Value Ref Range    WBC 6.5 4.4 - 11.3 x10*3/uL    nRBC 0.0 0.0 - 0.0 /100 WBCs    RBC 3.16 (L) 4.50 - 5.90 x10*6/uL    Hemoglobin 8.4 (L) 13.5 - 17.5 g/dL    Hematocrit 25.1 (L) 41.0 - 52.0 %    MCV 79 (L) 80 - 100 fL    MCH 26.6 26.0 - 34.0 pg    MCHC 33.5 32.0 - 36.0 g/dL    RDW 18.9 (H) 11.5 - 14.5 %    Platelets 88 (L) 150 - 450 x10*3/uL   Calcium, Ionized   Result Value Ref Range    POCT Calcium, Ionized 1.27 1.1 - 1.33 mmol/L   Phosphorus   Result Value Ref Range    Phosphorus 3.9 2.5 - 4.9 mg/dL   POCT GLUCOSE   Result Value Ref Range    POCT Glucose 63 (L) 74 - 99 mg/dL   POCT GLUCOSE   Result Value Ref Range    POCT Glucose 65 (L) 74 - 99 mg/dL   POCT GLUCOSE   Result Value  Ref Range    POCT Glucose 107 (H) 74 - 99 mg/dL     *Note: Due to a large number of results and/or encounters for the requested time period, some results have not been displayed. A complete set of results can be found in Results Review.        Plan:    - Bilateral GLEN catheter blocks performed preoperatively on 7/29/25  - Ambit ball with Ropivacaine 0.2%/NaCl 0.9% 500mL, Rate 7 cc/hr bilaterally  - *** Refil Bolus ***  - Please notify Acute Pain service before initiation of anticoagulation and/or dual antiplatelet therapy if patient has indwelling nerve block catheter(s) in place  - Ambit medication will not interfere with pain medication prescribed by the primary team.   - Please be aware of local anesthetic toxic dose and absorption variability before considering lidocaine patches  - Acute pain service will follow while catheters in place  - Rest of pain management per primary team    Acute Pain Resident  pg [phone]  [phone]          [1] insulin lispro, 0-5 Units, subcutaneous, q4h  morphine, 4 mg, intravenous, q4h  phytonadione, 10 mg, intravenous, Daily  [2] dextrose 5 % and lactated Ringer's, 50 mL/hr, Last Rate: 50 mL/hr (07/31/25 0434)  propofol, 5-50 mcg/kg/min, Last Rate: Stopped (07/30/25 1050)  ropivacaine (PF) in NS cmpd, 14 mL/hr, Last Rate: 14 mL/hr (07/29/25 2023)  [3] PRN medications: calcium gluconate, calcium gluconate, dextrose, dextrose, glucagon, glucagon, magnesium sulfate, magnesium sulfate, morphine, ondansetron, oxygen, potassium chloride, potassium chloride

## 2025-07-31 NOTE — CARE PLAN
Problem: Pain - Adult  Goal: Verbalizes/displays adequate comfort level or baseline comfort level  Outcome: Progressing     Problem: Safety - Adult  Goal: Free from fall injury  Outcome: Progressing     Problem: Discharge Planning  Goal: Discharge to home or other facility with appropriate resources  Outcome: Progressing     Problem: Chronic Conditions and Co-morbidities  Goal: Patient's chronic conditions and co-morbidity symptoms are monitored and maintained or improved  Outcome: Progressing     Problem: Nutrition  Goal: Nutrient intake appropriate for maintaining nutritional needs  Outcome: Progressing     Problem: Skin  Goal: Decreased wound size/increased tissue granulation at next dressing change  7/30/2025 2024 by Ra Gotti RN  Outcome: Progressing  Flowsheets (Taken 7/29/2025 2309)  Decreased wound size/increased tissue granulation at next dressing change: Protective dressings over bony prominences  7/30/2025 2022 by Ra Gotti RN  Outcome: Progressing  Flowsheets (Taken 7/29/2025 2309)  Decreased wound size/increased tissue granulation at next dressing change: Protective dressings over bony prominences  Goal: Participates in plan/prevention/treatment measures  7/30/2025 2024 by Ra Gotti RN  Outcome: Progressing  Flowsheets (Taken 7/29/2025 2309)  Participates in plan/prevention/treatment measures: Discuss with provider PT/OT consult  7/30/2025 2022 by Ra Gotti RN  Outcome: Progressing  Flowsheets (Taken 7/29/2025 2309)  Participates in plan/prevention/treatment measures: Discuss with provider PT/OT consult  Goal: Prevent/manage excess moisture  7/30/2025 2024 by Ra Gotti RN  Outcome: Progressing  Flowsheets (Taken 7/29/2025 2309)  Prevent/manage excess moisture: Cleanse incontinence/protect with barrier cream  7/30/2025 2022 by Ra Gotti RN  Outcome: Progressing  Flowsheets (Taken 7/29/2025 2309)  Prevent/manage excess moisture: Cleanse incontinence/protect with  barrier cream  Goal: Prevent/minimize sheer/friction injuries  7/30/2025 2024 by Ra Gotti RN  Outcome: Progressing  Flowsheets (Taken 7/29/2025 2309)  Prevent/minimize sheer/friction injuries:   Complete micro-shifts as needed if patient unable. Adjust patient position to relieve pressure points, not a full turn   HOB 30 degrees or less   Turn/reposition every 2 hours/use positioning/transfer devices   Use pull sheet  7/30/2025 2022 by Ra Gotti RN  Outcome: Progressing  Flowsheets (Taken 7/29/2025 2309)  Prevent/minimize sheer/friction injuries:   Complete micro-shifts as needed if patient unable. Adjust patient position to relieve pressure points, not a full turn   HOB 30 degrees or less   Turn/reposition every 2 hours/use positioning/transfer devices   Use pull sheet  Goal: Promote/optimize nutrition  7/30/2025 2024 by Ra Gotti RN  Outcome: Progressing  Flowsheets (Taken 7/29/2025 2309)  Promote/optimize nutrition: Discuss with provider if NPO > 2 days  7/30/2025 2022 by Ra Gotti RN  Outcome: Progressing  Flowsheets (Taken 7/29/2025 2309)  Promote/optimize nutrition: Discuss with provider if NPO > 2 days  Goal: Promote skin healing  7/30/2025 2024 by Ra Gotti RN  Outcome: Progressing  Flowsheets (Taken 7/29/2025 2309)  Promote skin healing:   Assess skin/pad under line(s)/device(s)   Protective dressings over bony prominences   Turn/reposition every 2 hours/use positioning/transfer devices  7/30/2025 2022 by Ra Gotti RN  Outcome: Progressing

## 2025-07-31 NOTE — PROGRESS NOTES
Surgical Oncology Progress Note    Subjective   Patient was extubated yesterday with no issues. Overnight there were no acute events, patient is resting comfortably. Pain is appropriately controlled. Minimal ostomy function this morning.      Objective   Physical Exam:   Constitutional: well appearing, resting comfortably  Eyes: EOMI  Head/Neck: NCAT  Respiratory: nonlabored breathing on room air  Cardiovascular: regular rate  Abdominal: soft, nontender, nondistended, no rebound or guarding, stoma pink with small formed hard stool and minimal air in the bag. R abdominal drain with ss output to gravity, NGT with minimal dark brown output  MSK: moves all extremities  Extremities: no lower extremity edema  Skin: warm and well perfused, no rashes  Psych: appropriate mood and behavior     Last Recorded Vitals  Heart Rate:  [51-73]   Temp:  [35.9 °C (96.6 °F)-37 °C (98.6 °F)]   Resp:  [14-20]   BP: (109-136)/(54-78)   Weight:  [80.2 kg (176 lb 12.9 oz)]   SpO2:  [94 %-100 %]      Intake/Output Last 24 Hours:    Intake/Output Summary (Last 24 hours) at 7/31/2025 0719  Last data filed at 7/31/2025 0700  Gross per 24 hour   Intake 2079.38 ml   Output 2422 ml   Net -342.62 ml        Relevant Results  Lab Date: 07/30/25     8.2    6.0>-----<87         25.0   142  103  33                  ----------------<80     4.1  29  1.22          Ca 9.3 Phos 3.9 Mg 2.39       ALT 63; 63 ; 239 AlkPhos 179; 179 tBili 3.7; 3.7           Assessment:  Roma Corral is a 70M who is recovering postoperatively from an open right hepatectomy for colon carcinoma with liver mets on 7/29.     Plan:   - Neuro: Morphine PRN for pain, Zofran PRN for nausea  - CV: Q4H Vitals, Hold home ASA81, Lasix 40mg, Metoprolol 100mg, Atorvastatin 40mg while NPO  - Respiratory: Q4H Vitals, IS  - GI: NPO, NGT to LIWS, Monitor drain output  - : strict I&Os, D5LR@50, replete electrolytes PRN, DC CLEMENTE evans  - Heme/Endo: LVX   - ID: Completed ancef x 24H  -  PPX: ELISAs, Lovenox, IS  - Dispo: If AM lab work appropriate, okay for RNF    Discussed with Dr. Erazo.    Lidia Patel MD, PGY3  Surgical Oncology   [facility] [id number]

## 2025-07-31 NOTE — PROGRESS NOTES
Roma Corral is a 70 y.o. male on day 2 of admission presenting with Colon carcinoma metastatic to liver.    Subjective   No transfusions overnight. Patient received a 1/2 amp D50 for hypoglycemia and mIVFs were switched to D5LR, otherwise no acute events. Patient reporting appropriate surgical pain.     Objective     Physical Exam  Vitals reviewed.   Constitutional:       General: He is awake.      Appearance: Normal appearance.   HENT:      Head: Normocephalic and atraumatic.      Nose:      Comments: NG tube in place to LIWS.     Mouth/Throat:      Mouth: Mucous membranes are dry.     Eyes:      Extraocular Movements: Extraocular movements intact.      Pupils: Pupils are equal, round, and reactive to light.       Cardiovascular:      Rate and Rhythm: Normal rate and regular rhythm.      Pulses: Normal pulses.      Heart sounds: Normal heart sounds.   Pulmonary:      Effort: Pulmonary effort is normal.      Breath sounds: Normal breath sounds.   Abdominal:      General: Abdomen is flat. Bowel sounds are normal.      Palpations: Abdomen is soft.      Tenderness: There is abdominal tenderness (surgical).      Comments: LUQ hockey stick incision KWABENA, dermabond intact.   Genitourinary:     Comments: Arriaga in place with clear yellow UOP.     Musculoskeletal:      Cervical back: Neck supple.     Skin:     General: Skin is warm and dry.     Neurological:      General: No focal deficit present.      Mental Status: He is alert and oriented to person, place, and time.     Psychiatric:         Mood and Affect: Mood normal.         Behavior: Behavior normal. Behavior is cooperative.            Assessment & Plan    Assessment:  Roma Corral is a 70 y.o. male with a history of NSTEMI, CAD, anemia, gout, abdominal pain, HTN, DM2, and colon carcinoma metastatic to liver presenting to SICU from OR s/p open right hepatectomy on 7/29 with Dr. Erazo.    Plan:  NEURO: Hx of chronic pain on PO Morphine at home. Acute post-op pain.  Bilateral erector spinae blocks with catheters performed pre-operatively on 7/29/2025.  - ongoing neuro and pain assessments  - continue scheduled morphine 4mg IV q4h for pain control  - additional morphine 2mg IV q4h PRN for breakthrough pain  - Acute Pain following for GLEN block catheters, ropivacaine infusing  - PT/OT consult  - Home meds: morphine 30 mg PO Q12H      CV: History of CAD (NSTEMI 2021) on ASA, HLD, HTN. Baseline /68. Echo 5/13/25: EF of 45-50%. Nuclear stress test 7/1/25: No evidence of inducible myocardial ischemia, with EF 64%. Arrived to SICU on Levo, now weaned off. Lactate downtrending.  - continuous EKG/abp monitoring  - Goal map range 65-90  - trend lactate  - Holding home meds: Metoprolol, lasix, Atorvastatin, ASA     PULM: No pulmonary history. Arrived to SICU intubated. Extubated 7/30. On RA.  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 Vasquez, and laparoscopic microwave ablation x3 with liver biopsy by Dr. Erazo on 3/18/25. Now s/p open right hepatectomy on 7/29.  - NPO   - NG to LIWS  - PRN Zofran  - monitor drain output  - trend LFTs     : No history of renal disease. Baseline creatinine 0.96.  - Check renal function panel daily and PRN  - Maintenance IVF -> D5LR at 50ml/hr  - Volume resuscitation as needed  - Maintain U/O >0.5ml/kg/hr  - remove evans  - Replete electrolytes to goal K>4, Mg>2, Phos>2.5, ionized Ca>1.10.     HEME: Acute blood loss anemia. Baseline Hgb 7.8 (5/28). OR EBL 1.5 L. Intra-op received 3units pRBC, 1 FFP, 1 plt. Received an additional 2 units of pRBC and 1 FFP in the ICU. Coagulopathy, INR of 1.9, receiving Vitamin K.  - Vitamin K 10 mg IV x3 days.  - Check CBC and coags  daily and PRN  - SCDs for DVT prophylaxis  - ok for SC lovenox per Surg Onc  - ongoing monitoring for s/s bleeding  - maintain active T&S (7/30)      ENDO: No history of  thyroid disease. H/o DM2 (patient denies), not currently requiring the use of medications - last A1C 5.3 on 1/18/25. Hypoglycemia overnight, received a 1/2 amp D50 for hypoglycemia and mIVFs were switched to D5LR, BG improved.  - Q4h BG   - holding SSI     I have discussed the case with the resident/advanced practice provider. I have personally performed a history, physical exam, and my own medical decision making. I have reviewed the note and agree with the findings and plan with the following exceptions as identified below. I have personally provided 37 minutes of critical care time exclusive of time spent on separately billable procedures. Time includes review of laboratory data, radiology results, discussion with consultants, family and monitoring for potential decompensation. Interventions were performed as documented above.      Family/Surrogate updated with plan of care.  Code status addressed/up to date.

## 2025-08-01 ASSESSMENT — COGNITIVE AND FUNCTIONAL STATUS - GENERAL
STANDING UP FROM CHAIR USING ARMS: A LITTLE
DRESSING REGULAR UPPER BODY CLOTHING: A LITTLE
TOILETING: A LITTLE
TURNING FROM BACK TO SIDE WHILE IN FLAT BAD: A LITTLE
DAILY ACTIVITIY SCORE: 18
MOVING TO AND FROM BED TO CHAIR: A LITTLE
HELP NEEDED FOR BATHING: A LOT
DRESSING REGULAR LOWER BODY CLOTHING: A LOT
DAILY ACTIVITIY SCORE: 16
PERSONAL GROOMING: A LITTLE
HELP NEEDED FOR BATHING: A LITTLE
TOILETING: A LOT
DRESSING REGULAR LOWER BODY CLOTHING: A LITTLE
MOBILITY SCORE: 18
PERSONAL GROOMING: A LITTLE
WALKING IN HOSPITAL ROOM: A LITTLE
EATING MEALS: A LITTLE
MOVING FROM LYING ON BACK TO SITTING ON SIDE OF FLAT BED WITH BEDRAILS: A LITTLE
CLIMB 3 TO 5 STEPS WITH RAILING: A LITTLE
DRESSING REGULAR UPPER BODY CLOTHING: A LITTLE

## 2025-08-01 ASSESSMENT — PAIN SCALES - GENERAL
PAINLEVEL_OUTOF10: 5 - MODERATE PAIN
PAINLEVEL_OUTOF10: 0 - NO PAIN
PAINLEVEL_OUTOF10: 4
PAINLEVEL_OUTOF10: 6

## 2025-08-01 ASSESSMENT — PAIN - FUNCTIONAL ASSESSMENT
PAIN_FUNCTIONAL_ASSESSMENT: 0-10

## 2025-08-01 ASSESSMENT — ACTIVITIES OF DAILY LIVING (ADL)
LACK_OF_TRANSPORTATION: NO
BATHING_ASSISTANCE: MODERATE
ADL_ASSISTANCE: INDEPENDENT

## 2025-08-01 NOTE — PROGRESS NOTES
Postop Pain HPI -   Palliative: relieved with IV analgesics and regional local anesthetics  Provocative: movement  Quality:  burning and aching  Radiation:  none  Severity:  5/10  Timing: constant    24-HOUR OPIOID CONSUMPTION:  Morphine 24mg    Scheduled medications  Scheduled Medications[1]  Continuous medications  Continuous Medications[2]  PRN medications  PRN Medications[3]     Physical Exam:  Constitutional:  no distress, alert and cooperative  Eyes: clear sclera  Head/Neck: No apparent injury, trachea midline  Respiratory/Thorax: Patent airways, thorax symmetric, breathing comfortably  Cardiovascular: no pitting edema  Gastrointestinal: Nondistended  Musculoskeletal: ROM intact  Extremities: no clubbing  Neurological: alert, mejia x4  Psychological: Appropriate affect    Results for orders placed or performed during the hospital encounter of 07/29/25 (from the past 24 hours)   POCT GLUCOSE   Result Value Ref Range    POCT Glucose 91 74 - 99 mg/dL   Blood Gas Arterial Full Panel   Result Value Ref Range    POCT pH, Arterial 7.45 (H) 7.38 - 7.42 pH    POCT pCO2, Arterial 38 38 - 42 mm Hg    POCT pO2, Arterial 102 (H) 85 - 95 mm Hg    POCT SO2, Arterial 99 94 - 100 %    POCT Oxy Hemoglobin, Arterial 97.1 94.0 - 98.0 %    POCT Hematocrit Calculated, Arterial 27.0 (L) 41.0 - 52.0 %    POCT Sodium, Arterial 140 136 - 145 mmol/L    POCT Potassium, Arterial 3.6 3.5 - 5.3 mmol/L    POCT Chloride, Arterial 105 98 - 107 mmol/L    POCT Ionized Calcium, Arterial 0.41 (LL) 1.10 - 1.33 mmol/L    POCT Glucose, Arterial 99 74 - 99 mg/dL    POCT Lactate, Arterial 2.4 (H) 0.4 - 2.0 mmol/L    POCT Base Excess, Arterial 2.3 -2.0 - 3.0 mmol/L    POCT HCO3 Calculated, Arterial 26.4 (H) 22.0 - 26.0 mmol/L    POCT Hemoglobin, Arterial 9.0 (L) 13.5 - 17.5 g/dL    POCT Anion Gap, Arterial 12 10 - 25 mmo/L    Patient Temperature 37.0 degrees Celsius    FiO2 21 %   Hepatic function panel   Result Value Ref Range    Albumin 3.2 (L) 3.4 -  5.0 g/dL    Bilirubin, Total 4.2 (H) 0.0 - 1.2 mg/dL    Bilirubin, Direct 2.0 (H) 0.0 - 0.3 mg/dL    Alkaline Phosphatase 186 (H) 33 - 136 U/L    ALT 49 10 - 52 U/L     (H) 9 - 39 U/L    Total Protein 5.4 (L) 6.4 - 8.2 g/dL   Coagulation Screen   Result Value Ref Range    Protime 20.2 (H) 9.8 - 12.4 seconds    INR 1.8 (H) 0.9 - 1.1    aPTT 32 26 - 36 seconds   Electrocardiogram, 12-lead PRN ACS symptoms   Result Value Ref Range    Ventricular Rate 68 BPM    Atrial Rate 68 BPM    QRS Duration 90 ms    QT Interval 470 ms    QTC Calculation(Bazett) 499 ms    R Axis -29 degrees    T Axis 43 degrees    QRS Count 11 beats    Q Onset 228 ms    T Offset 463 ms    QTC Fredericia 490 ms   POCT GLUCOSE   Result Value Ref Range    POCT Glucose 90 74 - 99 mg/dL   POCT GLUCOSE   Result Value Ref Range    POCT Glucose 90 74 - 99 mg/dL   POCT GLUCOSE   Result Value Ref Range    POCT Glucose 86 74 - 99 mg/dL   POCT GLUCOSE   Result Value Ref Range    POCT Glucose 90 74 - 99 mg/dL   POCT GLUCOSE   Result Value Ref Range    POCT Glucose 89 74 - 99 mg/dL   CBC   Result Value Ref Range    WBC 5.3 4.4 - 11.3 x10*3/uL    nRBC 0.0 0.0 - 0.0 /100 WBCs    RBC 3.44 (L) 4.50 - 5.90 x10*6/uL    Hemoglobin 9.0 (L) 13.5 - 17.5 g/dL    Hematocrit 29.4 (L) 41.0 - 52.0 %    MCV 86 80 - 100 fL    MCH 26.2 26.0 - 34.0 pg    MCHC 30.6 (L) 32.0 - 36.0 g/dL    RDW 19.7 (H) 11.5 - 14.5 %    Platelets 79 (L) 150 - 450 x10*3/uL     *Note: Due to a large number of results and/or encounters for the requested time period, some results have not been displayed. A complete set of results can be found in Results Review.      Plan:  - Bilateral GLEN blocks with catheters performed preoperatively on 7/29  - Bolus dose 10cc divided and given bilaterally  - INR 1.6, plt 79  - Bilateral GLEN catheters removed after discussion with patient and bedside RN. Advised on signs/symptoms of epidural hematoma.  - Plan for neurovascular checks q15min x4, then q30min x6,  then q4h x24h  - OK to resume Lovenox ppx at 1400  - Rest of pain management per primary team     Acute Pain Resident  pg [phone] ph [phone]       [1] enoxaparin, 40 mg, subcutaneous, q24h  methocarbamol, 1,000 mg, intravenous, q8h  phytonadione, 10 mg, intravenous, Daily  [2] dextrose 5 % and lactated Ringer's, 50 mL/hr, Last Rate: 50 mL/hr (08/01/25 0524)  dextrose 5 % and lactated Ringer's, 50 mL/hr, Last Rate: 50 mL/hr (08/01/25 0649)  HYDROmorphone,   ropivacaine (PF) in NS cmpd, 14 mL/hr, Last Rate: 14 mL/hr (07/31/25 1051)  [3] PRN medications: naloxone, ondansetron

## 2025-08-01 NOTE — PROGRESS NOTES
08/01/25 1216   Discharge Planning   Living Arrangements Spouse/significant other   Support Systems Spouse/significant other   Assistance Needed Patient is independent   Type of Residence Private residence   Number of Stairs to Enter Residence 3   Number of Stairs Within Residence 14   Do you have animals or pets at home? Yes   Type of Animals or Pets 1 dog   Who is requesting discharge planning? Provider   Home or Post Acute Services None   Expected Discharge Disposition Home   Does the patient need discharge transport arranged? No   Financial Resource Strain   How hard is it for you to pay for the very basics like food, housing, medical care, and heating? Pt Declined   Housing Stability   In the past 12 months, how many times have you moved where you were living? 0   At any time in the past 12 months, were you homeless or living in a shelter (including now)? N   Transportation Needs   In the past 12 months, has lack of transportation kept you from medical appointments or from getting medications? no   In the past 12 months, has lack of transportation kept you from meetings, work, or from getting things needed for daily living? No     Roma Corral is a 70M who is recovering postoperatively from an open right hepatectomy for colon carcinoma with liver mets on 7/29. ADOD 8/4  [facility], (Pt declined AR, SNF and HC)    Care Transitions Note     Plan per Medical/Surgical Team:   Status: IP  Payor Source: Medicare  Discharge disposition: Anticipate HNN   Expected date of discharge: 8/4/25  Barriers: none  PCP: Santiago Buckner MD  Preferred Pharmacy:  [facility]  Preferred home care agency: None  DME: Has walker and shower chair, does not use  O2: Denies  Dialysis: Denies   Diabetes/Supplies needed: Denies    TCC met with patient at bedside to discuss anticipated discharge needs. Introduced self and role to complete admission assessment. Demographics and insurance verifed with patient. Patient lives with his  wife and is independent, does  not use assistive devices, denies falls. Pt identified wife as primary supportive/caregiver. Wife will provide transportation home when discharged. Will continue to follow to assist with any discharge planning needs.     Tabby Shields RN  Transitional Care Coordinator (TCC)  [phone]

## 2025-08-01 NOTE — PROGRESS NOTES
Occupational Therapy    Evaluation and Treatment    Patient Name: Roma Corral  MRN: [id number]  Today's Date: 8/1/2025  Room: [address] [place], [place] [address]A  Time Calculation  Start Time: 0915  Stop Time: 0948  Time Calculation (min): 33 min    Assessment  IP OT Assessment  OT Assessment: Pt's abilty to complete ADLs currently limited by pain, ABD precautions, and deficits in functional strength, activity tolerance, and dynamic balance. The pt demos the ability to complete the majority of ADL tasks with Mod A - SBA and performs functional transfers with Min A using FWW. Pt will benefit from continued OT while admitted to increase IND and safety prior to d/c.  Prognosis: Good  Barriers to Discharge Home: Physical needs  Physical Needs: Intermittent mobility assistance needed, Intermittent ADL assistance needed, In-home setup navigation limited by function/safety, High falls risk due to function or environment  Evaluation/Treatment Tolerance: Patient tolerated treatment well  Medical Staff Made Aware: Yes  End of Session Communication: Bedside nurse  End of Session Patient Position: Up in chair, Alarm on  Plan:  Inpatient Plan  Treatment Interventions: ADL retraining, Functional transfer training, Endurance training, Patient/family training, Equipment evaluation/education, Compensatory technique education  OT Frequency: 4 times per week (Based on current functional status and rehab potential, patient is anticipated to tolerate and benefit from 5 or more days per week of skilled rehabilitative therapy after discharge from this acute inpatient hospitalization.)  OT Discharge Recommendations: High intensity level of continued care  Equipment Recommended upon Discharge: Wheeled walker (Shower chair)  OT Recommended Transfer Status: Minimal assist, Assist of 1  OT - OK to Discharge: Yes  OT Assessment  OT Assessment Results: Decreased ADL status, Decreased endurance, Decreased functional mobility, Decreased IADLs  Prognosis: Good  Barriers to  Discharge:  (CLOF)  Evaluation/Treatment Tolerance: Patient tolerated treatment well  Medical Staff Made Aware: Yes  Strengths: Attitude of self, Support of Caregivers, Housing layout  Barriers to Participation: Comorbidities    Subjective   Current Problem:  1. Colon carcinoma metastatic to liver  Surgical Pathology Exam    Surgical Pathology Exam    Stoma Care      2. Carcinoma of colon metastatic to liver          General:  Reason for Referral: open right hepatectomy for colon carcinoma with liver mets on 7/29.  Past Medical History Relevant to Rehab: NSTEMI, CAD, anemia, gout, abdominal pain, HTN, DM2 and colon carcinoma metastatic to liver  Prior to Session Communication: Bedside nurse  Patient Position Received: Bed, 3 rail up, Alarm on  Family/Caregiver Present: No  General Comment: Pt very pleasant and agreeable to OT evaluation   Precautions:  Medical Precautions: Fall precautions  Post-Surgical Precautions: Abdominal surgery precautions  Pain:  Pain Assessment  Pain Assessment: 0-10  0-10 (Numeric) Pain Score: 6  Pain Type: Surgical pain  Pain Location: Abdomen  Pain Interventions: Repositioned  Response to Interventions: Content/relaxed, No change in pain  Lines/Tubes/Drains:  Closed/Suction Drain 1 Right Abdomen Bulb 19 Fr. (Active)   Number of days: 136       Colostomy Loop LUQ (Active)   Number of days: 394     Objective   Cognition:  Overall Cognitive Status: Within Functional Limits  Arousal/Alertness: Appropriate responses to stimuli  Orientation Level: Oriented X4  Following Commands: Follows all commands and directions without difficulty   Home Living:  Type of Home: House  Lives With: Spouse  Home Adaptive Equipment: None  Home Layout: Multi-level (7 steps upstairs + 7 steps downstairs. Bedroom, bathroom, kitchen on same level so most steps pt has to do in one bout is 7.)  Home Access:  (1 CHRISTAL)  Bathroom Shower/Tub: Walk-in shower  Bathroom Toilet: Standard  Bathroom Equipment: Grab bars in  shower, Raised toilet seat without rails, Grab bars around toilet   Prior Function:  Level of [place]: Independent with ADLs and functional transfers, Independent with homemaking with ambulation  ADL Assistance: Independent  Homemaking Assistance: Independent  Ambulatory Assistance: Independent  Vocational: Retired  Leisure: Senior center, walk  Hand Dominance: Left  Prior Function Comments: - falls  IADL History:  Homemaking Responsibilities: Yes  Meal Prep Responsibility: Primary  Laundry Responsibility: Primary  Cleaning Responsibility: Primary  Bill Paying/Finance Responsibility: Primary  Shopping Responsibility: Primary  Homemaking Comments: Shares with spouse  Current License: Yes  Mode of Transportation: Car  Occupation: Retired  Type of Occupation:   Leisure and Hobbies: Senior center, walk  ADL:  Eating Assistance: Independent (Anticipated)  Grooming Assistance: Stand by  Grooming Deficit: Supervision/safety  Bathing Assistance: Moderate (Anticipated)  UE Dressing Assistance: Minimal  LE Dressing Assistance: Moderate  Toileting Assistance with Device: Moderate (Anticipated)  Activity Tolerance:  Endurance: Tolerates 10 - 20 min exercise with multiple rests  Balance:  Dynamic Standing Balance  Dynamic Standing-Balance Support: Bilateral upper extremity supported  Dynamic Standing-Level of Assistance: Minimum assistance  Dynamic Standing-Comments: FWW  Static Sitting Balance  Static Sitting-Balance Support: No upper extremity supported, Feet supported  Static Sitting-Level of Assistance: Distant supervision  Static Standing Balance  Static Standing-Balance Support: Bilateral upper extremity supported  Static Standing-Level of Assistance: Minimum assistance  Static Standing-Comment/Number of Minutes: FWW  Bed Mobility/Transfers: Bed Mobility/Transfers: Bed Mobility  Bed Mobility: Yes  Bed Mobility 1  Bed Mobility 1: Supine to sitting  Level of Assistance 1: Minimum assistance  Bed Mobility  Comments 1: HOB slight elevation, Cues for log roll technique, Min A for upright sitting  Functional Mobility  Functional Mobility Performed: Yes  Functional Mobility 1  Surface 1: Level tile  Device 1: Rolling walker  Assistance 1: Contact guard  Comments 1: Within room to bathroom and back, approx 10-15 feet   and Transfers  Transfer: Yes  Transfer 1  Transfer From 1: Sit to, Stand to  Transfer to 1: Stand, Sit  Technique 1: Sit to stand, Stand to sit  Transfer Device 1: Walker  Transfer Level of Assistance 1: Minimum assistance  Trials/Comments 1: Cues for hand placement, Min A for lifting to stand  Transfers 2  Transfer From 2: Bed to  Transfer to 2: Chair with arms  Technique 2:  (Ambulating)  Transfer Device 2: Walker  Transfer Level of Assistance 2: Minimum assistance  Trials/Comments 2: Min A for balance and safety  IADL's:   Homemaking Responsibilities: Yes  Meal Prep Responsibility: Primary  Laundry Responsibility: Primary  Cleaning Responsibility: Primary  Bill Paying/Finance Responsibility: Primary  Shopping Responsibility: Primary  Homemaking Comments: Shares with spouse  Current License: Yes  Mode of Transportation: Car  Occupation: Retired  Type of Occupation:   Leisure and Hobbies: Senior center, walk  Vision: Vision - Basic Assessment  Current Vision: No visual deficits   and    Sensation:  Light Touch: No apparent deficits  Strength:  Strength Comments: B UEs appear WFL  Perception:  Inattention/Neglect: Appears intact  Coordination:  Movements are Fluid and Coordinated: Yes   Hand Function:  Hand Function  Gross Grasp: Functional  Coordination: Functional  Extremities:   RUE   RUE : Within Functional Limits, LUE   LUE: Within Functional Limits,  , and        Outcome Measures: [facility] Daily Activity  Putting on and taking off regular lower body clothing: A lot  Bathing (including washing, rinsing, drying): A lot  Putting on and taking off regular upper body clothing: A little  Toileting,  which includes using toilet, bedpan or urinal: A lot  Taking care of personal grooming such as brushing teeth: A little  Eating Meals: None  Daily Activity - Total Score: 16         ,     OT Adult Other Outcome Measures  4AT: 4 AT -    Education Documentation  Body Mechanics, taught by Jonny Riley OT at 8/1/2025 11:31 AM.  Learner: Patient  Readiness: Acceptance  Method: Explanation, Demonstration  Response: Verbalizes Understanding, Needs Reinforcement  Comment: ADLs and safety    Precautions, taught by Jonny Riley OT at 8/1/2025 11:31 AM.  Learner: Patient  Readiness: Acceptance  Method: Explanation, Demonstration  Response: Verbalizes Understanding, Needs Reinforcement  Comment: ADLs and safety    ADL Training, taught by Jonny Riley OT at 8/1/2025 11:31 AM.  Learner: Patient  Readiness: Acceptance  Method: Explanation, Demonstration  Response: Verbalizes Understanding, Needs Reinforcement  Comment: ADLs and safety    Education Comments  No comments found.    Goals:   Encounter Problems       Encounter Problems (Active)       ADLs       Patient with complete lower body dressing with modified independent level of assistance donning and doffing all LE clothes  with PRN adaptive equipment while edge of bed  and standing (Progressing)       Start:  08/01/25    Expected End:  08/22/25            Patient will complete daily grooming tasks brushing teeth and washing face/hair with modified independent level of assistance and PRN adaptive equipment while standing. (Progressing)       Start:  08/01/25    Expected End:  08/22/25            Patient will complete toileting including hygiene clothing management/hygiene with modified independent level of assistance and raised toilet seat and grab bars. (Progressing)       Start:  08/01/25    Expected End:  08/22/25               BALANCE       Pt will maintain dynamic standing balance during ADL task with modified independent level of assistance in order to demonstrate  decreased risk of falling and improved postural control. (Progressing)       Start:  08/01/25    Expected End:  08/22/25               COGNITION/SAFETY       Patient will recall and adhere to ABD precautions during all functional mobility/ADL tasks in order to demonstrate improved understanding and promote healing post op (Progressing)       Start:  08/01/25    Expected End:  08/22/25               MOBILITY       Patient will perform Functional mobility max Household distances/Community Distances with modified independent level of assistance and least restrictive device in order to improve safety and functional mobility. (Progressing)       Start:  08/01/25    Expected End:  08/22/25                   Treatment Completed on Evaluation  Activities of Daily Living:    Grooming  Grooming Level of Assistance: Distant supervision  Grooming Where Assessed: Chair  Grooming Comments: Brushing teeth and washing face at tray table, SUP for sfaety with cues for increased IND     Therapy/Activity:     Therapeutic Activity  Therapeutic Activity Performed: Yes  Therapeutic Activity 1: Reinforced ABD precautions, cues throughout session to maintain, discussed modified ADLs and functional mobility  Therapeutic Activity 2: Functional mobility within room to door and back, cues for walker positioning and safety, pt requests trial of backwards walking with therapist providing education on sfaety. Min A for standing balance throughout  Therapeutic Activity 3: Functional transfer bed to chair, educaiton on benefit of upright sitting and safe transfer techniques. Min A throughout.   08/01/25 at 11:32 AM   Jonny Riley OT   Rehab Office: [phone]

## 2025-08-01 NOTE — CONSULTS
PM&R Consult Note  Patient: Roma Corral   Age/sex: 70 y.o.   Medical Record #: [id number]     Consulting physician: Dr. Heath Felton     Chief complaint:   Impairments and acitivities limitations in ADLs, mobility, functional communication, and cognition secondary to physical debility 2/2 colon carcinoma metastatic to liver s/p open right hepatectomy.    PM&R was consulted for recommendations and for IRF appropriateness.    HPI:   Roma Corral is a 70 y.o. year old male patient with history of NSTEMI, CAD, anemia, gout, abdominal pain, HTN, DM2 and colon carcinoma metastatic to liver s/p open right hepatectomy on 7/29 with Dr. Erazo.      Procedures performed on/related to this admission:  - open right hepatectomy on 7/29 with Dr. Erazo.    Home Situation/ Supports  Lives in: Multilevel home   Stairs to enter: 1 CHRISTAL  Stairs in home: (7 steps upstairs + 7 steps downstairs. Bedroom, bathroom, kitchen on same level so most steps pt has to do in one bout is 7.)   Bed level: as above  Bathroom level: as above  Lives with: spouse  Who can help at home and how often: spouse is there 24/7    Prior Level of Function  Mobility: Independent  ADLs: Independent  Assistive Devices: None    Physical Therapy  Last seen: 7/31  Bed mobility: ModA  Transfers: ModA with rolling walker  Ambulation: ModA with rolling walker; x3 ft bed to chair     Occupational Therapy  Last seen: 8/1  Putting on and taking off regular lower body clothing: A lot  Bathing (including washing, rinsing, drying): A lot  Putting on and taking off regular upper body clothing: A little  Toileting, which includes using toilet, bedpan or urinal: A lot  Taking care of personal grooming such as brushing teeth: A little  Eating Meals: None    REVIEW OF SYSTEMS    Constitutional: Denies fever, chills, fatigue, appetite/weight change  HEENT: Denies hearing loss, tinnitus, voice change  Cardio: Denies chest pain, leg swelling, palpitations  Respiratory: Denies SOB, cough,  wheezing  GI: Denies Abd pain, constipation, diarrhea, N/V  : Denies dysuria, urgency, decreased urination  MSK: Denies arthralgia, myalgia, joint swelling  Neuro: Denies numbness, dizziness, light-headedness  Skin: Denies color change, wound, rash  Psych: Denies anxiety, depression, hallucinations, agitation, sleep disturbance    OBJECTIVE    Patient Vitals for the past 24 hrs:   BP Temp Temp src Pulse Resp SpO2 Weight   08/01/25 1313 138/79 36.8 °C (98.2 °F) Temporal 79 16 98 % --   08/01/25 0838 123/69 36.8 °C (98.2 °F) Temporal 71 16 99 % --   08/01/25 0542 -- -- -- -- -- -- 76.1 kg (167 lb 12.3 oz)   08/01/25 0445 134/72 36 °C (96.8 °F) Temporal 81 16 96 % --   08/01/25 0312 -- -- -- -- -- -- 76.1 kg (167 lb 12.3 oz)   08/01/25 0002 125/71 36.6 °C (97.9 °F) -- 71 16 99 % --   07/31/25 2010 133/66 36.6 °C (97.8 °F) Temporal 87 16 98 % --   07/31/25 1845 151/75 36.5 °C (97.7 °F) Temporal 82 14 98 % --   07/31/25 1800 131/67 -- -- 66 11 100 % --   07/31/25 1700 111/76 -- -- 78 12 98 % --   07/31/25 1635 -- -- -- 80 16 100 % --       Lab Results   Component Value Date    WBC 5.3 08/01/2025    HGB 9.0 (L) 08/01/2025    HCT 29.4 (L) 08/01/2025    MCV 86 08/01/2025    PLT 79 (L) 08/01/2025        Lab Results   Component Value Date    CREATININE 0.96 08/01/2025    BUN 27 (H) 08/01/2025     08/01/2025    K 3.7 08/01/2025     08/01/2025    CO2 30 08/01/2025        PHYSICAL EXAM    GENERAL: Awake and alert, well-developed, well-nourished, No acute distress  HEENT - NC/AT  CARDIOVASCULAR: extremities warm, well perfused  RESPIRATORY: no conversational dyspnea, symmetrical chest rise  ABDOMEN: Soft, non-tender, normal bowel sounds, no distention, drains in place with serosanguinous output  MSK: No visible deformities or local swelling  SKIN: Normal color, warm, dry  Psych: Cooperative, affect appropriate, conversational, good-eye contact    NEURO: A&O x 4  RUE strength: 5/5 throughout  LUE strength: 5/5  throughout with exception of 4/5 in elbow extensors  RLE strength: 5/5 hip flexors, otherwise 5/5 throughout  LLE strength: 4/5 hip flexors, otherwise 5/5 throughout  Sensation - intact to light touch in bilateral upper and lower extremities.   Reflexes - 2+ biceps, brachioradialis, patellar reflexes bilaterally, No Babinski, Sanchez's negative bilaterally    IMPRESSION:  Roma Corral is a 70 y.o. year old male patient with physical debility 2/2 colon adenocarcinoma metastatic to liver s/p open right hepatectomy.      Recommendations:  # Metastatic colon adenocarcinoma s/p open right hepatectomy  - Previously on chemotherapy regimen prior to this admission  - PO: Adult diet clear liquid starting 8/1; continue to advance as indicated by the surgical team  - Pain management: methocarbamol 1,000mg q8h, dilaudid PCA, ropivicaine AMBIT ball  - DVT prophylaxis with lovenox 40mg q24h.   - Precautions: ABD precautions    # Dispo  - Patient would benefit from an acute inpatient rehab stay to help in transitioning patient home  - Would need to continue tolerating PO intake and tolerating diet advancement prior to transfer to acute rehab  - Must be off IV pain meds 24 hours prior to transfer. Please plan to continue gradually weaning off IV pain medications with continued pain management with oral regimen.  - If chemotherapy/radiotherapy planned, this will need to be temporized around proposed period of inpatient rehab stay when ready. Patient would be participating in acute rehab for around 7-10 days. Patient will not be able to get chemotherapy infusions or radiation therapy while in rehab.  - Ongoing PT/OT/ST in an acute inpatient rehab setting would improve functional outcomes.  - Patient is able and willing to participate in therapy for 3 hours/day, 5 days/week.  - Patient also requires physician supervision for monitoring ongoing medical issues.   - Must be medically stable with no further planned medical interventions  prior to transfer.  - For questions, please contact via [place]    We will follow along with you. Thank you for the consult.    Floresita Oconnell, DO  Physical Medicine and Rehabilitation PGY-3  Available via [place]     Patient seen and examined with attending Dr. Heath Felton, who agrees with assessment and plan.     NOTE: This note is not finalized until attending reviews and signs.

## 2025-08-01 NOTE — SIGNIFICANT EVENT
Bilateral GLEN catheters pulled at 10 AM.  Performed q. 15-minute neurovascular checks with no abnormal findings from 1000 to 1100.

## 2025-08-01 NOTE — PROGRESS NOTES
Surgical Oncology Progress Note    Roma Corral is a 70 y.o. male who is POD 3 Days Post-Op s/p Laparoscopic Microwave Ablation Liver Tumor(s) with Ultrasound and Navigation, Liver Biopsy Ultrasound Guided, and Resection, Rectum And Sigmoid Colon, Robot-assisted     Subjective   No acute events overnight.     Objective    Physical Exam:   Constitutional: well appearing, resting comfortably  Eyes: EOMI  Head/Neck: NCAT  Respiratory: nonlabored breathing on room air  Cardiovascular: regular rate  Abdominal: soft, nontender, nondistended, no rebound or guarding  MSK: moves all extremities  Extremities: no lower extremity edema  Skin: warm and well perfused, no rashes  Psych: appropriate mood and behavior     Last Recorded Vitals  Heart Rate:  [66-87]   Temp:  [36 °C (96.8 °F)-36.8 °C (98.2 °F)]   Resp:  [11-16]   BP: (111-151)/(66-79)   Weight:  [76.1 kg (167 lb 12.3 oz)]   SpO2:  [96 %-100 %]      Intake/Output Last 24 Hours:      Intake/Output Summary (Last 24 hours) at 8/1/2025 1412  Last data filed at 8/1/2025 1222  Gross per 24 hour   Intake 1160.17 ml   Output 1070 ml   Net 90.17 ml        Relevant Results     9.0    5.3>-----<79         29.4   144  106  27                  ----------------<82     3.7  30  0.96          Ca 8.3 Phos 3.9 Mg 2.42       ALT 34  AlkPhos 180 tBili 4.5           Imaging:   Electrocardiogram, 12-lead PRN ACS symptoms  Accelerated Junctional rhythm  Prolonged QT  Abnormal ECG  When compared with ECG of 03-APR-2025 06:45,  Junctional rhythm has replaced Sinus rhythm  XR chest 1 view  Narrative: Interpreted By:  Jennifer Young,   STUDY:  XR CHEST 1 VIEW; 7/31/2025 6:25 am      INDICATION:  Signs/Symptoms:daily.      COMPARISON:  Radiograph dated 07/30/2025 and CT dated 07/16/2025      ACCESSION NUMBER(S):  [id number]      ORDERING CLINICIAN:  SUPA ESPINO      FINDINGS:  Right IJ MediPort is in place with the tip projecting over upper  right atrium/cavoatrial  junction. Left subclavian approach central  venous catheter projecting over upper SVC Interval extubation.  Enteric tube is in place with the tip outside the field of view.      the cardiac silhouette size is within normal limits.      Small left basilar pleural effusion.      Surgical drain, embolization coils and surgical clips projecting over  right upper quadrant of the abdomen. The suggestion of free air in  the upper abdomen, likely postsurgical in nature.      No acute osseous change.      Impression: 1. Interval extubation. Other medical devices as described above.  2. Small left pleural effusion.  3. Suggestion of free air in the upper abdomen, likely postsurgical  in nature.              Signed by: Jennifer Davies 7/31/2025 7:14 AM  Dictation workstation:   [id number]       Assessment:  Roma Corral is a 70M who is recovering postoperatively from an open right hepatectomy for colon carcinoma with liver mets. Discussion today included patient directed analgesia, as well as plans for OBOO.         Plan:   - lovenox   Prophy - SCDs, Heparin/Lovenox, Incentive Spirometer  Dispo - Continue current management     Discussed with Dr. Cookie Pereira, who discussed with      Pelon Willams MD - PGY1  Surgical Oncology   [facility] [id number]

## 2025-08-01 NOTE — CONSULTS
Wound Care Consult     Visit Date: 8/1/2025      Patient Name: Roma Corral         MRN: [id number]             Reason for Consult: s/p colostomy            Assessment:        Colostomy Loop LUQ (Active)   Placement Date/Time: 07/02/24 1416   Hand Hygiene Completed: Yes  Colostomy Type: Loop  Location: LUQ   Number of days: 395      Colostomy Loop LUQ (Active)   Present on Admission to Healthcare Facility Y 07/31/25 2123   Stomal Appliance 2 piece;Clean;Dry;Intact 08/01/25 1330   Site/Stoma Assessment Red 08/01/25 1330   Peristomal Assessment TIGRE 08/01/25 1330   Treatment Placement checked 08/01/25 1330   Drainage Characteristics Brown 08/01/25 1330   Output (mL) 0 mL 08/01/25 1646   Intake (ml) 0 ml 07/31/25 0800     Pouching checked and intact.  Per patient, he changed pouch last night.  Additional supplies left at bedside.  Patient understands that if he requests assistance with from the ostomy nurse for care to request for help.          Wound Plan: Wound/ostomy team to asssist with pouching as needed.      Pavithra Andrea, MSN, RN, CWCN, COCN  08/01/25 6:35 PM

## 2025-08-01 NOTE — CARE PLAN
Problem: Pain - Adult  Goal: Verbalizes/displays adequate comfort level or baseline comfort level  Outcome: Progressing     Problem: Safety - Adult  Goal: Free from fall injury  Outcome: Progressing     Problem: Discharge Planning  Goal: Discharge to home or other facility with appropriate resources  Outcome: Progressing     Problem: Chronic Conditions and Co-morbidities  Goal: Patient's chronic conditions and co-morbidity symptoms are monitored and maintained or improved  Outcome: Progressing     Problem: Nutrition  Goal: Nutrient intake appropriate for maintaining nutritional needs  Outcome: Progressing     Problem: Skin  Goal: Decreased wound size/increased tissue granulation at next dressing change  Outcome: Progressing  Flowsheets (Taken 7/31/2025 2252)  Decreased wound size/increased tissue granulation at next dressing change: Promote sleep for wound healing  Goal: Participates in plan/prevention/treatment measures  Outcome: Progressing  Flowsheets (Taken 7/31/2025 2252)  Participates in plan/prevention/treatment measures:   Discuss with provider PT/OT consult   Elevate heels  Goal: Prevent/manage excess moisture  Outcome: Progressing  Flowsheets (Taken 7/31/2025 2252)  Prevent/manage excess moisture:   Cleanse incontinence/protect with barrier cream   Monitor for/manage infection if present  Goal: Prevent/minimize sheer/friction injuries  Outcome: Progressing  Flowsheets (Taken 7/31/2025 2252)  Prevent/minimize sheer/friction injuries: Use pull sheet  Goal: Promote/optimize nutrition  Outcome: Progressing  Flowsheets (Taken 7/31/2025 2252)  Promote/optimize nutrition: Discuss with provider if NPO > 2 days  Goal: Promote skin healing  Outcome: Progressing  Flowsheets (Taken 7/31/2025 2252)  Promote skin healing: Assess skin/pad under line(s)/device(s)

## 2025-08-01 NOTE — CARE PLAN
The clinical goals for the shift include Pt will remain HDS and free from falls throughout shift 8/1/25 1900      Problem: Pain - Adult  Goal: Verbalizes/displays adequate comfort level or baseline comfort level  Outcome: Progressing     Problem: Safety - Adult  Goal: Free from fall injury  Outcome: Progressing     Problem: Discharge Planning  Goal: Discharge to home or other facility with appropriate resources  Outcome: Progressing     Problem: Chronic Conditions and Co-morbidities  Goal: Patient's chronic conditions and co-morbidity symptoms are monitored and maintained or improved  Outcome: Progressing     Problem: Nutrition  Goal: Nutrient intake appropriate for maintaining nutritional needs  Outcome: Progressing     Problem: Skin  Goal: Decreased wound size/increased tissue granulation at next dressing change  Outcome: Progressing  Flowsheets (Taken 8/1/2025 1011)  Decreased wound size/increased tissue granulation at next dressing change:   Promote sleep for wound healing   Protective dressings over bony prominences  Goal: Participates in plan/prevention/treatment measures  Outcome: Progressing  Flowsheets (Taken 8/1/2025 1011)  Participates in plan/prevention/treatment measures:   Discuss with provider PT/OT consult   Elevate heels   Increase activity/out of bed for meals  Goal: Prevent/manage excess moisture  Outcome: Progressing  Flowsheets (Taken 8/1/2025 1011)  Prevent/manage excess moisture:   Monitor for/manage infection if present   Cleanse incontinence/protect with barrier cream  Goal: Prevent/minimize sheer/friction injuries  Outcome: Progressing  Flowsheets (Taken 8/1/2025 1011)  Prevent/minimize sheer/friction injuries:   Use pull sheet   Increase activity/out of bed for meals  Goal: Promote/optimize nutrition  Outcome: Progressing  Flowsheets (Taken 8/1/2025 1011)  Promote/optimize nutrition: Monitor/record intake including meals  Goal: Promote skin healing  Outcome: Progressing  Flowsheets (Taken  8/1/2025 1011)  Promote skin healing:   Turn/reposition every 2 hours/use positioning/transfer devices   Assess skin/pad under line(s)/device(s)   Protective dressings over bony prominences

## 2025-08-02 ENCOUNTER — DOCUMENTATION (OUTPATIENT)
Dept: HOME HEALTH SERVICES | Facility: HOME HEALTH | Age: 70
End: 2025-08-02
Payer: MEDICARE

## 2025-08-02 ASSESSMENT — COGNITIVE AND FUNCTIONAL STATUS - GENERAL
DRESSING REGULAR UPPER BODY CLOTHING: A LITTLE
EATING MEALS: A LITTLE
CLIMB 3 TO 5 STEPS WITH RAILING: A LITTLE
DRESSING REGULAR LOWER BODY CLOTHING: A LITTLE
TOILETING: A LITTLE
WALKING IN HOSPITAL ROOM: A LITTLE
MOVING FROM LYING ON BACK TO SITTING ON SIDE OF FLAT BED WITH BEDRAILS: A LITTLE
MOVING TO AND FROM BED TO CHAIR: A LITTLE
TURNING FROM BACK TO SIDE WHILE IN FLAT BAD: A LITTLE
MOBILITY SCORE: 18
DAILY ACTIVITIY SCORE: 18
HELP NEEDED FOR BATHING: A LITTLE
STANDING UP FROM CHAIR USING ARMS: A LITTLE
PERSONAL GROOMING: A LITTLE

## 2025-08-02 ASSESSMENT — PAIN - FUNCTIONAL ASSESSMENT
PAIN_FUNCTIONAL_ASSESSMENT: 0-10
PAIN_FUNCTIONAL_ASSESSMENT: 0-10

## 2025-08-02 ASSESSMENT — PAIN SCALES - GENERAL
PAINLEVEL_OUTOF10: 5 - MODERATE PAIN
PAINLEVEL_OUTOF10: 2

## 2025-08-02 NOTE — PROGRESS NOTES
Postop Pain HPI -   Palliative: relieved with IV analgesics and regional local anesthetics  Provocative: movement  Quality:  burning and aching  Radiation:  none  Severity:  5/10, lost IV access overnight and unable to receive PCA or IV meds  Timing: constant    24-HOUR OPIOID CONSUMPTION:  Dilaudid PCA    Scheduled medications  Scheduled Medications[1]  Continuous medications  Continuous Medications[2]  PRN medications  PRN Medications[3]     Physical Exam:  Constitutional:  no distress, alert and cooperative  Eyes: clear sclera  Head/Neck: No apparent injury, trachea midline  Respiratory/Thorax: Patent airways, thorax symmetric, breathing comfortably  Cardiovascular: no pitting edema  Gastrointestinal: Nondistended  Musculoskeletal: ROM intact  Extremities: no clubbing  Neurological: alert, mejia x4  Psychological: Appropriate affect    Results for orders placed or performed during the hospital encounter of 07/29/25 (from the past 24 hours)   CBC   Result Value Ref Range    WBC 4.1 (L) 4.4 - 11.3 x10*3/uL    nRBC 0.0 0.0 - 0.0 /100 WBCs    RBC 3.10 (L) 4.50 - 5.90 x10*6/uL    Hemoglobin 8.2 (L) 13.5 - 17.5 g/dL    Hematocrit 26.8 (L) 41.0 - 52.0 %    MCV 87 80 - 100 fL    MCH 26.5 26.0 - 34.0 pg    MCHC 30.6 (L) 32.0 - 36.0 g/dL    RDW 19.9 (H) 11.5 - 14.5 %    Platelets 81 (L) 150 - 450 x10*3/uL   Comprehensive Metabolic Panel   Result Value Ref Range    Glucose 138 (H) 74 - 99 mg/dL    Sodium 142 136 - 145 mmol/L    Potassium 3.5 3.5 - 5.3 mmol/L    Chloride 107 98 - 107 mmol/L    Bicarbonate 28 21 - 32 mmol/L    Anion Gap 11 10 - 20 mmol/L    Urea Nitrogen 21 6 - 23 mg/dL    Creatinine 0.80 0.50 - 1.30 mg/dL    eGFR >90 >60 mL/min/1.73m*2    Calcium 7.8 (L) 8.6 - 10.6 mg/dL    Albumin 2.7 (L) 3.4 - 5.0 g/dL    Alkaline Phosphatase 195 (H) 33 - 136 U/L    Total Protein 5.1 (L) 6.4 - 8.2 g/dL    AST 70 (H) 9 - 39 U/L    Bilirubin, Total 3.5 (H) 0.0 - 1.2 mg/dL    ALT 25 10 - 52 U/L   Magnesium   Result Value Ref  Range    Magnesium 2.23 1.60 - 2.40 mg/dL   Coagulation Screen   Result Value Ref Range    Protime 16.1 (H) 9.8 - 12.4 seconds    INR 1.5 (H) 0.9 - 1.1    aPTT 32 26 - 36 seconds     *Note: Due to a large number of results and/or encounters for the requested time period, some results have not been displayed. A complete set of results can be found in Results Review.      Plan:  - Bilateral GLEN blocks with catheters performed preoperatively on 7/29, catheters removed yesterday and neurovascular checks followed for 24h  - Rest of pain management per primary team  - Acute pain service will sign off     Acute Pain Resident  pg [phone] ph [phone]       [1] enoxaparin, 40 mg, subcutaneous, q24h  lactated Ringer's, 500 mL, intravenous, Once  methocarbamol, 1,000 mg, intravenous, q8h  potassium chloride, 20 mEq, intravenous, q2h     [2] dextrose 5 % and lactated Ringer's, 50 mL/hr, Last Rate: 50 mL/hr (08/01/25 1835)  HYDROmorphone,   ropivacaine (PF) in NS cmpd, 14 mL/hr, Last Rate: 14 mL/hr (07/31/25 1051)     [3] PRN medications: naloxone, ondansetron

## 2025-08-02 NOTE — NURSING NOTE
MINI RN called to bedside to place new PIV. Patient's current IV infiltrated and IV is still running. Appear to be Vit K. He has a PCA pump. Patient informed IV must come out because it is swollen. Removed IV, dressing applied. Bedside RN aware and AM MINI Rn will come assess for new PIV.

## 2025-08-02 NOTE — HH CARE COORDINATION
Home Care received a referral for Nursing and Physical Therapy. Unfortunately, we are unable to accept and process the referral at this time.    Reason:  Patient Declines Home Care     Patients, please reach out to the referring provider or your PCP to assist in obtaining an alternative home care agency and/or guidance to meet your needs.    Providers, please reach out to  Home Care at [phone] with any questions regarding the declined referral.

## 2025-08-03 ASSESSMENT — COGNITIVE AND FUNCTIONAL STATUS - GENERAL
TOILETING: A LITTLE
WALKING IN HOSPITAL ROOM: A LITTLE
HELP NEEDED FOR BATHING: A LITTLE
STANDING UP FROM CHAIR USING ARMS: A LITTLE
DRESSING REGULAR LOWER BODY CLOTHING: A LITTLE
MOBILITY SCORE: 19
DRESSING REGULAR UPPER BODY CLOTHING: A LITTLE
STANDING UP FROM CHAIR USING ARMS: A LITTLE
PERSONAL GROOMING: A LITTLE
DAILY ACTIVITIY SCORE: 19
MOVING FROM LYING ON BACK TO SITTING ON SIDE OF FLAT BED WITH BEDRAILS: A LITTLE
TURNING FROM BACK TO SIDE WHILE IN FLAT BAD: A LITTLE
MOBILITY SCORE: 18
TURNING FROM BACK TO SIDE WHILE IN FLAT BAD: A LITTLE
DAILY ACTIVITIY SCORE: 19
WALKING IN HOSPITAL ROOM: A LITTLE
PERSONAL GROOMING: A LITTLE
HELP NEEDED FOR BATHING: A LITTLE
CLIMB 3 TO 5 STEPS WITH RAILING: A LITTLE
DRESSING REGULAR LOWER BODY CLOTHING: A LITTLE
TOILETING: A LITTLE
DRESSING REGULAR UPPER BODY CLOTHING: A LITTLE
MOVING TO AND FROM BED TO CHAIR: A LITTLE
MOVING TO AND FROM BED TO CHAIR: A LITTLE
CLIMB 3 TO 5 STEPS WITH RAILING: A LITTLE

## 2025-08-03 ASSESSMENT — PAIN DESCRIPTION - ORIENTATION: ORIENTATION: RIGHT

## 2025-08-03 ASSESSMENT — PAIN DESCRIPTION - DESCRIPTORS
DESCRIPTORS: ACHING;DISCOMFORT
DESCRIPTORS: ACHING;DISCOMFORT

## 2025-08-03 ASSESSMENT — PAIN SCALES - GENERAL
PAINLEVEL_OUTOF10: 3
PAINLEVEL_OUTOF10: 9
PAINLEVEL_OUTOF10: 4
PAINLEVEL_OUTOF10: 4

## 2025-08-03 ASSESSMENT — PAIN DESCRIPTION - LOCATION: LOCATION: ABDOMEN

## 2025-08-03 NOTE — PROGRESS NOTES
Surgical Oncology Progress Note    Subjective   NAEON. Feeling well today with pain better controlled. Tolerating CLD. Denies N/V     Objective    Physical Exam  Constitutional: no acute distress  Respiratory: non-labored breathing on RA  Cardiovascular: non-cyanotic  Abdominal: soft, non-distended, appropriately tender to palpation. Bowel sweat in bag with gas in bag, ostomy pink and healthy. Incision well approximated without evidence of drainage. Bile bag drain with serous output.  Extremities: no evidence of edema bilateral lower extremities  Skin: warm and dry  Neuro: alert and conversant    Last Recorded Vitals  Heart Rate:  [73-97]   Temp:  [36.5 °C (97.7 °F)-37.3 °C (99.2 °F)]   Resp:  [16-18]   BP: (116-128)/(63-78)   Weight:  [78 kg (171 lb 15.3 oz)]   SpO2:  [95 %-100 %]      Intake/Output Last 24 Hours:      Intake/Output Summary (Last 24 hours) at 8/3/2025 0914  Last data filed at 8/3/2025 0858  Gross per 24 hour   Intake 3264.67 ml   Output 2185 ml   Net 1079.67 ml        Relevant Results     8.1    3.8>-----<82         26.8   138  104  16                  ----------------<120     3.7  29  0.75          Ca 7.6 Phos 3.9 Mg 2.07       ALT 21 AST 48 AlkPhos 226 tBili 3.1           Imaging:   No new imaging to review     Assessment:  Roma Corral is a 70M who is recovering postoperatively from an open right hepatectomy for colon carcinoma with liver mets on 7/29. Continues to progress well on RNF.    Plan:   Neuro   - multimodal pain control: morphine PCA, IV robaxin    CV:   - continue to hold home Lasix, Metoprolol    Respiratory:   - encourage IS    GI:   - advance to full liquid diet  - Monitor drain output  - daily LFTs and INR    FEN/:   - strict I&Os  - D5LR@50  - replete electrolytes PRN  - 1L bolus over 2h for low UOP and high drain output  - plan for 1/2:1 repletions qshift for drain output    Dispo: remain on RNF  DVT ppx: lovenox and SCDs    Patient seen and discussed with attending,  Dr. cMkeon covering for Dr. Kacey Weber MD  PGY-2  Surgical Oncology  Service Pager: [phone]

## 2025-08-03 NOTE — CARE PLAN
The clinical goals for the shift include pt will have adequate pain control      Problem: Pain - Adult  Goal: Verbalizes/displays adequate comfort level or baseline comfort level  Outcome: Progressing     Problem: Safety - Adult  Goal: Free from fall injury  Outcome: Progressing     Problem: Discharge Planning  Goal: Discharge to home or other facility with appropriate resources  Outcome: Progressing     Problem: Chronic Conditions and Co-morbidities  Goal: Patient's chronic conditions and co-morbidity symptoms are monitored and maintained or improved  Outcome: Progressing     Problem: Nutrition  Goal: Nutrient intake appropriate for maintaining nutritional needs  Outcome: Progressing     Problem: Skin  Goal: Decreased wound size/increased tissue granulation at next dressing change  Outcome: Progressing  Goal: Participates in plan/prevention/treatment measures  Outcome: Progressing  Goal: Prevent/manage excess moisture  Outcome: Progressing  Goal: Prevent/minimize sheer/friction injuries  Outcome: Progressing  Goal: Promote/optimize nutrition  Outcome: Progressing  Goal: Promote skin healing  Outcome: Progressing

## 2025-08-04 ASSESSMENT — COGNITIVE AND FUNCTIONAL STATUS - GENERAL
HELP NEEDED FOR BATHING: A LITTLE
MOBILITY SCORE: 18
CLIMB 3 TO 5 STEPS WITH RAILING: A LITTLE
TURNING FROM BACK TO SIDE WHILE IN FLAT BAD: A LITTLE
STANDING UP FROM CHAIR USING ARMS: A LITTLE
CLIMB 3 TO 5 STEPS WITH RAILING: A LITTLE
DRESSING REGULAR UPPER BODY CLOTHING: A LITTLE
TURNING FROM BACK TO SIDE WHILE IN FLAT BAD: A LITTLE
DRESSING REGULAR LOWER BODY CLOTHING: A LITTLE
DRESSING REGULAR UPPER BODY CLOTHING: A LITTLE
HELP NEEDED FOR BATHING: A LITTLE
DRESSING REGULAR LOWER BODY CLOTHING: A LITTLE
WALKING IN HOSPITAL ROOM: A LITTLE
STANDING UP FROM CHAIR USING ARMS: A LITTLE
DAILY ACTIVITIY SCORE: 21
CLIMB 3 TO 5 STEPS WITH RAILING: TOTAL
WALKING IN HOSPITAL ROOM: A LITTLE
TOILETING: A LITTLE
MOVING TO AND FROM BED TO CHAIR: A LITTLE
DAILY ACTIVITIY SCORE: 19
MOVING TO AND FROM BED TO CHAIR: A LITTLE
TURNING FROM BACK TO SIDE WHILE IN FLAT BAD: A LITTLE
MOVING FROM LYING ON BACK TO SITTING ON SIDE OF FLAT BED WITH BEDRAILS: A LITTLE
PERSONAL GROOMING: A LITTLE
MOVING TO AND FROM BED TO CHAIR: A LITTLE
MOBILITY SCORE: 19
WALKING IN HOSPITAL ROOM: A LITTLE
MOVING FROM LYING ON BACK TO SITTING ON SIDE OF FLAT BED WITH BEDRAILS: A LITTLE
MOBILITY SCORE: 16
STANDING UP FROM CHAIR USING ARMS: A LITTLE

## 2025-08-04 ASSESSMENT — PAIN DESCRIPTION - DESCRIPTORS
DESCRIPTORS: ACHING;DISCOMFORT
DESCRIPTORS: DISCOMFORT
DESCRIPTORS: DISCOMFORT

## 2025-08-04 ASSESSMENT — PAIN SCALES - GENERAL
PAINLEVEL_OUTOF10: 6
PAINLEVEL_OUTOF10: 4
PAINLEVEL_OUTOF10: 6
PAINLEVEL_OUTOF10: 4

## 2025-08-04 ASSESSMENT — PAIN - FUNCTIONAL ASSESSMENT
PAIN_FUNCTIONAL_ASSESSMENT: 0-10

## 2025-08-04 ASSESSMENT — ACTIVITIES OF DAILY LIVING (ADL): EFFECT OF PAIN ON DAILY ACTIVITIES: DISCOMFORT

## 2025-08-04 ASSESSMENT — PAIN DESCRIPTION - ORIENTATION: ORIENTATION: RIGHT

## 2025-08-04 ASSESSMENT — PAIN DESCRIPTION - LOCATION: LOCATION: ABDOMEN

## 2025-08-04 NOTE — CARE PLAN
The clinical goals for the shift include Pt will remain HDS throughout his shift        Problem: Pain - Adult  Goal: Verbalizes/displays adequate comfort level or baseline comfort level  Outcome: Progressing     Problem: Safety - Adult  Goal: Free from fall injury  Outcome: Progressing     Problem: Discharge Planning  Goal: Discharge to home or other facility with appropriate resources  Outcome: Progressing     Problem: Nutrition  Goal: Nutrient intake appropriate for maintaining nutritional needs  Outcome: Progressing     Problem: Skin  Goal: Decreased wound size/increased tissue granulation at next dressing change  Outcome: Progressing  Goal: Participates in plan/prevention/treatment measures  Outcome: Progressing  Goal: Prevent/manage excess moisture  Outcome: Progressing  Goal: Prevent/minimize sheer/friction injuries  Outcome: Progressing  Goal: Promote/optimize nutrition  Outcome: Progressing  Goal: Promote skin healing  Outcome: Progressing

## 2025-08-04 NOTE — PROGRESS NOTES
Surgical Oncology Progress Note    Subjective   NAEON. Feeling well today with pain better controlled. Tolerating CLD. Denies N/V     Objective    Physical Exam  Constitutional: no acute distress  Respiratory: non-labored breathing on RA  Cardiovascular: non-cyanotic  Abdominal: soft, non-distended, appropriately tender to palpation. Bowel sweat in bag with gas in bag, ostomy pink and healthy. Incision well approximated without evidence of drainage. Bile bag drain with serous output.  Extremities: no evidence of edema bilateral lower extremities  Skin: warm and dry  Neuro: alert and conversant    Last Recorded Vitals  Heart Rate:  [76-92]   Temp:  [36.7 °C (98.1 °F)-37.2 °C (99 °F)]   Resp:  [16]   BP: ()/(56-80)   Weight:  [79.4 kg (175 lb 0.7 oz)]   SpO2:  [97 %-100 %]      Intake/Output Last 24 Hours:      Intake/Output Summary (Last 24 hours) at 8/4/2025 1128  Last data filed at 8/4/2025 0958  Gross per 24 hour   Intake 2474.52 ml   Output 2275 ml   Net 199.52 ml        Relevant Results     8.7    3.5>-----<69         31.0   136  103  16                  ----------------<70     4.5  28  0.74          Ca 7.8 Phos 3.9 Mg 2.09       ALT 19 AST 44 AlkPhos 241 tBili 3.4           Imaging:       Assessment:  Roma Corral is a 70M who is recovering postoperatively from an open right hepatectomy for colon carcinoma with liver mets on 7/29. Continues to progress well on RNF.    Plan:   Neuro   - multimodal pain control: IV robaxin, morphine switched to PO    CV:   - continue to hold home Lasix, Metoprolol    Respiratory:   - encourage IS    GI:   - advance to full liquid diet  - Monitor drain output  - daily LFTs and INR    FEN/:   - strict I&Os  - D5LR@50  - replete electrolytes PRN  - 1L bolus over 2h for low UOP and high drain output  - plan for 1/2:1 repletions qshift for drain output    Dispo: remain on RNF  DVT ppx: lovenox and SCDs    Patient seen and discussed with attending, Dr. Kacey Bird  MD Екатерина - PGY1  Surgical Oncology   [facility] [id number]

## 2025-08-04 NOTE — PROGRESS NOTES
Physical Therapy    Physical Therapy Treatment    Patient Name: Roma Corral  MRN: [id number]  Department: [facility]  Room: [facility]6013-A  Today's Date: 8/4/2025  Time Calculation  Start Time: 1250  Stop Time: 1337  Time Calculation (min): 47 min    Assessment/Plan   PT Assessment  PT Assessment Results: Decreased strength, Decreased range of motion, Decreased endurance, Impaired balance, Decreased mobility, Orthopedic restrictions, Pain  Rehab Prognosis: Excellent  Barriers to Discharge Home: Physical needs  Physical Needs: Stair navigation into home limited by function/safety, Ambulating household distances limited by function/safety, Intermittent mobility assistance needed, Intermittent ADL assistance needed, High falls risk due to function or environment  Evaluation/Treatment Tolerance: Patient limited by fatigue  Medical Staff Made Aware: Yes  Strengths: Attitude of self, Coping skills  Barriers to Participation: Comorbidities  End of Session Communication: Bedside nurse  Assessment Comment: Pt demonstrated gradual functional progression with decreased assistance with transfers, amb, and increased amb distance using a wheeled walker. Pt remains appropriate for high intensity level therapy after d/c to improve safe mobility.  End of Session Patient Position: Up in chair, Alarm on  PT Plan  Inpatient/Swing Bed or Outpatient: Inpatient  PT Plan  Treatment/Interventions: Bed mobility, Transfer training, Gait training, Stair training, Balance training, Strengthening, Endurance training, Range of motion, Therapeutic exercise, Therapeutic activity, Home exercise program  PT Plan: Ongoing PT  PT Frequency: 5 times per week  PT Discharge Recommendations: High intensity level of continued care  Equipment Recommended upon Discharge: Wheeled walker  PT Recommended Transfer Status: Stand by assist  PT - OK to Discharge: Yes    PT Visit Info:  PT Received On: 08/04/25  Response to Previous Treatment: Patient reporting fatigue but able  to participate.     General Visit Information:   General  Prior to Session Communication: Bedside nurse  Patient Position Received: Up in chair, Alarm on  Preferred Learning Style: auditory, verbal, visual, written  General Comment: Pt sitting in chair upon arrival, pleasant, cooperative and willing to participate in therapy.    Subjective   Precautions:  Precautions  Hearing/Visual Limitations: Hearing and vision WFL with glasses.  Medical Precautions: Abdominal precautions, Fall precautions  Post-Surgical Precautions: Abdominal surgery precautions  Precautions Comment: Pt in compliance with precautions throughout therapy session.     Date/Time Vitals Session Patient Position Pulse Resp SpO2 BP MAP (mmHg)    08/04/25 1216 --  --  87  16  99 %  112/69  --     08/04/25 1250 Pre PT  Sitting  79  --  96 %  --  --      Vital Signs Comment: vitals stable     Objective   Pain:  Pain Assessment  Pain Assessment: 0-10  0-10 (Numeric) Pain Score: 6  Pain Type: Acute pain, Surgical pain  Pain Location: Abdomen  Pain Orientation: Lower  Pain Radiating Towards: whole abdomen  Pain Descriptors: Aching, Discomfort  Pain Frequency: Constant/continuous  Pain Onset: Ongoing  Clinical Progression: Not changed  Effect of Pain on Daily Activities: Discomfort  Patient's Stated Pain Goal: No pain  Pain Interventions: Ambulation/increased activity, Therapeutic presence  Response to Interventions: No change in pain  Cognition:  Cognition  Overall Cognitive Status: Within Functional Limits  Arousal/Alertness: Appropriate responses to stimuli  Orientation Level: Oriented X4  Following Commands: Follows all commands and directions without difficulty  Attention: Within Functional Limits  Coordination:  Movements are Fluid and Coordinated: Yes  Coordination Comment: WFL  Postural Control:  Postural Control  Postural Control: Within Functional Limits  Posture Comment: Pt presented with good sitting posture in chair and good standing posture  using a wheeled walker with a slightly forward flexed trunk 2/2 to kyphosis.  Static Sitting Balance  Static Sitting-Balance Support: Bilateral upper extremity supported, Feet supported  Static Sitting-Level of Assistance: Independent  Static Sitting-Comment/Number of Minutes: Sitting in chair  Dynamic Sitting Balance  Dynamic Sitting-Balance Support: Bilateral upper extremity supported, Feet supported  Dynamic Sitting-Level of Assistance: Independent  Dynamic Sitting-Comments: Sitting in chair  Static Standing Balance  Static Standing-Balance Support: Bilateral upper extremity supported  Static Standing-Level of Assistance: Close supervision  Static Standing-Comment/Number of Minutes: using a wheeled walker  Dynamic Standing Balance  Dynamic Standing-Balance Support: Bilateral upper extremity supported  Dynamic Standing-Level of Assistance: Close supervision  Dynamic Standing-Comments: using a wheeled walker  Extremity/Trunk Assessments:     RUE   RUE : Within Functional Limits  LUE   LUE: Within Functional Limits  RLE   RLE : Within Functional Limits  LLE   LLE : Within Functional Limits  Activity Tolerance:  Activity Tolerance  Endurance: Decreased tolerance for upright activites (Pt tolerates 10+ min exercises with minimal difficulty.)  Treatments:  Therapeutic Exercise  Therapeutic Exercise Performed: Yes  Therapeutic Exercise Activity 1: seated ankle pumps x 15  Therapeutic Exercise Activity 2: LAQ x 15  Therapeutic Exercise Activity 3: seated marching in place x 15    Therapeutic Activity  Therapeutic Activity Performed: Yes  Therapeutic Activity 1: Pt performed gait training using a wheeled walker.    Balance/Neuromuscular Re-Education  Balance/Neuromuscular Re-Education Activity Performed: Yes  Balance/Neuromuscular Re-Education Activity 1: SBA static standing balance using a wheeled walker.  Balance/Neuromuscular Re-Education Activity 2: SBA dynamic standing balance using a wheeled walker.    Bed  Mobility  Bed Mobility: No    Ambulation/Gait Training  Ambulation/Gait Training Performed: Yes  Ambulation/Gait Training 1  Surface 1: Level tile  Device 1: Rolling walker  Assistance 1: Close supervision  Quality of Gait 1: Decreased step length, Forward flexed posture (slightly unsteady, decreased naila, decreased endurance, alternated between step-to gait and step-through gait)  Comments/Distance (ft) 1: 75ft  Transfers  Transfer: Yes  Transfer 1  Transfer From 1: Sit to  Transfer to 1: Stand  Transfer Device 1: Walker  Transfer Level of Assistance 1: Close supervision  Transfers 2  Transfer From 2: Stand to  Transfer to 2: Sit  Transfer Device 2: Walker  Transfer Level of Assistance 2: Close supervision    Stairs  Stairs: No    Outcome Measures:  [facility] Basic Mobility  Turning from your back to your side while in a flat bed without using bedrails: A little  Moving from lying on your back to sitting on the side of a flat bed without using bedrails: A little  Moving to and from bed to chair (including a wheelchair): A little  Standing up from a chair using your arms (e.g. wheelchair or bedside chair): A little  To walk in hospital room: A little  Climbing 3-5 steps with railing: Total  Basic Mobility - Total Score: 16    OP EDUCATION:  Outpatient Education  Individual(s) Educated: Patient  Education Provided: Body Mechanics, Fall Risk, Home Exercise Program, Home Safety, Physiology, POC, Post-Op Precautions, Posture  Patient Response to Education: Patient/Caregiver Verbalized Understanding of Information, Patient/Caregiver Performed Return Demonstration of Exercises/Activities  Education Comment: Pt received education on abdominal precautions, safe mobility using the wheeled walker during sit to stand to sit transfers and gait training and good standing posture.    Encounter Problems       Encounter Problems (Active)       Balance       STG - Maintains dynamic standing balance with upper extremity support on  LRAD with Mod I  (Progressing)       Start:  07/31/25    Expected End:  08/21/25               Mobility       STG - Patient will ambulate x200 ft with Mod I using LRAD (Progressing)       Start:  07/31/25    Expected End:  08/21/25            STG - Patient will ascend and descend 7 stairs with Mod I using LRAD (Progressing)       Start:  07/31/25    Expected End:  08/21/25               PT Transfers       STG - Patient will perform bed mobility independently  (Progressing)       Start:  07/31/25    Expected End:  08/21/25            STG - Patient will transfer sit to and from stand with Mod I using LRAD (Progressing)       Start:  07/31/25    Expected End:  08/21/25

## 2025-08-05 ASSESSMENT — PAIN DESCRIPTION - LOCATION: LOCATION: ABDOMEN

## 2025-08-05 ASSESSMENT — COGNITIVE AND FUNCTIONAL STATUS - GENERAL
DRESSING REGULAR LOWER BODY CLOTHING: A LITTLE
DRESSING REGULAR UPPER BODY CLOTHING: A LITTLE
WALKING IN HOSPITAL ROOM: A LITTLE
TURNING FROM BACK TO SIDE WHILE IN FLAT BAD: A LITTLE
STANDING UP FROM CHAIR USING ARMS: A LITTLE
CLIMB 3 TO 5 STEPS WITH RAILING: A LITTLE
WALKING IN HOSPITAL ROOM: A LITTLE
DRESSING REGULAR UPPER BODY CLOTHING: A LITTLE
DAILY ACTIVITIY SCORE: 20
CLIMB 3 TO 5 STEPS WITH RAILING: A LITTLE
MOVING FROM LYING ON BACK TO SITTING ON SIDE OF FLAT BED WITH BEDRAILS: A LITTLE
TURNING FROM BACK TO SIDE WHILE IN FLAT BAD: A LITTLE
TOILETING: A LITTLE
MOVING FROM LYING ON BACK TO SITTING ON SIDE OF FLAT BED WITH BEDRAILS: A LITTLE
MOVING TO AND FROM BED TO CHAIR: A LITTLE
DRESSING REGULAR LOWER BODY CLOTHING: A LITTLE
CLIMB 3 TO 5 STEPS WITH RAILING: TOTAL
MOVING TO AND FROM BED TO CHAIR: A LITTLE
HELP NEEDED FOR BATHING: A LITTLE
STANDING UP FROM CHAIR USING ARMS: A LITTLE
HELP NEEDED FOR BATHING: A LITTLE
TURNING FROM BACK TO SIDE WHILE IN FLAT BAD: A LITTLE
MOBILITY SCORE: 16
MOVING FROM LYING ON BACK TO SITTING ON SIDE OF FLAT BED WITH BEDRAILS: A LITTLE
MOBILITY SCORE: 18
STANDING UP FROM CHAIR USING ARMS: A LITTLE
WALKING IN HOSPITAL ROOM: A LITTLE
DAILY ACTIVITIY SCORE: 21
MOBILITY SCORE: 18
MOVING TO AND FROM BED TO CHAIR: A LITTLE

## 2025-08-05 ASSESSMENT — PAIN - FUNCTIONAL ASSESSMENT
PAIN_FUNCTIONAL_ASSESSMENT: 0-10

## 2025-08-05 ASSESSMENT — ACTIVITIES OF DAILY LIVING (ADL): EFFECT OF PAIN ON DAILY ACTIVITIES: DISCOMFORT

## 2025-08-05 ASSESSMENT — PAIN SCALES - GENERAL
PAINLEVEL_OUTOF10: 8
PAINLEVEL_OUTOF10: 4
PAINLEVEL_OUTOF10: 7
PAINLEVEL_OUTOF10: 3
PAINLEVEL_OUTOF10: 9
PAINLEVEL_OUTOF10: 3

## 2025-08-05 ASSESSMENT — PAIN DESCRIPTION - DESCRIPTORS
DESCRIPTORS: ACHING;CRAMPING;DISCOMFORT
DESCRIPTORS: ACHING

## 2025-08-05 NOTE — PROGRESS NOTES
Surgical Oncology Progress Note    Subjective   NAEON. Feeling well today with pain better controlled. Denies N/V. Was able to tolerate some food PO. Complained of acid reflux overnight relieved by Tums.     Objective    Physical Exam  Constitutional: no acute distress  Respiratory: non-labored breathing on RA  Cardiovascular: non-cyanotic  Abdominal: soft, non-distended, appropriately tender to palpation. Bowel sweat in bag with gas in bag, ostomy pink and healthy. Incision well approximated without evidence of drainage. Bile bag drain with serous output.  Extremities: no evidence of edema bilateral lower extremities  Skin: warm and dry  Neuro: alert and conversant    Last Recorded Vitals  Heart Rate:  [74-89]   Temp:  [36.4 °C (97.5 °F)-37.6 °C (99.7 °F)]   Resp:  [16-18]   BP: (101-115)/(56-69)   Weight:  [77.8 kg (171 lb 9.6 oz)]   SpO2:  [96 %-99 %]      Intake/Output Last 24 Hours:      Intake/Output Summary (Last 24 hours) at 8/5/2025 1102  Last data filed at 8/5/2025 1041  Gross per 24 hour   Intake 2120 ml   Output 2485 ml   Net -365 ml        Relevant Results     7.0    2.5>-----<54         23.9   137  102  17                  ----------------<110     3.7  30  0.69          Ca 7.9 Phos 3.9 Mg 2.03       ALT 12 AST 29 AlkPhos 185 tBili 3.8           Imaging:       Assessment:  Roma Corral is a 70M who is recovering postoperatively from an open right hepatectomy for colon carcinoma with liver mets on 7/29. Continues to progress well on RNF.    Plan:   Neuro   - multimodal pain control: IV robaxin, morphine switched to PO    CV:   - continue to hold home Lasix, Metoprolol    Respiratory:   - encourage IS    GI:   - advance to adult diet regular + oral nutritional supplements  - Monitor drain output  - daily LFTs and INR    FEN/:   - strict I&Os  - replete electrolytes PRN  - 1L bolus over 2h for low UOP and high drain output  - plan for 1/2:1 repletions qshift for drain output    Dispo: remain on  RNF  DVT ppx: lovenox and SCDs    Patient seen and discussed with attending, Dr. Kacey Willams MD - PGY1  Surgical Oncology   [facility] [id number]

## 2025-08-05 NOTE — PROGRESS NOTES
Physical Therapy    Physical Therapy Treatment    Patient Name: Roma Corral  MRN: [id number]  Department: [facility]  Room: [facility]6013-A  Today's Date: 8/5/2025  Time Calculation  Start Time: 1532  Stop Time: 1618  Time Calculation (min): 46 min    Assessment/Plan   PT Assessment  PT Assessment Results: Impaired balance, Decreased mobility, Orthopedic restrictions, Pain  Rehab Prognosis: Excellent  Barriers to Discharge Home: Physical needs  Physical Needs: Stair navigation into home limited by function/safety, Ambulating household distances limited by function/safety, Intermittent mobility assistance needed, Intermittent ADL assistance needed, High falls risk due to function or environment  Evaluation/Treatment Tolerance: Patient limited by pain  Medical Staff Made Aware: Yes  Strengths: Attitude of self, Coping skills, Physical health  Barriers to Participation: Comorbidities  End of Session Communication: Bedside nurse  Assessment Comment: Pt demonstrated gradual functional progression with decreased assistance with bed mobility, transfers, amb, and increased amb distance using a wheeled walker.  End of Session Patient Position: Bed, 3 rail up, Alarm off, not on at start of session (Pt sitting EOB post-therapy.)  PT Plan  Inpatient/Swing Bed or Outpatient: Inpatient  PT Plan  Treatment/Interventions: Bed mobility, Transfer training, Gait training, Stair training, Balance training, Strengthening, Endurance training, Range of motion, Therapeutic exercise, Therapeutic activity, Home exercise program  PT Plan: Ongoing PT  PT Frequency: 5 times per week  PT Discharge Recommendations: High intensity level of continued care  Equipment Recommended upon Discharge: Wheeled walker  PT Recommended Transfer Status: Stand by assist  PT - OK to Discharge: Yes    PT Visit Info:  PT Received On: 08/05/25  Response to Previous Treatment: Patient with no complaints from previous session.     General Visit Information:   General  Prior to  Session Communication: Bedside nurse  Patient Position Received: Bed, 3 rail up, Alarm off, not on at start of session  Preferred Learning Style: auditory, verbal, visual, written  General Comment: Pt was pleasant, cooperative and willing to participate in therapy.    Subjective   Precautions:  Precautions  Hearing/Visual Limitations: Hearing and vision WFL with glasses.  Medical Precautions: Abdominal precautions, Fall precautions  Post-Surgical Precautions: Abdominal surgery precautions  Precautions Comment: Pt in compliance with precautions throughout therapy session.     Date/Time Vitals Session Patient Position Pulse Resp SpO2 BP MAP (mmHg)    08/05/25 1532 Pre PT  Lying  87  --  98 %  --  --      Vital Signs Comment: vitals stable     Objective   Pain:  Pain Assessment  Pain Assessment: 0-10  0-10 (Numeric) Pain Score: 7  Pain Type: Acute pain, Surgical pain  Pain Location: Abdomen  Pain Radiating Towards: whole abdomen  Pain Descriptors: Aching, Cramping, Discomfort  Pain Frequency: Constant/continuous  Pain Onset: Ongoing  Clinical Progression: Not changed  Effect of Pain on Daily Activities: Discomfort  Patient's Stated Pain Goal: No pain  Pain Interventions: Ambulation/increased activity, Therapeutic presence  Response to Interventions: No change in pain  Cognition:  Cognition  Overall Cognitive Status: Within Functional Limits  Arousal/Alertness: Appropriate responses to stimuli  Orientation Level: Oriented X4  Following Commands: Follows all commands and directions without difficulty  Attention: Within Functional Limits  Coordination:  Movements are Fluid and Coordinated: Yes  Coordination Comment: WFL  Postural Control:  Postural Control  Postural Control: Within Functional Limits  Posture Comment: Pt presented with good sitting posture in chair and good standing posture using a wheeled walker with a slightly forward flexed trunk 2/2 to kyphosis.  Static Sitting Balance  Static Sitting-Balance Support:  Bilateral upper extremity supported, Feet supported  Static Sitting-Level of Assistance: Independent  Static Sitting-Comment/Number of Minutes: Sitting EOB  Dynamic Sitting Balance  Dynamic Sitting-Balance Support: Bilateral upper extremity supported, Feet supported  Dynamic Sitting-Level of Assistance: Independent  Dynamic Sitting-Comments: Sitting EOB  Static Standing Balance  Static Standing-Balance Support: Bilateral upper extremity supported  Static Standing-Level of Assistance: Close supervision  Static Standing-Comment/Number of Minutes: using a wheeled walker  Dynamic Standing Balance  Dynamic Standing-Balance Support: Bilateral upper extremity supported  Dynamic Standing-Level of Assistance: Close supervision  Dynamic Standing-Comments: using a wheeled walker  Extremity/Trunk Assessments:     RUE   RUE : Within Functional Limits  LUE   LUE: Within Functional Limits  RLE   RLE : Within Functional Limits  LLE   LLE : Within Functional Limits  Activity Tolerance:  Activity Tolerance  Endurance: Endurance does not limit participation in activity  Treatments:  Therapeutic Exercise  Therapeutic Exercise Performed: No    Therapeutic Activity  Therapeutic Activity Performed: Yes  Therapeutic Activity 1: Pt performed gait training using a wheeled walker.    Balance/Neuromuscular Re-Education  Balance/Neuromuscular Re-Education Activity Performed: Yes  Balance/Neuromuscular Re-Education Activity 1: SBA static standing balance using a wheeled walker.  Balance/Neuromuscular Re-Education Activity 2: SBA dynamic standing balance using a wheeled walker.    Bed Mobility  Bed Mobility: Yes  Bed Mobility 1  Bed Mobility 1: Supine to sitting  Level of Assistance 1: Close supervision  Bed Mobility Comments 1: lateral roll technique    Ambulation/Gait Training  Ambulation/Gait Training Performed: Yes  Ambulation/Gait Training 1  Surface 1: Level tile  Device 1: Rolling walker  Assistance 1: Close supervision  Quality of Gait 1:  Decreased step length, Forward flexed posture (slightly unsteady, decreased naila)  Comments/Distance (ft) 1: 50ft, 75ft, 75ft  Transfers  Transfer: Yes  Transfer 1  Transfer From 1: Sit to  Transfer to 1: Stand  Transfer Device 1: Walker  Transfer Level of Assistance 1: Close supervision  Transfers 2  Transfer From 2: Stand to  Transfer to 2: Sit  Transfer Device 2: Walker  Transfer Level of Assistance 2: Close supervision    Stairs  Stairs: No    Outcome Measures:  [facility] Basic Mobility  Turning from your back to your side while in a flat bed without using bedrails: A little  Moving from lying on your back to sitting on the side of a flat bed without using bedrails: A little  Moving to and from bed to chair (including a wheelchair): A little  Standing up from a chair using your arms (e.g. wheelchair or bedside chair): A little  To walk in hospital room: A little  Climbing 3-5 steps with railing: Total  Basic Mobility - Total Score: 16    OP EDUCATION:  Outpatient Education  Individual(s) Educated: Patient  Education Provided: Body Mechanics, Fall Risk, Home Safety, Post-Op Precautions, Posture  Patient Response to Education: Patient/Caregiver Verbalized Understanding of Information, Patient/Caregiver Performed Return Demonstration of Exercises/Activities  Education Comment: Pt received education on achieving gradual functional progression.    Encounter Problems       Encounter Problems (Active)       Balance       STG - Maintains dynamic standing balance with upper extremity support on LRAD with Mod I  (Progressing)       Start:  07/31/25    Expected End:  08/21/25               Mobility       STG - Patient will ambulate x200 ft with Mod I using LRAD (Progressing)       Start:  07/31/25    Expected End:  08/21/25            STG - Patient will ascend and descend 7 stairs with Mod I using LRAD (Progressing)       Start:  07/31/25    Expected End:  08/21/25               PT Transfers       STG - Patient will perform  bed mobility independently  (Progressing)       Start:  07/31/25    Expected End:  08/21/25            STG - Patient will transfer sit to and from stand with Mod I using LRAD (Progressing)       Start:  07/31/25    Expected End:  08/21/25

## 2025-08-05 NOTE — CARE PLAN
The clinical goals for the shift include pt will remain HDS and free from falls throughout shift 8/5/25 1900      Problem: Pain - Adult  Goal: Verbalizes/displays adequate comfort level or baseline comfort level  Outcome: Progressing     Problem: Safety - Adult  Goal: Free from fall injury  Outcome: Progressing     Problem: Discharge Planning  Goal: Discharge to home or other facility with appropriate resources  Outcome: Progressing     Problem: Chronic Conditions and Co-morbidities  Goal: Patient's chronic conditions and co-morbidity symptoms are monitored and maintained or improved  Outcome: Progressing     Problem: Nutrition  Goal: Nutrient intake appropriate for maintaining nutritional needs  Outcome: Progressing     Problem: Skin  Goal: Decreased wound size/increased tissue granulation at next dressing change  Outcome: Progressing  Flowsheets (Taken 8/5/2025 1040)  Decreased wound size/increased tissue granulation at next dressing change: Promote sleep for wound healing  Goal: Participates in plan/prevention/treatment measures  Outcome: Progressing  Flowsheets (Taken 8/5/2025 1040)  Participates in plan/prevention/treatment measures:   Elevate heels   Discuss with provider PT/OT consult  Goal: Prevent/manage excess moisture  Outcome: Progressing  Flowsheets (Taken 8/5/2025 1040)  Prevent/manage excess moisture: Monitor for/manage infection if present  Goal: Prevent/minimize sheer/friction injuries  Outcome: Progressing  Flowsheets (Taken 8/5/2025 1040)  Prevent/minimize sheer/friction injuries:   Use pull sheet   Increase activity/out of bed for meals  Goal: Promote/optimize nutrition  Outcome: Progressing  Flowsheets (Taken 8/5/2025 1040)  Promote/optimize nutrition: Monitor/record intake including meals  Goal: Promote skin healing  Outcome: Progressing  Flowsheets (Taken 8/5/2025 1040)  Promote skin healing:   Protective dressings over bony prominences   Assess skin/pad under line(s)/device(s)

## 2025-08-06 ASSESSMENT — COGNITIVE AND FUNCTIONAL STATUS - GENERAL
DAILY ACTIVITIY SCORE: 20
MOVING FROM LYING ON BACK TO SITTING ON SIDE OF FLAT BED WITH BEDRAILS: A LITTLE
MOVING TO AND FROM BED TO CHAIR: A LITTLE
STANDING UP FROM CHAIR USING ARMS: A LITTLE
DRESSING REGULAR LOWER BODY CLOTHING: A LITTLE
MOVING TO AND FROM BED TO CHAIR: A LITTLE
HELP NEEDED FOR BATHING: A LITTLE
TOILETING: A LITTLE
TURNING FROM BACK TO SIDE WHILE IN FLAT BAD: A LITTLE
WALKING IN HOSPITAL ROOM: A LITTLE
WALKING IN HOSPITAL ROOM: A LITTLE
DAILY ACTIVITIY SCORE: 20
CLIMB 3 TO 5 STEPS WITH RAILING: A LITTLE
CLIMB 3 TO 5 STEPS WITH RAILING: A LITTLE
TOILETING: A LITTLE
DRESSING REGULAR UPPER BODY CLOTHING: A LITTLE
MOBILITY SCORE: 18
DRESSING REGULAR UPPER BODY CLOTHING: A LITTLE
DRESSING REGULAR LOWER BODY CLOTHING: A LITTLE
STANDING UP FROM CHAIR USING ARMS: A LITTLE
TURNING FROM BACK TO SIDE WHILE IN FLAT BAD: A LITTLE
MOBILITY SCORE: 19
HELP NEEDED FOR BATHING: A LITTLE

## 2025-08-06 ASSESSMENT — PAIN - FUNCTIONAL ASSESSMENT
PAIN_FUNCTIONAL_ASSESSMENT: 0-10
PAIN_FUNCTIONAL_ASSESSMENT: 0-10

## 2025-08-06 ASSESSMENT — PAIN SCALES - GENERAL
PAINLEVEL_OUTOF10: 5 - MODERATE PAIN
PAINLEVEL_OUTOF10: 7

## 2025-08-06 NOTE — PROGRESS NOTES
Surgical Oncology Progress Note    Subjective   Feeling well today with pain well controlled. Had stool in his ostomy bag. Denies N/V. Was able to tolerate food PO. NAEON.       Objective    Physical Exam  Constitutional: no acute distress  Respiratory: non-labored breathing on RA  Cardiovascular: non-cyanotic  Abdominal: soft, non-distended, appropriately tender to palpation. Bowel sweat in bag with gas and stool in bag, ostomy pink and healthy. Incision well approximated without evidence of drainage. Bile bag drain with serous output.  Extremities: no evidence of edema bilateral lower extremities  Skin: warm and dry  Neuro: alert and conversant    Last Recorded Vitals  Heart Rate:  [68-89]   Temp:  [36.5 °C (97.7 °F)-37.4 °C (99.3 °F)]   Resp:  [16-18]   BP: (103-122)/(57-78)   Weight:  [77.1 kg (170 lb)]   SpO2:  [96 %-99 %]      Intake/Output Last 24 Hours:      Intake/Output Summary (Last 24 hours) at 8/6/2025 1616  Last data filed at 8/6/2025 1100  Gross per 24 hour   Intake 3700 ml   Output 2610 ml   Net 1090 ml        Relevant Results     7.5    3.3>-----<61         24.4   139  104  15                  ----------------<131     4.4  29  0.81          Ca 8.3 Phos 3.9 Mg 2.01       ALT 11 AST 27 AlkPhos 169 tBili 4.6           Imaging:       Assessment:  Roma Corral is a 70M who is recovering postoperatively from an open right hepatectomy for colon carcinoma with liver mets on 7/29. He has stool in his ostomy bag, but bile bag continues to drain with serous output.    Plan:   Neuro   - multimodal pain control: IV robaxin, morphine switched to PO    CV:   - continue to hold home Lasix, Metoprolol    Respiratory:   - encourage IS    GI:   - adult diet regular + oral nutritional supplements  - Monitor drain output  - daily LFTs and INR  - another 2 days of albumin for drain output     FEN/:   - strict I&Os  - replete electrolytes PRN  - plan for 1/2:1 repletions qshift for drain output    Dispo: high  intensity  DVT ppx: lovenox and SCDs      Discussed with Dr. Cookie Pereira, who discussed with Dr. Erazo.    Pelon Willams MD - PGY1  Surgical Oncology   [facility] [id number]

## 2025-08-06 NOTE — CARE PLAN
The clinical goals for the shift include Pt will remain HDS and free from falls throughout shift      Problem: Pain - Adult  Goal: Verbalizes/displays adequate comfort level or baseline comfort level  Outcome: Progressing     Problem: Safety - Adult  Goal: Free from fall injury  Outcome: Progressing     Problem: Discharge Planning  Goal: Discharge to home or other facility with appropriate resources  Outcome: Progressing     Problem: Chronic Conditions and Co-morbidities  Goal: Patient's chronic conditions and co-morbidity symptoms are monitored and maintained or improved  Outcome: Progressing     Problem: Nutrition  Goal: Nutrient intake appropriate for maintaining nutritional needs  Outcome: Progressing     Problem: Skin  Goal: Decreased wound size/increased tissue granulation at next dressing change  Outcome: Progressing  Goal: Participates in plan/prevention/treatment measures  Outcome: Progressing  Goal: Prevent/manage excess moisture  Outcome: Progressing  Goal: Prevent/minimize sheer/friction injuries  Outcome: Progressing  Goal: Promote/optimize nutrition  Outcome: Progressing  Goal: Promote skin healing  Outcome: Progressing

## 2025-08-06 NOTE — PROGRESS NOTES
08/06/25 1100   Discharge Planning   Living Arrangements Spouse/significant other   Support Systems Spouse/significant other   Type of Residence Private residence   Number of Stairs to Enter Residence 3   Number of Stairs Within Residence 14   Do you have animals or pets at home? Yes   Type of Animals or Pets 1 dog   Who is requesting discharge planning? Provider   Home or Post Acute Services None   Expected Discharge Disposition Home     SW met with pt to discuss Rehab.  Pt is alert and oriented x3.  He declines Rehab.  SW asked if pt would be receptive to home care.  Pt declines.  SW will relay to care team.  KHUSHI Porras

## 2025-08-07 ENCOUNTER — RESULTS FOLLOW-UP (OUTPATIENT)
Dept: PRIMARY CARE | Facility: CLINIC | Age: 70
End: 2025-08-07
Payer: MEDICARE

## 2025-08-07 ENCOUNTER — APPOINTMENT (OUTPATIENT)
Dept: RADIOLOGY | Facility: HOSPITAL | Age: 70
End: 2025-08-07
Payer: MEDICARE

## 2025-08-07 PROCEDURE — 74178 CT ABD&PLV WO CNTR FLWD CNTR: CPT

## 2025-08-07 ASSESSMENT — PAIN SCALES - GENERAL
PAINLEVEL_OUTOF10: 0 - NO PAIN
PAINLEVEL_OUTOF10: 7
PAINLEVEL_OUTOF10: 0 - NO PAIN
PAINLEVEL_OUTOF10: 3
PAINLEVEL_OUTOF10: 0 - NO PAIN

## 2025-08-07 ASSESSMENT — COGNITIVE AND FUNCTIONAL STATUS - GENERAL
PERSONAL GROOMING: A LITTLE
WALKING IN HOSPITAL ROOM: A LITTLE
EATING MEALS: A LITTLE
CLIMB 3 TO 5 STEPS WITH RAILING: TOTAL
TOILETING: A LITTLE
STANDING UP FROM CHAIR USING ARMS: A LITTLE
MOBILITY SCORE: 22
DRESSING REGULAR UPPER BODY CLOTHING: A LITTLE
TURNING FROM BACK TO SIDE WHILE IN FLAT BAD: A LITTLE
CLIMB 3 TO 5 STEPS WITH RAILING: A LITTLE
DAILY ACTIVITIY SCORE: 18
DAILY ACTIVITIY SCORE: 18
WALKING IN HOSPITAL ROOM: A LITTLE
DRESSING REGULAR LOWER BODY CLOTHING: A LITTLE
CLIMB 3 TO 5 STEPS WITH RAILING: A LITTLE
MOBILITY SCORE: 22
EATING MEALS: A LITTLE
MOVING TO AND FROM BED TO CHAIR: A LITTLE
DRESSING REGULAR LOWER BODY CLOTHING: A LITTLE
HELP NEEDED FOR BATHING: A LITTLE
TOILETING: A LITTLE
MOVING FROM LYING ON BACK TO SITTING ON SIDE OF FLAT BED WITH BEDRAILS: A LITTLE
WALKING IN HOSPITAL ROOM: A LITTLE
DRESSING REGULAR UPPER BODY CLOTHING: A LITTLE
PERSONAL GROOMING: A LITTLE
MOBILITY SCORE: 16
HELP NEEDED FOR BATHING: A LITTLE

## 2025-08-07 ASSESSMENT — PAIN - FUNCTIONAL ASSESSMENT
PAIN_FUNCTIONAL_ASSESSMENT: 0-10

## 2025-08-07 NOTE — TELEPHONE ENCOUNTER
----- Message from Santiago Buckner sent at 5/29/2025  3:59 PM EDT -----  Patient needs to make a follow up appt. But first with cancer team  ----- Message -----  From: Luanne Salmon Results In  Sent: 5/29/2025   4:34 AM EDT  To: Santiago Buckner MD

## 2025-08-07 NOTE — PROGRESS NOTES
08/07/25 1153   Discharge Planning   Living Arrangements Spouse/significant other   Support Systems Spouse/significant other   Assistance Needed none   Type of Residence Private residence   Who is requesting discharge planning? Provider   Home or Post Acute Services In home services   Type of Home Care Services Home nursing visits;Home OT;Home PT   Expected Discharge Disposition Home H     Notified by medical team that patient/wife now requesting home care for SN/PT/OT. AOC is [facility]. Requested [facility] from team. Will continue to follow for discharge planning. Debby Laguerre RN [facility]

## 2025-08-07 NOTE — PROGRESS NOTES
Physical Therapy    Physical Therapy Treatment    Patient Name: Roma Corral  MRN: [id number]  Department: [facility]  Room: [address] [place] [address]  Today's Date: 8/7/2025  Time Calculation  Start Time: 1726  Stop Time: 1805  Time Calculation (min): 39 min         Assessment/Plan   PT Assessment  End of Session Communication: Bedside nurse  Assessment Comment: pt ambualting increased distsance however requires cotrninued cus for safety and ambulates at significantly decreased pace requiring 5 minutes to complete each ambulation trial  End of Session Patient Position: Bed, 3 rail up, Alarm off, not on at start of session  PT Plan  Inpatient/Swing Bed or Outpatient: Inpatient  PT Plan  Treatment/Interventions: Bed mobility, Transfer training, Gait training, Stair training, Balance training, Strengthening, Endurance training, Range of motion, Therapeutic exercise, Therapeutic activity, Home exercise program  PT Plan: Ongoing PT  PT Frequency: 5 times per week  PT Discharge Recommendations: High intensity level of continued care  Equipment Recommended upon Discharge: Wheeled walker  PT Recommended Transfer Status: Stand by assist  PT - OK to Discharge: Yes    PT Visit Info:  PT Received On: 08/07/25     General Visit Information:   General  Prior to Session Communication: Bedside nurse  Patient Position Received: Up in chair  General Comment: Pt was pleasant, cooperative and willing to participate in therapy. pt reports feeling trapped in room and likes to be able to get in the hallways.    Subjective   Precautions:  Precautions  Medical Precautions: Abdominal precautions, Fall precautions  Post-Surgical Precautions: Abdominal surgery precautions            Objective   Pain:  Pain Assessment  0-10 (Numeric) Pain Score: 0 - No pain  Cognition:  Cognition  Orientation Level: Oriented X4       Treatments:  Therapeutic Exercise  Therapeutic Exercise Activity 1: seated ankle pumps x 15  Therapeutic Exercise Activity 2: LAQ x 15          Ambulation/Gait Training 1  Surface 1: Level tile  Device 1: Rolling walker  Assistance 1: Close supervision  Quality of Gait 1: Decreased step length, Forward flexed posture  Comments/Distance (ft) 1: 3x100' using wheeled walker. cues for safe use of walker  Transfer 1  Technique 1: Sit to stand, Stand to sit  Transfer Device 1: Walker  Transfer Level of Assistance 1: Close supervision  Trials/Comments 1: cues for safe proxmity when descending         Outcome Measures:  [facility] Basic Mobility  Turning from your back to your side while in a flat bed without using bedrails: A little  Moving from lying on your back to sitting on the side of a flat bed without using bedrails: A little  Moving to and from bed to chair (including a wheelchair): A little  Standing up from a chair using your arms (e.g. wheelchair or bedside chair): A little  To walk in hospital room: A little  Climbing 3-5 steps with railing: Total  Basic Mobility - Total Score: 16    Education Documentation  Precautions, taught by Maikel Tyson PTA at 8/7/2025  6:26 PM.  Learner: Patient  Readiness: Acceptance  Method: Explanation  Response: Verbalizes Understanding  Comment: abdominal precatuions    Education Comments  No comments found.        OP EDUCATION:       Encounter Problems       Encounter Problems (Active)       Balance       STG - Maintains dynamic standing balance with upper extremity support on LRAD with Mod I  (Progressing)       Start:  07/31/25    Expected End:  08/21/25               Mobility       STG - Patient will ambulate x200 ft with Mod I using LRAD (Progressing)       Start:  07/31/25    Expected End:  08/21/25            STG - Patient will ascend and descend 7 stairs with Mod I using LRAD (Progressing)       Start:  07/31/25    Expected End:  08/21/25               PT Transfers       STG - Patient will perform bed mobility independently  (Progressing)       Start:  07/31/25    Expected End:  08/21/25            STG -  Patient will transfer sit to and from stand with Mod I using LRAD (Progressing)       Start:  07/31/25    Expected End:  08/21/25

## 2025-08-07 NOTE — CARE PLAN
Problem: Pain - Adult  Goal: Verbalizes/displays adequate comfort level or baseline comfort level  Outcome: Progressing     Problem: Safety - Adult  Goal: Free from fall injury  Outcome: Progressing     Problem: Discharge Planning  Goal: Discharge to home or other facility with appropriate resources  Outcome: Progressing     Problem: Chronic Conditions and Co-morbidities  Goal: Patient's chronic conditions and co-morbidity symptoms are monitored and maintained or improved  Outcome: Progressing     Problem: Nutrition  Goal: Nutrient intake appropriate for maintaining nutritional needs  Outcome: Progressing     Problem: Skin  Goal: Decreased wound size/increased tissue granulation at next dressing change  Outcome: Progressing  Goal: Participates in plan/prevention/treatment measures  Outcome: Progressing  Goal: Prevent/manage excess moisture  Outcome: Progressing  Goal: Prevent/minimize sheer/friction injuries  Outcome: Progressing  Goal: Promote/optimize nutrition  Outcome: Progressing  Goal: Promote skin healing  Outcome: Progressing   The patient's goals for the shift include      The clinical goals for the shift include Pt will remain safe n free from injury througout shift

## 2025-08-08 ASSESSMENT — PAIN SCALES - GENERAL
PAINLEVEL_OUTOF10: 0 - NO PAIN

## 2025-08-08 ASSESSMENT — COGNITIVE AND FUNCTIONAL STATUS - GENERAL
TURNING FROM BACK TO SIDE WHILE IN FLAT BAD: A LITTLE
WALKING IN HOSPITAL ROOM: A LITTLE
DAILY ACTIVITIY SCORE: 24
MOBILITY SCORE: 22
MOVING FROM LYING ON BACK TO SITTING ON SIDE OF FLAT BED WITH BEDRAILS: A LITTLE
CLIMB 3 TO 5 STEPS WITH RAILING: A LITTLE
DAILY ACTIVITIY SCORE: 20
MOBILITY SCORE: 24
DRESSING REGULAR LOWER BODY CLOTHING: A LITTLE
PERSONAL GROOMING: A LITTLE
DAILY ACTIVITIY SCORE: 24
MOVING TO AND FROM BED TO CHAIR: A LITTLE
WALKING IN HOSPITAL ROOM: A LITTLE
CLIMB 3 TO 5 STEPS WITH RAILING: A LITTLE
HELP NEEDED FOR BATHING: A LITTLE
MOBILITY SCORE: 18
TOILETING: A LITTLE
STANDING UP FROM CHAIR USING ARMS: A LITTLE

## 2025-08-08 ASSESSMENT — PAIN - FUNCTIONAL ASSESSMENT
PAIN_FUNCTIONAL_ASSESSMENT: 0-10

## 2025-08-08 ASSESSMENT — ACTIVITIES OF DAILY LIVING (ADL): HOME_MANAGEMENT_TIME_ENTRY: 6

## 2025-08-08 NOTE — PROGRESS NOTES
"Roma Corral is a 70 y.o. male on day 10 of admission presenting with Colon carcinoma metastatic to liver.    Subjective   NAEO. Drain put out about 1600, serous.  Tbili increasing 7.2, INR 1.7  Denies pain. Tolerating diet. Has stool in ostomy.        Objective     Physical Exam  Neurological: Awake, alert, conversive  Respiratory/Thorax: even, unlabored  Genitourinary: voiding  Gastrointestinal: soft, NT, ND.  Incision well approximated. Right drain with clear yellow serous fluid   Skin: warm, dry  Musculoskeletal: PAYNE  Eyes: non-icteric  Extremities: no edema   Psychological: appropriate mood/affect    Last Recorded Vitals  Blood pressure 117/71, pulse 98, temperature 37.6 °C (99.6 °F), temperature source Temporal, resp. rate 16, height 1.803 m (5' 10.98\"), weight 77.5 kg (170 lb 13.7 oz), SpO2 98%.  Intake/Output last 3 Shifts:  I/O last 3 completed shifts:  In: 2770 (35.7 mL/kg) [P.O.:720; Blood:1500; Other:550]  Out: 3825 (49.4 mL/kg) [Urine:1275 (0.5 mL/kg/hr); Drains:2300; Stool:250]  Weight: 77.5 kg     Relevant Results  Results for orders placed or performed during the hospital encounter of 07/29/25 (from the past 24 hours)   Coagulation Screen   Result Value Ref Range    Protime 18.6 (H) 9.8 - 12.4 seconds    INR 1.7 (H) 0.9 - 1.1    aPTT 44 (H) 26 - 36 seconds   CBC   Result Value Ref Range    WBC 4.1 (L) 4.4 - 11.3 x10*3/uL    nRBC 0.0 0.0 - 0.0 /100 WBCs    RBC 2.56 (L) 4.50 - 5.90 x10*6/uL    Hemoglobin 6.9 (L) 13.5 - 17.5 g/dL    Hematocrit 22.1 (L) 41.0 - 52.0 %    MCV 86 80 - 100 fL    MCH 27.0 26.0 - 34.0 pg    MCHC 31.2 (L) 32.0 - 36.0 g/dL    RDW 22.8 (H) 11.5 - 14.5 %    Platelets 45 (L) 150 - 450 x10*3/uL   Comprehensive Metabolic Panel   Result Value Ref Range    Glucose 111 (H) 74 - 99 mg/dL    Sodium 139 136 - 145 mmol/L    Potassium 3.9 3.5 - 5.3 mmol/L    Chloride 103 98 - 107 mmol/L    Bicarbonate 27 21 - 32 mmol/L    Anion Gap 13 10 - 20 mmol/L    Urea Nitrogen 15 6 - 23 mg/dL    " Creatinine 1.03 0.50 - 1.30 mg/dL    eGFR 78 >60 mL/min/1.73m*2    Calcium 8.7 8.6 - 10.6 mg/dL    Albumin 4.4 3.4 - 5.0 g/dL    Alkaline Phosphatase 110 33 - 136 U/L    Total Protein 6.0 (L) 6.4 - 8.2 g/dL    AST 22 9 - 39 U/L    Bilirubin, Total 7.2 (H) 0.0 - 1.2 mg/dL    ALT 9 (L) 10 - 52 U/L   Magnesium   Result Value Ref Range    Magnesium 1.90 1.60 - 2.40 mg/dL     *Note: Due to a large number of results and/or encounters for the requested time period, some results have not been displayed. A complete set of results can be found in Results Review.                         Assessment & Plan  Colon carcinoma metastatic to liver    Carcinoma of colon metastatic to liver    Acute post-operative pain    Mr. Corral is a 70M s/p right extended hepatectomy for metastatic rectal cancer on 7/29. Has uptrending bilirubin and INR. Clinically is doing very well and tolerating diet. Even though he has been getting q6 concentrated albumin boluses for the past 4 days, his labs show he is slightly dehydrated. Will continue to volume replace as he still has high output from drain. If drain does not slow down over weekend, may have to discharge with PICC and IVF.   Also noted to be anemic, Hgb 6.9 so will give unit of blood.   US and CT have been done that show no obvious insult, thrombus, ischemia to indicate rising LFTs.   Dispo is pending elevated bilirubin, coags and drain output as none of these are controlled or wnl at this time.     Discussed with Dr. Kacey Pereira MD

## 2025-08-08 NOTE — CARE PLAN
The clinical goals for the shift include pt will remain hemodynamically stable      Problem: Pain - Adult  Goal: Verbalizes/displays adequate comfort level or baseline comfort level  Outcome: Progressing     Problem: Safety - Adult  Goal: Free from fall injury  Outcome: Progressing     Problem: Discharge Planning  Goal: Discharge to home or other facility with appropriate resources  Outcome: Progressing     Problem: Chronic Conditions and Co-morbidities  Goal: Patient's chronic conditions and co-morbidity symptoms are monitored and maintained or improved  Outcome: Progressing     Problem: Nutrition  Goal: Nutrient intake appropriate for maintaining nutritional needs  Outcome: Progressing     Problem: Skin  Goal: Decreased wound size/increased tissue granulation at next dressing change  Outcome: Progressing  Goal: Participates in plan/prevention/treatment measures  Outcome: Progressing  Goal: Prevent/manage excess moisture  Outcome: Progressing  Goal: Prevent/minimize sheer/friction injuries  Outcome: Progressing  Goal: Promote/optimize nutrition  Outcome: Progressing  Goal: Promote skin healing  Outcome: Progressing

## 2025-08-08 NOTE — PROGRESS NOTES
Physical Therapy    Physical Therapy Treatment    Patient Name: Roma Corral  MRN: [id number]  Department: [facility]  Room: [address] [place] [address]  Today's Date: 8/8/2025  Time Calculation  Start Time: 1043  Stop Time: 1111  Time Calculation (min): 28 min         Assessment/Plan   PT Assessment  End of Session Communication: Bedside nurse  Assessment Comment: pt ambualting increased distsance and demosntrates decreased signs of fatigue with ambulation. pt negotiates 12 stairs with 1 rail  End of Session Patient Position: Bed, 3 rail up, Alarm off, not on at start of session  PT Plan  Inpatient/Swing Bed or Outpatient: Inpatient  PT Plan  Treatment/Interventions: Bed mobility, Transfer training, Gait training, Stair training, Balance training, Strengthening, Endurance training, Range of motion, Therapeutic exercise, Therapeutic activity, Home exercise program  PT Plan: Ongoing PT  PT Frequency: 5 times per week  PT Discharge Recommendations: Low intensity level of continued care (communicated with PT)  Equipment Recommended upon Discharge: Wheeled walker  PT Recommended Transfer Status: Stand by assist  PT - OK to Discharge: Yes    PT Visit Info:  PT Received On: 08/08/25     General Visit Information:   General  Prior to Session Communication: Bedside nurse  Patient Position Received: Up in chair  General Comment: Pt was pleasant, cooperative and willing to participate in therapy.  pt apologised for being offended by gauring required by PTA during mobility    Subjective   Precautions:  Precautions  Medical Precautions: Abdominal precautions, Fall precautions  Post-Surgical Precautions: Abdominal surgery precautions            Objective   Pain:  Pain Assessment  0-10 (Numeric) Pain Score: 0 - No pain  Cognition:  Cognition  Orientation Level: Oriented X4    Treatments:  Therapeutic Exercise  Therapeutic Exercise Activity 1: seated ankle pumps x 15  Therapeutic Exercise Activity 2: LAQ x 15    Bed Mobility 1  Bed Mobility 1: Supine to  sitting  Level of Assistance 1: Close supervision  Bed Mobility Comments 1: HOB elevated    Ambulation/Gait Training 1  Surface 1: Level tile  Device 1: Rolling walker  Assistance 1: Close supervision  Quality of Gait 1: Decreased step length, Forward flexed posture  Comments/Distance (ft) 1: 1x80', 1x400'. cues for walker safety  Transfer 1  Technique 1: Sit to stand, Stand to sit  Transfer Device 1: Walker  Transfer Level of Assistance 1: Close supervision  Trials/Comments 1: cues for safe pacing    Stairs  Stairs: Yes  Stairs  Rails 1: Right  Assistance 1: Distant supervision  Comment/Number of Steps 1: 12, cues for sequecning    Outcome Measures:  [facility] Basic Mobility  Turning from your back to your side while in a flat bed without using bedrails: A little  Moving from lying on your back to sitting on the side of a flat bed without using bedrails: A little  Moving to and from bed to chair (including a wheelchair): A little  Standing up from a chair using your arms (e.g. wheelchair or bedside chair): A little  To walk in hospital room: A little  Climbing 3-5 steps with railing: A little  Basic Mobility - Total Score: 18    Education Documentation  Precautions, taught by Maikel Tyson PTA at 8/8/2025  1:30 PM.  Learner: Patient  Readiness: Acceptance  Method: Explanation  Response: Verbalizes Understanding    Education Comments  No comments found.        OP EDUCATION:       Encounter Problems       Encounter Problems (Active)       Balance       STG - Maintains dynamic standing balance with upper extremity support on LRAD with Mod I  (Progressing)       Start:  07/31/25    Expected End:  08/21/25               Mobility       STG - Patient will ambulate x200 ft with Mod I using LRAD (Progressing)       Start:  07/31/25    Expected End:  08/21/25            STG - Patient will ascend and descend 7 stairs with Mod I using LRAD (Progressing)       Start:  07/31/25    Expected End:  08/21/25               PT  Transfers       STG - Patient will perform bed mobility independently  (Progressing)       Start:  07/31/25    Expected End:  08/21/25            STG - Patient will transfer sit to and from stand with Mod I using LRAD (Progressing)       Start:  07/31/25    Expected End:  08/21/25

## 2025-08-08 NOTE — PROGRESS NOTES
Occupational Therapy    Occupational Therapy Treatment    Name: Roma Corral  MRN: [id number]  : 1955  Date: 25  Room: [address] [place], [place] [address]    Time Calculation  Start Time: 1122  Stop Time: 1156  Time Calculation (min): 34 min       25 1122   OT Last Visit   OT Received On 25   General   Reason for Referral open right hepatectomy for colon carcinoma with liver mets on .   Past Medical History Relevant to Rehab NSTEMI, CAD, anemia, gout, abdominal pain, HTN, DM2 and colon carcinoma metastatic to liver   Family/Caregiver Present No   Prior to Session Communication Bedside nurse   Patient Position Received Up in chair   General Comment Pt sitting in chair upon arrival. Pleasant and agreeable to OT tx. Pt reporting minimal awareness to abdominal precautions and compensatory strategies- appreciative of education/training.   Precautions   Medical Precautions Abdominal precautions;Fall precautions   Post-Surgical Precautions Abdominal surgery precautions   Precautions Comment Pt unable to recall abdominal precautions; 0/3   Pain Assessment   Pain Assessment 0-10   0-10 (Numeric) Pain Score 0 - No pain   Cognition   Overall Cognitive Status WFL   Arousal/Alertness Appropriate responses to stimuli   Orientation Level Oriented X4   Following Commands Follows all commands and directions without difficulty   Insight WFL   Impulsive Mildly   Processing Speed WFL   Coordination   Movements are Fluid and Coordinated Yes   Coordination Comment WFL   RUE    RUE  WFL   LUE    LUE WFL   LE Bathing   LE Bathing Comments Pt simulated LB bathing while standing in bathroom using grab bar to steady and supporting LE on toilet to reach to lower leg/feet to maintain abdominal precautions while washing LB all with SBA for safety   LE Dressing   LE Dressing Yes   LE Dressing Comments Pt doffed shorts and socks while seated and standing with SBA and then donned new socks and hospital pants all via figue four technique with SBA  and VCs for figure four technique strategy to maintain abdominal precautions; also demo'd ability to flex at hip to bring leg up to thread into pants (maintained abdominal precautions)   Bed Mobility   Bed Mobility   (Seated in chair pre and post OT)   Functional Mobility   Functional Mobility Performed Yes   Functional Mobility 1   Comments 1 Pt ambulated MIN household distance using no AD and with SBA only for safety   Transfers   Transfer Yes   Transfer 1   Transfer From 1 Chair with arms to;Stand to   Transfer to 1 Stand;Chair with arms   Technique 1 Sit to stand;Stand to sit   Transfer Device 1   (none)   Transfer Level of Assistance 1 Close supervision   Trials/Comments 1 x3 trials   Static Sitting Balance   Static Sitting-Balance Support Feet supported;No upper extremity supported   Static Sitting-Level of Assistance Independent   Dynamic Sitting Balance   Dynamic Sitting-Balance Support Feet supported;No upper extremity supported   Dynamic Sitting-Level of Assistance Distant supervision   Dynamic Sitting-Balance Forward lean;Lateral lean;Reaching for objects  (figure four technique)   Dynamic Sitting-Comments Good balance while seated in chair during ADL tasks and leaning, reaching   Static Standing Balance   Static Standing-Balance Support No upper extremity supported   Static Standing-Level of Assistance Close supervision   Static Standing-Comment/Number of Minutes SBA for safety; good static balance   Dynamic Standing Balance   Dynamic Standing-Balance Support No upper extremity supported   Dynamic Standing-Level of Assistance Close supervision   Dynamic Standing-Balance Forward lean;Turning;Reaching for objects;Lateral lean   Dynamic Standing-Comments no AD; good-fair balance; able to demo ability to stand with 1 UE supported and 1 LE supported while bringing opposite LE up to simulate LB bathing tasks; maintained good to fair balance with close SBA for safety while standing with 1 LE supported    Therapeutic Activity   Therapeutic Activity Performed Yes   Therapeutic Activity 1 Functional mobility and transfer training as noted requiring skilled assistance for safety and training   Therapeutic Activity 2 Seated and standing balance training during simulated ADLs as noted requiring skilled supervision and cueing for safety, training, and to maintain abdominal precautions   Therapeutic Activity 3 Patient educated and trained on abdominal precautions and compensatory strategies to maintain abdominal precautions for ADLs (bathing, LB dressing, toileting), IADLs (laundry, cooking, cleaning), and mobility (log roll strategy, car transfer) as well as optimal environment setup (kitchen, bedroom, bathroom), and risks of non-adherence to abdominal precautions in regards to healing.   IP OT Assessment   OT Assessment Pt tolerated session well demonstrating progress towards therapeutic goals. Pt unfamiliar with abdominal precautions intitially but after education he verbalized good understanding of abdominal precautions, compensatory strategies to maintain, and optimal environment setup/DME use. Pt demonstrating good to fair standing balance during simulated  ADLs requiring SBA for safety. Pt requiring PRN VCs to maintain precautions but overall maintained well. At this time, pt is most appropriate for LOW intensity OT services to address noted deficits and for further training on abdominal precautions.   Prognosis Good   Barriers to Discharge Home No anticipated barriers   Evaluation/Treatment Tolerance Patient tolerated treatment well   Medical Staff Made Aware Yes   End of Session Communication Bedside nurse   End of Session Patient Position Up in chair;Alarm off, not on at start of session   OT Assessment   OT Assessment Results Decreased ADL status;Decreased endurance;Decreased functional mobility;Decreased IADLs   Strengths Attitude of self;Capable of completing ADLs semi/independent;Premorbid level of function    Barriers to Participation   (none)   Inpatient/Swing Bed or Outpatient   Inpatient/Swing Bed or Outpatient Inpatient   Inpatient Plan   Treatment Interventions ADL retraining;Functional transfer training;Endurance training;Compensatory technique education   OT Frequency 2 times per week   OT Discharge Recommendations Low intensity level of continued care   Equipment Recommended upon Discharge   (none required- owns)   OT Recommended Transfer Status Stand by assist   OT - OK to Discharge Yes       Outcome Measures:       08/08/25 1122   [facility] Daily Activity   Putting on and taking off regular lower body clothing 3   Bathing (including washing, rinsing, drying) 3   Putting on and taking off regular upper body clothing 4   Toileting, which includes using toilet, bedpan or urinal 3   Taking care of personal grooming such as brushing teeth 3   Eating Meals 4   Daily Activity - Total Score 20       Education Documentation  Body Mechanics, taught by Abdelrahman Khan OT at 8/8/2025  3:35 PM.  Learner: Patient  Readiness: Acceptance  Method: Explanation, Demonstration  Response: Verbalizes Understanding, Demonstrated Understanding  Comment: Abdominal precautions and compensatory strategies to maintain abdominal precautions for ADLs, IADLs, leisure and mobility as well as optimal environment setup, and risks of non-adherence to abdominal precautions in regards to healing, OT POC, rehab rec    Precautions, taught by Abdelrahman Khan OT at 8/8/2025  3:35 PM.  Learner: Patient  Readiness: Acceptance  Method: Explanation, Demonstration  Response: Verbalizes Understanding, Demonstrated Understanding  Comment: Abdominal precautions and compensatory strategies to maintain abdominal precautions for ADLs, IADLs, leisure and mobility as well as optimal environment setup, and risks of non-adherence to abdominal precautions in regards to healing, OT POC, rehab rec    ADL Training, taught by Abdelrahman Khan OT at  8/8/2025  3:35 PM.  Learner: Patient  Readiness: Acceptance  Method: Explanation, Demonstration  Response: Verbalizes Understanding, Demonstrated Understanding  Comment: Abdominal precautions and compensatory strategies to maintain abdominal precautions for ADLs, IADLs, leisure and mobility as well as optimal environment setup, and risks of non-adherence to abdominal precautions in regards to healing, OT POC, rehab rec    Education Comments  No comments found.      Goals:  Encounter Problems       Encounter Problems (Active)       ADLs       Patient with complete lower body dressing with modified independent level of assistance donning and doffing all LE clothes  with PRN adaptive equipment while edge of bed  and standing (Progressing)       Start:  08/01/25    Expected End:  08/22/25            Patient will complete daily grooming tasks brushing teeth and washing face/hair with modified independent level of assistance and PRN adaptive equipment while standing. (Progressing)       Start:  08/01/25    Expected End:  08/22/25            Patient will complete toileting including hygiene clothing management/hygiene with modified independent level of assistance and raised toilet seat and grab bars. (Progressing)       Start:  08/01/25    Expected End:  08/22/25               BALANCE       Pt will maintain dynamic standing balance during ADL task with modified independent level of assistance in order to demonstrate decreased risk of falling and improved postural control. (Progressing)       Start:  08/01/25    Expected End:  08/22/25               COGNITION/SAFETY       Patient will recall and adhere to ABD precautions during all functional mobility/ADL tasks in order to demonstrate improved understanding and promote healing post op (Progressing)       Start:  08/01/25    Expected End:  08/22/25               MOBILITY       Patient will perform Functional mobility max Household distances/Community Distances with modified  independent level of assistance and least restrictive device in order to improve safety and functional mobility. (Progressing)       Start:  08/01/25    Expected End:  08/22/25 08/08/25 at 3:36 PM   Abdelrahman Khan, OT   [phone]

## 2025-08-08 NOTE — CARE PLAN
Problem: Pain - Adult  Goal: Verbalizes/displays adequate comfort level or baseline comfort level  Outcome: Progressing     Problem: Safety - Adult  Goal: Free from fall injury  Outcome: Progressing     Problem: Discharge Planning  Goal: Discharge to home or other facility with appropriate resources  Outcome: Progressing     Problem: Chronic Conditions and Co-morbidities  Goal: Patient's chronic conditions and co-morbidity symptoms are monitored and maintained or improved  Outcome: Progressing     Problem: Nutrition  Goal: Nutrient intake appropriate for maintaining nutritional needs  Outcome: Progressing     Problem: Skin  Goal: Decreased wound size/increased tissue granulation at next dressing change  Outcome: Progressing  Goal: Participates in plan/prevention/treatment measures  Outcome: Progressing  Goal: Prevent/manage excess moisture  Outcome: Progressing  Goal: Prevent/minimize sheer/friction injuries  Outcome: Progressing  Goal: Promote/optimize nutrition  Outcome: Progressing  Goal: Promote skin healing  Outcome: Progressing   The patient's goals for the shift include      The clinical goals for the shift include pt will remain safe n free from injury

## 2025-08-09 ASSESSMENT — COGNITIVE AND FUNCTIONAL STATUS - GENERAL
MOBILITY SCORE: 23
DAILY ACTIVITIY SCORE: 24
CLIMB 3 TO 5 STEPS WITH RAILING: A LITTLE

## 2025-08-09 ASSESSMENT — PAIN - FUNCTIONAL ASSESSMENT
PAIN_FUNCTIONAL_ASSESSMENT: 0-10

## 2025-08-09 ASSESSMENT — PAIN SCALES - GENERAL
PAINLEVEL_OUTOF10: 7
PAINLEVEL_OUTOF10: 4
PAINLEVEL_OUTOF10: 7
PAINLEVEL_OUTOF10: 0 - NO PAIN

## 2025-08-09 ASSESSMENT — PAIN DESCRIPTION - LOCATION: LOCATION: ABDOMEN

## 2025-08-09 NOTE — CARE PLAN
Problem: Pain - Adult  Goal: Verbalizes/displays adequate comfort level or baseline comfort level  Outcome: Progressing     Problem: Safety - Adult  Goal: Free from fall injury  Outcome: Progressing     Problem: Discharge Planning  Goal: Discharge to home or other facility with appropriate resources  Outcome: Progressing     Problem: Chronic Conditions and Co-morbidities  Goal: Patient's chronic conditions and co-morbidity symptoms are monitored and maintained or improved  Outcome: Progressing     Problem: Nutrition  Goal: Nutrient intake appropriate for maintaining nutritional needs  Outcome: Progressing   The patient's goals for the shift include      The clinical goals for the shift include pt  will remain HDS through the end of the shift 8/9 1930

## 2025-08-09 NOTE — PROGRESS NOTES
Surgical Oncology Progress Note      08/09/25      Subjective:  NAEON. Bilirubin continues to rise. Hgb incremented from 6.9 to 7.4 after 1u pRBC yesterday. Reports he feels well without acute concerns. Pain well controlled. Tolerating diet.      Objective    Objective:  Vital signs:   Temp:  [36.6 °C (97.9 °F)-37.6 °C (99.6 °F)] 37.1 °C (98.7 °F)  Heart Rate:  [87-99] 87  Resp:  [16-18] 16  BP: (100-120)/(48-65) 102/53    Physical Exam:  GEN: no acute distress  RESP: non-labored breathing on RA  CV: non-cyanotic  GI: soft, non-tender, non-distended. Thin serous fluid in R sided drain. Stool in ostomy. Incisions well approximated without evidence of drainage.   : voiding spontaneously  MSK: no evidence of bilateral lower extremity edema  NEURO: alert and conversant  SKIN: warm and dry, mild jaundice    I/O last 2 completed shifts:  In: 2056 (25.4 mL/kg) [P.O.:770; Blood:286; IV Piggyback:1000]  Out: 2665 (33 mL/kg) [Urine:1000 (0.5 mL/kg/hr); Drains:1395; Stool:270]  Weight: 80.8 kg      Labs Past 18 Hours:  Recent Results (from the past 18 hours)   CBC    Collection Time: 08/09/25  2:06 AM   Result Value Ref Range    WBC 3.7 (L) 4.4 - 11.3 x10*3/uL    nRBC 0.0 0.0 - 0.0 /100 WBCs    RBC 2.69 (L) 4.50 - 5.90 x10*6/uL    Hemoglobin 7.4 (L) 13.5 - 17.5 g/dL    Hematocrit 23.2 (L) 41.0 - 52.0 %    MCV 86 80 - 100 fL    MCH 27.5 26.0 - 34.0 pg    MCHC 31.9 (L) 32.0 - 36.0 g/dL    RDW 22.5 (H) 11.5 - 14.5 %    Platelets 50 (L) 150 - 450 x10*3/uL   Coagulation Screen    Collection Time: 08/09/25  6:27 AM   Result Value Ref Range    Protime 19.9 (H) 9.8 - 12.4 seconds    INR 1.8 (H) 0.9 - 1.1    aPTT 46 (H) 26 - 36 seconds   CBC    Collection Time: 08/09/25  6:27 AM   Result Value Ref Range    WBC 3.5 (L) 4.4 - 11.3 x10*3/uL    nRBC 0.0 0.0 - 0.0 /100 WBCs    RBC 2.58 (L) 4.50 - 5.90 x10*6/uL    Hemoglobin 7.1 (L) 13.5 - 17.5 g/dL    Hematocrit 21.5 (L) 41.0 - 52.0 %    MCV 83 80 - 100 fL    MCH 27.5 26.0 - 34.0 pg     MCHC 33.0 32.0 - 36.0 g/dL    RDW 22.5 (H) 11.5 - 14.5 %    Platelets 47 (L) 150 - 450 x10*3/uL   Comprehensive Metabolic Panel    Collection Time: 08/09/25  6:27 AM   Result Value Ref Range    Glucose 106 (H) 74 - 99 mg/dL    Sodium 138 136 - 145 mmol/L    Potassium 3.7 3.5 - 5.3 mmol/L    Chloride 104 98 - 107 mmol/L    Bicarbonate 26 21 - 32 mmol/L    Anion Gap 12 10 - 20 mmol/L    Urea Nitrogen 19 6 - 23 mg/dL    Creatinine 1.15 0.50 - 1.30 mg/dL    eGFR 68 >60 mL/min/1.73m*2    Calcium 8.5 (L) 8.6 - 10.6 mg/dL    Albumin 3.9 3.4 - 5.0 g/dL    Alkaline Phosphatase 86 33 - 136 U/L    Total Protein 5.3 (L) 6.4 - 8.2 g/dL    AST 21 9 - 39 U/L    Bilirubin, Total 9.1 (H) 0.0 - 1.2 mg/dL    ALT 8 (L) 10 - 52 U/L   Magnesium    Collection Time: 08/09/25  6:27 AM   Result Value Ref Range    Magnesium 2.21 1.60 - 2.40 mg/dL   Phosphorus    Collection Time: 08/09/25  6:27 AM   Result Value Ref Range    Phosphorus 2.8 2.5 - 4.9 mg/dL      Meds:  Current Medications[1]     Imaging:  Imaging  CT abdomen pelvis w and wo IV contrast  Result Date: 8/7/2025  1.  No intrahepatic or extrahepatic biliary ductal dilatation or gallbladder changes to explain increasing bilirubin. 2. Perihepatic fluid collections and air foci as detailed above consistent with interval right hepatectomy. The superior collection may represent surgical packing versus hematoma. Further evaluation with the liver MRI, preferably be with Eovist contrast agent is recommended, would help in evaluating the probable hematoma versus surgical packing as well as in determining whether the rest of the perihepatic collections represent bilomas versus postoperative changes versus early abscesses. 3. Interval development of moderate right pleural effusion, likely related to recent surgery 4. Stable surgical drain placement with termination in the right upper quadrant. 5. Postoperative findings from colostomy creation as detailed above without evidence of leak.   I  personally reviewed the images/study and I agree with the findings as stated by Srinivasan Finnegan DO. This study was interpreted at [facility], [place], [place].   MACRO: None   Signed by: Laci Bates 8/7/2025 6:56 PM Dictation workstation:   [id number]      Cardiology, Vascular, and Other Imaging  No other imaging results found for the past 2 days      No pertinent imaging to review.    Medications reviewed.  Vital signs reviewed.  Labs reviewed.         Assessment/Plan    Assessment and Plan:  Roma Corral is a 70 y.o. male with history of rectal cancer with mets to the liver, CAD (2021 NSTEMI), DM2, HTN  who is s/p right extended hepatectomy for metastatic rectal cancer on 7/29. Clinically has been doing well and tolerating a diet, however, he has an uptrending bilirubin and INR as well as continues to have high drain output.    Plan Today:   - pain control with MS contin q12h, PRN dilaudid  - q6h concentrated albumin replacements  - continue regular diet with supplements  - continue PPI  - PRN zofran  - PRN Tums  - continue bowel reg with Miralax  - daily CBC, CMP, Mg Coags, and Phos  - continue to trend daily bilirubin and INR  - plan for 1u pRBC, 1u PLT and 1u FFP today    DVT ppx: SCDs and lovenox  Dispo: remain on RNF    Patient seen and discussed with attending, Dr. Goncalves covering for Dr. Kacey Weber MD  PGY-2  Surgical Oncology  [id number]           [1]   Current Facility-Administered Medications:     albumin human 5 % infusion 25 g, 25 g, intravenous, q6h, Cookie Pereira MD, Stopped at 08/09/25 0932    albumin human 5 % infusion 25 g, 25 g, intravenous, Once, Cookie Pereira MD    alteplase (Cathflo Activase) injection 2 mg, 2 mg, intra-catheter, PRN, Pelon Willams MD    calcium carbonate (Tums) 500 mg (200 mg elemental) chewable tablet 1 tablet, 500 mg of calcium carbonate, oral, 4x daily PRN, Matheus Torres MD, 1 tablet at 08/05/25 0447    enoxaparin  (Lovenox) syringe 40 mg, 40 mg, subcutaneous, q24h, Matheus Torres MD, 40 mg at 08/08/25 1541    HYDROmorphone (Dilaudid) injection 0.2 mg, 0.2 mg, intravenous, q3h PRN, Emily Neumann MD, 0.2 mg at 08/07/25 1234    lidocaine (Xylocaine) 10 mg/mL (1 %) injection 5 mL, 5 mL, infiltration, Once, Pelon Willams MD    morphine CR (MS Contin) 12 hr tablet 30 mg, 30 mg, oral, q12h ALEX, Pelon Willams MD, 30 mg at 08/09/25 0846    naloxone (Narcan) injection 0.2 mg, 0.2 mg, intravenous, PRN, Brent Rae MD    ondansetron (Zofran) injection 4 mg, 4 mg, intravenous, q4h PRN, Matheus Torres MD, 4 mg at 07/31/25 0015    pantoprazole (Protonix) injection 40 mg, 40 mg, intravenous, Daily, Cookie Pereira MD, 40 mg at 08/09/25 0846    polyethylene glycol (Glycolax, Miralax) packet 17 g, 17 g, oral, Daily, Cookie Pereira MD, 17 g at 08/07/25 0842    potassium chloride 20 mEq in sterile water for injection 100 mL, 20 mEq, intravenous, Once, Paz Weber MD    Facility-Administered Medications Ordered in Other Encounters:     alteplase (Cathflo Activase) injection 2 mg, 2 mg, intra-catheter, PRN, Laurence GLORIA Castelein, APRN-CNP    alteplase (Cathflo Activase) injection 2 mg, 2 mg, intra-catheter, PRN, Laurence GLORIA Castelein, APRN-CNP    heparin flush 10 unit/mL syringe 50 Units, 50 Units, intra-catheter, PRN, Laurence S Castelein, APRN-CNP    heparin flush 10 unit/mL syringe 50 Units, 50 Units, intra-catheter, PRN, Laurence S Castelein, APRN-CNP    heparin flush 100 unit/mL syringe 500 Units, 500 Units, intra-catheter, PRN, Laurence Betancourt, MEILSSA-CNP    heparin flush 100 unit/mL syringe 500 Units, 500 Units, intra-catheter, Laurence SCHNEIDER, MELISSA-CNP

## 2025-08-10 ASSESSMENT — PAIN SCALES - GENERAL
PAINLEVEL_OUTOF10: 5 - MODERATE PAIN
PAINLEVEL_OUTOF10: 5 - MODERATE PAIN
PAINLEVEL_OUTOF10: 7
PAINLEVEL_OUTOF10: 8
PAINLEVEL_OUTOF10: 5 - MODERATE PAIN
PAINLEVEL_OUTOF10: 8

## 2025-08-10 ASSESSMENT — COGNITIVE AND FUNCTIONAL STATUS - GENERAL
CLIMB 3 TO 5 STEPS WITH RAILING: A LITTLE
MOBILITY SCORE: 24
DAILY ACTIVITIY SCORE: 24
DAILY ACTIVITIY SCORE: 24
MOBILITY SCORE: 23
CLIMB 3 TO 5 STEPS WITH RAILING: A LITTLE
DAILY ACTIVITIY SCORE: 24
MOBILITY SCORE: 23

## 2025-08-10 ASSESSMENT — PAIN DESCRIPTION - LOCATION
LOCATION: ABDOMEN

## 2025-08-10 ASSESSMENT — PAIN - FUNCTIONAL ASSESSMENT
PAIN_FUNCTIONAL_ASSESSMENT: 0-10

## 2025-08-10 ASSESSMENT — PAIN DESCRIPTION - ORIENTATION: ORIENTATION: LOWER

## 2025-08-10 ASSESSMENT — PAIN DESCRIPTION - DESCRIPTORS: DESCRIPTORS: ACHING

## 2025-08-10 NOTE — PROGRESS NOTES
Surgical Oncology Progress Note      08/10/25      Subjective:  RUSSELL, received 1u pRBC 1 PLT and 1 FFP on 8/9 and was started on NAC therapy. Feels well overall without acute concerns. Denies nausea or vomiting. Tolerating diet, pain controlled. Having bowel function in ostomy.      Objective    Objective:  Vital signs:   Temp:  [37.1 °C (98.7 °F)-37.7 °C (99.8 °F)] 37.6 °C (99.6 °F)  Heart Rate:  [] 92  Resp:  [16-20] 16  BP: ()/(31-70) 98/55    Physical Exam:  GEN: no acute distress  RESP: non-labored breathing on RA  CV: non-cyanotic  GI: soft, non-tender, non-distended. Thin serous fluid out of drain in bile bag. Pasty stool in ostomy. Incisions healing well, well approximated without drainage.   : voiding spontaneously  MSK: no evidence of bilateral lower extremity edema  NEURO: alert and conversant  SKIN: jaundice, warm and dry    I/O last 2 completed shifts:  In: 3763 (47.3 mL/kg) [P.O.:180; I.V.:450 (5.7 mL/kg); Blood:1012; IV Piggyback:2121]  Out: 2550 (32 mL/kg) [Urine:800 (0.4 mL/kg/hr); Drains:1750]  Weight: 79.6 kg      Drain output: 1750cc in 24h, serous    Labs Past 18 Hours:  Recent Results (from the past 18 hours)   CBC    Collection Time: 08/10/25 12:03 AM   Result Value Ref Range    WBC 4.0 (L) 4.4 - 11.3 x10*3/uL    nRBC 0.0 0.0 - 0.0 /100 WBCs    RBC 2.81 (L) 4.50 - 5.90 x10*6/uL    Hemoglobin 7.9 (L) 13.5 - 17.5 g/dL    Hematocrit 24.0 (L) 41.0 - 52.0 %    MCV 85 80 - 100 fL    MCH 28.1 26.0 - 34.0 pg    MCHC 32.9 32.0 - 36.0 g/dL    RDW 22.2 (H) 11.5 - 14.5 %    Platelets 40 (LL) 150 - 450 x10*3/uL   CBC    Collection Time: 08/10/25  5:53 AM   Result Value Ref Range    WBC 3.2 (L) 4.4 - 11.3 x10*3/uL    nRBC 0.0 0.0 - 0.0 /100 WBCs    RBC 2.68 (L) 4.50 - 5.90 x10*6/uL    Hemoglobin 7.7 (L) 13.5 - 17.5 g/dL    Hematocrit 22.9 (L) 41.0 - 52.0 %    MCV 85 80 - 100 fL    MCH 28.7 26.0 - 34.0 pg    MCHC 33.6 32.0 - 36.0 g/dL    RDW 22.8 (H) 11.5 - 14.5 %    Platelets 46 (L) 150  - 450 x10*3/uL   Comprehensive Metabolic Panel    Collection Time: 08/10/25  5:53 AM   Result Value Ref Range    Glucose 101 (H) 74 - 99 mg/dL    Sodium 137 136 - 145 mmol/L    Potassium 3.4 (L) 3.5 - 5.3 mmol/L    Chloride 102 98 - 107 mmol/L    Bicarbonate 25 21 - 32 mmol/L    Anion Gap 13 10 - 20 mmol/L    Urea Nitrogen 18 6 - 23 mg/dL    Creatinine 1.02 0.50 - 1.30 mg/dL    eGFR 79 >60 mL/min/1.73m*2    Calcium 8.6 8.6 - 10.6 mg/dL    Albumin 3.9 3.4 - 5.0 g/dL    Alkaline Phosphatase 74 33 - 136 U/L    Total Protein 5.3 (L) 6.4 - 8.2 g/dL    AST 20 9 - 39 U/L    Bilirubin, Total 10.6 (H) 0.0 - 1.2 mg/dL    ALT 7 (L) 10 - 52 U/L   Magnesium    Collection Time: 08/10/25  5:53 AM   Result Value Ref Range    Magnesium 1.99 1.60 - 2.40 mg/dL   Coagulation Screen    Collection Time: 08/10/25  5:53 AM   Result Value Ref Range    Protime 22.1 (H) 9.8 - 12.4 seconds    INR 2.0 (H) 0.9 - 1.1    aPTT 46 (H) 26 - 36 seconds   Phosphorus    Collection Time: 08/10/25  5:53 AM   Result Value Ref Range    Phosphorus 2.6 2.5 - 4.9 mg/dL      Meds:  Current Medications[1]     Imaging:  Imaging  No results found.      Cardiology, Vascular, and Other Imaging  No other imaging results found for the past 2 days      No pertinent imaging to review.    Medications reviewed.  Vital signs reviewed.  Labs reviewed.         Assessment/Plan    Assessment and Plan:  Roma Corral is a 70 y.o. male with history of rectal cancer with mets to the liver, CAD (2021 NSTEMI), DM2, HTN  who is s/p right extended hepatectomy for metastatic rectal cancer on 7/29. Clinically has been doing well and tolerating a diet. Continues to have an uptrending bilirubin and INR as well as high drain output.    Plan Today:   - pain control with MS contin q12h, PRN dilaudid  - q6h concentrated albumin replacements  - continue regular diet with supplements  - continue PPI  - PRN zofran  - PRN Tums  - continue NAC therapy   - continue bowel reg with Miralax  - daily  CBC, CMP, Mg Coags, and Phos  - continue to trend daily bilirubin and INR  - pet, music and art therapy ordered    DVT ppx: SCDs and lovenox  Dispo: remain on RNF    Patient seen and discussed with attending, Dr. Goncalves covering for Dr. Kacey Weber MD  PGY-2  Surgical Oncology  [id number]         [1]   Current Facility-Administered Medications:     acetylcysteine (Acetadote) 8 g in dextrose 5% 1,000 mL IV infusion, 100 mg/kg, intravenous, Once, Cookie Pereira MD, 8 g at 08/09/25 2135    albumin human 25 % solution 25 g, 25 g, intravenous, q6h, Cookie Pereira MD, Stopped at 08/10/25 0409    alteplase (Cathflo Activase) injection 2 mg, 2 mg, intra-catheter, PRN, Pelon Willams MD    calcium carbonate (Tums) 500 mg (200 mg elemental) chewable tablet 1 tablet, 500 mg of calcium carbonate, oral, 4x daily PRN, Matheus Torres MD, 1 tablet at 08/05/25 0447    enoxaparin (Lovenox) syringe 40 mg, 40 mg, subcutaneous, q24h, Matheus Torres MD, 40 mg at 08/09/25 1446    HYDROmorphone (Dilaudid) injection 0.2 mg, 0.2 mg, intravenous, q3h PRN, Emily Neumann MD, 0.2 mg at 08/09/25 1647    lidocaine (Xylocaine) 10 mg/mL (1 %) injection 5 mL, 5 mL, infiltration, Once, Pelon Willmas MD    morphine CR (MS Contin) 12 hr tablet 30 mg, 30 mg, oral, q12h ALEX, Pelon Willams MD, 30 mg at 08/09/25 2135    naloxone (Narcan) injection 0.2 mg, 0.2 mg, intravenous, PRN, Brent Rae MD    ondansetron (Zofran) injection 4 mg, 4 mg, intravenous, q4h PRN, Matheus Torres MD, 4 mg at 07/31/25 0015    pantoprazole (Protonix) injection 40 mg, 40 mg, intravenous, Daily, Cookie Pereira MD, 40 mg at 08/09/25 0846    polyethylene glycol (Glycolax, Miralax) packet 17 g, 17 g, oral, Daily, Cookie Pereira MD, 17 g at 08/07/25 0842    potassium chloride (Klor-Con) packet 20 mEq, 20 mEq, oral, Once, Paz Weber MD    potassium chloride (Klor-Con) packet 40 mEq, 40 mEq, oral, Once, Cookie Pereira,  MD    potassium chloride (Klor-Con) packet 40 mEq, 40 mEq, oral, Once, Cookie Pereira MD    Facility-Administered Medications Ordered in Other Encounters:     alteplase (Cathflo Activase) injection 2 mg, 2 mg, intra-catheter, PRN, Laurence S Castelein, APRN-CNP    alteplase (Cathflo Activase) injection 2 mg, 2 mg, intra-catheter, PRN, Laurence S Castelein, APRN-CNP    heparin flush 10 unit/mL syringe 50 Units, 50 Units, intra-catheter, PRN, Laurence S Castelein, APRN-CNP    heparin flush 10 unit/mL syringe 50 Units, 50 Units, intra-catheter, PRN, Laurence S Castelein, APRN-CNP    heparin flush 100 unit/mL syringe 500 Units, 500 Units, intra-catheter, PRN, Laurence S Castelein, APRN-CNP    heparin flush 100 unit/mL syringe 500 Units, 500 Units, intra-catheter, PRN, Laurence S Castelein, APRN-CNP

## 2025-08-11 ASSESSMENT — COGNITIVE AND FUNCTIONAL STATUS - GENERAL
CLIMB 3 TO 5 STEPS WITH RAILING: A LITTLE
CLIMB 3 TO 5 STEPS WITH RAILING: A LITTLE
DAILY ACTIVITIY SCORE: 24
MOBILITY SCORE: 23
DAILY ACTIVITIY SCORE: 24
MOBILITY SCORE: 23

## 2025-08-11 ASSESSMENT — PAIN SCALES - GENERAL
PAINLEVEL_OUTOF10: 7
PAINLEVEL_OUTOF10: 0 - NO PAIN
PAINLEVEL_OUTOF10: 5 - MODERATE PAIN

## 2025-08-11 ASSESSMENT — PAIN - FUNCTIONAL ASSESSMENT
PAIN_FUNCTIONAL_ASSESSMENT: 0-10

## 2025-08-11 ASSESSMENT — PAIN DESCRIPTION - DESCRIPTORS: DESCRIPTORS: ACHING

## 2025-08-11 ASSESSMENT — PAIN DESCRIPTION - LOCATION: LOCATION: ABDOMEN

## 2025-08-11 NOTE — CARE PLAN
Problem: Pain - Adult  Goal: Verbalizes/displays adequate comfort level or baseline comfort level  Outcome: Progressing     Problem: Safety - Adult  Goal: Free from fall injury  Outcome: Progressing     Problem: Discharge Planning  Goal: Discharge to home or other facility with appropriate resources  Outcome: Progressing     Problem: Chronic Conditions and Co-morbidities  Goal: Patient's chronic conditions and co-morbidity symptoms are monitored and maintained or improved  Outcome: Progressing     Problem: Nutrition  Goal: Nutrient intake appropriate for maintaining nutritional needs  Outcome: Progressing     Problem: Skin  Goal: Decreased wound size/increased tissue granulation at next dressing change  Outcome: Progressing  Goal: Participates in plan/prevention/treatment measures  Outcome: Progressing  Goal: Prevent/manage excess moisture  Outcome: Progressing  Goal: Prevent/minimize sheer/friction injuries  Outcome: Progressing  Goal: Promote/optimize nutrition  Outcome: Progressing  Goal: Promote skin healing  Outcome: Progressing   The patient's goals for the shift include      The clinical goals for the shift include pt will remain HDS throughout shift

## 2025-08-11 NOTE — CARE PLAN
The clinical goals for the shift include pt will remain HDS throughout shift    Problem: Pain - Adult  Goal: Verbalizes/displays adequate comfort level or baseline comfort level  Outcome: Progressing     Problem: Safety - Adult  Goal: Free from fall injury  Outcome: Progressing     Problem: Discharge Planning  Goal: Discharge to home or other facility with appropriate resources  Outcome: Progressing     Problem: Chronic Conditions and Co-morbidities  Goal: Patient's chronic conditions and co-morbidity symptoms are monitored and maintained or improved  Outcome: Progressing     Problem: Nutrition  Goal: Nutrient intake appropriate for maintaining nutritional needs  Outcome: Progressing     Problem: Skin  Goal: Decreased wound size/increased tissue granulation at next dressing change  Outcome: Progressing  Flowsheets (Taken 8/11/2025 0420)  Decreased wound size/increased tissue granulation at next dressing change: Protective dressings over bony prominences  Goal: Participates in plan/prevention/treatment measures  Outcome: Progressing  Flowsheets (Taken 8/11/2025 0420)  Participates in plan/prevention/treatment measures: Elevate heels  Goal: Prevent/manage excess moisture  Outcome: Progressing  Flowsheets (Taken 8/11/2025 0420)  Prevent/manage excess moisture:   Moisturize dry skin   Monitor for/manage infection if present  Goal: Prevent/minimize sheer/friction injuries  Outcome: Progressing  Flowsheets (Taken 8/11/2025 0420)  Prevent/minimize sheer/friction injuries:   Use pull sheet   Increase activity/out of bed for meals  Goal: Promote/optimize nutrition  Outcome: Progressing  Flowsheets (Taken 8/11/2025 0420)  Promote/optimize nutrition:   Monitor/record intake including meals   Offer water/supplements/favorite foods  Goal: Promote skin healing  Outcome: Progressing  Flowsheets (Taken 8/11/2025 0420)  Promote skin healing: Protective dressings over bony prominences

## 2025-08-11 NOTE — PROGRESS NOTES
Surgical Oncology Progress Note      08/11/25      Subjective:  NAEON. Feels well overall without acute concerns. Denies nausea or vomiting. Tolerating diet, pain controlled. Having bowel function in ostomy.      Objective    Objective:  Vital signs:   Temp:  [36.4 °C (97.5 °F)-37.7 °C (99.9 °F)] 37.2 °C (99 °F)  Heart Rate:  [] 79  Resp:  [16-20] 16  BP: (100-143)/(53-80) 102/61    Physical Exam:  GEN: no acute distress  RESP: non-labored breathing on RA  CV: non-cyanotic  GI: soft, non-tender, non-distended. Thin serous fluid out of drain in bile bag. Pasty stool in ostomy. Incisions healing well, well approximated without drainage.   : voiding spontaneously  MSK: no evidence of bilateral lower extremity edema  NEURO: alert and conversant  SKIN: jaundice, warm and dry    I/O last 2 completed shifts:  In: 710 (8.9 mL/kg) [P.O.:710]  Out: 2775 (35 mL/kg) [Urine:850 (0.4 mL/kg/hr); Drains:1925]  Weight: 79.4 kg      Drain output: 1750cc in 24h, serous    Labs Past 18 Hours:  Recent Results (from the past 18 hours)   CBC    Collection Time: 08/11/25  6:03 AM   Result Value Ref Range    WBC 3.1 (L) 4.4 - 11.3 x10*3/uL    nRBC 0.0 0.0 - 0.0 /100 WBCs    RBC 2.80 (L) 4.50 - 5.90 x10*6/uL    Hemoglobin 8.0 (L) 13.5 - 17.5 g/dL    Hematocrit 24.5 (L) 41.0 - 52.0 %    MCV 88 80 - 100 fL    MCH 28.6 26.0 - 34.0 pg    MCHC 32.7 32.0 - 36.0 g/dL    RDW 23.2 (H) 11.5 - 14.5 %    Platelets 50 (L) 150 - 450 x10*3/uL   Comprehensive Metabolic Panel    Collection Time: 08/11/25  6:03 AM   Result Value Ref Range    Glucose 97 74 - 99 mg/dL    Sodium 137 136 - 145 mmol/L    Potassium 3.7 3.5 - 5.3 mmol/L    Chloride 102 98 - 107 mmol/L    Bicarbonate 26 21 - 32 mmol/L    Anion Gap 13 10 - 20 mmol/L    Urea Nitrogen 18 6 - 23 mg/dL    Creatinine 1.00 0.50 - 1.30 mg/dL    eGFR 81 >60 mL/min/1.73m*2    Calcium 8.9 8.6 - 10.6 mg/dL    Albumin 4.0 3.4 - 5.0 g/dL    Alkaline Phosphatase 70 33 - 136 U/L    Total Protein 5.4 (L)  6.4 - 8.2 g/dL    AST 22 9 - 39 U/L    Bilirubin, Total 11.8 (H) 0.0 - 1.2 mg/dL    ALT 8 (L) 10 - 52 U/L   Magnesium    Collection Time: 08/11/25  6:03 AM   Result Value Ref Range    Magnesium 2.00 1.60 - 2.40 mg/dL   Coagulation Screen    Collection Time: 08/11/25  6:03 AM   Result Value Ref Range    Protime 19.4 (H) 9.8 - 12.4 seconds    INR 1.8 (H) 0.9 - 1.1    aPTT 46 (H) 26 - 36 seconds   Phosphorus    Collection Time: 08/11/25  6:03 AM   Result Value Ref Range    Phosphorus 2.8 2.5 - 4.9 mg/dL        Meds:  Current Medications[1]     Imaging:  Imaging  No results found.      Cardiology, Vascular, and Other Imaging  No other imaging results found for the past 2 days      No pertinent imaging to review.    Medications reviewed.  Vital signs reviewed.  Labs reviewed.         Assessment/Plan    Assessment and Plan:  Roma Corral is a 70 y.o. male with history of rectal cancer with mets to the liver, CAD (2021 NSTEMI), DM2, HTN  who is s/p right extended hepatectomy for metastatic rectal cancer on 7/29. He has been tolerating a normal diet. Continues to have an uptrending total bilirubin and INR as well as a consistently high drain output.    Plan Today:   - pain control with MS contin q12h, PRN dilaudid  - q6h concentrated albumin replacements  - continue regular diet with supplements  - continue PPI  - PRN zofran  - PRN Tums  - continue NAC therapy   - continue bowel reg with Miralax  - daily CBC, CMP, Mg Coags, and Phos  - continue to trend daily bilirubin and INR  - pet, music and art therapy ordered  - 500 mL albumin + zosyn    DVT ppx: SCDs and lovenox  Dispo: remain on RNF    Discussed with chief, Dr. Cookie Pereira, who discussed with attending, Dr. Erazo.    Pelon Willams MD - PGY1  Surgical Oncology   [facility] [id number]             [1]   Current Facility-Administered Medications:     albumin human 25 % solution 25 g, 25 g, intravenous, q6h, Cookie Pereira MD, Stopped at 08/11/25 0819    albumin human 5 %  infusion 25 g, 25 g, intravenous, Once, Pelon Willams MD    alteplase (Cathflo Activase) injection 2 mg, 2 mg, intra-catheter, PRN, Pelon Willams MD    calcium carbonate (Tums) 500 mg (200 mg elemental) chewable tablet 1 tablet, 500 mg of calcium carbonate, oral, 4x daily PRN, Matheus Torres MD, 1 tablet at 08/05/25 0447    enoxaparin (Lovenox) syringe 40 mg, 40 mg, subcutaneous, q24h, Matheus Torres MD, 40 mg at 08/10/25 1738    HYDROmorphone (Dilaudid) injection 0.2 mg, 0.2 mg, intravenous, q3h PRN, Emily Neumann MD, 0.2 mg at 08/10/25 2109    lidocaine (Xylocaine) 10 mg/mL (1 %) injection 5 mL, 5 mL, infiltration, Once, Pelon Willams MD    morphine CR (MS Contin) 12 hr tablet 30 mg, 30 mg, oral, q12h ALEX, Pelon Willams MD, 30 mg at 08/10/25 2227    naloxone (Narcan) injection 0.2 mg, 0.2 mg, intravenous, PRN, Brent Rae MD    ondansetron (Zofran) injection 4 mg, 4 mg, intravenous, q4h PRN, Matheus Torres MD, 4 mg at 07/31/25 0015    pantoprazole (Protonix) injection 40 mg, 40 mg, intravenous, Daily, Cookie Pereira MD, 40 mg at 08/11/25 0831    piperacillin-tazobactam (Zosyn) 3.375 g in dextrose (iso) IV 50 mL, 3.375 g, intravenous, q6h, Pelon Willams MD    polyethylene glycol (Glycolax, Miralax) packet 17 g, 17 g, oral, Daily, Cookie Pereira MD, 17 g at 08/07/25 0842    potassium chloride (Klor-Con) packet 20 mEq, 20 mEq, oral, Once, Paz Weber MD    potassium phosphates 21 mmol in dextrose 5% 250 mL IV, 21 mmol, intravenous, Once, Pelon Willams MD    Facility-Administered Medications Ordered in Other Encounters:     alteplase (Cathflo Activase) injection 2 mg, 2 mg, intra-catheter, PRN, Laurence Betancourt, APRN-CNP    alteplase (Cathflo Activase) injection 2 mg, 2 mg, intra-catheter, PRN, Laurence Betancourt, APRN-CNP    heparin flush 10 unit/mL syringe 50 Units, 50 Units, intra-catheter, PRN, Laurence Betancourt, APRN-CNP    heparin flush 10 unit/mL  syringe 50 Units, 50 Units, intra-catheter, PRN, Laurence GLORIA Castelein, APRN-CNP    heparin flush 100 unit/mL syringe 500 Units, 500 Units, intra-catheter, PRN, Laurence GLORIA Castelein, APRN-CNP    heparin flush 100 unit/mL syringe 500 Units, 500 Units, intra-catheter, PRN, Laurence GLORIA Castelein, APRN-CNP

## 2025-08-12 ASSESSMENT — PAIN DESCRIPTION - LOCATION: LOCATION: ABDOMEN

## 2025-08-12 ASSESSMENT — COGNITIVE AND FUNCTIONAL STATUS - GENERAL
CLIMB 3 TO 5 STEPS WITH RAILING: A LITTLE
DAILY ACTIVITIY SCORE: 24
WALKING IN HOSPITAL ROOM: A LITTLE
DAILY ACTIVITIY SCORE: 24
CLIMB 3 TO 5 STEPS WITH RAILING: A LITTLE
MOBILITY SCORE: 23
MOBILITY SCORE: 22

## 2025-08-12 ASSESSMENT — PAIN DESCRIPTION - DESCRIPTORS: DESCRIPTORS: ACHING

## 2025-08-12 ASSESSMENT — PAIN SCALES - GENERAL
PAINLEVEL_OUTOF10: 8
PAINLEVEL_OUTOF10: 7
PAINLEVEL_OUTOF10: 0 - NO PAIN

## 2025-08-12 ASSESSMENT — PAIN - FUNCTIONAL ASSESSMENT
PAIN_FUNCTIONAL_ASSESSMENT: 0-10
PAIN_FUNCTIONAL_ASSESSMENT: 0-10
PAIN_FUNCTIONAL_ASSESSMENT: UNABLE TO SELF-REPORT
PAIN_FUNCTIONAL_ASSESSMENT: 0-10

## 2025-08-12 NOTE — CARE PLAN
The clinical goals for the shift include pt will remain HDS throughout shift      Problem: Pain - Adult  Goal: Verbalizes/displays adequate comfort level or baseline comfort level  Outcome: Progressing     Problem: Safety - Adult  Goal: Free from fall injury  Outcome: Progressing     Problem: Discharge Planning  Goal: Discharge to home or other facility with appropriate resources  Outcome: Progressing     Problem: Chronic Conditions and Co-morbidities  Goal: Patient's chronic conditions and co-morbidity symptoms are monitored and maintained or improved  Outcome: Progressing     Problem: Nutrition  Goal: Nutrient intake appropriate for maintaining nutritional needs  Outcome: Progressing     Problem: Skin  Goal: Decreased wound size/increased tissue granulation at next dressing change  Outcome: Progressing  Flowsheets (Taken 8/11/2025 2358)  Decreased wound size/increased tissue granulation at next dressing change: Protective dressings over bony prominences  Goal: Participates in plan/prevention/treatment measures  Outcome: Progressing  Flowsheets (Taken 8/11/2025 2358)  Participates in plan/prevention/treatment measures: Elevate heels  Goal: Prevent/manage excess moisture  Outcome: Progressing  Flowsheets (Taken 8/11/2025 2358)  Prevent/manage excess moisture: Monitor for/manage infection if present  Goal: Prevent/minimize sheer/friction injuries  Outcome: Progressing  Flowsheets (Taken 8/11/2025 2358)  Prevent/minimize sheer/friction injuries:   Increase activity/out of bed for meals   Use pull sheet  Goal: Promote/optimize nutrition  Outcome: Progressing  Flowsheets (Taken 8/11/2025 2358)  Promote/optimize nutrition:   Consume > 50% meals/supplements   Monitor/record intake including meals  Goal: Promote skin healing  Outcome: Progressing  Flowsheets (Taken 8/11/2025 2358)  Promote skin healing: Protective dressings over bony prominences

## 2025-08-12 NOTE — PROGRESS NOTES
Surgical Oncology Progress Note      08/12/25      Subjective:  NAEON. Feels well overall. Denies nausea or vomiting. Tolerating diet, pain controlled. Having bowel function in ostomy. Output continues to be high from his drain.     Objective    Objective:  Vital signs:   Temp:  [36.7 °C (98 °F)-37.4 °C (99.3 °F)] 37.2 °C (99 °F)  Heart Rate:  [72-86] 73  Resp:  [16-17] 16  BP: (106-122)/(50-68) 111/67    Physical Exam:  GEN: no acute distress  RESP: non-labored breathing on RA  CV: non-cyanotic  GI: soft, non-tender, non-distended. Thin serous fluid out of drain in bile bag. Pasty stool in ostomy. Incisions healing well, well approximated without drainage.   : voiding spontaneously  MSK: no evidence of bilateral lower extremity edema  NEURO: alert and conversant  SKIN: jaundice, warm and dry    I/O last 2 completed shifts:  In: 490 (6.1 mL/kg) [P.O.:440; IV Piggyback:50]  Out: 2050 (25.5 mL/kg) [Urine:950 (0.5 mL/kg/hr); Drains:900; Stool:200]  Weight: 80.3 kg      Drain output: 1750cc in 24h, serous    Labs Past 18 Hours:  Recent Results (from the past 18 hours)   CBC    Collection Time: 08/12/25  6:58 AM   Result Value Ref Range    WBC 3.0 (L) 4.4 - 11.3 x10*3/uL    nRBC 0.0 0.0 - 0.0 /100 WBCs    RBC 2.76 (L) 4.50 - 5.90 x10*6/uL    Hemoglobin 8.0 (L) 13.5 - 17.5 g/dL    Hematocrit 24.1 (L) 41.0 - 52.0 %    MCV 87 80 - 100 fL    MCH 29.0 26.0 - 34.0 pg    MCHC 33.2 32.0 - 36.0 g/dL    RDW 24.1 (H) 11.5 - 14.5 %    Platelets 59 (L) 150 - 450 x10*3/uL   Comprehensive Metabolic Panel    Collection Time: 08/12/25  6:58 AM   Result Value Ref Range    Glucose 89 74 - 99 mg/dL    Sodium 140 136 - 145 mmol/L    Potassium 3.5 3.5 - 5.3 mmol/L    Chloride 103 98 - 107 mmol/L    Bicarbonate 25 21 - 32 mmol/L    Anion Gap 16 10 - 20 mmol/L    Urea Nitrogen 21 6 - 23 mg/dL    Creatinine 1.17 0.50 - 1.30 mg/dL    eGFR 67 >60 mL/min/1.73m*2    Calcium 9.1 8.6 - 10.6 mg/dL    Albumin 4.4 3.4 - 5.0 g/dL    Alkaline  Phosphatase 72 33 - 136 U/L    Total Protein 5.8 (L) 6.4 - 8.2 g/dL    AST 22 9 - 39 U/L    Bilirubin, Total 12.3 (H) 0.0 - 1.2 mg/dL    ALT 8 (L) 10 - 52 U/L   Magnesium    Collection Time: 08/12/25  6:58 AM   Result Value Ref Range    Magnesium 2.03 1.60 - 2.40 mg/dL   Coagulation Screen    Collection Time: 08/12/25  6:58 AM   Result Value Ref Range    Protime 18.6 (H) 9.8 - 12.4 seconds    INR 1.7 (H) 0.9 - 1.1    aPTT 43 (H) 26 - 36 seconds   Phosphorus    Collection Time: 08/12/25  6:58 AM   Result Value Ref Range    Phosphorus 3.4 2.5 - 4.9 mg/dL        Meds:  Current Medications[1]     Imaging:  Imaging  No results found.      Cardiology, Vascular, and Other Imaging  No other imaging results found for the past 2 days      No pertinent imaging to review.    Medications reviewed.  Vital signs reviewed.  Labs reviewed.         Assessment/Plan    Assessment and Plan:  Roma Corral is a 70 y.o. male with history of rectal cancer with mets to the liver, CAD (2021 NSTEMI), DM2, HTN  who is s/p right extended hepatectomy for metastatic rectal cancer on 7/29. He has been tolerating a normal diet. Continues to have an uptrending total bilirubin (12.3 on 8/12 up from 11.8 on 8/11) and INR (1.7 on 8/12 down from 1.8 on 8/11) as well as a consistently high drain output.    Plan Today:   - pain control with MS contin q12h, PRN dilaudid  - q6h concentrated albumin replacements  - continue regular diet with supplements  - continue PPI  - PRN zofran  - PRN Tums  - continue NAC therapy   - continue bowel reg with Miralax  - daily CBC, CMP, Mg Coags, and Phos  - continue to trend daily bilirubin and INR  - pet, music and art therapy ordered  - 500 mL albumin + zosyn    DVT ppx: SCDs and lovenox  Dispo: remain on RNF    Discussed with chief, Dr. Cookie Pereira, who discussed with attending, Dr. Erazo.    Pelon Willams MD - PGY1  Surgical Oncology   [facility] [id number]       [1]   Current Facility-Administered Medications:      acetylcysteine (Acetadote) 8 g in dextrose 5% 1,000 mL IV infusion, 100 mg/kg, intravenous, Daily, Lidia Patel MD    albumin human 25 % solution 25 g, 25 g, intravenous, q6h, Cookie Pereira MD, Stopped at 08/12/25 0735    alteplase (Cathflo Activase) injection 2 mg, 2 mg, intra-catheter, PRN, Pelon Willams MD    calcium carbonate (Tums) 500 mg (200 mg elemental) chewable tablet 1 tablet, 500 mg of calcium carbonate, oral, 4x daily PRN, Matheus Torres MD, 1 tablet at 08/05/25 0447    enoxaparin (Lovenox) syringe 40 mg, 40 mg, subcutaneous, q24h, Matheus Torres MD, 40 mg at 08/11/25 1444    HYDROmorphone (Dilaudid) injection 0.2 mg, 0.2 mg, intravenous, q3h PRN, Emily Neumann MD, 0.2 mg at 08/12/25 0249    lidocaine (Xylocaine) 10 mg/mL (1 %) injection 5 mL, 5 mL, infiltration, Once, Pelon Willams MD    morphine CR (MS Contin) 12 hr tablet 30 mg, 30 mg, oral, q12h ALEX, Pelon Willams MD, 30 mg at 08/11/25 2308    naloxone (Narcan) injection 0.2 mg, 0.2 mg, intravenous, PRN, Brent Rae MD    ondansetron (Zofran) injection 4 mg, 4 mg, intravenous, q4h PRN, Matheus Torres MD, 4 mg at 07/31/25 0015    pantoprazole (Protonix) injection 40 mg, 40 mg, intravenous, Daily, Cookie Pereira MD, 40 mg at 08/11/25 0831    piperacillin-tazobactam (Zosyn) 3.375 g in dextrose (iso) IV 50 mL, 3.375 g, intravenous, q6h, Pelon Willams MD, Stopped at 08/12/25 0322    polyethylene glycol (Glycolax, Miralax) packet 17 g, 17 g, oral, Daily, Cookie Pereira MD, 17 g at 08/07/25 0842    potassium chloride (Klor-Con) packet 20 mEq, 20 mEq, oral, Once, Paz Weber MD    potassium chloride (Klor-Con) packet 40 mEq, 40 mEq, oral, Once, Pelon Willams MD    potassium chloride CR (Klor-Con) ER tablet 10 mEq, 10 mEq, oral, Once, Pelon Willams MD    Facility-Administered Medications Ordered in Other Encounters:     alteplase (Cathflo Activase) injection 2 mg, 2 mg, intra-catheter, PRN, Laurence GLORIA  Demetriuselein, APRN-CNP    alteplase (Cathflo Activase) injection 2 mg, 2 mg, intra-catheter, PRN, Laurence Romoelein, APRN-CNP    heparin flush 10 unit/mL syringe 50 Units, 50 Units, intra-catheter, PRN, Laurence GLORIA Castelein, APRN-CNP    heparin flush 10 unit/mL syringe 50 Units, 50 Units, intra-catheter, PRN, Laurence GLORIA Castelein, APRN-CNP    heparin flush 100 unit/mL syringe 500 Units, 500 Units, intra-catheter, PRN, Laurence GLORIA Castelein, APRN-CNP    heparin flush 100 unit/mL syringe 500 Units, 500 Units, intra-catheter, PRN, Laurence GLORIA Castelein, APRN-CNP

## 2025-08-12 NOTE — PROGRESS NOTES
"Physical Therapy                 Therapy Communication Note    Patient Name: Roma Corral  MRN: [id number]  [facility]  Room: [facility]/6013-A  Today's Date: 8/12/2025     Discipline: Physical Therapy    Missed Visit: PT Missed Visit: Yes     Missed Visit Reason: Missed Visit Reason: Patient refused (Pt declined stating \"I have a visitor right now\" will reattempt later as schedule allows)    Missed Time: Attempt    Comment:      "

## 2025-08-13 ENCOUNTER — APPOINTMENT (OUTPATIENT)
Dept: SURGICAL ONCOLOGY | Facility: HOSPITAL | Age: 70
End: 2025-08-13
Payer: MEDICARE

## 2025-08-13 ASSESSMENT — COGNITIVE AND FUNCTIONAL STATUS - GENERAL
DAILY ACTIVITIY SCORE: 24
MOBILITY SCORE: 24
MOBILITY SCORE: 24
DAILY ACTIVITIY SCORE: 24

## 2025-08-13 ASSESSMENT — PAIN SCALES - GENERAL
PAINLEVEL_OUTOF10: 3
PAINLEVEL_OUTOF10: 7

## 2025-08-13 ASSESSMENT — PAIN DESCRIPTION - LOCATION: LOCATION: ABDOMEN

## 2025-08-13 ASSESSMENT — PAIN - FUNCTIONAL ASSESSMENT
PAIN_FUNCTIONAL_ASSESSMENT: 0-10
PAIN_FUNCTIONAL_ASSESSMENT: 0-10

## 2025-08-13 ASSESSMENT — PAIN DESCRIPTION - ORIENTATION: ORIENTATION: UPPER

## 2025-08-13 NOTE — CARE PLAN
The clinical goals for the shift include pt will remain HDS throughout shift    Problem: Pain - Adult  Goal: Verbalizes/displays adequate comfort level or baseline comfort level  Outcome: Progressing     Problem: Safety - Adult  Goal: Free from fall injury  Outcome: Progressing     Problem: Discharge Planning  Goal: Discharge to home or other facility with appropriate resources  Outcome: Progressing     Problem: Chronic Conditions and Co-morbidities  Goal: Patient's chronic conditions and co-morbidity symptoms are monitored and maintained or improved  Outcome: Progressing     Problem: Nutrition  Goal: Nutrient intake appropriate for maintaining nutritional needs  Outcome: Progressing     Problem: Skin  Goal: Decreased wound size/increased tissue granulation at next dressing change  Outcome: Progressing  Flowsheets (Taken 8/12/2025 2247)  Decreased wound size/increased tissue granulation at next dressing change: Protective dressings over bony prominences  Goal: Participates in plan/prevention/treatment measures  Outcome: Progressing  Flowsheets (Taken 8/12/2025 2247)  Participates in plan/prevention/treatment measures:   Elevate heels   Increase activity/out of bed for meals  Goal: Prevent/manage excess moisture  Outcome: Progressing  Flowsheets (Taken 8/12/2025 2247)  Prevent/manage excess moisture:   Moisturize dry skin   Monitor for/manage infection if present  Goal: Prevent/minimize sheer/friction injuries  Outcome: Progressing  Flowsheets (Taken 8/12/2025 2247)  Prevent/minimize sheer/friction injuries:   Use pull sheet   Increase activity/out of bed for meals  Goal: Promote/optimize nutrition  Outcome: Progressing  Flowsheets (Taken 8/12/2025 2247)  Promote/optimize nutrition: Monitor/record intake including meals  Goal: Promote skin healing  Outcome: Progressing  Flowsheets (Taken 8/12/2025 2247)  Promote skin healing: Assess skin/pad under line(s)/device(s)

## 2025-08-13 NOTE — PROGRESS NOTES
Surgical Oncology Progress Note      08/13/25      Subjective:  NAEON. Feels well overall. Denies nausea or vomiting. Tolerating diet, pain controlled. Having bowel function in ostomy. Output continues to be high from his drain.     Objective    Objective:  Vital signs:   Temp:  [36.7 °C (98 °F)-37.4 °C (99.3 °F)] 37.1 °C (98.8 °F)  Heart Rate:  [67-78] 78  Resp:  [15-17] 16  BP: (105-121)/(52-73) 117/66    Physical Exam:  GEN: no acute distress  RESP: non-labored breathing on RA  CV: non-cyanotic  GI: soft, non-tender, non-distended. Thin serous fluid out of drain in bile bag. Pasty stool in ostomy. Incisions healing well, well approximated without drainage.   : voiding spontaneously  MSK: no evidence of bilateral lower extremity edema  NEURO: alert and conversant  SKIN: jaundice, warm and dry    I/O last 2 completed shifts:  In: 1000 (12.2 mL/kg) [IV Piggyback:1000]  Out: 2400 (29.4 mL/kg) [Urine:600 (0.3 mL/kg/hr); Drains:1800]  Weight: 81.6 kg      Drain output: 1750cc in 24h, serous    Labs Past 18 Hours:  Recent Results (from the past 18 hours)   Coagulation Screen    Collection Time: 08/13/25  6:47 AM   Result Value Ref Range    Protime 19.8 (H) 9.8 - 12.4 seconds    INR 1.8 (H) 0.9 - 1.1    aPTT 44 (H) 26 - 36 seconds   Phosphorus    Collection Time: 08/13/25  6:47 AM   Result Value Ref Range    Phosphorus 3.0 2.5 - 4.9 mg/dL          Meds:  Current Medications[1]     Imaging:  Imaging  US liver with doppler  Result Date: 8/12/2025  1. Extremely limited evaluation due to overlying bowel gas. Within these limitations, postsurgical changes of extended right hepatectomy. The hepatectomy bed is difficult to evaluate given the limitations. The remainder of the liver is mildly heterogenous in appearance, likely related to postsurgical changes. Small fluid collection adjacent to the left hepatic lobe measuring up to 2.2 cm. The hepatic vasculature is patent and similar to prior. Given the limitations of the  exam, recommend further evaluation with cross-sectional imaging. 2. Abdominopelvic ascites and bilateral pleural effusions. 3. Splenomegaly.   I personally reviewed the images/study and resident's interpretation and I agree with the findings as stated by Regina Ventura MD (resident radiologist). This study was analyzed and interpreted at [place], [place].   MACRO: None   Signed by: Des Mohsenparul Villeda 8/12/2025 12:32 PM Dictation workstation:   [id number]        Cardiology, Vascular, and Other Imaging  No other imaging results found for the past 2 days      No pertinent imaging to review.    Medications reviewed.  Vital signs reviewed.  Labs reviewed.         Assessment/Plan    Assessment and Plan:  Roma Corral is a 70 y.o. male with history of rectal cancer with mets to the liver, CAD (2021 NSTEMI), DM2, HTN  who is s/p right extended hepatectomy for metastatic rectal cancer on 7/29. He has been tolerating a normal diet. Continue trending total bilirubin and INR, as well as a consistently high drain output.    Plan Today:   - pain control with MS contin q12h, PRN dilaudid  - q6h concentrated albumin replacements  - continue regular diet with supplements  - continue PPI  - PRN zofran  - PRN Tums  - continue NAC therapy   - continue bowel reg with Miralax  - daily CBC, CMP, Mg Coags, and Phos  - continue to trend daily bilirubin and INR  - pet, music and art therapy ordered  - 500 mL albumin + zosyn    DVT ppx: SCDs and lovenox  Dispo: remain on RNF    Discussed with chief, Dr. Cookie Pereira, who discussed with attending, Dr. Erazo.    Pelon Willams MD - PGY1  Surgical Oncology   [facility] [id number]         [1]   Current Facility-Administered Medications:     acetylcysteine (Acetadote) 8 g in dextrose 5% 1,000 mL IV infusion, 100 mg/kg, intravenous, Daily, Lidia Patel MD, Stopped at 08/13/25 0316    albumin human 25 % solution 25 g, 25 g, intravenous, q6h, Cookie Pereira,  MD, Stopped at 08/13/25 0757    albumin human 5 % infusion 25 g, 25 g, intravenous, Once, Cookie Pereira MD    albumin human 5 % infusion 25 g, 25 g, intravenous, Once, Cookie Pereira MD, Last Rate: 500 mL/hr at 08/13/25 0918, 25 g at 08/13/25 0918    alteplase (Cathflo Activase) injection 2 mg, 2 mg, intra-catheter, PRN, Pelon Willams MD    calcium carbonate (Tums) 500 mg (200 mg elemental) chewable tablet 1 tablet, 500 mg of calcium carbonate, oral, 4x daily PRN, Matheus Torres MD, 1 tablet at 08/05/25 0447    enoxaparin (Lovenox) syringe 40 mg, 40 mg, subcutaneous, q24h, Matheus Torres MD, 40 mg at 08/12/25 1710    HYDROmorphone (Dilaudid) injection 0.2 mg, 0.2 mg, intravenous, q3h PRN, Emily Neumann MD, 0.2 mg at 08/12/25 1838    lidocaine (Xylocaine) 10 mg/mL (1 %) injection 5 mL, 5 mL, infiltration, Once, Pelon Willams MD    morphine CR (MS Contin) 12 hr tablet 30 mg, 30 mg, oral, q12h ALEX, Pelon Willams MD, 30 mg at 08/12/25 2252    naloxone (Narcan) injection 0.2 mg, 0.2 mg, intravenous, PRN, Brent Rae MD    ondansetron (Zofran) injection 4 mg, 4 mg, intravenous, q4h PRN, Matheus Torres MD, 4 mg at 07/31/25 0015    pantoprazole (Protonix) injection 40 mg, 40 mg, intravenous, Daily, Cookie Pereira MD, 40 mg at 08/13/25 0915    piperacillin-tazobactam (Zosyn) 3.375 g in dextrose (iso) IV 50 mL, 3.375 g, intravenous, q6h, Pelon Willams MD, Stopped at 08/13/25 0611    polyethylene glycol (Glycolax, Miralax) packet 17 g, 17 g, oral, Daily, Cookie Pereira MD, 17 g at 08/07/25 0842    potassium chloride (Klor-Con) packet 20 mEq, 20 mEq, oral, Once, Paz Weber MD    Facility-Administered Medications Ordered in Other Encounters:     alteplase (Cathflo Activase) injection 2 mg, 2 mg, intra-catheter, PRN, Laurence Betancourt, APRN-CNP    alteplase (Cathflo Activase) injection 2 mg, 2 mg, intra-catheter, PRN, Laurence Betancourt, APRN-CNP    heparin flush 10  unit/mL syringe 50 Units, 50 Units, intra-catheter, PRN, Laurence S Castelein, APRN-CNP    heparin flush 10 unit/mL syringe 50 Units, 50 Units, intra-catheter, PRN, Laurence S Castelein, APRN-CNP    heparin flush 100 unit/mL syringe 500 Units, 500 Units, intra-catheter, PRN, Laurence GLORIA Castelein, APRN-CNP    heparin flush 100 unit/mL syringe 500 Units, 500 Units, intra-catheter, PRN, Laurence GLORIA Castelein, APRN-CNP

## 2025-08-13 NOTE — PROGRESS NOTES
"Physical Therapy                 Therapy Communication Note    Patient Name: Roma Corral  MRN: [id number]  Department: [facility]  Room: 6013/6013-A  Today's Date: 8/13/2025     Discipline: Physical Therapy    Missed Visit: PT Missed Visit: Yes     Missed Visit Reason: Missed Visit Reason: Patient refused (when asked pt why he did not want to participate in therapy pt stated \"because I have two IVs in my arm and I havn't slept.\" Pt then asked this PTA to write her name down for him). Pt instructed that if he wishes to do therapy later he can ask his nurse to message this PTA and will re-attempt if schedule allows.     Missed Time: Attempt    Comment: per pt's last therapy treatment on 8/8, pt progressing well with therapy. Pt has been up and walking in hallway without assist, discussed with supervising PT, PT agrees to update pt frequency to 3x perweek       "

## 2025-08-13 NOTE — CONSULTS
Wound Care Consult     Visit Date: 8/13/2025      Patient Name: Roma Corral         MRN: [id number]             Reason for Consult: Ostomy care        Wound History: Patient with colostomy     Pertinent Labs:       Assessment:        Colostomy Loop LUQ (Active)   Placement Date/Time: 07/02/24 1416   Hand Hygiene Completed: Yes  Colostomy Type: Loop  Location: LUQ   Number of days: 407      Colostomy Loop LUQ (Active)   Present on Admission to Healthcare Facility Y 08/11/25 0800   Stomal Appliance Clean;Dry;Intact 08/13/25 0912   Site/Stoma Assessment Clean;Intact 08/13/25 1255   Peristomal Assessment Clean;Intact 08/13/25 1255   Treatment Pouch change;Site care 08/08/25 0455   Drainage Characteristics Brown 08/13/25 0912   Output (mL) 0 mL 08/13/25 1516   Intake (ml) 0 ml 07/31/25 0800      Pouching checked and intact.  Per patient, he changed pouch yesterday.  Additional supplies left at bedside.  Patient requesting weekly check in with ostomy nurse while inpatient, but understands that if he requests assistance with from the ostomy nurse for care to request for help.          Wound Plan: Wound/ostomy team to asssist with pouching as needed.      Pavithra Andrea, MSN, RN, CWCN, COCN  08/13/25 6:24 PM

## 2025-08-14 ASSESSMENT — COGNITIVE AND FUNCTIONAL STATUS - GENERAL
MOBILITY SCORE: 24
DAILY ACTIVITIY SCORE: 24
DAILY ACTIVITIY SCORE: 24
MOBILITY SCORE: 24

## 2025-08-14 ASSESSMENT — PAIN DESCRIPTION - LOCATION: LOCATION: ABDOMEN

## 2025-08-14 ASSESSMENT — PAIN DESCRIPTION - DESCRIPTORS
DESCRIPTORS: ACHING
DESCRIPTORS: ACHING

## 2025-08-14 ASSESSMENT — PAIN - FUNCTIONAL ASSESSMENT
PAIN_FUNCTIONAL_ASSESSMENT: 0-10
PAIN_FUNCTIONAL_ASSESSMENT: 0-10

## 2025-08-14 ASSESSMENT — PAIN SCALES - GENERAL
PAINLEVEL_OUTOF10: 6
PAINLEVEL_OUTOF10: 6

## 2025-08-14 NOTE — CARE PLAN
The patient's goals for the shift include      The clinical goals for the shift include Patientwill remain HDS throughouot the shift      Problem: Pain - Adult  Goal: Verbalizes/displays adequate comfort level or baseline comfort level  Outcome: Progressing     Problem: Safety - Adult  Goal: Free from fall injury  Outcome: Progressing     Problem: Discharge Planning  Goal: Discharge to home or other facility with appropriate resources  Outcome: Progressing     Problem: Chronic Conditions and Co-morbidities  Goal: Patient's chronic conditions and co-morbidity symptoms are monitored and maintained or improved  Outcome: Progressing     Problem: Nutrition  Goal: Nutrient intake appropriate for maintaining nutritional needs  Outcome: Progressing     Problem: Skin  Goal: Decreased wound size/increased tissue granulation at next dressing change  Outcome: Progressing  Goal: Participates in plan/prevention/treatment measures  Outcome: Progressing  Goal: Prevent/manage excess moisture  Outcome: Progressing  Goal: Prevent/minimize sheer/friction injuries  Outcome: Progressing  Goal: Promote/optimize nutrition  Outcome: Progressing  Goal: Promote skin healing  Outcome: Progressing

## 2025-08-14 NOTE — PROGRESS NOTES
Physical Therapy                 Therapy Communication Note    Patient Name: Roma Corral  MRN: [id number]  Department: [facility]  Room: 60/6013-A  Today's Date: 8/14/2025     Discipline: Physical Therapy    Missed Visit:       Missed Visit Reason:      Missed Time: Attempt    Comment: pt declining session requesting to rest prior to being medicated.

## 2025-08-14 NOTE — PROGRESS NOTES
Surgical Oncology Progress Note      08/14/25      Subjective:  NAEON. Feels well overall. Denies nausea or vomiting. Tolerating diet, pain controlled. Having bowel function in ostomy. Output continues to be high from his drain. He endorsed a numb sensation in his left forearm which he attributes to injury during a lab draw.     Objective    Objective:  Vital signs:   Temp:  [36.6 °C (97.9 °F)-37.1 °C (98.8 °F)] 36.6 °C (97.9 °F)  Heart Rate:  [76-95] 85  Resp:  [16] 16  BP: (103-134)/(56-75) 123/66    Physical Exam:  GEN: no acute distress  RESP: non-labored breathing on RA  CV: non-cyanotic  GI: soft, non-tender, non-distended. Thin serous fluid out of drain in bile bag. Pasty stool in ostomy. Incisions healing well, well approximated without drainage.   : voiding spontaneously  MSK: no evidence of bilateral lower extremity edema  NEURO: alert and conversant  SKIN: jaundice, warm and dry    I/O last 2 completed shifts:  In: 2110 (26.2 mL/kg) [P.O.:720; IV Piggyback:1390]  Out: 1905 (23.7 mL/kg) [Urine:525 (0.3 mL/kg/hr); Drains:1380]  Weight: 80.5 kg      Drain output: 1750cc in 24h, serous    Labs Past 18 Hours:  Recent Results (from the past 18 hours)   Coagulation Screen    Collection Time: 08/14/25  5:54 AM   Result Value Ref Range    Protime 22.9 (H) 9.8 - 12.4 seconds    INR 2.1 (H) 0.9 - 1.1    aPTT 51 (H) 26 - 36 seconds   Phosphorus    Collection Time: 08/14/25  5:54 AM   Result Value Ref Range    Phosphorus 2.3 (L) 2.5 - 4.9 mg/dL   CBC    Collection Time: 08/14/25  5:54 AM   Result Value Ref Range    WBC 2.2 (L) 4.4 - 11.3 x10*3/uL    nRBC 0.0 0.0 - 0.0 /100 WBCs    RBC 2.68 (L) 4.50 - 5.90 x10*6/uL    Hemoglobin 7.6 (L) 13.5 - 17.5 g/dL    Hematocrit 23.6 (L) 41.0 - 52.0 %    MCV 88 80 - 100 fL    MCH 28.4 26.0 - 34.0 pg    MCHC 32.2 32.0 - 36.0 g/dL    RDW 25.6 (H) 11.5 - 14.5 %    Platelets 47 (L) 150 - 450 x10*3/uL   Comprehensive Metabolic Panel    Collection Time: 08/14/25  5:54 AM   Result  Value Ref Range    Glucose 111 (H) 74 - 99 mg/dL    Sodium 136 136 - 145 mmol/L    Potassium 3.5 3.5 - 5.3 mmol/L    Chloride 100 98 - 107 mmol/L    Bicarbonate 22 21 - 32 mmol/L    Anion Gap 18 10 - 20 mmol/L    Urea Nitrogen 18 6 - 23 mg/dL    Creatinine 1.16 0.50 - 1.30 mg/dL    eGFR 68 >60 mL/min/1.73m*2    Calcium 9.6 8.6 - 10.6 mg/dL    Albumin 5.1 (H) 3.4 - 5.0 g/dL    Alkaline Phosphatase 53 33 - 136 U/L    Total Protein 6.4 6.4 - 8.2 g/dL    AST 22 9 - 39 U/L    Bilirubin, Total 12.6 (H) 0.0 - 1.2 mg/dL    ALT 7 (L) 10 - 52 U/L   Magnesium    Collection Time: 08/14/25  5:54 AM   Result Value Ref Range    Magnesium 2.22 1.60 - 2.40 mg/dL          Meds:  Current Medications[1]     Imaging:  Imaging  No results found.        Cardiology, Vascular, and Other Imaging  No other imaging results found for the past 2 days      No pertinent imaging to review.    Medications reviewed.  Vital signs reviewed.  Labs reviewed.         Assessment/Plan    Assessment and Plan:  Roma Corral is a 70 y.o. male with history of rectal cancer with mets to the liver, CAD (2021 NSTEMI), DM2, HTN  who is s/p right extended hepatectomy for metastatic rectal cancer on 7/29. He has been tolerating a normal diet. Continue trending total bilirubin and INR, as well as a consistently high drain output.    Plan Today:   - pain control with MS contin q12h, PRN dilaudid  - q6h concentrated albumin replacements  - continue regular diet with supplements  - continue PPI  - PRN zofran  - PRN Tums  - continue NAC therapy   - continue bowel reg with Miralax  - daily CBC, CMP, Mg Coags, and Phos  - continue to trend daily bilirubin and INR  - pet, music and art therapy ordered  - 500 mL albumin + zosyn    DVT ppx: SCDs and lovenox  Dispo: remain on RNF    Discussed with chief, Dr. Cookie Pereira, who discussed with attending, Dr. Erazo.    Pelon Willams MD - PGY1  Surgical Oncology   [facility] [id number]           [1]   Current Facility-Administered  Medications:     acetylcysteine (Acetadote) 8 g in dextrose 5% 1,000 mL IV infusion, 100 mg/kg, intravenous, Daily, Lidia Patel MD, Last Rate: 0 mL/hr at 08/14/25 0306, 8 g at 08/14/25 0902    albumin human 25 % solution 25 g, 25 g, intravenous, q6h, Cookie Pereira MD, Stopped at 08/14/25 1155    alteplase (Cathflo Activase) injection 2 mg, 2 mg, intra-catheter, PRN, Pelon Willams MD    calcium carbonate (Tums) 500 mg (200 mg elemental) chewable tablet 1 tablet, 500 mg of calcium carbonate, oral, 4x daily PRN, Matheus Torres MD, 1 tablet at 08/05/25 0447    enoxaparin (Lovenox) syringe 40 mg, 40 mg, subcutaneous, q24h, Matheus Torres MD, 40 mg at 08/13/25 1413    HYDROmorphone (Dilaudid) injection 0.2 mg, 0.2 mg, intravenous, q3h PRN, Emily Neumann MD, 0.2 mg at 08/14/25 0026    lidocaine (Xylocaine) 10 mg/mL (1 %) injection 5 mL, 5 mL, infiltration, Once, Pelon Willams MD    morphine CR (MS Contin) 12 hr tablet 30 mg, 30 mg, oral, q12h ALEX, Pelon Willams MD, 30 mg at 08/14/25 1005    naloxone (Narcan) injection 0.2 mg, 0.2 mg, intravenous, PRN, Brent Rae MD    ondansetron (Zofran) injection 4 mg, 4 mg, intravenous, q4h PRN, Matheus Torres MD, 4 mg at 07/31/25 0015    pantoprazole (Protonix) injection 40 mg, 40 mg, intravenous, Daily, Cookie Pereira MD, 40 mg at 08/14/25 0902    piperacillin-tazobactam (Zosyn) 3.375 g in dextrose (iso) IV 50 mL, 3.375 g, intravenous, q6h, Pelon Willams MD, Stopped at 08/14/25 1035    polyethylene glycol (Glycolax, Miralax) packet 17 g, 17 g, oral, Daily, Cookie Pereira MD, 17 g at 08/07/25 0842    potassium phosphates 21 mmol in dextrose 5% 250 mL IV, 21 mmol, intravenous, Once, Pelon Willams MD, Last Rate: 42.8 mL/hr at 08/14/25 1213, 21 mmol at 08/14/25 1213    Facility-Administered Medications Ordered in Other Encounters:     alteplase (Cathflo Activase) injection 2 mg, 2 mg, intra-catheter, PRN, Laurence Betancourt, APRN-CNP     alteplase (Cathflo Activase) injection 2 mg, 2 mg, intra-catheter, PRN, Laurence S Castelein, APRN-CNP    heparin flush 10 unit/mL syringe 50 Units, 50 Units, intra-catheter, PRN, Laurence S Castelein, APRN-CNP    heparin flush 10 unit/mL syringe 50 Units, 50 Units, intra-catheter, PRN, Laurence S Castelein, APRN-CNP    heparin flush 100 unit/mL syringe 500 Units, 500 Units, intra-catheter, PRN, Laurence S Castelein, APRN-CNP    heparin flush 100 unit/mL syringe 500 Units, 500 Units, intra-catheter, PRN, Laurence S Castelein, APRN-CNP

## 2025-08-14 NOTE — CARE PLAN
Problem: Pain - Adult  Goal: Verbalizes/displays adequate comfort level or baseline comfort level  Outcome: Progressing     Problem: Safety - Adult  Goal: Free from fall injury  Outcome: Progressing     Problem: Discharge Planning  Goal: Discharge to home or other facility with appropriate resources  Outcome: Progressing     Problem: Chronic Conditions and Co-morbidities  Goal: Patient's chronic conditions and co-morbidity symptoms are monitored and maintained or improved  Outcome: Progressing     Problem: Nutrition  Goal: Nutrient intake appropriate for maintaining nutritional needs  Outcome: Progressing     Problem: Skin  Goal: Decreased wound size/increased tissue granulation at next dressing change  Outcome: Progressing  Goal: Participates in plan/prevention/treatment measures  Outcome: Progressing  Goal: Prevent/manage excess moisture  Outcome: Progressing  Goal: Prevent/minimize sheer/friction injuries  Outcome: Progressing  Goal: Promote/optimize nutrition  Outcome: Progressing  Goal: Promote skin healing  Outcome: Progressing   The patient's goals for the shift include      The clinical goals for the shift include Pt will remain HDS

## 2025-08-14 NOTE — PROGRESS NOTES
08/07/25 1153   Discharge Planning   Living Arrangements Spouse/significant other   Support Systems Spouse/significant other   Assistance Needed none   Type of Residence Private residence   Who is requesting discharge planning? Provider   Home or Post Acute Services In home services   Type of Home Care Services Home nursing visits;Home OT;Home PT   Expected Discharge Disposition Home H     Notified by medical team that patient/wife now requesting home care for SN/PT/OT. AOC is [facility]. Requested HCO from team. Will continue to follow for discharge planning. Debby Laguerre RN [facility]     8/14/25 @ 12:58pm     Patient remains inpatient. Per medical team, continue to monitor liver function and drain output. ADOD 8/20 with [facility]-PT/OT/SN. Will continue to follow for discharge planning. Debby Laguerre RN [facility]

## 2025-08-15 ASSESSMENT — COGNITIVE AND FUNCTIONAL STATUS - GENERAL
MOBILITY SCORE: 18
DAILY ACTIVITIY SCORE: 18
DRESSING REGULAR UPPER BODY CLOTHING: A LITTLE
MOVING TO AND FROM BED TO CHAIR: A LITTLE
HELP NEEDED FOR BATHING: A LITTLE
MOVING FROM LYING ON BACK TO SITTING ON SIDE OF FLAT BED WITH BEDRAILS: A LITTLE
WALKING IN HOSPITAL ROOM: A LITTLE
EATING MEALS: A LITTLE
PERSONAL GROOMING: A LITTLE
STANDING UP FROM CHAIR USING ARMS: A LITTLE
CLIMB 3 TO 5 STEPS WITH RAILING: A LITTLE
TOILETING: A LITTLE
TURNING FROM BACK TO SIDE WHILE IN FLAT BAD: A LITTLE
DRESSING REGULAR LOWER BODY CLOTHING: A LITTLE

## 2025-08-15 ASSESSMENT — PAIN SCALES - GENERAL
PAINLEVEL_OUTOF10: 0 - NO PAIN
PAINLEVEL_OUTOF10: 6
PAINLEVEL_OUTOF10: 7
PAINLEVEL_OUTOF10: 5 - MODERATE PAIN

## 2025-08-15 ASSESSMENT — PAIN - FUNCTIONAL ASSESSMENT
PAIN_FUNCTIONAL_ASSESSMENT: 0-10

## 2025-08-15 NOTE — CARE PLAN
Problem: Pain - Adult  Goal: Verbalizes/displays adequate comfort level or baseline comfort level  Outcome: Progressing     Problem: Safety - Adult  Goal: Free from fall injury  Outcome: Progressing     Problem: Discharge Planning  Goal: Discharge to home or other facility with appropriate resources  Outcome: Progressing     Problem: Chronic Conditions and Co-morbidities  Goal: Patient's chronic conditions and co-morbidity symptoms are monitored and maintained or improved  Outcome: Progressing     Problem: Nutrition  Goal: Nutrient intake appropriate for maintaining nutritional needs  Outcome: Progressing     Problem: Skin  Goal: Decreased wound size/increased tissue granulation at next dressing change  Outcome: Progressing  Goal: Participates in plan/prevention/treatment measures  Outcome: Progressing  Goal: Prevent/manage excess moisture  Outcome: Progressing  Goal: Prevent/minimize sheer/friction injuries  Outcome: Progressing  Goal: Promote/optimize nutrition  Outcome: Progressing  Goal: Promote skin healing  Outcome: Progressing   The patient's goals for the shift include      The clinical goals for the shift include Patient will remain HDS with no acute events this shift

## 2025-08-16 ASSESSMENT — COGNITIVE AND FUNCTIONAL STATUS - GENERAL
MOBILITY SCORE: 23
MOBILITY SCORE: 23
CLIMB 3 TO 5 STEPS WITH RAILING: A LITTLE
DAILY ACTIVITIY SCORE: 24
CLIMB 3 TO 5 STEPS WITH RAILING: A LITTLE
DAILY ACTIVITIY SCORE: 24

## 2025-08-16 ASSESSMENT — PAIN - FUNCTIONAL ASSESSMENT
PAIN_FUNCTIONAL_ASSESSMENT: 0-10
PAIN_FUNCTIONAL_ASSESSMENT: 0-10

## 2025-08-16 ASSESSMENT — PAIN SCALES - GENERAL
PAINLEVEL_OUTOF10: 7
PAINLEVEL_OUTOF10: 0 - NO PAIN

## 2025-08-16 ASSESSMENT — PAIN DESCRIPTION - LOCATION: LOCATION: ABDOMEN

## 2025-08-16 NOTE — PROGRESS NOTES
Surgical Oncology Progress Note      08/16/25      Subjective:  NAEON. Patient was asleep during exam, and exam was unchanged. Feels well overall. No reported nausea or vomiting. Tolerating diet, pain controlled. Having bowel function in ostomy. Output continues to be high from his drain.     Objective    Objective:  Vital signs:   Temp:  [36.7 °C (98 °F)-37.2 °C (99 °F)] 37.1 °C (98.8 °F)  Heart Rate:  [82-91] 85  Resp:  [16] 16  BP: (106-138)/(57-73) 111/65    Physical Exam:  GEN: no acute distress  RESP: non-labored breathing on RA  CV: non-cyanotic  GI: soft, non-tender, non-distended. Thin serous fluid out of drain in bile bag. Pasty stool in ostomy. Incisions healing well, well approximated without drainage.   : voiding spontaneously  MSK: no evidence of bilateral lower extremity edema  NEURO: alert and conversant  SKIN: jaundice, warm and dry    I/O last 2 completed shifts:  In: 1190 (14.9 mL/kg) [IV Piggyback:1190]  Out: 1650 (20.6 mL/kg) [Urine:950 (0.5 mL/kg/hr); Drains:700]  Weight: 80 kg      Drain output: 1750cc in 24h, serous    Labs Past 18 Hours:  Recent Results (from the past 18 hours)   Coagulation Screen    Collection Time: 08/16/25  5:44 AM   Result Value Ref Range    Protime 24.4 (H) 9.8 - 12.4 seconds    INR 2.2 (H) 0.9 - 1.1    aPTT 57 (H) 26 - 36 seconds   Phosphorus    Collection Time: 08/16/25  5:44 AM   Result Value Ref Range    Phosphorus 2.6 2.5 - 4.9 mg/dL   CBC    Collection Time: 08/16/25  5:44 AM   Result Value Ref Range    WBC 1.9 (L) 4.4 - 11.3 x10*3/uL    nRBC 0.0 0.0 - 0.0 /100 WBCs    RBC 2.30 (L) 4.50 - 5.90 x10*6/uL    Hemoglobin 6.8 (L) 13.5 - 17.5 g/dL    Hematocrit 20.1 (L) 41.0 - 52.0 %    MCV 87 80 - 100 fL    MCH 29.6 26.0 - 34.0 pg    MCHC 33.8 32.0 - 36.0 g/dL    RDW 27.0 (H) 11.5 - 14.5 %    Platelets 48 (L) 150 - 450 x10*3/uL   Comprehensive Metabolic Panel    Collection Time: 08/16/25  5:44 AM   Result Value Ref Range    Glucose 108 (H) 74 - 99 mg/dL     Sodium 137 136 - 145 mmol/L    Potassium 3.5 3.5 - 5.3 mmol/L    Chloride 100 98 - 107 mmol/L    Bicarbonate 23 21 - 32 mmol/L    Anion Gap 18 10 - 20 mmol/L    Urea Nitrogen 17 6 - 23 mg/dL    Creatinine 1.17 0.50 - 1.30 mg/dL    eGFR 67 >60 mL/min/1.73m*2    Calcium 9.5 8.6 - 10.6 mg/dL    Albumin 4.7 3.4 - 5.0 g/dL    Alkaline Phosphatase 44 33 - 136 U/L    Total Protein 5.9 (L) 6.4 - 8.2 g/dL    AST 19 9 - 39 U/L    Bilirubin, Total 12.4 (H) 0.0 - 1.2 mg/dL    ALT 7 (L) 10 - 52 U/L   Magnesium    Collection Time: 08/16/25  5:44 AM   Result Value Ref Range    Magnesium 2.00 1.60 - 2.40 mg/dL            Meds:  Current Medications[1]     Imaging:  Imaging  No results found.        Cardiology, Vascular, and Other Imaging  No other imaging results found for the past 2 days      No pertinent imaging to review.    Medications reviewed.  Vital signs reviewed.  Labs reviewed.         Assessment/Plan    Assessment and Plan:  Roma Corral is a 70 y.o. male with history of rectal cancer with mets to the liver, CAD (2021 NSTEMI), DM2, HTN  who is s/p right extended hepatectomy for metastatic rectal cancer on 7/29. He has been tolerating a normal diet. Total bilirubin and INR values high, but plateau over the last couple of days; continues to have high drain output.    Plan Today:   - pain control with MS contin q12h, PRN dilaudid  - q6h concentrated albumin replacements  - continue regular diet with supplements  - continue PPI  - PRN zofran  - PRN Tums  - continue NAC therapy   - continue bowel reg with Miralax  - daily CBC, CMP, Mg Coags, and Phos  - continue to trend daily bilirubin and INR  - pet, music and art therapy ordered  - 500 mL albumin + zosyn    DVT ppx: SCDs and lovenox  Dispo: remain on RNF until drain output resolves and T-bili/INR come down.    Discussed with chief, Dr. Floresita Issa, who discussed with attending, Dr. Erazo.    Pelon Willams MD - PGY1  Surgical Oncology   [facility] [id number]               [1]    Current Facility-Administered Medications:     acetylcysteine (Acetadote) 8 g in dextrose 5% 1,000 mL IV infusion, 100 mg/kg, intravenous, Daily, Cookie Pereira MD, Last Rate: 0 mL/hr at 08/15/25 2340, 8 g at 08/16/25 0848    albumin human 25 % solution 25 g, 25 g, intravenous, q6h, Cookie Pereira MD, Last Rate: 100 mL/hr at 08/16/25 1056, 25 g at 08/16/25 1056    alteplase (Cathflo Activase) injection 2 mg, 2 mg, intra-catheter, PRN, Pelon Willams MD    calcium carbonate (Tums) 500 mg (200 mg elemental) chewable tablet 1 tablet, 500 mg of calcium carbonate, oral, 4x daily PRN, Matheus Torres MD, 1 tablet at 08/05/25 0447    enoxaparin (Lovenox) syringe 40 mg, 40 mg, subcutaneous, q24h, Matheus Torres MD, 40 mg at 08/15/25 1406    HYDROmorphone (Dilaudid) injection 0.2 mg, 0.2 mg, intravenous, q3h PRN, Emily Neumann MD, 0.2 mg at 08/15/25 2033    lidocaine (Xylocaine) 10 mg/mL (1 %) injection 5 mL, 5 mL, infiltration, Once, Pelon Willams MD    morphine CR (MS Contin) 12 hr tablet 30 mg, 30 mg, oral, q12h ALEX, Pelon Willams MD, 30 mg at 08/16/25 1107    naloxone (Narcan) injection 0.2 mg, 0.2 mg, intravenous, PRN, Brent Rae MD    ondansetron (Zofran) injection 4 mg, 4 mg, intravenous, q4h PRN, Matheus Torres MD, 4 mg at 07/31/25 0015    pantoprazole (Protonix) injection 40 mg, 40 mg, intravenous, Daily, Cookie Pereira MD, 40 mg at 08/16/25 0848    piperacillin-tazobactam (Zosyn) 3.375 g in dextrose (iso) IV 50 mL, 3.375 g, intravenous, q6h, Pelon Willams MD, Stopped at 08/16/25 1137    polyethylene glycol (Glycolax, Miralax) packet 17 g, 17 g, oral, Daily, Cookie Pereira MD, 17 g at 08/15/25 0757    sodium phosphates 15 mmol in sodium chloride 0.9% 250 mL IV, 15 mmol, intravenous, Once, Pelon Willams MD    Facility-Administered Medications Ordered in Other Encounters:     alteplase (Cathflo Activase) injection 2 mg, 2 mg, intra-catheter, PRN, Laurence Betancourt,  APRN-CNP    alteplase (Cathflo Activase) injection 2 mg, 2 mg, intra-catheter, PRN, Laurence GLORIA Castelein, APRN-CNP    heparin flush 10 unit/mL syringe 50 Units, 50 Units, intra-catheter, PRN, Laurence S Castelein, APRN-CNP    heparin flush 10 unit/mL syringe 50 Units, 50 Units, intra-catheter, PRN, Laurence S Castelein, APRN-CNP    heparin flush 100 unit/mL syringe 500 Units, 500 Units, intra-catheter, PRN, Laurence S Castelein, APRN-CNP    heparin flush 100 unit/mL syringe 500 Units, 500 Units, intra-catheter, PRN, Laurence S Castelein, APRN-CNP

## 2025-08-16 NOTE — CARE PLAN
Problem: Pain - Adult  Goal: Verbalizes/displays adequate comfort level or baseline comfort level  Outcome: Progressing     Problem: Safety - Adult  Goal: Free from fall injury  Outcome: Progressing     Problem: Discharge Planning  Goal: Discharge to home or other facility with appropriate resources  Outcome: Progressing     Problem: Chronic Conditions and Co-morbidities  Goal: Patient's chronic conditions and co-morbidity symptoms are monitored and maintained or improved  Outcome: Progressing     Problem: Nutrition  Goal: Nutrient intake appropriate for maintaining nutritional needs  Outcome: Progressing     Problem: Skin  Goal: Decreased wound size/increased tissue granulation at next dressing change  Outcome: Progressing  Goal: Participates in plan/prevention/treatment measures  Outcome: Progressing  Goal: Prevent/manage excess moisture  Outcome: Progressing  Goal: Prevent/minimize sheer/friction injuries  Outcome: Progressing  Goal: Promote/optimize nutrition  Outcome: Progressing  Goal: Promote skin healing  Outcome: Progressing       The clinical goals for the shift include Pt will remain fee from falls this shift.  Pt is alert and able to communicate needs. Pt had stable vital signs this shift. No falls noted. Care Plan is ongoing.

## 2025-08-16 NOTE — CARE PLAN
The patient's goals for the shift include  comfort    The clinical goals for the shift include pt will remain HDS    Over the shift, the patient did make progress toward the following goals.       Problem: Pain - Adult  Goal: Verbalizes/displays adequate comfort level or baseline comfort level  Outcome: Progressing     Problem: Safety - Adult  Goal: Free from fall injury  Outcome: Progressing     Problem: Discharge Planning  Goal: Discharge to home or other facility with appropriate resources  Outcome: Progressing     Problem: Chronic Conditions and Co-morbidities  Goal: Patient's chronic conditions and co-morbidity symptoms are monitored and maintained or improved  Outcome: Progressing

## 2025-08-17 ASSESSMENT — PAIN DESCRIPTION - LOCATION: LOCATION: ABDOMEN

## 2025-08-17 ASSESSMENT — COGNITIVE AND FUNCTIONAL STATUS - GENERAL
DAILY ACTIVITIY SCORE: 24
MOBILITY SCORE: 24

## 2025-08-17 ASSESSMENT — PAIN SCALES - GENERAL
PAINLEVEL_OUTOF10: 7
PAINLEVEL_OUTOF10: 4
PAINLEVEL_OUTOF10: 4

## 2025-08-17 ASSESSMENT — PAIN - FUNCTIONAL ASSESSMENT
PAIN_FUNCTIONAL_ASSESSMENT: 0-10

## 2025-08-17 NOTE — PROGRESS NOTES
Surgical Oncology Progress Note      08/17/25      Subjective:  NAEON. Feels well overall. No reported nausea or vomiting. Tolerating diet, pain controlled. Having bowel function in ostomy. Output continues to be high from his drain (975 ml, compared to 700 ml yesterday).     Objective    Objective:  Vital signs:   Temp:  [36.7 °C (98.1 °F)-37.3 °C (99.1 °F)] 36.7 °C (98.1 °F)  Heart Rate:  [] 80  Resp:  [16-18] 18  BP: (108-146)/(60-74) 108/61    Physical Exam:  GEN: no acute distress  RESP: non-labored breathing on RA  CV: non-cyanotic  GI: soft, non-tender, non-distended. Thin serous fluid out of drain in bile bag. Pasty stool in ostomy. Incisions healing well, well approximated without drainage.   : voiding spontaneously  MSK: no evidence of bilateral lower extremity edema  NEURO: alert and conversant  SKIN: jaundiced, warm and dry    I/O last 2 completed shifts:  In: 2279.6 (28.5 mL/kg) [P.O.:200; I.V.:78.3 (1 mL/kg); Blood:456.3; IV Piggyback:1545]  Out: 1775 (22.2 mL/kg) [Urine:550 (0.3 mL/kg/hr); Drains:975; Stool:250]  Weight: 80 kg      Drain output: 1750cc in 24h, serous    Labs Past 18 Hours:  Recent Results (from the past 18 hours)   Prepare RBC: 1 Units    Collection Time: 08/16/25  1:32 PM   Result Value Ref Range    PRODUCT CODE X2688V91     Unit Number [id number]     Unit ABO B     Unit RH NEG     XM INTEP COMP     Dispense Status TR     Blood Expiration Date 9/13/2025 11:59:00 PM EDT     PRODUCT BLOOD TYPE 1700     UNIT VOLUME 281             Meds:  Current Medications[1]     Imaging:  Imaging  No results found.        Cardiology, Vascular, and Other Imaging  No other imaging results found for the past 2 days      No pertinent imaging to review.    Medications reviewed.  Vital signs reviewed.  Labs reviewed.         Assessment/Plan    Assessment and Plan:  Roma Corral is a 70 y.o. male with history of rectal cancer with mets to the liver, CAD (2021 NSTEMI), DM2, HTN  who is s/p  right extended hepatectomy for metastatic rectal cancer on 7/29. He has been tolerating a normal diet. Total bilirubin and INR values high, and despite plateau over the last couple of days, Tbili went up to 13.7; continues to have high drain output (975 ml, compared to 700 ml yesterday).    Plan Today:   - pain control with MS contin q12h, PRN dilaudid  - q6h concentrated albumin replacements  - continue regular diet with supplements  - continue PPI  - PRN zofran  - PRN Tums  - continue NAC therapy   - continue bowel reg with Miralax  - daily CBC, CMP, Mg Coags, and Phos  - continue to trend daily bilirubin and INR  - pet, music and art therapy ordered  - 500 mL albumin + zosyn  - Will order urinalysis  - Due to high output, will be placed on 20 mg Lasix PO and Aldactone 50 mg PO    DVT ppx: SCDs and lovenox  Dispo: remain on RNF until drain output resolves and T-bili/INR come down.    Discussed with chief, Dr. Floresita Issa, who discussed with attending, Dr. Erazo.    Pelon Willams MD - PGY1  Surgical Oncology   [facility] [id number]         [1]   Current Facility-Administered Medications:     acetylcysteine (Acetadote) 8 g in dextrose 5% 1,000 mL IV infusion, 100 mg/kg, intravenous, Daily, Cookie Pereira MD, Stopped at 08/17/25 0048    albumin human 25 % solution 25 g, 25 g, intravenous, q6h, Cookie Pereira MD, Stopped at 08/17/25 0258    alteplase (Cathflo Activase) injection 2 mg, 2 mg, intra-catheter, PRN, Pelon Willams MD    calcium carbonate (Tums) 500 mg (200 mg elemental) chewable tablet 1 tablet, 500 mg of calcium carbonate, oral, 4x daily PRN, Matheus Torres MD, 1 tablet at 08/05/25 0447    enoxaparin (Lovenox) syringe 40 mg, 40 mg, subcutaneous, q24h, Matheus Torres MD, 40 mg at 08/16/25 1530    HYDROmorphone (Dilaudid) injection 0.2 mg, 0.2 mg, intravenous, q3h PRN, Emily Neumann MD, 0.2 mg at 08/16/25 2002    lidocaine (Xylocaine) 10 mg/mL (1 %) injection 5 mL, 5 mL, infiltration, Once, Pelon Willams,  MD    morphine CR (MS Contin) 12 hr tablet 30 mg, 30 mg, oral, q12h ALEX, Pelon Willams MD, 30 mg at 08/16/25 8793    naloxone (Narcan) injection 0.2 mg, 0.2 mg, intravenous, PRN, Brent Rae MD    ondansetron (Zofran) injection 4 mg, 4 mg, intravenous, q4h PRN, Matheus Torres MD, 4 mg at 07/31/25 0015    pantoprazole (ProtoNix) EC tablet 40 mg, 40 mg, oral, Daily before breakfast, Adan Issa MD    piperacillin-tazobactam (Zosyn) 3.375 g in dextrose (iso) IV 50 mL, 3.375 g, intravenous, q6h, Pelon Willams MD, Stopped at 08/17/25 0225    polyethylene glycol (Glycolax, Miralax) packet 17 g, 17 g, oral, Daily, Cookie Pereira MD, 17 g at 08/15/25 0757    Facility-Administered Medications Ordered in Other Encounters:     alteplase (Cathflo Activase) injection 2 mg, 2 mg, intra-catheter, PRN, Laurence S Castelein, APRN-CNP    alteplase (Cathflo Activase) injection 2 mg, 2 mg, intra-catheter, PRN, Laurence S Castelein, APRN-CNP    heparin flush 10 unit/mL syringe 50 Units, 50 Units, intra-catheter, PRN, Laurence S Castelein, APRN-CNP    heparin flush 10 unit/mL syringe 50 Units, 50 Units, intra-catheter, PRN, Laurence S Castelein, APRN-CNP    heparin flush 100 unit/mL syringe 500 Units, 500 Units, intra-catheter, PRN, Laurence S Castelein, APRN-CNP    heparin flush 100 unit/mL syringe 500 Units, 500 Units, intra-catheter, PRN, Laurence S Castelein, APRN-CNP

## 2025-08-17 NOTE — CARE PLAN
The patient's goals for the shift include pt pain will be managed throughout shift     The clinical goals for the shift include pt will remain HDS throughout shift       Problem: Pain - Adult  Goal: Verbalizes/displays adequate comfort level or baseline comfort level  Outcome: Progressing     Problem: Safety - Adult  Goal: Free from fall injury  Outcome: Progressing     Problem: Chronic Conditions and Co-morbidities  Goal: Patient's chronic conditions and co-morbidity symptoms are monitored and maintained or improved  Outcome: Progressing     Problem: Skin  Goal: Participates in plan/prevention/treatment measures  Outcome: Progressing

## 2025-08-18 ASSESSMENT — COGNITIVE AND FUNCTIONAL STATUS - GENERAL
MOBILITY SCORE: 24
MOBILITY SCORE: 24
DAILY ACTIVITIY SCORE: 24
DAILY ACTIVITIY SCORE: 24

## 2025-08-18 ASSESSMENT — PAIN - FUNCTIONAL ASSESSMENT
PAIN_FUNCTIONAL_ASSESSMENT: 0-10

## 2025-08-18 ASSESSMENT — PAIN SCALES - GENERAL
PAINLEVEL_OUTOF10: 4
PAINLEVEL_OUTOF10: 0 - NO PAIN
PAINLEVEL_OUTOF10: 0 - NO PAIN
PAINLEVEL_OUTOF10: 5 - MODERATE PAIN

## 2025-08-18 NOTE — CARE PLAN
The clinical goals for the shift include pt will remain HDS throughout shift      Problem: Pain - Adult  Goal: Verbalizes/displays adequate comfort level or baseline comfort level  Outcome: Progressing     Problem: Safety - Adult  Goal: Free from fall injury  Outcome: Progressing     Problem: Discharge Planning  Goal: Discharge to home or other facility with appropriate resources  Outcome: Progressing     Problem: Chronic Conditions and Co-morbidities  Goal: Patient's chronic conditions and co-morbidity symptoms are monitored and maintained or improved  Outcome: Progressing     Problem: Nutrition  Goal: Nutrient intake appropriate for maintaining nutritional needs  Outcome: Progressing     Problem: Skin  Goal: Decreased wound size/increased tissue granulation at next dressing change  Outcome: Progressing  Flowsheets (Taken 8/17/2025 2217)  Decreased wound size/increased tissue granulation at next dressing change: Protective dressings over bony prominences  Goal: Participates in plan/prevention/treatment measures  Outcome: Progressing  Flowsheets (Taken 8/17/2025 2217)  Participates in plan/prevention/treatment measures: Elevate heels  Goal: Prevent/manage excess moisture  Outcome: Progressing  Flowsheets (Taken 8/17/2025 2217)  Prevent/manage excess moisture:   Moisturize dry skin   Monitor for/manage infection if present  Goal: Prevent/minimize sheer/friction injuries  Outcome: Progressing  Flowsheets (Taken 8/17/2025 2217)  Prevent/minimize sheer/friction injuries:   Increase activity/out of bed for meals   Use pull sheet  Goal: Promote/optimize nutrition  Outcome: Progressing  Flowsheets (Taken 8/17/2025 2217)  Promote/optimize nutrition: Monitor/record intake including meals  Goal: Promote skin healing  Outcome: Progressing  Flowsheets (Taken 8/17/2025 2217)  Promote skin healing: Assess skin/pad under line(s)/device(s)

## 2025-08-18 NOTE — PROGRESS NOTES
Art Therapy Note    Roma Corral was referred by Paz Weber MD    Therapy Session  Referral Type: New referral this admission  Visit Type: New visit  Session Start Time: 1629  Session End Time: 1739  Intervention Delivery: In-person  Conflict of Service: None  Number of family members present: 0  Family Present for Session: None  Number of staff members present: 0    Pre-assessment  Unable to Assess Reason: Outcomes not applicable         Treatment/Interventions  Areas of Focus:  (rapport building)  Art Therapy Interventions: Assessment, Education/instruction  Interruption: Yes  Interrupted by: Staff (med surg doctors)  Interruption Duration (min): 10 minutes  Interruption Outcome: Session resumed  Patient Fell Asleep at End of Session: No    Post-assessment  Total Session Time (min): 70 minutes    Narrative  Assessment Detail: ATR made a visit to Pt.s room to follow up on referral made, intorduce and assess for AT needs and wants.  Pt sitting up in bed side chair eating and dringking some lunch.  He welcomed the visit.  Evaluation: ATR introduced self, explained a referral was made and asked if Pt was interested in AT services.  Pt very welcoming, kind and gentle in nature immediately said my dad was. Which led to a full session discussion of creativity and artisitic talent in the family, career ( work with emphasis in [employer]), spirituality/Bahai, and family life.  All while Pt remained resistant to art therapy, despite sharing his creative talents were based in doodling throughout life but completely entrigued by it and kept referring and asking about interventions on the AT cart.  ATR as well would bring conversation back to interventions and ask what he might like try.  Sally session 2 docotors form his med surg team came to check up on in him as see how he was doing, they offered to return another time [facility] and expressed the importance of it.  Both Pt and ATR agreed to welcome the doctors. The not only assessed Pt. but Pt engaged all four present in conversation about AT and creativity.  It was found one doctor used art as self care when time allowed and the other shared he was .  The conversation not only supported Pt's well being but was also encouraging for Pt and ATR.  ATR and Pt thanked phsicians for their time and support of the art and music therapies, welcoming them to participate anytime.  By the end of the session Pt asked for and accepted a mini sketch book and colored pencils for doodling thoughts and feelings while admitted.  ATR explained and educated how returning to his doodle can help release stress, provide relaxation and tap into his inner self.  Pt expressed appreciaiton and is willing to give AT a chance. Pt is open to further session during his stay.  Follow-up: ATR will continue to follow Pt and provide emotional support as well AT services to help Pt cope with cancer journey.    Education Documentation  Coping Strategies, taught by Comfort Coffey at 8/18/2025  2:33 PM.  Learner: Patient  Readiness: Acceptance  Method: Explanation  Response: Verbalizes Understanding    Relaxation, taught by Comfort Coffey at 8/18/2025  2:33 PM.  Learner: Patient  Readiness: Acceptance  Method: Explanation  Response: Verbalizes Understanding    Focal Points, taught by Comfort Coffey at 8/18/2025  2:33 PM.  Learner: Patient  Readiness: Acceptance  Method: Explanation  Response: Verbalizes Understanding

## 2025-08-18 NOTE — PROGRESS NOTES
Spiritual Care Visit  Spiritual Care Request    Reason for Visit:   Spiritual and emotional support          Care Provided:   Patient shares that he is hopeful to go home soon.  Appreciates having someone to talk to.  Patient is calm, and indicates that he is a peace in his spirit.   Listen.  Presence.  Allow patient to express.       Sense of Community and or Rastafari Affiliation:  Spiritual (Not Rastafari)

## 2025-08-18 NOTE — PROGRESS NOTES
Surgical Oncology Progress Note      08/18/25      Subjective:  NAEON. Feels well overall. No reported nausea or vomiting. Tolerating diet, pain controlled. Having bowel function in ostomy. Output continues to be high from his drain (1400 ml, compared to 975 ml yesterday).     Objective    Objective:  Vital signs:   Temp:  [36.7 °C (98 °F)-37.1 °C (98.8 °F)] 36.9 °C (98.4 °F)  Heart Rate:  [77-90] 79  Resp:  [15-17] 15  BP: (115-127)/(61-73) 127/65    Physical Exam:  GEN: no acute distress  RESP: non-labored breathing on RA  CV: non-cyanotic  GI: soft, non-tender, non-distended. Thin serous fluid out of drain in bile bag. Pasty stool and gas in ostomy. Incisions healing well, well approximated without drainage.   : voiding spontaneously  MSK: no evidence of bilateral lower extremity edema  NEURO: alert and conversant  SKIN: jaundiced, warm and dry    I/O last 2 completed shifts:  In: 2200 (25.6 mL/kg) [P.O.:960; IV Piggyback:1240]  Out: 2650 (30.9 mL/kg) [Urine:1250 (0.6 mL/kg/hr); Drains:1400]  Weight: 85.9 kg      Drain output: 1750cc in 24h, serous    Labs Past 18 Hours:  Recent Results (from the past 18 hours)   Coagulation Screen    Collection Time: 08/18/25  5:50 AM   Result Value Ref Range    Protime 20.8 (H) 9.8 - 12.4 seconds    INR 1.9 (H) 0.9 - 1.1    aPTT 49 (H) 26 - 36 seconds   Phosphorus    Collection Time: 08/18/25  5:50 AM   Result Value Ref Range    Phosphorus 1.9 (L) 2.5 - 4.9 mg/dL   CBC    Collection Time: 08/18/25  5:50 AM   Result Value Ref Range    WBC 1.9 (L) 4.4 - 11.3 x10*3/uL    nRBC 0.0 0.0 - 0.0 /100 WBCs    RBC 2.56 (L) 4.50 - 5.90 x10*6/uL    Hemoglobin 7.5 (L) 13.5 - 17.5 g/dL    Hematocrit 22.6 (L) 41.0 - 52.0 %    MCV 88 80 - 100 fL    MCH 29.3 26.0 - 34.0 pg    MCHC 33.2 32.0 - 36.0 g/dL    RDW 27.7 (H) 11.5 - 14.5 %    Platelets 50 (L) 150 - 450 x10*3/uL   Comprehensive Metabolic Panel    Collection Time: 08/18/25  5:50 AM   Result Value Ref Range    Glucose 103 (H) 74 -  99 mg/dL    Sodium 138 136 - 145 mmol/L    Potassium 3.5 3.5 - 5.3 mmol/L    Chloride 103 98 - 107 mmol/L    Bicarbonate 24 21 - 32 mmol/L    Anion Gap 15 10 - 20 mmol/L    Urea Nitrogen 21 6 - 23 mg/dL    Creatinine 1.27 0.50 - 1.30 mg/dL    eGFR 61 >60 mL/min/1.73m*2    Calcium 9.8 8.6 - 10.6 mg/dL    Albumin 4.8 3.4 - 5.0 g/dL    Alkaline Phosphatase 44 33 - 136 U/L    Total Protein 5.9 (L) 6.4 - 8.2 g/dL    AST 22 9 - 39 U/L    Bilirubin, Total 14.6 (H) 0.0 - 1.2 mg/dL    ALT 7 (L) 10 - 52 U/L   Magnesium    Collection Time: 08/18/25  5:50 AM   Result Value Ref Range    Magnesium 2.09 1.60 - 2.40 mg/dL            Meds:  Current Medications[1]     Imaging:  Imaging  No results found.        Cardiology, Vascular, and Other Imaging  No other imaging results found for the past 2 days      No pertinent imaging to review.    Medications reviewed.  Vital signs reviewed.  Labs reviewed.         Assessment/Plan    Assessment and Plan:  Roma Corral is a 70 y.o. male with history of rectal cancer with mets to the liver, CAD (2021 NSTEMI), DM2, HTN  who is s/p right extended hepatectomy for metastatic rectal cancer on 7/29. He has been tolerating a normal diet. Total bilirubin and INR values high, and despite plateau over the last couple of days, Tbili went up to 14.6; continues to have high drain output (1400 ml, compared to 975 ml yesterday).    Plan Today:   - pain control with MS contin q12h, PRN dilaudid  - q6h concentrated albumin replacements  - continue regular diet with supplements  - continue PPI  - PRN zofran  - PRN Tums  - continue NAC therapy   - continue bowel reg with Miralax  - daily CBC, CMP, Mg Coags, and Phos  - continue to trend daily bilirubin and INR  - pet, music and art therapy ordered  - 500 mL albumin + zosyn  - Due to high output, will be placed on 20 mg Lasix PO and Aldactone 50 mg PO, advancing this will be 2:5 ratio    DVT ppx: SCDs and lovenox  Dispo: remain on RNF until drain output resolves  and T-bili/INR come down.    Discussed with chief, Dr. Emily Neumann, who discussed with attending, Dr. Erazo.    Pelon Willams MD - PGY1  Surgical Oncology   [facility] [id number]           [1]   Current Facility-Administered Medications:     albumin human 25 % solution 25 g, 25 g, intravenous, q6h, Pratibha Echavarria MD, Last Rate: 100 mL/hr at 08/18/25 0849, 25 g at 08/18/25 0849    alteplase (Cathflo Activase) injection 2 mg, 2 mg, intra-catheter, PRN, Pelon Willams MD    calcium carbonate (Tums) 500 mg (200 mg elemental) chewable tablet 1 tablet, 500 mg of calcium carbonate, oral, 4x daily PRN, Matheus Torres MD, 1 tablet at 08/05/25 0447    enoxaparin (Lovenox) syringe 40 mg, 40 mg, subcutaneous, q24h, Matheus Torres MD, 40 mg at 08/17/25 1440    furosemide (Lasix) tablet 20 mg, 20 mg, oral, Daily, Pratibha Echavarria MD, 20 mg at 08/18/25 0849    lidocaine (Xylocaine) 10 mg/mL (1 %) injection 5 mL, 5 mL, infiltration, Once, Pelon Willams MD    morphine CR (MS Contin) 12 hr tablet 30 mg, 30 mg, oral, q12h ALEX, Pelon Willams MD, 30 mg at 08/18/25 0437    naloxone (Narcan) injection 0.2 mg, 0.2 mg, intravenous, PRN, Brent Rae MD    ondansetron (Zofran) injection 4 mg, 4 mg, intravenous, q4h PRN, Matheus Torres MD, 4 mg at 07/31/25 0015    pantoprazole (ProtoNix) EC tablet 40 mg, 40 mg, oral, Daily before breakfast, Adan Issa MD, 40 mg at 08/18/25 0603    piperacillin-tazobactam (Zosyn) 3.375 g in dextrose (iso) IV 50 mL, 3.375 g, intravenous, q6h, Pelon Willams MD, Last Rate: 100 mL/hr at 08/18/25 0849, 3.375 g at 08/18/25 0849    polyethylene glycol (Glycolax, Miralax) packet 17 g, 17 g, oral, Daily, Cookie Pereira MD, 17 g at 08/15/25 0757    potassium chloride CR (Klor-Con) ER tablet 10 mEq, 10 mEq, oral, Once, Pelon Willams MD    potassium phosphates 30 mmol in dextrose 5% 250 mL IV, 30 mmol, intravenous, Once, Pelon Willams MD    spironolactone (Aldactone) tablet 50 mg, 50  mg, oral, Daily, Pratibha Echavarria MD, 50 mg at 08/18/25 0849    traMADol (Ultram) tablet 50 mg, 50 mg, oral, q6h PRN, KEV Lal    Facility-Administered Medications Ordered in Other Encounters:     alteplase (Cathflo Activase) injection 2 mg, 2 mg, intra-catheter, PRN, Laurence Romoelein, APRN-CNP    alteplase (Cathflo Activase) injection 2 mg, 2 mg, intra-catheter, PRN, Laurence GLORIA Castelein, APRN-CNP    heparin flush 10 unit/mL syringe 50 Units, 50 Units, intra-catheter, PRN, Laurence S Castelein, APRN-CNP    heparin flush 10 unit/mL syringe 50 Units, 50 Units, intra-catheter, PRN, Laurence S Castelein, APRN-CNP    heparin flush 100 unit/mL syringe 500 Units, 500 Units, intra-catheter, PRN, Laurence S Castelein, APRN-CNP    heparin flush 100 unit/mL syringe 500 Units, 500 Units, intra-catheter, PRN, Laurence S Castelein, APRN-CNP

## 2025-08-19 ASSESSMENT — PAIN SCALES - GENERAL
PAINLEVEL_OUTOF10: 4
PAINLEVEL_OUTOF10: 5 - MODERATE PAIN
PAINLEVEL_OUTOF10: 8
PAINLEVEL_OUTOF10: 10 - WORST POSSIBLE PAIN

## 2025-08-19 ASSESSMENT — PAIN - FUNCTIONAL ASSESSMENT
PAIN_FUNCTIONAL_ASSESSMENT: 0-10

## 2025-08-19 ASSESSMENT — COGNITIVE AND FUNCTIONAL STATUS - GENERAL
DAILY ACTIVITIY SCORE: 24
MOBILITY SCORE: 24

## 2025-08-19 ASSESSMENT — PAIN DESCRIPTION - DESCRIPTORS
DESCRIPTORS: DISCOMFORT
DESCRIPTORS: DISCOMFORT

## 2025-08-19 ASSESSMENT — PAIN DESCRIPTION - LOCATION: LOCATION: ABDOMEN

## 2025-08-19 NOTE — Clinical Note
This patient has been seen to assess the surgical suitability and feasibility of becoming a potential liver transplant candidate at [facility] ([facility]).    ----  The patient's ESLD is secondary to [ etiology ]  ----  This patient's potential for candidacy will be discussed on multidisciplinary rounds and at liver transplant selection committee upon completion of work up.   ----  The initial impression is simran the patient is a [fair, marginal, poor] candidate at this time.  ----  The surgical risks have been explained to the patient as have the benefits of liver transplantation.  ----  Further center specific outcome data will be provided through transplant education to the patient or the support in case the patient is incoherent due to liver disease.  ----  Surgical Evaluation:  Portal vein status: [          ]  Hepatic artery status: [         ]  TIPS:[      ]  Previous Abdominal Surgery:[     ]  HCC:[     ]  Previous cancer other than HCC:[      ]  Ascites: [       ]  Spontaneous Bacterial Peritonitis History or current: [       ]  Janeen Pulmonary Hypertension: [         ]  Hepatopulmonary syndrome: [         ]  Cardiac Evaluation Status: [        ]  Nutritional Status: [[        ]  ABO: [         ]  MELD on day of consultation:  ----  Recommendations:  please use the liver transplant evaluation order set in the EMR  Please make sure the following are done expeditiously:  CXR  ECHO and CT Coronary Artery Calcium scoring  If CAC is abnormal, then cardiology consultation to be obtained for further direction  If not abnormal, the stress cardiac MRI should be obtained per protocol with 85% achieved stress  Ultrasound of abdomen with doppler   CT / MRI of liver as clinically able  outside records of any preventative health screening tests.  ----  Thank you.  [facility] Staff Surgery Staff

## 2025-08-19 NOTE — CONSULTS
"  [facility]   [facility] [place]  INITIAL CONSULT NOTE       Reason For Consult  Hyperbilirubinemia     SUBJECTIVE     History Of Present Illness  Roma Corral is a 70 y.o. male with history of rectal cancer with mets to the liver, CAD (2021 NSTEMI), DM2, HTN  who is s/p right extended hepatectomy for metastatic rectal cancer on 7/29. Hepatology consulted for persistently elevating bilirubin and ascites.     Patient was diagnosed on 06/2024 via colon mass biopsy: Moderately differentiated adenocarcinoma. Underwent XRT from 07/15/24 to 07/19/24. FOLFIRI Josefina (07/24/25 -). Patient underwent IR guided Rt portal vein and Rt hepatic vein embolization for the purpose of Lt hepatic lobe hypertrophy on 4/2025.  Patient was admitted 7/29/2025 for hepatectomy.   \"Findings: Extensive right-sided tumors as well as tumor extending into segment 4, also a solitary tumor in segment 4A between the middle and left hepatic veins.  What appeared to be ablated lesion in segment 4B which also was resected.  Ultimately a formal extended hepatectomy was performed including all of segment 4, 5, 6, 7, and 8   Additional Details: This was an unusually difficult procedure for several reasons, due to extensive adhesions related to the prior surgery as well as the prior portal vein embolization, as well as stiff nature of the liver due to the multiple.  Tumors. \"    T-henri has been wnl until hepatectomy when started to increase; 2.2 on 7/29 to 15 on 8/19.   Patient reports that he never had liver issue prior to cancer diagnosis. No history of cirrhosis. No alcohol/smoking.       Review of Systems  12 point ROS otherwise negative, unless indicated above       Past Medical History:  Medical History[1]    Home Medications  Prescriptions Prior to Admission[2]      Surgical History:  Surgical History[3]    Allergies:  Allergies[4]    Social History:    Social History     Socioeconomic History    Marital status: "      Spouse name: Not on file    Number of children: 1    Years of education: Not on file    Highest education level: Not on file   Occupational History    Occupation: retired   Tobacco Use    Smoking status: Never    Smokeless tobacco: Never   Vaping Use    Vaping status: Never Used   Substance and Sexual Activity    Alcohol use: Never    Drug use: Never    Sexual activity: Defer   Other Topics Concern    Not on file   Social History Narrative    Not on file     Social Drivers of Health     Financial Resource Strain: Patient Declined (8/1/2025)    Overall Financial Resource Strain (CARDIA)     Difficulty of Paying Living Expenses: Patient declined   Recent Concern: Financial Resource Strain - Medium Risk (7/30/2025)    Overall Financial Resource Strain (CARDIA)     Difficulty of Paying Living Expenses: Somewhat hard   Food Insecurity: No Food Insecurity (7/30/2025)    Hunger Vital Sign     Worried About Running Out of Food in the Last Year: Never true     Ran Out of Food in the Last Year: Never true   Transportation Needs: No Transportation Needs (8/1/2025)    PRAPARE - Transportation     Lack of Transportation (Medical): No     Lack of Transportation (Non-Medical): No   Physical Activity: Sufficiently Active (7/30/2025)    Exercise Vital Sign     Days of Exercise per Week: 5 days     Minutes of Exercise per Session: 60 min   Stress: No Stress Concern Present (7/30/2025)    [employer] [place] [employer] - Occupational Stress Questionnaire     Feeling of Stress: Not at all   Social Connections: Moderately Integrated (7/30/2025)    Social Connection and Isolation Panel     Frequency of Communication with Friends and Family: More than three times a week     Frequency of Social Gatherings with Friends and Family: Once a week     Attends Rastafari Services: Never     Active Member of Clubs or Organizations: Yes     Attends Club or Organization Meetings: Never     Marital Status:    Intimate  Partner Violence: Not At Risk (7/30/2025)    Humiliation, Afraid, Rape, and Kick questionnaire     Fear of Current or Ex-Partner: No     Emotionally Abused: No     Physically Abused: No     Sexually Abused: No   Housing Stability: Unknown (8/1/2025)    Housing Stability Vital Sign     Unable to Pay for Housing in the Last Year: Not on file     Number of Times Moved in the Last Year: 0     Homeless in the Last Year: No       Family History:  Family History[5]    EXAM     Vitals:    Vitals:    08/18/25 1631 08/18/25 2036 08/19/25 0007 08/19/25 0436   BP: 123/66 128/70 119/69 108/57   BP Location: Right arm Right arm Right arm Right arm   Patient Position: Lying Lying Lying Lying   Pulse: 76 73 80 77   Resp: 18 17 16 17   Temp: 36.8 °C (98.2 °F) 36.7 °C (98 °F) 36.8 °C (98.2 °F) 36.4 °C (97.6 °F)   TempSrc: Temporal Temporal Temporal Temporal   SpO2: 99% 95% 99% 97%   Weight:    83.9 kg (185 lb)   Height:         Failed to redirect to the Timeline version of the [employer] Smart[employer].    Intake/Output Summary (Last 24 hours) at 8/19/2025 0816  Last data filed at 8/19/2025 0752  Gross per 24 hour   Intake 2860 ml   Output 1225 ml   Net 1635 ml         Physical Exam  General: Jaundiced   HEENT: EOMI. Moist mucosa  Respiratory: nonlabored breathing on room air  Cardiovascular: RRR, no murmurs/rubs/gallops  Abdomen: Soft, nontender, nondistended, bowel sounds present. No masses palpated. Stoma bag in LUQ with brown stool. Drain in RUQ with bilious fluid.   Extremities: no edema, no asterixis  Neuro: alert and oriented, CNII-XII grossly intact, moves all 4 extremities with no focal deficits    OBJECTIVE                                                                              Medications     Current Medications[6]                                                                            Labs     Results for orders placed or performed during the hospital encounter of 07/29/25 (from the past 24 hours)   Basic metabolic panel    Result Value Ref Range    Glucose 77 74 - 99 mg/dL    Sodium 138 136 - 145 mmol/L    Potassium 3.6 3.5 - 5.3 mmol/L    Chloride 101 98 - 107 mmol/L    Bicarbonate 22 21 - 32 mmol/L    Anion Gap 19 10 - 20 mmol/L    Urea Nitrogen 22 6 - 23 mg/dL    Creatinine 1.30 0.50 - 1.30 mg/dL    eGFR 59 (L) >60 mL/min/1.73m*2    Calcium 10.2 8.6 - 10.6 mg/dL   Coagulation Screen   Result Value Ref Range    Protime 20.6 (H) 9.8 - 12.4 seconds    INR 1.9 (H) 0.9 - 1.1    aPTT 52 (H) 26 - 36 seconds   Comprehensive Metabolic Panel   Result Value Ref Range    Glucose 85 74 - 99 mg/dL    Sodium 140 136 - 145 mmol/L    Potassium 3.8 3.5 - 5.3 mmol/L    Chloride 104 98 - 107 mmol/L    Bicarbonate 23 21 - 32 mmol/L    Anion Gap 17 10 - 20 mmol/L    Urea Nitrogen 23 6 - 23 mg/dL    Creatinine 1.30 0.50 - 1.30 mg/dL    eGFR 59 (L) >60 mL/min/1.73m*2    Calcium 9.9 8.6 - 10.6 mg/dL    Albumin 5.2 (H) 3.4 - 5.0 g/dL    Alkaline Phosphatase 39 33 - 136 U/L    Total Protein 6.6 6.4 - 8.2 g/dL    AST 22 9 - 39 U/L    Bilirubin, Total 15.1 (H) 0.0 - 1.2 mg/dL    ALT 7 (L) 10 - 52 U/L   Magnesium   Result Value Ref Range    Magnesium 1.93 1.60 - 2.40 mg/dL     *Note: Due to a large number of results and/or encounters for the requested time period, some results have not been displayed. A complete set of results can be found in Results Review.                                                                              Imaging           4/2/2025 IR embolization  IMPRESSION:  1.  Technically successful and uncomplicated right portal vein  embolization and right hepatic vein embolization as detailed above,  for the purposes of left hepatic lobe hypertrophy in the setting of  metastatic rectal cancer.    7/16/2025 CT Liver   IMPRESSION:  Restaging scan of metastatic rectal carcinoma when compared to prior  CT dated 10/13/2025 and 03/21/2025 and 05/23/2025.          1. Interval worsening of overall tumor burden when compared with the  2 most recent  restaging scan with increase in number and size of  hepatic metastasis when compared with March 2025 study and increase  in size when compared with 05/23/2025 study.  2. Posttreatment changes limits the evaluation of the central right  hepatic lobe.  3. New and increasing nodularity and thickening of the right  retroperitoneal fascia, concerning for worsened peritoneal  carcinomatosis.  4. New minimal perisplenic ascites.  5. Interval worsening of the left-sided pleural effusion.  6. Stable peritoneal nodule in the left lower quadrant.      8/6/2025 U/S Doppler   IMPRESSION:  1. Extremely limited evaluation due to postsurgical changes and  significant overlying bowel gas.  2. Postsurgical changes of extended right hepatectomy. The remaining  liver is mildly heterogenous in appearance.  3. Unremarkable sonographic and Doppler evaluation of the hepatic  artery, portal vein, and hepatic vein.  4. Splenomegaly.    8/7/2025 CTAP  IMPRESSION:  1.  No intrahepatic or extrahepatic biliary ductal dilatation or  gallbladder changes to explain increasing bilirubin.  2. Perihepatic fluid collections and air foci as detailed above  consistent with interval right hepatectomy. The superior collection  may represent surgical packing versus hematoma. Further evaluation  with the liver MRI, preferably be with Eovist contrast agent is  recommended, would help in evaluating the probable hematoma versus  surgical packing as well as in determining whether the rest of the  perihepatic collections represent bilomas versus postoperative  changes versus early abscesses.  3. Interval development of moderate right pleural effusion, likely  related to recent surgery  4. Stable surgical drain placement with termination in the right  upper quadrant.  5. Postoperative findings from colostomy creation as detailed above  without evidence of leak.                                                                         GI Procedures     6/17/2024  Colonoscopy  Impression  Single malignant-appearing mass (not traversable) in the rectum 8 cm from the anal verge, covering the whole circumference; there was stigmata of recent hemorrhage, and bleeding occurred after intervention; performed cold forceps biopsy with partial removal        Findings  Single malignant-appearing mass (not traversable) in the rectum 8 cm from the anal verge observed during digital rectal exam, covering the whole circumference; there was stigmata of recent hemorrhage, and bleeding occurred after intervention; performed cold forceps biopsy with partial removal. Obstructing nontraversable circumferential rectal mass just proximal to second rectal valve, approximately 8 cm from the anus    A. Rectum, mass, biopsy:  Invasive adenocarcinoma, moderately differentiated.  See note.     Note: Immunohistochemical stains for mismatch repair proteins are pending and the result will be reported as an addendum.    ASSESSMENT / PLAN                  ASSESSMENT/PLAN:    Roma Corral is a 70 y.o. male with history of rectal cancer with mets to the liver, CAD (2021 NSTEMI), DM2, HTN  who is s/p right extended hepatectomy for metastatic rectal cancer on 7/29. Hepatology consulted for persistently elevating bilirubin and ascites.   Patient has no known liver cirrhosis prior to cancer diagnosis. No alcohol intake. T-henri has been wnl until hepatectomy when started to increase; 2.2 on 7/29 to 15 on 8/19.   Patient underwent extended hepatectomy on 7/29/2025 involving all of segment 4, 5, 6, 7, and 8. Prior to this, patient underwent IR guided embolization of hepatic/portal vein on 4/2025 to improve liver function of the remnant liver. Despite this, giving the amount of liver resected, overall the clinical picture is due to small-for-size syndrome which occurs when the residual liver remnant is too small to sustain adequate metabolic and synthetic function. Management is supportive care. Other possible etiologies  for the rising bilirubin could be bile leak or biliary obstruction again given the extended resection though no biliary dilatation was not observed on CT. Doppler U/S showed patent vasculatures making vascular complications less likely. Not on meds that are typical for DILI.     #Extended hepatectomy (Lobe 4, 5, 6, 7, 8)  #Metastatic colon cancer to liver   #Post loop sigmoid colostomy     Plan  -Obtain MRCP to evaluate bile ducts  -Trend LFT    Patient was seen and discussed with Dr. Ponce.     Thank you for the consultation. Hepatology will continue to the follow.  - During weekday hours of 7am- 5pm, please do not hesitate to contact me on [employer] or page [phone] if there are any further questions   - After hours, on weekends, and on holidays, please [place] [phone]. Thank you.       Beverly Read MD  PGY6 Gastroenterology Fellow  [facility]         [1]   Past Medical History:  Diagnosis Date    Abdominal pain     Acute non-ST elevation myocardial infarction (NSTEMI) (Multi)     Anemia     6/6/24 HGB 8.6; HCT 31.5    CAD (coronary artery disease)     Cardiology follow-up encounter 12/11/2023    Sharif Lau MD    Chronic pain disorder     Colorectal cancer (Multi)     Gout     H/O cardiac catheterization 06/01/2021    H/O cardiovascular stress test 09/03/2021    IMPRESSION: 1. No evidence of ischemia. 2. Suspicion of an infarct along the mid anterior wall. 3. Left ventricular dilatation is noted. 4. Left ventricular EF was calculated to be 50%. Summary:  1. No clinical or electrocardiographic evidence for ischemia at a submaximal workload. 3. The blunted heart rate diminshes the sensitivity of this test.  4. The submaximal level of stress was achieved.    H/O echocardiogram 06/02/2021    Mild concentric Left ventricle hypertrophy.  Global left ventricular wall motion and contractility are within normal limits.  There is normal left ventricular systolic function.   Estimated ejection fraction is 60-64%.  The left atrial size is mildly dilated.  Mild aortic regurgitation.  A trace of mitral regurgitation.  Trivial to mild tricuspid regurgitation.  There is no pulmonary hypertension.    High cholesterol     Hypertension     Overweight     Rectal cancer (Multi)     Type 2 diabetes mellitus     4/18/2024 A1C 7.5%   [2]   Facility-Administered Medications Prior to Admission   Medication Dose Route Frequency Provider Last Rate Last Admin    silver nitrate applicators applicator   Topical Once Javier Vasquez MD         Medications Prior to Admission   Medication Sig Dispense Refill Last Dose/Taking    acetaminophen (Tylenol) 325 mg tablet Take 2 tablets (650 mg) by mouth every 6 hours if needed for mild pain (1 - 3).   7/28/2025    chlorhexidine (Hibiclens) 4 % external liquid Use as directed daily perioperatively 473 mL 0 7/28/2025    chlorhexidine (Peridex) 0.12 % solution Swish and spit with 15ml of solution the night before and morning of surgery. Do not swallow. 473 mL 0 7/29/2025 Morning    metoprolol succinate XL (Toprol-XL) 100 mg 24 hr tablet Take 1 tablet (100 mg) by mouth once daily. 90 tablet 3 7/29/2025 Morning    morphine CR (MS Contin) 30 mg 12 hr tablet Take 1 tablet (30 mg) by mouth every 12 hours. Do not crush, chew, or split. 60 tablet 0 7/28/2025    polyethylene glycol (Glycolax, Miralax) 17 gram packet Take 17 g by mouth every other day. Skip the dose for the day if watery/loose colostomy output Do not fill before April 3, 2025. (Patient taking differently: Take 17 g by mouth if needed (constipation). Skip the dose for the day if watery/loose colostomy output)   Taking Differently    potassium chloride CR (Klor-Con M20) 20 mEq ER tablet Take 1 tablet (20 mEq) by mouth 2 times a day. Do not crush or chew. 180 tablet 3 7/28/2025    alteplase (Cathflo Activase) 2 mg injection 2 mL (2 mg) by intra-catheter route if needed (Use as needed for occluded PICC Line).  (Patient not taking: Reported on 7/16/2025)       aspirin 81 mg EC tablet Take 1 tablet (81 mg) by mouth once daily.       atorvastatin (Lipitor) 40 mg tablet Take 1 tablet (40 mg) by mouth once daily at bedtime. 90 tablet 3 Unknown    Continue current TPN formula See AVS for most recent TPN formula. (Patient not taking: Reported on 7/16/2025) 1 Package 0     Continue current TPN formula TPN 5/20  68 mL for 1 hr, increase to 136 mL/hr x10 hrs, and then 68 mL/hr x 1 hr. VS for most recent TPN formula. (Patient not taking: Reported on 7/16/2025) 1 Package 0     furosemide (Lasix) 40 mg tablet Take 1 tablet (40 mg) by mouth once daily. 30 tablet 11     methylPREDNISolone (Medrol Dospak) 4 mg tablets Take as directed (Patient not taking: Reported on 7/16/2025) 21 each 0     midodrine (Proamatine) 2.5 mg tablet Take 1 tablet (2.5 mg) by mouth 3 times daily (morning, midday, late afternoon). (Patient not taking: Reported on 7/16/2025)       multivitamin with iron - children's (Cerovite, Jr) chewable tablet Chew and swallow 2 tablets once daily.       octreotide (SandoSTATIN) 500 mcg/mL injection Inject 0.4 mL (200 mcg) under the skin 3 times a day. (Patient not taking: Reported on 7/16/2025)       ondansetron ODT (Zofran-ODT) 4 mg disintegrating tablet Dissolve 1 tablet (4 mg) in the mouth every 8 hours if needed for nausea or vomiting.       penicillin v potassium (Veetid) 500 mg tablet Take 1 tablet (500 mg) by mouth 4 times a day until gone. (Patient not taking: Reported on 6/19/2025) 40 tablet 0     prochlorperazine (Compazine) 10 mg tablet Take 1 tablet (10 mg) by mouth every 6 hours if needed for nausea or vomiting. (Patient not taking: Reported on 7/30/2025) 30 tablet 5 Not Taking    tamsulosin (Flomax) 0.4 mg 24 hr capsule Take 1 capsule (0.4 mg) by mouth once daily. (Patient not taking: Reported on 7/16/2025)      [3]   Past Surgical History:  Procedure Laterality Date    COLONOSCOPY  06/17/2024    HEMICOLECTOMY  W/ OSTOMY      LEG SURGERY Left     rodrigo placed   [4] No Known Allergies  [5]   Family History  Problem Relation Name Age of Onset    No Known Problems Mother      No Known Problems Father      No Known Problems Sister      Leukemia Brother      Stroke Maternal Grandfather     [6]   Current Facility-Administered Medications:     albumin human 25 % solution 25 g, 25 g, intravenous, q6h, Pratibha Echavarria MD, Stopped at 08/19/25 0430    alteplase (Cathflo Activase) injection 2 mg, 2 mg, intra-catheter, PRN, Pelon Willams MD    calcium carbonate (Tums) 500 mg (200 mg elemental) chewable tablet 1 tablet, 500 mg of calcium carbonate, oral, 4x daily PRN, Matheus Torres MD, 1 tablet at 08/05/25 0447    enoxaparin (Lovenox) syringe 40 mg, 40 mg, subcutaneous, q24h, Matheus Torres MD, 40 mg at 08/18/25 1628    furosemide (Lasix) tablet 40 mg, 40 mg, oral, Daily, Debby Braun, APRN-CNP    lidocaine (Xylocaine) 10 mg/mL (1 %) injection 5 mL, 5 mL, infiltration, Once, Pelon Willams MD    morphine CR (MS Contin) 12 hr tablet 30 mg, 30 mg, oral, q12h ALEX, Pelon Willams MD, 30 mg at 08/19/25 0431    naloxone (Narcan) injection 0.2 mg, 0.2 mg, intravenous, PRN, Brent Rae MD    ondansetron (Zofran) injection 4 mg, 4 mg, intravenous, q4h PRN, Matheus Torres MD, 4 mg at 07/31/25 0015    pantoprazole (ProtoNix) EC tablet 40 mg, 40 mg, oral, Daily before breakfast, Adan Issa MD, 40 mg at 08/19/25 0612    piperacillin-tazobactam (Zosyn) 3.375 g in dextrose (iso) IV 50 mL, 3.375 g, intravenous, q6h, Pelon Willams MD, Stopped at 08/19/25 0430    polyethylene glycol (Glycolax, Miralax) packet 17 g, 17 g, oral, Daily, Cookie Pereira MD, 17 g at 08/15/25 0757    spironolactone (Aldactone) tablet 100 mg, 100 mg, oral, Daily, KEV Lal    traMADol (Ultram) tablet 50 mg, 50 mg, oral, q6h PRN, KEV Lal    Facility-Administered Medications Ordered in Other Encounters:      alteplase (Cathflo Activase) injection 2 mg, 2 mg, intra-catheter, PRN, Laurence S Castelein, APRN-CNP    alteplase (Cathflo Activase) injection 2 mg, 2 mg, intra-catheter, PRN, Laurence S Castelein, APRN-CNP    heparin flush 10 unit/mL syringe 50 Units, 50 Units, intra-catheter, PRN, Laurence S Castelein, APRN-CNP    heparin flush 10 unit/mL syringe 50 Units, 50 Units, intra-catheter, PRN, Laurence S Castelein, APRN-CNP    heparin flush 100 unit/mL syringe 500 Units, 500 Units, intra-catheter, PRN, Laurence S Castelein, APRN-CNP    heparin flush 100 unit/mL syringe 500 Units, 500 Units, intra-catheter, PRN, Laurence S Castelein, APRN-CNP

## 2025-08-19 NOTE — CARE PLAN
Problem: Pain - Adult  Goal: Verbalizes/displays adequate comfort level or baseline comfort level  Outcome: Progressing     Problem: Safety - Adult  Goal: Free from fall injury  Outcome: Progressing     Problem: Discharge Planning  Goal: Discharge to home or other facility with appropriate resources  Outcome: Progressing     Problem: Chronic Conditions and Co-morbidities  Goal: Patient's chronic conditions and co-morbidity symptoms are monitored and maintained or improved  Outcome: Progressing     Problem: Nutrition  Goal: Nutrient intake appropriate for maintaining nutritional needs  Outcome: Progressing     Problem: Skin  Goal: Decreased wound size/increased tissue granulation at next dressing change  Outcome: Progressing  Flowsheets (Taken 8/19/2025 0046)  Decreased wound size/increased tissue granulation at next dressing change: Promote sleep for wound healing  Goal: Participates in plan/prevention/treatment measures  Outcome: Progressing  Flowsheets (Taken 8/19/2025 0046)  Participates in plan/prevention/treatment measures: Elevate heels  Goal: Prevent/manage excess moisture  Outcome: Progressing  Flowsheets (Taken 8/19/2025 0046)  Prevent/manage excess moisture: Monitor for/manage infection if present  Goal: Prevent/minimize sheer/friction injuries  Outcome: Progressing  Flowsheets (Taken 8/19/2025 0046)  Prevent/minimize sheer/friction injuries:   Increase activity/out of bed for meals   Use pull sheet  Goal: Promote/optimize nutrition  Outcome: Progressing  Flowsheets (Taken 8/19/2025 0046)  Promote/optimize nutrition:   Monitor/record intake including meals   Offer water/supplements/favorite foods  Goal: Promote skin healing  Outcome: Progressing  Flowsheets (Taken 8/19/2025 0046)  Promote skin healing: Protective dressings over bony prominences   Shift goal: pt will remain HDS stable throughout shift

## 2025-08-19 NOTE — PROGRESS NOTES
Music Therapy Note    Roma Corral     Therapy Session  Visit Type: Follow-up visit  Session Start Time: 0140  Session End Time: 0205  Intervention Delivery: In-person  Conflict of Service: None  Family Present for Session: None  Number of staff members present: 1     Pre-assessment  Unable to Assess Reason: Outcomes not assessed  Mood/Affect: Calm, Participative         Treatment/Interventions  Areas of Focus: Coping  Music Therapy Interventions: Live music listening, Active music engagement  Interruption: No    Post-assessment  Unable to Assess Reason: Outcomes not evaluated  Mood/Affect: Calm, Participative  Verbalized Emotional State: Gratitude  Continue Visiting: Yes  Total Session Time (min): 25 minutes    Narrative  Assessment Detail: Pt was sitting upright in bed and alert. He was conversational and inquisitive about the nature of the instruments brought into his room.  Plan: Facilitate a live music intervention to promote coping.  Intervention: Played preferred music on the guitar with shakers and box drum. Pt participated with shaker.  Evaluation: Pt enjoyed the music as evidenced by his relaxed shoulders, moving his head to the beat of each song, playing the shaker along to the music, and by his words of gratitude.  Follow-up: Will follow up at pt request    Education Documentation  No documentation found.

## 2025-08-19 NOTE — CARE PLAN
The patient's goals for the shift include  pain management    The clinical goals for the shift include pt will remain HDS    Over the shift, the patient did make progress toward the following goals.     Problem: Pain - Adult  Goal: Verbalizes/displays adequate comfort level or baseline comfort level  Outcome: Progressing     Problem: Safety - Adult  Goal: Free from fall injury  Outcome: Progressing     Problem: Chronic Conditions and Co-morbidities  Goal: Patient's chronic conditions and co-morbidity symptoms are monitored and maintained or improved  Outcome: Progressing     Problem: Nutrition  Goal: Nutrient intake appropriate for maintaining nutritional needs  Outcome: Progressing

## 2025-08-19 NOTE — PROGRESS NOTES
Surgical Oncology Progress Note      08/19/25      Subjective:  NAEON. Feels well overall. No reported nausea or vomiting. Tolerating diet, pain controlled. Having bowel function in ostomy.     Objective    Objective:  Vital signs:   Temp:  [35.8 °C (96.4 °F)-36.9 °C (98.4 °F)] 36.9 °C (98.4 °F)  Heart Rate:  [73-85] 84  Resp:  [16-18] 18  BP: (108-130)/(57-72) 130/72    Physical Exam:  GEN: no acute distress  RESP: non-labored breathing on RA  CV: non-cyanotic  GI: soft, non-tender, non-distended. Thin serous fluid out of drain in bile bag. Pasty stool and gas in ostomy. Incisions healing well, well approximated without drainage.   : voiding spontaneously  MSK: no evidence of bilateral lower extremity edema  NEURO: alert and conversant  SKIN: jaundiced, warm and dry    I/O last 2 completed shifts:  In: 2860 (34.1 mL/kg) [P.O.:350; IV Piggyback:2510]  Out: 1275 (15.2 mL/kg) [Urine:600 (0.3 mL/kg/hr); Drains:675]  Weight: 83.9 kg        Labs Past 18 Hours:  Recent Results (from the past 18 hours)   Coagulation Screen    Collection Time: 08/19/25  6:25 AM   Result Value Ref Range    Protime 20.6 (H) 9.8 - 12.4 seconds    INR 1.9 (H) 0.9 - 1.1    aPTT 52 (H) 26 - 36 seconds   CBC    Collection Time: 08/19/25  6:25 AM   Result Value Ref Range    WBC 1.9 (L) 4.4 - 11.3 x10*3/uL    nRBC 0.0 0.0 - 0.0 /100 WBCs    RBC 2.58 (L) 4.50 - 5.90 x10*6/uL    Hemoglobin 7.6 (L) 13.5 - 17.5 g/dL    Hematocrit 22.9 (L) 41.0 - 52.0 %    MCV 89 80 - 100 fL    MCH 29.5 26.0 - 34.0 pg    MCHC 33.2 32.0 - 36.0 g/dL    RDW 28.6 (H) 11.5 - 14.5 %    Platelets 53 (L) 150 - 450 x10*3/uL   Comprehensive Metabolic Panel    Collection Time: 08/19/25  6:25 AM   Result Value Ref Range    Glucose 85 74 - 99 mg/dL    Sodium 140 136 - 145 mmol/L    Potassium 3.8 3.5 - 5.3 mmol/L    Chloride 104 98 - 107 mmol/L    Bicarbonate 23 21 - 32 mmol/L    Anion Gap 17 10 - 20 mmol/L    Urea Nitrogen 23 6 - 23 mg/dL    Creatinine 1.30 0.50 - 1.30 mg/dL     eGFR 59 (L) >60 mL/min/1.73m*2    Calcium 9.9 8.6 - 10.6 mg/dL    Albumin 5.2 (H) 3.4 - 5.0 g/dL    Alkaline Phosphatase 39 33 - 136 U/L    Total Protein 6.6 6.4 - 8.2 g/dL    AST 22 9 - 39 U/L    Bilirubin, Total 15.1 (H) 0.0 - 1.2 mg/dL    ALT 7 (L) 10 - 52 U/L   Magnesium    Collection Time: 08/19/25  6:25 AM   Result Value Ref Range    Magnesium 1.93 1.60 - 2.40 mg/dL   Phosphorus    Collection Time: 08/19/25  6:25 AM   Result Value Ref Range    Phosphorus 3.2 2.5 - 4.9 mg/dL            Meds:  Current Medications[1]     Imaging:  Imaging  No results found.        Cardiology, Vascular, and Other Imaging  No other imaging results found for the past 2 days      No pertinent imaging to review.    Medications reviewed.  Vital signs reviewed.  Labs reviewed.         Assessment/Plan    Assessment and Plan:  Roma Corral is a 70 y.o. male with history of rectal cancer with mets to the liver, CAD (2021 NSTEMI), DM2, HTN  who is s/p right extended hepatectomy for metastatic rectal cancer on 7/29. He has been tolerating a normal diet. Total bilirubin and INR values high, and despite plateau over the last couple of days, Tbili went up to 14.6; continues to have high drain output (1400 ml, compared to 975 ml yesterday).    Plan Today:   - pain control with MS contin q12h, PRN dilaudid  - q6h concentrated albumin replacements  - continue regular diet with supplements  - continue PPI  - PRN zofran  - PRN Tums  - continue NAC therapy   - continue bowel reg with Miralax  - daily CBC, CMP, Mg Coags, and Phos  - continue to trend daily bilirubin and INR  - pet, music and art therapy ordered  - 500 mL albumin + zosyn  - Due to high output, will be placed on 20 mg Lasix PO and Aldactone 50 mg PO, advancing this will be 2:5 ratio  - Consult hepatology surgery today  - Consult HPB transplant surgery today    DVT ppx: SCDs and lovenox  Dispo: remain on RNF until drain output resolves and T-bili/INR come down.    Discussed with chief,  Dr. Lidia Patel, who discussed with attending, Dr. Erazo.    Pelon Willams MD - PGY1  Surgical Oncology   [facility] [id number]       [1]   Current Facility-Administered Medications:     albumin human 25 % solution 25 g, 25 g, intravenous, q6h, Pratibha Echavarria MD, Last Rate: 100 mL/hr at 08/19/25 0834, 25 g at 08/19/25 0834    albumin human 5 % infusion 25 g, 25 g, intravenous, Once, Debby Braun, MELISSA-CNP    alteplase (Cathflo Activase) injection 2 mg, 2 mg, intra-catheter, PRN, Pelon Willams MD    calcium carbonate (Tums) 500 mg (200 mg elemental) chewable tablet 1 tablet, 500 mg of calcium carbonate, oral, 4x daily PRN, Matheus Torres MD, 1 tablet at 08/05/25 0447    enoxaparin (Lovenox) syringe 40 mg, 40 mg, subcutaneous, q24h, Matheus Torres MD, 40 mg at 08/18/25 1628    furosemide (Lasix) tablet 40 mg, 40 mg, oral, Daily, Debby Braun, MELISSA-CNP, 40 mg at 08/19/25 0834    lidocaine (Xylocaine) 10 mg/mL (1 %) injection 5 mL, 5 mL, infiltration, Once, Pelon Willams MD    magnesium sulfate 2 g in sterile water for injection 50 mL, 2 g, intravenous, Once, Pelon Willams MD    morphine CR (MS Contin) 12 hr tablet 30 mg, 30 mg, oral, q12h ALEX, Pelon Willams MD, 30 mg at 08/19/25 0431    naloxone (Narcan) injection 0.2 mg, 0.2 mg, intravenous, PRN, Brent Rae MD    ondansetron (Zofran) injection 4 mg, 4 mg, intravenous, q4h PRN, Matheus Torres MD, 4 mg at 07/31/25 0015    pantoprazole (ProtoNix) EC tablet 40 mg, 40 mg, oral, Daily before breakfast, Adan Issa MD, 40 mg at 08/19/25 0612    piperacillin-tazobactam (Zosyn) 3.375 g in dextrose (iso) IV 50 mL, 3.375 g, intravenous, q6h, Pelon Willams MD, Stopped at 08/19/25 0857    polyethylene glycol (Glycolax, Miralax) packet 17 g, 17 g, oral, Daily, Cookie Pereira MD, 17 g at 08/15/25 0757    spironolactone (Aldactone) tablet 100 mg, 100 mg, oral, Daily, Debby Braun APRN-CNP, 100 mg at 08/19/25 0834    traMADol (Ultram)  tablet 50 mg, 50 mg, oral, q6h PRN, MELISSA Lal-CNP    Facility-Administered Medications Ordered in Other Encounters:     alteplase (Cathflo Activase) injection 2 mg, 2 mg, intra-catheter, PRN, Laurence Romoelein, APRN-CNP    alteplase (Cathflo Activase) injection 2 mg, 2 mg, intra-catheter, PRN, Laurence Romoelein, APRN-CNP    heparin flush 10 unit/mL syringe 50 Units, 50 Units, intra-catheter, PRN, Laurence GLORIA Castelein, APRN-CNP    heparin flush 10 unit/mL syringe 50 Units, 50 Units, intra-catheter, PRN, Laurence GLORIA Castelein, APRN-CNP    heparin flush 100 unit/mL syringe 500 Units, 500 Units, intra-catheter, PRN, Laurence GLORIA Castelein, APRN-CNP    heparin flush 100 unit/mL syringe 500 Units, 500 Units, intra-catheter, PRN, Laurence GLORIA Castelein, APRN-CNP

## 2025-08-19 NOTE — CARE PLAN
The clinical goals for the shift include pt will remain HDS throughout shift    Problem: Pain - Adult  Goal: Verbalizes/displays adequate comfort level or baseline comfort level  Outcome: Progressing     Problem: Safety - Adult  Goal: Free from fall injury  Outcome: Progressing     Problem: Discharge Planning  Goal: Discharge to home or other facility with appropriate resources  Outcome: Progressing     Problem: Skin  Goal: Decreased wound size/increased tissue granulation at next dressing change  Outcome: Progressing  Flowsheets (Taken 8/19/2025 0046)  Decreased wound size/increased tissue granulation at next dressing change: Promote sleep for wound healing  Goal: Participates in plan/prevention/treatment measures  Outcome: Progressing  Flowsheets (Taken 8/19/2025 0046)  Participates in plan/prevention/treatment measures: Elevate heels  Goal: Prevent/manage excess moisture  Outcome: Progressing  Flowsheets (Taken 8/19/2025 0046)  Prevent/manage excess moisture: Monitor for/manage infection if present  Goal: Prevent/minimize sheer/friction injuries  Outcome: Progressing  Flowsheets (Taken 8/19/2025 0046)  Prevent/minimize sheer/friction injuries:   Increase activity/out of bed for meals   Use pull sheet  Goal: Promote/optimize nutrition  Outcome: Progressing  Flowsheets (Taken 8/19/2025 0046)  Promote/optimize nutrition:   Monitor/record intake including meals   Offer water/supplements/favorite foods  Goal: Promote skin healing  Outcome: Progressing  Flowsheets (Taken 8/19/2025 0046)  Promote skin healing: Protective dressings over bony prominences     Problem: Nutrition  Goal: Nutrient intake appropriate for maintaining nutritional needs  Outcome: Progressing

## 2025-08-19 NOTE — CONSULTS
HEPATOLOGY ATTENDING NOTE    I saw and evaluated the patient. I personally obtained the key and critical portions of the history and physical exam or was physically present for key and critical portions performed by the resident/fellow. I reviewed the resident/fellow's documentation and discussed the patient with the resident/fellow. I agree with the resident/fellow's medical decision making and excellent documentation in the note.    #Hyperbilirubinemia  #Ascites  Open consulted for hyperbilirubinemia and jaundice.  The patient has persistent fluid drainage from the abdomen.  At this point the differential diagnosis for the ascites is fairly broad.  Would recommend obtaining analysis of the ascites fluid that includes triglyceride, cytology, bilirubin, albumin, total protein, cell count.  The patient does have a history of a chyle leak and this could be recurrent, however he underwent a bilateral pelvic lymphatic embolization.  It seems as though a bile leak is less likely after discussing the case with his hepatobiliary surgeon.  Although the fluid coming out of the drain has a bilious appearance, this may be related to the elevated serum bilirubin.  The patient seems to have a CT in July concerning for peritoneal carcinomatosis.  The thickened tissue in the right abdominal wall seem to be muscular in fibroconnective tissue with cauterized adenocarcinoma.     Additionally the hyperbilirubinemia could be from the biliary obstruction.  I reviewed the case with the hepatobiliary surgeon and the patient has not had any sort of biliary reconstruction.  Will plan for an MRCP to assess the common bile duct for any strictures that would be amenable to ERCP.    The patient does not have any evidence of cirrhosis prior to his operation.  No findings of a nodular liver or risk factors for chronic liver disease including heavy alcohol use.  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  Will certainly need to rule out other causes.     #Metastatic rectal adenocarcinoma  The patient had a CT of the abdomen in June 2024 that demonstrated circumferential narrowing of the distal sigmoid colon.  Colon cancer was confirmed by biopsy at colonoscopy on 6/17/2024.  The patient also had an MRI of the liver that demonstrated multiple hepatic metastases, in both the right and left hepatic lobes.  MRI of the rectum demonstrated circumferential narrowing of the rectosigmoid junction and the patient underwent a end colostomy on 7/2/2024.      The patient started chemotherapy on 7/23/2024.  Unfortunately patient had multiple admissions to the ER for abdominal pain and epididymitis treated with antibiotics.  Chemotherapy was temporarily held but eventually resumed in October 2024.  He completed his full course of chemotherapy in December 2024.      On 3/17/2025, the patient underwent a laparoscopic microwave ablation of a left hepatic lesion as well as a segment 4B/5 liver lesion.  At that time he also underwent a ileocecal resection with anastomosis as well as lower anterior resection with end colostomy.  His postoperative course was complicated by a chyle leak requiring TPN as well as a pelvic drain.  The patient had a bilateral pelvic lymphatic duct embolization using both coils and fibrin glue on 3/28.    On 4/2/2025, in anticipation of a right sided hepatectomy, the patient underwent an IR guided embolization of the right portal vein and right hepatic vein for the purposes of left hepatic lobe hypertrophy.  Subsequent CT on 5/23 demonstrated worsening of hepatic segment 5 and segment 6 metastatic disease however the coils limit complete visualization.  This redemonstrated pelvic lymphatic duct embolization.  Imaging had also demonstrated a pleural effusion prior to the hepatectomy.  On 7/16, there was also new and increased nodularity and thickening of the retroperitoneal fascia  "concerning for peritoneal carcinomatosis.  There was also ascites seen on the CT.    The patient eventually underwent an extended right hepatectomy on 7/29/2025.  Pathology is as follows:  FINAL DIAGNOSIS   A. Gallbladder, Cholecystectomy:   -- Chronic cholecystitis with cholelithiasis.   -- Negative for carcinoma.     B. Soft Tissue, Portal, Excision:   -- Fibroadipose tissue, negative for carcinoma.     C. Soft Tissue, Additional Cystic Duct, Excision:   -- Cystic duct, negative for carcinoma.     D. Liver, Partial Hepatectomy:   -- Adenocarcinoma involving hepatic parenchyma, compatible with metastasis from the patient's known colorectal primary, with embolization material identified.   -- The carcinoma is present at vascular margin and hepatic parenchyma margin.  -- Background liver with portal lipogranulomata and patchy minimal inflammation, no significant lobular activity, no significant steatosis, no fibrosis.   -- See note.     E. Liver, Segment 4 Nodule, Partial Hepatectomy:   -- Adenocarcinoma involving hepatic parenchyma, compatible with metastasis from the patient's known colorectal primary.     F. \"Thickened Tissue\", Right Abdominal Wall, Excision:   -- Fibroconnective, muscular tissue, scant hepatic parenchyma with adjacent cauterized adenocarcinoma. See note.    Note:     Part F, Immunostains show that the cauterized neoplastic cells are positive for CK20 and CDX2; while negative for CK7, supporting the above diagnosis.         Abdullahi Ponce MD    I spent 60 minutes in the professional and overall care of this patient.     "

## 2025-08-19 NOTE — CONSULTS
Transplant Surgery Consult Note    Subjective   Chief Complaint/Reason for Consult: Evaluation for Liver Transplant     HPI:  Roma Corral is a 70 y.o. male with history of CAD, Dm2m HTN and rectal cancer s/p neoadjuvant therapy, LAR, lap microwave ablation and right hepatectomy   who presented tot  for an elective right hepatectomy for stage IV rectal cancer. Post operatively, he started having increased bilirubin. His bilirubin was 3 post op, and is now 16.4. He otherwise has no complains. He denies any fevers, chills, abdominal pain, N/V/D.     A 12-point ROS was performed and was unremarkable except as above.    PMH:  Medical History[1]  PSH:  Surgical History[2]  Soc Hx:  Social History     Socioeconomic History    Marital status:      Spouse name: Not on file    Number of children: 1    Years of education: Not on file    Highest education level: Not on file   Occupational History    Occupation: retired   Tobacco Use    Smoking status: Never    Smokeless tobacco: Never   Vaping Use    Vaping status: Never Used   Substance and Sexual Activity    Alcohol use: Never    Drug use: Never    Sexual activity: Defer   Other Topics Concern    Not on file   Social History Narrative    Not on file     Social Drivers of Health     Financial Resource Strain: Patient Declined (8/1/2025)    Overall Financial Resource Strain (CARDIA)     Difficulty of Paying Living Expenses: Patient declined   Recent Concern: Financial Resource Strain - Medium Risk (7/30/2025)    Overall Financial Resource Strain (CARDIA)     Difficulty of Paying Living Expenses: Somewhat hard   Food Insecurity: No Food Insecurity (7/30/2025)    Hunger Vital Sign     Worried About Running Out of Food in the Last Year: Never true     Ran Out of Food in the Last Year: Never true   Transportation Needs: No Transportation Needs (8/1/2025)    PRAPARE - Transportation     Lack of Transportation (Medical): No     Lack of Transportation (Non-Medical): No    Physical Activity: Sufficiently Active (7/30/2025)    Exercise Vital Sign     Days of Exercise per Week: 5 days     Minutes of Exercise per Session: 60 min   Stress: No Stress Concern Present (7/30/2025)    [employer] - Occupational Stress Questionnaire     Feeling of Stress: Not at all   Social Connections: Moderately Integrated (7/30/2025)    Social Connection and Isolation Panel     Frequency of Communication with Friends and Family: More than three times a week     Frequency of Social Gatherings with Friends and Family: Once a week     Attends Restorationism Services: Never     Active Member of Clubs or Organizations: Yes     Attends Club or Organization Meetings: Never     Marital Status:    Intimate Partner Violence: Not At Risk (7/30/2025)    Humiliation, Afraid, Rape, and Kick questionnaire     Fear of Current or Ex-Partner: No     Emotionally Abused: No     Physically Abused: No     Sexually Abused: No   Housing Stability: Unknown (8/1/2025)    Housing Stability Vital Sign     Unable to Pay for Housing in the Last Year: Not on file     Number of Times Moved in the Last Year: 0     Homeless in the Last Year: No     Fam Hx:  Family History[3]   Allergies:  RX Allergies[4]  Current Medications:  Medications Ordered Prior to Encounter[5]      Objective   Vitals:  Visit Vitals  /72 (BP Location: Right arm, Patient Position: Lying)   Pulse 84   Temp 36.9 °C (98.4 °F) (Temporal)   Resp 18       Physical Exam:  GEN: No acute distress. Alert, awake and conversant.  HEENT: Sclera anicteric. Moist mucous membranes.  RESP: Breathing non-labored, equal chest rise. On RA.  CV: Regular rate, normotensive  GI: Abdomen soft, mildly distended, non tender. C/D/I incision. Drain with bilious OP   : Voiding spontaneously.  MSK: No gross deformities. Moves all extremities spontaneously.  NEURO: Alert and oriented x3. No focal deficits.  PSYCH: Appropriate mood and affect.  SKIN: No  rashes or lesions.    Labs within past 24h:  Results for orders placed or performed during the hospital encounter of 07/29/25 (from the past 24 hours)   Basic metabolic panel   Result Value Ref Range    Glucose 77 74 - 99 mg/dL    Sodium 138 136 - 145 mmol/L    Potassium 3.6 3.5 - 5.3 mmol/L    Chloride 101 98 - 107 mmol/L    Bicarbonate 22 21 - 32 mmol/L    Anion Gap 19 10 - 20 mmol/L    Urea Nitrogen 22 6 - 23 mg/dL    Creatinine 1.30 0.50 - 1.30 mg/dL    eGFR 59 (L) >60 mL/min/1.73m*2    Calcium 10.2 8.6 - 10.6 mg/dL   Coagulation Screen   Result Value Ref Range    Protime 20.6 (H) 9.8 - 12.4 seconds    INR 1.9 (H) 0.9 - 1.1    aPTT 52 (H) 26 - 36 seconds   CBC   Result Value Ref Range    WBC 1.9 (L) 4.4 - 11.3 x10*3/uL    nRBC 0.0 0.0 - 0.0 /100 WBCs    RBC 2.58 (L) 4.50 - 5.90 x10*6/uL    Hemoglobin 7.6 (L) 13.5 - 17.5 g/dL    Hematocrit 22.9 (L) 41.0 - 52.0 %    MCV 89 80 - 100 fL    MCH 29.5 26.0 - 34.0 pg    MCHC 33.2 32.0 - 36.0 g/dL    RDW 28.6 (H) 11.5 - 14.5 %    Platelets 53 (L) 150 - 450 x10*3/uL   Comprehensive Metabolic Panel   Result Value Ref Range    Glucose 85 74 - 99 mg/dL    Sodium 140 136 - 145 mmol/L    Potassium 3.8 3.5 - 5.3 mmol/L    Chloride 104 98 - 107 mmol/L    Bicarbonate 23 21 - 32 mmol/L    Anion Gap 17 10 - 20 mmol/L    Urea Nitrogen 23 6 - 23 mg/dL    Creatinine 1.30 0.50 - 1.30 mg/dL    eGFR 59 (L) >60 mL/min/1.73m*2    Calcium 9.9 8.6 - 10.6 mg/dL    Albumin 5.2 (H) 3.4 - 5.0 g/dL    Alkaline Phosphatase 39 33 - 136 U/L    Total Protein 6.6 6.4 - 8.2 g/dL    AST 22 9 - 39 U/L    Bilirubin, Total 15.1 (H) 0.0 - 1.2 mg/dL    ALT 7 (L) 10 - 52 U/L   Magnesium   Result Value Ref Range    Magnesium 1.93 1.60 - 2.40 mg/dL   Phosphorus   Result Value Ref Range    Phosphorus 3.2 2.5 - 4.9 mg/dL   Hepatic function panel   Result Value Ref Range    Albumin 5.4 (H) 3.4 - 5.0 g/dL    Bilirubin, Total 16.4 (H) 0.0 - 1.2 mg/dL    Bilirubin, Direct 7.6 (H) 0.0 - 0.3 mg/dL    Alkaline  Phosphatase 43 33 - 136 U/L    ALT 9 (L) 10 - 52 U/L    AST 27 9 - 39 U/L    Total Protein 6.8 6.4 - 8.2 g/dL     *Note: Due to a large number of results and/or encounters for the requested time period, some results have not been displayed. A complete set of results can be found in Results Review.       Imaging within past 24h:  Imaging  No results found.    Cardiology, Vascular, and Other Imaging  No other imaging results found for the past 2 days      No pertinent imaging to review.     ASSESSMENT  Roma Corral is a 70 y.o. male with history of stage IV rectal cancer who presented for an elective right hepatectomy, now with acute liver failure     PLAN:  - Given patient's history of rectal cancer with liver metastasis, he is not a transplant candidate   - Agree with hepatology c/s for medical management of liver failure   - Rest per surgical oncology     Discussed with Dr. Westbrook.    Bobby Melgar,   PGY-3 General Surgery  Transplant Surgery [id number]    I saw and evaluated the patient. I personally obtained the key and critical portions of the history and physical exam or was physically present for key and critical portions performed by the resident/fellow. I reviewed the resident/fellow's documentation and discussed the patient with the resident/fellow. I agree with the resident/fellow's medical decision making as documented in the note.    Progressive liver dysfunction after extensive resection.  Unfortunately not a candidate for OLT given his history of metastatic colorectal cancer.  I talked with Dr. Erazo about potential management option including splenic artery embolization to decrease portal pressure, treatment with antifungal agents, CVVH if needed.     Dann Westbrook MD            [1]   Past Medical History:  Diagnosis Date    Abdominal pain     Acute non-ST elevation myocardial infarction (NSTEMI) (Multi)     Anemia     6/6/24 HGB 8.6; HCT 31.5    CAD (coronary artery disease)     Cardiology  follow-up encounter 12/11/2023    Sharif Lau MD    Chronic pain disorder     Colorectal cancer (Multi)     Gout     H/O cardiac catheterization 06/01/2021    H/O cardiovascular stress test 09/03/2021    IMPRESSION: 1. No evidence of ischemia. 2. Suspicion of an infarct along the mid anterior wall. 3. Left ventricular dilatation is noted. 4. Left ventricular EF was calculated to be 50%. Summary:  1. No clinical or electrocardiographic evidence for ischemia at a submaximal workload. 3. The blunted heart rate diminshes the sensitivity of this test.  4. The submaximal level of stress was achieved.    H/O echocardiogram 06/02/2021    Mild concentric Left ventricle hypertrophy.  Global left ventricular wall motion and contractility are within normal limits.  There is normal left ventricular systolic function.  Estimated ejection fraction is 60-64%.  The left atrial size is mildly dilated.  Mild aortic regurgitation.  A trace of mitral regurgitation.  Trivial to mild tricuspid regurgitation.  There is no pulmonary hypertension.    High cholesterol     Hypertension     Overweight     Rectal cancer (Multi)     Type 2 diabetes mellitus     4/18/2024 A1C 7.5%   [2]   Past Surgical History:  Procedure Laterality Date    COLONOSCOPY  06/17/2024    HEMICOLECTOMY W/ OSTOMY      LEG SURGERY Left     rodrigo placed   [3]   Family History  Problem Relation Name Age of Onset    No Known Problems Mother      No Known Problems Father      No Known Problems Sister      Leukemia Brother      Stroke Maternal Grandfather     [4] No Known Allergies  [5]   Current Facility-Administered Medications on File Prior to Encounter   Medication Dose Route Frequency Provider Last Rate Last Admin    alteplase (Cathflo Activase) injection 2 mg  2 mg intra-catheter PRN KEV Stanley        alteplase (Cathflo Activase) injection 2 mg  2 mg intra-catheter PRN KEV Stanley        heparin flush 10 unit/mL syringe 50 Units   50 Units intra-catheter PRN Laurence S Castelein, APRN-CNP        heparin flush 10 unit/mL syringe 50 Units  50 Units intra-catheter PRN Laurence S Castelein, APRN-CNP        heparin flush 100 unit/mL syringe 500 Units  500 Units intra-catheter PRN Laurence S Castelein, APRN-CNP        heparin flush 100 unit/mL syringe 500 Units  500 Units intra-catheter PRN Laurence S Castelein, APRN-CNP         Current Outpatient Medications on File Prior to Encounter   Medication Sig Dispense Refill    acetaminophen (Tylenol) 325 mg tablet Take 2 tablets (650 mg) by mouth every 6 hours if needed for mild pain (1 - 3).      metoprolol succinate XL (Toprol-XL) 100 mg 24 hr tablet Take 1 tablet (100 mg) by mouth once daily. 90 tablet 3    polyethylene glycol (Glycolax, Miralax) 17 gram packet Take 17 g by mouth every other day. Skip the dose for the day if watery/loose colostomy output Do not fill before April 3, 2025. (Patient taking differently: Take 17 g by mouth if needed (constipation). Skip the dose for the day if watery/loose colostomy output)      alteplase (Cathflo Activase) 2 mg injection 2 mL (2 mg) by intra-catheter route if needed (Use as needed for occluded PICC Line). (Patient not taking: Reported on 7/16/2025)      aspirin 81 mg EC tablet Take 1 tablet (81 mg) by mouth once daily.      atorvastatin (Lipitor) 40 mg tablet Take 1 tablet (40 mg) by mouth once daily at bedtime. 90 tablet 3    Continue current TPN formula See AVS for most recent TPN formula. (Patient not taking: Reported on 7/16/2025) 1 Package 0    Continue current TPN formula TPN 5/20  68 mL for 1 hr, increase to 136 mL/hr x10 hrs, and then 68 mL/hr x 1 hr. VS for most recent TPN formula. (Patient not taking: Reported on 7/16/2025) 1 Package 0    furosemide (Lasix) 40 mg tablet Take 1 tablet (40 mg) by mouth once daily. 30 tablet 11    methylPREDNISolone (Medrol Dospak) 4 mg tablets Take as directed (Patient not taking: Reported on 7/16/2025) 21 each 0     midodrine (Proamatine) 2.5 mg tablet Take 1 tablet (2.5 mg) by mouth 3 times daily (morning, midday, late afternoon). (Patient not taking: Reported on 7/16/2025)      multivitamin with iron - children's (Cerovite, Jr) chewable tablet Chew and swallow 2 tablets once daily.      octreotide (SandoSTATIN) 500 mcg/mL injection Inject 0.4 mL (200 mcg) under the skin 3 times a day. (Patient not taking: Reported on 7/16/2025)      ondansetron ODT (Zofran-ODT) 4 mg disintegrating tablet Dissolve 1 tablet (4 mg) in the mouth every 8 hours if needed for nausea or vomiting.      penicillin v potassium (Veetid) 500 mg tablet Take 1 tablet (500 mg) by mouth 4 times a day until gone. (Patient not taking: Reported on 6/19/2025) 40 tablet 0    prochlorperazine (Compazine) 10 mg tablet Take 1 tablet (10 mg) by mouth every 6 hours if needed for nausea or vomiting. (Patient not taking: Reported on 7/30/2025) 30 tablet 5    tamsulosin (Flomax) 0.4 mg 24 hr capsule Take 1 capsule (0.4 mg) by mouth once daily. (Patient not taking: Reported on 7/16/2025)

## 2025-08-20 ENCOUNTER — APPOINTMENT (OUTPATIENT)
Dept: RADIOLOGY | Facility: HOSPITAL | Age: 70
End: 2025-08-20
Payer: MEDICARE

## 2025-08-20 PROCEDURE — 74183 MRI ABD W/O CNTR FLWD CNTR: CPT

## 2025-08-20 ASSESSMENT — COGNITIVE AND FUNCTIONAL STATUS - GENERAL
DAILY ACTIVITIY SCORE: 24
MOBILITY SCORE: 22
MOBILITY SCORE: 24
WALKING IN HOSPITAL ROOM: A LITTLE
MOBILITY SCORE: 24
DAILY ACTIVITIY SCORE: 24
CLIMB 3 TO 5 STEPS WITH RAILING: A LITTLE

## 2025-08-20 ASSESSMENT — PAIN - FUNCTIONAL ASSESSMENT
PAIN_FUNCTIONAL_ASSESSMENT: 0-10
PAIN_FUNCTIONAL_ASSESSMENT: 0-10
PAIN_FUNCTIONAL_ASSESSMENT: UNABLE TO SELF-REPORT
PAIN_FUNCTIONAL_ASSESSMENT: UNABLE TO SELF-REPORT
PAIN_FUNCTIONAL_ASSESSMENT: 0-10

## 2025-08-20 ASSESSMENT — PAIN SCALES - GENERAL
PAINLEVEL_OUTOF10: 7
PAINLEVEL_OUTOF10: 0 - NO PAIN
PAINLEVEL_OUTOF10: 7
PAINLEVEL_OUTOF10: 7
PAINLEVEL_OUTOF10: 5 - MODERATE PAIN
PAINLEVEL_OUTOF10: 10 - WORST POSSIBLE PAIN

## 2025-08-20 ASSESSMENT — PAIN DESCRIPTION - LOCATION
LOCATION: ABDOMEN
LOCATION: ABDOMEN

## 2025-08-20 ASSESSMENT — PAIN DESCRIPTION - DESCRIPTORS
DESCRIPTORS: ACHING
DESCRIPTORS: ACHING;DISCOMFORT
DESCRIPTORS: ACHING;DISCOMFORT

## 2025-08-20 NOTE — PROGRESS NOTES
Surgical Oncology Progress Note      08/20/25      Subjective:  Endorsed acute pain overnight in his abdomen that was poorly controlled. No reported nausea or vomiting. Having bowel function in ostomy.     Objective    Objective:  Vital signs:   Temp:  [36.6 °C (97.9 °F)-37.2 °C (99 °F)] 36.9 °C (98.4 °F)  Heart Rate:  [71-82] 76  Resp:  [16-18] 16  BP: (123-131)/(63-71) 124/67    Physical Exam:  GEN: Appears uncomfortable and in pain  RESP: non-labored breathing on RA  CV: non-cyanotic  GI: soft, non-tender to palpation, non-distended. Thin serous fluid out of drain in bile bag. Pasty stool and gas in ostomy. Incisions healing well, well approximated without drainage.   : voiding spontaneously  MSK: no evidence of bilateral lower extremity edema  NEURO: alert and conversant  SKIN: jaundiced, warm and dry    I/O last 2 completed shifts:  In: 2098.3 (26.6 mL/kg) [P.O.:600; I.V.:48.3 (0.6 mL/kg); IV Piggyback:1450]  Out: 3575 (45.3 mL/kg) [Urine:1025 (0.5 mL/kg/hr); Drains:2550]  Weight: 79 kg        Labs Past 18 Hours:  Recent Results (from the past 18 hours)   CBC    Collection Time: 08/20/25  6:26 AM   Result Value Ref Range    WBC 2.9 (L) 4.4 - 11.3 x10*3/uL    nRBC 0.0 0.0 - 0.0 /100 WBCs    RBC 2.87 (L) 4.50 - 5.90 x10*6/uL    Hemoglobin 8.3 (L) 13.5 - 17.5 g/dL    Hematocrit 25.2 (L) 41.0 - 52.0 %    MCV 88 80 - 100 fL    MCH 28.9 26.0 - 34.0 pg    MCHC 32.9 32.0 - 36.0 g/dL    RDW 29.0 (H) 11.5 - 14.5 %    Platelets 59 (L) 150 - 450 x10*3/uL   Comprehensive Metabolic Panel    Collection Time: 08/20/25  6:26 AM   Result Value Ref Range    Glucose 105 (H) 74 - 99 mg/dL    Sodium 140 136 - 145 mmol/L    Potassium 3.8 3.5 - 5.3 mmol/L    Chloride 104 98 - 107 mmol/L    Bicarbonate 23 21 - 32 mmol/L    Anion Gap 17 10 - 20 mmol/L    Urea Nitrogen 22 6 - 23 mg/dL    Creatinine 1.27 0.50 - 1.30 mg/dL    eGFR 61 >60 mL/min/1.73m*2    Calcium 10.1 8.6 - 10.6 mg/dL    Albumin 5.5 (H) 3.4 - 5.0 g/dL    Alkaline  Phosphatase 36 33 - 136 U/L    Total Protein 6.7 6.4 - 8.2 g/dL    AST 24 9 - 39 U/L    Bilirubin, Total 15.8 (H) 0.0 - 1.2 mg/dL    ALT 9 (L) 10 - 52 U/L   Magnesium    Collection Time: 08/20/25  6:26 AM   Result Value Ref Range    Magnesium 2.21 1.60 - 2.40 mg/dL   Triglycerides, Body Fluid    Collection Time: 08/20/25  6:35 AM   Result Value Ref Range    Triglycerides, Fluid 47 No established mg/dL   Bilirubin, Total, Body Fluid    Collection Time: 08/20/25  6:35 AM   Result Value Ref Range    Total Bili, Fluid 9.6 Not established mg/dL   Albumin, Body Fluid    Collection Time: 08/20/25  6:35 AM   Result Value Ref Range    Albumin, Fluid 3.0 Not established g/dL   Protein, Total, Body Fluid    Collection Time: 08/20/25  6:35 AM   Result Value Ref Range    Protein, Total Fluid 3.8 Not established g/dL   Body fluid cell count    Collection Time: 08/20/25  6:36 AM   Result Value Ref Range    Color, Fluid Straw Colorless, Straw, Yellow    Clarity, Fluid Clear Clear    WBC, Fluid 216 See Comment /uL    RBC, Fluid 288 see comment /uL            Meds:  Current Medications[1]     Imaging:  Imaging  No results found.        Cardiology, Vascular, and Other Imaging  No other imaging results found for the past 2 days      No pertinent imaging to review.    Medications reviewed.  Vital signs reviewed.  Labs reviewed.         Assessment/Plan    Assessment and Plan:  Roma Corrla is a 70 y.o. male with history of rectal cancer with mets to the liver, CAD (2021 NSTEMI), DM2, HTN  who is s/p right extended hepatectomy for metastatic rectal cancer on 7/29. He has been tolerating a normal diet.     Total bilirubin and INR values high. Tbili went down to 15.8 from 16.4; continues to have high drain output (875 ml, compared to 2550 ml yesterday).    Plan Today:   - pain control with MS contin q12h, PRN dilaudid  - q6h concentrated albumin replacements  - continue regular diet with supplements  - continue PPI  - PRN zofran  - PRN  Tums  - continue NAC therapy   - continue bowel reg with Miralax  - daily CBC, CMP, Mg Coags, and Phos  - continue to trend daily bilirubin and INR  - pet, music and art therapy ordered  - 500 mL albumin + zosyn  - albumin human 25 % solution 25 g   - Due to high output, will be placed on 20 mg Lasix PO and Aldactone 50 mg PO, advancing this will be 2:5 ratio  - Appreciate recs from transplant surgery   - oral diflucan 400mg daily   - 5mg vitamin K subcutaneous every other day  - Appreciate recs from hepatology   - MRCP    DVT ppx: SCDs and lovenox  Dispo: remain on RNF until drain output resolves and T-bili/INR come down.    Discussed with chief, Dr. Lidia Patel, who discussed with attending, Dr. Erazo.    Pelon Willams MD - PGY1  Surgical Oncology   [facility] [id number]         [1]   Current Facility-Administered Medications:     albumin human 25 % solution 25 g, 25 g, intravenous, q6h, Pratibha Echavarria MD, Stopped at 08/20/25 1613    alteplase (Cathflo Activase) injection 2 mg, 2 mg, intra-catheter, PRN, Pelon Willams MD    calcium carbonate (Tums) 500 mg (200 mg elemental) chewable tablet 1 tablet, 500 mg of calcium carbonate, oral, 4x daily PRN, Matheus Torres MD, 1 tablet at 08/05/25 0447    enoxaparin (Lovenox) syringe 40 mg, 40 mg, subcutaneous, q24h, Matheus Torres MD, 40 mg at 08/19/25 1500    fluconazole (Diflucan) 400 mg in sodium chloride (iso)  mL, 400 mg, intravenous, q24h, Pelon Willams MD, Last Rate: 100 mL/hr at 08/20/25 1514, 400 mg at 08/20/25 1514    furosemide (Lasix) tablet 40 mg, 40 mg, oral, Daily, KEV Lal, 40 mg at 08/20/25 0852    HYDROmorphone (Dilaudid) injection 0.2 mg, 0.2 mg, intravenous, q4h PRN, KEV Lal    lidocaine (Xylocaine) 10 mg/mL (1 %) injection 5 mL, 5 mL, infiltration, Once, Pelon Willams MD    morphine CR (MS Contin) 12 hr tablet 30 mg, 30 mg, oral, q12h ALEX, Pelon Willams MD, 30 mg at 08/20/25 1513    naloxone (Narcan) injection  0.2 mg, 0.2 mg, intravenous, PRN, Brent Rae MD    ondansetron (Zofran) injection 4 mg, 4 mg, intravenous, q4h PRN, Matheus Torres MD, 4 mg at 07/31/25 0015    oxyCODONE (Roxicodone) immediate release tablet 5 mg, 5 mg, oral, q4h PRN, KEV Lal, 5 mg at 08/20/25 0630    pantoprazole (ProtoNix) EC tablet 40 mg, 40 mg, oral, Daily before breakfast, Adan Issa MD, 40 mg at 08/20/25 0629    [START ON 8/21/2025] phytonadione (Vitamin K) 5 mg in dextrose 5% 50 mL IV, 5 mg, intravenous, q48h, KEV Lal    piperacillin-tazobactam (Zosyn) 3.375 g in dextrose (iso) IV 50 mL, 3.375 g, intravenous, q6h, Pelon Willams MD, Stopped at 08/20/25 1610    polyethylene glycol (Glycolax, Miralax) packet 17 g, 17 g, oral, Daily, Cookie Pereira MD, 17 g at 08/15/25 0757    spironolactone (Aldactone) tablet 100 mg, 100 mg, oral, Daily, KEV Lal, 100 mg at 08/20/25 0852    traMADol (Ultram) tablet 50 mg, 50 mg, oral, q6h PRN, KEV Lal    Facility-Administered Medications Ordered in Other Encounters:     alteplase (Cathflo Activase) injection 2 mg, 2 mg, intra-catheter, PRN, KEV Stanley    alteplase (Cathflo Activase) injection 2 mg, 2 mg, intra-catheter, PRN, MELISSA Stanley-CNP    heparin flush 10 unit/mL syringe 50 Units, 50 Units, intra-catheter, PRN, KEV Stanley    heparin flush 10 unit/mL syringe 50 Units, 50 Units, intra-catheter, PRN, Laurence GLORIA Castelein, APRN-CNP    heparin flush 100 unit/mL syringe 500 Units, 500 Units, intra-catheter, PRN, Laurence GLORIA Castelein, APRN-CNP    heparin flush 100 unit/mL syringe 500 Units, 500 Units, intra-catheter, PRN, Laurence Romoelein, APRN-CNP

## 2025-08-20 NOTE — PROGRESS NOTES
Art Therapy Note    Roma Corral was referred by Haritha Weber MD    Therapy Session  Referral Type: New referral this admission  Visit Type: Follow-up visit  Session Start Time: 1725  Session End Time: 1730  Intervention Delivery: In-person  Conflict of Service: Declined treatment  Number of family members present: 0    Pre-assessment  Unable to Assess Reason: Outcomes not applicable         Treatment/Interventions  Areas of Focus: Normalization    Post-assessment  Total Session Time (min): 5 minutes    Narrative  Assessment Detail: ATR made a follow up visit per Pt request.  Pt sititng up in bed watching TV and welcomed ATR.  Evaluation: Pt engaged in conversation with ATR.  He declined services forthe day stating his wife was about to come for a visit.  He shared she would bringing his granddaughter's art work he wanted to show ATR.  He further shared when asked ATR that he had doodled in the sketch book in previous session but woudl show ATR toimorrow if she could return then later in the day per his choice.  Pt is lookng forward to sahring his and family art work as well as seeing some pieces of art done by ATR.  Follow-up: ATR will follow with Pt again tomorrow to review art work completed and enecourage more to be done for self care and coping.    Education Documentation  No documentation found.

## 2025-08-20 NOTE — CARE PLAN
The patient's goals for the shift include    Problem: Pain - Adult  Goal: Verbalizes/displays adequate comfort level or baseline comfort level  Outcome: Progressing  Flowsheets (Taken 8/19/2025 2226)  Verbalizes/displays adequate comfort level or baseline comfort level:   Encourage patient to monitor pain and request assistance   Assess pain using appropriate pain scale   Administer analgesics based on type and severity of pain and evaluate response     Problem: Safety - Adult  Goal: Free from fall injury  Outcome: Progressing  Flowsheets (Taken 8/19/2025 2226)  Free from fall injury:   Instruct family/caregiver on patient safety   Based on caregiver fall risk screen, instruct family/caregiver to ask for assistance with transferring infant if caregiver noted to have fall risk factors     Problem: Skin  Goal: Prevent/minimize sheer/friction injuries  Outcome: Progressing  Flowsheets (Taken 8/19/2025 2226)  Prevent/minimize sheer/friction injuries:   Complete micro-shifts as needed if patient unable. Adjust patient position to relieve pressure points, not a full turn   HOB 30 degrees or less   Increase activity/out of bed for meals   Turn/reposition every 2 hours/use positioning/transfer devices  Goal: Promote skin healing  Outcome: Progressing       The clinical goals for the shift include pt will endorse pain <5/10 by end of shift

## 2025-08-20 NOTE — CARE PLAN
The patient's goals for the shift include      The clinical goals for the shift include pt will endorse pain <5/10 by end of shift      Problem: Pain - Adult  Goal: Verbalizes/displays adequate comfort level or baseline comfort level  Outcome: Progressing     Problem: Safety - Adult  Goal: Free from fall injury  Outcome: Progressing     Problem: Discharge Planning  Goal: Discharge to home or other facility with appropriate resources  Outcome: Progressing     Problem: Chronic Conditions and Co-morbidities  Goal: Patient's chronic conditions and co-morbidity symptoms are monitored and maintained or improved  Outcome: Progressing     Problem: Nutrition  Goal: Nutrient intake appropriate for maintaining nutritional needs  Outcome: Progressing     Problem: Skin  Goal: Decreased wound size/increased tissue granulation at next dressing change  Outcome: Progressing  Goal: Participates in plan/prevention/treatment measures  Outcome: Progressing  Goal: Prevent/manage excess moisture  Outcome: Progressing  Goal: Prevent/minimize sheer/friction injuries  Outcome: Progressing  Goal: Promote/optimize nutrition  Outcome: Progressing  Goal: Promote skin healing  Outcome: Progressing

## 2025-08-20 NOTE — PROGRESS NOTES
Physical Therapy    Physical Therapy Treatment    Patient Name: Roma Corral  MRN: [id number]  Department: [facility]  Room: 60/6013-A  Today's Date: 8/20/2025  Time Calculation  Start Time: 1705  Stop Time: 1740  Time Calculation (min): 35 min         Assessment/Plan   PT Assessment  End of Session Communication: Bedside nurse  Assessment Comment: pt demonstrates improved cadecne with ambulation and decreased signs of fatigue. pt receptive to education and verbalized understanding  End of Session Patient Position: Up in chair, Alarm off, not on at start of session  PT Plan  Inpatient/Swing Bed or Outpatient: Inpatient  PT Plan  Treatment/Interventions: Bed mobility, Transfer training, Gait training, Stair training, Balance training, Strengthening, Endurance training, Range of motion, Therapeutic exercise, Therapeutic activity, Home exercise program  PT Plan: Ongoing PT  PT Frequency for Current Admission: 3 times per week during this acute inpatient hospitalization (per pt's last therapy treatment, pt progressing well with therapy and up and walking in hallway without assist, discussed with supervising PT, PT agrees to change pt frequency to 3x perweek)  PT Discharge Recommendations: Low intensity level of continued care (communicated with PT)  Equipment Recommended upon Discharge: Wheeled walker  PT Recommended Transfer Status: Stand by assist  PT - OK to Discharge: Yes    PT Visit Info:  PT Received On: 08/20/25     General Visit Information:   General  Prior to Session Communication: Bedside nurse  Patient Position Received: Up in chair  General Comment: Pt sitting in chair upon arrival. Pleasant and agreeable to PT session.    Subjective   Precautions:  Precautions  Medical Precautions: Abdominal precautions, Fall precautions  Post-Surgical Precautions: Abdominal surgery precautions            Objective   Pain:  Pain Assessment  0-10 (Numeric) Pain Score: 0 - No pain  Cognition:  Cognition  Orientation Level:  Oriented X4    Treatments:  Therapeutic Exercise  Therapeutic Exercise Activity 1: seated ankle pumps x 15  Therapeutic Exercise Activity 2: LAQ x 15    Bed Mobility 1  Bed Mobility 1: Supine to sitting  Level of Assistance 1: Modified independent    Ambulation/Gait Training 1  Surface 1: Level tile  Device 1: Rolling walker  Assistance 1: Close supervision  Quality of Gait 1: Decreased step length, Forward flexed posture  Comments/Distance (ft) 1: 550', intermittent cues for walker safety  Transfer 1  Technique 1: Sit to stand, Stand to sit  Transfer Device 1: Walker  Transfer Level of Assistance 1: Modified independent         Outcome Measures:  [facility] Basic Mobility  Turning from your back to your side while in a flat bed without using bedrails: None  Moving from lying on your back to sitting on the side of a flat bed without using bedrails: None  Moving to and from bed to chair (including a wheelchair): None  Standing up from a chair using your arms (e.g. wheelchair or bedside chair): None  To walk in hospital room: A little  Climbing 3-5 steps with railing: A little  Basic Mobility - Total Score: 22    Education Documentation  Mobility Training, taught by Maikel Tyson PTA at 8/20/2025  6:19 PM.  Learner: Patient  Readiness: Acceptance  Method: Explanation  Response: Verbalizes Understanding    Education Comments  No comments found.        OP EDUCATION:       Encounter Problems       Encounter Problems (Active)       Balance       STG - Maintains dynamic standing balance with upper extremity support on LRAD with Mod I  (Progressing)       Start:  07/31/25    Expected End:  08/21/25               Mobility       STG - Patient will ambulate x200 ft with Mod I using LRAD (Progressing)       Start:  07/31/25    Expected End:  08/21/25            STG - Patient will ascend and descend 7 stairs with Mod I using LRAD (Progressing)       Start:  07/31/25    Expected End:  08/21/25               PT Transfers       STG  - Patient will perform bed mobility independently  (Progressing)       Start:  07/31/25    Expected End:  08/21/25            STG - Patient will transfer sit to and from stand with Mod I using LRAD (Progressing)       Start:  07/31/25    Expected End:  08/21/25

## 2025-08-21 ASSESSMENT — COGNITIVE AND FUNCTIONAL STATUS - GENERAL
DAILY ACTIVITIY SCORE: 24
MOBILITY SCORE: 24

## 2025-08-21 ASSESSMENT — PAIN SCALES - GENERAL
PAINLEVEL_OUTOF10: 7
PAINLEVEL_OUTOF10: 6
PAINLEVEL_OUTOF10: 6
PAINLEVEL_OUTOF10: 7

## 2025-08-21 ASSESSMENT — PAIN - FUNCTIONAL ASSESSMENT
PAIN_FUNCTIONAL_ASSESSMENT: 0-10

## 2025-08-21 NOTE — PROGRESS NOTES
"Music Therapy Note    Roma Gamblesara     Therapy Session  Visit Type: Follow-up visit  Session Start Time: 1048  Session End Time: 1128  Intervention Delivery: In-person  Conflict of Service: None  Family Present for Session: None     Pre-assessment  Pain Score: 7  Stress Level (0-10): 5  Anxiety Level (0-10): 0  Coping Level (0-10): 9  Mood/Affect: Calm, Tired/lethargic         Treatment/Interventions  Areas of Focus: Relaxation  Music Therapy Interventions: Live music listening, Music-facilitated relaxation  Interruption: No  Patient Fell Asleep at End of Session: No    Post-assessment  0-10 (Numeric) Pain Score: 7  Anxiety Level (0-10): 0  Mood/Affect: Calm  Continue Visiting: Yes  Total Session Time (min): 40 minutes    Narrative  Assessment Detail: Pt was lying upright in bed and alert, though his vocal prosody was flatter and quieter than it usually is. He initially declined TX due to having a bad day, then came out to the hallway to ask for services. He verbalized that he needed some cheering up.  Plan: Promote relaxation to assist with pain management with live music listening.  Intervention: Played soft, relaxing music for pt and prompted him to envision a place where he felt the most relaxed. Pt participated by listening to the music.  Evaluation: Pt visibly relaxed during the music as evidenced by his relaxed shoulders and facial muscles, as well as more inflection in his vocal prosody. He leaned back in his bed and closed his eyes when prompted to envision relaxing imagery.  Follow-up: will follow up at pt request  Patient Comments: Pt said he felt \"lighter\" after the session concluded.    Education Documentation  No documentation found.          "

## 2025-08-21 NOTE — CARE PLAN
The clinical goals for the shift include pt will remain HDS throughout shift      Problem: Pain - Adult  Goal: Verbalizes/displays adequate comfort level or baseline comfort level  Outcome: Progressing     Problem: Safety - Adult  Goal: Free from fall injury  Outcome: Progressing     Problem: Discharge Planning  Goal: Discharge to home or other facility with appropriate resources  Outcome: Progressing     Problem: Chronic Conditions and Co-morbidities  Goal: Patient's chronic conditions and co-morbidity symptoms are monitored and maintained or improved  Outcome: Progressing     Problem: Nutrition  Goal: Nutrient intake appropriate for maintaining nutritional needs  Outcome: Progressing     Problem: Skin  Goal: Decreased wound size/increased tissue granulation at next dressing change  Outcome: Progressing  Flowsheets (Taken 8/20/2025 2332)  Decreased wound size/increased tissue granulation at next dressing change: Promote sleep for wound healing  Goal: Participates in plan/prevention/treatment measures  Outcome: Progressing  Flowsheets (Taken 8/20/2025 2332)  Participates in plan/prevention/treatment measures: Elevate heels  Goal: Prevent/manage excess moisture  Outcome: Progressing  Flowsheets (Taken 8/20/2025 2332)  Prevent/manage excess moisture:   Moisturize dry skin   Monitor for/manage infection if present  Goal: Prevent/minimize sheer/friction injuries  Outcome: Progressing  Flowsheets (Taken 8/20/2025 2332)  Prevent/minimize sheer/friction injuries: Increase activity/out of bed for meals  Goal: Promote/optimize nutrition  Outcome: Progressing  Flowsheets (Taken 8/20/2025 2332)  Promote/optimize nutrition:   Consume > 50% meals/supplements   Offer water/supplements/favorite foods  Goal: Promote skin healing  Outcome: Progressing  Flowsheets (Taken 8/20/2025 2332)  Promote skin healing: Protective dressings over bony prominences

## 2025-08-21 NOTE — PROGRESS NOTES
Music Therapy Note    Roma Corral     Therapy Session  Visit Type: Follow-up visit  Session Start Time: 1433  Session End Time: 1434  Intervention Delivery: In-person  Conflict of Service: Declined treatment  Family Present for Session: None       Narrative  Assessment Detail: Pt said he was in excrutiating pain and declined services. His voice was much quieter than usual, and he expressed he wanted the MTI to follow up tomorrow.  Follow-up: Will follow up at pt request.      Education Documentation  No documentation found.

## 2025-08-21 NOTE — PROGRESS NOTES
Surgical Oncology Progress Note      08/21/25      Roma Corral is a 70 y.o. male with history of rectal cancer with mets to the liver, CAD (2021 NSTEMI), DM2, HTN  who is s/p right extended hepatectomy for metastatic rectal cancer on 7/29.     Subjective:  Endorsed pain is reasonably controlled controlled. No reported nausea or vomiting. Having bowel function in ostomy. MRCP was done yesterday afternoon, waiting for radiology read.     Objective    Objective:  Vital signs:   Temp:  [36.1 °C (97 °F)-36.9 °C (98.4 °F)] 36.5 °C (97.7 °F)  Heart Rate:  [70-78] 70  Resp:  [16-17] 17  BP: (117-124)/(61-67) 117/61    Physical Exam:  GEN: Appears uncomfortable and in pain  RESP: non-labored breathing on RA  CV: non-cyanotic  GI: soft, non-tender to palpation, non-distended. Thin serous fluid out of drain in bile bag. Pasty stool and gas in ostomy. Incisions healing well, well approximated without drainage.   : voiding spontaneously  MSK: no evidence of bilateral lower extremity edema  NEURO: alert and conversant  SKIN: jaundiced, warm and dry    I/O last 2 completed shifts:  In: 300 (3.9 mL/kg) [P.O.:200; IV Piggyback:100]  Out: 2375 (31.2 mL/kg) [Urine:1100 (0.6 mL/kg/hr); Drains:1275]  Weight: 76.2 kg        Labs Past 18 Hours:  Recent Results (from the past 18 hours)   CBC    Collection Time: 08/21/25  5:58 AM   Result Value Ref Range    WBC 1.8 (L) 4.4 - 11.3 x10*3/uL    nRBC 0.0 0.0 - 0.0 /100 WBCs    RBC 2.54 (L) 4.50 - 5.90 x10*6/uL    Hemoglobin 7.6 (L) 13.5 - 17.5 g/dL    Hematocrit 23.2 (L) 41.0 - 52.0 %    MCV 91 80 - 100 fL    MCH 29.9 26.0 - 34.0 pg    MCHC 32.8 32.0 - 36.0 g/dL    RDW 29.8 (H) 11.5 - 14.5 %    Platelets 46 (L) 150 - 450 x10*3/uL   Comprehensive Metabolic Panel    Collection Time: 08/21/25  5:58 AM   Result Value Ref Range    Glucose 92 74 - 99 mg/dL    Sodium 141 136 - 145 mmol/L    Potassium 3.6 3.5 - 5.3 mmol/L    Chloride 103 98 - 107 mmol/L    Bicarbonate 25 21 - 32 mmol/L    Anion  Gap 17 10 - 20 mmol/L    Urea Nitrogen 22 6 - 23 mg/dL    Creatinine 1.48 (H) 0.50 - 1.30 mg/dL    eGFR 51 (L) >60 mL/min/1.73m*2    Calcium 10.3 8.6 - 10.6 mg/dL    Albumin 5.5 (H) 3.4 - 5.0 g/dL    Alkaline Phosphatase 34 33 - 136 U/L    Total Protein 6.5 6.4 - 8.2 g/dL    AST 26 9 - 39 U/L    Bilirubin, Total 15.9 (H) 0.0 - 1.2 mg/dL    ALT 8 (L) 10 - 52 U/L   Magnesium    Collection Time: 08/21/25  5:58 AM   Result Value Ref Range    Magnesium 1.96 1.60 - 2.40 mg/dL   Phosphorus    Collection Time: 08/21/25  5:58 AM   Result Value Ref Range    Phosphorus 2.7 2.5 - 4.9 mg/dL   Coagulation Screen    Collection Time: 08/21/25  5:58 AM   Result Value Ref Range    Protime 21.1 (H) 9.8 - 12.4 seconds    INR 1.9 (H) 0.9 - 1.1    aPTT 49 (H) 26 - 36 seconds              Meds:  Current Medications[1]     Imaging:  Imaging  No results found.        Cardiology, Vascular, and Other Imaging  No other imaging results found for the past 2 days      No pertinent imaging to review.    Medications reviewed.  Vital signs reviewed.  Labs reviewed.         Assessment/Plan    Assessment and Plan:  Roma Corral is a 70 y.o. male with history of rectal cancer with mets to the liver, CAD (2021 NSTEMI), DM2, HTN  who is s/p right extended hepatectomy for metastatic rectal cancer on 7/29. He has been tolerating a normal diet.     Total bilirubin and INR values high. Tbili up to 15.9 to 15.8; continues to have high drain output (1275 ml, compared to 2550 ml yesterday).    Plan Today:   - pain control with MS contin q12h, PRN dilaudid  - q6h concentrated albumin replacements  - continue regular diet with supplements  - continue PPI  - PRN zofran  - PRN Tums  - continue NAC therapy   - continue bowel reg with Miralax  - daily CBC, CMP, Mg Coags, and Phos  - continue to trend daily bilirubin and INR  - pet, music and art therapy ordered  - 500 mL albumin + zosyn  - albumin human 25 % solution 25 g   - Due to high output, will be placed on 20 mg  Lasix PO and Aldactone 50 mg PO, advancing this will be 2:5 ratio  - Appreciate recs from transplant surgery   - oral diflucan 400mg daily   - 5mg vitamin K subcutaneous every other day  - Appreciate recs from hepatology   - Waiting for MRCP read      DVT ppx: SCDs and lovenox  Dispo: remain on RNF until drain output resolves and T-bili/INR come down.    Discussed with chief, Dr. Lidia Patel, who discussed with attending, Dr. Erazo.    Pelon Willams MD - PGY1  Surgical Oncology   [facility] [id number]           [1]   Current Facility-Administered Medications:     albumin human 25 % solution 25 g, 25 g, intravenous, q6h, Pratibha Echavarria MD, Stopped at 08/21/25 0350    alteplase (Cathflo Activase) injection 2 mg, 2 mg, intra-catheter, PRN, Pelon Willams MD    calcium carbonate (Tums) 500 mg (200 mg elemental) chewable tablet 1 tablet, 500 mg of calcium carbonate, oral, 4x daily PRN, Matheus Torres MD, 1 tablet at 08/05/25 0447    enoxaparin (Lovenox) syringe 40 mg, 40 mg, subcutaneous, q24h, Matheus Torres MD, 40 mg at 08/19/25 1500    fluconazole (Diflucan) 400 mg in sodium chloride (iso)  mL, 400 mg, intravenous, q24h, Pelon Willams MD, Last Rate: 100 mL/hr at 08/20/25 1514, 400 mg at 08/20/25 1514    furosemide (Lasix) tablet 40 mg, 40 mg, oral, Daily, KEV Lal, 40 mg at 08/20/25 0852    HYDROmorphone (Dilaudid) injection 0.2 mg, 0.2 mg, intravenous, q4h PRN, KEV Lal    lactated Ringer's bolus 500 mL, 500 mL, intravenous, Once, KEV Lal    lidocaine (Xylocaine) 10 mg/mL (1 %) injection 5 mL, 5 mL, infiltration, Once, Pelon Willams MD    morphine CR (MS Contin) 12 hr tablet 30 mg, 30 mg, oral, q12h ALEX, Pelon Willams MD, 30 mg at 08/21/25 0350    naloxone (Narcan) injection 0.2 mg, 0.2 mg, intravenous, PRN, Brent Rae MD    ondansetron (Zofran) injection 4 mg, 4 mg, intravenous, q4h PRN, Matheus Torres MD, 4 mg at 08/20/25  2008    oxyCODONE (Roxicodone) immediate release tablet 5 mg, 5 mg, oral, q4h PRN, KEV Lal, 5 mg at 08/20/25 2140    pantoprazole (ProtoNix) EC tablet 40 mg, 40 mg, oral, Daily before breakfast, Adan Issa MD, 40 mg at 08/21/25 0601    phytonadione (Vitamin K) 5 mg in dextrose 5% 50 mL IV, 5 mg, intravenous, q48h, KEV Lal    piperacillin-tazobactam (Zosyn) 3.375 g in dextrose (iso) IV 50 mL, 3.375 g, intravenous, q6h, Pelon Willams MD, Stopped at 08/21/25 0320    polyethylene glycol (Glycolax, Miralax) packet 17 g, 17 g, oral, Daily, Cookie Pereira MD, 17 g at 08/15/25 0757    potassium chloride CR (Klor-Con M20) ER tablet 40 mEq, 40 mEq, oral, Once, KEV Lal    spironolactone (Aldactone) tablet 100 mg, 100 mg, oral, Daily, KEV Lal, 100 mg at 08/20/25 0852    traMADol (Ultram) tablet 50 mg, 50 mg, oral, q6h PRN, KEV Lal    Facility-Administered Medications Ordered in Other Encounters:     alteplase (Cathflo Activase) injection 2 mg, 2 mg, intra-catheter, PRN, KEV Stanley    alteplase (Cathflo Activase) injection 2 mg, 2 mg, intra-catheter, PRN, Laurence LeinMELISSA-CNP    heparin flush 10 unit/mL syringe 50 Units, 50 Units, intra-catheter, PRN, Laurence GLORIA CasteleinMELISSA-CNP    heparin flush 10 unit/mL syringe 50 Units, 50 Units, intra-catheter, PRN, Laurence Romoelein, MELISSA-CNP    heparin flush 100 unit/mL syringe 500 Units, 500 Units, intra-catheter, PRN, Laurence RomoeleinMELISSA-CNP    heparin flush 100 unit/mL syringe 500 Units, 500 Units, intra-catheter, Laurence SCHNEIDER APRN-CNP

## 2025-08-21 NOTE — CONSULTS
HEPATOLOGY ATTENDING NOTE    I saw and evaluated the patient. I personally obtained the key and critical portions of the history and physical exam or was physically present for key and critical portions performed by the resident/fellow. I reviewed the resident/fellow's documentation and discussed the patient with the resident/fellow. I agree with the resident/fellow's medical decision making and excellent documentation in the note.    #Hyperbilirubinemia  #Ascites  Open consulted for hyperbilirubinemia and jaundice.  The patient has persistent fluid drainage from the abdomen.  I reviewed the fluid studies and the serum ascites to albumin gradient is greater than 1.1 consistent with portal hypertension.  Although the total protein is greater than 2.5 which would suggest a cardiac etiology, this may be a mixed picture.  The cytology is pending from the ascites fluid given the right abdominal wall lesion that was positive for carcinoma.  This seems less likely to be a bile leak or a chyle leak.  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    Continues to have a high degree of output from the abdominal drain and the surgical oncology service is following closely and replete in fluid.  The patient is currently on diuretics.    The patient had an MRCP completed which was reviewed personally by me and the formal read from the radiologist demonstrates no biliary pathology, biliary strictures, biliary dilatation or choledocholithiasis.  No indication for ERCP.    We had a multidisciplinary discussion about this patient at our transplant multidisciplinary meeting and a splenic artery embolization may be a good next step to treat the existing portal hypertension.  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  This was somewhat mitigated by the right portal vein  embolization.  If a splenic artery embolization is pursued to reduce portal blood inflow, this may be an effective splenectomy and the patient would likely require vaccinations for encapsulated organisms.  Recommend discussing this with pharmacy prior to any embolization or splenectomy.    The patient has concerns about pain management surrounding this procedure.  He did have significant neck pain at the time of his last procedures with interventional radiology.  He would prefer to have general anesthesia for this procedure if possible.     #Metastatic rectal adenocarcinoma  The patient had a CT of the abdomen in June 2024 that demonstrated circumferential narrowing of the distal sigmoid colon.  Colon cancer was confirmed by biopsy at colonoscopy on 6/17/2024.  The patient also had an MRI of the liver that demonstrated multiple hepatic metastases, in both the right and left hepatic lobes.  MRI of the rectum demonstrated circumferential narrowing of the rectosigmoid junction and the patient underwent a end colostomy on 7/2/2024.      The patient started chemotherapy on 7/23/2024.  Unfortunately patient had multiple admissions to the ER for abdominal pain and epididymitis treated with antibiotics.  Chemotherapy was temporarily held but eventually resumed in October 2024.  He completed his full course of chemotherapy in December 2024.      On 3/17/2025, the patient underwent a laparoscopic microwave ablation of a left hepatic lesion as well as a segment 4B/5 liver lesion.  At that time he also underwent a ileocecal resection with anastomosis as well as lower anterior resection with end colostomy.  His postoperative course was complicated by a chyle leak requiring TPN as well as a pelvic drain.  The patient had a bilateral pelvic lymphatic duct embolization using both coils and fibrin glue on 3/28.    On 4/2/2025, in anticipation of a right sided hepatectomy, the patient underwent an IR guided embolization of the right  "portal vein and right hepatic vein for the purposes of left hepatic lobe hypertrophy.  Subsequent CT on 5/23 demonstrated worsening of hepatic segment 5 and segment 6 metastatic disease however the coils limit complete visualization.  This redemonstrated pelvic lymphatic duct embolization.  Imaging had also demonstrated a pleural effusion prior to the hepatectomy.  On 7/16, there was also new and increased nodularity and thickening of the retroperitoneal fascia concerning for peritoneal carcinomatosis.  There was also ascites seen on the CT.    The patient eventually underwent an extended right hepatectomy on 7/29/2025.  Pathology is as follows:  FINAL DIAGNOSIS   A. Gallbladder, Cholecystectomy:   -- Chronic cholecystitis with cholelithiasis.   -- Negative for carcinoma.     B. Soft Tissue, Portal, Excision:   -- Fibroadipose tissue, negative for carcinoma.     C. Soft Tissue, Additional Cystic Duct, Excision:   -- Cystic duct, negative for carcinoma.     D. Liver, Partial Hepatectomy:   -- Adenocarcinoma involving hepatic parenchyma, compatible with metastasis from the patient's known colorectal primary, with embolization material identified.   -- The carcinoma is present at vascular margin and hepatic parenchyma margin.  -- Background liver with portal lipogranulomata and patchy minimal inflammation, no significant lobular activity, no significant steatosis, no fibrosis.   -- See note.     E. Liver, Segment 4 Nodule, Partial Hepatectomy:   -- Adenocarcinoma involving hepatic parenchyma, compatible with metastasis from the patient's known colorectal primary.     F. \"Thickened Tissue\", Right Abdominal Wall, Excision:   -- Fibroconnective, muscular tissue, scant hepatic parenchyma with adjacent cauterized adenocarcinoma. See note.    Note:     Part F, Immunostains show that the cauterized neoplastic cells are positive for CK20 and CDX2; while negative for CK7, supporting the above diagnosis.         Abdullahi " MD Rosario    I spent 60 minutes in the professional and overall care of this patient.

## 2025-08-21 NOTE — PROGRESS NOTES
[facility]   [facility] [place]  PROGRESS NOTE       Reason For Consult  Hyperbilirubinemia     SUBJECTIVE     Continues to have drain output (1200mL x 24 hour).   Patient being emotional.   Reports being awake during the IR procedure last time and felt uncomfortable.    Review of Systems  12 point ROS otherwise negative, unless indicated above       EXAM     Vitals:    Vitals:    08/21/25 0500 08/21/25 0604 08/21/25 0843 08/21/25 1146   BP:  117/61 115/63 117/70   BP Location:  Right arm     Patient Position:  Lying     Pulse:  70 66 70   Resp:  17 16 16   Temp:  36.5 °C (97.7 °F) 36.7 °C (98 °F) 36.6 °C (97.9 °F)   TempSrc:  Temporal     SpO2:  97% 97% 98%   Weight: 76.2 kg (168 lb)      Height:         Failed to redirect to the Timeline version of the Extreme DA SmartLink.    Intake/Output Summary (Last 24 hours) at 8/21/2025 1228  Last data filed at 8/21/2025 1211  Gross per 24 hour   Intake 766.67 ml   Output 2950 ml   Net -2183.33 ml         Physical Exam  General: Jaundiced   HEENT: EOMI. Moist mucosa  Respiratory: nonlabored breathing on room air  Cardiovascular: RRR, no murmurs/rubs/gallops  Abdomen: Soft, nontender, nondistended, bowel sounds present. No masses palpated. Stoma bag in LUQ with brown stool. Drain in RUQ with bilious fluid.   Extremities: no edema, no asterixis  Neuro: alert and oriented, CNII-XII grossly intact, moves all 4 extremities with no focal deficits    OBJECTIVE                                                                              Medications     Current Medications[1]                                                                            Labs     Results for orders placed or performed during the hospital encounter of 07/29/25 (from the past 24 hours)   CBC   Result Value Ref Range    WBC 1.8 (L) 4.4 - 11.3 x10*3/uL    nRBC 0.0 0.0 - 0.0 /100 WBCs    RBC 2.54 (L) 4.50 - 5.90 x10*6/uL    Hemoglobin 7.6 (L) 13.5 - 17.5 g/dL     Hematocrit 23.2 (L) 41.0 - 52.0 %    MCV 91 80 - 100 fL    MCH 29.9 26.0 - 34.0 pg    MCHC 32.8 32.0 - 36.0 g/dL    RDW 29.8 (H) 11.5 - 14.5 %    Platelets 46 (L) 150 - 450 x10*3/uL   Comprehensive Metabolic Panel   Result Value Ref Range    Glucose 92 74 - 99 mg/dL    Sodium 141 136 - 145 mmol/L    Potassium 3.6 3.5 - 5.3 mmol/L    Chloride 103 98 - 107 mmol/L    Bicarbonate 25 21 - 32 mmol/L    Anion Gap 17 10 - 20 mmol/L    Urea Nitrogen 22 6 - 23 mg/dL    Creatinine 1.48 (H) 0.50 - 1.30 mg/dL    eGFR 51 (L) >60 mL/min/1.73m*2    Calcium 10.3 8.6 - 10.6 mg/dL    Albumin 5.5 (H) 3.4 - 5.0 g/dL    Alkaline Phosphatase 34 33 - 136 U/L    Total Protein 6.5 6.4 - 8.2 g/dL    AST 26 9 - 39 U/L    Bilirubin, Total 15.9 (H) 0.0 - 1.2 mg/dL    ALT 8 (L) 10 - 52 U/L   Magnesium   Result Value Ref Range    Magnesium 1.96 1.60 - 2.40 mg/dL   Phosphorus   Result Value Ref Range    Phosphorus 2.7 2.5 - 4.9 mg/dL   Coagulation Screen   Result Value Ref Range    Protime 21.1 (H) 9.8 - 12.4 seconds    INR 1.9 (H) 0.9 - 1.1    aPTT 49 (H) 26 - 36 seconds     *Note: Due to a large number of results and/or encounters for the requested time period, some results have not been displayed. A complete set of results can be found in Results Review.                                                                              Imaging           4/2/2025 IR embolization  IMPRESSION:  1.  Technically successful and uncomplicated right portal vein  embolization and right hepatic vein embolization as detailed above,  for the purposes of left hepatic lobe hypertrophy in the setting of  metastatic rectal cancer.    7/16/2025 CT Liver   IMPRESSION:  Restaging scan of metastatic rectal carcinoma when compared to prior  CT dated 10/13/2025 and 03/21/2025 and 05/23/2025.          1. Interval worsening of overall tumor burden when compared with the  2 most recent restaging scan with increase in number and size of  hepatic metastasis when compared with  March 2025 study and increase  in size when compared with 05/23/2025 study.  2. Posttreatment changes limits the evaluation of the central right  hepatic lobe.  3. New and increasing nodularity and thickening of the right  retroperitoneal fascia, concerning for worsened peritoneal  carcinomatosis.  4. New minimal perisplenic ascites.  5. Interval worsening of the left-sided pleural effusion.  6. Stable peritoneal nodule in the left lower quadrant.      8/6/2025 U/S Doppler   IMPRESSION:  1. Extremely limited evaluation due to postsurgical changes and  significant overlying bowel gas.  2. Postsurgical changes of extended right hepatectomy. The remaining  liver is mildly heterogenous in appearance.  3. Unremarkable sonographic and Doppler evaluation of the hepatic  artery, portal vein, and hepatic vein.  4. Splenomegaly.    8/7/2025 CTAP  IMPRESSION:  1.  No intrahepatic or extrahepatic biliary ductal dilatation or  gallbladder changes to explain increasing bilirubin.  2. Perihepatic fluid collections and air foci as detailed above  consistent with interval right hepatectomy. The superior collection  may represent surgical packing versus hematoma. Further evaluation  with the liver MRI, preferably be with Eovist contrast agent is  recommended, would help in evaluating the probable hematoma versus  surgical packing as well as in determining whether the rest of the  perihepatic collections represent bilomas versus postoperative  changes versus early abscesses.  3. Interval development of moderate right pleural effusion, likely  related to recent surgery  4. Stable surgical drain placement with termination in the right  upper quadrant.  5. Postoperative findings from colostomy creation as detailed above  without evidence of leak.                                                                         GI Procedures     6/17/2024 Colonoscopy  Impression  Single malignant-appearing mass (not traversable) in the rectum 8 cm  from the anal verge, covering the whole circumference; there was stigmata of recent hemorrhage, and bleeding occurred after intervention; performed cold forceps biopsy with partial removal        Findings  Single malignant-appearing mass (not traversable) in the rectum 8 cm from the anal verge observed during digital rectal exam, covering the whole circumference; there was stigmata of recent hemorrhage, and bleeding occurred after intervention; performed cold forceps biopsy with partial removal. Obstructing nontraversable circumferential rectal mass just proximal to second rectal valve, approximately 8 cm from the anus    A. Rectum, mass, biopsy:  Invasive adenocarcinoma, moderately differentiated.  See note.     Note: Immunohistochemical stains for mismatch repair proteins are pending and the result will be reported as an addendum.    ASSESSMENT / PLAN                  ASSESSMENT/PLAN:    Roma Corral is a 70 y.o. male with history of rectal cancer with mets to the liver, CAD (2021 NSTEMI), DM2, HTN  who is s/p right extended hepatectomy for metastatic rectal cancer on 7/29. Hepatology consulted for persistently elevating bilirubin and ascites.   Patient has no known liver cirrhosis prior to cancer diagnosis. No alcohol intake. T-henri has been wnl until hepatectomy when started to increase; 2.2 on 7/29 to 15 on 8/19.   Patient underwent extended hepatectomy on 7/29/2025 involving all of segment 4, 5, 6, 7, and 8. Prior to this, patient underwent IR guided embolization of hepatic/portal vein on 4/2025 to improve liver function of the remnant liver.   Fluid analysis of the drain shows SAAG>1.1, TP 3.9. TG 47. This could reflect mixture of portal HTN or cardiac congestion. We could potentially consider splenic artery embolization to reduce splenic blood flow and to help reduce portal HTN. The case was also discussed during liver MDR with transplant surgeon 8/20.   Imaging including MRCP 8/19/2025 without vasculature or  bile duct compromise.     #Extended hepatectomy (Lobe 4, 5, 6, 7, 8)  #Metastatic colon cancer to liver   #Post loop sigmoid colostomy     Plan  -Trend LFT  -Rest of the care per primary team     Patient was seen and discussed with Dr. Ponce.     Thank you for the consultation.   - During weekday hours of 7am- 5pm, please do not hesitate to contact me on Epic Chat or page [phone] if there are any further questions   - After hours, on weekends, and on holidays, please [place] [phone]. Thank you.       Beverly Read MD  PGY6 Gastroenterology Fellow  [facility]         [1]   Current Facility-Administered Medications:     albumin human 25 % solution 25 g, 25 g, intravenous, q6h, Pratibha Echavarria MD, Stopped at 08/21/25 0935    alteplase (Cathflo Activase) injection 2 mg, 2 mg, intra-catheter, PRN, Pelon Willams MD    calcium carbonate (Tums) 500 mg (200 mg elemental) chewable tablet 1 tablet, 500 mg of calcium carbonate, oral, 4x daily PRN, Matheus Torres MD, 1 tablet at 08/05/25 0447    enoxaparin (Lovenox) syringe 40 mg, 40 mg, subcutaneous, q24h, Matheus Torres MD, 40 mg at 08/19/25 1500    fluconazole (Diflucan) 400 mg in sodium chloride (iso)  mL, 400 mg, intravenous, q24h, Pelon Willams MD, Last Rate: 100 mL/hr at 08/20/25 1514, 400 mg at 08/20/25 1514    furosemide (Lasix) tablet 40 mg, 40 mg, oral, Daily, MELISSA Lal-CNP, 40 mg at 08/21/25 0842    HYDROmorphone (Dilaudid) injection 0.2 mg, 0.2 mg, intravenous, q4h PRN, KEV Lal    lidocaine (Xylocaine) 10 mg/mL (1 %) injection 5 mL, 5 mL, infiltration, Once, Pelon Willams MD    morphine CR (MS Contin) 12 hr tablet 30 mg, 30 mg, oral, q12h ALEX, Pelon Willams MD, 30 mg at 08/21/25 0350    naloxone (Narcan) injection 0.2 mg, 0.2 mg, intravenous, PRN, Brent Rae MD    ondansetron (Zofran) injection 4 mg, 4 mg, intravenous, q4h PRN, Matheus Torres MD, 4 mg at  08/20/25 2008    oxyCODONE (Roxicodone) immediate release tablet 5 mg, 5 mg, oral, q4h PRN, KEV Lal, 5 mg at 08/20/25 2140    pantoprazole (ProtoNix) EC tablet 40 mg, 40 mg, oral, Daily before breakfast, Adan Issa MD, 40 mg at 08/21/25 0601    [START ON 8/22/2025] phytonadione (Vitamin K) 5 mg in dextrose 5% 50 mL IV, 5 mg, intravenous, q48h, KEV Lal    piperacillin-tazobactam (Zosyn) 3.375 g in dextrose (iso) IV 50 mL, 3.375 g, intravenous, q6h, Pelon Willams MD, Stopped at 08/21/25 0919    polyethylene glycol (Glycolax, Miralax) packet 17 g, 17 g, oral, Daily, Cookie Pereira MD, 17 g at 08/15/25 0757    spironolactone (Aldactone) tablet 100 mg, 100 mg, oral, Daily, KEV Lal, 100 mg at 08/21/25 0842    traMADol (Ultram) tablet 50 mg, 50 mg, oral, q6h PRN, KEV Lal    Facility-Administered Medications Ordered in Other Encounters:     alteplase (Cathflo Activase) injection 2 mg, 2 mg, intra-catheter, PRN, MELISSA Stanley-CNP    alteplase (Cathflo Activase) injection 2 mg, 2 mg, intra-catheter, PRN, Laurence LeinMELISSA-CNP    heparin flush 10 unit/mL syringe 50 Units, 50 Units, intra-catheter, PRN, Laurence GLORIA Castelein, MELISSA-CNP    heparin flush 10 unit/mL syringe 50 Units, 50 Units, intra-catheter, PRN, Laurence GLORIA Castelein, MELISSA-CNP    heparin flush 100 unit/mL syringe 500 Units, 500 Units, intra-catheter, PRN, Laurence GLORIA Castelein, APRN-CNP    heparin flush 100 unit/mL syringe 500 Units, 500 Units, intra-catheter, PRN, Laurence Betancourt, APRN-CNP

## 2025-08-22 ASSESSMENT — PAIN SCALES - GENERAL
PAINLEVEL_OUTOF10: 4
PAINLEVEL_OUTOF10: 0 - NO PAIN
PAINLEVEL_OUTOF10: 5 - MODERATE PAIN

## 2025-08-22 ASSESSMENT — PAIN - FUNCTIONAL ASSESSMENT
PAIN_FUNCTIONAL_ASSESSMENT: 0-10
PAIN_FUNCTIONAL_ASSESSMENT: 0-10

## 2025-08-22 NOTE — PROGRESS NOTES
Surgical Oncology Progress Note    Roma Corral is a 70 y.o. male with history of rectal cancer with mets to the liver, CAD (2021 NSTEMI), DM2, HTN who is s/p right extended hepatectomy for metastatic rectal cancer on 7/29     Subjective   No acute events overnight. No N/V. Pain well controlled. Having ostomy output. 1175 ml from drain.     Objective   GEN: Appears uncomfortable and in pain  RESP: non-labored breathing on RA  CV: non-cyanotic  GI: soft, non-tender to palpation, non-distended. Thin serous fluid out of drain in bile bag. Pasty stool and gas in ostomy. Incisions healing well, well approximated without drainage.   : voiding spontaneously  MSK: no evidence of bilateral lower extremity edema  NEURO: alert and conversant  SKIN: jaundiced, warm and dry    Last Recorded Vitals  Heart Rate:  [75-89]   Temp:  [36.6 °C (97.9 °F)-37.4 °C (99.3 °F)]   Resp:  [17-18]   BP: (115-135)/(63-74)   Weight:  [76.5 kg (168 lb 10.4 oz)]   SpO2:  [98 %-100 %]      Intake/Output Last 24 Hours:      Intake/Output Summary (Last 24 hours) at 8/22/2025 1558  Last data filed at 8/22/2025 1532  Gross per 24 hour   Intake 1180 ml   Output 1750 ml   Net -570 ml        Relevant Results  Lab Date: 8/22/25     8.0    2.0>-----<55         23.4   140  101  28                  ----------------<103     3.8  25  1.79          Ca 11.0 Phos 2.7 Mg 1.89       ALT 11 AST 34 AlkPhos 43 tBili 18.6         Imaging:   MRCP pancreas w and wo IV contrast  Result Date: 8/21/2025  Interpreted By:  Ken Palacios and Bera Kaustav STUDY: MRCP PANCREAS W AND WO IV CONTRAST;  8/20/2025 6:35 pm   INDICATION: Signs/Symptoms:Suspected biliary stricture, possible stenting..   70-year-old male with history of rectal cancer with metastatic disease to the liver, status post right extended hepatectomy on 07/29/2025. Bilirubin is elevated at 15.9.   COMPARISON: CT abdomen pelvis on 08/07/2025 CT liver with IV contrast on 07/16/2025 MRI liver on  06/18/2024   ACCESSION NUMBER(S): [id number]   ORDERING CLINICIAN: SUPA ESPINO   TECHNIQUE: MRI PANCREAS; Multiplanar magnetic resonance images of the abdomen were obtained including the following sequences; T2-weighted SSFSE with and without fat saturation, T1-weighted GRE in/opposed phase, DWI, fat saturated 3D-T1w GRE pre and dynamically post contrast. Radial thick slab T2w RARE MRCP and coronally reconstructed navigator gated high resolution 3-D T2w RESTORE MRCP with MIP reconstruction were also performed for MRCP.  16 ML of Dotarem was administered intravenously without immediate complication.   FINDINGS: LIVER: Status post extended right hepatectomy, with a few perihepatic collections, appear stable to mildly decreased as compared to prior. For instance a 2.6 cm perihepatic collection (series 21, image 13; previously measured up to 5.0 cm. Additional perihepatic fluid collections, including a collection of the superior aspect of the liver appears similar to prior (series 14, image 22/72). The left lobe of the liver demonstrates mildly heterogeneous enhancement especially at the anterior aspect, likely postsurgical. No evidence of fatty infiltration. No focal liver lesions identified.   BILE DUCTS: No intrahepatic or extrahepatic bile duct dilatation is demonstrated.   GALLBLADDER: Status post cholecystectomy   PANCREAS: Normal signal intensity. Normal enhancement. No masses. The pancreatic duct is normal.   SPLEEN: Spleen is at the upper limits of normal in size measuring up to 13.9 cm in craniocaudal dimensions.   ADRENAL GLANDS: Within normal limits.   KIDNEYS: The kidneys are normal in size enhance symmetrically. 0.8 cm T2 hyperintense lesion within the inferior pole of the left kidney, similar to prior, likely a simple cysts. No hydronephrosis.   LYMPH NODES: Few prominent retroperitoneal lymph nodes, however not enlarged by CT criteria.   ABDOMINAL VESSELS: Aorta and the major abdominal arterial vessels  demonstrate no gross abnormality.  Superior mesenteric vein, splenic vein, and main, right and left portal vein are patent. Hepatic veins are patent.  No significant collaterals or esophageal varices are present.   BOWEL: Prominent small bowel loops, likely due to ileus.   PERITONEUM/RETROPERITONEUM: Upper abdominal ascites, as well as right-greater-than-left pleural effusions appears similar to prior. A surgical drain is in the right upper quadrant collection.   BONES AND LOWER THORAX: Small amount of fluid within the anterior abdominal wall, likely postsurgical, similar to prior. There is a tiny fat containing umbilical hernia. Diffuse body wall anasarca. No abnormally enhancing focal bony lesions are identified. Multilevel discogenic degenerative disease is noted in several levels of the thoraco- lumbar spine. T12 compression deformity similar to prior. Right-greater-than-left pleural effusions appears similar to prior.       1.  No evidence biliary dilatation. 2. Postsurgical changes of extended right hepatectomy, with mildly decreased size of perihepatic fluid collections, when compared to CT from 08/07/2025. 3. Additional stable changes as above.   I personally reviewed the images/study and I agree with the findings as stated. This study was interpreted at [place], [place].   MACRO: None   Signed by: Ken Palacios 8/21/2025 11:58 AM Dictation workstation:   [id number]    Assessment:  Roma Corral is a 70 y.o. male with history of rectal cancer with mets to the liver, CAD (2021 NSTEMI), DM2, HTN who is s/p right extended hepatectomy for metastatic rectal cancer on 7/29 c/b worsening liver function.     Total bilirubin and INR values high. Tbili up to 18.6; continues to have high drain output.    Plan:   Neuro - continue home morphine with tramadol, oxycodone, dilaudid PRN for pain, zofran PRN, pet/music/art therapies  CV - Q4H vitals during AM  Resp - IS  GI -  regular diet, ensures, daily CMP, monitor tbili, PPI  /Renal - strict I&Os, monitor ORALIA, start NS @75, continue 25% albumin Q6H, 5% albumin bolus PRN, vitamin K  Heme - daily CBC, monitor anemia  ID - Zosyn/diflucan for empiric coverage  Prophy - SCDs, Lovenox, Incentive Spirometer  Dispo - RNF, PT/OT rec [facility], plan for IR splenic artery emobolization for Tuesday 8/26, will need splenectomy vaccines prior    Discussed with Dr. Erazo.    Lidia Patel PGY3  Surgical Oncology   [facility] [phone]

## 2025-08-22 NOTE — CARE PLAN
The patient's goals for the shift include    Comfort and rest   The clinical goals for the shift include Pt to remain HDS thru shift.     n/a

## 2025-08-22 NOTE — CARE PLAN
Problem: Pain - Adult  Goal: Verbalizes/displays adequate comfort level or baseline comfort level  Outcome: Progressing     Problem: Safety - Adult  Goal: Free from fall injury  Outcome: Progressing     Problem: Discharge Planning  Goal: Discharge to home or other facility with appropriate resources  Outcome: Progressing     Problem: Skin  Goal: Decreased wound size/increased tissue granulation at next dressing change  Outcome: Progressing  Goal: Participates in plan/prevention/treatment measures  Outcome: Progressing  Goal: Prevent/manage excess moisture  Outcome: Progressing  Goal: Prevent/minimize sheer/friction injuries  Outcome: Progressing  Goal: Promote/optimize nutrition  Outcome: Progressing  Goal: Promote skin healing  Outcome: Progressing    The clinical goals for the shift include Pt to remain HDS thru shift.

## 2025-08-22 NOTE — PROGRESS NOTES
Art Therapy Note    Roma Corral was referred by Paz Capellan MD    Therapy Session  Referral Type: New referral this admission  Visit Type: Follow-up visit  Session Start Time: 1643  Session End Time: 1647  Intervention Delivery: In-person  Conflict of Service: Declined treatment  Number of family members present: 0    Pre-assessment  Unable to Assess Reason: Outcomes not applicable         Treatment/Interventions  Art Therapy Interventions: Empathic listening/validating emotions    Post-assessment  Total Session Time (min): 4 minutes    Narrative  Assessment Detail: ATR made two attempts today to see Pt and offer AT services. Pt although wnated a session he sahred he was waiting on two specialist who were going to talk with him.  Pt kindly furhter shared he didn't want o start anything and cut things shorts should they arrive.  When ATR returned about 45 minutes PT reported physicians never came but was ot up to session.  He preferred to try again tomorrow.  Follow-up: ATR will continue to provide emotional support and AT services with Pt to help him cope with his cancer and use art for self expression    Education Documentation  No documentation found.

## 2025-08-23 ASSESSMENT — COGNITIVE AND FUNCTIONAL STATUS - GENERAL
DAILY ACTIVITIY SCORE: 24
MOBILITY SCORE: 24
MOBILITY SCORE: 24
DAILY ACTIVITIY SCORE: 24

## 2025-08-23 ASSESSMENT — PAIN - FUNCTIONAL ASSESSMENT: PAIN_FUNCTIONAL_ASSESSMENT: 0-10

## 2025-08-23 ASSESSMENT — PAIN SCALES - GENERAL
PAINLEVEL_OUTOF10: 0 - NO PAIN

## 2025-08-23 NOTE — CARE PLAN
Problem: Pain - Adult  Goal: Verbalizes/displays adequate comfort level or baseline comfort level  Outcome: Progressing     Problem: Safety - Adult  Goal: Free from fall injury  Outcome: Progressing     Problem: Discharge Planning  Goal: Discharge to home or other facility with appropriate resources  Outcome: Progressing     Problem: Chronic Conditions and Co-morbidities  Goal: Patient's chronic conditions and co-morbidity symptoms are monitored and maintained or improved  Outcome: Progressing     Problem: Nutrition  Goal: Nutrient intake appropriate for maintaining nutritional needs  Outcome: Progressing     Problem: Skin  Goal: Decreased wound size/increased tissue granulation at next dressing change  Outcome: Progressing  Flowsheets (Taken 8/23/2025 0029)  Decreased wound size/increased tissue granulation at next dressing change: Promote sleep for wound healing  Goal: Participates in plan/prevention/treatment measures  Outcome: Progressing  Flowsheets (Taken 8/23/2025 0029)  Participates in plan/prevention/treatment measures: Increase activity/out of bed for meals  Goal: Prevent/manage excess moisture  Outcome: Progressing  Flowsheets (Taken 8/23/2025 0029)  Prevent/manage excess moisture: Monitor for/manage infection if present  Goal: Prevent/minimize sheer/friction injuries  Outcome: Progressing  Flowsheets (Taken 8/23/2025 0029)  Prevent/minimize sheer/friction injuries: Increase activity/out of bed for meals  Goal: Promote/optimize nutrition  Outcome: Progressing  Flowsheets (Taken 8/23/2025 0029)  Promote/optimize nutrition: Monitor/record intake including meals  Goal: Promote skin healing  Outcome: Progressing  Flowsheets (Taken 8/23/2025 0029)  Promote skin healing: Assess skin/pad under line(s)/device(s)

## 2025-08-23 NOTE — PROGRESS NOTES
Surgical Oncology Progress Note    Roma Corral is a 70 y.o. male with history of rectal cancer with mets to the liver, CAD (2021 NSTEMI), DM2, HTN who is s/p right extended hepatectomy for metastatic rectal cancer on 7/29     Subjective   No acute events overnight. No N/V. Pain well controlled. Having ostomy output. 1050 ml from drain.     Objective   GEN: Appears tired, awake, alert, conversant.  RESP: non-labored breathing on RA  CV: non-cyanotic  GI: soft, non-tender to palpation, non-distended. Thin serous fluid out of drain in bile bag. Pasty stool and gas in ostomy. Incisions healing well, well approximated without drainage.   : voiding spontaneously  MSK: no evidence of bilateral lower extremity edema  NEURO: alert and conversant  SKIN: jaundiced, warm and dry    Last Recorded Vitals  Heart Rate:  [75-83]   Temp:  [36.9 °C (98.4 °F)-37.1 °C (98.7 °F)]   Resp:  [18]   BP: (110-133)/(63-72)   Weight:  [76 kg (167 lb 8.8 oz)]   SpO2:  [95 %-98 %]      Intake/Output Last 24 Hours:      Intake/Output Summary (Last 24 hours) at 8/23/2025 0905  Last data filed at 8/23/2025 0851  Gross per 24 hour   Intake 2079.17 ml   Output 1650 ml   Net 429.17 ml        Relevant Results  Lab Date: 8/22/25     7.7    1.9>-----<45         23.3   140  101  28                  ----------------<103     3.8  25  1.79          Ca 11.0 Phos 2.9 Mg 2.26       ALT 10 AST 30 AlkPhos 39 tBili 17.0         Imaging:   No results found.    Assessment:  Roma Corral is a 70 y.o. male with history of rectal cancer with mets to the liver, CAD (2021 NSTEMI), DM2, HTN who is s/p right extended hepatectomy for metastatic rectal cancer on 7/29 c/b worsening liver function.     Total bilirubin and INR values high. Tbili up to 18.6; continues to have high drain output.    Plan:   Neuro - continue home morphine with tramadol, oxycodone, dilaudid PRN for pain, zofran PRN, pet/music/art therapies  CV - Q4H vitals during AM  Resp - IS  GI - regular  diet, ensures, daily CMP, monitor tbili, PPI  /Renal - strict I&Os, monitor ORALIA, start NS @75, continue 25% albumin Q6H, 5% albumin bolus PRN, vitamin K  Heme - daily CBC, monitor anemia; H/H 7.7/23.3  ID - Zosyn/diflucan for empiric coverage  Prophy - SCDs, Lovenox, Incentive Spirometer  Dispo - RNF, PT/OT rec [facility], plan for IR splenic artery emobolization for Tuesday 8/26, will need splenectomy vaccines prior    Discussed with Dr. Lidia Patel, who discussed with attending, Dr. Dylon Aguirre.    Pelon Willams MD - PGY1  Surgical Oncology   [facility] [id number]

## 2025-08-24 ENCOUNTER — APPOINTMENT (OUTPATIENT)
Dept: RADIOLOGY | Facility: HOSPITAL | Age: 70
End: 2025-08-24
Payer: MEDICARE

## 2025-08-24 PROCEDURE — 76770 US EXAM ABDO BACK WALL COMP: CPT

## 2025-08-24 ASSESSMENT — COGNITIVE AND FUNCTIONAL STATUS - GENERAL
DAILY ACTIVITIY SCORE: 24
DAILY ACTIVITIY SCORE: 24
MOBILITY SCORE: 24
MOBILITY SCORE: 24

## 2025-08-24 ASSESSMENT — PAIN SCALES - GENERAL
PAINLEVEL_OUTOF10: 0 - NO PAIN
PAINLEVEL_OUTOF10: 0 - NO PAIN

## 2025-08-24 ASSESSMENT — PAIN - FUNCTIONAL ASSESSMENT: PAIN_FUNCTIONAL_ASSESSMENT: 0-10

## 2025-08-24 NOTE — CONSULTS
Reason For Consult  ORALIA    History Of Present Illness  Roma Corral is a 70 y.o. male with a history of NSTEMI, CAD, anemia, gout, abdominal pain, HTN, DM2 and colon carcinoma metastatic to liver with colectomy w/ ostomy placement 3/31, presenting for surgery OR s/p open right hepatectomy completed on 7/29.     No baseline CKD. 1+ proteinuria on Uas since past few years. Noted hypotension requiring pressors after surgery. Elevated creatines with improvement.  Reports dizziness with standing. No nausea or vomiting. No shortness of breath. No NSAID use. No Vanc use. No family history of CKD.      Past Medical History  He has a past medical history of Abdominal pain, Acute non-ST elevation myocardial infarction (NSTEMI) (Multi), Anemia, CAD (coronary artery disease), Cardiology follow-up encounter (12/11/2023), Chronic pain disorder, Colorectal cancer (Multi), Gout, H/O cardiac catheterization (06/01/2021), H/O cardiovascular stress test (09/03/2021), H/O echocardiogram (06/02/2021), High cholesterol, Hypertension, Overweight, Rectal cancer (Multi), and Type 2 diabetes mellitus.    Surgical History  He has a past surgical history that includes Leg Surgery (Left); Colonoscopy (06/17/2024); and Hemicolectomy w/ ostomy.     Social History  He reports that he has never smoked. He has never used smokeless tobacco. He reports that he does not drink alcohol and does not use drugs.    Family History  Family History[1]     Allergies  Patient has no known allergies.    Review of Systems  Negative except those in HPI     Physical Exam  NAD  RRR. No rub.  CTABL  Ostomy: w/ trace liquid output.  Right abdominal drain  Ext: No edema       I&O 24HR    Intake/Output Summary (Last 24 hours) at 8/24/2025 1609  Last data filed at 8/24/2025 1605  Gross per 24 hour   Intake 2475 ml   Output 2600 ml   Net -125 ml       Vitals 24HR  Heart Rate:  [71-93]   Temp:  [36.6 °C (97.9 °F)-37.2 °C (98.9 °F)]   Resp:  [18]   BP: (122-134)/(64-68)    Weight:  [77 kg (169 lb 12.1 oz)]   SpO2:  [96 %-99 %]        Relevant Results  Lab Results   Component Value Date    WBC 1.4 (L) 08/24/2025    HGB 7.4 (L) 08/24/2025    HCT 23.3 (L) 08/24/2025    MCV 94 08/24/2025    PLT 47 (L) 08/24/2025     Lab Results   Component Value Date    HEPBSAG Nonreactive 07/24/2024    IRONSAT 21 (L) 08/06/2024    FERRITIN 340 (H) 08/06/2024      Lab Results   Component Value Date    GLUCOSE 93 08/24/2025    CALCIUM 10.8 (H) 08/24/2025     08/24/2025    K 3.9 08/24/2025    MG 2.19 08/24/2025    CO2 20 (L) 08/24/2025     08/24/2025    BUN 41 (H) 08/24/2025    CREATININE 1.92 (H) 08/24/2025     Lab Results   Component Value Date    .0 (H) 09/16/2024    PHOS 2.4 (L) 08/24/2025    ALBUMIN 5.6 (H) 08/24/2025    CALCIUM 10.8 (H) 08/24/2025    VITD25 26 (L) 05/14/2025     Assessment & Plan  Colon carcinoma metastatic to liver    Carcinoma of colon metastatic to liver    Acute post-operative pain    Roma Corral is a 70 y.o. male with a history of NSTEMI, CAD, anemia, gout, abdominal pain, HTN, DM2 and colon carcinoma metastatic to liver with colectomy w/ ostomy placement 3/31, presenting for surgery OR s/p open right hepatectomy completed on 7/29. Hospital course c/b Worsening liver function w/ recurrent ascites and liver function. Nephrology consulted for ORALIA.    #ORALIA-Nonoliguric.  -Baseline creatinine 0.7-0.9  -Multiple ORALIA during admission that have improved. Likely ischemic ATI/ATN as was hypotensive after surgery. Currently having large volume ascites and ostomy output.  -8/20 - today having increasing creatiine, peaked on 8/24 and downtrending 1.97 -> 1.92.  Etiology: ATI from hemodynamic insult and prerenal ORALIA. Liver dysfunction with elevated bilirubin.  -UA: 8/17: 1+ protein, had since 2022. No blood. No WBC or RBC.  -FENa: 0.6%  -US: R Kidney 11cm, L kidney 13 cm. No hydro or stones. Trace free fluid abutting L kidney.  -UOP: 675x24  -PIVs, LUQ ostomy, R abdominal  drain.  -Acid base:  Metabolic acidosis mild at 20.  -Anemia: 7.4  -BP/volume: No overload state.  Multiple outputs.   -Chemistries: Na/K stable    Plan:  -Recheck UA/electrolytes  -recheck orthostatic vital signs  -LR for fluid replacement. Goal of net positive 1L. Can consider albumin.  -Will Follow    Seen and discussed with Dr.Dobre Raman Martínez MD  PGY-5 Nephrology Fellow         [1]   Family History  Problem Relation Name Age of Onset    No Known Problems Mother      No Known Problems Father      No Known Problems Sister      Leukemia Brother      Stroke Maternal Grandfather

## 2025-08-24 NOTE — PROGRESS NOTES
Surgical Oncology Progress Note    Roma Corral is a 70 y.o. male with history of rectal cancer with mets to the liver, CAD (2021 NSTEMI), DM2, HTN who is s/p right extended hepatectomy for metastatic rectal cancer on 7/29     Subjective   No acute events overnight. Endorses nausea, no vomiting. Pain well controlled. Having no ostomy output OVN, decreased UOP (675 ml). 1100 ml from drain.      Objective   GEN: Appears tired, awake, alert, conversant.  RESP: non-labored breathing on RA  CV: non-cyanotic  GI: soft, non-tender to palpation, slightly distended compared to last exam. Thin serous fluid out of drain in bile bag. Minimal output/gas in ostomy bag. Incisions healing well, well approximated without drainage.   : voiding spontaneously  MSK: no evidence of bilateral lower extremity edema  NEURO: alert and conversant  SKIN: jaundiced, warm and dry    Last Recorded Vitals  Heart Rate:  [71-82]   Temp:  [36.6 °C (97.8 °F)-37.2 °C (98.9 °F)]   Resp:  [18]   BP: (122-133)/(64-66)   Weight:  [77 kg (169 lb 12.1 oz)]   SpO2:  [96 %-99 %]      Intake/Output Last 24 Hours:      Intake/Output Summary (Last 24 hours) at 8/24/2025 1012  Last data filed at 8/24/2025 0928  Gross per 24 hour   Intake 2895 ml   Output 2100 ml   Net 795 ml        Relevant Results  Lab Date: 8/22/25     7.4    1.4>-----<47         23.3   143  105  41                  ----------------<93     3.9  20  1.92          Ca 10.8 Phos 2.4 Mg 2.19       ALT 10 AST 33 AlkPhos 38 tBili 17.4         Imaging:   No results found.    Assessment:  Roma Corral is a 70 y.o. male with history of rectal cancer with mets to the liver, CAD (2021 NSTEMI), DM2, HTN who is s/p right extended hepatectomy for metastatic rectal cancer on 7/29 c/b worsening liver function.     Bilirubin, Total   Date/Time Value Ref Range Status   08/24/2025 05:37 AM 17.4 (H) 0.0 - 1.2 mg/dL Final     His UOP is decreased and he is no longer having BM as of yesterday, 8/23. On exam, he  endorses nausea and has mild abdominal distension.    Plan:   Neuro - continue home morphine with tramadol, oxycodone, dilaudid PRN for pain, zofran PRN, pet/music/art therapies  CV - Q4H vitals during AM  Resp - IS  GI - NPO diet, except sips of clear liquids, ensures, daily CMP, monitor tbili, PPI  /Renal - strict I&Os, monitor ORALIA, start NS @75, continue 25% albumin Q6H, 5% albumin bolus PRN, vitamin K  Heme - daily CBC, monitor anemia; H/H 7.4/23.3  ID - Zosyn/diflucan for empiric coverage  Prophy - SCDs, Lovenox, Incentive Spirometer  Dispo - RNF, PT/OT rec [facility], plan for IR splenic artery emobolization for Tuesday 8/26, will need splenectomy vaccines prior    Discussed with Dr. Lidia Patel, who discussed with attending, Dr. Dylon Aguirre.    Pelon Willams MD - PGY1  Surgical Oncology   [facility] [id number]

## 2025-08-24 NOTE — CARE PLAN
Problem: Pain - Adult  Goal: Verbalizes/displays adequate comfort level or baseline comfort level  Outcome: Progressing     Problem: Safety - Adult  Goal: Free from fall injury  Outcome: Progressing     Problem: Discharge Planning  Goal: Discharge to home or other facility with appropriate resources  Outcome: Progressing     Problem: Chronic Conditions and Co-morbidities  Goal: Patient's chronic conditions and co-morbidity symptoms are monitored and maintained or improved  Outcome: Progressing     Problem: Nutrition  Goal: Nutrient intake appropriate for maintaining nutritional needs  Outcome: Progressing     Problem: Skin  Goal: Decreased wound size/increased tissue granulation at next dressing change  Outcome: Progressing  Flowsheets (Taken 8/24/2025 0023)  Decreased wound size/increased tissue granulation at next dressing change: Promote sleep for wound healing  Goal: Participates in plan/prevention/treatment measures  Outcome: Progressing  Flowsheets (Taken 8/24/2025 0023)  Participates in plan/prevention/treatment measures: Increase activity/out of bed for meals  Goal: Prevent/manage excess moisture  Outcome: Progressing  Flowsheets (Taken 8/24/2025 0023)  Prevent/manage excess moisture: Monitor for/manage infection if present  Goal: Prevent/minimize sheer/friction injuries  Outcome: Progressing  Flowsheets (Taken 8/24/2025 0023)  Prevent/minimize sheer/friction injuries: Increase activity/out of bed for meals  Goal: Promote/optimize nutrition  Outcome: Progressing  Flowsheets (Taken 8/24/2025 0023)  Promote/optimize nutrition: Monitor/record intake including meals  Goal: Promote skin healing  Outcome: Progressing  Flowsheets (Taken 8/24/2025 0023)  Promote skin healing: Assess skin/pad under line(s)/device(s)

## 2025-08-25 ENCOUNTER — APPOINTMENT (OUTPATIENT)
Dept: RADIOLOGY | Facility: HOSPITAL | Age: 70
End: 2025-08-25
Payer: MEDICARE

## 2025-08-25 ASSESSMENT — PAIN SCALES - GENERAL
PAINLEVEL_OUTOF10: 0 - NO PAIN
PAINLEVEL_OUTOF10: 0 - NO PAIN

## 2025-08-25 ASSESSMENT — PAIN - FUNCTIONAL ASSESSMENT
PAIN_FUNCTIONAL_ASSESSMENT: 0-10
PAIN_FUNCTIONAL_ASSESSMENT: 0-10

## 2025-08-25 ASSESSMENT — COGNITIVE AND FUNCTIONAL STATUS - GENERAL
EATING MEALS: A LITTLE
DRESSING REGULAR UPPER BODY CLOTHING: A LITTLE
STANDING UP FROM CHAIR USING ARMS: A LITTLE
STANDING UP FROM CHAIR USING ARMS: A LITTLE
WALKING IN HOSPITAL ROOM: A LITTLE
DRESSING REGULAR LOWER BODY CLOTHING: A LITTLE
TOILETING: A LITTLE
TOILETING: A LITTLE
TURNING FROM BACK TO SIDE WHILE IN FLAT BAD: A LITTLE
DRESSING REGULAR LOWER BODY CLOTHING: A LITTLE
TURNING FROM BACK TO SIDE WHILE IN FLAT BAD: A LITTLE
CLIMB 3 TO 5 STEPS WITH RAILING: A LITTLE
HELP NEEDED FOR BATHING: A LITTLE
CLIMB 3 TO 5 STEPS WITH RAILING: A LITTLE
PERSONAL GROOMING: A LITTLE
MOVING TO AND FROM BED TO CHAIR: A LITTLE
DRESSING REGULAR UPPER BODY CLOTHING: A LITTLE
MOBILITY SCORE: 19
DAILY ACTIVITIY SCORE: 18
MOVING TO AND FROM BED TO CHAIR: A LITTLE
HELP NEEDED FOR BATHING: A LITTLE
PERSONAL GROOMING: A LITTLE
MOBILITY SCORE: 18
DAILY ACTIVITIY SCORE: 19
MOVING FROM LYING ON BACK TO SITTING ON SIDE OF FLAT BED WITH BEDRAILS: A LITTLE
WALKING IN HOSPITAL ROOM: A LITTLE

## 2025-08-25 NOTE — PROGRESS NOTES
Surgical Oncology Progress Note    Roma Corral is a 70 y.o. male with history of rectal cancer with mets to the liver, CAD (2021 NSTEMI), DM2, HTN who is s/p right extended hepatectomy for metastatic rectal cancer on 7/29     Subjective   Overnight: No acute events, remained afebrile, vital signs stable, satting well on room air.    P.o. intake: 4 to 20 cc  Patient received 300 cc of albumin to balance outputs  Drain: 1.8 L  Colostomy: 0  Net +500 cc for the past 24 hours  Net -6.3 L for entire hospital stay     Objective   GEN: Appears tired, awake, alert, conversant.  RESP: non-labored breathing on RA  CV: non-cyanotic  GI: soft, non-tender to palpation, slightly distended compared to last exam. Thin serous fluid out of drain in bile bag. Minimal output/gas in ostomy bag. Incisions healing well, well approximated without drainage.   : voiding spontaneously  MSK: no evidence of bilateral lower extremity edema  NEURO: alert and conversant  SKIN: jaundiced, warm and dry    Last Recorded Vitals  Heart Rate:  [77-93]   Temp:  [36.2 °C (97.1 °F)-37.1 °C (98.7 °F)]   Resp:  [18]   BP: (112-145)/(57-78)   Weight:  [76.1 kg (167 lb 12.3 oz)]   SpO2:  [96 %-98 %]      Intake/Output Last 24 Hours:      Intake/Output Summary (Last 24 hours) at 8/25/2025 1027  Last data filed at 8/25/2025 0811  Gross per 24 hour   Intake 3062.33 ml   Output 2575 ml   Net 487.33 ml        Relevant Results  Lab Date: 8/22/25     7.4    1.4>-----<47         23.3   143  105  41                  ----------------<93     3.9  20  1.92          Ca 10.8 Phos 2.4 Mg 2.19       ALT 10 AST 33 AlkPhos 38 tBili 17.4         Imaging:   US renal complete  Result Date: 8/24/2025  Interpreted By:  Juanito Monzon  and Huey Ye STUDY: US RENAL COMPLETE;  8/24/2025 3:17 pm   INDICATION: Signs/Symptoms:Worsening Cr.     COMPARISON: MRCP PANCREAS W AND WO IV CONTRAST 8/20/2025, US LIVER WITH DOPPLER 8/12/2025, CT ABDOMEN PELVIS W AND WO IV CONTRAST  8/7/2025, US LIVER WITH DOPPLER 8/6/2025   ACCESSION NUMBER(S): [id number]   ORDERING CLINICIAN: SUPA ESPINO   TECHNIQUE: Multiple grayscale, B flow, Doppler color sequences of the kidneys were obtained.   FINDINGS: Exam is limited due to patient's body habitus overlying bowel gas, within those limitations:   RIGHT KIDNEY: The right kidney measures 11.4 cm in length. The renal cortical echogenicity and thickness are within normal limits. No hydronephrosis is present; no evidence of nephrolithiasis.   LEFT KIDNEY: The left kidney measures 13.0 cm in length. The renal cortical echogenicity and thickness are within normal limits. No hydronephrosis is present; no evidence of nephrolithiasis.   BLADDER: The urinary bladder is unremarkable in appearance.   OTHER: Small volume free fluid is noted abutting the left kidney (image 23/29). The prostate is enlarged extending superiorly and indenting the inferior aspect of the urinary bladder measuring 4.4 x 5.1 x 3.9 cm (image 28/29). Partially imaged small right-sided pleural effusion (image 18/29).       1. Unremarkable sonographic evaluation of the bilateral kidneys. No hydronephrosis. 2. Trace free fluid is noted abutting the left kidney. 3. Partially imaged right-sided pleural effusion. 4. Incidentally noted prostatomegaly.     I personally reviewed the images/study and I agree with the findings as stated by Dr. Ephraim Arzate. This study was interpreted at [facility], [place], [place].   MACRO: None     Signed by: Juanito Monzon 8/24/2025 4:28 PM Dictation workstation:   [id number]      Assessment:  Roma Corral is a 70 y.o. male with history of rectal cancer with mets to the liver, CAD (2021 NSTEMI), DM2, HTN who is s/p right extended hepatectomy for metastatic rectal cancer on 7/29 c/b worsening liver function.     Bilirubin, Total   Date/Time Value Ref Range Status   08/24/2025 05:37 AM 17.4 (H) 0.0 - 1.2 mg/dL Final     His UOP is  decreased and he is no longer having BM as of yesterday, 8/23. On exam, he endorses nausea and has mild abdominal distension.    Plan:  Neuro:  - Morphine (30 mg, p.o., twice daily)  - Oxycodone (5 mg, p.o., every 4 hours as needed)  - Tramadol (50 mg, p.o., every 6 hours as needed)  - Dilaudid (0.2 mg, IV, every 4 hours as needed)    CV:   - No acute cardiac issues    Pulm:   - Encourage IS  - No acute pulmonary issues    GI:   - Diet: Clear liquid diet, with nutritional supplements.  Plan for n.p.o. at midnight in advance of IR procedure tomorrow  - Bowel regimen: MiraLAX daily  - Zofran as needed for nausea     /Renal:  - IVF: NS 100cc/hr  - Albumin (25 g, IV, scheduled every 6 hours); to account for high drain output in the setting of cirrhosis  - AM CMP, Mg, with lyte repletion. Goal K > 4, Mg > 2  - Nephrology consult: Goal net +1 L daily, renal ultrasound unremarkable, urine studies completed.  Appreciate further recommendations.     Heme:   - PM CBC postprocedure tomorrow; active T&S  - Transfuse for Hgb < 7  - Vitamin K (5 mg, IV, every 48 hours)    Endo:  - No acute glycemic issues    ID:   - Empiric Zosyn and Diflucan (started 8/11 and 8/19, respectively, end date pending)  -Will require splenectomy vaccines postprocedure    DVT ppx:   - Heparin 5000 units 3 times daily, prophylactic  - SCDs    Dispo: Floor; PT/OT previously recommended home with home health care.  Will require reevaluation during his hospital stay.  Plan for IR splenic artery embolization tomorrow (8/26)    Patient was seen and discussed with attending, Dr. Erazo.    Emily Neumann MD, MSc  Hepatobiliary Surgery and Surgical Oncology  [facility], [phone]

## 2025-08-25 NOTE — CARE PLAN
Problem: Pain - Adult  Goal: Verbalizes/displays adequate comfort level or baseline comfort level  Outcome: Progressing     Problem: Safety - Adult  Goal: Free from fall injury  Outcome: Progressing     Problem: Discharge Planning  Goal: Discharge to home or other facility with appropriate resources  Outcome: Progressing     Problem: Chronic Conditions and Co-morbidities  Goal: Patient's chronic conditions and co-morbidity symptoms are monitored and maintained or improved  Outcome: Progressing     Problem: Nutrition  Goal: Nutrient intake appropriate for maintaining nutritional needs  Outcome: Progressing     Problem: Skin  Goal: Decreased wound size/increased tissue granulation at next dressing change  Outcome: Progressing  Flowsheets (Taken 8/24/2025 2224)  Decreased wound size/increased tissue granulation at next dressing change: Promote sleep for wound healing  Goal: Participates in plan/prevention/treatment measures  Outcome: Progressing  Flowsheets (Taken 8/24/2025 2224)  Participates in plan/prevention/treatment measures: Increase activity/out of bed for meals  Goal: Prevent/manage excess moisture  Outcome: Progressing  Flowsheets (Taken 8/24/2025 2224)  Prevent/manage excess moisture: Monitor for/manage infection if present  Goal: Prevent/minimize sheer/friction injuries  Outcome: Progressing  Flowsheets (Taken 8/24/2025 2224)  Prevent/minimize sheer/friction injuries: Increase activity/out of bed for meals  Goal: Promote/optimize nutrition  Outcome: Progressing  Flowsheets (Taken 8/24/2025 2224)  Promote/optimize nutrition: Monitor/record intake including meals  Goal: Promote skin healing  Outcome: Progressing  Flowsheets (Taken 8/24/2025 2224)  Promote skin healing: Assess skin/pad under line(s)/device(s)

## 2025-08-25 NOTE — PROGRESS NOTES
Art Therapy Note    Roma Corral was referred by Paz Weber MD    Therapy Session  Referral Type: New referral this admission  Visit Type: Follow-up visit  Session Start Time: 1511  Session End Time: 1522  Intervention Delivery: In-person  Conflict of Service: None  Number of family members present: 0    Pre-assessment  Unable to Assess Reason: Outcomes not applicable         Treatment/Interventions  Areas of Focus: Socialization  Art Therapy Interventions: Empathic listening/validating emotions  Interruption: Yes  Interrupted by: Staff  Interruption Duration (min): 11 minutes  Interruption Outcome: Session ended  Patient Fell Asleep at End of Session: No    Post-assessment  Total Session Time (min): 11 minutes    Narrative  Assessment Detail: ATR made a visit to Pt.s room to offer an AT session Pt sitting up in bed and welcomed visit.  Evaluation: Jsut after ATR sat down Pt.s RN entered the room to start IV meds and take Pt..s vitals.  When that was all complete Pt reported to both ATR and RN he had to use the restroom. He asked ATR to wait in the gomez until finished.  Moments later RN brought ATR's cart into the gomez and explained Pt.s wife had called him and Pt said he woudl have to have AT services another day because the call was goiing to take some time.  He sent his apologies.  Follow-up: ATR will follow up another day to offer services and provide emotional support.    Education Documentation  No documentation found.

## 2025-08-25 NOTE — PROGRESS NOTES
Art Therapy Note    Roma Corral was referred by Paz Weber MD    Therapy Session  Referral Type: New referral this admission  Visit Type: Follow-up visit  Session Start Time: 1552  Session End Time: 1522  Intervention Delivery: In-person  Conflict of Service:  (using restroom)  Number of family members present: 0    Pre-assessment  Unable to Assess Reason: Outcomes not applicable         Treatment/Interventions  Areas of Focus: Socialization  Art Therapy Interventions: Empathic listening/validating emotions  Interruption: Yes  Interrupted by: Staff  Interruption Duration (min): 11 minutes  Interruption Outcome: Session ended  Patient Fell Asleep at End of Session: No    Post-assessment  Total Session Time (min): 11 minutes    Narrative  Assessment Detail: At time of visit Pt acknowledged knock at door from ATR but reported he was using the restroom and changing.  He asked to return another when available.  ATR will follow with Pt  another tiem to offer an AT session.  Pt agreeable.  Evaluation: Jsut after ATR sat down Pt.s RN entered the room to start IV meds and take Pt..s vitals.  When that was all complete Pt reported to both ATR and RN he had to use the restroom. He asked ATR to wait in the gomez until finished.  Moments later RN brought ATR's cart into the gomez and explained Pt.s wife had called him and Pt said he woudl have to have AT services another day because the call was goiing to take some time.  He sent his apologies.  Follow-up: ATR will follow up another day to offer services and provide emotional support.    Education Documentation  No documentation found.

## 2025-08-25 NOTE — CONSULTS
INTERVENTIONAL RADIOLOGY CONSULT NOTE    History   No chief complaint on file.    HPI  Roma Corral is a 70 y.o. male with history of rectal cancer with mets to the liver, CAD (2021 NSTEMI), DM2, HTN who is s/p right extended hepatectomy for metastatic rectal cancer on 7/29.     IR initially involved in pre surgical Portal Vein embolization for hypertrophy of FLR with Dr. Gonzalez in 4/2/25. Post surgery patient has been having large volume bilious output from his surgical drain with concerns for small ofr size syndrome. IR was consulted for splenic artery embolization to reduce portal venous outflow return to the liver by means of cutting arterial supply.     On my eval of the patient he had difficult communicating due to NGT in place but communicated with non verbal cues understanding the risks and benefits of the procedure.      Medical History[1]    Surgical History[2]    Family History[3]    Social History[4]    Review of Systems As above.     Physical Exam     Vitals:    08/25/25 1740   BP: 149/74   Pulse: 89   Resp: 18   Temp: 36.9 °C (98.4 °F)   SpO2: 97%       Physical Exam  Constitutional:       Appearance: He is ill-appearing.     Eyes:      General: Scleral icterus present.     Pulmonary:      Effort: Pulmonary effort is normal.   Abdominal:      General: Abdomen is flat.      Comments: Surgical drain in place with bileus output. Incision clean, and intact.      Skin:     Coloration: Skin is jaundiced.     Neurological:      Mental Status: He is alert and oriented to person, place, and time.             Assessment and Plan     70 y.o. male with history of rectal cancer with mets to the liver, CAD (2021 NSTEMI), DM2, HTN who is s/p right extended hepatectomy for metastatic rectal cancer on 7/29.     IR initially involved in pre surgical Portal Vein embolization for hypertrophy of FLR with Dr. Gonzalez in 4/2/25. Post surgery patient has been having large volume bilious output from his surgical drain with  concerns for small ofr size syndrome. IR was consulted for splenic artery embolization to reduce portal venous outflow return to the liver by means of cutting arterial supply.     On my eval of the patient he had difficult communicating due to NGT in place but communicated with non verbal cues understanding the risks and benefits of the procedure.    - Patient consented for tomorrow for plans to perform splenic artery embolization   - Keep patietn NPO at midnight    Plan discussed with IR attending Dr. Gonzalez.     Rex Monaco MD   PGY-5, Vascular & Interventional Radiology   On call IR Pager: [phone]            [1]   Past Medical History:  Diagnosis Date    Abdominal pain     Acute non-ST elevation myocardial infarction (NSTEMI) (Multi)     Anemia     6/6/24 HGB 8.6; HCT 31.5    CAD (coronary artery disease)     Cardiology follow-up encounter 12/11/2023    Sharif Lau MD    Chronic pain disorder     Colorectal cancer (Multi)     Gout     H/O cardiac catheterization 06/01/2021    H/O cardiovascular stress test 09/03/2021    IMPRESSION: 1. No evidence of ischemia. 2. Suspicion of an infarct along the mid anterior wall. 3. Left ventricular dilatation is noted. 4. Left ventricular EF was calculated to be 50%. Summary:  1. No clinical or electrocardiographic evidence for ischemia at a submaximal workload. 3. The blunted heart rate diminshes the sensitivity of this test.  4. The submaximal level of stress was achieved.    H/O echocardiogram 06/02/2021    Mild concentric Left ventricle hypertrophy.  Global left ventricular wall motion and contractility are within normal limits.  There is normal left ventricular systolic function.  Estimated ejection fraction is 60-64%.  The left atrial size is mildly dilated.  Mild aortic regurgitation.  A trace of mitral regurgitation.  Trivial to mild tricuspid regurgitation.  There is no pulmonary hypertension.    High cholesterol     Hypertension     Overweight     Rectal cancer  (Multi)     Type 2 diabetes mellitus     4/18/2024 A1C 7.5%   [2]   Past Surgical History:  Procedure Laterality Date    COLONOSCOPY  06/17/2024    HEMICOLECTOMY W/ OSTOMY      LEG SURGERY Left     rodrigo placed   [3]   Family History  Problem Relation Name Age of Onset    No Known Problems Mother      No Known Problems Father      No Known Problems Sister      Leukemia Brother      Stroke Maternal Grandfather     [4]   Social History  Tobacco Use    Smoking status: Never    Smokeless tobacco: Never   Vaping Use    Vaping status: Never Used   Substance Use Topics    Alcohol use: Never    Drug use: Never

## 2025-08-26 ENCOUNTER — APPOINTMENT (OUTPATIENT)
Dept: RADIOLOGY | Facility: HOSPITAL | Age: 70
End: 2025-08-26
Payer: MEDICARE

## 2025-08-26 PROCEDURE — 99152 MOD SED SAME PHYS/QHP 5/>YRS: CPT

## 2025-08-26 PROCEDURE — C1760 CLOSURE DEV, VASC: HCPCS

## 2025-08-26 PROCEDURE — 7100000010 HC PHASE TWO TIME - EACH INCREMENTAL 1 MINUTE

## 2025-08-26 PROCEDURE — 2780000003 HC OR 278 NO HCPCS

## 2025-08-26 PROCEDURE — 2720000007 HC OR 272 NO HCPCS

## 2025-08-26 PROCEDURE — C1887 CATHETER, GUIDING: HCPCS

## 2025-08-26 PROCEDURE — 7100000009 HC PHASE TWO TIME - INITIAL BASE CHARGE

## 2025-08-26 PROCEDURE — 99153 MOD SED SAME PHYS/QHP EA: CPT

## 2025-08-26 PROCEDURE — C1889 IMPLANT/INSERT DEVICE, NOC: HCPCS

## 2025-08-26 ASSESSMENT — COGNITIVE AND FUNCTIONAL STATUS - GENERAL
MOVING TO AND FROM BED TO CHAIR: A LITTLE
STANDING UP FROM CHAIR USING ARMS: A LITTLE
DAILY ACTIVITIY SCORE: 20
DRESSING REGULAR UPPER BODY CLOTHING: A LITTLE
HELP NEEDED FOR BATHING: A LITTLE
MOBILITY SCORE: 20
DAILY ACTIVITIY SCORE: 20
WALKING IN HOSPITAL ROOM: A LITTLE
DRESSING REGULAR UPPER BODY CLOTHING: A LITTLE
WALKING IN HOSPITAL ROOM: A LITTLE
MOBILITY SCORE: 20
CLIMB 3 TO 5 STEPS WITH RAILING: A LITTLE
STANDING UP FROM CHAIR USING ARMS: A LITTLE
MOVING TO AND FROM BED TO CHAIR: A LITTLE
CLIMB 3 TO 5 STEPS WITH RAILING: A LITTLE
HELP NEEDED FOR BATHING: A LITTLE
DRESSING REGULAR LOWER BODY CLOTHING: A LITTLE
TOILETING: A LITTLE
DRESSING REGULAR LOWER BODY CLOTHING: A LITTLE
TOILETING: A LITTLE

## 2025-08-26 ASSESSMENT — PAIN SCALES - GENERAL
PAINLEVEL_OUTOF10: 0 - NO PAIN
PAINLEVEL_OUTOF10: 8
PAINLEVEL_OUTOF10: 8
PAINLEVEL_OUTOF10: 10 - WORST POSSIBLE PAIN
PAINLEVEL_OUTOF10: 0 - NO PAIN
PAINLEVEL_OUTOF10: 9

## 2025-08-26 ASSESSMENT — PAIN - FUNCTIONAL ASSESSMENT

## 2025-08-26 NOTE — PRE-PROCEDURE NOTE
Interventional Radiology Preprocedure Note    Indication for procedure: The primary encounter diagnosis was Colon carcinoma metastatic to liver. Diagnoses of Carcinoma of colon metastatic to liver and Unsteady gait were also pertinent to this visit.     Relevant review of systems: NA    Relevant Labs:   Lab Results   Component Value Date    CREATININE 1.52 (H) 08/26/2025    EGFR 49 (L) 08/26/2025    INR 1.9 (H) 08/21/2025    PROTIME 21.1 (H) 08/21/2025       Planned Sedation/Anesthesia: Moderate    Airway assessment: normal    Directed physical examination:    Physical Exam  Constitutional:       Appearance: Normal appearance.   HENT:      Head: Normocephalic.   Pulmonary:      Effort: Pulmonary effort is normal.     Musculoskeletal:      Cervical back: Normal range of motion and neck supple.     Skin:     General: Skin is warm and dry.     Neurological:      Mental Status: He is alert and oriented to person, place, and time.        ASA Score: ASA 3 - Patient with moderate systemic disease with functional limitations    Benefits, risks and alternatives of procedure and planned sedation have been discussed with the patient and/or their representative. All questions answered and they agree to proceed.     Rachelle Rodríguez MD  PgY1  Radiology

## 2025-08-26 NOTE — PROGRESS NOTES
Surgical Oncology Progress Note    Roma Corral is a 70 y.o. male with history of rectal cancer with mets to the liver, CAD (2021 NSTEMI), DM2, HTN who is s/p right extended hepatectomy for metastatic rectal cancer on 7/29     Subjective   Overnight, patient's NG tube came out accidentally. The night resident attempted to put an NG tube back in, but it came out again. Patient was seen and evaluated during rounds this AM. He is understandably upset about his current situation. We asked him several times if we could replace his NG tube, although he continues to refuse.    P.o. intake: 0  Drain: 1.8 L  Colostomy: 0  Net -3402 cc for the past 24 hours  Net -6.3 L for entire hospital stay     Objective   GEN: Appears tired, sad, awake, alert, conversant.  RESP: non-labored breathing on RA  CV: non-cyanotic  GI: soft, non-tender to palpation, slightly distended. Thin serous fluid out of drain in bile bag. Minimal output/gas in ostomy bag. Incisions healing well, well approximated without drainage.   : voiding spontaneously  MSK: no evidence of bilateral lower extremity edema  NEURO: alert and conversant  SKIN: jaundiced, warm and dry    Last Recorded Vitals  Heart Rate:  [80-89]   Temp:  [36.1 °C (97 °F)-36.9 °C (98.5 °F)]   Resp:  [16-18]   BP: (115-149)/(60-79)   Weight:  [71.2 kg (156 lb 14.4 oz)]   SpO2:  [97 %-98 %]      Intake/Output Last 24 Hours:      Intake/Output Summary (Last 24 hours) at 8/26/2025 1156  Last data filed at 8/26/2025 1100  Gross per 24 hour   Intake 898 ml   Output 4025 ml   Net -3127 ml        Relevant Results  Lab Date: 8/22/25     7.4    1.8>-----<47         22.9   147  109  35                  ----------------<76     3.7  20  1.52          Ca 11.1 Phos 2.2 Mg 2.18       ALT 12 AST 39 AlkPhos 37 tBili 17.9         Imaging:   XR abdomen 1 view  Result Date: 8/26/2025  Interpreted By:  Jennifer Young,  and Rene Dueñas STUDY: XR ABDOMEN 1 VIEW;  8/26/2025 1:05 am   INDICATION:  Signs/Symptoms:NG tube replacement.     COMPARISON: XR ABDOMEN 1 VIEW 8/25/2025, CT ABDOMEN PELVIS WO IV CONTRAST 8/25/2025   ACCESSION NUMBER(S): [id number]   ORDERING CLINICIAN: SUPA ESPINO   FINDINGS: AP radiograph of abdomen available for interpretation.   Interval advancement/replacement of enteric tube with tip projecting over the gastric body. Right IJ CVC tip projects over the cavoatrial junction. Embolization coils are noted over the left upper abdomen. Surgical drain and clips visualized over the right upper quadrant.   Nonobstructive bowel gas pattern.Limited evaluation of pneumoperitoneum on supine imaging, however no gross evidence of free air is noted.   Persistent right more than left pleural effusions.   Osseous structures demonstrate no acute bony changes.       1.  Interval advancement/replacement of enteric tube with tip now projecting over the gastric body. 2. Persistent moderate right and small left pleural effusions.     I have reviewed the images/study and I agree with the findings as stated by Spenser López MD (PGY-3).   Signed by: eJnnifer Davies 8/26/2025 7:17 AM Dictation workstation:   [id number]    XR abdomen 1 view  Result Date: 8/26/2025  Interpreted By:  Jennifer Young and Stevens Alex STUDY: XR ABDOMEN 1 VIEW;  8/25/2025 6:35 pm   INDICATION: Signs/Symptoms:NG Tube placement confirmation.     COMPARISON: CT abdomen pelvis 08/25/2025   ACCESSION NUMBER(S): [id number]   ORDERING CLINICIAN: SUPA ESPINO   FINDINGS: Enteric tube is visualized with distal tip projecting over the expected location of the gastric body. Surgical drain and clips visualized projecting over the right upper abdominal quadrant. Embolization coils noted over the left upper abdomen.   Nonobstructive bowel gas pattern.   Limited evaluation of pneumoperitoneum on supine imaging, however no gross evidence of free air is noted.   Blunting of the bilateral costophrenic angles,  right-greater-than-left. Consolidate opacity in the lung bases.   Osseous structures demonstrate no acute bony changes.       1. Enteric tube is visualized with distal tip projecting over the expected location of the gastric body. 2. Moderate right and small left pleural effusions.   I personally reviewed the images/study and I agree with the findings as stated by Benja Sotomayor DO PGY-3. This study was interpreted at [facility], [place], [place].   MACRO: None   Signed by: Jennifer Davies 8/26/2025 7:16 AM Dictation workstation:   [id number]    CT abdomen pelvis wo IV contrast  Result Date: 8/25/2025  Interpreted By:  Ken Palacios, STUDY: CT ABDOMEN PELVIS WO IV CONTRAST; 8/25/2025 3:06 pm   INDICATION: Signs/Symptoms:Concern for GI obstruction - plan to administer PO contrast prior to scan, long prep.   COMPARISON: 08/07/2025   ACCESSION NUMBER(S): [id number]   ORDERING CLINICIAN: SUPA ESPINO   TECHNIQUE: CT of the abdomen and pelvis was performed utilizing enteric contrast. Contiguous axial images were obtained through the abdomen and pelvis. Coronal and sagittal reconstructions were also created. No intravenous contrast was administered.   FINDINGS: Evaluation of vessels and organs is sub-optimal without intravenous contrast.   INCLUDED LOWER CHEST: There is a moderate right and small left pleural effusion with adjacent atelectasis.   LIVER: Stable findings including right hepatectomy. No new hepatic abnormality is evident given noncontrast technique.   BILE DUCTS: No biliary dilation.   GALLBLADDER: Status post cholecystectomy.   PANCREAS: Unremarkable.   SPLEEN: Stable splenomegaly with calcifications suggesting prior granulomatous exposure. There is also evidence of prior embolization.   ADRENAL GLANDS: Unremarkable.   KIDNEYS, URETERS AND BLADDER: No evidence of nephrolithiasis or hydroureteronephrosis. The urinary bladder is minimally distended.   REPRODUCTIVE  ORGANS: Unremarkable.   BOWEL: There is a small bowel obstruction with an abrupt transition point just anterior to the rectal anastomosis in the pelvis. The distal small bowel loops in the right lower quadrant are decompressed. No definite pneumatosis or wall thickening given noncontrast technique.   VESSELS: Stable findings with calcific atherosclerosis in the nonaneurysmal abdominal aorta.   PERITONEUM/RETROPERITONEUM/LYMPH NODES: There is trace free fluid and postsurgical changes consistent with patient history, similar to the prior exam. No acute hematoma or free air. No adenopathy is evident.   MUSCULOSKELETAL: No destructive osseous lesion is evident.       1. Small-bowel obstruction with an abrupt transition point just anterior to the rectal anastomosis in the pelvis. The distal small bowel loops in the right lower quadrant are decompressed. No definite pneumatosis or wall thickening given noncontrast technique. 2. Other findings are without significant change including small pleural effusions and adjacent atelectasis, right-greater-than-left, and postsurgical changes.   Signed by: Ken Palacios 8/25/2025 3:34 PM Dictation workstation:   [id number]    US renal complete  Result Date: 8/24/2025  Interpreted By:  Juanito Monzon and Sheng Max STUDY: US RENAL COMPLETE;  8/24/2025 3:17 pm   INDICATION: Signs/Symptoms:Worsening Cr.     COMPARISON: MRCP PANCREAS W AND WO IV CONTRAST 8/20/2025, US LIVER WITH DOPPLER 8/12/2025, CT ABDOMEN PELVIS W AND WO IV CONTRAST 8/7/2025, US LIVER WITH DOPPLER 8/6/2025   ACCESSION NUMBER(S): [id number]   ORDERING CLINICIAN: SUPA ESPINO   TECHNIQUE: Multiple grayscale, B flow, Doppler color sequences of the kidneys were obtained.   FINDINGS: Exam is limited due to patient's body habitus overlying bowel gas, within those limitations:   RIGHT KIDNEY: The right kidney measures 11.4 cm in length. The renal cortical echogenicity and thickness are within normal limits. No  hydronephrosis is present; no evidence of nephrolithiasis.   LEFT KIDNEY: The left kidney measures 13.0 cm in length. The renal cortical echogenicity and thickness are within normal limits. No hydronephrosis is present; no evidence of nephrolithiasis.   BLADDER: The urinary bladder is unremarkable in appearance.   OTHER: Small volume free fluid is noted abutting the left kidney (image 23/29). The prostate is enlarged extending superiorly and indenting the inferior aspect of the urinary bladder measuring 4.4 x 5.1 x 3.9 cm (image 28/29). Partially imaged small right-sided pleural effusion (image 18/29).       1. Unremarkable sonographic evaluation of the bilateral kidneys. No hydronephrosis. 2. Trace free fluid is noted abutting the left kidney. 3. Partially imaged right-sided pleural effusion. 4. Incidentally noted prostatomegaly.     I personally reviewed the images/study and I agree with the findings as stated by Dr. Ephraim Arzate. This study was interpreted at [facility], [place], [place].   MACRO: None     Signed by: Juanito Monzon 8/24/2025 4:28 PM Dictation workstation:   [id number]      Assessment:  Roma Corral is a 70 y.o. male with history of rectal cancer with mets to the liver, CAD (2021 NSTEMI), DM2, HTN who is s/p right extended hepatectomy for metastatic rectal cancer on 7/29 c/b worsening liver function.     His UOP is decreased and he is no longer having BM as of yesterday, 8/23. On exam, he endorses nausea and has mild abdominal distension.    Most recent bilirubin, total: 17.9    Plan:  Neuro:  - Morphine (30 mg, p.o., twice daily)  - Hold Oxycodone (5 mg, p.o., every 4 hours as needed) while NPO  - Tramadol (50 mg, p.o., every 6 hours as needed)  - Dilaudid (0.4 mg/0.6 mg IV, every 4 hours as needed, 0.2 mg for breakthrough)    CV:   - Heparin held for IR procedure later today; unhold this evening after his procedure (9 PM)    Pulm:   - Encourage IS  - No acute  pulmonary issues    GI:   - Diet: NPO diet before IR procedure  - Bowel regimen: MiraLAX daily  - Zofran as needed for nausea     /Renal:  - IVF: NS 100cc/hr  - Given 500 ml/hr bolus of NS for goal of net +500cc  - Albumin (25 g, IV, scheduled every 6 hours); to account for high drain output in the setting of cirrhosis  - AM CMP, Mg, with lyte repletion. Goal K > 4, Mg > 2  - Nephrology consult: Goal net +1 L daily, renal ultrasound unremarkable, urine studies completed.  Appreciate further recommendations.     Heme:   - PM CBC postprocedure today; active T&S  - Transfuse for Hgb < 7  - Vitamin K (5 mg, IV, every 48 hours)    Endo:  - No acute glycemic issues    ID:   - Empiric Zosyn and Diflucan (started 8/11 and 8/19, respectively, end date pending)  - Will require splenectomy vaccines postprocedure    DVT ppx:   - Heparin 5000 units 3 times daily, prophylactic  - SCDs    Dispo: Floor; PT/OT previously recommended home with home health care.  Will require reevaluation during his hospital stay.    Plan for IR splenic artery embolization today (8/26)    Discussed with Dr. Emily Neumann, who discussed with attending, Dr. Erazo.    Pelon Willams MD - PGY1  Surgical Oncology   [facility] [phone]

## 2025-08-26 NOTE — CARE PLAN
Patient afebrile. Vitals stable. No pain. Nausea and emesis. Ct done. Ngt placed. Broke drain. Fixed with new bag. Got potassium today. Will continue to monitor.

## 2025-08-26 NOTE — PROGRESS NOTES
Music Therapy Note    Roma Corral     Therapy Session  Visit Type: Follow-up visit  Session Start Time: 1320  Session End Time: 1321  Intervention Delivery: In-person  Conflict of Service: Off unit               Treatment/Interventions       Post-assessment  Total Session Time (min): 1 minutes    Narrative  Assessment Detail: Pt was not in room.  Follow-up: Will follow up at pt request.    Education Documentation  No documentation found.

## 2025-08-26 NOTE — CARE PLAN
The patient's goals for the shift include        Problem: Pain - Adult  Goal: Verbalizes/displays adequate comfort level or baseline comfort level  Outcome: Progressing     Problem: Safety - Adult  Goal: Free from fall injury  Outcome: Progressing     Problem: Skin  Goal: Decreased wound size/increased tissue granulation at next dressing change  Outcome: Progressing

## 2025-08-26 NOTE — POST-PROCEDURE NOTE
Interventional Radiology Brief Postprocedure Note    Attending: Dr. Jb Gonzalez MD    Assistant: Dr. Rachelle Rodríguez MD    Diagnosis:   1. Colon carcinoma metastatic to liver  Surgical Pathology Exam    Surgical Pathology Exam    Referral to Home Health    Stoma Care    CANCELED: Stoma Care      2. Carcinoma of colon metastatic to liver        3. Unsteady gait  Walker rolling        Description of procedure: Main Splenic Artery embolization with access through Right Common Femoral artery. Bedrest for 4 hours post procedure.      Anesthesia:  Local and MAC Moderate    Complications: None    Estimated Blood Loss: minimal    See detailed result report with images in PACS.    The patient tolerated the procedure well without incident or complication and is in stable condition.     Rachelle Rodríguez MD  PgY1  Radiology

## 2025-08-26 NOTE — PROGRESS NOTES
Physical Therapy                 Therapy Communication Note    Patient Name: Roma Corral  MRN: [id number]  Department: [facility] ANGIO  Room: 6013/6013-A  Today's Date: 8/26/2025     Discipline: Physical Therapy    Missed Visit: PT Missed Visit: Yes     Missed Visit Reason: Missed Visit Reason: Patient in a medical procedure    Missed Time: Attempt    Comment: Pt off division at angiogram procedure.

## 2025-08-27 ENCOUNTER — APPOINTMENT (OUTPATIENT)
Dept: SURGICAL ONCOLOGY | Facility: HOSPITAL | Age: 70
End: 2025-08-27
Payer: MEDICARE

## 2025-08-27 ENCOUNTER — APPOINTMENT (OUTPATIENT)
Dept: RADIOLOGY | Facility: HOSPITAL | Age: 70
End: 2025-08-27
Payer: MEDICARE

## 2025-08-27 PROCEDURE — 2780000003 HC OR 278 NO HCPCS

## 2025-08-27 PROCEDURE — C1751 CATH, INF, PER/CENT/MIDLINE: HCPCS

## 2025-08-27 PROCEDURE — 36573 INSJ PICC RS&I 5 YR+: CPT

## 2025-08-27 ASSESSMENT — COGNITIVE AND FUNCTIONAL STATUS - GENERAL
MOVING FROM LYING ON BACK TO SITTING ON SIDE OF FLAT BED WITH BEDRAILS: A LITTLE
STANDING UP FROM CHAIR USING ARMS: A LITTLE
PERSONAL GROOMING: A LITTLE
TOILETING: A LITTLE
HELP NEEDED FOR BATHING: A LITTLE
MOVING TO AND FROM BED TO CHAIR: A LITTLE
MOBILITY SCORE: 15
TURNING FROM BACK TO SIDE WHILE IN FLAT BAD: A LITTLE
MOBILITY SCORE: 16
CLIMB 3 TO 5 STEPS WITH RAILING: A LOT
MOVING FROM LYING ON BACK TO SITTING ON SIDE OF FLAT BED WITH BEDRAILS: A LITTLE
STANDING UP FROM CHAIR USING ARMS: A LOT
DRESSING REGULAR LOWER BODY CLOTHING: A LOT
DRESSING REGULAR UPPER BODY CLOTHING: A LOT
DRESSING REGULAR UPPER BODY CLOTHING: A LITTLE
HELP NEEDED FOR BATHING: A LITTLE
DAILY ACTIVITIY SCORE: 17
TURNING FROM BACK TO SIDE WHILE IN FLAT BAD: A LITTLE
TOILETING: A LITTLE
CLIMB 3 TO 5 STEPS WITH RAILING: A LOT
WALKING IN HOSPITAL ROOM: A LOT
WALKING IN HOSPITAL ROOM: A LOT
DRESSING REGULAR LOWER BODY CLOTHING: A LITTLE
DAILY ACTIVITIY SCORE: 18
EATING MEALS: A LITTLE
PERSONAL GROOMING: A LITTLE
MOVING TO AND FROM BED TO CHAIR: A LITTLE

## 2025-08-27 ASSESSMENT — PAIN - FUNCTIONAL ASSESSMENT
PAIN_FUNCTIONAL_ASSESSMENT: 0-10
PAIN_FUNCTIONAL_ASSESSMENT: UNABLE TO SELF-REPORT
PAIN_FUNCTIONAL_ASSESSMENT: 0-10
PAIN_FUNCTIONAL_ASSESSMENT: UNABLE TO SELF-REPORT

## 2025-08-27 ASSESSMENT — PAIN SCALES - GENERAL
PAINLEVEL_OUTOF10: 9
PAINLEVEL_OUTOF10: 9
PAINLEVEL_OUTOF10: 7
PAINLEVEL_OUTOF10: 8
PAINLEVEL_OUTOF10: 4
PAINLEVEL_OUTOF10: 7
PAINLEVEL_OUTOF10: 8
PAINLEVEL_OUTOF10: 8
PAINLEVEL_OUTOF10: 6

## 2025-08-27 ASSESSMENT — PAIN DESCRIPTION - LOCATION: LOCATION: ABDOMEN

## 2025-08-27 NOTE — CARE PLAN
Problem: Pain - Adult  Goal: Verbalizes/displays adequate comfort level or baseline comfort level  Outcome: Progressing     Problem: Safety - Adult  Goal: Free from fall injury  Outcome: Progressing     Problem: Skin  Goal: Promote skin healing  Outcome: Progressing

## 2025-08-27 NOTE — PROGRESS NOTES
Surgical Oncology Progress Note    Roma Corral is a 70 y.o. male with history of rectal cancer with mets to the liver, CAD (2021 NSTEMI), DM2, HTN who is s/p right extended hepatectomy for metastatic rectal cancer on 7/29     Subjective   No acute events overnight. Patient seen and evaluated this AM during team rounds. He is notably more distressed about his situation, especially after his IR procedure yesterday. Patient endorses nausea but denies vomiting.    P.o. intake: 0  Drain: 1.8 L  Colostomy: 0  Net +1 L for the past 24 hours  Net -5.7 L for entire hospital stay     Objective   GEN: Appears tired, sad, awake, alert, conversant.  RESP: non-labored breathing on RA  CV: non-cyanotic  GI: soft, non-tender to palpation, slightly distended. Thin serous fluid out of drain in bile bag. Minimal output/gas in ostomy bag, likely all bowel sweat. Incisions healing well, well approximated without drainage.   : voiding spontaneously.   MSK: no evidence of bilateral lower extremity edema  NEURO: alert and conversant  SKIN: jaundiced, warm and dry. Femoral access site from IR procedure clean, dry, and intact. No hematoma, fluctuance, or erythema noted. Site healing well.    Last Recorded Vitals  Heart Rate:  [74-88]   Temp:  [36.7 °C (98 °F)-37.4 °C (99.4 °F)]   Resp:  [14-20]   BP: (133-150)/(59-77)   Weight:  [72 kg (158 lb 11.7 oz)]   SpO2:  [96 %-100 %]      Intake/Output Last 24 Hours:      Intake/Output Summary (Last 24 hours) at 8/27/2025 1017  Last data filed at 8/27/2025 0939  Gross per 24 hour   Intake 4206.67 ml   Output 3170 ml   Net 1036.67 ml        Relevant Results  Lab Date: 8/22/25     7.4    5.2>-----<48         22.7   150  114  26                  ----------------<61     3.7  19  1.29          Ca 10.9 Phos 2.2 Mg 2.18       ALT 12 AST 49 AlkPhos 36 tBili 17.3         Imaging:   IR intervention arterial embolization  Result Date: 8/26/2025  Interpreted By:  Jb Gonzalez, STUDY: IR  INTERVENTION ARTERIAL EMBOLIZATION; ;  8/26/2025 3:56 pm 1. ULTRASOUND-GUIDED RIGHT COMMON FEMORAL ARTERY ACCESS 2. RETROGRADE RIGHT COMMON FEMORAL ARTERIOGRAM 3. CELIAC ARTERIOGRAM 4. SPLENIC ARTERIOGRAM 5. COIL EMBOLIZATION SPLENIC ARTERY 6. POSTEMBOLIZATION SPLENIC ARTERIOGRAM 7. PERCUTANEOUS CLOSURE DEVICE-ANGIO-SEAL     INDICATION: Signs/Symptoms:embolization. 70-year-old man with metastatic colon cancer, liver metastases status post extended right hip technique, small for size syndrome. Splenic artery embolization requested.     COMPARISON: CT abdomen pelvis 08/25/2025, CT liver 07/16/2025, 05/23/2025   ACCESSION NUMBER(S): [id number]   ORDERING CLINICIAN: CATALINA SOTO   TECHNIQUE: ASSISTING : Rachelle Rodríguez M.D. ATTENDING : Jb Gonzalez M.D.   TECHNICAL DESCRIPTION/FINDINGS: The procedure, including all risks, benefits and alternatives were explained to the patient in detail. All questions were answered and written informed consent was obtained.   The patient was positioned supine on the fluoroscopy table. A time-out was performed.   The right groin was prepped and draped in usual sterile fashion. Focused ultrasound was performed the right common femoral artery demonstrating patency and pulsatility. Ultrasound images were saved. 1% lidocaine was administered subcutaneously for local anesthesia. A combination of fluoroscopic landmarks and real-time ultrasound guidance, the right common femoral artery was accessed in retrograde direction using micropuncture technique. Ultrasound images were saved. Under fluoroscopic visualization, an 018 guidewire was advanced into the right common iliac artery and a micropuncture transitional introducer was utilized for placement of a suprarenal 035 Bentson guidewire. Over the Bentson guidewire, retrograde 5 [place] vascular sheath was placed.   A retrograde right common femoral arteriogram was performed demonstrating arterial access at the  level of the right femoral head with vascular caliber suitable for percutaneous closure device.   Over the Bentson guidewire, a 5 [place] SOS 2 catheter was advanced into the suprarenal abdominal aorta and used to select the celiac axis. A celiac arteriogram was performed demonstrating antegrade flow in left gastric, common hepatic, and the hypertrophied splenic arteries. Embolization coils are shown in the remnant liver as well as in the splenic parenchyma from prior trans splenic access.   In coaxial fashion, a Renegade HiFlo microcatheter an 016 fathom microwire were then used to select the mid splenic artery. An arteriogram was performed demonstrating brisk antegrade flow in the hypertrophied splenic artery with distal branching at the hilum.   Coil embolization was then initiated with multiple detachable embolization close of various sizes. An attempt was made to get the initial loops of the embolization coil to form in the mid to distal splenic artery without distal migration to the splenic hilum. A coiled conglomerate of 2 coils was formed in the mid to distal main splenic artery proper however upon addition of the 3rd coil, the prior coil conglomerate was shown to embolize more distally toward the hilum. In sequential fashion, additional embolization coils were then administered into the mid and upstream portion of the splenic artery for complete occlusion.   A postembolization splenic arteriogram was performed demonstrating complete occlusion of the main splenic artery with persistent filling of pancreatic branches arising from the upstream aspect of the splenic artery.   Catheters were then removed from the right groin vascular sheath. The vascular sheath was exchanged for an Angio-Seal percutaneous closure device to obtain hemostasis. A sterile dressing was applied.   SEDATION/MEDICATIONS: Continuous cardiopulmonary monitoring was performed by a radiology nurse for the duration of the procedure.  1 mg  Versed and   50 mcg Fentanyl were administered for moderate conscious sedation time of 75 minutes.      10 cc 1% Lidocaine was administered subcutaneously for local anesthesia. SPECIMENS: None. ESTIMATED BLOOD LOSS:  5 cc FLOUROSCOPY:  16.6 minutes; DAP  309925 mGy-cm*2; Air Kerma  552.77 mGy CONTRAST: 60 cc Omnipaque 350   FINDINGS: Test       Main splenic artery coil embolization to completion, as described above.     MACRO: None   Signed by: Jb Gonzalez 8/26/2025 5:50 PM Dictation workstation:   [id number]    XR abdomen 1 view  Result Date: 8/26/2025  Interpreted By:  Jennifer Young  and Rene Dueñas STUDY: XR ABDOMEN 1 VIEW;  8/26/2025 1:05 am   INDICATION: Signs/Symptoms:NG tube replacement.     COMPARISON: XR ABDOMEN 1 VIEW 8/25/2025, CT ABDOMEN PELVIS WO IV CONTRAST 8/25/2025   ACCESSION NUMBER(S): [id number]   ORDERING CLINICIAN: SUPA ESPINO   FINDINGS: AP radiograph of abdomen available for interpretation.   Interval advancement/replacement of enteric tube with tip projecting over the gastric body. Right IJ CVC tip projects over the cavoatrial junction. Embolization coils are noted over the left upper abdomen. Surgical drain and clips visualized over the right upper quadrant.   Nonobstructive bowel gas pattern.Limited evaluation of pneumoperitoneum on supine imaging, however no gross evidence of free air is noted.   Persistent right more than left pleural effusions.   Osseous structures demonstrate no acute bony changes.       1.  Interval advancement/replacement of enteric tube with tip now projecting over the gastric body. 2. Persistent moderate right and small left pleural effusions.     I have reviewed the images/study and I agree with the findings as stated by Spenser López MD (PGY-3).   Signed by: Jennifer Davies 8/26/2025 7:17 AM Dictation workstation:   [id number]    XR abdomen 1 view  Result Date: 8/26/2025  Interpreted By:  Jennifer Young,  and Bran Yousif  STUDY: XR ABDOMEN 1 VIEW;  8/25/2025 6:35 pm   INDICATION: Signs/Symptoms:NG Tube placement confirmation.     COMPARISON: CT abdomen pelvis 08/25/2025   ACCESSION NUMBER(S): [id number]   ORDERING CLINICIAN: SUPA ESPINO   FINDINGS: Enteric tube is visualized with distal tip projecting over the expected location of the gastric body. Surgical drain and clips visualized projecting over the right upper abdominal quadrant. Embolization coils noted over the left upper abdomen.   Nonobstructive bowel gas pattern.   Limited evaluation of pneumoperitoneum on supine imaging, however no gross evidence of free air is noted.   Blunting of the bilateral costophrenic angles, right-greater-than-left. Consolidate opacity in the lung bases.   Osseous structures demonstrate no acute bony changes.       1. Enteric tube is visualized with distal tip projecting over the expected location of the gastric body. 2. Moderate right and small left pleural effusions.   I personally reviewed the images/study and I agree with the findings as stated by Benja Sotomayor DO PGY-3. This study was interpreted at [place], [place].   MACRO: None   Signed by: Jennifer Davies 8/26/2025 7:16 AM Dictation workstation:   [id number]    CT abdomen pelvis wo IV contrast  Result Date: 8/25/2025  Interpreted By:  Ken Palacios, STUDY: CT ABDOMEN PELVIS WO IV CONTRAST; 8/25/2025 3:06 pm   INDICATION: Signs/Symptoms:Concern for GI obstruction - plan to administer PO contrast prior to scan, long prep.   COMPARISON: 08/07/2025   ACCESSION NUMBER(S): [id number]   ORDERING CLINICIAN: SUPA ESPINO   TECHNIQUE: CT of the abdomen and pelvis was performed utilizing enteric contrast. Contiguous axial images were obtained through the abdomen and pelvis. Coronal and sagittal reconstructions were also created. No intravenous contrast was administered.   FINDINGS: Evaluation of vessels and organs is sub-optimal without  intravenous contrast.   INCLUDED LOWER CHEST: There is a moderate right and small left pleural effusion with adjacent atelectasis.   LIVER: Stable findings including right hepatectomy. No new hepatic abnormality is evident given noncontrast technique.   BILE DUCTS: No biliary dilation.   GALLBLADDER: Status post cholecystectomy.   PANCREAS: Unremarkable.   SPLEEN: Stable splenomegaly with calcifications suggesting prior granulomatous exposure. There is also evidence of prior embolization.   ADRENAL GLANDS: Unremarkable.   KIDNEYS, URETERS AND BLADDER: No evidence of nephrolithiasis or hydroureteronephrosis. The urinary bladder is minimally distended.   REPRODUCTIVE ORGANS: Unremarkable.   BOWEL: There is a small bowel obstruction with an abrupt transition point just anterior to the rectal anastomosis in the pelvis. The distal small bowel loops in the right lower quadrant are decompressed. No definite pneumatosis or wall thickening given noncontrast technique.   VESSELS: Stable findings with calcific atherosclerosis in the nonaneurysmal abdominal aorta.   PERITONEUM/RETROPERITONEUM/LYMPH NODES: There is trace free fluid and postsurgical changes consistent with patient history, similar to the prior exam. No acute hematoma or free air. No adenopathy is evident.   MUSCULOSKELETAL: No destructive osseous lesion is evident.       1. Small-bowel obstruction with an abrupt transition point just anterior to the rectal anastomosis in the pelvis. The distal small bowel loops in the right lower quadrant are decompressed. No definite pneumatosis or wall thickening given noncontrast technique. 2. Other findings are without significant change including small pleural effusions and adjacent atelectasis, right-greater-than-left, and postsurgical changes.   Signed by: Ken Palacios 8/25/2025 3:34 PM Dictation workstation:   [id number]      Assessment:  Roma Corral is a 70 y.o. male with history of rectal cancer with mets to the  liver, CAD (2021 NSTEMI), DM2, HTN who is s/p right extended hepatectomy for metastatic rectal cancer on 7/29 c/b worsening liver function.     His UOP is decreased and he is no longer having BM as of yesterday, 8/23. On exam, he endorses nausea and has mild abdominal distension.    Most recent bilirubin, total: 17.3    Plan:  Neuro:  - Holding PO pain meds, including Morphine (30 mg, p.o., twice daily), Oxycodone (5 mg, p.o., every 4 hours as needed), Tramadol (50 mg, p.o., every 6 hours as needed)  - Dilaudid (0.4 mg/0.6 mg IV, every 4 hours as needed, 0.2 mg for breakthrough)  - Consult to integrative heme/onc for evaluation    CV:   - No acute cardiac issues    Pulm:   - Encourage IS  - No acute pulmonary issues    GI:   - Diet: NPO  - Hold Bowel regimen: MiraLAX   - Zofran as needed for nausea   - PICC team consulted for PICC placement  - Appreciate nutrition recs fo TPN:  - Refrain from using any dextrose-containing IVF, when TPN begins, unless otherwise medically indicated   -Begin standard TPN @ 35 ml/hr  -After 24 hours, if no major shifts in lytes and glucose levels <180, advance to 50 ml/hr  -On third day of TPN, again if no major shifts in lytes and glucose levels <180, advance to 65 ml/hr  - hold trace elements in parenteral nutrition formula for now  - After pt reaches continuous goal rate for TPN with stable lytes, would transition to cycled TPN to try to reduce total stress on liver  -Continue 5/20 formula  -First Hour=80 ml/hr  -Next 10 Hours=140 ml/hr  -Last Hour=80 ml/hr  -Continue 250 mls SMOFlipids x 12 hours/night  - 250 mls (50 g) SMOFlipids to run x 12 hours/night    - re-check 25(OH)-D level.       /Renal:  - IVF: D5 100cc/hr from this AM; will switch to NS per nutrition recs while pt receiving TPN  - 500 ml/hr bolus of NS for goal of net +500cc to + 1   - Albumin (25 g, IV, scheduled every 6 hours); to account for high drain output in the setting of cirrhosis  - AM CMP, Mg, with lyte  repletion. Goal K > 4, Mg > 2  - Nephrology consult: Goal net +1 L daily, renal ultrasound unremarkable, urine studies completed.  Appreciate further recommendations.     Heme:   - Transfuse for Hgb < 7  - Vitamin K (5 mg, IV, every 48 hours)  - H/H stable: 7.4/22.7    Endo:  - SSI with TPN    ID:   - Empiric Zosyn and Diflucan (started 8/11 and 8/19, respectively, end date pending)  - Will require splenectomy vaccines postprocedure    DVT ppx:   - Heparin 5000u restarted yesterday evening after IR procedure  - SCDs    Dispo: RNF; PT/OT previously recommended home with home health care.  Will require reevaluation during his hospital stay.    Discussed with Dr. Emily Neumann, who discussed with attending, Dr. Erazo.    Pelon Willams MD - PGY1  Surgical Oncology   [facility] [phone]

## 2025-08-27 NOTE — SIGNIFICANT EVENT
Post procedure check    Patient recovering well after IR ALDO this afternoon, reporting no pain. Has completed four hours of bedrest. R femoral cannulation site assessed -- dressing without strikethrough. Dressing taken down, area is clean/dry/inact with no drainage. No evidence of hematoma. Dressing replaced.     DVT prophylaxis w subcutaneous heparin to be resumed tonight per primary team.    Cookie Ortiz MD PGY1  Surgical Oncology

## 2025-08-27 NOTE — CARE PLAN
Problem: Pain - Adult  Goal: Verbalizes/displays adequate comfort level or baseline comfort level  Outcome: Progressing     Problem: Discharge Planning  Goal: Discharge to home or other facility with appropriate resources  Outcome: Progressing     Problem: Safety - Adult  Goal: Free from fall injury  Outcome: Progressing   The patient's goals for the shift include      The clinical goals for the shift include pt will remain safe and free from injury through shift

## 2025-08-27 NOTE — PROGRESS NOTES
Music Therapy Note    Roma Corral    Therapy Session  Visit Type: Follow-up visit  Session Start Time: 1335  Session End Time: 1336  Intervention Delivery: In-person  Conflict of Service: Declined treatment               Treatment/Interventions       Post-assessment  Total Session Time (min): 1 minutes    Narrative  Assessment Detail: Pt was sitting slouched over the side of his bed and was accompanied by a nurse. His voice was much quieter than usual and his facial muscles and shoulder muscles were tensed. He said he was in a lot of pain and declined TX.  Follow-up: Will follow up tomorrow at pt request.    Education Documentation  No documentation found.

## 2025-08-27 NOTE — CONSULTS
"Nutrition Initial Assessment:   Nutrition Assessment    --->Reason for Assessment: Parenteral assessment/recommendation (TPN/PPN)--PICC placement ordered this am    Patient is a 70 y.o. male with DM2 and h/o rectal CA with mets to liver, originally admitted on 07/29/25 and underwent R extended hepatectomy.     Since admit, pt with worsening liver function with recurrent ascites. Nephrology following d/t ORALIA. It appears NGT (to suction) was placed on 08/25 d/t n/v and abdominal distension. Fell out on 08/26, pt would not let team replace.    In review of diet orders since admit, pt has been NPO/Clear Liquids intermittently since his surgery on 07/29 (>5 days).    Current order for IVF of D5-1/2NaCl @ 100 m/hr (ordered this am) provides a total of 120 g dextrose=408 kcals/day.    Nutrition History:  This writer met with pt earlier today, was laying in bed at time of visit with him, getting him to elaborate on questions asked of him was a bit difficult but did provide some information.    Reports before being admitted for his surgery, felt his appetite was good and weight was staying stable.    Since admit, has had intermittent issues with n/v/abdominal distension. When he is on a solid PO diet, feels he has been able to eat,  \"okay.\"     --Vitamin/Herbal Supplement Use: MVI (per review of home meds in EMR)  --Food Allergy: pt denies       Anthropometrics:  Height: 180.3 cm (5' 10.98\")   Weight: 72 kg (158 lb 11.7 oz)   BMI (Calculated): 22.15  IBW/kg (Dietitian Calculated): 78.2 kg  Percent of IBW: 92 %    Weight History:     Wt Readings per review of EMR:   08/27/25 07/29/25 72 kg (158 lb 11.7 oz) (current wt)--possibly elevated d/t fluids, considering extended hospitalization with intermittent ascites  73.1 kg (admit wt/standing scale)   07/16/25 75.5 kg (166 lb 8 oz)   06/19/25 79.8 kg (176 lb)--? fluids   05/28/25 74.1 kg (163 lb 4.8 oz)   05/27/25 73 kg (161 lb)   05/27/25 72.7 kg (160 lb 3.2 oz) "   05/16/25 75.8 kg (167 lb)   05/14/25 76.9 kg (169 lb 9.6 oz)   05/12/25 76.7 kg (169 lb 3.2 oz)   04/15/25 71.5 kg (157 lb 10.1 oz)--->wt stable from this date to admit (x ~3 mos)   04/06/25 66.9 kg (147 lb 7.8 oz)   03/12/25 70.8 kg (156 lb)   02/18/25 67.1 kg (148 lb)   02/12/25 69.5 kg (153 lb 3.5 oz)   02/07/25 72.8 kg (160 lb 7.9 oz)   02/05/25 72.9 kg (160 lb 11.5 oz)   02/04/25 74.3 kg (163 lb 12.8 oz)   01/24/25 74.5 kg (164 lb 3.9 oz)--wt stable from this date to admit (x ~6 mos)   01/17/25 74.4 kg (164 lb)   01/17/25 74.4 kg (164 lb)   01/15/25 72.4 kg (159 lb 11.6 oz)   01/10/25 75.5 kg (166 lb 8.9 oz)   01/08/25 76.2 kg (167 lb 15.9 oz)   01/07/25 76.7 kg (169 lb)   12/31/24 76.4 kg (168 lb 5.1 oz)   12/24/24 79.8 kg (175 lb 14.8 oz)   12/17/24 80.1 kg (176 lb 9.4 oz)   12/12/24 83.7 kg (184 lb 8.4 oz)   12/10/24 83.3 kg (183 lb 12.1 oz)   12/09/24 82.5 kg (181 lb 12.3 oz)   11/26/24 80.3 kg (177 lb)   11/20/24 80.6 kg (177 lb 9.3 oz)   11/19/24 81.5 kg (179 lb 10.8 oz)   11/18/24 80.5 kg (177 lb 7.5 oz)   11/16/24 83.2 kg (183 lb 6.8 oz)   11/14/24 83.1 kg (183 lb 3.2 oz)   11/10/24 84.8 kg (187 lb)   10/29/24 82.2 kg (181 lb 3.5 oz)   10/28/24 82.6 kg (182 lb)   10/23/24 80.4 kg (177 lb 4 oz)   10/18/24 84.9 kg (187 lb 4.5 oz)   10/16/24 86.7 kg (191 lb 2.2 oz)   11/04/24 82.2 kg (181 lb 3.5 oz)   10/15/24 87.2 kg (192 lb 3.9 oz)   10/09/24 83.7 kg (184 lb 6.6 oz)   10/04/24 87.1 kg (192 lb 2.1 oz)   10/02/24 86 kg (189 lb 9.5 oz)   10/01/24 85.5 kg (188 lb 7.9 oz)   09/26/24 89.8 kg (198 lb)   09/20/24 87.8 kg (193 lb 7.3 oz)   09/18/24 89 kg (196 lb 1.6 oz)   09/17/24 89.8 kg (198 lb)   08/28/24 89.8 kg (198 lb)   08/20/24 86.6 kg (190 lb 14.7 oz)   08/17/24 84.8 kg (187 lb)   08/13/24 84.4 kg (186 lb)   08/09/24 89.4 kg (197 lb 1.5 oz)   08/07/24 89.5 kg (197 lb 5 oz)   08/07/24 89.8 kg (198 lb 1.3 oz)   08/06/24 89.4 kg (197 lb)   07/26/24 91.5 kg (201 lb 11.5 oz)   07/24/24 90.6 kg (199 lb 12.8  oz)   07/23/24 99.8 kg (220 lb)--? fluids   07/17/24 92.3 kg (203 lb 6 oz)--->21% wt loss from this date to admit (significant x ~1 year)   07/11/24 90.7 kg (199 lb 15.3 oz)     Nutrition Focused Physical Exam Findings:  Subcutaneous Fat Loss:   Orbital Fat Pads: Severe (dark circles, hollowing and loose skin)  Buccal Fat Pads: Severe (hollow, sunken and narrow face)  Triceps: Mild-Moderate (less than ample fat tissue)  Ribs: Defer  Muscle Wasting:  Temporalis: Severe (hollowed scooping depression)  Pectoralis (Clavicular Region): Mild-Moderate (some protrusion of clavicle)  Deltoid/Trapezius: Mild-Moderate (slight protrusion of acromion process)  Interosseous: Severe (depressed area between thumb and forefinger)  Trapezius/Infraspinatus/Supraspinatus (Scapular Region): Defer  Quadriceps: Defer  Gastrocnemius: Defer  Edema:  Edema: none  Physical Findings:  Hair: Negative  Eyes: Positive (jaundice)  Nails: Negative  Skin: Positive (jaundice; surgical incision to abdomen)    Nutrition Significant Labs:  CBC Trend:   Results from last 7 days   Lab Units 08/27/25  0558 08/26/25  1859 08/26/25  1027 08/26/25  0610   WBC AUTO x10*3/uL 5.2 2.8* 2.0* 1.8*   RBC AUTO x10*6/uL 2.42* 2.37* 2.45* 2.47*   HEMOGLOBIN g/dL 7.4* 7.2* 7.4* 7.4*   HEMATOCRIT % 22.7* 21.9* 22.8* 22.9*   MCV fL 94 92 93 93   PLATELETS AUTO x10*3/uL 48* 41* 44* 47*   BMP Trend:   Results from last 7 days   Lab Units 08/27/25  0558 08/26/25  0610 08/25/25  0640 08/24/25  0537   GLUCOSE mg/dL 61* 76 80 93   CALCIUM mg/dL 10.9* 11.1* 10.7* 10.8*   SODIUM mmol/L 150* 147* 144 143   POTASSIUM mmol/L 3.7 3.7 3.6 3.9   CO2 mmol/L 19* 20* 21 20*   CHLORIDE mmol/L 114* 109* 107 105   BUN mg/dL 26* 35* 37* 41*   CREATININE mg/dL 1.29 1.52* 1.80* 1.92*   A1C:  Lab Results   Component Value Date    HGBA1C 5.3 01/17/2025   Liver Function Trend:   Results from last 7 days   Lab Units 08/27/25  0558 08/26/25  0610 08/25/25  0640 08/24/25  0537   ALK PHOS U/L 36 37 36  38   AST U/L 49* 39 37 33   ALT U/L 12 12 10 10   BILIRUBIN TOTAL mg/dL 17.3* 17.9* 17.2* 17.4*   Renal Lab Trend:   Results from last 7 days   Lab Units 08/27/25  0558 08/26/25  0610 08/25/25  0640 08/24/25  0537   POTASSIUM mmol/L 3.7 3.7 3.6 3.9   PHOSPHORUS mg/dL 2.2* 2.2* 2.6 2.4*   SODIUM mmol/L 150* 147* 144 143   MAGNESIUM mg/dL  --   --  2.18 2.19   EGFR mL/min/1.73m*2 60* 49* 40* 37*   BUN mg/dL 26* 35* 37* 41*   CREATININE mg/dL 1.29 1.52* 1.80* 1.92*   Vit D:   Lab Results   Component Value Date    VITD25 26 (L) 05/14/2025   Vit B12:   Lab Results   Component Value Date    [id number] 568 05/14/2025     Medications:  Scheduled medications  Scheduled Medications[1]  Continuous medications  Continuous Medications[2]  PRN medications  PRN Medications[3]    I/O:   Last BM Date: 08/15/25; Stool Appearance: Soft (08/16/25 0900)    --no recent documented stool outputs via colostomy in review of flowsheets    Dietary Orders (From admission, onward)       Start     Ordered    08/26/25 0000  NPO Diet; Effective now  Diet effective now         08/25/25 0651    07/29/25 2306  May Not Participate in Room Service  ( ROOM SERVICE MAY NOT PARTICIPATE)  Once        Question:  .  Answer:  Yes    07/29/25 2305                Estimated Needs:   Total Energy Estimated Needs in 24 hours (kCal): ~7152-6469  Method for Estimating Needs: MSJ (1507) x ~1.3-1.5 (using 72 kg)  Total Protein Estimated Needs in 24 Hours (g): ~  Method for Estimating 24 Hour Protein Needs: 72 (IBW) x ~1.2-1.5g/kg  Total Fluid Estimated Needs in 24 Hours (mL): per MD/team        Nutrition Diagnosis   Malnutrition Diagnosis  Patient has Malnutrition Diagnosis: Yes  Diagnosis Status: New  Malnutrition Diagnosis: Severe malnutrition related to chronic disease or condition (acute-on-chronic)  Related to: metastatic CA, recent surgery with post-op complications  As Evidenced by: pt with areas of severe muscle and fat losses, 21% wt loss x ~1 year,  estimated to be consuming </=50% estimated energy needs x >/=5 days since admit (intermittently requiring NPO/Clear Liquids)    Additional Assessment Information: Pt is at risk for refeeding syndrome with initiation of TPN. Considering recurrent ascites, recommend use of a more volume-restricted TPN formula.       Nutrition Interventions/Recommendations   Nutrition prescription for parenteral nutrition    Nutrition Recommendations:  1. Replace phos and check mag    2. Check triglyceride level    3. Start 100 mg thiamin daily    4. Refrain from using any dextrose-containing IVF, when TPN begins, unless otherwise medically indicated    5. When ready to begin TPN, order the standard 5% amino acids, 20% dextrose and start @ 35 ml/hr  --After 24 hours, if no major shifts in lytes and glucose levels <180, advance to 50 ml/hr  --On third day of TPN, again if no major shifts in lytes and glucose levels <180, advance to 65 ml/hr    ----->D/t liver failure, please hold trace elements in parenteral nutrition formula for now    6. Order 250 mls (50 g) SMOFlipids to run x 12 hours/night    ---->Above TPN regimen @ goal rate, would provide a total of 312 g dextrose, 78 g pro, 1872 kcals, 26% fat--meets 98% estimated kcal needs, 92% estimated pro needs    TPN Monitoring:  --RFP + mag Q12H as TPN being advanced to goal rate  --Accuchecks Q6H or per team  --LFTs and TG level at least once/week  --Daily weights (standing preferred, if feasible)    7. After pt reaches continuous goal rate for TPN with stable lytes, would transition to cycled TPN to try to reduce total stress on liver  --Continue 5/20 formula  --First Hour=80 ml/hr  --Next 10 Hours=140 ml/hr  --Last Hour=80 ml/hr  --Continue 250 mls SMOFlipids x 12 hours/night    8. At some point, need to re-check 25(OH)-D level. Pt noted with vitamin D insufficiency in 05/2025.    Nutrition Interventions/Goals:   Parenteral Nutrition: Management of composition of parenteral nutrition,  Management of delivery rate of parenteral nutrition  Goal: TPN (5/20) @ goal rate of 65 ml/hr + 250 LGWR=5530 kcals, 78 g pro    Education:  Education Documentation  No documentation found.    Pt aware TPN to begin. Denied any questions for RDN at this time.       Nutrition Monitoring and Evaluation   Enteral and Parenteral Nutrition Intake Determination: Parenteral nutrition intake - Tolerate TPN at goal rate, Parenteral nutrition intake - To meet > 75% estimated energy needs, Parenteral nutrition intake - Titrate to goal without metabolic complications    Electrolyte and Renal Panel: Electrolytes within normal limits  Glucose/Endocrine Profile: Glucose within normal limits ( mg/dL)      Goal Status: New goal(s) identified          Time Spent (min): 75 minutes              [1] albumin human, 25 g, intravenous, q6h  fluconazole, 400 mg, intravenous, q24h  heparin (porcine), 5,000 Units, subcutaneous, q8h  lidocaine, 5 mL, infiltration, Once  lidocaine, 5 mL, infiltration, Once  lidocaine, 1 Application, Topical, Once  [Held by provider] morphine CR, 30 mg, oral, q12h ALEX  pantoprazole, 40 mg, intravenous, BID  phytonadione, 5 mg, intravenous, q48h  piperacillin-tazobactam, 3.375 g, intravenous, q6h  [Held by provider] polyethylene glycol, 17 g, oral, Daily  potassium chloride, 20 mEq, intravenous, Once  sodium chloride, 500 mL, intravenous, Once     [2] dextrose 5%-0.45 % sodium chloride, 100 mL/hr, Last Rate: 100 mL/hr (08/27/25 0633)     [3] PRN medications: alteplase, alteplase, calcium carbonate, HYDROmorphone, HYDROmorphone, HYDROmorphone, ondansetron, [Held by provider] oxyCODONE, phenoL, [Held by provider] traMADol

## 2025-08-27 NOTE — POST-PROCEDURE NOTE
Pre-Procedure Checklist:  Emergent Line Insertion: No  Type of Line to be Placed: PICC  Consent Obtained: Yes  Emergency Medication Necessary: No  Patient Identified with 2 Independent Identifiers: Yes  Review of Allergies, Anticoagulation, Relevant Labs, ECG/Telemetry: Yes  Risks/Benefits/Alternatives Discussed with Patient/POA/Legal Representative: Yes  Stop Sign on Door: Yes  Time Out Performed: Yes  Catheter Exchange: No    Positioning Checklist:  All People, Including Patient, in the Room with Cap and Mask: Yes  Fluoroscopy Used to Identify Vessel and Guide Insertion: No   Sterile Cover Used: Yes  Full Barrier Precautions Followed (Mask, Cap, Gown, Gloves): Yes  Hands Washed: Yes  Monitors Attached with Sound Alarms On: No  Full Body Sterile Drape (Head-to-Toe) Used to Cover Patient: Yes  Trendelenburg Position (For IJ and Subclavian): No  CHG Skin Prep Used and Allowed to Air Dry to Skin Procedure: Yes    Procedure Checklist:  Blood Aspirated From All Lumens, All Ports Subsequently Flushed: Yes  Catheter Caps Placed on All Lumens; Lumens Clamped: Yes  Maintain Guidewire Control Throughout, Ensuring Guidewire Removal: Yes  Maintain Sterile Field Throughout Insertion: Yes  Catheter Secured: Yes  Confirmatory Test of Venous Placement: Non-Pulsatile Blood    Post Procedure Checklist:  Date and Time Written on Dressing: Yes  Sharp and Wire Count and Safe Disposal of all Sharps/Wires: Yes  Sterile Dressing Applied Per Protocol: Yes  X-ray Ordered or ECG Image: Yes    PICC Insertion Details:  Size (Fr): 4  Lumen Type: double  Catheter to Vein Ratio Less Than 50%: Yes  Total Length (cm): 49  External Length (cm): 0   Orientation: left upper arm   Location: basilic  Site Prep: Chlorohexidine; Usual sterile procedure followed  Local Anesthetic: Injectable/Subcutaneous  Indication: TPN/IL  Insertion Team Members in the Room: Nurse, LPN  Initial Extremity Circumference (cm): 28  Insertion Attempts: 3   Patient Tolerance:  Tolerated Well, Age Appropriate  Comfort Measures: Subcutaneous anesthetic; Verbal  Procedure Location: Bedside  Safety Measures: Patient specific safety measures addressed with RN  Estimated Blood Loss (mL): 0.5   Vessel Fully Compressible Proximally and Distally to Insertion Site: Yes  Brisk Blood Return Obtained and Line Draws Easily: Yes  Tip Location: SVC (per radiologist, Dr. Jb Arrieta)  Line Confirmation: x-ray  Lot #: [id number]  : BD  PICC Line Exp Date: 07/31/2026  Securement: Stat Lock  Post Procedure Checklist: Handoff with RN; Obtain all new IV tubing prior to use; Bed at lowest level and wheels locked; Line discharge information at bedside.  Additional Details: Line was inserted using Modified Seldinger's Technique.     Patient vomiting upon arrival to bedside. Bedside nurse notified. IV Zofran was given by bedside nurse prior to starting PICC procedure. Patient reports previous PICC placement was painful so he is anxious about this procedure. Discussed use of subcut. Lidocaine prior to attempting to access vein. Patient with mediport in right chest. First 2 attempts to left basilic vein is unsuccessful due to inability to fully advance guidewire. Third attempt to access vein was successful, but initially there was difficulty advancing dilator. After adjust dilator I was able to advance it, and I was able to place left basilic vein DL PICC. Upon completion of PICC insertion, patient had emesis x1 while we were sitting him up in bed. Bedside nurse notified.   Placed by: Anamika Tyler RN

## 2025-08-28 ASSESSMENT — PAIN SCALES - GENERAL
PAINLEVEL_OUTOF10: 8
PAINLEVEL_OUTOF10: 5 - MODERATE PAIN
PAINLEVEL_OUTOF10: 6
PAINLEVEL_OUTOF10: 10 - WORST POSSIBLE PAIN
PAINLEVEL_OUTOF10: 5 - MODERATE PAIN
PAINLEVEL_OUTOF10: 8
PAINLEVEL_OUTOF10: 9
PAINLEVEL_OUTOF10: 7

## 2025-08-28 ASSESSMENT — COGNITIVE AND FUNCTIONAL STATUS - GENERAL
DRESSING REGULAR UPPER BODY CLOTHING: A LITTLE
DRESSING REGULAR LOWER BODY CLOTHING: A LITTLE
HELP NEEDED FOR BATHING: A LITTLE
TOILETING: A LITTLE
DAILY ACTIVITIY SCORE: 18
DAILY ACTIVITIY SCORE: 18
DRESSING REGULAR UPPER BODY CLOTHING: A LITTLE
TURNING FROM BACK TO SIDE WHILE IN FLAT BAD: A LITTLE
TOILETING: A LITTLE
MOVING TO AND FROM BED TO CHAIR: A LITTLE
MOVING FROM LYING ON BACK TO SITTING ON SIDE OF FLAT BED WITH BEDRAILS: A LITTLE
DAILY ACTIVITIY SCORE: 16
STANDING UP FROM CHAIR USING ARMS: A LITTLE
PERSONAL GROOMING: A LITTLE
MOBILITY SCORE: 16
CLIMB 3 TO 5 STEPS WITH RAILING: A LOT
PERSONAL GROOMING: A LITTLE
MOVING TO AND FROM BED TO CHAIR: A LITTLE
DRESSING REGULAR LOWER BODY CLOTHING: A LITTLE
EATING MEALS: A LITTLE
PERSONAL GROOMING: A LITTLE
TURNING FROM BACK TO SIDE WHILE IN FLAT BAD: A LITTLE
STANDING UP FROM CHAIR USING ARMS: A LITTLE
MOBILITY SCORE: 16
DRESSING REGULAR UPPER BODY CLOTHING: A LITTLE
MOVING FROM LYING ON BACK TO SITTING ON SIDE OF FLAT BED WITH BEDRAILS: A LITTLE
EATING MEALS: A LITTLE
MOBILITY SCORE: 16
DRESSING REGULAR LOWER BODY CLOTHING: A LITTLE
TOILETING: A LITTLE
WALKING IN HOSPITAL ROOM: A LOT
TURNING FROM BACK TO SIDE WHILE IN FLAT BAD: A LITTLE
HELP NEEDED FOR BATHING: A LITTLE
WALKING IN HOSPITAL ROOM: A LOT
WALKING IN HOSPITAL ROOM: A LOT
MOVING TO AND FROM BED TO CHAIR: A LITTLE
CLIMB 3 TO 5 STEPS WITH RAILING: A LOT
MOVING FROM LYING ON BACK TO SITTING ON SIDE OF FLAT BED WITH BEDRAILS: A LITTLE
HELP NEEDED FOR BATHING: A LITTLE
STANDING UP FROM CHAIR USING ARMS: A LITTLE
CLIMB 3 TO 5 STEPS WITH RAILING: A LOT
EATING MEALS: TOTAL

## 2025-08-28 ASSESSMENT — PAIN DESCRIPTION - ORIENTATION: ORIENTATION: MID

## 2025-08-28 ASSESSMENT — PAIN - FUNCTIONAL ASSESSMENT
PAIN_FUNCTIONAL_ASSESSMENT: 0-10

## 2025-08-28 ASSESSMENT — PAIN DESCRIPTION - LOCATION: LOCATION: ABDOMEN

## 2025-08-28 ASSESSMENT — ENCOUNTER SYMPTOMS: FATIGUE: 1

## 2025-08-28 ASSESSMENT — PAIN DESCRIPTION - DESCRIPTORS
DESCRIPTORS: ACHING;DISCOMFORT
DESCRIPTORS: ACHING;DISCOMFORT

## 2025-08-28 NOTE — PROGRESS NOTES
Spiritual Care Visit  Spiritual Care Request    Reason for Visit:  Routine Visit: Introduction  Continue Visiting: Yes     Request Received From:  Referral From: Other (Comment) (Long Term Pt visited to identify and address unmet needs.)    Focus of Care:  Visited With: Patient         Refer to :  Referral To:        Spiritual Care Assessment    Spiritual Assessment:                      Care Provided:       Sense of Community and or Oriental orthodox Affiliation:  Spiritual (Not Oriental orthodox)         Addressed Needs/Concerns and/or Luis Through:          Outcome:        Advance Directives:         Spiritual Care Annotation    Annotation:  Long Term pt visited to identify and address unmet needs.  Pt demeanor appeared weak and exhausted.  Pt agreed this was not a good time to visit.  Left pt my card and telling of our availability.  Pt non verbally gestured appreciation several times.

## 2025-08-28 NOTE — PROGRESS NOTES
Art Therapy Note    Roma Corral was referred by Paz Weber MD  Therapy Session  Referral Type: New referral this admission  Visit Type: Follow-up visit  Session Start Time: 1526  Intervention Delivery: In-person  Conflict of Service: Declined treatment  Number of family members present: 0    Pre-assessment  Unable to Assess Reason: Outcomes not applicable         Treatment/Interventions  Art Therapy Interventions: Empathic listening/validating emotions         Narrative  Assessment Detail: At time of visit Pt on the phone.  He shared with ATR he could not meet for he was making necessary business calls.  But remains open to services. ATR will follow up with Pt another time to offer services.    Education Documentation  No documentation found.

## 2025-08-28 NOTE — PROGRESS NOTES
Acupuncture Visit:     Roma Corral was referred by KEV Neri  .  Session Information  Discipline: Acupuncture  Session Start Time: 1100  Session End Time: 1105  Visit Type: New patient  Conflict of Service : Declined service  Medical History Reviewed: I have reviewed pertinent medical history in EHR and contraindications are present.  Description of Present Complaint: Wellbeing challenges  History of Present Illness: Seeking support for cancer related sx  Additional Assessment Detail: Patient met at bedside comfortably resting in bed.  IO services was introduced but declined noting he would think about it.  He received program flyer to help with his decision making.  Patient educated on next availability of service line.    Pre-treatment Assessment  Unable to Assess Reason: Patient declined to answer              Provider reviewed plan for the acupuncture session, precautions and contraindications. Patient/guardian/hospital staff has given consent to treat with full understanding of what to expect during thesession. Before acupuncture began, provider explained to the patient to communicate at any time if the procedure was causing discomfort past their tolerance level. Patient agreed to advise acupuncturist. The acupuncturist counseled the patient on the risks of acupuncture treatment including pain, infection, bleeding, and no relief of pain. The patient was positioned comfortably. There was no evidence of infection at the site of needle insertions.       No annotated images are attached to the encounter.         Post-treatment Assessment  Unable to Assess Reason: Did not provide intervention    Evaluation/Recommendation/Follow-up  Total Session Time (min): 5 minutes      Massage Therapy / Acupuncture Note:

## 2025-08-28 NOTE — CARE PLAN
Problem: Pain - Adult  Goal: Verbalizes/displays adequate comfort level or baseline comfort level  Outcome: Progressing     Problem: Safety - Adult  Goal: Free from fall injury  Outcome: Progressing     Problem: Nutrition  Goal: Nutrient intake appropriate for maintaining nutritional needs  Outcome: Progressing     Problem: Skin  Goal: Promote skin healing  Outcome: Progressing

## 2025-08-28 NOTE — PROGRESS NOTES
Surgical Oncology Progress Note    Roma Corral is a 70 y.o. male with history of rectal cancer with mets to the liver, CAD (2021 NSTEMI), DM2, HTN who is s/p right extended hepatectomy for metastatic rectal cancer on 7/29     Subjective   No acute events overnight. Patient seen and evaluated this AM during team rounds. He is notably distressed, with sad affect. Patient denies nausea, and is understandably upset about having an NGT.    P.o. intake: NPO  Drain: 1.7 L  Colostomy: 60  Net +1 L for the past 24 hours  Net -5.5 L for entire hospital stay     Objective   GEN: Appears tired, sad, tearful, awake, alert, conversant.  RESP: non-labored breathing on RA  CV: non-cyanotic  GI: soft, non-tender to palpation, slightly distended. Thin serous fluid out of drain in bile bag. Ostomy bag full of stool. Incisions healing well, well approximated without drainage.   : voiding spontaneously.   MSK: no evidence of bilateral lower extremity edema  NEURO: alert and conversant  SKIN: jaundiced, warm and dry. Femoral access site from IR procedure clean, dry, and intact. No hematoma, fluctuance, or erythema noted. Site healing well.    Last Recorded Vitals  Heart Rate:  []   Temp:  [36.8 °C (98.2 °F)-37.9 °C (100.2 °F)]   Resp:  [17-20]   BP: (122-152)/(70-83)   Weight:  [73.9 kg (162 lb 14.7 oz)]   SpO2:  [93 %-95 %]      Intake/Output Last 24 Hours:      Intake/Output Summary (Last 24 hours) at 8/28/2025 1027  Last data filed at 8/28/2025 0827  Gross per 24 hour   Intake 3258.99 ml   Output 3495 ml   Net -236.01 ml        Relevant Results  Lab Date: 08/28/25       7.5    10.3>-----<47         23.7   147  114  24                  ----------------<181     3.3  17  1.41          Ca 11.1 Phos 1.4 Mg 1.94       ALT 15 AST 50 AlkPhos 46 tBili 18.3         Imaging:   XR abdomen 1 view  Result Date: 8/28/2025  Interpreted By:  Jorge Hyman  and Rene Dueñas STUDY: XR ABDOMEN 1 VIEW;  8/27/2025 7:24 pm   INDICATION:  Signs/Symptoms:assess NG tube placement.     COMPARISON: XR ABDOMEN 1 VIEW 8/26/2025, CT ABDOMEN PELVIS WO IV CONTRAST 8/25/2025, CT ABDOMEN PELVIS W AND WO IV CONTRAST 8/7/2025   ACCESSION NUMBER(S): [id number]   ORDERING CLINICIAN: SUPA ESPINO   FINDINGS: AP radiograph of abdomen available for interpretation.   Interval placement of enteric tube which is seen coursing midline and below the level of the diaphragm with distal tip projecting over the gastric body. New embolization coils are seen along the left mid abdomen. Similar position of previously visualized left lateral abdomen embolization coil. Surgical drain and clips are visualized over the right upper quadrant.   Presumed right IJ CVC tip projects over the cavoatrial junction.   Nonobstructive bowel gas pattern.Limited evaluation of pneumoperitoneum on supine imaging, however no gross evidence of free air is noted.   Persistent but stable right-greater-than-left pleural effusions. There is also superimposed bibasilar atelectatic changes.   Osseous structures demonstrate no acute bony changes.       1.  Interval placement of enteric tube with tip projecting with the gastric body. Remaining medical devices as above. 2. Persistent but stable right-greater-than-left pleural effusions.     I have reviewed the images/study and I agree with the findings as stated by Spenser López MD (PGY-3).   Signed by: Jorge Hyman 8/28/2025 9:55 AM Dictation workstation:   [id number]    XR chest 1 view  Result Date: 8/27/2025  Interpreted By:  Jorge Hyman  and Nancy Torres STUDY: XR CHEST 1 VIEW;  8/27/2025 4:24 pm   INDICATION: Signs/Symptoms:PICC LINE PLACEMENT CONFIRMATION.     COMPARISON: Chest radiograph one view 07/31/2025, CT abdomen and pelvis 08/25/2025.   ACCESSION NUMBER(S): [id number]   ORDERING CLINICIAN: SUPA ESPINO   FINDINGS: AP radiograph of the chest was provided.   Right-sided internal jugular MediPort with distal tip visualized projecting over the  superior cavoatrial junction. Left approach PICC line with distal tip projecting over the lower SVC.   CARDIOMEDIASTINAL SILHOUETTE: Cardiomediastinal silhouette is normal in size and configuration. Calcifications overlying the aortic arch.   LUNGS: Moderate right and small left pleural effusion with the adjacent opacification. Perihilar vascular prominence.   ABDOMEN: No remarkable upper abdominal findings.   BONES: No acute osseous changes.       1. Left approach PICC line with distal tip projecting over the lower SVC. 2. Persistence of a moderate right-and small left pleural effusion with adjacent opacification consistent with atelectasis, can not exclude an infectious cause. 3. Additional medical devices as above.   I personally reviewed the images/study and I agree with the findings as stated by Brian Cruz D.O. (Radiology resident). This study was interpreted at [place], [place].   MACRO: None   Signed by: Jorge Hyman 8/27/2025 4:53 PM Dictation workstation:   [id number]    Bedside PICC Imaging  Result Date: 8/27/2025  These images are not reportable by radiology and will not be interpreted by  Radiologists.    IR intervention arterial embolization  Result Date: 8/26/2025  Interpreted By:  Jb Gonzalez, STUDY: IR INTERVENTION ARTERIAL EMBOLIZATION; ;  8/26/2025 3:56 pm 1. ULTRASOUND-GUIDED RIGHT COMMON FEMORAL ARTERY ACCESS 2. RETROGRADE RIGHT COMMON FEMORAL ARTERIOGRAM 3. CELIAC ARTERIOGRAM 4. SPLENIC ARTERIOGRAM 5. COIL EMBOLIZATION SPLENIC ARTERY 6. POSTEMBOLIZATION SPLENIC ARTERIOGRAM 7. PERCUTANEOUS CLOSURE DEVICE-ANGIO-SEAL     INDICATION: Signs/Symptoms:embolization. 70-year-old man with metastatic colon cancer, liver metastases status post extended right hip technique, small for size syndrome. Splenic artery embolization requested.     COMPARISON: CT abdomen pelvis 08/25/2025, CT liver 07/16/2025, 05/23/2025   ACCESSION NUMBER(S): [id number]    ORDERING CLINICIAN: CATALINA SOTO   TECHNIQUE: ASSISTING : Rachelle Rodríguez M.D. ATTENDING : Jb Gonzalez M.D.   TECHNICAL DESCRIPTION/FINDINGS: The procedure, including all risks, benefits and alternatives were explained to the patient in detail. All questions were answered and written informed consent was obtained.   The patient was positioned supine on the fluoroscopy table. A time-out was performed.   The right groin was prepped and draped in usual sterile fashion. Focused ultrasound was performed the right common femoral artery demonstrating patency and pulsatility. Ultrasound images were saved. 1% lidocaine was administered subcutaneously for local anesthesia. A combination of fluoroscopic landmarks and real-time ultrasound guidance, the right common femoral artery was accessed in retrograde direction using micropuncture technique. Ultrasound images were saved. Under fluoroscopic visualization, an 018 guidewire was advanced into the right common iliac artery and a micropuncture transitional introducer was utilized for placement of a suprarenal 035 Bentson guidewire. Over the Bentson guidewire, retrograde 5 Latvian vascular sheath was placed.   A retrograde right common femoral arteriogram was performed demonstrating arterial access at the level of the right femoral head with vascular caliber suitable for percutaneous closure device.   Over the Bentson guidewire, a 5 Latvian SOS 2 catheter was advanced into the suprarenal abdominal aorta and used to select the celiac axis. A celiac arteriogram was performed demonstrating antegrade flow in left gastric, common hepatic, and the hypertrophied splenic arteries. Embolization coils are shown in the remnant liver as well as in the splenic parenchyma from prior trans splenic access.   In coaxial fashion, a Renegade HiFlo microcatheter an 016 fathom microwire were then used to select the mid splenic artery. An arteriogram was performed  demonstrating brisk antegrade flow in the hypertrophied splenic artery with distal branching at the hilum.   Coil embolization was then initiated with multiple detachable embolization close of various sizes. An attempt was made to get the initial loops of the embolization coil to form in the mid to distal splenic artery without distal migration to the splenic hilum. A coiled conglomerate of 2 coils was formed in the mid to distal main splenic artery proper however upon addition of the 3rd coil, the prior coil conglomerate was shown to embolize more distally toward the hilum. In sequential fashion, additional embolization coils were then administered into the mid and upstream portion of the splenic artery for complete occlusion.   A postembolization splenic arteriogram was performed demonstrating complete occlusion of the main splenic artery with persistent filling of pancreatic branches arising from the upstream aspect of the splenic artery.   Catheters were then removed from the right groin vascular sheath. The vascular sheath was exchanged for an Angio-Seal percutaneous closure device to obtain hemostasis. A sterile dressing was applied.   SEDATION/MEDICATIONS: Continuous cardiopulmonary monitoring was performed by a radiology nurse for the duration of the procedure.  1 mg Versed and   50 mcg Fentanyl were administered for moderate conscious sedation time of 75 minutes.      10 cc 1% Lidocaine was administered subcutaneously for local anesthesia. SPECIMENS: None. ESTIMATED BLOOD LOSS:  5 cc FLOUROSCOPY:  16.6 minutes; DAP  843930 mGy-cm*2; Air Kerma  552.77 mGy CONTRAST: 60 cc Omnipaque 350   FINDINGS: Test       Main splenic artery coil embolization to completion, as described above.     MACRO: None   Signed by: Jb Gonzalez 8/26/2025 5:50 PM Dictation workstation:   [id number]      Assessment:  Roma Corral is a 70 y.o. male with history of rectal cancer with mets to the liver, CAD (2021 NSTEMI), DM2, HTN  who is s/p right extended hepatectomy for metastatic rectal cancer on 7/29 c/b worsening liver function.     Most recent bilirubin, total: 18.3, up from 17.3 on 8/27.     Plan:  Neuro:  - Holding PO pain meds, including Morphine (30 mg, p.o., twice daily), Oxycodone (5 mg, p.o., every 4 hours as needed), Tramadol (50 mg, p.o., every 6 hours as needed)  - Dilaudid (0.4 mg/0.6 mg IV, every 4 hours as needed, 0.2 mg for breakthrough)  - Consult to integrative heme/onc for evaluation    CV:   - No acute cardiac issues    Pulm:   - Encourage IS  - No acute pulmonary issues    GI:   - Diet: NPO  - Hold Bowel regimen: MiraLAX   - Zofran as needed for nausea   - Appreciate nutrition recs fo TPN:  - Refrain from using any dextrose-containing IVF, when TPN begins, unless otherwise medically indicated   -Glucose levels <180, TPN advanced to 50 ml/hr  -On third day of TPN, again if no major shifts in lytes and glucose levels <180, advance to 65 ml/hr  - hold trace elements in parenteral nutrition formula for now  - After pt reaches continuous goal rate for TPN with stable lytes, would transition to cycled TPN to try to reduce total stress on liver  -Continue 5/20 formula  -First Hour=80 ml/hr  -Next 10 Hours=140 ml/hr  -Last Hour=80 ml/hr  -Continue 250 mls SMOFlipids x 12 hours/night  - 250 mls (50 g) SMOFlipids to run x 12 hours/night    - re-check 25(OH)-D level.     /Renal:  - IVF: NS 50cc/hr  - 500 ml/hr bolus of NS for goal of net +500cc to + 1   - Albumin (25 g, IV, scheduled every 6 hours); to account for high drain output in the setting of cirrhosis  - AM CMP, Mg, with lyte repletion. Goal K > 4, Mg > 2  - Nephrology consult: Goal net +1 L daily, renal ultrasound unremarkable, urine studies completed.  Appreciate further recommendations.     Heme:   - Transfuse for Hgb < 7  - Vitamin K (5 mg, IV, every 48 hours)  - H/H stable: 7.5/23.7    Endo:  - SSI with TPN    ID:   - Empiric Zosyn and Diflucan (started 8/11 and  8/19, respectively, end date pending)  - Will require splenectomy vaccines postprocedure    DVT ppx:   - Heparin 5000u  - SCDs    Dispo: RNF; PT/OT previously recommended home with home health care.  Will require reevaluation during his hospital stay.    Discussed with Dr. Emily Neumann, who discussed with attending, Dr. Erazo.    Pelon Willams MD - PGY1  Surgical Oncology   [facility] [id number]

## 2025-08-28 NOTE — CONSULTS
Inpatient consult to Integrative Hem/Onc  Consult performed by: Ivory Razo, MELISSA-CNP  Consult ordered by: Baron Erazo MD          Reason For Consult  Symptom management     History Of Present Illness  Roma Corral is a 70 y.o. male with PMH of rectal cancer with mets to the liver, CAD (2021 NSTEMI), DM2, HTN who is s/p right extended hepatectomy for metastatic rectal cancer on 7/29. Integrative hematology/oncology consulted by surgical oncology for non-pharmacological symptom management of pain, anxiety and stress.     Pt resting in bed, no family at bedside.     Family Hx: Father: no medical Hx. Mother: no medical Hx     Social Hx: lives in a home with his wife. Has one child. Worked in   Tobacco: denies    ETOH: denies   Illicits: denies      Spiritual Hx: spiritual     Joys: music - classical, family         Information obtained from chart review, discussion with patient/family, and discussion with primary team.       Past Medical History  He has a past medical history of Abdominal pain, Acute non-ST elevation myocardial infarction (NSTEMI) (Multi), Anemia, CAD (coronary artery disease), Cardiology follow-up encounter (12/11/2023), Chronic pain disorder, Colorectal cancer (Multi), Gout, H/O cardiac catheterization (06/01/2021), H/O cardiovascular stress test (09/03/2021), H/O echocardiogram (06/02/2021), High cholesterol, Hypertension, Overweight, Rectal cancer (Multi), and Type 2 diabetes mellitus.    Surgical History  He has a past surgical history that includes Leg Surgery (Left); Colonoscopy (06/17/2024); and Hemicolectomy w/ ostomy.     Social History  He reports that he has never smoked. He has never used smokeless tobacco. He reports that he does not drink alcohol and does not use drugs.    Family History  Family History[1]     Allergies  Patient has no known allergies.    Review of Systems   Constitutional:  Positive for fatigue.        Physical Exam  Vitals and nursing note  "reviewed.   Constitutional:       General: He is not in acute distress.     Appearance: Normal appearance. He is underweight. He is ill-appearing.   HENT:      Head: Normocephalic and atraumatic.      Nose: Nose normal.      Mouth/Throat:      Mouth: Mucous membranes are moist.   Eyes:      Extraocular Movements: Extraocular movements intact.      Pupils: Pupils are equal, round, and reactive to light.   Cardiovascular:      Rate and Rhythm: Normal rate.   Pulmonary:      Effort: Pulmonary effort is normal.   Abdominal:      General: Abdomen is flat.      Palpations: Abdomen is soft.   Skin:     General: Skin is warm and dry.      Coloration: Skin is jaundiced.   Neurological:      General: No focal deficit present.      Mental Status: He is alert and oriented to person, place, and time.   Psychiatric:         Mood and Affect: Mood normal.         Behavior: Behavior normal.          Last Recorded Vitals  Blood pressure 123/72, pulse 108, temperature 36.8 °C (98.2 °F), resp. rate 20, height 1.803 m (5' 10.98\"), weight 73.9 kg (162 lb 14.7 oz), SpO2 93%.         Relevant Results  Scheduled medications  Scheduled Medications[2]  Continuous medications  Continuous Medications[3]  PRN medications  PRN Medications[4]    Results for orders placed or performed during the hospital encounter of 07/29/25 (from the past 24 hours)   POCT GLUCOSE   Result Value Ref Range    POCT Glucose 110 (H) 74 - 99 mg/dL   Triglycerides   Result Value Ref Range    Triglycerides 94 0 - 149 mg/dL   Vitamin D 25-Hydroxy,Total (for eval of Vitamin D levels)   Result Value Ref Range    Vitamin D, 25-Hydroxy, Total 15 (L) 30 - 100 ng/mL   POCT GLUCOSE   Result Value Ref Range    POCT Glucose 151 (H) 74 - 99 mg/dL   POCT GLUCOSE   Result Value Ref Range    POCT Glucose 161 (H) 74 - 99 mg/dL   POCT GLUCOSE   Result Value Ref Range    POCT Glucose 181 (H) 74 - 99 mg/dL   Comprehensive Metabolic Panel   Result Value Ref Range    Glucose 181 (H) 74 - 99 " mg/dL    Sodium 147 (H) 136 - 145 mmol/L    Potassium 3.3 (L) 3.5 - 5.3 mmol/L    Chloride 114 (H) 98 - 107 mmol/L    Bicarbonate 17 (L) 21 - 32 mmol/L    Anion Gap 19 10 - 20 mmol/L    Urea Nitrogen 24 (H) 6 - 23 mg/dL    Creatinine 1.41 (H) 0.50 - 1.30 mg/dL    eGFR 54 (L) >60 mL/min/1.73m*2    Calcium 11.1 (H) 8.6 - 10.6 mg/dL    Albumin 5.4 (H) 3.4 - 5.0 g/dL    Alkaline Phosphatase 46 33 - 136 U/L    Total Protein 6.4 6.4 - 8.2 g/dL    AST 50 (H) 9 - 39 U/L    Bilirubin, Total 18.3 (H) 0.0 - 1.2 mg/dL    ALT 15 10 - 52 U/L   Phosphorus   Result Value Ref Range    Phosphorus 1.4 (L) 2.5 - 4.9 mg/dL   CBC   Result Value Ref Range    WBC 10.3 4.4 - 11.3 x10*3/uL    nRBC 0.6 (H) 0.0 - 0.0 /100 WBCs    RBC 2.43 (L) 4.50 - 5.90 x10*6/uL    Hemoglobin 7.5 (L) 13.5 - 17.5 g/dL    Hematocrit 23.7 (L) 41.0 - 52.0 %    MCV 98 80 - 100 fL    MCH 30.9 26.0 - 34.0 pg    MCHC 31.6 (L) 32.0 - 36.0 g/dL    RDW      Platelets 47 (L) 150 - 450 x10*3/uL   Magnesium   Result Value Ref Range    Magnesium 1.94 1.60 - 2.40 mg/dL   POCT GLUCOSE   Result Value Ref Range    POCT Glucose 165 (H) 74 - 99 mg/dL     *Note: Due to a large number of results and/or encounters for the requested time period, some results have not been displayed. A complete set of results can be found in Results Review.     XR abdomen 1 view  Result Date: 8/28/2025  Interpreted By:  Jorge Hyman  and Rene Dueñas STUDY: XR ABDOMEN 1 VIEW;  8/27/2025 7:24 pm   INDICATION: Signs/Symptoms:assess NG tube placement.     COMPARISON: XR ABDOMEN 1 VIEW 8/26/2025, CT ABDOMEN PELVIS WO IV CONTRAST 8/25/2025, CT ABDOMEN PELVIS W AND WO IV CONTRAST 8/7/2025   ACCESSION NUMBER(S): [id number]   ORDERING CLINICIAN: SUPA ESPINO   FINDINGS: AP radiograph of abdomen available for interpretation.   Interval placement of enteric tube which is seen coursing midline and below the level of the diaphragm with distal tip projecting over the gastric body. New embolization coils are seen  along the left mid abdomen. Similar position of previously visualized left lateral abdomen embolization coil. Surgical drain and clips are visualized over the right upper quadrant.   Presumed right IJ CVC tip projects over the cavoatrial junction.   Nonobstructive bowel gas pattern.Limited evaluation of pneumoperitoneum on supine imaging, however no gross evidence of free air is noted.   Persistent but stable right-greater-than-left pleural effusions. There is also superimposed bibasilar atelectatic changes.   Osseous structures demonstrate no acute bony changes.       1.  Interval placement of enteric tube with tip projecting with the gastric body. Remaining medical devices as above. 2. Persistent but stable right-greater-than-left pleural effusions.     I have reviewed the images/study and I agree with the findings as stated by Spenser López MD (PGY-3).   Signed by: Jorge Hyman 8/28/2025 9:55 AM Dictation workstation:   [id number]    XR chest 1 view  Result Date: 8/27/2025  Interpreted By:  Jorge Hyman and Nadeau Simon STUDY: XR CHEST 1 VIEW;  8/27/2025 4:24 pm   INDICATION: Signs/Symptoms:PICC LINE PLACEMENT CONFIRMATION.     COMPARISON: Chest radiograph one view 07/31/2025, CT abdomen and pelvis 08/25/2025.   ACCESSION NUMBER(S): [id number]   ORDERING CLINICIAN: SUPA ESPINO   FINDINGS: AP radiograph of the chest was provided.   Right-sided internal jugular MediPort with distal tip visualized projecting over the superior cavoatrial junction. Left approach PICC line with distal tip projecting over the lower SVC.   CARDIOMEDIASTINAL SILHOUETTE: Cardiomediastinal silhouette is normal in size and configuration. Calcifications overlying the aortic arch.   LUNGS: Moderate right and small left pleural effusion with the adjacent opacification. Perihilar vascular prominence.   ABDOMEN: No remarkable upper abdominal findings.   BONES: No acute osseous changes.       1. Left approach PICC line with distal tip  projecting over the lower SVC. 2. Persistence of a moderate right-and small left pleural effusion with adjacent opacification consistent with atelectasis, can not exclude an infectious cause. 3. Additional medical devices as above.   I personally reviewed the images/study and I agree with the findings as stated by Brian Cruz D.O. (Radiology resident). This study was interpreted at [facility], [place], [place].   MACRO: None   Signed by: Jorge Hyman 8/27/2025 4:53 PM Dictation workstation:   [id number]    Bedside PICC Imaging  Result Date: 8/27/2025  These images are not reportable by radiology and will not be interpreted by  Radiologists.    IR intervention arterial embolization  Result Date: 8/26/2025  Interpreted By:  Jb Gonzalez, STUDY: IR INTERVENTION ARTERIAL EMBOLIZATION; ;  8/26/2025 3:56 pm 1. ULTRASOUND-GUIDED RIGHT COMMON FEMORAL ARTERY ACCESS 2. RETROGRADE RIGHT COMMON FEMORAL ARTERIOGRAM 3. CELIAC ARTERIOGRAM 4. SPLENIC ARTERIOGRAM 5. COIL EMBOLIZATION SPLENIC ARTERY 6. POSTEMBOLIZATION SPLENIC ARTERIOGRAM 7. PERCUTANEOUS CLOSURE DEVICE-ANGIO-SEAL     INDICATION: Signs/Symptoms:embolization. 70-year-old man with metastatic colon cancer, liver metastases status post extended right hip technique, small for size syndrome. Splenic artery embolization requested.     COMPARISON: CT abdomen pelvis 08/25/2025, CT liver 07/16/2025, 05/23/2025   ACCESSION NUMBER(S): [id number]   ORDERING CLINICIAN: CATALINA SOTO   TECHNIQUE: ASSISTING : Rachelle Rodríguez M.D. ATTENDING : Jb Gonzalez M.D.   TECHNICAL DESCRIPTION/FINDINGS: The procedure, including all risks, benefits and alternatives were explained to the patient in detail. All questions were answered and written informed consent was obtained.   The patient was positioned supine on the fluoroscopy table. A time-out was performed.   The right groin was prepped and draped in usual sterile  fashion. Focused ultrasound was performed the right common femoral artery demonstrating patency and pulsatility. Ultrasound images were saved. 1% lidocaine was administered subcutaneously for local anesthesia. A combination of fluoroscopic landmarks and real-time ultrasound guidance, the right common femoral artery was accessed in retrograde direction using micropuncture technique. Ultrasound images were saved. Under fluoroscopic visualization, an 018 guidewire was advanced into the right common iliac artery and a micropuncture transitional introducer was utilized for placement of a suprarenal 035 Bentson guidewire. Over the Bentson guidewire, retrograde 5 Stateless vascular sheath was placed.   A retrograde right common femoral arteriogram was performed demonstrating arterial access at the level of the right femoral head with vascular caliber suitable for percutaneous closure device.   Over the Bentson guidewire, a 5 Stateless SOS 2 catheter was advanced into the suprarenal abdominal aorta and used to select the celiac axis. A celiac arteriogram was performed demonstrating antegrade flow in left gastric, common hepatic, and the hypertrophied splenic arteries. Embolization coils are shown in the remnant liver as well as in the splenic parenchyma from prior trans splenic access.   In coaxial fashion, a Renegade HiFlo microcatheter an 016 fathom microwire were then used to select the mid splenic artery. An arteriogram was performed demonstrating brisk antegrade flow in the hypertrophied splenic artery with distal branching at the hilum.   Coil embolization was then initiated with multiple detachable embolization close of various sizes. An attempt was made to get the initial loops of the embolization coil to form in the mid to distal splenic artery without distal migration to the splenic hilum. A coiled conglomerate of 2 coils was formed in the mid to distal main splenic artery proper however upon addition of the 3rd coil,  the prior coil conglomerate was shown to embolize more distally toward the hilum. In sequential fashion, additional embolization coils were then administered into the mid and upstream portion of the splenic artery for complete occlusion.   A postembolization splenic arteriogram was performed demonstrating complete occlusion of the main splenic artery with persistent filling of pancreatic branches arising from the upstream aspect of the splenic artery.   Catheters were then removed from the right groin vascular sheath. The vascular sheath was exchanged for an Angio-Seal percutaneous closure device to obtain hemostasis. A sterile dressing was applied.   SEDATION/MEDICATIONS: Continuous cardiopulmonary monitoring was performed by a radiology nurse for the duration of the procedure.  1 mg Versed and   50 mcg Fentanyl were administered for moderate conscious sedation time of 75 minutes.      10 cc 1% Lidocaine was administered subcutaneously for local anesthesia. SPECIMENS: None. ESTIMATED BLOOD LOSS:  5 cc FLOUROSCOPY:  16.6 minutes; DAP  291794 mGy-cm*2; Air Kerma  552.77 mGy CONTRAST: 60 cc Omnipaque 350   FINDINGS: Test       Main splenic artery coil embolization to completion, as described above.     MACRO: None   Signed by: Jb Gonzalez 8/26/2025 5:50 PM Dictation workstation:   [id number]    XR abdomen 1 view  Result Date: 8/26/2025  Interpreted By:  Jennifer Young and Mercado Amiel STUDY: XR ABDOMEN 1 VIEW;  8/26/2025 1:05 am   INDICATION: Signs/Symptoms:NG tube replacement.     COMPARISON: XR ABDOMEN 1 VIEW 8/25/2025, CT ABDOMEN PELVIS WO IV CONTRAST 8/25/2025   ACCESSION NUMBER(S): [id number]   ORDERING CLINICIAN: SUPA ESPINO   FINDINGS: AP radiograph of abdomen available for interpretation.   Interval advancement/replacement of enteric tube with tip projecting over the gastric body. Right IJ CVC tip projects over the cavoatrial junction. Embolization coils are noted over the left upper  abdomen. Surgical drain and clips visualized over the right upper quadrant.   Nonobstructive bowel gas pattern.Limited evaluation of pneumoperitoneum on supine imaging, however no gross evidence of free air is noted.   Persistent right more than left pleural effusions.   Osseous structures demonstrate no acute bony changes.       1.  Interval advancement/replacement of enteric tube with tip now projecting over the gastric body. 2. Persistent moderate right and small left pleural effusions.     I have reviewed the images/study and I agree with the findings as stated by Spenser López MD (PGY-3).   Signed by: Jennifer Davies 8/26/2025 7:17 AM Dictation workstation:   [id number]    XR abdomen 1 view  Result Date: 8/26/2025  Interpreted By:  Jennifer Young and Stevens Alex STUDY: XR ABDOMEN 1 VIEW;  8/25/2025 6:35 pm   INDICATION: Signs/Symptoms:NG Tube placement confirmation.     COMPARISON: CT abdomen pelvis 08/25/2025   ACCESSION NUMBER(S): [id number]   ORDERING CLINICIAN: SUPA ESPINO   FINDINGS: Enteric tube is visualized with distal tip projecting over the expected location of the gastric body. Surgical drain and clips visualized projecting over the right upper abdominal quadrant. Embolization coils noted over the left upper abdomen.   Nonobstructive bowel gas pattern.   Limited evaluation of pneumoperitoneum on supine imaging, however no gross evidence of free air is noted.   Blunting of the bilateral costophrenic angles, right-greater-than-left. Consolidate opacity in the lung bases.   Osseous structures demonstrate no acute bony changes.       1. Enteric tube is visualized with distal tip projecting over the expected location of the gastric body. 2. Moderate right and small left pleural effusions.   I personally reviewed the images/study and I agree with the findings as stated by Benja Sotomayor DO PGY-3. This study was interpreted at [facility], [place],  [place].   MACRO: None   Signed by: Garcíaedwin Jonesberny Davies 8/26/2025 7:16 AM Dictation workstation:   [id number]    CT abdomen pelvis wo IV contrast  Result Date: 8/25/2025  Interpreted By:  Ken Palacios, STUDY: CT ABDOMEN PELVIS WO IV CONTRAST; 8/25/2025 3:06 pm   INDICATION: Signs/Symptoms:Concern for GI obstruction - plan to administer PO contrast prior to scan, long prep.   COMPARISON: 08/07/2025   ACCESSION NUMBER(S): [id number]   ORDERING CLINICIAN: SUPA ESPINO   TECHNIQUE: CT of the abdomen and pelvis was performed utilizing enteric contrast. Contiguous axial images were obtained through the abdomen and pelvis. Coronal and sagittal reconstructions were also created. No intravenous contrast was administered.   FINDINGS: Evaluation of vessels and organs is sub-optimal without intravenous contrast.   INCLUDED LOWER CHEST: There is a moderate right and small left pleural effusion with adjacent atelectasis.   LIVER: Stable findings including right hepatectomy. No new hepatic abnormality is evident given noncontrast technique.   BILE DUCTS: No biliary dilation.   GALLBLADDER: Status post cholecystectomy.   PANCREAS: Unremarkable.   SPLEEN: Stable splenomegaly with calcifications suggesting prior granulomatous exposure. There is also evidence of prior embolization.   ADRENAL GLANDS: Unremarkable.   KIDNEYS, URETERS AND BLADDER: No evidence of nephrolithiasis or hydroureteronephrosis. The urinary bladder is minimally distended.   REPRODUCTIVE ORGANS: Unremarkable.   BOWEL: There is a small bowel obstruction with an abrupt transition point just anterior to the rectal anastomosis in the pelvis. The distal small bowel loops in the right lower quadrant are decompressed. No definite pneumatosis or wall thickening given noncontrast technique.   VESSELS: Stable findings with calcific atherosclerosis in the nonaneurysmal abdominal aorta.   PERITONEUM/RETROPERITONEUM/LYMPH NODES: There is trace free fluid and postsurgical  changes consistent with patient history, similar to the prior exam. No acute hematoma or free air. No adenopathy is evident.   MUSCULOSKELETAL: No destructive osseous lesion is evident.       1. Small-bowel obstruction with an abrupt transition point just anterior to the rectal anastomosis in the pelvis. The distal small bowel loops in the right lower quadrant are decompressed. No definite pneumatosis or wall thickening given noncontrast technique. 2. Other findings are without significant change including small pleural effusions and adjacent atelectasis, right-greater-than-left, and postsurgical changes.   Signed by: Ken Palacios 8/25/2025 3:34 PM Dictation workstation:   [id number]    US renal complete  Result Date: 8/24/2025  Interpreted By:  Juanito Monzon and Sheng Max STUDY: US RENAL COMPLETE;  8/24/2025 3:17 pm   INDICATION: Signs/Symptoms:Worsening Cr.     COMPARISON: MRCP PANCREAS W AND WO IV CONTRAST 8/20/2025, US LIVER WITH DOPPLER 8/12/2025, CT ABDOMEN PELVIS W AND WO IV CONTRAST 8/7/2025, US LIVER WITH DOPPLER 8/6/2025   ACCESSION NUMBER(S): [id number]   ORDERING CLINICIAN: SUPA ESPINO   TECHNIQUE: Multiple grayscale, B flow, Doppler color sequences of the kidneys were obtained.   FINDINGS: Exam is limited due to patient's body habitus overlying bowel gas, within those limitations:   RIGHT KIDNEY: The right kidney measures 11.4 cm in length. The renal cortical echogenicity and thickness are within normal limits. No hydronephrosis is present; no evidence of nephrolithiasis.   LEFT KIDNEY: The left kidney measures 13.0 cm in length. The renal cortical echogenicity and thickness are within normal limits. No hydronephrosis is present; no evidence of nephrolithiasis.   BLADDER: The urinary bladder is unremarkable in appearance.   OTHER: Small volume free fluid is noted abutting the left kidney (image 23/29). The prostate is enlarged extending superiorly and indenting the inferior aspect of the  urinary bladder measuring 4.4 x 5.1 x 3.9 cm (image 28/29). Partially imaged small right-sided pleural effusion (image 18/29).       1. Unremarkable sonographic evaluation of the bilateral kidneys. No hydronephrosis. 2. Trace free fluid is noted abutting the left kidney. 3. Partially imaged right-sided pleural effusion. 4. Incidentally noted prostatomegaly.     I personally reviewed the images/study and I agree with the findings as stated by Dr. Ephraim Arzate. This study was interpreted at [place], [place].   MACRO: None     Signed by: Juanito Monzon 8/24/2025 4:28 PM Dictation workstation:   [id number]    MRCP pancreas w and wo IV contrast  Result Date: 8/21/2025  Interpreted By:  Ken Palacios and Bera Kaustav STUDY: MRCP PANCREAS W AND WO IV CONTRAST;  8/20/2025 6:35 pm   INDICATION: Signs/Symptoms:Suspected biliary stricture, possible stenting..   70-year-old male with history of rectal cancer with metastatic disease to the liver, status post right extended hepatectomy on 07/29/2025. Bilirubin is elevated at 15.9.   COMPARISON: CT abdomen pelvis on 08/07/2025 CT liver with IV contrast on 07/16/2025 MRI liver on 06/18/2024   ACCESSION NUMBER(S): [id number]   ORDERING CLINICIAN: SUPA ESPINO   TECHNIQUE: MRI PANCREAS; Multiplanar magnetic resonance images of the abdomen were obtained including the following sequences; T2-weighted SSFSE with and without fat saturation, T1-weighted GRE in/opposed phase, DWI, fat saturated 3D-T1w GRE pre and dynamically post contrast. Radial thick slab T2w RARE MRCP and coronally reconstructed navigator gated high resolution 3-D T2w RESTORE MRCP with MIP reconstruction were also performed for MRCP.  16 ML of Dotarem was administered intravenously without immediate complication.   FINDINGS: LIVER: Status post extended right hepatectomy, with a few perihepatic collections, appear stable to mildly decreased as compared to prior. For  instance a 2.6 cm perihepatic collection (series 21, image 13; previously measured up to 5.0 cm. Additional perihepatic fluid collections, including a collection of the superior aspect of the liver appears similar to prior (series 14, image 22/72). The left lobe of the liver demonstrates mildly heterogeneous enhancement especially at the anterior aspect, likely postsurgical. No evidence of fatty infiltration. No focal liver lesions identified.   BILE DUCTS: No intrahepatic or extrahepatic bile duct dilatation is demonstrated.   GALLBLADDER: Status post cholecystectomy   PANCREAS: Normal signal intensity. Normal enhancement. No masses. The pancreatic duct is normal.   SPLEEN: Spleen is at the upper limits of normal in size measuring up to 13.9 cm in craniocaudal dimensions.   ADRENAL GLANDS: Within normal limits.   KIDNEYS: The kidneys are normal in size enhance symmetrically. 0.8 cm T2 hyperintense lesion within the inferior pole of the left kidney, similar to prior, likely a simple cysts. No hydronephrosis.   LYMPH NODES: Few prominent retroperitoneal lymph nodes, however not enlarged by CT criteria.   ABDOMINAL VESSELS: Aorta and the major abdominal arterial vessels demonstrate no gross abnormality.  Superior mesenteric vein, splenic vein, and main, right and left portal vein are patent. Hepatic veins are patent.  No significant collaterals or esophageal varices are present.   BOWEL: Prominent small bowel loops, likely due to ileus.   PERITONEUM/RETROPERITONEUM: Upper abdominal ascites, as well as right-greater-than-left pleural effusions appears similar to prior. A surgical drain is in the right upper quadrant collection.   BONES AND LOWER THORAX: Small amount of fluid within the anterior abdominal wall, likely postsurgical, similar to prior. There is a tiny fat containing umbilical hernia. Diffuse body wall anasarca. No abnormally enhancing focal bony lesions are identified. Multilevel discogenic degenerative  disease is noted in several levels of the thoraco- lumbar spine. T12 compression deformity similar to prior. Right-greater-than-left pleural effusions appears similar to prior.       1.  No evidence biliary dilatation. 2. Postsurgical changes of extended right hepatectomy, with mildly decreased size of perihepatic fluid collections, when compared to CT from 08/07/2025. 3. Additional stable changes as above.   I personally reviewed the images/study and I agree with the findings as stated. This study was interpreted at [place], [place].   MACRO: None   Signed by: Ken Palacios 8/21/2025 11:58 AM Dictation workstation:   [id number]    US liver with doppler  Result Date: 8/12/2025  Interpreted By:  Des Villeda,  Murphy Tapia STUDY: US LIVER WITH DOPPLER;  8/12/2025 10:20 am   INDICATION: Signs/Symptoms:S/p right extended hepatectomy with postop liver failure (tbili >10, INR 2).     COMPARISON: CT abdomen and pelvis 08/07/2025. Ultrasound liver Doppler 08/06/2025.   ACCESSION NUMBER(S): [id number]   ORDERING CLINICIAN: SUPA ESPINO   TECHNIQUE: Multiple images of the right upper quadrant were obtained.  Gray scale, color Doppler and spectral Doppler waveform analysis was performed.   FINDINGS: Extremely limited evaluation due to overlying bowel gas and incisions.   LIVER: Postsurgical changes of extended right hepatectomy. The hepatectomy bed is difficult to evaluate due to the limitations noted above. Mildly heterogenous appearance of the left hepatic lobe likely due to postsurgical changes. Small hypodense fluid collection adjacent to the left hepatic lobe measuring 2.19 by 1.04 x 1.54 cm. There is perihepatic fluid and a perihepatic drain in place.   GALLBLADDER: The gallbladder is not definitively visualized either due to the above limitations with the gallbladder is surgically absent.   BILIARY SYSTEM: No evidence of intra or extrahepatic biliary  dilatation is identified.   DOPPLER EVALUATION:   HEPATIC ARTERIES: The main hepatic artery is patent with an RI of 0.68, previously measuring 0.76. The right hepatic artery branches are not definitively visualized. The left hepatic artery branch has an RI of approximately 0.68.   PORTAL VEIN: The main portal vein is patent with flow in the anterograde direction and a velocity of 26.0 cm/sec, previously measuring 11.6 cm/sec. The left portal vein is patent with flow in the anterograde direction and a velocity of 22.6 cm/sec. The splenic vein is patent with flow in the anterograde direction and a velocity of 24.4 cm/sec, previously measuring 38.8 cm/sec.   HEPATIC VEIN: The right, middle and left hepatic veins are patent and demonstrate triphasic antegrade flow. IVC appears also patent.   PANCREAS: The pancreas is poorly visualized due to overlying bowel gas.   RIGHT KIDNEY: The right kidney measures 11.58 cm in length. No hydronephrosis or renal calculi are seen.   SPLEEN: The spleen measures 17.18cm and is grossly unremarkable.   PERITONEUM AND RETROPERITONEUM: Abdominopelvic ascites. Incidentally noted bilateral pleural effusions.       1. Extremely limited evaluation due to overlying bowel gas. Within these limitations, postsurgical changes of extended right hepatectomy. The hepatectomy bed is difficult to evaluate given the limitations. The remainder of the liver is mildly heterogenous in appearance, likely related to postsurgical changes. Small fluid collection adjacent to the left hepatic lobe measuring up to 2.2 cm. The hepatic vasculature is patent and similar to prior. Given the limitations of the exam, recommend further evaluation with cross-sectional imaging. 2. Abdominopelvic ascites and bilateral pleural effusions. 3. Splenomegaly.   I personally reviewed the images/study and resident's interpretation and I agree with the findings as stated by Regina Ventura MD (resident radiologist). This study was  analyzed and interpreted at [place], [place].   MACRO: None   Signed by: Des Pimentelkalen 8/12/2025 12:32 PM Dictation workstation:   [id number]    CT abdomen pelvis w and wo IV contrast  Result Date: 8/7/2025  Interpreted By:  Laci Bates and Ross Jason STUDY: CT ABDOMEN PELVIS W AND WO IV CONTRAST;  8/7/2025 12:26 pm   INDICATION: Signs/Symptoms:Rising t bili status post extended right hepatectomy..     COMPARISON: CT liver with IV contrast from 07/16/2025.   ACCESSION NUMBER(S): [id number]   ORDERING CLINICIAN: SUPA ESPINO   TECHNIQUE: CT of the abdomen and pelvis was performed.  Standard contiguous axial images were obtained at 3 mm slice thickness through the abdomen and pelvis. Coronal and sagittal reconstructions at 3 mm slice thickness were performed.  75 ML of Omnipaque 350 was administered intravenously without immediate complication.   FINDINGS: LOWER CHEST: Interval development of moderate pleural effusion with atelectatic changes in the right lung base from prior exam. Local increase in lymph node size with maximal short axis measurement of 0.7 cm. Superimposed infectious process can not be ruled out. Mild left-sided pleural effusion, similar in appearance to prior study. Linear atelectatic densities in the left upper and right middle lobes. No discrete lung nodules.   The heart is normal in size. Mild atherosclerotic calcifications of the coronary arteries noted. Central access line terminating in the cavoatrial junction.   No mediastinal lymphadenopathy.   Mild atherosclerotic calcification of the thoracic aorta without occlusion or aneurysm.   The visualized portion of the thoracic esophagus is without acute pathology.   ABDOMEN:   LIVER: Patient is status post resection of right hepatic lobe and hepatopexy using falciform ligament. Stable position of a surgical drain inserted in the right lower quadrant that terminates along the anterior  aspect of segment 4B. Embolization coils noted in the right hepatic branch of portal vein. Multiple perihepatic foci of air likely represents postoperative change.   There are multiple air-fluid collections perihepatic location measuring up to 5 cm on image 28 series 3 and 3 cm on image 29 series 3. There is an additional, soft tissue attenuating collection at the superior aspect of the liver (Series 900, Image 76) and image 19 series 3 measuring 5.6 x 5.1 by 5 cm.   BILE DUCTS: The remaining intrahepatic biliary ducts are not dilated. No extrahepatic biliary ductal dilatation.   GALLBLADDER: The gallbladder is surgically absent.   PANCREAS: Interval decrease in size of the pancreatic tail which likely represents compression against the lesser curvature of the dilated stomach. No ductal dilatation or mass.   SPLEEN: The spleen is normal in size without evidence of infarct or mass. There is midline artifact within the spleen from surgical implant, likely closure device.   ADRENAL GLANDS: The adrenal glands are within normal limits bilaterally.   KIDNEYS AND URETERS: The renal parenchyma is enhancing appropriately bilaterally. No renal calculi or hydroureteronephrosis. No masses.   PELVIS:   BLADDER: Bladder size within normal limits. There is wall enhancement and thickening to 0.9 cm. This may represent cystitis.   REPRODUCTIVE ORGANS: Mildly enlarged prostate consistent with diagnosis of benign prostatic hyperplasia.   BOWEL: The stomach is enlarged without wall thickening. No acute dilation or wall thickening of the small bowel. There is no acute dilation or wall thickening of the large bowel. No diverticula. The descending colon terminates in a left lower quadrant colostomy with presence of air throughout suggestive of patency.   No diverticula. The appendix is not well visualized on current study. Rectal stump visualized with surgical clips in place. No evidence of leak.   VESSELS: There is mild-to-moderate  atherosclerotic calcification of the abdominal aorta and common iliacs. The celiac, SMA, and DONOVAN branches are patent. The IVC is unremarkable.   PERITONEUM/RETROPERITONEUM/LYMPH NODES: Enlarged inguinal lymph nodes with the largest measuring up to 0.9 cm in short axis, likely reactive in nature given postoperative course. No intraperitoneal or retroperitoneal fluid collections. There is diffuse inflammatory stranding consistent with prior operation.   BONES AND ABDOMINAL WALL: No osseous lesions. There is compression deformity in the vertebral body of T12, similar in appearance to prior study.   Midline, fluid attenuating collection within the anterior abdominal wall (Series 3, Image 69). Fat containing, umbilical hernia that is similar in appearance to prior exam. Diffuse anasarca and fluid within the abdominal wall musculature expected for postoperative course.       1.  No intrahepatic or extrahepatic biliary ductal dilatation or gallbladder changes to explain increasing bilirubin. 2. Perihepatic fluid collections and air foci as detailed above consistent with interval right hepatectomy. The superior collection may represent surgical packing versus hematoma. Further evaluation with the liver MRI, preferably be with Eovist contrast agent is recommended, would help in evaluating the probable hematoma versus surgical packing as well as in determining whether the rest of the perihepatic collections represent bilomas versus postoperative changes versus early abscesses. 3. Interval development of moderate right pleural effusion, likely related to recent surgery 4. Stable surgical drain placement with termination in the right upper quadrant. 5. Postoperative findings from colostomy creation as detailed above without evidence of leak.   I personally reviewed the images/study and I agree with the findings as stated by Srinivasan Finnegan DO. This study was interpreted at [facility], [place],  [place].   MACRO: None   Signed by: Laci Bates 8/7/2025 6:56 PM Dictation workstation:   [id number]    US liver with doppler  Result Date: 8/7/2025  Interpreted By:  Camron Mathew and Hofer Lindsay STUDY: US LIVER WITH DOPPLER;  8/6/2025 6:14 pm   INDICATION: Signs/Symptoms:S/p extended right hepatectomy elevated bili. 70-year-old male status post open right hepatectomy for colon carcinoma with liver Mets on 07/29/2025.     COMPARISON: Ultrasound abdomen limited liver 05/20/2025.   ACCESSION NUMBER(S): [id number]   ORDERING CLINICIAN: SUPA ESPINO   TECHNIQUE: Multiple images of the right upper quadrant were obtained.  Gray scale, color Doppler and spectral Doppler waveform analysis was performed.   FINDINGS: Very limited examination due to postsurgical changes, overlying bandages, and bowel gas.   LIVER: The liver measures 9.0 CM . Postsurgical changes of extended right hepatectomy. The visualized portions of the liver are mildly heterogenous and echogenic in appearance. No sonographic evidence of focal hepatic lesion.   GALLBLADDER: The gallbladder was not definitively visualized due to the limitations above.   BILIARY SYSTEM: No evidence of significant intra or extrahepatic biliary dilatation is identified.   DOPPLER EVALUATION:   HEPATIC ARTERIES: The hepatic artery is patent with an RI of 0.76.   PORTAL VEIN: Portal vein is patent. There is normal respiratory variation. The main portal vein has a velocity of 11.6 cm/sec in the anterograde direction. The splenic vein is patent with a velocity of 38.8 cm/sec in the anterograde direction.   HEPATIC VEIN: The right, middle and left hepatic veins are patent and demonstrate triphasic antegrade flow. IVC appears also patent.   PANCREAS: The pancreas is poorly visualized due to overlying bowel gas.   RIGHT KIDNEY: The right kidney measures 11.8 cm in length. No hydronephrosis or renal calculi are seen.   SPLEEN: The spleen measures 16.3 cm and is enlarged in size.  No sonographic evidence of focal splenic lesions.   PERITONEUM AND RETROPERITONEUM: There is no free or loculated fluid seen in the abdomen.       1. Extremely limited evaluation due to postsurgical changes and significant overlying bowel gas. 2. Postsurgical changes of extended right hepatectomy. The remaining liver is mildly heterogenous in appearance. 3. Unremarkable sonographic and Doppler evaluation of the hepatic artery, portal vein, and hepatic vein. 4. Splenomegaly.   I personally reviewed the images/study and resident's interpretation and I agree with the findings as stated by Regina Ventura MD (resident radiologist). This study was analyzed and interpreted at [place], [place].   MACRO: None     Signed by: Camron Mathew 8/7/2025 6:21 AM Dictation workstation:   [id number]    Electrocardiogram, 12-lead PRN ACS symptoms  Result Date: 8/4/2025  Sinus rhythm Prolonged QT Abnormal ECG Confirmed by Jefferson Sandoval ([id number]) on 8/4/2025 8:51:38 AM    XR chest 1 view  Result Date: 7/31/2025  Interpreted By:  Jennifer Young, STUDY: XR CHEST 1 VIEW; 7/31/2025 6:25 am   INDICATION: Signs/Symptoms:daily.   COMPARISON: Radiograph dated 07/30/2025 and CT dated 07/16/2025   ACCESSION NUMBER(S): [id number]   ORDERING CLINICIAN: SUPA ESPINO   FINDINGS: Right IJ MediPort is in place with the tip projecting over upper right atrium/cavoatrial junction. Left subclavian approach central venous catheter projecting over upper SVC Interval extubation. Enteric tube is in place with the tip outside the field of view.   the cardiac silhouette size is within normal limits.   Small left basilar pleural effusion.   Surgical drain, embolization coils and surgical clips projecting over right upper quadrant of the abdomen. The suggestion of free air in the upper abdomen, likely postsurgical in nature.   No acute osseous change.       1. Interval extubation. Other medical devices as  described above. 2. Small left pleural effusion. 3. Suggestion of free air in the upper abdomen, likely postsurgical in nature.       Signed by: Jennifer Davies 7/31/2025 7:14 AM Dictation workstation:   [id number]    XR chest 1 view  Result Date: 7/30/2025  Interpreted By:  Kaden Delgadillo, STUDY: XR CHEST 1 VIEW;  7/30/2025 7:51 am   INDICATION: Signs/Symptoms:daily.   COMPARISON: Chest radiograph 04/03/2025 and chest CT 07/16/2025.   ACCESSION NUMBER(S): [id number]   ORDERING CLINICIAN: CHARLEY VITALE   FINDINGS: AP radiograph of the chest. Interval intubation with endotracheal tube tip 6.6 cm superior to josephine. Enteric tube is now seen in place coursing below diaphragm and tip not included in field of view. Stable positioning of right IJ approach MediPort catheter with tip projecting over lower SVC/superior cavoatrial junction. The previously noted left upper extremity PICC has been removed. There has been interval placement of left subclavian approach central venous catheter, the tip of which overlies expected location of confluence of brachiocephalic veins.   CARDIOMEDIASTINAL SILHOUETTE: The cardiomediastinal silhouette is stable in size and configuration. Mild aortic knob calcification.   LUNGS: There is similar mild bandlike bibasilar atelectasis. No new focal consolidation, sizeable pleural effusion or pneumothorax.   ABDOMEN: No remarkable upper abdominal findings.   BONES: No acute osseous abnormality.       1. Mild bibasilar bandlike subsegmental atelectasis. Otherwise, no new focal consolidation, sizeable pleural effusion or pneumothorax. 2. Medical devices as above.   Signed by: Kaden Delgadillo 7/30/2025 9:03 AM Dictation workstation:   [id number]         Assessment/Plan   Introduction to Integrative Medicine:  Spoke with pt at bedside. Patient seemed to appreciate the extra layer of support.   Integrative Medicine was introduced as a service for patients with serious illness to help with symptoms  through non-pharmacological management, such as mindfulness, acupuncture, and gentle bodywork. Such interventions can assist with symptoms such as anxiety, fatigue, nausea, depression and pain.     The [facility] Integrative Medicine Symptom Management program offers multi-disciplinary supervised care of cancer patients using Integrative Modalities billed to insurance using NCCN and SIO/ASCO guideline-driven practices.    ESAS not obtained today, as no integrative services were provided today.     Abdominal/generalized pain:   pain related to malignancy   Pain is well-controlled  Defer to supportive oncology team for adequate PO/IV pain regimen   Recommend integrative therapy modalities as pt allows:  -Acupuncture; provided pt education. Pt declined services today, agreeable to follow up when next available (on Monday). Handouts provided  -Acupressure - pt declined   -Gentle bodywork and stretching as tolerated - pt declined      -Art therapy - pt declined   -Music therapy - consult in place   -Chaplaincy - pt declined   -Pet therapy        Nausea/Vomiting:  Intermittent nausea and vomiting related to opioids, chemotherapy  -Defer to supportive oncology recs for pharmacological management  -Recommend integrative medicine modalities as listed above        Altered Mood:  Anxiety and/or depression r/t health concerns, prolonged hospitalization  History of anxiety/depression  -Recommend integrative medicine modalities as listed above  Mindfulness -- provided handout   Vitor: AMDtx, Calm, Headspace, Insight Timer  Guided Imagery  Meditation (15 min in the morning) - consider mindfulness (Mindfulness based Stress Reduction)   Apps such as CALM or Headspace  Deep breathing: Alternate nostril breathing and Deep abdominal breathing (5 min) in the morning  [facility] - Guided Meditation          Thank you for allowing us to participate in the care of this patient. Integrative Medicine Team will continue to follow as  needed.  Please contact team with any questions or concerns.           MELISSA Granados-CNP (available by [employer])  [facility]  Inpatient Integrative Medicine      I spent 45 minutes in the care of this patient which included chart review, interviewing patient/family, discussion with primary team, coordination of care, and documentation.     Medical Decision Making was high level due to high complexity of problems, extensive data review, and high risk of management/treatment.                      [1]   Family History  Problem Relation Name Age of Onset    No Known Problems Mother      No Known Problems Father      No Known Problems Sister      Leukemia Brother      Stroke Maternal Grandfather     [2] albumin human, 25 g, intravenous, q6h  fat emulsion fish oil/plant based, 250 mL, intravenous, Daily Lipids  fluconazole, 400 mg, intravenous, q24h  heparin (porcine), 5,000 Units, subcutaneous, q8h  insulin lispro, 0-10 Units, subcutaneous, q4h  [Held by provider] morphine CR, 30 mg, oral, q12h ALEX  pantoprazole, 40 mg, intravenous, BID  phytonadione, 5 mg, intravenous, q48h  piperacillin-tazobactam, 3.375 g, intravenous, q6h  [Held by provider] polyethylene glycol, 17 g, oral, Daily  sodium chloride, 500 mL, intravenous, Once  thiamine, 100 mg, intravenous, Daily  [3] Adult Clinimix Parenteral Nutrition Continuous, 50 mL/hr, Last Rate: 50 mL/hr (08/28/25 0632)  sodium chloride 0.9%, 50 mL/hr, Last Rate: 50 mL/hr (08/28/25 0631)  [4] PRN medications: alteplase, alteplase, calcium carbonate, dextrose, dextrose, glucagon, glucagon, HYDROmorphone, HYDROmorphone, HYDROmorphone, ondansetron, [Held by provider] oxyCODONE, phenoL, [Held by provider] traMADol

## 2025-08-28 NOTE — PROGRESS NOTES
Music Therapy Note    Roma Corral    Therapy Session  Visit Type: Follow-up visit  Session Start Time: 1407  Session End Time: 1408  Intervention Delivery: In-person  Conflict of Service: Declined treatment               Treatment/Interventions       Post-assessment  Total Session Time (min): 1 minutes    Narrative  Assessment Detail: Pt was laying down in bed and appeared very tired as evidenced by his flat affect and how he primarily made small motions with his hands to communicate. The MTI inquired if he wanted music to help him relax, and he declined by motioning with his hands. He said thank you quietly as the MTI left.  Follow-up: Will follow up at pt request.    Education Documentation  No documentation found.

## 2025-08-28 NOTE — PROGRESS NOTES
Art Therapy Note    Roma Corral was referred by Paz Padilla MD    Therapy Session  Referral Type: New referral this admission  Visit Type: Follow-up visit  Session Start Time: 1654  Session End Time: 1658  Intervention Delivery: In-person  Conflict of Service: Declined treatment  Number of family members present: 0  Number of staff members present: 0    Pre-assessment  Unable to Assess Reason: Outcomes not applicable         Treatment/Interventions  Art Therapy Interventions: Empathic listening/validating emotions  Interruption: No  Patient Fell Asleep at End of Session: No    Post-assessment  Total Session Time (min): 4 minutes    Narrative  Assessment Detail: ATR made a visit to pt.s room to offer not only AT services but emoitonal support as well.  Pt sitting in bedside cahir and welcomed ATR.  Evaluation: Pt and ATR briefly spoke.  Pt expressed appreciation for the contiued follow and said somethign to effect that he has not done so on his part. ATR validated his feelings and shared there was not reason for apologies. ATR further shared with Pt from observation, she recognizes when she visits that he seems to be contemplative and trying to process all that is happening to him. ATR assured Pt she will meet him where he is at and verified if she she should continue to vist and offer servics.  Pt smiled at  ATR and thanked her persistance and support sharign he is jsut unable to express himself at this time and is exhausted.  Lastly Pt reported more surgery was to come Tuesday or Wednesday and to please keep coming to visit and offer services.  Follow-up: ATR will continue to follow Pt by offer emotional support and AT services per his choice.    Education Documentation  No documentation found.

## 2025-08-28 NOTE — CARE PLAN
The clinical goals for the shift include Pt will remain hds    Problem: Pain - Adult  Goal: Verbalizes/displays adequate comfort level or baseline comfort level  Outcome: Progressing  Flowsheets (Taken 8/19/2025 2226 by Mesha Salazar RN)  Verbalizes/displays adequate comfort level or baseline comfort level:   Encourage patient to monitor pain and request assistance   Assess pain using appropriate pain scale   Administer analgesics based on type and severity of pain and evaluate response     Problem: Safety - Adult  Goal: Free from fall injury  Outcome: Progressing  Flowsheets (Taken 8/19/2025 2226 by Mesha Salazar RN)  Free from fall injury:   Instruct family/caregiver on patient safety   Based on caregiver fall risk screen, instruct family/caregiver to ask for assistance with transferring infant if caregiver noted to have fall risk factors     Problem: Skin  Goal: Decreased wound size/increased tissue granulation at next dressing change  Outcome: Progressing  Flowsheets (Taken 8/24/2025 2224 by Rozina Jackson RN)  Decreased wound size/increased tissue granulation at next dressing change: Promote sleep for wound healing  Goal: Participates in plan/prevention/treatment measures  Outcome: Progressing  Flowsheets (Taken 8/24/2025 2224 by Rozina Jackson RN)  Participates in plan/prevention/treatment measures: Increase activity/out of bed for meals  Goal: Prevent/manage excess moisture  Outcome: Progressing  Flowsheets (Taken 8/24/2025 2224 by Rozina Jackson RN)  Prevent/manage excess moisture: Monitor for/manage infection if present  Goal: Promote skin healing  Outcome: Progressing  Flowsheets (Taken 8/24/2025 2224 by Rozina Jackson RN)  Promote skin healing: Assess skin/pad under line(s)/device(s)

## 2025-08-29 ENCOUNTER — APPOINTMENT (OUTPATIENT)
Dept: RADIOLOGY | Facility: HOSPITAL | Age: 70
End: 2025-08-29
Payer: MEDICARE

## 2025-08-29 PROCEDURE — 93926 LOWER EXTREMITY STUDY: CPT | Mod: RT

## 2025-08-29 ASSESSMENT — PAIN - FUNCTIONAL ASSESSMENT
PAIN_FUNCTIONAL_ASSESSMENT: 0-10

## 2025-08-29 ASSESSMENT — COGNITIVE AND FUNCTIONAL STATUS - GENERAL
PERSONAL GROOMING: A LITTLE
DAILY ACTIVITIY SCORE: 18
DAILY ACTIVITIY SCORE: 18
CLIMB 3 TO 5 STEPS WITH RAILING: A LOT
MOVING FROM LYING ON BACK TO SITTING ON SIDE OF FLAT BED WITH BEDRAILS: A LITTLE
STANDING UP FROM CHAIR USING ARMS: A LITTLE
PERSONAL GROOMING: A LITTLE
EATING MEALS: A LITTLE
STANDING UP FROM CHAIR USING ARMS: A LITTLE
WALKING IN HOSPITAL ROOM: A LOT
CLIMB 3 TO 5 STEPS WITH RAILING: A LOT
EATING MEALS: A LITTLE
DRESSING REGULAR LOWER BODY CLOTHING: A LITTLE
MOVING TO AND FROM BED TO CHAIR: A LITTLE
MOVING FROM LYING ON BACK TO SITTING ON SIDE OF FLAT BED WITH BEDRAILS: A LITTLE
TURNING FROM BACK TO SIDE WHILE IN FLAT BAD: A LITTLE
HELP NEEDED FOR BATHING: A LITTLE
TOILETING: A LITTLE
MOBILITY SCORE: 16
DRESSING REGULAR UPPER BODY CLOTHING: A LITTLE
MOVING TO AND FROM BED TO CHAIR: A LITTLE
TOILETING: A LITTLE
TURNING FROM BACK TO SIDE WHILE IN FLAT BAD: A LITTLE
MOBILITY SCORE: 17
DRESSING REGULAR LOWER BODY CLOTHING: A LITTLE
HELP NEEDED FOR BATHING: A LITTLE
WALKING IN HOSPITAL ROOM: A LITTLE
DRESSING REGULAR UPPER BODY CLOTHING: A LITTLE

## 2025-08-29 ASSESSMENT — PAIN DESCRIPTION - LOCATION
LOCATION: ABDOMEN
LOCATION: ABDOMEN

## 2025-08-29 ASSESSMENT — PAIN SCALES - GENERAL
PAINLEVEL_OUTOF10: 6
PAINLEVEL_OUTOF10: 7
PAINLEVEL_OUTOF10: 5 - MODERATE PAIN
PAINLEVEL_OUTOF10: 7

## 2025-08-29 ASSESSMENT — PAIN DESCRIPTION - ORIENTATION: ORIENTATION: MID;RIGHT

## 2025-08-29 NOTE — PROGRESS NOTES
Occupational Therapy                 Therapy Communication Note    Patient Name: Roma Corral  MRN: [id number]  Department: [facility]  Room: [facility]/6013-  Today's Date: 8/29/2025     Discipline: Occupational Therapy    Missed Visit: OT Missed Visit: Yes     Missed Visit Reason: Missed Visit Reason: Patient refused (Pt politely refusing 2/2 increased fatigue. Will reattempt as appropriate.)    Missed Time: Attempt    Comment: [id number]    Completion of this session, clinical decision making, and documentation performed under the supervision/direction of Abdelrahman CAIN/BOLA

## 2025-08-29 NOTE — CARE PLAN
The patient's goals for the shift include      The clinical goals for the shift include Pt will remain hds      Problem: Pain - Adult  Goal: Verbalizes/displays adequate comfort level or baseline comfort level  Outcome: Progressing     Problem: Safety - Adult  Goal: Free from fall injury  Outcome: Progressing     Problem: Discharge Planning  Goal: Discharge to home or other facility with appropriate resources  Outcome: Progressing     Problem: Chronic Conditions and Co-morbidities  Goal: Patient's chronic conditions and co-morbidity symptoms are monitored and maintained or improved  Outcome: Progressing     Problem: Nutrition  Goal: Nutrient intake appropriate for maintaining nutritional needs  Outcome: Progressing     Problem: Skin  Goal: Decreased wound size/increased tissue granulation at next dressing change  Outcome: Progressing  Goal: Participates in plan/prevention/treatment measures  Outcome: Progressing  Goal: Prevent/manage excess moisture  Outcome: Progressing  Goal: Prevent/minimize sheer/friction injuries  Outcome: Progressing  Goal: Promote/optimize nutrition  Outcome: Progressing  Goal: Promote skin healing  Outcome: Progressing

## 2025-08-29 NOTE — SIGNIFICANT EVENT
Significant event note: Bleeding from R groin femoral artery access site    Team was paged approximately 4:50am for concern that patient with active bleeding from R femoral artery access site from IR splenic artery embolization on 8/26.     Team arrived to bedside approximately 4:55am, brisk oozing from puncture site, not pulsatile. Held pressure for 10 minutes. When 4x4 was pulled back, continued to have brisk oozing. Pressure was again held for additional 5 minutes. Pressure dressing applied with 4x4s and three tegaderm bandages.     Patient remained normotensive throughout, not tachycardic. On review of morning labs, Hgb  7.5, platelets 47. Set of labs including CBC and coagulation panel sent off while team at bedside.    Given concern for risk of ongoing bleeding and need to r/o pseudoaneurysm formation, vascular US groin ordered STAT.     Patient seen with senior resident, seen and discussed with chief resident.    Cookie Ortiz MD PGY1  Surgical Oncology

## 2025-08-29 NOTE — PROGRESS NOTES
Surgical Oncology Progress Note    Roma Corral is a 70 y.o. male with history of rectal cancer with mets to the liver, CAD (2021 NSTEMI), DM2, HTN who is s/p right extended hepatectomy for metastatic rectal cancer on 7/29     Subjective   Overnight, patient had active bleeding from his R femoral artery acces site from IR splenic artery embolization on 8/26; the night team controlled the bleeding with pressure and bandages. Additionally, pt's NG tube came out again overnight. Patient seen and evaluated this AM during team rounds. Patient is notably distressed with sad affect. Patient denies nausea.    P.O. intake: NPO  Drain: 1.4 L  Colostomy: full bag, unmeasured  Net +1 L for the past 24 hours  Net -5.5 L for entire hospital stay     Objective   GEN: Appears tired, sad, tearful, awake, alert, conversant.  RESP: non-labored breathing on RA  CV: non-cyanotic  GI: soft, non-tender to palpation, slightly distended. Thin serous fluid out of drain in bile bag. Ostomy bag full of stool. Incisions healing well, well approximated without drainage.   : voiding spontaneously.   MSK: no evidence of bilateral lower extremity edema  NEURO: alert and conversant  SKIN: jaundiced, warm and dry. Femoral access site from IR procedure covered with bandages, hemostatic.    Last Recorded Vitals  Heart Rate:  []   Temp:  [36.8 °C (98.2 °F)-37.6 °C (99.6 °F)]   Resp:  [18-20]   BP: (120-133)/(72-83)   Weight:  [74.2 kg (163 lb 9.3 oz)]   SpO2:  [93 %-99 %]      Intake/Output Last 24 Hours:      Intake/Output Summary (Last 24 hours) at 8/29/2025 0730  Last data filed at 8/29/2025 0028  Gross per 24 hour   Intake 2934.58 ml   Output 2380 ml   Net 554.58 ml        Relevant Results  Lab Date: 08/29/25       7.1    10.1>-----<39         23.2   149  116  24                  ----------------<129     3.7  19  1.29          Ca 10.6 Phos 1.1 Mg 2.06       ALT 13 AST 46 AlkPhos 40 tBili 16.5         Imaging:   XR abdomen 1 view  Result  Date: 8/28/2025  Interpreted By:  Jorge Hyman and Mercado Amiel STUDY: XR ABDOMEN 1 VIEW;  8/27/2025 7:24 pm   INDICATION: Signs/Symptoms:assess NG tube placement.     COMPARISON: XR ABDOMEN 1 VIEW 8/26/2025, CT ABDOMEN PELVIS WO IV CONTRAST 8/25/2025, CT ABDOMEN PELVIS W AND WO IV CONTRAST 8/7/2025   ACCESSION NUMBER(S): [id number]   ORDERING CLINICIAN: SUPA ESPINO   FINDINGS: AP radiograph of abdomen available for interpretation.   Interval placement of enteric tube which is seen coursing midline and below the level of the diaphragm with distal tip projecting over the gastric body. New embolization coils are seen along the left mid abdomen. Similar position of previously visualized left lateral abdomen embolization coil. Surgical drain and clips are visualized over the right upper quadrant.   Presumed right IJ CVC tip projects over the cavoatrial junction.   Nonobstructive bowel gas pattern.Limited evaluation of pneumoperitoneum on supine imaging, however no gross evidence of free air is noted.   Persistent but stable right-greater-than-left pleural effusions. There is also superimposed bibasilar atelectatic changes.   Osseous structures demonstrate no acute bony changes.       1.  Interval placement of enteric tube with tip projecting with the gastric body. Remaining medical devices as above. 2. Persistent but stable right-greater-than-left pleural effusions.     I have reviewed the images/study and I agree with the findings as stated by Spenser López MD (PGY-3).   Signed by: Jorge Hyman 8/28/2025 9:55 AM Dictation workstation:   [id number]    XR chest 1 view  Result Date: 8/27/2025  Interpreted By:  Jorge Hyman and Nadeau Simon STUDY: XR CHEST 1 VIEW;  8/27/2025 4:24 pm   INDICATION: Signs/Symptoms:PICC LINE PLACEMENT CONFIRMATION.     COMPARISON: Chest radiograph one view 07/31/2025, CT abdomen and pelvis 08/25/2025.   ACCESSION NUMBER(S): [id number]   ORDERING CLINICIAN: SUPA ESPINO   FINDINGS: AP  radiograph of the chest was provided.   Right-sided internal jugular MediPort with distal tip visualized projecting over the superior cavoatrial junction. Left approach PICC line with distal tip projecting over the lower SVC.   CARDIOMEDIASTINAL SILHOUETTE: Cardiomediastinal silhouette is normal in size and configuration. Calcifications overlying the aortic arch.   LUNGS: Moderate right and small left pleural effusion with the adjacent opacification. Perihilar vascular prominence.   ABDOMEN: No remarkable upper abdominal findings.   BONES: No acute osseous changes.       1. Left approach PICC line with distal tip projecting over the lower SVC. 2. Persistence of a moderate right-and small left pleural effusion with adjacent opacification consistent with atelectasis, can not exclude an infectious cause. 3. Additional medical devices as above.   I personally reviewed the images/study and I agree with the findings as stated by Brian Cruz D.O. (Radiology resident). This study was interpreted at [facility], [place], [place].   MACRO: None   Signed by: Jorge Hyman 8/27/2025 4:53 PM Dictation workstation:   [id number]    Bedside PICC Imaging  Result Date: 8/27/2025  These images are not reportable by radiology and will not be interpreted by  Radiologists.      Assessment:  Roma Corral is a 70 y.o. male with history of rectal cancer with mets to the liver, CAD (2021 NSTEMI), DM2, HTN who is s/p right extended hepatectomy for metastatic rectal cancer on 7/29 c/b worsening liver function.     Most recent bilirubin, total: 16.5, down from 18.3 on 8/28.     Plan:  Neuro:  - Holding PO pain meds, including Morphine (30 mg, p.o., twice daily), Oxycodone (5 mg, p.o., every 4 hours as needed), Tramadol (50 mg, p.o., every 6 hours as needed)  - Dilaudid (0.4 mg/0.6 mg IV, every 4 hours as needed, 0.2 mg for breakthrough)    CV:   - No acute cardiac issues    Pulm:   - Encourage IS  - No acute  pulmonary issues    GI:   - Diet: NPO  - Hold Bowel regimen: MiraLAX   - Zofran as needed for nausea   - Protonix IV 40 mg  - Appreciate nutrition recs fo TPN:  - Refrain from using any dextrose-containing IVF, when TPN begins, unless otherwise medically indicated   -Glucose levels <180, TPN advanced to 50 ml/hr  -On third day of TPN, no major shifts in lytes and glucose levels <180, advanced to 65 ml/hr  - hold trace elements in parenteral nutrition formula for now  - After pt reaches continuous goal rate for TPN with stable lytes, would transition to cycled TPN to try to reduce total stress on liver  -Continue 5/20 formula  -First Hour=80 ml/hr  -Next 10 Hours=140 ml/hr  -Last Hour=80 ml/hr  -Continue 250 mls SMOFlipids x 12 hours/night  - 250 mls (50 g) SMOFlipids to run x 12 hours/night    - re-check 25(OH)-D level.     /Renal:  - 500 ml/hr bolus of NS for goal of net +500cc to + 1   - Albumin (25 g, IV, scheduled every 6 hours); to account for high drain output in the setting of cirrhosis  - AM CMP, Mg, with lyte repletion. Goal K > 4, Mg > 2  - Nephrology consult: Goal net +1 L daily, renal ultrasound unremarkable, urine studies completed.  Appreciate further recommendations.     Heme:   - Vitamin K (5 mg, IV, every 48 hours)  - H/H : 7.1/23.2  - Vitamin K IV 10 mg  - transfuse 1 unit RBC  - Follow up on US    Endo:  - SSI with TPN    ID:   - Empiric Zosyn and Diflucan (started 8/11 and 8/19, respectively, end date pending)  - Will require splenectomy vaccines postprocedure    DVT ppx:   - Hold Heparin 5000u  - SCDs    Dispo: RNF; PT/OT previously recommended home with home health care.  Will require reevaluation during his hospital stay.    Discussed with Dr. Robin Garcia, who discussed with attending, Dr. Erazo.    Pelon Willams MD - PGY1  Surgical Oncology   [facility] [id number]

## 2025-08-29 NOTE — PROGRESS NOTES
Physical Therapy                 Therapy Communication Note    Patient Name: Roma Corral  MRN: [id number]  Department: [facility]  Room: [facility]  Today's Date: 8/29/2025     Discipline: Physical Therapy    Missed Visit:       Missed Visit Reason:      Missed Time: Attempt    Comment: pt declining session. Per RN pt has been lethargic all day and requiring increased assist since prior days.

## 2025-08-29 NOTE — PROGRESS NOTES
Art Therapy Note    Roma Corral was referred by Paz Weber MD    Therapy Session  Referral Type: New referral this admission  Visit Type: Follow-up visit  Session Start Time: 1549  Session End Time: 1551  Intervention Delivery: In-person  Conflict of Service: Asleep  Number of family members present: 0    Pre-assessment  Unable to Assess Reason: Outcomes not applicable         Treatment/Interventions  Art Therapy Interventions:  (presence)  Interruption: No  Patient Fell Asleep at End of Session: No    Post-assessment  Total Session Time (min): 2 minutes    Narrative  Assessment Detail: ATR made a visit to Pt.s room to offer comforting support and an AT session if desired.  Pt was found sleeping soundly and peacefully in bed and appeared comfortable.  AR left Pt a hand made art note of support and her attempted visit.  ATR will follow up another time to offer services.    Education Documentation  No documentation found.

## 2025-08-29 NOTE — PROGRESS NOTES
Music Therapy Note    Roma Corral     Therapy Session  Referral Type: New referral this admission  Visit Type: Follow-up visit  Session Start Time: 1216  Session End Time: 1217  Intervention Delivery: In-person  Conflict of Service: Off unit     Post-assessment  Total Session Time (min): 1 minutes    Narrative  Assessment Detail: Pt was found to be off unit.  Follow-up: MT will follow up per pt's request.    Education Documentation  No documentation found.

## 2025-08-29 NOTE — PROGRESS NOTES
08/29/25 1400   Discharge Planning   Living Arrangements Spouse/significant other   Support Systems Spouse/significant other   Type of Residence Private residence   Who is requesting discharge planning? Provider   Home or Post Acute Services In home services   Type of Home Care Services Home nursing visits;Home OT;Home PT   Expected Discharge Disposition Home H     8/29/25-TCC brought FWW to bedside from [facility]. However patient politely declined front wheeled walker. Will continue to follow.  Mariah Newsome RN TCC

## 2025-08-29 NOTE — CARE PLAN
The patient's goals for the shift include      The clinical goals for the shift include pt will remain HDS an remain free from fall or injury throughout shift      Problem: Pain - Adult  Goal: Verbalizes/displays adequate comfort level or baseline comfort level  Flowsheets (Taken 8/29/2025 0332)  Verbalizes/displays adequate comfort level or baseline comfort level:   Encourage patient to monitor pain and request assistance   Assess pain using appropriate pain scale   Administer analgesics based on type and severity of pain and evaluate response   Implement non-pharmacological measures as appropriate and evaluate response     Problem: Safety - Adult  Goal: Free from fall injury  Note: Educated fall risks and prevention measures bed locked and lowered, bed exit active, 3/4 side rails elevated, non-slip socks provided, intentional rounding, personal items and call light within reach.     Problem: Chronic Conditions and Co-morbidities  Goal: Patient's chronic conditions and co-morbidity symptoms are monitored and maintained or improved  Flowsheets (Taken 8/29/2025 0332)  Care Plan - Patient's Chronic Conditions and Co-Morbidity Symptoms are Monitored and Maintained or Improved:   Monitor and assess patient's chronic conditions and comorbid symptoms for stability, deterioration, or improvement   Collaborate with multidisciplinary team to address chronic and comorbid conditions and prevent exacerbation or deterioration   Update acute care plan with appropriate goals if chronic or comorbid symptoms are exacerbated and prevent overall improvement and discharge     Problem: Skin  Goal: Decreased wound size/increased tissue granulation at next dressing change  Flowsheets (Taken 8/29/2025 0332)  Decreased wound size/increased tissue granulation at next dressing change:   Promote sleep for wound healing   Protective dressings over bony prominences  Goal: Prevent/manage excess moisture  Flowsheets (Taken 8/29/2025  0332)  Prevent/manage excess moisture:   Cleanse incontinence/protect with barrier cream   Monitor for/manage infection if present   Follow provider orders for dressing changes  Goal: Prevent/minimize sheer/friction injuries  Flowsheets (Taken 8/29/2025 0332)  Prevent/minimize sheer/friction injuries:   Complete micro-shifts as needed if patient unable. Adjust patient position to relieve pressure points, not a full turn   Use pull sheet   Turn/reposition every 2 hours/use positioning/transfer devices   Utilize specialty bed per algorithm  Goal: Promote/optimize nutrition  Flowsheets (Taken 8/29/2025 0332)  Promote/optimize nutrition: Monitor/record intake including meals  Goal: Promote skin healing  Flowsheets (Taken 8/29/2025 0332)  Promote skin healing:   Assess skin/pad under line(s)/device(s)   Protective dressings over bony prominences   Turn/reposition every 2 hours/use positioning/transfer devices   Ensure correct size (line/device) and apply per  instructions   Rotate device position/do not position patient on device

## 2025-08-30 ASSESSMENT — COGNITIVE AND FUNCTIONAL STATUS - GENERAL
STANDING UP FROM CHAIR USING ARMS: A LITTLE
TOILETING: A LITTLE
DRESSING REGULAR UPPER BODY CLOTHING: A LITTLE
EATING MEALS: A LITTLE
DAILY ACTIVITIY SCORE: 18
MOVING TO AND FROM BED TO CHAIR: A LITTLE
MOVING FROM LYING ON BACK TO SITTING ON SIDE OF FLAT BED WITH BEDRAILS: A LITTLE
CLIMB 3 TO 5 STEPS WITH RAILING: A LOT
DAILY ACTIVITIY SCORE: 18
TOILETING: A LITTLE
MOVING FROM LYING ON BACK TO SITTING ON SIDE OF FLAT BED WITH BEDRAILS: A LITTLE
HELP NEEDED FOR BATHING: A LITTLE
DRESSING REGULAR LOWER BODY CLOTHING: A LITTLE
PERSONAL GROOMING: A LITTLE
MOBILITY SCORE: 16
WALKING IN HOSPITAL ROOM: A LOT
CLIMB 3 TO 5 STEPS WITH RAILING: A LOT
WALKING IN HOSPITAL ROOM: A LOT
TURNING FROM BACK TO SIDE WHILE IN FLAT BAD: A LITTLE
DRESSING REGULAR LOWER BODY CLOTHING: A LITTLE
TURNING FROM BACK TO SIDE WHILE IN FLAT BAD: A LITTLE
MOVING TO AND FROM BED TO CHAIR: A LITTLE
MOBILITY SCORE: 16
EATING MEALS: A LITTLE
DRESSING REGULAR UPPER BODY CLOTHING: A LITTLE
STANDING UP FROM CHAIR USING ARMS: A LITTLE
HELP NEEDED FOR BATHING: A LITTLE
PERSONAL GROOMING: A LITTLE

## 2025-08-30 ASSESSMENT — PAIN - FUNCTIONAL ASSESSMENT
PAIN_FUNCTIONAL_ASSESSMENT: 0-10
PAIN_FUNCTIONAL_ASSESSMENT: 0-10

## 2025-08-30 ASSESSMENT — PAIN SCALES - GENERAL
PAINLEVEL_OUTOF10: 8
PAINLEVEL_OUTOF10: 6

## 2025-08-30 NOTE — CONSULTS
"NEPHROLOGY NEW CONSULT NOTE   Roma oCrral   70 y.o.    @WT@  MRN/Room: [id number]    Reason for consult: Hypernatremia    HPI:  Roma Corral is a 70 y.o. male with a PMHx of CAD (2021 NSTEMI), DM2, HTN as well as Rectal Cancer with Metastasis to the Liver. Admitted for Extended Hepatectomy done 7/29. Following surgery has High output mainly due to Ascites. Developed Hypernatremia for which Nephrology was consulted.        In The ER: /77 (BP Location: Left arm, Patient Position: Lying)   Pulse 97   Temp 37.5 °C (99.5 °F) (Temporal)   Resp 16   Ht 1.803 m (5' 10.98\")   Wt 79.8 kg (176 lb)   SpO2 96%   BMI 24.56 kg/m²      Medical History[1]   Surgical History[2]   Family History[3]  Social History     Socioeconomic History    Marital status:      Spouse name: Not on file    Number of children: 1    Years of education: Not on file    Highest education level: Not on file   Occupational History    Occupation: retired   Tobacco Use    Smoking status: Never    Smokeless tobacco: Never   Vaping Use    Vaping status: Never Used   Substance and Sexual Activity    Alcohol use: Never    Drug use: Never    Sexual activity: Defer   Other Topics Concern    Not on file   Social History Narrative    Not on file     Social Drivers of Health     Financial Resource Strain: Patient Declined (8/1/2025)    Overall Financial Resource Strain (CARDIA)     Difficulty of Paying Living Expenses: Patient declined   Recent Concern: Financial Resource Strain - Medium Risk (7/30/2025)    Overall Financial Resource Strain (CARDIA)     Difficulty of Paying Living Expenses: Somewhat hard   Food Insecurity: No Food Insecurity (7/30/2025)    Hunger Vital Sign     Worried About Running Out of Food in the Last Year: Never true     Ran Out of Food in the Last Year: Never true   Transportation Needs: No Transportation Needs (8/1/2025)    PRAPARE - Transportation     Lack of Transportation (Medical): No     Lack of Transportation " (Non-Medical): No   Physical Activity: Sufficiently Active (7/30/2025)    Exercise Vital Sign     Days of Exercise per Week: 5 days     Minutes of Exercise per Session: 60 min   Stress: No Stress Concern Present (7/30/2025)    [employer] - Occupational Stress Questionnaire     Feeling of Stress: Not at all   Social Connections: Moderately Integrated (7/30/2025)    Social Connection and Isolation Panel     Frequency of Communication with Friends and Family: More than three times a week     Frequency of Social Gatherings with Friends and Family: Once a week     Attends L[employer] Services: Never     Active Member of Clubs or Organizations: Yes     Attends Club or Organization Meetings: Never     Marital Status:    Intimate Partner Violence: Not At Risk (7/30/2025)    Humiliation, Afraid, Rape, and Kick questionnaire     Fear of Current or Ex-Partner: No     Emotionally Abused: No     Physically Abused: No     Sexually Abused: No   Housing Stability: Unknown (8/1/2025)    Housing Stability Vital Sign     Unable to Pay for Housing in the Last Year: Not on file     Number of Times Moved in the Last Year: 0     Homeless in the Last Year: No       Allergies[4]     Prescriptions Prior to Admission[5]     Meds:   albumin human, 25 g, q6h  fat emulsion fish oil/plant based, 250 mL, Daily Lipids  fluconazole, 400 mg, q24h  [Held by provider] heparin (porcine), 5,000 Units, q8h  insulin lispro, 0-10 Units, q4h  lactulose, 10 g, BID  [Held by provider] morphine CR, 30 mg, q12h ALEX  pantoprazole, 40 mg, BID  phytonadione, 5 mg, q48h  piperacillin-tazobactam, 3.375 g, q6h  [Held by provider] polyethylene glycol, 17 g, Daily  thiamine, 100 mg, Daily      Adult Clinimix Parenteral Nutrition Continuous, Last Rate: 65 mL/hr (08/30/25 0952)      alteplase, 2 mg, PRN  alteplase, 2 mg, PRN  calcium carbonate, 500 mg of calcium carbonate, 4x daily PRN  dextrose, 12.5 g, q15 min PRN  dextrose, 25 g, q15  min PRN  glucagon, 1 mg, q15 min PRN  glucagon, 1 mg, q15 min PRN  HYDROmorphone, 0.2 mg, q4h PRN  HYDROmorphone, 0.4 mg, q4h PRN  HYDROmorphone, 0.6 mg, q4h PRN  ondansetron, 4 mg, q4h PRN  [Held by provider] oxyCODONE, 5 mg, q4h PRN  phenoL, 1 spray, q2h PRN  [Held by provider] traMADol, 50 mg, q6h PRN        Heart Rate:  []   Temp:  [36.8 °C (98.2 °F)-37.6 °C (99.7 °F)]   Resp:  [16-18]   BP: (121-132)/(70-77)   Weight:  [79.8 kg (176 lb)]   SpO2:  [96 %-100 %]      08/28 1900 - 08/30 0659  In: 6285.8 [P.O.:240; I.V.:860.8]  Out: 3555 [Urine:725; Drains:2750]   Weight change: 5.633 kg (12 lb 6.7 oz)     General appearance: AAOx3. No distress  Eyes: non-icteric  Skin: no apparent rash  Heart: No JVD      ASSESSMENT:  Roma Corral is a 70 y.o. male with a PMHx of CAD (2021 NSTEMI), DM2, HTN as well as Rectal Cancer with Metastasis to the Liver. Admitted for Extended Hepatectomy done 7/29. Following surgery has High output mainly due to Ascites. Developed Hypernatremia for which Nephrology was consulted.    #Hypernatremia  - Onset ~8/25  - High Ongoing Losses  - ORALIA in Recovery  - Receiving Albumin  - Diet: Clear Liquid  - on TPN  - FWD 1.7L (Not including ongoing losses)    #High Ongoing Losses (Mainly Ascites)    #Had an ORALIA, In Recovery  - Baseline Cr 0.7        Recommendations:  - For Correction to 145 in 24 hours, Please give Dextrose Water 3.7L (150 mL/hour) with follow up of Na level  - Allow Oral intake    Cas Sevilla MD  24 hour Renal Pager - [phone]         [1]   Past Medical History:  Diagnosis Date    Abdominal pain     Acute non-ST elevation myocardial infarction (NSTEMI) (Multi)     Anemia     6/6/24 HGB 8.6; HCT 31.5    CAD (coronary artery disease)     Cardiology follow-up encounter 12/11/2023    Sharif Lau MD    Chronic pain disorder     Colorectal cancer (Multi)     Gout     H/O cardiac catheterization 06/01/2021    H/O cardiovascular stress test 09/03/2021    IMPRESSION: 1. No  evidence of ischemia. 2. Suspicion of an infarct along the mid anterior wall. 3. Left ventricular dilatation is noted. 4. Left ventricular EF was calculated to be 50%. Summary:  1. No clinical or electrocardiographic evidence for ischemia at a submaximal workload. 3. The blunted heart rate diminshes the sensitivity of this test.  4. The submaximal level of stress was achieved.    H/O echocardiogram 06/02/2021    Mild concentric Left ventricle hypertrophy.  Global left ventricular wall motion and contractility are within normal limits.  There is normal left ventricular systolic function.  Estimated ejection fraction is 60-64%.  The left atrial size is mildly dilated.  Mild aortic regurgitation.  A trace of mitral regurgitation.  Trivial to mild tricuspid regurgitation.  There is no pulmonary hypertension.    High cholesterol     Hypertension     Overweight     Rectal cancer (Multi)     Type 2 diabetes mellitus     4/18/2024 A1C 7.5%   [2]   Past Surgical History:  Procedure Laterality Date    COLONOSCOPY  06/17/2024    HEMICOLECTOMY W/ OSTOMY      LEG SURGERY Left     rodrigo placed   [3]   Family History  Problem Relation Name Age of Onset    No Known Problems Mother      No Known Problems Father      No Known Problems Sister      Leukemia Brother      Stroke Maternal Grandfather     [4] No Known Allergies  [5]   Facility-Administered Medications Prior to Admission   Medication Dose Route Frequency Provider Last Rate Last Admin    silver nitrate applicators applicator   Topical Once Javier MAYRA Vasquez MD         Medications Prior to Admission   Medication Sig Dispense Refill Last Dose/Taking    acetaminophen (Tylenol) 325 mg tablet Take 2 tablets (650 mg) by mouth every 6 hours if needed for mild pain (1 - 3).   7/28/2025    chlorhexidine (Hibiclens) 4 % external liquid Use as directed daily perioperatively 473 mL 0 7/28/2025    chlorhexidine (Peridex) 0.12 % solution Swish and spit with 15ml of solution the night before  and morning of surgery. Do not swallow. 473 mL 0 7/29/2025 Morning    metoprolol succinate XL (Toprol-XL) 100 mg 24 hr tablet Take 1 tablet (100 mg) by mouth once daily. 90 tablet 3 7/29/2025 Morning    morphine CR (MS Contin) 30 mg 12 hr tablet Take 1 tablet (30 mg) by mouth every 12 hours. Do not crush, chew, or split. 60 tablet 0 7/28/2025    polyethylene glycol (Glycolax, Miralax) 17 gram packet Take 17 g by mouth every other day. Skip the dose for the day if watery/loose colostomy output Do not fill before April 3, 2025. (Patient taking differently: Take 17 g by mouth if needed (constipation). Skip the dose for the day if watery/loose colostomy output)   Taking Differently    potassium chloride CR (Klor-Con M20) 20 mEq ER tablet Take 1 tablet (20 mEq) by mouth 2 times a day. Do not crush or chew. 180 tablet 3 7/28/2025    alteplase (Cathflo Activase) 2 mg injection 2 mL (2 mg) by intra-catheter route if needed (Use as needed for occluded PICC Line). (Patient not taking: Reported on 7/16/2025)       aspirin 81 mg EC tablet Take 1 tablet (81 mg) by mouth once daily.       atorvastatin (Lipitor) 40 mg tablet Take 1 tablet (40 mg) by mouth once daily at bedtime. 90 tablet 3 Unknown    Continue current TPN formula See AVS for most recent TPN formula. (Patient not taking: Reported on 7/16/2025) 1 Package 0     Continue current TPN formula TPN 5/20  68 mL for 1 hr, increase to 136 mL/hr x10 hrs, and then 68 mL/hr x 1 hr. VS for most recent TPN formula. (Patient not taking: Reported on 7/16/2025) 1 Package 0     furosemide (Lasix) 40 mg tablet Take 1 tablet (40 mg) by mouth once daily. 30 tablet 11     methylPREDNISolone (Medrol Dospak) 4 mg tablets Take as directed (Patient not taking: Reported on 7/16/2025) 21 each 0     midodrine (Proamatine) 2.5 mg tablet Take 1 tablet (2.5 mg) by mouth 3 times daily (morning, midday, late afternoon). (Patient not taking: Reported on 7/16/2025)       multivitamin with iron -  children's (Jr Raymond) chewable tablet Chew and swallow 2 tablets once daily.       octreotide (SandoSTATIN) 500 mcg/mL injection Inject 0.4 mL (200 mcg) under the skin 3 times a day. (Patient not taking: Reported on 7/16/2025)       ondansetron ODT (Zofran-ODT) 4 mg disintegrating tablet Dissolve 1 tablet (4 mg) in the mouth every 8 hours if needed for nausea or vomiting.       penicillin v potassium (Veetid) 500 mg tablet Take 1 tablet (500 mg) by mouth 4 times a day until gone. (Patient not taking: Reported on 6/19/2025) 40 tablet 0     prochlorperazine (Compazine) 10 mg tablet Take 1 tablet (10 mg) by mouth every 6 hours if needed for nausea or vomiting. (Patient not taking: Reported on 7/30/2025) 30 tablet 5 Not Taking    tamsulosin (Flomax) 0.4 mg 24 hr capsule Take 1 capsule (0.4 mg) by mouth once daily. (Patient not taking: Reported on 7/16/2025)

## 2025-08-30 NOTE — PROGRESS NOTES
Surgical Oncology Progress Note    Roma Corral is a 70 y.o. male with history of rectal cancer with mets to the liver, CAD (2021 NSTEMI), DM2, HTN who is s/p right extended hepatectomy for metastatic rectal cancer on 7/29     Subjective   No acute events overnight. Patient seen and evaluated this AM during team rounds. Patient is distressed with sad affect. Patient denies nausea. Small volume emesis. Endorses abdominal fullness    P.O. intake: NPO  Drain: 2.3 L     Objective   GEN: Appears tired, sad, tearful, awake, alert, conversant.  RESP: non-labored breathing on 1L nasal cannula  CV: regular rate  GI: soft, non-tender to palpation, moderately distended. Drain with ascites Ostomy pink, healthy. No output in appliance Incisions healing well, well approximated without drainage.   : voiding spontaneously.   MSK: no evidence of bilateral lower extremity edema  NEURO: alert and conversant  SKIN: jaundiced, warm and dry. Femoral access site from IR procedure covered with bandages, hemostatic.    Last Recorded Vitals  Heart Rate:  []   Temp:  [36.8 °C (98.2 °F)-37.6 °C (99.7 °F)]   Resp:  [16-18]   BP: (121-134)/(70-79)   Weight:  [79.8 kg (176 lb)]   SpO2:  [97 %-100 %]      Intake/Output Last 24 Hours:      Intake/Output Summary (Last 24 hours) at 8/30/2025 1120  Last data filed at 8/30/2025 0952  Gross per 24 hour   Intake 5867.12 ml   Output 2800 ml   Net 3067.12 ml        Relevant Results  Lab Date: 08/30/25       8.4    9.2>-----<39         26.4   150  116  29                  ----------------<152     3.5  22  1.22          Ca 10.8 Phos 1.2 Mg 1.99       ALT 13 AST 44 AlkPhos 44 tBili 19.6         Imaging:   US pelvis groin pseudoaneurysm  Result Date: 8/29/2025  Interpreted By:  Juanito Monzon,  and Neli Fulton STUDY: US PELVIS GROIN PSEUDOANEURYSM; ;  8/29/2025 12:02 pm   INDICATION: Signs/Symptoms:Pseudoaneurysm concern.   ,I72.9 Aneurysm of unspecified site,I72.4 Aneurysm of artery of lower  extremity   COMPARISON: None.   ACCESSION NUMBER(S): [id number]   ORDERING CLINICIAN: USPA ESPINO   TECHNIQUE: Focused ultrasonographic evaluation of the right groin. Grayscale imaging, color Doppler, and spectral Doppler were utilized. Static images were sent for interpretation.   FINDINGS: There is no evidence of a pseudoaneurysm within the right groin.       No evidence of pseudoaneurysm within the right groin.   I personally reviewed the images/study and I agree with the findings as stated by Kirstin Quinteros MD, PGY-3 this study was interpreted at [place], [place].   MACRO: None   Signed by: Juanito Monzon 8/29/2025 7:41 PM Dictation workstation:   [id number]      Assessment:  Roma Corral is a 70 y.o. male with history of rectal cancer with mets to the liver, CAD (2021 NSTEMI), DM2, HTN who is s/p right extended hepatectomy for metastatic rectal cancer on 7/29 c/b worsening liver function.     Plan:  Neuro: Dilaudid 0.4 mg/0.6 mg IV, every 4 hours as needed, 0.2 mg for breakthrough  CV: Vitals q8hrs, telemetry  Pulm: Encourage IS, wean oxygen  GI: NPO, start lactulose 15ml BID, PPI, zofran prn, cont TPN   /Renal: nephrology consult for hypernatremia. 1L LR bolus this AM. Cont albumin (25 g, IV, scheduled every 6 hours)  Heme: Vitamin K (5 mg, IV, every 48 hours)  Endo: SSI with TPN  ID: Empiric Zosyn and Diflucan (started 8/11 and 8/19, respectively, end date pending)  DVT ppx: Heparin 5000u q8hr, SCDs    Dispo: RNF    Seen and discussed with Dr. Linda Garcia MD  PGY5 General Surgery  Surgical Oncology [id number]

## 2025-08-30 NOTE — CARE PLAN
Problem: Pain - Adult  Goal: Verbalizes/displays adequate comfort level or baseline comfort level  Outcome: Progressing  Flowsheets (Taken 8/29/2025 0332 by Maris Vincent, RN)  Verbalizes/displays adequate comfort level or baseline comfort level:   Encourage patient to monitor pain and request assistance   Assess pain using appropriate pain scale   Administer analgesics based on type and severity of pain and evaluate response   Implement non-pharmacological measures as appropriate and evaluate response     Problem: Safety - Adult  Goal: Free from fall injury  Outcome: Progressing  Flowsheets (Taken 8/19/2025 2226 by Mesha Salazar RN)  Free from fall injury:   Instruct family/caregiver on patient safety   Based on caregiver fall risk screen, instruct family/caregiver to ask for assistance with transferring infant if caregiver noted to have fall risk factors     Problem: Skin  Goal: Decreased wound size/increased tissue granulation at next dressing change  Outcome: Progressing  Flowsheets (Taken 8/29/2025 0332 by Maris Vincent RN)  Decreased wound size/increased tissue granulation at next dressing change:   Promote sleep for wound healing   Protective dressings over bony prominences  Goal: Participates in plan/prevention/treatment measures  Outcome: Progressing  Flowsheets (Taken 8/24/2025 2224 by Rozina Jackson, RN)  Participates in plan/prevention/treatment measures: Increase activity/out of bed for meals  Goal: Prevent/manage excess moisture  Outcome: Progressing  Flowsheets (Taken 8/29/2025 0332 by Maris Vincent RN)  Prevent/manage excess moisture:   Cleanse incontinence/protect with barrier cream   Monitor for/manage infection if present   Follow provider orders for dressing changes

## 2025-08-31 ENCOUNTER — APPOINTMENT (OUTPATIENT)
Dept: RADIOLOGY | Facility: HOSPITAL | Age: 70
End: 2025-08-31
Payer: MEDICARE

## 2025-08-31 PROCEDURE — 74177 CT ABD & PELVIS W/CONTRAST: CPT

## 2025-08-31 ASSESSMENT — COGNITIVE AND FUNCTIONAL STATUS - GENERAL
DRESSING REGULAR LOWER BODY CLOTHING: A LITTLE
HELP NEEDED FOR BATHING: A LITTLE
EATING MEALS: A LITTLE
DRESSING REGULAR LOWER BODY CLOTHING: A LITTLE
EATING MEALS: A LITTLE
TURNING FROM BACK TO SIDE WHILE IN FLAT BAD: A LITTLE
CLIMB 3 TO 5 STEPS WITH RAILING: A LITTLE
PERSONAL GROOMING: A LITTLE
MOBILITY SCORE: 18
WALKING IN HOSPITAL ROOM: A LITTLE
MOVING TO AND FROM BED TO CHAIR: A LITTLE
MOVING TO AND FROM BED TO CHAIR: A LITTLE
DRESSING REGULAR UPPER BODY CLOTHING: A LITTLE
HELP NEEDED FOR BATHING: A LITTLE
PERSONAL GROOMING: A LITTLE
TOILETING: A LITTLE
TURNING FROM BACK TO SIDE WHILE IN FLAT BAD: A LITTLE
DRESSING REGULAR UPPER BODY CLOTHING: A LITTLE
TOILETING: A LITTLE
MOBILITY SCORE: 18
DAILY ACTIVITIY SCORE: 18
DAILY ACTIVITIY SCORE: 18
WALKING IN HOSPITAL ROOM: A LITTLE
MOVING FROM LYING ON BACK TO SITTING ON SIDE OF FLAT BED WITH BEDRAILS: A LITTLE
STANDING UP FROM CHAIR USING ARMS: A LITTLE
STANDING UP FROM CHAIR USING ARMS: A LITTLE
MOVING FROM LYING ON BACK TO SITTING ON SIDE OF FLAT BED WITH BEDRAILS: A LITTLE
CLIMB 3 TO 5 STEPS WITH RAILING: A LITTLE

## 2025-08-31 ASSESSMENT — PAIN - FUNCTIONAL ASSESSMENT
PAIN_FUNCTIONAL_ASSESSMENT: UNABLE TO SELF-REPORT
PAIN_FUNCTIONAL_ASSESSMENT: 0-10
PAIN_FUNCTIONAL_ASSESSMENT: 0-10
PAIN_FUNCTIONAL_ASSESSMENT: UNABLE TO SELF-REPORT
PAIN_FUNCTIONAL_ASSESSMENT: 0-10

## 2025-08-31 ASSESSMENT — PAIN DESCRIPTION - LOCATION
LOCATION: ABDOMEN
LOCATION: ABDOMEN

## 2025-08-31 ASSESSMENT — PAIN SCALES - GENERAL
PAINLEVEL_OUTOF10: 7
PAINLEVEL_OUTOF10: 5 - MODERATE PAIN

## 2025-08-31 ASSESSMENT — PAIN DESCRIPTION - ORIENTATION: ORIENTATION: RIGHT;MID

## 2025-08-31 NOTE — CARE PLAN
The clinical goals for the shift include Pt will remain hds    Problem: Pain - Adult  Goal: Verbalizes/displays adequate comfort level or baseline comfort level  Outcome: Progressing  Flowsheets (Taken 8/29/2025 0332 by Maris Vincent RN)  Verbalizes/displays adequate comfort level or baseline comfort level:   Encourage patient to monitor pain and request assistance   Assess pain using appropriate pain scale   Administer analgesics based on type and severity of pain and evaluate response   Implement non-pharmacological measures as appropriate and evaluate response     Problem: Safety - Adult  Goal: Free from fall injury  Outcome: Progressing  Flowsheets (Taken 8/19/2025 2226 by Mesha Salazar, RN)  Free from fall injury:   Instruct family/caregiver on patient safety   Based on caregiver fall risk screen, instruct family/caregiver to ask for assistance with transferring infant if caregiver noted to have fall risk factors     Problem: Skin  Goal: Participates in plan/prevention/treatment measures  Outcome: Progressing  Flowsheets (Taken 8/24/2025 2224 by Rozina Jackson RN)  Participates in plan/prevention/treatment measures: Increase activity/out of bed for meals  Goal: Prevent/manage excess moisture  Outcome: Progressing  Flowsheets (Taken 8/29/2025 0332 by Maris Vincent RN)  Prevent/manage excess moisture:   Cleanse incontinence/protect with barrier cream   Monitor for/manage infection if present   Follow provider orders for dressing changes  Goal: Prevent/minimize sheer/friction injuries  Outcome: Progressing  Flowsheets (Taken 8/29/2025 0332 by Maris Vincent, RN)  Prevent/minimize sheer/friction injuries:   Complete micro-shifts as needed if patient unable. Adjust patient position to relieve pressure points, not a full turn   Use pull sheet   Turn/reposition every 2 hours/use positioning/transfer devices   Utilize specialty bed per algorithm

## 2025-08-31 NOTE — CARE PLAN
The patient's goals for the shift include      The clinical goals for the shift include patient will remain HDS      Problem: Pain - Adult  Goal: Verbalizes/displays adequate comfort level or baseline comfort level  Outcome: Progressing     Problem: Safety - Adult  Goal: Free from fall injury  Outcome: Progressing     Problem: Discharge Planning  Goal: Discharge to home or other facility with appropriate resources  Outcome: Progressing     Problem: Chronic Conditions and Co-morbidities  Goal: Patient's chronic conditions and co-morbidity symptoms are monitored and maintained or improved  Outcome: Progressing     Problem: Nutrition  Goal: Nutrient intake appropriate for maintaining nutritional needs  Outcome: Progressing     Problem: Skin  Goal: Decreased wound size/increased tissue granulation at next dressing change  Outcome: Progressing  Flowsheets (Taken 8/31/2025 1402)  Decreased wound size/increased tissue granulation at next dressing change:   Utilize specialty bed per algorithm   Promote sleep for wound healing   Protective dressings over bony prominences  Goal: Participates in plan/prevention/treatment measures  Outcome: Progressing  Flowsheets (Taken 8/31/2025 1402)  Participates in plan/prevention/treatment measures:   Discuss with provider PT/OT consult   Elevate heels   Increase activity/out of bed for meals  Goal: Prevent/manage excess moisture  Outcome: Progressing  Flowsheets (Taken 8/31/2025 1402)  Prevent/manage excess moisture:   Moisturize dry skin   Monitor for/manage infection if present  Goal: Prevent/minimize sheer/friction injuries  Outcome: Progressing  Flowsheets (Taken 8/31/2025 1402)  Prevent/minimize sheer/friction injuries:   Utilize specialty bed per algorithm   Use pull sheet   Increase activity/out of bed for meals  Goal: Promote/optimize nutrition  Outcome: Progressing  Flowsheets (Taken 8/31/2025 1402)  Promote/optimize nutrition:   Monitor/record intake including meals   Consume >  50% meals/supplements   Offer water/supplements/favorite foods  Goal: Promote skin healing  Outcome: Progressing  Flowsheets (Taken 8/31/2025 1402)  Promote skin healing: Protective dressings over bony prominences

## 2025-08-31 NOTE — SIGNIFICANT EVENT
"This man's CT has been done.  I have reviewed the report and images.  I have spoken to Dr Erazo.      IMPRESSION:  1.  Interval development of pneumatosis and hypoenhancement of the  ascending and proximal transverse colon. Findings are concerning for  bowel ischemia. Recommend correlation with lactate. Of note, no free  air is seen.  2. 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  3. Interval increase in now moderate bilateral pleural effusions with  associated passive atelectasis.  4. There is diffuse mesenteric edema and trace free fluid throughout  the abdomen and pelvis, similar to prior. Findings are likely  postoperative in nature although a reactive etiology cannot be  entirely excluded.  5. Small area of subcutaneous gas in an anterior abdominal wall  midline incision with adjacent soft tissue thickening but no  intraperitoneal extension.    I agree the CT is suggestive of pneumatosis.  He had nothing suggesting peritonitis on exam, at most some R sided tenderness, which the in house intern confirmed this afternoon.  Pt was also seen by inhouse Chief.    This man is a month out from extended R hepatectomy and has post-hepatectomy liver failure as documented.  His status / likelihood of recovery is tenuous with no operation.  With an operation survival exceedingly unlikely.    There is no hard indication to consider an operation at this time, and we certainly will not \"prophylactically\" operate.    Instead of one unit of blood, he will get 2.  NPO  He is on TPN  He is on Zosyn/Fluc  Will do better with ascites replacements in addition to the scheduled albumin.       "

## 2025-08-31 NOTE — PROGRESS NOTES
Surgical Oncology Progress Note    Roma Corral is a 70 y.o. male with history of rectal cancer with mets to the liver, CAD (2021 NSTEMI), DM2, HTN who is s/p right extended hepatectomy for metastatic rectal cancer on 7/29     Subjective   No acute events overnight. Patient seen and evaluated this AM during team rounds. Patient denies nausea or emesis. He does endorse mild epigastric pain and feeling his stomach is full     Objective   GEN: Appears tired, awake, conversant.  RESP: non-labored breathing on room air  CV: regular rate  GI: soft, non-tender to palpation, moderately distended. Drain with ascites Ostomy pink, healthy. Gas in appliance Incisions healing well, well approximated without drainage.   : voiding spontaneously.   MSK: no evidence of bilateral lower extremity edema  NEURO: alert and conversant  SKIN: jaundiced, warm and dry. Femoral access site from IR procedure covered with bandages, hemostatic.    Last Recorded Vitals  Heart Rate:  []   Temp:  [36.2 °C (97.2 °F)-37.5 °C (99.5 °F)]   Resp:  [16-17]   BP: (102-132)/(63-77)   SpO2:  [95 %-99 %]      Intake/Output Last 24 Hours:      Intake/Output Summary (Last 24 hours) at 8/31/2025 1140  Last data filed at 8/31/2025 1100  Gross per 24 hour   Intake 4759.46 ml   Output 3010 ml   Net 1749.46 ml        Relevant Results  Lab Date: 08/31/25       7.4    9.9>-----<24         23.1   145  111  35                  ----------------<129     3.3  21  1.33          Ca 10.8 Phos 2.0 Mg 1.90       ALT 15 AST 50 AlkPhos 44 tBili 20.8         Imaging:   US pelvis groin pseudoaneurysm  Result Date: 8/29/2025  Interpreted By:  Juanito Monzon,  and Neli Fulton STUDY: US PELVIS GROIN PSEUDOANEURYSM; ;  8/29/2025 12:02 pm   INDICATION: Signs/Symptoms:Pseudoaneurysm concern.   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   ACCESSION NUMBER(S): [id number]   ORDERING CLINICIAN: SUPA ESPINO   TECHNIQUE:  Focused ultrasonographic evaluation of the right groin. Grayscale imaging, color Doppler, and spectral Doppler were utilized. Static images were sent for interpretation.   FINDINGS: There is no evidence of a pseudoaneurysm within the right groin.       No evidence of pseudoaneurysm within the right groin.   I personally reviewed the images/study and I agree with the findings as stated by Kirstin Quinteros MD, PGY-3 this study was interpreted at [facility], [place], [place].   MACRO: None   Signed by: Juanito Monzon 8/29/2025 7:41 PM Dictation workstation:   [id number]      Assessment:  Roma Corral is a 70 y.o. male with history of rectal cancer with mets to the liver, CAD (2021 NSTEMI), DM2, HTN who is s/p right extended hepatectomy for metastatic rectal cancer on 7/29 c/b worsening liver function.     Plan:  WBC rising and so will send UA and obtain CT A/P with IV contrast. Transfusing 1 pRBC.    Neuro: Dilaudid 0.4 mg/0.6 mg IV, every 4 hours as needed, 0.2 mg for breakthrough  CV: Vitals q8hrs, telemetry  Pulm: Encourage IS, wean oxygen  GI: Clear liquid diet, cont lactulose 15ml BID, PPI, zofran prn, cont TPN   /Renal: nephrology consult: on D5W at 150ml/hr for hypernatremia. Another 1L LR bolus this AM. Cont albumin (25 g, IV, scheduled every 6 hours)  Heme: Vitamin K (5 mg, IV, every 48 hours)  Endo: SSI with TPN  ID: Empiric Zosyn and Diflucan (started 8/11 and 8/19, respectively, end date pending)  DVT ppx: Holding heparin 5000u q8hr, cont SCDs    Dispo: RNF    Seen and discussed with Dr. Ivanna Garcia MD  PGY5 General Surgery  Surgical Oncology [id number]

## 2025-09-01 ENCOUNTER — APPOINTMENT (OUTPATIENT)
Dept: RADIOLOGY | Facility: HOSPITAL | Age: 70
End: 2025-09-01
Payer: MEDICARE

## 2025-09-01 PROCEDURE — 2780000003 HC OR 278 NO HCPCS

## 2025-09-01 PROCEDURE — C1751 CATH, INF, PER/CENT/MIDLINE: HCPCS

## 2025-09-01 PROCEDURE — 36573 INSJ PICC RS&I 5 YR+: CPT

## 2025-09-01 ASSESSMENT — PAIN DESCRIPTION - LOCATION
LOCATION: ABDOMEN
LOCATION: ABDOMEN

## 2025-09-01 ASSESSMENT — PAIN SCALES - GENERAL
PAINLEVEL_OUTOF10: 7
PAINLEVEL_OUTOF10: 0 - NO PAIN
PAINLEVEL_OUTOF10: 7
PAINLEVEL_OUTOF10: 3
PAINLEVEL_OUTOF10: 4
PAINLEVEL_OUTOF10: 0 - NO PAIN
PAINLEVEL_OUTOF10: 0 - NO PAIN
PAINLEVEL_OUTOF10: 10 - WORST POSSIBLE PAIN
PAINLEVEL_OUTOF10: 7
PAINLEVEL_OUTOF10: 7

## 2025-09-01 ASSESSMENT — COGNITIVE AND FUNCTIONAL STATUS - GENERAL
WALKING IN HOSPITAL ROOM: A LOT
WALKING IN HOSPITAL ROOM: A LOT
DAILY ACTIVITIY SCORE: 16
EATING MEALS: A LITTLE
MOVING TO AND FROM BED TO CHAIR: A LOT
PERSONAL GROOMING: A LITTLE
MOVING TO AND FROM BED TO CHAIR: A LOT
DRESSING REGULAR LOWER BODY CLOTHING: A LOT
HELP NEEDED FOR BATHING: A LOT
TOILETING: A LITTLE
PERSONAL GROOMING: A LITTLE
CLIMB 3 TO 5 STEPS WITH RAILING: A LOT
STANDING UP FROM CHAIR USING ARMS: A LOT
MOBILITY SCORE: 13
DAILY ACTIVITIY SCORE: 16
STANDING UP FROM CHAIR USING ARMS: A LOT
TOILETING: A LITTLE
HELP NEEDED FOR BATHING: A LOT
DRESSING REGULAR UPPER BODY CLOTHING: A LITTLE
EATING MEALS: A LITTLE
DRESSING REGULAR LOWER BODY CLOTHING: A LOT
TURNING FROM BACK TO SIDE WHILE IN FLAT BAD: A LITTLE
TURNING FROM BACK TO SIDE WHILE IN FLAT BAD: A LITTLE
MOVING FROM LYING ON BACK TO SITTING ON SIDE OF FLAT BED WITH BEDRAILS: A LITTLE
MOBILITY SCORE: 14
DRESSING REGULAR UPPER BODY CLOTHING: A LITTLE
CLIMB 3 TO 5 STEPS WITH RAILING: TOTAL
MOVING FROM LYING ON BACK TO SITTING ON SIDE OF FLAT BED WITH BEDRAILS: A LITTLE

## 2025-09-01 NOTE — CARE PLAN
Problem: Pain - Adult  Goal: Verbalizes/displays adequate comfort level or baseline comfort level  Outcome: Progressing     Problem: Safety - Adult  Goal: Free from fall injury  Outcome: Progressing     Problem: Discharge Planning  Goal: Discharge to home or other facility with appropriate resources  Outcome: Progressing     Problem: Chronic Conditions and Co-morbidities  Goal: Patient's chronic conditions and co-morbidity symptoms are monitored and maintained or improved  Outcome: Progressing     Problem: Nutrition  Goal: Nutrient intake appropriate for maintaining nutritional needs  Outcome: Progressing     Problem: Skin  Goal: Decreased wound size/increased tissue granulation at next dressing change  Outcome: Progressing  Flowsheets (Taken 9/1/2025 1756)  Decreased wound size/increased tissue granulation at next dressing change: Promote sleep for wound healing  Goal: Participates in plan/prevention/treatment measures  Outcome: Progressing  Flowsheets (Taken 9/1/2025 1756)  Participates in plan/prevention/treatment measures: Elevate heels  Goal: Prevent/manage excess moisture  Outcome: Progressing  Flowsheets (Taken 9/1/2025 1756)  Prevent/manage excess moisture:   Monitor for/manage infection if present   Cleanse incontinence/protect with barrier cream  Goal: Prevent/minimize sheer/friction injuries  Outcome: Progressing  Flowsheets (Taken 9/1/2025 1756)  Prevent/minimize sheer/friction injuries:   HOB 30 degrees or less   Use pull sheet  Goal: Promote/optimize nutrition  Outcome: Progressing  Flowsheets (Taken 9/1/2025 1756)  Promote/optimize nutrition: Monitor/record intake including meals  Goal: Promote skin healing  Outcome: Progressing  Flowsheets (Taken 9/1/2025 1756)  Promote skin healing: Assess skin/pad under line(s)/device(s)

## 2025-09-01 NOTE — SIGNIFICANT EVENT
Nephrology following for Hypernatremia that has improved with hydration. Will sign off. Please call with questions.    Raman Martínez MD  PGY-5 Nephrology Fellow

## 2025-09-01 NOTE — PROGRESS NOTES
Surgical Oncology Progress Note    Roma Corral is a 70 y.o. male with history of rectal cancer with mets to the liver, CAD (2021 NSTEMI), DM2, HTN who is s/p right extended hepatectomy for metastatic rectal cancer on 7/29     Subjective   Overnight: No acute events, afebrile, vital signs are stable.  Patient remains on room air and normal sinus rhythm.  Patient did receive a total of 2 units of PRBCs over the course of yesterday for hemoglobin in the low sevens.  Of note, he did become confused and accidentally pulled his PICC line overnight.  His TPN was switched over to his right chest Mediport in order to continue with the infusion.    Urine output: 850 cc  P.o. intake: 0 cc  Drain: 2 L bile tinged ascitic fluid  Colostomy: 550 cc stool     Objective   GEN: Appears tired, awake, conversant.  RESP: non-labored breathing on room air  CV: regular rate  GI: soft, intermittent voluntary guarding without rebound tenderness, non-peritonitic, moderately distended. Drain with bile-tinged ascites. Ostomy pink, healthy. Gas in appliance. Incisions healed well, well approximated without drainage, erythema, or infected appearance.   : voiding spontaneously.   MSK: no evidence of bilateral lower extremity edema  NEURO: alert and conversant, A&O x 2-3, re-directable  SKIN: jaundiced, warm and dry. Femoral access site from IR procedure hemostatic without seroma or hematoma; dressings taken down    Last Recorded Vitals  Heart Rate:  [76-97]   Temp:  [36.8 °C (98.2 °F)-37.3 °C (99.1 °F)]   Resp:  [15-18]   BP: (104-128)/(59-80)   Weight:  [79.1 kg (174 lb 6.1 oz)]   SpO2:  [92 %-100 %]      Intake/Output Last 24 Hours:      Intake/Output Summary (Last 24 hours) at 9/1/2025 0923  Last data filed at 9/1/2025 0939  Gross per 24 hour   Intake 7595.87 ml   Output 3600 ml   Net 3995.87 ml        Relevant Results  Lab Date: 09/01/25       9.3    11.9>-----<14         27.9   140  109  33                  ----------------<114     3.8   22  1.31          Ca 10.0 Phos 3.1 Mg 1.89       ALT 17 AST 54 AlkPhos 43 tBili 22.9         Imaging:   CT abdomen pelvis w IV contrast  Result Date: 8/31/2025  Interpreted By:  Des Villeda,  and Leatha Bravo STUDY: CT ABDOMEN PELVIS W IV CONTRAST;  8/31/2025 12:42 pm   INDICATION: Signs/Symptoms:uptrending wbc with abdominal pain.     COMPARISON: CT ABDOMEN PELVIS WO IV CONTRAST 8/25/2025   ACCESSION NUMBER(S): [id number]   ORDERING CLINICIAN: SUPA ESPINO   TECHNIQUE: CT of the abdomen and pelvis was performed.  Standard contiguous axial images were obtained at 3 mm slice thickness through the abdomen and pelvis. Coronal and sagittal reconstructions at 3 mm slice thickness were performed.  75 ML of Omnipaque 350 was administered intravenously without immediate complication.   FINDINGS: LOWER CHEST: Interval increase in now moderate bilateral pleural effusions with associated passive atelectasis. The heart is normal in size.   ABDOMEN:   LIVER: Status post right hepatectomy with multiple metallic clips seen in place as well as surgical drains coursing along the right liver edge. There are several areas of hypoattenuation in the remaining portions of the liver which are incompletely evaluated on the current examination. For example, there is an area in segment 4A which measures 1.7 x 1.2 cm (Series 2, Image 37). Another area is noted slightly more inferiorly in segment 4A which measures 2.0 x 1.8 cm (Series 2, Image 45). Similar appearance of embolization coils in the right hepatic branch of the portal vein.   BILE DUCTS: The intrahepatic and extrahepatic ducts are not dilated.   GALLBLADDER: The gallbladder is surgically absent.   PANCREAS: Somewhat limited in evaluation due to adjacent metallic artifact from splenic artery embolization coils. Within this limitation, the pancreas appears unremarkable   SPLEEN: There has been interval embolization splenic artery with subsequent enlargement of the  spleen which now measures 16.1 cm in maximum dimension as well as large nonenhancing area in the splenic parenchyma, compatible with infarction.   ADRENAL GLANDS: Bilateral adrenal glands appear normal.   KIDNEYS AND URETERS: The kidneys are normal in size and enhance symmetrically.  No hydroureteronephrosis or nephroureterolithiasis is identified.   PELVIS:   BLADDER: The urinary bladder is decompressed, limited for evaluation.   REPRODUCTIVE ORGANS: The prostate is not enlarged.   BOWEL: Stomach is decompressed and limited in evaluation. There has been interval decrease in the dilatation of the multiple fluid-filled small bowel loops. There is a left mid abdominal end colostomy. Interval development of pneumatosis involving the cecum, ascending, and early transverse colon through the hepatic flexure. This section of the colon also appears hypoenhancing and is therefore concerning for ischemia. The distal remaining colonic portions show no pneumatosis and terminate into a similar-appearing left lower quadrant ostomy.    The appendix is surgically absent. Unremarkable appearance of the rectal stump with a metallic clips in place.   VESSELS: The aorta and IVC appear normal. Moderate atherosclerotic changes are noted in the abdominal aorta. No aortic aneurysm.   PERITONEUM/RETROPERITONEUM/LYMPH NODES: There is diffuse mesenteric edema, similar to prior. There is trace free fluid scattered throughout the abdomen and pelvis. No free air. No abdominopelvic lymphadenopathy is present. There multiple nonenlarged calcified retroperitoneal lymph nodes similar to prior.   BONES AND ABDOMINAL WALL: No suspicious osseous lesions are identified. Degenerative discogenic disease is noted in the lower thoracic and lumbar spine including a chronic appearing compression deformity of the T12 vertebral body.   Similar appearance of calcified subcutaneous soft tissues along the left anterior abdomen, and right subcutaneous soft tissues  superficial to the common femoral vasculature. These are likely related to prior lymphangiography.   Small area of subcutaneous gas seen in the anterior abdominal wall along a midline incision with adjacent soft tissue thickening but no intraperitoneal extension (Series 2, Image 70). Mild diffuse soft tissue edema/anasarca.       1.  Interval development of pneumatosis and hypoenhancement of the ascending and proximal transverse colon. Findings are concerning for bowel ischemia. Recommend correlation with lactate. Of note, no pneumoperitoneum is seen. 2. 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 3. Interval increase in now moderate bilateral pleural effusions with associated passive atelectasis. 4. There is diffuse mesenteric edema and trace free fluid throughout the abdomen and pelvis, similar to prior. Findings are likely postoperative in nature although a reactive etiology cannot be entirely excluded. 5. Small area of subcutaneous gas in an anterior abdominal wall midline incision with adjacent soft tissue thickening but no intraperitoneal extension.   I personally reviewed the images/study and I agree with the findings as stated by Lawrence Zhang MD (resident).   MACRO: Lawrence Zhang discussed the significance and urgency of this critical finding by EPIC secure chat with  SUPA ESPINO on 8/31/2025 at 1:04 pm.  (**-RCF-**) Findings:  See findings.   Signed by: Des Villeda 8/31/2025 2:31 PM Dictation workstation:   [id number]      Assessment:  Roma Corral is a 70 y.o. male with history of rectal cancer with mets to the liver, CAD (2021 NSTEMI), DM2, HTN who is s/p right extended hepatectomy for metastatic rectal cancer on 7/29 c/b worsening liver function.     Plan:  Neuro: Dilaudid 0.4 mg/0.6 mg IV, every 4 hours as needed, 0.2 mg for breakthrough  CV: Vitals q8hrs, telemetry  Pulm: Encourage IS, wean oxygen  GI: NPO, cont lactulose 15ml BID,  PPI, zofran prn, cont TPN   /Renal: nephrology consult: on D5W at 150ml/hr for hypernatremia. Cont albumin (25 g, IV, scheduled every 6 hours)  Heme: Vitamin K (5 mg, IV, every 48 hours); incremented appropriately from yesterday after 2U pRBC  Endo: SSI with TPN  ID: Empiric Zosyn and Diflucan (started 8/11 and 8/19, respectively, end date pending). UA negative, CT a/p with IV contrast notable for pnuematosis of the bowel wall, in context of voluntary guarding without peritonitis on PE  DVT ppx: Holding heparin 5000u q8hr, cont SCDs    Will consider 5-pk platelet transfusion for platelets of 14 today, pending whether we need PICC line replacement to allow for administration of TPN and antibiotics; currently with TPN running through [facility].    Dispo: RNF    Patient was seen and discussed with Dr. Ivanna Neumann MD  PGY5 General Surgery  Surgical Oncology [id number]

## 2025-09-01 NOTE — CARE PLAN
The patient's goals for the shift include      The clinical goals for the shift include patient will remain HDS and recieve blood      Problem: Pain - Adult  Goal: Verbalizes/displays adequate comfort level or baseline comfort level  Outcome: Progressing     Problem: Safety - Adult  Goal: Free from fall injury  Outcome: Progressing     Problem: Discharge Planning  Goal: Discharge to home or other facility with appropriate resources  Outcome: Progressing     Problem: Chronic Conditions and Co-morbidities  Goal: Patient's chronic conditions and co-morbidity symptoms are monitored and maintained or improved  Outcome: Progressing     Problem: Nutrition  Goal: Nutrient intake appropriate for maintaining nutritional needs  Outcome: Progressing     Problem: Skin  Goal: Decreased wound size/increased tissue granulation at next dressing change  Outcome: Progressing  Flowsheets (Taken 9/1/2025 0508)  Decreased wound size/increased tissue granulation at next dressing change:   Utilize specialty bed per algorithm   Promote sleep for wound healing   Protective dressings over bony prominences  Goal: Participates in plan/prevention/treatment measures  Outcome: Progressing  Flowsheets (Taken 9/1/2025 0508)  Participates in plan/prevention/treatment measures:   Discuss with provider PT/OT consult   Elevate heels   Increase activity/out of bed for meals  Goal: Prevent/manage excess moisture  Outcome: Progressing  Flowsheets (Taken 9/1/2025 0508)  Prevent/manage excess moisture:   Moisturize dry skin   Cleanse incontinence/protect with barrier cream   Follow provider orders for dressing changes   Use wicking fabric (obtain order)   Monitor for/manage infection if present  Goal: Prevent/minimize sheer/friction injuries  Outcome: Progressing  Flowsheets (Taken 9/1/2025 0508)  Prevent/minimize sheer/friction injuries:   Complete micro-shifts as needed if patient unable. Adjust patient position to relieve pressure points, not a full  turn   Turn/reposition every 2 hours/use positioning/transfer devices   Utilize specialty bed per algorithm   HOB 30 degrees or less   Use pull sheet   Increase activity/out of bed for meals  Goal: Promote/optimize nutrition  Outcome: Progressing  Flowsheets (Taken 9/1/2025 0508)  Promote/optimize nutrition:   Monitor/record intake including meals   Assist with feeding   Consume > 50% meals/supplements   Offer water/supplements/favorite foods   Reassess MST if dietician not consulted   Discuss with provider if NPO > 2 days  Goal: Promote skin healing  Outcome: Progressing  Flowsheets (Taken 9/1/2025 0508)  Promote skin healing:   Turn/reposition every 2 hours/use positioning/transfer devices   Protective dressings over bony prominences   Assess skin/pad under line(s)/device(s)   Rotate device position/do not position patient on device   Ensure correct size (line/device) and apply per  instructions

## 2025-09-02 ENCOUNTER — APPOINTMENT (OUTPATIENT)
Dept: CARDIOLOGY | Facility: HOSPITAL | Age: 70
End: 2025-09-02
Payer: MEDICARE

## 2025-09-02 PROCEDURE — C8929 TTE W OR WO FOL WCON,DOPPLER: HCPCS

## 2025-09-02 PROCEDURE — 93306 TTE W/DOPPLER COMPLETE: CPT

## 2025-09-02 ASSESSMENT — COGNITIVE AND FUNCTIONAL STATUS - GENERAL
EATING MEALS: A LITTLE
WALKING IN HOSPITAL ROOM: A LOT
DRESSING REGULAR LOWER BODY CLOTHING: A LOT
MOVING TO AND FROM BED TO CHAIR: A LOT
DAILY ACTIVITIY SCORE: 15
TOILETING: A LITTLE
CLIMB 3 TO 5 STEPS WITH RAILING: A LOT
DRESSING REGULAR LOWER BODY CLOTHING: A LOT
DAILY ACTIVITIY SCORE: 16
MOVING TO AND FROM BED TO CHAIR: A LITTLE
PERSONAL GROOMING: A LITTLE
MOVING FROM LYING ON BACK TO SITTING ON SIDE OF FLAT BED WITH BEDRAILS: A LITTLE
PERSONAL GROOMING: A LITTLE
TOILETING: A LITTLE
TURNING FROM BACK TO SIDE WHILE IN FLAT BAD: A LITTLE
MOVING FROM LYING ON BACK TO SITTING ON SIDE OF FLAT BED WITH BEDRAILS: A LITTLE
HELP NEEDED FOR BATHING: A LOT
CLIMB 3 TO 5 STEPS WITH RAILING: A LOT
STANDING UP FROM CHAIR USING ARMS: A LITTLE
EATING MEALS: A LITTLE
MOBILITY SCORE: 16
DRESSING REGULAR UPPER BODY CLOTHING: A LITTLE
MOBILITY SCORE: 14
HELP NEEDED FOR BATHING: A LOT
TURNING FROM BACK TO SIDE WHILE IN FLAT BAD: A LITTLE
WALKING IN HOSPITAL ROOM: A LOT
DRESSING REGULAR UPPER BODY CLOTHING: A LOT
STANDING UP FROM CHAIR USING ARMS: A LOT

## 2025-09-02 ASSESSMENT — PAIN - FUNCTIONAL ASSESSMENT
PAIN_FUNCTIONAL_ASSESSMENT: 0-10
PAIN_FUNCTIONAL_ASSESSMENT: UNABLE TO SELF-REPORT

## 2025-09-02 ASSESSMENT — PAIN SCALES - GENERAL
PAINLEVEL_OUTOF10: 6
PAINLEVEL_OUTOF10: 5 - MODERATE PAIN
PAINLEVEL_OUTOF10: 0 - NO PAIN
PAINLEVEL_OUTOF10: 6
PAINLEVEL_OUTOF10: 5 - MODERATE PAIN

## 2025-09-02 ASSESSMENT — PAIN DESCRIPTION - LOCATION: LOCATION: ABDOMEN

## 2025-09-02 NOTE — CARE PLAN
Problem: Pain - Adult  Goal: Verbalizes/displays adequate comfort level or baseline comfort level  Outcome: Progressing     Problem: Safety - Adult  Goal: Free from fall injury  Outcome: Progressing     Problem: Discharge Planning  Goal: Discharge to home or other facility with appropriate resources  Outcome: Progressing     Problem: Chronic Conditions and Co-morbidities  Goal: Patient's chronic conditions and co-morbidity symptoms are monitored and maintained or improved  Outcome: Progressing     Problem: Nutrition  Goal: Nutrient intake appropriate for maintaining nutritional needs  Outcome: Progressing     Problem: Skin  Goal: Decreased wound size/increased tissue granulation at next dressing change  Outcome: Progressing  Flowsheets (Taken 9/2/2025 1629)  Decreased wound size/increased tissue granulation at next dressing change:   Promote sleep for wound healing   Protective dressings over bony prominences  Goal: Participates in plan/prevention/treatment measures  Outcome: Progressing  Flowsheets (Taken 9/2/2025 1629)  Participates in plan/prevention/treatment measures: Elevate heels  Goal: Prevent/manage excess moisture  Outcome: Progressing  Flowsheets (Taken 9/2/2025 1629)  Prevent/manage excess moisture: Monitor for/manage infection if present  Goal: Prevent/minimize sheer/friction injuries  Outcome: Progressing  Flowsheets (Taken 9/2/2025 1629)  Prevent/minimize sheer/friction injuries: Use pull sheet  Goal: Promote/optimize nutrition  Outcome: Progressing  Flowsheets (Taken 9/2/2025 1629)  Promote/optimize nutrition: Monitor/record intake including meals  Goal: Promote skin healing  Outcome: Progressing  Flowsheets (Taken 9/2/2025 1629)  Promote skin healing:   Protective dressings over bony prominences   Assess skin/pad under line(s)/device(s)

## 2025-09-02 NOTE — PROGRESS NOTES
Surgical Oncology Progress Note    Roma Corral is a 70 y.o. male with history of rectal cancer with mets to the liver, CAD (2021 NSTEMI), DM2, HTN who is s/p right extended hepatectomy for metastatic rectal cancer on 7/29     Subjective   Overnight: No acute events, afebrile, vital signs are stable.  Patient remains on room air and normal sinus rhythm.  Patient received 1 pk platelets, however is still pending PICC line placement. Improved mentation with clearer understanding of ongoing care this AM    Urine output: 1.1L  Drain: 2 L bile tinged ascitic fluid  Colostomy: 250 cc stool with gas in appliance     Objective   GEN: Appears tired, awake, conversant.  RESP: non-labored breathing on room air  CV: regular rate  GI: soft, intermittent voluntary guarding without rebound tenderness, non-peritonitic, moderately distended. Drain with bile-tinged ascites. Ostomy pink, healthy. Gas in appliance. Incisions healed well, well approximated without drainage, erythema, or infected appearance.   : voiding spontaneously.   MSK: no evidence of bilateral lower extremity edema  NEURO: alert and conversant, A&O x 2-3, re-directable  SKIN: jaundiced, warm and dry. Femoral access site from IR procedure hemostatic without seroma or hematoma; dressings taken down    Last Recorded Vitals  Heart Rate:  [80-90]   Temp:  [36.7 °C (98.1 °F)-37.5 °C (99.5 °F)]   Resp:  [17-18]   BP: (107-127)/(60-75)   Weight:  [79.5 kg (175 lb 4.8 oz)]   SpO2:  [96 %-99 %]      Intake/Output Last 24 Hours:      Intake/Output Summary (Last 24 hours) at 9/2/2025 0950  Last data filed at 9/2/2025 0900  Gross per 24 hour   Intake 2719.8 ml   Output 3700 ml   Net -980.2 ml        Relevant Results  Lab Date: 09/02/25       9.4    10.7>-----<60         28.3   142  109  35                  ----------------<109     3.8  23  1.26          Ca 10.0 Phos 2.8 Mg 1.97       ALT 20 AST 69 AlkPhos 50 tBili 24.9         Imaging:   CT abdomen pelvis w IV  contrast  Result Date: 8/31/2025  Interpreted By:  Des Villeda,  and Leatha Bravo STUDY: CT ABDOMEN PELVIS W IV CONTRAST;  8/31/2025 12:42 pm   INDICATION: Signs/Symptoms:uptrending wbc with abdominal pain.     COMPARISON: CT ABDOMEN PELVIS WO IV CONTRAST 8/25/2025   ACCESSION NUMBER(S): [id number]   ORDERING CLINICIAN: SUPA ESPINO   TECHNIQUE: CT of the abdomen and pelvis was performed.  Standard contiguous axial images were obtained at 3 mm slice thickness through the abdomen and pelvis. Coronal and sagittal reconstructions at 3 mm slice thickness were performed.  75 ML of Omnipaque 350 was administered intravenously without immediate complication.   FINDINGS: LOWER CHEST: Interval increase in now moderate bilateral pleural effusions with associated passive atelectasis. The heart is normal in size.   ABDOMEN:   LIVER: Status post right hepatectomy with multiple metallic clips seen in place as well as surgical drains coursing along the right liver edge. There are several areas of hypoattenuation in the remaining portions of the liver which are incompletely evaluated on the current examination. For example, there is an area in segment 4A which measures 1.7 x 1.2 cm (Series 2, Image 37). Another area is noted slightly more inferiorly in segment 4A which measures 2.0 x 1.8 cm (Series 2, Image 45). Similar appearance of embolization coils in the right hepatic branch of the portal vein.   BILE DUCTS: The intrahepatic and extrahepatic ducts are not dilated.   GALLBLADDER: The gallbladder is surgically absent.   PANCREAS: Somewhat limited in evaluation due to adjacent metallic artifact from splenic artery embolization coils. Within this limitation, the pancreas appears unremarkable   SPLEEN: There has been interval embolization splenic artery with subsequent enlargement of the spleen which now measures 16.1 cm in maximum dimension as well as large nonenhancing area in the splenic parenchyma, compatible  with infarction.   ADRENAL GLANDS: Bilateral adrenal glands appear normal.   KIDNEYS AND URETERS: The kidneys are normal in size and enhance symmetrically.  No hydroureteronephrosis or nephroureterolithiasis is identified.   PELVIS:   BLADDER: The urinary bladder is decompressed, limited for evaluation.   REPRODUCTIVE ORGANS: The prostate is not enlarged.   BOWEL: Stomach is decompressed and limited in evaluation. There has been interval decrease in the dilatation of the multiple fluid-filled small bowel loops. There is a left mid abdominal end colostomy. Interval development of pneumatosis involving the cecum, ascending, and early transverse colon through the hepatic flexure. This section of the colon also appears hypoenhancing and is therefore concerning for ischemia. The distal remaining colonic portions show no pneumatosis and terminate into a similar-appearing left lower quadrant ostomy.    The appendix is surgically absent. Unremarkable appearance of the rectal stump with a metallic clips in place.   VESSELS: The aorta and IVC appear normal. Moderate atherosclerotic changes are noted in the abdominal aorta. No aortic aneurysm.   PERITONEUM/RETROPERITONEUM/LYMPH NODES: There is diffuse mesenteric edema, similar to prior. There is trace free fluid scattered throughout the abdomen and pelvis. No free air. No abdominopelvic lymphadenopathy is present. There multiple nonenlarged calcified retroperitoneal lymph nodes similar to prior.   BONES AND ABDOMINAL WALL: No suspicious osseous lesions are identified. Degenerative discogenic disease is noted in the lower thoracic and lumbar spine including a chronic appearing compression deformity of the T12 vertebral body.   Similar appearance of calcified subcutaneous soft tissues along the left anterior abdomen, and right subcutaneous soft tissues superficial to the common femoral vasculature. These are likely related to prior lymphangiography.   Small area of subcutaneous  gas seen in the anterior abdominal wall along a midline incision with adjacent soft tissue thickening but no intraperitoneal extension (Series 2, Image 70). Mild diffuse soft tissue edema/anasarca.       1.  Interval development of pneumatosis and hypoenhancement of the ascending and proximal transverse colon. Findings are concerning for bowel ischemia. Recommend correlation with lactate. Of note, no pneumoperitoneum is seen. 2. 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 3. Interval increase in now moderate bilateral pleural effusions with associated passive atelectasis. 4. There is diffuse mesenteric edema and trace free fluid throughout the abdomen and pelvis, similar to prior. Findings are likely postoperative in nature although a reactive etiology cannot be entirely excluded. 5. Small area of subcutaneous gas in an anterior abdominal wall midline incision with adjacent soft tissue thickening but no intraperitoneal extension.   I personally reviewed the images/study and I agree with the findings as stated by Lawrence Zhang MD (resident).   MACRO: Lawrence Zhang discussed the significance and urgency of this critical finding by EPIC secure chat with  SUPA ESPINO on 8/31/2025 at 1:04 pm.  (**-RCF-**) Findings:  See findings.   Signed by: Des Villeda 8/31/2025 2:31 PM Dictation workstation:   [id number]      Assessment:  Roma Corral is a 70 y.o. male with history of rectal cancer with mets to the liver, CAD (2021 NSTEMI), DM2, HTN who is s/p right extended hepatectomy for metastatic rectal cancer on 7/29 c/b worsening liver function.     Plan:  Neuro: Dilaudid 0.4 mg/0.6 mg IV, every 4 hours as needed, 0.2 mg for breakthrough  CV: Vitals q8hrs, telemetry  Pulm: Encourage IS, wean oxygen  GI: NPO, cont lactulose 15ml BID, PPI, zofran prn, cont TPN   /Renal: nephrology consult: on D5W at 150ml/hr for hypernatremia. Cont albumin (25 g, IV,  scheduled every 6 hours). Will attempt clamping drain for 4 hours today, and unclamp for 1 hour, to encourage resorption of ascitic fluid.  Heme: Vitamin K (5 mg, IV, every 48 hours); Plts increased from 14 to 60.  Endo: SSI with TPN  ID: Empiric Zosyn and Diflucan (started 8/11 and 8/19, respectively, end date pending given patient's clinical status). UA negative, CT a/p with IV contrast notable for pnuematosis of the bowel wall, in context of voluntary guarding without peritonitis on PE  DVT ppx: Holding heparin 5000u q8hr, cont SCDs    - Will need double lumen PICC placed today for TPN and abx.   - Conversation started regarding goals of care; will plan to have further discussion with patient's wife this afternoon. As of now, will remain FULL CODE.     Dispo: RNF    Patient was seen and discussed with Dr. Erazo.    Emily Neumann MD, MSc  Hepatobiliary Surgery and Surgical Oncology  [place], [phone]

## 2025-09-02 NOTE — CONSULTS
Reason For Consult    New T wave inversions in prechordial leads with trop elevated to 293       Subjective   Roma Corral is a 70 y.o. male with history of NSTEMI (2021), CAD, anemia, gout, abdominal pain, HTN, DM2 and colon carcinoma metastatic to liver presenting to SICU s/p open right hepatectomy on 7/29 for metastatic rectal cancer. Hospital course complicated by hypotension and liver failure. Pt complaint of chest tightness last night and ECG showed T wave inversions in leads V3-V6, trop 293. Today pt has no chest pain or tightness, no pain radiating to neck, no dyspnea.     ROS negative unless mentioned above.     Cardiac History and Testing:  History of CAD (NSTEMI 2021) on ASA, HLD, HTN . Baseline /68. Echo 5/13/25: EF of 45-50%.Nuclear stress test 7/1/25: No evidence of inducible myocardial ischemia. with EF 64%.     Home meds: Metoprolol and Atorvastatin, ASA     Nuclear Stress Test 7/1/2025  IMPRESSION:  1. No evidence of inducible myocardial ischemia. Predominantly fixed  medium-sized moderate to severe perfusion defects involving apex,  apical to mid anterior, apical inferior, as well as apical lateral  wall, similar to prior exam, likely representing prior infarction.  2. Moderate dilatation of left ventricle.  3. Normal LV wall motion with a post-stress LV EF estimated at 64%.    ECHO 5/13/2025:  CONCLUSIONS:   1. The left ventricular systolic function is mildly decreased, with a visually estimated ejection fraction of 45-50%.   2. There is global hypokinesis of the left ventricle with minor regional variations.   3. Mild to moderate mitral valve regurgitation.   4. Mild tricuspid regurgitation is visualized.   5. Slightly elevated right ventricular systolic pressure.   6. Mild aortic valve regurgitation.      Last Recorded Vitals  Vitals:    09/02/25 1206   BP: 119/73   Pulse: 90   Resp: 18   Temp: 37.2 °C (99 °F)   SpO2: 93%       Physical Exam  General: somnolent but rousable to verbal  stimuli, no acute distress, notable jaundice throughout  HEENT: significant scleral icterus, jaundice  CV: RRR, no MRG  Resp: CTA b/l, no wheezes, rales, or rhonchi  Abd: soft, ostomy and drain  LE: no edema, no cyanosis    Intake/Output last 3 Shifts:  I/O last 3 completed shifts:  In: 8144.7 (102.4 mL/kg) [P.O.:15; I.V.:1003 (12.6 mL/kg); Blood:693.8; IV Piggyback:3946.3]  Out: 5325 (67 mL/kg) [Urine:1625 (0.6 mL/kg/hr); Drains:3175; Stool:525]  Weight: 79.5 kg     Medications:  Current Medications[1]    Relevant Results:  Results from last 72 hours   Lab Units 09/02/25  1203 09/02/25  0350   TROPHSCMC ng/L 280* 293*   SODIUM mmol/L  --  142   POTASSIUM mmol/L  --  3.8   CO2 mmol/L  --  23   BUN mg/dL  --  35*   CREATININE mg/dL  --  1.26   MAGNESIUM mg/dL  --  1.97   PHOSPHORUS mg/dL  --  2.8      Results from last 72 hours   Lab Units 09/02/25  0350   WBC AUTO x10*3/uL 10.7   HEMOGLOBIN g/dL 9.4*   PLATELETS AUTO x10*3/uL 60*        Troponin I, High Sensitivity   Date/Time Value Ref Range Status   11/10/2024 10:00 PM 8 0 - 20 ng/L Final   11/10/2024 08:42 PM 8 0 - 20 ng/L Final     Troponin I, High Sensitivity ([facility])   Date/Time Value Ref Range Status   09/02/2025 12:03  (HH) 0 - 53 ng/L Final     Comment:     Previous result verified on 9/2/2025 1111 on specimen/case [id number] called with component [facility] for procedure Troponin I, High Sensitivity with value 293 ng/L.   09/02/2025 03:50  (HH) 0 - 53 ng/L Final     BNP   Date/Time Value Ref Range Status   11/10/2024 08:42  (H) 0 - 99 pg/mL Final     LDL   Date/Time Value Ref Range Status   12/02/2022 10:34 AM 39 0 - 99 mg/dL Final     Comment:     .                           NEAR      BORD      AGE      DESIRABLE  OPTIMAL    HIGH     HIGH     VERY HIGH     0-19 Y     0 - 109     ---    110-129   >/= 130     ----    20-24 Y     0 - 119     ---    120-159   >/= 160     ----      >24 Y     0 -  99   100-129  130-159   160-189     >/=190  .      06/23/2021 11:12 AM 39 0 - 99 mg/dL Final     Comment:     .                           NEAR      BORD      AGE      DESIRABLE  OPTIMAL    HIGH     HIGH     VERY HIGH     0-19 Y     0 - 109     ---    110-129   >/= 130     ----    20-24 Y     0 - 119     ---    120-159   >/= 160     ----      >24 Y     0 -  99   100-129  130-159   160-189     >/=190  .       Triglycerides   Date/Time Value Ref Range Status   08/27/2025 02:54 PM 94 0 - 149 mg/dL Final     Comment:     Age              Desirable        Borderline         High        Very High  SEX:B           mg/dL             mg/dL               mg/dL      mg/dL  <=14D                       ----               ----        ----  15D-365D              ----               ----        ----  1Y-9Y           0-74               75-99             >=100       ----  10Y-19Y        0-89                            >=130       ----  20Y-24Y        0-114             115-149             >=150      ----  >= 25Y         0-149             150-199             200-499    >=500      Venipuncture immediately after or during the administration of Metamizole may lead to falsely low results. Testing should be performed immediately prior to Metamizole dosing.   04/10/2025 04:19  0 - 149 mg/dL Final     Comment:     Age              Desirable        Borderline         High        Very High  SEX:B           mg/dL             mg/dL               mg/dL      mg/dL  <=14D                       ----               ----        ----  15D-365D              ----               ----        ----  1Y-9Y           0-74               75-99             >=100       ----  10Y-19Y        0-89                            >=130       ----  20Y-24Y        0-114             115-149             >=150      ----  >= 25Y         0-149             150-199             200-499    >=500      Venipuncture immediately after or during the administration of Metamizole may lead  to falsely low results. Testing should be performed immediately prior to Metamizole dosing.         Assessment/Plan   Roma Corral is a 70 y.o. male with history of NSTEMI (2021, no stent), CAD, anemia, gout, abdominal pain, HTN, DM2 and colon carcinoma metastatic to liver presenting to SICU s/p open right hepatectomy on 7/29 for metastatic rectal cancer. Hospital course complicated by hypotension and liver failure. New onset chest tightness last night, now resolved, accompanied by T wave inversions on ECG and mild trop elevation. Concern for type II myocardial injury likely due to metabolic/hemodynamic/postOP stress.     Type II myocardial injury  :: likely related to to metabolic/hemodynamic/postOP stress.  :: platelet count 60K, limiting interventions  :: trop peaked at 293, now down trending    Recommendations:  :: ECHO - to evaluate changes in heart function  :: no other interventions indicated at this time    Isabelle Monae, MS4    Thank you for the opportunity to contribute to the care of this patient. Above recommendations discussed with Dr. Sanchez.  Recommendations are preliminary until note is co-signed by an attending.     If further questions arise, please page the general cardiology consult pager at [phone] on weekdays 7AM - 6PM and weekends 7AM - 2PM, or at [phone] at all other times.               [1]   Current Facility-Administered Medications:     Adult Clinimix Parenteral Nutrition Continuous, 65 mL/hr, intravenous, Daily PN, Debby Braun, APRN-CNP, Last Rate: 65 mL/hr at 09/01/25 2136, New Bag at 09/01/25 2136    albumin human 25 % solution 25 g, 25 g, intravenous, q6h, Pratibha Echavarria MD, Stopped at 09/02/25 1007    alteplase (Cathflo Activase) injection 2 mg, 2 mg, intra-catheter, PRN, Debby Schultz MD    benzocaine-menthol (Cepastat Sore Throat) lozenge 1 lozenge, 1 lozenge, Mouth/Throat, q2h PRN, Mikaela Tobar MD, 1 lozenge at 09/02/25 0901    calcium carbonate (Tums) 500 mg (200 mg elemental)  chewable tablet 1 tablet, 500 mg of calcium carbonate, oral, 4x daily PRN, Matheus Torres MD, 1 tablet at 08/05/25 0447    dextrose 50 % injection 12.5 g, 12.5 g, intravenous, q15 min PRN, Pelon Willams MD    dextrose 50 % injection 25 g, 25 g, intravenous, q15 min PRN, Pelon Willams MD    fat emulsion fish oil/plant based (SMOFlipid) 20 % IV infusion 50 g, 250 mL, intravenous, Daily Lipids, Pelon Willams MD, Stopped at 09/02/25 1011    fluconazole (Diflucan) 400 mg in sodium chloride (iso)  mL, 400 mg, intravenous, q24h, Pelon Willams MD, Last Rate: 100 mL/hr at 09/01/25 1804, 400 mg at 09/01/25 1804    glucagon (Glucagen) injection 1 mg, 1 mg, intramuscular, q15 min PRN, Pelon Willams MD    [Held by provider] heparin (porcine) injection 5,000 Units, 5,000 Units, subcutaneous, q8h, Cookie Ortiz MD, 5,000 Units at 08/28/25 2131    HYDROmorphone (Dilaudid) injection 0.2 mg, 0.2 mg, intravenous, q4h PRN, KEV Lal, 0.2 mg at 08/28/25 0420    HYDROmorphone (Dilaudid) injection 0.4 mg, 0.4 mg, intravenous, q4h PRN, KEV Lal, 0.4 mg at 09/02/25 0902    HYDROmorphone (Dilaudid) injection 0.6 mg, 0.6 mg, intravenous, q4h PRN, KEV Lal, 0.6 mg at 09/01/25 2201    insulin lispro injection 0-10 Units, 0-10 Units, subcutaneous, q4h, Pelon Willams MD, 2 Units at 08/31/25 1913    lactated Ringer's bolus 500 mL, 500 mL, intravenous, q4h ALEX, Robin Garcia MD, Stopped at 09/02/25 1007    lactulose 20 gram/30 mL oral solution 10 g, 10 g, oral, BID, Debby Schultz MD, 10 g at 09/02/25 0836    lidocaine (Xylocaine) 10 mg/mL (1 %) injection 5 mL, 5 mL, infiltration, Once, Debby Schultz MD    [Held by provider] morphine CR (MS Contin) 12 hr tablet 30 mg, 30 mg, oral, q12h ALEX, Pelon Willams MD, 30 mg at 08/25/25 0338    ondansetron (Zofran) injection 4 mg, 4 mg, intravenous, q4h PRN, Matheus Torres MD, 4 mg at 08/31/25 0639    [Held by provider]  oxyCODONE (Roxicodone) immediate release tablet 5 mg, 5 mg, oral, q4h PRN, KEV Lal, 5 mg at 08/22/25 0958    pantoprazole (Protonix) injection 40 mg, 40 mg, intravenous, BID, Pelon Willams MD, 40 mg at 09/02/25 0836    phytonadione (Vitamin K) 5 mg in dextrose 5% 50 mL IV, 5 mg, intravenous, q48h, KEV Lal, Stopped at 09/01/25 1031    piperacillin-tazobactam (Zosyn) 3.375 g in dextrose (iso) IV 50 mL, 3.375 g, intravenous, q6h, Pelon Willams MD, Stopped at 09/02/25 1007    [Held by provider] polyethylene glycol (Glycolax, Miralax) packet 17 g, 17 g, oral, Daily, Pelon Willams MD, 17 g at 08/24/25 0834    thiamine (Vitamin B1) injection 100 mg, 100 mg, intravenous, Daily, Pelon Willams MD, 100 mg at 09/02/25 0836    [Held by provider] traMADol (Ultram) tablet 50 mg, 50 mg, oral, q6h PRN, KEV Lal    Facility-Administered Medications Ordered in Other Encounters:     alteplase (Cathflo Activase) injection 2 mg, 2 mg, intra-catheter, PRN, Laurence LeinMELISSA-CNP    alteplase (Cathflo Activase) injection 2 mg, 2 mg, intra-catheter, PRN, Laurence GLORIA Castelein, APRN-CNP    heparin flush 10 unit/mL syringe 50 Units, 50 Units, intra-catheter, PRN, Laurence GLORIA Castelein, APRN-CNP    heparin flush 10 unit/mL syringe 50 Units, 50 Units, intra-catheter, PRN, Laurence S Castelein, APRN-CNP    heparin flush 100 unit/mL syringe 500 Units, 500 Units, intra-catheter, PRN, Laurence GLORIA Castelein, APRN-CNP    heparin flush 100 unit/mL syringe 500 Units, 500 Units, intra-catheter, PRN, Laurence Betancourt, APRN-CNP

## 2025-09-02 NOTE — PROGRESS NOTES
Music Therapy Note    Roma Corral     Therapy Session  Visit Type: Follow-up visit  Session Start Time: 1256  Session End Time: 1257  Intervention Delivery: In-person  Conflict of Service: Asleep               Treatment/Interventions       Post-assessment  Total Session Time (min): 1 minutes    Narrative  Assessment Detail: Pt was sleeping when the MTI entered the room.  Follow-up: Will follow up at pt request.    Education Documentation  No documentation found.

## 2025-09-02 NOTE — PROGRESS NOTES
Art Therapy Note    Roma Corral was referred by Paz Weber MD    Therapy Session  Referral Type: New referral this admission  Visit Type: Follow-up visit  Session Start Time: 1637  Session End Time: 1639  Intervention Delivery: In-person  Conflict of Service: Declined treatment  Number of family members present: 0    Pre-assessment  Unable to Assess Reason: Outcomes not applicable         Treatment/Interventions  Areas of Focus: Emotional support  Art Therapy Interventions: Empathic listening/validating emotions  Interruption: No  Patient Fell Asleep at End of Session: No    Post-assessment  Total Session Time (min): 2 minutes    Narrative  Assessment Detail: ATR made a visit to Pt.s; room to offer emotional support and art therapy.  Evaluation: As ATR was entering room Pt.'s doctor was leaving and shared with ATR Pt is groggy but hopes he will accept visit. Pt was resting and did appear groggy. However, once Pt saw ATR he began shaking his no and did not verbalize at all. ATR validated his head nod no and expressed to him she was there for him when he was ready.  Pt then gave her the peace sign as a goodbye until next session.  Follow-up: ATR will continue to offer Pt presence, emotional support and offer AT services throughout admission.    Education Documentation  No documentation found.

## 2025-09-02 NOTE — CARE PLAN
The clinical goals for the shift include patient will remain HDS    Problem: Pain - Adult  Goal: Verbalizes/displays adequate comfort level or baseline comfort level  Outcome: Progressing     Problem: Safety - Adult  Goal: Free from fall injury  Outcome: Progressing     Problem: Discharge Planning  Goal: Discharge to home or other facility with appropriate resources  Outcome: Progressing     Problem: Chronic Conditions and Co-morbidities  Goal: Patient's chronic conditions and co-morbidity symptoms are monitored and maintained or improved  Outcome: Progressing     Problem: Nutrition  Goal: Nutrient intake appropriate for maintaining nutritional needs  Outcome: Progressing     Problem: Skin  Goal: Decreased wound size/increased tissue granulation at next dressing change  9/2/2025 0504 by Francisca Hammer RN  Flowsheets (Taken 9/2/2025 0504)  Decreased wound size/increased tissue granulation at next dressing change:   Utilize specialty bed per algorithm   Promote sleep for wound healing   Protective dressings over bony prominences  9/2/2025 0504 by Francisca aHmmer RN  Outcome: Progressing  Flowsheets (Taken 9/2/2025 0504)  Decreased wound size/increased tissue granulation at next dressing change:   Utilize specialty bed per algorithm   Promote sleep for wound healing   Protective dressings over bony prominences  Goal: Participates in plan/prevention/treatment measures  9/2/2025 0504 by Francisca Hammer RN  Flowsheets (Taken 9/2/2025 0504)  Participates in plan/prevention/treatment measures:   Elevate heels   Discuss with provider PT/OT consult   Increase activity/out of bed for meals  9/2/2025 0504 by Francisca Hammer RN  Outcome: Progressing  Flowsheets (Taken 9/2/2025 0504)  Participates in plan/prevention/treatment measures:   Elevate heels   Discuss with provider PT/OT consult   Increase activity/out of bed for meals  Goal: Prevent/manage excess moisture  9/2/2025 0504 by Francisca Hammer RN  Flowsheets (Taken 9/2/2025  0504)  Prevent/manage excess moisture:   Moisturize dry skin   Use wicking fabric (obtain order)   Cleanse incontinence/protect with barrier cream   Follow provider orders for dressing changes   Monitor for/manage infection if present  9/2/2025 0504 by Francisca Hammer RN  Outcome: Progressing  Flowsheets (Taken 9/2/2025 0504)  Prevent/manage excess moisture:   Moisturize dry skin   Use wicking fabric (obtain order)   Cleanse incontinence/protect with barrier cream   Follow provider orders for dressing changes   Monitor for/manage infection if present  Goal: Prevent/minimize sheer/friction injuries  9/2/2025 0504 by Francisca Hammer RN  Flowsheets (Taken 9/2/2025 0504)  Prevent/minimize sheer/friction injuries:   Use pull sheet   Increase activity/out of bed for meals   HOB 30 degrees or less   Turn/reposition every 2 hours/use positioning/transfer devices   Complete micro-shifts as needed if patient unable. Adjust patient position to relieve pressure points, not a full turn   Utilize specialty bed per algorithm  9/2/2025 0504 by Francisca Hammer RN  Outcome: Progressing  Flowsheets (Taken 9/2/2025 0504)  Prevent/minimize sheer/friction injuries:   Use pull sheet   Increase activity/out of bed for meals   HOB 30 degrees or less   Turn/reposition every 2 hours/use positioning/transfer devices   Complete micro-shifts as needed if patient unable. Adjust patient position to relieve pressure points, not a full turn   Utilize specialty bed per algorithm  Goal: Promote/optimize nutrition  9/2/2025 0504 by Francisca Hammer RN  Flowsheets (Taken 9/2/2025 0504)  Promote/optimize nutrition:   Monitor/record intake including meals   Assist with feeding   Consume > 50% meals/supplements   Offer water/supplements/favorite foods   Reassess MST if dietician not consulted   Discuss with provider if NPO > 2 days  9/2/2025 0504 by Francisca Hammer RN  Outcome: Progressing  Flowsheets (Taken 9/2/2025 0504)  Promote/optimize nutrition:   Monitor/record  intake including meals   Assist with feeding   Consume > 50% meals/supplements   Offer water/supplements/favorite foods   Reassess MST if dietician not consulted   Discuss with provider if NPO > 2 days  Goal: Promote skin healing  9/2/2025 0504 by Francisca Hammer RN  Flowsheets (Taken 9/2/2025 0504)  Promote skin healing:   Protective dressings over bony prominences   Assess skin/pad under line(s)/device(s)   Turn/reposition every 2 hours/use positioning/transfer devices   Rotate device position/do not position patient on device   Ensure correct size (line/device) and apply per  instructions  9/2/2025 0504 by Francisca Hammer RN  Outcome: Progressing  Flowsheets (Taken 9/2/2025 0504)  Promote skin healing:   Protective dressings over bony prominences   Assess skin/pad under line(s)/device(s)   Turn/reposition every 2 hours/use positioning/transfer devices   Rotate device position/do not position patient on device   Ensure correct size (line/device) and apply per  instructions

## 2025-09-03 ASSESSMENT — PAIN - FUNCTIONAL ASSESSMENT
PAIN_FUNCTIONAL_ASSESSMENT: 0-10
PAIN_FUNCTIONAL_ASSESSMENT: 0-10
PAIN_FUNCTIONAL_ASSESSMENT: CPOT (CRITICAL CARE PAIN OBSERVATION TOOL)
PAIN_FUNCTIONAL_ASSESSMENT: 0-10
PAIN_FUNCTIONAL_ASSESSMENT: UNABLE TO SELF-REPORT
PAIN_FUNCTIONAL_ASSESSMENT: 0-10

## 2025-09-03 ASSESSMENT — PAIN DESCRIPTION - LOCATION
LOCATION: ABDOMEN
LOCATION: ABDOMEN

## 2025-09-03 ASSESSMENT — COGNITIVE AND FUNCTIONAL STATUS - GENERAL
MOVING TO AND FROM BED TO CHAIR: A LITTLE
TOILETING: A LITTLE
HELP NEEDED FOR BATHING: A LOT
PERSONAL GROOMING: A LITTLE
CLIMB 3 TO 5 STEPS WITH RAILING: A LOT
TURNING FROM BACK TO SIDE WHILE IN FLAT BAD: A LITTLE
STANDING UP FROM CHAIR USING ARMS: A LITTLE
MOVING FROM LYING ON BACK TO SITTING ON SIDE OF FLAT BED WITH BEDRAILS: A LITTLE
EATING MEALS: A LITTLE
DRESSING REGULAR UPPER BODY CLOTHING: A LOT
WALKING IN HOSPITAL ROOM: A LOT
DRESSING REGULAR LOWER BODY CLOTHING: A LOT
DAILY ACTIVITIY SCORE: 15
MOBILITY SCORE: 16

## 2025-09-03 ASSESSMENT — PAIN SCALES - GENERAL
PAINLEVEL_OUTOF10: 6
PAINLEVEL_OUTOF10: 6
PAINLEVEL_OUTOF10: 4
PAINLEVEL_OUTOF10: 7

## 2025-09-03 ASSESSMENT — PAIN DESCRIPTION - DESCRIPTORS: DESCRIPTORS: DISCOMFORT

## 2025-09-03 NOTE — CARE PLAN
Problem: Pain - Adult  Goal: Verbalizes/displays adequate comfort level or baseline comfort level  Outcome: Progressing  Flowsheets (Taken 8/29/2025 0332 by Maris Vincent RN)  Verbalizes/displays adequate comfort level or baseline comfort level:   Encourage patient to monitor pain and request assistance   Assess pain using appropriate pain scale   Administer analgesics based on type and severity of pain and evaluate response   Implement non-pharmacological measures as appropriate and evaluate response     Problem: Safety - Adult  Goal: Free from fall injury  Outcome: Progressing  Flowsheets (Taken 8/19/2025 2226 by Mesha Salazar RN)  Free from fall injury:   Instruct family/caregiver on patient safety   Based on caregiver fall risk screen, instruct family/caregiver to ask for assistance with transferring infant if caregiver noted to have fall risk factors     Problem: Skin  Goal: Decreased wound size/increased tissue granulation at next dressing change  Outcome: Progressing  Flowsheets (Taken 9/2/2025 1629 by Brigitte Draper)  Decreased wound size/increased tissue granulation at next dressing change:   Promote sleep for wound healing   Protective dressings over bony prominences  Goal: Participates in plan/prevention/treatment measures  Outcome: Progressing  Flowsheets (Taken 9/2/2025 1629 by Brigitte Draper)  Participates in plan/prevention/treatment measures: Elevate heels  Goal: Prevent/manage excess moisture  Outcome: Progressing  Flowsheets (Taken 9/2/2025 1629 by Brigitte Draper)  Prevent/manage excess moisture: Monitor for/manage infection if present  Goal: Prevent/minimize sheer/friction injuries  Outcome: Progressing  Flowsheets (Taken 9/2/2025 1629 by Brigitte Draper)  Prevent/minimize sheer/friction injuries: Use pull sheet

## 2025-09-03 NOTE — PROGRESS NOTES
Art Therapy Note    Roma Corral was referred by Paz Weber MD    Therapy Session  Referral Type: New referral this admission  Visit Type: Follow-up visit  Session Start Time: 1652  Session End Time: 1639  Intervention Delivery: In-person  Conflict of Service: Working with other staff (being brought back to room from a procedure)  Number of family members present: 0  Number of staff members present: 2    Pre-assessment  Unable to Assess Reason: Outcomes not applicable         Treatment/Interventions  Areas of Focus: Emotional support  Art Therapy Interventions: Empathic listening/validating emotions  Interruption: No  Patient Fell Asleep at End of Session: No    Post-assessment  Total Session Time (min): 2 minutes    Narrative  Assessment Detail: At time of visit Pt was being returned to room from a procedure and staff was getting him situated back into his room. ATR will follow up another time to offer AT services.  Evaluation: As ATR was entering room Pt.'s doctor was leaving and shared with ATR Pt is groggy but hopes he will accept visit. Pt was resting and did appear groggy. However, once Pt saw ATR he began shaking his no and did not verbalize at all. ATR validated his head nod no and expressed to him she was there for him when he was ready.  Pt then gave her the peace sign as a goodbye until next session.  Follow-up: ATR will continue to offer Pt presence, emotional support and offer AT services throughout admission.    Education Documentation  No documentation found.

## 2025-09-03 NOTE — CARE PLAN
The patient's goals for the shift include      The clinical goals for the shift include pt will remain hds and pain controlled    Problem: Pain - Adult  Goal: Verbalizes/displays adequate comfort level or baseline comfort level  Outcome: Progressing     Problem: Safety - Adult  Goal: Free from fall injury  Outcome: Progressing     Problem: Discharge Planning  Goal: Discharge to home or other facility with appropriate resources  Outcome: Progressing     Problem: Chronic Conditions and Co-morbidities  Goal: Patient's chronic conditions and co-morbidity symptoms are monitored and maintained or improved  Outcome: Progressing     Problem: Nutrition  Goal: Nutrient intake appropriate for maintaining nutritional needs  Outcome: Progressing     Problem: Skin  Goal: Decreased wound size/increased tissue granulation at next dressing change  Outcome: Progressing  Flowsheets (Taken 9/3/2025 1355)  Decreased wound size/increased tissue granulation at next dressing change: Promote sleep for wound healing  Goal: Participates in plan/prevention/treatment measures  Outcome: Progressing  Flowsheets (Taken 9/3/2025 1355)  Participates in plan/prevention/treatment measures:   Discuss with provider PT/OT consult   Elevate heels  Goal: Prevent/manage excess moisture  Outcome: Progressing  Flowsheets (Taken 9/3/2025 1355)  Prevent/manage excess moisture:   Cleanse incontinence/protect with barrier cream   Moisturize dry skin  Goal: Prevent/minimize sheer/friction injuries  Outcome: Progressing  Flowsheets (Taken 9/3/2025 1355)  Prevent/minimize sheer/friction injuries:   Complete micro-shifts as needed if patient unable. Adjust patient position to relieve pressure points, not a full turn   Use pull sheet  Goal: Promote/optimize nutrition  Outcome: Progressing  Flowsheets (Taken 9/3/2025 1355)  Promote/optimize nutrition: Discuss with provider if NPO > 2 days  Goal: Promote skin healing  Outcome: Progressing  Flowsheets (Taken 9/3/2025  1444)  Promote skin healing: Ensure correct size (line/device) and apply per  instructions

## 2025-09-03 NOTE — PROGRESS NOTES
Interval events:    Ongoing goals of care discussions per primary team. PICC line to be placed    Subjective:  Some SOB. No chest tightness    Objective:  Vitals:    09/03/25 0854   BP: 122/72   Pulse: 92   Resp: 18   Temp: 37 °C (98.6 °F)   SpO2: 98%     Weight         8/29/2025  0617 8/30/2025  0415 9/1/2025  0351 9/2/2025  0354 9/3/2025  0300    Weight: 74.2 kg (163 lb 9.3 oz) 79.8 kg (176 lb) 79.1 kg (174 lb 6.1 oz) 79.5 kg (175 lb 4.8 oz) 105 kg (231 lb 7.7 oz)            Intake/Output Summary (Last 24 hours) at 9/3/2025 0916  Last data filed at 9/3/2025 0851  Gross per 24 hour   Intake 5685.32 ml   Output 2920 ml   Net 2765.32 ml     Recent Results (from the past 24 hours)   POCT GLUCOSE    Collection Time: 09/02/25 12:00 PM   Result Value Ref Range    POCT Glucose 108 (H) 74 - 99 mg/dL   Troponin I, High Sensitivity    Collection Time: 09/02/25 12:03 PM   Result Value Ref Range    Troponin I, High Sensitivity (CMC) 280 (HH) 0 - 53 ng/L   Transthoracic Echo Complete    Collection Time: 09/02/25  4:33 PM   Result Value Ref Range    AV pk denise 1.37 m/s    AV mn grad 4 mmHg    LVOT diam 2.30 cm    MV avg E/e' ratio 7.14     MV E/A ratio 0.61     LA vol index A/L 34.8 ml/m2    Tricuspid annular plane systolic excursion 2.1 cm    LV EF 35 %    RV free wall pk S' 18.46 cm/s    LVIDd 5.01 cm    Aortic Valve Area by Continuity of VTI 3.11 cm2    Aortic Valve Area by Continuity of Peak Velocity 3.12 cm2    AV pk grad 8 mmHg    LV A4C EF 45.0    POCT GLUCOSE    Collection Time: 09/02/25  5:05 PM   Result Value Ref Range    POCT Glucose 130 (H) 74 - 99 mg/dL   Troponin I, High Sensitivity    Collection Time: 09/02/25  6:29 PM   Result Value Ref Range    Troponin I, High Sensitivity (CMC) 229 (HH) 0 - 53 ng/L   POCT GLUCOSE    Collection Time: 09/02/25  8:16 PM   Result Value Ref Range    POCT Glucose 129 (H) 74 - 99 mg/dL   POCT GLUCOSE    Collection Time: 09/02/25 11:48 PM   Result Value Ref Range    POCT Glucose 126 (H)  74 - 99 mg/dL   Troponin I, High Sensitivity    Collection Time: 09/03/25 12:12 AM   Result Value Ref Range    Troponin I, High Sensitivity (CMC) 203 (HH) 0 - 53 ng/L   POCT GLUCOSE    Collection Time: 09/03/25  3:46 AM   Result Value Ref Range    POCT Glucose 128 (H) 74 - 99 mg/dL   Comprehensive Metabolic Panel    Collection Time: 09/03/25  5:05 AM   Result Value Ref Range    Glucose 116 (H) 74 - 99 mg/dL    Sodium 142 136 - 145 mmol/L    Potassium 4.1 3.5 - 5.3 mmol/L    Chloride 111 (H) 98 - 107 mmol/L    Bicarbonate 20 (L) 21 - 32 mmol/L    Anion Gap 15 10 - 20 mmol/L    Urea Nitrogen 33 (H) 6 - 23 mg/dL    Creatinine 1.36 (H) 0.50 - 1.30 mg/dL    eGFR 56 (L) >60 mL/min/1.73m*2    Calcium 10.4 8.6 - 10.6 mg/dL    Albumin 4.6 3.4 - 5.0 g/dL    Alkaline Phosphatase 56 33 - 136 U/L    Total Protein 5.5 (L) 6.4 - 8.2 g/dL    AST 86 (H) 9 - 39 U/L    Bilirubin, Total 25.2 (H) 0.0 - 1.2 mg/dL    ALT 24 10 - 52 U/L   Phosphorus    Collection Time: 09/03/25  5:05 AM   Result Value Ref Range    Phosphorus 2.6 2.5 - 4.9 mg/dL   CBC    Collection Time: 09/03/25  5:05 AM   Result Value Ref Range    WBC 9.4 4.4 - 11.3 x10*3/uL    nRBC 0.0 0.0 - 0.0 /100 WBCs    RBC 2.84 (L) 4.50 - 5.90 x10*6/uL    Hemoglobin 9.1 (L) 13.5 - 17.5 g/dL    Hematocrit 27.8 (L) 41.0 - 52.0 %    MCV 98 80 - 100 fL    MCH 32.0 26.0 - 34.0 pg    MCHC 32.7 32.0 - 36.0 g/dL    RDW 29.9 (H) 11.5 - 14.5 %    Platelets 47 (L) 150 - 450 x10*3/uL   Magnesium    Collection Time: 09/03/25  5:05 AM   Result Value Ref Range    Magnesium 2.17 1.60 - 2.40 mg/dL   Triglycerides    Collection Time: 09/03/25  5:05 AM   Result Value Ref Range    Triglycerides 76 0 - 149 mg/dL   Troponin I, High Sensitivity    Collection Time: 09/03/25  5:05 AM   Result Value Ref Range    Troponin I, High Sensitivity (CMC) 176 (HH) 0 - 53 ng/L   POCT GLUCOSE    Collection Time: 09/03/25  7:29 AM   Result Value Ref Range    POCT Glucose 124 (H) 74 - 99 mg/dL     Inpatient  Medications:  Scheduled Medications[1]  PRN Medications[2]  Continuous Medications[3]    Telemetry 9/3/2025 (personally reviewed): SR 80-90s    Physical exam:  General: NAD, jaundices  Head/ neck: + mid neck JVD,  Cardiac: RRR, regular S1 S2 , no murmur, no rub, no gallop  Pulm: 3L NC O2 in place  Vascular: Radial 2+ bilaterally  GI: Non distended  Extremities: no LE edema   Neuro: no focal neuro deficits   Psych: appropriate mood and behavior   Skin: warm and dry     Assessment/Plan   Roma Corral is a 70 y.o. male with history of CAD with NSTEMI (2021, no stent), anemia, gout, abdominal pain, HTN, DM2 and colon carcinoma metastatic to liver s/p open right hepatectomy 7/29. Course complicated by hypotension, liver failure, & chest tightness.    HFrEF 35%  Type II myocardial injury  - Troponin overall flat  - EKG TW changes V3V5  - Echo with reduced EF 35% (prior was 45-50% in 5/2025).   - Has underlying CAD but this could respresent a stress induced cardiomyopathy post surgery  - Defer any invasive ischemic evaluation given his acute post op state.  - Avoid aspirin (Platelets <50k)and statin given thrombocytopenia and liver failure.   - Initiate guideline directed medical therapy for CM:  lopressor 25mg IV BID(eventual switch to Toprol). If BP tolerates, can add isordil in the next 1-3 days.  - Suspected mild hypervolemia: gentle diuresis the IV lasix 20mg x1    Cardiology will continue to follow.    Patient case evaluated in collaboration with Dr. Nelson Sanchez.  Jemma Kraft PA-C    Please call with any questions  General Cardiology Consult Pager: [phone] (weekday 7AM-6PM and weekend 7AM-2PM) and other: [phone]  EP Consult Pager: [phone] (weekday 7AM-6PM and weekend 7AM-2PM) and other: [phone]  CICU Fellow Pager: [phone] anytime  EP Device Nurse Pager: [phone] (weekday 7AM-4PM)  Advanced Heart Failure Consult Pager: [phone] anytime             [1]   Scheduled medications   Medication Dose Route Frequency    albumin  human  25 g intravenous q8h    fat emulsion fish oil/plant based  250 mL intravenous Daily Lipids    fluconazole  400 mg intravenous q24h    [Held by provider] heparin (porcine)  5,000 Units subcutaneous q8h    insulin lispro  0-10 Units subcutaneous q4h    lactated Ringer's  500 mL intravenous q4h ALEX    lactulose  10 g oral BID    lidocaine  5 mL infiltration Once    [Held by provider] morphine CR  30 mg oral q12h ALEX    pantoprazole  40 mg intravenous BID    phytonadione  5 mg intravenous q48h    piperacillin-tazobactam  3.375 g intravenous q6h    [Held by provider] polyethylene glycol  17 g oral Daily    thiamine  100 mg intravenous Daily   [2]   PRN medications   Medication    alteplase    benzocaine-menthol    calcium carbonate    dextrose    dextrose    glucagon    HYDROmorphone    HYDROmorphone    HYDROmorphone    nitroglycerin    ondansetron    [Held by provider] oxyCODONE    [Held by provider] traMADol   [3]   Continuous Medications   Medication Dose Last Rate    Adult Clinimix Parenteral Nutrition Continuous  65 mL/hr 65 mL/hr (09/03/25 0608)

## 2025-09-03 NOTE — PROGRESS NOTES
Surgical Oncology Progress Note    Roma Corral is a 70 y.o. male with history of rectal cancer with mets to the liver, CAD (2021 NSTEMI), DM2, HTN who is s/p right extended hepatectomy for metastatic rectal cancer on 7/29     Subjective   Overnight: No acute events, afebrile, vital signs are stable.  Patient remains on room air and normal sinus rhythm.  Alert and oriented well, able to speak to wife late last night at 2100/2200.    Urine output: 1.15L  Drain: 1.6L bile tinged ascitic fluid  Colostomy: 625 cc stool with gas in appliance     Objective   GEN: Appears tired, awake, conversant.  RESP: non-labored breathing on room air  CV: regular rate  GI: soft, intermittent voluntary guarding without rebound tenderness, non-peritonitic, moderately distended. Drain with bile-tinged ascites. Ostomy pink, healthy. Gas in appliance. Incisions healed well, well approximated without drainage, erythema, or infected appearance.   : voiding spontaneously.   MSK: no evidence of bilateral lower extremity edema  NEURO: alert and conversant, A&O x 2-3, re-directable  SKIN: jaundiced, warm and dry. Femoral access site from IR procedure hemostatic without seroma or hematoma; dressings taken down    Last Recorded Vitals  Heart Rate:  [83-92]   Temp:  [36.9 °C (98.4 °F)-37.7 °C (99.9 °F)]   Resp:  [14-18]   BP: (112-131)/(63-76)   Weight:  [105 kg (231 lb 7.7 oz)]   SpO2:  [96 %-99 %]      Intake/Output Last 24 Hours:      Intake/Output Summary (Last 24 hours) at 9/3/2025 1257  Last data filed at 9/3/2025 1200  Gross per 24 hour   Intake 5949.39 ml   Output 3545 ml   Net 2404.39 ml        Relevant Results  Lab Date: 09/03/25       9.1    9.4>-----<47         27.8   142  111  33                  ----------------<116     4.1  20  1.36          Ca 10.4 Phos 2.6 Mg 2.17       ALT 24 AST 86 AlkPhos 56 tBili 25.2         Imaging:   Bedside PICC Imaging  Result Date: 9/3/2025  These images are not reportable by radiology and will not  be interpreted by  Radiologists.    Transthoracic Echo Complete  Result Date: 9/2/2025   [facility] (formerly [facility]), [address] [place] [address]                Tel [phone] and Fax [phone] TRANSTHORACIC ECHOCARDIOGRAM REPORT  Patient Name:       INDU DOSHI          Reading Physician:    [id number] Paty Pop MD Study Date:         9/2/2025            Ordering Provider:    [id number] SUPA ESPINO MRN/PID:            [id number]            Fellow: Accession#:         [id number]        Nurse:                Kenyatta Burris RN Date of Birth/Age:  1955 / 70 years Sonographer:          Bouchra Rivera                                                               RDCS Gender assigned at                     Additional Staff: Birth: Height:             177.80 cm           Admit Date:           7/29/2025 Weight:             79.38 kg            Admission Status:     Inpatient -                                                               Routine BSA / BMI:          1.97 m2 / 25.11     Encounter#:           [id number]                     kg/m2 Blood Pressure:     119/73 mmHg         Department Location:  [facility]                                                               Non Invasive Study Type:    TRANSTHORACIC ECHO (TTE) COMPLETE Diagnosis/ICD: Non ST elevation (NSTEMI) myocardial infarction-I21.4 Indication:    Concern for anterior ischemia CPT Code:      Echo Complete w Full [id number] Patient History: Pertinent History: Rectal cancer with mets to the liver, CAD (2021 NSTEMI), DM2,                    HTN who is s/p right extended hepatectomy for metastatic                    rectal cancer on 7/29. Study Detail: The following Echo studies were performed: 2D, M-Mode, Doppler and               color flow.  Technically challenging study due to patient lying in               supine position and body habitus. Optison used as a contrast agent               for endocardial border definition. Total contrast used for this               procedure was 2.5 mL via IV push.  Critical Event Critical Event: Test was completed as per department protocol. Critical Finding: New WMA. Time Test was Completed: 4:30:43 PM Notified: Paty Pop MD. Attending notification time: 4:35:44 PM  PHYSICIAN INTERPRETATION: Left Ventricle: Left ventricular ejection fraction is moderately decreased calculated by Russo's biplane at 35%. Left venticular wall motion is abnormal. The left ventricular cavity size is mildly dilated. There is normal septal and normal posterior left ventricular wall thickness. Spectral Doppler shows a Grade I (impaired relaxation pattern) of left ventricular diastolic filling with normal left atrial filling pressure. There is no definite left ventricular thrombus visualized. LV Wall Scoring: The entire apex is akinetic. Left Atrium: The left atrium is mildly dilated. Right Ventricle: The right ventricle is normal in size. There is normal right ventricular global systolic function. Right Atrium: The right atrium is normal in size. Aortic Valve: The aortic valve is trileaflet. The aortic valve area by VTI is 3.11 cmÂ² with a peak velocity of 1.37 m/s. The peak and mean gradients are 8 mmHg and 4 mmHg, respectively with a dimensionless index of 0.75. There is mild aortic valve thickening. There is trace aortic valve regurgitation. Mitral Valve: The mitral valve is normal in structure. There is trace mitral valve regurgitation. The E Vmax is 0.48 m/s. Tricuspid Valve: The tricuspid valve is structurally normal. There is trace tricuspid regurgitation. Pulmonic Valve: The pulmonic valve is not well visualized. There is no indication of pulmonic valve regurgitation. Pericardium: Trivial pericardial effusion. Pleural: There  is left pleural effusion. Aorta: The aortic root is normal. The aortic root appears normal in size and measures 3.50 cm. The Asc Ao is 3.20 cm. There is no dilatation of the ascending aorta. Systemic Veins: The inferior vena cava appears small in size, with IVC inspiratory collapse greater than 50%. In comparison to the previous echocardiogram(s): Compared with study dated 5/13/2025, LVEF has decreased from 45-50% on the prior study to 35% on the current examination, with new regional wall motion abnormalities. The mitral regurgitation is trace on today's study (was mild to moderate).  CONCLUSIONS:  1. Left ventricular ejection fraction is moderately decreased calculated by Russo's biplane at 35%.  2. Abnormal left venticular wall motion.  3. Entire apex is abnormal.  4. No left ventricular thrombus visualized.  5. Spectral Doppler shows a Grade I (impaired relaxation pattern) of left ventricular diastolic filling with normal left atrial filling pressure.  6. Left ventricular cavity size is mildly dilated.  7. There is normal right ventricular global systolic function.  8. The left atrium is mildly dilated.  9. Normal aortic root. 10. Compared with study dated 5/13/2025, LVEF has decreased from 45-50% on the prior study to 35% on the current examination, with new regional wall motion abnormalities. The mitral regurgitation is trace on today's study (was mild to moderate).  QUANTITATIVE DATA SUMMARY:  2D MEASUREMENTS:          Normal Ranges: Ao Root d:       3.50 cm  (2.0-3.7cm) LAs:             4.43 cm  (2.7-4.0cm) IVSd:            0.87 cm  (0.6-1.1cm) LVPWd:           1.03 cm  (0.6-1.1cm) LVIDd:           5.01 cm  (3.9-5.9cm) LVIDs:           3.72 cm LV Mass Index:   87 g/m2 LVEDV Index:     86 ml/m2 LV % FS          25.8 %  LEFT ATRIUM:                  Normal Ranges: LA Vol A4C:        87.1 ml    (22+/-6mL/m2) LA Vol A2C:        42.3 ml LA Vol BP:         68.6 ml LA Vol Index A4C:  44.2ml/m2 LA Vol Index A2C:   21.5 ml/m2 LA Vol Index BP:   34.8 ml/m2 LA Area A4C:       24.8 cm2 LA Area A2C:       15.3 cm2 LA Major Axis A4C: 6.0 cm LA Major Axis A2C: 4.7 cm  RIGHT ATRIUM:          Normal Ranges: RA Area A4C:  14.3 cm2  AORTA MEASUREMENTS:         Normal Ranges: Asc Ao, d:          3.20 cm (2.1-3.4cm)  LV SYSTOLIC FUNCTION:                      Normal Ranges: EF-A4C View:    45 % (>=55%) EF-A2C View:    20 % EF-Biplane:     35 % LV EF Reported: 35 %  LV DIASTOLIC FUNCTION:             Normal Ranges: MV Peak E:             0.48 m/s    (0.7-1.2 m/s) MV Peak A:             0.79 m/s    (0.42-0.7 m/s) E/A Ratio:             0.61        (1.0-2.2) MV e'                  0.067 m/s   (>8.0) MV lateral e'          0.07 m/s MV medial e'           0.06 m/s MV A Dur:              138.92 msec E/e' Ratio:            7.14        (<8.0) MV DT:                 58 msec     (150-240 msec)  MITRAL VALVE:         Normal Ranges: MV DT:        58 msec (150-240msec)  AORTIC VALVE:                     Normal Ranges: AoV Vmax:                1.37 m/s (<=1.7m/s) AoV Peak P.5 mmHg (<20mmHg) AoV Mean P.0 mmHg (1.7-11.5mmHg) LVOT Max Surendra:            1.03 m/s (<=1.1m/s) AoV VTI:                 23.48 cm (18-25cm) LVOT VTI:                17.59 cm LVOT Diameter:           2.30 cm  (1.8-2.4cm) AoV Area, VTI:           3.11 cm2 (2.5-5.5cm2) AoV Area,Vmax:           3.12 cm2 (2.5-4.5cm2) AoV Dimensionless Index: 0.75  RIGHT VENTRICLE: RV Basal 3.50 cm RV Mid   2.50 cm RV Major 9.5 cm TAPSE:   20.5 mm RV s'    0.18 m/s  TRICUSPID VALVE/RVSP:         Normal Ranges: Est. RA Pressure:     3 IVC Diam:             0.97 cm  AORTA: Asc Ao Diam 3.24 cm  [id number] Paty Pop MD Electronically signed on 2025 at 7:36:47 PM  Wall Scoring  ** Final **     CONCLUSIONS:  1. Left ventricular ejection fraction is moderately decreased calculated by Russo's biplane at 35%.  2. Abnormal left venticular wall motion.  3. Entire apex is  abnormal.  4. No left ventricular thrombus visualized.  5. Spectral Doppler shows a Grade I (impaired relaxation pattern) of left ventricular diastolic filling with normal left atrial filling pressure.  6. Left ventricular cavity size is mildly dilated.  7. There is normal right ventricular global systolic function.  8. The left atrium is mildly dilated.  9. Normal aortic root. 10. Compared with study dated 5/13/2025, LVEF has decreased from 45-50% on the prior study to 35% on the current examination, with new regional wall motion abnormalities. The mitral regurgitation is trace on today's study (was mild to moderate).      Assessment:  Roma Corral is a 70 y.o. male with history of rectal cancer with mets to the liver, CAD (2021 NSTEMI), DM2, HTN who is s/p right extended hepatectomy for metastatic rectal cancer on 7/29 c/b worsening liver function.     Plan:  Neuro: Dilaudid 0.4 mg/0.6 mg IV, every 4 hours as needed, 0.2 mg for breakthrough  CV: Vitals q8hrs, telemetry. Trops downtrended over the night, will stop trending  - Echo completed 9/2 showing EF reduced to 35% with complete apical akinesis.   - Pending further recs from cards, but for now will start nitro PRN CP   Pulm: Encourage IS, wean oxygen  GI: NPO, cont lactulose 15ml BID, PPI, zofran prn, cont TPN   /Renal: nephrology consult: on D5W at 150ml/hr for hypernatremia. Cont albumin (25 g, IV, scheduled every 6 hours). Will attempt clamping drain for 6 hours today, and unclamp for 1 hour, to encourage resorption of ascitic fluid -- will stop if patient becomes symptomatic  Heme: Vitamin K (5 mg, IV, every 48 hours); Plts appropriate, will get double-lumen PICC line today  Endo: SSI with TPN  ID: Empiric Zosyn and Diflucan (started 8/11 and 8/19, respectively, end date pending given patient's clinical status). UA negative, CT a/p with IV contrast notable for pnuematosis of the bowel wall, in context of voluntary guarding without peritonitis on PE  DVT  ppx: Holding heparin 5000u q8hr, cont SCDs    - Conversation started regarding goals of care; will plan to have further discussion with patient's wife today; multiple attempts made yesterday without being able to contact patient's wife. As of now, will remain FULL CODE.     Dispo: RNF    Patient was discussed with Dr. Erazo.    Emily Neumann MD, MSc  Hepatobiliary Surgery and Surgical Oncology  [place], [id number]

## 2025-09-03 NOTE — POST-PROCEDURE NOTE
Pre-Procedure Checklist:  Emergent Line Insertion: No  Type of Line to be Placed: PICC  Consent Obtained: Yes  Emergency Medication Necessary: No  Patient Identified with 2 Independent Identifiers: Yes  Review of Allergies, Anticoagulation, Relevant Labs, ECG/Telemetry: Yes  Risks/Benefits/Alternatives Discussed with Patient/POA/Legal Representative: Yes  Stop Sign on Door: Yes  Time Out Performed: Yes  Catheter Exchange: No    Positioning Checklist:  All People, Including Patient, in the Room with Cap and Mask: Yes  Fluoroscopy Used to Identify Vessel and Guide Insertion: No   Sterile Cover Used: Yes  Full Barrier Precautions Followed (Mask, Cap, Gown, Gloves): Yes  Hands Washed: Yes  Monitors Attached with Sound Alarms On: No  Full Body Sterile Drape (Head-to-Toe) Used to Cover Patient: Yes  Trendelenburg Position (For IJ and Subclavian): No  CHG Skin Prep Used and Allowed to Air Dry to Skin Procedure: Yes    Procedure Checklist:  Blood Aspirated From All Lumens, All Ports Subsequently Flushed: Yes  Catheter Caps Placed on All Lumens; Lumens Clamped: Yes  Maintain Guidewire Control Throughout, Ensuring Guidewire Removal: Yes  Maintain Sterile Field Throughout Insertion: Yes  Catheter Secured: Yes  Confirmatory Test of Venous Placement: Non-Pulsatile Blood    Post Procedure Checklist:  Date and Time Written on Dressing: Yes  Sharp and Wire Count and Safe Disposal of all Sharps/Wires: Yes  Sterile Dressing Applied Per Protocol: Yes  X-ray Ordered or ECG Image: Yes    PICC Insertion Details:  Size (Fr): 4  Lumen Type: double  Catheter to Vein Ratio Less Than 50%: Yes  Total Length (cm): 38  External Length (cm): 3   Orientation: right upper arm  Location: brachial  Site Prep: Chlorohexidine; Usual sterile procedure followed  Local Anesthetic: Injectable/Subcutaneous  Indication: TPN/IL  Insertion Team Members in the Room: Nurse, LPN  Initial Extremity Circumference (cm): 28  Insertion Attempts: 1  Patient Tolerance:  Tolerated Well, Age Appropriate  Comfort Measures: Subcutaneous anesthetic; Verbal  Procedure Location: Bedside  Safety Measures: Patient specific safety measures addressed with RN  Estimated Blood Loss (mL): 0.5   Vessel Fully Compressible Proximally and Distally to Insertion Site: Yes  Brisk Blood Return Obtained and Line Draws Easily: Yes  Tip Location: SVC  Line Confirmation: ECG  Lot #: [id number]  : BD  PICC Line Exp Date: 07/31/2026  Securement: Stat Lock  Post Procedure Checklist: Handoff with RN; Obtain all new IV tubing prior to use; Bed at lowest level and wheels locked; Line discharge information at bedside.  Additional Details: Line was inserted using Modified Seldinger's Technique.   Placed by: Anamika Tyler RN

## 2025-09-03 NOTE — PROGRESS NOTES
Music Therapy Note    Roma Corral     Therapy Session  Visit Type: Follow-up visit  Session Start Time: 1558  Session End Time: 1559  Intervention Delivery: In-person  Conflict of Service: Asleep  Number of staff members present: 1               Treatment/Interventions       Post-assessment  Total Session Time (min): 1 minutes    Narrative  Assessment Detail: Pt was sleeping.  Follow-up: Will follow up at pt request.    Education Documentation  No documentation found.

## 2025-09-04 ASSESSMENT — COGNITIVE AND FUNCTIONAL STATUS - GENERAL
WALKING IN HOSPITAL ROOM: A LOT
PERSONAL GROOMING: A LITTLE
TOILETING: A LITTLE
CLIMB 3 TO 5 STEPS WITH RAILING: A LOT
DAILY ACTIVITIY SCORE: 15
DRESSING REGULAR LOWER BODY CLOTHING: A LOT
MOVING FROM LYING ON BACK TO SITTING ON SIDE OF FLAT BED WITH BEDRAILS: A LITTLE
MOVING TO AND FROM BED TO CHAIR: A LITTLE
MOBILITY SCORE: 15
HELP NEEDED FOR BATHING: A LOT
TURNING FROM BACK TO SIDE WHILE IN FLAT BAD: A LITTLE
STANDING UP FROM CHAIR USING ARMS: A LOT
EATING MEALS: A LITTLE
DRESSING REGULAR UPPER BODY CLOTHING: A LOT

## 2025-09-04 ASSESSMENT — PAIN SCALES - GENERAL
PAINLEVEL_OUTOF10: 6
PAINLEVEL_OUTOF10: 7

## 2025-09-04 NOTE — PROGRESS NOTES
Music Therapy Note    Roma Corral     Therapy Session  Visit Type: Follow-up visit  Session Start Time: 1345  Session End Time: 1346  Intervention Delivery: In-person  Conflict of Service: Declined treatment                Treatment/Interventions       Post-assessment  Total Session Time (min): 1 minutes    Narrative  Assessment Detail: Staff exited the room when the MTI was going to enter, and mentioned that the pt did not want therapy since he just wanted to sleep.  Follow-up: Will follow up at pt request.    Education Documentation  No documentation found.

## 2025-09-04 NOTE — CARE PLAN
Problem: Pain - Adult  Goal: Verbalizes/displays adequate comfort level or baseline comfort level  Outcome: Progressing     Problem: Safety - Adult  Goal: Free from fall injury  Outcome: Progressing     Problem: Discharge Planning  Goal: Discharge to home or other facility with appropriate resources  Outcome: Progressing     Problem: Chronic Conditions and Co-morbidities  Goal: Patient's chronic conditions and co-morbidity symptoms are monitored and maintained or improved  Outcome: Progressing     Problem: Nutrition  Goal: Nutrient intake appropriate for maintaining nutritional needs  Outcome: Progressing     Problem: Skin  Goal: Decreased wound size/increased tissue granulation at next dressing change  Outcome: Progressing  Goal: Participates in plan/prevention/treatment measures  Outcome: Progressing  Goal: Prevent/manage excess moisture  Outcome: Progressing  Goal: Prevent/minimize sheer/friction injuries  Outcome: Progressing  Goal: Promote/optimize nutrition  Outcome: Progressing  Goal: Promote skin healing  Outcome: Progressing   The patient's goals for the shift include  rest and comfort    The clinical goals for the shift include pt will remain hds and pain controlled    Over the shift, the patient did not make progress toward the following goals. Barriers to progression include safety. Recommendations to address these barriers include re-orient to room.

## 2025-09-04 NOTE — PROGRESS NOTES
Surgical Oncology Progress Note    Roma Corral is a 70 y.o. male with history of rectal cancer with mets to the liver, CAD (2021 NSTEMI), DM2, HTN who is s/p right extended hepatectomy for metastatic rectal cancer on 7/29     Subjective   Overnight: No acute events, afebrile, vital signs are stable.  Patient remains on room air and normal sinus rhythm.  Alert and oriented, redirectable for areas of difficulty remembering    Urine output: 890cc  Drain: 1.2L bile tinged ascitic fluid  Colostomy: 200 cc stool with gas in appliance     Objective   GEN: Appears tired, awake, conversant.  RESP: non-labored breathing on room air  CV: regular rate  GI: soft, intermittent voluntary guarding without rebound tenderness, non-peritonitic, moderately distended. Drain with bile-tinged ascites. Ostomy pink, healthy. Gas in appliance. Incisions healed well, well approximated without drainage, erythema, or infected appearance.   : voiding spontaneously.   MSK: no evidence of bilateral lower extremity edema  NEURO: alert and conversant, A&O x 2-3, re-directable  SKIN: jaundiced, warm and dry. Femoral access site from IR procedure with bleeding from site. No obvious bulge/pulsation/hematoma/seroma palpable on exam. Pressure dressing applied.    Last Recorded Vitals  Heart Rate:  [77-92]   Temp:  [36.3 °C (97.3 °F)-37.6 °C (99.7 °F)]   Resp:  [14-16]   BP: (119-143)/(67-80)   Weight:  [103 kg (226 lb 10.1 oz)]   SpO2:  [95 %-100 %]      Intake/Output Last 24 Hours:      Intake/Output Summary (Last 24 hours) at 9/4/2025 0917  Last data filed at 9/4/2025 0800  Gross per 24 hour   Intake 2285.07 ml   Output 2605 ml   Net -319.93 ml        Relevant Results  Lab Date: 09/04/25       9.2    8.3>-----<47         28.9   142  110  36                  ----------------<119     4.1  22  1.26          Ca 10.6 Phos 2.6 Mg 2.34       ALT 32  AlkPhos 57 tBili 27.0         Imaging:   Bedside PICC Imaging  Result Date: 9/3/2025  These  images are not reportable by radiology and will not be interpreted by  Radiologists.    Transthoracic Echo Complete  Result Date: 9/2/2025   [facility], [address] [place] [address]                Tel [phone] and Fax [phone] TRANSTHORACIC ECHOCARDIOGRAM REPORT  Patient Name:       INDU DOSHI          Reading Physician:    [id number] Paty Pop MD Study Date:         9/2/2025            Ordering Provider:    [id number] SUPA ESPINO MRN/PID:            [id number]            Fellow: Accession#:         [id number]        Nurse:                Kenyatta Burris RN Date of Birth/Age:  1955 / 70 years Sonographer:          Buochra Rivera                                                               RDCS Gender assigned at                     Additional Staff: Birth: Height:             177.80 cm           Admit Date:           7/29/2025 Weight:             79.38 kg            Admission Status:     Inpatient -                                                               Routine BSA / BMI:          1.97 m2 / 25.11     Encounter#:           [id number]                     kg/m2 Blood Pressure:     119/73 mmHg         Department Location:  [facility]                                                               Non Invasive Study Type:    TRANSTHORACIC ECHO (TTE) COMPLETE Diagnosis/ICD: Non ST elevation (NSTEMI) myocardial infarction-I21.4 Indication:    Concern for anterior ischemia CPT Code:      Echo Complete w Full Doppler-[id number] Patient History: Pertinent History: Rectal cancer with mets to the liver, CAD (2021 NSTEMI), DM2,                    HTN who is s/p right extended hepatectomy for metastatic                    rectal cancer on 7/29. Study Detail: The following Echo studies were  performed: 2D, M-Mode, Doppler and               color flow. Technically challenging study due to patient lying in               supine position and body habitus. Optison used as a contrast agent               for endocardial border definition. Total contrast used for this               procedure was 2.5 mL via IV push.  Critical Event Critical Event: Test was completed as per department protocol. Critical Finding: New WMA. Time Test was Completed: 4:30:43 PM Notified: Paty Pop MD. Attending notification time: 4:35:44 PM  PHYSICIAN INTERPRETATION: Left Ventricle: Left ventricular ejection fraction is moderately decreased calculated by Russo's biplane at 35%. Left venticular wall motion is abnormal. The left ventricular cavity size is mildly dilated. There is normal septal and normal posterior left ventricular wall thickness. Spectral Doppler shows a Grade I (impaired relaxation pattern) of left ventricular diastolic filling with normal left atrial filling pressure. There is no definite left ventricular thrombus visualized. LV Wall Scoring: The entire apex is akinetic. Left Atrium: The left atrium is mildly dilated. Right Ventricle: The right ventricle is normal in size. There is normal right ventricular global systolic function. Right Atrium: The right atrium is normal in size. Aortic Valve: The aortic valve is trileaflet. The aortic valve area by VTI is 3.11 cmÂ² with a peak velocity of 1.37 m/s. The peak and mean gradients are 8 mmHg and 4 mmHg, respectively with a dimensionless index of 0.75. There is mild aortic valve thickening. There is trace aortic valve regurgitation. Mitral Valve: The mitral valve is normal in structure. There is trace mitral valve regurgitation. The E Vmax is 0.48 m/s. Tricuspid Valve: The tricuspid valve is structurally normal. There is trace tricuspid regurgitation. Pulmonic Valve: The pulmonic valve is not well visualized. There is no indication of pulmonic valve  regurgitation. Pericardium: Trivial pericardial effusion. Pleural: There is left pleural effusion. Aorta: The aortic root is normal. The aortic root appears normal in size and measures 3.50 cm. The Asc Ao is 3.20 cm. There is no dilatation of the ascending aorta. Systemic Veins: The inferior vena cava appears small in size, with IVC inspiratory collapse greater than 50%. In comparison to the previous echocardiogram(s): Compared with study dated 5/13/2025, LVEF has decreased from 45-50% on the prior study to 35% on the current examination, with new regional wall motion abnormalities. The mitral regurgitation is trace on today's study (was mild to moderate).  CONCLUSIONS:  1. Left ventricular ejection fraction is moderately decreased calculated by Russo's biplane at 35%.  2. Abnormal left venticular wall motion.  3. Entire apex is abnormal.  4. No left ventricular thrombus visualized.  5. Spectral Doppler shows a Grade I (impaired relaxation pattern) of left ventricular diastolic filling with normal left atrial filling pressure.  6. Left ventricular cavity size is mildly dilated.  7. There is normal right ventricular global systolic function.  8. The left atrium is mildly dilated.  9. Normal aortic root. 10. Compared with study dated 5/13/2025, LVEF has decreased from 45-50% on the prior study to 35% on the current examination, with new regional wall motion abnormalities. The mitral regurgitation is trace on today's study (was mild to moderate).  QUANTITATIVE DATA SUMMARY:  2D MEASUREMENTS:          Normal Ranges: Ao Root d:       3.50 cm  (2.0-3.7cm) LAs:             4.43 cm  (2.7-4.0cm) IVSd:            0.87 cm  (0.6-1.1cm) LVPWd:           1.03 cm  (0.6-1.1cm) LVIDd:           5.01 cm  (3.9-5.9cm) LVIDs:           3.72 cm LV Mass Index:   87 g/m2 LVEDV Index:     86 ml/m2 LV % FS          25.8 %  LEFT ATRIUM:                  Normal Ranges: LA Vol A4C:        87.1 ml    (22+/-6mL/m2) LA Vol A2C:        42.3 ml LA  Vol BP:         68.6 ml LA Vol Index A4C:  44.2ml/m2 LA Vol Index A2C:  21.5 ml/m2 LA Vol Index BP:   34.8 ml/m2 LA Area A4C:       24.8 cm2 LA Area A2C:       15.3 cm2 LA Major Axis A4C: 6.0 cm LA Major Axis A2C: 4.7 cm  RIGHT ATRIUM:          Normal Ranges: RA Area A4C:  14.3 cm2  AORTA MEASUREMENTS:         Normal Ranges: Asc Ao, d:          3.20 cm (2.1-3.4cm)  LV SYSTOLIC FUNCTION:                      Normal Ranges: EF-A4C View:    45 % (>=55%) EF-A2C View:    20 % EF-Biplane:     35 % LV EF Reported: 35 %  LV DIASTOLIC FUNCTION:             Normal Ranges: MV Peak E:             0.48 m/s    (0.7-1.2 m/s) MV Peak A:             0.79 m/s    (0.42-0.7 m/s) E/A Ratio:             0.61        (1.0-2.2) MV e'                  0.067 m/s   (>8.0) MV lateral e'          0.07 m/s MV medial e'           0.06 m/s MV A Dur:              138.92 msec E/e' Ratio:            7.14        (<8.0) MV DT:                 58 msec     (150-240 msec)  MITRAL VALVE:         Normal Ranges: MV DT:        58 msec (150-240msec)  AORTIC VALVE:                     Normal Ranges: AoV Vmax:                1.37 m/s (<=1.7m/s) AoV Peak P.5 mmHg (<20mmHg) AoV Mean P.0 mmHg (1.7-11.5mmHg) LVOT Max Surendra:            1.03 m/s (<=1.1m/s) AoV VTI:                 23.48 cm (18-25cm) LVOT VTI:                17.59 cm LVOT Diameter:           2.30 cm  (1.8-2.4cm) AoV Area, VTI:           3.11 cm2 (2.5-5.5cm2) AoV Area,Vmax:           3.12 cm2 (2.5-4.5cm2) AoV Dimensionless Index: 0.75  RIGHT VENTRICLE: RV Basal 3.50 cm RV Mid   2.50 cm RV Major 9.5 cm TAPSE:   20.5 mm RV s'    0.18 m/s  TRICUSPID VALVE/RVSP:         Normal Ranges: Est. RA Pressure:     3 IVC Diam:             0.97 cm  AORTA: Asc Ao Diam 3.24 cm  [id number] Paty Pop MD Electronically signed on 2025 at 7:36:47 PM  Wall Scoring  ** Final **     CONCLUSIONS:  1. Left ventricular ejection fraction is moderately decreased calculated by Russo's biplane at  35%.  2. Abnormal left venticular wall motion.  3. Entire apex is abnormal.  4. No left ventricular thrombus visualized.  5. Spectral Doppler shows a Grade I (impaired relaxation pattern) of left ventricular diastolic filling with normal left atrial filling pressure.  6. Left ventricular cavity size is mildly dilated.  7. There is normal right ventricular global systolic function.  8. The left atrium is mildly dilated.  9. Normal aortic root. 10. Compared with study dated 5/13/2025, LVEF has decreased from 45-50% on the prior study to 35% on the current examination, with new regional wall motion abnormalities. The mitral regurgitation is trace on today's study (was mild to moderate).      Assessment:  Roma Corral is a 70 y.o. male with history of rectal cancer with mets to the liver, CAD (2021 NSTEMI), DM2, HTN who is s/p right extended hepatectomy for metastatic rectal cancer on 7/29 c/b worsening liver function.     Plan:  Neuro: Dilaudid 0.4 mg/0.6 mg IV, every 4 hours as needed, 0.2 mg for breakthrough  CV: Vitals q8hrs, telemetry.  - Echo completed 9/2 showing EF reduced to 35% with complete apical akinesis.   - nitro PRN CP  - Pending further recs from cards  Pulm: Encourage IS, wean oxygen  GI: NPO, cont lactulose 15ml BID, PPI, zofran prn, cont TPN   /Renal: nephrology consult: on D5W at 150ml/hr for hypernatremia. Cont albumin (25 g, IV, scheduled every 6 hours). Will attempt clamping drain for 8 hours today, and unclamp for 1 hour, to encourage resorption of ascitic fluid -- will stop if patient becomes symptomatic  Heme: Vitamin K (5 mg, IV, every 48 hours).   - Bleeding from R groin site, pressure dressing applied. Will obtain R groin ultrasound to evaluate   Endo: SSI with TPN  ID: Empiric Zosyn and Diflucan (started 8/11 and 8/19, respectively, end date pending given patient's clinical status).   DVT ppx: Holding heparin 5000u q8hr, cont SCDs    - Conversation started regarding goals of care yesterday  with patient's wife; will plan to have further discussion with patient's wife today. As of now, will remain FULL CODE.     Dispo: RNF    Patient was discussed with Dr. Erazo.    Emily Neumann MD, MSc  Hepatobiliary Surgery and Surgical Oncology  ronaldo, [id number]

## 2025-09-04 NOTE — PROGRESS NOTES
Interval events:    PICC line placed yesterday.  Started nitroglycerin PRN  Ongoing GOC discussion per primary team.      Objective:  Vitals:    09/04/25 0453   BP: 143/78   Pulse: 92   Resp: 16   Temp: 36.9 °C (98.5 °F)   SpO2: 98%     Weight         8/30/2025  0415 9/1/2025  0351 9/2/2025  0354 9/3/2025  0300 9/4/2025  0453    Weight: 79.8 kg (176 lb) 79.1 kg (174 lb 6.1 oz) 79.5 kg (175 lb 4.8 oz) 105 kg (231 lb 7.7 oz) 103 kg (226 lb 10.1 oz)            Intake/Output Summary (Last 24 hours) at 9/4/2025 1135  Last data filed at 9/4/2025 0800  Gross per 24 hour   Intake 2216 ml   Output 2605 ml   Net -389 ml     Recent Results (from the past 24 hours)   POCT GLUCOSE    Collection Time: 09/03/25  1:16 PM   Result Value Ref Range    POCT Glucose 133 (H) 74 - 99 mg/dL   POCT GLUCOSE    Collection Time: 09/03/25  5:38 PM   Result Value Ref Range    POCT Glucose 119 (H) 74 - 99 mg/dL   POCT GLUCOSE    Collection Time: 09/03/25  8:18 PM   Result Value Ref Range    POCT Glucose 137 (H) 74 - 99 mg/dL   POCT GLUCOSE    Collection Time: 09/03/25 11:48 PM   Result Value Ref Range    POCT Glucose 140 (H) 74 - 99 mg/dL   POCT GLUCOSE    Collection Time: 09/04/25  4:46 AM   Result Value Ref Range    POCT Glucose 123 (H) 74 - 99 mg/dL   Comprehensive Metabolic Panel    Collection Time: 09/04/25  4:50 AM   Result Value Ref Range    Glucose 119 (H) 74 - 99 mg/dL    Sodium 142 136 - 145 mmol/L    Potassium 4.1 3.5 - 5.3 mmol/L    Chloride 110 (H) 98 - 107 mmol/L    Bicarbonate 22 21 - 32 mmol/L    Anion Gap 14 10 - 20 mmol/L    Urea Nitrogen 36 (H) 6 - 23 mg/dL    Creatinine 1.26 0.50 - 1.30 mg/dL    eGFR 61 >60 mL/min/1.73m*2    Calcium 10.6 8.6 - 10.6 mg/dL    Albumin 4.5 3.4 - 5.0 g/dL    Alkaline Phosphatase 57 33 - 136 U/L    Total Protein 5.2 (L) 6.4 - 8.2 g/dL     (H) 9 - 39 U/L    Bilirubin, Total 27.0 (H) 0.0 - 1.2 mg/dL    ALT 32 10 - 52 U/L   CBC    Collection Time: 09/04/25  4:50 AM   Result Value Ref Range     WBC 8.3 4.4 - 11.3 x10*3/uL    nRBC 0.0 0.0 - 0.0 /100 WBCs    RBC 2.87 (L) 4.50 - 5.90 x10*6/uL    Hemoglobin 9.2 (L) 13.5 - 17.5 g/dL    Hematocrit 28.9 (L) 41.0 - 52.0 %     (H) 80 - 100 fL    MCH 32.1 26.0 - 34.0 pg    MCHC 31.8 (L) 32.0 - 36.0 g/dL    RDW 30.2 (H) 11.5 - 14.5 %    Platelets 47 (L) 150 - 450 x10*3/uL   Magnesium    Collection Time: 09/04/25  4:50 AM   Result Value Ref Range    Magnesium 2.34 1.60 - 2.40 mg/dL   Coagulation Screen    Collection Time: 09/04/25  6:42 AM   Result Value Ref Range    Protime 22.0 (H) 9.8 - 12.4 seconds    INR 2.0 (H) 0.9 - 1.1    aPTT 49 (H) 26 - 36 seconds   POCT GLUCOSE    Collection Time: 09/04/25  8:06 AM   Result Value Ref Range    POCT Glucose 119 (H) 74 - 99 mg/dL     Inpatient Medications:  Scheduled Medications[1]  PRN Medications[2]  Continuous Medications[3]    Telemetry 9/4/2025 (personally reviewed): SR 70-80s, non sustained Atach/AF episodes <5sec      Assessment/Plan   Roma Corral is a 70 y.o. male with history of CAD with NSTEMI (2021, no stent), anemia, gout, abdominal pain, HTN, DM2 and colon carcinoma metastatic to liver s/p open right hepatectomy 7/29. Course complicated by hypotension, liver failure, & chest tightness.    HFrEF 35%  Type II myocardial injury  - Troponin overall flat  - EKG TW changes V3V5  - Echo with reduced EF 35% (prior was 45-50% in 5/2025).   - Has underlying CAD but this could respresent a stress induced cardiomyopathy post surgery  - Defer any invasive ischemic evaluation given his acute post op state.  - Avoid aspirin since thrombocytopenia <50k and statin given liver failure.   - Initiate guideline directed medical therapy for CM:  lopressor 25mg IV BID. If BP tolerates, can add isordil in the next 1-3 days.  - Suspected mild hypervolemia: gentle diuresis the IV lasix 20mg x1    Cardiology will continue to follow.    Patient case evaluated in collaboration with Dr. Nelson Sanchez.  Jemma Kraft,  MARIA ESTHER    Please call with any questions  General Cardiology Consult Pager: [phone] (weekday 7AM-6PM and weekend 7AM-2PM) and other: [phone]  EP Consult Pager: [phone] (weekday 7AM-6PM and weekend 7AM-2PM) and other: [phone]  CICU Fellow Pager: [phone] anytime  EP Device Nurse Pager: [phone] (weekday 7AM-4PM)  Advanced Heart Failure Consult Pager: [phone] anytime               [1]   Scheduled medications   Medication Dose Route Frequency    albumin human  25 g intravenous q8h    fat emulsion fish oil/plant based  250 mL intravenous Daily Lipids    fluconazole  400 mg intravenous q24h    [Held by provider] heparin (porcine)  5,000 Units subcutaneous q8h    insulin lispro  0-10 Units subcutaneous q4h    lactated Ringer's  500 mL intravenous q8h    lactulose  10 g oral BID    [Held by provider] morphine CR  30 mg oral q12h ALEX    pantoprazole  40 mg intravenous BID    phytonadione  5 mg intravenous q48h    phytonadione  5 mg intravenous Once    piperacillin-tazobactam  3.375 g intravenous q6h    [Held by provider] polyethylene glycol  17 g oral Daily    thiamine  100 mg intravenous Daily   [2]   PRN medications   Medication    alteplase    benzocaine-menthol    calcium carbonate    dextrose    dextrose    glucagon    HYDROmorphone    HYDROmorphone    HYDROmorphone    nitroglycerin    ondansetron    [Held by provider] oxyCODONE    [Held by provider] traMADol   [3]   Continuous Medications   Medication Dose Last Rate    Adult Clinimix Parenteral Nutrition Continuous  65 mL/hr 65 mL/hr (09/03/25 8152)

## 2025-09-05 ASSESSMENT — PAIN - FUNCTIONAL ASSESSMENT
PAIN_FUNCTIONAL_ASSESSMENT: 0-10

## 2025-09-05 ASSESSMENT — COGNITIVE AND FUNCTIONAL STATUS - GENERAL
DAILY ACTIVITIY SCORE: 15
DRESSING REGULAR UPPER BODY CLOTHING: A LOT
MOBILITY SCORE: 15
PERSONAL GROOMING: A LITTLE
MOVING FROM LYING ON BACK TO SITTING ON SIDE OF FLAT BED WITH BEDRAILS: A LITTLE
DAILY ACTIVITIY SCORE: 15
MOBILITY SCORE: 15
TOILETING: A LITTLE
DRESSING REGULAR LOWER BODY CLOTHING: A LOT
EATING MEALS: A LITTLE
WALKING IN HOSPITAL ROOM: A LOT
MOVING TO AND FROM BED TO CHAIR: A LITTLE
TOILETING: A LITTLE
TURNING FROM BACK TO SIDE WHILE IN FLAT BAD: A LITTLE
MOVING TO AND FROM BED TO CHAIR: A LITTLE
DRESSING REGULAR UPPER BODY CLOTHING: A LOT
TURNING FROM BACK TO SIDE WHILE IN FLAT BAD: A LITTLE
STANDING UP FROM CHAIR USING ARMS: A LOT
MOVING FROM LYING ON BACK TO SITTING ON SIDE OF FLAT BED WITH BEDRAILS: A LITTLE
CLIMB 3 TO 5 STEPS WITH RAILING: A LOT
HELP NEEDED FOR BATHING: A LOT
WALKING IN HOSPITAL ROOM: A LOT
PERSONAL GROOMING: A LITTLE
CLIMB 3 TO 5 STEPS WITH RAILING: A LOT
EATING MEALS: A LITTLE
DRESSING REGULAR LOWER BODY CLOTHING: A LOT
STANDING UP FROM CHAIR USING ARMS: A LOT
HELP NEEDED FOR BATHING: A LOT

## 2025-09-05 ASSESSMENT — PAIN SCALES - GENERAL
PAINLEVEL_OUTOF10: 4
PAINLEVEL_OUTOF10: 5 - MODERATE PAIN
PAINLEVEL_OUTOF10: 6
PAINLEVEL_OUTOF10: 7

## 2025-09-05 ASSESSMENT — PAIN DESCRIPTION - LOCATION: LOCATION: ABDOMEN

## 2025-09-05 NOTE — PROGRESS NOTES
Interval events:    - R femoral pseudoaneurysm noted. Vascular surgery following with plans for repeat duplex    Subjective  - No SOB or chest tightness    Objective:  Vitals:    09/05/25 1120   BP: 130/68   Pulse: 84   Resp: 16   Temp: 37.2 °C (98.9 °F)   SpO2: 97%     Weight         9/1/2025  0351 9/2/2025  0354 9/3/2025  0300 9/4/2025  0453 9/5/2025  0346    Weight: 79.1 kg (174 lb 6.1 oz) 79.5 kg (175 lb 4.8 oz) 105 kg (231 lb 7.7 oz) 103 kg (226 lb 10.1 oz) 104 kg (229 lb 1.6 oz)            Intake/Output Summary (Last 24 hours) at 9/5/2025 1128  Last data filed at 9/5/2025 1028  Gross per 24 hour   Intake 3020.25 ml   Output 2945 ml   Net 75.25 ml     Recent Results (from the past 24 hours)   Coagulation Screen    Collection Time: 09/04/25  2:58 PM   Result Value Ref Range    Protime 23.1 (H) 9.8 - 12.4 seconds    INR 2.1 (H) 0.9 - 1.1    aPTT 49 (H) 26 - 36 seconds   POCT GLUCOSE    Collection Time: 09/04/25  4:34 PM   Result Value Ref Range    POCT Glucose 132 (H) 74 - 99 mg/dL   POCT GLUCOSE    Collection Time: 09/04/25 10:14 PM   Result Value Ref Range    POCT Glucose 73 (L) 74 - 99 mg/dL   POCT GLUCOSE    Collection Time: 09/05/25  1:15 AM   Result Value Ref Range    POCT Glucose 110 (H) 74 - 99 mg/dL   Comprehensive Metabolic Panel    Collection Time: 09/05/25  3:18 AM   Result Value Ref Range    Glucose 100 (H) 74 - 99 mg/dL    Sodium 142 136 - 145 mmol/L    Potassium 3.8 3.5 - 5.3 mmol/L    Chloride 110 (H) 98 - 107 mmol/L    Bicarbonate 23 21 - 32 mmol/L    Anion Gap 13 10 - 20 mmol/L    Urea Nitrogen 34 (H) 6 - 23 mg/dL    Creatinine 1.12 0.50 - 1.30 mg/dL    eGFR 71 >60 mL/min/1.73m*2    Calcium 10.6 8.6 - 10.6 mg/dL    Albumin 4.6 3.4 - 5.0 g/dL    Alkaline Phosphatase 60 33 - 136 U/L    Total Protein 5.1 (L) 6.4 - 8.2 g/dL     (H) 9 - 39 U/L    Bilirubin, Total 28.3 (H) 0.0 - 1.2 mg/dL    ALT 39 10 - 52 U/L   CBC    Collection Time: 09/05/25  3:18 AM   Result Value Ref Range    WBC 7.5 4.4 -  11.3 x10*3/uL    nRBC 0.0 0.0 - 0.0 /100 WBCs    RBC 2.73 (L) 4.50 - 5.90 x10*6/uL    Hemoglobin 8.9 (L) 13.5 - 17.5 g/dL    Hematocrit 27.7 (L) 41.0 - 52.0 %     (H) 80 - 100 fL    MCH 32.6 26.0 - 34.0 pg    MCHC 32.1 32.0 - 36.0 g/dL    RDW 29.7 (H) 11.5 - 14.5 %    Platelets 45 (L) 150 - 450 x10*3/uL   Magnesium    Collection Time: 09/05/25  3:18 AM   Result Value Ref Range    Magnesium 2.34 1.60 - 2.40 mg/dL   Coagulation Screen    Collection Time: 09/05/25  3:18 AM   Result Value Ref Range    Protime 21.6 (H) 9.8 - 12.4 seconds    INR 2.0 (H) 0.9 - 1.1    aPTT 44 (H) 26 - 36 seconds   Type And Screen    Collection Time: 09/05/25  3:24 AM   Result Value Ref Range    ABO TYPE AB     Rh TYPE NEG     ANTIBODY SCREEN POS    BB ORDER ONLY - Antibody Identification    Collection Time: 09/05/25  3:24 AM   Result Value Ref Range    Antibody ID INCONCLUSIVE     CASE # [id number]    Direct Antiglobulin Test    Collection Time: 09/05/25  3:24 AM   Result Value Ref Range    SONIA-POLYSPECIFIC NEG    POCT GLUCOSE    Collection Time: 09/05/25  8:14 AM   Result Value Ref Range    POCT Glucose 116 (H) 74 - 99 mg/dL     Inpatient Medications:  Scheduled Medications[1]  PRN Medications[2]  Continuous Medications[3]       Physical exam:  General: NAD, jaundice  Cardiac: RRR, regular S1 S2 , no murmur, no rub, no gallop  Pulm: No WOB, on room air  Vascular: Radial 2+ bilaterally  GI: Non distended  Extremities: no LE edema   Neuro: no focal neuro deficits   Psych: appropriate mood and behavior   Skin: warm and dry     Telemetry 9/5/2025 (personally reviewed): SR 70-80s,, 7bt NSVT, occasional PVCs      Assessment/Plan   Roma Corral is a 70 y.o. male with history of CAD with NSTEMI (2021, no stent), anemia, gout, abdominal pain, HTN, DM2 and colon carcinoma metastatic to liver s/p open right hepatectomy 7/29. Course complicated by hypotension, liver failure, & chest tightness.    HFrEF 35%  Type II myocardial injury  - Troponin  overall flat  - EKG TW changes V3V5  - Echo with reduced EF 35% (prior was 45-50% in 5/2025).   - Has underlying CAD but this could respresent a stress induced cardiomyopathy post surgery  - Defer any invasive ischemic evaluation given his acute post op state.  - Avoid aspirin since thrombocytopenia <50k and statin given liver failure.   - Initiate low dose lopressor 25mg IV BID for better BP and HR control with cardiomyopathy    Cardiology will continue to follow. Will see again on Monday    Patient case evaluated in collaboration with Dr. Nelson Sanchez.  Jemma Kraft PA-C    Please call with any questions  General Cardiology Consult Pager: [phone] (weekday 7AM-6PM and weekend 7AM-2PM) and other: [phone]  EP Consult Pager: [phone] (weekday 7AM-6PM and weekend 7AM-2PM) and other: [phone]  CICU Fellow Pager: [phone] anytime  EP Device Nurse Pager: [phone] (weekday 7AM-4PM)  Advanced Heart Failure Consult Pager: [phone] anytime                 [1]   Scheduled medications   Medication Dose Route Frequency    albumin human  25 g intravenous BID    fat emulsion fish oil/plant based  250 mL intravenous Daily Lipids    fluconazole  400 mg intravenous q24h    [Held by provider] heparin (porcine)  5,000 Units subcutaneous q8h    insulin lispro  0-10 Units subcutaneous q4h    lactated Ringer's  500 mL intravenous q8h    lactulose  10 g oral BID    [Held by provider] morphine CR  30 mg oral q12h ALEX    pantoprazole  40 mg intravenous BID    phytonadione  5 mg intravenous q48h    piperacillin-tazobactam  3.375 g intravenous q6h    [Held by provider] polyethylene glycol  17 g oral Daily    thiamine  100 mg intravenous Daily   [2]   PRN medications   Medication    alteplase    benzocaine-menthol    calcium carbonate    dextrose    dextrose    glucagon    HYDROmorphone    HYDROmorphone    HYDROmorphone    nitroglycerin    ondansetron    [Held by provider] oxyCODONE    [Held by provider] traMADol   [3]   Continuous Medications    Medication Dose Last Rate    Adult Clinimix Parenteral Nutrition Continuous  65 mL/hr Stopped (09/04/25 5853)

## 2025-09-05 NOTE — PROGRESS NOTES
VASCULAR SURGERY PROGRESS NOTE    Subjective   Patient was seen and assessed today. Reports mild right groin pain.     Objective   Vitals:  Heart Rate:  [79-87]   Temp:  [36.8 °C (98.2 °F)-37.3 °C (99.1 °F)]   Resp:  [16-18]   BP: (129-137)/(65-77)   Weight:  [104 kg (229 lb 1.6 oz)]   SpO2:  [94 %-99 %]     Physical Exam:  Constitutional: In no acute distress.  Neuro:  Intact.  ENMT: Moist mucous membranes.  Cardiovascular: Rate: normal. Rhythm: normal.  Pulmonary: On room air.  Abdomen: soft and not distended.  Extremities: Sensorimotor intact. R groin site with overlying bruising but no skin changes.   Pulse Exam: Palpable pulses DP bilaterally.    Labs:  Results from last 7 days   Lab Units 09/05/25 0318 09/04/25  0450 09/03/25  0505   WBC AUTO x10*3/uL 7.5 8.3 9.4   HEMOGLOBIN g/dL 8.9* 9.2* 9.1*   PLATELETS AUTO x10*3/uL 45* 47* 47*      Results from last 7 days   Lab Units 09/05/25 0318 09/04/25  0450 09/03/25  0505   SODIUM mmol/L 142 142 142   POTASSIUM mmol/L 3.8 4.1 4.1   CHLORIDE mmol/L 110* 110* 111*   CO2 mmol/L 23 22 20*   BUN mg/dL 34* 36* 33*   CREATININE mg/dL 1.12 1.26 1.36*   GLUCOSE mg/dL 100* 119* 116*   MAGNESIUM mg/dL 2.34 2.34 2.17   PHOSPHORUS mg/dL  --   --  2.6      Results from last 7 days   Lab Units 09/05/25 0318 09/04/25  1458 09/04/25  0642   INR  2.0* 2.1* 2.0*   PROTIME seconds 21.6* 23.1* 22.0*   APTT seconds 44* 49* 49*            Assessment/Plan   Roma Corral is 70 y.o. male with history of CAD, NSTEMI in 2021, DM2, HTN, rectal cancer with liver metastases who underwent an extended hepatectomy 7/29 with a complex postoperative course including worsening liver function, he underwent splenic artery coil embolization on 8/26 c/b R groin femoral access site bleeding on 8/29 and at that time the groin US was negative for pseudoaneurysm. On 9/4 he had continued bleeding from R groin site showing PSA on US and CTA. Vascular Surgery consulted for R groin pseudoaneurysm.       Plan:  Recommend pressure and repeat duplex this afternoon.  Vascular surgery to continue to follow    Case discussed with attending Dr. Dunphy Hassan Chamseddine, MD   Vascular Surgery PGY1  Team Pager [phone]  09/05/25  8:37 AM

## 2025-09-05 NOTE — CONSULTS
VASCULAR SURGERY CONSULT NOTE    Assessment/Plan   Roma Corral is 70 y.o. male with history of CAD, NSTEMI in 2021, DM2, HTN, rectal cancer with liver metastases who underwent an extended hepatectomy 7/29 with a complex postoperative course including worsening liver function, he underwent splenic artery coil embolization on 8/26 c/b R groin femoral access site bleeding on 8/29 and at that time the groin US was negative for pseudoaneurysm. On 9/4 he had continued bleeding from R groin site showing PSA on US and CTA. Vascular Surgery consulted for R groin pseudoaneurysm.     Plan:  Recommend manual pressure for 30 minutes, pressure held by resident Dr. Cheema for 30 minutes at bedside  Plan for repeat US pelvic groin pseudoaneurysm with duplex next week  Vascular surgery to continue to follow    Patient discussed with fellow, Dr. Smiley, who discussed with attending, Dr. Dunphy Francesca Ponzini, MD  PGY-2  Vascular Surgery  Service Pager: [phone]    Subjective   HPI:  Roma Corral is 70 y.o. male with history of CAD, NSTEMI in 2021, DM2, HTN, rectal cancer with liver metastases who underwent an extended hepatectomy 7/29 with a complex postoperative course including worsening liver function, he underwent splenic artery coil embolization on 8/26 c/b R groin femoral access site bleeding. At initial time of bleeding on 8/29 patient's US was negative for a pseudoaneurysm, per chart review, pressure was held and CBC was found to be stable. He then had another episode of bleeding from his R groin access on 9/4. At that time a groin PSA US was performed and was suggestive of a PSA. CTA was performed to further evaluate and showed evidence of a pseudoaneurysm prompting a vascular surgery consult. Denies chest pain, shortness of breath, fevers, chills, nausea, vomiting, pain at the R groin site.     Vascular History:  CAD, NSTEMI, HTN, R groin PSA    PMH: CAD, NSTEMI in 2021, DM2, HTN, rectal cancer with liver  metastases  Medical History[1]    PSH:   Surgical History[2]    Home Meds:  Medications Ordered Prior to Encounter[3]     Allergies:  RX Allergies[4]    SH/FH:   Social History     Socioeconomic History    Marital status:      Spouse name: Not on file    Number of children: 1    Years of education: Not on file    Highest education level: Not on file   Occupational History    Occupation: retired   Tobacco Use    Smoking status: Never    Smokeless tobacco: Never   Vaping Use    Vaping status: Never Used   Substance and Sexual Activity    Alcohol use: Never    Drug use: Never    Sexual activity: Defer   Other Topics Concern    Not on file   Social History Narrative    Not on file     Social Drivers of Health     Financial Resource Strain: Patient Declined (8/1/2025)    Overall Financial Resource Strain (CARDIA)     Difficulty of Paying Living Expenses: Patient declined   Recent Concern: Financial Resource Strain - Medium Risk (7/30/2025)    Overall Financial Resource Strain (CARDIA)     Difficulty of Paying Living Expenses: Somewhat hard   Food Insecurity: No Food Insecurity (7/30/2025)    Hunger Vital Sign     Worried About Running Out of Food in the Last Year: Never true     Ran Out of Food in the Last Year: Never true   Transportation Needs: No Transportation Needs (8/1/2025)    PRAPARE - Transportation     Lack of Transportation (Medical): No     Lack of Transportation (Non-Medical): No   Physical Activity: Sufficiently Active (7/30/2025)    Exercise Vital Sign     Days of Exercise per Week: 5 days     Minutes of Exercise per Session: 60 min   Stress: No Stress Concern Present (7/30/2025)    [employer] [facility] - Occupational Stress Questionnaire     Feeling of Stress: Not at all   Social Connections: Moderately Integrated (7/30/2025)    Social Connection and Isolation Panel     Frequency of Communication with Friends and Family: More than three times a week     Frequency of Social  Gatherings with Friends and Family: Once a week     Attends Yarsani Services: Never     Active Member of Clubs or Organizations: Yes     Attends Club or Organization Meetings: Never     Marital Status:    Intimate Partner Violence: Not At Risk (7/30/2025)    Humiliation, Afraid, Rape, and Kick questionnaire     Fear of Current or Ex-Partner: No     Emotionally Abused: No     Physically Abused: No     Sexually Abused: No   Housing Stability: Unknown (8/1/2025)    Housing Stability Vital Sign     Unable to Pay for Housing in the Last Year: Not on file     Number of Times Moved in the Last Year: 0     Homeless in the Last Year: No       ROS: 12 system negative except HPI    Objective   Vitals:  Heart Rate:  [79-92]   Temp:  [36.8 °C (98.2 °F)-37.3 °C (99.1 °F)]   Resp:  [16-18]   BP: (129-143)/(65-78)   Weight:  [103 kg (226 lb 10.1 oz)]   SpO2:  [94 %-99 %]     Exam:  Constitutional: no acute distress  Neuro: alert and conversant  ENMT: scleral icterus  CV: non-cyanotic, non-tachycardic  Pulm: non-labored breathing on RA  GI: soft, moderately distended, drain with bile tinged ascites. Ostomy pink and healthy with gas.   Skin: warm and dry, jaundice  Musculoskeletal: able to move bilateral upper and lower extremities  Extremities: R groin site with overlying bruising but no skin changes.  Pulses: palpable DP bilaterally    Labs:  Results from last 7 days   Lab Units 09/04/25  0450 09/03/25  0505 09/02/25  0350   WBC AUTO x10*3/uL 8.3 9.4 10.7   HEMOGLOBIN g/dL 9.2* 9.1* 9.4*   PLATELETS AUTO x10*3/uL 47* 47* 60*      Results from last 7 days   Lab Units 09/04/25  0450 09/03/25  0505 09/02/25  0350   SODIUM mmol/L 142 142 142   POTASSIUM mmol/L 4.1 4.1 3.8   CHLORIDE mmol/L 110* 111* 109*   CO2 mmol/L 22 20* 23   BUN mg/dL 36* 33* 35*   CREATININE mg/dL 1.26 1.36* 1.26   GLUCOSE mg/dL 119* 116* 109*   MAGNESIUM mg/dL 2.34 2.17 1.97   PHOSPHORUS mg/dL  --  2.6 2.8      Results from last 7 days   Lab Units  09/04/25  1458 09/04/25  0642 08/29/25  1622   INR  2.1* 2.0* 2.3*   PROTIME seconds 23.1* 22.0* 25.6*   APTT seconds 49* 49* 65*           Imaging:  Reviewed independently by vascular team:  US pelvis groin PSA: concern for pseudoaneurysm, abnormal flow concerning for AV fistula    CT angio AP: R 0.5cm PSA R groin without evidence of active extravasation           [1]   Past Medical History:  Diagnosis Date    Abdominal pain     Acute non-ST elevation myocardial infarction (NSTEMI) (Multi)     Anemia     6/6/24 HGB 8.6; HCT 31.5    CAD (coronary artery disease)     Cardiology follow-up encounter 12/11/2023    Sharif Lau MD    Chronic pain disorder     Colorectal cancer (Multi)     Gout     H/O cardiac catheterization 06/01/2021    H/O cardiovascular stress test 09/03/2021    IMPRESSION: 1. No evidence of ischemia. 2. Suspicion of an infarct along the mid anterior wall. 3. Left ventricular dilatation is noted. 4. Left ventricular EF was calculated to be 50%. Summary:  1. No clinical or electrocardiographic evidence for ischemia at a submaximal workload. 3. The blunted heart rate diminshes the sensitivity of this test.  4. The submaximal level of stress was achieved.    H/O echocardiogram 06/02/2021    Mild concentric Left ventricle hypertrophy.  Global left ventricular wall motion and contractility are within normal limits.  There is normal left ventricular systolic function.  Estimated ejection fraction is 60-64%.  The left atrial size is mildly dilated.  Mild aortic regurgitation.  A trace of mitral regurgitation.  Trivial to mild tricuspid regurgitation.  There is no pulmonary hypertension.    High cholesterol     Hypertension     Overweight     Rectal cancer (Multi)     Type 2 diabetes mellitus     4/18/2024 A1C 7.5%   [2]   Past Surgical History:  Procedure Laterality Date    COLONOSCOPY  06/17/2024    HEMICOLECTOMY W/ OSTOMY      LEG SURGERY Left     rodrigo placed   [3]   Current Facility-Administered  Medications on File Prior to Encounter   Medication Dose Route Frequency Provider Last Rate Last Admin    alteplase (Cathflo Activase) injection 2 mg  2 mg intra-catheter PRN Laurence S Castelein, APRN-CNP        alteplase (Cathflo Activase) injection 2 mg  2 mg intra-catheter PRN Laurence S Castelein, APRN-CNP        heparin flush 10 unit/mL syringe 50 Units  50 Units intra-catheter PRN Laurence S Castelein, APRN-CNP        heparin flush 10 unit/mL syringe 50 Units  50 Units intra-catheter PRN Laurence S Castelein, APRN-CNP        heparin flush 100 unit/mL syringe 500 Units  500 Units intra-catheter PRN Laurence S Castelein, APRN-CNP        heparin flush 100 unit/mL syringe 500 Units  500 Units intra-catheter PRN Laurence S Castelein, APRN-CNP         Current Outpatient Medications on File Prior to Encounter   Medication Sig Dispense Refill    acetaminophen (Tylenol) 325 mg tablet Take 2 tablets (650 mg) by mouth every 6 hours if needed for mild pain (1 - 3).      metoprolol succinate XL (Toprol-XL) 100 mg 24 hr tablet Take 1 tablet (100 mg) by mouth once daily. 90 tablet 3    polyethylene glycol (Glycolax, Miralax) 17 gram packet Take 17 g by mouth every other day. Skip the dose for the day if watery/loose colostomy output Do not fill before April 3, 2025. (Patient taking differently: Take 17 g by mouth if needed (constipation). Skip the dose for the day if watery/loose colostomy output)      alteplase (Cathflo Activase) 2 mg injection 2 mL (2 mg) by intra-catheter route if needed (Use as needed for occluded PICC Line). (Patient not taking: Reported on 7/16/2025)      aspirin 81 mg EC tablet Take 1 tablet (81 mg) by mouth once daily.      atorvastatin (Lipitor) 40 mg tablet Take 1 tablet (40 mg) by mouth once daily at bedtime. 90 tablet 3    Continue current TPN formula See AVS for most recent TPN formula. (Patient not taking: Reported on 7/16/2025) 1 Package 0    Continue current TPN formula TPN 5/20  68 mL for 1 hr,  increase to 136 mL/hr x10 hrs, and then 68 mL/hr x 1 hr. VS for most recent TPN formula. (Patient not taking: Reported on 7/16/2025) 1 Package 0    furosemide (Lasix) 40 mg tablet Take 1 tablet (40 mg) by mouth once daily. 30 tablet 11    methylPREDNISolone (Medrol Dospak) 4 mg tablets Take as directed (Patient not taking: Reported on 7/16/2025) 21 each 0    midodrine (Proamatine) 2.5 mg tablet Take 1 tablet (2.5 mg) by mouth 3 times daily (morning, midday, late afternoon). (Patient not taking: Reported on 7/16/2025)      multivitamin with iron - children's (Cerovite, Jr) chewable tablet Chew and swallow 2 tablets once daily.      octreotide (SandoSTATIN) 500 mcg/mL injection Inject 0.4 mL (200 mcg) under the skin 3 times a day. (Patient not taking: Reported on 7/16/2025)      ondansetron ODT (Zofran-ODT) 4 mg disintegrating tablet Dissolve 1 tablet (4 mg) in the mouth every 8 hours if needed for nausea or vomiting.      penicillin v potassium (Veetid) 500 mg tablet Take 1 tablet (500 mg) by mouth 4 times a day until gone. (Patient not taking: Reported on 6/19/2025) 40 tablet 0    prochlorperazine (Compazine) 10 mg tablet Take 1 tablet (10 mg) by mouth every 6 hours if needed for nausea or vomiting. (Patient not taking: Reported on 7/30/2025) 30 tablet 5    tamsulosin (Flomax) 0.4 mg 24 hr capsule Take 1 capsule (0.4 mg) by mouth once daily. (Patient not taking: Reported on 7/16/2025)     [4] No Known Allergies

## 2025-09-05 NOTE — PROGRESS NOTES
"Art Therapy Note    Roma Corral was referred by Paz Weber MD    Therapy Session  Referral Type: New referral this admission  Visit Type: Follow-up visit  Session Start Time: 1602  Session End Time: 1611  Conflict of Service: None  Number of family members present: 0  Number of staff members present: 1 (sitter)    Pre-assessment  Unable to Assess Reason: Outcomes not applicable         Treatment/Interventions  Areas of Focus: Emotional support  Art Therapy Interventions: Empathic listening/validating emotions  Interruption: No  Patient Fell Asleep at End of Session: No    Post-assessment  Total Session Time (min): 9 minutes    Narrative  Assessment Detail: ATR made a visit to Pt's room to offer not only an AT session but emotional support as well.  A sitter was present inthe room when ATR arrived and Pt was in an dout sleep and somewhat groggy.  When ATR called his name he welcomed ATR.  Evaluation: ATR told Pt who she was and that she was there to support him and talk about anything on his mind during the time of frustration and many health challenges of late.  Pt was groggy but recognized ATR and smiled.  Pt went on to share some, what seemed like, end of lfe review talk.  He shared with ATR how he just wanted God to give him five years which now doesn't seem like a lot and shared having about 3 more surgery this admission. Fore afew minutes Pt.;s thoughts were scattered but filled with much emotion.  ATR validated his feelings held space. After that moment ATR reviewed a [employer] image she liliane and (left in the room for him) as an example of how to use the sketch book left session ago. ATR had left the image one day when he was sleeping as reminder she had stopped by to visit.. Pt was happy to reminded where the image came from and further told ATR \"It was beautiful to get to know you.\"  as they finished reflecting on previous sessions together.  Pt became more tired and ATR told Pt they could talk again " another time, that for now he should rest.  Pt smiled and was agreeable to future sessions and closed his eyes to rest and or sleep.  Follow-up: ATR will conrinue to provide emotional support and offer AT sessions if Pt is willing to help support andnormalize all that is happening withhis disease progression. .    Education Documentation  No documentation found.

## 2025-09-05 NOTE — SIGNIFICANT EVENT
Significant event: New CTA finding, pseudoaneurysm    Patient underwent CTA pelvis today to further evaluate R femoral artery after US pelvis groin demonstrated 3 mm focal outpouching along the superficial margin of the right femoral artery.     Approximately 2am, team called by radiology with findings of approximately 4x5 mm pseudoaneurysm with layering hypodensity, no active extravasation.     On exam, palpable mass in groin under site of R artery cannulation, approx 2cm. Incision hemostatic, no oozing. No overlying skin changes. DP and PT pulses palpable, 2+ bilaterally.     Call to attending and consult to vascular surgery. Appreciate vascular surgery recs. Will follow up formal CTA read when available. Will continue to monitor.     Patient seen with PGY2 Markus Weber, discussed with chief resident Marta Shaffer.     Cookie Ortiz MD PGY1  Surgical Oncology

## 2025-09-05 NOTE — PROGRESS NOTES
Music Therapy Note    Roma Corral     Therapy Session  Referral Type: New referral this admission  Visit Type: Follow-up visit  Session Start Time: 1407  Session End Time: 1408  Intervention Delivery: In-person  Conflict of Service: Asleep               Treatment/Interventions       Post-assessment  Total Session Time (min): 1 minutes    Narrative  Assessment Detail: Pt was sleeping when the MTI entered the room.  Follow-up: Will follow up at pt request.    Education Documentation  No documentation found.

## 2025-09-05 NOTE — PROGRESS NOTES
Surgical Oncology Progress Note    Roma Corral is a 70 y.o. male with history of rectal cancer with mets to the liver, CAD (2021 NSTEMI), DM2, HTN who is s/p right extended hepatectomy for metastatic rectal cancer on 7/29     Subjective   Overnight: No acute events, afebrile, vital signs are stable.  Patient remains on room air and normal sinus rhythm.  Alert and oriented, while continuing to increase amount of time patient remains clamped    Urine output: 1L  Drain: 780cc bile tinged ascitic fluid  Colostomy: 475 cc stool with gas in appliance     Objective   GEN: Appears tired, awake, conversant.  RESP: non-labored breathing on room air  CV: regular rate  GI: soft, nontender, non-peritonitic, moderately distended. Drain with bile-tinged ascites. Ostomy pink, healthy. Gas in appliance. Incisions healed well, well approximated without drainage, erythema, or infected appearance.   : voiding spontaneously.   MSK: no evidence of bilateral lower extremity edema  NEURO: alert and conversant, A&O x 2-3, re-directable  SKIN: jaundiced, warm and dry. Femoral access site from IR procedure with bleeding from site. No obvious bulge/pulsation/hematoma/seroma palpable on exam. Pressure dressing re-applied.    Last Recorded Vitals  Heart Rate:  [79-87]   Temp:  [36.8 °C (98.2 °F)-37.3 °C (99.1 °F)]   Resp:  [16-18]   BP: (129-137)/(65-77)   Weight:  [104 kg (229 lb 1.6 oz)]   SpO2:  [94 %-99 %]      Intake/Output Last 24 Hours:      Intake/Output Summary (Last 24 hours) at 9/5/2025 0727  Last data filed at 9/5/2025 0346  Gross per 24 hour   Intake 3001.75 ml   Output 2280 ml   Net 721.75 ml        Relevant Results  Lab Date: 09/05/25       8.9    7.5>-----<45         27.7   142  110  34                  ----------------<100     3.8  23  1.12          Ca 10.6 Phos 2.6 Mg 2.34       ALT 39  AlkPhos 60 tBili 28.3         Imaging:   CT angio abdomen pelvis w and or wo IV IV contrast  Result Date: 9/5/2025  Interpreted  By:  Camron Mathew and Liu Scott STUDY: CT ANGIO ABDOMEN PELVIS W AND/OR WO IV IV CONTRAST; 9/4/2025 9:34 pm   INDICATION: Signs/Symptoms:Triple phase - c/f PSA vs fistula vs active extrav in R groin access site from splenic artery embolization. Ref CT a/p from 8/31 and groin u/s from 9/4. [id number]   COMPARISON: CT abdomen pelvis 08/31/2025 and ultrasound 09/04/2025   ACCESSION NUMBER(S): [id number]   ORDERING CLINICIAN: SUPA ESPINO   PROCEDURE: CT ANGIOGRAM OF THE ABDOMEN AND PELVIS   TECHNIQUE: High-resolution contrast-enhanced helical CT of the abdomen and pelvis was performed, timed to the arterial phase. 3-D processing was performed by the physician on an independent work station, with MIP and volume-rendering techniques. Total of 75 ml of omnipaque 350 was injected intravenously during the examination. The study was performed without oral contrast. The patient tolerated the injection without complications.   FINDINGS: VASCULAR FINDINGS:   Images of the abdominal aorta demonstrate diffuse atherosclerotic change without significant focal stenosis or aneurysm.   Celiac artery demonstrates no significant focal stenosis.   Superior mesenteric artery demonstrates no significant focal stenosis.   Inferior mesenteric artery demonstrates no significant focal stenosis.   There is a single right renal artery. Right renal artery demonstrates no significant focal stenosis.   There is a single left renal artery. Left renal artery demonstrates no significant focal stenosis.   Right common iliac artery  is patent with no significant stenosis. Right external iliac artery is patent with no significant stenosis. Right internal iliac artery is patent with no significant stenosis. Right common femoral artery demonstrates small pseudoaneurysm measuring 0.5 X 0.4 X 0.5 cm (Series 7, Image 895) and (Series 902, Image 133). Faint region of hyperintensity on the delayed phase imaging suggesting minimal amount of blood products. The  visualized right profunda femoris artery is patent with no significant stenosis. The visualized right superficial femoral artery is patent with no significant stenosis.   Left common iliac artery is patent with no significant stenosis. Left external iliac artery is patent with no significant stenosis. Left internal iliac artery is patent with no significant stenosis. Left common femoral artery is patent with no significant stenosis. The visualized left profunda femoris artery is patent with no significant stenosis. The visualized left superficial femoral artery is patent with no significant stenosis.   NONVASCULAR FINDINGS:   CHEST: VISUALIZED CHEST: Moderate bilateral pleural effusions with associated compressive atelectasis. Heart is normal size. No pericardial effusion.   ABDOMEN:   LIVER: Postsurgical changes right sided hepatectomy. No focal lesion.   BILE DUCTS: No significant abnormality.   GALLBLADDER: Not definitely visualized may be surgically absent or contracted.   PANCREAS: No significant abnormality.   SPLEEN: Redemonstration of splenic infarcts.   ADRENAL GLANDS: No significant abnormality.   KIDNEYS AND URETERS: No significant abnormality.   PELVIS:   BLADDER: No significant abnormality.   REPRODUCTIVE ORGANS: No significant abnormality.   BOWEL: Stomach is unremarkable appearance small and large bowel is normal caliber without evidence of wall thickening. Postsurgical changes of left mid abdominal end colostomy. Appendix is surgically absent. There is interval resolution of previously described pneumatosis.   VESSELS: The IVC, portal vein, SMV, and splenic vein are patent without evidence of thrombosis. Multiple embolization coils in stable positioning.   PERITONEUM/RETROPERITONEUM/LYMPH NODES: Stable positioning of the right-sided percutaneous drain terminating along the lateral margin of the hepatectomy bed. Small volume abdominopelvic ascites, increased from prior. No discrete walled-off fluid  collection. Diffuse mesenteric stranding which is likely reactive. No abdominopelvic lymphadenopathy.   BONES AND ABDOMINAL WALL: No suspicious osseous lesions are identified. Diffuse body wall edema.       1. Right common femoral artery pseudoaneurysm measuring 0.5 X 0.4 X 0.5 cm corresponding to pseudoaneurysm seen on recent groin ultrasound. Small amount of hyperdense blood products of the groin without evidence of joel active extravasation. 2. Extensive postsurgical changes as above with interval increase in abdominopelvic ascites. 3. Additional chronic/incidental findings detailed above.   I personally reviewed the images/study and I agree with the findings as stated by resident physician Ezequiel Justice MD. This study was interpreted at [facility], [place], [place].   MACRO: The significance of the finding was communicated with via telephone to ordering team  by  Ezequiel Justice on 9/5/2025 at 1:53 a.m.. (**-DONF-**) Findings:  See findings.   Signed by: Camron Mathew 9/5/2025 6:04 AM Dictation workstation:   [id number]    US pelvis groin pseudoaneurysm  Result Date: 9/4/2025  Interpreted By:  Ken Palacios and Dulla Kireeti STUDY: US PELVIS GROIN PSEUDOANEURYSM; ;  9/4/2025 12:12 pm   INDICATION: Signs/Symptoms:Pseudoaneurysm concern.   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   ACCESSION NUMBER(S): [id number]   ORDERING CLINICIAN: CATALINA SOTO   TECHNIQUE: Focused ultrasonographic evaluation of the right. Grayscale imaging, color Doppler, and spectral Doppler were utilized. Static images were sent for interpretation.   FINDINGS: There is a 3 mm focal outpouching along the superficial margin of the right femoral artery in the area of clinical interest in the right groin, with abnormal flow, suggesting a pseudo aneurysm. Additionally, there is abnormal color flow within the artery and vein in this location, suggesting  both arterial and venous flow within both vessels, which may reflect an arteriovenous fistula. There appears to be arterial waveforms also in the vein, although this may be artifactual. Multiphase CTA should be considered to further evaluate.       There is a 3 mm focal outpouching along the superficial margin of the right femoral artery in the area of clinical interest in the right groin, with abnormal flow, suggesting a pseudo aneurysm. Additionally, there is abnormal color flow within the artery and vein in this location, suggesting both arterial and venous flow within both vessels, which may reflect an arteriovenous fistula. There appears to be arterial waveforms also in the vein, although this may be artifactual. Multiphase CTA should be considered to further evaluate.   I personally reviewed the images/study and I agree with the findings as stated by Kirstin Quinteros MD, PGY-3 this study was interpreted at [facility], [place], [place].   MACRO: None   Signed by: Ken Palacios 9/4/2025 4:22 PM Dictation workstation:   [id number]    Bedside PICC Imaging  Result Date: 9/3/2025  These images are not reportable by radiology and will not be interpreted by  Radiologists.      Assessment:  Roma Corral is a 70 y.o. male with history of rectal cancer with mets to the liver, CAD (2021 NSTEMI), DM2, HTN who is s/p right extended hepatectomy for metastatic rectal cancer on 7/29 c/b worsening liver function.     Plan:  Neuro: Dilaudid 0.4 mg/0.6 mg IV, every 4 hours as needed, 0.2 mg for breakthrough  CV: Vitals q8hrs, telemetry.  - Echo completed 9/2 showing EF reduced to 35% with complete apical akinesis.   - nitro PRN CP  - Will start metop 12.5mg PO, BID per cards  Pulm: Encourage IS, wean oxygen  GI: NPO, cont lactulose 15ml BID, PPI, zofran prn, cont TPN   /Renal: nephrology consult: on D5W at 150ml/hr for hypernatremia. Cont albumin (25 g, IV, scheduled every 6 hours). Will  continue with clamping drain for 8 hours today, and unclamp for 1 hour, to encourage resorption of ascitic fluid -- will stop if patient becomes symptomatic; BID albumin  Heme: Vitamin K (5 mg, IV, every 48 hours).   - R groin ultrasound and CTA c/f 5mm R CFA PSA. Will obtain repeat ultrasound in 1 week's time per vascular surgery  Endo: SSI with TPN  ID: Empiric Zosyn and Diflucan (started 8/11 and 8/19, respectively, end date pending given patient's clinical status).   DVT ppx: Holding heparin 5000u q8hr, cont SCDs    - Conversation regarding goals of care with patient's wife may need to extend to hospice; will plan to have further discussion with patient's wife today. As of now, will remain FULL CODE.     Dispo: RNF    Patient was discussed with Dr. Erazo.    Emily Neumann MD, MSc  Hepatobiliary Surgery and Surgical Oncology  [place], [phone]

## 2025-09-05 NOTE — CONSULTS
Wound Care Consult     Visit Date: 9/5/2025      Patient Name: Roma Corral         MRN: [id number]             Reason for Consult: pouch change      Wound History: ostomy present on admission, currently not at baseline mental state and unable to change pouch without assistance.       Assessment:        Colostomy Loop LUQ (Active)   Placement Date/Time: 07/02/24 1416   Hand Hygiene Completed: Yes  Colostomy Type: Loop  Location: LUQ   Number of days: 429      Colostomy Loop LUQ (Active)   Present on Admission to Healthcare Facility [facility] 09/05/25 1300   Stomal Appliance Changed;1 piece 09/05/25 1300   Site/Stoma Assessment [place] 09/05/25 1300   Peristomal Assessment Maceration 09/05/25 1300   Treatment Pouch change;Site care 09/05/25 1300   Drainage Characteristics Brown 09/05/25 1300   Output (mL) 50 mL 09/05/25 1300   Intake (ml) 0 ml 08/14/25 1943     Ostomy type: colostomy   color: pink moist      protruding: yes, budded  John: none  Functioning: yes, loose brown stool  Mucocutaneous junction: intact  Peristomal skin: mild maceration   Pouching: RED Karrie 1-piece pouch with ostomy ring  Ostomy Education: TIGRE, pt with altered mental status due to current condition. At baseline, pt is independent with pouch changes. Will see twice weekly while admitted until able to resume pouching independently.   Plan: assess stoma/pouching    Wound Plan:     See twice weekly until able to resume independent ostomy care.       Reva Gottlieb RN  9/5/2025  2:12 PM

## 2025-09-05 NOTE — CARE PLAN
Problem: Pain - Adult  Goal: Verbalizes/displays adequate comfort level or baseline comfort level  Outcome: Progressing     Problem: Safety - Adult  Goal: Free from fall injury  Outcome: Progressing     Problem: Discharge Planning  Goal: Discharge to home or other facility with appropriate resources  Outcome: Progressing     Problem: Chronic Conditions and Co-morbidities  Goal: Patient's chronic conditions and co-morbidity symptoms are monitored and maintained or improved  Outcome: Progressing     Problem: Nutrition  Goal: Nutrient intake appropriate for maintaining nutritional needs  Outcome: Progressing     Problem: Skin  Goal: Decreased wound size/increased tissue granulation at next dressing change  Outcome: Progressing  Flowsheets (Taken 9/5/2025 0438)  Decreased wound size/increased tissue granulation at next dressing change:   Promote sleep for wound healing   Utilize specialty bed per algorithm  Goal: Participates in plan/prevention/treatment measures  Outcome: Progressing  Flowsheets (Taken 9/5/2025 0438)  Participates in plan/prevention/treatment measures:   Increase activity/out of bed for meals   Elevate heels  Goal: Prevent/manage excess moisture  Outcome: Progressing  Flowsheets (Taken 9/5/2025 0438)  Prevent/manage excess moisture:   Monitor for/manage infection if present   Cleanse incontinence/protect with barrier cream  Goal: Prevent/minimize sheer/friction injuries  Outcome: Progressing  Flowsheets (Taken 9/5/2025 0438)  Prevent/minimize sheer/friction injuries:   Utilize specialty bed per algorithm   Turn/reposition every 2 hours/use positioning/transfer devices   Increase activity/out of bed for meals  Goal: Promote/optimize nutrition  Outcome: Progressing  Flowsheets (Taken 9/5/2025 0438)  Promote/optimize nutrition:   Monitor/record intake including meals   Offer water/supplements/favorite foods   Assist with feeding  Goal: Promote skin healing  Outcome: Progressing  Flowsheets (Taken  9/5/2025 0438)  Promote skin healing:   Assess skin/pad under line(s)/device(s)   Ensure correct size (line/device) and apply per  instructions

## 2025-09-06 ASSESSMENT — PAIN - FUNCTIONAL ASSESSMENT
PAIN_FUNCTIONAL_ASSESSMENT: 0-10
PAIN_FUNCTIONAL_ASSESSMENT: UNABLE TO SELF-REPORT
PAIN_FUNCTIONAL_ASSESSMENT: UNABLE TO SELF-REPORT
PAIN_FUNCTIONAL_ASSESSMENT: 0-10

## 2025-09-06 ASSESSMENT — COGNITIVE AND FUNCTIONAL STATUS - GENERAL
MOVING TO AND FROM BED TO CHAIR: A LITTLE
WALKING IN HOSPITAL ROOM: A LOT
DRESSING REGULAR UPPER BODY CLOTHING: A LOT
STANDING UP FROM CHAIR USING ARMS: A LOT
MOVING FROM LYING ON BACK TO SITTING ON SIDE OF FLAT BED WITH BEDRAILS: A LITTLE
WALKING IN HOSPITAL ROOM: A LOT
TOILETING: A LOT
PERSONAL GROOMING: A LOT
MOBILITY SCORE: 15
TURNING FROM BACK TO SIDE WHILE IN FLAT BAD: A LITTLE
TOILETING: A LOT
TURNING FROM BACK TO SIDE WHILE IN FLAT BAD: A LITTLE
CLIMB 3 TO 5 STEPS WITH RAILING: A LOT
DAILY ACTIVITIY SCORE: 13
DAILY ACTIVITIY SCORE: 13
MOVING FROM LYING ON BACK TO SITTING ON SIDE OF FLAT BED WITH BEDRAILS: A LITTLE
HELP NEEDED FOR BATHING: A LOT
DRESSING REGULAR UPPER BODY CLOTHING: A LOT
STANDING UP FROM CHAIR USING ARMS: A LOT
DRESSING REGULAR LOWER BODY CLOTHING: A LOT
CLIMB 3 TO 5 STEPS WITH RAILING: A LOT
EATING MEALS: A LITTLE
MOBILITY SCORE: 15
DRESSING REGULAR LOWER BODY CLOTHING: A LOT
HELP NEEDED FOR BATHING: A LOT
PERSONAL GROOMING: A LOT
EATING MEALS: A LITTLE
MOVING TO AND FROM BED TO CHAIR: A LITTLE

## 2025-09-06 ASSESSMENT — PAIN SCALES - GENERAL
PAINLEVEL_OUTOF10: 8
PAINLEVEL_OUTOF10: 7
PAINLEVEL_OUTOF10: 6
PAINLEVEL_OUTOF10: 6
PAINLEVEL_OUTOF10: 7
PAINLEVEL_OUTOF10: 6
PAINLEVEL_OUTOF10: 8

## 2025-09-06 ASSESSMENT — PAIN DESCRIPTION - LOCATION
LOCATION: ABDOMEN

## 2025-09-06 ASSESSMENT — PAIN DESCRIPTION - ORIENTATION: ORIENTATION: RIGHT

## 2025-09-06 NOTE — PROGRESS NOTES
Surgical Oncology Progress Note    Roma Corral is a 70 y.o. male with history of rectal cancer with mets to the liver, CAD (2021 NSTEMI), DM2, HTN who is s/p right extended hepatectomy for metastatic rectal cancer on 7/29     Subjective   Overnight: No acute events, afebrile, vital signs are stable.  Patient remains on room air and normal sinus rhythm.  Alert and oriented. Continuing to have pressure applied to R groin. No hematoma on exam.    Urine output: 1.2L  Drain: 1300 cc bile tinged ascitic fluid  Colostomy: 250 cc stool with gas in appliance     Objective   GEN: Appears tired, awake, conversant.  RESP: non-labored breathing on room air  CV: regular rate  GI: soft, nontender, non-peritonitic, moderately distended. Drain with bile-tinged ascites. Ostomy pink, healthy. Gas in appliance. Incisions healed well, well approximated without drainage, erythema, or infected appearance.   : voiding spontaneously.   MSK: no evidence of bilateral lower extremity edema  NEURO: alert and conversant, A&O x 2-3, re-directable  SKIN: jaundiced, warm and dry. Femoral access site from IR procedure without bleeding from site. No obvious bulge/pulsation/hematoma/seroma palpable on exam. Pressure dressing re-applied using LR bag    Last Recorded Vitals  Heart Rate:  [81-87]   Temp:  [36.8 °C (98.2 °F)-37.2 °C (99 °F)]   Resp:  [16-18]   BP: (123-136)/(68-73)   Weight:  [101 kg (223 lb 11.2 oz)]   SpO2:  [96 %]      Intake/Output Last 24 Hours:      Intake/Output Summary (Last 24 hours) at 9/6/2025 1316  Last data filed at 9/6/2025 1248  Gross per 24 hour   Intake 1202.97 ml   Output 2325 ml   Net -1122.03 ml        Relevant Results  Lab Date: 09/06/25       9.0    7.8>-----<39         28.0   143  113  38                  ----------------<114     4.0  22  1.22          Ca 10.8 Phos 2.6 Mg 2.46       ALT 50  AlkPhos 70 tBili 29.3         Imaging:   Vascular US lower extremity pseudoaneurysm duplex right  Result Date:  9/5/2025  Preliminary Cardiology Report           [facility] [id number] [place] [address]   Tel [phone] and Fax [phone]            Preliminary Vascular Lab Report  [facility] US LOWER EXTREMITY PSEUDOANEURYSM DUPLEX RIGHT  Patient Name:      INDU DOSHI   Reading Physician:  [id number] Andreia Sousa Study Date:        9/5/2025     Ordering Physician: [id number] SUPA ESPINO MRN/PID:           [id number]     Technologist:       Emily Lynch RVT Accession#:        [id number] Technologist 2: Date of Birth/Age: 1955     Encounter#:         [id number] Gender:            M Admission Status:  Inpatient    Location Performed: [facility]  Diagnosis/ICD: Stricture of artery-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  PRELIMINARY CONCLUSIONS: Right Lower Arterial: PSA off CFA artery. Unable to get to and fro flow in the PSA or in the track. PSA measures .4 cm x 1.6 cm with a depth 1.78 cm. Active flow area measures .28 cm x .47 cm. Pseudo Aneurysm: There is no evidence of AVF. 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 No evidence of DVT in the visualized vessels.  Imaging & Doppler Findings:  Right       Compressible Thrombus   Flow CFV             Yes        None   Pulsatile FV Proximal     Yes        None   Pulsatile   Right                     Left   PSV                       cm/s         cm/s         cm/s Profunda Proximal 146 cm/s   SFA Proximal  Left Pseudo Aneurysm                     Diam   Depth Left Pseudoaneurysm 0.4 cm 1.6 cm                                  Diam    Length Left Pseudoaneurysm Track Length 0.18 mm 0.2 cm  VASCULAR PRELIMINARY REPORT completed by Emily Lynch RVT on 9/5/2025 at 5:32:53 PM  ** Final **     CT angio abdomen pelvis w and or wo IV IV contrast  Result Date: 9/5/2025  Interpreted By:  Camron Mathew  and Vinh Nieves STUDY: CT ANGIO  ABDOMEN PELVIS W AND/OR WO IV IV CONTRAST; 9/4/2025 9:34 pm   INDICATION: Signs/Symptoms:Triple phase - c/f PSA vs fistula vs active extrav in R groin access site from splenic artery embolization. Ref CT a/p from 8/31 and groin u/s from 9/4. [id number]   COMPARISON: CT abdomen pelvis 08/31/2025 and ultrasound 09/04/2025   ACCESSION NUMBER(S): [id number]   ORDERING CLINICIAN: SUPA ESPINO   PROCEDURE: CT ANGIOGRAM OF THE ABDOMEN AND PELVIS   TECHNIQUE: High-resolution contrast-enhanced helical CT of the abdomen and pelvis was performed, timed to the arterial phase. 3-D processing was performed by the physician on an independent work station, with MIP and volume-rendering techniques. Total of 75 ml of omnipaque 350 was injected intravenously during the examination. The study was performed without oral contrast. The patient tolerated the injection without complications.   FINDINGS: VASCULAR FINDINGS:   Images of the abdominal aorta demonstrate diffuse atherosclerotic change without significant focal stenosis or aneurysm.   Celiac artery demonstrates no significant focal stenosis.   Superior mesenteric artery demonstrates no significant focal stenosis.   Inferior mesenteric artery demonstrates no significant focal stenosis.   There is a single right renal artery. Right renal artery demonstrates no significant focal stenosis.   There is a single left renal artery. Left renal artery demonstrates no significant focal stenosis.   Right common iliac artery  is patent with no significant stenosis. Right external iliac artery is patent with no significant stenosis. Right internal iliac artery is patent with no significant stenosis. Right common femoral artery demonstrates small pseudoaneurysm measuring 0.5 X 0.4 X 0.5 cm (Series 7, Image 895) and (Series 902, Image 133). Faint region of hyperintensity on the delayed phase imaging suggesting minimal amount of blood products. The visualized right profunda femoris artery is patent  with no significant stenosis. The visualized right superficial femoral artery is patent with no significant stenosis.   Left common iliac artery is patent with no significant stenosis. Left external iliac artery is patent with no significant stenosis. Left internal iliac artery is patent with no significant stenosis. Left common femoral artery is patent with no significant stenosis. The visualized left profunda femoris artery is patent with no significant stenosis. The visualized left superficial femoral artery is patent with no significant stenosis.   NONVASCULAR FINDINGS:   CHEST: VISUALIZED CHEST: Moderate bilateral pleural effusions with associated compressive atelectasis. Heart is normal size. No pericardial effusion.   ABDOMEN:   LIVER: Postsurgical changes right sided hepatectomy. No focal lesion.   BILE DUCTS: No significant abnormality.   GALLBLADDER: Not definitely visualized may be surgically absent or contracted.   PANCREAS: No significant abnormality.   SPLEEN: Redemonstration of splenic infarcts.   ADRENAL GLANDS: No significant abnormality.   KIDNEYS AND URETERS: No significant abnormality.   PELVIS:   BLADDER: No significant abnormality.   REPRODUCTIVE ORGANS: No significant abnormality.   BOWEL: Stomach is unremarkable appearance small and large bowel is normal caliber without evidence of wall thickening. Postsurgical changes of left mid abdominal end colostomy. Appendix is surgically absent. There is interval resolution of previously described pneumatosis.   VESSELS: The IVC, portal vein, SMV, and splenic vein are patent without evidence of thrombosis. Multiple embolization coils in stable positioning.   PERITONEUM/RETROPERITONEUM/LYMPH NODES: Stable positioning of the right-sided percutaneous drain terminating along the lateral margin of the hepatectomy bed. Small volume abdominopelvic ascites, increased from prior. No discrete walled-off fluid collection. Diffuse mesenteric stranding which is  likely reactive. No abdominopelvic lymphadenopathy.   BONES AND ABDOMINAL WALL: No suspicious osseous lesions are identified. Diffuse body wall edema.       1. Right common femoral artery pseudoaneurysm measuring 0.5 X 0.4 X 0.5 cm corresponding to pseudoaneurysm seen on recent groin ultrasound. Small amount of hyperdense blood products of the groin without evidence of joel active extravasation. 2. Extensive postsurgical changes as above with interval increase in abdominopelvic ascites. 3. Additional chronic/incidental findings detailed above.   I personally reviewed the images/study and I agree with the findings as stated by resident physician Ezequiel Justice MD. This study was interpreted at [facility], [place], [place].   MACRO: The significance of the finding was communicated with via telephone to ordering team  by  Ezequiel Justice on 9/5/2025 at 1:53 a.m.. (**-DONF-**) Findings:  See findings.   Signed by: Camron Mathew 9/5/2025 6:04 AM Dictation workstation:   [id number]      Assessment:  Roma Corral is a 70 y.o. male with history of rectal cancer with mets to the liver, CAD (2021 NSTEMI), DM2, HTN who is s/p right extended hepatectomy for metastatic rectal cancer on 7/29 c/b worsening liver function.     Plan:  Neuro: Dilaudid 0.4 mg/0.6 mg IV, every 4 hours as needed, 0.2 mg for breakthrough  CV: Vitals q8hrs, telemetry.  - Echo completed 9/2 showing EF reduced to 35% with complete apical akinesis.   - nitro PRN CP  - metop 25 mg PO, BID per cards  Pulm: Encourage IS, wean oxygen  GI: NPO, cont lactulose 15ml BID, PPI, zofran prn, cont TPN. Initiated Actigal 250 mg BID  /Renal: nephrology consult: on D5W at 150ml/hr for hypernatremia. Cont albumin (25 g, IV, scheduled every 6 hours). Will continue with clamping drain for 8 hours today, and unclamp for 1 hour, to encourage resorption of ascitic fluid -- will stop if patient becomes symptomatic; BID albumin  Heme: Vitamin K (5 mg, IV,  every 48 hours).   - R groin ultrasound and CTA c/f 5mm R CFA PSA. Stable on imaging yesterday Vascular surgery recommend continued compression and repeat Duplex 9/8 per vascular surgery  Endo: SSI with TPN  ID: Empiric Zosyn and Diflucan (started 8/11 and 8/19, respectively, end date pending given patient's clinical status).   DVT ppx: Holding heparin 5000u q8hr, cont SCDs  -Stressed the importance of getting up to chair and ambulating with patient    - Conversation regarding goals of care with patient's wife may need to extend to hospice; will plan to have further discussion with patient's wife. As of now, will remain FULL CODE.     Dispo: RNF    Patient was discussed with Dr. Mckeon.    Beni Zamarripa DO  Hepatobiliary Surgery and Surgical Oncology  [place], [phone]

## 2025-09-06 NOTE — PROGRESS NOTES
VASCULAR SURGERY PROGRESS NOTE    Subjective   Seen and examined - no acute events overnight. He denies any pain today.     Objective   Vitals:  Heart Rate:  [81-89]   Temp:  [36.8 °C (98.2 °F)-37.2 °C (99 °F)]   Resp:  [16-18]   BP: (124-136)/(68-73)   Weight:  [101 kg (223 lb 11.2 oz)]   SpO2:  [96 %-97 %]     Physical Exam:  Constitutional: In no acute distress.  Cardiovascular: Rate: normal. Rhythm: normal.  Pulmonary: On room air.  Abdomen: soft and not distended.  Extremities: Sensorimotor intact. R groin site with overlying bruising but no skin changes, palpable femoral pulse no obvious pulsatile mass   Pulse Exam: Palpable pulses DP bilaterally.    Labs:  Results from last 7 days   Lab Units 09/06/25 0544 09/05/25 0318 09/04/25  0450   WBC AUTO x10*3/uL 7.8 7.5 8.3   HEMOGLOBIN g/dL 9.0* 8.9* 9.2*   PLATELETS AUTO x10*3/uL 39* 45* 47*      Results from last 7 days   Lab Units 09/06/25  0544 09/05/25 0318 09/04/25  0450 09/03/25  0505   SODIUM mmol/L 143 142 142 142   POTASSIUM mmol/L 4.0 3.8 4.1 4.1   CHLORIDE mmol/L 113* 110* 110* 111*   CO2 mmol/L 22 23 22 20*   BUN mg/dL 38* 34* 36* 33*   CREATININE mg/dL 1.22 1.12 1.26 1.36*   GLUCOSE mg/dL 114* 100* 119* 116*   MAGNESIUM mg/dL 2.46* 2.34 2.34 2.17   PHOSPHORUS mg/dL  --   --   --  2.6      Results from last 7 days   Lab Units 09/05/25 0318 09/04/25  1458 09/04/25  0642   INR  2.0* 2.1* 2.0*   PROTIME seconds 21.6* 23.1* 22.0*   APTT seconds 44* 49* 49*            Assessment/Plan   Roma Corral is 70 y.o. male with history of CAD, NSTEMI in 2021, DM2, HTN, rectal cancer with liver metastases who underwent an extended hepatectomy 7/29 with a complex postoperative course including worsening liver function, he underwent splenic artery coil embolization on 8/26 c/b R groin femoral access site bleeding on 8/29 and at that time the groin US was negative for pseudoaneurysm. On 9/4 he had continued bleeding from R groin site showing PSA on US and CTA.  Vascular Surgery consulted for R groin pseudoaneurysm. Duplex on 9/5 shows persistent but smaller pseudoaneurysm.      Plan:  Recommend pressure and repeat duplex 9/8  Vascular surgery to continue to follow    Case discussed with attending Dr. Viviane Dallas MD   Team Pager [phone]  09/06/25  8:53 AM

## 2025-09-06 NOTE — CARE PLAN
The clinical goals for the shift include pt will remain hds and pain controlled      Problem: Pain - Adult  Goal: Verbalizes/displays adequate comfort level or baseline comfort level  Outcome: Progressing     Problem: Safety - Adult  Goal: Free from fall injury  Outcome: Progressing     Problem: Discharge Planning  Goal: Discharge to home or other facility with appropriate resources  Outcome: Progressing     Problem: Chronic Conditions and Co-morbidities  Goal: Patient's chronic conditions and co-morbidity symptoms are monitored and maintained or improved  Outcome: Progressing     Problem: Nutrition  Goal: Nutrient intake appropriate for maintaining nutritional needs  Outcome: Progressing     Problem: Skin  Goal: Decreased wound size/increased tissue granulation at next dressing change  Outcome: Progressing  Goal: Participates in plan/prevention/treatment measures  Outcome: Progressing  Goal: Prevent/manage excess moisture  Outcome: Progressing  Goal: Prevent/minimize sheer/friction injuries  Outcome: Progressing  Goal: Promote/optimize nutrition  Outcome: Progressing  Goal: Promote skin healing  Outcome: Progressing     Problem: Skin  Goal: Participates in plan/prevention/treatment measures  Outcome: Progressing     Problem: Skin  Goal: Decreased wound size/increased tissue granulation at next dressing change  Outcome: Progressing     Problem: Skin  Goal: Prevent/manage excess moisture  Outcome: Progressing     Problem: Skin  Goal: Prevent/minimize sheer/friction injuries  Outcome: Progressing     Problem: Skin  Goal: Promote/optimize nutrition  Outcome: Progressing     Problem: Skin  Goal: Promote skin healing  Outcome: Progressing

## 2025-09-07 ASSESSMENT — PAIN - FUNCTIONAL ASSESSMENT
PAIN_FUNCTIONAL_ASSESSMENT: 0-10
PAIN_FUNCTIONAL_ASSESSMENT: UNABLE TO SELF-REPORT
PAIN_FUNCTIONAL_ASSESSMENT: 0-10
PAIN_FUNCTIONAL_ASSESSMENT: UNABLE TO SELF-REPORT
PAIN_FUNCTIONAL_ASSESSMENT: 0-10

## 2025-09-07 ASSESSMENT — PAIN SCALES - GENERAL
PAINLEVEL_OUTOF10: 6
PAINLEVEL_OUTOF10: 4
PAINLEVEL_OUTOF10: 6
PAINLEVEL_OUTOF10: 4
PAINLEVEL_OUTOF10: 6

## 2025-09-07 ASSESSMENT — PAIN DESCRIPTION - ORIENTATION: ORIENTATION: MID

## 2025-09-07 ASSESSMENT — COGNITIVE AND FUNCTIONAL STATUS - GENERAL
PERSONAL GROOMING: A LOT
MOVING TO AND FROM BED TO CHAIR: A LITTLE
STANDING UP FROM CHAIR USING ARMS: A LITTLE
DAILY ACTIVITIY SCORE: 12
EATING MEALS: A LOT
HELP NEEDED FOR BATHING: A LOT
DRESSING REGULAR UPPER BODY CLOTHING: A LOT
WALKING IN HOSPITAL ROOM: A LOT
MOVING TO AND FROM BED TO CHAIR: A LITTLE
MOBILITY SCORE: 16
TURNING FROM BACK TO SIDE WHILE IN FLAT BAD: A LITTLE
DAILY ACTIVITIY SCORE: 12
MOBILITY SCORE: 16
TOILETING: A LOT
MOBILITY SCORE: 16
TOILETING: A LOT
MOVING FROM LYING ON BACK TO SITTING ON SIDE OF FLAT BED WITH BEDRAILS: A LITTLE
DRESSING REGULAR LOWER BODY CLOTHING: A LOT
WALKING IN HOSPITAL ROOM: A LOT
EATING MEALS: A LOT
TURNING FROM BACK TO SIDE WHILE IN FLAT BAD: A LITTLE
STANDING UP FROM CHAIR USING ARMS: A LITTLE
MOVING FROM LYING ON BACK TO SITTING ON SIDE OF FLAT BED WITH BEDRAILS: A LITTLE
DRESSING REGULAR LOWER BODY CLOTHING: A LOT
TURNING FROM BACK TO SIDE WHILE IN FLAT BAD: A LITTLE
HELP NEEDED FOR BATHING: A LOT
STANDING UP FROM CHAIR USING ARMS: A LITTLE
MOVING TO AND FROM BED TO CHAIR: A LITTLE
DRESSING REGULAR UPPER BODY CLOTHING: A LOT
PERSONAL GROOMING: A LOT
WALKING IN HOSPITAL ROOM: A LOT
CLIMB 3 TO 5 STEPS WITH RAILING: A LOT
MOVING FROM LYING ON BACK TO SITTING ON SIDE OF FLAT BED WITH BEDRAILS: A LITTLE
CLIMB 3 TO 5 STEPS WITH RAILING: A LOT
CLIMB 3 TO 5 STEPS WITH RAILING: A LOT

## 2025-09-07 ASSESSMENT — PAIN DESCRIPTION - LOCATION
LOCATION: ABDOMEN
LOCATION: ABDOMEN

## 2025-09-07 NOTE — PROGRESS NOTES
VASCULAR SURGERY PROGRESS NOTE    Subjective   Seen and examined - no acute events overnight. He denies any pain today.     Objective   Vitals:  Heart Rate:  [68-87]   Temp:  [36.4 °C (97.5 °F)-37.2 °C (98.9 °F)]   Resp:  [18]   BP: ()/(58-72)   Weight:  [101 kg (223 lb)]   SpO2:  [92 %-98 %]     Physical Exam:  Constitutional: In no acute distress.  Cardiovascular: Rate: normal. Rhythm: normal.  Pulmonary: On room air.  Abdomen: soft and not distended.  Extremities: Sensorimotor intact. R groin site with overlying bruising but no skin changes, palpable femoral pulse no obvious pulsatile mass   Pulse Exam: Palpable pulses DP bilaterally.    Labs:  Results from last 7 days   Lab Units 09/07/25 0306 09/06/25 0544 09/05/25 0318   WBC AUTO x10*3/uL 6.6 7.8 7.5   HEMOGLOBIN g/dL 7.8* 9.0* 8.9*   PLATELETS AUTO x10*3/uL 29* 39* 45*      Results from last 7 days   Lab Units 09/07/25  0306 09/06/25  0544 09/05/25  0318 09/04/25  0450 09/03/25  0505   SODIUM mmol/L 144 143 142   < > 142   POTASSIUM mmol/L 3.6 4.0 3.8   < > 4.1   CHLORIDE mmol/L 116* 113* 110*   < > 111*   CO2 mmol/L 20* 22 23   < > 20*   BUN mg/dL 34* 38* 34*   < > 33*   CREATININE mg/dL 1.13 1.22 1.12   < > 1.36*   GLUCOSE mg/dL 96 114* 100*   < > 116*   MAGNESIUM mg/dL 2.10 2.46* 2.34   < > 2.17   PHOSPHORUS mg/dL  --   --   --   --  2.6    < > = values in this interval not displayed.      Results from last 7 days   Lab Units 09/05/25 0318 09/04/25  1458 09/04/25  0642   INR  2.0* 2.1* 2.0*   PROTIME seconds 21.6* 23.1* 22.0*   APTT seconds 44* 49* 49*            Assessment/Plan   Roma Corral is 70 y.o. male with history of CAD, NSTEMI in 2021, DM2, HTN, rectal cancer with liver metastases who underwent an extended hepatectomy 7/29 with a complex postoperative course including worsening liver function, he underwent splenic artery coil embolization on 8/26 c/b R groin femoral access site bleeding on 8/29 and at that time the groin US was negative  for pseudoaneurysm. On 9/4 he had continued bleeding from R groin site showing PSA on US and CTA. Vascular Surgery consulted for R groin pseudoaneurysm. Duplex on 9/5 shows persistent but smaller pseudoaneurysm.      Plan:  Recommend pressure and repeat duplex 9/8  Vascular surgery to continue to follow    Case discussed with attending Dr. Viviane Dallas MD   Team Pager [phone]  09/07/25  9:45 AM

## 2025-09-07 NOTE — PROGRESS NOTES
Surgical Oncology Progress Note    Roma Corral is a 70 y.o. male with history of rectal cancer with mets to the liver, CAD (2021 NSTEMI), DM2, HTN who is s/p right extended hepatectomy for metastatic rectal cancer on 7/29     Subjective   Overnight: No acute events, afebrile, vital signs are stable.  Patient remains on room air and normal sinus rhythm.  Alert and oriented. Continuing to have pressure applied to R groin. No hematoma on exam.    Urine output: 600cc (1x unmeasured)  Drain: 1.3L bile tinged ascitic fluid  Colostomy: 575 cc stool with gas in appliance     Objective   GEN: Appears tired, awake, conversant.  RESP: non-labored breathing on room air  CV: regular rate  GI: soft, nontender, non-peritonitic, minimally distended. Drain with bile-tinged ascites. Ostomy pink, healthy. Gas in appliance. Incisions healed well, well approximated without drainage, erythema, or infected appearance.   : voiding spontaneously.   MSK: no evidence of bilateral lower extremity edema  NEURO: alert and conversant, A&O x 2-3, re-directable  SKIN: jaundiced, warm and dry. Femoral access site from IR procedure without bleeding from site. No obvious bulge/pulsation/hematoma/seroma palpable on exam. Pressure dressing re-applied using LR bag    Last Recorded Vitals  Heart Rate:  [68-87]   Temp:  [36.4 °C (97.5 °F)-37.2 °C (98.9 °F)]   Resp:  [18]   BP: ()/(58-72)   Weight:  [101 kg (223 lb)]   SpO2:  [92 %-98 %]      Intake/Output Last 24 Hours:      Intake/Output Summary (Last 24 hours) at 9/7/2025 0952  Last data filed at 9/7/2025 0936  Gross per 24 hour   Intake 1937.83 ml   Output 3025 ml   Net -1087.17 ml        Relevant Results  Lab Date: 09/07/25       7.8    6.6>-----<29         24.1   144  116  34                  ----------------<96     3.6  20  1.13          Ca 9.3 Phos 2.6 Mg 2.10       ALT 48  AlkPhos 55 tBili 26.1         Imaging:   Vascular US lower extremity pseudoaneurysm duplex right  Result  Date: 9/5/2025  Preliminary Cardiology Report           [facility] [id number] [place] [address]   Tel [phone] and Fax [phone]            Preliminary Vascular Lab Report  [facility] US LOWER EXTREMITY PSEUDOANEURYSM DUPLEX RIGHT  Patient Name:      ROMA DOSHI   Reading Physician:  [id number] Andreia Sousa Study Date:        9/5/2025     Ordering Physician: [id number] SUPA ESPINO MRN/PID:           [id number]     Technologist:       Emily Lynch RVT Accession#:        [id number] Technologist 2: Date of Birth/Age: 1955     Encounter#:         [id number] Gender:            M Admission Status:  Inpatient    Location Performed: [facility]  Diagnosis/ICD: 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  PRELIMINARY CONCLUSIONS: Right Lower Arterial: PSA off CFA artery. Unable to get to and fro flow in the PSA or in the track. PSA measures .4 cm x 1.6 cm with a depth 1.78 cm. Active flow area measures .28 cm x .47 cm. Pseudo Aneurysm: There is no evidence of AVF. 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 No evidence of DVT in the visualized vessels.  Imaging & Doppler Findings:  Right       Compressible Thrombus   Flow CFV             Yes        None   Pulsatile FV Proximal     Yes        None   Pulsatile   Right                     Left   PSV                       cm/s         cm/s         cm/s Profunda Proximal 146 cm/s   SFA Proximal  Left Pseudo Aneurysm                     Diam   Depth Left Pseudoaneurysm 0.4 cm 1.6 cm                                  Diam    Length Left Pseudoaneurysm Track Length 0.18 mm 0.2 cm  VASCULAR PRELIMINARY REPORT completed by Emily Lynch RVT on 9/5/2025 at 5:32:53 PM  ** Final **       Assessment:  Roma Doshi is a 70 y.o. male with history of rectal cancer with mets to the liver, CAD (2021 NSTEMI), DM2, HTN who is  s/p right extended hepatectomy for metastatic rectal cancer on 7/29 c/b worsening liver function.     Plan:  Neuro: Dilaudid 0.4 mg/0.6 mg IV, every 4 hours as needed, 0.2 mg for breakthrough  CV: Vitals q8hrs, telemetry.  - Echo completed 9/2 showing EF reduced to 35% with complete apical akinesis.   - nitro PRN CP  - metop 25 mg PO, BID per cards  Pulm: Encourage IS, wean oxygen  GI: NPO, cont lactulose 15ml BID, PPI, zofran prn, cont TPN.   - Actigal 250 mg BID  /Renal: nephrology consult: on D5W at 150ml/hr for hypernatremia. Cont albumin (25 g, IV, scheduled every 6 hours). Will continue with clamping drain for 8 hours today, and unclamp for 1 hour, to encourage resorption of ascitic fluid -- will stop if patient becomes symptomatic; BID albumin  Heme: Vitamin K (5 mg, IV, every 48 hours).   - R groin ultrasound and CTA c/f 5mm R CFA PSA. Stable on imaging yesterday Vascular surgery recommend continued compression and repeat Duplex 9/8 per vascular surgery  - Will monitor platelets, currently downtrended to 29  Endo: SSI with TPN  ID: Empiric Zosyn and Diflucan (started 8/11 and 8/19, respectively, end date pending given patient's clinical status).   DVT ppx: Holding heparin 5000u q8hr, cont SCDs  -Stressed the importance of getting up to chair and ambulating with patient.   - Will attempt to get patient to take in at least 2-3 ensure/boosts and consider reduction of TPN early this week    - Conversation regarding goals of care with patient's wife may need to extend to hospice; will plan to have further discussion with patient's wife. As of now, will remain FULL CODE.     Dispo: RNF    Patient was seen and discussed with Dr. Mckeon.    Emily Nemuann MD, MSc  Hepatobiliary Surgery and Surgical Oncology  [place], [phone]

## 2025-09-08 NOTE — CARE PLAN
The patient's goals for the shift include  pain management     The clinical goals for the shift include pt will remain HDS    Over the shift, the patient did make progress toward the following goals.       Problem: Pain - Adult  Goal: Verbalizes/displays adequate comfort level or baseline comfort level  Outcome: Progressing     Problem: Safety - Adult  Goal: Free from fall injury  Outcome: Progressing     Problem: Discharge Planning  Goal: Discharge to home or other facility with appropriate resources  Outcome: Progressing     Problem: Chronic Conditions and Co-morbidities  Goal: Patient's chronic conditions and co-morbidity symptoms are monitored and maintained or improved  Outcome: Progressing

## 2025-09-08 NOTE — PROGRESS NOTES
Acupuncture Visit:     Roma Corral was referred by KEV Neri  .  Session Information  Discipline: Acupuncture  Session Start Time: 0434  Session End Time: 0435  Visit Type: Follow-up visit  Conflict of Service : Declined service  Medical History Reviewed: I have reviewed pertinent medical history in EHR and contraindications are present.  History of Present Illness: Patient seeking support for cancer related sx  Additional Assessment Detail: Patient met at bedside resting following 2 previous attempts from IO team.  He declined an intervention today.  Number of staff members present: 1 (Sitter)    Pre-treatment Assessment  Unable to Assess Reason: Patient declined to answer              Provider reviewed plan for the acupuncture session, precautions and contraindications. Patient/guardian/hospital staff has given consent to treat with full understanding of what to expect during thesession. Before acupuncture began, provider explained to the patient to communicate at any time if the procedure was causing discomfort past their tolerance level. Patient agreed to advise acupuncturist. The acupuncturist counseled the patient on the risks of acupuncture treatment including pain, infection, bleeding, and no relief of pain. The patient was positioned comfortably. There was no evidence of infection at the site of needle insertions.       No annotated images are attached to the encounter.         Post-treatment Assessment  Unable to Assess Reason: Did not provide intervention    Evaluation/Recommendation/Follow-up  Total Session Time (min): 1 minutes      Massage Therapy / Acupuncture Note:

## 2025-09-08 NOTE — PROGRESS NOTES
VASCULAR SURGERY PROGRESS NOTE    Subjective   Seen and examined this morning. No acute events overnight. He denies any pain today.     Objective   Vitals:  Heart Rate:  [70-74]   Temp:  [36.6 °C (97.9 °F)-37.2 °C (99 °F)]   Resp:  [18]   BP: (104-117)/(60-63)   Weight:  [101 kg (222 lb 10.6 oz)]   SpO2:  [96 %-99 %]     Physical Exam:  Skin: Jaundice.  Cardiovascular: Rate: normal. Rhythm: normal.  Pulmonary: On room air.  Abdomen: soft and not distended.  Extremities: Sensorimotor intact. R groin site with overlying bruising but no skin changes, palpable femoral pulse no obvious pulsatile mass   Pulse Exam: Palpable pulses DP bilaterally.    Labs:  Results from last 7 days   Lab Units 09/08/25 0226 09/07/25  0306 09/06/25  0544   WBC AUTO x10*3/uL 6.7 6.6 7.8   HEMOGLOBIN g/dL 8.4* 7.8* 9.0*   PLATELETS AUTO x10*3/uL 28* 29* 39*      Results from last 7 days   Lab Units 09/08/25  0226 09/07/25  0306 09/06/25  0544 09/04/25  0450 09/03/25  0505   SODIUM mmol/L 142 144 143   < > 142   POTASSIUM mmol/L 4.0 3.6 4.0   < > 4.1   CHLORIDE mmol/L 111* 116* 113*   < > 111*   CO2 mmol/L 23 20* 22   < > 20*   BUN mg/dL 41* 34* 38*   < > 33*   CREATININE mg/dL 1.39* 1.13 1.22   < > 1.36*   GLUCOSE mg/dL 100* 96 114*   < > 116*   MAGNESIUM mg/dL 2.45* 2.10 2.46*   < > 2.17   PHOSPHORUS mg/dL  --   --   --   --  2.6    < > = values in this interval not displayed.      Results from last 7 days   Lab Units 09/05/25  0318 09/04/25  1458 09/04/25  0642   INR  2.0* 2.1* 2.0*   PROTIME seconds 21.6* 23.1* 22.0*   APTT seconds 44* 49* 49*            Assessment/Plan   Roma Corral is 70 y.o. male with history of CAD, NSTEMI in 2021, DM2, HTN, rectal cancer with liver metastases who underwent an extended hepatectomy 7/29 with a complex postoperative course including worsening liver function, he underwent splenic artery coil embolization on 8/26 c/b R groin femoral access site bleeding on 8/29 and at that time the groin US was negative  for pseudoaneurysm. On 9/4 he had continued bleeding from R groin site showing PSA on US and CTA. Vascular Surgery consulted for R groin pseudoaneurysm. Duplex on 9/5 shows persistent but smaller pseudoaneurysm.      Plan:  Continue pressure dressing to R groin.  Repeat pseudoaneurysm duplex today (ordered).,  Weekly pseudoaneurysm duplex scans during inpatient admission.  Vascular surgery to continue to follow    Case discussed with attending Dr. Dunphy Hassan Chamseddine, MD   Team Pager [phone]  09/08/25  8:39 AM

## 2025-09-08 NOTE — CARE PLAN
The clinical goals for the shift include pt will remain hds and pain controlled      Problem: Pain - Adult  Goal: Verbalizes/displays adequate comfort level or baseline comfort level  Outcome: Progressing     Problem: Safety - Adult  Goal: Free from fall injury  Outcome: Progressing     Problem: Discharge Planning  Goal: Discharge to home or other facility with appropriate resources  Outcome: Progressing     Problem: Chronic Conditions and Co-morbidities  Goal: Patient's chronic conditions and co-morbidity symptoms are monitored and maintained or improved  Outcome: Progressing     Problem: Nutrition  Goal: Nutrient intake appropriate for maintaining nutritional needs  Outcome: Progressing     Problem: Skin  Goal: Decreased wound size/increased tissue granulation at next dressing change  Outcome: Progressing  Goal: Participates in plan/prevention/treatment measures  Outcome: Progressing  Goal: Prevent/manage excess moisture  Outcome: Progressing  Goal: Prevent/minimize sheer/friction injuries  Outcome: Progressing  Goal: Promote/optimize nutrition  Outcome: Progressing  Goal: Promote skin healing  Outcome: Progressing

## 2025-09-08 NOTE — PROGRESS NOTES
Interval events:    - US 9/5 R femoral pseudoaneurysm persistent but smaller  - ORALIA based on labs this am  - BP's improving   - PLT 28, Hgb 8.4     Subjective  - No SOB or chest tightness  - Intermittent confusion, sitter at bedside  - refusing changing soiled bedding     Objective:  Vitals:    09/08/25 1233   BP: 116/81   Pulse: 61   Resp: 17   Temp: 36.7 °C (98.1 °F)   SpO2: 97%     Weight         9/5/2025  0346 9/6/2025  0301 9/7/2025  0455 9/7/2025  0500 9/8/2025  0312    Weight: 104 kg (229 lb 1.6 oz) 101 kg (223 lb 11.2 oz) 101 kg (223 lb) -- 101 kg (222 lb 10.6 oz)            Intake/Output Summary (Last 24 hours) at 9/8/2025 1434  Last data filed at 9/8/2025 1433  Gross per 24 hour   Intake 3041.58 ml   Output 2225 ml   Net 816.58 ml     Recent Results (from the past 24 hours)   POCT GLUCOSE    Collection Time: 09/07/25  2:58 PM   Result Value Ref Range    POCT Glucose 104 (H) 74 - 99 mg/dL   POCT GLUCOSE    Collection Time: 09/07/25  6:23 PM   Result Value Ref Range    POCT Glucose 106 (H) 74 - 99 mg/dL   Comprehensive Metabolic Panel    Collection Time: 09/08/25  2:26 AM   Result Value Ref Range    Glucose 100 (H) 74 - 99 mg/dL    Sodium 142 136 - 145 mmol/L    Potassium 4.0 3.5 - 5.3 mmol/L    Chloride 111 (H) 98 - 107 mmol/L    Bicarbonate 23 21 - 32 mmol/L    Anion Gap 12 10 - 20 mmol/L    Urea Nitrogen 41 (H) 6 - 23 mg/dL    Creatinine 1.39 (H) 0.50 - 1.30 mg/dL    eGFR 55 (L) >60 mL/min/1.73m*2    Calcium 11.1 (H) 8.6 - 10.6 mg/dL    Albumin 4.1 3.4 - 5.0 g/dL    Alkaline Phosphatase 63 33 - 136 U/L    Total Protein 5.0 (L) 6.4 - 8.2 g/dL     (H) 9 - 39 U/L    Bilirubin, Total 29.7 (H) 0.0 - 1.2 mg/dL    ALT 59 (H) 10 - 52 U/L   CBC    Collection Time: 09/08/25  2:26 AM   Result Value Ref Range    WBC 6.7 4.4 - 11.3 x10*3/uL    nRBC 0.0 0.0 - 0.0 /100 WBCs    RBC 2.55 (L) 4.50 - 5.90 x10*6/uL    Hemoglobin 8.4 (L) 13.5 - 17.5 g/dL    Hematocrit 26.4 (L) 41.0 - 52.0 %     (H) 80 - 100 fL     MCH 32.9 26.0 - 34.0 pg    MCHC 31.8 (L) 32.0 - 36.0 g/dL    RDW 28.0 (H) 11.5 - 14.5 %    Platelets 28 (LL) 150 - 450 x10*3/uL   Magnesium    Collection Time: 09/08/25  2:26 AM   Result Value Ref Range    Magnesium 2.45 (H) 1.60 - 2.40 mg/dL   POCT GLUCOSE    Collection Time: 09/08/25  6:35 AM   Result Value Ref Range    POCT Glucose 95 74 - 99 mg/dL   Vascular US lower extremity pseudoaneurysm duplex right    Collection Time: 09/08/25 10:46 AM   Result Value Ref Range    BSA 1.92 m2   POCT GLUCOSE    Collection Time: 09/08/25 11:17 AM   Result Value Ref Range    POCT Glucose 79 74 - 99 mg/dL     Inpatient Medications:  Scheduled Medications[1]  PRN Medications[2]  Continuous Medications[3]       Physical exam:  General: NAD, jaundice  Cardiac: RRR, regular S1 S2 , no murmur, no rub, no gallop  Pulm: No WOB, on room air  Vascular: Radial 2+ bilaterally  GI: Non distended  Extremities: no LE edema   Neuro: no focal neuro deficits, confused at times     Psych: appropriate mood and behavior   Skin: warm and dry     Telemetry 9/8/2025 (personally reviewed): SR 70-80s, occasional PVCs      Assessment/Plan   Rmoa Corral is a 70 y.o. male with history of CAD with NSTEMI (2021, no stent), anemia, gout, abdominal pain, HTN, DM2 and colon carcinoma metastatic to liver s/p open right hepatectomy 7/29. Course complicated by hypotension, liver failure, & chest tightness.    HFrEF 35%  Type II myocardial injury  - Troponin overall flat  - EKG TW changes V3V5  - Echo with reduced EF 35% (prior was 45-50% in 5/2025).   - Has underlying CAD but this could respresent a stress induced cardiomyopathy post surgery  - Defer any invasive ischemic evaluation given his acute post op state.  - Avoid aspirin since thrombocytopenia <50k and statin given liver failure. (Known thrombocytopenia since 1/2025)   - Continue metoprolol tartrate 25mg BID, transition to succinate when hemodynamics stable  - Initiate statin when able (abnormal LFT  with / ALT 59)  - Due to ORALIA unable to add further GDMT  - Goal Hgb >8 given hx of CAD   - Patient to follow his cardiologist Dr. Sharif Lau     Cardiology will continue to follow.     Patient case evaluated in collaboration with Dr. Dave Hall, APRN-CNP    Please call with any questions  General Cardiology Consult Pager: [phone] (weekday 7AM-6PM and weekend 7AM-2PM) and other: [phone]  EP Consult Pager: [phone] (weekday 7AM-6PM and weekend 7AM-2PM) and other: [phone]  CICU Fellow Pager: [phone] anytime  EP Device Nurse Pager: [phone] (weekday 7AM-4PM)  Advanced Heart Failure Consult Pager: [phone] anytime                   [1]   Scheduled medications   Medication Dose Route Frequency    albumin human  25 g intravenous BID    fluconazole  400 mg intravenous q24h    [Held by provider] heparin (porcine)  5,000 Units subcutaneous q8h    insulin lispro  0-10 Units subcutaneous q4h    lactated Ringer's  500 mL intravenous q8h    lactulose  10 g oral BID    metoprolol tartrate  25 mg oral BID    [Held by provider] morphine CR  30 mg oral q12h ALEX    pantoprazole  40 mg oral BID AC    phytonadione  5 mg intravenous q48h    piperacillin-tazobactam  3.375 g intravenous q6h    [Held by provider] polyethylene glycol  17 g oral Daily    thiamine  100 mg intravenous Daily    ursodiol  250 mg oral BID   [2]   PRN medications   Medication    alteplase    benzocaine-menthol    calcium carbonate    dextrose    dextrose    glucagon    HYDROmorphone    nitroglycerin    ondansetron    oxyCODONE    traMADol   [3]   Continuous Medications   Medication Dose Last Rate    Adult Clinimix Parenteral Nutrition Continuous  30 mL/hr 30 mL/hr (09/08/25 1331)

## 2025-09-08 NOTE — PROGRESS NOTES
Surgical Oncology Progress Note    Roma Corral is a 70 y.o. male with history of rectal cancer with mets to the liver, CAD (2021 NSTEMI), DM2, HTN who is s/p right extended hepatectomy for metastatic rectal cancer on 7/29     Subjective   Overnight: NAEON AF, VSS RA.   Confused Aox1 . Continuing to have pressure applied to R groin. No hematoma on exam. -n/v    Urine output: 1 L  Drain: 957ml bile tinged ascitic fluid  Colostomy: 575 cc stool with gas in appliance     Objective   GEN: Appears tired, awake, conversant.  RESP: non-labored breathing on room air  CV: regular rate  GI: soft, nontender, non-peritonitic, minimally distended. Drain with bile-tinged ascites. Ostomy pink, healthy. Gas in appliance. Incisions healed well, well approximated without drainage, erythema, or infected appearance.   : voiding spontaneously.   MSK: no evidence of bilateral lower extremity edema  NEURO: alert and speaking in broken sentences, A&O x1 confused, re-directable  SKIN: jaundiced, warm and dry. Femoral access site from IR procedure without bleeding from site. No obvious bulge/pulsation/hematoma/seroma palpable on exam. Pressure dressing re-applied using LR bag    Last Recorded Vitals  Heart Rate:  [61-73]   Temp:  [36.6 °C (97.9 °F)-37.3 °C (99.2 °F)]   Resp:  [17-18]   BP: (104-124)/(60-81)   Weight:  [101 kg (222 lb 10.6 oz)]   SpO2:  [96 %-99 %]      Intake/Output Last 24 Hours:      Intake/Output Summary (Last 24 hours) at 9/8/2025 1752  Last data filed at 9/8/2025 1543  Gross per 24 hour   Intake 2727.58 ml   Output 1525 ml   Net 1202.58 ml        Relevant Results  Lab Date: 09/08/25       8.4    6.7>-----<28         26.4   142  111  41                  ----------------<100     4.0  23  1.39          Ca 11.1 Phos 2.6 Mg 2.45       ALT 59  AlkPhos 63 tBili 29.7         Imaging:   Vascular US lower extremity pseudoaneurysm duplex right  Result Date: 9/8/2025  Preliminary Cardiology Report           [address]  [place] [address]   Tel [phone] and Fax [phone]            Preliminary Vascular Lab Report  VASC US LOWER EXTREMITY PSEUDOANEURYSM DUPLEX RIGHT  Patient Name:      ROMA DOSHI   Reading Physician:  [id number] Angel Antonio MD Study Date:        9/8/2025     Ordering Physician: [id number] SUPA ESPINO MRN/PID:           [id number]     Technologist:       Emily Lynch RVT Accession#:        [id number] Technologist 2: Date of Birth/Age: 1955     Encounter#:         [id number] Gender:            M Admission Status:  Inpatient    Location Performed: [facility]  Diagnosis/ICD: Stricture of artery-I77.1 Procedure/CPT: 00259 Peripheral artery Lower arterial Duplex limited  PRELIMINARY CONCLUSIONS: Pseudo Aneurysm: There is no evidence of AVF. 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 No evidence of DVT in the visualized vessels. PSA off the CFA measures .4 cm x 1.5 cm. Active area measures .13 cm x .23 cm. Unable able to visualize a track to measure.  Imaging & Doppler Findings:   Right                     Left   PSV                      PSV 89 cm/s          cm/s         cm/s         cm/s         cm/s Profunda Proximal 147 cm/s   SFA Proximal  Right Pseudo Aneurysm                      Diam   Depth Right Pseudoaneurysm 0.4 cm 1.6 cm Active Area          0.2 cm  VASCULAR PRELIMINARY REPORT completed by Emily Lynch RVT on 9/8/2025 at 10:45:52 AM  ** Final **       Assessment:  Roma Doshi is a 70 y.o. male with history of rectal cancer with mets to the liver, CAD (2021 NSTEMI), DM2, HTN who is s/p right extended hepatectomy for metastatic rectal cancer on 7/29 c/b worsening liver function. AF VSS, encouraging oral intake decreasing TPN to 1/2 maintance rate. Vasc Surgery Rt pseudoaneurysm non surgical due to coagulopathy.    Plan:  Neuro: Dilaudid 0.4 mg/0.6 mg IV, every 4 hours as needed, 0.2 mg for breakthrough    CV:  Vitals q8hrs, telemetry.  - Echo completed 9/2 showing EF reduced to 35% with complete apical akinesis.   - nitro PRN CP  - metop 25 mg PO, BID per cards    Pulm: Encourage IS, wean oxygen  GI: regular diet, cont lactulose 20ml BID, PPI, zofran prn, 1/2 TPN.   - Actigal 250 mg BID    /Renal: nephrology consult:  Cont albumin (25 g, IV, scheduled every 6 hours). Will continue with clamping drain for 8 hours today, and unclamp for 1 hour, to encourage resorption of ascitic fluid -- will stop if patient becomes symptomatic; BID albumin, LR bolus 1/2 drain output.     Heme:   - R groin ultrasound and CTA c/f 5mm R CFA PSA. Stable on imaging 9/8 Vascular surgery recommend continue to hold anticoagulation. Continue sandbag to R groin weekly doppler while inpatient - Will monitor platelets, currently downtrended to 29    Endo: SSI with 1/2TPN    ID: Empiric Zosyn and Diflucan (started 8/11 and 8/19, respectively, end date pending given patient's clinical status).     DVT ppx: Holding heparin 5000u q8hr, cont SCDs  -Stressed the importance of getting up to chair and ambulating with patient.   - Will attempt to get patient to take in at least 2-3 ensure/boosts     Dispo: RNF    Patient was seen and discussed with Dr. Kacey Pierre DO PGY1  Hepatobiliary Surgery and Surgical Oncology  [place], [id number]

## 2025-09-08 NOTE — PROGRESS NOTES
Acupuncture Visit:     Roma Corral was referred by KEV Neri  .  Session Information  Discipline: Acupuncture  Session Start Time: 0159  Session End Time: 0202  Visit Type: Follow-up visit  Conflict of Service : Declined service  Medical History Reviewed: I have reviewed pertinent medical history in EHR and contraindications are present.  History of Present Illness: Ongoing support for cancer related sx  Additional Assessment Detail: Patient met resting in bed following a previous attempt to see him.  He appeared to be unable to an intervention today but nodded affirmatively to to being seen tomorrow.  IO services will return during next service availability.  Number of staff members present: 1 (Sitter)    Pre-treatment Assessment  Unable to Assess Reason: Cognitive limitation              Provider reviewed plan for the acupuncture session, precautions and contraindications. Patient/guardian/hospital staff has given consent to treat with full understanding of what to expect during thesession. Before acupuncture began, provider explained to the patient to communicate at any time if the procedure was causing discomfort past their tolerance level. Patient agreed to advise acupuncturist. The acupuncturist counseled the patient on the risks of acupuncture treatment including pain, infection, bleeding, and no relief of pain. The patient was positioned comfortably. There was no evidence of infection at the site of needle insertions.       No annotated images are attached to the encounter.         Post-treatment Assessment  Unable to Assess Reason: Did not provide intervention    Evaluation/Recommendation/Follow-up  Total Session Time (min): 3 minutes      Massage Therapy / Acupuncture Note:

## 2025-09-08 NOTE — PROGRESS NOTES
Physical Therapy                 Therapy Communication Note    Patient Name: Roma Corral  MRN: [id number]  Department: [facility]  Room: 60/6013-A  Today's Date: 9/8/2025     Discipline: Physical Therapy    Missed Visit: PT Missed Visit: Yes     Missed Visit Reason: Missed Visit Reason:  (RN requested to hold therapy at this time, will reattempt as appropriate)    Missed Time: Attempt    Comment:

## 2025-09-09 NOTE — PROGRESS NOTES
09/09/25 1600   Discharge Planning   Living Arrangements Spouse/significant other   Support Systems Spouse/significant other   Type of Residence Private residence   Number of Stairs to Enter Residence 3   Number of Stairs Within Residence 14   Do you have animals or pets at home? Yes   Type of Animals or Pets 1 dog   Who is requesting discharge planning? Provider   Home or Post Acute Services Other (Comment)  (Hospice referral)   Type of Post Acute Facility Services Other (Comment)  (Hospice referral)   Expected Discharge Disposition [facility]   Does the patient need discharge transport arranged? Yes   [facility] coordination needed? Yes   Has discharge transport been arranged? No   Patient Choice   Provider Choice list and CMS website (https://medicare.gov/care-compare#search) for post-acute Quality and Resource Measure Data were provided and reviewed with: Family   Patient / Family choosing to utilize agency / facility established prior to hospitalization No     SW received call from pt's spouse, Ronda.  Spouse reports speaking with attending and was given option of SNF placement vs hospice.  SW discussed hospice care, philosophy, and services.  Spouse asked if pt was a candidate for an inpatient hospice unit.  SW explained pt would need to be assessed by a hospice agency to provide that answer.  SW provided choice of hospice agencies.  Spouse wishes for referral is made to [facility].  SW updated surgery team and hospice order will be placed.  SW will make referral [facility]  Support provided.    KHUSHI Porras

## 2025-09-09 NOTE — PROGRESS NOTES
Physical Therapy    Physical Therapy Treatment    Patient Name: Roma Corral  MRN: [id number]  Department: [facility]  Room: [facility]6013-A  Today's Date: 9/9/2025  Time Calculation  Start Time: 1502  Stop Time: 1527  Time Calculation (min): 25 min         Assessment/Plan   PT Assessment  PT Assessment Results: Decreased strength, Decreased endurance, Impaired balance, Decreased mobility, Decreased safety awareness, Impaired judgement, Decreased cognition  End of Session Communication: Bedside nurse  Assessment Comment: Pt initially only agreeable to B LE ther. ex. in supine. However, pt eventually agreeable to sit EOB with encouragement. Pt required Mod A and constant cues to sit EOB. Pt able to sit EOB with CGA. Pt appears lethargic throughout treatment and required increased stimuli and constant redirection to tasks. Pt may need updated PT rec pending future PT visits and further goals of care outcomes.  End of Session Patient Position: Bed, 3 rail up, Alarm off, caregiver present (sitter present)  PT Plan  Inpatient/Swing Bed or Outpatient: Inpatient  PT Plan  Treatment/Interventions: Bed mobility, Transfer training, Gait training, Stair training, Balance training, Strengthening, Endurance training, Range of motion, Therapeutic exercise, Therapeutic activity, Home exercise program  PT Plan: Ongoing PT  PT Frequency for Current Admission: 3 times per week during this acute inpatient hospitalization (per pt's last therapy treatment, pt progressing well with therapy and up and walking in hallway without assist, discussed with supervising PT, PT agrees to change pt frequency to 3x perweek)  PT Discharge Recommendations: Low intensity level of continued care (communicated with PT)  Equipment Recommended upon Discharge: Wheeled walker  PT Recommended Transfer Status: Stand by assist  PT - OK to Discharge: Yes    PT Visit Info:  PT Received On: 09/09/25     General Visit Information:   General  Family/Caregiver Present:  Yes  Caregiver Feedback: sitter present  Prior to Session Communication: Bedside nurse  Patient Position Received: Bed, 3 rail up, Alarm off, caregiver present (sitter present)  General Comment: Pt supine in bed upon arrival. Pt pleasantly confused, initially declining therapy but agreeable to do LE exercises.    Subjective   Precautions:  Precautions  Hearing/Visual Limitations: Hearing and vision WFL with glasses.  Medical Precautions: Abdominal precautions, Fall precautions  Post-Surgical Precautions: Abdominal surgery precautions     Date/Time Vitals Session Patient Position Pulse Resp SpO2 BP MAP (mmHg)    09/09/25 1502 During PT  --  79  --  --  --  --            Objective   Pain:  Pain Assessment  Pain Assessment: 0-10  0-10 (Numeric) Pain Score:  (Pt did not rate)  Cognition:  Cognition  Arousal/Alertness: Delayed responses to stimuli  Following Commands: Follows one step commands with repetition  Attention: Exceptions to WFL  Processing Speed: Delayed    Activity Tolerance:  Activity Tolerance  Endurance: Decreased tolerance for upright activites  Treatments:  Therapeutic Exercise  Therapeutic Exercise Performed: Yes  Therapeutic Exercise Activity 1: Supine B LE AROM/intermittent AAROM with fatigue QS, HS, hip abduction/adduction, AP x10 pt required increased stimuli to perform, frequent rest breaks and redirection to task.  Therapeutic Exercise Activity 2: Seated B LE AROM LAQ x10 pt with intermittent sudden acute LE weakness requiring multiple attempts to complete the exercise with full ROM.    Therapeutic Activity  Therapeutic Activity Performed: Yes  Therapeutic Activity 1: Pt sat EOB for approx. 6 minutes with CGA. Pt able to reach outside G[place] for object on the table with CGA and no LOB. Pt sits with B UE support and rounded shoulders, FF posture  Therapeutic Activity 2: While sitting EOB, pt attempted to drink from a cup but unable, pt dropped the cup multiple times secondary to sudden B UE weakness.  RN aware.    Bed Mobility  Bed Mobility: Yes  Bed Mobility 1  Bed Mobility 1: Supine to sitting  Level of Assistance 1: Moderate assistance  Bed Mobility Comments 1: constant cues for sequencing  Bed Mobility 2  Bed Mobility  2: Sitting to supine  Level of Assistance 2: Minimum assistance  Bed Mobility 3  Bed Mobility 3: Scooting  Level of Assistance 3: Close supervision  Bed Mobility Comments 3: toward EOB, increased time to complete  Bed Mobility 4  Bed Mobility 4: Scooting  Level of Assistance 4: Dependent, +2  Bed Mobility Comments 4: boost    Outcome Measures:  [facility] Basic Mobility  Turning from your back to your side while in a flat bed without using bedrails: A little  Moving from lying on your back to sitting on the side of a flat bed without using bedrails: A lot  Moving to and from bed to chair (including a wheelchair): A little  Standing up from a chair using your arms (e.g. wheelchair or bedside chair): A little  To walk in hospital room: A little  Climbing 3-5 steps with railing: Total  Basic Mobility - Total Score: 15    Education Documentation  Mobility Training, taught by Whit Rubio PTA at 9/9/2025  4:08 PM.  Learner: Patient  Readiness: Acceptance  Method: Explanation  Response: Verbalizes Understanding, No Evidence of Learning  Comment: supine exercises and sequencing during bed mobilty    Education Comments  No comments found.        OP EDUCATION:       Encounter Problems       Encounter Problems (Active)       Balance       STG - Maintains dynamic standing balance with upper extremity support on LRAD with Mod I  (Progressing)       Start:  07/31/25    Expected End:  08/21/25               Mobility       STG - Patient will ambulate x200 ft with Mod I using LRAD (Progressing)       Start:  07/31/25    Expected End:  08/21/25            STG - Patient will ascend and descend 7 stairs with Mod I using LRAD (Progressing)       Start:  07/31/25    Expected End:  08/21/25               PT Transfers        STG - Patient will perform bed mobility independently  (Progressing)       Start:  07/31/25    Expected End:  08/21/25            STG - Patient will transfer sit to and from stand with Mod I using LRAD (Progressing)       Start:  07/31/25    Expected End:  08/21/25

## 2025-09-09 NOTE — PROGRESS NOTES
Spiritual Care Visit  Spiritual Care Request    Reason for Visit:  Routine Visit: Introduction  Continue Visiting:  (PRN)     Request Received From:       Focus of Care:  Visited With: Patient         Refer to :          Spiritual Care Assessment    Spiritual Assessment:                      Care Provided:       Sense of Community and or Mormon Affiliation:  Spiritual (Not Mormon)         Addressed Needs/Concerns and/or Luis Through:  Mormon Encounters  Mormon Needs: Spiritual care brochure       Outcome:        Advance Directives:         Spiritual Care Annotation    Annotation:  Long term pts are visited to identify and address unmet needs.  Pt sitting upright in bed, using ipad.  Pt was very gracious, but expressed no needs at this time.  Spiritual Care remains available as pt desires.

## 2025-09-09 NOTE — PROGRESS NOTES
Interval events:    - ORALIA based on labs this am S/P 1L IVF per primary   - BP's improving   - PLT 25, Hgb 8.8    Subjective  - He is sitting comfortably in bed in no apparent distress.  - Denies SOB or chest tightness.  - Intermittent confusion, sitter at bedside.      Objective:  Vitals:    09/09/25 1234   BP: 148/74   Pulse: 72   Resp: 18   Temp: 37 °C (98.6 °F)   SpO2: 97%     Weight         9/6/2025  0301 9/7/2025  0455 9/7/2025  0500 9/8/2025  0312 9/9/2025  0312    Weight: 101 kg (223 lb 11.2 oz) 101 kg (223 lb) -- 101 kg (222 lb 10.6 oz) 104 kg (229 lb)            Intake/Output Summary (Last 24 hours) at 9/9/2025 1523  Last data filed at 9/9/2025 1232  Gross per 24 hour   Intake 1616.5 ml   Output 2785 ml   Net -1168.5 ml     Recent Results (from the past 24 hours)   POCT GLUCOSE    Collection Time: 09/08/25  5:53 PM   Result Value Ref Range    POCT Glucose 89 74 - 99 mg/dL   POCT GLUCOSE    Collection Time: 09/08/25  8:31 PM   Result Value Ref Range    POCT Glucose 78 74 - 99 mg/dL   POCT GLUCOSE    Collection Time: 09/09/25 12:43 AM   Result Value Ref Range    POCT Glucose 85 74 - 99 mg/dL   POCT GLUCOSE    Collection Time: 09/09/25  3:10 AM   Result Value Ref Range    POCT Glucose 81 74 - 99 mg/dL   Comprehensive Metabolic Panel    Collection Time: 09/09/25  3:19 AM   Result Value Ref Range    Glucose 74 74 - 99 mg/dL    Sodium 142 136 - 145 mmol/L    Potassium 4.0 3.5 - 5.3 mmol/L    Chloride 111 (H) 98 - 107 mmol/L    Bicarbonate 23 21 - 32 mmol/L    Anion Gap 12 10 - 20 mmol/L    Urea Nitrogen 42 (H) 6 - 23 mg/dL    Creatinine 1.47 (H) 0.50 - 1.30 mg/dL    eGFR 51 (L) >60 mL/min/1.73m*2    Calcium 11.8 (H) 8.6 - 10.6 mg/dL    Albumin 4.2 3.4 - 5.0 g/dL    Alkaline Phosphatase 68 33 - 136 U/L    Total Protein 5.3 (L) 6.4 - 8.2 g/dL     (H) 9 - 39 U/L    Bilirubin, Total 34.7 (H) 0.0 - 1.2 mg/dL    ALT 64 (H) 10 - 52 U/L   CBC    Collection Time: 09/09/25  3:19 AM   Result Value Ref Range    WBC  7.9 4.4 - 11.3 x10*3/uL    nRBC 0.0 0.0 - 0.0 /100 WBCs    RBC 2.68 (L) 4.50 - 5.90 x10*6/uL    Hemoglobin 8.8 (L) 13.5 - 17.5 g/dL    Hematocrit 27.6 (L) 41.0 - 52.0 %     (H) 80 - 100 fL    MCH 32.8 26.0 - 34.0 pg    MCHC 31.9 (L) 32.0 - 36.0 g/dL    RDW 27.5 (H) 11.5 - 14.5 %    Platelets 25 (LL) 150 - 450 x10*3/uL   Magnesium    Collection Time: 09/09/25  3:19 AM   Result Value Ref Range    Magnesium 2.53 (H) 1.60 - 2.40 mg/dL   Ammonia    Collection Time: 09/09/25  3:19 AM   Result Value Ref Range    Ammonia 56 (H) 16 - 53 umol/L   POCT GLUCOSE    Collection Time: 09/09/25 12:25 PM   Result Value Ref Range    POCT Glucose 102 (H) 74 - 99 mg/dL     Inpatient Medications:  Scheduled Medications[1]  PRN Medications[2]  Continuous Medications[3]       Physical exam:  General: NAD, jaundice  Cardiac: RRR, regular S1 S2 , no murmur, no rub, no gallop  Pulm: No WOB, on room air  Vascular: Radial 2+ bilaterally  GI: Non distended  Extremities: no LE edema   Neuro: no focal neuro deficits, confused at times     Psych: appropriate mood and behavior   Skin: warm and dry     Telemetry 9/9/2025 (personally reviewed): SR 70-80s, occasional PVCs      Assessment/Plan   Roma Corral is a 70 y.o. male with history of CAD with NSTEMI (2021, no stent), anemia, gout, abdominal pain, HTN, DM2 and colon carcinoma metastatic to liver s/p open right hepatectomy 7/29. Course complicated by hypotension, liver failure, & chest tightness.    HFrEF 35%  Type II myocardial injury  - Troponin overall flat  - EKG TW changes V3V5  - Echo with reduced EF 35% (prior was 45-50% in 5/2025).   - Has underlying CAD but this could respresent a stress induced cardiomyopathy post surgery   - Avoid aspirin since thrombocytopenia <50k and statin given liver failure. (Known thrombocytopenia since 1/2025)   - Continue metoprolol tartrate 25mg BID.  - Initiate statin when able (abnormal LFT with / ALT 64)  - Due to ORALIA unable to add further  GDMT  - Goal Hgb >8 given hx of CAD   - Defer any invasive ischemic evaluation given his acute post op state. We will sign off for now, please reengage when PLT and mentation improves to decide inpatient vs outpatient ischemic eval.   - Patient to follow his cardiologist Dr. Sharif Lau. He has appt with  Cardiology Cait Phillips 10/23/25.        Patient case evaluated in collaboration with Dr. Dave Jolley PA-S    Thank you for the opportunity to contribute to the care of this patient. Above recommendations discussed with Dr. Acosta. If further questions arise, please page the general cardiology consult pager at [phone] on weekdays 7AM - 6PM and weekends 7AM - 2PM, or at [phone] at all other times.           [1]   Scheduled medications   Medication Dose Route Frequency    albumin human  25 g intravenous BID    fluconazole  400 mg intravenous q24h    [Held by provider] heparin (porcine)  5,000 Units subcutaneous q8h    insulin lispro  0-10 Units subcutaneous q4h    lactulose  10 g oral BID    metoprolol tartrate  25 mg oral BID    [Held by provider] morphine CR  30 mg oral q12h ALEX    pantoprazole  40 mg oral BID AC    phytonadione  5 mg intravenous q48h    piperacillin-tazobactam  3.375 g intravenous q6h    [Held by provider] polyethylene glycol  17 g oral Daily    thiamine  100 mg intravenous Daily    ursodiol  250 mg oral BID   [2]   PRN medications   Medication    alteplase    benzocaine-menthol    calcium carbonate    dextrose    dextrose    glucagon    HYDROmorphone    nitroglycerin    ondansetron    oxyCODONE    traMADol   [3]   Continuous Medications   Medication Dose Last Rate    Adult Clinimix Parenteral Nutrition Continuous  30 mL/hr 30 mL/hr (09/08/25 2481)    lactated Ringer's  0-1,000 mL/hr

## 2025-09-09 NOTE — PROGRESS NOTES
Surgical Oncology Progress Note    Roma Corral is a 70 y.o. male with history of rectal cancer with mets to the liver, CAD (2021 NSTEMI), DM2, HTN who is s/p right extended hepatectomy for metastatic rectal cancer on 7/29     Subjective   Overnight: CHRISTEL, remained afebrile, VSS. A&O x 3 this AM. Appears more defeated with regards to overall plan, however agreeable to try to drink more ensures; currently had 2 out of 3 ensures as goal yesterday.    PO: 200cc  Urine output: 600cc + 2xunmeasured  Drain: 1.5Lml bile tinged ascitic fluid  Colostomy: 350 cc liquid stool with gas in appliance     Objective   GEN: Appears tired, awake, conversant.  RESP: non-labored breathing on room air  CV: regular rate  GI: soft, nontender, non-peritonitic, minimally distended. Drain with bile-tinged ascites. Ostomy pink, healthy. Gas in appliance. Incisions healed well, well approximated without drainage, erythema, or infected appearance.   : voiding spontaneously.   MSK: no evidence of bilateral lower extremity edema  NEURO: alert and speaking in broken sentences, A&O x1 confused, re-directable  SKIN: jaundiced, warm and dry. Femoral access site from IR procedure without bleeding from site. No obvious bulge/pulsation/hematoma/seroma palpable on exam. Pressure dressing re-applied     Last Recorded Vitals  Heart Rate:  [64-74]   Temp:  [36.6 °C (97.9 °F)-37.1 °C (98.8 °F)]   Resp:  [16-18]   BP: (123-148)/(65-76)   Weight:  [104 kg (229 lb)]   SpO2:  [97 %-99 %]      Intake/Output Last 24 Hours:      Intake/Output Summary (Last 24 hours) at 9/9/2025 1339  Last data filed at 9/9/2025 1232  Gross per 24 hour   Intake 1679.5 ml   Output 3010 ml   Net -1330.5 ml        Relevant Results  Lab Date: 09/09/25       8.8    7.9>-----<25         27.6   142  111  42                  ----------------<74     4.0  23  1.47          Ca 11.8 Phos 2.6 Mg 2.53       ALT 64  AlkPhos 68 tBili 34.7         Imaging:   Vascular US lower extremity  pseudoaneurysm duplex right  Result Date: 9/8/2025            [place] [id number]   Tel [phone] and Fax [phone]  [facility] PSEUDOANEURYSM DUPLEX RIGHT  Patient Name:     INDU DOSHI         [place] Physician: [id number] Antonio MD Study Date:       9/8/2025           Ordering           [id number] SUPA ESPINO                                      Physician: MRN/PID:          [id number]           Technologist:      Emily Lynch RVT Accession#:       [id number]       Technologist 2: Date of           1955 / 70      Encounter#:        [id number] Birth/Age:        years Gender:           M Admission Status: Inpatient          Location           [facility]                                      Performed:  Diagnosis/ICD: Stricture of artery-I77.1 CPT Codes:     39052 Peripheral artery Lower arterial Duplex limited CONCLUSIONS: Pseudo Aneurysm: There is no evidence of AVF. 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 No evidence of DVT in the visualized vessels. PSA off the CFA measures .4 cm x 1.5 cm. Active area measures .13 cm x .23 cm. Unable able to visualize a track to measure.  Imaging & Doppler Findings:   Right                     Left   PSV                      PSV 89 cm/s          cm/s         cm/s         cm/s         cm/s Profunda Proximal 147 cm/s   SFA Proximal  Right Pseudo Aneurysm                      Diam   Depth Right Pseudoaneurysm 0.4 cm 1.6 cm Active Area          0.2 cm .  [id number] Angel Antonio MD Electronically signed by [id number] Antonio MD on 9/8/2025 at 5:57:56 PM  ** Final **     CONCLUSIONS: Pseudo Aneurysm: There is no evidence of AVF. 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 No evidence of DVT in the visualized vessels. PSA off the CFA measures .4 cm x 1.5 cm.  Active area measures .13 cm x .23 cm. Unable able to visualize a track to measure.      Assessment:  Roma Corral is a 70 y.o. male with history of rectal cancer with mets to the liver, CAD (2021 NSTEMI), DM2, HTN who is s/p right extended hepatectomy for metastatic rectal cancer on 7/29 c/b worsening liver function. AF VSS, encouraging oral intake decreasing TPN to 1/2 maintance rate. Vasc Surgery Rt pseudoaneurysm non surgical due to coagulopathy.    Plan:  Neuro: Dilaudid 0.4 mg/0.6 mg IV, every 4 hours as needed, 0.2 mg for breakthrough  - Ammonia level (9/9): 56    CV: Vitals q8hrs, telemetry.  - Echo completed 9/2 showing EF reduced to 35% with complete apical akinesis.   - nitro PRN CP  - metop 25 mg PO, BID per cards    Pulm: Encourage IS, wean oxygen  GI: regular diet, cont lactulose 20ml BID, PPI, zofran prn, 1/2 TPN.   - Actigal 250 mg BID    /Renal: nephrology consult:  Cont albumin (25 g, IV, scheduled every 6 hours).   - Patient unable to tolerate the 11:1 clamp trial today and is having significantly more drainage from around the drain while clamped. Will liberate back to 7:1   -  BID albumin, LR bolus 1/2 drain output.     Heme:   - R groin ultrasound and CTA c/f 5mm R CFA PSA. Stable on imaging 9/8 Vascular surgery recommendss repeat Q weekly ultrasounds.   -  continue to hold anticoagulation.   - continue sandbag to R groin weekly doppler while inpatient   - Will monitor platelets, currently downtrended to 25. Platelet goal > 20    Endo: SSI with 1/2TPN    ID: Empiric Zosyn and Diflucan (started 8/11 and 8/19, respectively, end date pending given patient's clinical status).     DVT ppx: Holding heparin 5000u q8hr, cont SCDs  -Stressed the importance of getting up to chair and ambulating with patient.   - Will attempt to get patient to take in at least 2-3 ensure/boosts     Dispo: RNF    Patient was discussed with Dr. Kacey Neumann MD, MSc  Hepatobiliary Surgery and Surgical  Oncology  [email or web address], [id number]

## 2025-09-09 NOTE — PROGRESS NOTES
Acupuncture Visit:     Roma Corral was referred by Ivory ANGULO  .  Session Information  Discipline: Acupuncture  Session Start Time: 1205  Session End Time: 1210  Visit Type: Follow-up visit  Conflict of Service : Declined service  Medical History Reviewed: I have reviewed pertinent medical history in EHR and contraindications are present.  History of Present Illness: Ongoing support for cancer related sx  Additional Assessment Detail: PT met at bedside.  He declined services by nodding head.  Educated on next availability of IO service line.  Number of staff members present: 1                   Provider reviewed plan for the acupuncture session, precautions and contraindications. Patient/guardian/hospital staff has given consent to treat with full understanding of what to expect during thesession. Before acupuncture began, provider explained to the patient to communicate at any time if the procedure was causing discomfort past their tolerance level. Patient agreed to advise acupuncturist. The acupuncturist counseled the patient on the risks of acupuncture treatment including pain, infection, bleeding, and no relief of pain. The patient was positioned comfortably. There was no evidence of infection at the site of needle insertions.       No annotated images are attached to the encounter.              Evaluation/Recommendation/Follow-up  Total Session Time (min): 5 minutes      Massage Therapy / Acupuncture Note:

## 2025-09-09 NOTE — CONSULTS
Wound Care Consult     Visit Date: 9/9/2025      Patient Name: Roma Corral         MRN: [id number]             Reason for Consult: ostomy care        Assessment:             Colostomy Loop LUQ (Active)   Placement Date/Time: 07/02/24 1416   Hand Hygiene Completed: Yes  Colostomy Type: Loop  Location: LUQ   Number of days: 434      Colostomy Loop LUQ (Active)   Present on Admission to Healthcare Facility Y 09/05/25 1300   Stomal Appliance 2 piece;Changed 09/09/25 1700   Site/Stoma Assessment Red 09/09/25 1700   Stoma Size (cm) 3.8 cm 09/09/25 1700   Peristomal Assessment Clean;Intact 09/09/25 1700   Treatment Pouch change 09/09/25 1700   Drainage Characteristics Brown 09/09/25 1700   Output (mL) 100 mL 09/09/25 1700   Intake (ml) 0 ml 08/14/25 1943     Ostomy type: s/p colostomy    size: 1 1/2 in      color: red      protruding: flush  John: none  Functioning: brown liquid stool  Mucocutaneous junction: intact  Peristomal skin: intact, no sting skin prep applied  Pouching: barrier ring, 2 piece [place] red soft convex with drainable pouch  Ostomy Education: none at this time  Plan: assess stoma/pouching 2 times a week and as needed while inpatient.       Pavithra Andrea, MSN, RN, CWCN, COCN  09/09/25 6:28 PM

## 2025-09-10 NOTE — PROGRESS NOTES
09/10/25 1200   Discharge Planning   Living Arrangements Spouse/significant other   Support Systems Spouse/significant other   Type of Residence Private residence   Number of Stairs to Enter Residence 3   Number of Stairs Within Residence 14   Do you have animals or pets at home? Yes   Type of Animals or Pets 1 dog   Who is requesting discharge planning? Provider   Home or Post Acute Services Other (Comment)   Expected [facility]position [facility]   Does the patient need discharge transport arranged? Yes   [facility] coordination needed? Yes   Has discharge transport been arranged? No     [facility] has left message for pt's spouse for hospice meeting.  [facility] will update when meeting is scheduled.  KHUSHI Porras

## 2025-09-10 NOTE — CARE PLAN
Problem: Pain - Adult  Goal: Verbalizes/displays adequate comfort level or baseline comfort level  Outcome: Progressing     Problem: Safety - Adult  Goal: Free from fall injury  Outcome: Progressing     Problem: Discharge Planning  Goal: Discharge to home or other facility with appropriate resources  Outcome: Progressing     Problem: Chronic Conditions and Co-morbidities  Goal: Patient's chronic conditions and co-morbidity symptoms are monitored and maintained or improved  Outcome: Progressing     Problem: Nutrition  Goal: Nutrient intake appropriate for maintaining nutritional needs  Outcome: Progressing     Problem: Skin  Goal: Decreased wound size/increased tissue granulation at next dressing change  Outcome: Progressing  Flowsheets (Taken 9/9/2025 2159)  Decreased wound size/increased tissue granulation at next dressing change: Promote sleep for wound healing  Goal: Participates in plan/prevention/treatment measures  Outcome: Progressing  Flowsheets (Taken 9/9/2025 2159)  Participates in plan/prevention/treatment measures: Elevate heels  Goal: Prevent/manage excess moisture  Outcome: Progressing  Flowsheets (Taken 9/9/2025 2159)  Prevent/manage excess moisture:   Monitor for/manage infection if present   Cleanse incontinence/protect with barrier cream  Goal: Prevent/minimize sheer/friction injuries  Outcome: Progressing  Flowsheets (Taken 9/9/2025 2159)  Prevent/minimize sheer/friction injuries: HOB 30 degrees or less  Goal: Promote/optimize nutrition  Outcome: Progressing  Flowsheets (Taken 9/9/2025 2159)  Promote/optimize nutrition: Monitor/record intake including meals  Goal: Promote skin healing  Outcome: Progressing  Flowsheets (Taken 9/9/2025 2159)  Promote skin healing: Assess skin/pad under line(s)/device(s)

## 2025-09-10 NOTE — CARE PLAN
Problem: Pain - Adult  Goal: Verbalizes/displays adequate comfort level or baseline comfort level  Outcome: Progressing     Problem: Safety - Adult  Goal: Free from fall injury  Outcome: Progressing     Problem: Discharge Planning  Goal: Discharge to home or other facility with appropriate resources  Outcome: Progressing     Problem: Chronic Conditions and Co-morbidities  Goal: Patient's chronic conditions and co-morbidity symptoms are monitored and maintained or improved  Outcome: Progressing     Problem: Nutrition  Goal: Nutrient intake appropriate for maintaining nutritional needs  Outcome: Progressing     Problem: Skin  Goal: Decreased wound size/increased tissue granulation at next dressing change  9/10/2025 1429 by Kylah Kumar RN  Flowsheets (Taken 9/10/2025 1429)  Decreased wound size/increased tissue granulation at next dressing change:   Promote sleep for wound healing   Protective dressings over bony prominences  9/10/2025 1428 by Kylah Kumar RN  Flowsheets (Taken 9/10/2025 1428)  Decreased wound size/increased tissue granulation at next dressing change:   Promote sleep for wound healing   Protective dressings over bony prominences  9/10/2025 1428 by Kylah Kumar RN  Outcome: Progressing  Flowsheets (Taken 9/10/2025 1428)  Decreased wound size/increased tissue granulation at next dressing change:   Promote sleep for wound healing   Protective dressings over bony prominences  Goal: Participates in plan/prevention/treatment measures  9/10/2025 1429 by Kylah Kumar RN  Flowsheets (Taken 9/10/2025 1429)  Participates in plan/prevention/treatment measures: Elevate heels  9/10/2025 1428 by Kylah Kumar RN  Outcome: Progressing  Goal: Prevent/manage excess moisture  9/10/2025 1429 by Kylah Kumar RN  Flowsheets (Taken 9/10/2025 1429)  Prevent/manage excess moisture:   Cleanse incontinence/protect with barrier cream   Follow provider orders for dressing changes  9/10/2025 1428 by Kylah Kumar  RN  Outcome: Progressing  Goal: Prevent/minimize sheer/friction injuries  9/10/2025 1429 by Kylah Kumar RN  Flowsheets (Taken 9/10/2025 1429)  Prevent/minimize sheer/friction injuries:   Use pull sheet   Turn/reposition every 2 hours/use positioning/transfer devices   HOB 30 degrees or less  9/10/2025 1428 by Kylah uKmar RN  Outcome: Progressing  Goal: Promote/optimize nutrition  9/10/2025 1429 by Kylah Kumar RN  Flowsheets (Taken 9/10/2025 1429)  Promote/optimize nutrition:   Monitor/record intake including meals   Offer water/supplements/favorite foods  9/10/2025 1428 by Kylah Kumar RN  Outcome: Progressing  Goal: Promote skin healing  9/10/2025 1429 by Kylah Kumar RN  Flowsheets (Taken 9/10/2025 1429)  Promote skin healing:   Assess skin/pad under line(s)/device(s)   Protective dressings over bony prominences   Turn/reposition every 2 hours/use positioning/transfer devices  9/10/2025 1428 by Kylah Kumar RN  Outcome: Progressing

## 2025-09-10 NOTE — PROGRESS NOTES
Surgical Oncology Progress Note    Roma Corral is a 70 y.o. male with history of rectal cancer with mets to the liver, CAD (2021 NSTEMI), DM2, HTN who is s/p right extended hepatectomy for metastatic rectal cancer on 7/29     Subjective   Overnight: NAEON, AF, VSS, tolerating diet, reports pain is well tolerated with current regiment.     PO: 300ml   Urine output: 750 ml  Drain: 960 ml bile tinged ascitic fluid  Colostomy: 350 ml liquid stool with gas in appliance     Objective   GEN: Appears tired, awake, conversant.  RESP: non-labored breathing on room air  CV: regular rate  GI: soft, nontender, non-peritonitic, minimally distended. Drain with bile-tinged ascites. Ostomy pink, healthy. Gas in appliance. Incisions healed well, well approximated without drainage, erythema, or infected appearance. Drain site leaking bile tinged ascites- without erythema   : voiding spontaneously.   MSK: no evidence of bilateral lower extremity edema  NEURO: alert and speaking in broken sentences, A&O x2 confused, re-directable  SKIN: jaundiced, warm and dry. Femoral access site from IR procedure without bleeding from site. No obvious bulge/pulsation/hematoma/seroma palpable on exam. Pressure dressing re-applied     Last Recorded Vitals  Heart Rate:  [61-73]   Temp:  [36.8 °C (98.2 °F)-37.3 °C (99.2 °F)]   Resp:  [16]   BP: (121-138)/(65-71)   Weight:  [104 kg (229 lb 15 oz)]   SpO2:  [96 %-99 %]      Intake/Output Last 24 Hours:      Intake/Output Summary (Last 24 hours) at 9/10/2025 1748  Last data filed at 9/10/2025 1659  Gross per 24 hour   Intake 3409.5 ml   Output 3925 ml   Net -515.5 ml        Relevant Results  Lab Date: 09/10/25       8.9    6.9>-----<29         27.7   144  112  39                  ----------------<91     3.8  22  1.40          Ca 12.2 Phos 2.6 Mg 2.51       ALT 65  AlkPhos 70 tBili 36.0         Imaging:   No results found.      Assessment:  Roma Corral is a 70 y.o. male with history of rectal  cancer with mets to the liver, CAD (2021 NSTEMI), DM2, HTN who is s/p right extended hepatectomy for metastatic rectal cancer on 7/29 c/b worsening liver function. AF VSS, encouraging oral intake decreasing TPN to 1/2 maintance rate. Vasc Surgery Rt pseudoaneurysm non surgical due to coagulopathy.    Plan:  Neuro: tramadol 50mg q6h, Dilaudid 0.4 mg/0.6 mg IV, every 4 hours as needed, 0.2 mg for breakthrough  - Ammonia level (9/9): 56    CV: Vitals q8hrs, telemetry.  - Echo completed 9/2 showing EF reduced to 35% with complete apical akinesis.   - nitro PRN CP  - metop 25 mg PO, BID per cards    Pulm: Encourage IS, wean oxygen  GI: regular diet, cont lactulose 20ml BID, PPI, zofran prn, 1/2 TPN.   - Actigal 250 mg BID    /Renal: nephrology consult:  Cont albumin (25 g, IV, scheduled every 6 hours).   -9/9 Patient unable to tolerate the 11:1 clamp trial. order placed back to 7:1   -  BID albumin, LR bolus 1/2 drain output.     Heme:   - R groin ultrasound and CTA c/f 5mm R CFA PSA. Stable on imaging 9/8 Vascular surgery recommendss repeat Q weekly ultrasounds.   -  continue to hold anticoagulation.   - continue sandbag to R groin weekly doppler while inpatient   - Will monitor platelets, currently downtrended to 25. Platelet goal > 20    Endo: SSI with 1/2TPN    ID: Empiric Zosyn and Diflucan (started 8/11 and 8/19, respectively, end date pending given patient's clinical status).     DVT ppx: Holding heparin 5000u q8hr, cont SCDs  -Stressed the importance of getting up to chair and ambulating with patient.   - Will attempt to get patient to take in at least 2-3 ensure/boosts     Dispo: hospice to reach out to family for placement options.     Patient was discussed with Dr. Kacey Pierre DO PGY1  Hepatobiliary Surgery and Surgical Oncology  [place], [id number]

## 2025-09-11 NOTE — PROGRESS NOTES
Music Therapy Note    Roma Corral     Therapy Session  Referral Type: New referral this admission  Visit Type: Follow-up visit  Session Start Time: 1338  Session End Time: 1338  Intervention Delivery: In-person  Conflict of Service: Asleep  Number of staff members present: 1     Post-assessment  Total Session Time (min): 0 minutes    Narrative  Assessment Detail: Pt found asleep while reclined in bed. One staff present in room.  Follow-up: Will follow up as able.    Education Documentation  No documentation found.

## 2025-09-11 NOTE — PROGRESS NOTES
Physical Therapy                 Therapy Communication Note    Patient Name: Roma Corral  MRN: [id number]  Department: [facility]  Room: 60/6013-  Today's Date: 9/11/2025     Discipline: Physical Therapy    Missed Visit:       Missed Visit Reason:      Missed Time: Attempt    Comment:    RN requesting PT hold as patient is pending comfort care and requesting to rest at this time.

## 2025-09-11 NOTE — PROGRESS NOTES
Surgical Oncology Progress Note    Roma Corral is a 70 y.o. male with history of rectal cancer with mets to the liver, CAD (2021 NSTEMI), DM2, HTN who is s/p right extended hepatectomy for metastatic rectal cancer on 7/29     Subjective   Overnight: NAEON, AF, VSS, poor oral intake needs lots of encouraging, reports pain is well tolerated with current regiment. Denies N/V.     Event during AM: 0830 Pt had episode of hypoglycemia 60s. 12.5 g D50. Encouraged increased oral intake as well.  Will continue to monitor.     PO: 100ml   Urine output: 450 ml  Drain: 1.9 ml bile tinged ascitic fluid  Colostomy: 450 ml liquid stool with gas in appliance     Objective   GEN: Appears tired, awake, conversant.  RESP: non-labored breathing on room air  CV: regular rate  GI: soft, nontender, non-peritonitic, minimally distended. Drain with bile-tinged ascites. Ostomy pink, healthy. Gas in appliance. Incisions healed well, well approximated without drainage, erythema, or infected appearance. Drain site leaking bile tinged ascites- without erythema   : voiding spontaneously.   MSK: no evidence of bilateral lower extremity edema  NEURO: drowsy, A&O x3 increased fatigue, depressed  SKIN: jaundiced, warm and dry. Femoral access site from IR procedure without bleeding from site. No obvious bulge/pulsation/hematoma/seroma palpable on exam.       Last Recorded Vitals  Heart Rate:  [62-68]   Temp:  [36.2 °C (97.1 °F)-37.3 °C (99.2 °F)]   Resp:  [16-20]   BP: (114-134)/(61-70)   Weight:  [103 kg (227 lb 14.4 oz)]   SpO2:  [96 %-99 %]      Intake/Output Last 24 Hours:      Intake/Output Summary (Last 24 hours) at 9/11/2025 1042  Last data filed at 9/11/2025 0951  Gross per 24 hour   Intake 3478 ml   Output 4270 ml   Net -792 ml        Relevant Results  Lab Date: 09/11/25       8.4    6.5>-----<33         26.6   145  112  37                  ----------------<39     3.6  23  1.39          Ca 12.0 Phos 2.6 Mg 2.32       ALT 54   AlkPhos 61 tBili 33.6         Imaging:   No results found.      Assessment:  Roma Corral is a 70 y.o. male with history of rectal cancer with mets to the liver, CAD (2021 NSTEMI), DM2, HTN who is s/p right extended hepatectomy for metastatic rectal cancer on 7/29 c/b worsening liver function. AF VSS, encouraging oral intake. Patient has discussed wanting to move to hospice/palliative care. Spouse is in contact with social work and palliative for possible placement.       Plan:  Neuro: tramadol 50mg q6h, oxycodone, Dilaudid 0.2 mg every 4 hours as needed,   - Ammonia level (9/9): 56    CV: Vitals q8hrs, telemetry.  - Echo completed 9/2 showing EF reduced to 35% with complete apical akinesis.   - nitro PRN CP  - metop 25 mg PO, BID per cards    Pulm: Encourage IS, wean oxygen  GI: regular diet, cont lactulose 20ml BID, PPI, zofran prn, TPN discontinued as pt continues to increase oral intake. Albumin 5% 25G q6hr  - Actigal 250 mg BID    /Renal: nephrology consult:  Cont albumin (25 g, IV, scheduled every 6 hours).   -9/9 Patient unable to tolerate the 11:1 clamp trial. order placed back to 7:1   -  BID albumin, LR bolus 1/2 drain output.     Heme:   - R groin ultrasound and CTA c/f 5mm R CFA PSA. Stable on imaging 9/8 Vascular surgery recommendss repeat Q weekly ultrasounds.   -  continue to hold anticoagulation.   - continue sandbag to R groin weekly doppler while inpatient   - Will monitor platelets, currently downtrended to 25. Platelet goal > 20    Endo: SSI q4hr     ID: Empiric Zosyn and Diflucan (started 8/11 and 8/19, respectively, end date pending given patient's clinical status).     DVT ppx: Holding heparin 5000u q8hr, cont SCDs  -Stressed the importance of getting up to chair and ambulating with patient.   - Will attempt to get patient to take in at least 2-3 ensure/boosts     Dispo: pending family decision     Patient was discussed with Dr. Kacey Pierre DO PGY1  Hepatobiliary Surgery and Surgical  Oncology  [place], [id number]

## 2025-09-11 NOTE — PROGRESS NOTES
Acupuncture Visit:     Roma Corral was referred by KEV Neri  .  Session Information  Discipline: Acupuncture  Session Start Time: 1132  Session End Time: 1132  Visit Type: Follow-up visit  Conflict of Service : Asleep  Medical History Reviewed: I have reviewed pertinent medical history in EHR and contraindications are present.  History of Present Illness: Seeking support for cancer related sx  Additional Assessment Detail: Patient met sleeping in bed with sitter near by.  This writer spoke with patient's attending nurse who advised IO services hold off treatments for the foreseeable future as patient has been unable to consent to treatment.  Number of staff members present: 2 (RN and sitter)    Pre-treatment Assessment  Unable to Assess Reason: Patient declined to answer, Cognitive limitation              Provider reviewed plan for the acupuncture session, precautions and contraindications. Patient/guardian/hospital staff has given consent to treat with full understanding of what to expect during thesession. Before acupuncture began, provider explained to the patient to communicate at any time if the procedure was causing discomfort past their tolerance level. Patient agreed to advise acupuncturist. The acupuncturist counseled the patient on the risks of acupuncture treatment including pain, infection, bleeding, and no relief of pain. The patient was positioned comfortably. There was no evidence of infection at the site of needle insertions.       No annotated images are attached to the encounter.         Post-treatment Assessment  Unable to Assess Reason: Did not provide intervention, Cognitive limitation  0-10 (Numeric) Pain Score: 0 - No pain    Evaluation/Recommendation/Follow-up  Total Session Time (min): 0 minutes      Massage Therapy / Acupuncture Note:

## 2025-09-11 NOTE — CONSULTS
"Nutrition Follow Up Assessment:   Nutrition Assessment      Since admit, liver function remains poor. TPN now off and pt on PO diet of Regular. Hypoglycemia earlier today. Now on IVF of D5-1/2 NaCl @ 40 ml/hr (provides a total of 48 g dextrose=163 kcals/day). Noted plans for possible Hospice/Palliative Care, pending family discussion.    Of note, pt with intermittent confusion, sitter present at bedside.    Nutrition History:  This writer attempted to meet with pt today, though was soundly sleeping. Breakfast meal tray in front of him. Per sitter, she set up the tray for him but then would not eat anything.    In review of what is documented in EMR, it appears PO has been poor, will intermittently drink Ensure supplements.    Anthropometrics:  Height: 180.3 cm (5' 10.98\")   Weight: 103 kg (227 lb 14.4 oz)   BMI (Calculated): 22.73  IBW/kg (Dietitian Calculated): 78.2 kg  Percent of IBW: 132 %    Weights (since admit):  07/29-73.1 kg (admit wt)  08/28-73.9 kg (wt at first nutrition visit)  09/.0 kg (current wt)--?; wt variable since admit, likely d/t fluids and/or bed scale inaccuracies    Nutrition Focused Physical Exam Findings:  Subcutaneous Fat Loss:   Defer Subcutaneous Fat Loss Assessment: Defer all  Defer All Reason: completed at first nutrition visit + pt soundly sleeping today  Muscle Wasting:  Defer Muscle Wasting Assessment: Defer all  Defer All Reason: completed at first nutrition visit + pt soundly sleeping today  Edema:  Edema: generalized, non-pitting edema  Physical Findings:  Skin: Positive (jaundice, surgical incision to abdomen)    Nutrition Significant Labs:  CBC Trend:   Results from last 7 days   Lab Units 09/11/25  0610 09/10/25  0422 09/09/25  0319 09/08/25  0226   WBC AUTO x10*3/uL 6.5 6.9 7.9 6.7   RBC AUTO x10*6/uL 2.51* 2.66* 2.68* 2.55*   HEMOGLOBIN g/dL 8.4* 8.9* 8.8* 8.4*   HEMATOCRIT % 26.6* 27.7* 27.6* 26.4*   MCV fL 106* 104* 103* 104*   PLATELETS AUTO x10*3/uL 33* 29* 25* 28* "   BMP Trend:   Results from last 7 days   Lab Units 09/11/25  0610 09/10/25  0422 09/09/25 0319 09/08/25  0226   GLUCOSE mg/dL 39* 91 74 100*   CALCIUM mg/dL 12.0* 12.2* 11.8* 11.1*   SODIUM mmol/L 145 144 142 142   POTASSIUM mmol/L 3.6 3.8 4.0 4.0   CO2 mmol/L 23 22 23 23   CHLORIDE mmol/L 112* 112* 111* 111*   BUN mg/dL 37* 39* 42* 41*   CREATININE mg/dL 1.39* 1.40* 1.47* 1.39*   BG POCT trend:   Results from last 7 days   Lab Units 09/11/25  1241 09/11/25  1157 09/11/25  0819 09/11/25  0658 09/11/25  0611   POCT GLUCOSE mg/dL 120* 66* 66* 89 40*   Liver Function Trend:   Results from last 7 days   Lab Units 09/11/25  0610 09/10/25  0422 09/09/25 0319 09/08/25  0226   ALK PHOS U/L 61 70 68 63   AST U/L 154* 177* 185* 187*   ALT U/L 54* 65* 64* 59*   BILIRUBIN TOTAL mg/dL 33.6* 36.0* 34.7* 29.7*   Renal Lab Trend:   Results from last 7 days   Lab Units 09/11/25  0610 09/10/25  0422 09/09/25 0319 09/08/25  0226   POTASSIUM mmol/L 3.6 3.8 4.0 4.0   SODIUM mmol/L 145 144 142 142   MAGNESIUM mg/dL 2.32 2.51* 2.53* 2.45*   EGFR mL/min/1.73m*2 55* 54* 51* 55*   BUN mg/dL 37* 39* 42* 41*   CREATININE mg/dL 1.39* 1.40* 1.47* 1.39*      Medications:  Scheduled medications  Scheduled Medications[1]  Continuous medications  Continuous Medications[2]  PRN medications  PRN Medications[3]    I/O:   --pt with colostomy; total of 120 mls output documented so far today    Dietary Orders (From admission, onward)       Start     Ordered    09/07/25 1005  Oral nutritional supplements  Until discontinued        Comments: OK for any flavor of ensure   Question Answer Comment   Deliver with Breakfast    Deliver with All meals    Select supplement: Ensure Max        09/07/25 1005    09/05/25 1728  Adult diet Regular  Diet effective now        Question:  Diet type  Answer:  Regular    09/05/25 1727    08/28/25 2310  May Not Participate in Room Service  ( ROOM SERVICE MAY NOT PARTICIPATE)  Once        Question:  .  Answer:  Yes     08/28/25 2309                Estimated Needs:   Total Energy Estimated Needs in 24 hours (kCal): ~4526-6853  Method for Estimating Needs: MSJ (1507) x ~1.3-1.5 (using 72 kg)  Total Protein Estimated Needs in 24 Hours (g): ~  Method for Estimating 24 Hour Protein Needs: 72 (IBW) x ~1.2-1.5g/kg  Total Fluid Estimated Needs in 24 Hours (mL): per MD/team        Nutrition Diagnosis   Malnutrition Diagnosis  Patient has Malnutrition Diagnosis: Yes  Diagnosis Status: Active  Malnutrition Diagnosis: Severe malnutrition related to chronic disease or condition (acute-on-chronic)  Related to: metastatic CA, recent surgery with post-op complications  As Evidenced by: pt with areas of severe muscle and fat losses, 21% wt loss x ~1 year, estimated to be consuming </=50% estimated energy needs x >/=5 days since admit (intermittently requiring NPO/Clear Liquids)    Additional Assessment Information: Will change oral nutrition supplements from Ensure Max to Ensure Plus TID to increase total calories and CHO content, considering hypoglycemia with poor PO.       Nutrition Interventions/Recommendations   Nutrition prescription for oral nutrition    Nutrition Recommendations:  1. PO diet of Regular, only as tolerated/as medically appropriate.  --Considering intermittent confusion, if any s/s of dysphagia are noted and if aggressive care to continue, recommend formal SLP eval.  --RDN to adjust orders for oral nutrition supplements from Ensure Max to Ensure Plus (Ensure Plus will provide more calories and more carbohydrates, when compared to Ensure Max).    2. If aggressive care to continue, will need to consider initiation of supplemental nutrition support, considering severe malnutrition with poor PO. Would prefer trial of enteral nutrition support over re-initiation of parenteral, if medically feasible. Please re-consult for those recs PRN.    Nutrition Interventions/Goals:   Meals and Snacks: General healthful diet  Goal:  Regular Diet  Medical Food Supplement: Commercial beverage medical food supplement therapy  Goal: Ensure Plus TID (350 kcals, 13 g pro each)  Coordination of Care with Providers: Other (Comment)  Goal: sitter at bedside    Education:  Education Documentation  No documentation found.    N/A--pt sleeping       Nutrition Monitoring and Evaluation   Intake / Amount of food: Consumes at least 50% or more of meals/snacks/supplements    Electrolyte and Renal Panel: Electrolytes within normal limits  Glucose/Endocrine Profile: Glucose within normal limits ( mg/dL)      Goal Status: New goal(s) identified          Time Spent (min): 45 minutes              [1] albumin human, 25 g, intravenous, q6h  fluconazole, 400 mg, intravenous, q24h  [Held by provider] heparin (porcine), 5,000 Units, subcutaneous, q8h  insulin lispro, 0-10 Units, subcutaneous, q4h  lactulose, 10 g, oral, BID  metoprolol tartrate, 25 mg, oral, BID  [Held by provider] morphine CR, 30 mg, oral, q12h ALEX  pantoprazole, 40 mg, oral, BID AC  phytonadione, 5 mg, intravenous, q48h  piperacillin-tazobactam, 3.375 g, intravenous, q6h  [Held by provider] polyethylene glycol, 17 g, oral, Daily  thiamine, 100 mg, intravenous, Daily  ursodiol, 250 mg, oral, BID     [2] dextrose 5%-0.45 % sodium chloride, 40 mL/hr, Last Rate: 40 mL/hr (09/11/25 1241)  lactated Ringer's, 0-1,000 mL/hr, Last Rate: Stopped (09/11/25 1105)     [3] PRN medications: alteplase, benzocaine-menthol, calcium carbonate, dextrose, dextrose, glucagon, HYDROmorphone, nitroglycerin, ondansetron, oxyCODONE, traMADol

## 2025-09-11 NOTE — PROGRESS NOTES
"Roma Corral is a 70 y.o. 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    Subjective   Pt resting in bed, sitter at bedside. Pt appears disoriented, not able to consent for treatment     Objective     Physical Exam    Last Recorded Vitals  Blood pressure 136/71, pulse 64, temperature 37 °C (98.6 °F), temperature source Temporal, resp. rate 18, height 1.803 m (5' 10.98\"), weight 103 kg (227 lb 14.4 oz), SpO2 98%.  Intake/Output last 3 Shifts:  I/O last 3 completed shifts:  In: 4805.5 (46.5 mL/kg) [P.O.:340; I.V.:1470 (14.2 mL/kg); Blood:700; IV Piggyback:1750]  Out: 5045 (48.8 mL/kg) [Urine:2400 (0.6 mL/kg/hr); Drains:2175; Stool:470]  Weight: 103.4 kg     Relevant Results  Scheduled medications  Scheduled Medications[1]  Continuous medications  Continuous Medications[2]  PRN medications  PRN Medications[3]    Results for orders placed or performed during the hospital encounter of 07/29/25 (from the past 24 hours)   POCT GLUCOSE   Result Value Ref Range    POCT Glucose 104 (H) 74 - 99 mg/dL   POCT GLUCOSE   Result Value Ref Range    POCT Glucose 87 74 - 99 mg/dL   Comprehensive Metabolic Panel   Result Value Ref Range    Glucose 39 (LL) 74 - 99 mg/dL    Sodium 145 136 - 145 mmol/L    Potassium 3.6 3.5 - 5.3 mmol/L    Chloride 112 (H) 98 - 107 mmol/L    Bicarbonate 23 21 - 32 mmol/L    Anion Gap 14 10 - 20 mmol/L    Urea Nitrogen 37 (H) 6 - 23 mg/dL    Creatinine 1.39 (H) 0.50 - 1.30 mg/dL    eGFR 55 (L) >60 mL/min/1.73m*2    Calcium 12.0 (H) 8.6 - 10.6 mg/dL    Albumin 4.1 3.4 - 5.0 g/dL    Alkaline Phosphatase 61 33 - 136 U/L    Total Protein 5.1 (L) 6.4 - 8.2 g/dL     (H) 9 - 39 U/L    Bilirubin, Total 33.6 (H) 0.0 - 1.2 mg/dL    ALT 54 (H) 10 - 52 U/L   CBC   Result Value Ref Range    WBC 6.5 4.4 - 11.3 x10*3/uL    nRBC 0.0 0.0 - 0.0 /100 WBCs    RBC 2.51 (L) 4.50 - 5.90 x10*6/uL    Hemoglobin 8.4 (L) 13.5 - 17.5 g/dL    Hematocrit 26.6 (L) 41.0 - 52.0 %     (H) 80 - " 100 fL    MCH 33.5 26.0 - 34.0 pg    MCHC 31.6 (L) 32.0 - 36.0 g/dL    RDW 26.6 (H) 11.5 - 14.5 %    Platelets 33 (LL) 150 - 450 x10*3/uL   Magnesium   Result Value Ref Range    Magnesium 2.32 1.60 - 2.40 mg/dL   POCT GLUCOSE   Result Value Ref Range    POCT Glucose 40 (L) 74 - 99 mg/dL   POCT GLUCOSE   Result Value Ref Range    POCT Glucose 89 74 - 99 mg/dL   POCT GLUCOSE   Result Value Ref Range    POCT Glucose 66 (L) 74 - 99 mg/dL   POCT GLUCOSE   Result Value Ref Range    POCT Glucose 66 (L) 74 - 99 mg/dL     *Note: Due to a large number of results and/or encounters for the requested time period, some results have not been displayed. A complete set of results can be found in Results Review.     Vascular US lower extremity pseudoaneurysm duplex right  Result Date: 9/8/2025            [place] [address]   Tel [phone] and Fax [phone]  Vascular Lab Report VASC US LOWER EXTREMITY PSEUDOANEURYSM DUPLEX RIGHT  Patient Name:     INDU Donald Physician: [id number] Angel Antonio MD Study Date:       9/8/2025           Ordering           [id number] SUPA ESPINO                                      Physician: MRN/PID:          [id number]           Technologist:      Emily Lynch JI Accession#:       [id number]       Technologist 2: Date of           1955 / 70      Encounter#:        [id number] Birth/Age:        years Gender:           M Admission Status: Inpatient          Location           [facility]                                      Performed:  Diagnosis/ICD: 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 No evidence of DVT in the visualized vessels. PSA off the CFA measures .4 cm x 1.5 cm. Active area measures .13  cm x .23 cm. Unable able to visualize a track to measure.  Imaging & Doppler Findings:   Right                     Left   PSV                      PSV 89 cm/s          cm/s         cm/s         cm/s         cm/s Profunda Proximal 147 cm/s   SFA Proximal  Right Pseudo Aneurysm                      Diam   Depth Right Pseudoaneurysm 0.4 cm 1.6 cm Active Area          0.2 cm .  [id number] Angel Antonio MD Electronically signed by [id number] Angel Antonio MD on 9/8/2025 at 5:57:56 PM  ** Final **     CONCLUSIONS: Pseudo Aneurysm: There is no evidence of AVF. 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 No evidence of DVT in the visualized vessels. PSA off the CFA measures .4 cm x 1.5 cm. Active area measures .13 cm x .23 cm. Unable able to visualize a track to measure.    Vascular US lower extremity pseudoaneurysm duplex right  Result Date: 9/8/2025            [place] [address]   Tel [phone] and Fax [phone]  Vascular Lab Report [facility] US LOWER EXTREMITY PSEUDOANEURYSM DUPLEX RIGHT  Patient Name:      INDU DOSHI          Reading Physician:  [id number] Andreia Sousa Study Date:        9/5/2025            Ordering Physician: [id number] ESPINO MRN/PID:           [id number]            Technologist:       Emily Lynch RVT Accession#:        [id number]        Technologist 2: Date of Birth/Age: 1955 / 70 years Encounter#:         [id number] Gender:            M Admission Status:  Inpatient           Location Performed: [facility]  Diagnosis/ICD: Stricture of artery-I77.1 CPT Codes:     20525 Peripheral artery Lower arterial Duplex limited  Patient History 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 CONCLUSIONS: Right Lower Arterial: PSA off CFA artery. Unable to get to and fro flow in the PSA or in the track. PSA measures .4 cm x 1.6 cm with a depth 1.78 cm. Active flow area measures .28 cm x .47 cm. Pseudo Aneurysm: There is no  evidence of AVF. 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 No evidence of DVT in the visualized vessels.  Comparison: Compared with study from 9/5/2025, CT angio abdomen pelvis w and or wo IV IV contrast IMPRESSION: 1. Right common femoral artery pseudoaneurysm measuring 0.5 X 0.4 X 0.5 cm corresponding to pseudoaneurysm seen on recent groin ultrasound. Small amount of hyperdense blood products of the groin without evidence of joel active extravasation.  Imaging & Doppler Findings:  Right       Compressible Thrombus   Flow CFV             Yes        None   Pulsatile FV Proximal     Yes        None   Pulsatile   Right                     Left   PSV                       cm/s         cm/s         cm/s Profunda Proximal 146 cm/s   SFA Proximal  Right Pseudo Aneurysm                      Diam   Depth Right Pseudoaneurysm 0.4 cm 1.6 cm                                   Diam    Length Right Pseudoaneurysm Track Length 1.82 mm 0.2 cm .  [id number] Andreia Sousa Electronically signed by [id number] Andreia Sousa on 9/8/2025 at 5:35:20 PM  ** Final **     CONCLUSIONS: Right Lower Arterial: PSA off CFA artery. Unable to get to and fro flow in the PSA or in the track. PSA measures .4 cm x 1.6 cm with a depth 1.78 cm. Active flow area measures .28 cm x .47 cm. Pseudo Aneurysm: There is no evidence of AVF. 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 No evidence of DVT in the visualized vessels.    CT angio abdomen pelvis w and or wo IV IV contrast  Result Date: 9/5/2025  Interpreted By:  Camron Mathew  and Vinh Nieves STUDY: CT ANGIO ABDOMEN PELVIS W AND/OR WO IV IV CONTRAST; 9/4/2025 9:34 pm   INDICATION: Signs/Symptoms:Triple phase - c/f PSA vs fistula vs active extrav in R groin access site from splenic artery embolization. Ref CT a/p from 8/31 and groin u/s from 9/4. [id number]   COMPARISON: CT abdomen pelvis 08/31/2025 and ultrasound 09/04/2025   ACCESSION NUMBER(S): [id number]    ORDERING CLINICIAN: SUPA ESPINO   PROCEDURE: CT ANGIOGRAM OF THE ABDOMEN AND PELVIS   TECHNIQUE: High-resolution contrast-enhanced helical CT of the abdomen and pelvis was performed, timed to the arterial phase. 3-D processing was performed by the physician on an independent work station, with MIP and volume-rendering techniques. Total of 75 ml of omnipaque 350 was injected intravenously during the examination. The study was performed without oral contrast. The patient tolerated the injection without complications.   FINDINGS: VASCULAR FINDINGS:   Images of the abdominal aorta demonstrate diffuse atherosclerotic change without significant focal stenosis or aneurysm.   Celiac artery demonstrates no significant focal stenosis.   Superior mesenteric artery demonstrates no significant focal stenosis.   Inferior mesenteric artery demonstrates no significant focal stenosis.   There is a single right renal artery. Right renal artery demonstrates no significant focal stenosis.   There is a single left renal artery. Left renal artery demonstrates no significant focal stenosis.   Right common iliac artery  is patent with no significant stenosis. Right external iliac artery is patent with no significant stenosis. Right internal iliac artery is patent with no significant stenosis. Right common femoral artery demonstrates small pseudoaneurysm measuring 0.5 X 0.4 X 0.5 cm (Series 7, Image 895) and (Series 902, Image 133). Faint region of hyperintensity on the delayed phase imaging suggesting minimal amount of blood products. The visualized right profunda femoris artery is patent with no significant stenosis. The visualized right superficial femoral artery is patent with no significant stenosis.   Left common iliac artery is patent with no significant stenosis. Left external iliac artery is patent with no significant stenosis. Left internal iliac artery is patent with no significant stenosis. Left common femoral artery is patent  with no significant stenosis. The visualized left profunda femoris artery is patent with no significant stenosis. The visualized left superficial femoral artery is patent with no significant stenosis.   NONVASCULAR FINDINGS:   CHEST: VISUALIZED CHEST: Moderate bilateral pleural effusions with associated compressive atelectasis. Heart is normal size. No pericardial effusion.   ABDOMEN:   LIVER: Postsurgical changes right sided hepatectomy. No focal lesion.   BILE DUCTS: No significant abnormality.   GALLBLADDER: Not definitely visualized may be surgically absent or contracted.   PANCREAS: No significant abnormality.   SPLEEN: Redemonstration of splenic infarcts.   ADRENAL GLANDS: No significant abnormality.   KIDNEYS AND URETERS: No significant abnormality.   PELVIS:   BLADDER: No significant abnormality.   REPRODUCTIVE ORGANS: No significant abnormality.   BOWEL: Stomach is unremarkable appearance small and large bowel is normal caliber without evidence of wall thickening. Postsurgical changes of left mid abdominal end colostomy. Appendix is surgically absent. There is interval resolution of previously described pneumatosis.   VESSELS: The IVC, portal vein, SMV, and splenic vein are patent without evidence of thrombosis. Multiple embolization coils in stable positioning.   PERITONEUM/RETROPERITONEUM/LYMPH NODES: Stable positioning of the right-sided percutaneous drain terminating along the lateral margin of the hepatectomy bed. Small volume abdominopelvic ascites, increased from prior. No discrete walled-off fluid collection. Diffuse mesenteric stranding which is likely reactive. No abdominopelvic lymphadenopathy.   BONES AND ABDOMINAL WALL: No suspicious osseous lesions are identified. Diffuse body wall edema.       1. Right common femoral artery pseudoaneurysm measuring 0.5 X 0.4 X 0.5 cm corresponding to pseudoaneurysm seen on recent groin ultrasound. Small amount of hyperdense blood products of the groin without  evidence of joel active extravasation. 2. Extensive postsurgical changes as above with interval increase in abdominopelvic ascites. 3. Additional chronic/incidental findings detailed above.   I personally reviewed the images/study and I agree with the findings as stated by resident physician Ezequiel Justice MD. This study was interpreted at [facility], [place], [place].   MACRO: The significance of the finding was communicated with via telephone to ordering team  by  Ezequiel Jsutice on 9/5/2025 at 1:53 a.m.. (**-DONF-**) Findings:  See findings.   Signed by: Camron Mathew 9/5/2025 6:04 AM Dictation workstation:   [id number]    US pelvis groin pseudoaneurysm  Result Date: 9/4/2025  Interpreted By:  Ken Palacios and Dulla Kireeti STUDY: US PELVIS GROIN PSEUDOANEURYSM; ;  9/4/2025 12:12 pm   INDICATION: Signs/Symptoms:Pseudoaneurysm concern.   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   ACCESSION NUMBER(S): [id number]   ORDERING CLINICIAN: CATALINA SOTO   TECHNIQUE: Focused ultrasonographic evaluation of the right. Grayscale imaging, color Doppler, and spectral Doppler were utilized. Static images were sent for interpretation.   FINDINGS: There is a 3 mm focal outpouching along the superficial margin of the right femoral artery in the area of clinical interest in the right groin, with abnormal flow, suggesting a pseudo aneurysm. Additionally, there is abnormal color flow within the artery and vein in this location, suggesting both arterial and venous flow within both vessels, which may reflect an arteriovenous fistula. There appears to be arterial waveforms also in the vein, although this may be artifactual. Multiphase CTA should be considered to further evaluate.       There is a 3 mm focal outpouching along the superficial margin of the right femoral artery in the area of clinical interest in the right groin, with abnormal  flow, suggesting a pseudo aneurysm. Additionally, there is abnormal color flow within the artery and vein in this location, suggesting both arterial and venous flow within both vessels, which may reflect an arteriovenous fistula. There appears to be arterial waveforms also in the vein, although this may be artifactual. Multiphase CTA should be considered to further evaluate.   I personally reviewed the images/study and I agree with the findings as stated by Kirstin Quinteros MD, PGY-3 this study was interpreted at [facility], [place], [place].   MACRO: None   Signed by: Ken Palacios 9/4/2025 4:22 PM Dictation workstation:   [id number]    ECG 12 lead  Result Date: 9/4/2025  Normal sinus rhythm with sinus arrhythmia Septal infarct (cited on or before 02-SEP-2025) T wave abnormality, consider anterior ischemia Abnormal ECG When compared with ECG of 02-SEP-2025 11:27, (unconfirmed) Significant changes have occurred Confirmed by Zack Lira ([id number]) on 9/4/2025 2:38:18 PM    Bedside PICC Imaging  Result Date: 9/3/2025  These images are not reportable by radiology and will not be interpreted by  Radiologists.    Transthoracic Echo Complete  Result Date: 9/2/2025   [facility], [place], [address] [place] [address]                Tel [phone] and Fax [phone] TRANSTHORACIC ECHOCARDIOGRAM REPORT  Patient Name:       INDU DOSHI          Reading Physician:    [id number] Paty Pop MD Study Date:         9/2/2025            Ordering Provider:    [id number] SUPA ESPINO MRN/PID:            [id number]            Fellow: Accession#:         [id number]        Nurse:                Kenyatta Burris RN Date of Birth/Age:  1955 / 70 years Sonographer:          Bouchra Rivera                                                                RDCS Gender assigned at  M                   Additional Staff: Birth: Height:             177.80 cm           Admit Date:           7/29/2025 Weight:             79.38 kg            Admission Status:     Inpatient -                                                               Routine BSA / BMI:          1.97 m2 / 25.11     Encounter#:           [id number]                     kg/m2 Blood Pressure:     119/73 mmHg         Department Location:  [facility]                                                               Non Invasive Study Type:    TRANSTHORACIC ECHO (TTE) COMPLETE Diagnosis/ICD: Non ST elevation (NSTEMI) myocardial infarction-I21.4 Indication:    Concern for anterior ischemia CPT Code:      Echo Complete w Full Doppler-98373 Patient History: Pertinent History: Rectal cancer with mets to the liver, CAD (2021 NSTEMI), DM2,                    HTN who is s/p right extended hepatectomy for metastatic                    rectal cancer on 7/29. Study Detail: The following Echo studies were performed: 2D, M-Mode, Doppler and               color flow. Technically challenging study due to patient lying in               supine position and body habitus. Optison used as a contrast agent               for endocardial border definition. Total contrast used for this               procedure was 2.5 mL via IV push.  Critical Event Critical Event: Test was completed as per department protocol. Critical Finding: New WMA. Time Test was Completed: 4:30:43 PM Notified: Paty Pop MD. Attending notification time: 4:35:44 PM  PHYSICIAN INTERPRETATION: Left Ventricle: Left ventricular ejection fraction is moderately decreased calculated by Russo's biplane at 35%. Left venticular wall motion is abnormal. The left ventricular cavity size is mildly dilated. There is normal septal and normal posterior left ventricular wall thickness. Spectral Doppler shows a Grade I  (impaired relaxation pattern) of left ventricular diastolic filling with normal left atrial filling pressure. There is no definite left ventricular thrombus visualized. LV Wall Scoring: The entire apex is akinetic. Left Atrium: The left atrium is mildly dilated. Right Ventricle: The right ventricle is normal in size. There is normal right ventricular global systolic function. Right Atrium: The right atrium is normal in size. Aortic Valve: The aortic valve is trileaflet. The aortic valve area by VTI is 3.11 cmÂ² with a peak velocity of 1.37 m/s. The peak and mean gradients are 8 mmHg and 4 mmHg, respectively with a dimensionless index of 0.75. There is mild aortic valve thickening. There is trace aortic valve regurgitation. Mitral Valve: The mitral valve is normal in structure. There is trace mitral valve regurgitation. The E Vmax is 0.48 m/s. Tricuspid Valve: The tricuspid valve is structurally normal. There is trace tricuspid regurgitation. Pulmonic Valve: The pulmonic valve is not well visualized. There is no indication of pulmonic valve regurgitation. Pericardium: Trivial pericardial effusion. Pleural: There is left pleural effusion. Aorta: The aortic root is normal. The aortic root appears normal in size and measures 3.50 cm. The Asc Ao is 3.20 cm. There is no dilatation of the ascending aorta. Systemic Veins: The inferior vena cava appears small in size, with IVC inspiratory collapse greater than 50%. In comparison to the previous echocardiogram(s): Compared with study dated 5/13/2025, LVEF has decreased from 45-50% on the prior study to 35% on the current examination, with new regional wall motion abnormalities. The mitral regurgitation is trace on today's study (was mild to moderate).  CONCLUSIONS:  1. Left ventricular ejection fraction is moderately decreased calculated by Russo's biplane at 35%.  2. Abnormal left venticular wall motion.  3. Entire apex is abnormal.  4. No left ventricular thrombus  visualized.  5. Spectral Doppler shows a Grade I (impaired relaxation pattern) of left ventricular diastolic filling with normal left atrial filling pressure.  6. Left ventricular cavity size is mildly dilated.  7. There is normal right ventricular global systolic function.  8. The left atrium is mildly dilated.  9. Normal aortic root. 10. Compared with study dated 5/13/2025, LVEF has decreased from 45-50% on the prior study to 35% on the current examination, with new regional wall motion abnormalities. The mitral regurgitation is trace on today's study (was mild to moderate).  QUANTITATIVE DATA SUMMARY:  2D MEASUREMENTS:          Normal Ranges: Ao Root d:       3.50 cm  (2.0-3.7cm) LAs:             4.43 cm  (2.7-4.0cm) IVSd:            0.87 cm  (0.6-1.1cm) LVPWd:           1.03 cm  (0.6-1.1cm) LVIDd:           5.01 cm  (3.9-5.9cm) LVIDs:           3.72 cm LV Mass Index:   87 g/m2 LVEDV Index:     86 ml/m2 LV % FS          25.8 %  LEFT ATRIUM:                  Normal Ranges: LA Vol A4C:        87.1 ml    (22+/-6mL/m2) LA Vol A2C:        42.3 ml LA Vol BP:         68.6 ml LA Vol Index A4C:  44.2ml/m2 LA Vol Index A2C:  21.5 ml/m2 LA Vol Index BP:   34.8 ml/m2 LA Area A4C:       24.8 cm2 LA Area A2C:       15.3 cm2 LA Major Axis A4C: 6.0 cm LA Major Axis A2C: 4.7 cm  RIGHT ATRIUM:          Normal Ranges: RA Area A4C:  14.3 cm2  AORTA MEASUREMENTS:         Normal Ranges: Asc Ao, d:          3.20 cm (2.1-3.4cm)  LV SYSTOLIC FUNCTION:                      Normal Ranges: EF-A4C View:    45 % (>=55%) EF-A2C View:    20 % EF-Biplane:     35 % LV EF Reported: 35 %  LV DIASTOLIC FUNCTION:             Normal Ranges: MV Peak E:             0.48 m/s    (0.7-1.2 m/s) MV Peak A:             0.79 m/s    (0.42-0.7 m/s) E/A Ratio:             0.61        (1.0-2.2) MV e'                  0.067 m/s   (>8.0) MV lateral e'          0.07 m/s MV medial e'           0.06 m/s MV A Dur:              138.92 msec E/e' Ratio:            7.14         (<8.0) MV DT:                 58 msec     (150-240 msec)  MITRAL VALVE:         Normal Ranges: MV DT:        58 msec (150-240msec)  AORTIC VALVE:                     Normal Ranges: AoV Vmax:                1.37 m/s (<=1.7m/s) AoV Peak P.5 mmHg (<20mmHg) AoV Mean P.0 mmHg (1.7-11.5mmHg) LVOT Max Surendra:            1.03 m/s (<=1.1m/s) AoV VTI:                 23.48 cm (18-25cm) LVOT VTI:                17.59 cm LVOT Diameter:           2.30 cm  (1.8-2.4cm) AoV Area, VTI:           3.11 cm2 (2.5-5.5cm2) AoV Area,Vmax:           3.12 cm2 (2.5-4.5cm2) AoV Dimensionless Index: 0.75  RIGHT VENTRICLE: RV Basal 3.50 cm RV Mid   2.50 cm RV Major 9.5 cm TAPSE:   20.5 mm RV s'    0.18 m/s  TRICUSPID VALVE/RVSP:         Normal Ranges: Est. RA Pressure:     3 IVC Diam:             0.97 cm  AORTA: Asc Ao Diam 3.24 cm  [id number] Paty Pop MD Electronically signed on 2025 at 7:36:47 PM  Wall Scoring  ** Final **     CONCLUSIONS:  1. Left ventricular ejection fraction is moderately decreased calculated by Russo's biplane at 35%.  2. Abnormal left venticular wall motion.  3. Entire apex is abnormal.  4. No left ventricular thrombus visualized.  5. Spectral Doppler shows a Grade I (impaired relaxation pattern) of left ventricular diastolic filling with normal left atrial filling pressure.  6. Left ventricular cavity size is mildly dilated.  7. There is normal right ventricular global systolic function.  8. The left atrium is mildly dilated.  9. Normal aortic root. 10. Compared with study dated 2025, LVEF has decreased from 45-50% on the prior study to 35% on the current examination, with new regional wall motion abnormalities. The mitral regurgitation is trace on today's study (was mild to moderate).    CT abdomen pelvis w IV contrast  Result Date: 2025  Interpreted By:  [place],  and Leatha Bravo STUDY: CT ABDOMEN PELVIS W IV CONTRAST;  2025 12:42 pm    INDICATION: Signs/Symptoms:uptrending wbc with abdominal pain.     COMPARISON: CT ABDOMEN PELVIS WO IV CONTRAST 8/25/2025   ACCESSION NUMBER(S): [id number]   ORDERING CLINICIAN: SUPA ESPINO   TECHNIQUE: CT of the abdomen and pelvis was performed.  Standard contiguous axial images were obtained at 3 mm slice thickness through the abdomen and pelvis. Coronal and sagittal reconstructions at 3 mm slice thickness were performed.  75 ML of Omnipaque 350 was administered intravenously without immediate complication.   FINDINGS: LOWER CHEST: Interval increase in now moderate bilateral pleural effusions with associated passive atelectasis. The heart is normal in size.   ABDOMEN:   LIVER: Status post right hepatectomy with multiple metallic clips seen in place as well as surgical drains coursing along the right liver edge. There are several areas of hypoattenuation in the remaining portions of the liver which are incompletely evaluated on the current examination. For example, there is an area in segment 4A which measures 1.7 x 1.2 cm (Series 2, Image 37). Another area is noted slightly more inferiorly in segment 4A which measures 2.0 x 1.8 cm (Series 2, Image 45). Similar appearance of embolization coils in the right hepatic branch of the portal vein.   BILE DUCTS: The intrahepatic and extrahepatic ducts are not dilated.   GALLBLADDER: The gallbladder is surgically absent.   PANCREAS: Somewhat limited in evaluation due to adjacent metallic artifact from splenic artery embolization coils. Within this limitation, the pancreas appears unremarkable   SPLEEN: There has been interval embolization splenic artery with subsequent enlargement of the spleen which now measures 16.1 cm in maximum dimension as well as large nonenhancing area in the splenic parenchyma, compatible with infarction.   ADRENAL GLANDS: Bilateral adrenal glands appear normal.   KIDNEYS AND URETERS: The kidneys are normal in size and enhance symmetrically.  No  hydroureteronephrosis or nephroureterolithiasis is identified.   PELVIS:   BLADDER: The urinary bladder is decompressed, limited for evaluation.   REPRODUCTIVE ORGANS: The prostate is not enlarged.   BOWEL: Stomach is decompressed and limited in evaluation. There has been interval decrease in the dilatation of the multiple fluid-filled small bowel loops. There is a left mid abdominal end colostomy. Interval development of pneumatosis involving the cecum, ascending, and early transverse colon through the hepatic flexure. This section of the colon also appears hypoenhancing and is therefore concerning for ischemia. The distal remaining colonic portions show no pneumatosis and terminate into a similar-appearing left lower quadrant ostomy.    The appendix is surgically absent. Unremarkable appearance of the rectal stump with a metallic clips in place.   VESSELS: The aorta and IVC appear normal. Moderate atherosclerotic changes are noted in the abdominal aorta. No aortic aneurysm.   PERITONEUM/RETROPERITONEUM/LYMPH NODES: There is diffuse mesenteric edema, similar to prior. There is trace free fluid scattered throughout the abdomen and pelvis. No free air. No abdominopelvic lymphadenopathy is present. There multiple nonenlarged calcified retroperitoneal lymph nodes similar to prior.   BONES AND ABDOMINAL WALL: No suspicious osseous lesions are identified. Degenerative discogenic disease is noted in the lower thoracic and lumbar spine including a chronic appearing compression deformity of the T12 vertebral body.   Similar appearance of calcified subcutaneous soft tissues along the left anterior abdomen, and right subcutaneous soft tissues superficial to the common femoral vasculature. These are likely related to prior lymphangiography.   Small area of subcutaneous gas seen in the anterior abdominal wall along a midline incision with adjacent soft tissue thickening but no intraperitoneal extension (Series 2, Image 70).  Mild diffuse soft tissue edema/anasarca.       1.  Interval development of pneumatosis and hypoenhancement of the ascending and proximal transverse colon. Findings are concerning for bowel ischemia. Recommend correlation with lactate. Of note, no pneumoperitoneum is seen. 2. 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 3. Interval increase in now moderate bilateral pleural effusions with associated passive atelectasis. 4. There is diffuse mesenteric edema and trace free fluid throughout the abdomen and pelvis, similar to prior. Findings are likely postoperative in nature although a reactive etiology cannot be entirely excluded. 5. Small area of subcutaneous gas in an anterior abdominal wall midline incision with adjacent soft tissue thickening but no intraperitoneal extension.   I personally reviewed the images/study and I agree with the findings as stated by Lawrence Zhang MD (resident).   MACRO: Lawrence Zhang discussed the significance and urgency of this critical finding by EPIC secure chat with  SUPA ESPINO on 8/31/2025 at 1:04 pm.  (**-RCF-**) Findings:  See findings.   Signed by: Des Villeda 8/31/2025 2:31 PM Dictation workstation:   [id number]    US pelvis groin pseudoaneurysm  Result Date: 8/29/2025  Interpreted By:  Juanito Monzon and Dulla Kireeti STUDY: US PELVIS GROIN PSEUDOANEURYSM; ;  8/29/2025 12:02 pm   INDICATION: Signs/Symptoms:Pseudoaneurysm concern.   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   ACCESSION NUMBER(S): [id number]   ORDERING CLINICIAN: SUPA ESPINO   TECHNIQUE: Focused ultrasonographic evaluation of the right groin. Grayscale imaging, color Doppler, and spectral Doppler were utilized. Static images were sent for interpretation.   FINDINGS: There is no evidence of a pseudoaneurysm within the right groin.       No evidence of pseudoaneurysm within the right  groin.   I personally reviewed the images/study and I agree with the findings as stated by Kirstin Quinteros MD, PGY-3 this study was interpreted at [facility], [place], [place].   MACRO: None   Signed by: Juanito Monzon 8/29/2025 7:41 PM Dictation workstation:   [id number]    XR abdomen 1 view  Result Date: 8/28/2025  Interpreted By:  Jorge Hyman  and Rene Dueñas STUDY: XR ABDOMEN 1 VIEW;  8/27/2025 7:24 pm   INDICATION: Signs/Symptoms:assess NG tube placement.     COMPARISON: XR ABDOMEN 1 VIEW 8/26/2025, CT ABDOMEN PELVIS WO IV CONTRAST 8/25/2025, CT ABDOMEN PELVIS W AND WO IV CONTRAST 8/7/2025   ACCESSION NUMBER(S): [id number]   ORDERING CLINICIAN: SUPA ESPINO   FINDINGS: AP radiograph of abdomen available for interpretation.   Interval placement of enteric tube which is seen coursing midline and below the level of the diaphragm with distal tip projecting over the gastric body. New embolization coils are seen along the left mid abdomen. Similar position of previously visualized left lateral abdomen embolization coil. Surgical drain and clips are visualized over the right upper quadrant.   Presumed right IJ CVC tip projects over the cavoatrial junction.   Nonobstructive bowel gas pattern.Limited evaluation of pneumoperitoneum on supine imaging, however no gross evidence of free air is noted.   Persistent but stable right-greater-than-left pleural effusions. There is also superimposed bibasilar atelectatic changes.   Osseous structures demonstrate no acute bony changes.       1.  Interval placement of enteric tube with tip projecting with the gastric body. Remaining medical devices as above. 2. Persistent but stable right-greater-than-left pleural effusions.     I have reviewed the images/study and I agree with the findings as stated by Spenser López MD (PGY-3).   Signed by: Jorge Hyman 8/28/2025 9:55 AM Dictation workstation:   [id number]    XR chest 1 view  Result Date:  8/27/2025  Interpreted By:  Jorge Hyman and Nadeau Simon STUDY: XR CHEST 1 VIEW;  8/27/2025 4:24 pm   INDICATION: Signs/Symptoms:PICC LINE PLACEMENT CONFIRMATION.     COMPARISON: Chest radiograph one view 07/31/2025, CT abdomen and pelvis 08/25/2025.   ACCESSION NUMBER(S): [id number]   ORDERING CLINICIAN: SUPA ESPINO   FINDINGS: AP radiograph of the chest was provided.   Right-sided internal jugular MediPort with distal tip visualized projecting over the superior cavoatrial junction. Left approach PICC line with distal tip projecting over the lower SVC.   CARDIOMEDIASTINAL SILHOUETTE: Cardiomediastinal silhouette is normal in size and configuration. Calcifications overlying the aortic arch.   LUNGS: Moderate right and small left pleural effusion with the adjacent opacification. Perihilar vascular prominence.   ABDOMEN: No remarkable upper abdominal findings.   BONES: No acute osseous changes.       1. Left approach PICC line with distal tip projecting over the lower SVC. 2. Persistence of a moderate right-and small left pleural effusion with adjacent opacification consistent with atelectasis, can not exclude an infectious cause. 3. Additional medical devices as above.   I personally reviewed the images/study and I agree with the findings as stated by Brian Cruz D.O. (Radiology resident). This study was interpreted at [place], [place].   MACRO: None   Signed by: Jorge Hyman 8/27/2025 4:53 PM Dictation workstation:   [id number]    Bedside PICC Imaging  Result Date: 8/27/2025  These images are not reportable by radiology and will not be interpreted by  Radiologists.    IR intervention arterial embolization  Result Date: 8/26/2025  Interpreted By:  Jb Gonzalez, STUDY: IR INTERVENTION ARTERIAL EMBOLIZATION; ;  8/26/2025 3:56 pm 1. ULTRASOUND-GUIDED RIGHT COMMON FEMORAL ARTERY ACCESS 2. RETROGRADE RIGHT COMMON FEMORAL ARTERIOGRAM 3. CELIAC ARTERIOGRAM 4.  SPLENIC ARTERIOGRAM 5. COIL EMBOLIZATION SPLENIC ARTERY 6. POSTEMBOLIZATION SPLENIC ARTERIOGRAM 7. PERCUTANEOUS CLOSURE DEVICE-ANGIO-SEAL     INDICATION: Signs/Symptoms:embolization. 70-year-old man with metastatic colon cancer, liver metastases status post extended right hip technique, small for size syndrome. Splenic artery embolization requested.     COMPARISON: CT abdomen pelvis 08/25/2025, CT liver 07/16/2025, 05/23/2025   ACCESSION NUMBER(S): [id number]   ORDERING CLINICIAN: CATALINA SOTO   TECHNIQUE: ASSISTING : Rachelle Rodríguez M.D. ATTENDING : Jb Gonzalez M.D.   TECHNICAL DESCRIPTION/FINDINGS: The procedure, including all risks, benefits and alternatives were explained to the patient in detail. All questions were answered and written informed consent was obtained.   The patient was positioned supine on the fluoroscopy table. A time-out was performed.   The right groin was prepped and draped in usual sterile fashion. Focused ultrasound was performed the right common femoral artery demonstrating patency and pulsatility. Ultrasound images were saved. 1% lidocaine was administered subcutaneously for local anesthesia. A combination of fluoroscopic landmarks and real-time ultrasound guidance, the right common femoral artery was accessed in retrograde direction using micropuncture technique. Ultrasound images were saved. Under fluoroscopic visualization, an 018 guidewire was advanced into the right common iliac artery and a micropuncture transitional introducer was utilized for placement of a suprarenal 035 Bentson guidewire. Over the Bentson guidewire, retrograde 5 Azeri vascular sheath was placed.   A retrograde right common femoral arteriogram was performed demonstrating arterial access at the level of the right femoral head with vascular caliber suitable for percutaneous closure device.   Over the Bentson guidewire, a 5 Azeri SOS 2 catheter was advanced into the suprarenal  abdominal aorta and used to select the celiac axis. A celiac arteriogram was performed demonstrating antegrade flow in left gastric, common hepatic, and the hypertrophied splenic arteries. Embolization coils are shown in the remnant liver as well as in the splenic parenchyma from prior trans splenic access.   In coaxial fashion, a Renegade HiFlo microcatheter an 016 fathom microwire were then used to select the mid splenic artery. An arteriogram was performed demonstrating brisk antegrade flow in the hypertrophied splenic artery with distal branching at the hilum.   Coil embolization was then initiated with multiple detachable embolization close of various sizes. An attempt was made to get the initial loops of the embolization coil to form in the mid to distal splenic artery without distal migration to the splenic hilum. A coiled conglomerate of 2 coils was formed in the mid to distal main splenic artery proper however upon addition of the 3rd coil, the prior coil conglomerate was shown to embolize more distally toward the hilum. In sequential fashion, additional embolization coils were then administered into the mid and upstream portion of the splenic artery for complete occlusion.   A postembolization splenic arteriogram was performed demonstrating complete occlusion of the main splenic artery with persistent filling of pancreatic branches arising from the upstream aspect of the splenic artery.   Catheters were then removed from the right groin vascular sheath. The vascular sheath was exchanged for an Angio-Seal percutaneous closure device to obtain hemostasis. A sterile dressing was applied.   SEDATION/MEDICATIONS: Continuous cardiopulmonary monitoring was performed by a radiology nurse for the duration of the procedure.  1 mg Versed and   50 mcg Fentanyl were administered for moderate conscious sedation time of 75 minutes.      10 cc 1% Lidocaine was administered subcutaneously for local anesthesia. SPECIMENS:  None. ESTIMATED BLOOD LOSS:  5 cc FLOUROSCOPY:  16.6 minutes; DAP  810851 mGy-cm*2; Air Kerma  552.77 mGy CONTRAST: 60 cc Omnipaque 350   FINDINGS: Test       Main splenic artery coil embolization to completion, as described above.     MACRO: None   Signed by: Jb Gonzalez 8/26/2025 5:50 PM Dictation workstation:   [id number]    XR abdomen 1 view  Result Date: 8/26/2025  Interpreted By:  Jennifer Young and Mercado Amiel STUDY: XR ABDOMEN 1 VIEW;  8/26/2025 1:05 am   INDICATION: Signs/Symptoms:NG tube replacement.     COMPARISON: XR ABDOMEN 1 VIEW 8/25/2025, CT ABDOMEN PELVIS WO IV CONTRAST 8/25/2025   ACCESSION NUMBER(S): [id number]   ORDERING CLINICIAN: SUPA ESPINO   FINDINGS: AP radiograph of abdomen available for interpretation.   Interval advancement/replacement of enteric tube with tip projecting over the gastric body. Right IJ CVC tip projects over the cavoatrial junction. Embolization coils are noted over the left upper abdomen. Surgical drain and clips visualized over the right upper quadrant.   Nonobstructive bowel gas pattern.Limited evaluation of pneumoperitoneum on supine imaging, however no gross evidence of free air is noted.   Persistent right more than left pleural effusions.   Osseous structures demonstrate no acute bony changes.       1.  Interval advancement/replacement of enteric tube with tip now projecting over the gastric body. 2. Persistent moderate right and small left pleural effusions.     I have reviewed the images/study and I agree with the findings as stated by Spenser López MD (PGY-3).   Signed by: Jennifer Davies 8/26/2025 7:17 AM Dictation workstation:   [id number]    XR abdomen 1 view  Result Date: 8/26/2025  Interpreted By:  Jennifer Young and Stevens Alex STUDY: XR ABDOMEN 1 VIEW;  8/25/2025 6:35 pm   INDICATION: Signs/Symptoms:NG Tube placement confirmation.     COMPARISON: CT abdomen pelvis 08/25/2025   ACCESSION NUMBER(S): [id number]    ORDERING CLINICIAN: SUPA ESPINO   FINDINGS: Enteric tube is visualized with distal tip projecting over the expected location of the gastric body. Surgical drain and clips visualized projecting over the right upper abdominal quadrant. Embolization coils noted over the left upper abdomen.   Nonobstructive bowel gas pattern.   Limited evaluation of pneumoperitoneum on supine imaging, however no gross evidence of free air is noted.   Blunting of the bilateral costophrenic angles, right-greater-than-left. Consolidate opacity in the lung bases.   Osseous structures demonstrate no acute bony changes.       1. Enteric tube is visualized with distal tip projecting over the expected location of the gastric body. 2. Moderate right and small left pleural effusions.   I personally reviewed the images/study and I agree with the findings as stated by Benja Sotomayor DO PGY-3. This study was interpreted at [place], [place].   MACRO: None   Signed by: Jennifer Davies 8/26/2025 7:16 AM Dictation workstation:   [id number]    CT abdomen pelvis wo IV contrast  Result Date: 8/25/2025  Interpreted By:  Ken Palacios, STUDY: CT ABDOMEN PELVIS WO IV CONTRAST; 8/25/2025 3:06 pm   INDICATION: Signs/Symptoms:Concern for GI obstruction - plan to administer PO contrast prior to scan, long prep.   COMPARISON: 08/07/2025   ACCESSION NUMBER(S): [id number]   ORDERING CLINICIAN: SUPA ESPINO   TECHNIQUE: CT of the abdomen and pelvis was performed utilizing enteric contrast. Contiguous axial images were obtained through the abdomen and pelvis. Coronal and sagittal reconstructions were also created. No intravenous contrast was administered.   FINDINGS: Evaluation of vessels and organs is sub-optimal without intravenous contrast.   INCLUDED LOWER CHEST: There is a moderate right and small left pleural effusion with adjacent atelectasis.   LIVER: Stable findings including right hepatectomy. No new  hepatic abnormality is evident given noncontrast technique.   BILE DUCTS: No biliary dilation.   GALLBLADDER: Status post cholecystectomy.   PANCREAS: Unremarkable.   SPLEEN: Stable splenomegaly with calcifications suggesting prior granulomatous exposure. There is also evidence of prior embolization.   ADRENAL GLANDS: Unremarkable.   KIDNEYS, URETERS AND BLADDER: No evidence of nephrolithiasis or hydroureteronephrosis. The urinary bladder is minimally distended.   REPRODUCTIVE ORGANS: Unremarkable.   BOWEL: There is a small bowel obstruction with an abrupt transition point just anterior to the rectal anastomosis in the pelvis. The distal small bowel loops in the right lower quadrant are decompressed. No definite pneumatosis or wall thickening given noncontrast technique.   VESSELS: Stable findings with calcific atherosclerosis in the nonaneurysmal abdominal aorta.   PERITONEUM/RETROPERITONEUM/LYMPH NODES: There is trace free fluid and postsurgical changes consistent with patient history, similar to the prior exam. No acute hematoma or free air. No adenopathy is evident.   MUSCULOSKELETAL: No destructive osseous lesion is evident.       1. Small-bowel obstruction with an abrupt transition point just anterior to the rectal anastomosis in the pelvis. The distal small bowel loops in the right lower quadrant are decompressed. No definite pneumatosis or wall thickening given noncontrast technique. 2. Other findings are without significant change including small pleural effusions and adjacent atelectasis, right-greater-than-left, and postsurgical changes.   Signed by: Ken Palacios 8/25/2025 3:34 PM Dictation workstation:   [id number]    US renal complete  Result Date: 8/24/2025  Interpreted By:  Juanito Monzon and Sheng Max STUDY: US RENAL COMPLETE;  8/24/2025 3:17 pm   INDICATION: Signs/Symptoms:Worsening Cr.     COMPARISON: MRCP PANCREAS W AND WO IV CONTRAST 8/20/2025, US LIVER WITH DOPPLER 8/12/2025, CT ABDOMEN  PELVIS W AND WO IV CONTRAST 8/7/2025, US LIVER WITH DOPPLER 8/6/2025   ACCESSION NUMBER(S): [id number]   ORDERING CLINICIAN: SUPA ESPINO   TECHNIQUE: Multiple grayscale, B flow, Doppler color sequences of the kidneys were obtained.   FINDINGS: Exam is limited due to patient's body habitus overlying bowel gas, within those limitations:   RIGHT KIDNEY: The right kidney measures 11.4 cm in length. The renal cortical echogenicity and thickness are within normal limits. No hydronephrosis is present; no evidence of nephrolithiasis.   LEFT KIDNEY: The left kidney measures 13.0 cm in length. The renal cortical echogenicity and thickness are within normal limits. No hydronephrosis is present; no evidence of nephrolithiasis.   BLADDER: The urinary bladder is unremarkable in appearance.   OTHER: Small volume free fluid is noted abutting the left kidney (image 23/29). The prostate is enlarged extending superiorly and indenting the inferior aspect of the urinary bladder measuring 4.4 x 5.1 x 3.9 cm (image 28/29). Partially imaged small right-sided pleural effusion (image 18/29).       1. Unremarkable sonographic evaluation of the bilateral kidneys. No hydronephrosis. 2. Trace free fluid is noted abutting the left kidney. 3. Partially imaged right-sided pleural effusion. 4. Incidentally noted prostatomegaly.     I personally reviewed the images/study and I agree with the findings as stated by Dr. Ephraim Arzate. This study was interpreted at [facility], [place], [place].   MACRO: None     Signed by: Juanito Monzon 8/24/2025 4:28 PM Dictation workstation:   [id number]    MRCP pancreas w and wo IV contrast  Result Date: 8/21/2025  Interpreted By:  Ken Palacios and Bera Kaustav STUDY: MRCP PANCREAS W AND WO IV CONTRAST;  8/20/2025 6:35 pm   INDICATION: Signs/Symptoms:Suspected biliary stricture, possible stenting..   70-year-old male with history of rectal cancer with metastatic disease to the  liver, status post right extended hepatectomy on 07/29/2025. Bilirubin is elevated at 15.9.   COMPARISON: CT abdomen pelvis on 08/07/2025 CT liver with IV contrast on 07/16/2025 MRI liver on 06/18/2024   ACCESSION NUMBER(S): [id number]   ORDERING CLINICIAN: SUPA ESPINO   TECHNIQUE: MRI PANCREAS; Multiplanar magnetic resonance images of the abdomen were obtained including the following sequences; T2-weighted SSFSE with and without fat saturation, T1-weighted GRE in/opposed phase, DWI, fat saturated 3D-T1w GRE pre and dynamically post contrast. Radial thick slab T2w RARE MRCP and coronally reconstructed navigator gated high resolution 3-D T2w RESTORE MRCP with MIP reconstruction were also performed for MRCP.  16 ML of Dotarem was administered intravenously without immediate complication.   FINDINGS: LIVER: Status post extended right hepatectomy, with a few perihepatic collections, appear stable to mildly decreased as compared to prior. For instance a 2.6 cm perihepatic collection (series 21, image 13; previously measured up to 5.0 cm. Additional perihepatic fluid collections, including a collection of the superior aspect of the liver appears similar to prior (series 14, image 22/72). The left lobe of the liver demonstrates mildly heterogeneous enhancement especially at the anterior aspect, likely postsurgical. No evidence of fatty infiltration. No focal liver lesions identified.   BILE DUCTS: No intrahepatic or extrahepatic bile duct dilatation is demonstrated.   GALLBLADDER: Status post cholecystectomy   PANCREAS: Normal signal intensity. Normal enhancement. No masses. The pancreatic duct is normal.   SPLEEN: Spleen is at the upper limits of normal in size measuring up to 13.9 cm in craniocaudal dimensions.   ADRENAL GLANDS: Within normal limits.   KIDNEYS: The kidneys are normal in size enhance symmetrically. 0.8 cm T2 hyperintense lesion within the inferior pole of the left kidney, similar to prior, likely a  simple cysts. No hydronephrosis.   LYMPH NODES: Few prominent retroperitoneal lymph nodes, however not enlarged by CT criteria.   ABDOMINAL VESSELS: Aorta and the major abdominal arterial vessels demonstrate no gross abnormality.  Superior mesenteric vein, splenic vein, and main, right and left portal vein are patent. Hepatic veins are patent.  No significant collaterals or esophageal varices are present.   BOWEL: Prominent small bowel loops, likely due to ileus.   PERITONEUM/RETROPERITONEUM: Upper abdominal ascites, as well as right-greater-than-left pleural effusions appears similar to prior. A surgical drain is in the right upper quadrant collection.   BONES AND LOWER THORAX: Small amount of fluid within the anterior abdominal wall, likely postsurgical, similar to prior. There is a tiny fat containing umbilical hernia. Diffuse body wall anasarca. No abnormally enhancing focal bony lesions are identified. Multilevel discogenic degenerative disease is noted in several levels of the thoraco- lumbar spine. T12 compression deformity similar to prior. Right-greater-than-left pleural effusions appears similar to prior.       1.  No evidence biliary dilatation. 2. Postsurgical changes of extended right hepatectomy, with mildly decreased size of perihepatic fluid collections, when compared to CT from 08/07/2025. 3. Additional stable changes as above.   I personally reviewed the images/study and I agree with the findings as stated. This study was interpreted at [facility], [place], [place].   MACRO: None   Signed by: Ken Palacios 8/21/2025 11:58 AM Dictation workstation:   [id number]      Assessment & Plan  Colon carcinoma metastatic to liver    Carcinoma of colon metastatic to liver    Acute post-operative pain      The [facility] Symptom Management program offers multi-disciplinary supervised care of cancer patients using Integrative Modalities  billed to insurance using NCCN and SIO/ASCO guideline-driven practices.     ESAS not obtained today, as no integrative services were provided today.     Abdominal/generalized pain:   pain related to malignancy   Pain is well-controlled  Defer to supportive oncology team for adequate PO/IV pain regimen   Recommend integrative therapy modalities as pt allows:  -Acupuncture; unfortunately given pt's persistent altered mental status, pt does not have capacity to consent for treatment. Defer services  -Acupressure  -Gentle bodywork and stretching as tolerated     -Art therapy - pt declined   -Music therapy - consult in place   -Chaplaincy - pt declined   -Pet therapy        Nausea/Vomiting:  Intermittent nausea and vomiting related to opioids, chemotherapy  -Defer to supportive oncology recs for pharmacological management  -Recommend integrative medicine modalities as listed above        Altered Mood:  Anxiety and/or depression r/t health concerns, prolonged hospitalization  History of anxiety/depression  -Recommend integrative medicine modalities as listed above  Mindfulness -- provided handout              Vitor: AMDtx, Calm, Headspace, Insight Timer  Guided Imagery  Meditation (15 min in the morning) - consider mindfulness (Mindfulness based Stress Reduction)   Apps such as CALM or Headspace  Deep breathing: Alternate nostril breathing and Deep abdominal breathing (5 min) in the morning  [facility] - Guided Meditation        Integrative hematology/oncology will sign off at this time, given pt's altered mental status and inability to consent for integrative medicine interventions. Please don't hesitate to page us if any further questions arise. Thank you for allowing us to participate in the care of this patient.          MELISSA Granados-CNP (available by [employer])  [facility]  Inpatient Integrative Medicine      I spent 15 minutes in the care of this patient which included chart  review, interviewing patient/family, discussion with primary team, coordination of care, and documentation.     Medical Decision Making was high level due to high complexity of problems, extensive data review, and high risk of management/treatment.          [1] albumin human, 25 g, intravenous, q6h  fluconazole, 400 mg, intravenous, q24h  [Held by provider] heparin (porcine), 5,000 Units, subcutaneous, q8h  insulin lispro, 0-10 Units, subcutaneous, q4h  lactulose, 10 g, oral, BID  metoprolol tartrate, 25 mg, oral, BID  [Held by provider] morphine CR, 30 mg, oral, q12h ALEX  pantoprazole, 40 mg, oral, BID AC  phytonadione, 5 mg, intravenous, q48h  piperacillin-tazobactam, 3.375 g, intravenous, q6h  [Held by provider] polyethylene glycol, 17 g, oral, Daily  thiamine, 100 mg, intravenous, Daily  ursodiol, 250 mg, oral, BID  [2] dextrose 5%-0.45 % sodium chloride, 40 mL/hr  lactated Ringer's, 0-1,000 mL/hr, Last Rate: Stopped (09/11/25 1105)  [3] PRN medications: alteplase, benzocaine-menthol, calcium carbonate, dextrose, dextrose, glucagon, HYDROmorphone, nitroglycerin, ondansetron, oxyCODONE, traMADol

## 2025-09-12 NOTE — CONSULTS
Wound Care Consult     Visit Date: 9/12/2025      Patient Name: Roma Corral         MRN: [id number]             Reason for Consult: Ostomy assessment and pouch change        Wound History: Roma Corral is a 70 y.o. male with history of rectal cancer with mets to the liver, CAD (2021 NSTEMI), DM2, HTN who is s/p right extended hepatectomy for metastatic rectal cancer on 7/29.      Assessment:             Colostomy Loop LUQ (Active)   Placement Date/Time: 07/02/24 1416   Hand Hygiene Completed: Yes  Colostomy Type: Loop  Location: LUQ   Number of days: 437      Colostomy Loop LUQ (Active)   Present on Admission to Healthcare Facility [facility] 09/05/25 1300   Stomal Appliance 2 piece;Changed 09/12/25 1350   Site/Stoma Assessment Budded;Red 09/12/25 1350   Stoma Size (cm) 3.8 cm 09/12/25 1350   Peristomal Assessment Clean;Intact 09/12/25 1350   Treatment Pouch change 09/12/25 1350   Drainage Characteristics Brown 09/12/25 1350   Output (mL) 0 mL 09/12/25 1251   Intake (ml) 0 ml 08/14/25 1943   Ostomy type: s/p colostomy    size: 1 1/2 in      color: red      protruding: flush  John: none  Functioning: brown liquid stool  Mucocutaneous junction: intact  Peristomal skin: intact, no sting skin prep applied  Pouching: barrier ring, 2 piece Karrie red soft convex with drainable pouch  Ostomy Education: none at this time         Wound Plan: assess stoma/pouching 2 times a week and as needed while inpatient.      SUZANNE GERHARDT, RN, BSN, CWOCN  9/12/2025  3:46 PM

## 2025-09-12 NOTE — ACP (ADVANCE CARE PLANNING)
Confirming Previous Code Status:   Advance Care Planning Note     Discussion Date: 09/12/25   Discussion Participants: spouse    The patient wishes to discuss Advance Care Planning today and the following is a brief summary of our discussion.     Patient has capacity to make their own medical decisions: Intermittently with some encephalopathy  Health Care Agent/Surrogate Decision Maker documented in chart: Wife, Betsy Corral    Documents on file and valid:  Advance Directive/Living Will: No   Health Care Power of : No    Communication of Medical Status/Prognosis:   Communicated current status of liver failure, and worsening LFTs     Communication of Treatment Goals/Options:   Discussed meaning of FULL CODE status in comparison to DNI/DNR, and wife clearly stated that she wished to make patinet DNI/DNR as this would be concordant with his wishes    Treatment Decisions  Goals of Care: comfort is paramount; other goals are not relevant or achievable     Follow Up Plan  DNI/DNR, will continue to follow-up with hospice planning    Team Members  MD Tano Blackwell MD Lauren Johnson, MD     Time Statement: Total face to face time spent on advance care planning was 20 minutes with 15 minutes spent in counseling, including the explanation.    Emily Neumann MD  9/12/2025 2:28 PM

## 2025-09-12 NOTE — PROGRESS NOTES
Surgical Oncology Progress Note    Roma Corral is a 70 y.o. male with history of rectal cancer with mets to the liver, CAD (2021 NSTEMI), DM2, HTN who is s/p right extended hepatectomy for metastatic rectal cancer on 7/29     Subjective   Overnight: NAEON, AF, VSS, patient's status declining.  Multiple episodes of hypoglycemia needing D50 intervention.  Patient still declines to eat is alert to person.      PO: 260  Urine output: 975  Drain: 1.8 bile tinged ascitic fluid  Colostomy: 450 ml liquid stool with gas in appliance     Objective   GEN: Appears tired, awake, conversant.  RESP: non-labored breathing on room air  CV: regular rate  GI: soft, nontender, non-peritonitic, minimally distended. Drain with bile-tinged ascites. Ostomy pink, healthy. Gas in appliance. Incisions healed well, well approximated without drainage, erythema, or infected appearance. Drain site leaking bile tinged ascites- without erythema   : voiding spontaneously.   MSK: no evidence of bilateral lower extremity edema  NEURO: drowsy, A&O x3 increased fatigue, depressed  SKIN: jaundiced, warm and dry. Femoral access site from IR procedure without bleeding from site. No obvious bulge/pulsation/hematoma/seroma palpable on exam.       Last Recorded Vitals  Heart Rate:  [65-76]   Temp:  [36.6 °C (97.8 °F)-37.3 °C (99.1 °F)]   Resp:  [16-18]   BP: (110-147)/(58-90)   Weight:  [103 kg (227 lb 5.1 oz)]   SpO2:  [96 %-97 %]      Intake/Output Last 24 Hours:      Intake/Output Summary (Last 24 hours) at 9/12/2025 1417  Last data filed at 9/12/2025 1313  Gross per 24 hour   Intake 6873.16 ml   Output 2700 ml   Net 4173.16 ml        Relevant Results  Lab Date: 09/12/25       8.4    5.7>-----<27         25.6   147  112  36                  ----------------<74     3.3  23  1.52          Ca 12.2 Phos 2.6 Mg 2.26       ALT 49  AlkPhos 62 tBili 34.9         Imaging:   No results found.      Assessment:  Roma Corral is a 70 y.o. male with history  of rectal cancer with mets to the liver, CAD (2021 NSTEMI), DM2, HTN who is s/p right extended hepatectomy for metastatic rectal cancer on 7/29 c/b worsening liver function. AF VSS, encouraging oral intake. Patient has discussed wanting to move to hospice/palliative care.  Patient made DNR DNI via spouse.  8    Plan:  Neuro: tramadol 50mg q6h, oxycodone, Dilaudid 0.2 mg every 4 hours as needed,   - Ammonia level (9/9): 56    CV: Vitals q8hrs, telemetry.  - Echo completed 9/2 showing EF reduced to 35% with complete apical akinesis.   - nitro PRN CP  - metop 25 mg PO, BID per cards    Pulm: Encourage IS, wean oxygen  GI: regular diet, cont lactulose 20ml BID, PPI, zofran prn, TPN restarted due to hypoglycemia. Albumin 5% 25G q6hr  - Actigal 250 mg BID    /Renal: nephrology consult:  Cont albumin (25 g, IV, scheduled every 6 hours).   -9/9 Patient unable to tolerate the 11:1 clamp trial. order placed back to 7:1   -  BID albumin, LR bolus 1/2 drain output.     Heme:   - R groin ultrasound and CTA c/f 5mm R CFA PSA. Stable on imaging 9/8 Vascular surgery recommendss repeat Q weekly ultrasounds.   - continue to hold anticoagulation.   - Will monitor platelets, currently downtrended to 25. Platelet goal > 20    Endo: SSI q4hr     ID: Empiric Zosyn and Diflucan (started 8/11 and 8/19, respectively, end date pending given patient's clinical status).     DVT ppx: Holding heparin 5000u q8hr, cont SCDs  -Stressed the importance of getting up to chair and ambulating with patient.   - Will attempt to get patient to take in at least 2-3 ensure/boosts     Dispo: Hospice meeting 9/15    Patient was discussed with Dr. Kaecy Pierre DO PGY1  Hepatobiliary Surgery and Surgical Oncology  [place], [id number]

## 2025-09-12 NOTE — CARE PLAN
The clinical goals for the shift include pt will remain hds and pain controlled      Problem: Pain - Adult  Goal: Verbalizes/displays adequate comfort level or baseline comfort level  Outcome: Progressing     Problem: Safety - Adult  Goal: Free from fall injury  Outcome: Progressing     Problem: Discharge Planning  Goal: Discharge to home or other facility with appropriate resources  Outcome: Progressing     Problem: Chronic Conditions and Co-morbidities  Goal: Patient's chronic conditions and co-morbidity symptoms are monitored and maintained or improved  Outcome: Progressing     Problem: Nutrition  Goal: Nutrient intake appropriate for maintaining nutritional needs  Outcome: Progressing     Problem: Skin  Goal: Decreased wound size/increased tissue granulation at next dressing change  Outcome: Progressing  Flowsheets (Taken 9/12/2025 0424)  Decreased wound size/increased tissue granulation at next dressing change:   Utilize specialty bed per algorithm   Promote sleep for wound healing   Protective dressings over bony prominences  Goal: Participates in plan/prevention/treatment measures  Outcome: Progressing  Flowsheets (Taken 9/12/2025 0424)  Participates in plan/prevention/treatment measures:   Discuss with provider PT/OT consult   Elevate heels   Increase activity/out of bed for meals  Goal: Prevent/manage excess moisture  Outcome: Progressing  Flowsheets (Taken 9/12/2025 0424)  Prevent/manage excess moisture:   Use wicking fabric (obtain order)   Follow provider orders for dressing changes   Moisturize dry skin   Cleanse incontinence/protect with barrier cream   Monitor for/manage infection if present  Goal: Prevent/minimize sheer/friction injuries  Outcome: Progressing  Flowsheets (Taken 9/12/2025 0424)  Prevent/minimize sheer/friction injuries:   Complete micro-shifts as needed if patient unable. Adjust patient position to relieve pressure points, not a full turn   Utilize specialty bed per algorithm    Turn/reposition every 2 hours/use positioning/transfer devices   HOB 30 degrees or less   Increase activity/out of bed for meals   Use pull sheet  Goal: Promote/optimize nutrition  Outcome: Progressing  Flowsheets (Taken 9/12/2025 0424)  Promote/optimize nutrition:   Monitor/record intake including meals   Assist with feeding   Consume > 50% meals/supplements   Offer water/supplements/favorite foods   Reassess MST if dietician not consulted   Discuss with provider if NPO > 2 days  Goal: Promote skin healing  Outcome: Progressing  Flowsheets (Taken 9/12/2025 0424)  Promote skin healing:   Turn/reposition every 2 hours/use positioning/transfer devices   Protective dressings over bony prominences   Assess skin/pad under line(s)/device(s)   Ensure correct size (line/device) and apply per  instructions   Rotate device position/do not position patient on device

## 2025-09-12 NOTE — PROGRESS NOTES
Music Therapy Note    Roma Corral     Therapy Session  Visit Type: Follow-up visit  Session Start Time: 1430  Session End Time: 1431  Intervention Delivery: In-person  Conflict of Service: Asleep               Treatment/Interventions       Post-assessment  Total Session Time (min): 1 minutes    Narrative  Assessment Detail: Pt was sleeping  Follow-up: Will follow up.    Education Documentation  No documentation found.

## 2025-09-12 NOTE — CONSULTS
"Nutrition Note:   ---->Message received from team. Per team, pt with ongoing worsening liver failure and poor prognosis. Goals of care discussions ongoing. Hospice meeting set for 09/16/25. In the meantime, team would like to re-start TPN d/t ongoing issues with hypoglycemia. PICC remains in place. Team requesting to use the 20% dextrose formula v the 15%.    Of note, pt's previous TPN order was discontinued on 09/10. Current order for IVF of D5-1/2 NaCl @ 75 ml/hr (ordered this am) providing 90 g dextrose=306 kcals/day.    Anthropometrics:  Height: 180.3 cm (5' 10.98\")   Weight: 103 kg (227 lb 5.1 oz)   BMI (Calculated): 22.73  IBW/kg (Dietitian Calculated): 78.2 kg  Percent of IBW: 132 %    Weights (since admit):  07/29-73.1 kg (admit wt)  08/28-73.9 kg (wt at first nutrition visit)  09/.0 kg (current wt)--?; wt variable since admit, likely d/t fluids and/or bed scale inaccuracies    Nutrition Significant Labs:  CBC Trend:   Results from last 7 days   Lab Units 09/12/25  0333 09/11/25  0610 09/10/25  0422 09/09/25  0319   WBC AUTO x10*3/uL 5.7 6.5 6.9 7.9   RBC AUTO x10*6/uL 2.44* 2.51* 2.66* 2.68*   HEMOGLOBIN g/dL 8.4* 8.4* 8.9* 8.8*   HEMATOCRIT % 25.6* 26.6* 27.7* 27.6*   MCV fL 105* 106* 104* 103*   PLATELETS AUTO x10*3/uL 27* 33* 29* 25*   BMP Trend:   Results from last 7 days   Lab Units 09/12/25  0333 09/11/25  0610 09/10/25  0422 09/09/25  0319   GLUCOSE mg/dL 74 39* 91 74   CALCIUM mg/dL 12.2* 12.0* 12.2* 11.8*   SODIUM mmol/L 147* 145 144 142   POTASSIUM mmol/L 3.3* 3.6 3.8 4.0   CO2 mmol/L 23 23 22 23   CHLORIDE mmol/L 112* 112* 112* 111*   BUN mg/dL 36* 37* 39* 42*   CREATININE mg/dL 1.52* 1.39* 1.40* 1.47*   BG POCT trend:   Results from last 7 days   Lab Units 09/12/25  0332 09/11/25  2242 09/11/25  2107 09/11/25  1628 09/11/25  1241   POCT GLUCOSE mg/dL 73* 92 62* 93 120*   Liver Function Trend:   Results from last 7 days   Lab Units 09/12/25  0333 09/11/25  0610 09/10/25  0422 09/09/25  0319 "   ALK PHOS U/L 62 61 70 68   AST U/L 147* 154* 177* 185*   ALT U/L 49 54* 65* 64*   BILIRUBIN TOTAL mg/dL 34.9* 33.6* 36.0* 34.7*   Renal Lab Trend:   Results from last 7 days   Lab Units 09/12/25  0333 09/11/25  0610 09/10/25  0422 09/09/25  0319   POTASSIUM mmol/L 3.3* 3.6 3.8 4.0   SODIUM mmol/L 147* 145 144 142   MAGNESIUM mg/dL 2.26 2.32 2.51* 2.53*   EGFR mL/min/1.73m*2 49* 55* 54* 51*   BUN mg/dL 36* 37* 39* 42*   CREATININE mg/dL 1.52* 1.39* 1.40* 1.47*   Lipid Panel:   Lab Results   Component Value Date    CHOL 154 06/06/2024    HDL 27.1 06/06/2024    CHHDL 5.7 06/06/2024    LDLF 39 12/02/2022    VLDL 22 04/18/2024    TRIG 89 09/10/2025      Medications:  Scheduled medications  Scheduled Medications[1]  Continuous medications  Continuous Medications[2]  PRN medications  PRN Medications[3]    I/O:   Pt with colostomy; last BM Date: 09/07/25; Stool Appearance: Watery (09/07/25 1433)    Dietary Orders (From admission, onward)       Start     Ordered    09/11/25 1259  Oral nutritional supplements  Until discontinued        Comments: OK for any flavor of ensure   Question Answer Comment   Deliver with All meals    Select supplement: Ensure Plus        09/11/25 1258    09/05/25 1728  Adult diet Regular  Diet effective now        Question:  Diet type  Answer:  Regular    09/05/25 1727    08/28/25 2310  May Not Participate in Room Service  ( ROOM SERVICE MAY NOT PARTICIPATE)  Once        Question:  .  Answer:  Yes    08/28/25 2309                Estimated Needs:   Total Energy Estimated Needs in 24 hours (kCal): ~4610-8717  Method for Estimating Needs: MSJ (1507) x ~1.3-1.5 (using 72 kg)  Total Protein Estimated Needs in 24 Hours (g): ~  Method for Estimating 24 Hour Protein Needs: 72 (IBW) x ~1.2-1.5g/kg  Total Fluid Estimated Needs in 24 Hours (mL): per MD/team    Nutrition Interventions/Recommendations:  Nutrition prescription for oral nutrition, Nutrition prescription for parenteral  nutrition    Nutrition Recommendations:  1. In addition to current labs, please check phos daily.    2. Continue 100 mg thiamin daily.    3. Considering liver failure and pt deemed appropriate for PO diet, would consider initiation of enteral nutrition support over parenteral.    4. If team desires to start TPN, please order the 5/20 parenteral nutrition formula and start @ 35 ml/hr.  --After 24 hours, if no major shifts in lytes and glucose levels <180, advance to goal rate of 65 ml/hr.    ---->Would leave trace elements out of TPN bags.    ---->After TPN begins, would refrain from using any other dextrose-containing IVF, unless otherwise medically indicated.    5. Order 250 mls (50 g) SMOFlipids to run x 12 hours/night.    ---->Above TPN regimen @ goal rate would provide a total of 312 g dextrose, 78 g pro, 1897 kcals, 26% fat--meets 98% estimated kcal needs, 92% estimated pro needs    TPN Monitoring:  --RFP + mag at least once/day  --LFTs and TG level at least once/week  --Accuchecks Q6H or per team  --Daily weights    6. PO diet + oral nutrition supplements, only as tolerated/as medically appropriate, per MD/team.    Nutrition Interventions/Goals:   Meals and Snacks: General healthful diet  Goal: Regular Diet  Parenteral Nutrition: Management of composition of parenteral nutrition, Management of delivery rate of parenteral nutrition  Goal: TPN (5/20) @ goal rate of 65 ml/hr + 250 BBDI=7065 kcals, 78 g pro  Medical Food Supplement: Commercial beverage medical food supplement therapy  Goal: Ensure Plus TID (350 kcals, 13g pro each)  Coordination of Care with Providers: Provider  Goal: surg onc team    Nutrition Monitoring and Evaluation:  Enteral and Parenteral Nutrition Intake Determination: Parenteral nutrition intake - Tolerate TPN at goal rate, Parenteral nutrition intake - To meet > 75% estimated energy needs, Parenteral nutrition intake - Titrate to goal without metabolic complications    Electrolyte and  Renal Panel: Electrolytes within normal limits  Glucose/Endocrine Profile: Glucose within normal limits ( mg/dL)        Goal Status: New goal(s) identified          Time Spent (min): 45 minutes            [1] albumin human, 25 g, intravenous, q6h  fluconazole, 400 mg, intravenous, q24h  [Held by provider] heparin (porcine), 5,000 Units, subcutaneous, q8h  insulin lispro, 0-10 Units, subcutaneous, q4h  lactulose, 10 g, oral, BID  metoprolol tartrate, 25 mg, oral, BID  [Held by provider] morphine CR, 30 mg, oral, q12h ALEX  pantoprazole, 40 mg, oral, BID AC  phytonadione, 5 mg, intravenous, q48h  piperacillin-tazobactam, 3.375 g, intravenous, q6h  [Held by provider] polyethylene glycol, 17 g, oral, Daily  potassium chloride, 40 mEq, intravenous, Once  thiamine, 100 mg, intravenous, Daily  ursodiol, 250 mg, oral, BID     [2] dextrose 5%-0.45 % sodium chloride, 75 mL/hr, Last Rate: 75 mL/hr (09/12/25 0842)  lactated Ringer's, 0-1,000 mL/hr, Last Rate: Stopped (09/12/25 0615)     [3] PRN medications: alteplase, benzocaine-menthol, calcium carbonate, dextrose, dextrose, glucagon, HYDROmorphone, nitroglycerin, ondansetron, oxyCODONE, traMADol

## 2025-09-13 NOTE — CARE PLAN
The patient's goals for the shift include  Rest     The clinical goals for the shift include Pt to remain HDS this shift     Over the shift, the patient did make progress toward the following goals.     Problem: Chronic Conditions and Co-morbidities  Goal: Patient's chronic conditions and co-morbidity symptoms are monitored and maintained or improved  Outcome: Not Progressing     Problem: Nutrition  Goal: Nutrient intake appropriate for maintaining nutritional needs  Outcome: Not Progressing      Problem: Skin  Goal: Decreased wound size/increased tissue granulation at next dressing change  Outcome: Progressing  Flowsheets (Taken 9/13/2025 0155)  Decreased wound size/increased tissue granulation at next dressing change:   Promote sleep for wound healing   Protective dressings over bony prominences  Goal: Participates in plan/prevention/treatment measures  Outcome: Progressing  Flowsheets (Taken 9/13/2025 0155)  Participates in plan/prevention/treatment measures: Elevate heels  Goal: Prevent/manage excess moisture  Outcome: Progressing  Flowsheets (Taken 9/13/2025 0155)  Prevent/manage excess moisture: Cleanse incontinence/protect with barrier cream  Goal: Prevent/minimize sheer/friction injuries  Outcome: Progressing  Flowsheets (Taken 9/13/2025 0155)  Prevent/minimize sheer/friction injuries:   Use pull sheet   Turn/reposition every 2 hours/use positioning/transfer devices  Goal: Promote/optimize nutrition  Outcome: Progressing  Flowsheets (Taken 9/13/2025 0155)  Promote/optimize nutrition:   Offer water/supplements/favorite foods   Monitor/record intake including meals  Goal: Promote skin healing  Outcome: Progressing  Flowsheets (Taken 9/13/2025 0155)  Promote skin healing: Protective dressings over bony prominences

## 2025-09-13 NOTE — PROGRESS NOTES
Surgical Oncology Progress Note    Roma Corral is a 70 y.o. male with history of rectal cancer with mets to the liver, CAD (2021 NSTEMI), DM2, HTN who is s/p right extended hepatectomy for metastatic rectal cancer on 7/29     Subjective   Overnight: NAEON. Patient still declines to eat, is alert to person. Now DNR/DNI after discussion with wife yesterday    Urine output: 550  Drain: 1.6 bile tinged ascitic fluid  Colostomy: 100 ml liquid stool with gas in appliance     Objective   GEN: Appears tired, awake, conversant.  RESP: non-labored breathing on room air  CV: regular rate  GI: soft, nontender, non-peritonitic, minimally distended. Drain with bile-tinged ascites. Ostomy pink, healthy. Gas in appliance. Incisions healed well, well approximated without drainage, erythema, or infected appearance. Drain site leaking bile tinged ascites- without erythema   : voiding spontaneously.   MSK: no evidence of bilateral lower extremity edema  NEURO: drowsy, A&O x3 increased fatigue, depressed  SKIN: jaundiced, warm and dry. Femoral access site from IR procedure without bleeding from site. No obvious bulge/pulsation/hematoma/seroma palpable on exam.       Last Recorded Vitals  Heart Rate:  [69-77]   Temp:  [36.8 °C (98.2 °F)-37.4 °C (99.4 °F)]   Resp:  [16-18]   BP: (131-142)/(62-90)   SpO2:  [96 %-98 %]      Intake/Output Last 24 Hours:      Intake/Output Summary (Last 24 hours) at 9/13/2025 1052  Last data filed at 9/13/2025 1041  Gross per 24 hour   Intake 4077.47 ml   Output 2600 ml   Net 1477.47 ml        Relevant Results  Lab Date: 09/13/25       8.0    5.6>-----<28         24.7   145  111  30                  ----------------<116     3.4  23  1.56          Ca 12.0 Phos 2.6 Mg 2.14       ALT 42  AlkPhos 60 tBili 33.6         Imaging:   No results found.      Assessment:  Roma Corral is a 70 y.o. male with history of rectal cancer with mets to the liver, CAD (2021 NSTEMI), DM2, HTN who is s/p right extended  hepatectomy for metastatic rectal cancer on 7/29 c/b worsening liver function. AF VSS, encouraging oral intake. Patient has discussed wanting to move to hospice/palliative care.  Patient made DNR DNI via spouse.  8    Plan:  Neuro: tramadol 50mg q6h, oxycodone, Dilaudid 0.2 mg every 4 hours as needed,   - Ammonia level (9/9): 56    CV: Vitals q8hrs, telemetry.  - Echo completed 9/2 showing EF reduced to 35% with complete apical akinesis.   - nitro PRN CP  - metop 25 mg PO, BID per cards    Pulm: Encourage IS    GI: regular diet, cont lactulose 20ml BID, PPI, zofran prn, TPN restarted due to hypoglycemia. Albumin 5% 25G q6hr  - Actigal 250 mg BID    /Renal: nephrology consult:  Cont albumin (25 g, IV, scheduled every 6 hours).   -9/9 Patient unable to tolerate the 11:1 clamp trial. order placed back to 7:1   -  BID albumin, LR bolus 1/2 drain output.     Heme:   - R groin ultrasound and CTA c/f 5mm R CFA PSA. Stable on imaging 9/8 Vascular surgery recommendss repeat Q weekly ultrasounds.   - continue to hold anticoagulation.   - Will monitor platelets. Platelet goal > 20    Endo: SSI q4hr     ID: Empiric Zosyn and Diflucan (started 8/11 and 8/19, respectively, end date pending given patient's clinical status).     DVT ppx: Holding heparin 5000u q8hr, cont SCDs    Dispo: Hospice meeting 9/15    Patient was discussed with Dr. Kirk Garcia MD  Hepatobiliary Surgery and Surgical Oncology  [facility], [id number]

## 2025-09-14 NOTE — PROGRESS NOTES
Surgical Oncology Progress Note    Roma Corral is a 70 y.o. male with history of rectal cancer with mets to the liver, CAD (2021 NSTEMI), DM2, HTN who is s/p right extended hepatectomy for metastatic rectal cancer on 7/29     Subjective   NAEO. Patient refusing PO medications. Pain has increased 2/2 not receiving his oral pain medications, improved with IV dose of dilaudid.    Urine output: 1.5L  Drain: 950 bile tinged ascitic fluid  Colostomy: 150 ml liquid stool with gas in appliance     Objective   GEN: Appears tired, awake, conversant.  RESP: non-labored breathing on room air  CV: regular rate  GI: soft, nontender, non-peritonitic, minimally distended. Drain with bile-tinged ascites. Ostomy pink, healthy. Gas in appliance. Incisions healed well, well approximated without drainage, erythema, or infected appearance. Drain site leaking bile tinged ascites- without erythema   : voiding spontaneously.   MSK: no evidence of bilateral lower extremity edema  NEURO: drowsy, delirious  SKIN: jaundiced, warm and dry. Femoral access site from IR procedure without bleeding from site. No obvious bulge/pulsation/hematoma/seroma palpable on exam.     Last Recorded Vitals  Heart Rate:  [56-72]   Temp:  [36.4 °C (97.5 °F)-37.4 °C (99.4 °F)]   Resp:  [18-32]   BP: (120-152)/(60-71)   SpO2:  [93 %-100 %]      Intake/Output Last 24 Hours:      Intake/Output Summary (Last 24 hours) at 9/14/2025 1238  Last data filed at 9/14/2025 1134  Gross per 24 hour   Intake 6055.15 ml   Output 1960 ml   Net 4095.15 ml        Relevant Results  Lab Date: 09/14/25       7.6    6.3>-----<22         24.3   142  109  32                  ----------------<116     3.6  23  1.59          Ca 12.6 Phos 2.6 Mg 2.07       ALT 36  AlkPhos 52 tBili 33.8         Imaging:   No results found.      Assessment:  Roma Corral is a 70 y.o. male with history of rectal cancer with mets to the liver, CAD (2021 NSTEMI), DM2, HTN who is s/p right extended  hepatectomy for metastatic rectal cancer on 7/29 c/b worsening liver function. AF VSS, encouraging oral intake. Patient has discussed wanting to move to hospice/palliative care.  Patient made DNR DNI via spouse.  Planning for discussion with hospice 9/16, Tuesday.    Plan:  Neuro: Encephalopathy likely 2/2 ammonia  - tramadol 50mg q6h, oxycodone, Dilaudid 0.2 mg every 4 hours as needed    CV: LVEF 35%  - Vitals q8hrs  - Echo completed 9/2 showing EF reduced to 35% with complete apical akinesis.   - nitro PRN CP  - metop 25 mg PO, BID per cards    Pulm:   - Encourage IS    GI:   - regular diet  - cont lactulose 20ml BID  - PPI BID  - zofran prn  - TPN for hypoglycemia management  - Albumin 5% 25G q6hr  - Actigal 250 mg BID    /Renal:   - nephrology consult:  Cont albumin (25 g, IV, scheduled every 6 hours).   - 9/9 Patient unable to tolerate the 11:1 clamp trial. order placed back to 7:1   -  BID albumin  - Fluid replacement 1:0.5 LR to drain output    Heme:   - R groin ultrasound and CTA c/f 5mm R CFA PSA. Stable on imaging 9/8 Vascular surgery recommends repeat Q weekly ultrasounds.   - continue to hold DVT ppx and AC  - Will monitor platelets. Platelet goal > 20    Endo:   - SSI q4hr     ID:   - Empiric Zosyn and Diflucan (started 8/11 and 8/19, respectively, end date pending patient's clinical status).     DVT ppx:   - Holding heparin 5000u q8hr  - SCDs    Dispo: Continue current level of care. DNR/DNI.    Patient was discussed with Dr. Kirk Schultz MD  PGY1 General Surgery  [facility], [id number]

## 2025-09-14 NOTE — CARE PLAN
The clinical goals for the shift include pt will remain HDS throughout shift    Problem: Pain - Adult  Goal: Verbalizes/displays adequate comfort level or baseline comfort level  Outcome: Progressing     Problem: Safety - Adult  Goal: Free from fall injury  Outcome: Progressing     Problem: Discharge Planning  Goal: Discharge to home or other facility with appropriate resources  Outcome: Progressing     Problem: Chronic Conditions and Co-morbidities  Goal: Patient's chronic conditions and co-morbidity symptoms are monitored and maintained or improved  Outcome: Progressing     Problem: Nutrition  Goal: Nutrient intake appropriate for maintaining nutritional needs  Outcome: Progressing     Problem: Skin  Goal: Decreased wound size/increased tissue granulation at next dressing change  Outcome: Progressing  Flowsheets (Taken 9/13/2025 2303)  Decreased wound size/increased tissue granulation at next dressing change: Protective dressings over bony prominences  Goal: Participates in plan/prevention/treatment measures  Outcome: Progressing  Flowsheets (Taken 9/13/2025 2303)  Participates in plan/prevention/treatment measures: Elevate heels  Goal: Prevent/manage excess moisture  Outcome: Progressing  Flowsheets (Taken 9/13/2025 2303)  Prevent/manage excess moisture: Moisturize dry skin  Goal: Prevent/minimize sheer/friction injuries  Outcome: Progressing  Flowsheets (Taken 9/13/2025 2303)  Prevent/minimize sheer/friction injuries: HOB 30 degrees or less  Goal: Promote/optimize nutrition  Outcome: Progressing  Flowsheets (Taken 9/13/2025 2303)  Promote/optimize nutrition: Monitor/record intake including meals  Goal: Promote skin healing  Outcome: Progressing  Flowsheets (Taken 9/13/2025 2303)  Promote skin healing: Protective dressings over bony prominences

## 2025-09-15 NOTE — CONSULTS
SUPPORTIVE AND PALLIATIVE ONCOLOGY CONSULT    SERVICE DATE: 9/15/2025    Updates and Recommendations (9/15/2025):  Pt's mentation remains altered, and he is not participating in interview or assessment. Spoke with pt's surrogate, wife Betsy, and she is agreeable to transition pt's code status from DNR-DNI to DNR comfort measures only in the interest of promoting pt's comfort at EOL while awaiting HWR meeting tomorrow. Please update code status to DNR-comfort measures only in EMR. Reviewed pertinent details of DNR comfort measures only code status, and Betsy aware that medications not promoting comfort, imaging, testing, procedures, and labs will not continue to be performed under this code status. May continue TPN until finished with current bag iso recent hypoglycemia. Though Betsy and family aware that afterward TPN will be discontinued and IV fluids will not be administered at EOL as can cause adverse symptoms 2/2 fluid overload. Betsy stating that ideally they would like to have pt transferred to a hospice facility s/p HWR meeting. In his current state, I feel pt would likely be eligible for such a transfer as his symptoms are largely well controlled. Primary team updated regarding this discussion.     See bolded EOL pain and symptom recs below    ASSESSMENT/PLAN:  Roma Corral is a 70 y.o. male diagnosed with rectal cancer with metastasis to liver (s/p R extended hepatectomy on 7/29/25). PMHx significant for CAD (NSTEMI 2021), DM2, HTN, anemia, and gout. Admitted 7/29/2025 for further Surgical Oncologic evaluation and management iso worsening hepatic function and poor PO intake. Pt's wife expressing interest in comfort care with hospice transition. Plans for discussion with HWR on 9/16/25 between 1:30-2p. Supportive and Palliative Oncology is consulted for assistance with EOL care.     EOL Comfort Measures Plan:  EOL c/b hepatic dysfunction, ORALIA, and AMS.   At risk for cancer related pain 2/2 known malignancy  At  risk for shortness of breath 2/2 dying process  At risk for nausea and vomiting  At risk for opioid-induced constipation  At risk for anxiety 2/2 dying process  Assess for respiratory distress using RDOS scale. Signs of discomfort and/or respiratory distress include tachypnea, restlessness, accessory muscle use, grunting at end-expiration, nasal flaring, and fearful facial expressions.  If patient unable to self-report, assess pain using NPAT scale and assess dyspnea using RDOS scale q4h and PRN to assess response to medications.  Combine comfort care measures with frequent skilled nursing monitoring and medication adjustments as necessary to manage symptoms of pain, respiratory distress, and inability to manage secretions 2/2 dying process.  Use Comfort Care order set  Discontinue any medications or orders that do not promote comfort or manage symptoms.  Discontinue tramadol PRN   Discontinue oxycodone IR PRN   Start morphine concentrated solution 5mg SL q3h PRN for moderate pain, severe pain, [NPAT >/= 3] and/or dyspnea [RDOS >/= 3]  Change hydromorphone 0.2mg IV q1h PRN for breakthrough pain or dyspnea  Start diazepam 2mg IV q8h PRN for anxiety/agitation  Start glycopyrrolate 0.2mg IV q4h PRN for secretions  Start acetaminophen 650mg WA q8h PRN for fever  Continue ondansetron 4mg IV q4h PRN for n/v--may make SL if prepping for discharge to a facility or home  Continue PPI per primary   Continue scheduled lactulose 10g PO BID per primary   Start bisacodyl 10mg WA once daily PRN for constipation  If patient stabilizes enough for transfer to RMF/hospice facility, will plan to transition to PO meds for symptom control.    Disposition:  Please start the process of having prior authorization with meds to beds deliver medications to patient prior to discharge via [facility] pharmacy. Prescriptions will need to be sent 48-72 hours prior to discharge so that a prior authorization can be completed.     Discharge date pending  resolution of acute hospital issues and HWR meeting outcome.     SIGNATURE: KEV Wing  PAGER/CONTACT:  Contact information:  Supportive and Palliative Oncology  Monday-Friday 8 AM-5 PM  Epic Secure chat or pager [phone].  After hours and weekends:  pager [phone]    ==========================================================================================================================  Inpatient consult to [facility] Adult Supportive Oncology  Consult performed by: KEV Wing  Consult ordered by: Baron Erazo MD      PALLIATIVE MEDICINE OUTPATIENT PROVIDER: None  CURRENT ATTENDING PROVIDER: Baron Erazo MD     Medical Oncologist: MD An Cueto MD   Radiation Oncologist: No care team member to display  Primary Physician: Santiago Buckner  [phone]    REASON FOR CONSULT/CHIEF CONSULT COMPLAINT: EOL pain and symptom management, coordination of care    Subjective   HISTORY OF PRESENT ILLNESS: Roma Corral is a 70 y.o. male diagnosed with rectal cancer with metastasis to liver (s/p R extended hepatectomy on 25). PMHx significant for CAD (NSTEMI ), DM2, HTN, anemia, and gout. Admitted 2025 for further Surgical Oncologic evaluation and management iso worsening hepatic function and poor PO intake. Pt's wife expressing interest in comfort care with hospice transition. Plans for discussion with HWR on 25 between 1:30-2p. Supportive and Palliative Oncology is consulted for assistance with EOL care.     Pain Assessment:    NPAT (Non-Verbal Pain Assessment Tool):   Emotion: 0: Smiling; calm; relaxed; exhibiting no emotion  Movement: 0: None; sleeping comfortably   Verbal Cues: 0: Intubated; no verbalization; unable to verbalize  Facial Cues: 0: Relaxed; calm expression  Positioning/Guardin: Relaxed body  Total Score: 0    RDOS (Respiratory Distress Observation Scale)  HR/min: 0  RR/min: 1  Restlessness: 0  Accessory Muscle Use: 0  Paradoxical Breathing  Pattern: 0  Grunting at End Expiration: 0  Nasal Flarin  Look of Fear: 0  Total Score: 1    Opioid Requirements  Past 24h opioid requirements:  (-9/15, 8593-2579)  hydromorphone 0.2mg IV x 1 = 2.5 OME    Total 24h OME use: 2.5 OME    OARRS/PDMP reviewed, no aberrant behavior noted.    Symptom Assessment:  Unable to complete ROS iso pt's AMS.     Information obtained from: chart review, interview of patient, and discussion with primary team  ______________________________________________________________________     Oncology History   Rectal cancer (Multi)   2024 Initial Diagnosis    Rectal cancer (Multi)     2024 -  Chemotherapy    Bevacizumab + mFOLFOXIRI (Fluorouracil Continuous Infusion / Leucovorin / Oxaliplatin / Irinotecan), 14 Day Cycles       Medical History[1]  Surgical History[2]  Family History[3]     SOCIAL HISTORY:   Social History:  reports that he has never smoked. He has never used smokeless tobacco. He reports that he does not drink alcohol and does not use drugs.   to wife, Betsy     REVIEW OF SYSTEMS:  Review of systems negative unless noted in HPI.     Objective     Lab Results   Component Value Date    WBC 6.4 09/15/2025    HGB 7.5 (L) 09/15/2025    HCT 23.8 (L) 09/15/2025     (H) 09/15/2025    PLT 11 (LL) 09/15/2025      Lab Results   Component Value Date    GLUCOSE 99 09/15/2025    CALCIUM 12.3 (H) 09/15/2025     09/15/2025    K 3.9 09/15/2025    CO2 25 09/15/2025     09/15/2025    BUN 36 (H) 09/15/2025    CREATININE 1.75 (H) 09/15/2025     Lab Results   Component Value Date    ALT 39 09/15/2025     (H) 09/15/2025    ALKPHOS 44 09/15/2025    BILITOT 29.5 (H) 09/15/2025     Estimated Creatinine Clearance: 49.8 mL/min (A) (by C-G formula based on SCr of 1.75 mg/dL (H)).     Encounter Date: 25   ECG 12 lead   Result Value    Ventricular Rate 90    Atrial Rate 90    MN Interval 208    QRS Duration 92    QT Interval 374    QTC  Calculation(Bazett) 457    P Axis -7    R Axis -26    T Axis -17    QRS Count 15    Q Onset 222    P Onset 118    P Offset 178    T Offset 409    QTC Fredericia 428    Narrative    Normal sinus rhythm with sinus arrhythmia  Septal infarct (cited on or before 02-SEP-2025)  T wave abnormality, consider anterior ischemia  Abnormal ECG  When compared with ECG of 02-SEP-2025 11:27, (unconfirmed)  Significant changes have occurred  Confirmed by Zack Lira ([id number]) on 9/4/2025 2:38:18 PM     Wt Readings from Last 5 Encounters:   09/15/25 111 kg (245 lb 2.4 oz)   07/16/25 75.5 kg (166 lb 8 oz)   06/19/25 79.8 kg (176 lb)   05/28/25 74.1 kg (163 lb 4.8 oz)   05/27/25 73 kg (161 lb)     Current Outpatient Medications   Medication Instructions    acetaminophen (TYLENOL) 650 mg, oral, Every 6 hours PRN    alteplase (CATHFLO ACTIVASE) 2 mg, intra-catheter, As needed    aspirin 81 mg, oral, Daily    atorvastatin (LIPITOR) 40 mg, oral, Nightly    chlorhexidine (Hibiclens) 4 % external liquid Use as directed daily perioperatively    chlorhexidine (Peridex) 0.12 % solution Swish and spit with 15ml of solution the night before and morning of surgery. Do not swallow.    Continue current TPN formula See AVS for most recent TPN formula.    Continue current TPN formula TPN 5/20  68 mL for 1 hr, increase to 136 mL/hr x10 hrs, and then 68 mL/hr x 1 hr. VS for most recent TPN formula.    furosemide (LASIX) 40 mg, oral, Daily    methylPREDNISolone (Medrol Dospak) 4 mg tablets Take as directed    metoprolol succinate XL (TOPROL-XL) 100 mg, oral, Daily    midodrine (PROAMATINE) 2.5 mg, oral, 3 times daily (morning, midday, late afternoon)    morphine CR (MS CONTIN) 30 mg, oral, Every 12 hours scheduled, Do not crush, chew, or split.    multivitamin with iron - children's (Cerovite, Jr) chewable tablet 2 tablets, oral, Daily    octreotide (SANDOSTATIN) 200 mcg, subcutaneous, 3 times daily    ondansetron ODT (ZOFRAN-ODT) 4 mg, oral, Every 8  hours PRN    penicillin v potassium (Veetid) 500 mg tablet Take 1 tablet (500 mg) by mouth 4 times a day until gone.    polyethylene glycol (GLYCOLAX, MIRALAX) 17 g, oral, Every other day, Skip the dose for the day if watery/loose colostomy output    potassium chloride CR (Klor-Con M20) 20 mEq ER tablet 20 mEq, oral, 2 times daily, Do not crush or chew.    prochlorperazine (COMPAZINE) 10 mg, oral, Every 6 hours PRN    tamsulosin (FLOMAX) 0.4 mg, oral, Daily     Scheduled medications   Scheduled Medications[4]  Continuous medications  Continuous Medications[5]  PRN medications  alteplase, 2 mg, PRN  benzocaine-menthol, 1 lozenge, q2h PRN  calcium carbonate, 500 mg of calcium carbonate, 4x daily PRN  dextrose, 12.5 g, q15 min PRN  dextrose, 25 g, q15 min PRN  glucagon, 1 mg, q15 min PRN  HYDROmorphone, 0.2 mg, q4h PRN  nitroglycerin, 0.4 mg, q5 min PRN  ondansetron, 4 mg, q4h PRN  oxyCODONE, 5 mg, q4h PRN  traMADol, 50 mg, q6h PRN    Allergies: RX Allergies[6]    PHYSICAL EXAMINATION:  Vital Signs:   Vital signs reviewed  Vitals:    09/15/25 1251   BP: 120/79   Pulse: 59   Resp: 22   Temp: 36.3 °C (97.4 °F)   SpO2: 98%     Pain Score: 5 - Moderate pain    Physical Exam  Vitals reviewed.   Constitutional:       Comments: Lethargic, ill appearing gentleman laying in bed. No signs of acute distress. Not participating in interview.     HENT:      Head:      Comments: Normocephalic, atraumatic.     Eyes:      Comments: Eyes closed.   Pulmonary:      Comments: Symmetrical chest rise. Regular rate and depth of respirations. NC.   Abdominal:      Comments: Abdomen distended.    Musculoskeletal:      Comments: No visible extremity edema.   Skin:     Comments: No lesions, rash, or abrasions present on visible skin. Skin jaundiced.    Neurological:      Comments: Does not follow commands, unable to establish orientation.   Psychiatric:      Comments: Calm, no non-verbal signs of anxiety or distress.         ==========================================================================================================================    PALLIATIVE CARE ENCOUNTER:    Introduction to Supportive and Palliative Oncology:  Spoke with pt's wife and family at bedside.  Introduced the role and philosophy of Supportive and Palliative oncology in the evaluation and management of symptoms during cancer treatment.  Palliative care was introduced as a service for patients with serious illness to help with symptoms, assist with goals of care conversations, navigate complex decision making, improve quality of life for patients, and provide support both patients and families.    Supportive and Palliative Oncology encounter:  Spoke with pt's wife and family at bedside.  Emotional support provided.  Coordination of care: medication and symptom evaluation     Medical Decision Making/Goals of Care/Advance Care Planning:  (9/15/2025) Pt's mentation remains altered, and he is not participating in interview or assessment. Spoke with pt's surrogate, wife Betsy, and she is agreeable to transition pt's code status from DNR-DNI to DNR comfort measures only in the interest of promoting pt's comfort at EOL while awaiting HWR meeting tomorrow. Reviewed pertinent details of DNR comfort measures only code status, and Betsy aware that medications not promoting comfort, imaging, testing, procedures, and labs will not continue to be performed under this code status. May continue TPN until finished with current bag iso recent hypoglycemia. Though Betsy and family aware that afterward TPN will be discontinued and IV fluids will not be administered at EOL as can cause adverse symptoms 2/2 fluid overload. Betsy stating that ideally they would like to have pt transferred to a hospice facility s/p HWR meeting. In his current state, I feel pt would likely be eligible for such a transfer as his symptoms are largely well controlled.     Advance  Directives  Existence of Advance Directives: None  Decision maker: Surrogate decision maker is pt's wifeBetsy     Supportive Interventions: Interventions: SPO Spiritual Care: referral placed    Signature and billing:  Medical complexity was high level due to due to complexity of problems, extensive data review, and high risk of management/treatment.    I spent 75 minutes in the care of this patient which included chart review, interviewing patient/family, discussion with primary team, coordination of care, and documentation.    DATA   Diagnostic tests and information reviewed for today's visit: Conversation with primary team, Most recent labs and imaging results, Most recent EKG, Medications     Some elements copied from Surgical Oncology's notes on 9/14/25 and 9/15/25, the elements have been updated and all reflect current decision making from today, 9/15/2025.    Plan of Care discussed with: Primary team, pt's wife and family    Thank you for asking Supportive and Palliative Oncology to assist with care of this patient.  Recommendations will be communicated back to the consulting service by way of shared electronic medical record/secure chat/email or face-to-face.   We will continue to follow.  Please contact us for additional questions or concerns.    SIGNATURE: MELISSA Wing-ANKUSH  PAGER/CONTACT:  Contact information:  Supportive and Palliative Oncology  Monday-Friday 8 AM-5 PM  Epic Secure chat or pager [phone].  After hours and weekends:  pager [phone]         [1]   Past Medical History:  Diagnosis Date    Abdominal pain     Acute non-ST elevation myocardial infarction (NSTEMI) (Multi)     Anemia     6/6/24 HGB 8.6; HCT 31.5    CAD (coronary artery disease)     Cardiology follow-up encounter 12/11/2023    Sharif Lau MD    Chronic pain disorder     Colorectal cancer (Multi)     Gout     H/O cardiac catheterization 06/01/2021    H/O cardiovascular stress test 09/03/2021    IMPRESSION: 1. No evidence of  ischemia. 2. Suspicion of an infarct along the mid anterior wall. 3. Left ventricular dilatation is noted. 4. Left ventricular EF was calculated to be 50%. Summary:  1. No clinical or electrocardiographic evidence for ischemia at a submaximal workload. 3. The blunted heart rate diminshes the sensitivity of this test.  4. The submaximal level of stress was achieved.    H/O echocardiogram 06/02/2021    Mild concentric Left ventricle hypertrophy.  Global left ventricular wall motion and contractility are within normal limits.  There is normal left ventricular systolic function.  Estimated ejection fraction is 60-64%.  The left atrial size is mildly dilated.  Mild aortic regurgitation.  A trace of mitral regurgitation.  Trivial to mild tricuspid regurgitation.  There is no pulmonary hypertension.    High cholesterol     Hypertension     Overweight     Rectal cancer (Multi)     Type 2 diabetes mellitus     4/18/2024 A1C 7.5%   [2]   Past Surgical History:  Procedure Laterality Date    COLONOSCOPY  06/17/2024    HEMICOLECTOMY W/ OSTOMY      LEG SURGERY Left     rodrigo placed   [3]   Family History  Problem Relation Name Age of Onset    No Known Problems Mother      No Known Problems Father      No Known Problems Sister      Leukemia Brother      Stroke Maternal Grandfather     [4] fat emulsion fish oil/plant based, 250 mL, intravenous, Daily Lipids  fluconazole, 400 mg, intravenous, q24h  [Held by provider] heparin (porcine), 5,000 Units, subcutaneous, q8h  insulin lispro, 0-10 Units, subcutaneous, q4h  lactulose, 10 g, oral, BID  metoprolol, 5 mg, intravenous, q6h  [Held by provider] morphine CR, 30 mg, oral, q12h ALEX  pantoprazole, 40 mg, intravenous, BID  phytonadione, 5 mg, intravenous, q48h  piperacillin-tazobactam, 3.375 g, intravenous, q6h  [Held by provider] polyethylene glycol, 17 g, oral, Daily  thiamine, 100 mg, intravenous, Daily  ursodiol, 250 mg, oral, BID  [5] Adult Clinimix Parenteral Nutrition Continuous, 65  mL/hr, Last Rate: 65 mL/hr (09/14/25 3448)  dextrose 5%-0.45 % sodium chloride, 75 mL/hr, Last Rate: 75 mL/hr (09/15/25 1135)  lactated Ringer's, 0-1,000 mL/hr, Last Rate: Stopped (09/15/25 1136)  [6] No Known Allergies

## 2025-09-15 NOTE — PROGRESS NOTES
09/15/25 1200   Discharge Planning   Living Arrangements Spouse/significant other   Support Systems Spouse/significant other   Type of Residence Private residence   Number of Stairs to Enter Residence 3   Number of Stairs Within Residence 14   Do you have animals or pets at home? Yes   Type of Animals or Pets 1 dog   Home or Post Acute Services Other (Comment)  (Hospice referral)   Expected Discharge Disposition [facility]   Does the patient need discharge transport arranged? Yes   [facility] coordination needed? Yes   Has discharge transport been arranged? No     [facility]eting with [facility] is scheduled for 9/16 between 1:30 and 2pm.  SW will relay to care team.  KHUSHI Porras

## 2025-09-15 NOTE — PROGRESS NOTES
Surgical Oncology Progress Note    Roma Corral is a 70 y.o. male with history of rectal cancer with mets to the liver, CAD (2021 NSTEMI), DM2, HTN who is s/p right extended hepatectomy for metastatic rectal cancer on 7/29     Subjective   NAEO. Patient refusing PO medications. Patient is significantly drowsy, unable to conversate, will say no to most questions.     Urine output: 575 (1.5)L  Drain: 1.5 (950) bile tinged ascitic fluid  Colostomy: 100 ml liquid stool with gas in appliance     Objective   GEN: drowsy, unable to follow commands Aox0  RESP: tachypneic, labored breathing  CV: regular rate  GI: soft, nontender, non-peritonitic, minimally distended. Drain with bile-tinged ascites. Ostomy pink, healthy. Gas in appliance. Incisions healed well, well approximated without drainage, erythema, or infected appearance. Drain site leaking bile tinged ascites- without erythema   : voiding spontaneously.   MSK: no evidence of bilateral lower extremity edema  NEURO: drowsy, delirious  SKIN: jaundiced, warm and dry. Femoral access site from IR procedure without bleeding from site. No obvious bulge/pulsation/hematoma/seroma palpable on exam.     Last Recorded Vitals  Heart Rate:  [56-73]   Temp:  [35.6 °C (96 °F)-36.8 °C (98.2 °F)]   Resp:  [22-32]   BP: (117-143)/(60-77)   Weight:  [111 kg (245 lb 2.4 oz)]   SpO2:  [94 %-100 %]      Intake/Output Last 24 Hours:      Intake/Output Summary (Last 24 hours) at 9/15/2025 0951  Last data filed at 9/15/2025 0926  Gross per 24 hour   Intake 5644.83 ml   Output 2075 ml   Net 3569.83 ml        Relevant Results  Lab Date: 09/15/25       7.5    6.4>-----<11         23.8   139  107  36                  ----------------<99     3.9  25  1.75          Ca 12.3 Phos 2.6 Mg 2.10       ALT 39  AlkPhos 44 tBili 29.5         Imaging:   No results found.      Assessment:  Roma Corral is a 70 y.o. male with history of rectal cancer with mets to the liver, CAD (2021 NSTEMI), DM2,  HTN who is s/p right extended hepatectomy for metastatic rectal cancer on 7/29 c/b worsening liver function. AF VSS, encouraging oral intake. Patient has discussed wanting to move to hospice/palliative care.  Patient made DNR DNI via spouse.  Planning for discussion with hospice 9/16, Tuesday. Reaching out to spouse 9/15 due to significant decrease in patient clinical status.     Plan:  Neuro: Encephalopathy likely 2/2 ammonia  - tramadol 50mg q6h, oxycodone, Dilaudid 0.2 mg every 4 hours as needed  - PT refusing oral medications.     CV: LVEF 35%  - Vitals q8hrs  - Echo completed 9/2 showing EF reduced to 35% with complete apical akinesis.   - nitro PRN CP  - metop 25 mg PO, BID per cards  - discontinue US doppler right groin due to patient condition     Pulm:   - Encourage IS    GI:   - regular diet  - cont lactulose 20ml BID  - PPI BID  - zofran prn  - TPN for hypoglycemia management  - Albumin 5% 25G q6hr  - Actigal 250 mg BID    /Renal:   - nephrology consult:  Cont albumin (25 g, IV, scheduled every 6 hours).   - 9/9 Patient unable to tolerate the 11:1 clamp trial. order placed back to 7:1   -  BID albumin  - Fluid replacement 1:0.5 LR to drain output    Heme:   - R groin ultrasound and CTA c/f 5mm R CFA PSA. Stable on imaging 9/8   - continue to hold DVT ppx and AC  - Will monitor platelets. Platelet goal > 20  -9/15 Plt 11,000,x1 unit Plt transfused.     Endo:   - SSI q4hr     ID:   - Empiric Zosyn and Diflucan (started 8/11 and 8/19, respectively, end date pending patient's clinical status).     DVT ppx:   - Holding heparin 5000u q8hr  - SCDs    Dispo: Continue current level of care. DNR/DNI. Hospice meeting 9/16    Patient was discussed with attending Dr. Kacey Pierre, DO  PGY1 General Surgery  [facility], [id number]

## 2025-09-15 NOTE — PROGRESS NOTES
Art Therapy Note    Roma Corral was referred by Paz Weber MD    Therapy Session  Referral Type: New referral this admission  Visit Type: Follow-up visit  Session Start Time: 1612  Session End Time: 1619  Intervention Delivery: In-person  Conflict of Service: None  Number of family members present: 0  Family Present for Session: Other (Comment) (sitter)  Family Participation: Supportive    Pre-assessment  Unable to Assess Reason: Outcomes not applicable         Treatment/Interventions  Areas of Focus: End of life support  Art Therapy Interventions:  (presence)  Interruption: No    Post-assessment  Total Session Time (min): 7 minutes    Narrative  Assessment Detail: ATR made a visit to Pt.;s room to offer end of life support and presence.  Pt lying in bed and appeared calm and peaceful.  Pt kept eyes closed. ATR engaged in small talk with Pt sharing she was there for him and would continue visiting and keep him in her thoughts and prayers.  Pt remained non verbal but when asked by ATR if she should continue to visit Pt nodded his head yes.  ATR acknowledged his response and assured him she would to visit and support him. ATR ended session to let Pt rest.  ATR will cotninue to visit and support Pt whiel admitted.  Follow-up: ATR will continue to provide support and end of life presence to Pt per Pt choice when discussed in session prior..    Education Documentation  No documentation found.

## 2025-09-15 NOTE — PROGRESS NOTES
Spiritual Care Visit  Spiritual Care Request    Reason for Visit:  Routine Visit: Introduction  Continue Visiting: Yes     Request Received From:  Referral From: Nurse    Focus of Care:  Visited With: Patient and family together       Refer to :  Referral To:        Spiritual Care Assessment    Care Provided:  Intended Effects: Demonstrate caring and concern, Establish rapport and connectedness, Journeying with someone in the grief process  Methods: Collaborate with care team member, Encourage sharing of feelings, Offer support  Interventions: Acknowledge current situation, Active listening, [place]    Spiritual Care Annotation    Annotation:   introduced self and role to patient Roma Corral, his spouse Betsy, son Geovany, and daughter-in-law Padmini. Patient acknowledged  but unable to engage in conversation. Family shared how patient was doing and were tearful throughout visit as they reflected on who patient is. Spouse shared they have a dog that patient has been wanting to see.  received approval from [facility] to allow their dog to visit;  explained to family the policy and what is required in order for their dog to visit. They expressed understanding and agreement to policy.  further provided care through supportive conversation and prayer. Family was appreciative and did not have any further needs at this time. Spiritual Care remains available as needed/requested.    Rev. Lexis Harris MDiv, BCC

## 2025-09-15 NOTE — CARE PLAN
Problem: Skin  Goal: Decreased wound size/increased tissue granulation at next dressing change  Flowsheets (Taken 9/15/2025 0410)  Decreased wound size/increased tissue granulation at next dressing change: Protective dressings over bony prominences  Goal: Participates in plan/prevention/treatment measures  Flowsheets (Taken 9/15/2025 0410)  Participates in plan/prevention/treatment measures: Elevate heels  Goal: Prevent/manage excess moisture  Flowsheets (Taken 9/15/2025 0410)  Prevent/manage excess moisture: Monitor for/manage infection if present  Goal: Prevent/minimize sheer/friction injuries  Flowsheets (Taken 9/15/2025 0410)  Prevent/minimize sheer/friction injuries: Use pull sheet  Goal: Promote/optimize nutrition  Flowsheets (Taken 9/15/2025 0410)  Promote/optimize nutrition: Monitor/record intake including meals  Goal: Promote skin healing  Flowsheets (Taken 9/15/2025 0410)  Promote skin healing:   Protective dressings over bony prominences   Assess skin/pad under line(s)/device(s)

## 2025-09-16 NOTE — PROGRESS NOTES
Surgical Oncology Progress Note    Roma Corral is a 70 y.o. male with history of rectal cancer with mets to the liver, CAD (2021 NSTEMI), DM2, HTN who is s/p right extended hepatectomy for metastatic rectal cancer on 7/29     Subjective   Afternoon/Over night: Patient's spouse met with supportive oncology due to continued declining status.  An extensive discussion was had and the patient's spouse decided to move the patient to DNR comfort care.  See supplemental oncology note for further details.  Following this decision all medications not intended for patient comfort were discontinued.  D5 half-normal saline at 75 was continued at the request of the patient's family for possible movement to hospice on 9/16.  Spouse did however declined to meet with hospice and wanted to keep her scheduled appointment of 9/16 with them.    Patient this morning is severely drowsy.  Unable to speak.  Alert and oriented x 0.  No signs of air hunger or pain while at bedside this morning.    0830 spoke with Patient's spouse discussed the current status of the patient is declining and that there is a possibility he will not be able to go to hospice and he would pass today.   She agreed to come in this morning due to declining status of the patient.        Objective   GEN: drowsy, unable to follow commands Aox0  RESP: tachypneic, labored breathing  CV: regular rate  GI: soft, nontender, non-peritonitic, minimally distended. Drain with bile-tinged ascites. Ostomy pink, healthy. Gas in appliance. Incisions healed well, well approximated without drainage, erythema, or infected appearance. Drain site leaking bile tinged ascites- without erythema   : voiding spontaneously.   MSK: no evidence of bilateral lower extremity edema  NEURO: drowsy, delirious, aphasic   SKIN: jaundiced, warm and dry. Femoral access site from IR procedure without bleeding from site. No obvious bulge/pulsation/hematoma/seroma palpable on exam.     Last Recorded  Vitals  Heart Rate:  [54-62]   Temp:  [36.2 °C (97.1 °F)-36.9 °C (98.4 °F)]   Resp:  [21-26]   BP: ()/(45-79)   SpO2:  [94 %-98 %]      Intake/Output Last 24 Hours:      Intake/Output Summary (Last 24 hours) at 9/16/2025 0840  Last data filed at 9/16/2025 0805  Gross per 24 hour   Intake 3555.76 ml   Output 650 ml   Net 2905.76 ml        Relevant Results  Lab Date: 09/16/25       7.5    6.4>-----<11         23.8   139  107  36                  ----------------<99     3.9  25  1.75          Ca 12.3 Phos 2.6 Mg 2.10       ALT 39  AlkPhos 44 tBili 29.5         Imaging:   No results found.      Assessment:  Roma Corral is a 70 y.o. male with history of rectal cancer with mets to the liver, CAD (2021 NSTEMI), DM2, HTN who is s/p right extended hepatectomy for metastatic rectal cancer on 7/29 c/b worsening liver function. AF VSS, encouraging oral intake. Patient has discussed wanting to move to hospice/palliative care.  Patient made DNR DNI via spouse.  Planning for discussion with hospice 9/16, Tuesday. 9/15 afternoon Spouse requested patient be made DNR/Comfort measure.     Plan:    Comfort Care orders:   -DNR/Comfort Care 9/15  -Assess for respiratory distress using RDOS scale. Signs of discomfort and/or respiratory distress include tachypnea, restlessness, accessory muscle use, grunting at end-expiration, nasal flaring, and fearful facial expressions.  -If patient unable to self-report, assess pain using NPAT scale and assess dyspnea using RDOS scale q4h and PRN to assess response to medications.  - Discontinue any medications or orders that do not promote comfort or manage symptoms.  - 2 mg, intravenous, Every 1 hour PRN, pain - moderate (4-6), first line, pain - severe (7-10), first line, [NPAT >/= 3] and/or dyspnea [RDOS >/= 3],   - hydromorphone 0.2mg IV q1h PRN for breakthrough pain or dyspnea  - Loazepam 2mg IV q8h PRN for anxiety/agitation  - glycopyrrolate 0.2mg IV q4h PRN for secretions  -  acetaminophen 650mg IN q8h PRN for fever  - Continue ondansetron 4mg IV q4h PRN, SL if prepping for discharge to a facility or home  - Continue protonix 40mg    - Continue lactulose 10g PO BID   - Start bisacodyl 10mg IN once daily PRN for constipation        Neuro: Encephalopathy likely 2/2 ammonia  - discontinued 9/15: tramadol 50mg q6h, oxycodone, Dilaudid 0.2 mg   CV: LVEF 35%  - discontinued 9/15: Vitals q8hrs  - Echo completed 9/2 showing EF reduced to 35% with complete apical akinesis.   - discontinued 9/15: nitro PRN CP  - discontinued 9/15: metop 25 mg PO, BID per cards  Pulm:   - Encourage IS  GI:   - discontinued 9/15: TPN for hypoglycemia management  - discontinued 9/15: Albumin 5% 25G q6hr  - Discontinued 9/16: Actigal 250 mg BID  /Renal:   - Discontinued 9/15: 9/9 Patient unable to tolerate the 11:1 clamp trial. order placed back to 7:1   - Discontinued 9/15: BID albumin  - Discontinued 9/15: Fluid replacement 1:0.5 LR to drain output  Heme:   - R groin ultrasound and CTA c/f 5mm R CFA PSA. Stable on imaging 9/8   -9/15 Plt 11,000,x1 unit Plt transfused.   Endo:   - Discontinued 9/15: SSI q4hr   ID:   - discontinued 9/15: Empiric Zosyn and Diflucan (started 8/11 and 8/19, respectively, end date pending patient's clinical status).    DVT ppx:   - Holding heparin 5000u q8hr  - SCDs    Dispo: Continue current level of care. DNR/CC. Hospice meeting 9/16    Patient was discussed with attending Dr. Kacey Pierre,   PGY1 surgical oncology  [facility], [id number]

## 2025-09-16 NOTE — PROGRESS NOTES
Acupuncture Visit:     Roma Corral was referred by Ivory Hair  .                      Provider reviewed plan for the acupuncture session, precautions and contraindications. Patient/guardian/hospital staff has given consent to treat with full understanding of what to expect during thesession. Before acupuncture began, provider explained to the patient to communicate at any time if the procedure was causing discomfort past their tolerance level. Patient agreed to advise acupuncturist. The acupuncturist counseled the patient on the risks of acupuncture treatment including pain, infection, bleeding, and no relief of pain. The patient was positioned comfortably. There was no evidence of infection at the site of needle insertions.       No annotated images are attached to the encounter.                     Massage Therapy / Acupuncture Note:

## 2025-09-16 NOTE — PROGRESS NOTES
Art Therapy Note    Roma Corral was referred by Paz Weber MD    Therapy Session  Referral Type: New referral this admission  Visit Type: Follow-up visit  Session Start Time: 1749  Session End Time: 1814  Intervention Delivery: In-person  Conflict of Service: None  Number of family members present: 0  Family Present for Session:  (Pat andrade)  Family Participation: Supportive  Number of staff members present: 1 (Pat andrade)    Pre-assessment  Unable to Assess Reason: Outcomes not applicable         Treatment/Interventions  Areas of Focus: End of life support  Art Therapy Interventions: Legacy intervention  Interruption: No    Post-assessment  Total Session Time (min): 25 minutes    Narrative  Assessment Detail: ATR made a visit to Pt.'s room to offer emotional and end of life support as well as legacy work for Pt..'s family.  Pat Andrade preesnt in room and Pt. sleeping and appeared  calm and peaceful.  He did have some beginning death rattle breathing.  Today Pt did not make eye contact with ATR like in orevious session. Sitter accepting of visit.  Intervention: legacy work, hand tracings for wife, son and two granddaughters  Evaluation: ATR introduced self to Pat andrade and shared how she provides Pt support and thought today prvide some legacy work given note in chart, Pt to start Hospice Tuesday. ATR asked Pat if she would be willing to assist with tracing Pt.'s hand four times for family members discussed when Pt was verbal and engaging with ATR last month.  Pat agreeable. ATR sought Pt.;s RN to ask if pulse ox could be removed during the hand tracing process.  She was agreeable and said it was for her own watching.  RN gratefdule legacy work woudl be completed.  Pat shared with ATR family was in a couple hours ago and was told Pt may die this evening given vitals changing and overall decline.  ATR apprecizative of update.  Together ATR and Pat explained to Pt doing the legacy work  for his family and what  aspecial gift it woudl be for them to have.  ATR and Pat successfully completed 4 tracaings of Pt.'s hand.  Overall Pt remained calm and tolerant with the process, until the last tracing where it initally pulled away but allowed ATR and Ramandeep t start over and get the last one done. ATR thanked Pt for sticking with the process and offered a discussion filled with goodbyes and finding peace.  ATR left a note of explanation for family with the tracings next to the bed on the night strand.  It further left instruction how to embellish and complete the hands to their liking.  Sitter expressed appreciation and kindness for being a part of the legacy work completed.  ATR found Pt.' s RN and reported success was had.  RN again expressed her own appreciation for the legacy work.  Follow-up: ATR will continue to provide emotional and end of life support to Pt while admitted.    Education Documentation  No documentation found.

## 2025-09-17 NOTE — DISCHARGE SUMMARY
Discharge Diagnosis  Colon carcinoma metastatic to liver    Test Results Pending At Discharge  Pending Labs       Order Current Status    BLOOD GAS ARTERIAL FULL PANEL Collected (07/29/25 1041)    BLOOD GAS ARTERIAL FULL PANEL Collected (07/29/25 1042)          Hospital Course  70 yr old male with colon cancer metastatic to the liver who underwent open right hepatectomy on 7/29 with Dr Erazo.    Prolonged hospitalization significant for:  - acute blood loss anemia requiring PRBC  - thrombocytopenia  - hypercoagulability  - ORALIA  - ascites  - portal HTN 2/2 small for size syndrome  - hyperbilirubinemia s/p splenic artery embolization    Continued to have worsening liver and kidney function.  Decision made to transition to comfort care and hospice.  Transitioned to Hospice [facility] bed 9/16/25.     Visit Vitals  /62   Pulse 62   Temp 36.9 °C (98.4 °F) (Temporal)   Resp 22     Vitals:    09/15/25 0042   Weight: 111 kg (245 lb 2.4 oz)       Immunization History   Administered Date(s) Administered    COVID-19, mRNA, LNP-S, PF, 30 mcg/0.3 mL dose 12/15/2021    Tdap vaccine, age 7 year and older (BOOSTRIX, ADACEL) 06/23/2021     Pertinent Physical Exam At Time of Discharge  Neurological: Awake, alert, conversive  Respiratory/Thorax: even, unlabored  Genitourinary: voiding  Gastrointestinal: soft, NT, ND.  Incision well approximated.  Colostomy with small amount of stool; stoma beefy red; pouch intact.  Drain to gravity with bilious output.  Skin: warm, dry  Musculoskeletal: PAYNE - generalized muscle wasting  Eyes: icteric  Skin: jaundice  Extremities: no edema   Psychological: appropriate mood/affect     Home Medications     Medication List      CONTINUE taking these medications     acetaminophen 325 mg tablet; Commonly known as: Tylenol; Take 2 tablets   (650 mg) by mouth every 6 hours if needed for mild pain (1 - 3).   metoprolol succinate  mg 24 hr tablet; Commonly known as:   Toprol-XL; Take 1 tablet (100 mg) by  mouth once daily.   morphine CR 30 mg 12 hr tablet; Commonly known as: MS Contin; Take 1   tablet (30 mg) by mouth every 12 hours. Do not crush, chew, or split.   multivitamin with iron - children's chewable tablet; Commonly known as:   Cerovite Jr   ondansetron ODT 4 mg disintegrating tablet; Commonly known as:   Zofran-ODT   polyethylene glycol 17 gram packet; Commonly known as: Glycolax,   Miralax; Take 17 g by mouth every other day. Skip the dose for the day if   watery/loose colostomy output Do not fill before April 3, 2025.   potassium chloride CR 20 mEq ER tablet; Commonly known as: Klor-Con M20;   Take 1 tablet (20 mEq) by mouth 2 times a day. Do not crush or chew.   prochlorperazine 10 mg tablet; Commonly known as: Compazine; Take 1   tablet (10 mg) by mouth every 6 hours if needed for nausea or vomiting.     STOP taking these medications     alteplase 2 mg injection; Commonly known as: Cathflo Activase   atorvastatin 40 mg tablet; Commonly known as: Lipitor   chlorhexidine 0.12 % solution; Commonly known as: Peridex   chlorhexidine 4 % external liquid; Commonly known as: Hibiclens   Continue current TPN formula   Continue current TPN formula   methylPREDNISolone 4 mg tablets; Commonly known as: Medrol Dospak   midodrine 2.5 mg tablet; Commonly known as: Proamatine   octreotide 500 mcg/mL injection; Commonly known as: SandoSTATIN   penicillin v potassium 500 mg tablet; Commonly known as: Veetid     ASK your doctor about these medications     aspirin 81 mg EC tablet; Ask about: Which instructions should I use?   furosemide 40 mg tablet; Commonly known as: Lasix; Take 1 tablet (40 mg)   by mouth once daily.   tamsulosin 0.4 mg 24 hr capsule; Commonly known as: Flomax; Take 1   capsule (0.4 mg) by mouth once daily.       Outpatient Follow-Up  Future Appointments   Date Time Provider Department Center   10/23/2025  3:00 PM MELISSA Thibodeaux-CNP [id number] [facility]       Debby Braun APRN-CNP

## 2025-09-23 NOTE — DOCUMENTATION CLARIFICATION NOTE
"    PATIENT:               INDU DOSHI  ACCT #:                  [id number]  MRN:                       [id number]  :                       1955  ADMIT DATE:       2025 6:01 AM  DISCH DATE:        2025 2:50 PM  RESPONDING PROVIDER #:        [id number]          PROVIDER RESPONSE TEXT:    I agree with dietician diagnosis of severe malnutrition related to chronic disease or condition on 25    CDI QUERY TEXT:    Clarification    Instruction:    Based on your assessment of the patient and the clinical information, please provide the requested documentation by clicking on the appropriate radio button and enter any additional information if prompted.    Question: Please further clarify this patient nutritional status as    When answering this query, please exercise your independent professional judgment. The fact that a question is being asked, does not imply that any particular answer is desired or expected.    The patient's clinical indicators include:  Clinical Information: 71 y/o male admitted for right extended hepatectomy for metastatic rectal cancer on .    Clinical Indicators:  Nutrition Consult note revealed \"Patient is a 70 y.o. male with DM2 and h/o rectal CA with mets to liver, originally admitted on 25 and underwent R extended hepatectomy...  Nutrition Focused Physical Exam Findings:  Subcutaneous Fat Loss:  -Orbital Fat Pads: Severe (dark circles, hollowing and loose skin)  -Buccal Fat Pads: Severe (hollow, sunken and narrow face)  -Triceps: Mild-Moderate (less than ample fat tissue)  -Ribs: Defer  Muscle Wasting:  -Temporalis: Severe (hollowed scooping depression)  -Pectoralis (Clavicular Region): Mild-Moderate (some protrusion of clavicle)  -Deltoid/Trapezius: Mild-Moderate (slight protrusion of acromion process)  -Interosseous: Severe (depressed area between thumb and forefinger)  -Trapezius/Infraspinatus/Supraspinatus (Scapular Region): Defer  -Quadriceps: " "Defer  -Gastrocnemius: Defer  Edema:  -Edema: none  Physical Findings:  -Hair: Negative  -Eyes: Positive (jaundice)  -Nails: Negative  -Skin: Positive (jaundice; surgical incision to abdomen)...  Malnutrition Diagnosis: Severe malnutrition related to chronic disease or condition (acute-on-chronic)  Related to: metastatic CA, recent surgery with post-op complications  As Evidenced by: pt with areas of severe muscle and fat losses, 21% wt loss x   1 year, estimated to be consuming </=50% estimated energy needs x >/=5 days since admit (intermittently requiring NPO/Clear Liquids)...  5. When ready to begin TPN, order the standard 5% amino acids, 20% dextrose and start @ 35 ml/hr  --After 24 hours, if no major shifts in lytes and glucose levels <180, advance to 50 ml/hr  --On third day of TPN, again if no major shifts in lytes and glucose levels <180, advance to 65 ml/hr\"    Treatment: Nutrition Consult. TPN.    Risk Factors: Multiple chronic medical conditions with areas of severe muscle and fat losses.  Options provided:  -- I agree with dietician diagnosis of severe malnutrition related to chronic disease or condition on 8/27/25  -- Other - I will add my own diagnosis  -- Refer to Clinical Documentation Reviewer    Query created by: Agustin Horner Jr on 9/19/2025 11:29 AM      Electronically signed by:  NGOC CAMARENA DO 9/23/2025 10:55 AM          "

## (undated) DEVICE — COVER, TABLE, 44 X 75 IN, DISPOSABLE, LF, STERILE

## (undated) DEVICE — DRAPE, UNDERBUTTOCKS, W / 27IN FLUID POUCH

## (undated) DEVICE — CARE KIT, LAPAROSCOPIC, ADVANCED

## (undated) DEVICE — SCOPE WARMER, LAPAROSCOPE, BAG ONLY, LF

## (undated) DEVICE — COVER, CART, 45 X 27 X 48 IN, CLEAR

## (undated) DEVICE — Device

## (undated) DEVICE — SUTURE, VICRYL PLUS 3-0, SH, 27IN

## (undated) DEVICE — TUBING, SUCTION, NON-CONDUCTIVE, W/CONNECT,.25 IN X 12 FT, STERILE, LF

## (undated) DEVICE — ELECTRODE, OPTI2 LAPAROSCOPIC SPATULA, CURVED

## (undated) DEVICE — DRAPE, LEGGINGS, 28.5 X 43 IN, DISPOSABLE, LF, STERILE

## (undated) DEVICE — TOWEL, SURGICAL, NEURO, O/R, 16 X 26, BLUE, STERILE

## (undated) DEVICE — DRAIN, CHANNEL, HUBLESS, ROUND, FULL FLUTE, 19FR

## (undated) DEVICE — PAD, GROUNDING, ELECTROSURGICAL, DUAL

## (undated) DEVICE — EVACUATOR, WOUND, SUCTION, CLOSED, JACKSON-PRATT, 100 CC, SILICONE

## (undated) DEVICE — DRESSING, NON-ADHERENT, TELFA, OUCHLESS, 3 X 8 IN, STERILE

## (undated) DEVICE — CUTTER, PROX LINEAR, 75MM, REG TISSUE, W/ SAFETY LOCK OUT

## (undated) DEVICE — GOWN, SURGICAL, SMARTGOWN, XLARGE, STERILE

## (undated) DEVICE — DRAPE, TIBURON W/ADHESIVE, 19 X 30

## (undated) DEVICE — HOLSTER, ELECTROSURGERY ACCESSORY, STERILE

## (undated) DEVICE — SUTURE, MONOCRYL, 4-0, 27 IN, SH, UNDYED

## (undated) DEVICE — SUTURE, VICRYL, 2-0, 27 IN, SH, UNDYED

## (undated) DEVICE — LIGASURE, V SEALER/DIVIDER  5MM BLUNT TIP

## (undated) DEVICE — TUBING, SUCTION, CONNECTING, NON-CONDUCTIVE, FEMALE, 5 X 3.7 MM

## (undated) DEVICE — PUMP, STRYKERFLOW 2 & HANDPIECE W/10FT. IRRIGATION TUBING

## (undated) DEVICE — ACCESS SYS, KII SHIELDED BLADED, Z-THREAD, 5X100CM

## (undated) DEVICE — DRAIN, PENROSE, 5/8 IN X 18 IN, STERILE, LF

## (undated) DEVICE — FORCEPS, CADIERE, DAVINCI XI

## (undated) DEVICE — DRAPE, SHEET, UTILITY, NON ABSORBENT, 18 X 26 IN, LF

## (undated) DEVICE — GLOVE, SURGICAL, PROTEXIS PI BLUE W/NEUTHERA, 8.0, PF, LF

## (undated) DEVICE — DRAIN, PENROSE, 0.5 X 12 IN, LATEX, STERILE

## (undated) DEVICE — SUTURE, PDS II, 0, 36 IN, CT, VIOLET

## (undated) DEVICE — SHEARS, CURVED 5MM, ENDOPATH

## (undated) DEVICE — HEMOSTAT, ABSORABABLE, SURGICEL SNOW, 2 X 4, LF

## (undated) DEVICE — SUTURE, VICRYL, 3-0, 27 IN, SH

## (undated) DEVICE — DRAPE, ARM XI

## (undated) DEVICE — SUTURE, ETHILON, 3-0, 18 IN, PS1, BLACK

## (undated) DEVICE — SEAL, UNIVERSAL, 5-12MM

## (undated) DEVICE — TROCAR, OPTICAL, BLADELESS, KII FIOS, 5 X 100MM, THREADED

## (undated) DEVICE — DRIVER, NEEDLE, MEGA SUTURE CUT, DAVINCI XI

## (undated) DEVICE — FORCEPS, BIPOLAR FENESTRATED XI

## (undated) DEVICE — DRAIN, PENROSE, 1 IN X 18 IN, STERILE, LF

## (undated) DEVICE — CATHETER TRAY, SURESTEP, 16FR, URINE METER W/STATLOCK

## (undated) DEVICE — SUTURE, VICRYL, 2-0, 18 IN, UNDYED

## (undated) DEVICE — DRAIN, WOUND, ROUND, W/TROCAR, HOLE PATTERN, 10 IN, MEDIUM/LARGE, 3/16 X 49 IN

## (undated) DEVICE — DRAPE, INSTRUMENT, W/POUCH, STERI DRAPE, 7 X 11 IN, DISPOSABLE, STERILE

## (undated) DEVICE — NEEDLE, INSUFFLATION, 13GAX120MM, DISP

## (undated) DEVICE — NEEDLE, STIMUPLEX, INSULATED, 21GA X 4IN.

## (undated) DEVICE — SOLUTION, IRRIGATION, STERILE WATER, 1000 ML, POUR BOTTLE

## (undated) DEVICE — CUTTER, PROXIMATE LINEAR RELOAD, 75MM, BLUE

## (undated) DEVICE — RELOAD, SUREFORM 45, 4.6 BLACK

## (undated) DEVICE — MANIFOLD, 4 PORT NEPTUNE STANDARD

## (undated) DEVICE — ACCESS PORT, 5MM, 100MM LENGTH, LOW PROFILE W/BLADELESS OPTICAL TIP

## (undated) DEVICE — ADHESIVE, SKIN, LIQUIBAND EXCEED

## (undated) DEVICE — GLOVE, SURGICAL, PROTEXIS PI MICRO, 8.0, PF, LF

## (undated) DEVICE — TROCAR SYSTEM, BALLOON, KII GELPORT, 12 X 100MM

## (undated) DEVICE — GLOVE, SURGEON, PREMIERPRO PI, MICRO, SZ-7.5, PF, WH

## (undated) DEVICE — OSTOMY KIT, POSTOPERATIVE, COLOSTOMY/ILEOSTOMY, LOOP, 1.75 IN FLANGE, STERILE

## (undated) DEVICE — SUTURE, VICRYL, 0, 18 IN, TIE, VIOLET

## (undated) DEVICE — COVER, TIP HOT SHEARS ENDOWRIST

## (undated) DEVICE — LUBRICANT, ELECTROLUBE, F/ELECTRODE TIPS

## (undated) DEVICE — STAPLER, SUREFORM 45, DAVINCI XI

## (undated) DEVICE — SUTURE, VICRYL, 3-0,18 IN, SH, UNDYED

## (undated) DEVICE — SLEEVE, SCD EXPRESS, KNEE LENGTH-MEDIUM

## (undated) DEVICE — SUTURE, MONOCRYL, 4-0, 18 IN, PS2, UNDYED

## (undated) DEVICE — NERVE BLOCK, STIMUQUIK, SINGLE-SHOT, 21GA X 3-1/2

## (undated) DEVICE — DRAPE, COLUMN, DAVINCI XI

## (undated) DEVICE — TIP, SUCTION, YANKAUER, FLEXIBLE

## (undated) DEVICE — GLOVE, PROTEXIS PI CLASSIC, SZ-7.5, PF, LF

## (undated) DEVICE — SEALER, VESSEL, EXTENDED

## (undated) DEVICE — CANNULA, KII ADVANCED FIXATION, 5X100MM W/SEAL

## (undated) DEVICE — SYRINGE, 10 CC, LUER LOCK

## (undated) DEVICE — SUTURE, VICRYL 0, 36 IN, CT-1, VIOLET

## (undated) DEVICE — GLOVE, SURGEON, PREMIERPRO PI, UNDERGLV, SZ-8.0, PF, GRN

## (undated) DEVICE — COVER, TABLE, UHC

## (undated) DEVICE — OBTURATOR, BLADELESS , SU

## (undated) DEVICE — ANTENNA, 30CM, NAV, EMPRINT SX

## (undated) DEVICE — TUBE SET, PNEUMOLAR HEATED, SMOKE EVACU, HIGH-FLOW

## (undated) DEVICE — COUNTER, NEEDLE, FOAM BLOCK, POP-N-COUNT, W/BLADEGUARD, W/ADHESIVE 40 COUNT, RED

## (undated) DEVICE — SOLUTION, IRRIGATION, SODIUM CHLORIDE 0.9%, 1000 ML, POUR BOTTLE